# Patient Record
Sex: MALE | Race: WHITE | NOT HISPANIC OR LATINO
[De-identification: names, ages, dates, MRNs, and addresses within clinical notes are randomized per-mention and may not be internally consistent; named-entity substitution may affect disease eponyms.]

---

## 2017-01-09 ENCOUNTER — RX RENEWAL (OUTPATIENT)
Age: 82
End: 2017-01-09

## 2017-01-12 ENCOUNTER — APPOINTMENT (OUTPATIENT)
Dept: INFECTIOUS DISEASE | Facility: CLINIC | Age: 82
End: 2017-01-12

## 2017-01-12 VITALS
SYSTOLIC BLOOD PRESSURE: 148 MMHG | TEMPERATURE: 96.8 F | BODY MASS INDEX: 28.56 KG/M2 | DIASTOLIC BLOOD PRESSURE: 81 MMHG | HEIGHT: 67 IN | OXYGEN SATURATION: 99 % | HEART RATE: 65 BPM | WEIGHT: 182 LBS

## 2017-02-02 ENCOUNTER — APPOINTMENT (OUTPATIENT)
Dept: INTERNAL MEDICINE | Facility: CLINIC | Age: 82
End: 2017-02-02

## 2017-02-02 VITALS
HEIGHT: 67 IN | SYSTOLIC BLOOD PRESSURE: 143 MMHG | DIASTOLIC BLOOD PRESSURE: 76 MMHG | BODY MASS INDEX: 28.56 KG/M2 | HEART RATE: 83 BPM | OXYGEN SATURATION: 94 % | TEMPERATURE: 97.5 F | WEIGHT: 182 LBS

## 2017-02-03 LAB
25(OH)D3 SERPL-MCNC: 22 NG/ML
ALBUMIN SERPL ELPH-MCNC: 4.2 G/DL
ALP BLD-CCNC: 57 U/L
ALT SERPL-CCNC: 29 U/L
ANION GAP SERPL CALC-SCNC: 15 MMOL/L
AST SERPL-CCNC: 24 U/L
BASOPHILS # BLD AUTO: 0.08 K/UL
BASOPHILS NFR BLD AUTO: 1.2 %
BILIRUB SERPL-MCNC: 0.3 MG/DL
BUN SERPL-MCNC: 28 MG/DL
CALCIUM SERPL-MCNC: 9.5 MG/DL
CHLORIDE SERPL-SCNC: 106 MMOL/L
CHOLEST SERPL-MCNC: 158 MG/DL
CHOLEST/HDLC SERPL: 4.5 RATIO
CO2 SERPL-SCNC: 22 MMOL/L
CREAT SERPL-MCNC: 1.82 MG/DL
EOSINOPHIL # BLD AUTO: 0.23 K/UL
EOSINOPHIL NFR BLD AUTO: 3.6 %
GLUCOSE SERPL-MCNC: 158 MG/DL
HBA1C MFR BLD HPLC: 7.1 %
HCT VFR BLD CALC: 42.3 %
HDLC SERPL-MCNC: 35 MG/DL
HGB BLD-MCNC: 12.9 G/DL
IMM GRANULOCYTES NFR BLD AUTO: 0.2 %
LDLC SERPL CALC-MCNC: 89 MG/DL
LYMPHOCYTES # BLD AUTO: 1.45 K/UL
LYMPHOCYTES NFR BLD AUTO: 22.5 %
MAN DIFF?: NORMAL
MCHC RBC-ENTMCNC: 27.5 PG
MCHC RBC-ENTMCNC: 30.5 GM/DL
MCV RBC AUTO: 90.2 FL
MONOCYTES # BLD AUTO: 0.56 K/UL
MONOCYTES NFR BLD AUTO: 8.7 %
NEUTROPHILS # BLD AUTO: 4.12 K/UL
NEUTROPHILS NFR BLD AUTO: 63.8 %
PLATELET # BLD AUTO: 216 K/UL
POTASSIUM SERPL-SCNC: 4.9 MMOL/L
PROT SERPL-MCNC: 6.8 G/DL
RBC # BLD: 4.69 M/UL
RBC # FLD: 15 %
SODIUM SERPL-SCNC: 143 MMOL/L
T4 SERPL-MCNC: 5 UG/DL
TRIGL SERPL-MCNC: 172 MG/DL
TSH SERPL-ACNC: 2.39 UIU/ML
WBC # FLD AUTO: 6.45 K/UL

## 2017-02-13 ENCOUNTER — MEDICATION RENEWAL (OUTPATIENT)
Age: 82
End: 2017-02-13

## 2017-04-06 ENCOUNTER — RX RENEWAL (OUTPATIENT)
Age: 82
End: 2017-04-06

## 2017-05-01 ENCOUNTER — MEDICATION RENEWAL (OUTPATIENT)
Age: 82
End: 2017-05-01

## 2017-05-01 ENCOUNTER — RX RENEWAL (OUTPATIENT)
Age: 82
End: 2017-05-01

## 2017-05-15 ENCOUNTER — RX RENEWAL (OUTPATIENT)
Age: 82
End: 2017-05-15

## 2017-06-20 ENCOUNTER — RX RENEWAL (OUTPATIENT)
Age: 82
End: 2017-06-20

## 2017-07-17 ENCOUNTER — RX RENEWAL (OUTPATIENT)
Age: 82
End: 2017-07-17

## 2017-10-13 ENCOUNTER — APPOINTMENT (OUTPATIENT)
Dept: INTERNAL MEDICINE | Facility: CLINIC | Age: 82
End: 2017-10-13

## 2017-11-01 ENCOUNTER — LABORATORY RESULT (OUTPATIENT)
Age: 82
End: 2017-11-01

## 2017-11-01 ENCOUNTER — APPOINTMENT (OUTPATIENT)
Dept: INTERNAL MEDICINE | Facility: CLINIC | Age: 82
End: 2017-11-01
Payer: MEDICARE

## 2017-11-01 VITALS
DIASTOLIC BLOOD PRESSURE: 77 MMHG | SYSTOLIC BLOOD PRESSURE: 130 MMHG | WEIGHT: 171 LBS | BODY MASS INDEX: 26.84 KG/M2 | HEIGHT: 67 IN | TEMPERATURE: 98.5 F

## 2017-11-01 VITALS — OXYGEN SATURATION: 94 %

## 2017-11-01 VITALS — HEART RATE: 74 BPM

## 2017-11-01 LAB
BILIRUB UR QL STRIP: NEGATIVE
CLARITY UR: CLEAR
COLLECTION METHOD: NORMAL
GLUCOSE UR-MCNC: NEGATIVE
HCG UR QL: 0.2 EU/DL
HGB UR QL STRIP.AUTO: NORMAL
KETONES UR-MCNC: NEGATIVE
LEUKOCYTE ESTERASE UR QL STRIP: NORMAL
NITRITE UR QL STRIP: POSITIVE
PH UR STRIP: 6
PROT UR STRIP-MCNC: NEGATIVE
SP GR UR STRIP: 1.01

## 2017-11-01 PROCEDURE — G0439: CPT

## 2017-11-01 PROCEDURE — 36415 COLL VENOUS BLD VENIPUNCTURE: CPT

## 2017-11-01 PROCEDURE — 81002 URINALYSIS NONAUTO W/O SCOPE: CPT

## 2017-11-01 PROCEDURE — 99214 OFFICE O/P EST MOD 30 MIN: CPT | Mod: 25

## 2017-11-01 PROCEDURE — 90662 IIV NO PRSV INCREASED AG IM: CPT

## 2017-11-01 PROCEDURE — G0008: CPT

## 2017-11-02 LAB
25(OH)D3 SERPL-MCNC: 26.6 NG/ML
BASOPHILS # BLD AUTO: 0.04 K/UL
BASOPHILS NFR BLD AUTO: 0.6 %
CHOLEST SERPL-MCNC: 127 MG/DL
CHOLEST/HDLC SERPL: 4.5 RATIO
EOSINOPHIL # BLD AUTO: 0.56 K/UL
EOSINOPHIL NFR BLD AUTO: 8.9 %
HBA1C MFR BLD HPLC: 6.3 %
HCT VFR BLD CALC: 40.9 %
HDLC SERPL-MCNC: 28 MG/DL
HGB BLD-MCNC: 12.8 G/DL
IMM GRANULOCYTES NFR BLD AUTO: 0.2 %
LDLC SERPL CALC-MCNC: 66 MG/DL
LYMPHOCYTES # BLD AUTO: 0.88 K/UL
LYMPHOCYTES NFR BLD AUTO: 14 %
MAN DIFF?: NORMAL
MCHC RBC-ENTMCNC: 28.6 PG
MCHC RBC-ENTMCNC: 31.3 GM/DL
MCV RBC AUTO: 91.3 FL
MONOCYTES # BLD AUTO: 0.63 K/UL
MONOCYTES NFR BLD AUTO: 10 %
NEUTROPHILS # BLD AUTO: 4.15 K/UL
NEUTROPHILS NFR BLD AUTO: 66.3 %
PLATELET # BLD AUTO: 176 K/UL
RBC # BLD: 4.48 M/UL
RBC # FLD: 15.8 %
T4 SERPL-MCNC: 6.1 UG/DL
TRIGL SERPL-MCNC: 164 MG/DL
TSH SERPL-ACNC: 3.47 UIU/ML
WBC # FLD AUTO: 6.27 K/UL

## 2017-11-06 ENCOUNTER — MEDICATION RENEWAL (OUTPATIENT)
Age: 82
End: 2017-11-06

## 2018-01-22 ENCOUNTER — RX RENEWAL (OUTPATIENT)
Age: 83
End: 2018-01-22

## 2018-02-04 ENCOUNTER — RX RENEWAL (OUTPATIENT)
Age: 83
End: 2018-02-04

## 2018-03-01 ENCOUNTER — APPOINTMENT (OUTPATIENT)
Dept: INTERNAL MEDICINE | Facility: CLINIC | Age: 83
End: 2018-03-01
Payer: MEDICARE

## 2018-03-01 VITALS — TEMPERATURE: 97.4 F | HEIGHT: 67 IN | BODY MASS INDEX: 29.03 KG/M2 | WEIGHT: 185 LBS

## 2018-03-01 VITALS — HEART RATE: 60 BPM | SYSTOLIC BLOOD PRESSURE: 110 MMHG | DIASTOLIC BLOOD PRESSURE: 60 MMHG

## 2018-03-01 PROCEDURE — 99214 OFFICE O/P EST MOD 30 MIN: CPT

## 2018-03-19 ENCOUNTER — RX RENEWAL (OUTPATIENT)
Age: 83
End: 2018-03-19

## 2018-04-24 ENCOUNTER — MEDICATION RENEWAL (OUTPATIENT)
Age: 83
End: 2018-04-24

## 2018-05-02 ENCOUNTER — APPOINTMENT (OUTPATIENT)
Dept: INTERNAL MEDICINE | Facility: CLINIC | Age: 83
End: 2018-05-02

## 2018-05-11 ENCOUNTER — RX RENEWAL (OUTPATIENT)
Age: 83
End: 2018-05-11

## 2018-05-11 ENCOUNTER — MEDICATION RENEWAL (OUTPATIENT)
Age: 83
End: 2018-05-11

## 2018-07-10 ENCOUNTER — RX RENEWAL (OUTPATIENT)
Age: 83
End: 2018-07-10

## 2018-07-23 ENCOUNTER — RX RENEWAL (OUTPATIENT)
Age: 83
End: 2018-07-23

## 2018-07-26 ENCOUNTER — MEDICATION RENEWAL (OUTPATIENT)
Age: 83
End: 2018-07-26

## 2018-11-29 ENCOUNTER — RX RENEWAL (OUTPATIENT)
Age: 83
End: 2018-11-29

## 2018-12-07 ENCOUNTER — MEDICATION RENEWAL (OUTPATIENT)
Age: 83
End: 2018-12-07

## 2018-12-07 ENCOUNTER — APPOINTMENT (OUTPATIENT)
Dept: INTERNAL MEDICINE | Facility: CLINIC | Age: 83
End: 2018-12-07
Payer: MEDICARE

## 2018-12-07 VITALS
OXYGEN SATURATION: 96 % | TEMPERATURE: 97.8 F | BODY MASS INDEX: 28.25 KG/M2 | DIASTOLIC BLOOD PRESSURE: 80 MMHG | SYSTOLIC BLOOD PRESSURE: 120 MMHG | HEIGHT: 67 IN | HEART RATE: 60 BPM | WEIGHT: 180 LBS

## 2018-12-07 DIAGNOSIS — R21 RASH AND OTHER NONSPECIFIC SKIN ERUPTION: ICD-10-CM

## 2018-12-07 DIAGNOSIS — N39.0 URINARY TRACT INFECTION, SITE NOT SPECIFIED: ICD-10-CM

## 2018-12-07 DIAGNOSIS — Z87.898 PERSONAL HISTORY OF OTHER SPECIFIED CONDITIONS: ICD-10-CM

## 2018-12-07 LAB
BILIRUB UR QL STRIP: NEGATIVE
CLARITY UR: CLEAR
COLLECTION METHOD: NORMAL
GLUCOSE UR-MCNC: NEGATIVE
HCG UR QL: 0.2 EU/DL
HGB UR QL STRIP.AUTO: NORMAL
KETONES UR-MCNC: NEGATIVE
LEUKOCYTE ESTERASE UR QL STRIP: NORMAL
NITRITE UR QL STRIP: POSITIVE
PH UR STRIP: 5
PROT UR STRIP-MCNC: NEGATIVE
SP GR UR STRIP: 1.01

## 2018-12-07 PROCEDURE — 36415 COLL VENOUS BLD VENIPUNCTURE: CPT

## 2018-12-07 PROCEDURE — 99214 OFFICE O/P EST MOD 30 MIN: CPT | Mod: 25

## 2018-12-07 PROCEDURE — 81002 URINALYSIS NONAUTO W/O SCOPE: CPT

## 2018-12-07 PROCEDURE — G0439: CPT

## 2018-12-07 RX ORDER — GLYCOPYRROLATE AND FORMOTEROL FUMARATE 9; 4.8 UG/1; UG/1
9-4.8 AEROSOL, METERED RESPIRATORY (INHALATION) TWICE DAILY
Qty: 1 | Refills: 0 | Status: DISCONTINUED | OUTPATIENT
Start: 2018-03-01 | End: 2018-12-07

## 2018-12-07 RX ORDER — NYSTATIN 100000 [USP'U]/G
100000 POWDER TOPICAL 3 TIMES DAILY
Qty: 1 | Refills: 11 | Status: DISCONTINUED | COMMUNITY
Start: 2017-11-01 | End: 2018-12-07

## 2018-12-07 NOTE — PLAN
[FreeTextEntry1] : Medicare Annual\par vaccine up to date\par aged out of other health maintenance\par \par \par Dementia progressive\par diabetes on med , well controlled check blood\par ashd stable\par cholesterol on med to check\par f/u cardiolgist

## 2018-12-07 NOTE — REVIEW OF SYSTEMS
[Fever] : no fever [Night Sweats] : no night sweats [Discharge] : no discharge [Vision Problems] : no vision problems [Earache] : no earache [Nasal Discharge] : no nasal discharge [Chest Pain] : no chest pain [Orthopena] : no orthopnea [Shortness Of Breath] : no shortness of breath [Wheezing] : no wheezing [Abdominal Pain] : no abdominal pain [Vomiting] : no vomiting [Dysuria] : no dysuria [Hematuria] : no hematuria [Joint Pain] : joint pain [Back Pain] : back pain [Joint Swelling] : joint swelling [Confusion] : confusion [Unsteady Walk] : ataxia [Memory Loss] : memory loss

## 2018-12-07 NOTE — HISTORY OF PRESENT ILLNESS
[FreeTextEntry1] : Medicare Annual [de-identified] : Medicare Annual\par had all vaccine\par aged out of other health mainteance\par \par \par diabetes on med\par no opth?\par ashd see Dr Mann\par see podiatry\par history of stent\par Walks with walker\par

## 2018-12-07 NOTE — PHYSICAL EXAM
[No Acute Distress] : no acute distress [Well Nourished] : well nourished [Normal Outer Ear/Nose] : the outer ears and nose were normal in appearance [Normal Oropharynx] : the oropharynx was normal [No JVD] : no jugular venous distention [Supple] : supple [No Respiratory Distress] : no respiratory distress  [Clear to Auscultation] : lungs were clear to auscultation bilaterally [Normal Rate] : normal rate  [Regular Rhythm] : with a regular rhythm [No Edema] : there was no peripheral edema [No Extremity Clubbing/Cyanosis] : no extremity clubbing/cyanosis [Soft] : abdomen soft [No HSM] : no HSM [Normal Bowel Sounds] : normal bowel sounds

## 2018-12-07 NOTE — HEALTH RISK ASSESSMENT
[Poor] : ~his/her~ mood as  poor [] : No [Any fall with injury in past year] : Patient reported fall with injury in the past year [1] : 2) Feeling down, depressed, or hopeless for several days (1) [[id number]] : 2 [Change in mental status noted] : Change in mental status noted [Language] : denies difficulty with language [Behavior] : difficulty with behavior [Learning/Retaining New Information] : difficulty learning/retaining new information [Handling Complex Tasks] : difficulty handling complex tasks [Reasoning] : difficulty with reasoning [Spatial Ability and Orientation] : difficulty with spatial ability and orientation [None] : None [With Family] : lives with family [College] : College [] :  [Sexually Active] : sexually active [Feels Safe at Home] : Feels safe at home [Fully functional (bathing, dressing, toileting, transferring, walking, feeding)] : Fully functional (bathing, dressing, toileting, transferring, walking, feeding) [Fully functional (using the telephone, shopping, preparing meals, housekeeping, doing laundry, using] : Fully functional and needs no help or supervision to perform IADLs (using the telephone, shopping, preparing meals, housekeeping, doing laundry, using transportation, managing medications and managing finances) [Reports changes in hearing] : Reports no changes in hearing [Reports changes in vision] : Reports no changes in vision [Reports changes in dental health] : Reports no changes in dental health [Smoke Detector] : smoke detector [Carbon Monoxide Detector] : carbon monoxide detector [Guns at Home] : no guns at home [Safety elements used in home] : safety elements used in home [Seat Belt] :  uses seat belt

## 2018-12-08 ENCOUNTER — TRANSCRIPTION ENCOUNTER (OUTPATIENT)
Age: 83
End: 2018-12-08

## 2018-12-08 LAB
25(OH)D3 SERPL-MCNC: 24.3 NG/ML
ALBUMIN SERPL ELPH-MCNC: 4.2 G/DL
ALP BLD-CCNC: 55 U/L
ALT SERPL-CCNC: 23 U/L
ANION GAP SERPL CALC-SCNC: 10 MMOL/L
AST SERPL-CCNC: 20 U/L
BASOPHILS # BLD AUTO: 0.05 K/UL
BASOPHILS NFR BLD AUTO: 0.7 %
BILIRUB SERPL-MCNC: 0.3 MG/DL
BUN SERPL-MCNC: 33 MG/DL
CALCIUM SERPL-MCNC: 9.1 MG/DL
CHLORIDE SERPL-SCNC: 108 MMOL/L
CHOLEST SERPL-MCNC: 130 MG/DL
CHOLEST/HDLC SERPL: 3.7 RATIO
CO2 SERPL-SCNC: 24 MMOL/L
CREAT SERPL-MCNC: 1.61 MG/DL
EOSINOPHIL # BLD AUTO: 0.32 K/UL
EOSINOPHIL NFR BLD AUTO: 4.7 %
GLUCOSE SERPL-MCNC: 87 MG/DL
HBA1C MFR BLD HPLC: 6.1 %
HCT VFR BLD CALC: 36.1 %
HDLC SERPL-MCNC: 35 MG/DL
HGB BLD-MCNC: 11.2 G/DL
IMM GRANULOCYTES NFR BLD AUTO: 0.3 %
LDLC SERPL CALC-MCNC: 75 MG/DL
LYMPHOCYTES # BLD AUTO: 1.05 K/UL
LYMPHOCYTES NFR BLD AUTO: 15.3 %
MAN DIFF?: NORMAL
MCHC RBC-ENTMCNC: 26.6 PG
MCHC RBC-ENTMCNC: 31 GM/DL
MCV RBC AUTO: 85.7 FL
MONOCYTES # BLD AUTO: 0.7 K/UL
MONOCYTES NFR BLD AUTO: 10.2 %
NEUTROPHILS # BLD AUTO: 4.72 K/UL
NEUTROPHILS NFR BLD AUTO: 68.8 %
PLATELET # BLD AUTO: 213 K/UL
POTASSIUM SERPL-SCNC: 4.9 MMOL/L
PROT SERPL-MCNC: 6.3 G/DL
RBC # BLD: 4.21 M/UL
RBC # FLD: 16.2 %
SODIUM SERPL-SCNC: 142 MMOL/L
T4 SERPL-MCNC: 5 UG/DL
TRIGL SERPL-MCNC: 100 MG/DL
TSH SERPL-ACNC: 2.19 UIU/ML
WBC # FLD AUTO: 6.86 K/UL

## 2018-12-09 ENCOUNTER — MEDICATION RENEWAL (OUTPATIENT)
Age: 83
End: 2018-12-09

## 2018-12-09 LAB — NT-PROBNP SERPL-MCNC: 827 PG/ML

## 2019-01-09 ENCOUNTER — RX RENEWAL (OUTPATIENT)
Age: 84
End: 2019-01-09

## 2019-01-22 ENCOUNTER — RX RENEWAL (OUTPATIENT)
Age: 84
End: 2019-01-22

## 2019-01-23 ENCOUNTER — APPOINTMENT (OUTPATIENT)
Dept: OTOLARYNGOLOGY | Facility: CLINIC | Age: 84
End: 2019-01-23
Payer: MEDICARE

## 2019-01-23 VITALS
WEIGHT: 180 LBS | SYSTOLIC BLOOD PRESSURE: 118 MMHG | HEART RATE: 57 BPM | DIASTOLIC BLOOD PRESSURE: 65 MMHG | BODY MASS INDEX: 28.25 KG/M2 | HEIGHT: 67 IN

## 2019-01-23 DIAGNOSIS — R09.81 NASAL CONGESTION: ICD-10-CM

## 2019-01-23 DIAGNOSIS — J34.89 OTHER SPECIFIED DISORDERS OF NOSE AND NASAL SINUSES: ICD-10-CM

## 2019-01-23 PROCEDURE — 92557 COMPREHENSIVE HEARING TEST: CPT

## 2019-01-23 PROCEDURE — G0268 REMOVAL OF IMPACTED WAX MD: CPT

## 2019-01-23 PROCEDURE — 99204 OFFICE O/P NEW MOD 45 MIN: CPT | Mod: 25

## 2019-01-23 PROCEDURE — 31231 NASAL ENDOSCOPY DX: CPT

## 2019-01-23 PROCEDURE — 92567 TYMPANOMETRY: CPT

## 2019-01-23 NOTE — ADDENDUM
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

## 2019-01-23 NOTE — HISTORY OF PRESENT ILLNESS
[de-identified] : Patient has been blasting the TV lately and has been having people repeat themselves all the time. He does not have any ear pain or pressure but has had a history of ear clogging with wax. He does not have any ringing in the ears or dizziness. He does not have any nasal congestion or runny nose right now but according to wife he has a frequent runny nose that makes him blow his nose a lot

## 2019-01-23 NOTE — PHYSICAL EXAM
[Midline] : trachea located in midline position [Normal] : no rashes [Nasal Endoscopy Performed] : nasal endoscopy was performed, see procedure section for findings

## 2019-01-23 NOTE — CONSULT LETTER
[Dear  ___] : Dear  [unfilled], [Consult Letter:] : I had the pleasure of evaluating your patient, [unfilled]. [Please see my note below.] : Please see my note below. [Consult Closing:] : Thank you very much for allowing me to participate in the care of this patient.  If you have any questions, please do not hesitate to contact me. [Sincerely,] : Sincerely, [FreeTextEntry3] : Maxi Negron MD\par [facility]\par Otolaryngology and Facial Plastics\par Associated Professor, [employer]\par

## 2019-02-04 ENCOUNTER — MEDICATION RENEWAL (OUTPATIENT)
Age: 84
End: 2019-02-04

## 2019-05-23 ENCOUNTER — MEDICATION RENEWAL (OUTPATIENT)
Age: 84
End: 2019-05-23

## 2019-06-03 ENCOUNTER — MEDICATION RENEWAL (OUTPATIENT)
Age: 84
End: 2019-06-03

## 2019-06-16 ENCOUNTER — RX RENEWAL (OUTPATIENT)
Age: 84
End: 2019-06-16

## 2019-06-21 ENCOUNTER — APPOINTMENT (OUTPATIENT)
Dept: INTERNAL MEDICINE | Facility: CLINIC | Age: 84
End: 2019-06-21
Payer: MEDICARE

## 2019-06-21 VITALS
BODY MASS INDEX: 28.56 KG/M2 | SYSTOLIC BLOOD PRESSURE: 100 MMHG | WEIGHT: 182 LBS | HEIGHT: 67 IN | DIASTOLIC BLOOD PRESSURE: 70 MMHG | TEMPERATURE: 98 F

## 2019-06-21 DIAGNOSIS — K21.9 GASTRO-ESOPHAGEAL REFLUX DISEASE W/OUT ESOPHAGITIS: ICD-10-CM

## 2019-06-21 PROCEDURE — 99214 OFFICE O/P EST MOD 30 MIN: CPT | Mod: 25

## 2019-06-21 PROCEDURE — 36415 COLL VENOUS BLD VENIPUNCTURE: CPT

## 2019-06-21 NOTE — HISTORY OF PRESENT ILLNESS
[FreeTextEntry1] : diabetes [de-identified] : diabetes and dementia\par ashd\par gait worse\par Recent visit Cardiology Dr Mann\par Recent visit Neuro Ostrofsky

## 2019-06-21 NOTE — PLAN
[FreeTextEntry1] : diabetes on med\par ashd no cp or sob\par gait poor\par under care of neuro and cardiology\par blood to check diabetes, lipids and other

## 2019-06-22 LAB
25(OH)D3 SERPL-MCNC: 21.6 NG/ML
ALBUMIN SERPL ELPH-MCNC: 4.2 G/DL
ALP BLD-CCNC: 48 U/L
ALT SERPL-CCNC: 20 U/L
ANION GAP SERPL CALC-SCNC: 11 MMOL/L
AST SERPL-CCNC: 19 U/L
BASOPHILS # BLD AUTO: 0.09 K/UL
BASOPHILS NFR BLD AUTO: 1.2 %
BILIRUB SERPL-MCNC: 0.2 MG/DL
BUN SERPL-MCNC: 26 MG/DL
CALCIUM SERPL-MCNC: 9.2 MG/DL
CHLORIDE SERPL-SCNC: 108 MMOL/L
CHOLEST SERPL-MCNC: 124 MG/DL
CHOLEST/HDLC SERPL: 4.1 RATIO
CO2 SERPL-SCNC: 24 MMOL/L
CREAT SERPL-MCNC: 1.6 MG/DL
EOSINOPHIL # BLD AUTO: 0.32 K/UL
EOSINOPHIL NFR BLD AUTO: 4.4 %
ESTIMATED AVERAGE GLUCOSE: 128 MG/DL
GLUCOSE SERPL-MCNC: 87 MG/DL
HBA1C MFR BLD HPLC: 6.1 %
HCT VFR BLD CALC: 36.9 %
HDLC SERPL-MCNC: 30 MG/DL
HGB BLD-MCNC: 11.1 G/DL
IMM GRANULOCYTES NFR BLD AUTO: 0.4 %
LDLC SERPL CALC-MCNC: 69 MG/DL
LYMPHOCYTES # BLD AUTO: 1.16 K/UL
LYMPHOCYTES NFR BLD AUTO: 15.8 %
MAN DIFF?: NORMAL
MCHC RBC-ENTMCNC: 25.6 PG
MCHC RBC-ENTMCNC: 30.1 GM/DL
MCV RBC AUTO: 85.2 FL
MONOCYTES # BLD AUTO: 0.89 K/UL
MONOCYTES NFR BLD AUTO: 12.1 %
NEUTROPHILS # BLD AUTO: 4.86 K/UL
NEUTROPHILS NFR BLD AUTO: 66.1 %
PLATELET # BLD AUTO: 212 K/UL
POTASSIUM SERPL-SCNC: 4.6 MMOL/L
PROT SERPL-MCNC: 6.5 G/DL
RBC # BLD: 4.33 M/UL
RBC # FLD: 15.8 %
SODIUM SERPL-SCNC: 143 MMOL/L
T4 FREE SERPL-MCNC: 0.9 NG/DL
TRIGL SERPL-MCNC: 126 MG/DL
TSH SERPL-ACNC: 2.96 UIU/ML
WBC # FLD AUTO: 7.35 K/UL

## 2019-07-07 ENCOUNTER — RX RENEWAL (OUTPATIENT)
Age: 84
End: 2019-07-07

## 2019-07-14 ENCOUNTER — RX RENEWAL (OUTPATIENT)
Age: 84
End: 2019-07-14

## 2019-07-17 ENCOUNTER — MEDICATION RENEWAL (OUTPATIENT)
Age: 84
End: 2019-07-17

## 2019-08-18 ENCOUNTER — RX RENEWAL (OUTPATIENT)
Age: 84
End: 2019-08-18

## 2019-09-25 ENCOUNTER — APPOINTMENT (OUTPATIENT)
Dept: INTERNAL MEDICINE | Facility: CLINIC | Age: 84
End: 2019-09-25
Payer: MEDICARE

## 2019-09-25 VITALS
SYSTOLIC BLOOD PRESSURE: 120 MMHG | DIASTOLIC BLOOD PRESSURE: 70 MMHG | WEIGHT: 182 LBS | TEMPERATURE: 98 F | HEIGHT: 67 IN | BODY MASS INDEX: 28.56 KG/M2

## 2019-09-25 DIAGNOSIS — B02.9 ZOSTER W/OUT COMPLICATIONS: ICD-10-CM

## 2019-09-25 PROCEDURE — 99213 OFFICE O/P EST LOW 20 MIN: CPT

## 2020-02-16 ENCOUNTER — RX RENEWAL (OUTPATIENT)
Age: 85
End: 2020-02-16

## 2020-03-13 ENCOUNTER — APPOINTMENT (OUTPATIENT)
Dept: INTERNAL MEDICINE | Facility: CLINIC | Age: 85
End: 2020-03-13
Payer: MEDICARE

## 2020-03-13 VITALS
WEIGHT: 182 LBS | DIASTOLIC BLOOD PRESSURE: 70 MMHG | BODY MASS INDEX: 28.56 KG/M2 | HEART RATE: 70 BPM | SYSTOLIC BLOOD PRESSURE: 110 MMHG | HEIGHT: 67 IN

## 2020-03-13 PROCEDURE — 36415 COLL VENOUS BLD VENIPUNCTURE: CPT

## 2020-03-13 PROCEDURE — G0439: CPT

## 2020-03-13 PROCEDURE — 99215 OFFICE O/P EST HI 40 MIN: CPT | Mod: 25

## 2020-03-13 NOTE — REVIEW OF SYSTEMS
[Fever] : no fever [Night Sweats] : no night sweats [Earache] : no earache [Nasal Discharge] : no nasal discharge [Chest Pain] : no chest pain [Orthopena] : no orthopnea [Shortness Of Breath] : no shortness of breath [Wheezing] : no wheezing [Abdominal Pain] : no abdominal pain [Vomiting] : no vomiting [Back Pain] : back pain

## 2020-03-13 NOTE — PHYSICAL EXAM
[No Acute Distress] : no acute distress [Well Nourished] : well nourished [Normal Outer Ear/Nose] : the outer ears and nose were normal in appearance [Normal Oropharynx] : the oropharynx was normal [No JVD] : no jugular venous distention [No Lymphadenopathy] : no lymphadenopathy [No Respiratory Distress] : no respiratory distress  [No Accessory Muscle Use] : no accessory muscle use [Normal Rate] : normal rate  [Regular Rhythm] : with a regular rhythm [Soft] : abdomen soft [No HSM] : no HSM

## 2020-03-13 NOTE — HISTORY OF PRESENT ILLNESS
[de-identified] : Annual \par had all vaccine\par aged out of other\par \par \par \par cough 10 days \par worse at night\par no cp\par history of stent See Dr Mann\par diabetic on med\par

## 2020-03-13 NOTE — HEALTH RISK ASSESSMENT
[Good] : ~his/her~  mood as  good [] : No [No] : No [No falls in past year] : Patient reported no falls in the past year [1] : 2) Feeling down, depressed, or hopeless for several days (1) [[id number]] : 2 [Change in mental status noted] : No change in mental status noted [Language] : denies difficulty with language [Behavior] : difficulty with behavior [Learning/Retaining New Information] : difficulty learning/retaining new information [Handling Complex Tasks] : difficulty handling complex tasks [Reasoning] : difficulty with reasoning [Spatial Ability and Orientation] : difficulty with spatial ability and orientation [None] : None [With Family] : lives with family [Retired] : retired [College] : College [] :  [Feels Safe at Home] : Feels safe at home [Fully functional (bathing, dressing, toileting, transferring, walking, feeding)] : Fully functional (bathing, dressing, toileting, transferring, walking, feeding) [Fully functional (using the telephone, shopping, preparing meals, housekeeping, doing laundry, using] : Fully functional and needs no help or supervision to perform IADLs (using the telephone, shopping, preparing meals, housekeeping, doing laundry, using transportation, managing medications and managing finances) [Reports changes in hearing] : Reports no changes in hearing [Reports changes in vision] : Reports no changes in vision [Reports normal functional visual acuity (ie: able to read med bottle)] : Reports poor functional visual acuity.  [Reports changes in dental health] : Reports no changes in dental health [Smoke Detector] : smoke detector [Carbon Monoxide Detector] : carbon monoxide detector [Guns at Home] : no guns at home [Safety elements used in home] : safety elements used in home [Seat Belt] :  uses seat belt

## 2020-03-13 NOTE — PLAN
[FreeTextEntry1] : Annual exam\par had all vaccine\par aged out of other health maintenance\par \par \par diabetes check med\par cough no evidence of chf will check pro bnp

## 2020-03-15 LAB
25(OH)D3 SERPL-MCNC: 21.3 NG/ML
ALBUMIN SERPL ELPH-MCNC: 4.2 G/DL
ALP BLD-CCNC: 57 U/L
ALT SERPL-CCNC: 14 U/L
ANION GAP SERPL CALC-SCNC: 12 MMOL/L
AST SERPL-CCNC: 17 U/L
BASOPHILS # BLD AUTO: 0.09 K/UL
BASOPHILS NFR BLD AUTO: 1.1 %
BILIRUB SERPL-MCNC: 0.2 MG/DL
BUN SERPL-MCNC: 28 MG/DL
CALCIUM SERPL-MCNC: 9 MG/DL
CHLORIDE SERPL-SCNC: 107 MMOL/L
CHOLEST SERPL-MCNC: 127 MG/DL
CHOLEST/HDLC SERPL: 4.3 RATIO
CO2 SERPL-SCNC: 22 MMOL/L
CREAT SERPL-MCNC: 1.74 MG/DL
EOSINOPHIL # BLD AUTO: 0.25 K/UL
EOSINOPHIL NFR BLD AUTO: 3.1 %
ESTIMATED AVERAGE GLUCOSE: 126 MG/DL
GLUCOSE SERPL-MCNC: 81 MG/DL
HBA1C MFR BLD HPLC: 6 %
HCT VFR BLD CALC: 35.4 %
HDLC SERPL-MCNC: 30 MG/DL
HGB BLD-MCNC: 10.2 G/DL
IMM GRANULOCYTES NFR BLD AUTO: 0.4 %
LDLC SERPL CALC-MCNC: 70 MG/DL
LYMPHOCYTES # BLD AUTO: 1.19 K/UL
LYMPHOCYTES NFR BLD AUTO: 15 %
MAN DIFF?: NORMAL
MCHC RBC-ENTMCNC: 24.9 PG
MCHC RBC-ENTMCNC: 28.8 GM/DL
MCV RBC AUTO: 86.3 FL
MONOCYTES # BLD AUTO: 0.89 K/UL
MONOCYTES NFR BLD AUTO: 11.2 %
NEUTROPHILS # BLD AUTO: 5.49 K/UL
NEUTROPHILS NFR BLD AUTO: 69.2 %
NT-PROBNP SERPL-MCNC: 1520 PG/ML
PLATELET # BLD AUTO: 262 K/UL
POTASSIUM SERPL-SCNC: 4.8 MMOL/L
PROT SERPL-MCNC: 6.6 G/DL
RBC # BLD: 4.1 M/UL
RBC # FLD: 16.9 %
SODIUM SERPL-SCNC: 141 MMOL/L
T4 FREE SERPL-MCNC: 1 NG/DL
TRIGL SERPL-MCNC: 138 MG/DL
TSH SERPL-ACNC: 3.08 UIU/ML
WBC # FLD AUTO: 7.94 K/UL

## 2020-04-12 ENCOUNTER — RX RENEWAL (OUTPATIENT)
Age: 85
End: 2020-04-12

## 2020-04-24 ENCOUNTER — APPOINTMENT (OUTPATIENT)
Dept: INTERNAL MEDICINE | Facility: CLINIC | Age: 85
End: 2020-04-24
Payer: MEDICARE

## 2020-04-24 VITALS — DIASTOLIC BLOOD PRESSURE: 68 MMHG | SYSTOLIC BLOOD PRESSURE: 128 MMHG | HEART RATE: 68 BPM

## 2020-04-24 DIAGNOSIS — Z87.898 PERSONAL HISTORY OF OTHER SPECIFIED CONDITIONS: ICD-10-CM

## 2020-04-24 DIAGNOSIS — R06.2 WHEEZING: ICD-10-CM

## 2020-04-24 PROCEDURE — 99213 OFFICE O/P EST LOW 20 MIN: CPT | Mod: 95

## 2020-04-24 NOTE — REVIEW OF SYSTEMS
[Chest Pain] : no chest pain [Lower Ext Edema] : no lower extremity edema [Shortness Of Breath] : shortness of breath [Orthopena] : orthopnea [Abdominal Pain] : no abdominal pain [Vomiting] : no vomiting [Wheezing] : wheezing [Negative] : Constitutional

## 2020-04-24 NOTE — HISTORY OF PRESENT ILLNESS
[Medical Office: ([facility])___] : at the medical office located in  [Home] : at home, [unfilled] , at the time of the visit. [Spouse] : spouse [Patient] : the patient [FreeTextEntry1] : follow up [FreeTextEntry2] : and wife [de-identified] : Oral consent given\par Had Wheezing given lasix\par better\par still sob\par no chest pain\par sugars good\par bp 128/70 and pulse 68\par using walker\par dementia stable\par  remove dressing on neck on Sunday. If drainage occurs from either incision, place dry dressing over areas.

## 2020-04-24 NOTE — PLAN
[FreeTextEntry1] : Wheezing  chf or other\par will try increase atenolol to 25 bid\par add imdur\par coontinue lasix 20\par warned about side effects of imdur\par discussed diabees and demtnia

## 2020-04-27 RX ORDER — ISOSORBIDE MONONITRATE 30 MG/1
30 TABLET, EXTENDED RELEASE ORAL
Qty: 30 | Refills: 1 | Status: DISCONTINUED | COMMUNITY
Start: 2020-04-24 | End: 2020-04-27

## 2020-05-12 ENCOUNTER — NON-APPOINTMENT (OUTPATIENT)
Age: 85
End: 2020-05-12

## 2020-05-18 ENCOUNTER — EMERGENCY (EMERGENCY)
Facility: HOSPITAL | Age: 85
LOS: 1 days | Discharge: HOME | End: 2020-05-18
Attending: EMERGENCY MEDICINE | Admitting: EMERGENCY MEDICINE
Payer: MEDICARE

## 2020-05-18 VITALS
OXYGEN SATURATION: 100 % | HEART RATE: 75 BPM | TEMPERATURE: 98 F | DIASTOLIC BLOOD PRESSURE: 67 MMHG | RESPIRATION RATE: 16 BRPM | SYSTOLIC BLOOD PRESSURE: 131 MMHG

## 2020-05-18 LAB
ALBUMIN SERPL ELPH-MCNC: 3.9 G/DL — SIGNIFICANT CHANGE UP (ref 3.3–5)
ALP SERPL-CCNC: 52 U/L — SIGNIFICANT CHANGE UP (ref 40–120)
ALT FLD-CCNC: 13 U/L — SIGNIFICANT CHANGE UP (ref 4–41)
ANION GAP SERPL CALC-SCNC: 11 MMO/L — SIGNIFICANT CHANGE UP (ref 7–14)
APTT BLD: 31.9 SEC — SIGNIFICANT CHANGE UP (ref 27.5–36.3)
AST SERPL-CCNC: 15 U/L — SIGNIFICANT CHANGE UP (ref 4–40)
BASE EXCESS BLDV CALC-SCNC: -1.9 MMOL/L — SIGNIFICANT CHANGE UP
BASOPHILS # BLD AUTO: 0.07 K/UL — SIGNIFICANT CHANGE UP (ref 0–0.2)
BASOPHILS NFR BLD AUTO: 1 % — SIGNIFICANT CHANGE UP (ref 0–2)
BILIRUB SERPL-MCNC: 0.2 MG/DL — SIGNIFICANT CHANGE UP (ref 0.2–1.2)
BLOOD GAS VENOUS - CREATININE: 1.88 MG/DL — HIGH (ref 0.5–1.3)
BLOOD GAS VENOUS - FIO2: 21 — SIGNIFICANT CHANGE UP
BUN SERPL-MCNC: 36 MG/DL — HIGH (ref 7–23)
CALCIUM SERPL-MCNC: 9 MG/DL — SIGNIFICANT CHANGE UP (ref 8.4–10.5)
CHLORIDE BLDV-SCNC: 111 MMOL/L — HIGH (ref 96–108)
CHLORIDE SERPL-SCNC: 109 MMOL/L — HIGH (ref 98–107)
CO2 SERPL-SCNC: 22 MMOL/L — SIGNIFICANT CHANGE UP (ref 22–31)
CREAT SERPL-MCNC: 1.95 MG/DL — HIGH (ref 0.5–1.3)
EOSINOPHIL # BLD AUTO: 0.34 K/UL — SIGNIFICANT CHANGE UP (ref 0–0.5)
EOSINOPHIL NFR BLD AUTO: 4.8 % — SIGNIFICANT CHANGE UP (ref 0–6)
GAS PNL BLDV: 143 MMOL/L — SIGNIFICANT CHANGE UP (ref 136–146)
GLUCOSE BLDV-MCNC: 103 MG/DL — HIGH (ref 70–99)
GLUCOSE SERPL-MCNC: 112 MG/DL — HIGH (ref 70–99)
HCO3 BLDV-SCNC: 22 MMOL/L — SIGNIFICANT CHANGE UP (ref 20–27)
HCT VFR BLD CALC: 30.8 % — LOW (ref 39–50)
HCT VFR BLDV CALC: 29.5 % — LOW (ref 39–51)
HGB BLD-MCNC: 9.2 G/DL — LOW (ref 13–17)
HGB BLDV-MCNC: 9.5 G/DL — LOW (ref 13–17)
IMM GRANULOCYTES NFR BLD AUTO: 0.3 % — SIGNIFICANT CHANGE UP (ref 0–1.5)
INR BLD: 1.2 — HIGH (ref 0.88–1.17)
LACTATE BLDV-MCNC: 1.6 MMOL/L — SIGNIFICANT CHANGE UP (ref 0.5–2)
LYMPHOCYTES # BLD AUTO: 0.99 K/UL — LOW (ref 1–3.3)
LYMPHOCYTES # BLD AUTO: 14 % — SIGNIFICANT CHANGE UP (ref 13–44)
MCHC RBC-ENTMCNC: 23.5 PG — LOW (ref 27–34)
MCHC RBC-ENTMCNC: 29.9 % — LOW (ref 32–36)
MCV RBC AUTO: 78.6 FL — LOW (ref 80–100)
MONOCYTES # BLD AUTO: 0.7 K/UL — SIGNIFICANT CHANGE UP (ref 0–0.9)
MONOCYTES NFR BLD AUTO: 9.9 % — SIGNIFICANT CHANGE UP (ref 2–14)
NEUTROPHILS # BLD AUTO: 4.94 K/UL — SIGNIFICANT CHANGE UP (ref 1.8–7.4)
NEUTROPHILS NFR BLD AUTO: 70 % — SIGNIFICANT CHANGE UP (ref 43–77)
NRBC # FLD: 0 K/UL — SIGNIFICANT CHANGE UP (ref 0–0)
NT-PROBNP SERPL-SCNC: 1784 PG/ML — SIGNIFICANT CHANGE UP
PCO2 BLDV: 45 MMHG — SIGNIFICANT CHANGE UP (ref 41–51)
PH BLDV: 7.33 PH — SIGNIFICANT CHANGE UP (ref 7.32–7.43)
PLATELET # BLD AUTO: 242 K/UL — SIGNIFICANT CHANGE UP (ref 150–400)
PMV BLD: 10.3 FL — SIGNIFICANT CHANGE UP (ref 7–13)
PO2 BLDV: 40 MMHG — SIGNIFICANT CHANGE UP (ref 35–40)
POTASSIUM BLDV-SCNC: 4 MMOL/L — SIGNIFICANT CHANGE UP (ref 3.4–4.5)
POTASSIUM SERPL-MCNC: 4.2 MMOL/L — SIGNIFICANT CHANGE UP (ref 3.5–5.3)
POTASSIUM SERPL-SCNC: 4.2 MMOL/L — SIGNIFICANT CHANGE UP (ref 3.5–5.3)
PROT SERPL-MCNC: 6.7 G/DL — SIGNIFICANT CHANGE UP (ref 6–8.3)
PROTHROM AB SERPL-ACNC: 13.7 SEC — HIGH (ref 9.8–13.1)
RBC # BLD: 3.92 M/UL — LOW (ref 4.2–5.8)
RBC # FLD: 17.4 % — HIGH (ref 10.3–14.5)
SAO2 % BLDV: 64.4 % — SIGNIFICANT CHANGE UP (ref 60–85)
SODIUM SERPL-SCNC: 142 MMOL/L — SIGNIFICANT CHANGE UP (ref 135–145)
TROPONIN T, HIGH SENSITIVITY: 29 NG/L — SIGNIFICANT CHANGE UP (ref ?–14)
TROPONIN T, HIGH SENSITIVITY: 30 NG/L — SIGNIFICANT CHANGE UP (ref ?–14)
WBC # BLD: 7.06 K/UL — SIGNIFICANT CHANGE UP (ref 3.8–10.5)
WBC # FLD AUTO: 7.06 K/UL — SIGNIFICANT CHANGE UP (ref 3.8–10.5)

## 2020-05-18 PROCEDURE — 71045 X-RAY EXAM CHEST 1 VIEW: CPT | Mod: 26

## 2020-05-18 PROCEDURE — 99284 EMERGENCY DEPT VISIT MOD MDM: CPT

## 2020-05-18 NOTE — ED ADULT NURSE NOTE - CHIEF COMPLAINT QUOTE
Pt. brought to [facility] ED by [facility].  Has increased shortness of breath. Saw PMD 2 weeks ago, was placed on lasix. Pt. has history of MI 5 years ago, DM, dementia. . Denies c/o pain. Tachypnea noted at triage. NAD.

## 2020-05-18 NOTE — ED ADULT NURSE NOTE - OBJECTIVE STATEMENT
pt received alert and oriented x4. pt c.o  having sob. pt states his wife called the ambulance because she noticed that he was sob. pt states he feels better and unsure why he is. respirations equal and unlabored. abd is distended. 20g placed in left forearm. labs drawn and sent. pt declines chest pain or sob at the moment. Call bell in reach, warm blanket provided, bed in lowest position, side rails up x2,safety maintained. will continue to monitor.

## 2020-05-18 NOTE — ED PROVIDER NOTE - PHYSICAL EXAMINATION
General: alert, conversant, looks well, vitals reassuring, not labored breathing  Head: atraumatic, normocephalic  Eyes: PERRL, EOMI, no scleral icterus  ENT: no epistaxis, moist mucous membranes, normal phonation, airway patent  Neck: full ROM, no midline ttp  CV: RRR, no murmurs, HDS  Pulm: lungs CTA b/l, no wheezing, no respiratory distress  GI: abd soft, non tender, no guarding/rebound/masses  Back: normal ROM, no midline ttp, no signs of trauma  Extremities: normal ROM, joints stable, distal pulses intact, no edema  Neuro: alert, oriented to self, cannot remember events from tonight, PERRL, moving all extremities   Derm: warm, dry, normal color

## 2020-05-18 NOTE — ED PROVIDER NOTE - NSFOLLOWUPINSTRUCTIONS_ED_ALL_ED_FT
The lab work and XRAY did not reveal a cause of your shortness of breath.    Please hold the LASIX for 2-3 days and follow up with Dr. Agustin this week.    Please return to ER for new or concerning symptoms.

## 2020-05-18 NOTE — ED PROVIDER NOTE - PROGRESS NOTE DETAILS
Haverty, PGY2- workup not markedly abnormal, Scr up but has been elevated in the past, pt still without sx, normal vitals, d/w Dr. JESSY Mann, confirms EKG with prior conduction delays, states has talked to wife several times in the last few months, would recommended discharge, possibly hold lasix for a few days  attempted to reach PCP, Dr. Agustin but unable to  wife called to let her know given no hard indication for admission risks of admission in times of covid outweigh discharge and close f/u with PCP, advised to stop lasix for a few days and call Dr. Agustin, stable for d/c, return precautions given Haverty, PGY2- workup not markedly abnormal, Scr up but has been elevated in the past, pt still without sx, normal vitals, d/w Dr. JESSY Mann, confirms EKG with prior conduction delays, states has talked to wife several times in the last few months, would recommended discharge, hold lasix for a few days  attempted to reach PCP, Dr. Agustin but unable to  wife called to let her know given no hard indication for admission risks of admission in times of covid outweigh discharge and close f/u with PCP, advised to stop lasix for a few days and call Dr. Agustin, stable for d/c, return precautions given

## 2020-05-18 NOTE — ED PROVIDER NOTE - PATIENT PORTAL LINK FT
You can access the FollowMyHealth Patient Portal offered by [facility] by registering at the following website: [email or web address]. By joining [employer]’s FollowMyHealth portal, you will also be able to view your health information using other applications (apps) compatible with our system.

## 2020-05-18 NOTE — ED PROVIDER NOTE - CLINICAL SUMMARY MEDICAL DECISION MAKING FREE TEXT BOX
Jean, PGY2- MDM many medical comorbidities, limited hx 2/2 to dementia, collateral from wife, breathing abnormally tonight, some sputum, no cp, no fever/chills, no falls  pt looks well, demented, normal vitals, no distress, breathing unlabored, no peripheral edema, lungs clear, non focal neuro exam  eval for ACS, pna, signs of overload, not suspicious for overwhelming bacterial infection, intracranial pathology, intraabd process  stable, will re-eval after workup complete

## 2020-05-18 NOTE — ED PROVIDER NOTE - ATTENDING CONTRIBUTION TO CARE
HPI: 83 yo M with Past Medical History of dementia, HTN, HLD, DM, CAD, TIA, falls BIB EMS for concern for SOB. Pt very limited historian. At this time has no complaints. States he feels well. Wife Usha called for collateral at [phone]. Per wife pt had labored breathing at home this evening, was not complaining of chest pain,, no fever/chills, no recent falls. Has been eating/drinking well otherwise. pt also has had cough x several weeks, was seen by PMD and given lasix which did not seem to help so they went back and pt was started on advair which led to tachycardia so PMD stopped meds and sent pt to his cardiologist who saw him 3 days ago and told EKG was stable.   PCP: Sunil Agustin  Cards: Juancho Mann  EXAM: NAD, speaking full sentences, eyes EOMI/PERRL, heart RRR without M/R/G, lungs ctab without wheezing. abd soft nontender, BLE without pitting edema.   MDM: pt with multiple medical problems including CAD that was brought in by EMS from home for coughing and SOB although pt has no complaints now. Wife was concerned and called EMS. Pt has had symptoms x wks and has been seen by his PMD x 2 and his cards x 1 3 days ago. Will work up for ACS vs CHF but likely seasonal allergies. Unlikely covid as no fever, chills or other symptoms per wife. Will contact PMD and cards for further recs.

## 2020-05-18 NOTE — ED ADULT NURSE NOTE - NSIMPLEMENTINTERV_GEN_ALL_ED
Implemented All Fall Risk Interventions:  [place] to call system. Call bell, personal items and telephone within reach. Instruct patient to call for assistance. Room bathroom lighting operational. Non-slip footwear when patient is off stretcher. Physically safe environment: no spills, clutter or unnecessary equipment. Stretcher in lowest position, wheels locked, appropriate side rails in place. Provide visual cue, wrist band, yellow gown, etc. Monitor gait and stability. Monitor for mental status changes and reorient to person, place, and time. Review medications for side effects contributing to fall risk. Reinforce activity limits and safety measures with patient and family.

## 2020-05-18 NOTE — ED PROVIDER NOTE - CARE PLAN
Principal Discharge DX:	Shortness of breath  Secondary Diagnosis:	HTN (hypertension)  Secondary Diagnosis:	Hyperlipidemia

## 2020-05-18 NOTE — ED ADULT TRIAGE NOTE - CHIEF COMPLAINT QUOTE
Pt. brought to [facility] ED by [facility].  Has increased shortness of breath. Saw PMD 2 weeks ago, was placed on lasix. Pt. has history of MI 5 years ago, DM, dementia. . Denies c/o pain. Tachypnea noted at triage. NAD.

## 2020-05-18 NOTE — ED PROVIDER NOTE - PMH
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

## 2020-07-02 ENCOUNTER — APPOINTMENT (OUTPATIENT)
Dept: INTERNAL MEDICINE | Facility: CLINIC | Age: 85
End: 2020-07-02
Payer: MEDICARE

## 2020-07-02 ENCOUNTER — RX RENEWAL (OUTPATIENT)
Age: 85
End: 2020-07-02

## 2020-07-02 VITALS
SYSTOLIC BLOOD PRESSURE: 136 MMHG | HEIGHT: 67 IN | OXYGEN SATURATION: 96 % | TEMPERATURE: 97.9 F | DIASTOLIC BLOOD PRESSURE: 67 MMHG | WEIGHT: 178 LBS | BODY MASS INDEX: 27.94 KG/M2 | HEART RATE: 74 BPM

## 2020-07-02 DIAGNOSIS — R05 COUGH: ICD-10-CM

## 2020-07-02 PROCEDURE — 36415 COLL VENOUS BLD VENIPUNCTURE: CPT

## 2020-07-02 PROCEDURE — 99214 OFFICE O/P EST MOD 30 MIN: CPT | Mod: 25

## 2020-07-02 NOTE — PHYSICAL EXAM
[No Acute Distress] : no acute distress [Well Nourished] : well nourished [Normal Outer Ear/Nose] : the outer ears and nose were normal in appearance [Normal Oropharynx] : the oropharynx was normal [No JVD] : no jugular venous distention [No Lymphadenopathy] : no lymphadenopathy [No Accessory Muscle Use] : no accessory muscle use [No Respiratory Distress] : no respiratory distress  [Normal Rate] : normal rate  [Regular Rhythm] : with a regular rhythm [Soft] : abdomen soft [de-identified] : trqce edema [No HSM] : no HSM [FreeTextEntry1] : guaiac neg

## 2020-07-02 NOTE — HISTORY OF PRESENT ILLNESS
[FreeTextEntry1] : anemia and cough [de-identified] : slow anemia\par low mcv?\par no obvious bleeding\par big toe issue\par worried about gout\par cough and sob better on flovent

## 2020-07-02 NOTE — PLAN
[FreeTextEntry1] : check blood count and iron\par start slo fe\par repeat 6 week'full blood\par check uric acid and diabetes\par continue flovent

## 2020-07-05 LAB
ALBUMIN SERPL ELPH-MCNC: 4.2 G/DL
ALP BLD-CCNC: 55 U/L
ALT SERPL-CCNC: 13 U/L
ANION GAP SERPL CALC-SCNC: 12 MMOL/L
AST SERPL-CCNC: 14 U/L
BACTERIA UR CULT: NORMAL
BASOPHILS # BLD AUTO: 0.07 K/UL
BASOPHILS NFR BLD AUTO: 0.9 %
BILIRUB SERPL-MCNC: 0.3 MG/DL
BUN SERPL-MCNC: 31 MG/DL
CALCIUM SERPL-MCNC: 8.9 MG/DL
CHLORIDE SERPL-SCNC: 110 MMOL/L
CHOLEST SERPL-MCNC: 101 MG/DL
CHOLEST/HDLC SERPL: 3.6 RATIO
CO2 SERPL-SCNC: 21 MMOL/L
CREAT SERPL-MCNC: 1.75 MG/DL
EOSINOPHIL # BLD AUTO: 0.33 K/UL
EOSINOPHIL NFR BLD AUTO: 4.5 %
ESTIMATED AVERAGE GLUCOSE: 126 MG/DL
FERRITIN SERPL-MCNC: 14 NG/ML
FOLATE SERPL-MCNC: 5.7 NG/ML
GLUCOSE SERPL-MCNC: 100 MG/DL
HBA1C MFR BLD HPLC: 6 %
HCT VFR BLD CALC: 34.5 %
HDLC SERPL-MCNC: 28 MG/DL
HGB BLD-MCNC: 9.7 G/DL
IMM GRANULOCYTES NFR BLD AUTO: 0.3 %
IRON SATN MFR SERPL: 6 %
IRON SERPL-MCNC: 24 UG/DL
LDLC SERPL CALC-MCNC: 58 MG/DL
LYMPHOCYTES # BLD AUTO: 1.16 K/UL
LYMPHOCYTES NFR BLD AUTO: 15.7 %
MAN DIFF?: NORMAL
MCHC RBC-ENTMCNC: 22.6 PG
MCHC RBC-ENTMCNC: 28.1 GM/DL
MCV RBC AUTO: 80.4 FL
MONOCYTES # BLD AUTO: 0.86 K/UL
MONOCYTES NFR BLD AUTO: 11.6 %
NEUTROPHILS # BLD AUTO: 4.97 K/UL
NEUTROPHILS NFR BLD AUTO: 67 %
PLATELET # BLD AUTO: 224 K/UL
POTASSIUM SERPL-SCNC: 4.6 MMOL/L
PROT SERPL-MCNC: 6.5 G/DL
RBC # BLD: 4.29 M/UL
RBC # FLD: 19.3 %
SODIUM SERPL-SCNC: 144 MMOL/L
TIBC SERPL-MCNC: 410 UG/DL
TRIGL SERPL-MCNC: 73 MG/DL
TSH SERPL-ACNC: 2.11 UIU/ML
UIBC SERPL-MCNC: 386 UG/DL
URATE SERPL-MCNC: 6.9 MG/DL
VIT B12 SERPL-MCNC: 365 PG/ML
WBC # FLD AUTO: 7.41 K/UL

## 2020-07-13 ENCOUNTER — RX RENEWAL (OUTPATIENT)
Age: 85
End: 2020-07-13

## 2020-07-15 ENCOUNTER — RX RENEWAL (OUTPATIENT)
Age: 85
End: 2020-07-15

## 2020-08-11 ENCOUNTER — LABORATORY RESULT (OUTPATIENT)
Age: 85
End: 2020-08-11

## 2020-08-12 ENCOUNTER — RX RENEWAL (OUTPATIENT)
Age: 85
End: 2020-08-12

## 2020-08-13 LAB
ALBUMIN SERPL ELPH-MCNC: 4.3 G/DL
ALP BLD-CCNC: 57 U/L
ALT SERPL-CCNC: 23 U/L
ANION GAP SERPL CALC-SCNC: 16 MMOL/L
AST SERPL-CCNC: 21 U/L
BASOPHILS # BLD AUTO: 0.08 K/UL
BASOPHILS NFR BLD AUTO: 1 %
BILIRUB SERPL-MCNC: 0.3 MG/DL
BUN SERPL-MCNC: 27 MG/DL
CALCIUM SERPL-MCNC: 9.1 MG/DL
CHLORIDE SERPL-SCNC: 107 MMOL/L
CHOLEST SERPL-MCNC: 119 MG/DL
CHOLEST/HDLC SERPL: 3.9 RATIO
CO2 SERPL-SCNC: 20 MMOL/L
CREAT SERPL-MCNC: 1.82 MG/DL
EOSINOPHIL # BLD AUTO: 0.33 K/UL
EOSINOPHIL NFR BLD AUTO: 4.1 %
ESTIMATED AVERAGE GLUCOSE: 108 MG/DL
GLUCOSE SERPL-MCNC: 93 MG/DL
HBA1C MFR BLD HPLC: 5.4 %
HCT VFR BLD CALC: 41.1 %
HDLC SERPL-MCNC: 31 MG/DL
HGB BLD-MCNC: 12.1 G/DL
IMM GRANULOCYTES NFR BLD AUTO: 0.4 %
LDLC SERPL CALC-MCNC: 65 MG/DL
LYMPHOCYTES # BLD AUTO: 1.25 K/UL
LYMPHOCYTES NFR BLD AUTO: 15.4 %
MAN DIFF?: NORMAL
MCHC RBC-ENTMCNC: 25.2 PG
MCHC RBC-ENTMCNC: 29.4 GM/DL
MCV RBC AUTO: 85.4 FL
MONOCYTES # BLD AUTO: 0.83 K/UL
MONOCYTES NFR BLD AUTO: 10.2 %
NEUTROPHILS # BLD AUTO: 5.62 K/UL
NEUTROPHILS NFR BLD AUTO: 68.9 %
PLATELET # BLD AUTO: 207 K/UL
POTASSIUM SERPL-SCNC: 4.9 MMOL/L
PROT SERPL-MCNC: 6.4 G/DL
RBC # BLD: 4.81 M/UL
RBC # FLD: 26.5 %
SODIUM SERPL-SCNC: 142 MMOL/L
T4 FREE SERPL-MCNC: 1 NG/DL
TRIGL SERPL-MCNC: 118 MG/DL
TSH SERPL-ACNC: 3.21 UIU/ML
WBC # FLD AUTO: 8.14 K/UL

## 2020-08-16 ENCOUNTER — RX RENEWAL (OUTPATIENT)
Age: 85
End: 2020-08-16

## 2020-08-21 ENCOUNTER — RX RENEWAL (OUTPATIENT)
Age: 85
End: 2020-08-21

## 2020-08-25 RX ORDER — BLOOD-GLUCOSE METER
W/DEVICE EACH MISCELLANEOUS
Qty: 1 | Refills: 0 | Status: ACTIVE | COMMUNITY
Start: 2020-08-25 | End: 1900-01-01

## 2020-09-22 ENCOUNTER — NON-APPOINTMENT (OUTPATIENT)
Age: 85
End: 2020-09-22

## 2020-09-25 ENCOUNTER — NON-APPOINTMENT (OUTPATIENT)
Age: 85
End: 2020-09-25

## 2020-09-25 ENCOUNTER — APPOINTMENT (OUTPATIENT)
Dept: CARDIOLOGY | Facility: CLINIC | Age: 85
End: 2020-09-25
Payer: MEDICARE

## 2020-09-25 VITALS
WEIGHT: 178 LBS | OXYGEN SATURATION: 100 % | SYSTOLIC BLOOD PRESSURE: 130 MMHG | DIASTOLIC BLOOD PRESSURE: 78 MMHG | HEIGHT: 67 IN | HEART RATE: 60 BPM | BODY MASS INDEX: 27.94 KG/M2

## 2020-09-25 PROCEDURE — 93000 ELECTROCARDIOGRAM COMPLETE: CPT

## 2020-09-25 PROCEDURE — 99204 OFFICE O/P NEW MOD 45 MIN: CPT

## 2020-09-25 RX ORDER — SULFAMETHOXAZOLE AND TRIMETHOPRIM 400; 80 MG/1; MG/1
400-80 TABLET ORAL
Qty: 10 | Refills: 1 | Status: DISCONTINUED | COMMUNITY
Start: 2020-07-02 | End: 2020-09-25

## 2020-09-25 RX ORDER — IPRATROPIUM BROMIDE 21 UG/1
0.03 SPRAY NASAL
Qty: 1 | Refills: 2 | Status: DISCONTINUED | COMMUNITY
Start: 2019-01-23 | End: 2020-09-25

## 2020-09-25 RX ORDER — POTASSIUM CHLORIDE 750 MG/1
10 CAPSULE, EXTENDED RELEASE ORAL DAILY
Qty: 90 | Refills: 3 | Status: DISCONTINUED | COMMUNITY
Start: 2020-04-22 | End: 2020-09-25

## 2020-09-25 RX ORDER — LOSARTAN POTASSIUM 100 MG/1
100 TABLET, FILM COATED ORAL
Qty: 90 | Refills: 3 | Status: DISCONTINUED | COMMUNITY
Start: 2018-07-26 | End: 2020-09-25

## 2020-09-25 RX ORDER — KETOCONAZOLE 20 MG/G
2 CREAM TOPICAL
Qty: 60 | Refills: 0 | Status: DISCONTINUED | COMMUNITY
Start: 2018-11-28 | End: 2020-09-25

## 2020-09-25 RX ORDER — FAMCICLOVIR 250 MG/1
250 TABLET, FILM COATED ORAL 3 TIMES DAILY
Qty: 21 | Refills: 0 | Status: DISCONTINUED | COMMUNITY
Start: 2019-09-25 | End: 2020-09-25

## 2020-09-25 RX ORDER — FUROSEMIDE 20 MG/1
20 TABLET ORAL DAILY
Qty: 90 | Refills: 3 | Status: DISCONTINUED | COMMUNITY
Start: 2020-04-22 | End: 2020-09-25

## 2020-09-25 RX ORDER — METHYLPREDNISOLONE 4 MG/1
4 TABLET ORAL
Qty: 21 | Refills: 1 | Status: DISCONTINUED | COMMUNITY
Start: 2020-05-18 | End: 2020-09-25

## 2020-09-25 RX ORDER — BLOOD SUGAR DIAGNOSTIC
STRIP MISCELLANEOUS
Qty: 100 | Refills: 2 | Status: DISCONTINUED | COMMUNITY
Start: 2017-01-09 | End: 2020-09-25

## 2020-09-25 RX ORDER — FLUTICASONE PROPIONATE AND SALMETEROL 250; 50 UG/1; UG/1
250-50 POWDER RESPIRATORY (INHALATION)
Qty: 1 | Refills: 5 | Status: DISCONTINUED | COMMUNITY
Start: 2020-04-27 | End: 2020-09-25

## 2020-09-25 NOTE — REASON FOR VISIT
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

## 2020-09-25 NOTE — ASSESSMENT
[FreeTextEntry1] : Ghulam ham has advanced dementia, gait disturbance status post coronary stent, frequent PVCs but is hemodynamically stable without symptoms.  He has had an iron deficiency anemia but on iron his hemoglobin and hematocrit have increased.  He does have chronic renal insufficiency\par \par 1.  Dementia\par 2.  Chronic renal insufficiency\par 3.  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  Frequent PVCs\par 5 EKG with intraventricular conduction defect\par \par Overall the patient is hemodynamically stable and functioning at a reasonable level.

## 2020-09-25 NOTE — DISCUSSION/SUMMARY
[FreeTextEntry1] : Patient will continue on his present regimen of medications.  He will continue to use a walker.  I will obtain the records from [facility] concerning in a noninvasive studies that he had.\par \par He will follow-up with me again in 4 months unless is a change in his status

## 2020-09-25 NOTE — PHYSICAL EXAM
[Normal Conjunctiva] : the conjunctiva exhibited no abnormalities [Heart Sounds] : normal S1 and S2 [Murmurs] : no murmurs present [Auscultation Breath Sounds / Voice Sounds] : lungs were clear to auscultation bilaterally [Abdomen Soft] : soft [Abdomen Tenderness] : non-tender [FreeTextEntry1] : Trace edema

## 2020-11-19 LAB
FERRITIN SERPL-MCNC: 78 NG/ML
IRON SATN MFR SERPL: 36 %
IRON SERPL-MCNC: 128 UG/DL
TIBC SERPL-MCNC: 359 UG/DL
UIBC SERPL-MCNC: 231 UG/DL

## 2020-11-21 ENCOUNTER — NON-APPOINTMENT (OUTPATIENT)
Age: 85
End: 2020-11-21

## 2020-11-25 ENCOUNTER — APPOINTMENT (OUTPATIENT)
Dept: INTERNAL MEDICINE | Facility: CLINIC | Age: 85
End: 2020-11-25
Payer: MEDICARE

## 2020-11-25 VITALS
OXYGEN SATURATION: 98 % | TEMPERATURE: 97.4 F | SYSTOLIC BLOOD PRESSURE: 132 MMHG | DIASTOLIC BLOOD PRESSURE: 87 MMHG | HEART RATE: 62 BPM

## 2020-11-25 PROCEDURE — 99214 OFFICE O/P EST MOD 30 MIN: CPT | Mod: 25

## 2020-11-25 PROCEDURE — 36415 COLL VENOUS BLD VENIPUNCTURE: CPT

## 2020-11-25 NOTE — HISTORY OF PRESENT ILLNESS
[FreeTextEntry1] : anemia [de-identified] : anemia on iron\par need\par f/u\par no source\par will not investigate\par diabetes\par dementia\par heart disease

## 2020-11-29 LAB
ALBUMIN SERPL ELPH-MCNC: 4 G/DL
ALP BLD-CCNC: 61 U/L
ALT SERPL-CCNC: 20 U/L
ANION GAP SERPL CALC-SCNC: 9 MMOL/L
AST SERPL-CCNC: 19 U/L
BASOPHILS # BLD AUTO: 0.08 K/UL
BASOPHILS NFR BLD AUTO: 1.1 %
BILIRUB SERPL-MCNC: 0.3 MG/DL
BUN SERPL-MCNC: 28 MG/DL
CALCIUM SERPL-MCNC: 9.8 MG/DL
CHLORIDE SERPL-SCNC: 106 MMOL/L
CHOLEST SERPL-MCNC: 116 MG/DL
CO2 SERPL-SCNC: 27 MMOL/L
CREAT SERPL-MCNC: 1.54 MG/DL
EOSINOPHIL # BLD AUTO: 0.3 K/UL
EOSINOPHIL NFR BLD AUTO: 4 %
ESTIMATED AVERAGE GLUCOSE: 126 MG/DL
GLUCOSE SERPL-MCNC: 100 MG/DL
HBA1C MFR BLD HPLC: 6 %
HCT VFR BLD CALC: 42.2 %
HDLC SERPL-MCNC: 29 MG/DL
HGB BLD-MCNC: 13 G/DL
IMM GRANULOCYTES NFR BLD AUTO: 0.3 %
LDLC SERPL CALC-MCNC: 67 MG/DL
LYMPHOCYTES # BLD AUTO: 1.08 K/UL
LYMPHOCYTES NFR BLD AUTO: 14.3 %
MAN DIFF?: NORMAL
MCHC RBC-ENTMCNC: 29.5 PG
MCHC RBC-ENTMCNC: 30.8 GM/DL
MCV RBC AUTO: 95.7 FL
MONOCYTES # BLD AUTO: 0.79 K/UL
MONOCYTES NFR BLD AUTO: 10.4 %
NEUTROPHILS # BLD AUTO: 5.3 K/UL
NEUTROPHILS NFR BLD AUTO: 69.9 %
NONHDLC SERPL-MCNC: 87 MG/DL
PLATELET # BLD AUTO: 224 K/UL
POTASSIUM SERPL-SCNC: 5 MMOL/L
PROT SERPL-MCNC: 6.6 G/DL
RBC # BLD: 4.41 M/UL
RBC # FLD: 16 %
SODIUM SERPL-SCNC: 142 MMOL/L
T4 FREE SERPL-MCNC: 1 NG/DL
TRIGL SERPL-MCNC: 96 MG/DL
TSH SERPL-ACNC: 3.03 UIU/ML
WBC # FLD AUTO: 7.57 K/UL

## 2020-12-06 ENCOUNTER — RX RENEWAL (OUTPATIENT)
Age: 85
End: 2020-12-06

## 2021-01-07 ENCOUNTER — RX RENEWAL (OUTPATIENT)
Age: 86
End: 2021-01-07

## 2021-02-25 ENCOUNTER — APPOINTMENT (OUTPATIENT)
Dept: CARDIOLOGY | Facility: CLINIC | Age: 86
End: 2021-02-25
Payer: MEDICARE

## 2021-02-25 ENCOUNTER — NON-APPOINTMENT (OUTPATIENT)
Age: 86
End: 2021-02-25

## 2021-02-25 VITALS
BODY MASS INDEX: 26.94 KG/M2 | OXYGEN SATURATION: 97 % | DIASTOLIC BLOOD PRESSURE: 70 MMHG | WEIGHT: 172 LBS | HEART RATE: 89 BPM | TEMPERATURE: 97.4 F | SYSTOLIC BLOOD PRESSURE: 124 MMHG

## 2021-02-25 PROCEDURE — 99072 ADDL SUPL MATRL&STAF TM PHE: CPT

## 2021-02-25 PROCEDURE — 99214 OFFICE O/P EST MOD 30 MIN: CPT

## 2021-02-25 PROCEDURE — 93000 ELECTROCARDIOGRAM COMPLETE: CPT

## 2021-02-25 NOTE — DISCUSSION/SUMMARY
[FreeTextEntry1] : The patient will continue on his present regimen of medications.  No further cardiac work-up is needed.\par \par Routine follow with me again in 4 months

## 2021-02-25 NOTE — ASSESSMENT
[FreeTextEntry1] : Ghulam ham has advanced dementia, gait disturbance status post coronary stent, frequent PVCs but is hemodynamically stable without symptoms.  He has had an iron deficiency anemia but on iron his hemoglobin and hematocrit have increased.  He does have chronic renal insufficiency\par \par 1.  Dementia with memory difficulties and ambulation difficulties\par 2.  Chronic renal insufficiency last creatinine in September 1 0.5 down from 1.8\par 3.  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  Frequent PVCs\par 5 EKG with intraventricular conduction defect\par \par Overall the patient is hemodynamically stable and functioning at a reasonable level..  There has been no significant change in his cardiac status since the last visit done

## 2021-02-25 NOTE — HISTORY OF PRESENT ILLNESS
[FreeTextEntry1] : Ghulam is 85 years old with a history of hyperlipidemia hypertension status post PTCI of the right coronary many years ago.  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  His wife takes meticulous care of him but he is quite dependent on her.  He walks with a walker and is quite unsteady on his feet\par \par His EKG is abnormal with a intraventricular conduction defect and frequent PVCs.  EKG today is unchanged\par He is on stable medications and now on Lasix 20 mg every other day\par \par Since last visit the wife relates that he is quite stable.  His major problems are related to his dementia difficulty ambulation poor memory etc.\par \par There has been no change in his cardiac status

## 2021-02-25 NOTE — PHYSICAL EXAM
[Normal Conjunctiva] : the conjunctiva exhibited no abnormalities [Auscultation Breath Sounds / Voice Sounds] : lungs were clear to auscultation bilaterally [Heart Sounds] : normal S1 and S2 [Murmurs] : no murmurs present [Abdomen Soft] : soft [Abdomen Tenderness] : non-tender [FreeTextEntry1] : Trace edema

## 2021-03-01 ENCOUNTER — RX RENEWAL (OUTPATIENT)
Age: 86
End: 2021-03-01

## 2021-03-03 ENCOUNTER — APPOINTMENT (OUTPATIENT)
Dept: INTERNAL MEDICINE | Facility: CLINIC | Age: 86
End: 2021-03-03
Payer: MEDICARE

## 2021-03-03 VITALS
TEMPERATURE: 97.5 F | BODY MASS INDEX: 27 KG/M2 | SYSTOLIC BLOOD PRESSURE: 131 MMHG | WEIGHT: 172 LBS | HEART RATE: 72 BPM | HEIGHT: 67 IN | OXYGEN SATURATION: 80 % | DIASTOLIC BLOOD PRESSURE: 87 MMHG

## 2021-03-03 DIAGNOSIS — Z95.5 PRESENCE OF CORONARY ANGIOPLASTY IMPLANT AND GRAFT: ICD-10-CM

## 2021-03-03 DIAGNOSIS — Z74.09 OTHER REDUCED MOBILITY: ICD-10-CM

## 2021-03-03 LAB
BILIRUB UR QL STRIP: NEGATIVE
GLUCOSE UR-MCNC: NEGATIVE
HCG UR QL: 0.2 EU/DL
HGB UR QL STRIP.AUTO: NORMAL
KETONES UR-MCNC: NEGATIVE
LEUKOCYTE ESTERASE UR QL STRIP: NORMAL
NITRITE UR QL STRIP: POSITIVE
PH UR STRIP: 5.5
PROT UR STRIP-MCNC: NEGATIVE
SP GR UR STRIP: 1.01

## 2021-03-03 PROCEDURE — 99214 OFFICE O/P EST MOD 30 MIN: CPT | Mod: 25

## 2021-03-03 PROCEDURE — 81003 URINALYSIS AUTO W/O SCOPE: CPT | Mod: QW

## 2021-03-03 PROCEDURE — 36415 COLL VENOUS BLD VENIPUNCTURE: CPT

## 2021-03-03 PROCEDURE — 99072 ADDL SUPL MATRL&STAF TM PHE: CPT

## 2021-03-03 PROCEDURE — G0439: CPT

## 2021-03-03 NOTE — REVIEW OF SYSTEMS
[Nausea] : no nausea [Incontinence] : incontinence [Frequency] : frequency [Muscle Weakness] : muscle weakness [Back Pain] : back pain [Negative] : Respiratory

## 2021-03-03 NOTE — PHYSICAL EXAM
[No Carotid Bruits] : no carotid bruits [No Edema] : there was no peripheral edema [Normal] : soft, non-tender, non-distended, no masses palpated, no HSM and normal bowel sounds

## 2021-03-03 NOTE — HEALTH RISK ASSESSMENT
[Fair] :  ~his/her~ mood as fair [] : No [No] : No [Any fall with injury in past year] : Patient reported fall with injury in the past year [1] : 2) Feeling down, depressed, or hopeless for several days (1) [[id number]] : 2 [Change in mental status noted] : Change in mental status noted [Language] : denies difficulty with language [Behavior] : difficulty with behavior [Learning/Retaining New Information] : difficulty learning/retaining new information [Handling Complex Tasks] : difficulty handling complex tasks [Reasoning] : difficulty with reasoning [Spatial Ability and Orientation] : difficulty with spatial ability and orientation [None] : None [With Family] : lives with family [Retired] : retired [High School] : high school [] :  [Sexually Active] : not sexually active [Feels Safe at Home] : Feels safe at home [Fully functional (bathing, dressing, toileting, transferring, walking, feeding)] : Fully functional (bathing, dressing, toileting, transferring, walking, feeding) [Fully functional (using the telephone, shopping, preparing meals, housekeeping, doing laundry, using] : Fully functional and needs no help or supervision to perform IADLs (using the telephone, shopping, preparing meals, housekeeping, doing laundry, using transportation, managing medications and managing finances) [Reports changes in dental health] : Reports no changes in dental health [Smoke Detector] : smoke detector [Carbon Monoxide Detector] : carbon monoxide detector [Guns at Home] : no guns at home [Safety elements used in home] : safety elements used in home [Seat Belt] :  uses seat belt

## 2021-03-03 NOTE — HISTORY OF PRESENT ILLNESS
[de-identified] : Annual exam\par Had all vaccine and 1 covid shot\par \par dementia progressive\par gait very poor and panics over and uses poor judgemnt\par to use walker and wheelchair\par wears a diaper\par diabetes with fs 100-180\par on glimperide\par apptite good and does not miss pills\par hx of stent no cp\par history of chf with no sob or edema\par

## 2021-03-03 NOTE — PLAN
[FreeTextEntry1] : Annual exam\par had vaccine\par \par \par \par Stop glimepiride with potential of low sugar\par send correct dosage of metformin   one daily to drug store\par Doing well chf, bp\par dementia progressive gait poor to use wheelchair and walker\par \par blood to asses diabetes and chf \par

## 2021-03-04 LAB
25(OH)D3 SERPL-MCNC: 36.8 NG/ML
ALBUMIN SERPL ELPH-MCNC: 4.3 G/DL
ALP BLD-CCNC: 67 U/L
ALT SERPL-CCNC: 14 U/L
ANION GAP SERPL CALC-SCNC: 10 MMOL/L
APPEARANCE: ABNORMAL
AST SERPL-CCNC: 15 U/L
BACTERIA: ABNORMAL
BASOPHILS # BLD AUTO: 0.06 K/UL
BASOPHILS NFR BLD AUTO: 0.9 %
BILIRUB SERPL-MCNC: 0.5 MG/DL
BILIRUBIN URINE: NEGATIVE
BLOOD URINE: NORMAL
BUN SERPL-MCNC: 27 MG/DL
CALCIUM SERPL-MCNC: 9.7 MG/DL
CHLORIDE SERPL-SCNC: 102 MMOL/L
CHOLEST SERPL-MCNC: 125 MG/DL
CO2 SERPL-SCNC: 27 MMOL/L
COLOR: YELLOW
CREAT SERPL-MCNC: 1.63 MG/DL
EOSINOPHIL # BLD AUTO: 0.44 K/UL
EOSINOPHIL NFR BLD AUTO: 6.7 %
ESTIMATED AVERAGE GLUCOSE: 126 MG/DL
GLUCOSE QUALITATIVE U: NEGATIVE
GLUCOSE SERPL-MCNC: 78 MG/DL
HBA1C MFR BLD HPLC: 6 %
HCT VFR BLD CALC: 45.7 %
HDLC SERPL-MCNC: 32 MG/DL
HGB BLD-MCNC: 14.3 G/DL
HYALINE CASTS: 0 /LPF
IMM GRANULOCYTES NFR BLD AUTO: 0.5 %
KETONES URINE: NEGATIVE
LDLC SERPL CALC-MCNC: 75 MG/DL
LEUKOCYTE ESTERASE URINE: ABNORMAL
LYMPHOCYTES # BLD AUTO: 0.92 K/UL
LYMPHOCYTES NFR BLD AUTO: 14 %
MAN DIFF?: NORMAL
MCHC RBC-ENTMCNC: 30.6 PG
MCHC RBC-ENTMCNC: 31.3 GM/DL
MCV RBC AUTO: 97.6 FL
MICROSCOPIC-UA: NORMAL
MONOCYTES # BLD AUTO: 0.85 K/UL
MONOCYTES NFR BLD AUTO: 13 %
NEUTROPHILS # BLD AUTO: 4.25 K/UL
NEUTROPHILS NFR BLD AUTO: 64.9 %
NITRITE URINE: POSITIVE
NONHDLC SERPL-MCNC: 93 MG/DL
NT-PROBNP SERPL-MCNC: 4485 PG/ML
PH URINE: 6
PLATELET # BLD AUTO: 177 K/UL
POTASSIUM SERPL-SCNC: 4.7 MMOL/L
PROT SERPL-MCNC: 6.8 G/DL
PROTEIN URINE: NORMAL
RBC # BLD: 4.68 M/UL
RBC # FLD: 14.1 %
RED BLOOD CELLS URINE: 4 /HPF
SODIUM SERPL-SCNC: 140 MMOL/L
SPECIFIC GRAVITY URINE: 1.02
SQUAMOUS EPITHELIAL CELLS: 0 /HPF
T4 FREE SERPL-MCNC: 1.1 NG/DL
TRIGL SERPL-MCNC: 87 MG/DL
TSH SERPL-ACNC: 3.76 UIU/ML
URATE SERPL-MCNC: 6.8 MG/DL
UROBILINOGEN URINE: NORMAL
WBC # FLD AUTO: 6.55 K/UL
WHITE BLOOD CELLS URINE: 214 /HPF

## 2021-03-06 LAB — BACTERIA UR CULT: ABNORMAL

## 2021-03-24 ENCOUNTER — RX RENEWAL (OUTPATIENT)
Age: 86
End: 2021-03-24

## 2021-05-03 ENCOUNTER — RX RENEWAL (OUTPATIENT)
Age: 86
End: 2021-05-03

## 2021-05-14 ENCOUNTER — RX CHANGE (OUTPATIENT)
Age: 86
End: 2021-05-14

## 2021-05-14 ENCOUNTER — NON-APPOINTMENT (OUTPATIENT)
Age: 86
End: 2021-05-14

## 2021-05-14 RX ORDER — FLUTICASONE PROPIONATE 110 UG/1
110 AEROSOL, METERED RESPIRATORY (INHALATION) DAILY
Qty: 12 | Refills: 5 | Status: DISCONTINUED | COMMUNITY
Start: 2020-05-13 | End: 2021-05-14

## 2021-05-18 ENCOUNTER — APPOINTMENT (OUTPATIENT)
Dept: OTOLARYNGOLOGY | Facility: CLINIC | Age: 86
End: 2021-05-18
Payer: MEDICARE

## 2021-05-18 VITALS
TEMPERATURE: 97.3 F | DIASTOLIC BLOOD PRESSURE: 70 MMHG | SYSTOLIC BLOOD PRESSURE: 125 MMHG | BODY MASS INDEX: 25.76 KG/M2 | HEIGHT: 68 IN | WEIGHT: 170 LBS | HEART RATE: 63 BPM

## 2021-05-18 PROCEDURE — 92557 COMPREHENSIVE HEARING TEST: CPT

## 2021-05-18 PROCEDURE — 99214 OFFICE O/P EST MOD 30 MIN: CPT | Mod: 25

## 2021-05-18 PROCEDURE — G0268 REMOVAL OF IMPACTED WAX MD: CPT

## 2021-05-18 PROCEDURE — 92567 TYMPANOMETRY: CPT

## 2021-05-18 PROCEDURE — 99072 ADDL SUPL MATRL&STAF TM PHE: CPT

## 2021-05-18 NOTE — ASSESSMENT
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

## 2021-06-15 ENCOUNTER — APPOINTMENT (OUTPATIENT)
Dept: CARDIOLOGY | Facility: CLINIC | Age: 86
End: 2021-06-15
Payer: MEDICARE

## 2021-06-15 ENCOUNTER — NON-APPOINTMENT (OUTPATIENT)
Age: 86
End: 2021-06-15

## 2021-06-15 VITALS
HEART RATE: 57 BPM | DIASTOLIC BLOOD PRESSURE: 70 MMHG | SYSTOLIC BLOOD PRESSURE: 130 MMHG | HEIGHT: 68 IN | OXYGEN SATURATION: 97 % | TEMPERATURE: 97.3 F

## 2021-06-15 PROCEDURE — 99214 OFFICE O/P EST MOD 30 MIN: CPT

## 2021-06-15 PROCEDURE — 99072 ADDL SUPL MATRL&STAF TM PHE: CPT

## 2021-06-15 PROCEDURE — 93000 ELECTROCARDIOGRAM COMPLETE: CPT

## 2021-06-16 NOTE — ASSESSMENT
[FreeTextEntry1] : Ghulam Bob has advanced dementia, gait disturbance status post coronary stent, frequent PVCs but is hemodynamically stable without symptoms.  He has had an iron deficiency anemia but on iron his hemoglobin and hematocrit have increased.  He does have chronic renal insufficiency and has a markedly elevated BNP which has risen despite no change in his symptoms of shortness of breath etc. and no evidence of overt CHF\par \par 1.  Dementia with memory difficulties and ambulation difficulties\par 2.  Chronic renal insufficiency last creatinine in September 1 0.5 down from 1.8\par 3.  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  Frequent PVCs\par 5 EKG with intraventricular conduction defect\par \par Overall the patient is hemodynamically stable and functioning at a reasonable level..  There has been no significant change in his cardiac status since the last visit.\par \par I believe now he is on an increased dose of Lasix from 20-40 because of the elevated BNP\par

## 2021-06-16 NOTE — REASON FOR VISIT
[FreeTextEntry1] : Ghulam Paulino 85 years old for follow-up of his cardiac status..  His last blood work revealed some chronic renal insufficiency but his BNP had risen significantly in the absence of significant shortness of breath etc.

## 2021-06-16 NOTE — DISCUSSION/SUMMARY
[FreeTextEntry1] : 1.  Despite the absence of symptoms he does have an elevated BNP which is of concern and his symptoms may be difficult to follow.  I would suggest that he have a repeat echocardiogram and then consider whether we can change some of his medications although he is on guideline medicine right now but perhaps Entresto if his renal function holds up would be a better choice\par \par 2.  For now continue present regimen of medications.  Encouraged him to be active and exercise and he will follow-up with me in 3 months and we will try to schedule an echocardiogram at the same visit

## 2021-06-16 NOTE — HISTORY OF PRESENT ILLNESS
[FreeTextEntry1] : Ghulam is 85 years old with a history of hyperlipidemia hypertension status post PTCI of the right coronary many years ago.  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  His wife takes meticulous care of him but he is quite dependent on her.  He walks with a walker and is quite unsteady on his feet\par \par His EKG is abnormal with a intraventricular conduction defect and frequent PVCs.  \par He is on stable medications and now on Lasix 20 mg every other day\par \par On last visit the wife relates that he is quite stable.  His major problems are related to his dementia difficulty ambulation poor memory etc. he did have an elevated BNP\par \par Since that visit he has continued to do well and the wife is pleased with how he is feeling.  He denies shortness of breath or chest pain edema orthopnea PND.  He is a pleasant dementia and offers no complaints and the wife confirms that his status has been quite stable\par \par Nonetheless a BNP recently was in the thousands which was a significant increase.  His Lasix dose was increased\par \par Echocardiogram in 2018 in 2020 done at my previous office revealed mild decrease in LV function with moderate to severe inferior wall hypokinesia there was no significant valvular disease.  There was severe left atrial dilatation and concentric left ventricular hypertrophy

## 2021-08-04 ENCOUNTER — RX RENEWAL (OUTPATIENT)
Age: 86
End: 2021-08-04

## 2021-08-10 ENCOUNTER — RX CHANGE (OUTPATIENT)
Age: 86
End: 2021-08-10

## 2021-09-19 ENCOUNTER — RX RENEWAL (OUTPATIENT)
Age: 86
End: 2021-09-19

## 2021-09-22 ENCOUNTER — APPOINTMENT (OUTPATIENT)
Dept: INTERNAL MEDICINE | Facility: CLINIC | Age: 86
End: 2021-09-22
Payer: MEDICARE

## 2021-09-22 ENCOUNTER — LABORATORY RESULT (OUTPATIENT)
Age: 86
End: 2021-09-22

## 2021-09-22 VITALS
DIASTOLIC BLOOD PRESSURE: 67 MMHG | WEIGHT: 170 LBS | BODY MASS INDEX: 25.76 KG/M2 | TEMPERATURE: 98 F | HEIGHT: 68 IN | OXYGEN SATURATION: 97 % | SYSTOLIC BLOOD PRESSURE: 132 MMHG | HEART RATE: 59 BPM

## 2021-09-22 DIAGNOSIS — Z23 ENCOUNTER FOR IMMUNIZATION: ICD-10-CM

## 2021-09-22 PROCEDURE — G0008: CPT

## 2021-09-22 PROCEDURE — 90662 IIV NO PRSV INCREASED AG IM: CPT

## 2021-09-22 PROCEDURE — 99214 OFFICE O/P EST MOD 30 MIN: CPT | Mod: 25

## 2021-09-22 PROCEDURE — 36415 COLL VENOUS BLD VENIPUNCTURE: CPT

## 2021-09-22 NOTE — HISTORY OF PRESENT ILLNESS
[FreeTextEntry1] : now on lasix 20 [de-identified] : ashd\par elevated pro bnp\par history of ashd\par diabetic on med\par dementia chronic\par

## 2021-09-23 ENCOUNTER — LABORATORY RESULT (OUTPATIENT)
Age: 86
End: 2021-09-23

## 2021-09-23 LAB
ALBUMIN SERPL ELPH-MCNC: 4.4 G/DL
ALP BLD-CCNC: 51 U/L
ALT SERPL-CCNC: 19 U/L
ANION GAP SERPL CALC-SCNC: 14 MMOL/L
APPEARANCE: CLEAR
AST SERPL-CCNC: 19 U/L
BASOPHILS # BLD AUTO: 0.07 K/UL
BASOPHILS NFR BLD AUTO: 1 %
BILIRUB SERPL-MCNC: 0.4 MG/DL
BILIRUBIN URINE: NEGATIVE
BLOOD URINE: NEGATIVE
BUN SERPL-MCNC: 29 MG/DL
CALCIUM SERPL-MCNC: 9.7 MG/DL
CHLORIDE SERPL-SCNC: 105 MMOL/L
CHOLEST SERPL-MCNC: 131 MG/DL
CO2 SERPL-SCNC: 23 MMOL/L
COLOR: NORMAL
CREAT SERPL-MCNC: 1.78 MG/DL
EOSINOPHIL # BLD AUTO: 0.33 K/UL
EOSINOPHIL NFR BLD AUTO: 4.5 %
ESTIMATED AVERAGE GLUCOSE: 123 MG/DL
GLUCOSE QUALITATIVE U: NEGATIVE
GLUCOSE SERPL-MCNC: 96 MG/DL
HBA1C MFR BLD HPLC: 5.9 %
HCT VFR BLD CALC: 43.1 %
HDLC SERPL-MCNC: 31 MG/DL
HGB BLD-MCNC: 13.8 G/DL
IMM GRANULOCYTES NFR BLD AUTO: 0.3 %
KETONES URINE: NEGATIVE
LDLC SERPL CALC-MCNC: 71 MG/DL
LEUKOCYTE ESTERASE URINE: ABNORMAL
LYMPHOCYTES # BLD AUTO: 1.15 K/UL
LYMPHOCYTES NFR BLD AUTO: 15.7 %
MAN DIFF?: NORMAL
MCHC RBC-ENTMCNC: 32 GM/DL
MCHC RBC-ENTMCNC: 32.1 PG
MCV RBC AUTO: 100.2 FL
MONOCYTES # BLD AUTO: 0.66 K/UL
MONOCYTES NFR BLD AUTO: 9 %
NEUTROPHILS # BLD AUTO: 5.1 K/UL
NEUTROPHILS NFR BLD AUTO: 69.5 %
NITRITE URINE: NEGATIVE
NONHDLC SERPL-MCNC: 100 MG/DL
NT-PROBNP SERPL-MCNC: 4011 PG/ML
PH URINE: 6.5
PLATELET # BLD AUTO: 164 K/UL
POTASSIUM SERPL-SCNC: 4.6 MMOL/L
PROT SERPL-MCNC: 6.7 G/DL
PROTEIN URINE: NEGATIVE
RBC # BLD: 4.3 M/UL
RBC # FLD: 14.1 %
SODIUM SERPL-SCNC: 142 MMOL/L
SPECIFIC GRAVITY URINE: 1.01
T4 FREE SERPL-MCNC: 1 NG/DL
TRIGL SERPL-MCNC: 149 MG/DL
TSH SERPL-ACNC: 3.43 UIU/ML
UROBILINOGEN URINE: NORMAL
WBC # FLD AUTO: 7.33 K/UL

## 2021-10-19 ENCOUNTER — APPOINTMENT (OUTPATIENT)
Dept: CARDIOLOGY | Facility: CLINIC | Age: 86
End: 2021-10-19
Payer: MEDICARE

## 2021-10-19 VITALS
HEART RATE: 76 BPM | SYSTOLIC BLOOD PRESSURE: 124 MMHG | HEIGHT: 68 IN | DIASTOLIC BLOOD PRESSURE: 66 MMHG | OXYGEN SATURATION: 96 %

## 2021-10-19 DIAGNOSIS — Z95.5 PRESENCE OF CORONARY ANGIOPLASTY IMPLANT AND GRAFT: ICD-10-CM

## 2021-10-19 PROCEDURE — 99214 OFFICE O/P EST MOD 30 MIN: CPT

## 2021-10-19 PROCEDURE — 93306 TTE W/DOPPLER COMPLETE: CPT

## 2021-10-19 RX ORDER — ALCLOMETASONE DIPROPIONATE 0.5 MG/G
0.05 OINTMENT TOPICAL
Qty: 15 | Refills: 0 | Status: ACTIVE | COMMUNITY
Start: 2021-07-10

## 2021-10-19 RX ORDER — FLUOCINONIDE 0.05 MG/G
0.05 OINTMENT TOPICAL
Qty: 15 | Refills: 0 | Status: ACTIVE | COMMUNITY
Start: 2021-04-14

## 2021-10-19 NOTE — ASSESSMENT
[FreeTextEntry1] : Ghulam Bob has advanced dementia, gait disturbance status post coronary stent, frequent PVCs but is hemodynamically stable without symptoms.  He has had an iron deficiency anemia but on iron his hemoglobin and hematocrit have increased.  He does have chronic renal insufficiency and has a markedly elevated BNP which has risen despite no change in his symptoms of shortness of breath etc. and no evidence of overt CHF\par \par 1.  Dementia with memory difficulties and ambulation difficulties\par 2.  Chronic renal insufficiency last creatinine 1.78\par 3.  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  Frequent PVCs\par 5 EKG with intraventricular conduction defect\par 6.  Markedly elevated BNP-certainly is chronic renal insufficiency corresponds with this.  The elevation is probably a combination of the chronic renal insufficiency some diastolic dysfunction and systolic dysfunction\par \par The patient is free of any angina and free of significant shortness of breath despite the elevation of BNP on last blood draw.  He does not have overt CHF but probably does have a combination of diastolic and systolic heart failure chronic which is under clinical control normal (ped)...

## 2021-10-19 NOTE — HISTORY OF PRESENT ILLNESS
[FreeTextEntry1] : Ghulam is 85 years old with a history of hyperlipidemia hypertension status post PTCI of the right coronary many years ago.  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  His wife takes meticulous care of him but he is quite dependent on her.  He walks with a walker and is quite unsteady on his feet\par \par His EKG is abnormal with a intraventricular conduction defect and frequent PVCs.  \par He is on stable medications and now on Lasix 20 mg every other day\par \par On last visit the wife relates that he is quite stable.  His major problems are related to his dementia difficulty ambulation poor memory etc. he did have an elevated BNP\par \par Since that visit he has continued to do well and the wife is pleased with how he is feeling.  He denies shortness of breath or chest pain edema orthopnea PND.  He is a pleasant dementia and offers no complaints and the wife confirms that his status has been quite stable\par \par Nonetheless a BNP recently was in the thousands which was a significant increase.  His Lasix dose was increased\par \par Echocardiogram in 2018 in 2020 done at my previous office revealed mild decrease in LV function with moderate to severe inferior wall hypokinesia there was no significant valvular disease.  There was severe left atrial dilatation and concentric left ventricular hypertrophy\par \par Since last visit despite the elevation in BNP, he has been feeling extremely well.  He walked from the parking lot to my office which is a fair distance and had no shortness of breath no chest pain.  He has some occasional edema of his lower extremities but that is been under relatively good control he is on a diuretic.  He denies chest pain and he denies significant shortness of breath and he feels quite well and the wife confirms this as well\par \par Blood test September 2021\par WBC 7.33 hemoglobin 13.8 hematocrit 43.1\par BNP 4011 slightly lower than his previous BNP but still markedly elevated\par Hemoglobin A1c 5.9\par LDL 71\par Creatinine 1.78 potassium 4.6 sodium 142\par \par Echocardiogram will be performed today

## 2021-10-19 NOTE — DISCUSSION/SUMMARY
[FreeTextEntry1] : 1.  Continue present regimen of medications\par 2.  Pending on the results of the echo, I would consider starting him on an SGLT2 inhibitor such as Jardiance.  This is quite effective for patients with chronic heart failure both systolic and diastolic and in patients with chronic renal insufficiency.  The main risk is that of infection. \par \par Once the results of the echo are available, we will discuss the addition of this medication

## 2021-10-25 ENCOUNTER — RX RENEWAL (OUTPATIENT)
Age: 86
End: 2021-10-25

## 2021-12-15 ENCOUNTER — RX RENEWAL (OUTPATIENT)
Age: 86
End: 2021-12-15

## 2021-12-30 ENCOUNTER — RX RENEWAL (OUTPATIENT)
Age: 86
End: 2021-12-30

## 2022-02-22 ENCOUNTER — RX RENEWAL (OUTPATIENT)
Age: 87
End: 2022-02-22

## 2022-02-22 ENCOUNTER — NON-APPOINTMENT (OUTPATIENT)
Age: 87
End: 2022-02-22

## 2022-02-22 ENCOUNTER — APPOINTMENT (OUTPATIENT)
Dept: CARDIOLOGY | Facility: CLINIC | Age: 87
End: 2022-02-22
Payer: MEDICARE

## 2022-02-22 VITALS
DIASTOLIC BLOOD PRESSURE: 68 MMHG | SYSTOLIC BLOOD PRESSURE: 129 MMHG | HEIGHT: 68 IN | HEART RATE: 68 BPM | OXYGEN SATURATION: 99 %

## 2022-02-22 DIAGNOSIS — N39.0 URINARY TRACT INFECTION, SITE NOT SPECIFIED: ICD-10-CM

## 2022-02-22 PROCEDURE — 93000 ELECTROCARDIOGRAM COMPLETE: CPT

## 2022-02-22 PROCEDURE — 99214 OFFICE O/P EST MOD 30 MIN: CPT

## 2022-02-22 NOTE — ASSESSMENT
[FreeTextEntry1] : Ghulam Bob has advanced dementia, gait disturbance status post coronary stent, frequent PVCs but is hemodynamically stable without symptoms.  He has had an iron deficiency anemia but on iron his hemoglobin and hematocrit have increased.  He does have chronic renal insufficiency and has a markedly elevated BNP which has risen despite no change in his symptoms of shortness of breath etc. and no evidence of overt CHF.  The patient remained stable without overt CHF stable vital signs and stable EKG.  He does have at least moderate segmental LV dysfunction and diastolic dysfunction\par \par 1.  Dementia with memory difficulties and ambulation difficulties\par 2.  Chronic renal insufficiency last creatinine 1.78\par 3.  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  Frequent PVCs\par 5 EKG with intraventricular conduction defect\par 6.  Markedly elevated BNP-certainly is chronic renal insufficiency corresponds with this.  The elevation is probably a combination of the chronic renal insufficiency some diastolic dysfunction and systolic dysfunction\par 7.  Moderate systolic and moderate diastolic dysfunction\par 8.  Airways disease with intermittent wheezing improved with bronchodilators\par \par Presently the patient is hemodynamically stable

## 2022-02-22 NOTE — REASON FOR VISIT
[FreeTextEntry1] : Ghulam Bob 86 years old for follow-up of his cardiac status..  His last blood work revealed some chronic renal insufficiency but his BNP had risen significantly in the absence of significant shortness of breath etc.

## 2022-02-22 NOTE — PHYSICAL EXAM
[Normal Conjunctiva] : the conjunctiva exhibited no abnormalities [Auscultation Breath Sounds / Voice Sounds] : lungs were clear to auscultation bilaterally [Heart Sounds] : normal S1 and S2 [Murmurs] : no murmurs present [Abdomen Soft] : soft [Abdomen Tenderness] : non-tender [FreeTextEntry1] : No significant edema

## 2022-02-22 NOTE — HISTORY OF PRESENT ILLNESS
[FreeTextEntry1] : Ghulam is 65 years old with a history of hyperlipidemia hypertension status post PTCI of the right coronary many years ago.  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  His wife takes meticulous care of him but he is quite dependent on her.  He walks with a walker and is quite unsteady on his feet\par \par His EKG is abnormal with a intraventricular conduction defect and frequent PVCs.  \par He is on stable medications and now on Lasix 20 mg every other day\par \par On last visit the wife relates that he is quite stable.  His major problems are related to his dementia difficulty ambulation poor memory etc. he did have an elevated BNP\par \par Since that visit he has continued to do well and the wife is pleased with how he is feeling.  He denies shortness of breath or chest pain edema orthopnea PND.  He is a pleasant dementia and offers no complaints and the wife confirms that his status has been quite stable\par \par Nonetheless a BNP recently was in the thousands which was a significant increase.  His Lasix dose was increased\par \par Echocardiogram in 2018 and in 2020 done at my previous office revealed mild decrease in LV function with moderate to severe inferior wall hypokinesia there was no significant valvular disease.  There was severe left atrial dilatation and concentric left ventricular hypertrophy\par \par Since last visit despite the elevation in BNP, he has been feeling extremely well.  He walked from the parking lot to my office which is a fair distance and had no shortness of breath no chest pain.  He has some occasional edema of his lower extremities but that is been under relatively good control he is on a diuretic.  He denies chest pain and he denies significant shortness of breath and he feels quite well and the wife confirms this as well\par \par Blood test September 2021\par WBC 7.33 hemoglobin 13.8 hematocrit 43.1\par BNP 4011 slightly lower than his previous BNP but still markedly elevated\par Hemoglobin A1c 5.9\par LDL 71\par Creatinine 1.78 potassium 4.6 sodium 142\par \par 2D echo in September 2021 revealed moderate LV dysfunction and moderate diastolic dysfunction with segmental inferior and anterolateral hypokinesia.  This was a slow moderate decrease from his prior echoes\par \par For the elevated BNP and his somewhat decrease in LV function, he was started on Lasix 20 mg a day in addition to his losartan and atenolol\par \par According to the wife from a cardiac point of view he has been stable without chest pain shortness of breath edema orthopnea PND.  He does have a pleasant dementia which is relatively stable.\par \par EKG today is stable with normal sinus rhythm intraventricular conduction defect with a left bundle branch block configuration decreased R wave progression related to a severe left axis deviation.  No significant change from prior echo\par \par Echocardiogram will be performed today

## 2022-02-22 NOTE — DISCUSSION/SUMMARY
[FreeTextEntry1] : Patient will continue his present regimen of medications including losartan 50 atenolol 25 and Lasix 20.\par \par Repeat blood work when he sees Dr. Welch in March\par If shortness of breath worsens, we can consider increasing his Lasix or consider adding Entresto and stopping losartan\par \par In addition he is probably a candidate for an SGLT2 inhibitor but I would hold off as clinically he is doing well\par \par Follow-up in 4 months\par

## 2022-02-28 ENCOUNTER — RX RENEWAL (OUTPATIENT)
Age: 87
End: 2022-02-28

## 2022-02-28 RX ORDER — BLOOD SUGAR DIAGNOSTIC
STRIP MISCELLANEOUS
Qty: 100 | Refills: 3 | Status: ACTIVE | COMMUNITY
Start: 2021-03-15 | End: 1900-01-01

## 2022-03-30 ENCOUNTER — APPOINTMENT (OUTPATIENT)
Dept: INTERNAL MEDICINE | Facility: CLINIC | Age: 87
End: 2022-03-30
Payer: MEDICARE

## 2022-03-30 VITALS
TEMPERATURE: 96.7 F | HEART RATE: 50 BPM | WEIGHT: 168 LBS | HEIGHT: 68 IN | DIASTOLIC BLOOD PRESSURE: 65 MMHG | BODY MASS INDEX: 25.46 KG/M2 | OXYGEN SATURATION: 95 % | SYSTOLIC BLOOD PRESSURE: 96 MMHG

## 2022-03-30 DIAGNOSIS — D64.9 ANEMIA, UNSPECIFIED: ICD-10-CM

## 2022-03-30 PROCEDURE — 99214 OFFICE O/P EST MOD 30 MIN: CPT | Mod: 25

## 2022-03-30 PROCEDURE — G0439: CPT

## 2022-03-30 PROCEDURE — 36415 COLL VENOUS BLD VENIPUNCTURE: CPT

## 2022-03-30 RX ORDER — SULFAMETHOXAZOLE AND TRIMETHOPRIM 400; 80 MG/1; MG/1
400-80 TABLET ORAL
Qty: 10 | Refills: 0 | Status: DISCONTINUED | COMMUNITY
Start: 2021-03-11 | End: 2022-03-30

## 2022-03-30 NOTE — HISTORY OF PRESENT ILLNESS
[de-identified] : Annual exam\par had all vaccine\par 3 covid shot\par \par chf compensated\par diabetes to check\par anemia on iron not being worked up\par dementia stable  slowly progressive\par uses walker

## 2022-03-30 NOTE — HEALTH RISK ASSESSMENT
[Fair] :  ~his/her~ mood as fair [No] : No [Any fall with injury in past year] : Patient reported fall with injury in the past year [PHQ-9 Positive] : PHQ-9 Positive [I have developed a follow-up plan documented below in the note.] : I have developed a follow-up plan documented below in the note. [Change in mental status noted] : No change in mental status noted [Language] : denies difficulty with language [Behavior] : denies difficulty with behavior [Learning/Retaining New Information] : difficulty learning/retaining new information [Handling Complex Tasks] : difficulty handling complex tasks [Reasoning] : difficulty with reasoning [Spatial Ability and Orientation] : difficulty with spatial ability and orientation [None] : None [With Family] : lives with family [Retired] : retired [High School] : high school [] :  [Sexually Active] : not sexually active [Feels Safe at Home] : Feels safe at home [Fully functional (bathing, dressing, toileting, transferring, walking, feeding)] : Fully functional (bathing, dressing, toileting, transferring, walking, feeding) [Some assistance needed] : using telephone [Full assistance needed] : managing finances [Reports changes in hearing] : Reports no changes in hearing [Reports changes in vision] : Reports no changes in vision [Reports changes in dental health] : Reports no changes in dental health [Smoke Detector] : smoke detector [Carbon Monoxide Detector] : carbon monoxide detector [Guns at Home] : no guns at home [Safety elements used in home] : safety elements used in home [Seat Belt] :  uses seat belt

## 2022-03-30 NOTE — PLAN
[FreeTextEntry1] : Annual exam\par Had all vaccine\par asged out of other\par \par anemia check blood continue iron no work up\par chf appears compensated\par check pro bnp and lytes\par bp low\par ashd no cp\par demtnia stable\par depression stable on med

## 2022-03-30 NOTE — REVIEW OF SYSTEMS
[Nocturia] : nocturia [Frequency] : frequency [Muscle Pain] : muscle pain [Muscle Weakness] : muscle weakness [Confusion] : confusion [Unsteady Walk] : ataxia [Memory Loss] : memory loss [Negative] : Gastrointestinal

## 2022-03-31 LAB
25(OH)D3 SERPL-MCNC: 63.2 NG/ML
ALBUMIN SERPL ELPH-MCNC: 4 G/DL
ALP BLD-CCNC: 55 U/L
ALT SERPL-CCNC: 28 U/L
ANION GAP SERPL CALC-SCNC: 15 MMOL/L
AST SERPL-CCNC: 21 U/L
BASOPHILS # BLD AUTO: 0.05 K/UL
BASOPHILS NFR BLD AUTO: 0.7 %
BILIRUB SERPL-MCNC: 0.2 MG/DL
BUN SERPL-MCNC: 31 MG/DL
CALCIUM SERPL-MCNC: 9.5 MG/DL
CHLORIDE SERPL-SCNC: 107 MMOL/L
CHOLEST SERPL-MCNC: 129 MG/DL
CO2 SERPL-SCNC: 22 MMOL/L
CREAT SERPL-MCNC: 1.65 MG/DL
EGFR: 40 ML/MIN/1.73M2
EOSINOPHIL # BLD AUTO: 0.41 K/UL
EOSINOPHIL NFR BLD AUTO: 6.1 %
ESTIMATED AVERAGE GLUCOSE: 126 MG/DL
FERRITIN SERPL-MCNC: 86 NG/ML
FOLATE SERPL-MCNC: 6.7 NG/ML
GLUCOSE SERPL-MCNC: 118 MG/DL
HBA1C MFR BLD HPLC: 6 %
HCT VFR BLD CALC: 40 %
HDLC SERPL-MCNC: 36 MG/DL
HGB BLD-MCNC: 12.7 G/DL
IMM GRANULOCYTES NFR BLD AUTO: 0.3 %
IRON SATN MFR SERPL: 27 %
IRON SERPL-MCNC: 83 UG/DL
LDLC SERPL CALC-MCNC: 75 MG/DL
LYMPHOCYTES # BLD AUTO: 1.12 K/UL
LYMPHOCYTES NFR BLD AUTO: 16.6 %
MAN DIFF?: NORMAL
MCHC RBC-ENTMCNC: 30.8 PG
MCHC RBC-ENTMCNC: 31.8 GM/DL
MCV RBC AUTO: 97.1 FL
MONOCYTES # BLD AUTO: 0.72 K/UL
MONOCYTES NFR BLD AUTO: 10.7 %
NEUTROPHILS # BLD AUTO: 4.41 K/UL
NEUTROPHILS NFR BLD AUTO: 65.6 %
NONHDLC SERPL-MCNC: 92 MG/DL
NT-PROBNP SERPL-MCNC: 1673 PG/ML
PLATELET # BLD AUTO: 175 K/UL
POTASSIUM SERPL-SCNC: 4.3 MMOL/L
PROT SERPL-MCNC: 6.3 G/DL
RBC # BLD: 4.12 M/UL
RBC # FLD: 13.6 %
SODIUM SERPL-SCNC: 143 MMOL/L
T4 FREE SERPL-MCNC: 1 NG/DL
TIBC SERPL-MCNC: 307 UG/DL
TRIGL SERPL-MCNC: 88 MG/DL
TSH SERPL-ACNC: 3.61 UIU/ML
UIBC SERPL-MCNC: 224 UG/DL
VIT B12 SERPL-MCNC: 362 PG/ML
WBC # FLD AUTO: 6.73 K/UL

## 2022-05-25 ENCOUNTER — APPOINTMENT (OUTPATIENT)
Dept: OTOLARYNGOLOGY | Facility: CLINIC | Age: 87
End: 2022-05-25
Payer: MEDICARE

## 2022-05-25 VITALS
BODY MASS INDEX: 25.76 KG/M2 | WEIGHT: 170 LBS | OXYGEN SATURATION: 98 % | DIASTOLIC BLOOD PRESSURE: 67 MMHG | HEIGHT: 68 IN | HEART RATE: 66 BPM | SYSTOLIC BLOOD PRESSURE: 109 MMHG

## 2022-05-25 DIAGNOSIS — H61.23 IMPACTED CERUMEN, BILATERAL: ICD-10-CM

## 2022-05-25 DIAGNOSIS — H90.3 SENSORINEURAL HEARING LOSS, BILATERAL: ICD-10-CM

## 2022-05-25 PROCEDURE — 69210 REMOVE IMPACTED EAR WAX UNI: CPT

## 2022-05-25 PROCEDURE — 99214 OFFICE O/P EST MOD 30 MIN: CPT | Mod: 25

## 2022-05-25 NOTE — END OF VISIT
[FreeTextEntry3] : I saw and examined this patient in person. I have discussed with Radha Rodriguez, Physician Assistant, in detail the above note and agree with the above assessment and plan of care.\par

## 2022-05-25 NOTE — HISTORY OF PRESENT ILLNESS
[de-identified] : Patient is here today for ear clogging bilaterally and needs an ear cleaning. He does not have any pain complaints in the ears and is not having any issues with ringing in the ears. He does not wear hearing aids. He does not have any complaints of dizziness. He has some nasal congestion recently that is mild

## 2022-05-25 NOTE — ASSESSMENT
[FreeTextEntry1] : She now 86 follows up cerumen impaction bilaterally curetted out rest of his examination has essentially no change he still has a fairly large septal perforation I do recommend he follow-up with us annually.

## 2022-05-28 ENCOUNTER — NON-APPOINTMENT (OUTPATIENT)
Age: 87
End: 2022-05-28

## 2022-05-29 RX ORDER — GLIMEPIRIDE 1 MG/1
1 TABLET ORAL
Qty: 90 | Refills: 3 | Status: ACTIVE | COMMUNITY
Start: 2022-05-29 | End: 1900-01-01

## 2022-05-29 RX ORDER — SITAGLIPTIN 50 MG/1
50 TABLET, FILM COATED ORAL
Qty: 90 | Refills: 3 | Status: ACTIVE | COMMUNITY
Start: 2022-05-29 | End: 1900-01-01

## 2022-06-13 ENCOUNTER — RX RENEWAL (OUTPATIENT)
Age: 87
End: 2022-06-13

## 2022-06-14 ENCOUNTER — RX RENEWAL (OUTPATIENT)
Age: 87
End: 2022-06-14

## 2022-06-28 ENCOUNTER — NON-APPOINTMENT (OUTPATIENT)
Age: 87
End: 2022-06-28

## 2022-06-28 ENCOUNTER — APPOINTMENT (OUTPATIENT)
Dept: CARDIOLOGY | Facility: CLINIC | Age: 87
End: 2022-06-28
Payer: MEDICARE

## 2022-06-28 VITALS
BODY MASS INDEX: 25.76 KG/M2 | SYSTOLIC BLOOD PRESSURE: 120 MMHG | OXYGEN SATURATION: 98 % | HEART RATE: 69 BPM | HEIGHT: 68 IN | WEIGHT: 170 LBS | DIASTOLIC BLOOD PRESSURE: 70 MMHG

## 2022-06-28 PROCEDURE — 99214 OFFICE O/P EST MOD 30 MIN: CPT

## 2022-06-28 PROCEDURE — 93000 ELECTROCARDIOGRAM COMPLETE: CPT

## 2022-06-28 NOTE — DISCUSSION/SUMMARY
[FreeTextEntry1] : Patient will continue on his present regimen of medications.  Overall at age 86 he is clinically doing well despite his dementia and cognitive difficulties.\par \par No further cardiac work-up needed presently\par \par Routine follow with me again in 3 months

## 2022-06-28 NOTE — ASSESSMENT
[FreeTextEntry1] : Ghulam Bob has advanced dementia, gait disturbance status post coronary stent, frequent PVCs but is hemodynamically stable without symptoms.  He has had an iron deficiency anemia but on iron his hemoglobin and hematocrit have increased.  He does have chronic renal insufficiency and has a markedly elevated BNP which has risen despite no change in his symptoms of shortness of breath etc. and no evidence of overt CHF.  The patient remained stable without overt CHF stable vital signs and stable EKG.  He does have at least moderate segmental LV dysfunction and diastolic dysfunction\par \par 1.  Dementia with memory difficulties and ambulation difficulties\par 2.  Chronic renal insufficiency last creatinine 1.78\par 3.  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  Frequent PVCs\par 5 EKG with intraventricular conduction defect\par 6.  Markedly elevated BNP-certainly is chronic renal insufficiency corresponds with this.  The elevation is probably a combination of the chronic renal insufficiency some diastolic dysfunction and systolic dysfunction\par 7.  Moderate systolic and moderate diastolic dysfunction\par 8.  Airways disease with intermittent wheezing improved with bronchodilators\par \par Presently the patient is hemodynamically stable.  He continues to be stable hemodynamically.  There is no change in his physical exam EKG.

## 2022-06-28 NOTE — HISTORY OF PRESENT ILLNESS
[FreeTextEntry1] : Ghulam is 65 years old with a history of hyperlipidemia hypertension status post PTCI of the right coronary many years ago.  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  His wife takes meticulous care of him but he is quite dependent on her.  He walks with a walker and is quite unsteady on his feet\par \par His EKG is abnormal with a intraventricular conduction defect and frequent PVCs.  \par He is on stable medications and now on Lasix 20 mg every other day\par \par On last visit the wife relates that he is quite stable.  His major problems are related to his dementia difficulty ambulation poor memory etc. he did have an elevated BNP\par \par Since that visit he has continued to do well and the wife is pleased with how he is feeling.  He denies shortness of breath or chest pain edema orthopnea PND.  He is a pleasant dementia and offers no complaints and the wife confirms that his status has been quite stable\par \par Nonetheless a BNP recently was in the thousands which was a significant increase.  His Lasix dose was increased\par \par Echocardiogram in 2018 and in 2020 done at my previous office revealed mild decrease in LV function with moderate to severe inferior wall hypokinesia there was no significant valvular disease.  There was severe left atrial dilatation and concentric left ventricular hypertrophy\par \par Since last visit despite the elevation in BNP, he has been feeling extremely well.  He walked from the parking lot to my office which is a fair distance and had no shortness of breath no chest pain.  He has some occasional edema of his lower extremities but that is been under relatively good control he is on a diuretic.  He denies chest pain and he denies significant shortness of breath and he feels quite well and the wife confirms this as well\par \par Blood test September 2021\par WBC 7.33 hemoglobin 13.8 hematocrit 43.1\par BNP 4011 slightly lower than his previous BNP but still markedly elevated\par Hemoglobin A1c 5.9\par LDL 71\par Creatinine 1.78 potassium 4.6 sodium 142\par \par 2D echo in October r 2021 revealed moderate LV dysfunction and moderate diastolic dysfunction with segmental inferior and anterolateral hypokinesia.  This was a slow moderate decrease from his prior echoes\par \par For the elevated BNP and his somewhat decrease in LV function, he was started on Lasix 20 mg a day in addition to his losartan and atenolol\par \par According to the wife from a cardiac point of view he has been stable without chest pain shortness of breath edema orthopnea PND.  He does have a pleasant dementia which is relatively stable.\par \par EKG  last visit was  stable with normal sinus rhythm intraventricular conduction defect with a left bundle branch block configuration decreased R wave progression related to a severe left axis deviation. \par \par EKG dated June 28, 2022 unchanged normal sinus rhythm intraventricular conduction defect with a left bundle branch block configuration and left axis deviation.  No change\par \par Overall the patient has been stable since the last visit.  His wife does relate that occasionally he has some shortness of breath but overall she is pleased with how he is doing.  He does have significant cognitive issues but is always pleasant.\par \par \par \par \par Echocardiogram will be performed today

## 2022-07-28 ENCOUNTER — RX RENEWAL (OUTPATIENT)
Age: 87
End: 2022-07-28

## 2022-08-10 ENCOUNTER — RX RENEWAL (OUTPATIENT)
Age: 87
End: 2022-08-10

## 2022-08-11 ENCOUNTER — RX CHANGE (OUTPATIENT)
Age: 87
End: 2022-08-11

## 2022-08-11 RX ORDER — FLUTICASONE PROPIONATE 110 UG/1
110 AEROSOL, METERED RESPIRATORY (INHALATION) DAILY
Qty: 1 | Refills: 5 | Status: ACTIVE | COMMUNITY
Start: 2022-08-11 | End: 1900-01-01

## 2022-08-17 ENCOUNTER — RX RENEWAL (OUTPATIENT)
Age: 87
End: 2022-08-17

## 2022-08-29 ENCOUNTER — RX RENEWAL (OUTPATIENT)
Age: 87
End: 2022-08-29

## 2022-08-29 RX ORDER — METFORMIN ER 500 MG 500 MG/1
500 TABLET ORAL
Qty: 90 | Refills: 3 | Status: ACTIVE | COMMUNITY
Start: 2017-02-13 | End: 1900-01-01

## 2022-09-10 ENCOUNTER — RX RENEWAL (OUTPATIENT)
Age: 87
End: 2022-09-10

## 2022-10-03 ENCOUNTER — LABORATORY RESULT (OUTPATIENT)
Age: 87
End: 2022-10-03

## 2022-10-03 ENCOUNTER — APPOINTMENT (OUTPATIENT)
Dept: INTERNAL MEDICINE | Facility: CLINIC | Age: 87
End: 2022-10-03

## 2022-10-03 VITALS
TEMPERATURE: 96.8 F | HEART RATE: 72 BPM | WEIGHT: 170 LBS | SYSTOLIC BLOOD PRESSURE: 140 MMHG | DIASTOLIC BLOOD PRESSURE: 78 MMHG | HEIGHT: 68 IN | BODY MASS INDEX: 25.76 KG/M2

## 2022-10-03 DIAGNOSIS — J45.909 UNSPECIFIED ASTHMA, UNCOMPLICATED: ICD-10-CM

## 2022-10-03 PROCEDURE — 36415 COLL VENOUS BLD VENIPUNCTURE: CPT

## 2022-10-03 PROCEDURE — 81003 URINALYSIS AUTO W/O SCOPE: CPT | Mod: QW

## 2022-10-03 PROCEDURE — G0008: CPT

## 2022-10-03 PROCEDURE — 99213 OFFICE O/P EST LOW 20 MIN: CPT | Mod: 25

## 2022-10-03 PROCEDURE — 90662 IIV NO PRSV INCREASED AG IM: CPT

## 2022-10-03 RX ORDER — FLUTICASONE PROPIONATE 110 UG/1
110 AEROSOL, METERED RESPIRATORY (INHALATION)
Qty: 1 | Refills: 5 | Status: DISCONTINUED | COMMUNITY
Start: 2021-05-14 | End: 2022-10-03

## 2022-10-05 LAB
ALBUMIN SERPL ELPH-MCNC: 4.1 G/DL
ALP BLD-CCNC: 54 U/L
ALT SERPL-CCNC: 18 U/L
ANION GAP SERPL CALC-SCNC: 10 MMOL/L
APPEARANCE: CLEAR
AST SERPL-CCNC: 20 U/L
BASOPHILS # BLD AUTO: 0.06 K/UL
BASOPHILS NFR BLD AUTO: 0.7 %
BILIRUB SERPL-MCNC: 0.4 MG/DL
BILIRUBIN URINE: NEGATIVE
BLOOD URINE: NEGATIVE
BUN SERPL-MCNC: 28 MG/DL
CALCIUM SERPL-MCNC: 9.2 MG/DL
CHLORIDE SERPL-SCNC: 105 MMOL/L
CHOLEST SERPL-MCNC: 133 MG/DL
CO2 SERPL-SCNC: 27 MMOL/L
COLOR: NORMAL
CREAT SERPL-MCNC: 1.69 MG/DL
EGFR: 39 ML/MIN/1.73M2
EOSINOPHIL # BLD AUTO: 0.38 K/UL
EOSINOPHIL NFR BLD AUTO: 4.5 %
ESTIMATED AVERAGE GLUCOSE: 120 MG/DL
GLUCOSE QUALITATIVE U: NEGATIVE
GLUCOSE SERPL-MCNC: 80 MG/DL
HBA1C MFR BLD HPLC: 5.8 %
HCT VFR BLD CALC: 40.9 %
HDLC SERPL-MCNC: 35 MG/DL
HGB BLD-MCNC: 13.5 G/DL
IMM GRANULOCYTES NFR BLD AUTO: 0.2 %
KETONES URINE: NEGATIVE
LDLC SERPL CALC-MCNC: 77 MG/DL
LEUKOCYTE ESTERASE URINE: ABNORMAL
LYMPHOCYTES # BLD AUTO: 1.02 K/UL
LYMPHOCYTES NFR BLD AUTO: 12 %
MAN DIFF?: NORMAL
MCHC RBC-ENTMCNC: 31.7 PG
MCHC RBC-ENTMCNC: 33 GM/DL
MCV RBC AUTO: 96 FL
MONOCYTES # BLD AUTO: 0.93 K/UL
MONOCYTES NFR BLD AUTO: 11 %
NEUTROPHILS # BLD AUTO: 6.06 K/UL
NEUTROPHILS NFR BLD AUTO: 71.6 %
NITRITE URINE: POSITIVE
NONHDLC SERPL-MCNC: 98 MG/DL
PH URINE: 6.5
PLATELET # BLD AUTO: 157 K/UL
POTASSIUM SERPL-SCNC: 4.3 MMOL/L
PROT SERPL-MCNC: 6.7 G/DL
PROTEIN URINE: NEGATIVE
RBC # BLD: 4.26 M/UL
RBC # FLD: 13.9 %
SODIUM SERPL-SCNC: 142 MMOL/L
SPECIFIC GRAVITY URINE: 1.01
T4 FREE SERPL-MCNC: 1.1 NG/DL
TRIGL SERPL-MCNC: 103 MG/DL
TSH SERPL-ACNC: 3.43 UIU/ML
UROBILINOGEN URINE: NORMAL
WBC # FLD AUTO: 8.47 K/UL

## 2022-10-16 ENCOUNTER — RX RENEWAL (OUTPATIENT)
Age: 87
End: 2022-10-16

## 2022-10-17 NOTE — PLAN
[FreeTextEntry1] : blood pressure good\par ashd no cp  chf stable\par diabetes check blood\par \par Patient has become more and more incapacitated\par Would benefit from home care and physical therapy and med management/diabetes \par

## 2022-10-17 NOTE — HISTORY OF PRESENT ILLNESS
[FreeTextEntry1] : f/u [de-identified] : flu shot today\par no cp or sob\par sob gone since with fliovent\par diabetes on med\par \par

## 2022-10-24 ENCOUNTER — NON-APPOINTMENT (OUTPATIENT)
Age: 87
End: 2022-10-24

## 2022-10-25 ENCOUNTER — NON-APPOINTMENT (OUTPATIENT)
Age: 87
End: 2022-10-25

## 2022-10-25 ENCOUNTER — APPOINTMENT (OUTPATIENT)
Dept: CARDIOLOGY | Facility: CLINIC | Age: 87
End: 2022-10-25

## 2022-10-25 VITALS
HEIGHT: 68 IN | OXYGEN SATURATION: 98 % | DIASTOLIC BLOOD PRESSURE: 72 MMHG | BODY MASS INDEX: 25.76 KG/M2 | SYSTOLIC BLOOD PRESSURE: 133 MMHG | HEART RATE: 62 BPM | WEIGHT: 170 LBS

## 2022-10-25 DIAGNOSIS — F32.A ANXIETY DISORDER, UNSPECIFIED: ICD-10-CM

## 2022-10-25 DIAGNOSIS — I50.9 HEART FAILURE, UNSPECIFIED: ICD-10-CM

## 2022-10-25 DIAGNOSIS — F41.9 ANXIETY DISORDER, UNSPECIFIED: ICD-10-CM

## 2022-10-25 DIAGNOSIS — N18.9 CHRONIC KIDNEY DISEASE, UNSPECIFIED: ICD-10-CM

## 2022-10-25 DIAGNOSIS — F03.90 UNSPECIFIED DEMENTIA W/OUT BEHAVIORAL DISTURBANCE: ICD-10-CM

## 2022-10-25 DIAGNOSIS — E11.9 TYPE 2 DIABETES MELLITUS W/OUT COMPLICATIONS: ICD-10-CM

## 2022-10-25 DIAGNOSIS — I10 ESSENTIAL (PRIMARY) HYPERTENSION: ICD-10-CM

## 2022-10-25 DIAGNOSIS — I25.10 ATHEROSCLEROTIC HEART DISEASE OF NATIVE CORONARY ARTERY W/OUT ANGINA PECTORIS: ICD-10-CM

## 2022-10-25 DIAGNOSIS — E78.5 HYPERLIPIDEMIA, UNSPECIFIED: ICD-10-CM

## 2022-10-25 PROCEDURE — 99214 OFFICE O/P EST MOD 30 MIN: CPT | Mod: 25

## 2022-10-25 PROCEDURE — 93000 ELECTROCARDIOGRAM COMPLETE: CPT

## 2022-10-25 NOTE — ASSESSMENT
[FreeTextEntry1] : Ghulam Bob has advanced dementia, gait disturbance status post coronary stent, frequent PVCs but is hemodynamically stable without symptoms.  He has had an iron deficiency anemia but on iron his hemoglobin and hematocrit have increased.  He does have chronic renal insufficiency and has a markedly elevated BNP which has risen despite no change in his symptoms of shortness of breath etc. and no evidence of overt CHF.  The patient remained stable without overt CHF stable vital signs and stable EKG.  He does have at least moderate segmental LV dysfunction and diastolic dysfunction.  Presently from a cardiac point of view he is stable but his major problem revolves around his dementia and periods of behavioral disturbance placing a stress on his spouse\par \par 1.  Dementia with memory difficulties and ambulation difficulties\par 2.  Chronic renal insufficiency last  GFR 39 stable\par 3.  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  Frequent PVCs\par 5 EKG with intraventricular conduction defect\par 6.  Markedly elevated BNP-certainly is chronic renal insufficiency corresponds with this.  The elevation is probably a combination of the chronic renal insufficiency some diastolic dysfunction and systolic dysfunction\par 7.  Moderate systolic and moderate diastolic dysfunction\par 8.  Airways disease with intermittent wheezing improved with bronchodilators\par \par Presently the patient is hemodynamically stable.  He continues to be stable hemodynamically.  There is no change in his physical exam EKG.

## 2022-10-25 NOTE — DISCUSSION/SUMMARY
[FreeTextEntry1] :  patient continue on his present regimen of medications.  It would be helpful for the wife to have greater assistance in the house which will put less stress on her.  Hemodynamically is stable.  No further cardiac work-up is needed [EKG obtained to assist in diagnosis and management of assessed problem(s)] : EKG obtained to assist in diagnosis and management of assessed problem(s)

## 2022-10-25 NOTE — HISTORY OF PRESENT ILLNESS
[FreeTextEntry1] : Ghulam is 65 years old with a history of hyperlipidemia hypertension status post PTCI of the right coronary many years ago.  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  His wife takes meticulous care of him but he is quite dependent on her.  He walks with a walker and is quite unsteady on his feet\par \par His EKG is abnormal with a intraventricular conduction defect and frequent PVCs.  \par He is on stable medications and now on Lasix 20 mg every other day\par \par On last visit the wife relates that he is quite stable.  His major problems are related to his dementia difficulty ambulation poor memory etc. he did have an elevated BNP\par \par Since that visit he has continued to do well and the wife is pleased with how he is feeling.  He denies shortness of breath or chest pain edema orthopnea PND.  He is a pleasant dementia and offers no complaints and the wife confirms that his status has been quite stable\par \par Nonetheless a BNP recently was in the thousands which was a significant increase.  His Lasix dose was increased\par \par Visit October 25, 2022:  The main issues with the patient is related to his dementia and periods of some behavioral disturbance which is putting a tremendous amount of stress on his spouse.  The patient is in good spirits and denies chest pain chest pressure or shortness of breath..\par \par   Blood test October 3, 2022\par  LDL 77 HDL 35 cholesterol 133\par  creatinine 1.69 potassium 4.3 GFR 39 (stable)\par  hemoglobin A1c 5.8 hemoglobin 12.4 hematocrit 40\par \par Echocardiogram in 2018 and in 2020 done at my previous office revealed mild decrease in LV function with moderate to severe inferior wall hypokinesia there was no significant valvular disease.  There was severe left atrial dilatation and concentric left ventricular hypertrophy\par \par Since last visit despite the elevation in BNP, he has been feeling extremely well.  He walked from the parking lot to my office which is a fair distance and had no shortness of breath no chest pain.  He has some occasional edema of his lower extremities but that is been under relatively good control he is on a diuretic.  He denies chest pain and he denies significant shortness of breath and he feels quite well and the wife confirms this as well\par \par Blood test September 2021\par WBC 7.33 hemoglobin 13.8 hematocrit 43.1\par BNP 4011 slightly lower than his previous BNP but still markedly elevated\par Hemoglobin A1c 5.9\par LDL 71\par Creatinine 1.78 potassium 4.6 sodium 142\par \par 2D echo in October r 2021 revealed moderate LV dysfunction and moderate diastolic dysfunction with segmental inferior and anterolateral hypokinesia.  This was a slow moderate decrease from his prior echoes\par \par For the elevated BNP and his somewhat decrease in LV function, he was started on Lasix 20 mg a day in addition to his losartan and atenolol\par \par According to the wife from a cardiac point of view he has been stable without chest pain shortness of breath edema orthopnea PND.  He does have a pleasant dementia which is relatively stable.\par \par EKG  last visit was  stable with normal sinus rhythm intraventricular conduction defect with a left bundle branch block configuration decreased R wave progression related to a severe left axis deviation. \par \par EKG dated June 28, 2022 unchanged normal sinus rhythm intraventricular conduction defect with a left bundle branch block configuration and left axis deviation.  No change\par \par Overall the patient has been stable since the last visit.  His wife does relate that occasionally he has some shortness of breath but overall she is pleased with how he is doing.  He does have significant cognitive issues but is always pleasant.\par \par \par \par \par Echocardiogram will be performed today

## 2023-01-23 ENCOUNTER — INPATIENT (INPATIENT)
Facility: HOSPITAL | Age: 88
LOS: 7 days | Discharge: NURSING, REHAB OR LONG-TERM CARE | End: 2023-01-31
Attending: STUDENT IN AN ORGANIZED HEALTH CARE EDUCATION/TRAINING PROGRAM | Admitting: STUDENT IN AN ORGANIZED HEALTH CARE EDUCATION/TRAINING PROGRAM
Payer: MEDICARE

## 2023-01-23 VITALS
DIASTOLIC BLOOD PRESSURE: 70 MMHG | RESPIRATION RATE: 16 BRPM | SYSTOLIC BLOOD PRESSURE: 165 MMHG | OXYGEN SATURATION: 95 % | HEART RATE: 73 BPM | TEMPERATURE: 101 F

## 2023-01-23 PROCEDURE — 99291 CRITICAL CARE FIRST HOUR: CPT

## 2023-01-23 NOTE — ED ADULT TRIAGE NOTE - CHIEF COMPLAINT QUOTE
alert oriented c/o fever weakness temp 101.5 in triage no c/o CP dizziness or SOB  PMHx HTN DM2 deepak knee replacement dementia   unable to walk usually uses a walker

## 2023-01-24 ENCOUNTER — NON-APPOINTMENT (OUTPATIENT)
Age: 88
End: 2023-01-24

## 2023-01-24 ENCOUNTER — APPOINTMENT (OUTPATIENT)
Dept: CARDIOLOGY | Facility: CLINIC | Age: 88
End: 2023-01-24

## 2023-01-24 DIAGNOSIS — E78.5 HYPERLIPIDEMIA, UNSPECIFIED: ICD-10-CM

## 2023-01-24 DIAGNOSIS — N17.9 ACUTE KIDNEY FAILURE, UNSPECIFIED: ICD-10-CM

## 2023-01-24 DIAGNOSIS — J12.1 RESPIRATORY SYNCYTIAL VIRUS PNEUMONIA: ICD-10-CM

## 2023-01-24 DIAGNOSIS — A41.9 SEPSIS, UNSPECIFIED ORGANISM: ICD-10-CM

## 2023-01-24 DIAGNOSIS — J18.9 PNEUMONIA, UNSPECIFIED ORGANISM: ICD-10-CM

## 2023-01-24 DIAGNOSIS — R63.8 OTHER SYMPTOMS AND SIGNS CONCERNING FOOD AND FLUID INTAKE: ICD-10-CM

## 2023-01-24 DIAGNOSIS — E11.9 TYPE 2 DIABETES MELLITUS WITHOUT COMPLICATIONS: ICD-10-CM

## 2023-01-24 DIAGNOSIS — I10 ESSENTIAL (PRIMARY) HYPERTENSION: ICD-10-CM

## 2023-01-24 DIAGNOSIS — F03.90 UNSPECIFIED DEMENTIA WITHOUT BEHAVIORAL DISTURBANCE: ICD-10-CM

## 2023-01-24 DIAGNOSIS — K21.9 GASTRO-ESOPHAGEAL REFLUX DISEASE WITHOUT ESOPHAGITIS: ICD-10-CM

## 2023-01-24 DIAGNOSIS — N40.0 BENIGN PROSTATIC HYPERPLASIA WITHOUT LOWER URINARY TRACT SYMPTOMS: ICD-10-CM

## 2023-01-24 DIAGNOSIS — R53.1 WEAKNESS: ICD-10-CM

## 2023-01-24 DIAGNOSIS — J96.01 ACUTE RESPIRATORY FAILURE WITH HYPOXIA: ICD-10-CM

## 2023-01-24 LAB
ALBUMIN SERPL ELPH-MCNC: 4 G/DL — SIGNIFICANT CHANGE UP (ref 3.3–5)
ALP SERPL-CCNC: 59 U/L — SIGNIFICANT CHANGE UP (ref 40–120)
ALT FLD-CCNC: 16 U/L — SIGNIFICANT CHANGE UP (ref 4–41)
ANION GAP SERPL CALC-SCNC: 13 MMOL/L — SIGNIFICANT CHANGE UP (ref 7–14)
APPEARANCE UR: CLEAR — SIGNIFICANT CHANGE UP
AST SERPL-CCNC: 17 U/L — SIGNIFICANT CHANGE UP (ref 4–40)
BACTERIA # UR AUTO: NEGATIVE — SIGNIFICANT CHANGE UP
BASE EXCESS BLDV CALC-SCNC: -1.9 MMOL/L — SIGNIFICANT CHANGE UP (ref -2–3)
BASE EXCESS BLDV CALC-SCNC: -2.5 MMOL/L — LOW (ref -2–3)
BASOPHILS # BLD AUTO: 0.04 K/UL — SIGNIFICANT CHANGE UP (ref 0–0.2)
BASOPHILS NFR BLD AUTO: 0.3 % — SIGNIFICANT CHANGE UP (ref 0–2)
BILIRUB SERPL-MCNC: 0.5 MG/DL — SIGNIFICANT CHANGE UP (ref 0.2–1.2)
BILIRUB UR-MCNC: NEGATIVE — SIGNIFICANT CHANGE UP
BLOOD GAS VENOUS COMPREHENSIVE RESULT: SIGNIFICANT CHANGE UP
BLOOD GAS VENOUS COMPREHENSIVE RESULT: SIGNIFICANT CHANGE UP
BUN SERPL-MCNC: 37 MG/DL — HIGH (ref 7–23)
CALCIUM SERPL-MCNC: 9.1 MG/DL — SIGNIFICANT CHANGE UP (ref 8.4–10.5)
CHLORIDE BLDV-SCNC: 106 MMOL/L — SIGNIFICANT CHANGE UP (ref 96–108)
CHLORIDE BLDV-SCNC: 109 MMOL/L — HIGH (ref 96–108)
CHLORIDE SERPL-SCNC: 106 MMOL/L — SIGNIFICANT CHANGE UP (ref 98–107)
CO2 BLDV-SCNC: 23.3 MMOL/L — SIGNIFICANT CHANGE UP (ref 22–26)
CO2 BLDV-SCNC: 23.7 MMOL/L — SIGNIFICANT CHANGE UP (ref 22–26)
CO2 SERPL-SCNC: 20 MMOL/L — LOW (ref 22–31)
COLOR SPEC: YELLOW — SIGNIFICANT CHANGE UP
CREAT ?TM UR-MCNC: 117 MG/DL — SIGNIFICANT CHANGE UP
CREAT SERPL-MCNC: 1.8 MG/DL — HIGH (ref 0.5–1.3)
DIFF PNL FLD: NEGATIVE — SIGNIFICANT CHANGE UP
EGFR: 36 ML/MIN/1.73M2 — LOW
EOSINOPHIL # BLD AUTO: 0 K/UL — SIGNIFICANT CHANGE UP (ref 0–0.5)
EOSINOPHIL NFR BLD AUTO: 0 % — SIGNIFICANT CHANGE UP (ref 0–6)
EPI CELLS # UR: 0 /HPF — SIGNIFICANT CHANGE UP (ref 0–5)
FLUAV AG NPH QL: SIGNIFICANT CHANGE UP
FLUBV AG NPH QL: SIGNIFICANT CHANGE UP
GAS PNL BLDV: 136 MMOL/L — SIGNIFICANT CHANGE UP (ref 136–145)
GAS PNL BLDV: 137 MMOL/L — SIGNIFICANT CHANGE UP (ref 136–145)
GLUCOSE BLDV-MCNC: 144 MG/DL — HIGH (ref 70–99)
GLUCOSE BLDV-MCNC: 156 MG/DL — HIGH (ref 70–99)
GLUCOSE SERPL-MCNC: 162 MG/DL — HIGH (ref 70–99)
GLUCOSE UR QL: NEGATIVE — SIGNIFICANT CHANGE UP
HCO3 BLDV-SCNC: 22 MMOL/L — SIGNIFICANT CHANGE UP (ref 22–29)
HCO3 BLDV-SCNC: 22 MMOL/L — SIGNIFICANT CHANGE UP (ref 22–29)
HCT VFR BLD CALC: 39.1 % — SIGNIFICANT CHANGE UP (ref 39–50)
HCT VFR BLDA CALC: 36 % — LOW (ref 39–51)
HCT VFR BLDA CALC: 40 % — SIGNIFICANT CHANGE UP (ref 39–51)
HGB BLD CALC-MCNC: 11.9 G/DL — LOW (ref 13–17)
HGB BLD CALC-MCNC: 13.2 G/DL — SIGNIFICANT CHANGE UP (ref 13–17)
HGB BLD-MCNC: 12.8 G/DL — LOW (ref 13–17)
IANC: 10.51 K/UL — HIGH (ref 1.8–7.4)
IMM GRANULOCYTES NFR BLD AUTO: 0.5 % — SIGNIFICANT CHANGE UP (ref 0–0.9)
KETONES UR-MCNC: NEGATIVE — SIGNIFICANT CHANGE UP
LACTATE BLDV-MCNC: 1 MMOL/L — SIGNIFICANT CHANGE UP (ref 0.5–2)
LACTATE BLDV-MCNC: 2.3 MMOL/L — HIGH (ref 0.5–2)
LEUKOCYTE ESTERASE UR-ACNC: ABNORMAL
LYMPHOCYTES # BLD AUTO: 0.65 K/UL — LOW (ref 1–3.3)
LYMPHOCYTES # BLD AUTO: 5.3 % — LOW (ref 13–44)
MCHC RBC-ENTMCNC: 30.5 PG — SIGNIFICANT CHANGE UP (ref 27–34)
MCHC RBC-ENTMCNC: 32.7 GM/DL — SIGNIFICANT CHANGE UP (ref 32–36)
MCV RBC AUTO: 93.1 FL — SIGNIFICANT CHANGE UP (ref 80–100)
MONOCYTES # BLD AUTO: 1.01 K/UL — HIGH (ref 0–0.9)
MONOCYTES NFR BLD AUTO: 8.2 % — SIGNIFICANT CHANGE UP (ref 2–14)
NEUTROPHILS # BLD AUTO: 10.51 K/UL — HIGH (ref 1.8–7.4)
NEUTROPHILS NFR BLD AUTO: 85.7 % — HIGH (ref 43–77)
NITRITE UR-MCNC: POSITIVE
NRBC # BLD: 0 /100 WBCS — SIGNIFICANT CHANGE UP (ref 0–0)
NRBC # FLD: 0 K/UL — SIGNIFICANT CHANGE UP (ref 0–0)
PCO2 BLDV: 35 MMHG — LOW (ref 42–55)
PCO2 BLDV: 39 MMHG — LOW (ref 42–55)
PH BLDV: 7.37 — SIGNIFICANT CHANGE UP (ref 7.32–7.43)
PH BLDV: 7.41 — SIGNIFICANT CHANGE UP (ref 7.32–7.43)
PH UR: 5.5 — SIGNIFICANT CHANGE UP (ref 5–8)
PLATELET # BLD AUTO: 161 K/UL — SIGNIFICANT CHANGE UP (ref 150–400)
PO2 BLDV: 50 MMHG — SIGNIFICANT CHANGE UP
PO2 BLDV: 57 MMHG — SIGNIFICANT CHANGE UP
POTASSIUM BLDV-SCNC: 3.6 MMOL/L — SIGNIFICANT CHANGE UP (ref 3.5–5.1)
POTASSIUM BLDV-SCNC: 4 MMOL/L — SIGNIFICANT CHANGE UP (ref 3.5–5.1)
POTASSIUM SERPL-MCNC: 4 MMOL/L — SIGNIFICANT CHANGE UP (ref 3.5–5.3)
POTASSIUM SERPL-SCNC: 4 MMOL/L — SIGNIFICANT CHANGE UP (ref 3.5–5.3)
PROT SERPL-MCNC: 7.2 G/DL — SIGNIFICANT CHANGE UP (ref 6–8.3)
PROT UR-MCNC: NEGATIVE — SIGNIFICANT CHANGE UP
RBC # BLD: 4.2 M/UL — SIGNIFICANT CHANGE UP (ref 4.2–5.8)
RBC # FLD: 13.9 % — SIGNIFICANT CHANGE UP (ref 10.3–14.5)
RBC CASTS # UR COMP ASSIST: 2 /HPF — SIGNIFICANT CHANGE UP (ref 0–4)
RSV RNA NPH QL NAA+NON-PROBE: DETECTED
SAO2 % BLDV: 83.6 % — SIGNIFICANT CHANGE UP
SAO2 % BLDV: 88.7 % — SIGNIFICANT CHANGE UP
SARS-COV-2 RNA SPEC QL NAA+PROBE: SIGNIFICANT CHANGE UP
SODIUM SERPL-SCNC: 139 MMOL/L — SIGNIFICANT CHANGE UP (ref 135–145)
SODIUM UR-SCNC: 72 MMOL/L — SIGNIFICANT CHANGE UP
SP GR SPEC: 1.02 — SIGNIFICANT CHANGE UP (ref 1.01–1.05)
UROBILINOGEN FLD QL: SIGNIFICANT CHANGE UP
UUN UR-MCNC: 919 MG/DL — SIGNIFICANT CHANGE UP
WBC # BLD: 12.27 K/UL — HIGH (ref 3.8–10.5)
WBC # FLD AUTO: 12.27 K/UL — HIGH (ref 3.8–10.5)
WBC UR QL: 6 /HPF — HIGH (ref 0–5)

## 2023-01-24 PROCEDURE — 99223 1ST HOSP IP/OBS HIGH 75: CPT

## 2023-01-24 PROCEDURE — 71045 X-RAY EXAM CHEST 1 VIEW: CPT | Mod: 26

## 2023-01-24 RX ORDER — DEXTROSE 50 % IN WATER 50 %
12.5 SYRINGE (ML) INTRAVENOUS ONCE
Refills: 0 | Status: DISCONTINUED | OUTPATIENT
Start: 2023-01-24 | End: 2023-01-31

## 2023-01-24 RX ORDER — INSULIN LISPRO 100/ML
VIAL (ML) SUBCUTANEOUS
Refills: 0 | Status: DISCONTINUED | OUTPATIENT
Start: 2023-01-24 | End: 2023-01-31

## 2023-01-24 RX ORDER — SODIUM CHLORIDE 9 MG/ML
1000 INJECTION INTRAMUSCULAR; INTRAVENOUS; SUBCUTANEOUS ONCE
Refills: 0 | Status: COMPLETED | OUTPATIENT
Start: 2023-01-24 | End: 2023-01-24

## 2023-01-24 RX ORDER — ACETAMINOPHEN 500 MG
1000 TABLET ORAL ONCE
Refills: 0 | Status: COMPLETED | OUTPATIENT
Start: 2023-01-24 | End: 2023-01-24

## 2023-01-24 RX ORDER — FLUTICASONE PROPIONATE 220 MCG
2 AEROSOL WITH ADAPTER (GRAM) INHALATION
Qty: 0 | Refills: 0 | DISCHARGE

## 2023-01-24 RX ORDER — CEFTRIAXONE 500 MG/1
1000 INJECTION, POWDER, FOR SOLUTION INTRAMUSCULAR; INTRAVENOUS EVERY 24 HOURS
Refills: 0 | Status: DISCONTINUED | OUTPATIENT
Start: 2023-01-25 | End: 2023-01-26

## 2023-01-24 RX ORDER — DEXTROSE 50 % IN WATER 50 %
25 SYRINGE (ML) INTRAVENOUS ONCE
Refills: 0 | Status: DISCONTINUED | OUTPATIENT
Start: 2023-01-24 | End: 2023-01-31

## 2023-01-24 RX ORDER — TAMSULOSIN HYDROCHLORIDE 0.4 MG/1
0.4 CAPSULE ORAL AT BEDTIME
Refills: 0 | Status: DISCONTINUED | OUTPATIENT
Start: 2023-01-24 | End: 2023-01-26

## 2023-01-24 RX ORDER — SODIUM CHLORIDE 9 MG/ML
1000 INJECTION, SOLUTION INTRAVENOUS
Refills: 0 | Status: DISCONTINUED | OUTPATIENT
Start: 2023-01-24 | End: 2023-01-31

## 2023-01-24 RX ORDER — PANTOPRAZOLE SODIUM 20 MG/1
40 TABLET, DELAYED RELEASE ORAL
Refills: 0 | Status: DISCONTINUED | OUTPATIENT
Start: 2023-01-24 | End: 2023-01-31

## 2023-01-24 RX ORDER — IPRATROPIUM/ALBUTEROL SULFATE 18-103MCG
3 AEROSOL WITH ADAPTER (GRAM) INHALATION EVERY 6 HOURS
Refills: 0 | Status: DISCONTINUED | OUTPATIENT
Start: 2023-01-24 | End: 2023-01-24

## 2023-01-24 RX ORDER — PANTOPRAZOLE SODIUM 20 MG/1
1 TABLET, DELAYED RELEASE ORAL
Qty: 0 | Refills: 0 | DISCHARGE

## 2023-01-24 RX ORDER — BENZOCAINE AND MENTHOL 5; 1 G/100ML; G/100ML
1 LIQUID ORAL EVERY 4 HOURS
Refills: 0 | Status: DISCONTINUED | OUTPATIENT
Start: 2023-01-24 | End: 2023-01-25

## 2023-01-24 RX ORDER — INSULIN LISPRO 100/ML
VIAL (ML) SUBCUTANEOUS AT BEDTIME
Refills: 0 | Status: DISCONTINUED | OUTPATIENT
Start: 2023-01-24 | End: 2023-01-31

## 2023-01-24 RX ORDER — GLUCAGON INJECTION, SOLUTION 0.5 MG/.1ML
1 INJECTION, SOLUTION SUBCUTANEOUS ONCE
Refills: 0 | Status: DISCONTINUED | OUTPATIENT
Start: 2023-01-24 | End: 2023-01-31

## 2023-01-24 RX ORDER — LEVALBUTEROL 1.25 MG/.5ML
0.63 SOLUTION, CONCENTRATE RESPIRATORY (INHALATION) EVERY 6 HOURS
Refills: 0 | Status: DISCONTINUED | OUTPATIENT
Start: 2023-01-24 | End: 2023-01-25

## 2023-01-24 RX ORDER — HEPARIN SODIUM 5000 [USP'U]/ML
5000 INJECTION INTRAVENOUS; SUBCUTANEOUS EVERY 8 HOURS
Refills: 0 | Status: DISCONTINUED | OUTPATIENT
Start: 2023-01-24 | End: 2023-01-31

## 2023-01-24 RX ORDER — DULOXETINE HYDROCHLORIDE 30 MG/1
60 CAPSULE, DELAYED RELEASE ORAL DAILY
Refills: 0 | Status: DISCONTINUED | OUTPATIENT
Start: 2023-01-24 | End: 2023-01-31

## 2023-01-24 RX ORDER — ATENOLOL 25 MG/1
25 TABLET ORAL DAILY
Refills: 0 | Status: DISCONTINUED | OUTPATIENT
Start: 2023-01-24 | End: 2023-01-31

## 2023-01-24 RX ORDER — CEFTRIAXONE 500 MG/1
1000 INJECTION, POWDER, FOR SOLUTION INTRAMUSCULAR; INTRAVENOUS ONCE
Refills: 0 | Status: COMPLETED | OUTPATIENT
Start: 2023-01-24 | End: 2023-01-24

## 2023-01-24 RX ORDER — LANOLIN ALCOHOL/MO/W.PET/CERES
3 CREAM (GRAM) TOPICAL AT BEDTIME
Refills: 0 | Status: DISCONTINUED | OUTPATIENT
Start: 2023-01-24 | End: 2023-01-31

## 2023-01-24 RX ORDER — LOSARTAN POTASSIUM 100 MG/1
1 TABLET, FILM COATED ORAL
Qty: 0 | Refills: 0 | DISCHARGE

## 2023-01-24 RX ORDER — SIMVASTATIN 20 MG/1
40 TABLET, FILM COATED ORAL AT BEDTIME
Refills: 0 | Status: DISCONTINUED | OUTPATIENT
Start: 2023-01-24 | End: 2023-01-31

## 2023-01-24 RX ORDER — ACETAMINOPHEN 500 MG
650 TABLET ORAL EVERY 6 HOURS
Refills: 0 | Status: DISCONTINUED | OUTPATIENT
Start: 2023-01-24 | End: 2023-01-31

## 2023-01-24 RX ORDER — DEXTROSE 50 % IN WATER 50 %
15 SYRINGE (ML) INTRAVENOUS ONCE
Refills: 0 | Status: DISCONTINUED | OUTPATIENT
Start: 2023-01-24 | End: 2023-01-31

## 2023-01-24 RX ADMIN — SIMVASTATIN 40 MILLIGRAM(S): 20 TABLET, FILM COATED ORAL at 23:42

## 2023-01-24 RX ADMIN — Medication 3 MILLILITER(S): at 11:10

## 2023-01-24 RX ADMIN — HEPARIN SODIUM 5000 UNIT(S): 5000 INJECTION INTRAVENOUS; SUBCUTANEOUS at 23:42

## 2023-01-24 RX ADMIN — Medication 4: at 17:14

## 2023-01-24 RX ADMIN — CEFTRIAXONE 100 MILLIGRAM(S): 500 INJECTION, POWDER, FOR SOLUTION INTRAMUSCULAR; INTRAVENOUS at 02:43

## 2023-01-24 RX ADMIN — Medication 2: at 14:15

## 2023-01-24 RX ADMIN — Medication 3 MILLIGRAM(S): at 23:42

## 2023-01-24 RX ADMIN — Medication 1000 MILLIGRAM(S): at 04:43

## 2023-01-24 RX ADMIN — LEVALBUTEROL 0.63 MILLIGRAM(S): 1.25 SOLUTION, CONCENTRATE RESPIRATORY (INHALATION) at 22:08

## 2023-01-24 RX ADMIN — Medication 400 MILLIGRAM(S): at 02:00

## 2023-01-24 RX ADMIN — SODIUM CHLORIDE 1000 MILLILITER(S): 9 INJECTION INTRAMUSCULAR; INTRAVENOUS; SUBCUTANEOUS at 04:43

## 2023-01-24 RX ADMIN — DULOXETINE HYDROCHLORIDE 60 MILLIGRAM(S): 30 CAPSULE, DELAYED RELEASE ORAL at 13:14

## 2023-01-24 RX ADMIN — HEPARIN SODIUM 5000 UNIT(S): 5000 INJECTION INTRAVENOUS; SUBCUTANEOUS at 14:13

## 2023-01-24 NOTE — PATIENT PROFILE ADULT - FALL HARM RISK - HARM RISK INTERVENTIONS

## 2023-01-24 NOTE — H&P ADULT - PROBLEM SELECTOR PLAN 8
- home meds: januvia 50mg daily, glimeperide 1mg daily, metformin 500mg BID  - mISS  - check A1C  - carb consistent diet

## 2023-01-24 NOTE — H&P ADULT - PROBLEM SELECTOR PLAN 9
- pt with hx dementia, not on meds; at baseline ambulates with walker and AOx2-3  - currently at baseline

## 2023-01-24 NOTE — H&P ADULT - PROBLEM SELECTOR PLAN 3
- CXR without focal consolidation, however given presence of severe sepsis will treat with high risk c/f superimposed bacterial PNA  - management as above

## 2023-01-24 NOTE — ED ADULT NURSE REASSESSMENT NOTE - NS ED NURSE REASSESS COMMENT FT1
ACP at the bedside, patient's heart rate is 112, sinus tachycardia on the monitor w/O2 sat 100%. Patient denies SOB, chest pain at this time. Side rails up and safety maintained. Fall precaution in place. Call bells within reach. Family at bedside.

## 2023-01-24 NOTE — ED ADULT NURSE REASSESSMENT NOTE - NS ED NURSE REASSESS COMMENT FT1
report received from AM shift RN. pt A&Ox2, hx dementia. pt respirations even and unlabored, completing full sentences. pt on 3L NC, O2 saturation 98%. VS as charted. pt comfortable at this time, no new complaints at  this time. stretcher in lowest position, siderails up, family at bedside.

## 2023-01-24 NOTE — H&P ADULT - PROBLEM SELECTOR PLAN 2
- on arrival, RR 16 with O2sat 95% RA -- 2L NC 99%  - likely in setting of RSV and c/f superimposed PNA  - management as above  - also former smoker, ?component of possible undiagnosed COPD  - start duonebs q6h   - wean O2 as tolerated

## 2023-01-24 NOTE — H&P ADULT - PROBLEM SELECTOR PLAN 4
- Cr 1.8 on arrival, unknown baseline  - likely pre-renal in setting of severe sepsis and poor PO intake  - s/p 1L NS in ER  - check urine lytes  - continue to trend  - monitor I&O  - avoid nephrotoxic agents and renally dose medications  - hold home lasix/losartan and restart as indicated

## 2023-01-24 NOTE — H&P ADULT - PROBLEM SELECTOR PLAN 5
- pt p/w generalized weakness and difficulty ambulating x1d, likely in setting of infection, as above  - PT consulted, f/u recs  - fall precautions

## 2023-01-24 NOTE — ED PROVIDER NOTE - NSICDXPASTMEDICALHX_GEN_ALL_CORE_FT
PAST MEDICAL HISTORY:  BPH with Urinary Obstruction; 8/2010; [facility] Tx     Diabetes Mellitus     Fall Multiple falls in 2013    Fall multiple falls in 2012 secondary to bladder infection    H/O: Osteoarthritis     Heart Attack;( silent)  noted stress 1993     HTN (Hypertension)     Hyperlipidemia     TIA (Transient Ischemic Attack); x 3; 1984,1989,1995

## 2023-01-24 NOTE — ED ADULT NURSE NOTE - OBJECTIVE STATEMENT
Fernando RN: Pt received in rm #[id number], 87Y M AOx2 non ambulatory at this time but as per family is at baseline. PMHX Dementia. As per son pt has had increased generalized weakness, productive cough. Pt rectally 102.7F skin clean dry intact. VS as charted, IV placed 18G Rt forearm, labs sent, medicated as ordered. Linen on bed changed and stretcher in lowest position. Pt instructed to call for assistance as needed. Report to primary RN.

## 2023-01-24 NOTE — ED PROVIDER NOTE - NS ED ROS FT
CONST: + Fever, weakness  EYES: no pain, no vision changes  ENT: no sore throat, no ear pain, no change in hearing  CV: no chest pain, no leg swelling  RESP: + Cough  ABD: no abdominal pain, no nausea, no vomiting, no diarrhea  : no dysuria, no flank pain, no hematuria  MSK: no back pain, no extremity pain  NEURO: no headache or additional neurologic complaints  SKIN:  no rash

## 2023-01-24 NOTE — H&P ADULT - ASSESSMENT
Pt is an 88 yo M with PMH HTN, HLD, T2D, dementia (AOx2-3), OA, BPH, CAD, TIA, and GERD p/w 1d weakness, F/C, productive cough, and decreased ability to ambulate. Found to have RSV and COY.

## 2023-01-24 NOTE — ED ADULT NURSE NOTE - NSIMPLEMENTINTERV_GEN_ALL_ED
Implemented All Fall with Harm Risk Interventions:  [place] to call system. Call bell, personal items and telephone within reach. Instruct patient to call for assistance. Room bathroom lighting operational. Non-slip footwear when patient is off stretcher. Physically safe environment: no spills, clutter or unnecessary equipment. Stretcher in lowest position, wheels locked, appropriate side rails in place. Provide visual cue, wrist band, yellow gown, etc. Monitor gait and stability. Monitor for mental status changes and reorient to person, place, and time. Review medications for side effects contributing to fall risk. Reinforce activity limits and safety measures with patient and family. Provide visual clues: red socks.

## 2023-01-24 NOTE — ED PROVIDER NOTE - OBJECTIVE STATEMENT
87-year-old male patient past medical history of dementia who presents to the emergency department 1 day of cough, green sputum, congestion.  Per son, patient has been having difficulty ambulating today secondary to weakness.  No nausea, vomiting, abdominal pain or any other complaints.

## 2023-01-24 NOTE — ED ADULT NURSE REASSESSMENT NOTE - NS ED NURSE REASSESS COMMENT FT1
report rcd from bulmaro rn. pt a&ox2 with no new complaints. pt afebrile at this time. EKG in progress. 18g to the LA with no redness or swelling. pt respirations even and unlabored. pt on 2L NC. pt safety maintained.

## 2023-01-24 NOTE — ED PROVIDER NOTE - CLINICAL SUMMARY MEDICAL DECISION MAKING FREE TEXT BOX
87-year-old male patient past medical history of dementia who presents to the emergency department for cough, sputum, weakness since 1 day.  On exam with rales on the right lung.  Will assess with chest x-ray, lab, UA. Will admit given weakness and unable to ambulate.  Most likely pneumonia. 87-year-old male patient past medical history of dementia who presents to the emergency department for cough, sputum, weakness since 1 day.  On exam with rales on the right lung.  Will assess with chest x-ray, lab, UA. Will admit given weakness and unable to ambulate.  Most likely pneumonia.    Chandan villagomez MD: 87-year-old male past mostly dementia presenting for productive cough and generalized weakness for the last day.  Physical exam did demonstrate rales on the right side.  The differential includes but is not limited to pulmonary edema, respiratory infection viral in nature, pneumonia.  Chest x-ray was obtained which was concerning for infiltrate.  Labs were also drawn which had no significant abnormalities except for white count.  Urinalysis did not show signs of infection.  The patient did meet SIRS criteria did receive broad-spectrum antibiotics as well as blood cultures.  Was hemodynamically stable the entire time.  Due to the degree of weakness that he is experiencing will need to be admitted for further treatment.

## 2023-01-24 NOTE — H&P ADULT - HISTORY OF PRESENT ILLNESS
Pt is an 86 yo M with PMH HTN, HLD, T2D, dementia (AOx2-3), OA, BPH, CAD, TIA, and GERD p/w 1d weakness, F/C, productive cough, and decreased ability to ambulate. Pt had been in his usual state of health when symptoms developed. Denies any known sick contacts. Lives with his wife and does not have any pets. Has not had any recent medication changes. At baseline ambulates with a walker but has been unable to do so for the last day or so; also with mildly decreased appetite. Denies HA, CP, SOB, abd pain, changes to BMs or urination, pain in extremities. Last BM was morning of presentation.     On arrival, T 101.5--102.7, HR 73, /70, RR 16 O2sat 95% RA. Labs with WBC 12.27, 85.7% neutrophils, Hb 12.8, MCV 93, BUN 37, Cr 1.8, lact 2.3--1, UA neg, RVP +RSV. CXR neg acute consolidation. Pt given tylenol, CTX, and 1L NS.

## 2023-01-24 NOTE — H&P ADULT - NSHPLABSRESULTS_GEN_ALL_CORE
LABS:                        12.8   12.27 )-----------( 161      ( 2023 02:15 )             39.1         139  |  106  |  37<H>  ----------------------------<  162<H>  4.0   |  20<L>  |  1.80<H>    Ca    9.1      2023 02:15    TPro  7.2  /  Alb  4.0  /  TBili  0.5  /  DBili  x   /  AST  17  /  ALT  16  /  AlkPhos  59        Urinalysis Basic - ( 2023 02:15 )    Color: Yellow / Appearance: Clear / S.020 / pH: x  Gluc: x / Ketone: Negative  / Bili: Negative / Urobili: <2 mg/dL   Blood: x / Protein: Negative / Nitrite: Positive   Leuk Esterase: Small / RBC: 2 /HPF / WBC 6 /HPF   Sq Epi: x / Non Sq Epi: 0 /HPF / Bacteria: Negative      CAPILLARY BLOOD GLUCOSE      POCT Blood Glucose.: 157 mg/dL (2023 06:31)      RADIOLOGY & ADDITIONAL TESTS: Reviewed.

## 2023-01-24 NOTE — H&P ADULT - PROBLEM SELECTOR PLAN 12
- F: s/p 1L NS  - E: replete K<4, Mg<2  - N: DASH/TLC, carb consistent  - D: hep sq TID  - G: protonix 40mg daily    code: full  dispo: pending medical optimization and PT eval

## 2023-01-24 NOTE — H&P ADULT - PROBLEM SELECTOR PLAN 1
- pt p/w 1d weakness, F/C, productive cough, poor appetite, and decreased ability to ambulate  - on arrival, meeting SIRS criteria with lactate 2.3  - UA neg, UCx and BCx in lab  - RVP+ RSV  - CXR neg acute consolidation  - repeat lactate 1  - s/p 1L NS and CTX in ER  - will c/w CTX 1g q24h x5d with c/f PNA   - supportive care  - wean O2 as tolerated

## 2023-01-24 NOTE — ED ADULT NURSE NOTE - NSSEPSISSUSPECTED_ED_A_ED
Detail Level: Generalized Patient Specific Counseling (Will Not Stick From Patient To Patient): * Recommended QWeekly bleach baths\\n* Continue Eucrisa BID\\n* Apply clobetasol when red, angry, scaly \\n* Recommended Neetu Boot \\n* Discussed starting Dupixent\\n  * pt declined today secondary to recent COPD diagnosis Unable to Assess: Patient left before being evaluated

## 2023-01-24 NOTE — ED ADULT NURSE REASSESSMENT NOTE - NS ED NURSE REASSESS COMMENT FT1
Received patient in room 18, admitted to medicine, waiting for bed assignment. Patient denies any pain/discomfort at this time. Patient is A&OX2, airway patent, breathing unlabored and even, radial pulses palpbel. Side rails up and safety maintained. Fall precaution in place. Call bells within reach.

## 2023-01-24 NOTE — ED ADULT NURSE REASSESSMENT NOTE - NS ED NURSE REASSESS COMMENT FT1
Patient's -140 sinus tachycardia w/O2 sat 100% on the monitor, patient c/o SOB, duoneb given. Spoke w/ACP on the phone, will wait for a visit soon. Patient's , sinus tachycardia w/O2 sat 100% on the monitor, patient c/o SOB, duoneb given. Spoke w/ACP on the phone, will wait for a visit soon.

## 2023-01-24 NOTE — ED PROVIDER NOTE - PHYSICAL EXAMINATION
GENERAL: Awake, alert, NAD  HEENT: NC/AT, moist mucous membranes, EOMI  LUNGS: Rales auscultated in right lung  CARDIAC: RRR, no m/r/g  ABDOMEN: Soft, non tender, non distended, no rebound, no guarding  EXT: No edema, no calf tenderness, no deformities.  NEURO: A&Ox3. Moving all extremities.  SKIN: Warm and dry. No rash.  PSYCH: Normal affect.

## 2023-01-24 NOTE — H&P ADULT - PROBLEM SELECTOR PLAN 6
General
- home meds: lasix 20mg daily, losartan 50mg daily, atenolol 25mg daily  - c/w home atenolol  - hold lasix/losartan in setting of COY, restart as indicated

## 2023-01-24 NOTE — ED ADULT NURSE REASSESSMENT NOTE - NS ED NURSE REASSESS COMMENT FT1
Patient resting in stretcher, no distress noted. Patient denies any pain/discomfort at this time. Patient denies SOB, chest pain. Patient is on cardiac monitor sinus rhythm w/O2 sat 98% on a 4L/NC. Safety maintained. Side rails up and safety maintained. Fall precaution in place. Call bells within reach.

## 2023-01-24 NOTE — ED PROVIDER NOTE - CARE PLAN
1 Principal Discharge DX:	Pneumonia   Principal Discharge DX:	Sepsis  Secondary Diagnosis:	Pneumonia

## 2023-01-24 NOTE — ED PROVIDER NOTE - ATTENDING CONTRIBUTION TO CARE
87-year-old male with a past medical history of dementia presents emerged department with cough productive of green sputum as well as congestion.  The patient and provide any history secondary to his dementia.  The son reports that the patient had problems ambulating today and taking care of some of his ADLs secondary to generalized weakness.  There is no reported nausea vomiting or sick contacts.    Vitals: I have reviewed the patients vital signs  General: nontoxic appearing  HEENT: Atraumatic, normocephalic, airway patent  Eyes: EOMI, tracking appropriately  Neck: no tracheal deviation  Chest/Lungs: no trauma, symmetric chest rise,  no resp distress  Heart: skin and extremities well perfused, regular rate and rhythm  Neuro: A+Ox1, appears non focal  MSK: no deformities  Skin: no cyanosis, no jaundice   Psych:  Normal mood and affect    For the full details of the medical decision making and ED clinical course please see the MDM and progress note sections in the main body of the provider note 87-year-old male with a past medical history of dementia presents emerged department with cough productive of green sputum as well as congestion.  The patient and provide any history secondary to his dementia.  The son reports that the patient had problems ambulating today and taking care of some of his ADLs secondary to generalized weakness.  There is no reported nausea vomiting or sick contacts.    Vitals: I have reviewed the patients vital signs  General: nontoxic appearing  HEENT: Atraumatic, normocephalic, airway patent  Eyes: EOMI, tracking appropriately  Neck: no tracheal deviation  Chest/Lungs: no trauma, symmetric chest rise,  no resp distress  Heart: skin and extremities well perfused, regular rate and rhythm  Neuro: A+Ox1, appears non focal  MSK: no deformities  Skin: no cyanosis, no jaundice   Psych:  Normal mood and affect    For the full details of the medical decision making and ED clinical course please see the MDM and progress note sections in the main body of the provider note    Upon my evaluation, this patient had a high probability of imminent or life-threatening deterioration due to sepsis and pneumonia, which required my direct attention, intervention, and personal management.  The patient has a  medical condition that impairs one or more vital organ systems.  Frequent personal assessment and adjustment of medical interventions was performed.      I have personally provided 35 minutes of critical care time exclusive of time spent on separately billable procedures. Time includes review of laboratory data, radiology results, discussion with consultants, patient and family; monitoring for potential decompensation, as well as time spent retrieving data and reviewing the chart and documenting the visit. Interventions were performed as documented above.

## 2023-01-24 NOTE — H&P ADULT - NSHPPHYSICALEXAM_GEN_ALL_CORE
VITAL SIGNS:  T(C): 36.9 (01-24-23 @ 05:41), Max: 39.3 (01-24-23 @ 01:50)  T(F): 98.4 (01-24-23 @ 05:41), Max: 102.7 (01-24-23 @ 01:50)  HR: 127 (01-24-23 @ 11:16) (64 - 127)  BP: 154/73 (01-24-23 @ 11:16) (119/57 - 165/70)  BP(mean): 93 (01-24-23 @ 05:41) (73 - 93)  RR: 24 (01-24-23 @ 11:16) (15 - 24)  SpO2: 100% (01-24-23 @ 11:16) (95% - 100%)  Wt(kg): --    PHYSICAL EXAM:  Constitutional: WDWN resting in bed; NAD; slightly anxious (looking for wife)  Head: NC/AT  Eyes: PERRL, EOMI, anicteric sclera  ENT: no nasal discharge; MMM  Neck: supple; no JVD  Respiratory: decreased inspiratory effort, diffuse wheezing in all lung fields L>R, on 2L NC, no respiratory distress  Cardiac: +S1/S2; tachycardic 100-105; no M/R/G  Gastrointestinal: soft, overly nourished, NT/ND; no rebound or guarding; +BSx4  Extremities: WWP, no clubbing or cyanosis; no peripheral edema  Musculoskeletal: NROM x4; no joint swelling, tenderness or erythema  Vascular: 2+ radial, DP/PT pulses B/L  Dermatologic: skin warm, dry and intact; no rashes, wounds, or scars  Neurologic: AAOx2 (self, [place], 2021, [place]); CNII-XII grossly intact; no focal deficits  Psychiatric: affect and characteristics of appearance, verbalizations, behaviors are appropriate

## 2023-01-25 LAB
24R-OH-CALCIDIOL SERPL-MCNC: 37.7 NG/ML — SIGNIFICANT CHANGE UP (ref 30–80)
A1C WITH ESTIMATED AVERAGE GLUCOSE RESULT: 5.8 % — HIGH (ref 4–5.6)
ANION GAP SERPL CALC-SCNC: 8 MMOL/L — SIGNIFICANT CHANGE UP (ref 7–14)
BASOPHILS # BLD AUTO: 0.03 K/UL — SIGNIFICANT CHANGE UP (ref 0–0.2)
BASOPHILS NFR BLD AUTO: 0.5 % — SIGNIFICANT CHANGE UP (ref 0–2)
BUN SERPL-MCNC: 36 MG/DL — HIGH (ref 7–23)
CALCIUM SERPL-MCNC: 8.7 MG/DL — SIGNIFICANT CHANGE UP (ref 8.4–10.5)
CHLORIDE SERPL-SCNC: 111 MMOL/L — HIGH (ref 98–107)
CO2 SERPL-SCNC: 23 MMOL/L — SIGNIFICANT CHANGE UP (ref 22–31)
CREAT SERPL-MCNC: 1.63 MG/DL — HIGH (ref 0.5–1.3)
CULTURE RESULTS: SIGNIFICANT CHANGE UP
EGFR: 41 ML/MIN/1.73M2 — LOW
EOSINOPHIL # BLD AUTO: 0.02 K/UL — SIGNIFICANT CHANGE UP (ref 0–0.5)
EOSINOPHIL NFR BLD AUTO: 0.3 % — SIGNIFICANT CHANGE UP (ref 0–6)
ESTIMATED AVERAGE GLUCOSE: 120 — SIGNIFICANT CHANGE UP
FOLATE SERPL-MCNC: 4.4 NG/ML — SIGNIFICANT CHANGE UP (ref 3.1–17.5)
GLUCOSE BLDC GLUCOMTR-MCNC: 142 MG/DL — HIGH (ref 70–99)
GLUCOSE BLDC GLUCOMTR-MCNC: 168 MG/DL — HIGH (ref 70–99)
GLUCOSE BLDC GLUCOMTR-MCNC: 230 MG/DL — HIGH (ref 70–99)
GLUCOSE SERPL-MCNC: 128 MG/DL — HIGH (ref 70–99)
HCT VFR BLD CALC: 33.6 % — LOW (ref 39–50)
HGB BLD-MCNC: 10.8 G/DL — LOW (ref 13–17)
IANC: 5.01 K/UL — SIGNIFICANT CHANGE UP (ref 1.8–7.4)
IMM GRANULOCYTES NFR BLD AUTO: 0.5 % — SIGNIFICANT CHANGE UP (ref 0–0.9)
LYMPHOCYTES # BLD AUTO: 0.5 K/UL — LOW (ref 1–3.3)
LYMPHOCYTES # BLD AUTO: 8.2 % — LOW (ref 13–44)
MAGNESIUM SERPL-MCNC: 2.1 MG/DL — SIGNIFICANT CHANGE UP (ref 1.6–2.6)
MCHC RBC-ENTMCNC: 30.4 PG — SIGNIFICANT CHANGE UP (ref 27–34)
MCHC RBC-ENTMCNC: 32.1 GM/DL — SIGNIFICANT CHANGE UP (ref 32–36)
MCV RBC AUTO: 94.6 FL — SIGNIFICANT CHANGE UP (ref 80–100)
MONOCYTES # BLD AUTO: 0.53 K/UL — SIGNIFICANT CHANGE UP (ref 0–0.9)
MONOCYTES NFR BLD AUTO: 8.7 % — SIGNIFICANT CHANGE UP (ref 2–14)
NEUTROPHILS # BLD AUTO: 5.01 K/UL — SIGNIFICANT CHANGE UP (ref 1.8–7.4)
NEUTROPHILS NFR BLD AUTO: 81.8 % — HIGH (ref 43–77)
NRBC # BLD: 0 /100 WBCS — SIGNIFICANT CHANGE UP (ref 0–0)
NRBC # FLD: 0 K/UL — SIGNIFICANT CHANGE UP (ref 0–0)
PHOSPHATE SERPL-MCNC: 2.2 MG/DL — LOW (ref 2.5–4.5)
PLATELET # BLD AUTO: 125 K/UL — LOW (ref 150–400)
POTASSIUM SERPL-MCNC: 3.8 MMOL/L — SIGNIFICANT CHANGE UP (ref 3.5–5.3)
POTASSIUM SERPL-SCNC: 3.8 MMOL/L — SIGNIFICANT CHANGE UP (ref 3.5–5.3)
RBC # BLD: 3.55 M/UL — LOW (ref 4.2–5.8)
RBC # FLD: 14.2 % — SIGNIFICANT CHANGE UP (ref 10.3–14.5)
SODIUM SERPL-SCNC: 142 MMOL/L — SIGNIFICANT CHANGE UP (ref 135–145)
SPECIMEN SOURCE: SIGNIFICANT CHANGE UP
TSH SERPL-MCNC: 2.65 UIU/ML — SIGNIFICANT CHANGE UP (ref 0.27–4.2)
VIT B12 SERPL-MCNC: 272 PG/ML — SIGNIFICANT CHANGE UP (ref 200–900)
VIT D25+D1,25 OH+D1,25 PNL SERPL-MCNC: 61.3 PG/ML — SIGNIFICANT CHANGE UP (ref 19.9–79.3)
WBC # BLD: 6.12 K/UL — SIGNIFICANT CHANGE UP (ref 3.8–10.5)
WBC # FLD AUTO: 6.12 K/UL — SIGNIFICANT CHANGE UP (ref 3.8–10.5)

## 2023-01-25 PROCEDURE — 71045 X-RAY EXAM CHEST 1 VIEW: CPT | Mod: 26

## 2023-01-25 PROCEDURE — 99233 SBSQ HOSP IP/OBS HIGH 50: CPT

## 2023-01-25 RX ORDER — ALBUTEROL 90 UG/1
2.5 AEROSOL, METERED ORAL
Refills: 0 | Status: DISCONTINUED | OUTPATIENT
Start: 2023-01-25 | End: 2023-01-31

## 2023-01-25 RX ORDER — SODIUM,POTASSIUM PHOSPHATES 278-250MG
1 POWDER IN PACKET (EA) ORAL
Refills: 0 | Status: COMPLETED | OUTPATIENT
Start: 2023-01-25 | End: 2023-01-26

## 2023-01-25 RX ORDER — IPRATROPIUM BROMIDE 0.2 MG/ML
500 SOLUTION, NON-ORAL INHALATION EVERY 6 HOURS
Refills: 0 | Status: DISCONTINUED | OUTPATIENT
Start: 2023-01-25 | End: 2023-01-25

## 2023-01-25 RX ORDER — IPRATROPIUM/ALBUTEROL SULFATE 18-103MCG
3 AEROSOL WITH ADAPTER (GRAM) INHALATION EVERY 6 HOURS
Refills: 0 | Status: DISCONTINUED | OUTPATIENT
Start: 2023-01-25 | End: 2023-01-31

## 2023-01-25 RX ORDER — INSULIN GLARGINE 100 [IU]/ML
8 INJECTION, SOLUTION SUBCUTANEOUS AT BEDTIME
Refills: 0 | Status: DISCONTINUED | OUTPATIENT
Start: 2023-01-25 | End: 2023-01-26

## 2023-01-25 RX ORDER — MOMETASONE FUROATE 220 UG/1
1 INHALANT RESPIRATORY (INHALATION) DAILY
Refills: 0 | Status: DISCONTINUED | OUTPATIENT
Start: 2023-01-25 | End: 2023-01-31

## 2023-01-25 RX ADMIN — ATENOLOL 25 MILLIGRAM(S): 25 TABLET ORAL at 06:12

## 2023-01-25 RX ADMIN — Medication 40 MILLIGRAM(S): at 18:20

## 2023-01-25 RX ADMIN — TAMSULOSIN HYDROCHLORIDE 0.4 MILLIGRAM(S): 0.4 CAPSULE ORAL at 21:52

## 2023-01-25 RX ADMIN — Medication 1 PACKET(S): at 18:10

## 2023-01-25 RX ADMIN — HEPARIN SODIUM 5000 UNIT(S): 5000 INJECTION INTRAVENOUS; SUBCUTANEOUS at 12:42

## 2023-01-25 RX ADMIN — LEVALBUTEROL 0.63 MILLIGRAM(S): 1.25 SOLUTION, CONCENTRATE RESPIRATORY (INHALATION) at 04:21

## 2023-01-25 RX ADMIN — CEFTRIAXONE 100 MILLIGRAM(S): 500 INJECTION, POWDER, FOR SOLUTION INTRAMUSCULAR; INTRAVENOUS at 02:42

## 2023-01-25 RX ADMIN — HEPARIN SODIUM 5000 UNIT(S): 5000 INJECTION INTRAVENOUS; SUBCUTANEOUS at 06:13

## 2023-01-25 RX ADMIN — Medication 3 MILLILITER(S): at 21:46

## 2023-01-25 RX ADMIN — Medication 1 PACKET(S): at 12:42

## 2023-01-25 RX ADMIN — Medication 1 PACKET(S): at 21:52

## 2023-01-25 RX ADMIN — Medication 3 MILLIGRAM(S): at 21:53

## 2023-01-25 RX ADMIN — TAMSULOSIN HYDROCHLORIDE 0.4 MILLIGRAM(S): 0.4 CAPSULE ORAL at 00:43

## 2023-01-25 RX ADMIN — DULOXETINE HYDROCHLORIDE 60 MILLIGRAM(S): 30 CAPSULE, DELAYED RELEASE ORAL at 18:11

## 2023-01-25 RX ADMIN — SIMVASTATIN 40 MILLIGRAM(S): 20 TABLET, FILM COATED ORAL at 21:53

## 2023-01-25 RX ADMIN — Medication 100 MILLIGRAM(S): at 21:52

## 2023-01-25 RX ADMIN — PANTOPRAZOLE SODIUM 40 MILLIGRAM(S): 20 TABLET, DELAYED RELEASE ORAL at 06:12

## 2023-01-25 RX ADMIN — Medication 650 MILLIGRAM(S): at 18:22

## 2023-01-25 RX ADMIN — INSULIN GLARGINE 8 UNIT(S): 100 INJECTION, SOLUTION SUBCUTANEOUS at 22:49

## 2023-01-25 RX ADMIN — HEPARIN SODIUM 5000 UNIT(S): 5000 INJECTION INTRAVENOUS; SUBCUTANEOUS at 21:53

## 2023-01-25 RX ADMIN — Medication 3 MILLILITER(S): at 15:04

## 2023-01-25 RX ADMIN — Medication 2: at 12:40

## 2023-01-25 RX ADMIN — LEVALBUTEROL 0.63 MILLIGRAM(S): 1.25 SOLUTION, CONCENTRATE RESPIRATORY (INHALATION) at 10:14

## 2023-01-25 NOTE — PHYSICAL THERAPY INITIAL EVALUATION ADULT - IMPAIRMENTS FOUND, PT EVAL
ergonomics and body mechanics/gait, locomotion, and balance/gross motor/neuromotor development and sensory integration/ROM

## 2023-01-25 NOTE — PHYSICAL THERAPY INITIAL EVALUATION ADULT - ACTIVE RANGE OF MOTION EXAMINATION, REHAB EVAL
bilateral knee flexion <90 degrees/Left UE Active ROM was WNL (within normal limits)/Right UE Active ROM was WNL (within normal limits)/deficits as listed below

## 2023-01-25 NOTE — PHYSICAL THERAPY INITIAL EVALUATION ADULT - MANUAL MUSCLE TESTING RESULTS, REHAB EVAL
In supine, pt able to perform bilateral ankle pumps and knee flexion to extension through reduced ROM. Bilateral UE at least 3/5

## 2023-01-25 NOTE — PHYSICAL THERAPY INITIAL EVALUATION ADULT - PATIENT PROFILE REVIEW, REHAB EVAL
PT initial evaluation received and chart review completed. Pt agreeable to participate in PT evaluation. Pt cleared by TIFFANIE Brown./yes

## 2023-01-25 NOTE — PROGRESS NOTE ADULT - PROBLEM SELECTOR PLAN 2
- on arrival, RR 16 with O2sat 95% RA -- 2L NC 99%  - likely in setting of RSV   - management as above  - also former smoker, ?component of possible undiagnosed COPD  - continue with ATC nebs, started methylprednisolone for COPD exac  - wean O2 as tolerated not on O2 at home - on arrival, RR 16 with O2sat 95% RA -- 2L NC 99%  - likely in setting of RSV   - management as above  - also former smoker, ?component of possible undiagnosed COPD  - continue with ATC nebs, started methylprednisolone for COPD exac  - wean O2 as tolerated not on O2 at home  - resumed home Flovent as therapeutic interchange mometasone

## 2023-01-25 NOTE — PHYSICAL THERAPY INITIAL EVALUATION ADULT - GENERAL OBSERVATIONS, REHAB EVAL
Upon entry, pt semi-supine in bed in NAD; + nasal cannula. + telemetry. Pt left as received with all tubes/lines intact, bed alarm on, call bell in reach and in NAD.

## 2023-01-25 NOTE — PHYSICAL THERAPY INITIAL EVALUATION ADULT - PERTINENT HX OF CURRENT PROBLEM, REHAB EVAL
Pt is an 87 year old male presenting with weakness productive cough, and decreased ability to ambulate. CXR clear lungs. Pt found to be + for RSV. Pt admitted for severe sepsis and acute respiratory failure with hypoxia.

## 2023-01-25 NOTE — PROGRESS NOTE ADULT - PROBLEM SELECTOR PLAN 12
dispo: pending improvement of respiratory status and ARF     F no IVF  E K>4 Mg>2 P>3  N Diet DASH consistent carbohydrate     hep sub q dispo: pending improvement of respiratory status and ARF     F no IVF  E K>4 Mg>2 P>3  N Diet DASH consistent carbohydrate     hep sub q    Patient wife and patient son updated regarding RSV infection, pending CXR, and current kidney dysfunction. Informed them that I started steroids for a chronic bronchitis exacerbation (COPD).

## 2023-01-25 NOTE — PHYSICAL THERAPY INITIAL EVALUATION ADULT - LEVEL OF INDEPENDENCE: SUPINE/SIT, REHAB EVAL
Attempted x2 bouts; however, pt unable to perform. Very resistant upon attempts likely secondary to fear of falling. TIFFANIE Brown notified./unable to perform

## 2023-01-25 NOTE — PHYSICAL THERAPY INITIAL EVALUATION ADULT - ADDITIONAL COMMENTS
Pt is AAOx1 to self only; unable to provide information regarding prior level of function and living situation. Information obtained from H&P. Pt lives with his wife. Ambulated with rolling walker.

## 2023-01-26 ENCOUNTER — NON-APPOINTMENT (OUTPATIENT)
Age: 88
End: 2023-01-26

## 2023-01-26 DIAGNOSIS — N18.30 CHRONIC KIDNEY DISEASE, STAGE 3 UNSPECIFIED: ICD-10-CM

## 2023-01-26 LAB
ANION GAP SERPL CALC-SCNC: 10 MMOL/L — SIGNIFICANT CHANGE UP (ref 7–14)
ANION GAP SERPL CALC-SCNC: 12 MMOL/L — SIGNIFICANT CHANGE UP (ref 7–14)
ANION GAP SERPL CALC-SCNC: 9 MMOL/L — SIGNIFICANT CHANGE UP (ref 7–14)
BASOPHILS # BLD AUTO: 0.01 K/UL — SIGNIFICANT CHANGE UP (ref 0–0.2)
BASOPHILS NFR BLD AUTO: 0.2 % — SIGNIFICANT CHANGE UP (ref 0–2)
BUN SERPL-MCNC: 44 MG/DL — HIGH (ref 7–23)
BUN SERPL-MCNC: 54 MG/DL — HIGH (ref 7–23)
BUN SERPL-MCNC: 55 MG/DL — HIGH (ref 7–23)
CALCIUM SERPL-MCNC: 9 MG/DL — SIGNIFICANT CHANGE UP (ref 8.4–10.5)
CALCIUM SERPL-MCNC: 9.1 MG/DL — SIGNIFICANT CHANGE UP (ref 8.4–10.5)
CALCIUM SERPL-MCNC: 9.2 MG/DL — SIGNIFICANT CHANGE UP (ref 8.4–10.5)
CHLORIDE SERPL-SCNC: 110 MMOL/L — HIGH (ref 98–107)
CHLORIDE SERPL-SCNC: 111 MMOL/L — HIGH (ref 98–107)
CHLORIDE SERPL-SCNC: 111 MMOL/L — HIGH (ref 98–107)
CO2 SERPL-SCNC: 21 MMOL/L — LOW (ref 22–31)
CO2 SERPL-SCNC: 22 MMOL/L — SIGNIFICANT CHANGE UP (ref 22–31)
CO2 SERPL-SCNC: 23 MMOL/L — SIGNIFICANT CHANGE UP (ref 22–31)
CREAT SERPL-MCNC: 1.7 MG/DL — HIGH (ref 0.5–1.3)
CREAT SERPL-MCNC: 1.76 MG/DL — HIGH (ref 0.5–1.3)
CREAT SERPL-MCNC: 1.77 MG/DL — HIGH (ref 0.5–1.3)
EGFR: 37 ML/MIN/1.73M2 — LOW
EGFR: 37 ML/MIN/1.73M2 — LOW
EGFR: 39 ML/MIN/1.73M2 — LOW
EOSINOPHIL # BLD AUTO: 0 K/UL — SIGNIFICANT CHANGE UP (ref 0–0.5)
EOSINOPHIL NFR BLD AUTO: 0 % — SIGNIFICANT CHANGE UP (ref 0–6)
FERRITIN SERPL-MCNC: 300 NG/ML — SIGNIFICANT CHANGE UP (ref 30–400)
GLUCOSE BLDC GLUCOMTR-MCNC: 237 MG/DL — HIGH (ref 70–99)
GLUCOSE BLDC GLUCOMTR-MCNC: 249 MG/DL — HIGH (ref 70–99)
GLUCOSE BLDC GLUCOMTR-MCNC: 259 MG/DL — HIGH (ref 70–99)
GLUCOSE BLDC GLUCOMTR-MCNC: 320 MG/DL — HIGH (ref 70–99)
GLUCOSE SERPL-MCNC: 235 MG/DL — HIGH (ref 70–99)
GLUCOSE SERPL-MCNC: 267 MG/DL — HIGH (ref 70–99)
GLUCOSE SERPL-MCNC: 292 MG/DL — HIGH (ref 70–99)
HCT VFR BLD CALC: 36.4 % — LOW (ref 39–50)
HGB BLD-MCNC: 11.6 G/DL — LOW (ref 13–17)
IANC: 4.43 K/UL — SIGNIFICANT CHANGE UP (ref 1.8–7.4)
IMM GRANULOCYTES NFR BLD AUTO: 0.6 % — SIGNIFICANT CHANGE UP (ref 0–0.9)
IRON SATN MFR SERPL: 11 % — LOW (ref 14–50)
IRON SATN MFR SERPL: 24 UG/DL — LOW (ref 45–165)
LYMPHOCYTES # BLD AUTO: 0.39 K/UL — LOW (ref 1–3.3)
LYMPHOCYTES # BLD AUTO: 7.6 % — LOW (ref 13–44)
MAGNESIUM SERPL-MCNC: 2.4 MG/DL — SIGNIFICANT CHANGE UP (ref 1.6–2.6)
MAGNESIUM SERPL-MCNC: 2.4 MG/DL — SIGNIFICANT CHANGE UP (ref 1.6–2.6)
MCHC RBC-ENTMCNC: 30.4 PG — SIGNIFICANT CHANGE UP (ref 27–34)
MCHC RBC-ENTMCNC: 31.9 GM/DL — LOW (ref 32–36)
MCV RBC AUTO: 95.3 FL — SIGNIFICANT CHANGE UP (ref 80–100)
MONOCYTES # BLD AUTO: 0.27 K/UL — SIGNIFICANT CHANGE UP (ref 0–0.9)
MONOCYTES NFR BLD AUTO: 5.3 % — SIGNIFICANT CHANGE UP (ref 2–14)
MRSA PCR RESULT.: SIGNIFICANT CHANGE UP
NEUTROPHILS # BLD AUTO: 4.43 K/UL — SIGNIFICANT CHANGE UP (ref 1.8–7.4)
NEUTROPHILS NFR BLD AUTO: 86.3 % — HIGH (ref 43–77)
NRBC # BLD: 0 /100 WBCS — SIGNIFICANT CHANGE UP (ref 0–0)
NRBC # FLD: 0 K/UL — SIGNIFICANT CHANGE UP (ref 0–0)
PHOSPHATE SERPL-MCNC: 2.2 MG/DL — LOW (ref 2.5–4.5)
PHOSPHATE SERPL-MCNC: 3.6 MG/DL — SIGNIFICANT CHANGE UP (ref 2.5–4.5)
PLATELET # BLD AUTO: 136 K/UL — LOW (ref 150–400)
POTASSIUM SERPL-MCNC: 4.1 MMOL/L — SIGNIFICANT CHANGE UP (ref 3.5–5.3)
POTASSIUM SERPL-MCNC: 4.3 MMOL/L — SIGNIFICANT CHANGE UP (ref 3.5–5.3)
POTASSIUM SERPL-MCNC: 4.4 MMOL/L — SIGNIFICANT CHANGE UP (ref 3.5–5.3)
POTASSIUM SERPL-SCNC: 4.1 MMOL/L — SIGNIFICANT CHANGE UP (ref 3.5–5.3)
POTASSIUM SERPL-SCNC: 4.3 MMOL/L — SIGNIFICANT CHANGE UP (ref 3.5–5.3)
POTASSIUM SERPL-SCNC: 4.4 MMOL/L — SIGNIFICANT CHANGE UP (ref 3.5–5.3)
RBC # BLD: 3.82 M/UL — LOW (ref 4.2–5.8)
RBC # FLD: 14.3 % — SIGNIFICANT CHANGE UP (ref 10.3–14.5)
S AUREUS DNA NOSE QL NAA+PROBE: SIGNIFICANT CHANGE UP
SODIUM SERPL-SCNC: 142 MMOL/L — SIGNIFICANT CHANGE UP (ref 135–145)
SODIUM SERPL-SCNC: 143 MMOL/L — SIGNIFICANT CHANGE UP (ref 135–145)
SODIUM SERPL-SCNC: 144 MMOL/L — SIGNIFICANT CHANGE UP (ref 135–145)
TIBC SERPL-MCNC: 213 UG/DL — LOW (ref 220–430)
UIBC SERPL-MCNC: 189 UG/DL — SIGNIFICANT CHANGE UP (ref 110–370)
WBC # BLD: 5.13 K/UL — SIGNIFICANT CHANGE UP (ref 3.8–10.5)
WBC # FLD AUTO: 5.13 K/UL — SIGNIFICANT CHANGE UP (ref 3.8–10.5)

## 2023-01-26 PROCEDURE — 51702 INSERT TEMP BLADDER CATH: CPT

## 2023-01-26 PROCEDURE — 76770 US EXAM ABDO BACK WALL COMP: CPT | Mod: 26

## 2023-01-26 PROCEDURE — 99233 SBSQ HOSP IP/OBS HIGH 50: CPT

## 2023-01-26 RX ORDER — INSULIN GLARGINE 100 [IU]/ML
12 INJECTION, SOLUTION SUBCUTANEOUS AT BEDTIME
Refills: 0 | Status: DISCONTINUED | OUTPATIENT
Start: 2023-01-26 | End: 2023-01-27

## 2023-01-26 RX ORDER — SODIUM CHLORIDE 9 MG/ML
4 INJECTION INTRAMUSCULAR; INTRAVENOUS; SUBCUTANEOUS ONCE
Refills: 0 | Status: DISCONTINUED | OUTPATIENT
Start: 2023-01-26 | End: 2023-01-31

## 2023-01-26 RX ORDER — TAMSULOSIN HYDROCHLORIDE 0.4 MG/1
0.8 CAPSULE ORAL AT BEDTIME
Refills: 0 | Status: DISCONTINUED | OUTPATIENT
Start: 2023-01-26 | End: 2023-01-31

## 2023-01-26 RX ORDER — LIDOCAINE HCL 20 MG/ML
10 VIAL (ML) INJECTION ONCE
Refills: 0 | Status: COMPLETED | OUTPATIENT
Start: 2023-01-26 | End: 2023-01-26

## 2023-01-26 RX ORDER — SODIUM,POTASSIUM PHOSPHATES 278-250MG
1 POWDER IN PACKET (EA) ORAL
Refills: 0 | Status: COMPLETED | OUTPATIENT
Start: 2023-01-26 | End: 2023-01-27

## 2023-01-26 RX ORDER — INSULIN LISPRO 100/ML
4 VIAL (ML) SUBCUTANEOUS
Refills: 0 | Status: DISCONTINUED | OUTPATIENT
Start: 2023-01-26 | End: 2023-01-27

## 2023-01-26 RX ADMIN — CEFTRIAXONE 100 MILLIGRAM(S): 500 INJECTION, POWDER, FOR SOLUTION INTRAMUSCULAR; INTRAVENOUS at 01:18

## 2023-01-26 RX ADMIN — TAMSULOSIN HYDROCHLORIDE 0.8 MILLIGRAM(S): 0.4 CAPSULE ORAL at 22:48

## 2023-01-26 RX ADMIN — Medication 3 MILLILITER(S): at 21:11

## 2023-01-26 RX ADMIN — Medication 6: at 13:06

## 2023-01-26 RX ADMIN — PANTOPRAZOLE SODIUM 40 MILLIGRAM(S): 20 TABLET, DELAYED RELEASE ORAL at 06:05

## 2023-01-26 RX ADMIN — Medication 4 UNIT(S): at 18:00

## 2023-01-26 RX ADMIN — HEPARIN SODIUM 5000 UNIT(S): 5000 INJECTION INTRAVENOUS; SUBCUTANEOUS at 13:06

## 2023-01-26 RX ADMIN — Medication 3 MILLIGRAM(S): at 22:49

## 2023-01-26 RX ADMIN — DULOXETINE HYDROCHLORIDE 60 MILLIGRAM(S): 30 CAPSULE, DELAYED RELEASE ORAL at 13:06

## 2023-01-26 RX ADMIN — Medication 40 MILLIGRAM(S): at 06:05

## 2023-01-26 RX ADMIN — Medication 4: at 18:01

## 2023-01-26 RX ADMIN — Medication 3 MILLILITER(S): at 10:21

## 2023-01-26 RX ADMIN — Medication 1 PACKET(S): at 08:48

## 2023-01-26 RX ADMIN — SIMVASTATIN 40 MILLIGRAM(S): 20 TABLET, FILM COATED ORAL at 22:49

## 2023-01-26 RX ADMIN — MOMETASONE FUROATE 1 PUFF(S): 220 INHALANT RESPIRATORY (INHALATION) at 16:33

## 2023-01-26 RX ADMIN — Medication 8: at 08:46

## 2023-01-26 RX ADMIN — Medication 1 PACKET(S): at 19:45

## 2023-01-26 RX ADMIN — ATENOLOL 25 MILLIGRAM(S): 25 TABLET ORAL at 06:05

## 2023-01-26 RX ADMIN — Medication 3 MILLILITER(S): at 16:24

## 2023-01-26 RX ADMIN — HEPARIN SODIUM 5000 UNIT(S): 5000 INJECTION INTRAVENOUS; SUBCUTANEOUS at 22:47

## 2023-01-26 RX ADMIN — INSULIN GLARGINE 12 UNIT(S): 100 INJECTION, SOLUTION SUBCUTANEOUS at 22:48

## 2023-01-26 RX ADMIN — Medication 3 MILLILITER(S): at 04:30

## 2023-01-26 RX ADMIN — HEPARIN SODIUM 5000 UNIT(S): 5000 INJECTION INTRAVENOUS; SUBCUTANEOUS at 06:05

## 2023-01-26 NOTE — PROGRESS NOTE ADULT - PROBLEM SELECTOR PLAN 2
- on arrival, RR 16 with O2sat 95% RA -- 2L NC 99%  - likely in setting of RSV   - management as above  - also former smoker, ?component of possible undiagnosed COPD  - continue with ATC nebs, c/w methylprednisolone for COPD exac  - wean O2 as tolerated not on O2 at home  - resumed home Flovent as therapeutic interchange mometasone

## 2023-01-26 NOTE — PROGRESS NOTE ADULT - PROBLEM SELECTOR PLAN 10
- home med: flomax 0.4mg daily  - c/w home med  - byrne placed for retention continue for one week per urology  - monitor lytes in setting of post-obstructive diuresis, repeat ordered now and another BMP ordered for 10pm - home med: flomax 0.4mg daily--> up-titrate to .8mg given retention   - c/w home med  - byrne placed for retention continue for one week per urology  - monitor lytes in setting of post-obstructive diuresis, repeat ordered now and another BMP ordered for 10pm

## 2023-01-26 NOTE — CHART NOTE - NSCHARTNOTEFT_GEN_A_CORE
Note    Called to place Difficult Byrne    Pt requires byrne for Urinary Retention / Elevated PVR    T(C): 37.1 (01-26-23 @ 06:01), Max: 39.8 (01-25-23 @ 18:20)  HR: 60 (01-26-23 @ 06:01) (55 - 110)  BP: 141/60 (01-26-23 @ 06:01) (128/72 - 146/62)  RR: 18 (01-26-23 @ 06:01) (17 - 22)  SpO2: 97% (01-26-23 @ 06:01) (92% - 99%)  Wt(kg): --    PE:  GEN:-NAD  ABD: soft NT ND, + suprapubic distension  : Penis uncircumcised ;  Scrotum WNL    Procedure:  18f coude  byrne placed in aseptic fashion.  1200 cc of urine collected yellow color  pt tolerated well      Assessment/Plan  -Monitor patient for post-obstructive diuresis, (POD)  -Maintain strict intake and output  -POD = >200cc of urine/hr for 3 or more consecutive hours  -Allow patient to drink to thirst.  Keep two pitchers of water at the bedside at all times.  -Check q6 BMPs to monitor electrolytes until Urine Output normalizes  -Continue Byrne catheter for a week  *Do not remove without speaking to Uro first  -Consider Nephro for further recc's for POD  -Can obtain follow up Renal US to assess improvement in Hydronephrosis in 3- 4 days

## 2023-01-26 NOTE — PROVIDER CONTACT NOTE (OTHER) - ASSESSMENT
Pt is A&Ox1-2. Denies pain or discomfort. Comfortable appearing at this time. Abdomen distended and taut. Unable to insert byrne met resistance when attempting.

## 2023-01-27 LAB
ANION GAP SERPL CALC-SCNC: 10 MMOL/L — SIGNIFICANT CHANGE UP (ref 7–14)
BASOPHILS # BLD AUTO: 0.02 K/UL — SIGNIFICANT CHANGE UP (ref 0–0.2)
BASOPHILS NFR BLD AUTO: 0.2 % — SIGNIFICANT CHANGE UP (ref 0–2)
BUN SERPL-MCNC: 54 MG/DL — HIGH (ref 7–23)
CALCIUM SERPL-MCNC: 8.9 MG/DL — SIGNIFICANT CHANGE UP (ref 8.4–10.5)
CHLORIDE SERPL-SCNC: 112 MMOL/L — HIGH (ref 98–107)
CO2 SERPL-SCNC: 22 MMOL/L — SIGNIFICANT CHANGE UP (ref 22–31)
CREAT SERPL-MCNC: 1.84 MG/DL — HIGH (ref 0.5–1.3)
EGFR: 35 ML/MIN/1.73M2 — LOW
EOSINOPHIL # BLD AUTO: 0 K/UL — SIGNIFICANT CHANGE UP (ref 0–0.5)
EOSINOPHIL NFR BLD AUTO: 0 % — SIGNIFICANT CHANGE UP (ref 0–6)
GLUCOSE BLDC GLUCOMTR-MCNC: 181 MG/DL — HIGH (ref 70–99)
GLUCOSE BLDC GLUCOMTR-MCNC: 223 MG/DL — HIGH (ref 70–99)
GLUCOSE BLDC GLUCOMTR-MCNC: 226 MG/DL — HIGH (ref 70–99)
GLUCOSE BLDC GLUCOMTR-MCNC: 238 MG/DL — HIGH (ref 70–99)
GLUCOSE SERPL-MCNC: 184 MG/DL — HIGH (ref 70–99)
HCT VFR BLD CALC: 34.7 % — LOW (ref 39–50)
HGB BLD-MCNC: 11 G/DL — LOW (ref 13–17)
IANC: 10.08 K/UL — HIGH (ref 1.8–7.4)
IMM GRANULOCYTES NFR BLD AUTO: 0.8 % — SIGNIFICANT CHANGE UP (ref 0–0.9)
LYMPHOCYTES # BLD AUTO: 0.65 K/UL — LOW (ref 1–3.3)
LYMPHOCYTES # BLD AUTO: 5.6 % — LOW (ref 13–44)
MAGNESIUM SERPL-MCNC: 2.4 MG/DL — SIGNIFICANT CHANGE UP (ref 1.6–2.6)
MCHC RBC-ENTMCNC: 29.4 PG — SIGNIFICANT CHANGE UP (ref 27–34)
MCHC RBC-ENTMCNC: 31.7 GM/DL — LOW (ref 32–36)
MCV RBC AUTO: 92.8 FL — SIGNIFICANT CHANGE UP (ref 80–100)
MONOCYTES # BLD AUTO: 0.87 K/UL — SIGNIFICANT CHANGE UP (ref 0–0.9)
MONOCYTES NFR BLD AUTO: 7.4 % — SIGNIFICANT CHANGE UP (ref 2–14)
MRSA PCR RESULT.: SIGNIFICANT CHANGE UP
NEUTROPHILS # BLD AUTO: 10.08 K/UL — HIGH (ref 1.8–7.4)
NEUTROPHILS NFR BLD AUTO: 86 % — HIGH (ref 43–77)
NRBC # BLD: 0 /100 WBCS — SIGNIFICANT CHANGE UP (ref 0–0)
NRBC # FLD: 0 K/UL — SIGNIFICANT CHANGE UP (ref 0–0)
PHOSPHATE SERPL-MCNC: 3.4 MG/DL — SIGNIFICANT CHANGE UP (ref 2.5–4.5)
PLATELET # BLD AUTO: 183 K/UL — SIGNIFICANT CHANGE UP (ref 150–400)
POTASSIUM SERPL-MCNC: 4 MMOL/L — SIGNIFICANT CHANGE UP (ref 3.5–5.3)
POTASSIUM SERPL-SCNC: 4 MMOL/L — SIGNIFICANT CHANGE UP (ref 3.5–5.3)
RBC # BLD: 3.74 M/UL — LOW (ref 4.2–5.8)
RBC # FLD: 14 % — SIGNIFICANT CHANGE UP (ref 10.3–14.5)
S AUREUS DNA NOSE QL NAA+PROBE: SIGNIFICANT CHANGE UP
SODIUM SERPL-SCNC: 144 MMOL/L — SIGNIFICANT CHANGE UP (ref 135–145)
WBC # BLD: 11.71 K/UL — HIGH (ref 3.8–10.5)
WBC # FLD AUTO: 11.71 K/UL — HIGH (ref 3.8–10.5)

## 2023-01-27 PROCEDURE — 99232 SBSQ HOSP IP/OBS MODERATE 35: CPT

## 2023-01-27 RX ORDER — INSULIN GLARGINE 100 [IU]/ML
15 INJECTION, SOLUTION SUBCUTANEOUS AT BEDTIME
Refills: 0 | Status: DISCONTINUED | OUTPATIENT
Start: 2023-01-27 | End: 2023-01-31

## 2023-01-27 RX ORDER — INSULIN LISPRO 100/ML
5 VIAL (ML) SUBCUTANEOUS
Refills: 0 | Status: DISCONTINUED | OUTPATIENT
Start: 2023-01-27 | End: 2023-01-31

## 2023-01-27 RX ADMIN — PANTOPRAZOLE SODIUM 40 MILLIGRAM(S): 20 TABLET, DELAYED RELEASE ORAL at 06:47

## 2023-01-27 RX ADMIN — TAMSULOSIN HYDROCHLORIDE 0.8 MILLIGRAM(S): 0.4 CAPSULE ORAL at 21:24

## 2023-01-27 RX ADMIN — Medication 1 PACKET(S): at 09:02

## 2023-01-27 RX ADMIN — Medication 40 MILLIGRAM(S): at 06:47

## 2023-01-27 RX ADMIN — Medication 3 MILLIGRAM(S): at 21:24

## 2023-01-27 RX ADMIN — Medication 4 UNIT(S): at 12:59

## 2023-01-27 RX ADMIN — INSULIN GLARGINE 15 UNIT(S): 100 INJECTION, SOLUTION SUBCUTANEOUS at 22:24

## 2023-01-27 RX ADMIN — Medication 4 UNIT(S): at 09:01

## 2023-01-27 RX ADMIN — Medication 3 MILLILITER(S): at 20:48

## 2023-01-27 RX ADMIN — Medication 2: at 09:01

## 2023-01-27 RX ADMIN — Medication 4: at 12:59

## 2023-01-27 RX ADMIN — Medication 1 PACKET(S): at 18:12

## 2023-01-27 RX ADMIN — Medication 3 MILLILITER(S): at 03:38

## 2023-01-27 RX ADMIN — ATENOLOL 25 MILLIGRAM(S): 25 TABLET ORAL at 06:47

## 2023-01-27 RX ADMIN — Medication 3 MILLILITER(S): at 10:16

## 2023-01-27 RX ADMIN — Medication 4 UNIT(S): at 18:10

## 2023-01-27 RX ADMIN — Medication 3 MILLILITER(S): at 16:02

## 2023-01-27 RX ADMIN — HEPARIN SODIUM 5000 UNIT(S): 5000 INJECTION INTRAVENOUS; SUBCUTANEOUS at 13:02

## 2023-01-27 RX ADMIN — HEPARIN SODIUM 5000 UNIT(S): 5000 INJECTION INTRAVENOUS; SUBCUTANEOUS at 06:46

## 2023-01-27 RX ADMIN — Medication 1 PACKET(S): at 13:02

## 2023-01-27 RX ADMIN — DULOXETINE HYDROCHLORIDE 60 MILLIGRAM(S): 30 CAPSULE, DELAYED RELEASE ORAL at 13:03

## 2023-01-27 RX ADMIN — MOMETASONE FUROATE 1 PUFF(S): 220 INHALANT RESPIRATORY (INHALATION) at 09:37

## 2023-01-27 RX ADMIN — SIMVASTATIN 40 MILLIGRAM(S): 20 TABLET, FILM COATED ORAL at 21:24

## 2023-01-27 RX ADMIN — HEPARIN SODIUM 5000 UNIT(S): 5000 INJECTION INTRAVENOUS; SUBCUTANEOUS at 21:24

## 2023-01-27 RX ADMIN — Medication 4: at 18:09

## 2023-01-27 NOTE — PROGRESS NOTE ADULT - PROBLEM SELECTOR PLAN 2
- likely in setting of RSV   - management as above  - also former smoker, ?component of possible undiagnosed COPD. On Flovent on med review.   - continue with ATC nebs, c/w methylprednisolone for COPD exac  - weaned to RA  - resumed home Flovent as therapeutic interchange mometasone

## 2023-01-27 NOTE — PROGRESS NOTE ADULT - PROBLEM SELECTOR PLAN 10
- home med: flomax 0.4mg daily--> up-titrate to .8mg given retention   - c/w home med  - byrne placed for retention continue for one week per urology  - monitor lytes in setting of post-obstructive diuresis

## 2023-01-27 NOTE — PROGRESS NOTE ADULT - TIME BILLING
Time spent on review of vitals, physical exam, labs, documentation, and discussion of plan of care with patient and patient wife.
Time spent on review of vitals, labs, physical exam, adjustment of insulin regimen. Time spent on documentation and review of urology note.
Time spent on review of vitals, physical exam, labs, personal review of CXR performed which is clear. Additional time spent on educating patient, patient son, and patient wife regarding plan of care. Time spent on documentation, ordering steroids nebulizers, PA Lateral CXR, and renal sono.

## 2023-01-28 LAB
ANION GAP SERPL CALC-SCNC: 8 MMOL/L — SIGNIFICANT CHANGE UP (ref 7–14)
BUN SERPL-MCNC: 54 MG/DL — HIGH (ref 7–23)
CALCIUM SERPL-MCNC: 8.8 MG/DL — SIGNIFICANT CHANGE UP (ref 8.4–10.5)
CHLORIDE SERPL-SCNC: 114 MMOL/L — HIGH (ref 98–107)
CO2 SERPL-SCNC: 23 MMOL/L — SIGNIFICANT CHANGE UP (ref 22–31)
CREAT SERPL-MCNC: 1.66 MG/DL — HIGH (ref 0.5–1.3)
EGFR: 40 ML/MIN/1.73M2 — LOW
GLUCOSE BLDC GLUCOMTR-MCNC: 155 MG/DL — HIGH (ref 70–99)
GLUCOSE BLDC GLUCOMTR-MCNC: 182 MG/DL — HIGH (ref 70–99)
GLUCOSE BLDC GLUCOMTR-MCNC: 188 MG/DL — HIGH (ref 70–99)
GLUCOSE BLDC GLUCOMTR-MCNC: 211 MG/DL — HIGH (ref 70–99)
GLUCOSE SERPL-MCNC: 148 MG/DL — HIGH (ref 70–99)
HCT VFR BLD CALC: 35.2 % — LOW (ref 39–50)
HGB BLD-MCNC: 11.3 G/DL — LOW (ref 13–17)
MAGNESIUM SERPL-MCNC: 2.4 MG/DL — SIGNIFICANT CHANGE UP (ref 1.6–2.6)
MCHC RBC-ENTMCNC: 29.7 PG — SIGNIFICANT CHANGE UP (ref 27–34)
MCHC RBC-ENTMCNC: 32.1 GM/DL — SIGNIFICANT CHANGE UP (ref 32–36)
MCV RBC AUTO: 92.6 FL — SIGNIFICANT CHANGE UP (ref 80–100)
NRBC # BLD: 0 /100 WBCS — SIGNIFICANT CHANGE UP (ref 0–0)
NRBC # FLD: 0 K/UL — SIGNIFICANT CHANGE UP (ref 0–0)
PHOSPHATE SERPL-MCNC: 3.8 MG/DL — SIGNIFICANT CHANGE UP (ref 2.5–4.5)
PLATELET # BLD AUTO: 177 K/UL — SIGNIFICANT CHANGE UP (ref 150–400)
POTASSIUM SERPL-MCNC: 4.3 MMOL/L — SIGNIFICANT CHANGE UP (ref 3.5–5.3)
POTASSIUM SERPL-SCNC: 4.3 MMOL/L — SIGNIFICANT CHANGE UP (ref 3.5–5.3)
RBC # BLD: 3.8 M/UL — LOW (ref 4.2–5.8)
RBC # FLD: 14 % — SIGNIFICANT CHANGE UP (ref 10.3–14.5)
SODIUM SERPL-SCNC: 145 MMOL/L — SIGNIFICANT CHANGE UP (ref 135–145)
WBC # BLD: 10.55 K/UL — HIGH (ref 3.8–10.5)
WBC # FLD AUTO: 10.55 K/UL — HIGH (ref 3.8–10.5)

## 2023-01-28 PROCEDURE — 99232 SBSQ HOSP IP/OBS MODERATE 35: CPT

## 2023-01-28 RX ADMIN — INSULIN GLARGINE 15 UNIT(S): 100 INJECTION, SOLUTION SUBCUTANEOUS at 23:13

## 2023-01-28 RX ADMIN — PANTOPRAZOLE SODIUM 40 MILLIGRAM(S): 20 TABLET, DELAYED RELEASE ORAL at 05:21

## 2023-01-28 RX ADMIN — HEPARIN SODIUM 5000 UNIT(S): 5000 INJECTION INTRAVENOUS; SUBCUTANEOUS at 21:45

## 2023-01-28 RX ADMIN — Medication 2: at 12:34

## 2023-01-28 RX ADMIN — Medication 40 MILLIGRAM(S): at 05:24

## 2023-01-28 RX ADMIN — Medication 3 MILLILITER(S): at 16:33

## 2023-01-28 RX ADMIN — HEPARIN SODIUM 5000 UNIT(S): 5000 INJECTION INTRAVENOUS; SUBCUTANEOUS at 12:38

## 2023-01-28 RX ADMIN — Medication 5 UNIT(S): at 18:32

## 2023-01-28 RX ADMIN — TAMSULOSIN HYDROCHLORIDE 0.8 MILLIGRAM(S): 0.4 CAPSULE ORAL at 21:48

## 2023-01-28 RX ADMIN — Medication 2: at 18:31

## 2023-01-28 RX ADMIN — Medication 3 MILLILITER(S): at 08:40

## 2023-01-28 RX ADMIN — Medication 5 UNIT(S): at 08:55

## 2023-01-28 RX ADMIN — Medication 2: at 08:55

## 2023-01-28 RX ADMIN — Medication 5 UNIT(S): at 12:34

## 2023-01-28 RX ADMIN — Medication 3 MILLIGRAM(S): at 21:48

## 2023-01-28 RX ADMIN — Medication 3 MILLILITER(S): at 04:36

## 2023-01-28 RX ADMIN — DULOXETINE HYDROCHLORIDE 60 MILLIGRAM(S): 30 CAPSULE, DELAYED RELEASE ORAL at 12:35

## 2023-01-28 RX ADMIN — SIMVASTATIN 40 MILLIGRAM(S): 20 TABLET, FILM COATED ORAL at 21:48

## 2023-01-28 RX ADMIN — MOMETASONE FUROATE 1 PUFF(S): 220 INHALANT RESPIRATORY (INHALATION) at 08:57

## 2023-01-28 RX ADMIN — ATENOLOL 25 MILLIGRAM(S): 25 TABLET ORAL at 05:21

## 2023-01-28 RX ADMIN — Medication 3 MILLILITER(S): at 22:17

## 2023-01-28 RX ADMIN — HEPARIN SODIUM 5000 UNIT(S): 5000 INJECTION INTRAVENOUS; SUBCUTANEOUS at 05:21

## 2023-01-29 LAB
ANION GAP SERPL CALC-SCNC: 11 MMOL/L — SIGNIFICANT CHANGE UP (ref 7–14)
BUN SERPL-MCNC: 50 MG/DL — HIGH (ref 7–23)
CALCIUM SERPL-MCNC: 8.7 MG/DL — SIGNIFICANT CHANGE UP (ref 8.4–10.5)
CHLORIDE SERPL-SCNC: 112 MMOL/L — HIGH (ref 98–107)
CO2 SERPL-SCNC: 23 MMOL/L — SIGNIFICANT CHANGE UP (ref 22–31)
CREAT SERPL-MCNC: 1.7 MG/DL — HIGH (ref 0.5–1.3)
CULTURE RESULTS: SIGNIFICANT CHANGE UP
CULTURE RESULTS: SIGNIFICANT CHANGE UP
EGFR: 39 ML/MIN/1.73M2 — LOW
GLUCOSE BLDC GLUCOMTR-MCNC: 131 MG/DL — HIGH (ref 70–99)
GLUCOSE BLDC GLUCOMTR-MCNC: 150 MG/DL — HIGH (ref 70–99)
GLUCOSE BLDC GLUCOMTR-MCNC: 168 MG/DL — HIGH (ref 70–99)
GLUCOSE BLDC GLUCOMTR-MCNC: 188 MG/DL — HIGH (ref 70–99)
GLUCOSE SERPL-MCNC: 109 MG/DL — HIGH (ref 70–99)
HCT VFR BLD CALC: 35 % — LOW (ref 39–50)
HGB BLD-MCNC: 11.4 G/DL — LOW (ref 13–17)
MAGNESIUM SERPL-MCNC: 2.3 MG/DL — SIGNIFICANT CHANGE UP (ref 1.6–2.6)
MCHC RBC-ENTMCNC: 30.1 PG — SIGNIFICANT CHANGE UP (ref 27–34)
MCHC RBC-ENTMCNC: 32.6 GM/DL — SIGNIFICANT CHANGE UP (ref 32–36)
MCV RBC AUTO: 92.3 FL — SIGNIFICANT CHANGE UP (ref 80–100)
NRBC # BLD: 0 /100 WBCS — SIGNIFICANT CHANGE UP (ref 0–0)
NRBC # FLD: 0 K/UL — SIGNIFICANT CHANGE UP (ref 0–0)
PHOSPHATE SERPL-MCNC: 3.4 MG/DL — SIGNIFICANT CHANGE UP (ref 2.5–4.5)
PLATELET # BLD AUTO: 187 K/UL — SIGNIFICANT CHANGE UP (ref 150–400)
POTASSIUM SERPL-MCNC: 4.2 MMOL/L — SIGNIFICANT CHANGE UP (ref 3.5–5.3)
POTASSIUM SERPL-SCNC: 4.2 MMOL/L — SIGNIFICANT CHANGE UP (ref 3.5–5.3)
RBC # BLD: 3.79 M/UL — LOW (ref 4.2–5.8)
RBC # FLD: 13.9 % — SIGNIFICANT CHANGE UP (ref 10.3–14.5)
SARS-COV-2 RNA SPEC QL NAA+PROBE: SIGNIFICANT CHANGE UP
SODIUM SERPL-SCNC: 146 MMOL/L — HIGH (ref 135–145)
SPECIMEN SOURCE: SIGNIFICANT CHANGE UP
SPECIMEN SOURCE: SIGNIFICANT CHANGE UP
WBC # BLD: 9.65 K/UL — SIGNIFICANT CHANGE UP (ref 3.8–10.5)
WBC # FLD AUTO: 9.65 K/UL — SIGNIFICANT CHANGE UP (ref 3.8–10.5)

## 2023-01-29 PROCEDURE — 99232 SBSQ HOSP IP/OBS MODERATE 35: CPT

## 2023-01-29 RX ORDER — LOSARTAN POTASSIUM 100 MG/1
50 TABLET, FILM COATED ORAL DAILY
Refills: 0 | Status: DISCONTINUED | OUTPATIENT
Start: 2023-01-29 | End: 2023-01-31

## 2023-01-29 RX ADMIN — Medication 2: at 12:55

## 2023-01-29 RX ADMIN — Medication 3 MILLILITER(S): at 09:42

## 2023-01-29 RX ADMIN — DULOXETINE HYDROCHLORIDE 60 MILLIGRAM(S): 30 CAPSULE, DELAYED RELEASE ORAL at 12:56

## 2023-01-29 RX ADMIN — PANTOPRAZOLE SODIUM 40 MILLIGRAM(S): 20 TABLET, DELAYED RELEASE ORAL at 05:25

## 2023-01-29 RX ADMIN — Medication 3 MILLILITER(S): at 16:54

## 2023-01-29 RX ADMIN — TAMSULOSIN HYDROCHLORIDE 0.8 MILLIGRAM(S): 0.4 CAPSULE ORAL at 21:46

## 2023-01-29 RX ADMIN — Medication 5 UNIT(S): at 12:55

## 2023-01-29 RX ADMIN — Medication 2: at 17:47

## 2023-01-29 RX ADMIN — HEPARIN SODIUM 5000 UNIT(S): 5000 INJECTION INTRAVENOUS; SUBCUTANEOUS at 21:47

## 2023-01-29 RX ADMIN — SIMVASTATIN 40 MILLIGRAM(S): 20 TABLET, FILM COATED ORAL at 21:46

## 2023-01-29 RX ADMIN — Medication 40 MILLIGRAM(S): at 05:25

## 2023-01-29 RX ADMIN — Medication 5 UNIT(S): at 17:47

## 2023-01-29 RX ADMIN — ATENOLOL 25 MILLIGRAM(S): 25 TABLET ORAL at 05:25

## 2023-01-29 RX ADMIN — Medication 3 MILLILITER(S): at 04:15

## 2023-01-29 RX ADMIN — Medication 3 MILLIGRAM(S): at 21:46

## 2023-01-29 RX ADMIN — HEPARIN SODIUM 5000 UNIT(S): 5000 INJECTION INTRAVENOUS; SUBCUTANEOUS at 15:09

## 2023-01-29 RX ADMIN — Medication 3 MILLILITER(S): at 21:52

## 2023-01-29 RX ADMIN — Medication 5 UNIT(S): at 09:12

## 2023-01-29 RX ADMIN — INSULIN GLARGINE 15 UNIT(S): 100 INJECTION, SOLUTION SUBCUTANEOUS at 23:13

## 2023-01-29 RX ADMIN — HEPARIN SODIUM 5000 UNIT(S): 5000 INJECTION INTRAVENOUS; SUBCUTANEOUS at 05:25

## 2023-01-29 RX ADMIN — MOMETASONE FUROATE 1 PUFF(S): 220 INHALANT RESPIRATORY (INHALATION) at 09:14

## 2023-01-29 NOTE — PROGRESS NOTE ADULT - PROBLEM SELECTOR PLAN 2
- likely in setting of RSV   - management as above  - also former smoker, poss component of undiagnosed COPD. On Flovent on med review.   - continue with ATC nebs, c/w methylprednisolone for COPD exac  - weaned to RA  - resumed home Flovent as therapeutic interchange mometasone

## 2023-01-30 ENCOUNTER — TRANSCRIPTION ENCOUNTER (OUTPATIENT)
Age: 88
End: 2023-01-30

## 2023-01-30 LAB
ANION GAP SERPL CALC-SCNC: 10 MMOL/L — SIGNIFICANT CHANGE UP (ref 7–14)
BUN SERPL-MCNC: 49 MG/DL — HIGH (ref 7–23)
CALCIUM SERPL-MCNC: 8.6 MG/DL — SIGNIFICANT CHANGE UP (ref 8.4–10.5)
CHLORIDE SERPL-SCNC: 110 MMOL/L — HIGH (ref 98–107)
CO2 SERPL-SCNC: 24 MMOL/L — SIGNIFICANT CHANGE UP (ref 22–31)
CREAT SERPL-MCNC: 1.6 MG/DL — HIGH (ref 0.5–1.3)
EGFR: 41 ML/MIN/1.73M2 — LOW
GLUCOSE BLDC GLUCOMTR-MCNC: 137 MG/DL — HIGH (ref 70–99)
GLUCOSE BLDC GLUCOMTR-MCNC: 141 MG/DL — HIGH (ref 70–99)
GLUCOSE BLDC GLUCOMTR-MCNC: 168 MG/DL — HIGH (ref 70–99)
GLUCOSE BLDC GLUCOMTR-MCNC: 190 MG/DL — HIGH (ref 70–99)
GLUCOSE SERPL-MCNC: 118 MG/DL — HIGH (ref 70–99)
HCT VFR BLD CALC: 34.9 % — LOW (ref 39–50)
HGB BLD-MCNC: 11.5 G/DL — LOW (ref 13–17)
MAGNESIUM SERPL-MCNC: 2.4 MG/DL — SIGNIFICANT CHANGE UP (ref 1.6–2.6)
MCHC RBC-ENTMCNC: 30.7 PG — SIGNIFICANT CHANGE UP (ref 27–34)
MCHC RBC-ENTMCNC: 33 GM/DL — SIGNIFICANT CHANGE UP (ref 32–36)
MCV RBC AUTO: 93.1 FL — SIGNIFICANT CHANGE UP (ref 80–100)
NRBC # BLD: 0 /100 WBCS — SIGNIFICANT CHANGE UP (ref 0–0)
NRBC # FLD: 0.02 K/UL — HIGH (ref 0–0)
PHOSPHATE SERPL-MCNC: 3.6 MG/DL — SIGNIFICANT CHANGE UP (ref 2.5–4.5)
PLATELET # BLD AUTO: 186 K/UL — SIGNIFICANT CHANGE UP (ref 150–400)
POTASSIUM SERPL-MCNC: 4 MMOL/L — SIGNIFICANT CHANGE UP (ref 3.5–5.3)
POTASSIUM SERPL-SCNC: 4 MMOL/L — SIGNIFICANT CHANGE UP (ref 3.5–5.3)
RBC # BLD: 3.75 M/UL — LOW (ref 4.2–5.8)
RBC # FLD: 13.9 % — SIGNIFICANT CHANGE UP (ref 10.3–14.5)
SODIUM SERPL-SCNC: 144 MMOL/L — SIGNIFICANT CHANGE UP (ref 135–145)
WBC # BLD: 7.73 K/UL — SIGNIFICANT CHANGE UP (ref 3.8–10.5)
WBC # FLD AUTO: 7.73 K/UL — SIGNIFICANT CHANGE UP (ref 3.8–10.5)

## 2023-01-30 PROCEDURE — 99239 HOSP IP/OBS DSCHRG MGMT >30: CPT

## 2023-01-30 RX ORDER — FUROSEMIDE 40 MG
1 TABLET ORAL
Qty: 0 | Refills: 0 | DISCHARGE

## 2023-01-30 RX ORDER — INSULIN LISPRO 100/ML
5 VIAL (ML) SUBCUTANEOUS
Qty: 0 | Refills: 0 | DISCHARGE
Start: 2023-01-30

## 2023-01-30 RX ORDER — TAMSULOSIN HYDROCHLORIDE 0.4 MG/1
2 CAPSULE ORAL
Qty: 0 | Refills: 0 | DISCHARGE
Start: 2023-01-30

## 2023-01-30 RX ORDER — INSULIN LISPRO 100/ML
1 VIAL (ML) SUBCUTANEOUS
Qty: 0 | Refills: 0 | DISCHARGE
Start: 2023-01-30

## 2023-01-30 RX ORDER — INSULIN GLARGINE 100 [IU]/ML
15 INJECTION, SOLUTION SUBCUTANEOUS
Qty: 0 | Refills: 0 | DISCHARGE
Start: 2023-01-30

## 2023-01-30 RX ORDER — SITAGLIPTIN 50 MG/1
1 TABLET, FILM COATED ORAL
Qty: 0 | Refills: 0 | DISCHARGE

## 2023-01-30 RX ORDER — IPRATROPIUM/ALBUTEROL SULFATE 18-103MCG
3 AEROSOL WITH ADAPTER (GRAM) INHALATION
Qty: 0 | Refills: 0 | DISCHARGE
Start: 2023-01-30

## 2023-01-30 RX ORDER — METFORMIN HYDROCHLORIDE 850 MG/1
1 TABLET ORAL
Qty: 0 | Refills: 0 | DISCHARGE

## 2023-01-30 RX ADMIN — Medication 5 UNIT(S): at 12:45

## 2023-01-30 RX ADMIN — Medication 3 MILLILITER(S): at 03:32

## 2023-01-30 RX ADMIN — MOMETASONE FUROATE 1 PUFF(S): 220 INHALANT RESPIRATORY (INHALATION) at 08:52

## 2023-01-30 RX ADMIN — SIMVASTATIN 40 MILLIGRAM(S): 20 TABLET, FILM COATED ORAL at 22:28

## 2023-01-30 RX ADMIN — PANTOPRAZOLE SODIUM 40 MILLIGRAM(S): 20 TABLET, DELAYED RELEASE ORAL at 06:19

## 2023-01-30 RX ADMIN — Medication 5 UNIT(S): at 17:58

## 2023-01-30 RX ADMIN — Medication 3 MILLILITER(S): at 15:54

## 2023-01-30 RX ADMIN — Medication 2: at 17:58

## 2023-01-30 RX ADMIN — Medication 3 MILLIGRAM(S): at 22:28

## 2023-01-30 RX ADMIN — Medication 5 UNIT(S): at 08:50

## 2023-01-30 RX ADMIN — Medication 2: at 12:44

## 2023-01-30 RX ADMIN — ATENOLOL 25 MILLIGRAM(S): 25 TABLET ORAL at 06:19

## 2023-01-30 RX ADMIN — TAMSULOSIN HYDROCHLORIDE 0.8 MILLIGRAM(S): 0.4 CAPSULE ORAL at 22:28

## 2023-01-30 RX ADMIN — HEPARIN SODIUM 5000 UNIT(S): 5000 INJECTION INTRAVENOUS; SUBCUTANEOUS at 15:57

## 2023-01-30 RX ADMIN — INSULIN GLARGINE 15 UNIT(S): 100 INJECTION, SOLUTION SUBCUTANEOUS at 22:29

## 2023-01-30 RX ADMIN — Medication 3 MILLILITER(S): at 08:09

## 2023-01-30 RX ADMIN — Medication 40 MILLIGRAM(S): at 06:19

## 2023-01-30 RX ADMIN — DULOXETINE HYDROCHLORIDE 60 MILLIGRAM(S): 30 CAPSULE, DELAYED RELEASE ORAL at 12:45

## 2023-01-30 RX ADMIN — Medication 3 MILLILITER(S): at 22:08

## 2023-01-30 RX ADMIN — HEPARIN SODIUM 5000 UNIT(S): 5000 INJECTION INTRAVENOUS; SUBCUTANEOUS at 22:28

## 2023-01-30 RX ADMIN — LOSARTAN POTASSIUM 50 MILLIGRAM(S): 100 TABLET, FILM COATED ORAL at 06:19

## 2023-01-30 RX ADMIN — HEPARIN SODIUM 5000 UNIT(S): 5000 INJECTION INTRAVENOUS; SUBCUTANEOUS at 06:18

## 2023-01-30 NOTE — DISCHARGE NOTE PROVIDER - HOSPITAL COURSE
88 yo M with PMHx HTN, HLD, T2D, dementia (AOx2-3), OA, BPH, CAD, TIA, CKD 3 and GERD presented to ED with weakness, productive cough, and decreased ability to ambulate.   On admission, tested positive for RSV and noted to be wheezing on exam with a smoking history concern for possible underlying chronic bronchitis/COPD. He was treated for likely COPD exacerbation. Likely an acute worsening of patient's chronic cough based on hx obtained on admission and had chronic cough for the past several years with white sputum as a former smoker.   On admission, noted to have severe sepsis- likely 2/2 to RSV and met SIRS criteria with lactate 2.3 cleared on repeat. UA neg, UCx showed normal urogenital john. BCx negative. MRSA swab negative  CXR neg acute consolidation, unable to obtain PA/Lateral patient unable to stand. Repeat CXR clear, discontinued antibiotics.   On admission, noted to have acute respiratory failure with hypoxia and also treated with Methylprednisolone and nebulizer for likely COPD exacerbation. He will need to follow up with Pulmonology outpatient for COPD management. Initially on oxygen supplementation and weaned to RA. Now stable on room air. Can resume home Flovent as therapeutic interchange mometasone.  Patient with hx of stage 3 chronic kidney disease and on admission Cr 1.8--> 1.63 --> 1.76-> 1.84, baseline ~1.5-1.8 per HIE. On 1/30, Cr 1.60 prior to discharge.   Urine studies sent and showed likely cause is intrinsic. Renal U/S showed no hydronephrosis thickened bladder which may reflect chronic obstruction.   Home dose Lasix held in setting of post obstructive diuresis. Losartan initially held but resumed prior to discharge.   PT consulted for difficulty ambulating and recommended rehab. Will need to have fall precautions in place at the rehab as well.   Patient with hx of hypertension and continued home meds: losartan 50mg daily, atenolol 25mg daily with exception of Lasix 20mg daily which was held in setting of post obstructive diuresis and normotension.  Continued simvastatin 40mg daily for hyperlipidemia  Pt with hx of Type 2 diabetes mellitus and at home on Januvia 50mg daily, glimepiride 1mg daily, metformin 500mg BID. During hospitalization, HgbA1C --> 5.8 and increased lantus to 15u and 5u of pre-meal (hyperglycemia in the setting of steroids). He was continued on carb consistent diet.  On Discharge, would not resume metformin, can continue Januvia and glimepiride.   Will stop Metformin given CKD and risk of lactic acidosis.  Pt with hx of Dementia and not on meds; at baseline ambulates with walker and AOx2-3. Pt currently at baseline.  Pt with benign prostatic hyperplasia and at home received Flomax 0.4mg daily--> up-titrate to .8mg given retention.   Bethea placed for retention continue for one week per urology and will need to follow up outpatient after discharge.   Continued on Protonix 40mg daily for GERD    On 1/30, patient was seen and evaluated today. Pt at AO x 2 and reports feeling better. He denies any acute complaints at this time.   Discussed discharge medications, plan and outpatient follow up with spouse over the phone. All questions answered and family in agreement with discharge plan.   Patient is hemodynamically stable and medically optimized for discharge with outpatient follow up with PCP, Urology, Pulmonology, Nephrology and Cardiology.    86 yo M with PMHx HTN, HLD, T2D, dementia (AOx2-3), OA, BPH, CAD, TIA, CKD 3 and GERD presented to ED with weakness, productive cough, and decreased ability to ambulate.   On admission, tested positive for RSV and noted to be wheezing on exam with a smoking history concern for possible underlying chronic bronchitis/COPD. He was treated for likely COPD exacerbation. Likely an acute worsening of patient's chronic cough based on hx obtained on admission and had chronic cough for the past several years with white sputum as a former smoker.   On admission, noted to have severe sepsis- likely 2/2 to RSV and met SIRS criteria with lactate 2.3 cleared on repeat. UA neg, UCx showed normal urogenital john. BCx negative. MRSA swab negative  CXR neg acute consolidation, unable to obtain PA/Lateral patient unable to stand. Repeat CXR clear, discontinued antibiotics.   On admission, noted to have acute respiratory failure with hypoxia and also treated with Methylprednisolone and nebulizer for likely COPD exacerbation. He will need to follow up with Pulmonology outpatient for COPD management. Initially on oxygen supplementation and weaned to RA. Now stable on room air. Can resume home Flovent as therapeutic interchange mometasone.  Patient with hx of stage 3 chronic kidney disease and on admission Cr 1.8--> 1.63 --> 1.76-> 1.84, baseline ~1.5-1.8 per HIE. On 1/30, Cr 1.60 prior to discharge.   Urine studies sent and showed likely cause is intrinsic. Renal U/S showed no hydronephrosis thickened bladder which may reflect chronic obstruction.   Home dose Lasix held in setting of post obstructive diuresis. Losartan initially held but resumed prior to discharge.   PT consulted for difficulty ambulating and recommended rehab. Will need to have fall precautions in place at the rehab as well.   Patient with hx of hypertension and continued home meds: losartan 50mg daily, atenolol 25mg daily with exception of Lasix 20mg daily which was held in setting of post obstructive diuresis and normotension.  Continued simvastatin 40mg daily for hyperlipidemia  Pt with hx of Type 2 diabetes mellitus and at home on Januvia 50mg daily, glimepiride 1mg daily, metformin 500mg BID. During hospitalization, HgbA1C --> 5.8 and increased lantus to 15u and 5u of pre-meal (hyperglycemia in the setting of steroids. He was continued on carb consistent diet.  On Discharge, would not resume metformin, can continue Januvia and glimepiride. Will stop Metformin given CKD and risk of lactic acidosis.  Pt with hx of Dementia and not on meds; at baseline ambulates with walker and AOx2-3. Pt currently at baseline.  Pt with benign prostatic hyperplasia and at home received Flomax 0.4mg daily--> up-titrate to .8mg given retention.   Bethea placed for retention continue for one week per urology and will need to follow up outpatient after discharge.   Continued on Protonix 40mg daily for GERD  Discussed case with Urology over the phone on 1/30 and recommended outpatient follow up with urology for further work up. Discussed case with nephrology as well who recommended BUN/Cr now at baseline and will need to follow up with Nephrologist for further work up of  post obstructive diuresis.   On 1/30, patient was seen and evaluated today. Pt at AO x 2 and reports feeling better. He denies any acute complaints at this time.   Discussed discharge medications, plan and outpatient follow up with spouse over the phone. All questions answered and family in agreement with discharge plan.   Patient is hemodynamically stable and medically optimized for discharge with outpatient follow up with PCP, Urology, Pulmonology, Nephrology and Cardiology. Patient will need repeat labs (CBC/CMP) 1-2 days to monitor BUN/Cr and hemoglobin. Pt needs repeat Renal U/S within one week to assess  post obstructive diuresis.    88 yo M with PMHx HTN, HLD, T2D, dementia (AOx2-3), OA, BPH, CAD, TIA, CKD 3 and GERD presented to ED with weakness, productive cough, and decreased ability to ambulate.   On admission, tested positive for RSV and noted to be wheezing on exam with a smoking history concern for possible underlying chronic bronchitis/COPD. He was treated for likely COPD exacerbation. Likely an acute worsening of patient's chronic cough based on hx obtained on admission and had chronic cough for the past several years with white sputum as a former smoker.   On admission, noted to have severe sepsis- likely 2/2 to RSV and met SIRS criteria with lactate 2.3 cleared on repeat. UA neg, UCx showed normal urogenital john. BCx negative. MRSA swab negative  CXR neg acute consolidation, unable to obtain PA/Lateral patient unable to stand. Repeat CXR clear, discontinued antibiotics.   On admission, noted to have acute respiratory failure with hypoxia and also treated with Methylprednisolone and nebulizer for likely COPD exacerbation. He will need to follow up with Pulmonology outpatient for COPD management. Initially on oxygen supplementation and weaned to RA. Now stable on room air. Can resume home Flovent as therapeutic interchange mometasone.  Patient with hx of stage 3 chronic kidney disease and on admission Cr 1.8--> 1.63 --> 1.76-> 1.84, baseline ~1.5-1.8 per HIE. On 1/30, Cr 1.60 prior to discharge.   Urine studies sent and showed likely cause is intrinsic. Renal U/S showed no hydronephrosis thickened bladder which may reflect chronic obstruction.   Home dose Lasix held in setting of post obstructive diuresis. Losartan initially held but resumed prior to discharge.   PT consulted for difficulty ambulating and recommended rehab. Will need to have fall precautions in place at the rehab as well.   Patient with hx of hypertension and continued home meds: losartan 50mg daily, atenolol 25mg daily with exception of Lasix 20mg daily which was held in setting of post obstructive diuresis and normotension.  Continued simvastatin 40mg daily for hyperlipidemia  Pt with hx of Type 2 diabetes mellitus and at home on Januvia 50mg daily, glimepiride 1mg daily, metformin 500mg BID. During hospitalization, HgbA1C --> 5.8 and increased lantus to 15u and 5u of pre-meal (hyperglycemia in the setting of steroids. He was continued on carb consistent diet.  On Discharge, would not resume metformin, can continue Januvia and glimepiride. Will stop Metformin given CKD and risk of lactic acidosis.  Pt with hx of Dementia and not on meds; at baseline ambulates with walker and AOx2-3. Pt currently at baseline.  Pt with benign prostatic hyperplasia and at home received Flomax 0.4mg daily--> up-titrate to .8mg given retention.   Bethea placed for retention continue for one week per urology and will need to follow up outpatient after discharge.   Continued on Protonix 40mg daily for GERD  Discussed case with Urology over the phone on 1/30 and recommended outpatient follow up with urology for further work up. Discussed case with nephrology as well who recommended BUN/Cr now at baseline and will need to follow up with Nephrologist for further work up of  post obstructive diuresis.   On 1/30, patient was seen and evaluated today. Pt at AO x 2 and reports feeling better. He denies any acute complaints at this time.   Discussed discharge medications, plan and outpatient follow up with spouse (Stan Sousa)  over the phone. All questions answered and family in agreement with discharge plan.   Patient is hemodynamically stable and medically optimized for discharge with outpatient follow up with PCP, Urology, Pulmonology, Nephrology and Cardiology. Patient will need repeat labs (CBC/CMP) 1-2 days to monitor BUN/Cr and hemoglobin. Pt needs repeat Renal U/S within one week to assess  post obstructive diuresis.

## 2023-01-30 NOTE — DISCHARGE NOTE PROVIDER - NSFOLLOWUPCLINICS_GEN_ALL_ED_FT
[facility]  Nephrology  [address] [place] [address], [address] [place] [address]  Phone: [phone]  Fax:     [facility]  Pulmonology  [address] [place] [address], [facility] [address]  [place] [address]  Phone: [phone]  Fax:     [facility]  Urology  [address] [place] [address]  Phone: [phone]  Fax: [phone]

## 2023-01-30 NOTE — DISCHARGE NOTE PROVIDER - ATTENDING DISCHARGE PHYSICAL EXAMINATION:
CONSTITUTIONAL: NAD, appears comfortable  EYES: PERRLA; conjunctiva and sclera clear  ENMT: Moist oral mucosa; normal dentition  RESPIRATORY: Normal respiratory effort; grossly b/l AE  CARDIOVASCULAR: Regular rate and rhythm; No lower extremity edema;   ABDOMEN: Nontender to palpation, normoactive bowel sounds  MUSCULOSKELETAL:  no clubbing or cyanosis of digits; no joint swelling or tenderness to palpation  PSYCH: calm, coop; affect appropriate  NEUROLOGY: CN 2-12 are intact and symmetric; no gross sensory deficits   SKIN: No rashes; no palpable lesions

## 2023-01-30 NOTE — DISCHARGE NOTE PROVIDER - NSDCCPCAREPLAN_GEN_ALL_CORE_FT
PRINCIPAL DISCHARGE DIAGNOSIS  Diagnosis: Sepsis  Assessment and Plan of Treatment: You were treated for sepsis and tested positive for RSV. Urine Cx showed normal urogenital john. BCx negative. CXR neg acute consolidation and you received antibiotics.         SECONDARY DISCHARGE DIAGNOSES  Diagnosis: Acute respiratory failure with hypoxia  Assessment and Plan of Treatment: During admission, you were treated with Methylprednisolone and nebulizer for likely COPD exacerbation. You  will need to follow up with Pulmonology outpatient for COPD management. Initially on oxygen supplementation and weaned off  to room air. Now stable on room air. Can resume home Flovent. Please follow up with pulmonologist for further evaluation and management.    Diagnosis: Bladder outlet obstruction  Assessment and Plan of Treatment: During admission, you had Renal U/S which showed no hydronephrosis thickened bladder which may reflect chronic obstruction.  Urology consulted and Bethea inserted. Please continue to keep the Bethea and follow up with urology for further work up.    Diagnosis: Stage 3 chronic kidney disease  Assessment and Plan of Treatment: Patient with hx of stage 3 chronic kidney disease and on admission Cr 1.8--> 1.63 --> 1.76-> 1.84, baseline ~1.5-1.8 per HIE. On 1/30, Cr 1.60 prior to discharge.   Please follow up with Nephrology after discharge.    Diagnosis: HLD (hyperlipidemia)  Assessment and Plan of Treatment: Continue statin and follow up with PCP.    Diagnosis: HTN (hypertension)  Assessment and Plan of Treatment: Continue all home medications except for Lasix and follow up with PCP.    Diagnosis: Type 2 diabetes mellitus  Assessment and Plan of Treatment: Please stop Metformin but can resume other home medications for Diabetes.   Please see your PCP  to have your A1c checked every 3 months. You will need to return at least once each year to have your feet checked. You will need an eye exam once a year to check for retinopathy. You will also need urine tests every year to check for kidney problems. You may need tests to monitor for heart disease such as an EKG, stress test, blood pressure monitoring, and blood tests. Write down your questions so you remember to ask them during your visits.  You will need to check your blood sugar level at least 3 times each day if you are on insulin. If you check your blood sugar level before a meal , it should be between 80 and 130 mg/dL. If you check your blood sugar level 1 to 2 hours after a meal , it should be less than 180 mg/dL.  Your blood sugar level is too low if it goes below 70 mg/dL. If the level is too low, eat or drink 15 grams of fast-acting carbohydrates, such as 1/2 cup fruit juice or 4 oz. regular soda. Check your blood sugar level 15 minutes later. If the level is still low (less than 100 mg/dL), drink another serving.   Do not skip meals. Your blood sugar level may drop too low if you have taken diabetes medicine and do not eat.  Please seek medical attention immediately if:  You have severe abdominal pain, or the pain spreads to your back. You may also be vomiting.  You have trouble staying awake or focusing.  You are shaking or sweating.  You have blurred or double vision.  Your breath has a fruity, sweet smell.  Your breathing is deep and labored, or rapid and shallow.  Your heartbeat is fast and weak.      Diagnosis: Dementia  Assessment and Plan of Treatment: Will need frequent re-orientation. fall risk protocol and aspiration precautions. Modified diet    Diagnosis: BPH (benign prostatic hyperplasia)  Assessment and Plan of Treatment: Continue Bethea and follow up with Urology.    Diagnosis: Pneumonia  Assessment and Plan of Treatment:

## 2023-01-30 NOTE — DISCHARGE NOTE PROVIDER - NSDCMRMEDTOKEN_GEN_ALL_CORE_FT
atenolol 25 mg oral tablet: 1 tab(s) orally once a day  Cymbalta 60 mg oral delayed release capsule: 1 cap(s) orally once a day  Flovent HFA 44 mcg/inh inhalation aerosol: 2 puff(s) inhaled 2 times a day, As Needed  glipiZIDE 5 mg oral tablet: 1.5  orally once a day  Januvia 50 mg oral tablet: 1 tab(s) orally once a day  Lasix 20 mg oral tablet: 1 tab(s) orally once a day  losartan 50 mg oral tablet: 1 tab(s) orally once a day  metFORMIN 500 mg oral tablet: 1 tab(s) orally 2 times a day  Protonix 40 mg oral delayed release tablet: 1 tab(s) orally once a day  simvastatin 40 mg oral tablet: 1 tab(s) orally once a day (at bedtime)  tamsulosin 0.4 mg oral capsule: 2 cap(s) orally once a day (at bedtime)   atenolol 25 mg oral tablet: 1 tab(s) orally once a day  Cymbalta 60 mg oral delayed release capsule: 1 cap(s) orally once a day  Flovent HFA 44 mcg/inh inhalation aerosol: 2 puff(s) inhaled 2 times a day, As Needed  guaiFENesin 100 mg/5 mL oral liquid: 5 milliliter(s) orally every 6 hours, As needed, Cough  insulin glargine 100 units/mL subcutaneous solution: 15 unit(s) subcutaneous once a day (at bedtime)  insulin lispro 100 units/mL injectable solution: 5 unit(s) subcutaneous 3 times a day (before meals)  insulin lispro 100 units/mL injectable solution: 0 Unit(s) if Glucose 61 - 250  2 Unit(s) if Glucose 251 - 300  4 Unit(s) if Glucose 301 - 350  6 Unit(s) if Glucose 351 - 400  8 Unit(s) if Glucose Greater Than 400  insulin lispro 100 units/mL injectable solution: 2 Unit(s) if Glucose 151 - 200  4 Unit(s) if Glucose 201 - 250  6 Unit(s) if Glucose 251 - 300  8 Unit(s) if Glucose 301 - 350  10 Unit(s) if Glucose 351 - 400  12 Unit(s) if Glucose Greater Than 400  ipratropium-albuterol 0.5 mg-2.5 mg/3 mL inhalation solution: 3 milliliter(s) inhaled every 6 hours  losartan 50 mg oral tablet: 1 tab(s) orally once a day  Protonix 40 mg oral delayed release tablet: 1 tab(s) orally once a day  simvastatin 40 mg oral tablet: 1 tab(s) orally once a day (at bedtime)  tamsulosin 0.4 mg oral capsule: 2 cap(s) orally once a day (at bedtime)

## 2023-01-31 ENCOUNTER — TRANSCRIPTION ENCOUNTER (OUTPATIENT)
Age: 88
End: 2023-01-31

## 2023-01-31 VITALS
TEMPERATURE: 98 F | RESPIRATION RATE: 18 BRPM | HEART RATE: 84 BPM | DIASTOLIC BLOOD PRESSURE: 62 MMHG | SYSTOLIC BLOOD PRESSURE: 130 MMHG | OXYGEN SATURATION: 100 %

## 2023-01-31 LAB
ALBUMIN SERPL ELPH-MCNC: 3.2 G/DL — LOW (ref 3.3–5)
ALP SERPL-CCNC: 50 U/L — SIGNIFICANT CHANGE UP (ref 40–120)
ALT FLD-CCNC: 63 U/L — HIGH (ref 4–41)
ANION GAP SERPL CALC-SCNC: 10 MMOL/L — SIGNIFICANT CHANGE UP (ref 7–14)
AST SERPL-CCNC: 23 U/L — SIGNIFICANT CHANGE UP (ref 4–40)
BASOPHILS # BLD AUTO: 0.02 K/UL — SIGNIFICANT CHANGE UP (ref 0–0.2)
BASOPHILS NFR BLD AUTO: 0.2 % — SIGNIFICANT CHANGE UP (ref 0–2)
BILIRUB SERPL-MCNC: 0.5 MG/DL — SIGNIFICANT CHANGE UP (ref 0.2–1.2)
BUN SERPL-MCNC: 50 MG/DL — HIGH (ref 7–23)
CALCIUM SERPL-MCNC: 8.6 MG/DL — SIGNIFICANT CHANGE UP (ref 8.4–10.5)
CHLORIDE SERPL-SCNC: 111 MMOL/L — HIGH (ref 98–107)
CO2 SERPL-SCNC: 22 MMOL/L — SIGNIFICANT CHANGE UP (ref 22–31)
CREAT SERPL-MCNC: 1.65 MG/DL — HIGH (ref 0.5–1.3)
EGFR: 40 ML/MIN/1.73M2 — LOW
EOSINOPHIL # BLD AUTO: 0.14 K/UL — SIGNIFICANT CHANGE UP (ref 0–0.5)
EOSINOPHIL NFR BLD AUTO: 1.4 % — SIGNIFICANT CHANGE UP (ref 0–6)
GLUCOSE BLDC GLUCOMTR-MCNC: 168 MG/DL — HIGH (ref 70–99)
GLUCOSE BLDC GLUCOMTR-MCNC: 172 MG/DL — HIGH (ref 70–99)
GLUCOSE SERPL-MCNC: 163 MG/DL — HIGH (ref 70–99)
HCT VFR BLD CALC: 37.9 % — LOW (ref 39–50)
HGB BLD-MCNC: 12.4 G/DL — LOW (ref 13–17)
IANC: 8.29 K/UL — HIGH (ref 1.8–7.4)
IMM GRANULOCYTES NFR BLD AUTO: 1.5 % — HIGH (ref 0–0.9)
LYMPHOCYTES # BLD AUTO: 0.67 K/UL — LOW (ref 1–3.3)
LYMPHOCYTES # BLD AUTO: 6.8 % — LOW (ref 13–44)
MAGNESIUM SERPL-MCNC: 2.4 MG/DL — SIGNIFICANT CHANGE UP (ref 1.6–2.6)
MCHC RBC-ENTMCNC: 31.3 PG — SIGNIFICANT CHANGE UP (ref 27–34)
MCHC RBC-ENTMCNC: 32.7 GM/DL — SIGNIFICANT CHANGE UP (ref 32–36)
MCV RBC AUTO: 95.7 FL — SIGNIFICANT CHANGE UP (ref 80–100)
MONOCYTES # BLD AUTO: 0.64 K/UL — SIGNIFICANT CHANGE UP (ref 0–0.9)
MONOCYTES NFR BLD AUTO: 6.5 % — SIGNIFICANT CHANGE UP (ref 2–14)
NEUTROPHILS # BLD AUTO: 8.29 K/UL — HIGH (ref 1.8–7.4)
NEUTROPHILS NFR BLD AUTO: 83.6 % — HIGH (ref 43–77)
NRBC # BLD: 0 /100 WBCS — SIGNIFICANT CHANGE UP (ref 0–0)
NRBC # FLD: 0 K/UL — SIGNIFICANT CHANGE UP (ref 0–0)
PHOSPHATE SERPL-MCNC: 3.8 MG/DL — SIGNIFICANT CHANGE UP (ref 2.5–4.5)
PLATELET # BLD AUTO: 221 K/UL — SIGNIFICANT CHANGE UP (ref 150–400)
POTASSIUM SERPL-MCNC: 4.4 MMOL/L — SIGNIFICANT CHANGE UP (ref 3.5–5.3)
POTASSIUM SERPL-SCNC: 4.4 MMOL/L — SIGNIFICANT CHANGE UP (ref 3.5–5.3)
PROT SERPL-MCNC: 6.2 G/DL — SIGNIFICANT CHANGE UP (ref 6–8.3)
RBC # BLD: 3.96 M/UL — LOW (ref 4.2–5.8)
RBC # FLD: 15.6 % — HIGH (ref 10.3–14.5)
SODIUM SERPL-SCNC: 143 MMOL/L — SIGNIFICANT CHANGE UP (ref 135–145)
WBC # BLD: 9.91 K/UL — SIGNIFICANT CHANGE UP (ref 3.8–10.5)
WBC # FLD AUTO: 9.91 K/UL — SIGNIFICANT CHANGE UP (ref 3.8–10.5)

## 2023-01-31 PROCEDURE — 71045 X-RAY EXAM CHEST 1 VIEW: CPT | Mod: 26

## 2023-01-31 RX ADMIN — Medication 3 MILLILITER(S): at 04:16

## 2023-01-31 RX ADMIN — DULOXETINE HYDROCHLORIDE 60 MILLIGRAM(S): 30 CAPSULE, DELAYED RELEASE ORAL at 11:36

## 2023-01-31 RX ADMIN — Medication 2: at 08:40

## 2023-01-31 RX ADMIN — HEPARIN SODIUM 5000 UNIT(S): 5000 INJECTION INTRAVENOUS; SUBCUTANEOUS at 12:00

## 2023-01-31 RX ADMIN — ATENOLOL 25 MILLIGRAM(S): 25 TABLET ORAL at 05:05

## 2023-01-31 RX ADMIN — MOMETASONE FUROATE 1 PUFF(S): 220 INHALANT RESPIRATORY (INHALATION) at 08:39

## 2023-01-31 RX ADMIN — Medication 5 UNIT(S): at 12:27

## 2023-01-31 RX ADMIN — Medication 3 MILLILITER(S): at 10:05

## 2023-01-31 RX ADMIN — PANTOPRAZOLE SODIUM 40 MILLIGRAM(S): 20 TABLET, DELAYED RELEASE ORAL at 05:04

## 2023-01-31 RX ADMIN — HEPARIN SODIUM 5000 UNIT(S): 5000 INJECTION INTRAVENOUS; SUBCUTANEOUS at 05:04

## 2023-01-31 RX ADMIN — LOSARTAN POTASSIUM 50 MILLIGRAM(S): 100 TABLET, FILM COATED ORAL at 05:05

## 2023-01-31 RX ADMIN — Medication 2: at 12:26

## 2023-01-31 RX ADMIN — Medication 5 UNIT(S): at 08:40

## 2023-01-31 NOTE — DIETITIAN INITIAL EVALUATION ADULT - OTHER INFO
Medical course: Per chart 87 year old male with PMH HTN, HLD, T2D, dementia (AOx2-3), OA, BPH, CAD, TIA, CKD 3 and GERD p/w 1d weakness, F/C, productive cough, and decreased ability to ambulate. Found to have RSV Patient noted to be wheezing on exam with a smoking history concern for possible underlying chronic bronchitis/COPD now treating for a likely COPD exacerbation.     Nutrition interview: Pt A&Ox1-2, pt is poor historian but able to answer questions appropriately. Denies any recent nausea, vomiting, diarrhea or constipation, last BM noted on 1/29 per RN flowsheets. Pt not on bowel regimen. Denies any chewing/swallowing difficulties. No known food allergies. Per [facility] #. Denies any recent weight changes. Pt with no food preferences at this time. Intake is 50-75% per RN flowsheets and per pt. Feeding skills: total assistance.

## 2023-01-31 NOTE — DISCHARGE NOTE NURSING/CASE MANAGEMENT/SOCIAL WORK - NSDCCRNAME_GEN_ALL_CORE_FT
[facility]   Transportation [facility] [phone] Trip #[id number] 
[facility]   transportation [facility] [phone]  trip #[id number]

## 2023-01-31 NOTE — PROGRESS NOTE ADULT - PROBLEM SELECTOR PLAN 1
Sepsis 2/2 to RSV  - pt p/w 1d weakness, F/C, productive cough, poor appetite, and decreased ability to ambulate. Likely an acute worsening of patient's chronic cough based on hx obtained bedside. Has chronic cough for the past several years with white sputum in the setting of former smoking hx.   - on arrival, meeting SIRS criteria with lactate 2.3 cleared on repeat   - UA neg, UCx and BCx--> NGTD, MRSA swab negative, sputum culture ordered, RVP+ RSV  - CXR neg acute consolidation, unable to obtain PA/Lateral patient unable to stand. Repeat CXR clear, discontinued antibiotics.  - weaned to RA  - c/w methylprednisolone for likely COPD exacerbation, outpt f/u for COPD mgmt  - ATC nebulizer treatments
Sepsis 2/2 to RSV  - pt p/w 1d weakness, F/C, productive cough, poor appetite, and decreased ability to ambulate. Likely an acute worsening of patient's chronic cough based on hx obtained bedside. Has chronic cough for the past several years with white sputum in the setting of former smoking hx.   - on arrival, meeting SIRS criteria with lactate 2.3 cleared on repeat   - UA neg, UCx and BCx--> NGTD, MRSA swab pending, sputum culture ordered, RVP+ RSV  - CXR neg acute consolidation, unable to obtain PA/Lateral patient unable to stand. Repeat CXR clear, discontinued antibiotics.  - wean O2 as tolerated, not on O2 at home   - c/w methylprednisolone for likely COPD exacerbation, needs outpatient pulm follow up for official PFTs  - ATC nebulizer treatments
Sepsis 2/2 to RSV  - pt p/w 1d weakness, F/C, productive cough, poor appetite, and decreased ability to ambulate. Likely an acute worsening of patient's chronic cough based on hx obtained bedside. Has chronic cough for the past several years with white sputum in the setting of former smoking hx.   - on arrival, meeting SIRS criteria with lactate 2.3 cleared on repeat   - UA neg, UCx and BCx--> NGTD, MRSA swab negative, sputum culture ordered, RVP+ RSV  - CXR neg acute consolidation, unable to obtain PA/Lateral patient unable to stand. Repeat CXR clear, discontinued antibiotics.  - weaned to RA  - c/w methylprednisolone for likely COPD exacerbation, outpt f/u for COPD mgmt  - ATC nebulizer treatments
Sepsis 2/2 to RSV  - pt p/w 1d weakness, F/C, productive cough, poor appetite, and decreased ability to ambulate. Likely an acute worsening of patient's chronic cough based on hx obtained bedside. Has chronic cough for the past several years with white sputum in the setting of former smoking hx.   - on arrival, meeting SIRS criteria with lactate 2.3 cleared on repeat   - UA neg, UCx and BCx--> NGTD, MRSA swab negative, sputum culture ordered, RVP+ RSV  - CXR neg acute consolidation, unable to obtain PA/Lateral patient unable to stand. Repeat CXR clear, discontinued antibiotics.  - weaned to RA  - c/w methylprednisolone for likely COPD exacerbation, outpt f/u for COPD mgmt  - ATC nebulizer treatments
Sepsis 2/2 to RSV  - pt p/w 1d weakness, F/C, productive cough, poor appetite, and decreased ability to ambulate. Likely an acute worsening of patient's chronic cough based on hx obtained bedside. Has chronic cough for the past several years with white sputum in the setting of former smoking hx.   - on arrival, meeting SIRS criteria with lactate 2.3 cleared on repeat   - UA neg, UCx and BCx--> NGTD, MRSA swab pending, sputum culture ordered, RVP+ RSV  - CXR neg acute consolidation, will obtain PA/Lateral Film to assess for bacterial superinfection. If negative will discontinue antibiotics.   - will c/w CTX 1g q24h for now pending repeat imaging   - wean O2 as tolerated, not on O2 at home   - started methylprednisolone for likely COPD exacerbation, needs outpatient pulm follow up for official PFTs  - ATC nebulizer treatments
Sepsis 2/2 to RSV  - pt p/w 1d weakness, F/C, productive cough, poor appetite, and decreased ability to ambulate. Likely an acute worsening of patient's chronic cough based on hx obtained bedside. Has chronic cough for the past several years with white sputum in the setting of former smoking hx.   - on arrival, meeting SIRS criteria with lactate 2.3 cleared on repeat   - UA neg, UCx and BCx--> NGTD, MRSA swab negative, sputum culture ordered, RVP+ RSV  - CXR neg acute consolidation, unable to obtain PA/Lateral patient unable to stand. Repeat CXR clear, discontinued antibiotics.  - weaned to RA  - c/w methylprednisolone for likely COPD exacerbation, needs outpatient pulm follow up for official PFTs  - ATC nebulizer treatments

## 2023-01-31 NOTE — PROGRESS NOTE ADULT - SUBJECTIVE AND OBJECTIVE BOX
Barbara Henry MD   Pager [phone], [employer] Teams    INTERVAL HPI/OVERNIGHT EVENTS:  Patient was seen and examined at bedside. As per nurse and patient, no o/n events, patient resting comfortably. Patient complains of cough however states cough is chronic w/ white sputum. Patient reports dyspnea however improved from earlier. Patient otherwise w/o complaints. Patient denies: fever, chills, HA, CP, palpitations, N/V/D/C, dysuria, changes in bowel movements. ROS otherwise negative.    VITAL SIGNS:  T(F): 97.6 (23 @ 15:17)  HR: 76 (23 @ 15:17)  BP: 128/72 (23 @ 15:17)  RR: 17 (23 @ 15:17)  SpO2: 99% (23 @ 15:17)  Wt(kg): --    PHYSICAL EXAM:  Constitutional: Elderly male, NAD  HEENT: EOMI, sclera non-icteric, neck supple, trachea midline, no masses, no JVD, MMM   Respiratory: Diffuse wheezes and rhonchi, without accessory muscle use and no intercostal retractions, NC in place  Cardiovascular: RRR, normal S1S2, no M/R/G  Gastrointestinal: soft, NTND, no masses palpable, BS normal  Extremities: Warm, well perfused, pulses equal bilateral upper and lower extremities, no edema, no clubbing  Neurological: AAOx2, CN Grossly intact  Skin: Normal temperature, warm, dry    MEDICATIONS  (STANDING):  albuterol/ipratropium for Nebulization 3 milliLiter(s) Nebulizer every 6 hours  atenolol  Tablet 25 milliGRAM(s) Oral daily  cefTRIAXone   IVPB 1000 milliGRAM(s) IV Intermittent every 24 hours  dextrose 5%. 1000 milliLiter(s) (100 mL/Hr) IV Continuous <Continuous>  dextrose 5%. 1000 milliLiter(s) (50 mL/Hr) IV Continuous <Continuous>  dextrose 50% Injectable 25 Gram(s) IV Push once  dextrose 50% Injectable 12.5 Gram(s) IV Push once  dextrose 50% Injectable 25 Gram(s) IV Push once  DULoxetine 60 milliGRAM(s) Oral daily  glucagon  Injectable 1 milliGRAM(s) IntraMuscular once  heparin   Injectable 5000 Unit(s) SubCutaneous every 8 hours  insulin lispro (ADMELOG) corrective regimen sliding scale   SubCutaneous three times a day before meals  insulin lispro (ADMELOG) corrective regimen sliding scale   SubCutaneous at bedtime  melatonin 3 milliGRAM(s) Oral at bedtime  methylPREDNISolone sodium succinate Injectable 40 milliGRAM(s) IV Push daily  pantoprazole    Tablet 40 milliGRAM(s) Oral before breakfast  potassium phosphate / sodium phosphate Powder (PHOS-NaK) 1 Packet(s) Oral three times a day with meals  simvastatin 40 milliGRAM(s) Oral at bedtime  tamsulosin 0.4 milliGRAM(s) Oral at bedtime    MEDICATIONS  (PRN):  acetaminophen     Tablet .. 650 milliGRAM(s) Oral every 6 hours PRN Temp greater or equal to 38C (100.4F), Mild Pain (1 - 3)  albuterol   0.5% 2.5 milliGRAM(s) Nebulizer every 2 hours PRN SOB/Cough  dextrose Oral Gel 15 Gram(s) Oral once PRN Blood Glucose LESS THAN 70 milliGRAM(s)/deciliter  guaiFENesin Oral Liquid (Sugar-Free) 100 milliGRAM(s) Oral every 6 hours PRN Cough      Allergies    No Known Allergies    Intolerances        LABS:                        10.8   6.12  )-----------( 125      ( 2023 07:35 )             33.6     -    142  |  111<H>  |  36<H>  ----------------------------<  128<H>  3.8   |  23  |  1.63<H>    Ca    8.7      2023 07:35  Phos  2.2       Mg     2.10         TPro  7.2  /  Alb  4.0  /  TBili  0.5  /  DBili  x   /  AST  17  /  ALT  16  /  AlkPhos  59        Urinalysis Basic - ( 2023 02:15 )    Color: Yellow / Appearance: Clear / S.020 / pH: x  Gluc: x / Ketone: Negative  / Bili: Negative / Urobili: <2 mg/dL   Blood: x / Protein: Negative / Nitrite: Positive   Leuk Esterase: Small / RBC: 2 /HPF / WBC 6 /HPF   Sq Epi: x / Non Sq Epi: 0 /HPF / Bacteria: Negative    Blood cultures NGTD     CXR personally reviewed clear lungs w/o evidence of bacterial superinfection         RADIOLOGY & ADDITIONAL TESTS:  Reviewed
[facility] Division of Hospital Medicine  Hilda Mahajan MD  Pager [phone]    Patient is a 87y old  Male who presents with a chief complaint of RSV, weakness       SUBJECTIVE / OVERNIGHT EVENTS: no complaints      MEDICATIONS  (STANDING):  albuterol/ipratropium for Nebulization 3 milliLiter(s) Nebulizer every 6 hours  atenolol  Tablet 25 milliGRAM(s) Oral daily  dextrose 5%. 1000 milliLiter(s) (100 mL/Hr) IV Continuous <Continuous>  dextrose 5%. 1000 milliLiter(s) (50 mL/Hr) IV Continuous <Continuous>  dextrose 50% Injectable 25 Gram(s) IV Push once  dextrose 50% Injectable 12.5 Gram(s) IV Push once  dextrose 50% Injectable 25 Gram(s) IV Push once  DULoxetine 60 milliGRAM(s) Oral daily  glucagon  Injectable 1 milliGRAM(s) IntraMuscular once  heparin   Injectable 5000 Unit(s) SubCutaneous every 8 hours  insulin glargine Injectable (LANTUS) 15 Unit(s) SubCutaneous at bedtime  insulin lispro (ADMELOG) corrective regimen sliding scale   SubCutaneous three times a day before meals  insulin lispro (ADMELOG) corrective regimen sliding scale   SubCutaneous at bedtime  insulin lispro Injectable (ADMELOG) 5 Unit(s) SubCutaneous three times a day before meals  melatonin 3 milliGRAM(s) Oral at bedtime  methylPREDNISolone sodium succinate Injectable 40 milliGRAM(s) IV Push daily  mometasone 220 MICROgram(s) Inhaler 1 Puff(s) Inhalation daily  pantoprazole    Tablet 40 milliGRAM(s) Oral before breakfast  simvastatin 40 milliGRAM(s) Oral at bedtime  tamsulosin 0.8 milliGRAM(s) Oral at bedtime    MEDICATIONS  (PRN):  acetaminophen     Tablet .. 650 milliGRAM(s) Oral every 6 hours PRN Temp greater or equal to 38C (100.4F), Mild Pain (1 - 3)  albuterol   0.5% 2.5 milliGRAM(s) Nebulizer every 2 hours PRN SOB/Cough  dextrose Oral Gel 15 Gram(s) Oral once PRN Blood Glucose LESS THAN 70 milliGRAM(s)/deciliter  guaiFENesin Oral Liquid (Sugar-Free) 100 milliGRAM(s) Oral every 6 hours PRN Cough  sodium chloride 3%  Inhalation 4 milliLiter(s) Inhalation once PRN sputum induction      CAPILLARY BLOOD GLUCOSE  POCT Blood Glucose.: 150 mg/dL (29 Jan 2023 08:26)  POCT Blood Glucose.: 211 mg/dL (28 Jan 2023 22:15)  POCT Blood Glucose.: 188 mg/dL (28 Jan 2023 17:34)  POCT Blood Glucose.: 182 mg/dL (28 Jan 2023 12:15)      PHYSICAL EXAM:  Vital Signs Last 24 Hrs  T(F): 98.8 (29 Jan 2023 05:24), Max: 98.8 (29 Jan 2023 05:24)  HR: 84 (29 Jan 2023 05:24) (70 - 84)  BP: 144/79 (29 Jan 2023 05:24) (128/73 - 150/86)  RR: 18 (29 Jan 2023 05:24) (17 - 18)  SpO2: 96% (29 Jan 2023 05:24) (94% - 98%)    Parameters below as of 29 Jan 2023 05:24  Patient On (Oxygen Delivery Method): room air        CONSTITUTIONAL: NAD, appears comfortable  EYES: PERRLA; conjunctiva and sclera clear  ENMT: Moist oral mucosa; normal dentition  RESPIRATORY: Normal respiratory effort; grossly b/l AE  CARDIOVASCULAR: Regular rate and rhythm; No lower extremity edema;   ABDOMEN: Nontender to palpation, normoactive bowel sounds  MUSCULOSKELETAL:  no clubbing or cyanosis of digits; no joint swelling or tenderness to palpation  PSYCH: calm, coop; affect appropriate  NEUROLOGY: CN 2-12 are intact and symmetric; no gross sensory deficits   SKIN: No rashes; no palpable lesions    LABS:                        11.4   9.65  )-----------( 187      ( 29 Jan 2023 05:46 )             35.0     01-29    146<H>  |  112<H>  |  50<H>  ----------------------------<  109<H>  4.2   |  23  |  1.70<H>    Ca    8.7      29 Jan 2023 05:46  Phos  3.4     01-29  Mg     2.30     01-29                
Barbara Henry MD   Pager [phone], [employer] Teams    INTERVAL HPI/OVERNIGHT EVENTS:  Patient was seen and examined at bedside. Patient resting comfortably. Patient complains of cough w/ white sputum, denies hemoptysis or green sputum. Patient reports dyspnea however improved from earlier. Patient otherwise w/o complaints. ROS otherwise negative.    Vital Signs Last 24 Hrs  T(C): 37.1 (26 Jan 2023 06:01), Max: 39.8 (25 Jan 2023 18:20)  T(F): 98.8 (26 Jan 2023 06:01), Max: 103.7 (25 Jan 2023 18:20)  HR: 76 (26 Jan 2023 10:21) (55 - 110)  BP: 141/60 (26 Jan 2023 06:01) (128/72 - 146/62)  BP(mean): --  RR: 18 (26 Jan 2023 06:01) (17 - 22)  SpO2: 98% (26 Jan 2023 10:21) (92% - 99%)    Parameters below as of 26 Jan 2023 10:21  Patient On (Oxygen Delivery Method): nasal cannula    PHYSICAL EXAM:  Constitutional: Elderly male, NAD  HEENT: EOMI, sclera non-icteric, neck supple, trachea midline, no masses, no JVD, MMM   Respiratory: Diffuse wheezes and rhonchi, without accessory muscle use and no intercostal retractions, NC in place  Cardiovascular: RRR, normal S1S2, no M/R/G  Gastrointestinal: soft, NTND, no masses palpable, BS normal  Extremities: Warm, well perfused, pulses equal bilateral upper and lower extremities, no edema, no clubbing  : byrne in place with yellow urine   Neurological: AAOx2, CN Grossly intact  Skin: Normal temperature, warm, dry    MEDICATIONS  (STANDING):  albuterol/ipratropium for Nebulization 3 milliLiter(s) Nebulizer every 6 hours  atenolol  Tablet 25 milliGRAM(s) Oral daily  dextrose 5%. 1000 milliLiter(s) (100 mL/Hr) IV Continuous <Continuous>  dextrose 5%. 1000 milliLiter(s) (50 mL/Hr) IV Continuous <Continuous>  dextrose 50% Injectable 25 Gram(s) IV Push once  dextrose 50% Injectable 12.5 Gram(s) IV Push once  dextrose 50% Injectable 25 Gram(s) IV Push once  DULoxetine 60 milliGRAM(s) Oral daily  glucagon  Injectable 1 milliGRAM(s) IntraMuscular once  heparin   Injectable 5000 Unit(s) SubCutaneous every 8 hours  insulin glargine Injectable (LANTUS) 8 Unit(s) SubCutaneous at bedtime  insulin lispro (ADMELOG) corrective regimen sliding scale   SubCutaneous three times a day before meals  insulin lispro (ADMELOG) corrective regimen sliding scale   SubCutaneous at bedtime  melatonin 3 milliGRAM(s) Oral at bedtime  methylPREDNISolone sodium succinate Injectable 40 milliGRAM(s) IV Push daily  mometasone 220 MICROgram(s) Inhaler 1 Puff(s) Inhalation daily  pantoprazole    Tablet 40 milliGRAM(s) Oral before breakfast  simvastatin 40 milliGRAM(s) Oral at bedtime  tamsulosin 0.4 milliGRAM(s) Oral at bedtime    MEDICATIONS  (PRN):  acetaminophen     Tablet .. 650 milliGRAM(s) Oral every 6 hours PRN Temp greater or equal to 38C (100.4F), Mild Pain (1 - 3)  albuterol   0.5% 2.5 milliGRAM(s) Nebulizer every 2 hours PRN SOB/Cough  dextrose Oral Gel 15 Gram(s) Oral once PRN Blood Glucose LESS THAN 70 milliGRAM(s)/deciliter  guaiFENesin Oral Liquid (Sugar-Free) 100 milliGRAM(s) Oral every 6 hours PRN Cough  sodium chloride 3%  Inhalation 4 milliLiter(s) Inhalation once PRN sputum induction      Allergies    No Known Allergies    Intolerances    LABS:                         11.6   5.13  )-----------( 136      ( 26 Jan 2023 06:15 )             36.4     01-26    144  |  111<H>  |  44<H>  ----------------------------<  292<H>  4.4   |  21<L>  |  1.76<H>    Ca    9.0      26 Jan 2023 06:15  Phos  3.6     01-26  Mg     2.40     01-26    Ultrasound Renal   *  No hydronephrosis.  *  Thickened trabeculated bladder wall which may reflect chronic changes   of bladder outlet obstruction.  *  Specular debris in the bladder. Correlate with urinalysis.    CXR: clear lungs           RADIOLOGY, EKG & ADDITIONAL TESTS: Reviewed. 
[facility] Division of Hospital Medicine  Hilda Mahajan MD  Pager [phone]    Patient is a 87y old  Male who presents with a chief complaint of RSV, weakness       SUBJECTIVE / OVERNIGHT EVENTS: feeling better; looking forward to rehab      MEDICATIONS  (STANDING):  albuterol/ipratropium for Nebulization 3 milliLiter(s) Nebulizer every 6 hours  atenolol  Tablet 25 milliGRAM(s) Oral daily  dextrose 5%. 1000 milliLiter(s) (100 mL/Hr) IV Continuous <Continuous>  dextrose 5%. 1000 milliLiter(s) (50 mL/Hr) IV Continuous <Continuous>  dextrose 50% Injectable 25 Gram(s) IV Push once  dextrose 50% Injectable 12.5 Gram(s) IV Push once  dextrose 50% Injectable 25 Gram(s) IV Push once  DULoxetine 60 milliGRAM(s) Oral daily  glucagon  Injectable 1 milliGRAM(s) IntraMuscular once  heparin   Injectable 5000 Unit(s) SubCutaneous every 8 hours  insulin glargine Injectable (LANTUS) 15 Unit(s) SubCutaneous at bedtime  insulin lispro (ADMELOG) corrective regimen sliding scale   SubCutaneous three times a day before meals  insulin lispro (ADMELOG) corrective regimen sliding scale   SubCutaneous at bedtime  insulin lispro Injectable (ADMELOG) 5 Unit(s) SubCutaneous three times a day before meals  melatonin 3 milliGRAM(s) Oral at bedtime  methylPREDNISolone sodium succinate Injectable 40 milliGRAM(s) IV Push daily  mometasone 220 MICROgram(s) Inhaler 1 Puff(s) Inhalation daily  pantoprazole    Tablet 40 milliGRAM(s) Oral before breakfast  simvastatin 40 milliGRAM(s) Oral at bedtime  tamsulosin 0.8 milliGRAM(s) Oral at bedtime    MEDICATIONS  (PRN):  acetaminophen     Tablet .. 650 milliGRAM(s) Oral every 6 hours PRN Temp greater or equal to 38C (100.4F), Mild Pain (1 - 3)  albuterol   0.5% 2.5 milliGRAM(s) Nebulizer every 2 hours PRN SOB/Cough  dextrose Oral Gel 15 Gram(s) Oral once PRN Blood Glucose LESS THAN 70 milliGRAM(s)/deciliter  guaiFENesin Oral Liquid (Sugar-Free) 100 milliGRAM(s) Oral every 6 hours PRN Cough  sodium chloride 3%  Inhalation 4 milliLiter(s) Inhalation once PRN sputum induction      CAPILLARY BLOOD GLUCOSE  POCT Blood Glucose.: 155 mg/dL (28 Jan 2023 08:35)  POCT Blood Glucose.: 226 mg/dL (27 Jan 2023 22:21)  POCT Blood Glucose.: 223 mg/dL (27 Jan 2023 17:37)  POCT Blood Glucose.: 238 mg/dL (27 Jan 2023 12:05)        PHYSICAL EXAM:  Vital Signs Last 24 Hrs  T(F): 98 (28 Jan 2023 05:22), Max: 98.6 (27 Jan 2023 12:02)  HR: 65 (28 Jan 2023 05:22) (65 - 90)  BP: 145/76 (28 Jan 2023 05:22) (142/67 - 150/77)  RR: 18 (28 Jan 2023 05:22) (18 - 20)  SpO2: 97% (28 Jan 2023 05:22) (95% - 99%)    Parameters below as of 28 Jan 2023 05:22  Patient On (Oxygen Delivery Method): room air        CONSTITUTIONAL: NAD, appears comfortable  EYES: PERRLA; conjunctiva and sclera clear  ENMT: Moist oral mucosa; normal dentition  RESPIRATORY: Normal respiratory effort; grossly b/l AE, no wheeze  CARDIOVASCULAR: Regular rate and rhythm; No lower extremity edema;  ABDOMEN: Nontender to palpation, normoactive bowel sounds  MUSCULOSKELETAL:  no clubbing or cyanosis of digits; no joint swelling or tenderness to palpation  PSYCH: calm, coop; affect appropriate  NEUROLOGY: CN 2-12 are intact and symmetric; no gross sensory deficits   SKIN: No rashes; no palpable lesions  : indwelling urinary cath in place draining clear yellow urine    LABS:                        11.3   10.55 )-----------( 177      ( 28 Jan 2023 06:12 )             35.2     01-28    145  |  114<H>  |  54<H>  ----------------------------<  148<H>  4.3   |  23  |  1.66<H>    Ca    8.8      28 Jan 2023 06:12  Phos  3.8     01-28  Mg     2.40     01-28    
Barbara Henry MD   Pager [phone], [employer] Teams    INTERVAL HPI/OVERNIGHT EVENTS:  Patient seen and examined at bedside. Patient resting comfortably. Patient states his breathing and cough is much improved. Patient w/o complaints. ROS otherwise negative.    Vital Signs Last 24 Hrs  T(C): 37 (27 Jan 2023 12:02), Max: 37 (27 Jan 2023 12:02)  T(F): 98.6 (27 Jan 2023 12:02), Max: 98.6 (27 Jan 2023 12:02)  HR: 81 (27 Jan 2023 12:02) (73 - 88)  BP: 142/67 (27 Jan 2023 12:02) (129/66 - 142/67)  BP(mean): --  RR: 20 (27 Jan 2023 12:02) (18 - 20)  SpO2: 95% (27 Jan 2023 12:02) (94% - 98%)    Parameters below as of 27 Jan 2023 12:02  Patient On (Oxygen Delivery Method): room air    CAPILLARY BLOOD GLUCOSE  POCT Blood Glucose.: 223 mg/dL (27 Jan 2023 17:37)  POCT Blood Glucose.: 238 mg/dL (27 Jan 2023 12:05)  POCT Blood Glucose.: 181 mg/dL (27 Jan 2023 08:45)  POCT Blood Glucose.: 237 mg/dL (26 Jan 2023 22:16)    PHYSICAL EXAM:  Constitutional: Elderly male, NAD  HEENT: EOMI, sclera non-icteric, neck supple, trachea midline, no masses, no JVD, MMM   Respiratory: Trace diffuse wheezes, without accessory muscle use and no intercostal retractions, patient on RA  Cardiovascular: RRR, normal S1S2, no M/R/G  Gastrointestinal: soft, NTND, no masses palpable, BS normal  Extremities: Warm, well perfused, pulses equal bilateral upper and lower extremities, no edema, no clubbing  : byrne in place with yellow urine   Neurological: AAOx2, CN Grossly intact  Skin: Normal temperature, warm, dry    MEDICATIONS  (STANDING):  albuterol/ipratropium for Nebulization 3 milliLiter(s) Nebulizer every 6 hours  atenolol  Tablet 25 milliGRAM(s) Oral daily  dextrose 5%. 1000 milliLiter(s) (100 mL/Hr) IV Continuous <Continuous>  dextrose 5%. 1000 milliLiter(s) (50 mL/Hr) IV Continuous <Continuous>  dextrose 50% Injectable 25 Gram(s) IV Push once  dextrose 50% Injectable 12.5 Gram(s) IV Push once  dextrose 50% Injectable 25 Gram(s) IV Push once  DULoxetine 60 milliGRAM(s) Oral daily  glucagon  Injectable 1 milliGRAM(s) IntraMuscular once  heparin   Injectable 5000 Unit(s) SubCutaneous every 8 hours  insulin glargine Injectable (LANTUS) 12 Unit(s) SubCutaneous at bedtime  insulin lispro (ADMELOG) corrective regimen sliding scale   SubCutaneous three times a day before meals  insulin lispro (ADMELOG) corrective regimen sliding scale   SubCutaneous at bedtime  insulin lispro Injectable (ADMELOG) 4 Unit(s) SubCutaneous three times a day before meals  melatonin 3 milliGRAM(s) Oral at bedtime  methylPREDNISolone sodium succinate Injectable 40 milliGRAM(s) IV Push daily  mometasone 220 MICROgram(s) Inhaler 1 Puff(s) Inhalation daily  pantoprazole    Tablet 40 milliGRAM(s) Oral before breakfast  simvastatin 40 milliGRAM(s) Oral at bedtime  tamsulosin 0.8 milliGRAM(s) Oral at bedtime    MEDICATIONS  (PRN):  acetaminophen     Tablet .. 650 milliGRAM(s) Oral every 6 hours PRN Temp greater or equal to 38C (100.4F), Mild Pain (1 - 3)  albuterol   0.5% 2.5 milliGRAM(s) Nebulizer every 2 hours PRN SOB/Cough  dextrose Oral Gel 15 Gram(s) Oral once PRN Blood Glucose LESS THAN 70 milliGRAM(s)/deciliter  guaiFENesin Oral Liquid (Sugar-Free) 100 milliGRAM(s) Oral every 6 hours PRN Cough  sodium chloride 3%  Inhalation 4 milliLiter(s) Inhalation once PRN sputum induction      Allergies    No Known Allergies    Intolerances    LABS:                         11.0   11.71 )-----------( 183      ( 27 Jan 2023 06:17 )             34.7     01-27    144  |  112<H>  |  54<H>  ----------------------------<  184<H>  4.0   |  22  |  1.84<H>    Ca    8.9      27 Jan 2023 06:17  Phos  3.4     01-27  Mg     2.40     01-27    Ultrasound Renal   *  No hydronephrosis.  *  Thickened trabeculated bladder wall which may reflect chronic changes   of bladder outlet obstruction.  *  Specular debris in the bladder. Correlate with urinalysis.    CXR: clear lungs       RADIOLOGY, EKG & ADDITIONAL TESTS: Reviewed.             
Patient is a 87y old  Male who presents with a chief complaint of Pneumonia due to infectious organism     (31 Jan 2023 11:16)      SUBJECTIVE / OVERNIGHT EVENTS:    No events overnight. This AM, patient without n/v/d/cp/sob.      MEDICATIONS  (STANDING):  albuterol/ipratropium for Nebulization 3 milliLiter(s) Nebulizer every 6 hours  atenolol  Tablet 25 milliGRAM(s) Oral daily  dextrose 5%. 1000 milliLiter(s) (100 mL/Hr) IV Continuous <Continuous>  dextrose 5%. 1000 milliLiter(s) (50 mL/Hr) IV Continuous <Continuous>  dextrose 50% Injectable 25 Gram(s) IV Push once  dextrose 50% Injectable 12.5 Gram(s) IV Push once  dextrose 50% Injectable 25 Gram(s) IV Push once  DULoxetine 60 milliGRAM(s) Oral daily  glucagon  Injectable 1 milliGRAM(s) IntraMuscular once  heparin   Injectable 5000 Unit(s) SubCutaneous every 8 hours  insulin glargine Injectable (LANTUS) 15 Unit(s) SubCutaneous at bedtime  insulin lispro (ADMELOG) corrective regimen sliding scale   SubCutaneous three times a day before meals  insulin lispro (ADMELOG) corrective regimen sliding scale   SubCutaneous at bedtime  insulin lispro Injectable (ADMELOG) 5 Unit(s) SubCutaneous three times a day before meals  losartan 50 milliGRAM(s) Oral daily  melatonin 3 milliGRAM(s) Oral at bedtime  mometasone 220 MICROgram(s) Inhaler 1 Puff(s) Inhalation daily  pantoprazole    Tablet 40 milliGRAM(s) Oral before breakfast  simvastatin 40 milliGRAM(s) Oral at bedtime  tamsulosin 0.8 milliGRAM(s) Oral at bedtime    MEDICATIONS  (PRN):  acetaminophen     Tablet .. 650 milliGRAM(s) Oral every 6 hours PRN Temp greater or equal to 38C (100.4F), Mild Pain (1 - 3)  albuterol   0.5% 2.5 milliGRAM(s) Nebulizer every 2 hours PRN SOB/Cough  dextrose Oral Gel 15 Gram(s) Oral once PRN Blood Glucose LESS THAN 70 milliGRAM(s)/deciliter  guaiFENesin Oral Liquid (Sugar-Free) 100 milliGRAM(s) Oral every 6 hours PRN Cough  sodium chloride 3%  Inhalation 4 milliLiter(s) Inhalation once PRN sputum induction      PHYSICAL EXAM:  T(C): 36.7 (01-31-23 @ 05:00), Max: 36.8 (01-30-23 @ 22:20)  HR: 82 (01-31-23 @ 10:07) (70 - 85)  BP: 133/57 (01-31-23 @ 05:00) (131/66 - 133/57)  RR: 18 (01-31-23 @ 05:00) (17 - 18)  SpO2: 99% (01-31-23 @ 10:07) (96% - 100%)  I&O's Summary    30 Jan 2023 07:01  -  31 Jan 2023 07:00  --------------------------------------------------------  IN: 0 mL / OUT: 400 mL / NET: -400 mL      GENERAL: NAD, well-developed  HEAD:  Atraumatic, Normocephalic, MMM  CHEST/LUNG: No use of accessory muscles, CTAB, breathing non-labored  COR: RR, no mrcg  ABD: Soft, ND/NT, +BS  PSYCH: AAOx3  NEUROLOGY: CN II-XII grossly intact, moving all extremities  SKIN: No rashes or lesions  EXT: wwp, no cce    LABS:  CAPILLARY BLOOD GLUCOSE      POCT Blood Glucose.: 168 mg/dL (31 Jan 2023 12:05)  POCT Blood Glucose.: 172 mg/dL (31 Jan 2023 08:34)  POCT Blood Glucose.: 137 mg/dL (30 Jan 2023 22:13)  POCT Blood Glucose.: 168 mg/dL (30 Jan 2023 17:30)                          12.4   9.91  )-----------( 221      ( 31 Jan 2023 11:34 )             37.9     01-31    143  |  111<H>  |  50<H>  ----------------------------<  163<H>  4.4   |  22  |  1.65<H>    Ca    8.6      31 Jan 2023 11:34  Phos  3.8     01-31  Mg     2.40     01-31    TPro  6.2  /  Alb  3.2<L>  /  TBili  0.5  /  DBili  x   /  AST  23  /  ALT  63<H>  /  AlkPhos  50  01-31                RADIOLOGY & ADDITIONAL TESTS:    Telemetry Personally Reviewed -     Imaging Personally Reviewed -     Imaging Reviewed -     Consultant(s) Notes Reviewed -       Care Discussed with Consultants/Other Providers -

## 2023-01-31 NOTE — PROGRESS NOTE ADULT - PROBLEM SELECTOR PLAN 6
- home meds: lasix 20mg daily, losartan 50mg daily, atenolol 25mg daily  - c/w home atenolol  - holding lasix in setting of post obstructive diuresis and normotension
- home meds: lasix 20mg daily, losartan 50mg daily, atenolol 25mg daily  - c/w home atenolol  - holding lasix/losartan in setting of post obstructive diuresis and normotension
- home meds: lasix 20mg daily, losartan 50mg daily, atenolol 25mg daily  - c/w home atenolol  - holding lasix/losartan in setting of post obstructive diuresis and normotension
- home meds: lasix 20mg daily, losartan 50mg daily, atenolol 25mg daily  - c/w home atenolol  - holding lasix in setting of post obstructive diuresis and normotension
- home meds: lasix 20mg daily, losartan 50mg daily, atenolol 25mg daily  - c/w home atenolol  - hold lasix/losartan in setting of COY, restart as indicated
- home meds: lasix 20mg daily, losartan 50mg daily, atenolol 25mg daily  - c/w home atenolol  - holding lasix/losartan in setting of post obstructive diuresis and normotension

## 2023-01-31 NOTE — PROGRESS NOTE ADULT - PROBLEM SELECTOR PLAN 11
- home med: protonix 40mg daily  - c/w home med

## 2023-01-31 NOTE — DIETITIAN INITIAL EVALUATION ADULT - PERTINENT LABORATORY DATA
01-30    144  |  110<H>  |  49<H>  ----------------------------<  118<H>  4.0   |  24  |  1.60<H>    Ca    8.6      30 Jan 2023 05:47  Phos  3.6     01-30  Mg     2.40     01-30    POCT Blood Glucose.: 172 mg/dL (01-31-23 @ 08:34)  A1C with Estimated Average Glucose Result: 5.8 % (01-25-23 @ 07:35)  A1C with Estimated Average Glucose Result: 6.1 % (01-24-23 @ 02:15)   01-30 Na 144 mmol/L Glu 118 mg/dL<H> K+ 4.0 mmol/L Cr 1.60 mg/dL<H> BUN 49 mg/dL<H> Phos 3.6 mg/dL  01-31 @ 12:05 POCT 168 mg/dL  A1C with Estimated Average Glucose Result: 5.8 % (01-25-23 @ 07:35)  A1C with Estimated Average Glucose Result: 6.1 % (01-24-23 @ 02:15)

## 2023-01-31 NOTE — PROGRESS NOTE ADULT - ASSESSMENT
Pt is an 86 yo M with PMH HTN, HLD, T2D, dementia (AOx2-3), OA, BPH, CAD, TIA, and GERD p/w 1d weakness, F/C, productive cough, and decreased ability to ambulate. Found to have RSV and elevated crt (on review of outpatient labs patient currently at his baseline crt). Patient noted to be wheezing on exam with a smoking history concern for possible underlying chronic bronchitis/COPD now treating for a likely COPD exacerbation. 
Pt is an 86 yo M with PMH HTN, HLD, T2D, dementia (AOx2-3), OA, BPH, CAD, TIA, and GERD p/w 1d weakness, F/C, productive cough, and decreased ability to ambulate. Found to have RSV and COY. Patient noted to be wheezing on exam with a smoking history concern for possible underlying chronic bronchitis/COPD now treating for a likely COPD exacerbation. 
Pt is an 86 yo M with PMH HTN, HLD, T2D, dementia (AOx2-3), OA, BPH, CAD, TIA, and GERD p/w 1d weakness, F/C, productive cough, and decreased ability to ambulate. Found to have RSV and elevated crt (on review of outpatient labs patient currently at his baseline crt). Patient noted to be wheezing on exam with a smoking history concern for possible underlying chronic bronchitis/COPD now treating for a likely COPD exacerbation. 
Pt is an 86 yo M with PMH HTN, HLD, T2D, dementia (AOx2-3), OA, BPH, CAD, TIA, CKD 3 and GERD p/w 1d weakness, F/C, productive cough, and decreased ability to ambulate. Found to have RSV Patient noted to be wheezing on exam with a smoking history concern for possible underlying chronic bronchitis/COPD now treating for a likely COPD exacerbation. 
Pt is an 88 yo M with PMH HTN, HLD, T2D, dementia (AOx2-3), OA, BPH, CAD, TIA, CKD 3 and GERD p/w 1d weakness, F/C, productive cough, and decreased ability to ambulate. Found to have RSV Patient noted to be wheezing on exam with a smoking history concern for possible underlying chronic bronchitis/COPD now treating for a likely COPD exacerbation. 
Pt is an 88 yo M with PMH HTN, HLD, T2D, dementia (AOx2-3), OA, BPH, CAD, TIA, CKD 3 and GERD p/w 1d weakness, F/C, productive cough, and decreased ability to ambulate. Found to have RSV Patient noted to be wheezing on exam with a smoking history concern for possible underlying chronic bronchitis/COPD now treating for a likely COPD exacerbation.

## 2023-01-31 NOTE — DIETITIAN INITIAL EVALUATION ADULT - NS FNS DIET ORDER
Diet, DASH/TLC:   Sodium & Cholesterol Restricted  Consistent Carbohydrate {Evening Snack} (CSTCHOSN) (01-24-23 @ 09:22) [Active]

## 2023-01-31 NOTE — PROGRESS NOTE ADULT - PROBLEM SELECTOR PLAN 4
- Cr 1.8--> 1.63 --> 1.76-> 1.84, baseline ~1.5-1.8 per HIE  - urine lytes intrinsic, renal sono ordered no hydronephrosis thickened bladder which may reflect chronic obstruction  - continue to trend   - avoid nephrotoxic agents and renally dose medications  - holding home lasix (hold in setting of post obstructive diuresis)/losartan and restart as indicated  will consider resuming losartan in AM
- Cr 1.8--> 1.63 --> 1.76, baseline ~1.5-1.8 per HIE  - urine lytes intrinsic, renal sono ordered no hydronephrosis thickened bladder which may reflect chronic obstruction  - continue to trend   - avoid nephrotoxic agents and renally dose medications  - holding home lasix (hold in setting of post obstructive diuresis)/losartan and restart as indicated
- Cr 1.8--> 1.63 --> 1.76-> 1.84, baseline ~1.5-1.8 per HIE  - urine lytes intrinsic, renal sono ordered no hydronephrosis thickened bladder which may reflect chronic obstruction  - continue to trend   - avoid nephrotoxic agents and renally dose medications  - holding home lasix (hold in setting of post obstructive diuresis)/losartan and restart as indicated  resume losartan today, cont to monitor
- Cr 1.8--> 1.63 --> 1.76-> 1.84, baseline ~1.5-1.8 per HIE  - urine lytes intrinsic, renal sono ordered no hydronephrosis thickened bladder which may reflect chronic obstruction  - continue to trend   - avoid nephrotoxic agents and renally dose medications  - holding home lasix (hold in setting of post obstructive diuresis)/losartan and restart as indicated  resume losartan today, cont to monitor
- Cr 1.8--> 1.63 on arrival, unknown baseline  - urine lytes intrinsic, renal sono ordered   - likely in setting of severe sepsis and poor PO intake  - continue to trend daily  - avoid nephrotoxic agents and renally dose medications  - hold home lasix/losartan and restart as indicated
- Cr 1.8--> 1.63 --> 1.76-> 1.84, baseline ~1.5-1.8 per HIE  - urine lytes intrinsic, renal sono ordered no hydronephrosis thickened bladder which may reflect chronic obstruction  - continue to trend   - avoid nephrotoxic agents and renally dose medications  - holding home lasix (hold in setting of post obstructive diuresis)/losartan and restart as indicated

## 2023-01-31 NOTE — DISCHARGE NOTE NURSING/CASE MANAGEMENT/SOCIAL WORK - NSDCFUADDAPPT_GEN_ALL_CORE_FT
Follow up with your primary care physician for further monitoring in 1-2 weeks. Please call to arrange appointment.  
Follow up with your primary care physician for further monitoring in 1-2 weeks. Please call to arrange appointment.

## 2023-01-31 NOTE — PROGRESS NOTE ADULT - PROBLEM SELECTOR PLAN 3
- CXR without focal consolidation, however given presence of severe sepsis admitting provider started abx for possible superimposed bacterial PNA  - repeat CXR clear, discontinuing abx. Sepsis likely 2/2 to RSV.
- CXR without focal consolidation, however given presence of severe sepsis admitting provider started abx for possible superimposed bacterial PNA  - will obtain PA lateral film if negative for bacterial pna will d'c ceftriaxone
- CXR without focal consolidation, however given presence of severe sepsis admitting provider started abx for possible superimposed bacterial PNA  - repeat CXR clear, discontinuing abx. Sepsis likely 2/2 to RSV.

## 2023-01-31 NOTE — PROGRESS NOTE ADULT - PROBLEM SELECTOR PLAN 8
- home meds: januvia 50mg daily, glimeperide 1mg daily, metformin 500mg BID  - mISS  - check A1C --> 6.1; increase lantus 15u and 5u of pre-meal (hyperglycemia in the setting of steroids)  - carb consistent diet
- home meds: januvia 50mg daily, glimeperide 1mg daily, metformin 500mg BID  - mISS  - check A1C --> 5.8; increase lantus 15u and 5u of pre-meal (hyperglycemia in the setting of steroids)  - carb consistent diet  would not resume metformin on dc, can cont januvia, glimperide  A1c is better than goal for age group, tight control not necessary  no metformin given CKA and risk of lactic acidosis
- home meds: januvia 50mg daily, glimeperide 1mg daily, metformin 500mg BID  - mISS  - check A1C --> 5.8; increase lantus 15u and 5u of pre-meal (hyperglycemia in the setting of steroids)  - carb consistent diet  would not resume metformin on dc, can cont januvia, glimperide  A1c is better than goal for age group, tight control not necessary  no metformin given CKA and risk of lactic acidosis
- home meds: januvia 50mg daily, glimeperide 1mg daily, metformin 500mg BID  - mISS  - check A1C --> 6.1; increase insulin to 12u and 4u of pre-meal (hyperglycemia in the setting of steroids)  - carb consistent diet
- home meds: januvia 50mg daily, glimeperide 1mg daily, metformin 500mg BID  - mISS  - check A1C --> 5.8; increase lantus 15u and 5u of pre-meal (hyperglycemia in the setting of steroids)  - carb consistent diet  would not resume metformin on dc, can cont januvia, glimperide  A1c is better than goal for age group, tight control not necessary  no metformin given CKA and risk of lactic acidosis
- home meds: januvia 50mg daily, glimeperide 1mg daily, metformin 500mg BID  - mISS  - check A1C --> 6.1; starting 8u of basal insulin in anticipation of hyperglycemia in the setting of steroids  - carb consistent diet

## 2023-01-31 NOTE — PROGRESS NOTE ADULT - NSPROGADDITIONALINFOA_GEN_ALL_CORE
planning for rehab early next week
Dispo: Patient hemodynamically stable for discharge to [facility] today.
Called patient primary care office and updated them on reason for admission and patient hospital course.

## 2023-01-31 NOTE — PROGRESS NOTE ADULT - PROBLEM SELECTOR PLAN 5
- pt p/w generalized weakness and difficulty ambulating x1d, likely in setting of infection, as above  - PT consulted --> rehab  - fall precautions
Resulted
- pt p/w generalized weakness and difficulty ambulating x1d, likely in setting of infection, as above  - PT consulted --> rehab  - fall precautions

## 2023-01-31 NOTE — PROGRESS NOTE ADULT - PROBLEM SELECTOR PLAN 7
- home med: simvastatin 40mg daily  - c/w home med

## 2023-01-31 NOTE — PROGRESS NOTE ADULT - PROBLEM SELECTOR PROBLEM 4
Stage 3 chronic kidney disease
COY (acute kidney injury)
Stage 3 chronic kidney disease
Stage 3 chronic kidney disease

## 2023-01-31 NOTE — PROGRESS NOTE ADULT - PROBLEM SELECTOR PROBLEM 3
Pneumonia due to respiratory syncytial virus (RSV)

## 2023-01-31 NOTE — DIETITIAN INITIAL EVALUATION ADULT - ORAL INTAKE PTA/DIET HISTORY
Pt lives at home with wife. Pt is a poor historian, denies following any specific diet at home. No vitamins/supplements taken PTA. Per chart pt was having poor PO intake x1 day PTA.

## 2023-01-31 NOTE — DIETITIAN INITIAL EVALUATION ADULT - ADD RECOMMEND
1) Recommend CSTCHO, low sodium diet  2) Monitor weights, labs, BM's, skin integrity, p.o. intake.   3) Encourage PO intake and honor food preferences as able. Please provide assistance with meals.

## 2023-01-31 NOTE — DISCHARGE NOTE NURSING/CASE MANAGEMENT/SOCIAL WORK - NSDCPEFALRISK_GEN_ALL_CORE
For information on Fall & Injury Prevention, visit: [email or web address] OR  [email or web address] OR  https://www.cdc.gov/steadi/patient.html
For information on Fall & Injury Prevention, visit: [email or web address] OR  [email or web address] OR  https://www.cdc.gov/steadi/patient.html

## 2023-01-31 NOTE — DISCHARGE NOTE NURSING/CASE MANAGEMENT/SOCIAL WORK - PATIENT PORTAL LINK FT
You can access the FollowMyHealth Patient Portal offered by [facility] by registering at the following website: [email or web address]. By joining [facility]’s FollowMyHealth portal, you will also be able to view your health information using other applications (apps) compatible with our system.
You can access the FollowMyHealth Patient Portal offered by [facility] by registering at the following website: [email or web address]. By joining [facility]s FollowMyHealth portal, you will also be able to view your health information using other applications (apps) compatible with our system.

## 2023-02-01 ENCOUNTER — NON-APPOINTMENT (OUTPATIENT)
Age: 88
End: 2023-02-01

## 2023-02-15 ENCOUNTER — APPOINTMENT (OUTPATIENT)
Age: 88
End: 2023-02-15
Payer: MEDICARE

## 2023-02-15 ENCOUNTER — RX RENEWAL (OUTPATIENT)
Age: 88
End: 2023-02-15

## 2023-02-15 VITALS
SYSTOLIC BLOOD PRESSURE: 122 MMHG | OXYGEN SATURATION: 96 % | HEART RATE: 99 BPM | WEIGHT: 170 LBS | BODY MASS INDEX: 25.76 KG/M2 | DIASTOLIC BLOOD PRESSURE: 63 MMHG | HEIGHT: 68 IN

## 2023-02-15 DIAGNOSIS — R33.9 RETENTION OF URINE, UNSPECIFIED: ICD-10-CM

## 2023-02-15 DIAGNOSIS — N40.0 BENIGN PROSTATIC HYPERPLASIA WITHOUT LOWER URINARY TRACT SYMPMS: ICD-10-CM

## 2023-02-15 PROCEDURE — 99204 OFFICE O/P NEW MOD 45 MIN: CPT

## 2023-02-15 RX ORDER — LOSARTAN POTASSIUM 50 MG/1
50 TABLET, FILM COATED ORAL DAILY
Qty: 90 | Refills: 3 | Status: ACTIVE | COMMUNITY
Start: 2021-03-01 | End: 1900-01-01

## 2023-02-15 NOTE — HISTORY OF PRESENT ILLNESS
[FreeTextEntry1] : He is an 87-year-old male with a history of hypertension, hyperlipidemia, diabetes, dimension, coronary disease, TIA who was recently admitted to [facility] with RSV and weakness.  During his hospitalization, he had urinary retention.  Urology was called to place a catheter.  An 18 Urdu coudé catheter was placed on January 24.  1200 cc of urine were drained.\par \par Renal ultrasound January 26, 2023: Right renal cortical scarring, no hydronephrosis bilaterally, thickened and trabeculated bladder\par \par History obtained from son and wife.  He has a history of a greenlight prostatectomy approximately 7 to 8 years ago.  He is on Flomax twice daily.  He was wearing a diaper prior to his hospitalization, but did void intermittently throughout the day.  He has had recurrent UTIs.  No gross hematuria.\par \par Denies gross hematuria, flank pain, fevers, chills, nausea, vomiting.

## 2023-02-15 NOTE — ASSESSMENT
[FreeTextEntry1] : 87 y.o. M with history of hypertension, hyperlipidemia, diabetes, dimension, coronary disease, TIA who presents with urinary retention. Exam with fibrinous exudate at urethral meatus with pressure wound from prolonged catheterization\par - Byrne removed\par - Instructions given to nursing facility to check PVR, only replace catheter if PVR >500 mL, suprapubic pain, no void x 8 hours. Suspect patient has had longstanding poor bladder emptying based on symptoms, US with trabeculations\par - Bacitracin BID to tip of penis \par - If byrne needs replacing, given pressure wound, would consider SPT placement

## 2023-02-15 NOTE — PHYSICAL EXAM
[Edema] : no peripheral edema [Abdomen Tenderness] : non-tender [Costovertebral Angle Tenderness] : no ~M costovertebral angle tenderness [Testes Tenderness] : no tenderness of the testes [Testes Mass (___cm)] : there were no testicular masses [FreeTextEntry1] : Circumcised penis.  He can [place] draining yellow urine.  Evidence of medial erosion, fibrinous exudate at ventral aspect of urethral meatus [] : no rash [No Focal Deficits] : no focal deficits [Oriented To Time, Place, And Person] : oriented to person, place, and time

## 2023-03-08 ENCOUNTER — APPOINTMENT (OUTPATIENT)
Dept: INTERNAL MEDICINE | Facility: CLINIC | Age: 88
End: 2023-03-08

## 2023-03-22 NOTE — REVIEW OF SYSTEMS
I reviewed the progress note and agree with the resident’s findings and plans as written. Case discussed with resident.    Yazan Hampton, PharmD        [Fever] : no fever [Night Sweats] : no night sweats [Earache] : no earache [Nasal Discharge] : no nasal discharge [Chest Pain] : no chest pain [Orthopena] : no orthopnea [Shortness Of Breath] : no shortness of breath [Wheezing] : no wheezing [Abdominal Pain] : no abdominal pain [Vomiting] : no vomiting

## 2023-03-29 ENCOUNTER — INPATIENT (INPATIENT)
Facility: HOSPITAL | Age: 88
LOS: 15 days | Discharge: HOME | DRG: 628 | End: 2023-04-14
Attending: INTERNAL MEDICINE | Admitting: INTERNAL MEDICINE
Payer: MEDICARE

## 2023-03-29 VITALS
WEIGHT: 139.99 LBS | DIASTOLIC BLOOD PRESSURE: 91 MMHG | SYSTOLIC BLOOD PRESSURE: 160 MMHG | HEIGHT: 68 IN | HEART RATE: 95 BPM | TEMPERATURE: 98 F | RESPIRATION RATE: 16 BRPM | OXYGEN SATURATION: 96 %

## 2023-03-29 DIAGNOSIS — M86.9 OSTEOMYELITIS, UNSPECIFIED: ICD-10-CM

## 2023-03-29 PROCEDURE — 71045 X-RAY EXAM CHEST 1 VIEW: CPT | Mod: 26

## 2023-03-29 PROCEDURE — 73630 X-RAY EXAM OF FOOT: CPT | Mod: 26,LT

## 2023-03-29 PROCEDURE — 99285 EMERGENCY DEPT VISIT HI MDM: CPT

## 2023-03-29 RX ORDER — PIPERACILLIN AND TAZOBACTAM 4; .5 G/20ML; G/20ML
3.38 INJECTION, POWDER, LYOPHILIZED, FOR SOLUTION INTRAVENOUS ONCE
Refills: 0 | Status: COMPLETED | OUTPATIENT
Start: 2023-03-30 | End: 2023-03-30

## 2023-03-29 RX ORDER — PANTOPRAZOLE SODIUM 20 MG/1
40 TABLET, DELAYED RELEASE ORAL
Refills: 0 | Status: DISCONTINUED | OUTPATIENT
Start: 2023-03-29 | End: 2023-03-30

## 2023-03-29 RX ORDER — HEPARIN SODIUM 5000 [USP'U]/ML
5000 INJECTION INTRAVENOUS; SUBCUTANEOUS EVERY 12 HOURS
Refills: 0 | Status: DISCONTINUED | OUTPATIENT
Start: 2023-03-29 | End: 2023-03-31

## 2023-03-29 RX ORDER — VANCOMYCIN HCL 1 G
750 VIAL (EA) INTRAVENOUS EVERY 24 HOURS
Refills: 0 | Status: DISCONTINUED | OUTPATIENT
Start: 2023-03-29 | End: 2023-03-30

## 2023-03-29 RX ORDER — PIPERACILLIN AND TAZOBACTAM 4; .5 G/20ML; G/20ML
3.38 INJECTION, POWDER, LYOPHILIZED, FOR SOLUTION INTRAVENOUS EVERY 8 HOURS
Refills: 0 | Status: COMPLETED | OUTPATIENT
Start: 2023-03-30 | End: 2023-04-06

## 2023-03-29 RX ORDER — ACETAMINOPHEN 500 MG
650 TABLET ORAL EVERY 6 HOURS
Refills: 0 | Status: DISCONTINUED | OUTPATIENT
Start: 2023-03-29 | End: 2023-04-10

## 2023-03-29 RX ORDER — PIPERACILLIN AND TAZOBACTAM 4; .5 G/20ML; G/20ML
3.38 INJECTION, POWDER, LYOPHILIZED, FOR SOLUTION INTRAVENOUS ONCE
Refills: 0 | Status: COMPLETED | OUTPATIENT
Start: 2023-03-29 | End: 2023-03-29

## 2023-03-29 RX ADMIN — Medication 250 MILLIGRAM(S): at 22:49

## 2023-03-29 RX ADMIN — PIPERACILLIN AND TAZOBACTAM 200 GRAM(S): 4; .5 INJECTION, POWDER, LYOPHILIZED, FOR SOLUTION INTRAVENOUS at 21:15

## 2023-03-29 NOTE — CONSULT NOTE ADULT - SUBJECTIVE AND OBJECTIVE BOX
[facility] [place]     Patient is a 87y old  Male who presents with a chief complaint of     HPI:      HPI:    87y Male for Cardiology Consult    PAST MEDICAL & SURGICAL HISTORY:      FAMILY HISTORY:      SOCIAL HISTORY:   Alcohol:  Smoking:    Allergies    No Known Allergies    Intolerances        MEDICATIONS  (STANDING):  heparin   Injectable 5000 Unit(s) SubCutaneous every 12 hours  pantoprazole    Tablet 40 milliGRAM(s) Oral before breakfast  piperacillin/tazobactam IVPB.- 3.375 Gram(s) IV Intermittent once  piperacillin/tazobactam IVPB.- 3.375 Gram(s) IV Intermittent once  piperacillin/tazobactam IVPB.- 3.375 Gram(s) IV Intermittent once  piperacillin/tazobactam IVPB.. 3.375 Gram(s) IV Intermittent every 8 hours  vancomycin  IVPB 750 milliGRAM(s) IV Intermittent every 24 hours    MEDICATIONS  (PRN):  acetaminophen     Tablet .. 650 milliGRAM(s) Oral every 6 hours PRN Temp greater or equal to 38C (100.4F), Mild Pain (1 - 3)      REVIEW OF SYSTEMS:  CONSTITUTIONAL: No fever, weight loss, chills, shakes, or fat  RESPIRATORY: No cough, wheezing, hemoptysis, or shortness of breath  CARDIOVASCULAR: No chest pain, dyspnea, palpitations, dizziness, syncope, paroxysmal nocturnal dyspnea, orthopnea, or arm or leg swelling  GASTROINTESTINAL: No abdominal  or epigastric pain, nausea, vomiting, hematemesis, diarrhea, constipation, melena or bright red bloo  NEUROLOGICAL: No headaches, memory loss, slurred speech, limb weakness, loss of strength, numbness, or tremors  SKIN: No itching, burning, rashes, or lesions  ENDOCRINE: No heat or cold intolerance, or hair loss  MUSCULOSKELETAL: No joint pain or swelling, muscle, back, or extremity pain  HEME/LYMPH: No easy bruising or bleeding gums  ALLERY AND IMMUNOLOGIC: No hives or rash.    Vital Signs Last 24 Hrs  T(C): 36.4 (29 Mar 2023 17:43), Max: 36.4 (29 Mar 2023 17:43)  T(F): 97.6 (29 Mar 2023 17:43), Max: 97.6 (29 Mar 2023 17:43)  HR: 95 (29 Mar 2023 17:43) (95 - 95)  BP: 160/91 (29 Mar 2023 17:43) (160/91 - 160/91)  BP(mean): --  RR: 16 (29 Mar 2023 17:43) (16 - 16)  SpO2: 96% (29 Mar 2023 17:43) (96% - 96%)    Parameters below as of 29 Mar 2023 17:43  Patient On (Oxygen Delivery Method): room air        PHYSICAL EXAM:  HEAD:  Atraumatic, Normocephalic  EYES: EOMI, PERRLA, conjunctiva and sclera clear  NECK: Supple and normal thyroid.  No JVD or carotid bruit.   HEART: S1, S2 regular , 1/6 soft ejection systolic murmur in mitral area , no thrill and no gallops .  PULMONARY: Bilateral vesicular breathing , few scattered ronchi and few scattered rales are present .  ABDOMEN: Soft nontender and positive bowl sounds   EXTREMITIES:  No clubbing, cyanosis, or pedal  edema  NEUROLOGICAL: AAOX3 , no focal deficit .    LABS:                        11.2   11.88 )-----------( 228      ( 29 Mar 2023 21:20 )             35.5     03-29    144  |  115<H>  |  35<H>  ----------------------------<  249<H>  4.3   |  27  |  1.40<H>    Ca    9.3      29 Mar 2023 21:20    TPro  7.6  /  Alb  2.8<L>  /  TBili  0.5  /  DBili  x   /  AST  20  /  ALT  30  /  AlkPhos  71  03-29            BNP      EKG:  ECHO:  IMAGING:    Assessment and Plan :     Will continue to follow during hospital course and give further recommendations as needed . Thanks for your referral . if any questions please contact me at office ([phone])cell [phone])  [facility] [place]     Patient is a 87y old  Male who presents with a chief complaint of     HPI:      HPI:    87y Male for Cardiology Consult    PAST MEDICAL & SURGICAL HISTORY:      FAMILY HISTORY:      SOCIAL HISTORY:   Alcohol:  Smoking:    Allergies    No Known Allergies    Intolerances        MEDICATIONS  (STANDING):  heparin   Injectable 5000 Unit(s) SubCutaneous every 12 hours  pantoprazole    Tablet 40 milliGRAM(s) Oral before breakfast  piperacillin/tazobactam IVPB.- 3.375 Gram(s) IV Intermittent once  piperacillin/tazobactam IVPB.- 3.375 Gram(s) IV Intermittent once  piperacillin/tazobactam IVPB.- 3.375 Gram(s) IV Intermittent once  piperacillin/tazobactam IVPB.. 3.375 Gram(s) IV Intermittent every 8 hours  vancomycin  IVPB 750 milliGRAM(s) IV Intermittent every 24 hours    MEDICATIONS  (PRN):  acetaminophen     Tablet .. 650 milliGRAM(s) Oral every 6 hours PRN Temp greater or equal to 38C (100.4F), Mild Pain (1 - 3)      REVIEW OF SYSTEMS:  CONSTITUTIONAL: No fever, weight loss, chills, shakes, or fat  RESPIRATORY: No cough, wheezing, hemoptysis, or shortness of breath  CARDIOVASCULAR: No chest pain, dyspnea, palpitations, dizziness, syncope, paroxysmal nocturnal dyspnea, orthopnea, or arm or leg swelling  GASTROINTESTINAL: No abdominal  or epigastric pain, nausea, vomiting, hematemesis, diarrhea, constipation, melena or bright red bloo  NEUROLOGICAL: No headaches, memory loss, slurred speech, limb weakness, loss of strength, numbness, or tremors  SKIN: No itching, burning, rashes, or lesions  ENDOCRINE: No heat or cold intolerance, or hair loss  MUSCULOSKELETAL: No joint pain or swelling, muscle, back, or extremity pain  HEME/LYMPH: No easy bruising or bleeding gums  ALLERY AND IMMUNOLOGIC: No hives or rash.    Vital Signs Last 24 Hrs  T(C): 36.4 (29 Mar 2023 17:43), Max: 36.4 (29 Mar 2023 17:43)  T(F): 97.6 (29 Mar 2023 17:43), Max: 97.6 (29 Mar 2023 17:43)  HR: 95 (29 Mar 2023 17:43) (95 - 95)  BP: 160/91 (29 Mar 2023 17:43) (160/91 - 160/91)  BP(mean): --  RR: 16 (29 Mar 2023 17:43) (16 - 16)  SpO2: 96% (29 Mar 2023 17:43) (96% - 96%)    Parameters below as of 29 Mar 2023 17:43  Patient On (Oxygen Delivery Method): room air        PHYSICAL EXAM:  HEAD:  Atraumatic, Normocephalic  EYES: EOMI, PERRLA, conjunctiva and sclera clear  NECK: Supple and normal thyroid.  No JVD or carotid bruit.   HEART: S1, S2 regular , 1/6 soft ejection systolic murmur in mitral area , no thrill and no gallops .  PULMONARY: Bilateral vesicular breathing , few scattered ronchi and few scattered rales are present .  ABDOMEN: Soft nontender and positive bowl sounds   EXTREMITIES:  No clubbing, cyanosis, or pedal  edema  NEUROLOGICAL: AAOX3 , no focal deficit .    LABS:                        11.2   11.88 )-----------( 228      ( 29 Mar 2023 21:20 )             35.5     03-29    144  |  115<H>  |  35<H>  ----------------------------<  249<H>  4.3   |  27  |  1.40<H>    Ca    9.3      29 Mar 2023 21:20    TPro  7.6  /  Alb  2.8<L>  /  TBili  0.5  /  DBili  x   /  AST  20  /  ALT  30  /  AlkPhos  71  03-29            BNP      EKG:  ECHO:  IMAGING:    Assessment and Plan :     Will continue to follow during hospital course and give further recommendations as needed . Thanks for your referral . if any questions please contact me at office ([phone])cell [phone])  [facility] [place]     Patient is a 87y old  Male who presents with a chief complaint of     HPI:      HPI:    87y Male for Cardiology Consult    PAST MEDICAL & SURGICAL HISTORY:      FAMILY HISTORY:      SOCIAL HISTORY:   Alcohol:  Smoking:    Allergies    No Known Allergies    Intolerances        MEDICATIONS  (STANDING):  heparin   Injectable 5000 Unit(s) SubCutaneous every 12 hours  pantoprazole    Tablet 40 milliGRAM(s) Oral before breakfast  piperacillin/tazobactam IVPB.- 3.375 Gram(s) IV Intermittent once  piperacillin/tazobactam IVPB.- 3.375 Gram(s) IV Intermittent once  piperacillin/tazobactam IVPB.- 3.375 Gram(s) IV Intermittent once  piperacillin/tazobactam IVPB.. 3.375 Gram(s) IV Intermittent every 8 hours  vancomycin  IVPB 750 milliGRAM(s) IV Intermittent every 24 hours    MEDICATIONS  (PRN):  acetaminophen     Tablet .. 650 milliGRAM(s) Oral every 6 hours PRN Temp greater or equal to 38C (100.4F), Mild Pain (1 - 3)      REVIEW OF SYSTEMS:  CONSTITUTIONAL: No fever, weight loss, chills, shakes, or fat  RESPIRATORY: No cough, wheezing, hemoptysis, or shortness of breath  CARDIOVASCULAR: No chest pain, dyspnea, palpitations, dizziness, syncope, paroxysmal nocturnal dyspnea, orthopnea, or arm or leg swelling  GASTROINTESTINAL: No abdominal  or epigastric pain, nausea, vomiting, hematemesis, diarrhea, constipation, melena or bright red bloo  NEUROLOGICAL: No headaches, memory loss, slurred speech, limb weakness, loss of strength, numbness, or tremors  SKIN: No itching, burning, rashes, or lesions  ENDOCRINE: No heat or cold intolerance, or hair loss  MUSCULOSKELETAL: No joint pain or swelling, muscle, back, or extremity pain  HEME/LYMPH: No easy bruising or bleeding gums  ALLERY AND IMMUNOLOGIC: No hives or rash.    Vital Signs Last 24 Hrs  T(C): 36.4 (29 Mar 2023 17:43), Max: 36.4 (29 Mar 2023 17:43)  T(F): 97.6 (29 Mar 2023 17:43), Max: 97.6 (29 Mar 2023 17:43)  HR: 95 (29 Mar 2023 17:43) (95 - 95)  BP: 160/91 (29 Mar 2023 17:43) (160/91 - 160/91)  BP(mean): --  RR: 16 (29 Mar 2023 17:43) (16 - 16)  SpO2: 96% (29 Mar 2023 17:43) (96% - 96%)    Parameters below as of 29 Mar 2023 17:43  Patient On (Oxygen Delivery Method): room air        PHYSICAL EXAM:  HEAD:  Atraumatic, Normocephalic  EYES: EOMI, PERRLA, conjunctiva and sclera clear  NECK: Supple and normal thyroid.  No JVD or carotid bruit.   HEART: S1, S2 regular , 1/6 soft ejection systolic murmur in mitral area , no thrill and no gallops .  PULMONARY: Bilateral vesicular breathing , few scattered ronchi and few scattered rales are present .  ABDOMEN: Soft nontender and positive bowl sounds   EXTREMITIES:  No clubbing, cyanosis, or pedal  edema  NEUROLOGICAL: AAOX3 , no focal deficit .    LABS:                        11.2   11.88 )-----------( 228      ( 29 Mar 2023 21:20 )             35.5     03-29    144  |  115<H>  |  35<H>  ----------------------------<  249<H>  4.3   |  27  |  1.40<H>    Ca    9.3      29 Mar 2023 21:20    TPro  7.6  /  Alb  2.8<L>  /  TBili  0.5  /  DBili  x   /  AST  20  /  ALT  30  /  AlkPhos  71  03-29            BNP      EKG:  ECHO:  IMAGING:    Assessment and Plan :     Will continue to follow during hospital course and give further recommendations as needed . Thanks for your referral . if any questions please contact me at office ([phone])cell [phone])  KARIME WYNN MD [place] [id number]  [address]  OFFICE : [id number]  CELL : [phone]    Patient is a 87y old  Male who presents with a chief complaint of FOOT INFECTION .    HPI:      HPI:    87y Male for Cardiology Consult    PAST MEDICAL & SURGICAL HISTORY:      FAMILY HISTORY:      SOCIAL HISTORY:   Alcohol:  Smoking:    Allergies    No Known Allergies    Intolerances        MEDICATIONS  (STANDING):  heparin   Injectable 5000 Unit(s) SubCutaneous every 12 hours  pantoprazole    Tablet 40 milliGRAM(s) Oral before breakfast  piperacillin/tazobactam IVPB.- 3.375 Gram(s) IV Intermittent once  piperacillin/tazobactam IVPB.- 3.375 Gram(s) IV Intermittent once  piperacillin/tazobactam IVPB.- 3.375 Gram(s) IV Intermittent once  piperacillin/tazobactam IVPB.. 3.375 Gram(s) IV Intermittent every 8 hours  vancomycin  IVPB 750 milliGRAM(s) IV Intermittent every 24 hours    MEDICATIONS  (PRN):  acetaminophen     Tablet .. 650 milliGRAM(s) Oral every 6 hours PRN Temp greater or equal to 38C (100.4F), Mild Pain (1 - 3)      Vital Signs Last 24 Hrs  T(C): 36.4 (29 Mar 2023 17:43), Max: 36.4 (29 Mar 2023 17:43)  T(F): 97.6 (29 Mar 2023 17:43), Max: 97.6 (29 Mar 2023 17:43)  HR: 95 (29 Mar 2023 17:43) (95 - 95)  BP: 160/91 (29 Mar 2023 17:43) (160/91 - 160/91)  BP(mean): --  RR: 16 (29 Mar 2023 17:43) (16 - 16)  SpO2: 96% (29 Mar 2023 17:43) (96% - 96%)    Parameters below as of 29 Mar 2023 17:43  Patient On (Oxygen Delivery Method): room air        LABS:                        11.2   11.88 )-----------( 228      ( 29 Mar 2023 21:20 )             35.5     03-29    144  |  115<H>  |  35<H>  ----------------------------<  249<H>  4.3   |  27  |  1.40<H>    Ca    9.3      29 Mar 2023 21:20    TPro  7.6  /  Alb  2.8<L>  /  TBili  0.5  /  DBili  x   /  AST  20  /  ALT  30  /  AlkPhos  71  03-29            Assessment and Plan :   FULL CONSULT DICTATED   87 years old male with H/O hypertension , DM has foot infection . Continue I/V antibiotics . Patient cardiac wise stable and cleared for foot surgery if needed .  Will continue to follow during hospital course and give further recommendations as needed . Thanks for your referral . if any questions please contact me at office ([phone] cell [phone])  KARIME WYNN MD [place] [id number]  [address]  OFFICE : [id number]  CELL : [phone]    Patient is a 87y old  Male who presents with a chief complaint of FOOT INFECTION .    HPI:      HPI:    87y Male for Cardiology Consult    PAST MEDICAL & SURGICAL HISTORY:      FAMILY HISTORY:      SOCIAL HISTORY:   Alcohol:  Smoking:    Allergies    No Known Allergies    Intolerances        MEDICATIONS  (STANDING):  heparin   Injectable 5000 Unit(s) SubCutaneous every 12 hours  pantoprazole    Tablet 40 milliGRAM(s) Oral before breakfast  piperacillin/tazobactam IVPB.- 3.375 Gram(s) IV Intermittent once  piperacillin/tazobactam IVPB.- 3.375 Gram(s) IV Intermittent once  piperacillin/tazobactam IVPB.- 3.375 Gram(s) IV Intermittent once  piperacillin/tazobactam IVPB.. 3.375 Gram(s) IV Intermittent every 8 hours  vancomycin  IVPB 750 milliGRAM(s) IV Intermittent every 24 hours    MEDICATIONS  (PRN):  acetaminophen     Tablet .. 650 milliGRAM(s) Oral every 6 hours PRN Temp greater or equal to 38C (100.4F), Mild Pain (1 - 3)      Vital Signs Last 24 Hrs  T(C): 36.4 (29 Mar 2023 17:43), Max: 36.4 (29 Mar 2023 17:43)  T(F): 97.6 (29 Mar 2023 17:43), Max: 97.6 (29 Mar 2023 17:43)  HR: 95 (29 Mar 2023 17:43) (95 - 95)  BP: 160/91 (29 Mar 2023 17:43) (160/91 - 160/91)  BP(mean): --  RR: 16 (29 Mar 2023 17:43) (16 - 16)  SpO2: 96% (29 Mar 2023 17:43) (96% - 96%)    Parameters below as of 29 Mar 2023 17:43  Patient On (Oxygen Delivery Method): room air        LABS:                        11.2   11.88 )-----------( 228      ( 29 Mar 2023 21:20 )             35.5     03-29    144  |  115<H>  |  35<H>  ----------------------------<  249<H>  4.3   |  27  |  1.40<H>    Ca    9.3      29 Mar 2023 21:20    TPro  7.6  /  Alb  2.8<L>  /  TBili  0.5  /  DBili  x   /  AST  20  /  ALT  30  /  AlkPhos  71  03-29            Assessment and Plan :   FULL CONSULT DICTATED   87 years old male with H/O hypertension , DM has foot infection . Continue I/V antibiotics . Patient cardiac wise stable and cleared for foot surgery if needed .  Will continue to follow during hospital course and give further recommendations as needed . Thanks for your referral . if any questions please contact me at office ([phone] cell [phone])  KARIME WYNN MD [place] [id number]  [address]  OFFICE : [phone]  CELL : [phone]    Patient is a 87y old  Male who presents with a chief complaint of FOOT INFECTION .    HPI:      HPI:    87y Male for Cardiology Consult    PAST MEDICAL & SURGICAL HISTORY:      FAMILY HISTORY:      SOCIAL HISTORY:   Alcohol:  Smoking:    Allergies    No Known Allergies    Intolerances        MEDICATIONS  (STANDING):  heparin   Injectable 5000 Unit(s) SubCutaneous every 12 hours  pantoprazole    Tablet 40 milliGRAM(s) Oral before breakfast  piperacillin/tazobactam IVPB.- 3.375 Gram(s) IV Intermittent once  piperacillin/tazobactam IVPB.- 3.375 Gram(s) IV Intermittent once  piperacillin/tazobactam IVPB.- 3.375 Gram(s) IV Intermittent once  piperacillin/tazobactam IVPB.. 3.375 Gram(s) IV Intermittent every 8 hours  vancomycin  IVPB 750 milliGRAM(s) IV Intermittent every 24 hours    MEDICATIONS  (PRN):  acetaminophen     Tablet .. 650 milliGRAM(s) Oral every 6 hours PRN Temp greater or equal to 38C (100.4F), Mild Pain (1 - 3)      Vital Signs Last 24 Hrs  T(C): 36.4 (29 Mar 2023 17:43), Max: 36.4 (29 Mar 2023 17:43)  T(F): 97.6 (29 Mar 2023 17:43), Max: 97.6 (29 Mar 2023 17:43)  HR: 95 (29 Mar 2023 17:43) (95 - 95)  BP: 160/91 (29 Mar 2023 17:43) (160/91 - 160/91)  BP(mean): --  RR: 16 (29 Mar 2023 17:43) (16 - 16)  SpO2: 96% (29 Mar 2023 17:43) (96% - 96%)    Parameters below as of 29 Mar 2023 17:43  Patient On (Oxygen Delivery Method): room air        LABS:                        11.2   11.88 )-----------( 228      ( 29 Mar 2023 21:20 )             35.5     03-29    144  |  115<H>  |  35<H>  ----------------------------<  249<H>  4.3   |  27  |  1.40<H>    Ca    9.3      29 Mar 2023 21:20    TPro  7.6  /  Alb  2.8<L>  /  TBili  0.5  /  DBili  x   /  AST  20  /  ALT  30  /  AlkPhos  71  03-29            Assessment and Plan :   FULL CONSULT DICTATED   87 years old male with H/O hypertension , DM has foot infection . Continue I/V antibiotics . Patient cardiac wise stable and cleared for foot surgery if needed .  Will continue to follow during hospital course and give further recommendations as needed . Thanks for your referral . if any questions please contact me at office ([phone] cell [phone])

## 2023-03-29 NOTE — CONSULT NOTE ADULT - SUBJECTIVE AND OBJECTIVE BOX
Patient is a 87y Male whom presented to the hospital with     PAST MEDICAL & SURGICAL HISTORY:      MEDICATIONS  (STANDING):      Allergies    No Known Allergies    Intolerances        SOCIAL HISTORY:  Denies ETOh,Smoking,     FAMILY HISTORY:      REVIEW OF SYSTEMS:    CONSTITUTIONAL: No weakness, fevers or chills  EYES/ENT: No visual changes;  no throat pain   NECK: No pain or stiffness  RESPIRATORY: No cough, wheezing, hemoptysis; No shortness of breath  CARDIOVASCULAR: No chest pain or palpitations  GASTROINTESTINAL: No abdominal or epigastric pain. No nausea, vomiting,     No diarrhea or constipation. No melena   GENITOURINARY: No dysuria, frequency or hematuria  NEUROLOGICAL: No numbness or weakness  SKIN: dry      VITAL:  T(C): , Max: 36.4 (03-29-23 @ 17:43)  T(F): , Max: 97.6 (03-29-23 @ 17:43)  HR: 95 (03-29-23 @ 17:43)  BP: 160/91 (03-29-23 @ 17:43)  BP(mean): --  RR: 16 (03-29-23 @ 17:43)  SpO2: 96% (03-29-23 @ 17:43)  Wt(kg): --    I and O's:    Height (cm): 172.7 (03-29 @ 17:43)  Weight (kg): 63.5 (03-29 @ 17:43)  BMI (kg/m2): 21.3 (03-29 @ 17:43)  BSA (m2): 1.76 (03-29 @ 17:43)    PHYSICAL EXAM:    Constitutional: NAD  HEENT: conjunctive   clear   Neck:  No JVD  Respiratory: CTAB  Cardiovascular: S1 and S2  Gastrointestinal: BS+, soft, NT/ND  Extremities: No peripheral edema  Neurological: A/O x 3, no focal deficits  Psychiatric: Normal mood, normal affect  : No Bethea  Skin: No rashes  Access: Not applicable    LABS:                        11.2   11.88 )-----------( 228      ( 29 Mar 2023 21:20 )             35.5     03-29    144  |  115<H>  |  35<H>  ----------------------------<  249<H>  4.3   |  27  |  1.40<H>    Ca    9.3      29 Mar 2023 21:20    TPro  7.6  /  Alb  2.8<L>  /  TBili  0.5  /  DBili  x   /  AST  20  /  ALT  30  /  AlkPhos  71  03-29      Urine Studies:          RADIOLOGY & ADDITIONAL STUDIES:                   Patient is a 87y Male whom presented to the hospital with ckd and chelo     PAST MEDICAL & SURGICAL HISTORY:      MEDICATIONS  (STANDING):      Allergies    No Known Allergies    Intolerances        SOCIAL HISTORY:  Denies ETOh,Smoking,     FAMILY HISTORY:      REVIEW OF SYSTEMS:  unable to obtained a good review system            VITAL:  T(C): , Max: 36.4 (03-29-23 @ 17:43)  T(F): , Max: 97.6 (03-29-23 @ 17:43)  HR: 95 (03-29-23 @ 17:43)  BP: 160/91 (03-29-23 @ 17:43)  BP(mean): --  RR: 16 (03-29-23 @ 17:43)  SpO2: 96% (03-29-23 @ 17:43)  Wt(kg): --    I and O's:    Height (cm): 172.7 (03-29 @ 17:43)  Weight (kg): 63.5 (03-29 @ 17:43)  BMI (kg/m2): 21.3 (03-29 @ 17:43)  BSA (m2): 1.76 (03-29 @ 17:43)    PHYSICAL EXAM:    Constitutional: NAD  HEENT: conjunctive   clear   Neck:  No JVD  Respiratory: CTAB  Cardiovascular: S1 and S2  Gastrointestinal: BS+, soft,   Extremities: No peripheral edema  Neurological:  no focal deficits  Psychiatric: Normal mood,   : No Bethea  Skin: dry  Access: Not applicable    LABS:                        11.2   11.88 )-----------( 228      ( 29 Mar 2023 21:20 )             35.5     03-29    144  |  115<H>  |  35<H>  ----------------------------<  249<H>  4.3   |  27  |  1.40<H>    Ca    9.3      29 Mar 2023 21:20    TPro  7.6  /  Alb  2.8<L>  /  TBili  0.5  /  DBili  x   /  AST  20  /  ALT  30  /  AlkPhos  71  03-29      Urine Studies:          RADIOLOGY & ADDITIONAL STUDIES:          MEDICATIONS  (STANDING):  albuterol/ipratropium for Nebulization 3 milliLiter(s) Nebulizer every 8 hours  atenolol  Tablet 25 milliGRAM(s) Oral daily  budesonide 160 MICROgram(s)/formoterol 4.5 MICROgram(s) Inhaler 2 Puff(s) Inhalation two times a day  dextrose 5%. 1000 milliLiter(s) (50 mL/Hr) IV Continuous <Continuous>  dextrose 5%. 1000 milliLiter(s) (100 mL/Hr) IV Continuous <Continuous>  dextrose 50% Injectable 25 Gram(s) IV Push once  dextrose 50% Injectable 12.5 Gram(s) IV Push once  dextrose 50% Injectable 25 Gram(s) IV Push once  donepezil 5 milliGRAM(s) Oral at bedtime  DULoxetine 60 milliGRAM(s) Oral daily  glucagon  Injectable 1 milliGRAM(s) IntraMuscular once  heparin   Injectable 5000 Unit(s) SubCutaneous every 12 hours  insulin glargine Injectable (LANTUS) 8 Unit(s) SubCutaneous every morning  insulin lispro (ADMELOG) corrective regimen sliding scale   SubCutaneous three times a day before meals  insulin lispro (ADMELOG) corrective regimen sliding scale   SubCutaneous at bedtime  lactobacillus acidophilus 1 Tablet(s) Oral two times a day with meals  losartan 50 milliGRAM(s) Oral daily  multivitamin 1 Tablet(s) Oral daily  pantoprazole    Tablet 40 milliGRAM(s) Oral daily  piperacillin/tazobactam IVPB.. 3.375 Gram(s) IV Intermittent every 8 hours  senna 2 Tablet(s) Oral at bedtime  simvastatin 40 milliGRAM(s) Oral at bedtime  sodium chloride 0.9%. 1000 milliLiter(s) (50 mL/Hr) IV Continuous <Continuous>  tamsulosin 0.4 milliGRAM(s) Oral at bedtime

## 2023-03-29 NOTE — CONSULT NOTE ADULT - ASSESSMENT
86 yo male ,[facility] resident with PMHx - COPD, DM, HTN, CAD, HLD, H/O TIA, dementia,GERD, BPH and depression sent ot ER for evaluation of left foot 1metatarsal wound ,suggestive of osteomyelitis .Patient was seen by ID consult and transfer to the hospital recommended for wound cx/bone biopsy /podiatry evaluation ,likely will require 4-6 weeks of iv abx . Patient was admitted to [facility] with b/l feet wounds and was followed by wound care team ,recently completed 7 days of doxycycline for foot wound cellulitis . (30 Mar 2023 05:21)      ACUTE RENAL FAILURE: sodium chloride 0.9%. 1000 milliLiter(s) (50 mL/Hr) IV Continuous   Serum creatinine is  at     , approximating GFR at   ml/min.   There is no progression . No uremic symptoms  No evidence of anemia .  Fluid status stable.  Will continue to avoid nephrotoxic drugs.  Patient remains asymptomatic.   Continue current therapy.  hold  diuretic.  hold   ACE inhibitor.  hold   ARB.  Additional evaluation:   ECG,    echocardiogram,     CXR,  will obtained recent   renal ultrasound to evalaute kidney size and possible stones ,      BP monitoring,continue current antihypertensive meds, low salt diet,followup with PMD in 1-2 weeks     88 yo male ,[facility] resident with PMHx - COPD, DM, HTN, CAD, HLD, H/O TIA, dementia,GERD, BPH and depression sent ot ER for evaluation of left foot 1metatarsal wound ,suggestive of osteomyelitis .Patient was seen by ID consult and transfer to the hospital recommended for wound cx/bone biopsy /podiatry evaluation ,likely will require 4-6 weeks of iv abx . Patient was admitted to [facility] with b/l feet wounds and was followed by wound care team ,recently completed 7 days of doxycycline for foot wound cellulitis . (30 Mar 2023 05:21)      ACUTE RENAL FAILURE: sodium chloride 0.9%. 1000 milliLiter(s) (50 mL/Hr) IV Continuous   Serum creatinine is  at     , approximating GFR at   ml/min.   There is no progression . No uremic symptoms  No evidence of anemia .  Fluid status stable.  Will continue to avoid nephrotoxic drugs.  Patient remains asymptomatic.   Continue current therapy.  hold  diuretic.  hold   ACE inhibitor.  hold   ARB.  Additional evaluation:   ECG,    echocardiogram,     CXR,  will obtained recent   renal ultrasound to evalaute kidney size and possible stones ,      BP monitoring,continue current antihypertensive meds, low salt diet,followup with PMD in 1-2 weeks     86 yo male ,[facility] resident with PMHx - COPD, DM, HTN, CAD, HLD, H/O TIA, dementia,GERD, BPH and depression sent ot ER for evaluation of left foot 1metatarsal wound ,suggestive of osteomyelitis .Patient was seen by ID consult and transfer to the hospital recommended for wound cx/bone biopsy /podiatry evaluation ,likely will require 4-6 weeks of iv abx . Patient was admitted to [facility] with b/l feet wounds and was followed by wound care team ,recently completed 7 days of doxycycline for foot wound cellulitis . (30 Mar 2023 05:21)      ACUTE RENAL FAILURE: sodium chloride 0.9%. 1000 milliLiter(s) (50 mL/Hr) IV Continuous   Serum creatinine is  at     , approximating GFR at   ml/min.   There is no progression . No uremic symptoms  No evidence of anemia .  Fluid status stable.  Will continue to avoid nephrotoxic drugs.  Patient remains asymptomatic.   Continue current therapy.  hold  diuretic.  hold   ACE inhibitor.  hold   ARB.  Additional evaluation:   ECG,    echocardiogram,     CXR,  will obtained recent   renal ultrasound to evalaute kidney size and possible stones ,      BP monitoring,continue current antihypertensive meds, low salt diet,followup with PMD in 1-2 weeks

## 2023-03-29 NOTE — CONSULT NOTE ADULT - SUBJECTIVE AND OBJECTIVE BOX
ANA MARIA LEIGH    [facility] ED    Allergies    No Known Allergies    Intolerances        PAST MEDICAL & SURGICAL HISTORY:      FAMILY HISTORY:      Home Medications:      MEDICATIONS  (STANDING):    MEDICATIONS  (PRN):              Vital Signs Last 24 Hrs  T(C): 36.4 (29 Mar 2023 17:43), Max: 36.4 (29 Mar 2023 17:43)  T(F): 97.6 (29 Mar 2023 17:43), Max: 97.6 (29 Mar 2023 17:43)  HR: 95 (29 Mar 2023 17:43) (95 - 95)  BP: 160/91 (29 Mar 2023 17:43) (160/91 - 160/91)  BP(mean): --  RR: 16 (29 Mar 2023 17:43) (16 - 16)  SpO2: 96% (29 Mar 2023 17:43) (96% - 96%)    Parameters below as of 29 Mar 2023 17:43  Patient On (Oxygen Delivery Method): room air                  LABS:                        11.2   11.88 )-----------( 228      ( 29 Mar 2023 21:20 )             35.5     03-29    144  |  115<H>  |  35<H>  ----------------------------<  249<H>  4.3   |  27  |  1.40<H>    Ca    9.3      29 Mar 2023 21:20    TPro  7.6  /  Alb  2.8<L>  /  TBili  0.5  /  DBili  x   /  AST  20  /  ALT  30  /  AlkPhos  71  03-29              WBC:  WBC Count: 11.88 K/uL (03-29 @ 21:20)      MICROBIOLOGY:  RECENT CULTURES:                  Sodium:  Sodium, Serum: 144 mmol/L (03-29 @ 21:20)      1.40 mg/dL 03-29 @ 21:20      Hemoglobin:  Hemoglobin: 11.2 g/dL (03-29 @ 21:20)      Platelets: Platelet Count - Automated: 228 K/uL (03-29 @ 21:20)      LIVER FUNCTIONS - ( 29 Mar 2023 21:20 )  Alb: 2.8 g/dL / Pro: 7.6 g/dL / ALK PHOS: 71 U/L / ALT: 30 U/L / AST: 20 U/L / GGT: x                 RADIOLOGY & ADDITIONAL STUDIES:      MICROBIOLOGY:  RECENT CULTURES:

## 2023-03-29 NOTE — ED ADULT TRIAGE NOTE - NURSING HOMES
[facility]

## 2023-03-29 NOTE — CONSULT NOTE ADULT - TIME BILLING
in direct care of patient , reviewing labs and other results and adjusting medications and in discussion with other consultants , RN and  PMD

## 2023-03-29 NOTE — ED ADULT TRIAGE NOTE - CHIEF COMPLAINT QUOTE
Per EMS pt coming from [facility] to r/o osteomyelitis of left foot Per EMS pt coming from [facility] to r/o osteomyelitis of left foot Per EMS pt coming from [facility] to r/o osteomyelitis of left foot

## 2023-03-29 NOTE — ED ADULT TRIAGE NOTE - NS ED NURSE AMBULANCES
[facility]

## 2023-03-29 NOTE — CONSULT NOTE ADULT - ASSESSMENT
Hospital Course:  Discharge Date	30-Jan-2023  Admission Date	24-Jan-2023 03:33  Reason for Admission	RSV, weakness  Hospital Course	  88 yo M with PMHx HTN, HLD, T2D, dementia (AOx2-3), OA, BPH, CAD, TIA, CKD 3  and GERD presented to ED with weakness, productive cough, and decreased ability  to ambulate.  On admission, tested positive for RSV and noted to be wheezing on exam with a  smoking history concern for possible underlying chronic bronchitis/COPD. He was  treated for likely COPD exacerbation. Likely an acute worsening of patient's  chronic cough based on hx obtained on admission and had chronic cough for the  past several years with white sputum as a former smoker.  On admission, noted to have severe sepsis- likely 2/2 to RSV and met SIRS  criteria with lactate 2.3 cleared on repeat. UA neg, UCx showed normal  urogenital john. BCx negative. MRSA swab negative  CXR neg acute consolidation, unable to obtain PA/Lateral patient unable to  stand. Repeat CXR clear, discontinued antibiotics.  On admission, noted to have acute respiratory failure with hypoxia and also  treated with Methylprednisolone and nebulizer for likely COPD exacerbation. He  will need to follow up with Pulmonology outpatient for COPD management.  Initially on oxygen supplementation and weaned to RA. Now stable on room air.  Can resume home Flovent as therapeutic interchange mometasone.  Patient with hx of stage 3 chronic kidney disease and on admission Cr 1.8-->  1.63 --> 1.76-> 1.84, baseline ~1.5-1.8 per HIE. On 1/30, Cr 1.60 prior to  discharge.  Urine studies sent and showed likely cause is intrinsic. Renal U/S showed no  hydronephrosis thickened bladder which may reflect chronic obstruction.  Home dose Lasix held in setting of post obstructive diuresis. Losartan  initially held but resumed prior to discharge.  PT consulted for difficulty ambulating and recommended rehab. Will need to have  fall precautions in place at the rehab as well.  Patient with hx of hypertension and continued home meds: losartan 50mg daily,  atenolol 25mg daily with exception of Lasix 20mg daily which was held in  setting of post obstructive diuresis and normotension.  Continued simvastatin 40mg daily for hyperlipidemia  Pt with hx of Type 2 diabetes mellitus and at home on Januvia 50mg daily,  glimepiride 1mg daily, metformin 500mg BID. During hospitalization, HgbA1C -->  5.8 and increased lantus to 15u and 5u of pre-meal (hyperglycemia in the  setting of steroids. He was continued on carb consistent diet.  On Discharge, would not resume metformin, can continue Januvia and glimepiride.  Will stop Metformin given CKD and risk of lactic acidosis.  Pt with hx of Dementia and not on meds; at baseline ambulates with walker and  AOx2-3. Pt currently at baseline.  Pt with benign prostatic hyperplasia and at home received Flomax 0.4mg daily-->  up-titrate to .8mg given retention.  Bethea placed for retention continue for one week per urology and will need to  follow up outpatient after discharge.  Continued on Protonix 40mg daily for GERD  Discussed case with Urology over the phone on 1/30 and recommended outpatient  follow up with urology for further work up. Discussed case with nephrology as  well who recommended BUN/Cr now at baseline and will need to follow up with  Nephrologist for further work up of  post obstructive diuresis.  On 1/30, patient was seen and evaluated today. Pt at AO x 2 and reports feeling  better. He denies any acute complaints at this time.  Discussed discharge medications, plan and outpatient follow up with spouse  (Stan Sousa)  over the phone. All questions answered and family in  agreement with discharge plan.  Patient is hemodynamically stable and medically optimized for discharge with  outpatient follow up with PCP, Urology, Pulmonology, Nephrology and Cardiology.  Patient will need repeat labs (CBC/CMP) 1-2 days to monitor BUN/Cr and  hemoglobin. Pt needs repeat Renal U/S within one week to assess  post  obstructive diuresis.     Med Reconciliation:  Override IMPROVE-DD recommendations due to:	IMPROVE-DD Application Not  Available  Recommended Post-Discharge VTE Prophylaxis	No post-discharge thromboprophylaxis  indicated.  Medication Reconciliation Status	Admission Reconciliation is Completed  Discharge Reconciliation is Completed  Discharge Medications	atenolol 25 mg oral tablet: 1 tab(s) orally once a day  Cymbalta 60 mg oral delayed release capsule: 1 cap(s) orally once a day  Flovent HFA 44 mcg/inh inhalation aerosol: 2 puff(s) inhaled 2 times a day, As  Needed  guaiFENesin 100 mg/5 mL oral liquid: 5 milliliter(s) orally every 6 hours, As  needed, Cough  insulin glargine 100 units/mL subcutaneous solution: 15 unit(s) subcutaneous  once a day (at bedtime)  insulin lispro 100 units/mL injectable solution: 5 unit(s) subcutaneous 3 times  a day (before meals)  insulin lispro 100 units/mL injectable solution: 0 Unit(s) if Glucose 61 - 250  2 Unit(s) if Glucose 251 - 300  4 Unit(s) if Glucose 301 - 350  6 Unit(s) if Glucose 351 - 400  8 Unit(s) if Glucose Greater Than 400  insulin lispro 100 units/mL injectable solution: 2 Unit(s) if Glucose 151 - 200  4 Unit(s) if Glucose 201 - 250  6 Unit(s) if Glucose 251 - 300  8 Unit(s) if Glucose 301 - 350  10 Unit(s) if Glucose 351 - 400  12 Unit(s) if Glucose Greater Than 400  ipratropium-albuterol 0.5 mg-2.5 mg/3 mL inhalation solution: 3 milliliter(s)  inhaled every 6 hours  losartan 50 mg oral tablet: 1 tab(s) orally once a day  Protonix 40 mg oral delayed release tablet: 1 tab(s) orally once a day  simvastatin 40 mg oral tablet: 1 tab(s) orally once a day (at bedtime)  tamsulosin 0.4 mg oral capsule: 2 cap(s) orally once a day (at bedtime)  . Hospital Course:  Discharge Date	30-Jan-2023  Admission Date	24-Jan-2023 03:33  Reason for Admission	RSV, weakness  Hospital Course	  86 yo M with PMHx HTN, HLD, T2D, dementia (AOx2-3), OA, BPH, CAD, TIA, CKD 3  and GERD presented to ED with weakness, productive cough, and decreased ability  to ambulate.  On admission, tested positive for RSV and noted to be wheezing on exam with a  smoking history concern for possible underlying chronic bronchitis/COPD. He was  treated for likely COPD exacerbation. Likely an acute worsening of patient's  chronic cough based on hx obtained on admission and had chronic cough for the  past several years with white sputum as a former smoker.  On admission, noted to have severe sepsis- likely 2/2 to RSV and met SIRS  criteria with lactate 2.3 cleared on repeat. UA neg, UCx showed normal  urogenital john. BCx negative. MRSA swab negative  CXR neg acute consolidation, unable to obtain PA/Lateral patient unable to  stand. Repeat CXR clear, discontinued antibiotics.  On admission, noted to have acute respiratory failure with hypoxia and also  treated with Methylprednisolone and nebulizer for likely COPD exacerbation. He  will need to follow up with Pulmonology outpatient for COPD management.  Initially on oxygen supplementation and weaned to RA. Now stable on room air.  Can resume home Flovent as therapeutic interchange mometasone.  Patient with hx of stage 3 chronic kidney disease and on admission Cr 1.8-->  1.63 --> 1.76-> 1.84, baseline ~1.5-1.8 per HIE. On 1/30, Cr 1.60 prior to  discharge.  Urine studies sent and showed likely cause is intrinsic. Renal U/S showed no  hydronephrosis thickened bladder which may reflect chronic obstruction.  Home dose Lasix held in setting of post obstructive diuresis. Losartan  initially held but resumed prior to discharge.  PT consulted for difficulty ambulating and recommended rehab. Will need to have  fall precautions in place at the rehab as well.  Patient with hx of hypertension and continued home meds: losartan 50mg daily,  atenolol 25mg daily with exception of Lasix 20mg daily which was held in  setting of post obstructive diuresis and normotension.  Continued simvastatin 40mg daily for hyperlipidemia  Pt with hx of Type 2 diabetes mellitus and at home on Januvia 50mg daily,  glimepiride 1mg daily, metformin 500mg BID. During hospitalization, HgbA1C -->  5.8 and increased lantus to 15u and 5u of pre-meal (hyperglycemia in the  setting of steroids. He was continued on carb consistent diet.  On Discharge, would not resume metformin, can continue Januvia and glimepiride.  Will stop Metformin given CKD and risk of lactic acidosis.  Pt with hx of Dementia and not on meds; at baseline ambulates with walker and  AOx2-3. Pt currently at baseline.  Pt with benign prostatic hyperplasia and at home received Flomax 0.4mg daily-->  up-titrate to .8mg given retention.  Bethea placed for retention continue for one week per urology and will need to  follow up outpatient after discharge.  Continued on Protonix 40mg daily for GERD  Discussed case with Urology over the phone on 1/30 and recommended outpatient  follow up with urology for further work up. Discussed case with nephrology as  well who recommended BUN/Cr now at baseline and will need to follow up with  Nephrologist for further work up of  post obstructive diuresis.  On 1/30, patient was seen and evaluated today. Pt at AO x 2 and reports feeling  better. He denies any acute complaints at this time.  Discussed discharge medications, plan and outpatient follow up with spouse  (Stan Sousa)  over the phone. All questions answered and family in  agreement with discharge plan.  Patient is hemodynamically stable and medically optimized for discharge with  outpatient follow up with PCP, Urology, Pulmonology, Nephrology and Cardiology.  Patient will need repeat labs (CBC/CMP) 1-2 days to monitor BUN/Cr and  hemoglobin. Pt needs repeat Renal U/S within one week to assess  post  obstructive diuresis.     Med Reconciliation:  Override IMPROVE-DD recommendations due to:	IMPROVE-DD Application Not  Available  Recommended Post-Discharge VTE Prophylaxis	No post-discharge thromboprophylaxis  indicated.  Medication Reconciliation Status	Admission Reconciliation is Completed  Discharge Reconciliation is Completed  Discharge Medications	atenolol 25 mg oral tablet: 1 tab(s) orally once a day  Cymbalta 60 mg oral delayed release capsule: 1 cap(s) orally once a day  Flovent HFA 44 mcg/inh inhalation aerosol: 2 puff(s) inhaled 2 times a day, As  Needed  guaiFENesin 100 mg/5 mL oral liquid: 5 milliliter(s) orally every 6 hours, As  needed, Cough  insulin glargine 100 units/mL subcutaneous solution: 15 unit(s) subcutaneous  once a day (at bedtime)  insulin lispro 100 units/mL injectable solution: 5 unit(s) subcutaneous 3 times  a day (before meals)  insulin lispro 100 units/mL injectable solution: 0 Unit(s) if Glucose 61 - 250  2 Unit(s) if Glucose 251 - 300  4 Unit(s) if Glucose 301 - 350  6 Unit(s) if Glucose 351 - 400  8 Unit(s) if Glucose Greater Than 400  insulin lispro 100 units/mL injectable solution: 2 Unit(s) if Glucose 151 - 200  4 Unit(s) if Glucose 201 - 250  6 Unit(s) if Glucose 251 - 300  8 Unit(s) if Glucose 301 - 350  10 Unit(s) if Glucose 351 - 400  12 Unit(s) if Glucose Greater Than 400  ipratropium-albuterol 0.5 mg-2.5 mg/3 mL inhalation solution: 3 milliliter(s)  inhaled every 6 hours  losartan 50 mg oral tablet: 1 tab(s) orally once a day  Protonix 40 mg oral delayed release tablet: 1 tab(s) orally once a day  simvastatin 40 mg oral tablet: 1 tab(s) orally once a day (at bedtime)  tamsulosin 0.4 mg oral capsule: 2 cap(s) orally once a day (at bedtime)  . Initial evaluation/Pulmonary Critical Care consultation requested on  3/29/2023 by Dr Michelle     from Dr Mascorro   Patient examined chart reviewed    HOSPITAL ADMISSION   PATIENT CAME  FROM (if information available)      ABGS.    VS/ PO/IO/ VENT/ DRIPS.   3/29/2023 afeb 95 160/90   3/29/2023 ra 96%         AGE SEX DOA C/C.  87 m  3/29/2023   From [facility] osteom l foot     OUTSIDE PULM MD.   .. 3/29/2023 unknown    RECENT HOSPITAL STAYS.  .. 1/24-1/30/2023 [facility] RSV  COPD ex     RELEVANT ID INFORMATION.    PMH .  Hytn  HLD  GERD   T2D  CKD   .. 1/2023 Cr 1.6  Renal us (-)   BPH   TIA  dementia       PROBLEM/ASSESSMENT/PLAN.  Infection  Osteomyelitius  Possible pneumonia   .. Esr 3/29/2023 esr 67  .. W 3/29/2023 w 11.8   .. CXR 3/29/2023 Possible hansa pneum  .. Antibio   .. follow cultures  COPD  .. bd   hemodynamics  .. La 3/29/2023 la 1.8   .. target map 65 (+)   Anemia  .. Hb 3/29/2023 Hb 11.2   .. monitor  CKD  .. Na 3/29/2023 Na 144  .. Cr 3/29/2023 Cr 1.4   .. monitor    OVERALL .  86 yo M with PMHx HTN, HLD, T2D, dementia (AOx2-3), OA, BPH, CAD, TIA, CKD 3 and GERD HO recent hospital stay 1/24-1/30/2023 [facility] RSV  COPD ex  now admitted with osteomyelitis possible pneum  Pulm consulted 3/29/2023    PROBLEMS  Osteomyelitis  Pneumonia   COPD   CKD     PLAN  Antibio bronchodilators monitor  .       TIME SPENT   Over 55 minutes aggregate care time spent on encounter; activities included   direct patient care, counseling and/or coordinating care reviewing notes, lab data/ imaging , discussion with multidisciplinary team/ patient  /family and explaining in detail risks, benefits, alternatives  of the recommendations     Paulino Rossi m   Initial evaluation/Pulmonary Critical Care consultation requested on  3/29/2023 by Dr Michelle     from Dr Mascorro   Patient examined chart reviewed    HOSPITAL ADMISSION   PATIENT CAME  FROM (if information available)      ABGS.    VS/ PO/IO/ VENT/ DRIPS.   3/29/2023 afeb 95 160/90   3/29/2023 ra 96%         AGE SEX DOA C/C.  87 m  3/29/2023   From [facility] osteom l foot     OUTSIDE PULM MD.   .. 3/29/2023 unknown    RECENT HOSPITAL STAYS.  .. 1/24-1/30/2023 [facility] RSV  COPD ex     RELEVANT ID INFORMATION.    PMH .  Hytn  HLD  GERD   T2D  CKD   .. 1/2023 Cr 1.6  Renal us (-)   BPH   TIA  dementia       PROBLEM/ASSESSMENT/PLAN.  Infection  Osteomyelitius  Possible pneumonia   .. Esr 3/29/2023 esr 67  .. W 3/29/2023 w 11.8   .. CXR 3/29/2023 Possible hansa pneum  .. Antibio   .. follow cultures  COPD  .. bd   hemodynamics  .. La 3/29/2023 la 1.8   .. target map 65 (+)   Anemia  .. Hb 3/29/2023 Hb 11.2   .. monitor  CKD  .. Na 3/29/2023 Na 144  .. Cr 3/29/2023 Cr 1.4   .. monitor    OVERALL .  88 yo M with PMHx HTN, HLD, T2D, dementia (AOx2-3), OA, BPH, CAD, TIA, CKD 3 and GERD HO recent hospital stay 1/24-1/30/2023 [facility] RSV  COPD ex  now admitted with osteomyelitis possible pneum  Pulm consulted 3/29/2023    PROBLEMS  Osteomyelitis  Pneumonia   COPD   CKD     PLAN  Antibio bronchodilators monitor  .       TIME SPENT   Over 55 minutes aggregate care time spent on encounter; activities included   direct patient care, counseling and/or coordinating care reviewing notes, lab data/ imaging , discussion with multidisciplinary team/ patient  /family and explaining in detail risks, benefits, alternatives  of the recommendations     Paulino Rossi m   Initial evaluation/Pulmonary Critical Care consultation requested on  3/29/2023 by Dr Michelle     from Dr Mascorro   Patient examined chart reviewed    HOSPITAL ADMISSION   PATIENT CAME  FROM (if information available)      ABGS.    VS/ PO/IO/ VENT/ DRIPS.   3/29/2023 afeb 95 160/90   3/29/2023 ra 96%         AGE SEX DOA C/C.  87 m  3/29/2023   From [facility] osteom l foot     OUTSIDE PULM MD.   .. 3/29/2023 unknown    RECENT HOSPITAL STAYS.  .. 1/24-1/30/2023 [facility] RSV  COPD ex     RELEVANT ID INFORMATION.    PMH .  Hytn  HLD  GERD   T2D  CKD   .. 1/2023 Cr 1.6  Renal us (-)   BPH   TIA  dementia       PROBLEM/ASSESSMENT/PLAN.  Infection  Osteomyelitius  Possible pneumonia   .. Esr 3/29/2023 esr 67  .. W 3/29/2023 w 11.8   .. CXR 3/29/2023 Possible hansa pneum  .. Antibio   .. follow cultures  COPD  .. bd   hemodynamics  .. La 3/29/2023 la 1.8   .. target map 65 (+)   Anemia  .. Hb 3/29/2023 Hb 11.2   .. monitor  CKD  .. Na 3/29/2023 Na 144  .. Cr 3/29/2023 Cr 1.4   .. monitor    OVERALL .  86 yo M with PMHx HTN, HLD, T2D, dementia (AOx2-3), OA, BPH, CAD, TIA, CKD 3 and GERD HO recent hospital stay 1/24-1/30/2023 [facility] RSV  COPD ex  now admitted with osteomyelitis possible pneum  Pulm consulted 3/29/2023    PROBLEMS  Osteomyelitis  Pneumonia   COPD   CKD     PLAN  Antibio bronchodilators monitor  .       TIME SPENT   Over 55 minutes aggregate care time spent on encounter; activities included   direct patient care, counseling and/or coordinating care reviewing notes, lab data/ imaging , discussion with multidisciplinary team/ patient  /family and explaining in detail risks, benefits, alternatives  of the recommendations     Paulino Rossi m

## 2023-03-30 ENCOUNTER — NON-APPOINTMENT (OUTPATIENT)
Age: 88
End: 2023-03-30

## 2023-03-30 DIAGNOSIS — I25.10 ATHEROSCLEROTIC HEART DISEASE OF NATIVE CORONARY ARTERY WITHOUT ANGINA PECTORIS: ICD-10-CM

## 2023-03-30 DIAGNOSIS — Z29.9 ENCOUNTER FOR PROPHYLACTIC MEASURES, UNSPECIFIED: ICD-10-CM

## 2023-03-30 DIAGNOSIS — E11.9 TYPE 2 DIABETES MELLITUS WITHOUT COMPLICATIONS: ICD-10-CM

## 2023-03-30 DIAGNOSIS — I73.9 PERIPHERAL VASCULAR DISEASE, UNSPECIFIED: ICD-10-CM

## 2023-03-30 DIAGNOSIS — N18.30 CHRONIC KIDNEY DISEASE, STAGE 3 UNSPECIFIED: ICD-10-CM

## 2023-03-30 DIAGNOSIS — S91.309A UNSPECIFIED OPEN WOUND, UNSPECIFIED FOOT, INITIAL ENCOUNTER: ICD-10-CM

## 2023-03-30 DIAGNOSIS — M86.9 OSTEOMYELITIS, UNSPECIFIED: ICD-10-CM

## 2023-03-30 DIAGNOSIS — L03.116 CELLULITIS OF LEFT LOWER LIMB: ICD-10-CM

## 2023-03-30 DIAGNOSIS — N40.0 BENIGN PROSTATIC HYPERPLASIA WITHOUT LOWER URINARY TRACT SYMPTOMS: ICD-10-CM

## 2023-03-30 DIAGNOSIS — I10 ESSENTIAL (PRIMARY) HYPERTENSION: ICD-10-CM

## 2023-03-30 DIAGNOSIS — J44.9 CHRONIC OBSTRUCTIVE PULMONARY DISEASE, UNSPECIFIED: ICD-10-CM

## 2023-03-30 DIAGNOSIS — K21.9 GASTRO-ESOPHAGEAL REFLUX DISEASE WITHOUT ESOPHAGITIS: ICD-10-CM

## 2023-03-30 DIAGNOSIS — L89.95 PRESSURE ULCER OF UNSPECIFIED SITE, UNSTAGEABLE: ICD-10-CM

## 2023-03-30 DIAGNOSIS — F03.90 UNSPECIFIED DEMENTIA, UNSPECIFIED SEVERITY, WITHOUT BEHAVIORAL DISTURBANCE, PSYCHOTIC DISTURBANCE, MOOD DISTURBANCE, AND ANXIETY: ICD-10-CM

## 2023-03-30 LAB
ALBUMIN SERPL ELPH-MCNC: 2.2 G/DL — LOW (ref 3.3–5)
ALP SERPL-CCNC: 62 U/L — SIGNIFICANT CHANGE UP (ref 40–120)
ALT FLD-CCNC: 23 U/L — SIGNIFICANT CHANGE UP (ref 12–78)
ANION GAP SERPL CALC-SCNC: 5 MMOL/L — SIGNIFICANT CHANGE UP (ref 5–17)
AST SERPL-CCNC: 24 U/L — SIGNIFICANT CHANGE UP (ref 15–37)
BILIRUB DIRECT SERPL-MCNC: 0.2 MG/DL — SIGNIFICANT CHANGE UP (ref 0–0.3)
BILIRUB INDIRECT FLD-MCNC: 0.6 MG/DL — SIGNIFICANT CHANGE UP (ref 0.2–1)
BILIRUB SERPL-MCNC: 0.8 MG/DL — SIGNIFICANT CHANGE UP (ref 0.2–1.2)
BUN SERPL-MCNC: 36 MG/DL — HIGH (ref 7–23)
CALCIUM SERPL-MCNC: 8.9 MG/DL — SIGNIFICANT CHANGE UP (ref 8.5–10.1)
CHLORIDE SERPL-SCNC: 116 MMOL/L — HIGH (ref 96–108)
CHOLEST SERPL-MCNC: 108 MG/DL — SIGNIFICANT CHANGE UP
CO2 SERPL-SCNC: 22 MMOL/L — SIGNIFICANT CHANGE UP (ref 22–31)
CREAT SERPL-MCNC: 1.3 MG/DL — SIGNIFICANT CHANGE UP (ref 0.5–1.3)
EGFR: 53 ML/MIN/1.73M2 — LOW
GLUCOSE SERPL-MCNC: 273 MG/DL — HIGH (ref 70–99)
HCT VFR BLD CALC: 32.2 % — LOW (ref 39–50)
HDLC SERPL-MCNC: 32 MG/DL — LOW
HGB BLD-MCNC: 10.2 G/DL — LOW (ref 13–17)
LIPID PNL WITH DIRECT LDL SERPL: 62 MG/DL — SIGNIFICANT CHANGE UP
MAGNESIUM SERPL-MCNC: 2.2 MG/DL — SIGNIFICANT CHANGE UP (ref 1.6–2.6)
MCHC RBC-ENTMCNC: 29.3 PG — SIGNIFICANT CHANGE UP (ref 27–34)
MCHC RBC-ENTMCNC: 31.7 GM/DL — LOW (ref 32–36)
MCV RBC AUTO: 92.5 FL — SIGNIFICANT CHANGE UP (ref 80–100)
NON HDL CHOLESTEROL: 77 MG/DL — SIGNIFICANT CHANGE UP
NRBC # BLD: 0 /100 WBCS — SIGNIFICANT CHANGE UP (ref 0–0)
PLATELET # BLD AUTO: 202 K/UL — SIGNIFICANT CHANGE UP (ref 150–400)
POTASSIUM SERPL-MCNC: 4.4 MMOL/L — SIGNIFICANT CHANGE UP (ref 3.5–5.3)
POTASSIUM SERPL-SCNC: 4.4 MMOL/L — SIGNIFICANT CHANGE UP (ref 3.5–5.3)
PROT SERPL-MCNC: 6.7 G/DL — SIGNIFICANT CHANGE UP (ref 6–8.3)
RBC # BLD: 3.48 M/UL — LOW (ref 4.2–5.8)
RBC # FLD: 16.9 % — HIGH (ref 10.3–14.5)
SODIUM SERPL-SCNC: 143 MMOL/L — SIGNIFICANT CHANGE UP (ref 135–145)
TRIGL SERPL-MCNC: 74 MG/DL — SIGNIFICANT CHANGE UP
TSH SERPL-MCNC: 2.33 UIU/ML — SIGNIFICANT CHANGE UP (ref 0.36–3.74)
URATE SERPL-MCNC: 3.8 MG/DL — SIGNIFICANT CHANGE UP (ref 3.4–8.8)
WBC # BLD: 12 K/UL — HIGH (ref 3.8–10.5)
WBC # FLD AUTO: 12 K/UL — HIGH (ref 3.8–10.5)

## 2023-03-30 PROCEDURE — 93923 UPR/LXTR ART STDY 3+ LVLS: CPT | Mod: 26

## 2023-03-30 PROCEDURE — 99221 1ST HOSP IP/OBS SF/LOW 40: CPT

## 2023-03-30 PROCEDURE — 93010 ELECTROCARDIOGRAM REPORT: CPT

## 2023-03-30 PROCEDURE — 71250 CT THORAX DX C-: CPT | Mod: 26

## 2023-03-30 PROCEDURE — 73620 X-RAY EXAM OF FOOT: CPT | Mod: 26,LT

## 2023-03-30 PROCEDURE — 71045 X-RAY EXAM CHEST 1 VIEW: CPT | Mod: 26

## 2023-03-30 PROCEDURE — 99222 1ST HOSP IP/OBS MODERATE 55: CPT

## 2023-03-30 RX ORDER — INSULIN LISPRO 100/ML
VIAL (ML) SUBCUTANEOUS
Refills: 0 | Status: DISCONTINUED | OUTPATIENT
Start: 2023-03-30 | End: 2023-04-10

## 2023-03-30 RX ORDER — DEXTROSE 50 % IN WATER 50 %
25 SYRINGE (ML) INTRAVENOUS ONCE
Refills: 0 | Status: DISCONTINUED | OUTPATIENT
Start: 2023-03-30 | End: 2023-04-10

## 2023-03-30 RX ORDER — DEXTROSE 50 % IN WATER 50 %
15 SYRINGE (ML) INTRAVENOUS ONCE
Refills: 0 | Status: DISCONTINUED | OUTPATIENT
Start: 2023-03-30 | End: 2023-04-10

## 2023-03-30 RX ORDER — GLUCAGON INJECTION, SOLUTION 0.5 MG/.1ML
1 INJECTION, SOLUTION SUBCUTANEOUS ONCE
Refills: 0 | Status: DISCONTINUED | OUTPATIENT
Start: 2023-03-30 | End: 2023-04-10

## 2023-03-30 RX ORDER — SODIUM CHLORIDE 9 MG/ML
1000 INJECTION, SOLUTION INTRAVENOUS
Refills: 0 | Status: DISCONTINUED | OUTPATIENT
Start: 2023-03-30 | End: 2023-04-10

## 2023-03-30 RX ORDER — INSULIN LISPRO 100/ML
VIAL (ML) SUBCUTANEOUS AT BEDTIME
Refills: 0 | Status: DISCONTINUED | OUTPATIENT
Start: 2023-03-30 | End: 2023-04-10

## 2023-03-30 RX ORDER — HYDRALAZINE HCL 50 MG
50 TABLET ORAL EVERY 6 HOURS
Refills: 0 | Status: DISCONTINUED | OUTPATIENT
Start: 2023-03-30 | End: 2023-04-10

## 2023-03-30 RX ORDER — LACTOBACILLUS ACIDOPHILUS 100MM CELL
1 CAPSULE ORAL
Refills: 0 | Status: DISCONTINUED | OUTPATIENT
Start: 2023-03-30 | End: 2023-04-10

## 2023-03-30 RX ORDER — LANOLIN ALCOHOL/MO/W.PET/CERES
3 CREAM (GRAM) TOPICAL AT BEDTIME
Refills: 0 | Status: DISCONTINUED | OUTPATIENT
Start: 2023-03-30 | End: 2023-04-10

## 2023-03-30 RX ORDER — SODIUM CHLORIDE 9 MG/ML
1000 INJECTION INTRAMUSCULAR; INTRAVENOUS; SUBCUTANEOUS
Refills: 0 | Status: DISCONTINUED | OUTPATIENT
Start: 2023-03-30 | End: 2023-03-31

## 2023-03-30 RX ORDER — SODIUM CHLORIDE 9 MG/ML
1000 INJECTION, SOLUTION INTRAVENOUS
Refills: 0 | Status: DISCONTINUED | OUTPATIENT
Start: 2023-03-30 | End: 2023-03-30

## 2023-03-30 RX ORDER — PANTOPRAZOLE SODIUM 20 MG/1
40 TABLET, DELAYED RELEASE ORAL DAILY
Refills: 0 | Status: DISCONTINUED | OUTPATIENT
Start: 2023-03-30 | End: 2023-03-31

## 2023-03-30 RX ORDER — LOSARTAN POTASSIUM 100 MG/1
25 TABLET, FILM COATED ORAL DAILY
Refills: 0 | Status: DISCONTINUED | OUTPATIENT
Start: 2023-03-30 | End: 2023-03-30

## 2023-03-30 RX ORDER — IPRATROPIUM/ALBUTEROL SULFATE 18-103MCG
3 AEROSOL WITH ADAPTER (GRAM) INHALATION EVERY 8 HOURS
Refills: 0 | Status: DISCONTINUED | OUTPATIENT
Start: 2023-03-30 | End: 2023-04-10

## 2023-03-30 RX ORDER — DEXTROSE 50 % IN WATER 50 %
12.5 SYRINGE (ML) INTRAVENOUS ONCE
Refills: 0 | Status: DISCONTINUED | OUTPATIENT
Start: 2023-03-30 | End: 2023-04-10

## 2023-03-30 RX ORDER — INSULIN GLARGINE 100 [IU]/ML
8 INJECTION, SOLUTION SUBCUTANEOUS ONCE
Refills: 0 | Status: COMPLETED | OUTPATIENT
Start: 2023-03-30 | End: 2023-03-30

## 2023-03-30 RX ORDER — BUDESONIDE AND FORMOTEROL FUMARATE DIHYDRATE 160; 4.5 UG/1; UG/1
2 AEROSOL RESPIRATORY (INHALATION)
Refills: 0 | Status: DISCONTINUED | OUTPATIENT
Start: 2023-03-30 | End: 2023-04-10

## 2023-03-30 RX ORDER — TRAMADOL HYDROCHLORIDE 50 MG/1
50 TABLET ORAL
Refills: 0 | Status: DISCONTINUED | OUTPATIENT
Start: 2023-03-30 | End: 2023-03-30

## 2023-03-30 RX ORDER — LOSARTAN POTASSIUM 100 MG/1
50 TABLET, FILM COATED ORAL DAILY
Refills: 0 | Status: DISCONTINUED | OUTPATIENT
Start: 2023-03-31 | End: 2023-04-04

## 2023-03-30 RX ORDER — DULOXETINE HYDROCHLORIDE 30 MG/1
60 CAPSULE, DELAYED RELEASE ORAL DAILY
Refills: 0 | Status: DISCONTINUED | OUTPATIENT
Start: 2023-03-30 | End: 2023-04-10

## 2023-03-30 RX ORDER — DONEPEZIL HYDROCHLORIDE 10 MG/1
5 TABLET, FILM COATED ORAL AT BEDTIME
Refills: 0 | Status: DISCONTINUED | OUTPATIENT
Start: 2023-03-30 | End: 2023-04-10

## 2023-03-30 RX ORDER — MAGNESIUM HYDROXIDE 400 MG/1
30 TABLET, CHEWABLE ORAL DAILY
Refills: 0 | Status: DISCONTINUED | OUTPATIENT
Start: 2023-03-30 | End: 2023-04-10

## 2023-03-30 RX ORDER — SENNA PLUS 8.6 MG/1
2 TABLET ORAL AT BEDTIME
Refills: 0 | Status: DISCONTINUED | OUTPATIENT
Start: 2023-03-30 | End: 2023-04-10

## 2023-03-30 RX ORDER — TAMSULOSIN HYDROCHLORIDE 0.4 MG/1
0.4 CAPSULE ORAL AT BEDTIME
Refills: 0 | Status: DISCONTINUED | OUTPATIENT
Start: 2023-03-30 | End: 2023-04-10

## 2023-03-30 RX ORDER — ACETAMINOPHEN 500 MG
650 TABLET ORAL EVERY 6 HOURS
Refills: 0 | Status: DISCONTINUED | OUTPATIENT
Start: 2023-03-30 | End: 2023-03-30

## 2023-03-30 RX ORDER — INSULIN GLARGINE 100 [IU]/ML
8 INJECTION, SOLUTION SUBCUTANEOUS EVERY MORNING
Refills: 0 | Status: DISCONTINUED | OUTPATIENT
Start: 2023-03-31 | End: 2023-03-31

## 2023-03-30 RX ORDER — ONDANSETRON 8 MG/1
4 TABLET, FILM COATED ORAL EVERY 8 HOURS
Refills: 0 | Status: DISCONTINUED | OUTPATIENT
Start: 2023-03-30 | End: 2023-04-10

## 2023-03-30 RX ORDER — MORPHINE SULFATE 50 MG/1
2 CAPSULE, EXTENDED RELEASE ORAL EVERY 6 HOURS
Refills: 0 | Status: DISCONTINUED | OUTPATIENT
Start: 2023-03-30 | End: 2023-03-30

## 2023-03-30 RX ORDER — ATENOLOL 25 MG/1
25 TABLET ORAL DAILY
Refills: 0 | Status: DISCONTINUED | OUTPATIENT
Start: 2023-03-30 | End: 2023-03-31

## 2023-03-30 RX ORDER — SIMVASTATIN 20 MG/1
40 TABLET, FILM COATED ORAL AT BEDTIME
Refills: 0 | Status: DISCONTINUED | OUTPATIENT
Start: 2023-03-30 | End: 2023-04-10

## 2023-03-30 RX ADMIN — BUDESONIDE AND FORMOTEROL FUMARATE DIHYDRATE 2 PUFF(S): 160; 4.5 AEROSOL RESPIRATORY (INHALATION) at 09:49

## 2023-03-30 RX ADMIN — HEPARIN SODIUM 5000 UNIT(S): 5000 INJECTION INTRAVENOUS; SUBCUTANEOUS at 05:52

## 2023-03-30 RX ADMIN — SIMVASTATIN 40 MILLIGRAM(S): 20 TABLET, FILM COATED ORAL at 22:16

## 2023-03-30 RX ADMIN — PANTOPRAZOLE SODIUM 40 MILLIGRAM(S): 20 TABLET, DELAYED RELEASE ORAL at 12:53

## 2023-03-30 RX ADMIN — PIPERACILLIN AND TAZOBACTAM 25 GRAM(S): 4; .5 INJECTION, POWDER, LYOPHILIZED, FOR SOLUTION INTRAVENOUS at 22:17

## 2023-03-30 RX ADMIN — Medication 1 TABLET(S): at 09:53

## 2023-03-30 RX ADMIN — LOSARTAN POTASSIUM 25 MILLIGRAM(S): 100 TABLET, FILM COATED ORAL at 09:50

## 2023-03-30 RX ADMIN — SENNA PLUS 2 TABLET(S): 8.6 TABLET ORAL at 22:17

## 2023-03-30 RX ADMIN — BUDESONIDE AND FORMOTEROL FUMARATE DIHYDRATE 2 PUFF(S): 160; 4.5 AEROSOL RESPIRATORY (INHALATION) at 22:16

## 2023-03-30 RX ADMIN — Medication 1 TABLET(S): at 12:53

## 2023-03-30 RX ADMIN — TAMSULOSIN HYDROCHLORIDE 0.4 MILLIGRAM(S): 0.4 CAPSULE ORAL at 22:17

## 2023-03-30 RX ADMIN — PIPERACILLIN AND TAZOBACTAM 25 GRAM(S): 4; .5 INJECTION, POWDER, LYOPHILIZED, FOR SOLUTION INTRAVENOUS at 16:52

## 2023-03-30 RX ADMIN — SODIUM CHLORIDE 50 MILLILITER(S): 9 INJECTION INTRAMUSCULAR; INTRAVENOUS; SUBCUTANEOUS at 03:05

## 2023-03-30 RX ADMIN — PIPERACILLIN AND TAZOBACTAM 25 GRAM(S): 4; .5 INJECTION, POWDER, LYOPHILIZED, FOR SOLUTION INTRAVENOUS at 09:54

## 2023-03-30 RX ADMIN — Medication 6: at 08:21

## 2023-03-30 RX ADMIN — INSULIN GLARGINE 8 UNIT(S): 100 INJECTION, SOLUTION SUBCUTANEOUS at 16:49

## 2023-03-30 RX ADMIN — ATENOLOL 25 MILLIGRAM(S): 25 TABLET ORAL at 09:50

## 2023-03-30 RX ADMIN — Medication 3 MILLILITER(S): at 14:06

## 2023-03-30 RX ADMIN — HEPARIN SODIUM 5000 UNIT(S): 5000 INJECTION INTRAVENOUS; SUBCUTANEOUS at 22:16

## 2023-03-30 RX ADMIN — Medication 6: at 16:50

## 2023-03-30 RX ADMIN — PIPERACILLIN AND TAZOBACTAM 25 GRAM(S): 4; .5 INJECTION, POWDER, LYOPHILIZED, FOR SOLUTION INTRAVENOUS at 03:04

## 2023-03-30 RX ADMIN — Medication 1 TABLET(S): at 16:54

## 2023-03-30 RX ADMIN — Medication 10: at 12:48

## 2023-03-30 RX ADMIN — DULOXETINE HYDROCHLORIDE 60 MILLIGRAM(S): 30 CAPSULE, DELAYED RELEASE ORAL at 12:50

## 2023-03-30 RX ADMIN — Medication 3 MILLILITER(S): at 07:45

## 2023-03-30 NOTE — PROGRESS NOTE ADULT - ASSESSMENT
86 yo male ,[facility] resident with PMHx - COPD, DM, HTN, CAD, HLD, H/O TIA, dementia,GERD, BPH and depression sent ot ER for evaluation of left foot 1metatarsal wound ,suggestive of osteomyelitis .Patient was seen by ID consult and transfer to the hospital recommended for wound cx/bone biopsy /podiatry evaluation ,likely will require 4-6 weeks of iv abx . Patient was admitted to [facility] with b/l feet wounds and was followed by wound care team ,recently completed 7 days of doxycycline for foot wound cellulitis . (30 Mar 2023 05:21)      ACUTE RENAL FAILURE: sodium chloride 0.9%. 1000 milliLiter(s) (50 mL/Hr) IV Continuous   Serum creatinine is  at     , approximating GFR at   ml/min.   There is no progression . No uremic symptoms  No evidence of anemia .  Fluid status stable.  Will continue to avoid nephrotoxic drugs.  Patient remains asymptomatic.   Continue current therapy.  hold  diuretic.  hold   ACE inhibitor.  hold   ARB.  Additional evaluation:   ECG,    echocardiogram,     CXR,  will obtained recent   renal ultrasound to evalaute kidney size and possible stones ,      BP monitoring,continue current antihypertensive meds, low salt diet,followup with PMD in 1-2 weeks     86 yo male ,[facility] resident with PMHx - COPD, DM, HTN, CAD, HLD, H/O TIA, dementia,GERD, BPH and depression sent ot ER for evaluation of left foot 1metatarsal wound ,suggestive of osteomyelitis .Patient was seen by ID consult and transfer to the hospital recommended for wound cx/bone biopsy /podiatry evaluation ,likely will require 4-6 weeks of iv abx . Patient was admitted to [facility] with b/l feet wounds and was followed by wound care team ,recently completed 7 days of doxycycline for foot wound cellulitis . (30 Mar 2023 05:21)      ACUTE RENAL FAILURE: sodium chloride 0.9%. 1000 milliLiter(s) (50 mL/Hr) IV Continuous   Serum creatinine is  at     , approximating GFR at   ml/min.   There is no progression . No uremic symptoms  No evidence of anemia .  Fluid status stable.  Will continue to avoid nephrotoxic drugs.  Patient remains asymptomatic.   Continue current therapy.  hold  diuretic.  hold   ACE inhibitor.  hold   ARB.  Additional evaluation:   ECG,    echocardiogram,     CXR,  will obtained recent   renal ultrasound to evalaute kidney size and possible stones ,      BP monitoring,continue current antihypertensive meds, low salt diet,followup with PMD in 1-2 weeks     88 yo male ,[facility] resident with PMHx - COPD, DM, HTN, CAD, HLD, H/O TIA, dementia,GERD, BPH and depression sent ot ER for evaluation of left foot 1metatarsal wound ,suggestive of osteomyelitis .Patient was seen by ID consult and transfer to the hospital recommended for wound cx/bone biopsy /podiatry evaluation ,likely will require 4-6 weeks of iv abx . Patient was admitted to [facility] with b/l feet wounds and was followed by wound care team ,recently completed 7 days of doxycycline for foot wound cellulitis . (30 Mar 2023 05:21)      ACUTE RENAL FAILURE: sodium chloride 0.9%. 1000 milliLiter(s) (50 mL/Hr) IV Continuous   Serum creatinine is  at     , approximating GFR at   ml/min.   There is no progression . No uremic symptoms  No evidence of anemia .  Fluid status stable.  Will continue to avoid nephrotoxic drugs.  Patient remains asymptomatic.   Continue current therapy.  hold  diuretic.  hold   ACE inhibitor.  hold   ARB.  Additional evaluation:   ECG,    echocardiogram,     CXR,  will obtained recent   renal ultrasound to evalaute kidney size and possible stones ,      BP monitoring,continue current antihypertensive meds, low salt diet,followup with PMD in 1-2 weeks

## 2023-03-30 NOTE — CONSULT NOTE ADULT - ASSESSMENT
88 yo male ,[facility] resident with PMHx - COPD, DM, HTN, CAD, HLD, H/O TIA, dementia,GERD, BPH and depression, who was sent for evaluation of left foot 1metatarsal wound. Concern for wound infection with underlying osteomyelitis. He also has bilateral heel wounds R>L. He had a low grade temp and mild leukocytosis. Plan for potential Podiatry intervention pending MRI.    -continue Zosyn  -hold off on vancomycin for now  -follow blood cultures  -follow up bilateral foot MRI    Thank you for courtesy of this consult.     Will follow.  Discussed with the primary team.     Isis Mancia MD  Division of Infectious Diseases   Cell [phone] between 8am and 6pm   After 6pm and weekends please call ID service at [phone].    88 [facility] ,[facility] with PMHx - COPD, DM, HTN, CAD, HLD, H/O TIA, dementia,GERD, BPH and depression, who was sent for evaluation of left foot 1metatarsal wound. Concern for wound infection with underlying osteomyelitis. He also has bilateral heel wounds R>L. He had a low grade temp and mild leukocytosis. Plan for potential Podiatry intervention pending MRI.    -continue Zosyn  -hold off on vancomycin for now  -follow blood cultures  -follow up bilateral foot MRI    Thank you for courtesy of this consult.     Will follow.  Discussed with the primary team.     Isis Mancia MD  Division of Infectious Diseases   Cell [phone] between 8am and 6pm   After 6pm and weekends please call ID service at [phone].    86 yo male ,[facility] resident with PMHx - COPD, DM, HTN, CAD, HLD, H/O TIA, dementia,GERD, BPH and depression, who was sent for evaluation of left foot 1metatarsal wound. Concern for wound infection with underlying osteomyelitis. He also has bilateral heel wounds R>L. He had a low grade temp and mild leukocytosis. Plan for potential Podiatry intervention pending MRI.    -continue Zosyn  -hold off on vancomycin for now  -follow blood cultures  -follow up bilateral foot MRI    Thank you for courtesy of this consult.     Will follow.  Discussed with the primary team.     Isis Mancia MD  Division of Infectious Diseases   Cell [phone] between 8am and 6pm   After 6pm and weekends please call ID service at [phone].

## 2023-03-30 NOTE — CONSULT NOTE ADULT - ASSESSMENT
Physical Exam:   Vital Signs Last 24 Hrs  T(C): 37.2 (30 Mar 2023 12:47), Max: 37.8 (30 Mar 2023 04:28)  T(F): 99 (30 Mar 2023 12:47), Max: 100.1 (30 Mar 2023 04:28)  HR: 98 (30 Mar 2023 14:06) (91 - 112)  BP: 153/81 (30 Mar 2023 12:47) (151/76 - 160/91)  BP(mean): --  RR: 18 (30 Mar 2023 12:47) (16 - 20)  SpO2: 94% (30 Mar 2023 14:06) (91% - 96%)    Parameters below as of 30 Mar 2023 14:06  Patient On (Oxygen Delivery Method): room air     CAPILLARY BLOOD GLUCOSE      POCT Blood Glucose.: 273 mg/dL (30 Mar 2023 16:36)  POCT Blood Glucose.: 352 mg/dL (30 Mar 2023 12:48)  POCT Blood Glucose.: 339 mg/dL (30 Mar 2023 11:23)  POCT Blood Glucose.: 263 mg/dL (30 Mar 2023 08:02)  POCT Blood Glucose.: 240 mg/dL (30 Mar 2023 06:24)      Cholesterol, Serum: 113 mg/dL (05.19.21 @ 08:36)     HDL Cholesterol, Serum: 22 mg/dL (05.19.21 @ 08:36)     LDL Cholesterol Calculated: 66 mg/dL (05.19.21 @ 08:36)     DIET: CC  >50%

## 2023-03-30 NOTE — H&P ADULT - PROBLEM SELECTOR PLAN 4
Admitted for workup and IV antibiotics  Pt unable to provide any reliable information  Reports walks independently  Will obtain ALANNA/PVRs to provide input on wound healing potential  Pt does not appear to be a surgical candidate however in light of his advanced dementia, intermittent behavioral disturbances and malnutrition  Would consider conservative approach and GOC conversation

## 2023-03-30 NOTE — CARE COORDINATION ASSESSMENT. - NSDCPLANSERVICES_GEN_ALL_CORE
Spouse requesting pt return to [facility] once medically stable./Subacute Rehabilitation Spouse requesting pt return to [facility] once medically stable./Subacute Rehabilitation Spouse requesting pt return to [facility] once medically stable./Subacute Rehabilitation

## 2023-03-30 NOTE — PROGRESS NOTE ADULT - SUBJECTIVE AND OBJECTIVE BOX
ANA MARIA LU    PLV 1EAS 100 D1    Allergies    No Known Allergies    Intolerances        PAST MEDICAL & SURGICAL HISTORY:  ASHD (arteriosclerotic heart disease)      BPH without urinary obstruction      COPD, moderate      Stage 3 chronic kidney disease      Chronic GERD      HLD (hyperlipidemia)      MDD (major depressive disorder)      Obstructive and reflux uropathy      Cellulitis      HTN (hypertension)      Sepsis      Dementia      Moderate protein-calorie malnutrition      Brain TIA      History of RSV infection      DM type 2, not at goal      Pressure ulcer of unspecified heel, unspecified stage      Venous stasis ulcer without varicose veins      Multiple open wounds of foot          FAMILY HISTORY:      Home Medications:      MEDICATIONS  (STANDING):  albuterol/ipratropium for Nebulization 3 milliLiter(s) Nebulizer every 8 hours  atenolol  Tablet 25 milliGRAM(s) Oral daily  budesonide 160 MICROgram(s)/formoterol 4.5 MICROgram(s) Inhaler 2 Puff(s) Inhalation two times a day  dextrose 5%. 1000 milliLiter(s) (50 mL/Hr) IV Continuous <Continuous>  dextrose 5%. 1000 milliLiter(s) (100 mL/Hr) IV Continuous <Continuous>  dextrose 50% Injectable 25 Gram(s) IV Push once  dextrose 50% Injectable 12.5 Gram(s) IV Push once  dextrose 50% Injectable 25 Gram(s) IV Push once  donepezil 5 milliGRAM(s) Oral at bedtime  DULoxetine 60 milliGRAM(s) Oral daily  glucagon  Injectable 1 milliGRAM(s) IntraMuscular once  heparin   Injectable 5000 Unit(s) SubCutaneous every 12 hours  insulin lispro (ADMELOG) corrective regimen sliding scale   SubCutaneous three times a day before meals  insulin lispro (ADMELOG) corrective regimen sliding scale   SubCutaneous at bedtime  lactobacillus acidophilus 1 Tablet(s) Oral two times a day with meals  losartan 25 milliGRAM(s) Oral daily  multivitamin 1 Tablet(s) Oral daily  pantoprazole    Tablet 40 milliGRAM(s) Oral daily  piperacillin/tazobactam IVPB.- 3.375 Gram(s) IV Intermittent once  piperacillin/tazobactam IVPB.. 3.375 Gram(s) IV Intermittent every 8 hours  senna 2 Tablet(s) Oral at bedtime  simvastatin 40 milliGRAM(s) Oral at bedtime  sodium chloride 0.9%. 1000 milliLiter(s) (50 mL/Hr) IV Continuous <Continuous>  tamsulosin 0.4 milliGRAM(s) Oral at bedtime  vancomycin  IVPB 750 milliGRAM(s) IV Intermittent every 24 hours    MEDICATIONS  (PRN):  acetaminophen     Tablet .. 650 milliGRAM(s) Oral every 6 hours PRN Temp greater or equal to 38C (100.4F), Mild Pain (1 - 3)  aluminum hydroxide/magnesium hydroxide/simethicone Suspension 30 milliLiter(s) Oral every 4 hours PRN Dyspepsia  bisacodyl Suppository 10 milliGRAM(s) Rectal daily PRN Constipation  dextrose Oral Gel 15 Gram(s) Oral once PRN Blood Glucose LESS THAN 70 milliGRAM(s)/deciliter  hydrALAZINE 50 milliGRAM(s) Oral every 6 hours PRN for systolic BP>160  magnesium hydroxide Suspension 30 milliLiter(s) Oral daily PRN Constipation  melatonin 3 milliGRAM(s) Oral at bedtime PRN Insomnia  morphine  - Injectable 2 milliGRAM(s) IV Push every 6 hours PRN Severe Pain (7 - 10)  ondansetron Injectable 4 milliGRAM(s) IV Push every 8 hours PRN Nausea and/or Vomiting  traMADol 50 milliGRAM(s) Oral four times a day PRN Moderate Pain (4 - 6)      Diet, DASH/TLC:   Sodium & Cholesterol Restricted  Consistent Carbohydrate Evening Snack  Soft and Bite Sized (SOFTBTSZ)  Supplement Feeding Modality:  Oral  Glucerna Shake Cans or Servings Per Day:  1       Frequency:  Daily (03-30-23 @ 05:10) [Active]          Vital Signs Last 24 Hrs  T(C): 37.7 (30 Mar 2023 09:51), Max: 37.8 (30 Mar 2023 04:28)  T(F): 99.9 (30 Mar 2023 09:51), Max: 100.1 (30 Mar 2023 04:28)  HR: 112 (30 Mar 2023 09:51) (91 - 112)  BP: 158/76 (30 Mar 2023 09:51) (151/76 - 160/91)  BP(mean): --  RR: 18 (30 Mar 2023 09:51) (16 - 20)  SpO2: 96% (30 Mar 2023 09:51) (91% - 96%)    Parameters below as of 30 Mar 2023 09:51  Patient On (Oxygen Delivery Method): room air          03-29-23 @ 07:01  -  03-30-23 @ 07:00  --------------------------------------------------------  IN: 350 mL / OUT: 0 mL / NET: 350 mL    03-30-23 @ 07:01  -  03-30-23 @ 09:58  --------------------------------------------------------  IN: 240 mL / OUT: 0 mL / NET: 240 mL              LABS:                        10.2   12.00 )-----------( 202      ( 30 Mar 2023 07:20 )             32.2     03-30    143  |  116<H>  |  36<H>  ----------------------------<  273<H>  4.4   |  22  |  1.30    Ca    8.9      30 Mar 2023 07:20    TPro  7.6  /  Alb  2.8<L>  /  TBili  0.5  /  DBili  x   /  AST  20  /  ALT  30  /  AlkPhos  71  03-29              WBC:  WBC Count: 12.00 K/uL (03-30 @ 07:20)  WBC Count: 11.88 K/uL (03-29 @ 21:20)      MICROBIOLOGY:  RECENT CULTURES:                  Sodium:  Sodium, Serum: 143 mmol/L (03-30 @ 07:20)  Sodium, Serum: 144 mmol/L (03-29 @ 21:20)      1.30 mg/dL 03-30 @ 07:20  1.40 mg/dL 03-29 @ 21:20      Hemoglobin:  Hemoglobin: 10.2 g/dL (03-30 @ 07:20)  Hemoglobin: 11.2 g/dL (03-29 @ 21:20)      Platelets: Platelet Count - Automated: 202 K/uL (03-30 @ 07:20)  Platelet Count - Automated: 228 K/uL (03-29 @ 21:20)      LIVER FUNCTIONS - ( 29 Mar 2023 21:20 )  Alb: 2.8 g/dL / Pro: 7.6 g/dL / ALK PHOS: 71 U/L / ALT: 30 U/L / AST: 20 U/L / GGT: x                 RADIOLOGY & ADDITIONAL STUDIES:      MICROBIOLOGY:  RECENT CULTURES:

## 2023-03-30 NOTE — PATIENT CHOICE NOTE. - NSPTCHOICESTATE_GEN_ALL_CORE
I have met with the patient and/or caregiver to discuss discharge goals and treatment plan. Patient and/or caregiver also provided with instructions on accessing the CMS Compare websites for additional information related to Post Acute Provider quality and resource use measures to assist them in evaluation of the providers and in selecting their post-acute provider of choice. Patient and caregiver were informed of the facilities that are owned and/or operated by [facility]. I have discussed with the patient the availability of in-network facilities and providers. Patient and caregiver provided with a list of post-acute providers whose services are appropriate to the discharge plans and patient needs.     For patient requiring durable medical equipment, patient and/or caregiver were informed that they have the right to request who provides the required equipment. I have met with the patient and/or caregiver to discuss discharge goals and treatment plan. Patient and/or caregiver also provided with instructions on accessing the CMS Compare websites for additional information related to Post Acute Provider quality and resource use measures to assist them in evaluation of the providers and in selecting their post-acute provider of choice. Patient and caregiver were informed of the facilities that are owned and/or operated by [facility]. I have discussed with the patient the availability of in-network facilities and providers. Patient and caregiver provided with a list of post-acute providers whose services are appropriate to the discharge plans and patient needs.     For patient requiring durable medical equipment, patient and/or caregiver were informed that they have the right to request who provides the required equipment. I have met with the patient and/or caregiver to discuss discharge goals and treatment plan. Patient and/or caregiver also provided with instructions on accessing the CMS Compare websites for additional information related to Post Acute Provider quality and resource use measures to assist them in evaluation of the providers and in selecting their post-acute provider of choice. Patient and caregiver were informed of the facilities that are owned and/or operated by [facility]. I have discussed with the patient the availability of in-network facilities and providers. Patient and caregiver provided with a list of post-acute providers whose services are appropriate to the discharge plans and patient needs.     For patient requiring durable medical equipment, patient and/or caregiver were informed that they have the right to request who provides the required equipment.

## 2023-03-30 NOTE — CARE COORDINATION ASSESSMENT. - NS SW HOME EQUIPMENT
Pt unable to ambulate with walker since being in [facility]. Pt currently uses w/c for locomotion./wheelchair Pt unable to ambulate with walker since being in [facility]. Pt currently uses w/c for locomotion./wheelchair Pt unable to ambulate with walker since being in [facility]. Pt currently uses w/c for locomotion./wheelchair

## 2023-03-30 NOTE — H&P ADULT - PROBLEM SELECTOR PLAN 2
Wound care consult, turn and reposition every 2 hrs ,skin assesemnt and skin care as per floor protocol ,heel elevators

## 2023-03-30 NOTE — DIETITIAN INITIAL EVALUATION ADULT - SIGNS/SYMPTOMS
as evidenced by multiple pressure ulcers noted above.  as evidenced by NFPE findings- mild/moderate muscle depletion and fat loss.

## 2023-03-30 NOTE — DIETITIAN INITIAL EVALUATION ADULT - ORAL INTAKE PTA/DIET HISTORY
Pt admitted from [facility]. Reviewed transfer documents, pt was on a NCS, CARLOS ENRIQUE regular texture diet with thin liquids PTA. Pt was receiving Glucerna supplements 4x/day and Reginald BID.  Pt admitted from [facility]. Reviewed transfer documents, pt was on a NCS, CARLOS ENRIQUE regular texture diet with thin liquids PTA. Pt was receiving Glucerna supplements 4x/day and Reginald BID.  Pt admitted from [facility]. Reviewed transfer documents, pt was on a NCS, CARLOS ENRIQUE regular texture diet with thin liquids PTA. Pt was receiving Glucerna supplements 4x/day and Reginald BID.

## 2023-03-30 NOTE — DIETITIAN INITIAL EVALUATION ADULT - NSFNSPHYEXAMSKINFT_GEN_A_CORE
Pressure Injury 1: Left:,buttocks, Stage II  Pressure Injury 2: Left:,heel, Unstageable  Pressure Injury 3: Left:,lateral,foot, Unstageable  Pressure Injury 4: Left:,foot bunion,buttocks, Unstageable  Pressure Injury 5: Right:,heel, Unstageable

## 2023-03-30 NOTE — PROGRESS NOTE ADULT - SUBJECTIVE AND OBJECTIVE BOX
[facility] [place]       HPI:  86 yo male ,[facility] resident with PMHx - COPD, DM, HTN, CAD, HLD, H/O TIA, dementia,GERD, BPH and depression sent ot ER for evaluation of left foot 1metatarsal wound ,suggestive of osteomyelitis .Patient was seen by ID consult and transfer to the hospital recommended for wound cx/bone biopsy /podiatry evaluation ,likely will require 4-6 weeks of iv abx . Patient was admitted to [facility] with b/l feet wounds and was followed by wound care team ,recently completed 7 days of doxycycline for foot wound cellulitis . (30 Mar 2023 05:21)        SUBJECTIVE:      ALLERGIES:  Allergies    No Known Allergies    Intolerances          MEDICATIONS  (STANDING):  albuterol/ipratropium for Nebulization 3 milliLiter(s) Nebulizer every 8 hours  atenolol  Tablet 25 milliGRAM(s) Oral daily  budesonide 160 MICROgram(s)/formoterol 4.5 MICROgram(s) Inhaler 2 Puff(s) Inhalation two times a day  dextrose 5%. 1000 milliLiter(s) (50 mL/Hr) IV Continuous <Continuous>  dextrose 5%. 1000 milliLiter(s) (100 mL/Hr) IV Continuous <Continuous>  dextrose 50% Injectable 25 Gram(s) IV Push once  dextrose 50% Injectable 12.5 Gram(s) IV Push once  dextrose 50% Injectable 25 Gram(s) IV Push once  donepezil 5 milliGRAM(s) Oral at bedtime  DULoxetine 60 milliGRAM(s) Oral daily  glucagon  Injectable 1 milliGRAM(s) IntraMuscular once  heparin   Injectable 5000 Unit(s) SubCutaneous every 12 hours  insulin glargine Injectable (LANTUS) 15 Unit(s) SubCutaneous at bedtime  insulin lispro (ADMELOG) corrective regimen sliding scale   SubCutaneous three times a day before meals  insulin lispro (ADMELOG) corrective regimen sliding scale   SubCutaneous at bedtime  insulin lispro Injectable (ADMELOG) 3 Unit(s) SubCutaneous three times a day before meals  lactobacillus acidophilus 1 Tablet(s) Oral two times a day with meals  losartan 50 milliGRAM(s) Oral daily  multivitamin 1 Tablet(s) Oral daily  pantoprazole   Suspension 40 milliGRAM(s) Oral daily  piperacillin/tazobactam IVPB.. 3.375 Gram(s) IV Intermittent every 8 hours  senna 2 Tablet(s) Oral at bedtime  simvastatin 40 milliGRAM(s) Oral at bedtime  tamsulosin 0.4 milliGRAM(s) Oral at bedtime    MEDICATIONS  (PRN):  acetaminophen     Tablet .. 650 milliGRAM(s) Oral every 6 hours PRN Temp greater or equal to 38C (100.4F), Mild Pain (1 - 3)  aluminum hydroxide/magnesium hydroxide/simethicone Suspension 30 milliLiter(s) Oral every 4 hours PRN Dyspepsia  bisacodyl Suppository 10 milliGRAM(s) Rectal daily PRN Constipation  dextrose Oral Gel 15 Gram(s) Oral once PRN Blood Glucose LESS THAN 70 milliGRAM(s)/deciliter  hydrALAZINE 50 milliGRAM(s) Oral every 6 hours PRN for systolic BP>160  magnesium hydroxide Suspension 30 milliLiter(s) Oral daily PRN Constipation  melatonin 3 milliGRAM(s) Oral at bedtime PRN Insomnia  morphine  - Injectable 2 milliGRAM(s) IV Push every 6 hours PRN Severe Pain (7 - 10)  ondansetron Injectable 4 milliGRAM(s) IV Push every 8 hours PRN Nausea and/or Vomiting  traMADol 50 milliGRAM(s) Oral four times a day PRN Moderate Pain (4 - 6)      REVIEW OF SYSTEMS:  CONSTITUTIONAL: No fever,  RESPIRATORY: No cough, wheezing, shortness of breath  CARDIOVASCULAR: No chest pain, dyspnea, palpitations, dizziness, syncope, paroxysmal nocturnal dyspnea, orthopnea, or arm or leg swelling  GASTROINTESTINAL: No abdominal  or epigastric pain, nausea, vomiting,  diarrhea  NEUROLOGICAL: No headaches,  loss of strength, numbness, or tremors    Vital Signs Last 24 Hrs  T(C): 37 (31 Mar 2023 12:26), Max: 37 (31 Mar 2023 12:26)  T(F): 98.6 (31 Mar 2023 12:26), Max: 98.6 (31 Mar 2023 12:26)  HR: 101 (31 Mar 2023 13:18) (88 - 103)  BP: 136/85 (31 Mar 2023 12:26) (136/85 - 157/82)  BP(mean): --  RR: 18 (31 Mar 2023 12:26) (18 - 18)  SpO2: 94% (31 Mar 2023 13:18) (92% - 94%)    Parameters below as of 31 Mar 2023 13:18  Patient On (Oxygen Delivery Method): room air        PHYSICAL EXAM:  HEAD:  Atraumatic, Normocephalic  NECK: Supple and normal thyroid.  No JVD or carotid bruit.   HEART: S1, S2 regular , 1/6 soft ejection systolic murmur in mitral area , no thrill and no gallops .  PULMONARY: Bilateral vesicular breathing , few scattered ronchi and few scattered rales are present .  ABDOMEN: Soft nontender and positive bowl sounds   EXTREMITIES:  No clubbing, cyanosis, or pedal  edema  NEUROLOGICAL: AAOX3 , no focal deficit .    LABS:                        9.8    10.50 )-----------( 209      ( 31 Mar 2023 06:44 )             32.0     03-31    147<H>  |  119<H>  |  38<H>  ----------------------------<  234<H>  4.0   |  22  |  1.30    Ca    8.9      31 Mar 2023 06:44  Mg     2.2     03-30    TPro  6.6  /  Alb  2.3<L>  /  TBili  0.6  /  DBili  x   /  AST  23  /  ALT  30  /  AlkPhos  56  03-31        PT/INR - ( 31 Mar 2023 06:44 )   PT: 14.8 sec;   INR: 1.26 ratio           Urinalysis Basic - ( 30 Mar 2023 09:53 )    Color: Red / Appearance: Slightly Turbid / SG: >=1.030 / pH: x  Gluc: x / Ketone: Negative  / Bili: Negative / Urobili: <2 mg/dL   Blood: x / Protein: Trace / Nitrite: Negative   Leuk Esterase: Moderate / RBC: 10 /HPF /  /HPF   Sq Epi: x / Non Sq Epi: 0 /HPF / Bacteria: Moderate      BNP      EKG:  ECHO:  IMAGING:    Assessment/Plan    Will continue to follow during hospital course and give further recommendations as needed . Thanks for your referral . if any questions please contact me at office ([phone])cell [phone])  [facility] [place]       HPI:  86 yo male ,[facility] resident with PMHx - COPD, DM, HTN, CAD, HLD, H/O TIA, dementia,GERD, BPH and depression sent ot ER for evaluation of left foot 1metatarsal wound ,suggestive of osteomyelitis .Patient was seen by ID consult and transfer to the hospital recommended for wound cx/bone biopsy /podiatry evaluation ,likely will require 4-6 weeks of iv abx . Patient was admitted to [facility] with b/l feet wounds and was followed by wound care team ,recently completed 7 days of doxycycline for foot wound cellulitis . (30 Mar 2023 05:21)        SUBJECTIVE:      ALLERGIES:  Allergies    No Known Allergies    Intolerances          MEDICATIONS  (STANDING):  albuterol/ipratropium for Nebulization 3 milliLiter(s) Nebulizer every 8 hours  atenolol  Tablet 25 milliGRAM(s) Oral daily  budesonide 160 MICROgram(s)/formoterol 4.5 MICROgram(s) Inhaler 2 Puff(s) Inhalation two times a day  dextrose 5%. 1000 milliLiter(s) (50 mL/Hr) IV Continuous <Continuous>  dextrose 5%. 1000 milliLiter(s) (100 mL/Hr) IV Continuous <Continuous>  dextrose 50% Injectable 25 Gram(s) IV Push once  dextrose 50% Injectable 12.5 Gram(s) IV Push once  dextrose 50% Injectable 25 Gram(s) IV Push once  donepezil 5 milliGRAM(s) Oral at bedtime  DULoxetine 60 milliGRAM(s) Oral daily  glucagon  Injectable 1 milliGRAM(s) IntraMuscular once  heparin   Injectable 5000 Unit(s) SubCutaneous every 12 hours  insulin glargine Injectable (LANTUS) 15 Unit(s) SubCutaneous at bedtime  insulin lispro (ADMELOG) corrective regimen sliding scale   SubCutaneous three times a day before meals  insulin lispro (ADMELOG) corrective regimen sliding scale   SubCutaneous at bedtime  insulin lispro Injectable (ADMELOG) 3 Unit(s) SubCutaneous three times a day before meals  lactobacillus acidophilus 1 Tablet(s) Oral two times a day with meals  losartan 50 milliGRAM(s) Oral daily  multivitamin 1 Tablet(s) Oral daily  pantoprazole   Suspension 40 milliGRAM(s) Oral daily  piperacillin/tazobactam IVPB.. 3.375 Gram(s) IV Intermittent every 8 hours  senna 2 Tablet(s) Oral at bedtime  simvastatin 40 milliGRAM(s) Oral at bedtime  tamsulosin 0.4 milliGRAM(s) Oral at bedtime    MEDICATIONS  (PRN):  acetaminophen     Tablet .. 650 milliGRAM(s) Oral every 6 hours PRN Temp greater or equal to 38C (100.4F), Mild Pain (1 - 3)  aluminum hydroxide/magnesium hydroxide/simethicone Suspension 30 milliLiter(s) Oral every 4 hours PRN Dyspepsia  bisacodyl Suppository 10 milliGRAM(s) Rectal daily PRN Constipation  dextrose Oral Gel 15 Gram(s) Oral once PRN Blood Glucose LESS THAN 70 milliGRAM(s)/deciliter  hydrALAZINE 50 milliGRAM(s) Oral every 6 hours PRN for systolic BP>160  magnesium hydroxide Suspension 30 milliLiter(s) Oral daily PRN Constipation  melatonin 3 milliGRAM(s) Oral at bedtime PRN Insomnia  morphine  - Injectable 2 milliGRAM(s) IV Push every 6 hours PRN Severe Pain (7 - 10)  ondansetron Injectable 4 milliGRAM(s) IV Push every 8 hours PRN Nausea and/or Vomiting  traMADol 50 milliGRAM(s) Oral four times a day PRN Moderate Pain (4 - 6)      REVIEW OF SYSTEMS:  CONSTITUTIONAL: No fever,  RESPIRATORY: No cough, wheezing, shortness of breath  CARDIOVASCULAR: No chest pain, dyspnea, palpitations, dizziness, syncope, paroxysmal nocturnal dyspnea, orthopnea, or arm or leg swelling  GASTROINTESTINAL: No abdominal  or epigastric pain, nausea, vomiting,  diarrhea  NEUROLOGICAL: No headaches,  loss of strength, numbness, or tremors    Vital Signs Last 24 Hrs  T(C): 37 (31 Mar 2023 12:26), Max: 37 (31 Mar 2023 12:26)  T(F): 98.6 (31 Mar 2023 12:26), Max: 98.6 (31 Mar 2023 12:26)  HR: 101 (31 Mar 2023 13:18) (88 - 103)  BP: 136/85 (31 Mar 2023 12:26) (136/85 - 157/82)  BP(mean): --  RR: 18 (31 Mar 2023 12:26) (18 - 18)  SpO2: 94% (31 Mar 2023 13:18) (92% - 94%)    Parameters below as of 31 Mar 2023 13:18  Patient On (Oxygen Delivery Method): room air        PHYSICAL EXAM:  HEAD:  Atraumatic, Normocephalic  NECK: Supple and normal thyroid.  No JVD or carotid bruit.   HEART: S1, S2 regular , 1/6 soft ejection systolic murmur in mitral area , no thrill and no gallops .  PULMONARY: Bilateral vesicular breathing , few scattered ronchi and few scattered rales are present .  ABDOMEN: Soft nontender and positive bowl sounds   EXTREMITIES:  No clubbing, cyanosis, or pedal  edema  NEUROLOGICAL: AAOX3 , no focal deficit .    LABS:                        9.8    10.50 )-----------( 209      ( 31 Mar 2023 06:44 )             32.0     03-31    147<H>  |  119<H>  |  38<H>  ----------------------------<  234<H>  4.0   |  22  |  1.30    Ca    8.9      31 Mar 2023 06:44  Mg     2.2     03-30    TPro  6.6  /  Alb  2.3<L>  /  TBili  0.6  /  DBili  x   /  AST  23  /  ALT  30  /  AlkPhos  56  03-31        PT/INR - ( 31 Mar 2023 06:44 )   PT: 14.8 sec;   INR: 1.26 ratio           Urinalysis Basic - ( 30 Mar 2023 09:53 )    Color: Red / Appearance: Slightly Turbid / SG: >=1.030 / pH: x  Gluc: x / Ketone: Negative  / Bili: Negative / Urobili: <2 mg/dL   Blood: x / Protein: Trace / Nitrite: Negative   Leuk Esterase: Moderate / RBC: 10 /HPF /  /HPF   Sq Epi: x / Non Sq Epi: 0 /HPF / Bacteria: Moderate      BNP      EKG:  ECHO:  IMAGING:    Assessment/Plan    Will continue to follow during hospital course and give further recommendations as needed . Thanks for your referral . if any questions please contact me at office ([phone] [phone])  [facility] [place]       HPI:  86 yo male ,[facility] resident with PMHx - COPD, DM, HTN, CAD, HLD, H/O TIA, dementia,GERD, BPH and depression sent ot ER for evaluation of left foot 1metatarsal wound ,suggestive of osteomyelitis .Patient was seen by ID consult and transfer to the hospital recommended for wound cx/bone biopsy /podiatry evaluation ,likely will require 4-6 weeks of iv abx . Patient was admitted to [facility] with b/l feet wounds and was followed by wound care team ,recently completed 7 days of doxycycline for foot wound cellulitis . (30 Mar 2023 05:21)        SUBJECTIVE:      ALLERGIES:  Allergies    No Known Allergies    Intolerances          MEDICATIONS  (STANDING):  albuterol/ipratropium for Nebulization 3 milliLiter(s) Nebulizer every 8 hours  atenolol  Tablet 25 milliGRAM(s) Oral daily  budesonide 160 MICROgram(s)/formoterol 4.5 MICROgram(s) Inhaler 2 Puff(s) Inhalation two times a day  dextrose 5%. 1000 milliLiter(s) (50 mL/Hr) IV Continuous <Continuous>  dextrose 5%. 1000 milliLiter(s) (100 mL/Hr) IV Continuous <Continuous>  dextrose 50% Injectable 25 Gram(s) IV Push once  dextrose 50% Injectable 12.5 Gram(s) IV Push once  dextrose 50% Injectable 25 Gram(s) IV Push once  donepezil 5 milliGRAM(s) Oral at bedtime  DULoxetine 60 milliGRAM(s) Oral daily  glucagon  Injectable 1 milliGRAM(s) IntraMuscular once  heparin   Injectable 5000 Unit(s) SubCutaneous every 12 hours  insulin glargine Injectable (LANTUS) 15 Unit(s) SubCutaneous at bedtime  insulin lispro (ADMELOG) corrective regimen sliding scale   SubCutaneous three times a day before meals  insulin lispro (ADMELOG) corrective regimen sliding scale   SubCutaneous at bedtime  insulin lispro Injectable (ADMELOG) 3 Unit(s) SubCutaneous three times a day before meals  lactobacillus acidophilus 1 Tablet(s) Oral two times a day with meals  losartan 50 milliGRAM(s) Oral daily  multivitamin 1 Tablet(s) Oral daily  pantoprazole   Suspension 40 milliGRAM(s) Oral daily  piperacillin/tazobactam IVPB.. 3.375 Gram(s) IV Intermittent every 8 hours  senna 2 Tablet(s) Oral at bedtime  simvastatin 40 milliGRAM(s) Oral at bedtime  tamsulosin 0.4 milliGRAM(s) Oral at bedtime    MEDICATIONS  (PRN):  acetaminophen     Tablet .. 650 milliGRAM(s) Oral every 6 hours PRN Temp greater or equal to 38C (100.4F), Mild Pain (1 - 3)  aluminum hydroxide/magnesium hydroxide/simethicone Suspension 30 milliLiter(s) Oral every 4 hours PRN Dyspepsia  bisacodyl Suppository 10 milliGRAM(s) Rectal daily PRN Constipation  dextrose Oral Gel 15 Gram(s) Oral once PRN Blood Glucose LESS THAN 70 milliGRAM(s)/deciliter  hydrALAZINE 50 milliGRAM(s) Oral every 6 hours PRN for systolic BP>160  magnesium hydroxide Suspension 30 milliLiter(s) Oral daily PRN Constipation  melatonin 3 milliGRAM(s) Oral at bedtime PRN Insomnia  morphine  - Injectable 2 milliGRAM(s) IV Push every 6 hours PRN Severe Pain (7 - 10)  ondansetron Injectable 4 milliGRAM(s) IV Push every 8 hours PRN Nausea and/or Vomiting  traMADol 50 milliGRAM(s) Oral four times a day PRN Moderate Pain (4 - 6)      REVIEW OF SYSTEMS:  CONSTITUTIONAL: No fever,  RESPIRATORY: No cough, wheezing, shortness of breath  CARDIOVASCULAR: No chest pain, dyspnea, palpitations, dizziness, syncope, paroxysmal nocturnal dyspnea, orthopnea, or arm or leg swelling  GASTROINTESTINAL: No abdominal  or epigastric pain, nausea, vomiting,  diarrhea  NEUROLOGICAL: No headaches,  loss of strength, numbness, or tremors    Vital Signs Last 24 Hrs  T(C): 37 (31 Mar 2023 12:26), Max: 37 (31 Mar 2023 12:26)  T(F): 98.6 (31 Mar 2023 12:26), Max: 98.6 (31 Mar 2023 12:26)  HR: 101 (31 Mar 2023 13:18) (88 - 103)  BP: 136/85 (31 Mar 2023 12:26) (136/85 - 157/82)  BP(mean): --  RR: 18 (31 Mar 2023 12:26) (18 - 18)  SpO2: 94% (31 Mar 2023 13:18) (92% - 94%)    Parameters below as of 31 Mar 2023 13:18  Patient On (Oxygen Delivery Method): room air        PHYSICAL EXAM:  HEAD:  Atraumatic, Normocephalic  NECK: Supple and normal thyroid.  No JVD or carotid bruit.   HEART: S1, S2 regular , 1/6 soft ejection systolic murmur in mitral area , no thrill and no gallops .  PULMONARY: Bilateral vesicular breathing , few scattered ronchi and few scattered rales are present .  ABDOMEN: Soft nontender and positive bowl sounds   EXTREMITIES:  No clubbing, cyanosis, or pedal  edema  NEUROLOGICAL: AAOX3 , no focal deficit .    LABS:                        9.8    10.50 )-----------( 209      ( 31 Mar 2023 06:44 )             32.0     03-31    147<H>  |  119<H>  |  38<H>  ----------------------------<  234<H>  4.0   |  22  |  1.30    Ca    8.9      31 Mar 2023 06:44  Mg     2.2     03-30    TPro  6.6  /  Alb  2.3<L>  /  TBili  0.6  /  DBili  x   /  AST  23  /  ALT  30  /  AlkPhos  56  03-31        PT/INR - ( 31 Mar 2023 06:44 )   PT: 14.8 sec;   INR: 1.26 ratio           Urinalysis Basic - ( 30 Mar 2023 09:53 )    Color: Red / Appearance: Slightly Turbid / SG: >=1.030 / pH: x  Gluc: x / Ketone: Negative  / Bili: Negative / Urobili: <2 mg/dL   Blood: x / Protein: Trace / Nitrite: Negative   Leuk Esterase: Moderate / RBC: 10 /HPF /  /HPF   Sq Epi: x / Non Sq Epi: 0 /HPF / Bacteria: Moderate      BNP      EKG:  ECHO:  IMAGING:    Assessment/Plan    Will continue to follow during hospital course and give further recommendations as needed . Thanks for your referral . if any questions please contact me at office ([phone])cell [phone])  [facility] [place]       HPI:  86 yo male ,[facility] resident with PMHx - COPD, DM, HTN, CAD, HLD, H/O TIA, dementia,GERD, BPH and depression sent ot ER for evaluation of left foot 1metatarsal wound ,suggestive of osteomyelitis .Patient was seen by ID consult and transfer to the hospital recommended for wound cx/bone biopsy /podiatry evaluation ,likely will require 4-6 weeks of iv abx . Patient was admitted to [facility] with b/l feet wounds and was followed by wound care team ,recently completed 7 days of doxycycline for foot wound cellulitis . (30 Mar 2023 05:21)        SUBJECTIVE: Patient feeling better       ALLERGIES:  Allergies    No Known Allergies    Intolerances          MEDICATIONS  (STANDING):  albuterol/ipratropium for Nebulization 3 milliLiter(s) Nebulizer every 8 hours  atenolol  Tablet 25 milliGRAM(s) Oral daily  budesonide 160 MICROgram(s)/formoterol 4.5 MICROgram(s) Inhaler 2 Puff(s) Inhalation two times a day  dextrose 5%. 1000 milliLiter(s) (50 mL/Hr) IV Continuous <Continuous>  dextrose 5%. 1000 milliLiter(s) (100 mL/Hr) IV Continuous <Continuous>  dextrose 50% Injectable 25 Gram(s) IV Push once  dextrose 50% Injectable 12.5 Gram(s) IV Push once  dextrose 50% Injectable 25 Gram(s) IV Push once  donepezil 5 milliGRAM(s) Oral at bedtime  DULoxetine 60 milliGRAM(s) Oral daily  glucagon  Injectable 1 milliGRAM(s) IntraMuscular once  heparin   Injectable 5000 Unit(s) SubCutaneous every 12 hours  insulin glargine Injectable (LANTUS) 15 Unit(s) SubCutaneous at bedtime  insulin lispro (ADMELOG) corrective regimen sliding scale   SubCutaneous three times a day before meals  insulin lispro (ADMELOG) corrective regimen sliding scale   SubCutaneous at bedtime  insulin lispro Injectable (ADMELOG) 3 Unit(s) SubCutaneous three times a day before meals  lactobacillus acidophilus 1 Tablet(s) Oral two times a day with meals  losartan 50 milliGRAM(s) Oral daily  multivitamin 1 Tablet(s) Oral daily  pantoprazole   Suspension 40 milliGRAM(s) Oral daily  piperacillin/tazobactam IVPB.. 3.375 Gram(s) IV Intermittent every 8 hours  senna 2 Tablet(s) Oral at bedtime  simvastatin 40 milliGRAM(s) Oral at bedtime  tamsulosin 0.4 milliGRAM(s) Oral at bedtime    MEDICATIONS  (PRN):  acetaminophen     Tablet .. 650 milliGRAM(s) Oral every 6 hours PRN Temp greater or equal to 38C (100.4F), Mild Pain (1 - 3)  aluminum hydroxide/magnesium hydroxide/simethicone Suspension 30 milliLiter(s) Oral every 4 hours PRN Dyspepsia  bisacodyl Suppository 10 milliGRAM(s) Rectal daily PRN Constipation  dextrose Oral Gel 15 Gram(s) Oral once PRN Blood Glucose LESS THAN 70 milliGRAM(s)/deciliter  hydrALAZINE 50 milliGRAM(s) Oral every 6 hours PRN for systolic BP>160  magnesium hydroxide Suspension 30 milliLiter(s) Oral daily PRN Constipation  melatonin 3 milliGRAM(s) Oral at bedtime PRN Insomnia  morphine  - Injectable 2 milliGRAM(s) IV Push every 6 hours PRN Severe Pain (7 - 10)  ondansetron Injectable 4 milliGRAM(s) IV Push every 8 hours PRN Nausea and/or Vomiting  traMADol 50 milliGRAM(s) Oral four times a day PRN Moderate Pain (4 - 6)      REVIEW OF SYSTEMS:  CONSTITUTIONAL: No fever,  RESPIRATORY: No cough, wheezing, shortness of breath  CARDIOVASCULAR: No chest pain, dyspnea, palpitations, dizziness, syncope, paroxysmal nocturnal dyspnea, orthopnea, or arm or leg swelling  GASTROINTESTINAL: No abdominal  or epigastric pain, nausea, vomiting,  diarrhea  NEUROLOGICAL: No headaches,  loss of strength, numbness, or tremors    Vital Signs Last 24 Hrs  T(C): 37 (31 Mar 2023 12:26), Max: 37 (31 Mar 2023 12:26)  T(F): 98.6 (31 Mar 2023 12:26), Max: 98.6 (31 Mar 2023 12:26)  HR: 101 (31 Mar 2023 13:18) (88 - 103)  BP: 136/85 (31 Mar 2023 12:26) (136/85 - 157/82)  BP(mean): --  RR: 18 (31 Mar 2023 12:26) (18 - 18)  SpO2: 94% (31 Mar 2023 13:18) (92% - 94%)      PHYSICAL EXAM:  HEAD:  Atraumatic, Normocephalic  NECK: Supple and normal thyroid.  No JVD or carotid bruit.   HEART: S1, S2 regular , 1/6 soft ejection systolic murmur in mitral area , no thrill and no gallops .  PULMONARY: Bilateral vesicular breathing , few scattered rhonchi and few scattered rales are present .  ABDOMEN: Soft nontender and positive bowl sounds   EXTREMITIES:  No clubbing, cyanosis, or pedal  edema  NEUROLOGICAL: AA and confused .   Skin : No rashes .  Musculoskeletal : No joint swellings .    LABS:                        9.8    10.50 )-----------( 209      ( 31 Mar 2023 06:44 )             32.0     03-31    147<H>  |  119<H>  |  38<H>  ----------------------------<  234<H>  4.0   |  22  |  1.30    Ca    8.9      31 Mar 2023 06:44  Mg     2.2     03-30    TPro  6.6  /  Alb  2.3<L>  /  TBili  0.6  /  DBili  x   /  AST  23  /  ALT  30  /  AlkPhos  56  03-31        PT/INR - ( 31 Mar 2023 06:44 )   PT: 14.8 sec;   INR: 1.26 ratio           Urinalysis Basic - ( 30 Mar 2023 09:53 )    Color: Red / Appearance: Slightly Turbid / SG: >=1.030 / pH: x  Gluc: x / Ketone: Negative  / Bili: Negative / Urobili: <2 mg/dL   Blood: x / Protein: Trace / Nitrite: Negative   Leuk Esterase: Moderate / RBC: 10 /HPF /  /HPF   Sq Epi: x / Non Sq Epi: 0 /HPF / Bacteria: Moderate    Assessment/Plan  Patient has :  1) Left foot infection .  2) Hypertension and stable .  3) DM .  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  Will continue to follow during hospital course and give further recommendations as needed . Thanks for your referral . if any questions please contact me at office ([phone] cell [phone])      [facility] [place]       HPI:  88 yo male ,[facility] resident with PMHx - COPD, DM, HTN, CAD, HLD, H/O TIA, dementia,GERD, BPH and depression sent ot ER for evaluation of left foot 1metatarsal wound ,suggestive of osteomyelitis .Patient was seen by ID consult and transfer to the hospital recommended for wound cx/bone biopsy /podiatry evaluation ,likely will require 4-6 weeks of iv abx . Patient was admitted to [facility] with b/l feet wounds and was followed by wound care team ,recently completed 7 days of doxycycline for foot wound cellulitis . (30 Mar 2023 05:21)        SUBJECTIVE: Patient feeling better       ALLERGIES:  Allergies    No Known Allergies    Intolerances          MEDICATIONS  (STANDING):  albuterol/ipratropium for Nebulization 3 milliLiter(s) Nebulizer every 8 hours  atenolol  Tablet 25 milliGRAM(s) Oral daily  budesonide 160 MICROgram(s)/formoterol 4.5 MICROgram(s) Inhaler 2 Puff(s) Inhalation two times a day  dextrose 5%. 1000 milliLiter(s) (50 mL/Hr) IV Continuous <Continuous>  dextrose 5%. 1000 milliLiter(s) (100 mL/Hr) IV Continuous <Continuous>  dextrose 50% Injectable 25 Gram(s) IV Push once  dextrose 50% Injectable 12.5 Gram(s) IV Push once  dextrose 50% Injectable 25 Gram(s) IV Push once  donepezil 5 milliGRAM(s) Oral at bedtime  DULoxetine 60 milliGRAM(s) Oral daily  glucagon  Injectable 1 milliGRAM(s) IntraMuscular once  heparin   Injectable 5000 Unit(s) SubCutaneous every 12 hours  insulin glargine Injectable (LANTUS) 15 Unit(s) SubCutaneous at bedtime  insulin lispro (ADMELOG) corrective regimen sliding scale   SubCutaneous three times a day before meals  insulin lispro (ADMELOG) corrective regimen sliding scale   SubCutaneous at bedtime  insulin lispro Injectable (ADMELOG) 3 Unit(s) SubCutaneous three times a day before meals  lactobacillus acidophilus 1 Tablet(s) Oral two times a day with meals  losartan 50 milliGRAM(s) Oral daily  multivitamin 1 Tablet(s) Oral daily  pantoprazole   Suspension 40 milliGRAM(s) Oral daily  piperacillin/tazobactam IVPB.. 3.375 Gram(s) IV Intermittent every 8 hours  senna 2 Tablet(s) Oral at bedtime  simvastatin 40 milliGRAM(s) Oral at bedtime  tamsulosin 0.4 milliGRAM(s) Oral at bedtime    MEDICATIONS  (PRN):  acetaminophen     Tablet .. 650 milliGRAM(s) Oral every 6 hours PRN Temp greater or equal to 38C (100.4F), Mild Pain (1 - 3)  aluminum hydroxide/magnesium hydroxide/simethicone Suspension 30 milliLiter(s) Oral every 4 hours PRN Dyspepsia  bisacodyl Suppository 10 milliGRAM(s) Rectal daily PRN Constipation  dextrose Oral Gel 15 Gram(s) Oral once PRN Blood Glucose LESS THAN 70 milliGRAM(s)/deciliter  hydrALAZINE 50 milliGRAM(s) Oral every 6 hours PRN for systolic BP>160  magnesium hydroxide Suspension 30 milliLiter(s) Oral daily PRN Constipation  melatonin 3 milliGRAM(s) Oral at bedtime PRN Insomnia  morphine  - Injectable 2 milliGRAM(s) IV Push every 6 hours PRN Severe Pain (7 - 10)  ondansetron Injectable 4 milliGRAM(s) IV Push every 8 hours PRN Nausea and/or Vomiting  traMADol 50 milliGRAM(s) Oral four times a day PRN Moderate Pain (4 - 6)      REVIEW OF SYSTEMS:  CONSTITUTIONAL: No fever,  RESPIRATORY: No cough, wheezing, shortness of breath  CARDIOVASCULAR: No chest pain, dyspnea, palpitations, dizziness, syncope, paroxysmal nocturnal dyspnea, orthopnea, or arm or leg swelling  GASTROINTESTINAL: No abdominal  or epigastric pain, nausea, vomiting,  diarrhea  NEUROLOGICAL: No headaches,  loss of strength, numbness, or tremors    Vital Signs Last 24 Hrs  T(C): 37 (31 Mar 2023 12:26), Max: 37 (31 Mar 2023 12:26)  T(F): 98.6 (31 Mar 2023 12:26), Max: 98.6 (31 Mar 2023 12:26)  HR: 101 (31 Mar 2023 13:18) (88 - 103)  BP: 136/85 (31 Mar 2023 12:26) (136/85 - 157/82)  BP(mean): --  RR: 18 (31 Mar 2023 12:26) (18 - 18)  SpO2: 94% (31 Mar 2023 13:18) (92% - 94%)      PHYSICAL EXAM:  HEAD:  Atraumatic, Normocephalic  NECK: Supple and normal thyroid.  No JVD or carotid bruit.   HEART: S1, S2 regular , 1/6 soft ejection systolic murmur in mitral area , no thrill and no gallops .  PULMONARY: Bilateral vesicular breathing , few scattered rhonchi and few scattered rales are present .  ABDOMEN: Soft nontender and positive bowl sounds   EXTREMITIES:  No clubbing, cyanosis, or pedal  edema  NEUROLOGICAL: AA and confused .   Skin : No rashes .  Musculoskeletal : No joint swellings .    LABS:                        9.8    10.50 )-----------( 209      ( 31 Mar 2023 06:44 )             32.0     03-31    147<H>  |  119<H>  |  38<H>  ----------------------------<  234<H>  4.0   |  22  |  1.30    Ca    8.9      31 Mar 2023 06:44  Mg     2.2     03-30    TPro  6.6  /  Alb  2.3<L>  /  TBili  0.6  /  DBili  x   /  AST  23  /  ALT  30  /  AlkPhos  56  03-31        PT/INR - ( 31 Mar 2023 06:44 )   PT: 14.8 sec;   INR: 1.26 ratio           Urinalysis Basic - ( 30 Mar 2023 09:53 )    Color: Red / Appearance: Slightly Turbid / SG: >=1.030 / pH: x  Gluc: x / Ketone: Negative  / Bili: Negative / Urobili: <2 mg/dL   Blood: x / Protein: Trace / Nitrite: Negative   Leuk Esterase: Moderate / RBC: 10 /HPF /  /HPF   Sq Epi: x / Non Sq Epi: 0 /HPF / Bacteria: Moderate    Assessment/Plan  Patient has :  1) Left foot infection .  2) Hypertension and stable .  3) DM .  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  Will continue to follow during hospital course and give further recommendations as needed . Thanks for your referral . if any questions please contact me at office ([phone] cell [phone])      [facility] [place]       HPI:  86 yo male ,[facility] resident with PMHx - COPD, DM, HTN, CAD, HLD, H/O TIA, dementia,GERD, BPH and depression sent ot ER for evaluation of left foot 1metatarsal wound ,suggestive of osteomyelitis .Patient was seen by ID consult and transfer to the hospital recommended for wound cx/bone biopsy /podiatry evaluation ,likely will require 4-6 weeks of iv abx . Patient was admitted to [facility] with b/l feet wounds and was followed by wound care team ,recently completed 7 days of doxycycline for foot wound cellulitis . (30 Mar 2023 05:21)        SUBJECTIVE: Patient feeling better       ALLERGIES:  Allergies    No Known Allergies    Intolerances          MEDICATIONS  (STANDING):  albuterol/ipratropium for Nebulization 3 milliLiter(s) Nebulizer every 8 hours  atenolol  Tablet 25 milliGRAM(s) Oral daily  budesonide 160 MICROgram(s)/formoterol 4.5 MICROgram(s) Inhaler 2 Puff(s) Inhalation two times a day  dextrose 5%. 1000 milliLiter(s) (50 mL/Hr) IV Continuous <Continuous>  dextrose 5%. 1000 milliLiter(s) (100 mL/Hr) IV Continuous <Continuous>  dextrose 50% Injectable 25 Gram(s) IV Push once  dextrose 50% Injectable 12.5 Gram(s) IV Push once  dextrose 50% Injectable 25 Gram(s) IV Push once  donepezil 5 milliGRAM(s) Oral at bedtime  DULoxetine 60 milliGRAM(s) Oral daily  glucagon  Injectable 1 milliGRAM(s) IntraMuscular once  heparin   Injectable 5000 Unit(s) SubCutaneous every 12 hours  insulin glargine Injectable (LANTUS) 15 Unit(s) SubCutaneous at bedtime  insulin lispro (ADMELOG) corrective regimen sliding scale   SubCutaneous three times a day before meals  insulin lispro (ADMELOG) corrective regimen sliding scale   SubCutaneous at bedtime  insulin lispro Injectable (ADMELOG) 3 Unit(s) SubCutaneous three times a day before meals  lactobacillus acidophilus 1 Tablet(s) Oral two times a day with meals  losartan 50 milliGRAM(s) Oral daily  multivitamin 1 Tablet(s) Oral daily  pantoprazole   Suspension 40 milliGRAM(s) Oral daily  piperacillin/tazobactam IVPB.. 3.375 Gram(s) IV Intermittent every 8 hours  senna 2 Tablet(s) Oral at bedtime  simvastatin 40 milliGRAM(s) Oral at bedtime  tamsulosin 0.4 milliGRAM(s) Oral at bedtime    MEDICATIONS  (PRN):  acetaminophen     Tablet .. 650 milliGRAM(s) Oral every 6 hours PRN Temp greater or equal to 38C (100.4F), Mild Pain (1 - 3)  aluminum hydroxide/magnesium hydroxide/simethicone Suspension 30 milliLiter(s) Oral every 4 hours PRN Dyspepsia  bisacodyl Suppository 10 milliGRAM(s) Rectal daily PRN Constipation  dextrose Oral Gel 15 Gram(s) Oral once PRN Blood Glucose LESS THAN 70 milliGRAM(s)/deciliter  hydrALAZINE 50 milliGRAM(s) Oral every 6 hours PRN for systolic BP>160  magnesium hydroxide Suspension 30 milliLiter(s) Oral daily PRN Constipation  melatonin 3 milliGRAM(s) Oral at bedtime PRN Insomnia  morphine  - Injectable 2 milliGRAM(s) IV Push every 6 hours PRN Severe Pain (7 - 10)  ondansetron Injectable 4 milliGRAM(s) IV Push every 8 hours PRN Nausea and/or Vomiting  traMADol 50 milliGRAM(s) Oral four times a day PRN Moderate Pain (4 - 6)      REVIEW OF SYSTEMS:  CONSTITUTIONAL: No fever,  RESPIRATORY: No cough, wheezing, shortness of breath  CARDIOVASCULAR: No chest pain, dyspnea, palpitations, dizziness, syncope, paroxysmal nocturnal dyspnea, orthopnea, or arm or leg swelling  GASTROINTESTINAL: No abdominal  or epigastric pain, nausea, vomiting,  diarrhea  NEUROLOGICAL: No headaches,  loss of strength, numbness, or tremors    Vital Signs Last 24 Hrs  T(C): 37 (31 Mar 2023 12:26), Max: 37 (31 Mar 2023 12:26)  T(F): 98.6 (31 Mar 2023 12:26), Max: 98.6 (31 Mar 2023 12:26)  HR: 101 (31 Mar 2023 13:18) (88 - 103)  BP: 136/85 (31 Mar 2023 12:26) (136/85 - 157/82)  BP(mean): --  RR: 18 (31 Mar 2023 12:26) (18 - 18)  SpO2: 94% (31 Mar 2023 13:18) (92% - 94%)      PHYSICAL EXAM:  HEAD:  Atraumatic, Normocephalic  NECK: Supple and normal thyroid.  No JVD or carotid bruit.   HEART: S1, S2 regular , 1/6 soft ejection systolic murmur in mitral area , no thrill and no gallops .  PULMONARY: Bilateral vesicular breathing , few scattered rhonchi and few scattered rales are present .  ABDOMEN: Soft nontender and positive bowl sounds   EXTREMITIES:  No clubbing, cyanosis, or pedal  edema  NEUROLOGICAL: AA and confused .   Skin : No rashes .  Musculoskeletal : No joint swellings .    LABS:                        9.8    10.50 )-----------( 209      ( 31 Mar 2023 06:44 )             32.0     03-31    147<H>  |  119<H>  |  38<H>  ----------------------------<  234<H>  4.0   |  22  |  1.30    Ca    8.9      31 Mar 2023 06:44  Mg     2.2     03-30    TPro  6.6  /  Alb  2.3<L>  /  TBili  0.6  /  DBili  x   /  AST  23  /  ALT  30  /  AlkPhos  56  03-31        PT/INR - ( 31 Mar 2023 06:44 )   PT: 14.8 sec;   INR: 1.26 ratio           Urinalysis Basic - ( 30 Mar 2023 09:53 )    Color: Red / Appearance: Slightly Turbid / SG: >=1.030 / pH: x  Gluc: x / Ketone: Negative  / Bili: Negative / Urobili: <2 mg/dL   Blood: x / Protein: Trace / Nitrite: Negative   Leuk Esterase: Moderate / RBC: 10 /HPF /  /HPF   Sq Epi: x / Non Sq Epi: 0 /HPF / Bacteria: Moderate    Assessment/Plan  Patient has :  1) Left foot infection .  2) Hypertension and stable .  3) DM .  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  Will continue to follow during hospital course and give further recommendations as needed . Thanks for your referral . if any questions please contact me at office ([phone] cell [phone])      KARIME WYNN MD [place] [id number]  [address]  OFFICE : [phone]  CELL : [phone]  CARDIOLOGY F/U :       HPI:  88 yo male ,[facility] resident with PMHx - COPD, DM, HTN, CAD, HLD, H/O TIA, dementia,GERD, BPH and depression sent ot ER for evaluation of left foot 1metatarsal wound ,suggestive of osteomyelitis .Patient was seen by ID consult and transfer to the hospital recommended for wound cx/bone biopsy /podiatry evaluation ,likely will require 4-6 weeks of iv abx . Patient was admitted to [facility] with b/l feet wounds and was followed by wound care team ,recently completed 7 days of doxycycline for foot wound cellulitis . (30 Mar 2023 05:21)        SUBJECTIVE: Patient feeling better       ALLERGIES:  Allergies    No Known Allergies    Intolerances          MEDICATIONS  (STANDING):  albuterol/ipratropium for Nebulization 3 milliLiter(s) Nebulizer every 8 hours  atenolol  Tablet 25 milliGRAM(s) Oral daily  budesonide 160 MICROgram(s)/formoterol 4.5 MICROgram(s) Inhaler 2 Puff(s) Inhalation two times a day  dextrose 5%. 1000 milliLiter(s) (50 mL/Hr) IV Continuous <Continuous>  dextrose 5%. 1000 milliLiter(s) (100 mL/Hr) IV Continuous <Continuous>  dextrose 50% Injectable 25 Gram(s) IV Push once  dextrose 50% Injectable 12.5 Gram(s) IV Push once  dextrose 50% Injectable 25 Gram(s) IV Push once  donepezil 5 milliGRAM(s) Oral at bedtime  DULoxetine 60 milliGRAM(s) Oral daily  glucagon  Injectable 1 milliGRAM(s) IntraMuscular once  heparin   Injectable 5000 Unit(s) SubCutaneous every 12 hours  insulin glargine Injectable (LANTUS) 15 Unit(s) SubCutaneous at bedtime  insulin lispro (ADMELOG) corrective regimen sliding scale   SubCutaneous three times a day before meals  insulin lispro (ADMELOG) corrective regimen sliding scale   SubCutaneous at bedtime  insulin lispro Injectable (ADMELOG) 3 Unit(s) SubCutaneous three times a day before meals  lactobacillus acidophilus 1 Tablet(s) Oral two times a day with meals  losartan 50 milliGRAM(s) Oral daily  multivitamin 1 Tablet(s) Oral daily  pantoprazole   Suspension 40 milliGRAM(s) Oral daily  piperacillin/tazobactam IVPB.. 3.375 Gram(s) IV Intermittent every 8 hours  senna 2 Tablet(s) Oral at bedtime  simvastatin 40 milliGRAM(s) Oral at bedtime  tamsulosin 0.4 milliGRAM(s) Oral at bedtime    MEDICATIONS  (PRN):  acetaminophen     Tablet .. 650 milliGRAM(s) Oral every 6 hours PRN Temp greater or equal to 38C (100.4F), Mild Pain (1 - 3)  aluminum hydroxide/magnesium hydroxide/simethicone Suspension 30 milliLiter(s) Oral every 4 hours PRN Dyspepsia  bisacodyl Suppository 10 milliGRAM(s) Rectal daily PRN Constipation  dextrose Oral Gel 15 Gram(s) Oral once PRN Blood Glucose LESS THAN 70 milliGRAM(s)/deciliter  hydrALAZINE 50 milliGRAM(s) Oral every 6 hours PRN for systolic BP>160  magnesium hydroxide Suspension 30 milliLiter(s) Oral daily PRN Constipation  melatonin 3 milliGRAM(s) Oral at bedtime PRN Insomnia  morphine  - Injectable 2 milliGRAM(s) IV Push every 6 hours PRN Severe Pain (7 - 10)  ondansetron Injectable 4 milliGRAM(s) IV Push every 8 hours PRN Nausea and/or Vomiting  traMADol 50 milliGRAM(s) Oral four times a day PRN Moderate Pain (4 - 6)      REVIEW OF SYSTEMS:  CONSTITUTIONAL: No fever,  RESPIRATORY: No cough, wheezing, shortness of breath  CARDIOVASCULAR: No chest pain, dyspnea, palpitations, dizziness, syncope, paroxysmal nocturnal dyspnea, orthopnea, or arm or leg swelling  GASTROINTESTINAL: No abdominal  or epigastric pain, nausea, vomiting,  diarrhea  NEUROLOGICAL: No headaches,  loss of strength, numbness, or tremors    Vital Signs Last 24 Hrs  T(C): 37 (31 Mar 2023 12:26), Max: 37 (31 Mar 2023 12:26)  T(F): 98.6 (31 Mar 2023 12:26), Max: 98.6 (31 Mar 2023 12:26)  HR: 101 (31 Mar 2023 13:18) (88 - 103)  BP: 136/85 (31 Mar 2023 12:26) (136/85 - 157/82)  BP(mean): --  RR: 18 (31 Mar 2023 12:26) (18 - 18)  SpO2: 94% (31 Mar 2023 13:18) (92% - 94%)      PHYSICAL EXAM:  HEAD:  Atraumatic, Normocephalic  NECK: Supple and normal thyroid.  No JVD or carotid bruit.   HEART: S1, S2 regular , 1/6 soft ejection systolic murmur in mitral area , no thrill and no gallops .  PULMONARY: Bilateral vesicular breathing , few scattered rhonchi and few scattered rales are present .  ABDOMEN: Soft nontender and positive bowl sounds   EXTREMITIES:  No clubbing, cyanosis, or pedal  edema  NEUROLOGICAL: AA and confused .   Skin : No rashes .  Musculoskeletal : No joint swellings .    LABS:                        9.8    10.50 )-----------( 209      ( 31 Mar 2023 06:44 )             32.0     03-31    147<H>  |  119<H>  |  38<H>  ----------------------------<  234<H>  4.0   |  22  |  1.30    Ca    8.9      31 Mar 2023 06:44  Mg     2.2     03-30    TPro  6.6  /  Alb  2.3<L>  /  TBili  0.6  /  DBili  x   /  AST  23  /  ALT  30  /  AlkPhos  56  03-31        PT/INR - ( 31 Mar 2023 06:44 )   PT: 14.8 sec;   INR: 1.26 ratio           Urinalysis Basic - ( 30 Mar 2023 09:53 )    Color: Red / Appearance: Slightly Turbid / SG: >=1.030 / pH: x  Gluc: x / Ketone: Negative  / Bili: Negative / Urobili: <2 mg/dL   Blood: x / Protein: Trace / Nitrite: Negative   Leuk Esterase: Moderate / RBC: 10 /HPF /  /HPF   Sq Epi: x / Non Sq Epi: 0 /HPF / Bacteria: Moderate    Assessment/Plan  Patient has :  1) Left foot infection .  2) Hypertension and stable .  3) DM .  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  Will continue to follow during hospital course and give further recommendations as needed . Thanks for your referral . if any questions please contact me at office ([phone] cell [phone])      KARIME WYNN MD [place] [id number]  [address]  OFFICE : [phone]  CELL : [phone]  CARDIOLOGY F/U :       HPI:  86 yo male ,[facility] resident with PMHx - COPD, DM, HTN, CAD, HLD, H/O TIA, dementia,GERD, BPH and depression sent ot ER for evaluation of left foot 1metatarsal wound ,suggestive of osteomyelitis .Patient was seen by ID consult and transfer to the hospital recommended for wound cx/bone biopsy /podiatry evaluation ,likely will require 4-6 weeks of iv abx . Patient was admitted to [facility] with b/l feet wounds and was followed by wound care team ,recently completed 7 days of doxycycline for foot wound cellulitis . (30 Mar 2023 05:21)        SUBJECTIVE: Patient feeling better       ALLERGIES:  Allergies    No Known Allergies    Intolerances          MEDICATIONS  (STANDING):  albuterol/ipratropium for Nebulization 3 milliLiter(s) Nebulizer every 8 hours  atenolol  Tablet 25 milliGRAM(s) Oral daily  budesonide 160 MICROgram(s)/formoterol 4.5 MICROgram(s) Inhaler 2 Puff(s) Inhalation two times a day  dextrose 5%. 1000 milliLiter(s) (50 mL/Hr) IV Continuous <Continuous>  dextrose 5%. 1000 milliLiter(s) (100 mL/Hr) IV Continuous <Continuous>  dextrose 50% Injectable 25 Gram(s) IV Push once  dextrose 50% Injectable 12.5 Gram(s) IV Push once  dextrose 50% Injectable 25 Gram(s) IV Push once  donepezil 5 milliGRAM(s) Oral at bedtime  DULoxetine 60 milliGRAM(s) Oral daily  glucagon  Injectable 1 milliGRAM(s) IntraMuscular once  heparin   Injectable 5000 Unit(s) SubCutaneous every 12 hours  insulin glargine Injectable (LANTUS) 15 Unit(s) SubCutaneous at bedtime  insulin lispro (ADMELOG) corrective regimen sliding scale   SubCutaneous three times a day before meals  insulin lispro (ADMELOG) corrective regimen sliding scale   SubCutaneous at bedtime  insulin lispro Injectable (ADMELOG) 3 Unit(s) SubCutaneous three times a day before meals  lactobacillus acidophilus 1 Tablet(s) Oral two times a day with meals  losartan 50 milliGRAM(s) Oral daily  multivitamin 1 Tablet(s) Oral daily  pantoprazole   Suspension 40 milliGRAM(s) Oral daily  piperacillin/tazobactam IVPB.. 3.375 Gram(s) IV Intermittent every 8 hours  senna 2 Tablet(s) Oral at bedtime  simvastatin 40 milliGRAM(s) Oral at bedtime  tamsulosin 0.4 milliGRAM(s) Oral at bedtime    MEDICATIONS  (PRN):  acetaminophen     Tablet .. 650 milliGRAM(s) Oral every 6 hours PRN Temp greater or equal to 38C (100.4F), Mild Pain (1 - 3)  aluminum hydroxide/magnesium hydroxide/simethicone Suspension 30 milliLiter(s) Oral every 4 hours PRN Dyspepsia  bisacodyl Suppository 10 milliGRAM(s) Rectal daily PRN Constipation  dextrose Oral Gel 15 Gram(s) Oral once PRN Blood Glucose LESS THAN 70 milliGRAM(s)/deciliter  hydrALAZINE 50 milliGRAM(s) Oral every 6 hours PRN for systolic BP>160  magnesium hydroxide Suspension 30 milliLiter(s) Oral daily PRN Constipation  melatonin 3 milliGRAM(s) Oral at bedtime PRN Insomnia  morphine  - Injectable 2 milliGRAM(s) IV Push every 6 hours PRN Severe Pain (7 - 10)  ondansetron Injectable 4 milliGRAM(s) IV Push every 8 hours PRN Nausea and/or Vomiting  traMADol 50 milliGRAM(s) Oral four times a day PRN Moderate Pain (4 - 6)      REVIEW OF SYSTEMS:  CONSTITUTIONAL: No fever,  RESPIRATORY: No cough, wheezing, shortness of breath  CARDIOVASCULAR: No chest pain, dyspnea, palpitations, dizziness, syncope, paroxysmal nocturnal dyspnea, orthopnea, or arm or leg swelling  GASTROINTESTINAL: No abdominal  or epigastric pain, nausea, vomiting,  diarrhea  NEUROLOGICAL: No headaches,  loss of strength, numbness, or tremors    Vital Signs Last 24 Hrs  T(C): 37 (31 Mar 2023 12:26), Max: 37 (31 Mar 2023 12:26)  T(F): 98.6 (31 Mar 2023 12:26), Max: 98.6 (31 Mar 2023 12:26)  HR: 101 (31 Mar 2023 13:18) (88 - 103)  BP: 136/85 (31 Mar 2023 12:26) (136/85 - 157/82)  BP(mean): --  RR: 18 (31 Mar 2023 12:26) (18 - 18)  SpO2: 94% (31 Mar 2023 13:18) (92% - 94%)      PHYSICAL EXAM:  HEAD:  Atraumatic, Normocephalic  NECK: Supple and normal thyroid.  No JVD or carotid bruit.   HEART: S1, S2 regular , 1/6 soft ejection systolic murmur in mitral area , no thrill and no gallops .  PULMONARY: Bilateral vesicular breathing , few scattered rhonchi and few scattered rales are present .  ABDOMEN: Soft nontender and positive bowl sounds   EXTREMITIES:  No clubbing, cyanosis, or pedal  edema  NEUROLOGICAL: AA and confused .   Skin : No rashes .  Musculoskeletal : No joint swellings .    LABS:                        9.8    10.50 )-----------( 209      ( 31 Mar 2023 06:44 )             32.0     03-31    147<H>  |  119<H>  |  38<H>  ----------------------------<  234<H>  4.0   |  22  |  1.30    Ca    8.9      31 Mar 2023 06:44  Mg     2.2     03-30    TPro  6.6  /  Alb  2.3<L>  /  TBili  0.6  /  DBili  x   /  AST  23  /  ALT  30  /  AlkPhos  56  03-31        PT/INR - ( 31 Mar 2023 06:44 )   PT: 14.8 sec;   INR: 1.26 ratio           Urinalysis Basic - ( 30 Mar 2023 09:53 )    Color: Red / Appearance: Slightly Turbid / SG: >=1.030 / pH: x  Gluc: x / Ketone: Negative  / Bili: Negative / Urobili: <2 mg/dL   Blood: x / Protein: Trace / Nitrite: Negative   Leuk Esterase: Moderate / RBC: 10 /HPF /  /HPF   Sq Epi: x / Non Sq Epi: 0 /HPF / Bacteria: Moderate    Assessment/Plan  Patient has :  1) Left foot infection .  2) Hypertension and stable .  3) DM .  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  Will continue to follow during hospital course and give further recommendations as needed . Thanks for your referral . if any questions please contact me at office ([phone] cell [phone])      KARIME WYNN MD [place] [id number]  [address]  OFFICE : [phone]  CELL : [phone]  CARDIOLOGY F/U :       HPI:  88 yo male ,[facility] resident with PMHx - COPD, DM, HTN, CAD, HLD, H/O TIA, dementia,GERD, BPH and depression sent ot ER for evaluation of left foot 1metatarsal wound ,suggestive of osteomyelitis .Patient was seen by ID consult and transfer to the hospital recommended for wound cx/bone biopsy /podiatry evaluation ,likely will require 4-6 weeks of iv abx . Patient was admitted to [facility] with b/l feet wounds and was followed by wound care team ,recently completed 7 days of doxycycline for foot wound cellulitis . (30 Mar 2023 05:21)        SUBJECTIVE: Patient feeling better       ALLERGIES:  Allergies    No Known Allergies    Intolerances          MEDICATIONS  (STANDING):  albuterol/ipratropium for Nebulization 3 milliLiter(s) Nebulizer every 8 hours  atenolol  Tablet 25 milliGRAM(s) Oral daily  budesonide 160 MICROgram(s)/formoterol 4.5 MICROgram(s) Inhaler 2 Puff(s) Inhalation two times a day  dextrose 5%. 1000 milliLiter(s) (50 mL/Hr) IV Continuous <Continuous>  dextrose 5%. 1000 milliLiter(s) (100 mL/Hr) IV Continuous <Continuous>  dextrose 50% Injectable 25 Gram(s) IV Push once  dextrose 50% Injectable 12.5 Gram(s) IV Push once  dextrose 50% Injectable 25 Gram(s) IV Push once  donepezil 5 milliGRAM(s) Oral at bedtime  DULoxetine 60 milliGRAM(s) Oral daily  glucagon  Injectable 1 milliGRAM(s) IntraMuscular once  heparin   Injectable 5000 Unit(s) SubCutaneous every 12 hours  insulin glargine Injectable (LANTUS) 15 Unit(s) SubCutaneous at bedtime  insulin lispro (ADMELOG) corrective regimen sliding scale   SubCutaneous three times a day before meals  insulin lispro (ADMELOG) corrective regimen sliding scale   SubCutaneous at bedtime  insulin lispro Injectable (ADMELOG) 3 Unit(s) SubCutaneous three times a day before meals  lactobacillus acidophilus 1 Tablet(s) Oral two times a day with meals  losartan 50 milliGRAM(s) Oral daily  multivitamin 1 Tablet(s) Oral daily  pantoprazole   Suspension 40 milliGRAM(s) Oral daily  piperacillin/tazobactam IVPB.. 3.375 Gram(s) IV Intermittent every 8 hours  senna 2 Tablet(s) Oral at bedtime  simvastatin 40 milliGRAM(s) Oral at bedtime  tamsulosin 0.4 milliGRAM(s) Oral at bedtime    MEDICATIONS  (PRN):  acetaminophen     Tablet .. 650 milliGRAM(s) Oral every 6 hours PRN Temp greater or equal to 38C (100.4F), Mild Pain (1 - 3)  aluminum hydroxide/magnesium hydroxide/simethicone Suspension 30 milliLiter(s) Oral every 4 hours PRN Dyspepsia  bisacodyl Suppository 10 milliGRAM(s) Rectal daily PRN Constipation  dextrose Oral Gel 15 Gram(s) Oral once PRN Blood Glucose LESS THAN 70 milliGRAM(s)/deciliter  hydrALAZINE 50 milliGRAM(s) Oral every 6 hours PRN for systolic BP>160  magnesium hydroxide Suspension 30 milliLiter(s) Oral daily PRN Constipation  melatonin 3 milliGRAM(s) Oral at bedtime PRN Insomnia  morphine  - Injectable 2 milliGRAM(s) IV Push every 6 hours PRN Severe Pain (7 - 10)  ondansetron Injectable 4 milliGRAM(s) IV Push every 8 hours PRN Nausea and/or Vomiting  traMADol 50 milliGRAM(s) Oral four times a day PRN Moderate Pain (4 - 6)      REVIEW OF SYSTEMS:  CONSTITUTIONAL: No fever,  RESPIRATORY: No cough, wheezing, shortness of breath  CARDIOVASCULAR: No chest pain, dyspnea, palpitations, dizziness, syncope, paroxysmal nocturnal dyspnea, orthopnea, or arm or leg swelling  GASTROINTESTINAL: No abdominal  or epigastric pain, nausea, vomiting,  diarrhea  NEUROLOGICAL: No headaches,  loss of strength, numbness, or tremors    Vital Signs Last 24 Hrs  T(C): 37 (31 Mar 2023 12:26), Max: 37 (31 Mar 2023 12:26)  T(F): 98.6 (31 Mar 2023 12:26), Max: 98.6 (31 Mar 2023 12:26)  HR: 101 (31 Mar 2023 13:18) (88 - 103)  BP: 136/85 (31 Mar 2023 12:26) (136/85 - 157/82)  BP(mean): --  RR: 18 (31 Mar 2023 12:26) (18 - 18)  SpO2: 94% (31 Mar 2023 13:18) (92% - 94%)      PHYSICAL EXAM:  HEAD:  Atraumatic, Normocephalic  NECK: Supple and normal thyroid.  No JVD or carotid bruit.   HEART: S1, S2 regular , 1/6 soft ejection systolic murmur in mitral area , no thrill and no gallops .  PULMONARY: Bilateral vesicular breathing , few scattered rhonchi and few scattered rales are present .  ABDOMEN: Soft nontender and positive bowl sounds   EXTREMITIES:  No clubbing, cyanosis, or pedal  edema  NEUROLOGICAL: AA and confused .   Skin : No rashes .  Musculoskeletal : No joint swellings .    LABS:                        9.8    10.50 )-----------( 209      ( 31 Mar 2023 06:44 )             32.0     03-31    147<H>  |  119<H>  |  38<H>  ----------------------------<  234<H>  4.0   |  22  |  1.30    Ca    8.9      31 Mar 2023 06:44  Mg     2.2     03-30    TPro  6.6  /  Alb  2.3<L>  /  TBili  0.6  /  DBili  x   /  AST  23  /  ALT  30  /  AlkPhos  56  03-31        PT/INR - ( 31 Mar 2023 06:44 )   PT: 14.8 sec;   INR: 1.26 ratio           Urinalysis Basic - ( 30 Mar 2023 09:53 )    Color: Red / Appearance: Slightly Turbid / SG: >=1.030 / pH: x  Gluc: x / Ketone: Negative  / Bili: Negative / Urobili: <2 mg/dL   Blood: x / Protein: Trace / Nitrite: Negative   Leuk Esterase: Moderate / RBC: 10 /HPF /  /HPF   Sq Epi: x / Non Sq Epi: 0 /HPF / Bacteria: Moderate    Assessment/Plan  Patient has :  1) Left foot infection .  2) Hypertension and stable .  3) DM .  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  Will continue to follow during hospital course and give further recommendations as needed . Thanks for your referral . if any questions please contact me at office ([phone] cell [phone])      KARIME WYNN MD [place] [id number]  [address]  OFFICE : [phone]  CELL : [phone]  CARDIOLOGY F/U :       HPI:  88 yo male ,[facility] resident with PMHx - COPD, DM, HTN, CAD, HLD, H/O TIA, dementia,GERD, BPH and depression sent ot ER for evaluation of left foot 1metatarsal wound ,suggestive of osteomyelitis .Patient was seen by ID consult and transfer to the hospital recommended for wound cx/bone biopsy /podiatry evaluation ,likely will require 4-6 weeks of iv abx . Patient was admitted to [facility] with b/l feet wounds and was followed by wound care team ,recently completed 7 days of doxycycline for foot wound cellulitis . (30 Mar 2023 05:21)        SUBJECTIVE: Patient feeling better       ALLERGIES:  Allergies    No Known Allergies    Intolerances          MEDICATIONS  (STANDING):  albuterol/ipratropium for Nebulization 3 milliLiter(s) Nebulizer every 8 hours  atenolol  Tablet 25 milliGRAM(s) Oral daily  budesonide 160 MICROgram(s)/formoterol 4.5 MICROgram(s) Inhaler 2 Puff(s) Inhalation two times a day  dextrose 5%. 1000 milliLiter(s) (50 mL/Hr) IV Continuous <Continuous>  dextrose 5%. 1000 milliLiter(s) (100 mL/Hr) IV Continuous <Continuous>  dextrose 50% Injectable 25 Gram(s) IV Push once  dextrose 50% Injectable 12.5 Gram(s) IV Push once  dextrose 50% Injectable 25 Gram(s) IV Push once  donepezil 5 milliGRAM(s) Oral at bedtime  DULoxetine 60 milliGRAM(s) Oral daily  glucagon  Injectable 1 milliGRAM(s) IntraMuscular once  heparin   Injectable 5000 Unit(s) SubCutaneous every 12 hours  insulin glargine Injectable (LANTUS) 15 Unit(s) SubCutaneous at bedtime  insulin lispro (ADMELOG) corrective regimen sliding scale   SubCutaneous three times a day before meals  insulin lispro (ADMELOG) corrective regimen sliding scale   SubCutaneous at bedtime  insulin lispro Injectable (ADMELOG) 3 Unit(s) SubCutaneous three times a day before meals  lactobacillus acidophilus 1 Tablet(s) Oral two times a day with meals  losartan 50 milliGRAM(s) Oral daily  multivitamin 1 Tablet(s) Oral daily  pantoprazole   Suspension 40 milliGRAM(s) Oral daily  piperacillin/tazobactam IVPB.. 3.375 Gram(s) IV Intermittent every 8 hours  senna 2 Tablet(s) Oral at bedtime  simvastatin 40 milliGRAM(s) Oral at bedtime  tamsulosin 0.4 milliGRAM(s) Oral at bedtime    MEDICATIONS  (PRN):  acetaminophen     Tablet .. 650 milliGRAM(s) Oral every 6 hours PRN Temp greater or equal to 38C (100.4F), Mild Pain (1 - 3)  aluminum hydroxide/magnesium hydroxide/simethicone Suspension 30 milliLiter(s) Oral every 4 hours PRN Dyspepsia  bisacodyl Suppository 10 milliGRAM(s) Rectal daily PRN Constipation  dextrose Oral Gel 15 Gram(s) Oral once PRN Blood Glucose LESS THAN 70 milliGRAM(s)/deciliter  hydrALAZINE 50 milliGRAM(s) Oral every 6 hours PRN for systolic BP>160  magnesium hydroxide Suspension 30 milliLiter(s) Oral daily PRN Constipation  melatonin 3 milliGRAM(s) Oral at bedtime PRN Insomnia  morphine  - Injectable 2 milliGRAM(s) IV Push every 6 hours PRN Severe Pain (7 - 10)  ondansetron Injectable 4 milliGRAM(s) IV Push every 8 hours PRN Nausea and/or Vomiting  traMADol 50 milliGRAM(s) Oral four times a day PRN Moderate Pain (4 - 6)      REVIEW OF SYSTEMS:  CONSTITUTIONAL: No fever,  RESPIRATORY: No cough, wheezing, shortness of breath  CARDIOVASCULAR: No chest pain, dyspnea, palpitations, dizziness, syncope, paroxysmal nocturnal dyspnea, orthopnea, or arm or leg swelling  GASTROINTESTINAL: No abdominal  or epigastric pain, nausea, vomiting,  diarrhea  NEUROLOGICAL: No headaches, numbness, or tremors  Skin : No itching .  Hematology : No active bleeding .  Endocrinology : No heat and cold intolerance .  Psychiatry : Patient is confused .  Genitourinary : No dysuria .  Musculoskeletal : Patient has mild arthritis .      Vital Signs Last 24 Hrs  T(C): 37 (31 Mar 2023 12:26), Max: 37 (31 Mar 2023 12:26)  T(F): 98.6 (31 Mar 2023 12:26), Max: 98.6 (31 Mar 2023 12:26)  HR: 101 (31 Mar 2023 13:18) (88 - 103)  BP: 136/85 (31 Mar 2023 12:26) (136/85 - 157/82)  BP(mean): --  RR: 18 (31 Mar 2023 12:26) (18 - 18)  SpO2: 94% (31 Mar 2023 13:18) (92% - 94%)      PHYSICAL EXAM:  HEAD:  Atraumatic, Normocephalic  NECK: Supple and normal thyroid.  No JVD or carotid bruit.   HEART: S1, S2 regular , 1/6 soft ejection systolic murmur in mitral area , no thrill and no gallops .  PULMONARY: Bilateral vesicular breathing , few scattered rhonchi and few scattered rales are present .  ABDOMEN: Soft nontender and positive bowl sounds   EXTREMITIES:  No clubbing, cyanosis, or pedal  edema  NEUROLOGICAL: AA and confused .   Skin : No rashes .  Musculoskeletal : No joint swellings .    LABS:                        9.8    10.50 )-----------( 209      ( 31 Mar 2023 06:44 )             32.0     03-31    147<H>  |  119<H>  |  38<H>  ----------------------------<  234<H>  4.0   |  22  |  1.30    Ca    8.9      31 Mar 2023 06:44  Mg     2.2     03-30    TPro  6.6  /  Alb  2.3<L>  /  TBili  0.6  /  DBili  x   /  AST  23  /  ALT  30  /  AlkPhos  56  03-31        PT/INR - ( 31 Mar 2023 06:44 )   PT: 14.8 sec;   INR: 1.26 ratio           Urinalysis Basic - ( 30 Mar 2023 09:53 )    Color: Red / Appearance: Slightly Turbid / SG: >=1.030 / pH: x  Gluc: x / Ketone: Negative  / Bili: Negative / Urobili: <2 mg/dL   Blood: x / Protein: Trace / Nitrite: Negative   Leuk Esterase: Moderate / RBC: 10 /HPF /  /HPF   Sq Epi: x / Non Sq Epi: 0 /HPF / Bacteria: Moderate    Assessment/Plan  Patient has :  1) Left foot infection .  2) Hypertension and stable .  3) DM .  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  Will continue to follow during hospital course and give further recommendations as needed . Thanks for your referral . if any questions please contact me at office ([phone] cell [phone])      KARIME WYNN MD [place] [id number]  [address]  OFFICE : [phone]  CELL : [phone]  CARDIOLOGY F/U :       HPI:  88 yo male ,[facility] resident with PMHx - COPD, DM, HTN, CAD, HLD, H/O TIA, dementia,GERD, BPH and depression sent ot ER for evaluation of left foot 1metatarsal wound ,suggestive of osteomyelitis .Patient was seen by ID consult and transfer to the hospital recommended for wound cx/bone biopsy /podiatry evaluation ,likely will require 4-6 weeks of iv abx . Patient was admitted to [facility] with b/l feet wounds and was followed by wound care team ,recently completed 7 days of doxycycline for foot wound cellulitis . (30 Mar 2023 05:21)        SUBJECTIVE: Patient feeling better       ALLERGIES:  Allergies    No Known Allergies    Intolerances          MEDICATIONS  (STANDING):  albuterol/ipratropium for Nebulization 3 milliLiter(s) Nebulizer every 8 hours  atenolol  Tablet 25 milliGRAM(s) Oral daily  budesonide 160 MICROgram(s)/formoterol 4.5 MICROgram(s) Inhaler 2 Puff(s) Inhalation two times a day  dextrose 5%. 1000 milliLiter(s) (50 mL/Hr) IV Continuous <Continuous>  dextrose 5%. 1000 milliLiter(s) (100 mL/Hr) IV Continuous <Continuous>  dextrose 50% Injectable 25 Gram(s) IV Push once  dextrose 50% Injectable 12.5 Gram(s) IV Push once  dextrose 50% Injectable 25 Gram(s) IV Push once  donepezil 5 milliGRAM(s) Oral at bedtime  DULoxetine 60 milliGRAM(s) Oral daily  glucagon  Injectable 1 milliGRAM(s) IntraMuscular once  heparin   Injectable 5000 Unit(s) SubCutaneous every 12 hours  insulin glargine Injectable (LANTUS) 15 Unit(s) SubCutaneous at bedtime  insulin lispro (ADMELOG) corrective regimen sliding scale   SubCutaneous three times a day before meals  insulin lispro (ADMELOG) corrective regimen sliding scale   SubCutaneous at bedtime  insulin lispro Injectable (ADMELOG) 3 Unit(s) SubCutaneous three times a day before meals  lactobacillus acidophilus 1 Tablet(s) Oral two times a day with meals  losartan 50 milliGRAM(s) Oral daily  multivitamin 1 Tablet(s) Oral daily  pantoprazole   Suspension 40 milliGRAM(s) Oral daily  piperacillin/tazobactam IVPB.. 3.375 Gram(s) IV Intermittent every 8 hours  senna 2 Tablet(s) Oral at bedtime  simvastatin 40 milliGRAM(s) Oral at bedtime  tamsulosin 0.4 milliGRAM(s) Oral at bedtime    MEDICATIONS  (PRN):  acetaminophen     Tablet .. 650 milliGRAM(s) Oral every 6 hours PRN Temp greater or equal to 38C (100.4F), Mild Pain (1 - 3)  aluminum hydroxide/magnesium hydroxide/simethicone Suspension 30 milliLiter(s) Oral every 4 hours PRN Dyspepsia  bisacodyl Suppository 10 milliGRAM(s) Rectal daily PRN Constipation  dextrose Oral Gel 15 Gram(s) Oral once PRN Blood Glucose LESS THAN 70 milliGRAM(s)/deciliter  hydrALAZINE 50 milliGRAM(s) Oral every 6 hours PRN for systolic BP>160  magnesium hydroxide Suspension 30 milliLiter(s) Oral daily PRN Constipation  melatonin 3 milliGRAM(s) Oral at bedtime PRN Insomnia  morphine  - Injectable 2 milliGRAM(s) IV Push every 6 hours PRN Severe Pain (7 - 10)  ondansetron Injectable 4 milliGRAM(s) IV Push every 8 hours PRN Nausea and/or Vomiting  traMADol 50 milliGRAM(s) Oral four times a day PRN Moderate Pain (4 - 6)      REVIEW OF SYSTEMS:  CONSTITUTIONAL: No fever,  RESPIRATORY: No cough, wheezing, shortness of breath  CARDIOVASCULAR: No chest pain, dyspnea, palpitations, dizziness, syncope, paroxysmal nocturnal dyspnea, orthopnea, or arm or leg swelling  GASTROINTESTINAL: No abdominal  or epigastric pain, nausea, vomiting,  diarrhea  NEUROLOGICAL: No headaches, numbness, or tremors  Skin : No itching .  Hematology : No active bleeding .  Endocrinology : No heat and cold intolerance .  Psychiatry : Patient is confused .  Genitourinary : No dysuria .  Musculoskeletal : Patient has mild arthritis .      Vital Signs Last 24 Hrs  T(C): 37 (31 Mar 2023 12:26), Max: 37 (31 Mar 2023 12:26)  T(F): 98.6 (31 Mar 2023 12:26), Max: 98.6 (31 Mar 2023 12:26)  HR: 101 (31 Mar 2023 13:18) (88 - 103)  BP: 136/85 (31 Mar 2023 12:26) (136/85 - 157/82)  BP(mean): --  RR: 18 (31 Mar 2023 12:26) (18 - 18)  SpO2: 94% (31 Mar 2023 13:18) (92% - 94%)      PHYSICAL EXAM:  HEAD:  Atraumatic, Normocephalic  NECK: Supple and normal thyroid.  No JVD or carotid bruit.   HEART: S1, S2 regular , 1/6 soft ejection systolic murmur in mitral area , no thrill and no gallops .  PULMONARY: Bilateral vesicular breathing , few scattered rhonchi and few scattered rales are present .  ABDOMEN: Soft nontender and positive bowl sounds   EXTREMITIES:  No clubbing, cyanosis, or pedal  edema  NEUROLOGICAL: AA and confused .   Skin : No rashes .  Musculoskeletal : No joint swellings .    LABS:                        9.8    10.50 )-----------( 209      ( 31 Mar 2023 06:44 )             32.0     03-31    147<H>  |  119<H>  |  38<H>  ----------------------------<  234<H>  4.0   |  22  |  1.30    Ca    8.9      31 Mar 2023 06:44  Mg     2.2     03-30    TPro  6.6  /  Alb  2.3<L>  /  TBili  0.6  /  DBili  x   /  AST  23  /  ALT  30  /  AlkPhos  56  03-31        PT/INR - ( 31 Mar 2023 06:44 )   PT: 14.8 sec;   INR: 1.26 ratio           Urinalysis Basic - ( 30 Mar 2023 09:53 )    Color: Red / Appearance: Slightly Turbid / SG: >=1.030 / pH: x  Gluc: x / Ketone: Negative  / Bili: Negative / Urobili: <2 mg/dL   Blood: x / Protein: Trace / Nitrite: Negative   Leuk Esterase: Moderate / RBC: 10 /HPF /  /HPF   Sq Epi: x / Non Sq Epi: 0 /HPF / Bacteria: Moderate    Assessment/Plan  Patient has :  1) Left foot infection .  2) Hypertension and stable .  3) DM .  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  Will continue to follow during hospital course and give further recommendations as needed . Thanks for your referral . if any questions please contact me at office ([phone] cell [phone])      KARIME WYNN MD [place] [id number]  [address]  OFFICE : [phone]  CELL : [phone]  CARDIOLOGY F/U :       HPI:  86 yo male ,[facility] resident with PMHx - COPD, DM, HTN, CAD, HLD, H/O TIA, dementia,GERD, BPH and depression sent ot ER for evaluation of left foot 1metatarsal wound ,suggestive of osteomyelitis .Patient was seen by ID consult and transfer to the hospital recommended for wound cx/bone biopsy /podiatry evaluation ,likely will require 4-6 weeks of iv abx . Patient was admitted to [facility] with b/l feet wounds and was followed by wound care team ,recently completed 7 days of doxycycline for foot wound cellulitis . (30 Mar 2023 05:21)        SUBJECTIVE: Patient feeling better       ALLERGIES:  Allergies    No Known Allergies    Intolerances          MEDICATIONS  (STANDING):  albuterol/ipratropium for Nebulization 3 milliLiter(s) Nebulizer every 8 hours  atenolol  Tablet 25 milliGRAM(s) Oral daily  budesonide 160 MICROgram(s)/formoterol 4.5 MICROgram(s) Inhaler 2 Puff(s) Inhalation two times a day  dextrose 5%. 1000 milliLiter(s) (50 mL/Hr) IV Continuous <Continuous>  dextrose 5%. 1000 milliLiter(s) (100 mL/Hr) IV Continuous <Continuous>  dextrose 50% Injectable 25 Gram(s) IV Push once  dextrose 50% Injectable 12.5 Gram(s) IV Push once  dextrose 50% Injectable 25 Gram(s) IV Push once  donepezil 5 milliGRAM(s) Oral at bedtime  DULoxetine 60 milliGRAM(s) Oral daily  glucagon  Injectable 1 milliGRAM(s) IntraMuscular once  heparin   Injectable 5000 Unit(s) SubCutaneous every 12 hours  insulin glargine Injectable (LANTUS) 15 Unit(s) SubCutaneous at bedtime  insulin lispro (ADMELOG) corrective regimen sliding scale   SubCutaneous three times a day before meals  insulin lispro (ADMELOG) corrective regimen sliding scale   SubCutaneous at bedtime  insulin lispro Injectable (ADMELOG) 3 Unit(s) SubCutaneous three times a day before meals  lactobacillus acidophilus 1 Tablet(s) Oral two times a day with meals  losartan 50 milliGRAM(s) Oral daily  multivitamin 1 Tablet(s) Oral daily  pantoprazole   Suspension 40 milliGRAM(s) Oral daily  piperacillin/tazobactam IVPB.. 3.375 Gram(s) IV Intermittent every 8 hours  senna 2 Tablet(s) Oral at bedtime  simvastatin 40 milliGRAM(s) Oral at bedtime  tamsulosin 0.4 milliGRAM(s) Oral at bedtime    MEDICATIONS  (PRN):  acetaminophen     Tablet .. 650 milliGRAM(s) Oral every 6 hours PRN Temp greater or equal to 38C (100.4F), Mild Pain (1 - 3)  aluminum hydroxide/magnesium hydroxide/simethicone Suspension 30 milliLiter(s) Oral every 4 hours PRN Dyspepsia  bisacodyl Suppository 10 milliGRAM(s) Rectal daily PRN Constipation  dextrose Oral Gel 15 Gram(s) Oral once PRN Blood Glucose LESS THAN 70 milliGRAM(s)/deciliter  hydrALAZINE 50 milliGRAM(s) Oral every 6 hours PRN for systolic BP>160  magnesium hydroxide Suspension 30 milliLiter(s) Oral daily PRN Constipation  melatonin 3 milliGRAM(s) Oral at bedtime PRN Insomnia  morphine  - Injectable 2 milliGRAM(s) IV Push every 6 hours PRN Severe Pain (7 - 10)  ondansetron Injectable 4 milliGRAM(s) IV Push every 8 hours PRN Nausea and/or Vomiting  traMADol 50 milliGRAM(s) Oral four times a day PRN Moderate Pain (4 - 6)      REVIEW OF SYSTEMS:  CONSTITUTIONAL: No fever,  RESPIRATORY: No cough, wheezing, shortness of breath  CARDIOVASCULAR: No chest pain, dyspnea, palpitations, dizziness, syncope, paroxysmal nocturnal dyspnea, orthopnea, or arm or leg swelling  GASTROINTESTINAL: No abdominal  or epigastric pain, nausea, vomiting,  diarrhea  NEUROLOGICAL: No headaches, numbness, or tremors  Skin : No itching .  Hematology : No active bleeding .  Endocrinology : No heat and cold intolerance .  Psychiatry : Patient is confused .  Genitourinary : No dysuria .  Musculoskeletal : Patient has mild arthritis .      Vital Signs Last 24 Hrs  T(C): 37 (31 Mar 2023 12:26), Max: 37 (31 Mar 2023 12:26)  T(F): 98.6 (31 Mar 2023 12:26), Max: 98.6 (31 Mar 2023 12:26)  HR: 101 (31 Mar 2023 13:18) (88 - 103)  BP: 136/85 (31 Mar 2023 12:26) (136/85 - 157/82)  BP(mean): --  RR: 18 (31 Mar 2023 12:26) (18 - 18)  SpO2: 94% (31 Mar 2023 13:18) (92% - 94%)      PHYSICAL EXAM:  HEAD:  Atraumatic, Normocephalic  NECK: Supple and normal thyroid.  No JVD or carotid bruit.   HEART: S1, S2 regular , 1/6 soft ejection systolic murmur in mitral area , no thrill and no gallops .  PULMONARY: Bilateral vesicular breathing , few scattered rhonchi and few scattered rales are present .  ABDOMEN: Soft nontender and positive bowl sounds   EXTREMITIES:  No clubbing, cyanosis, or pedal  edema  NEUROLOGICAL: AA and confused .   Skin : No rashes .  Musculoskeletal : No joint swellings .    LABS:                        9.8    10.50 )-----------( 209      ( 31 Mar 2023 06:44 )             32.0     03-31    147<H>  |  119<H>  |  38<H>  ----------------------------<  234<H>  4.0   |  22  |  1.30    Ca    8.9      31 Mar 2023 06:44  Mg     2.2     03-30    TPro  6.6  /  Alb  2.3<L>  /  TBili  0.6  /  DBili  x   /  AST  23  /  ALT  30  /  AlkPhos  56  03-31        PT/INR - ( 31 Mar 2023 06:44 )   PT: 14.8 sec;   INR: 1.26 ratio           Urinalysis Basic - ( 30 Mar 2023 09:53 )    Color: Red / Appearance: Slightly Turbid / SG: >=1.030 / pH: x  Gluc: x / Ketone: Negative  / Bili: Negative / Urobili: <2 mg/dL   Blood: x / Protein: Trace / Nitrite: Negative   Leuk Esterase: Moderate / RBC: 10 /HPF /  /HPF   Sq Epi: x / Non Sq Epi: 0 /HPF / Bacteria: Moderate    Assessment/Plan  Patient has :  1) Left foot infection .  2) Hypertension and stable .  3) DM .  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  Will continue to follow during hospital course and give further recommendations as needed . Thanks for your referral . if any questions please contact me at office ([phone] cell [phone])

## 2023-03-30 NOTE — PATIENT PROFILE ADULT - FALL HARM RISK - HARM RISK INTERVENTIONS
Assistance with ambulation/Assistance OOB with selected safe patient handling equipment/Communicate Risk of Fall with Harm to all staff/Reinforce activity limits and safety measures with patient and family/Reorient to person, place and time as needed/Tailored Fall Risk Interventions/Use of alarms - bed, chair and/or voice tab/Visual Cue: Yellow wristband and red socks/Bed in lowest position, wheels locked, appropriate side rails in place/Call bell, personal items and telephone in reach/Instruct patient to call for assistance before getting out of bed or chair/Non-slip footwear when patient is out of bed/[place] to call system/Physically safe environment - no spills, clutter or unnecessary equipment/Purposeful Proactive Rounding/Room/bathroom lighting operational, light cord in reach Assistance with ambulation/Assistance OOB with selected safe patient handling equipment/Communicate Risk of Fall with Harm to all staff/Reinforce activity limits and safety measures with patient and family/Reorient to person, place and time as needed/Tailored Fall Risk Interventions/Use of alarms - bed, chair and/or voice tab/Visual Cue: Yellow wristband and red socks/Bed in lowest position, wheels locked, appropriate side rails in place/Call bell, personal items and telephone in reach/Instruct patient to call for assistance before getting out of bed or chair/Non-slip footwear when patient is out of bed/[place] to call system/Physically safe environment - no spills, clutter or unnecessary equipment/Purposeful Proactive Rounding/Room/bathroom lighting operational, light cord in reach Assistance with ambulation/Assistance OOB with selected safe patient handling equipment/Communicate Risk of Fall with Harm to all staff/Reinforce activity limits and safety measures with patient and family/Reorient to person, place and time as needed/Tailored Fall Risk Interventions/Use of alarms - bed, chair and/or voice tab/Visual Cue: Yellow wristband and red socks/Bed in lowest position, wheels locked, appropriate side rails in place/Call bell, personal items and telephone in reach/Instruct patient to call for assistance before getting out of bed or chair/Non-slip footwear when patient is out of bed/[place] to call system/Physically safe environment - no spills, clutter or unnecessary equipment/Purposeful Proactive Rounding/Room/bathroom lighting operational, light cord in reach

## 2023-03-30 NOTE — SWALLOW BEDSIDE ASSESSMENT ADULT - ASR SWALLOW ASPIRATION MONITOR
change of breathing pattern/oral hygiene/position upright (90Y)/cough/gurgly voice/fever/pneumonia/throat clearing/upper respiratory infection Aspiration precautions-if any s/s penetration/aspiration noted, d/c PO & initiate NPO with SLP to reassess/change of breathing pattern/oral hygiene/position upright (90Y)/cough/gurgly voice/fever/pneumonia/throat clearing/upper respiratory infection

## 2023-03-30 NOTE — DIETITIAN INITIAL EVALUATION ADULT - ETIOLOGY
related to decreased ability to consume sufficient energy, hx of dementia, advanced age related to increased demand for nutrient (protein, vit/min)

## 2023-03-30 NOTE — PATIENT PROFILE ADULT - FUNCTIONAL ASSESSMENT - BASIC MOBILITY 6.
1-calculated by average/Not able to assess (calculate score using [facility] averaging method)  1-calculated by average/Not able to assess (calculate score using [facility] averaging method)  1-calculated by average/Not able to assess (calculate score using [facility] averaging method)

## 2023-03-30 NOTE — CARE COORDINATION ASSESSMENT. - TRANSPORTATION UTILIZED PRIOR TO ADMISSION
spouse was assisting with transport but currently unable to drive - son Ghulam very involved and able to assist with transportation once home./family/friend provides transportation

## 2023-03-30 NOTE — H&P ADULT - ASSESSMENT
86 yo male ,[facility] resident with PMHx - COPD, DM, HTN, CAD, HLD, H/O TIA, dementia,GERD, BPH and depression sent ot ER for evaluation of left foot 1metatarsal wound ,suggestive of osteomyelitis .Patient was seen by ID consult and transfer to the hospital recommended for wound cx/bone biopsy /podiatry evaluation ,likely will require 4-6 weeks of iv abx . Patient was admitted to [facility] with b/l feet wounds and was followed by wound care team ,recently completed 7 days of doxycycline for foot wound cellulitis . 88 yo male ,[facility] resident with PMHx - COPD, DM, HTN, CAD, HLD, H/O TIA, dementia,GERD, BPH and depression sent ot ER for evaluation of left foot 1metatarsal wound ,suggestive of osteomyelitis .Patient was seen by ID consult and transfer to the hospital recommended for wound cx/bone biopsy /podiatry evaluation ,likely will require 4-6 weeks of iv abx . Patient was admitted to [facility] with b/l feet wounds and was followed by wound care team ,recently completed 7 days of doxycycline for foot wound cellulitis . 86 yo male ,[facility] resident with PMHx - COPD, DM, HTN, CAD, HLD, H/O TIA, dementia,GERD, BPH and depression sent ot ER for evaluation of left foot 1metatarsal wound ,suggestive of osteomyelitis .Patient was seen by ID consult and transfer to the hospital recommended for wound cx/bone biopsy /podiatry evaluation ,likely will require 4-6 weeks of iv abx . Patient was admitted to [facility] with b/l feet wounds and was followed by wound care team ,recently completed 7 days of doxycycline for foot wound cellulitis .

## 2023-03-30 NOTE — H&P ADULT - TIME BILLING
75minutes spent on this visit, 50% visit time spent in care co-ordination with other attendings and counselling patient ,writing admission orders ( see complete and current orders and order section) ,requesting necessary consults ,informing family about status & plan of care .I have discussed care plan with [facility] wellness/admitting /nursing   department ,outpatient PCP , hospital consultants , ER physician & med staff . 75minutes spent on this visit, 50% visit time spent in care co-ordination with other attendings and counselling patient ,writing admission orders ( see complete and current orders and order section) ,requesting necessary consults ,informing family about status & plan of care .I have discussed care plan with [facility] wellness/admitting /nursing   department ,outpatient PCP , hospital consultants , ER physician & med staff . 75minutes spent on this visit, 50% visit time spent in care co-ordination with other attendings and counselling patient ,writing admission orders ( see complete and current orders and order section) ,requesting necessary consults ,informing family about status & plan of care .I have discussed care plan with [facility] wellness/admitting /nursing   department ,outpatient PCP , hospital consultants , ER physician & med staff .

## 2023-03-30 NOTE — CONSULT NOTE ADULT - PROBLEM SELECTOR RECOMMENDATION 9
Type 2 A1c 8.0% adm leg wound  add 8 units lantus in AM  c/w moderate ISS  c/w CC diet and accucheck ACHS  Recommend endocrine-Perlman onconsult  FU appt: TBA, return to SNF  DSC recommendations: return to SNF with regimen and glucose monitoring  diabetes education provided as documented above  Diabetes support info and cell # [phone] given   Goal 100-180 mg/dL; 140-180 mg/dL in critical care areas Type 2 A1c 8.0% adm leg wound  add 8 units lantus in AM  c/w moderate ISS  c/w CC diet and accucheck ACHS  Recommend endocrine-Perlman onconsult  FU appt: TBA, return to SNF  DSC recommendations: return to SNF with regimen and glucose monitoring  diabetes education provided as documented above  Diabetes support info and cell # [phone] given   Goal 100-180 mg/dL; 140-180 mg/dL in critical care areas Type 2 A1c 8.0% adm leg wound  add 8 units lantus in AM  c/w moderate ISS  c/w CC diet and accucheck ACHS  Recommend endocrine-Perlman onconsult  FU appt: TBA, return to SNF  DSC recommendations: return to SNF with regimen and glucose monitoring  diabetes education provided as documented above  Diabetes support info and cell # [phone] given   Goal 100-180 mg/dL; 140-180 mg/dL in critical care areas

## 2023-03-30 NOTE — CONSULT NOTE ADULT - ASSESSMENT
86 y/o M with advanced dementia presents with BLE wound L>R  Admitted for workup and IV antibiotics  Pt unable to provide any reliable information  Reports walks independently  Will obtain ALANNA/PVRs to provide input on wound healing potential  Pt does not appear to be a surgical candidate however in light of his advanced dementia, intermittent behavioral disturbances and malnutrition  Would consider conservative approach and GOC conversation  Discussed with Dr Lucas  Further recommendations pending imaging

## 2023-03-30 NOTE — DIETITIAN INITIAL EVALUATION ADULT - NS FNS DIET ORDER
Diet, DASH/TLC:   Sodium & Cholesterol Restricted  Consistent Carbohydrate {Evening Snack}  Soft and Bite Sized (SOFTBTSZ)  Supplement Feeding Modality:  Oral  Glucerna Shake Cans or Servings Per Day:  1       Frequency:  Daily (03-30-23 @ 05:10)   Diet, DASH/TLC:   Sodium & Cholesterol Restricted  Consistent Carbohydrate {Evening Snack}  Soft and Bite Sized (SOFTBTSZ)  Supplement Feeding Modality:  Oral  Glucerna Shake Cans or Servings Per Day:  1       Frequency:  Daily (03-30-23 @ 05:10)

## 2023-03-30 NOTE — PATIENT PROFILE ADULT - NSPROPTRIGHTSUPPORTPHONE_GEN_A_NUR
[address] [place] [address]  Phone: [phone]  Fax: [phone]    Kenneth Mae  [id number]   1946 6/30/2022      Dear, Ellie Mendez    I would like to thank you for the kind referral of Jessie Mae. She presented to the office today for evaluation regarding chronic pain and swelling right ankle. 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 Conservative care recommended, patient was dispensed ankle brace and shoe recommendations were discussed. We will have continued follow-up until issues are improved. If you should have any questions concerning her visit today, please do not hesitate to contact me.     Sincerely,    Lux Alvarez DPM [phone]

## 2023-03-30 NOTE — DIETITIAN INITIAL EVALUATION ADULT - ADD RECOMMEND
1) Continue DASH/TLC, consistent carbohydrate diet; defer diet texture/consistency to SLP pending swallow evaluation  2) Recommend Reginald BID, continue Glucerna daily  3) Continue MVI daily  4) Monitor po intake, diet tolerance, weight trends, labs, GI function, skin integrity

## 2023-03-30 NOTE — H&P ADULT - NSICDXPASTMEDICALHX_GEN_ALL_CORE_FT
PAST MEDICAL HISTORY:  ASHD (arteriosclerotic heart disease)     BPH without urinary obstruction     Brain TIA     Cellulitis     Chronic GERD     COPD, moderate     Dementia     DM type 2, not at goal     History of RSV infection     HLD (hyperlipidemia)     HTN (hypertension)     MDD (major depressive disorder)     Moderate protein-calorie malnutrition     Multiple open wounds of foot     Obstructive and reflux uropathy     Pressure ulcer of unspecified heel, unspecified stage     Sepsis     Stage 3 chronic kidney disease     Venous stasis ulcer without varicose veins

## 2023-03-30 NOTE — PATIENT CHOICE NOTE. - NSPTCHOICENOTES_GEN_ALL_CORE
Pt spouse requesting that pt return to [facility] upon d/c. Pt spouse requesting that pt return to [facility] upon d/c. Pt spouse requesting that pt return to [facility] upon d/c.

## 2023-03-30 NOTE — H&P ADULT - HISTORY OF PRESENT ILLNESS
86 yo male ,[facility] resident with PMHx - COPD, DM, HTN, CAD, HLD, H/O TIA, dementia,GERD, BPH and depression sent ot ER for evaluation of left foot 1metatarsal wound ,suggestive of osteomyelitis .Patient was seen by ID consult and transfer to the hospital recommended for wound cx/bone biopsy /podiatry evaluation ,likely will require 4-6 weeks of iv abx . Patient was admitted to [facility] with b/l feet wounds and was followed by wound care team ,recently completed 7 days of doxycycline for foot wound cellulitis . 86 yo male ,[facility] resident with PMHx - COPD, DM, HTN, CAD, HLD, H/O TIA, dementia,GERD, BPH and depression sent ot ER for evaluation of left foot 1metatarsal wound ,suggestive of osteomyelitis .Patient was seen by ID consult and transfer to the hospital recommended for wound cx/bone biopsy /podiatry evaluation ,likely will require 4-6 weeks of iv abx . Patient was admitted to [facility] with b/l feet wounds and was followed by wound care team ,recently completed 7 days of doxycycline for foot wound cellulitis . 88 yo male ,[facility] resident with PMHx - COPD, DM, HTN, CAD, HLD, H/O TIA, dementia,GERD, BPH and depression sent ot ER for evaluation of left foot 1metatarsal wound ,suggestive of osteomyelitis .Patient was seen by ID consult and transfer to the hospital recommended for wound cx/bone biopsy /podiatry evaluation ,likely will require 4-6 weeks of iv abx . Patient was admitted to [facility] with b/l feet wounds and was followed by wound care team ,recently completed 7 days of doxycycline for foot wound cellulitis .

## 2023-03-30 NOTE — CARE COORDINATION ASSESSMENT. - NSPASTMEDSURGHISTORY_GEN_ALL_CORE_FT
PAST MEDICAL & SURGICAL HISTORY:  Multiple open wounds of foot      Venous stasis ulcer without varicose veins      Pressure ulcer of unspecified heel, unspecified stage      DM type 2, not at goal      History of RSV infection      Brain TIA      Moderate protein-calorie malnutrition      Dementia      Sepsis      HTN (hypertension)      Cellulitis      Obstructive and reflux uropathy      MDD (major depressive disorder)      HLD (hyperlipidemia)      Chronic GERD      Stage 3 chronic kidney disease      COPD, moderate      BPH without urinary obstruction      ASHD (arteriosclerotic heart disease)

## 2023-03-30 NOTE — CONSULT NOTE ADULT - SUBJECTIVE AND OBJECTIVE BOX
HPI:  86 yo male ,[facility] resident with PMHx - COPD, DM, HTN, CAD, HLD, H/O TIA, dementia,GERD, BPH and depression sent ot ER for evaluation of left foot 1metatarsal wound ,suggestive of osteomyelitis .Patient was seen by ID consult and transfer to the hospital recommended for wound cx/bone biopsy /podiatry evaluation ,likely will require 4-6 weeks of iv abx . Patient was admitted to [facility] with b/l feet wounds and was followed by wound care team ,recently completed 7 days of doxycycline for foot wound cellulitis . (30 Mar 2023 05:21)      87y year old Male seen at [facility] 1EAS 100 D1 for ----------.  Denies any fever, chills, nausea, vomiting, chest pain, shortness of breath, or calf pain at this time.    REVIEW OF SYSTEMS    PAST MEDICAL & SURGICAL HISTORY:  ASHD (arteriosclerotic heart disease)      BPH without urinary obstruction      COPD, moderate      Stage 3 chronic kidney disease      Chronic GERD      HLD (hyperlipidemia)      MDD (major depressive disorder)      Obstructive and reflux uropathy      Cellulitis      HTN (hypertension)      Sepsis      Dementia      Moderate protein-calorie malnutrition      Brain TIA      History of RSV infection      DM type 2, not at goal      Pressure ulcer of unspecified heel, unspecified stage      Venous stasis ulcer without varicose veins      Multiple open wounds of foot          Allergies    No Known Allergies    Intolerances        MEDICATIONS  (STANDING):  albuterol/ipratropium for Nebulization 3 milliLiter(s) Nebulizer every 8 hours  atenolol  Tablet 25 milliGRAM(s) Oral daily  budesonide 160 MICROgram(s)/formoterol 4.5 MICROgram(s) Inhaler 2 Puff(s) Inhalation two times a day  dextrose 5%. 1000 milliLiter(s) (50 mL/Hr) IV Continuous <Continuous>  dextrose 5%. 1000 milliLiter(s) (100 mL/Hr) IV Continuous <Continuous>  dextrose 50% Injectable 25 Gram(s) IV Push once  dextrose 50% Injectable 12.5 Gram(s) IV Push once  dextrose 50% Injectable 25 Gram(s) IV Push once  donepezil 5 milliGRAM(s) Oral at bedtime  DULoxetine 60 milliGRAM(s) Oral daily  glucagon  Injectable 1 milliGRAM(s) IntraMuscular once  heparin   Injectable 5000 Unit(s) SubCutaneous every 12 hours  insulin glargine Injectable (LANTUS) 8 Unit(s) SubCutaneous once  insulin lispro (ADMELOG) corrective regimen sliding scale   SubCutaneous three times a day before meals  insulin lispro (ADMELOG) corrective regimen sliding scale   SubCutaneous at bedtime  lactobacillus acidophilus 1 Tablet(s) Oral two times a day with meals  losartan 25 milliGRAM(s) Oral daily  multivitamin 1 Tablet(s) Oral daily  pantoprazole    Tablet 40 milliGRAM(s) Oral daily  piperacillin/tazobactam IVPB.- 3.375 Gram(s) IV Intermittent once  piperacillin/tazobactam IVPB.. 3.375 Gram(s) IV Intermittent every 8 hours  senna 2 Tablet(s) Oral at bedtime  simvastatin 40 milliGRAM(s) Oral at bedtime  sodium chloride 0.9%. 1000 milliLiter(s) (50 mL/Hr) IV Continuous <Continuous>  tamsulosin 0.4 milliGRAM(s) Oral at bedtime    MEDICATIONS  (PRN):  acetaminophen     Tablet .. 650 milliGRAM(s) Oral every 6 hours PRN Temp greater or equal to 38C (100.4F), Mild Pain (1 - 3)  aluminum hydroxide/magnesium hydroxide/simethicone Suspension 30 milliLiter(s) Oral every 4 hours PRN Dyspepsia  bisacodyl Suppository 10 milliGRAM(s) Rectal daily PRN Constipation  dextrose Oral Gel 15 Gram(s) Oral once PRN Blood Glucose LESS THAN 70 milliGRAM(s)/deciliter  hydrALAZINE 50 milliGRAM(s) Oral every 6 hours PRN for systolic BP>160  magnesium hydroxide Suspension 30 milliLiter(s) Oral daily PRN Constipation  melatonin 3 milliGRAM(s) Oral at bedtime PRN Insomnia  morphine  - Injectable 2 milliGRAM(s) IV Push every 6 hours PRN Severe Pain (7 - 10)  ondansetron Injectable 4 milliGRAM(s) IV Push every 8 hours PRN Nausea and/or Vomiting  traMADol 50 milliGRAM(s) Oral four times a day PRN Moderate Pain (4 - 6)      Social History:  [facility] .No ETOH or TOBACCO reported. (30 Mar 2023 05:21)      FAMILY HISTORY:      Vital Signs Last 24 Hrs  T(C): 37.2 (30 Mar 2023 12:47), Max: 37.8 (30 Mar 2023 04:28)  T(F): 99 (30 Mar 2023 12:47), Max: 100.1 (30 Mar 2023 04:28)  HR: 98 (30 Mar 2023 14:06) (91 - 112)  BP: 153/81 (30 Mar 2023 12:47) (151/76 - 160/91)  BP(mean): --  RR: 18 (30 Mar 2023 12:47) (16 - 20)  SpO2: 94% (30 Mar 2023 14:06) (91% - 96%)    Parameters below as of 30 Mar 2023 14:06  Patient On (Oxygen Delivery Method): room air        PHYSICAL EXAM:  Vascular: DP & PT palpable bilaterally, Capillary refill 3 seconds, Digital hair present bilaterally  Neurological: Light touch sensation intact bilaterally  Musculoskeletal: 5/5 strength in all quadrants bilaterally, AJ & STJ ROM intact  Dermatological: ---------- cm ulceration noted to the ----------, granular wound bed, no probe to bone, no periwound erythema, no fluctuance, no malodor, no proximal streaking at this time    CBC Full  -  ( 30 Mar 2023 07:20 )  WBC Count : 12.00 K/uL  RBC Count : 3.48 M/uL  Hemoglobin : 10.2 g/dL  Hematocrit : 32.2 %  Platelet Count - Automated : 202 K/uL  Mean Cell Volume : 92.5 fl  Mean Cell Hemoglobin : 29.3 pg  Mean Cell Hemoglobin Concentration : 31.7 gm/dL  Auto Neutrophil # : x  Auto Lymphocyte # : x  Auto Monocyte # : x  Auto Eosinophil # : x  Auto Basophil # : x  Auto Neutrophil % : x  Auto Lymphocyte % : x  Auto Monocyte % : x  Auto Eosinophil % : x  Auto Basophil % : x      ----------CHEM PANEL----------            Imaging: ----------   HPI:  88 yo male ,[facility] resident with PMHx - COPD, DM, HTN, CAD, HLD, H/O TIA, dementia,GERD, BPH and depression sent ot ER for evaluation of left foot 1metatarsal wound ,suggestive of osteomyelitis .Patient was seen by ID consult and transfer to the hospital recommended for wound cx/bone biopsy /podiatry evaluation ,likely will require 4-6 weeks of iv abx . Patient was admitted to [facility] with b/l feet wounds and was followed by wound care team ,recently completed 7 days of doxycycline for foot wound cellulitis . (30 Mar 2023 05:21)      87y year old Male [facility] at [facility] 1EAS 100 D1 for ----------.  Denies any fever, chills, nausea, vomiting, chest pain, shortness of breath, or calf pain at this time.    REVIEW OF SYSTEMS    PAST MEDICAL & SURGICAL HISTORY:  ASHD (arteriosclerotic heart disease)      BPH without urinary obstruction      COPD, moderate      Stage 3 chronic kidney disease      Chronic GERD      HLD (hyperlipidemia)      MDD (major depressive disorder)      Obstructive and reflux uropathy      Cellulitis      HTN (hypertension)      Sepsis      Dementia      Moderate protein-calorie malnutrition      Brain TIA      History of RSV infection      DM type 2, not at goal      Pressure ulcer of unspecified heel, unspecified stage      Venous stasis ulcer without varicose veins      Multiple open wounds of foot          Allergies    No Known Allergies    Intolerances        MEDICATIONS  (STANDING):  albuterol/ipratropium for Nebulization 3 milliLiter(s) Nebulizer every 8 hours  atenolol  Tablet 25 milliGRAM(s) Oral daily  budesonide 160 MICROgram(s)/formoterol 4.5 MICROgram(s) Inhaler 2 Puff(s) Inhalation two times a day  dextrose 5%. 1000 milliLiter(s) (50 mL/Hr) IV Continuous <Continuous>  dextrose 5%. 1000 milliLiter(s) (100 mL/Hr) IV Continuous <Continuous>  dextrose 50% Injectable 25 Gram(s) IV Push once  dextrose 50% Injectable 12.5 Gram(s) IV Push once  dextrose 50% Injectable 25 Gram(s) IV Push once  donepezil 5 milliGRAM(s) Oral at bedtime  DULoxetine 60 milliGRAM(s) Oral daily  glucagon  Injectable 1 milliGRAM(s) IntraMuscular once  heparin   Injectable 5000 Unit(s) SubCutaneous every 12 hours  insulin glargine Injectable (LANTUS) 8 Unit(s) SubCutaneous once  insulin lispro (ADMELOG) corrective regimen sliding scale   SubCutaneous three times a day before meals  insulin lispro (ADMELOG) corrective regimen sliding scale   SubCutaneous at bedtime  lactobacillus acidophilus 1 Tablet(s) Oral two times a day with meals  losartan 25 milliGRAM(s) Oral daily  multivitamin 1 Tablet(s) Oral daily  pantoprazole    Tablet 40 milliGRAM(s) Oral daily  piperacillin/tazobactam IVPB.- 3.375 Gram(s) IV Intermittent once  piperacillin/tazobactam IVPB.. 3.375 Gram(s) IV Intermittent every 8 hours  senna 2 Tablet(s) Oral at bedtime  simvastatin 40 milliGRAM(s) Oral at bedtime  sodium chloride 0.9%. 1000 milliLiter(s) (50 mL/Hr) IV Continuous <Continuous>  tamsulosin 0.4 milliGRAM(s) Oral at bedtime    MEDICATIONS  (PRN):  acetaminophen     Tablet .. 650 milliGRAM(s) Oral every 6 hours PRN Temp greater or equal to 38C (100.4F), Mild Pain (1 - 3)  aluminum hydroxide/magnesium hydroxide/simethicone Suspension 30 milliLiter(s) Oral every 4 hours PRN Dyspepsia  bisacodyl Suppository 10 milliGRAM(s) Rectal daily PRN Constipation  dextrose Oral Gel 15 Gram(s) Oral once PRN Blood Glucose LESS THAN 70 milliGRAM(s)/deciliter  hydrALAZINE 50 milliGRAM(s) Oral every 6 hours PRN for systolic BP>160  magnesium hydroxide Suspension 30 milliLiter(s) Oral daily PRN Constipation  melatonin 3 milliGRAM(s) Oral at bedtime PRN Insomnia  morphine  - Injectable 2 milliGRAM(s) IV Push every 6 hours PRN Severe Pain (7 - 10)  ondansetron Injectable 4 milliGRAM(s) IV Push every 8 hours PRN Nausea and/or Vomiting  traMADol 50 milliGRAM(s) Oral four times a day PRN Moderate Pain (4 - 6)      Social History:  Resident in [facility] .No ETOH or TOBACCO reported. (30 Mar 2023 05:21)      FAMILY HISTORY:      Vital Signs Last 24 Hrs  T(C): 37.2 (30 Mar 2023 12:47), Max: 37.8 (30 Mar 2023 04:28)  T(F): 99 (30 Mar 2023 12:47), Max: 100.1 (30 Mar 2023 04:28)  HR: 98 (30 Mar 2023 14:06) (91 - 112)  BP: 153/81 (30 Mar 2023 12:47) (151/76 - 160/91)  BP(mean): --  RR: 18 (30 Mar 2023 12:47) (16 - 20)  SpO2: 94% (30 Mar 2023 14:06) (91% - 96%)    Parameters below as of 30 Mar 2023 14:06  Patient On (Oxygen Delivery Method): room air        PHYSICAL EXAM:  Vascular: DP & PT palpable bilaterally, Capillary refill 3 seconds, Digital hair present bilaterally  Neurological: Light touch sensation intact bilaterally  Musculoskeletal: 5/5 strength in all quadrants bilaterally, AJ & STJ ROM intact  Dermatological: ---------- cm ulceration noted to the ----------, granular wound bed, no probe to bone, no periwound erythema, no fluctuance, no malodor, no proximal streaking at this time    CBC Full  -  ( 30 Mar 2023 07:20 )  WBC Count : 12.00 K/uL  RBC Count : 3.48 M/uL  Hemoglobin : 10.2 g/dL  Hematocrit : 32.2 %  Platelet Count - Automated : 202 K/uL  Mean Cell Volume : 92.5 fl  Mean Cell Hemoglobin : 29.3 pg  Mean Cell Hemoglobin Concentration : 31.7 gm/dL  Auto Neutrophil # : x  Auto Lymphocyte # : x  Auto Monocyte # : x  Auto Eosinophil # : x  Auto Basophil # : x  Auto Neutrophil % : x  Auto Lymphocyte % : x  Auto Monocyte % : x  Auto Eosinophil % : x  Auto Basophil % : x      ----------CHEM PANEL----------            Imaging: ----------   HPI:  86 yo male ,[facility] resident with PMHx - COPD, DM, HTN, CAD, HLD, H/O TIA, dementia,GERD, BPH and depression sent ot ER for evaluation of left foot 1metatarsal wound ,suggestive of osteomyelitis .Patient was seen by ID consult and transfer to the hospital recommended for wound cx/bone biopsy /podiatry evaluation ,likely will require 4-6 weeks of iv abx . Patient was admitted to [facility] with b/l feet wounds and was followed by wound care team ,recently completed 7 days of doxycycline for foot wound cellulitis . (30 Mar 2023 05:21)      87y year old Male seen at [facility] 1EAS 100 D1 for ----------.  Denies any fever, chills, nausea, vomiting, chest pain, shortness of breath, or calf pain at this time.    REVIEW OF SYSTEMS    PAST MEDICAL & SURGICAL HISTORY:  ASHD (arteriosclerotic heart disease)      BPH without urinary obstruction      COPD, moderate      Stage 3 chronic kidney disease      Chronic GERD      HLD (hyperlipidemia)      MDD (major depressive disorder)      Obstructive and reflux uropathy      Cellulitis      HTN (hypertension)      Sepsis      Dementia      Moderate protein-calorie malnutrition      Brain TIA      History of RSV infection      DM type 2, not at goal      Pressure ulcer of unspecified heel, unspecified stage      Venous stasis ulcer without varicose veins      Multiple open wounds of foot          Allergies    No Known Allergies    Intolerances        MEDICATIONS  (STANDING):  albuterol/ipratropium for Nebulization 3 milliLiter(s) Nebulizer every 8 hours  atenolol  Tablet 25 milliGRAM(s) Oral daily  budesonide 160 MICROgram(s)/formoterol 4.5 MICROgram(s) Inhaler 2 Puff(s) Inhalation two times a day  dextrose 5%. 1000 milliLiter(s) (50 mL/Hr) IV Continuous <Continuous>  dextrose 5%. 1000 milliLiter(s) (100 mL/Hr) IV Continuous <Continuous>  dextrose 50% Injectable 25 Gram(s) IV Push once  dextrose 50% Injectable 12.5 Gram(s) IV Push once  dextrose 50% Injectable 25 Gram(s) IV Push once  donepezil 5 milliGRAM(s) Oral at bedtime  DULoxetine 60 milliGRAM(s) Oral daily  glucagon  Injectable 1 milliGRAM(s) IntraMuscular once  heparin   Injectable 5000 Unit(s) SubCutaneous every 12 hours  insulin glargine Injectable (LANTUS) 8 Unit(s) SubCutaneous once  insulin lispro (ADMELOG) corrective regimen sliding scale   SubCutaneous three times a day before meals  insulin lispro (ADMELOG) corrective regimen sliding scale   SubCutaneous at bedtime  lactobacillus acidophilus 1 Tablet(s) Oral two times a day with meals  losartan 25 milliGRAM(s) Oral daily  multivitamin 1 Tablet(s) Oral daily  pantoprazole    Tablet 40 milliGRAM(s) Oral daily  piperacillin/tazobactam IVPB.- 3.375 Gram(s) IV Intermittent once  piperacillin/tazobactam IVPB.. 3.375 Gram(s) IV Intermittent every 8 hours  senna 2 Tablet(s) Oral at bedtime  simvastatin 40 milliGRAM(s) Oral at bedtime  sodium chloride 0.9%. 1000 milliLiter(s) (50 mL/Hr) IV Continuous <Continuous>  tamsulosin 0.4 milliGRAM(s) Oral at bedtime    MEDICATIONS  (PRN):  acetaminophen     Tablet .. 650 milliGRAM(s) Oral every 6 hours PRN Temp greater or equal to 38C (100.4F), Mild Pain (1 - 3)  aluminum hydroxide/magnesium hydroxide/simethicone Suspension 30 milliLiter(s) Oral every 4 hours PRN Dyspepsia  bisacodyl Suppository 10 milliGRAM(s) Rectal daily PRN Constipation  dextrose Oral Gel 15 Gram(s) Oral once PRN Blood Glucose LESS THAN 70 milliGRAM(s)/deciliter  hydrALAZINE 50 milliGRAM(s) Oral every 6 hours PRN for systolic BP>160  magnesium hydroxide Suspension 30 milliLiter(s) Oral daily PRN Constipation  melatonin 3 milliGRAM(s) Oral at bedtime PRN Insomnia  morphine  - Injectable 2 milliGRAM(s) IV Push every 6 hours PRN Severe Pain (7 - 10)  ondansetron Injectable 4 milliGRAM(s) IV Push every 8 hours PRN Nausea and/or Vomiting  traMADol 50 milliGRAM(s) Oral four times a day PRN Moderate Pain (4 - 6)      Social History:  Resident in [facility] .No ETOH or TOBACCO reported. (30 Mar 2023 05:21)      FAMILY HISTORY:      Vital Signs Last 24 Hrs  T(C): 37.2 (30 Mar 2023 12:47), Max: 37.8 (30 Mar 2023 04:28)  T(F): 99 (30 Mar 2023 12:47), Max: 100.1 (30 Mar 2023 04:28)  HR: 98 (30 Mar 2023 14:06) (91 - 112)  BP: 153/81 (30 Mar 2023 12:47) (151/76 - 160/91)  BP(mean): --  RR: 18 (30 Mar 2023 12:47) (16 - 20)  SpO2: 94% (30 Mar 2023 14:06) (91% - 96%)    Parameters below as of 30 Mar 2023 14:06  Patient On (Oxygen Delivery Method): room air        PHYSICAL EXAM:  Vascular: DP & PT palpable bilaterally, Capillary refill 3 seconds, Digital hair present bilaterally  Neurological: Light touch sensation intact bilaterally  Musculoskeletal: 5/5 strength in all quadrants bilaterally, AJ & STJ ROM intact  Dermatological: ---------- cm ulceration noted to the ----------, granular wound bed, no probe to bone, no periwound erythema, no fluctuance, no malodor, no proximal streaking at this time    CBC Full  -  ( 30 Mar 2023 07:20 )  WBC Count : 12.00 K/uL  RBC Count : 3.48 M/uL  Hemoglobin : 10.2 g/dL  Hematocrit : 32.2 %  Platelet Count - Automated : 202 K/uL  Mean Cell Volume : 92.5 fl  Mean Cell Hemoglobin : 29.3 pg  Mean Cell Hemoglobin Concentration : 31.7 gm/dL  Auto Neutrophil # : x  Auto Lymphocyte # : x  Auto Monocyte # : x  Auto Eosinophil # : x  Auto Basophil # : x  Auto Neutrophil % : x  Auto Lymphocyte % : x  Auto Monocyte % : x  Auto Eosinophil % : x  Auto Basophil % : x      ----------CHEM PANEL----------            Imaging: ----------   HPI:  88 yo male ,[facility] resident with PMHx - COPD, DM, HTN, CAD, HLD, H/O TIA, dementia,GERD, BPH and depression sent ot ER for evaluation of left foot 1metatarsal wound ,suggestive of osteomyelitis .Patient was seen by ID consult and transfer to the hospital recommended for wound cx/bone biopsy /podiatry evaluation ,likely will require 4-6 weeks of iv abx . Patient was admitted to [facility] with b/l feet wounds and was followed by wound care team ,recently completed 7 days of doxycycline for foot wound cellulitis . (30 Mar 2023 05:21)      87y year old Male seen at [facility] 1EAS 100 D1 for bilateral foot wounds. Patient is alert and oriented x 2 and is only able to answer simple questions. Patient has tenderness to palpation to the foot wounds. Patient is unable to follow instructions. Per phone conversation with patient's spouse, patient has had the wounds since January. Patient was at the [facility] and was being treated  for the wounds, was sent to [place] for further treatment and evaluation since there was suspicion of possible underlying infection to the left foot wound. Per patient's spouse patient has been non ambulatory and has mostly been bed bound.   REVIEW OF SYSTEMS    PAST MEDICAL & SURGICAL HISTORY:  ASHD (arteriosclerotic heart disease)      BPH without urinary obstruction      COPD, moderate      Stage 3 chronic kidney disease      Chronic GERD      HLD (hyperlipidemia)      MDD (major depressive disorder)      Obstructive and reflux uropathy      Cellulitis      HTN (hypertension)      Sepsis      Dementia      Moderate protein-calorie malnutrition      Brain TIA      History of RSV infection      DM type 2, not at goal      Pressure ulcer of unspecified heel, unspecified stage      Venous stasis ulcer without varicose veins      Multiple open wounds of foot          Allergies    No Known Allergies    Intolerances        MEDICATIONS  (STANDING):  albuterol/ipratropium for Nebulization 3 milliLiter(s) Nebulizer every 8 hours  atenolol  Tablet 25 milliGRAM(s) Oral daily  budesonide 160 MICROgram(s)/formoterol 4.5 MICROgram(s) Inhaler 2 Puff(s) Inhalation two times a day  dextrose 5%. 1000 milliLiter(s) (50 mL/Hr) IV Continuous <Continuous>  dextrose 5%. 1000 milliLiter(s) (100 mL/Hr) IV Continuous <Continuous>  dextrose 50% Injectable 25 Gram(s) IV Push once  dextrose 50% Injectable 12.5 Gram(s) IV Push once  dextrose 50% Injectable 25 Gram(s) IV Push once  donepezil 5 milliGRAM(s) Oral at bedtime  DULoxetine 60 milliGRAM(s) Oral daily  glucagon  Injectable 1 milliGRAM(s) IntraMuscular once  heparin   Injectable 5000 Unit(s) SubCutaneous every 12 hours  insulin glargine Injectable (LANTUS) 8 Unit(s) SubCutaneous once  insulin lispro (ADMELOG) corrective regimen sliding scale   SubCutaneous three times a day before meals  insulin lispro (ADMELOG) corrective regimen sliding scale   SubCutaneous at bedtime  lactobacillus acidophilus 1 Tablet(s) Oral two times a day with meals  losartan 25 milliGRAM(s) Oral daily  multivitamin 1 Tablet(s) Oral daily  pantoprazole    Tablet 40 milliGRAM(s) Oral daily  piperacillin/tazobactam IVPB.- 3.375 Gram(s) IV Intermittent once  piperacillin/tazobactam IVPB.. 3.375 Gram(s) IV Intermittent every 8 hours  senna 2 Tablet(s) Oral at bedtime  simvastatin 40 milliGRAM(s) Oral at bedtime  sodium chloride 0.9%. 1000 milliLiter(s) (50 mL/Hr) IV Continuous <Continuous>  tamsulosin 0.4 milliGRAM(s) Oral at bedtime    MEDICATIONS  (PRN):  acetaminophen     Tablet .. 650 milliGRAM(s) Oral every 6 hours PRN Temp greater or equal to 38C (100.4F), Mild Pain (1 - 3)  aluminum hydroxide/magnesium hydroxide/simethicone Suspension 30 milliLiter(s) Oral every 4 hours PRN Dyspepsia  bisacodyl Suppository 10 milliGRAM(s) Rectal daily PRN Constipation  dextrose Oral Gel 15 Gram(s) Oral once PRN Blood Glucose LESS THAN 70 milliGRAM(s)/deciliter  hydrALAZINE 50 milliGRAM(s) Oral every 6 hours PRN for systolic BP>160  magnesium hydroxide Suspension 30 milliLiter(s) Oral daily PRN Constipation  melatonin 3 milliGRAM(s) Oral at bedtime PRN Insomnia  morphine  - Injectable 2 milliGRAM(s) IV Push every 6 hours PRN Severe Pain (7 - 10)  ondansetron Injectable 4 milliGRAM(s) IV Push every 8 hours PRN Nausea and/or Vomiting  traMADol 50 milliGRAM(s) Oral four times a day PRN Moderate Pain (4 - 6)      Social History:  Resident in [facility] .No ETOH or TOBACCO reported. (30 Mar 2023 05:21)      FAMILY HISTORY:      Vital Signs Last 24 Hrs  T(C): 37.2 (30 Mar 2023 12:47), Max: 37.8 (30 Mar 2023 04:28)  T(F): 99 (30 Mar 2023 12:47), Max: 100.1 (30 Mar 2023 04:28)  HR: 98 (30 Mar 2023 14:06) (91 - 112)  BP: 153/81 (30 Mar 2023 12:47) (151/76 - 160/91)  BP(mean): --  RR: 18 (30 Mar 2023 12:47) (16 - 20)  SpO2: 94% (30 Mar 2023 14:06) (91% - 96%)    Parameters below as of 30 Mar 2023 14:06  Patient On (Oxygen Delivery Method): room air        PHYSICAL EXAM:  Vascular: DP & PT non palpable bilaterally, Capillary refill delayed to the digits  Digital hair absent bilaterally  Neurological: Left foot wound very tender to touch   Musculoskeletal: Unable to assess   Dermatological: Left foot 1st metatarsal head 1.0cm x 1.5cm x probe to bone cm ulceration, lateral 5th metatarsal base 1.0cm x 0.5cm x o.3 fibrotic wounds with periwound erythema and serous drainage noted, no fluctuance, no malodor, no proximal streaking at this time. Bilateral posterior plantar  heel eschars - right measuring 3.0cm x 4.0cm x necrotic eschar and left 1.0cm x 0.7cm x necrotic eschar, stable with no fluctuance or drainage.     CBC Full  -  ( 30 Mar 2023 07:20 )  WBC Count : 12.00 K/uL  RBC Count : 3.48 M/uL  Hemoglobin : 10.2 g/dL  Hematocrit : 32.2 %  Platelet Count - Automated : 202 K/uL  Mean Cell Volume : 92.5 fl  Mean Cell Hemoglobin : 29.3 pg  Mean Cell Hemoglobin Concentration : 31.7 gm/dL  Auto Neutrophil # : x  Auto Lymphocyte # : x  Auto Monocyte # : x  Auto Eosinophil # : x  Auto Basophil # : x  Auto Neutrophil % : x  Auto Lymphocyte % : x  Auto Monocyte % : x  Auto Eosinophil % : x  Auto Basophil % : x      03-30    143  |  116<H>  |  36<H>  ----------------------------<  273<H>  4.4   |  22  |  1.30    Ca    8.9      30 Mar 2023 07:20  Mg     2.2     03-30    TPro  6.7  /  Alb  2.2<L>  /  TBili  0.8  /  DBili  0.2  /  AST  24  /  ALT  23  /  AlkPhos  62  03-30                            10.2   12.00 )-----------( 202      ( 30 Mar 2023 07:20 )             32.2         Imaging: ----------     1 / 1 Doc Suero              Report date: 3/30/2023       View Order      (Report matches study selected on Patient History pane)                    ACC: [id number] EXAM: XR FOOT COMP MIN 3 VIEWS LT ORDERED BY: JOHN OLGUIN    PROCEDURE DATE: 03/29/2023        INTERPRETATION: LEFT foot    CLINICAL INFORMATION: Infection:    TECHNIQUE: Oblique radiographs submitted for interpretation..    FINDINGS:  Marked hallux valgus deformity of first metatarsal phalangeal joint with periarticular erosions of the medial first metatarsal head and arthritic narrowing of the metatarsal phalangeal joint with subchondral metatarsal head cystic changes.  Medial soft tissue ulceration adjacent to metatarsal head. No subcutaneous air.    No gross fracture.    .    IMPRESSION:    Severe first metatarsal phalangeal joint hallux valgus deformity of. Ulceration adjacent to metatarsal head with erosions of the medial first metatarsal head. Constellation of findings concerning for osteomyelitis of the first metatarsal head.    --- End of Report --- HPI:  86 yo male ,[facility] resident with PMHx - COPD, DM, HTN, CAD, HLD, H/O TIA, dementia,GERD, BPH and depression sent ot ER for evaluation of left foot 1metatarsal wound ,suggestive of osteomyelitis .Patient was seen by ID consult and transfer to the hospital recommended for wound cx/bone biopsy /podiatry evaluation ,likely will require 4-6 weeks of iv abx . Patient was admitted to [facility] with b/l feet wounds and was followed by wound care team ,recently completed 7 days of doxycycline for foot wound cellulitis . (30 Mar 2023 05:21)      87y year old Male seen at [facility] 1EAS 100 D1 for bilateral foot wounds. Patient is alert and oriented x 2 and is only able to answer simple questions. Patient has tenderness to palpation to the foot wounds. Patient is unable to follow instructions. Per phone conversation with patient's spouse, patient has had the wounds since January. Patient was at the [facility] and was being treated  for the wounds, was sent to [facility] for further treatment and evaluation since there was suspicion of possible underlying infection to the left foot wound. Per patient's spouse patient has been non ambulatory and has mostly been bed bound.   REVIEW OF SYSTEMS    PAST MEDICAL & SURGICAL HISTORY:  ASHD (arteriosclerotic heart disease)      BPH without urinary obstruction      COPD, moderate      Stage 3 chronic kidney disease      Chronic GERD      HLD (hyperlipidemia)      MDD (major depressive disorder)      Obstructive and reflux uropathy      Cellulitis      HTN (hypertension)      Sepsis      Dementia      Moderate protein-calorie malnutrition      Brain TIA      History of RSV infection      DM type 2, not at goal      Pressure ulcer of unspecified heel, unspecified stage      Venous stasis ulcer without varicose veins      Multiple open wounds of foot          Allergies    No Known Allergies    Intolerances        MEDICATIONS  (STANDING):  albuterol/ipratropium for Nebulization 3 milliLiter(s) Nebulizer every 8 hours  atenolol  Tablet 25 milliGRAM(s) Oral daily  budesonide 160 MICROgram(s)/formoterol 4.5 MICROgram(s) Inhaler 2 Puff(s) Inhalation two times a day  dextrose 5%. 1000 milliLiter(s) (50 mL/Hr) IV Continuous <Continuous>  dextrose 5%. 1000 milliLiter(s) (100 mL/Hr) IV Continuous <Continuous>  dextrose 50% Injectable 25 Gram(s) IV Push once  dextrose 50% Injectable 12.5 Gram(s) IV Push once  dextrose 50% Injectable 25 Gram(s) IV Push once  donepezil 5 milliGRAM(s) Oral at bedtime  DULoxetine 60 milliGRAM(s) Oral daily  glucagon  Injectable 1 milliGRAM(s) IntraMuscular once  heparin   Injectable 5000 Unit(s) SubCutaneous every 12 hours  insulin glargine Injectable (LANTUS) 8 Unit(s) SubCutaneous once  insulin lispro (ADMELOG) corrective regimen sliding scale   SubCutaneous three times a day before meals  insulin lispro (ADMELOG) corrective regimen sliding scale   SubCutaneous at bedtime  lactobacillus acidophilus 1 Tablet(s) Oral two times a day with meals  losartan 25 milliGRAM(s) Oral daily  multivitamin 1 Tablet(s) Oral daily  pantoprazole    Tablet 40 milliGRAM(s) Oral daily  piperacillin/tazobactam IVPB.- 3.375 Gram(s) IV Intermittent once  piperacillin/tazobactam IVPB.. 3.375 Gram(s) IV Intermittent every 8 hours  senna 2 Tablet(s) Oral at bedtime  simvastatin 40 milliGRAM(s) Oral at bedtime  sodium chloride 0.9%. 1000 milliLiter(s) (50 mL/Hr) IV Continuous <Continuous>  tamsulosin 0.4 milliGRAM(s) Oral at bedtime    MEDICATIONS  (PRN):  acetaminophen     Tablet .. 650 milliGRAM(s) Oral every 6 hours PRN Temp greater or equal to 38C (100.4F), Mild Pain (1 - 3)  aluminum hydroxide/magnesium hydroxide/simethicone Suspension 30 milliLiter(s) Oral every 4 hours PRN Dyspepsia  bisacodyl Suppository 10 milliGRAM(s) Rectal daily PRN Constipation  dextrose Oral Gel 15 Gram(s) Oral once PRN Blood Glucose LESS THAN 70 milliGRAM(s)/deciliter  hydrALAZINE 50 milliGRAM(s) Oral every 6 hours PRN for systolic BP>160  magnesium hydroxide Suspension 30 milliLiter(s) Oral daily PRN Constipation  melatonin 3 milliGRAM(s) Oral at bedtime PRN Insomnia  morphine  - Injectable 2 milliGRAM(s) IV Push every 6 hours PRN Severe Pain (7 - 10)  ondansetron Injectable 4 milliGRAM(s) IV Push every 8 hours PRN Nausea and/or Vomiting  traMADol 50 milliGRAM(s) Oral four times a day PRN Moderate Pain (4 - 6)      Social History:  [facility] .No ETOH or TOBACCO reported. (30 Mar 2023 05:21)      FAMILY HISTORY:      Vital Signs Last 24 Hrs  T(C): 37.2 (30 Mar 2023 12:47), Max: 37.8 (30 Mar 2023 04:28)  T(F): 99 (30 Mar 2023 12:47), Max: 100.1 (30 Mar 2023 04:28)  HR: 98 (30 Mar 2023 14:06) (91 - 112)  BP: 153/81 (30 Mar 2023 12:47) (151/76 - 160/91)  BP(mean): --  RR: 18 (30 Mar 2023 12:47) (16 - 20)  SpO2: 94% (30 Mar 2023 14:06) (91% - 96%)    Parameters below as of 30 Mar 2023 14:06  Patient On (Oxygen Delivery Method): room air        PHYSICAL EXAM:  Vascular: DP & PT non palpable bilaterally, Capillary refill delayed to the digits  Digital hair absent bilaterally  Neurological: Left foot wound very tender to touch   Musculoskeletal: Unable to assess   Dermatological: Left foot 1st metatarsal head 1.0cm x 1.5cm x probe to bone cm ulceration, lateral 5th metatarsal base 1.0cm x 0.5cm x o.3 fibrotic wounds with periwound erythema and serous drainage noted, no fluctuance, no malodor, no proximal streaking at this time. Bilateral posterior plantar  heel eschars - right measuring 3.0cm x 4.0cm x necrotic eschar and left 1.0cm x 0.7cm x necrotic eschar, stable with no fluctuance or drainage.     CBC Full  -  ( 30 Mar 2023 07:20 )  WBC Count : 12.00 K/uL  RBC Count : 3.48 M/uL  Hemoglobin : 10.2 g/dL  Hematocrit : 32.2 %  Platelet Count - Automated : 202 K/uL  Mean Cell Volume : 92.5 fl  Mean Cell Hemoglobin : 29.3 pg  Mean Cell Hemoglobin Concentration : 31.7 gm/dL  Auto Neutrophil # : x  Auto Lymphocyte # : x  Auto Monocyte # : x  Auto Eosinophil # : x  Auto Basophil # : x  Auto Neutrophil % : x  Auto Lymphocyte % : x  Auto Monocyte % : x  Auto Eosinophil % : x  Auto Basophil % : x      03-30    143  |  116<H>  |  36<H>  ----------------------------<  273<H>  4.4   |  22  |  1.30    Ca    8.9      30 Mar 2023 07:20  Mg     2.2     03-30    TPro  6.7  /  Alb  2.2<L>  /  TBili  0.8  /  DBili  0.2  /  AST  24  /  ALT  23  /  AlkPhos  62  03-30                            10.2   12.00 )-----------( 202      ( 30 Mar 2023 07:20 )             32.2         Imaging: ----------     1 / 1 Doc Suero              Report date: 3/30/2023       View Order      (Report matches study selected on Patient History pane)                    ACC: [id number] EXAM: XR FOOT COMP MIN 3 VIEWS LT ORDERED BY: JOHN OLGUIN    PROCEDURE DATE: 03/29/2023        INTERPRETATION: LEFT foot    CLINICAL INFORMATION: Infection:    TECHNIQUE: Oblique radiographs submitted for interpretation..    FINDINGS:  Marked hallux valgus deformity of first metatarsal phalangeal joint with periarticular erosions of the medial first metatarsal head and arthritic narrowing of the metatarsal phalangeal joint with subchondral metatarsal head cystic changes.  Medial soft tissue ulceration adjacent to metatarsal head. No subcutaneous air.    No gross fracture.    .    IMPRESSION:    Severe first metatarsal phalangeal joint hallux valgus deformity of. Ulceration adjacent to metatarsal head with erosions of the medial first metatarsal head. Constellation of findings concerning for osteomyelitis of the first metatarsal head.    --- End of Report --- HPI:  86 yo male ,[facility] resident with PMHx - COPD, DM, HTN, CAD, HLD, H/O TIA, dementia,GERD, BPH and depression sent ot ER for evaluation of left foot 1metatarsal wound ,suggestive of osteomyelitis .Patient was seen by ID consult and transfer to the hospital recommended for wound cx/bone biopsy /podiatry evaluation ,likely will require 4-6 weeks of iv abx . Patient was admitted to [facility] with b/l feet wounds and was followed by wound care team ,recently completed 7 days of doxycycline for foot wound cellulitis . (30 Mar 2023 05:21)      87y year old Male seen at [facility] 1EAS 100 D1 for bilateral foot wounds. Patient is alert and oriented x 2 and is only able to answer simple questions. Patient has tenderness to palpation to the foot wounds. Patient is unable to follow instructions. Per phone conversation with patient's spouse, patient has had the wounds since January. Patient was at the [facility] and was being treated  for the wounds, was sent to [place] for further treatment and evaluation since there was suspicion of possible underlying infection to the left foot wound. Per patient's spouse patient has been non ambulatory and has mostly been bed bound.   REVIEW OF SYSTEMS    PAST MEDICAL & SURGICAL HISTORY:  ASHD (arteriosclerotic heart disease)      BPH without urinary obstruction      COPD, moderate      Stage 3 chronic kidney disease      Chronic GERD      HLD (hyperlipidemia)      MDD (major depressive disorder)      Obstructive and reflux uropathy      Cellulitis      HTN (hypertension)      Sepsis      Dementia      Moderate protein-calorie malnutrition      Brain TIA      History of RSV infection      DM type 2, not at goal      Pressure ulcer of unspecified heel, unspecified stage      Venous stasis ulcer without varicose veins      Multiple open wounds of foot          Allergies    No Known Allergies    Intolerances        MEDICATIONS  (STANDING):  albuterol/ipratropium for Nebulization 3 milliLiter(s) Nebulizer every 8 hours  atenolol  Tablet 25 milliGRAM(s) Oral daily  budesonide 160 MICROgram(s)/formoterol 4.5 MICROgram(s) Inhaler 2 Puff(s) Inhalation two times a day  dextrose 5%. 1000 milliLiter(s) (50 mL/Hr) IV Continuous <Continuous>  dextrose 5%. 1000 milliLiter(s) (100 mL/Hr) IV Continuous <Continuous>  dextrose 50% Injectable 25 Gram(s) IV Push once  dextrose 50% Injectable 12.5 Gram(s) IV Push once  dextrose 50% Injectable 25 Gram(s) IV Push once  donepezil 5 milliGRAM(s) Oral at bedtime  DULoxetine 60 milliGRAM(s) Oral daily  glucagon  Injectable 1 milliGRAM(s) IntraMuscular once  heparin   Injectable 5000 Unit(s) SubCutaneous every 12 hours  insulin glargine Injectable (LANTUS) 8 Unit(s) SubCutaneous once  insulin lispro (ADMELOG) corrective regimen sliding scale   SubCutaneous three times a day before meals  insulin lispro (ADMELOG) corrective regimen sliding scale   SubCutaneous at bedtime  lactobacillus acidophilus 1 Tablet(s) Oral two times a day with meals  losartan 25 milliGRAM(s) Oral daily  multivitamin 1 Tablet(s) Oral daily  pantoprazole    Tablet 40 milliGRAM(s) Oral daily  piperacillin/tazobactam IVPB.- 3.375 Gram(s) IV Intermittent once  piperacillin/tazobactam IVPB.. 3.375 Gram(s) IV Intermittent every 8 hours  senna 2 Tablet(s) Oral at bedtime  simvastatin 40 milliGRAM(s) Oral at bedtime  sodium chloride 0.9%. 1000 milliLiter(s) (50 mL/Hr) IV Continuous <Continuous>  tamsulosin 0.4 milliGRAM(s) Oral at bedtime    MEDICATIONS  (PRN):  acetaminophen     Tablet .. 650 milliGRAM(s) Oral every 6 hours PRN Temp greater or equal to 38C (100.4F), Mild Pain (1 - 3)  aluminum hydroxide/magnesium hydroxide/simethicone Suspension 30 milliLiter(s) Oral every 4 hours PRN Dyspepsia  bisacodyl Suppository 10 milliGRAM(s) Rectal daily PRN Constipation  dextrose Oral Gel 15 Gram(s) Oral once PRN Blood Glucose LESS THAN 70 milliGRAM(s)/deciliter  hydrALAZINE 50 milliGRAM(s) Oral every 6 hours PRN for systolic BP>160  magnesium hydroxide Suspension 30 milliLiter(s) Oral daily PRN Constipation  melatonin 3 milliGRAM(s) Oral at bedtime PRN Insomnia  morphine  - Injectable 2 milliGRAM(s) IV Push every 6 hours PRN Severe Pain (7 - 10)  ondansetron Injectable 4 milliGRAM(s) IV Push every 8 hours PRN Nausea and/or Vomiting  traMADol 50 milliGRAM(s) Oral four times a day PRN Moderate Pain (4 - 6)      Social History:  [facility] .No ETOH or TOBACCO reported. (30 Mar 2023 05:21)      FAMILY HISTORY:      Vital Signs Last 24 Hrs  T(C): 37.2 (30 Mar 2023 12:47), Max: 37.8 (30 Mar 2023 04:28)  T(F): 99 (30 Mar 2023 12:47), Max: 100.1 (30 Mar 2023 04:28)  HR: 98 (30 Mar 2023 14:06) (91 - 112)  BP: 153/81 (30 Mar 2023 12:47) (151/76 - 160/91)  BP(mean): --  RR: 18 (30 Mar 2023 12:47) (16 - 20)  SpO2: 94% (30 Mar 2023 14:06) (91% - 96%)    Parameters below as of 30 Mar 2023 14:06  Patient On (Oxygen Delivery Method): room air        PHYSICAL EXAM:  Vascular: DP & PT non palpable bilaterally, Capillary refill delayed to the digits  Digital hair absent bilaterally  Neurological: Left foot wound very tender to touch   Musculoskeletal: Unable to assess   Dermatological: Left foot 1st metatarsal head 1.0cm x 1.5cm x probe to bone cm ulceration, lateral 5th metatarsal base 1.0cm x 0.5cm x o.3 fibrotic wounds with periwound erythema and serous drainage noted, no fluctuance, no malodor, no proximal streaking at this time. Bilateral posterior plantar  heel eschars - right measuring 3.0cm x 4.0cm x necrotic eschar and left 1.0cm x 0.7cm x necrotic eschar, stable with no fluctuance or drainage.     CBC Full  -  ( 30 Mar 2023 07:20 )  WBC Count : 12.00 K/uL  RBC Count : 3.48 M/uL  Hemoglobin : 10.2 g/dL  Hematocrit : 32.2 %  Platelet Count - Automated : 202 K/uL  Mean Cell Volume : 92.5 fl  Mean Cell Hemoglobin : 29.3 pg  Mean Cell Hemoglobin Concentration : 31.7 gm/dL  Auto Neutrophil # : x  Auto Lymphocyte # : x  Auto Monocyte # : x  Auto Eosinophil # : x  Auto Basophil # : x  Auto Neutrophil % : x  Auto Lymphocyte % : x  Auto Monocyte % : x  Auto Eosinophil % : x  Auto Basophil % : x      03-30    143  |  116<H>  |  36<H>  ----------------------------<  273<H>  4.4   |  22  |  1.30    Ca    8.9      30 Mar 2023 07:20  Mg     2.2     03-30    TPro  6.7  /  Alb  2.2<L>  /  TBili  0.8  /  DBili  0.2  /  AST  24  /  ALT  23  /  AlkPhos  62  03-30                            10.2   12.00 )-----------( 202      ( 30 Mar 2023 07:20 )             32.2         Imaging: ----------     1 / 1 Doc Suero              Report date: 3/30/2023       View Order      (Report matches study selected on Patient History pane)                    ACC: [id number] EXAM: XR FOOT COMP MIN 3 VIEWS LT ORDERED BY: JOHN OLGUIN    PROCEDURE DATE: 03/29/2023        INTERPRETATION: LEFT foot    CLINICAL INFORMATION: Infection:    TECHNIQUE: Oblique radiographs submitted for interpretation..    FINDINGS:  Marked hallux valgus deformity of first metatarsal phalangeal joint with periarticular erosions of the medial first metatarsal head and arthritic narrowing of the metatarsal phalangeal joint with subchondral metatarsal head cystic changes.  Medial soft tissue ulceration adjacent to metatarsal head. No subcutaneous air.    No gross fracture.    .    IMPRESSION:    Severe first metatarsal phalangeal joint hallux valgus deformity of. Ulceration adjacent to metatarsal head with erosions of the medial first metatarsal head. Constellation of findings concerning for osteomyelitis of the first metatarsal head.    --- End of Report ---

## 2023-03-30 NOTE — CONSULT NOTE ADULT - SUBJECTIVE AND OBJECTIVE BOX
Vascular Attending:  Dr Lucas      HPI:  86 yo male ,[facility] resident with PMHx - COPD, DM, HTN, CAD, HLD, H/O TIA, dementia,GERD, BPH and depression sent ot ER for evaluation of left foot 1metatarsal wound ,suggestive of osteomyelitis .Patient was seen by ID consult and transfer to the hospital recommended for wound cx/bone biopsy /podiatry evaluation ,likely will require 4-6 weeks of iv abx . Patient was admitted to [facility] with b/l feet wounds and was followed by wound care team ,recently completed 7 days of doxycycline for foot wound cellulitis . (30 Mar 2023 05:21)      PAST MEDICAL & SURGICAL HISTORY:  ASHD (arteriosclerotic heart disease)  BPH without urinary obstruction  COPD, moderate  Stage 3 chronic kidney disease  Chronic GERD  HLD (hyperlipidemia)  MDD (major depressive disorder)  Obstructive and reflux uropathy  Cellulitis  HTN (hypertension)  Sepsis  Dementia  Moderate protein-calorie malnutrition  Brain TIA  History of RSV infection  DM type 2, not at goal  Pressure ulcer of unspecified heel, unspecified stage  Venous stasis ulcer without varicose veins  Multiple open wounds of foot      REVIEW OF SYSTEMS-unable to obtain       MEDICATIONS  (STANDING):  albuterol/ipratropium for Nebulization 3 milliLiter(s) Nebulizer every 8 hours  atenolol  Tablet 25 milliGRAM(s) Oral daily  budesonide 160 MICROgram(s)/formoterol 4.5 MICROgram(s) Inhaler 2 Puff(s) Inhalation two times a day  dextrose 5%. 1000 milliLiter(s) (50 mL/Hr) IV Continuous <Continuous>  dextrose 5%. 1000 milliLiter(s) (100 mL/Hr) IV Continuous <Continuous>  dextrose 50% Injectable 25 Gram(s) IV Push once  dextrose 50% Injectable 12.5 Gram(s) IV Push once  dextrose 50% Injectable 25 Gram(s) IV Push once  donepezil 5 milliGRAM(s) Oral at bedtime  DULoxetine 60 milliGRAM(s) Oral daily  glucagon  Injectable 1 milliGRAM(s) IntraMuscular once  heparin   Injectable 5000 Unit(s) SubCutaneous every 12 hours  insulin lispro (ADMELOG) corrective regimen sliding scale   SubCutaneous three times a day before meals  insulin lispro (ADMELOG) corrective regimen sliding scale   SubCutaneous at bedtime  lactobacillus acidophilus 1 Tablet(s) Oral two times a day with meals  losartan 25 milliGRAM(s) Oral daily  multivitamin 1 Tablet(s) Oral daily  pantoprazole    Tablet 40 milliGRAM(s) Oral daily  piperacillin/tazobactam IVPB.- 3.375 Gram(s) IV Intermittent once  piperacillin/tazobactam IVPB.. 3.375 Gram(s) IV Intermittent every 8 hours  senna 2 Tablet(s) Oral at bedtime  simvastatin 40 milliGRAM(s) Oral at bedtime  sodium chloride 0.9%. 1000 milliLiter(s) (50 mL/Hr) IV Continuous <Continuous>  tamsulosin 0.4 milliGRAM(s) Oral at bedtime    MEDICATIONS  (PRN):  acetaminophen     Tablet .. 650 milliGRAM(s) Oral every 6 hours PRN Temp greater or equal to 38C (100.4F), Mild Pain (1 - 3)  aluminum hydroxide/magnesium hydroxide/simethicone Suspension 30 milliLiter(s) Oral every 4 hours PRN Dyspepsia  bisacodyl Suppository 10 milliGRAM(s) Rectal daily PRN Constipation  dextrose Oral Gel 15 Gram(s) Oral once PRN Blood Glucose LESS THAN 70 milliGRAM(s)/deciliter  hydrALAZINE 50 milliGRAM(s) Oral every 6 hours PRN for systolic BP>160  magnesium hydroxide Suspension 30 milliLiter(s) Oral daily PRN Constipation  melatonin 3 milliGRAM(s) Oral at bedtime PRN Insomnia  morphine  - Injectable 2 milliGRAM(s) IV Push every 6 hours PRN Severe Pain (7 - 10)  ondansetron Injectable 4 milliGRAM(s) IV Push every 8 hours PRN Nausea and/or Vomiting  traMADol 50 milliGRAM(s) Oral four times a day PRN Moderate Pain (4 - 6)      Allergies  No Known Allergies      SOCIAL HISTORY: [facility] resident although pt reports he lives with wife and works part time; non smoker, no ETOH      Vital Signs Last 24 Hrs  T(C): 37.7 (30 Mar 2023 09:51), Max: 37.8 (30 Mar 2023 04:28)  T(F): 99.9 (30 Mar 2023 09:51), Max: 100.1 (30 Mar 2023 04:28)  HR: 112 (30 Mar 2023 09:51) (91 - 112)  BP: 158/76 (30 Mar 2023 09:51) (151/76 - 160/91)  BP(mean): --  RR: 18 (30 Mar 2023 09:51) (16 - 20)  SpO2: 96% (30 Mar 2023 09:51) (91% - 96%)    Parameters below as of 30 Mar 2023 09:51  Patient On (Oxygen Delivery Method): room air        PHYSICAL EXAM:  Constitutional: Thin elderly M in NAD  Eyes: PERRL  ENMT: WNL  Neck: No JVD  Respiratory: essentially CTA  Cardiovascular: normal S1, S2  Gastrointestinal: soft, ND, NT  Extremities: BLE feet wrapped with dressings CDI. Pt became agitated when trying to remove dressings for further assessment  Neurological: A&O X 1  Pulses:   Right:                                                                          Left:  FEM [x ]2+ [ ]1+ [ ]doppler                                             FEM [x ]2+ [ ]1+ [ ]doppler    POP [x ]2+ [ ]1+ [ ]doppler                                             POP [x ]2+ [ ]1+ [ ]doppler    DP [ ]2+ [ ]1+ [x ]doppler                                                DP [ ]2+ [ ]1+ [ ]doppler  PT[ ]2+ [ ]1+ [ ]doppler                                                  PT [ ]2+ [ ]1+ [x ]doppler      LABS:                        10.2   12.00 )-----------( 202      ( 30 Mar 2023 07:20 )             32.2     03-30    143  |  116<H>  |  36<H>  ----------------------------<  273<H>  4.4   |  22  |  1.30    Ca    8.9      30 Mar 2023 07:20  Mg     2.2     03-30    TPro  6.7  /  Alb  2.2<L>  /  TBili  0.8  /  DBili  0.2  /  AST  24  /  ALT  23  /  AlkPhos  62  03-30          RADIOLOGY & ADDITIONAL STUDIES     Vascular Attending:  Dr Lucas      HPI:  86 yo male ,[facility] resident with PMHx - COPD, DM, HTN, CAD, HLD, H/O TIA, dementia,GERD, BPH and depression sent ot ER for evaluation of left foot 1metatarsal wound ,suggestive of osteomyelitis .Patient was seen by ID consult and transfer to the hospital recommended for wound cx/bone biopsy /podiatry evaluation ,likely will require 4-6 weeks of iv abx . Patient was admitted to [facility] with b/l feet wounds and was followed by wound care team ,recently completed 7 days of doxycycline for foot wound cellulitis . (30 Mar 2023 05:21)      PAST MEDICAL & SURGICAL HISTORY:  ASHD (arteriosclerotic heart disease)  BPH without urinary obstruction  COPD, moderate  Stage 3 chronic kidney disease  Chronic GERD  HLD (hyperlipidemia)  MDD (major depressive disorder)  Obstructive and reflux uropathy  Cellulitis  HTN (hypertension)  Sepsis  Dementia  Moderate protein-calorie malnutrition  Brain TIA  History of RSV infection  DM type 2, not at goal  Pressure ulcer of unspecified heel, unspecified stage  Venous stasis ulcer without varicose veins  Multiple open wounds of foot      REVIEW OF SYSTEMS-unable to obtain       MEDICATIONS  (STANDING):  albuterol/ipratropium for Nebulization 3 milliLiter(s) Nebulizer every 8 hours  atenolol  Tablet 25 milliGRAM(s) Oral daily  budesonide 160 MICROgram(s)/formoterol 4.5 MICROgram(s) Inhaler 2 Puff(s) Inhalation two times a day  dextrose 5%. 1000 milliLiter(s) (50 mL/Hr) IV Continuous <Continuous>  dextrose 5%. 1000 milliLiter(s) (100 mL/Hr) IV Continuous <Continuous>  dextrose 50% Injectable 25 Gram(s) IV Push once  dextrose 50% Injectable 12.5 Gram(s) IV Push once  dextrose 50% Injectable 25 Gram(s) IV Push once  donepezil 5 milliGRAM(s) Oral at bedtime  DULoxetine 60 milliGRAM(s) Oral daily  glucagon  Injectable 1 milliGRAM(s) IntraMuscular once  heparin   Injectable 5000 Unit(s) SubCutaneous every 12 hours  insulin lispro (ADMELOG) corrective regimen sliding scale   SubCutaneous three times a day before meals  insulin lispro (ADMELOG) corrective regimen sliding scale   SubCutaneous at bedtime  lactobacillus acidophilus 1 Tablet(s) Oral two times a day with meals  losartan 25 milliGRAM(s) Oral daily  multivitamin 1 Tablet(s) Oral daily  pantoprazole    Tablet 40 milliGRAM(s) Oral daily  piperacillin/tazobactam IVPB.- 3.375 Gram(s) IV Intermittent once  piperacillin/tazobactam IVPB.. 3.375 Gram(s) IV Intermittent every 8 hours  senna 2 Tablet(s) Oral at bedtime  simvastatin 40 milliGRAM(s) Oral at bedtime  sodium chloride 0.9%. 1000 milliLiter(s) (50 mL/Hr) IV Continuous <Continuous>  tamsulosin 0.4 milliGRAM(s) Oral at bedtime    MEDICATIONS  (PRN):  acetaminophen     Tablet .. 650 milliGRAM(s) Oral every 6 hours PRN Temp greater or equal to 38C (100.4F), Mild Pain (1 - 3)  aluminum hydroxide/magnesium hydroxide/simethicone Suspension 30 milliLiter(s) Oral every 4 hours PRN Dyspepsia  bisacodyl Suppository 10 milliGRAM(s) Rectal daily PRN Constipation  dextrose Oral Gel 15 Gram(s) Oral once PRN Blood Glucose LESS THAN 70 milliGRAM(s)/deciliter  hydrALAZINE 50 milliGRAM(s) Oral every 6 hours PRN for systolic BP>160  magnesium hydroxide Suspension 30 milliLiter(s) Oral daily PRN Constipation  melatonin 3 milliGRAM(s) Oral at bedtime PRN Insomnia  morphine  - Injectable 2 milliGRAM(s) IV Push every 6 hours PRN Severe Pain (7 - 10)  ondansetron Injectable 4 milliGRAM(s) IV Push every 8 hours PRN Nausea and/or Vomiting  traMADol 50 milliGRAM(s) Oral four times a day PRN Moderate Pain (4 - 6)      Allergies  No Known Allergies      SOCIAL HISTORY: [facility] resident although pt reports he lives with wife and works part time; non smoker, no ETOH      Vital Signs Last 24 Hrs  T(C): 37.7 (30 Mar 2023 09:51), Max: 37.8 (30 Mar 2023 04:28)  T(F): 99.9 (30 Mar 2023 09:51), Max: 100.1 (30 Mar 2023 04:28)  HR: 112 (30 Mar 2023 09:51) (91 - 112)  BP: 158/76 (30 Mar 2023 09:51) (151/76 - 160/91)  BP(mean): --  RR: 18 (30 Mar 2023 09:51) (16 - 20)  SpO2: 96% (30 Mar 2023 09:51) (91% - 96%)    Parameters below as of 30 Mar 2023 09:51  Patient On (Oxygen Delivery Method): room air        PHYSICAL EXAM:  Constitutional: Thin elderly M in NAD  Eyes: PERRL  ENMT: WNL  Neck: No JVD  Respiratory: essentially CTA  Cardiovascular: normal S1, S2  Gastrointestinal: soft, ND, NT  Extremities: BLE feet wrapped with dressings CDI. Pt became agitated when trying to remove dressings for further assessment  Neurological: A&O X 1  Pulses:   Right:                                                                          Left:  FEM [x ]2+ [ ]1+ [ ]doppler                                             FEM [x ]2+ [ ]1+ [ ]doppler    POP [x ]2+ [ ]1+ [ ]doppler                                             POP [x ]2+ [ ]1+ [ ]doppler    DP [ ]2+ [ ]1+ [x ]doppler                                                DP [ ]2+ [ ]1+ [ ]doppler  PT[ ]2+ [ ]1+ [ ]doppler                                                  PT [ ]2+ [ ]1+ [x ]doppler      LABS:                        10.2   12.00 )-----------( 202      ( 30 Mar 2023 07:20 )             32.2     03-30    143  |  116<H>  |  36<H>  ----------------------------<  273<H>  4.4   |  22  |  1.30    Ca    8.9      30 Mar 2023 07:20  Mg     2.2     03-30    TPro  6.7  /  Alb  2.2<L>  /  TBili  0.8  /  DBili  0.2  /  AST  24  /  ALT  23  /  AlkPhos  62  03-30          RADIOLOGY & ADDITIONAL STUDIES     Vascular Attending:  Dr Lucas      HPI:  88 yo male ,[facility] resident with PMHx - COPD, DM, HTN, CAD, HLD, H/O TIA, dementia,GERD, BPH and depression sent ot ER for evaluation of left foot 1metatarsal wound ,suggestive of osteomyelitis .Patient was seen by ID consult and transfer to the hospital recommended for wound cx/bone biopsy /podiatry evaluation ,likely will require 4-6 weeks of iv abx . Patient was admitted to [facility] with b/l feet wounds and was followed by wound care team ,recently completed 7 days of doxycycline for foot wound cellulitis . (30 Mar 2023 05:21)      PAST MEDICAL & SURGICAL HISTORY:  ASHD (arteriosclerotic heart disease)  BPH without urinary obstruction  COPD, moderate  Stage 3 chronic kidney disease  Chronic GERD  HLD (hyperlipidemia)  MDD (major depressive disorder)  Obstructive and reflux uropathy  Cellulitis  HTN (hypertension)  Sepsis  Dementia  Moderate protein-calorie malnutrition  Brain TIA  History of RSV infection  DM type 2, not at goal  Pressure ulcer of unspecified heel, unspecified stage  Venous stasis ulcer without varicose veins  Multiple open wounds of foot      REVIEW OF SYSTEMS-unable to obtain       MEDICATIONS  (STANDING):  albuterol/ipratropium for Nebulization 3 milliLiter(s) Nebulizer every 8 hours  atenolol  Tablet 25 milliGRAM(s) Oral daily  budesonide 160 MICROgram(s)/formoterol 4.5 MICROgram(s) Inhaler 2 Puff(s) Inhalation two times a day  dextrose 5%. 1000 milliLiter(s) (50 mL/Hr) IV Continuous <Continuous>  dextrose 5%. 1000 milliLiter(s) (100 mL/Hr) IV Continuous <Continuous>  dextrose 50% Injectable 25 Gram(s) IV Push once  dextrose 50% Injectable 12.5 Gram(s) IV Push once  dextrose 50% Injectable 25 Gram(s) IV Push once  donepezil 5 milliGRAM(s) Oral at bedtime  DULoxetine 60 milliGRAM(s) Oral daily  glucagon  Injectable 1 milliGRAM(s) IntraMuscular once  heparin   Injectable 5000 Unit(s) SubCutaneous every 12 hours  insulin lispro (ADMELOG) corrective regimen sliding scale   SubCutaneous three times a day before meals  insulin lispro (ADMELOG) corrective regimen sliding scale   SubCutaneous at bedtime  lactobacillus acidophilus 1 Tablet(s) Oral two times a day with meals  losartan 25 milliGRAM(s) Oral daily  multivitamin 1 Tablet(s) Oral daily  pantoprazole    Tablet 40 milliGRAM(s) Oral daily  piperacillin/tazobactam IVPB.- 3.375 Gram(s) IV Intermittent once  piperacillin/tazobactam IVPB.. 3.375 Gram(s) IV Intermittent every 8 hours  senna 2 Tablet(s) Oral at bedtime  simvastatin 40 milliGRAM(s) Oral at bedtime  sodium chloride 0.9%. 1000 milliLiter(s) (50 mL/Hr) IV Continuous <Continuous>  tamsulosin 0.4 milliGRAM(s) Oral at bedtime    MEDICATIONS  (PRN):  acetaminophen     Tablet .. 650 milliGRAM(s) Oral every 6 hours PRN Temp greater or equal to 38C (100.4F), Mild Pain (1 - 3)  aluminum hydroxide/magnesium hydroxide/simethicone Suspension 30 milliLiter(s) Oral every 4 hours PRN Dyspepsia  bisacodyl Suppository 10 milliGRAM(s) Rectal daily PRN Constipation  dextrose Oral Gel 15 Gram(s) Oral once PRN Blood Glucose LESS THAN 70 milliGRAM(s)/deciliter  hydrALAZINE 50 milliGRAM(s) Oral every 6 hours PRN for systolic BP>160  magnesium hydroxide Suspension 30 milliLiter(s) Oral daily PRN Constipation  melatonin 3 milliGRAM(s) Oral at bedtime PRN Insomnia  morphine  - Injectable 2 milliGRAM(s) IV Push every 6 hours PRN Severe Pain (7 - 10)  ondansetron Injectable 4 milliGRAM(s) IV Push every 8 hours PRN Nausea and/or Vomiting  traMADol 50 milliGRAM(s) Oral four times a day PRN Moderate Pain (4 - 6)      Allergies  No Known Allergies      SOCIAL HISTORY: [facility] resident although pt reports he lives with wife and works part time; non smoker, no ETOH      Vital Signs Last 24 Hrs  T(C): 37.7 (30 Mar 2023 09:51), Max: 37.8 (30 Mar 2023 04:28)  T(F): 99.9 (30 Mar 2023 09:51), Max: 100.1 (30 Mar 2023 04:28)  HR: 112 (30 Mar 2023 09:51) (91 - 112)  BP: 158/76 (30 Mar 2023 09:51) (151/76 - 160/91)  BP(mean): --  RR: 18 (30 Mar 2023 09:51) (16 - 20)  SpO2: 96% (30 Mar 2023 09:51) (91% - 96%)    Parameters below as of 30 Mar 2023 09:51  Patient On (Oxygen Delivery Method): room air        PHYSICAL EXAM:  Constitutional: Thin elderly M in NAD  Eyes: PERRL  ENMT: WNL  Neck: No JVD  Respiratory: essentially CTA  Cardiovascular: normal S1, S2  Gastrointestinal: soft, ND, NT  Extremities: BLE feet wrapped with dressings CDI. Pt became agitated when trying to remove dressings for further assessment  Neurological: A&O X 1  Pulses:   Right:                                                                          Left:  FEM [x ]2+ [ ]1+ [ ]doppler                                             FEM [x ]2+ [ ]1+ [ ]doppler    POP [x ]2+ [ ]1+ [ ]doppler                                             POP [x ]2+ [ ]1+ [ ]doppler    DP [ ]2+ [ ]1+ [x ]doppler                                                DP [ ]2+ [ ]1+ [ ]doppler  PT[ ]2+ [ ]1+ [ ]doppler                                                  PT [ ]2+ [ ]1+ [x ]doppler      LABS:                        10.2   12.00 )-----------( 202      ( 30 Mar 2023 07:20 )             32.2     03-30    143  |  116<H>  |  36<H>  ----------------------------<  273<H>  4.4   |  22  |  1.30    Ca    8.9      30 Mar 2023 07:20  Mg     2.2     03-30    TPro  6.7  /  Alb  2.2<L>  /  TBili  0.8  /  DBili  0.2  /  AST  24  /  ALT  23  /  AlkPhos  62  03-30          RADIOLOGY & ADDITIONAL STUDIES

## 2023-03-30 NOTE — CARE COORDINATION ASSESSMENT. - NSCAREPROVIDERS_GEN_ALL_CORE_FT
CARE PROVIDERS:  Admitting: Linda Sanders  Attending: Linda Sanders  Cardiology Technician: Turner Portillo  Case Management: Radha Marie  Clinical Doc. Improvement: Alverto Brewster  Clinical Doc. Improvement: Erin Tomlinson  Clinical Doc. Improvement: Andreia Cummings  Consultant: Manju France  Consultant: Manuel Mascorro  Consultant: Dutch Arevalo  Consultant: Alecia Lamas  Consultant: Ledy Armando  Consultant: Isis Mancia  Consultant: Ethan Ojeda ED Attending: Ashanti Pittman ED Nurse: Pete Gutierrez  Nurse: John Ojeda  Nurse: Carol Gallardo  Nurse: Vera Hernandez  Ordered: ADM, User  Ordered: Pahlavan, Mohsen  Ordered: Doctor, Unknown  Ordered: He Maurer  Override: Yuni Kidd  PCA/Nursing Assistant: Tiana Murray  Radiology Technician: Alecia Sousa  Registered Dietitian: Jovana Nicole  Respiratory Therapy: Cameron Travis  Respiratory Therapy: Kade Jansen  : Francheska Parkinson  Speech Pathology: Dominique Smith  Speech Pathology: Morelia Thomas// Supp. Assoc.: Carmen Hammond

## 2023-03-30 NOTE — CARE COORDINATION ASSESSMENT. - RETURN TO PRIOR LIVING ARRANGEMENTS
Unsure if pt can safely return home. Pt to return to [facility] post hospitalization./Unknown at Present Unsure if pt can safely return home. Pt to return to [facility] post hospitalization./Unknown at Present Unsure if pt can safely return home. Pt to return to [facility] post hospitalization./Unknown at Present

## 2023-03-30 NOTE — CONSULT NOTE ADULT - PROBLEM SELECTOR RECOMMENDATION 9
Patient seen and evaluated   Left first metatarsal head wound down to bone with fibrotic tissue and erythema in the periwound and forefoot area   Applied Aquacel and sterile dressing to the foot   Left foot X- Rays (+) OM at the first metatarsal head   Ordered MRI for the left and right foot to r/o OM   Discussed with family conservative vs surgical intervention for the left foot wound. Patient is high risk for amputation and for sepsis, loss of limb and loss life.  Will discuss further treatment plan pending MRI and vascular recommendations   Continue local wound care and offloading at this time

## 2023-03-30 NOTE — PROGRESS NOTE ADULT - SUBJECTIVE AND OBJECTIVE BOX
Patient is a 87y Male whom presented to the hospital with ckd and chelo     PAST MEDICAL & SURGICAL HISTORY:      MEDICATIONS  (STANDING):      Allergies    No Known Allergies    Intolerances        SOCIAL HISTORY:  Denies ETOh,Smoking,     FAMILY HISTORY:      REVIEW OF SYSTEMS:  unable to obtained a good review system                              10.2   12.00 )-----------( 202      ( 30 Mar 2023 07:20 )             32.2       CBC Full  -  ( 30 Mar 2023 07:20 )  WBC Count : 12.00 K/uL  RBC Count : 3.48 M/uL  Hemoglobin : 10.2 g/dL  Hematocrit : 32.2 %  Platelet Count - Automated : 202 K/uL  Mean Cell Volume : 92.5 fl  Mean Cell Hemoglobin : 29.3 pg  Mean Cell Hemoglobin Concentration : 31.7 gm/dL  Auto Neutrophil # : x  Auto Lymphocyte # : x  Auto Monocyte # : x  Auto Eosinophil # : x  Auto Basophil # : x  Auto Neutrophil % : x  Auto Lymphocyte % : x  Auto Monocyte % : x  Auto Eosinophil % : x  Auto Basophil % : x      03-30    143  |  116<H>  |  36<H>  ----------------------------<  273<H>  4.4   |  22  |  1.30    Ca    8.9      30 Mar 2023 07:20  Mg     2.2     03-30    TPro  6.7  /  Alb  2.2<L>  /  TBili  0.8  /  DBili  0.2  /  AST  24  /  ALT  23  /  AlkPhos  62  03-30      CAPILLARY BLOOD GLUCOSE      POCT Blood Glucose.: 199 mg/dL (30 Mar 2023 21:39)  POCT Blood Glucose.: 273 mg/dL (30 Mar 2023 16:36)  POCT Blood Glucose.: 352 mg/dL (30 Mar 2023 12:48)  POCT Blood Glucose.: 339 mg/dL (30 Mar 2023 11:23)  POCT Blood Glucose.: 263 mg/dL (30 Mar 2023 08:02)  POCT Blood Glucose.: 240 mg/dL (30 Mar 2023 06:24)      Vital Signs Last 24 Hrs  T(C): 36.9 (30 Mar 2023 20:45), Max: 37.8 (30 Mar 2023 04:28)  T(F): 98.5 (30 Mar 2023 20:45), Max: 100.1 (30 Mar 2023 04:28)  HR: 88 (30 Mar 2023 20:45) (88 - 112)  BP: 151/78 (30 Mar 2023 20:45) (151/76 - 158/76)  BP(mean): --  RR: 18 (30 Mar 2023 20:45) (18 - 20)  SpO2: 92% (30 Mar 2023 20:45) (91% - 96%)    Parameters below as of 30 Mar 2023 20:45  Patient On (Oxygen Delivery Method): room air              PHYSICAL EXAM:    Constitutional: NAD  HEENT: conjunctive   clear   Neck:  No JVD  Respiratory: CTAB  Cardiovascular: S1 and S2  Gastrointestinal: BS+, soft,   Extremities: No peripheral edema  Neurological:  no focal deficits  Psychiatric: Normal mood,   : No Bethea  Skin: dry  Access: Not applicable    LABS:                        11.2   11.88 )-----------( 228      ( 29 Mar 2023 21:20 )             35.5     03-29    144  |  115<H>  |  35<H>  ----------------------------<  249<H>  4.3   |  27  |  1.40<H>    Ca    9.3      29 Mar 2023 21:20    TPro  7.6  /  Alb  2.8<L>  /  TBili  0.5  /  DBili  x   /  AST  20  /  ALT  30  /  AlkPhos  71  03-29      Urine Studies:          RADIOLOGY & ADDITIONAL STUDIES:          MEDICATIONS  (STANDING):  albuterol/ipratropium for Nebulization 3 milliLiter(s) Nebulizer every 8 hours  atenolol  Tablet 25 milliGRAM(s) Oral daily  budesonide 160 MICROgram(s)/formoterol 4.5 MICROgram(s) Inhaler 2 Puff(s) Inhalation two times a day  dextrose 5%. 1000 milliLiter(s) (50 mL/Hr) IV Continuous <Continuous>  dextrose 5%. 1000 milliLiter(s) (100 mL/Hr) IV Continuous <Continuous>  dextrose 50% Injectable 25 Gram(s) IV Push once  dextrose 50% Injectable 12.5 Gram(s) IV Push once  dextrose 50% Injectable 25 Gram(s) IV Push once  donepezil 5 milliGRAM(s) Oral at bedtime  DULoxetine 60 milliGRAM(s) Oral daily  glucagon  Injectable 1 milliGRAM(s) IntraMuscular once  heparin   Injectable 5000 Unit(s) SubCutaneous every 12 hours  insulin glargine Injectable (LANTUS) 8 Unit(s) SubCutaneous every morning  insulin lispro (ADMELOG) corrective regimen sliding scale   SubCutaneous three times a day before meals  insulin lispro (ADMELOG) corrective regimen sliding scale   SubCutaneous at bedtime  lactobacillus acidophilus 1 Tablet(s) Oral two times a day with meals  losartan 50 milliGRAM(s) Oral daily  multivitamin 1 Tablet(s) Oral daily  pantoprazole    Tablet 40 milliGRAM(s) Oral daily  piperacillin/tazobactam IVPB.. 3.375 Gram(s) IV Intermittent every 8 hours  senna 2 Tablet(s) Oral at bedtime  simvastatin 40 milliGRAM(s) Oral at bedtime  sodium chloride 0.9%. 1000 milliLiter(s) (50 mL/Hr) IV Continuous <Continuous>  tamsulosin 0.4 milliGRAM(s) Oral at bedtime

## 2023-03-30 NOTE — DIETITIAN INITIAL EVALUATION ADULT - PERTINENT MEDS FT
MEDICATIONS  (STANDING):  albuterol/ipratropium for Nebulization 3 milliLiter(s) Nebulizer every 8 hours  atenolol  Tablet 25 milliGRAM(s) Oral daily  budesonide 160 MICROgram(s)/formoterol 4.5 MICROgram(s) Inhaler 2 Puff(s) Inhalation two times a day  dextrose 5%. 1000 milliLiter(s) (50 mL/Hr) IV Continuous <Continuous>  dextrose 5%. 1000 milliLiter(s) (100 mL/Hr) IV Continuous <Continuous>  dextrose 50% Injectable 25 Gram(s) IV Push once  dextrose 50% Injectable 12.5 Gram(s) IV Push once  dextrose 50% Injectable 25 Gram(s) IV Push once  donepezil 5 milliGRAM(s) Oral at bedtime  DULoxetine 60 milliGRAM(s) Oral daily  glucagon  Injectable 1 milliGRAM(s) IntraMuscular once  heparin   Injectable 5000 Unit(s) SubCutaneous every 12 hours  insulin lispro (ADMELOG) corrective regimen sliding scale   SubCutaneous three times a day before meals  insulin lispro (ADMELOG) corrective regimen sliding scale   SubCutaneous at bedtime  lactobacillus acidophilus 1 Tablet(s) Oral two times a day with meals  losartan 25 milliGRAM(s) Oral daily  multivitamin 1 Tablet(s) Oral daily  pantoprazole    Tablet 40 milliGRAM(s) Oral daily  piperacillin/tazobactam IVPB.- 3.375 Gram(s) IV Intermittent once  piperacillin/tazobactam IVPB.. 3.375 Gram(s) IV Intermittent every 8 hours  senna 2 Tablet(s) Oral at bedtime  simvastatin 40 milliGRAM(s) Oral at bedtime  sodium chloride 0.9%. 1000 milliLiter(s) (50 mL/Hr) IV Continuous <Continuous>  tamsulosin 0.4 milliGRAM(s) Oral at bedtime    MEDICATIONS  (PRN):  acetaminophen     Tablet .. 650 milliGRAM(s) Oral every 6 hours PRN Temp greater or equal to 38C (100.4F), Mild Pain (1 - 3)  aluminum hydroxide/magnesium hydroxide/simethicone Suspension 30 milliLiter(s) Oral every 4 hours PRN Dyspepsia  bisacodyl Suppository 10 milliGRAM(s) Rectal daily PRN Constipation  dextrose Oral Gel 15 Gram(s) Oral once PRN Blood Glucose LESS THAN 70 milliGRAM(s)/deciliter  hydrALAZINE 50 milliGRAM(s) Oral every 6 hours PRN for systolic BP>160  magnesium hydroxide Suspension 30 milliLiter(s) Oral daily PRN Constipation  melatonin 3 milliGRAM(s) Oral at bedtime PRN Insomnia  morphine  - Injectable 2 milliGRAM(s) IV Push every 6 hours PRN Severe Pain (7 - 10)  ondansetron Injectable 4 milliGRAM(s) IV Push every 8 hours PRN Nausea and/or Vomiting  traMADol 50 milliGRAM(s) Oral four times a day PRN Moderate Pain (4 - 6)

## 2023-03-30 NOTE — DIETITIAN INITIAL EVALUATION ADULT - PERTINENT LABORATORY DATA
03-30    143  |  116<H>  |  36<H>  ----------------------------<  273<H>  4.4   |  22  |  1.30    Ca    8.9      30 Mar 2023 07:20  Mg     2.2     03-30    TPro  6.7  /  Alb  2.2<L>  /  TBili  0.8  /  DBili  0.2  /  AST  24  /  ALT  23  /  AlkPhos  62  03-30  POCT Blood Glucose.: 352 mg/dL (03-30-23 @ 12:48)  A1C with Estimated Average Glucose Result: 8.0 % (03-29-23 @ 21:20)

## 2023-03-30 NOTE — CONSULT NOTE ADULT - PROBLEM SELECTOR RECOMMENDATION 3
Pressure ulcers to bilateral heels with necrotic stable eschars   Continue with offloading boots at all times since patient is non ambulatory   Applied Aquacel and sterile dry dressing to bilateral heels

## 2023-03-30 NOTE — CONSULT NOTE ADULT - SUBJECTIVE AND OBJECTIVE BOX
[facility]  INFECTIOUS DISEASES - Ruma Mayes, [place] [address]  Tel: [phone]     Fax: [phone]  =======================================================    [id number]  ANA MARIA ESMEGREGORIO     CC: Patient is a 87y old  Male who presents with a chief complaint of left foot infection (30 Mar 2023 11:59)    HPI:  86 yo male ,Formerly [facility] resident with PMHx - COPD, DM, HTN, CAD, HLD, H/O TIA, dementia,GERD, BPH and depression, who was sent for evaluation of left foot 1metatarsal wound.  Per records,recently completed 7 days of doxycycline for foot wound cellulitis. He denies any pain, but noted to have discomfort/restlessness while left foot dressing was being removed. He denies any fevers, SOB, chest pain, abdominal pain, nausea or diarrhea.      PAST MEDICAL & SURGICAL HISTORY:  ASHD (arteriosclerotic heart disease)      BPH without urinary obstruction      COPD, moderate      Stage 3 chronic kidney disease      Chronic GERD      HLD (hyperlipidemia)      MDD (major depressive disorder)      Obstructive and reflux uropathy      Cellulitis      HTN (hypertension)      Sepsis      Dementia      Moderate protein-calorie malnutrition      Brain TIA      History of RSV infection      DM type 2, not at goal      Pressure ulcer of unspecified heel, unspecified stage      Venous stasis ulcer without varicose veins      Multiple open wounds of foot          Social Hx:     FAMILY HISTORY:      Allergies    No Known Allergies    Intolerances        Antibiotics:  MEDICATIONS  (STANDING):  albuterol/ipratropium for Nebulization 3 milliLiter(s) Nebulizer every 8 hours  atenolol  Tablet 25 milliGRAM(s) Oral daily  budesonide 160 MICROgram(s)/formoterol 4.5 MICROgram(s) Inhaler 2 Puff(s) Inhalation two times a day  dextrose 5%. 1000 milliLiter(s) (50 mL/Hr) IV Continuous <Continuous>  dextrose 5%. 1000 milliLiter(s) (100 mL/Hr) IV Continuous <Continuous>  dextrose 50% Injectable 25 Gram(s) IV Push once  dextrose 50% Injectable 12.5 Gram(s) IV Push once  dextrose 50% Injectable 25 Gram(s) IV Push once  donepezil 5 milliGRAM(s) Oral at bedtime  DULoxetine 60 milliGRAM(s) Oral daily  glucagon  Injectable 1 milliGRAM(s) IntraMuscular once  heparin   Injectable 5000 Unit(s) SubCutaneous every 12 hours  insulin lispro (ADMELOG) corrective regimen sliding scale   SubCutaneous three times a day before meals  insulin lispro (ADMELOG) corrective regimen sliding scale   SubCutaneous at bedtime  lactobacillus acidophilus 1 Tablet(s) Oral two times a day with meals  losartan 25 milliGRAM(s) Oral daily  multivitamin 1 Tablet(s) Oral daily  pantoprazole    Tablet 40 milliGRAM(s) Oral daily  piperacillin/tazobactam IVPB.- 3.375 Gram(s) IV Intermittent once  piperacillin/tazobactam IVPB.. 3.375 Gram(s) IV Intermittent every 8 hours  senna 2 Tablet(s) Oral at bedtime  simvastatin 40 milliGRAM(s) Oral at bedtime  sodium chloride 0.9%. 1000 milliLiter(s) (50 mL/Hr) IV Continuous <Continuous>  tamsulosin 0.4 milliGRAM(s) Oral at bedtime    MEDICATIONS  (PRN):  acetaminophen     Tablet .. 650 milliGRAM(s) Oral every 6 hours PRN Temp greater or equal to 38C (100.4F), Mild Pain (1 - 3)  aluminum hydroxide/magnesium hydroxide/simethicone Suspension 30 milliLiter(s) Oral every 4 hours PRN Dyspepsia  bisacodyl Suppository 10 milliGRAM(s) Rectal daily PRN Constipation  dextrose Oral Gel 15 Gram(s) Oral once PRN Blood Glucose LESS THAN 70 milliGRAM(s)/deciliter  hydrALAZINE 50 milliGRAM(s) Oral every 6 hours PRN for systolic BP>160  magnesium hydroxide Suspension 30 milliLiter(s) Oral daily PRN Constipation  melatonin 3 milliGRAM(s) Oral at bedtime PRN Insomnia  morphine  - Injectable 2 milliGRAM(s) IV Push every 6 hours PRN Severe Pain (7 - 10)  ondansetron Injectable 4 milliGRAM(s) IV Push every 8 hours PRN Nausea and/or Vomiting  traMADol 50 milliGRAM(s) Oral four times a day PRN Moderate Pain (4 - 6)       REVIEW OF SYSTEMS:*limited 2/2 dementia*  CONSTITUTIONAL:  (+) low grade temp  CARDIOVASCULAR:  No chest pain or SOB.  RESPIRATORY:  No cough, shortness of breath  GASTROINTESTINAL:  No nausea, vomiting or diarrhea.  MUSCULOSKELETAL:  no back pain  SKIN:  see history  NEUROLOGIC:  No headache or dizziness    Physical Exam:  Vital Signs Last 24 Hrs  T(C): 37.7 (30 Mar 2023 09:51), Max: 37.8 (30 Mar 2023 04:28)  T(F): 99.9 (30 Mar 2023 09:51), Max: 100.1 (30 Mar 2023 04:28)  HR: 112 (30 Mar 2023 09:51) (91 - 112)  BP: 158/76 (30 Mar 2023 09:51) (151/76 - 160/91)  BP(mean): --  RR: 18 (30 Mar 2023 09:51) (16 - 20)  SpO2: 96% (30 Mar 2023 09:51) (91% - 96%)    Parameters below as of 30 Mar 2023 09:51  Patient On (Oxygen Delivery Method): room air      Height (cm): 172.7 (03-29 @ 17:43)  Weight (kg): 63.5 (03-29 @ 17:43)  BMI (kg/m2): 21.3 (03-29 @ 17:43)  BSA (m2): 1.76 (03-29 @ 17:43)  GEN: NAD  HEENT: normocephalic and atraumatic.   NECK: Supple.   LUNGS: Clear to auscultation.  HEART: Regular rate and rhythm   ABDOMEN: Soft, nontender, and nondistended.    EXTREMITIES: No leg edema.  NEUROLOGIC: AO x 2, answering some simple questions  SKIN: (+) L foot foot 1st metatarsal wound with probe to bone, no odor or active drainage but with surrounding erythema and tenderness, bilateral heel wounds    Labs:  03-30    143  |  116<H>  |  36<H>  ----------------------------<  273<H>  4.4   |  22  |  1.30    Ca    8.9      30 Mar 2023 07:20  Mg     2.2     03-30    TPro  6.7  /  Alb  2.2<L>  /  TBili  0.8  /  DBili  0.2  /  AST  24  /  ALT  23  /  AlkPhos  62  03-30                          10.2   12.00 )-----------( 202      ( 30 Mar 2023 07:20 )             32.2         LIVER FUNCTIONS - ( 30 Mar 2023 07:20 )  Alb: 2.2 g/dL / Pro: 6.7 g/dL / ALK PHOS: 62 U/L / ALT: 23 U/L / AST: 24 U/L / GGT: x                     Sedimentation Rate, Erythrocyte: 67 mm/hr (03-29-23 @ 21:20)    COVID-19 PCR: NotDetec (03-29-23 @ 21:20)      RECENT CULTURES:        All imaging and other data have been reviewed.    Left foot and chest xray:  Heart likely enlarged.    On March 29 oh is an infiltrate developing off the right lower hilum.   This has increased.    Left foot. Concern is osteomyelitis. 2 views. Moderate hallux valgus with   adjacent degeneration again noted.    There is a small erosion of the proximal corner of the outer aspect of   the proximal phalanx of the great toe. Another similar erosion is seen of   the distal outer corner of the proximal phalanx. Bone infection not   excluded.    No fracture. Arterial calcification.    Findings similar to March 29.    IMPRESSION: Stable erosions around the first MTP joint and great toe   which could be degenerative or bone infection in etiology.    Increasing right lower perihilar infiltrate.   [facility]  INFECTIOUS DISEASES - Ruma Mayes, [place] [address]  Tel: [phone]     Fax: [phone]  =======================================================    [id number]  ANA MARIA ESMEGREGORIO     CC: Patient is a 87y old  Male who presents with a chief complaint of left foot infection (30 Mar 2023 11:59)    HPI:  88 yo male ,[facility]ly [facility] resident with PMHx - COPD, DM, HTN, CAD, HLD, H/O TIA, dementia,GERD, BPH and depression, who was sent for evaluation of left foot 1metatarsal wound.  Per records,recently completed 7 days of doxycycline for foot wound cellulitis. He denies any pain, but noted to have discomfort/restlessness while left foot dressing was being removed. He denies any fevers, SOB, chest pain, abdominal pain, nausea or diarrhea.      PAST MEDICAL & SURGICAL HISTORY:  ASHD (arteriosclerotic heart disease)      BPH without urinary obstruction      COPD, moderate      Stage 3 chronic kidney disease      Chronic GERD      HLD (hyperlipidemia)      MDD (major depressive disorder)      Obstructive and reflux uropathy      Cellulitis      HTN (hypertension)      Sepsis      Dementia      Moderate protein-calorie malnutrition      Brain TIA      History of RSV infection      DM type 2, not at goal      Pressure ulcer of unspecified heel, unspecified stage      Venous stasis ulcer without varicose veins      Multiple open wounds of foot          Social Hx:     FAMILY HISTORY:      Allergies    No Known Allergies    Intolerances        Antibiotics:  MEDICATIONS  (STANDING):  albuterol/ipratropium for Nebulization 3 milliLiter(s) Nebulizer every 8 hours  atenolol  Tablet 25 milliGRAM(s) Oral daily  budesonide 160 MICROgram(s)/formoterol 4.5 MICROgram(s) Inhaler 2 Puff(s) Inhalation two times a day  dextrose 5%. 1000 milliLiter(s) (50 mL/Hr) IV Continuous <Continuous>  dextrose 5%. 1000 milliLiter(s) (100 mL/Hr) IV Continuous <Continuous>  dextrose 50% Injectable 25 Gram(s) IV Push once  dextrose 50% Injectable 12.5 Gram(s) IV Push once  dextrose 50% Injectable 25 Gram(s) IV Push once  donepezil 5 milliGRAM(s) Oral at bedtime  DULoxetine 60 milliGRAM(s) Oral daily  glucagon  Injectable 1 milliGRAM(s) IntraMuscular once  heparin   Injectable 5000 Unit(s) SubCutaneous every 12 hours  insulin lispro (ADMELOG) corrective regimen sliding scale   SubCutaneous three times a day before meals  insulin lispro (ADMELOG) corrective regimen sliding scale   SubCutaneous at bedtime  lactobacillus acidophilus 1 Tablet(s) Oral two times a day with meals  losartan 25 milliGRAM(s) Oral daily  multivitamin 1 Tablet(s) Oral daily  pantoprazole    Tablet 40 milliGRAM(s) Oral daily  piperacillin/tazobactam IVPB.- 3.375 Gram(s) IV Intermittent once  piperacillin/tazobactam IVPB.. 3.375 Gram(s) IV Intermittent every 8 hours  senna 2 Tablet(s) Oral at bedtime  simvastatin 40 milliGRAM(s) Oral at bedtime  sodium chloride 0.9%. 1000 milliLiter(s) (50 mL/Hr) IV Continuous <Continuous>  tamsulosin 0.4 milliGRAM(s) Oral at bedtime    MEDICATIONS  (PRN):  acetaminophen     Tablet .. 650 milliGRAM(s) Oral every 6 hours PRN Temp greater or equal to 38C (100.4F), Mild Pain (1 - 3)  aluminum hydroxide/magnesium hydroxide/simethicone Suspension 30 milliLiter(s) Oral every 4 hours PRN Dyspepsia  bisacodyl Suppository 10 milliGRAM(s) Rectal daily PRN Constipation  dextrose Oral Gel 15 Gram(s) Oral once PRN Blood Glucose LESS THAN 70 milliGRAM(s)/deciliter  hydrALAZINE 50 milliGRAM(s) Oral every 6 hours PRN for systolic BP>160  magnesium hydroxide Suspension 30 milliLiter(s) Oral daily PRN Constipation  melatonin 3 milliGRAM(s) Oral at bedtime PRN Insomnia  morphine  - Injectable 2 milliGRAM(s) IV Push every 6 hours PRN Severe Pain (7 - 10)  ondansetron Injectable 4 milliGRAM(s) IV Push every 8 hours PRN Nausea and/or Vomiting  traMADol 50 milliGRAM(s) Oral four times a day PRN Moderate Pain (4 - 6)       REVIEW OF SYSTEMS:*limited 2/2 dementia*  CONSTITUTIONAL:  (+) low grade temp  CARDIOVASCULAR:  No chest pain or SOB.  RESPIRATORY:  No cough, shortness of breath  GASTROINTESTINAL:  No nausea, vomiting or diarrhea.  MUSCULOSKELETAL:  no back pain  SKIN:  see history  NEUROLOGIC:  No headache or dizziness    Physical Exam:  Vital Signs Last 24 Hrs  T(C): 37.7 (30 Mar 2023 09:51), Max: 37.8 (30 Mar 2023 04:28)  T(F): 99.9 (30 Mar 2023 09:51), Max: 100.1 (30 Mar 2023 04:28)  HR: 112 (30 Mar 2023 09:51) (91 - 112)  BP: 158/76 (30 Mar 2023 09:51) (151/76 - 160/91)  BP(mean): --  RR: 18 (30 Mar 2023 09:51) (16 - 20)  SpO2: 96% (30 Mar 2023 09:51) (91% - 96%)    Parameters below as of 30 Mar 2023 09:51  Patient On (Oxygen Delivery Method): room air      Height (cm): 172.7 (03-29 @ 17:43)  Weight (kg): 63.5 (03-29 @ 17:43)  BMI (kg/m2): 21.3 (03-29 @ 17:43)  BSA (m2): 1.76 (03-29 @ 17:43)  GEN: NAD  HEENT: normocephalic and atraumatic.   NECK: Supple.   LUNGS: Clear to auscultation.  HEART: Regular rate and rhythm   ABDOMEN: Soft, nontender, and nondistended.    EXTREMITIES: No leg edema.  NEUROLOGIC: AO x 2, answering some simple questions  SKIN: (+) L foot foot 1st metatarsal wound with probe to bone, no odor or active drainage but with surrounding erythema and tenderness, bilateral heel wounds    Labs:  03-30    143  |  116<H>  |  36<H>  ----------------------------<  273<H>  4.4   |  22  |  1.30    Ca    8.9      30 Mar 2023 07:20  Mg     2.2     03-30    TPro  6.7  /  Alb  2.2<L>  /  TBili  0.8  /  DBili  0.2  /  AST  24  /  ALT  23  /  AlkPhos  62  03-30                          10.2   12.00 )-----------( 202      ( 30 Mar 2023 07:20 )             32.2         LIVER FUNCTIONS - ( 30 Mar 2023 07:20 )  Alb: 2.2 g/dL / Pro: 6.7 g/dL / ALK PHOS: 62 U/L / ALT: 23 U/L / AST: 24 U/L / GGT: x                     Sedimentation Rate, Erythrocyte: 67 mm/hr (03-29-23 @ 21:20)    COVID-19 PCR: NotDetec (03-29-23 @ 21:20)      RECENT CULTURES:        All imaging and other data have been reviewed.    Left foot and chest xray:  Heart likely enlarged.    On March 29 oh is an infiltrate developing off the right lower hilum.   This has increased.    Left foot. Concern is osteomyelitis. 2 views. Moderate hallux valgus with   adjacent degeneration again noted.    There is a small erosion of the proximal corner of the outer aspect of   the proximal phalanx of the great toe. Another similar erosion is seen of   the distal outer corner of the proximal phalanx. Bone infection not   excluded.    No fracture. Arterial calcification.    Findings similar to March 29.    IMPRESSION: Stable erosions around the first MTP joint and great toe   which could be degenerative or bone infection in etiology.    Increasing right lower perihilar infiltrate.   [facility]  INFECTIOUS DISEASES - Ruma Mayes, [place] [address]  Tel: [phone]     Fax: [phone]  =======================================================    [id number]  ANA MARIA ESMEGREGORIO     CC: Patient is a 87y old  Male who presents with a chief complaint of left foot infection (30 Mar 2023 11:59)    HPI:  88 yo male ,[facility] resident with PMHx - COPD, DM, HTN, CAD, HLD, H/O TIA, dementia,GERD, BPH and depression, who was sent for evaluation of left foot 1metatarsal wound.  Per records,recently completed 7 days of doxycycline for foot wound cellulitis. He denies any pain, but noted to have discomfort/restlessness while left foot dressing was being removed. He denies any fevers, SOB, chest pain, abdominal pain, nausea or diarrhea.      PAST MEDICAL & SURGICAL HISTORY:  ASHD (arteriosclerotic heart disease)      BPH without urinary obstruction      COPD, moderate      Stage 3 chronic kidney disease      Chronic GERD      HLD (hyperlipidemia)      MDD (major depressive disorder)      Obstructive and reflux uropathy      Cellulitis      HTN (hypertension)      Sepsis      Dementia      Moderate protein-calorie malnutrition      Brain TIA      History of RSV infection      DM type 2, not at goal      Pressure ulcer of unspecified heel, unspecified stage      Venous stasis ulcer without varicose veins      Multiple open wounds of foot          Social Hx:     FAMILY HISTORY:      Allergies    No Known Allergies    Intolerances        Antibiotics:  MEDICATIONS  (STANDING):  albuterol/ipratropium for Nebulization 3 milliLiter(s) Nebulizer every 8 hours  atenolol  Tablet 25 milliGRAM(s) Oral daily  budesonide 160 MICROgram(s)/formoterol 4.5 MICROgram(s) Inhaler 2 Puff(s) Inhalation two times a day  dextrose 5%. 1000 milliLiter(s) (50 mL/Hr) IV Continuous <Continuous>  dextrose 5%. 1000 milliLiter(s) (100 mL/Hr) IV Continuous <Continuous>  dextrose 50% Injectable 25 Gram(s) IV Push once  dextrose 50% Injectable 12.5 Gram(s) IV Push once  dextrose 50% Injectable 25 Gram(s) IV Push once  donepezil 5 milliGRAM(s) Oral at bedtime  DULoxetine 60 milliGRAM(s) Oral daily  glucagon  Injectable 1 milliGRAM(s) IntraMuscular once  heparin   Injectable 5000 Unit(s) SubCutaneous every 12 hours  insulin lispro (ADMELOG) corrective regimen sliding scale   SubCutaneous three times a day before meals  insulin lispro (ADMELOG) corrective regimen sliding scale   SubCutaneous at bedtime  lactobacillus acidophilus 1 Tablet(s) Oral two times a day with meals  losartan 25 milliGRAM(s) Oral daily  multivitamin 1 Tablet(s) Oral daily  pantoprazole    Tablet 40 milliGRAM(s) Oral daily  piperacillin/tazobactam IVPB.- 3.375 Gram(s) IV Intermittent once  piperacillin/tazobactam IVPB.. 3.375 Gram(s) IV Intermittent every 8 hours  senna 2 Tablet(s) Oral at bedtime  simvastatin 40 milliGRAM(s) Oral at bedtime  sodium chloride 0.9%. 1000 milliLiter(s) (50 mL/Hr) IV Continuous <Continuous>  tamsulosin 0.4 milliGRAM(s) Oral at bedtime    MEDICATIONS  (PRN):  acetaminophen     Tablet .. 650 milliGRAM(s) Oral every 6 hours PRN Temp greater or equal to 38C (100.4F), Mild Pain (1 - 3)  aluminum hydroxide/magnesium hydroxide/simethicone Suspension 30 milliLiter(s) Oral every 4 hours PRN Dyspepsia  bisacodyl Suppository 10 milliGRAM(s) Rectal daily PRN Constipation  dextrose Oral Gel 15 Gram(s) Oral once PRN Blood Glucose LESS THAN 70 milliGRAM(s)/deciliter  hydrALAZINE 50 milliGRAM(s) Oral every 6 hours PRN for systolic BP>160  magnesium hydroxide Suspension 30 milliLiter(s) Oral daily PRN Constipation  melatonin 3 milliGRAM(s) Oral at bedtime PRN Insomnia  morphine  - Injectable 2 milliGRAM(s) IV Push every 6 hours PRN Severe Pain (7 - 10)  ondansetron Injectable 4 milliGRAM(s) IV Push every 8 hours PRN Nausea and/or Vomiting  traMADol 50 milliGRAM(s) Oral four times a day PRN Moderate Pain (4 - 6)       REVIEW OF SYSTEMS:*limited 2/2 dementia*  CONSTITUTIONAL:  (+) low grade temp  CARDIOVASCULAR:  No chest pain or SOB.  RESPIRATORY:  No cough, shortness of breath  GASTROINTESTINAL:  No nausea, vomiting or diarrhea.  MUSCULOSKELETAL:  no back pain  SKIN:  see history  NEUROLOGIC:  No headache or dizziness    Physical Exam:  Vital Signs Last 24 Hrs  T(C): 37.7 (30 Mar 2023 09:51), Max: 37.8 (30 Mar 2023 04:28)  T(F): 99.9 (30 Mar 2023 09:51), Max: 100.1 (30 Mar 2023 04:28)  HR: 112 (30 Mar 2023 09:51) (91 - 112)  BP: 158/76 (30 Mar 2023 09:51) (151/76 - 160/91)  BP(mean): --  RR: 18 (30 Mar 2023 09:51) (16 - 20)  SpO2: 96% (30 Mar 2023 09:51) (91% - 96%)    Parameters below as of 30 Mar 2023 09:51  Patient On (Oxygen Delivery Method): room air      Height (cm): 172.7 (03-29 @ 17:43)  Weight (kg): 63.5 (03-29 @ 17:43)  BMI (kg/m2): 21.3 (03-29 @ 17:43)  BSA (m2): 1.76 (03-29 @ 17:43)  GEN: NAD  HEENT: normocephalic and atraumatic.   NECK: Supple.   LUNGS: Clear to auscultation.  HEART: Regular rate and rhythm   ABDOMEN: Soft, nontender, and nondistended.    EXTREMITIES: No leg edema.  NEUROLOGIC: AO x 2, answering some simple questions  SKIN: (+) L foot foot 1st metatarsal wound with probe to bone, no odor or active drainage but with surrounding erythema and tenderness, bilateral heel wounds    Labs:  03-30    143  |  116<H>  |  36<H>  ----------------------------<  273<H>  4.4   |  22  |  1.30    Ca    8.9      30 Mar 2023 07:20  Mg     2.2     03-30    TPro  6.7  /  Alb  2.2<L>  /  TBili  0.8  /  DBili  0.2  /  AST  24  /  ALT  23  /  AlkPhos  62  03-30                          10.2   12.00 )-----------( 202      ( 30 Mar 2023 07:20 )             32.2         LIVER FUNCTIONS - ( 30 Mar 2023 07:20 )  Alb: 2.2 g/dL / Pro: 6.7 g/dL / ALK PHOS: 62 U/L / ALT: 23 U/L / AST: 24 U/L / GGT: x                     Sedimentation Rate, Erythrocyte: 67 mm/hr (03-29-23 @ 21:20)    COVID-19 PCR: NotDetec (03-29-23 @ 21:20)      RECENT CULTURES:        All imaging and other data have been reviewed.    Left foot and chest xray:  Heart likely enlarged.    On March 29 oh is an infiltrate developing off the right lower hilum.   This has increased.    Left foot. Concern is osteomyelitis. 2 views. Moderate hallux valgus with   adjacent degeneration again noted.    There is a small erosion of the proximal corner of the outer aspect of   the proximal phalanx of the great toe. Another similar erosion is seen of   the distal outer corner of the proximal phalanx. Bone infection not   excluded.    No fracture. Arterial calcification.    Findings similar to March 29.    IMPRESSION: Stable erosions around the first MTP joint and great toe   which could be degenerative or bone infection in etiology.    Increasing right lower perihilar infiltrate.

## 2023-03-30 NOTE — SWALLOW BEDSIDE ASSESSMENT ADULT - COMMENTS
Chart reviewed order received for swallow eval.  Pt received upright in bed A&A Ox2, reduced cognition, on RA, pain scale 0/10 pre & post eval.  Swallow eval completed see below for details.  Pt educated on rx's, left as received NAD TIFFANIE Medina & Dr. Sanders notified.  Will follow.     Per charting, pt is a "88 yo male ,[facility] resident with PMHx - COPD, DM, HTN, CAD, HLD, H/O TIA, dementia,GERD, BPH and depression sent ot ER for evaluation of left foot 1metatarsal wound ,suggestive of osteomyelitis .Patient was seen by ID consult and transfer to the hospital recommended for wound cx/bone biopsy /podiatry evaluation ,likely will require 4-6 weeks of iv abx . Patient was admitted to [facility] with b/l feet wounds and was followed by wound care team ,recently completed 7 days of doxycycline for foot wound cellulitis ."    Chest xray 3/30/23: "IMPRESSION: Stable erosions around the first MTP joint and great toe   which could be degenerative or bone infection in etiology.    Increasing right lower perihilar infiltrate." Chart reviewed order received for swallow eval.  Pt received upright in bed A&A Ox2, reduced cognition, on RA, pain scale 0/10 pre & post eval.  Swallow eval completed see below for details.  Pt educated on rx's, left as received NAD TIFFANIE Medina & Dr. Sanders notified.  Will follow.     Per charting, pt is a "86 yo male ,[facility] resident with PMHx - COPD, DM, HTN, CAD, HLD, H/O TIA, dementia,GERD, BPH and depression sent ot ER for evaluation of left foot 1metatarsal wound ,suggestive of osteomyelitis .Patient was seen by ID consult and transfer to the hospital recommended for wound cx/bone biopsy /podiatry evaluation ,likely will require 4-6 weeks of iv abx . Patient was admitted to [facility] with b/l feet wounds and was followed by wound care team ,recently completed 7 days of doxycycline for foot wound cellulitis ."    Chest xray 3/30/23: "IMPRESSION: Stable erosions around the first MTP joint and great toe   which could be degenerative or bone infection in etiology.    Increasing right lower perihilar infiltrate." Chart reviewed order received for swallow eval.  Pt received upright in bed A&A Ox2, reduced cognition, on RA, pain scale 0/10 pre & post eval.  Swallow eval completed see below for details.  Pt educated on rx's, left as received NAD TIFFANIE Medina & Dr. Sanders notified.  Will follow.     Per charting, pt is a "86 yo male ,[facility] resident with PMHx - COPD, DM, HTN, CAD, HLD, H/O TIA, dementia,GERD, BPH and depression sent ot ER for evaluation of left foot 1metatarsal wound ,suggestive of osteomyelitis .Patient was seen by ID consult and transfer to the hospital recommended for wound cx/bone biopsy /podiatry evaluation ,likely will require 4-6 weeks of iv abx . Patient was admitted to [facility] with b/l feet wounds and was followed by wound care team ,recently completed 7 days of doxycycline for foot wound cellulitis ."    Chest xray 3/30/23: "IMPRESSION: Stable erosions around the first MTP joint and great toe   which could be degenerative or bone infection in etiology.    Increasing right lower perihilar infiltrate."

## 2023-03-30 NOTE — PROGRESS NOTE ADULT - ASSESSMENT
REVIEW OF SYMPTOMS      Able to give (reliable) ROS  NO     PHYSICAL EXAM    HEENT Unremarkable  atraumatic   RESP Fair air entry EXP prolonged    Harsh breath sound Resp distres mild   CARDIAC S1 S2 No S3     NO JVD    ABDOMEN SOFT BS PRESENT NOT DISTENDED No hepatosplenomegaly   PEDAL EDEMA present No calf tenderness  NO rash       GENERAL DATA .   GOC.   3/30/2023 full code  ALLGY.    nka                    WT. 3/30/2023 63  BMI.      3/30/2023 21            ICU STAY. .. none  COVID. ..  3/29/2023 scv2 (-)     BEST PRACTICE ISSUES.    HOB ELEVATN. Yes  DVT PPLX. ..    3/29 hpsc   MILLER PPLX. ..   3/30/2023 protonix 40    INFN PPLX. ..    SP SW LAURENT.  3/30/2023 -> soft bite mild thick        DIET.  ..  3/30/2023 dash   IV fl... 3/30/2023 ns 50        ABGS.    VS/ PO/IO/ VENT/ DRIPS.   3/30/2023 99f 110 150/70   3/30/2023 ra 96%     PROBLEM/ASSESSMENT/PLAN.  Infection  Osteomyelitius  Possible pneumonia   .. Esr 3/29/2023 esr 67  .. W 3/29-3/30/2023 w 11.8 - 12  .. cxr 3/30/2023  ........ increasing r lower perihilar infiltrate   .. xr foot left 3/30/2023  ........ stable eroisions around 1st mtp anmd great toe    ........ which could be bone infectn    .. CXR 3/29/2023 Possible hansa pneum  .. 3/29 zosyn    .. follow cultures  COPD  .. 3/30/2023 duoneb.3    .. 3/30/2023 symbicort   hemodynamics  .. La 3/29/2023 la 1.8   .. target map 65 (+)   CAD.  .. 3/30/2023 atenolol 25  .. 3/30/2023 simvastat 40   CHF.  .. 3/30/2023 losartan 50   .. 3/30/2023 hydralazin 50.4p   Anemia  .. Hb 3/29-3/30/2023 Hb 11.2- 10.2    .. monitor  CKD  .. Na 3/29/2023 Na 144  .. Cr 3/29-3/30/2023 Cr 1.4 - 1.3   .. monitor  OBS  .. 3/30/2023 donepezil     OVERALL .  86 yo M with PMHx HTN, HLD, T2D, dementia (AOx2-3), OA, BPH, CAD, TIA, CKD 3 and GERD HO recent hospital stay 1/24-1/30/2023 [facility] RSV  COPD ex  now admitted with osteomyelitis possible pneum  Pulm consulted 3/29/2023    PROBLEMS  Osteomyelitis  Pneumonia   COPD   CKD     PLAN  Antibio bronchodilators monitor  .. 3/30/2023 check ct ch   .       TIME SPENT   Over 25 minutes aggregate care time spent on encounter; activities included   direct patient care, counseling and/or coordinating care reviewing notes, lab data/ imaging , discussion with multidisciplinary team/ patient  /family and explaining in detail risks, benefits, alternatives  of the recommendations     Paulino Parmar 87 m   REVIEW OF SYMPTOMS      Able to give (reliable) ROS  NO     PHYSICAL EXAM    HEENT Unremarkable  atraumatic   RESP Fair air entry EXP prolonged    Harsh breath sound Resp distres mild   CARDIAC S1 S2 No S3     NO JVD    ABDOMEN SOFT BS PRESENT NOT DISTENDED No hepatosplenomegaly   PEDAL EDEMA present No calf tenderness  NO rash       GENERAL DATA .   GOC.   3/30/2023 full code  ALLGY.    nka                    WT. 3/30/2023 63  BMI.      3/30/2023 21            ICU STAY. .. none  COVID. ..  3/29/2023 scv2 (-)     BEST PRACTICE ISSUES.    HOB ELEVATN. Yes  DVT PPLX. ..    3/29 hpsc   MILLER PPLX. ..   3/30/2023 protonix 40    INFN PPLX. ..    SP SW LAURENT.  3/30/2023 -> soft bite mild thick        DIET.  ..  3/30/2023 dash   IV fl... 3/30/2023 ns 50        ABGS.    VS/ PO/IO/ VENT/ DRIPS.   3/30/2023 99f 110 150/70   3/30/2023 ra 96%     PROBLEM/ASSESSMENT/PLAN.  Infection  Osteomyelitius  Possible pneumonia   .. Esr 3/29/2023 esr 67  .. W 3/29-3/30/2023 w 11.8 - 12  .. cxr 3/30/2023  ........ increasing r lower perihilar infiltrate   .. xr foot left 3/30/2023  ........ stable eroisions around 1st mtp anmd great toe    ........ which could be bone infectn    .. CXR 3/29/2023 Possible hansa pneum  .. 3/29 zosyn    .. follow cultures  COPD  .. 3/30/2023 duoneb.3    .. 3/30/2023 symbicort   hemodynamics  .. La 3/29/2023 la 1.8   .. target map 65 (+)   CAD.  .. 3/30/2023 atenolol 25  .. 3/30/2023 simvastat 40   CHF.  .. 3/30/2023 losartan 50   .. 3/30/2023 hydralazin 50.4p   Anemia  .. Hb 3/29-3/30/2023 Hb 11.2- 10.2    .. monitor  CKD  .. Na 3/29/2023 Na 144  .. Cr 3/29-3/30/2023 Cr 1.4 - 1.3   .. monitor  OBS  .. 3/30/2023 donepezil     OVERALL .  88 yo M with PMHx HTN, HLD, T2D, dementia (AOx2-3), OA, BPH, CAD, TIA, CKD 3 and GERD HO recent hospital stay 1/24-1/30/2023 [facility] RSV  COPD ex  now admitted with osteomyelitis possible pneum  Pulm consulted 3/29/2023    PROBLEMS  Osteomyelitis  Pneumonia   COPD   CKD     PLAN  Antibio bronchodilators monitor  .. 3/30/2023 check ct ch   .       TIME SPENT   Over 25 minutes aggregate care time spent on encounter; activities included   direct patient care, counseling and/or coordinating care reviewing notes, lab data/ imaging , discussion with multidisciplinary team/ patient  /family and explaining in detail risks, benefits, alternatives  of the recommendations     Paulino Parmar 87 m

## 2023-03-30 NOTE — H&P ADULT - MUSCULOSKELETAL
not applicable no joint swelling/no joint erythema/no joint warmth/strength 5/5 bilateral lower extremities

## 2023-03-30 NOTE — DIETITIAN NUTRITION RISK NOTIFICATION - TREATMENT: THE FOLLOWING DIET HAS BEEN RECOMMENDED
Diet, DASH/TLC:   Sodium & Cholesterol Restricted  Consistent Carbohydrate {Evening Snack}  Soft and Bite Sized (SOFTBTSZ)  Supplement Feeding Modality:  Oral  Glucerna Shake Cans or Servings Per Day:  1       Frequency:  Daily (03-30-23 @ 05:10) [Active]

## 2023-03-30 NOTE — CONSULT NOTE ADULT - SUBJECTIVE AND OBJECTIVE BOX
Patient is a 87y old  Male who presents with a chief complaint of left foot infection (30 Mar 2023 14:47)    pt A&Ox2, disoriented to place and situation, poor historian, currently at [facility], left message for nurse, left message for spouse  formerly [facility] resident Type 2 DM, unsure when first DX, no known complications or hx DKA/HSS. managed by facility Dr. Linda Sanders, current A1c 8.0%, RX   year known complications Endocrine Last seen Rx home Sitagliptin 50mg daily admelog ISS ACHS, f/s and insulin administration done by SNF staff. explained to patient BG targets in hospital 100-180, using insulin for improved glycemic control, consistent carb diet. verbal education and written handouts given.    HPI:  88 yo male ,formerly [facility] resident with PMHx - COPD, DM, HTN, CAD, HLD, H/O TIA, dementia,GERD, BPH and depression sent ot ER for evaluation of left foot 1metatarsal wound ,suggestive of osteomyelitis .Patient was seen by ID consult and transfer to the hospital recommended for wound cx/bone biopsy /podiatry evaluation ,likely will require 4-6 weeks of iv abx . Patient was admitted to [facility] with b/l feet wounds and was followed by wound care team ,recently completed 7 days of doxycycline for foot wound cellulitis . (30 Mar 2023 05:21)      PAST MEDICAL & SURGICAL HISTORY:  ASHD (arteriosclerotic heart disease)      BPH without urinary obstruction      COPD, moderate      Stage 3 chronic kidney disease      Chronic GERD      HLD (hyperlipidemia)      MDD (major depressive disorder)      Obstructive and reflux uropathy      Cellulitis      HTN (hypertension)      Sepsis      Dementia      Moderate protein-calorie malnutrition      Brain TIA      History of RSV infection      DM type 2, not at goal      Pressure ulcer of unspecified heel, unspecified stage      Venous stasis ulcer without varicose veins      Multiple open wounds of foot      Allergies    No Known Allergies    Intolerances        MEDICATIONS  (STANDING):  albuterol/ipratropium for Nebulization 3 milliLiter(s) Nebulizer every 8 hours  atenolol  Tablet 25 milliGRAM(s) Oral daily  budesonide 160 MICROgram(s)/formoterol 4.5 MICROgram(s) Inhaler 2 Puff(s) Inhalation two times a day  dextrose 5%. 1000 milliLiter(s) (50 mL/Hr) IV Continuous <Continuous>  dextrose 5%. 1000 milliLiter(s) (100 mL/Hr) IV Continuous <Continuous>  dextrose 50% Injectable 25 Gram(s) IV Push once  dextrose 50% Injectable 12.5 Gram(s) IV Push once  dextrose 50% Injectable 25 Gram(s) IV Push once  donepezil 5 milliGRAM(s) Oral at bedtime  DULoxetine 60 milliGRAM(s) Oral daily  glucagon  Injectable 1 milliGRAM(s) IntraMuscular once  heparin   Injectable 5000 Unit(s) SubCutaneous every 12 hours  insulin glargine Injectable (LANTUS) 8 Unit(s) SubCutaneous once  insulin lispro (ADMELOG) corrective regimen sliding scale   SubCutaneous three times a day before meals  insulin lispro (ADMELOG) corrective regimen sliding scale   SubCutaneous at bedtime  lactobacillus acidophilus 1 Tablet(s) Oral two times a day with meals  losartan 25 milliGRAM(s) Oral daily  multivitamin 1 Tablet(s) Oral daily  pantoprazole    Tablet 40 milliGRAM(s) Oral daily  piperacillin/tazobactam IVPB.- 3.375 Gram(s) IV Intermittent once  piperacillin/tazobactam IVPB.. 3.375 Gram(s) IV Intermittent every 8 hours  senna 2 Tablet(s) Oral at bedtime  simvastatin 40 milliGRAM(s) Oral at bedtime  sodium chloride 0.9%. 1000 milliLiter(s) (50 mL/Hr) IV Continuous <Continuous>  tamsulosin 0.4 milliGRAM(s) Oral at bedtime       Patient is a 87y old  Male who presents with a chief complaint of left foot infection (30 Mar 2023 14:47)    pt A&Ox2, disoriented to place and situation, poor historian, currently at [facility], left message for nurse, left message for spouse  [facility] resident Type 2 DM, unsure when first DX, no known complications or hx DKA/HSS. managed by facility Dr. Linda Sanders, current A1c 8.0%, RX   year known complications Endocrine Last seen Rx home Sitagliptin 50mg daily admelog ISS ACHS, f/s and insulin administration done by SNF staff. explained to patient BG targets in hospital 100-180, using insulin for improved glycemic control, consistent carb diet. verbal education and written handouts given.    HPI:  86 yo male ,[facility] resident with PMHx - COPD, DM, HTN, CAD, HLD, H/O TIA, dementia,GERD, BPH and depression sent ot ER for evaluation of left foot 1metatarsal wound ,suggestive of osteomyelitis .Patient was seen by ID consult and transfer to the hospital recommended for wound cx/bone biopsy /podiatry evaluation ,likely will require 4-6 weeks of iv abx . Patient was admitted to [facility] with b/l feet wounds and was followed by wound care team ,recently completed 7 days of doxycycline for foot wound cellulitis . (30 Mar 2023 05:21)      PAST MEDICAL & SURGICAL HISTORY:  ASHD (arteriosclerotic heart disease)      BPH without urinary obstruction      COPD, moderate      Stage 3 chronic kidney disease      Chronic GERD      HLD (hyperlipidemia)      MDD (major depressive disorder)      Obstructive and reflux uropathy      Cellulitis      HTN (hypertension)      Sepsis      Dementia      Moderate protein-calorie malnutrition      Brain TIA      History of RSV infection      DM type 2, not at goal      Pressure ulcer of unspecified heel, unspecified stage      Venous stasis ulcer without varicose veins      Multiple open wounds of foot      Allergies    No Known Allergies    Intolerances        MEDICATIONS  (STANDING):  albuterol/ipratropium for Nebulization 3 milliLiter(s) Nebulizer every 8 hours  atenolol  Tablet 25 milliGRAM(s) Oral daily  budesonide 160 MICROgram(s)/formoterol 4.5 MICROgram(s) Inhaler 2 Puff(s) Inhalation two times a day  dextrose 5%. 1000 milliLiter(s) (50 mL/Hr) IV Continuous <Continuous>  dextrose 5%. 1000 milliLiter(s) (100 mL/Hr) IV Continuous <Continuous>  dextrose 50% Injectable 25 Gram(s) IV Push once  dextrose 50% Injectable 12.5 Gram(s) IV Push once  dextrose 50% Injectable 25 Gram(s) IV Push once  donepezil 5 milliGRAM(s) Oral at bedtime  DULoxetine 60 milliGRAM(s) Oral daily  glucagon  Injectable 1 milliGRAM(s) IntraMuscular once  heparin   Injectable 5000 Unit(s) SubCutaneous every 12 hours  insulin glargine Injectable (LANTUS) 8 Unit(s) SubCutaneous once  insulin lispro (ADMELOG) corrective regimen sliding scale   SubCutaneous three times a day before meals  insulin lispro (ADMELOG) corrective regimen sliding scale   SubCutaneous at bedtime  lactobacillus acidophilus 1 Tablet(s) Oral two times a day with meals  losartan 25 milliGRAM(s) Oral daily  multivitamin 1 Tablet(s) Oral daily  pantoprazole    Tablet 40 milliGRAM(s) Oral daily  piperacillin/tazobactam IVPB.- 3.375 Gram(s) IV Intermittent once  piperacillin/tazobactam IVPB.. 3.375 Gram(s) IV Intermittent every 8 hours  senna 2 Tablet(s) Oral at bedtime  simvastatin 40 milliGRAM(s) Oral at bedtime  sodium chloride 0.9%. 1000 milliLiter(s) (50 mL/Hr) IV Continuous <Continuous>  tamsulosin 0.4 milliGRAM(s) Oral at bedtime       Patient is a 87y old  Male who presents with a chief complaint of left foot infection (30 Mar 2023 14:47)    pt A&Ox2, disoriented to place and situation, poor historian, currently at [facility], left message for nurse, left message for spouse  [facility] resident Type 2 DM, unsure when first DX, no known complications or hx DKA/HSS. managed by facility Dr. Linda Sanders, current A1c 8.0%, RX   year known complications Endocrine Last seen Rx home Sitagliptin 50mg daily admelog ISS ACHS, f/s and insulin administration done by SNF staff. explained to patient BG targets in hospital 100-180, using insulin for improved glycemic control, consistent carb diet. verbal education and written handouts given.    HPI:  88 yo male ,[facility] resident with PMHx - COPD, DM, HTN, CAD, HLD, H/O TIA, dementia,GERD, BPH and depression sent ot ER for evaluation of left foot 1metatarsal wound ,suggestive of osteomyelitis .Patient was seen by ID consult and transfer to the hospital recommended for wound cx/bone biopsy /podiatry evaluation ,likely will require 4-6 weeks of iv abx . Patient was admitted to [facility] with b/l feet wounds and was followed by wound care team ,recently completed 7 days of doxycycline for foot wound cellulitis . (30 Mar 2023 05:21)      PAST MEDICAL & SURGICAL HISTORY:  ASHD (arteriosclerotic heart disease)      BPH without urinary obstruction      COPD, moderate      Stage 3 chronic kidney disease      Chronic GERD      HLD (hyperlipidemia)      MDD (major depressive disorder)      Obstructive and reflux uropathy      Cellulitis      HTN (hypertension)      Sepsis      Dementia      Moderate protein-calorie malnutrition      Brain TIA      History of RSV infection      DM type 2, not at goal      Pressure ulcer of unspecified heel, unspecified stage      Venous stasis ulcer without varicose veins      Multiple open wounds of foot      Allergies    No Known Allergies    Intolerances        MEDICATIONS  (STANDING):  albuterol/ipratropium for Nebulization 3 milliLiter(s) Nebulizer every 8 hours  atenolol  Tablet 25 milliGRAM(s) Oral daily  budesonide 160 MICROgram(s)/formoterol 4.5 MICROgram(s) Inhaler 2 Puff(s) Inhalation two times a day  dextrose 5%. 1000 milliLiter(s) (50 mL/Hr) IV Continuous <Continuous>  dextrose 5%. 1000 milliLiter(s) (100 mL/Hr) IV Continuous <Continuous>  dextrose 50% Injectable 25 Gram(s) IV Push once  dextrose 50% Injectable 12.5 Gram(s) IV Push once  dextrose 50% Injectable 25 Gram(s) IV Push once  donepezil 5 milliGRAM(s) Oral at bedtime  DULoxetine 60 milliGRAM(s) Oral daily  glucagon  Injectable 1 milliGRAM(s) IntraMuscular once  heparin   Injectable 5000 Unit(s) SubCutaneous every 12 hours  insulin glargine Injectable (LANTUS) 8 Unit(s) SubCutaneous once  insulin lispro (ADMELOG) corrective regimen sliding scale   SubCutaneous three times a day before meals  insulin lispro (ADMELOG) corrective regimen sliding scale   SubCutaneous at bedtime  lactobacillus acidophilus 1 Tablet(s) Oral two times a day with meals  losartan 25 milliGRAM(s) Oral daily  multivitamin 1 Tablet(s) Oral daily  pantoprazole    Tablet 40 milliGRAM(s) Oral daily  piperacillin/tazobactam IVPB.- 3.375 Gram(s) IV Intermittent once  piperacillin/tazobactam IVPB.. 3.375 Gram(s) IV Intermittent every 8 hours  senna 2 Tablet(s) Oral at bedtime  simvastatin 40 milliGRAM(s) Oral at bedtime  sodium chloride 0.9%. 1000 milliLiter(s) (50 mL/Hr) IV Continuous <Continuous>  tamsulosin 0.4 milliGRAM(s) Oral at bedtime

## 2023-03-30 NOTE — DIETITIAN INITIAL EVALUATION ADULT - OTHER INFO
Pt is a "86 yo male [facility] resident with PMHx of COPD, DM, HTN, CAD, HLD, H/O TIA, dementia, GERD, BPH and depression sent to ER for evaluation of left foot 1metatarsal wound, suggestive of osteomyelitis. Patient was seen by ID consult and transfer to the hospital recommended for wound cx/bone biopsy /podiatry evaluation, likely will require 4-6 weeks of iv abx. Patient was admitted to [facility] with b/l feet wounds and was followed by wound care team, recently completed 7 days of doxycycline for foot wound cellulitis."    Visited pt at bedside this am. Pt alert/confused at times during visit. Observed lunch tray left at pt's bedside; pt consumed >50% of meal. Spoke with nursing, pt with fair intake of breakfast meal this am as well (consumed 100% of yogurt and hot cereal, 1/2 of glucerna shake). Assisted with feeding. Denies chewing/swallowing difficulties. Soft/bite size consistency diet rx; SLP consulted. NKFA. Denies N/V/D/C. +BM 3/30; bowel regimen rx. CBW on admission 140#. No edema noted. Skin: pressure ulcers; stage II to BL buttocks, unstageable to BL heels, and L lateral foot. Pt currently on soft and bite size DASH/TLC, consistent carbohydrate diet. Receiving IVFs; NaCl@50ml/hr. Pt with hx of DM, A1c of 8.0% obtained. Diet education not appropriate at this time. Reviewed MOLST; pt DNR/DNI, no peg.  Pt is a "86 yo male [facility] resident with PMHx of COPD, DM, HTN, CAD, HLD, H/O TIA, dementia, GERD, BPH and depression sent to ER for evaluation of left foot 1metatarsal wound, suggestive of osteomyelitis. Patient was seen by ID consult and transfer to the hospital recommended for wound cx/bone biopsy /podiatry evaluation, likely will require 4-6 weeks of iv abx. Patient was admitted to [facility] with b/l feet wounds and was followed by wound care team, recently completed 7 days of doxycycline for foot wound cellulitis."    Visited pt at bedside this am. Pt alert/confused at times during visit. Observed lunch tray left at pt's bedside; pt consumed >50% of meal. Spoke with nursing, pt with fair intake of breakfast meal this am as well (consumed 100% of yogurt and hot cereal, 1/2 of glucerna shake). Assisted with feeding. Denies chewing/swallowing difficulties. Soft/bite size consistency diet rx; SLP consulted. NKFA. Denies N/V/D/C. +BM 3/30; bowel regimen rx. CBW on admission 140#. No edema noted. Skin: pressure ulcers; stage II to BL buttocks, unstageable to BL heels, and L lateral foot. Pt currently on soft and bite size DASH/TLC, consistent carbohydrate diet. Receiving IVFs; NaCl@50ml/hr. Pt with hx of DM, A1c of 8.0% obtained. Diet education not appropriate at this time. Reviewed MOLST; pt DNR/DNI, no peg.  Pt is a "88 yo male [facility] resident with PMHx of COPD, DM, HTN, CAD, HLD, H/O TIA, dementia, GERD, BPH and depression sent to ER for evaluation of left foot 1metatarsal wound, suggestive of osteomyelitis. Patient was seen by ID consult and transfer to the hospital recommended for wound cx/bone biopsy /podiatry evaluation, likely will require 4-6 weeks of iv abx. Patient was admitted to [facility] with b/l feet wounds and was followed by wound care team, recently completed 7 days of doxycycline for foot wound cellulitis."    Visited pt at bedside this am. Pt alert/confused at times during visit. Observed lunch tray left at pt's bedside; pt consumed >50% of meal. Spoke with nursing, pt with fair intake of breakfast meal this am as well (consumed 100% of yogurt and hot cereal, 1/2 of glucerna shake). Assisted with feeding. Denies chewing/swallowing difficulties. Soft/bite size consistency diet rx; SLP consulted. NKFA. Denies N/V/D/C. +BM 3/30; bowel regimen rx. CBW on admission 140#. No edema noted. Skin: pressure ulcers; stage II to BL buttocks, unstageable to BL heels, and L lateral foot. Pt currently on soft and bite size DASH/TLC, consistent carbohydrate diet. Receiving IVFs; NaCl@50ml/hr. Pt with hx of DM, A1c of 8.0% obtained. Diet education not appropriate at this time. Reviewed MOLST; pt DNR/DNI, no peg.

## 2023-03-30 NOTE — H&P ADULT - PROBLEM SELECTOR PLAN 1
left foot 1metatarsal wound. Concern for wound infection with underlying osteomyelitis. He also has bilateral heel wounds R>L. He had a low grade temp and mild leukocytosis. Plan for potential Podiatry intervention pending MRI.  -continue Zosyn  -hold off on vancomycin for now  -follow blood cultures  -follow up bilateral foot MRI

## 2023-03-31 LAB
ALBUMIN SERPL ELPH-MCNC: 2.3 G/DL — LOW (ref 3.3–5)
ALP SERPL-CCNC: 56 U/L — SIGNIFICANT CHANGE UP (ref 40–120)
ALT FLD-CCNC: 30 U/L — SIGNIFICANT CHANGE UP (ref 12–78)
ANION GAP SERPL CALC-SCNC: 6 MMOL/L — SIGNIFICANT CHANGE UP (ref 5–17)
APPEARANCE UR: ABNORMAL
AST SERPL-CCNC: 23 U/L — SIGNIFICANT CHANGE UP (ref 15–37)
BACTERIA # UR AUTO: ABNORMAL
BILIRUB SERPL-MCNC: 0.6 MG/DL — SIGNIFICANT CHANGE UP (ref 0.2–1.2)
BILIRUB UR-MCNC: NEGATIVE — SIGNIFICANT CHANGE UP
BUN SERPL-MCNC: 38 MG/DL — HIGH (ref 7–23)
CALCIUM SERPL-MCNC: 8.9 MG/DL — SIGNIFICANT CHANGE UP (ref 8.5–10.1)
CHLORIDE SERPL-SCNC: 119 MMOL/L — HIGH (ref 96–108)
CO2 SERPL-SCNC: 22 MMOL/L — SIGNIFICANT CHANGE UP (ref 22–31)
COLOR SPEC: ABNORMAL
CREAT SERPL-MCNC: 1.3 MG/DL — SIGNIFICANT CHANGE UP (ref 0.5–1.3)
CRP SERPL-MCNC: 105 MG/L — HIGH
DIFF PNL FLD: ABNORMAL
EGFR: 53 ML/MIN/1.73M2 — LOW
EPI CELLS # UR: 0 /HPF — SIGNIFICANT CHANGE UP (ref 0–5)
ERYTHROCYTE [SEDIMENTATION RATE] IN BLOOD: 49 MM/HR — HIGH (ref 0–20)
GLUCOSE SERPL-MCNC: 234 MG/DL — HIGH (ref 70–99)
GLUCOSE UR QL: ABNORMAL
GRAN CASTS # UR COMP ASSIST: 0 /LPF — SIGNIFICANT CHANGE UP
HCT VFR BLD CALC: 32 % — LOW (ref 39–50)
HGB BLD-MCNC: 9.8 G/DL — LOW (ref 13–17)
HYALINE CASTS # UR AUTO: 0 /LPF — SIGNIFICANT CHANGE UP (ref 0–7)
INR BLD: 1.26 RATIO — HIGH (ref 0.88–1.16)
KETONES UR-MCNC: NEGATIVE — SIGNIFICANT CHANGE UP
LEUKOCYTE ESTERASE UR-ACNC: ABNORMAL
MCHC RBC-ENTMCNC: 28.5 PG — SIGNIFICANT CHANGE UP (ref 27–34)
MCHC RBC-ENTMCNC: 30.6 GM/DL — LOW (ref 32–36)
MCV RBC AUTO: 93 FL — SIGNIFICANT CHANGE UP (ref 80–100)
NITRITE UR-MCNC: NEGATIVE — SIGNIFICANT CHANGE UP
NRBC # BLD: 0 /100 WBCS — SIGNIFICANT CHANGE UP (ref 0–0)
PH UR: 5.5 — SIGNIFICANT CHANGE UP (ref 5–8)
PLATELET # BLD AUTO: 209 K/UL — SIGNIFICANT CHANGE UP (ref 150–400)
POTASSIUM SERPL-MCNC: 4 MMOL/L — SIGNIFICANT CHANGE UP (ref 3.5–5.3)
POTASSIUM SERPL-SCNC: 4 MMOL/L — SIGNIFICANT CHANGE UP (ref 3.5–5.3)
PROT SERPL-MCNC: 6.6 G/DL — SIGNIFICANT CHANGE UP (ref 6–8.3)
PROT UR-MCNC: SIGNIFICANT CHANGE UP
PROTHROM AB SERPL-ACNC: 14.8 SEC — HIGH (ref 10.5–13.4)
RBC # BLD: 3.44 M/UL — LOW (ref 4.2–5.8)
RBC # FLD: 17.1 % — HIGH (ref 10.3–14.5)
RBC CASTS # UR COMP ASSIST: 10 /HPF — HIGH (ref 0–4)
SODIUM SERPL-SCNC: 147 MMOL/L — HIGH (ref 135–145)
SP GR SPEC: >=1.03 (ref 1.01–1.02)
UROBILINOGEN FLD QL: SIGNIFICANT CHANGE UP
WBC # BLD: 10.5 K/UL — SIGNIFICANT CHANGE UP (ref 3.8–10.5)
WBC # FLD AUTO: 10.5 K/UL — SIGNIFICANT CHANGE UP (ref 3.8–10.5)
WBC UR QL: 500 /HPF — HIGH (ref 0–5)

## 2023-03-31 PROCEDURE — 99232 SBSQ HOSP IP/OBS MODERATE 35: CPT | Mod: 57

## 2023-03-31 PROCEDURE — 93970 EXTREMITY STUDY: CPT | Mod: 26

## 2023-03-31 PROCEDURE — 99232 SBSQ HOSP IP/OBS MODERATE 35: CPT

## 2023-03-31 RX ORDER — INSULIN LISPRO 100/ML
3 VIAL (ML) SUBCUTANEOUS
Refills: 0 | Status: DISCONTINUED | OUTPATIENT
Start: 2023-03-31 | End: 2023-04-06

## 2023-03-31 RX ORDER — METOPROLOL TARTRATE 50 MG
25 TABLET ORAL
Refills: 0 | Status: DISCONTINUED | OUTPATIENT
Start: 2023-03-31 | End: 2023-04-03

## 2023-03-31 RX ORDER — ENOXAPARIN SODIUM 100 MG/ML
60 INJECTION SUBCUTANEOUS EVERY 12 HOURS
Refills: 0 | Status: DISCONTINUED | OUTPATIENT
Start: 2023-03-31 | End: 2023-04-09

## 2023-03-31 RX ORDER — PANTOPRAZOLE SODIUM 20 MG/1
40 TABLET, DELAYED RELEASE ORAL DAILY
Refills: 0 | Status: DISCONTINUED | OUTPATIENT
Start: 2023-03-31 | End: 2023-04-10

## 2023-03-31 RX ORDER — INSULIN GLARGINE 100 [IU]/ML
15 INJECTION, SOLUTION SUBCUTANEOUS AT BEDTIME
Refills: 0 | Status: DISCONTINUED | OUTPATIENT
Start: 2023-03-31 | End: 2023-04-06

## 2023-03-31 RX ADMIN — HEPARIN SODIUM 5000 UNIT(S): 5000 INJECTION INTRAVENOUS; SUBCUTANEOUS at 17:10

## 2023-03-31 RX ADMIN — TAMSULOSIN HYDROCHLORIDE 0.4 MILLIGRAM(S): 0.4 CAPSULE ORAL at 22:08

## 2023-03-31 RX ADMIN — BUDESONIDE AND FORMOTEROL FUMARATE DIHYDRATE 2 PUFF(S): 160; 4.5 AEROSOL RESPIRATORY (INHALATION) at 17:10

## 2023-03-31 RX ADMIN — SIMVASTATIN 40 MILLIGRAM(S): 20 TABLET, FILM COATED ORAL at 22:08

## 2023-03-31 RX ADMIN — HEPARIN SODIUM 5000 UNIT(S): 5000 INJECTION INTRAVENOUS; SUBCUTANEOUS at 06:39

## 2023-03-31 RX ADMIN — PIPERACILLIN AND TAZOBACTAM 25 GRAM(S): 4; .5 INJECTION, POWDER, LYOPHILIZED, FOR SOLUTION INTRAVENOUS at 22:07

## 2023-03-31 RX ADMIN — Medication 1 TABLET(S): at 11:48

## 2023-03-31 RX ADMIN — Medication 25 MILLIGRAM(S): at 22:09

## 2023-03-31 RX ADMIN — PANTOPRAZOLE SODIUM 40 MILLIGRAM(S): 20 TABLET, DELAYED RELEASE ORAL at 12:16

## 2023-03-31 RX ADMIN — BUDESONIDE AND FORMOTEROL FUMARATE DIHYDRATE 2 PUFF(S): 160; 4.5 AEROSOL RESPIRATORY (INHALATION) at 06:40

## 2023-03-31 RX ADMIN — Medication 1 TABLET(S): at 08:09

## 2023-03-31 RX ADMIN — ATENOLOL 25 MILLIGRAM(S): 25 TABLET ORAL at 08:10

## 2023-03-31 RX ADMIN — Medication 3 MILLILITER(S): at 13:18

## 2023-03-31 RX ADMIN — SODIUM CHLORIDE 50 MILLILITER(S): 9 INJECTION INTRAMUSCULAR; INTRAVENOUS; SUBCUTANEOUS at 11:50

## 2023-03-31 RX ADMIN — DULOXETINE HYDROCHLORIDE 60 MILLIGRAM(S): 30 CAPSULE, DELAYED RELEASE ORAL at 11:49

## 2023-03-31 RX ADMIN — ENOXAPARIN SODIUM 60 MILLIGRAM(S): 100 INJECTION SUBCUTANEOUS at 22:07

## 2023-03-31 RX ADMIN — Medication 3 UNIT(S): at 16:58

## 2023-03-31 RX ADMIN — Medication 3 UNIT(S): at 12:33

## 2023-03-31 RX ADMIN — INSULIN GLARGINE 15 UNIT(S): 100 INJECTION, SOLUTION SUBCUTANEOUS at 22:09

## 2023-03-31 RX ADMIN — Medication 650 MILLIGRAM(S): at 22:08

## 2023-03-31 RX ADMIN — PIPERACILLIN AND TAZOBACTAM 25 GRAM(S): 4; .5 INJECTION, POWDER, LYOPHILIZED, FOR SOLUTION INTRAVENOUS at 06:39

## 2023-03-31 RX ADMIN — Medication 6: at 16:58

## 2023-03-31 RX ADMIN — DONEPEZIL HYDROCHLORIDE 5 MILLIGRAM(S): 10 TABLET, FILM COATED ORAL at 22:08

## 2023-03-31 RX ADMIN — Medication 8: at 11:47

## 2023-03-31 RX ADMIN — Medication 1 TABLET(S): at 16:58

## 2023-03-31 RX ADMIN — Medication 3 MILLILITER(S): at 07:51

## 2023-03-31 RX ADMIN — SODIUM CHLORIDE 50 MILLILITER(S): 9 INJECTION INTRAMUSCULAR; INTRAVENOUS; SUBCUTANEOUS at 10:54

## 2023-03-31 RX ADMIN — INSULIN GLARGINE 8 UNIT(S): 100 INJECTION, SOLUTION SUBCUTANEOUS at 08:03

## 2023-03-31 RX ADMIN — Medication 4: at 08:03

## 2023-03-31 RX ADMIN — Medication 3 MILLILITER(S): at 22:51

## 2023-03-31 RX ADMIN — Medication 3 MILLILITER(S): at 00:37

## 2023-03-31 RX ADMIN — PIPERACILLIN AND TAZOBACTAM 25 GRAM(S): 4; .5 INJECTION, POWDER, LYOPHILIZED, FOR SOLUTION INTRAVENOUS at 14:29

## 2023-03-31 NOTE — PROGRESS NOTE ADULT - SUBJECTIVE AND OBJECTIVE BOX
ANA MARIA LEIGH    PLV 1EAS 100 D1    Allergies    No Known Allergies    Intolerances        PAST MEDICAL & SURGICAL HISTORY:  ASHD (arteriosclerotic heart disease)      BPH without urinary obstruction      COPD, moderate      Stage 3 chronic kidney disease      Chronic GERD      HLD (hyperlipidemia)      MDD (major depressive disorder)      Obstructive and reflux uropathy      Cellulitis      HTN (hypertension)      Sepsis      Dementia      Moderate protein-calorie malnutrition      Brain TIA      History of RSV infection      DM type 2, not at goal      Pressure ulcer of unspecified heel, unspecified stage      Venous stasis ulcer without varicose veins      Multiple open wounds of foot          FAMILY HISTORY:      Home Medications:  Admelog SoloStar 100 units/mL injectable solution: injectable 4 times a day (before meals and at bedtime) per sliding scale (30 Mar 2023 15:23)  Aricept 5 mg oral tablet: 1 tab(s) orally once a day (30 Mar 2023 15:23)  atenolol 25 mg oral tablet: 1 tab(s) orally once a day (30 Mar 2023 15:23)  Bacid (LAC) oral tablet: 1 tab(s) orally 2 times a day (30 Mar 2023 15:23)  Cymbalta 60 mg oral delayed release capsule: 1 cap(s) orally once a day (30 Mar 2023 15:23)  docusate sodium 100 mg oral capsule: 2 cap(s) orally once a day (at bedtime) (30 Mar 2023 15:23)  doxycycline hyclate 100 mg oral tablet: 1 tab(s) orally 2 times a day for 7 days  3/28/23-4/4/23 (30 Mar 2023 15:23)  Dulcolax Laxative 10 mg rectal suppository: 1 suppository(ies) rectally as needed for  constipation (30 Mar 2023 15:23)  famotidine 40 mg oral tablet: 1 tab(s) orally once a day (30 Mar 2023 15:23)  Fleet Enema 19 g-7 g rectal enema: 133 milliliter(s) rectally as needed for  constipation (30 Mar 2023 15:23)  Flovent 44 mcg/inh inhalation aerosol with adapter: 1 puff(s) inhaled every 12 hours (30 Mar 2023 15:23)  ipratropium-albuterol 0.5 mg-2.5 mg/3 mL inhalation solution: 3 milliliter(s) by nebulizer 4 times a day (30 Mar 2023 15:23)  losartan 50 mg oral tablet: 1 tab(s) orally once a day (30 Mar 2023 15:23)  Milk of Magnesia 8% oral suspension: 30 milliliter(s) orally once a day as needed for  constipation (30 Mar 2023 15:23)  Santyl 250 units/g topical ointment: Apply topically to affected area (30 Mar 2023 12:41)  simvastatin 40 mg oral tablet: 1 tab(s) orally once a day (at bedtime) (30 Mar 2023 15:23)  SITagliptin 50 mg oral tablet: 1 tab(s) orally once a day (30 Mar 2023 15:23)  tamsulosin 0.4 mg oral capsule: 1 cap(s) orally once a day (in the evening) (30 Mar 2023 15:23)  Therapeutic Multiple Vitamins oral tablet: 1 tab(s) orally once a day (30 Mar 2023 15:23)  traMADol 50 mg oral tablet: 0.5 tab(s) orally 2 times a day (30 Mar 2023 15:23)  Tylenol Caplet Extra Strength 500 mg oral tablet: 2 tab(s) orally every 8 hours (30 Mar 2023 15:23)      MEDICATIONS  (STANDING):  albuterol/ipratropium for Nebulization 3 milliLiter(s) Nebulizer every 8 hours  atenolol  Tablet 25 milliGRAM(s) Oral daily  budesonide 160 MICROgram(s)/formoterol 4.5 MICROgram(s) Inhaler 2 Puff(s) Inhalation two times a day  dextrose 5%. 1000 milliLiter(s) (50 mL/Hr) IV Continuous <Continuous>  dextrose 5%. 1000 milliLiter(s) (100 mL/Hr) IV Continuous <Continuous>  dextrose 50% Injectable 25 Gram(s) IV Push once  dextrose 50% Injectable 12.5 Gram(s) IV Push once  dextrose 50% Injectable 25 Gram(s) IV Push once  donepezil 5 milliGRAM(s) Oral at bedtime  DULoxetine 60 milliGRAM(s) Oral daily  glucagon  Injectable 1 milliGRAM(s) IntraMuscular once  heparin   Injectable 5000 Unit(s) SubCutaneous every 12 hours  insulin glargine Injectable (LANTUS) 8 Unit(s) SubCutaneous every morning  insulin lispro (ADMELOG) corrective regimen sliding scale   SubCutaneous three times a day before meals  insulin lispro (ADMELOG) corrective regimen sliding scale   SubCutaneous at bedtime  lactobacillus acidophilus 1 Tablet(s) Oral two times a day with meals  losartan 50 milliGRAM(s) Oral daily  multivitamin 1 Tablet(s) Oral daily  pantoprazole    Tablet 40 milliGRAM(s) Oral daily  piperacillin/tazobactam IVPB.. 3.375 Gram(s) IV Intermittent every 8 hours  senna 2 Tablet(s) Oral at bedtime  simvastatin 40 milliGRAM(s) Oral at bedtime  sodium chloride 0.9%. 1000 milliLiter(s) (50 mL/Hr) IV Continuous <Continuous>  tamsulosin 0.4 milliGRAM(s) Oral at bedtime    MEDICATIONS  (PRN):  acetaminophen     Tablet .. 650 milliGRAM(s) Oral every 6 hours PRN Temp greater or equal to 38C (100.4F), Mild Pain (1 - 3)  aluminum hydroxide/magnesium hydroxide/simethicone Suspension 30 milliLiter(s) Oral every 4 hours PRN Dyspepsia  bisacodyl Suppository 10 milliGRAM(s) Rectal daily PRN Constipation  dextrose Oral Gel 15 Gram(s) Oral once PRN Blood Glucose LESS THAN 70 milliGRAM(s)/deciliter  hydrALAZINE 50 milliGRAM(s) Oral every 6 hours PRN for systolic BP>160  magnesium hydroxide Suspension 30 milliLiter(s) Oral daily PRN Constipation  melatonin 3 milliGRAM(s) Oral at bedtime PRN Insomnia  morphine  - Injectable 2 milliGRAM(s) IV Push every 6 hours PRN Severe Pain (7 - 10)  ondansetron Injectable 4 milliGRAM(s) IV Push every 8 hours PRN Nausea and/or Vomiting  traMADol 50 milliGRAM(s) Oral four times a day PRN Moderate Pain (4 - 6)      Diet, DASH/TLC:   Sodium & Cholesterol Restricted  Consistent Carbohydrate Evening Snack  Soft and Bite Sized (SOFTBTSZ)  Reginald(7 Gm Arginine/7 Gm Glut/1.2 Gm HMB     Qty per Day:  2  Supplement Feeding Modality:  Oral  Glucerna Shake Cans or Servings Per Day:  1       Frequency:  Daily (03-30-23 @ 13:51) [Pending Verification By Attending]  Diet, DASH/TLC:   Sodium & Cholesterol Restricted  Consistent Carbohydrate Evening Snack  Soft and Bite Sized (SOFTBTSZ)  Supplement Feeding Modality:  Oral  Glucerna Shake Cans or Servings Per Day:  1       Frequency:  Daily (03-30-23 @ 05:10) [Active]          Vital Signs Last 24 Hrs  T(C): 36.9 (31 Mar 2023 04:53), Max: 37.7 (30 Mar 2023 09:51)  T(F): 98.4 (31 Mar 2023 04:53), Max: 99.9 (30 Mar 2023 09:51)  HR: 103 (31 Mar 2023 04:53) (88 - 112)  BP: 157/82 (31 Mar 2023 04:53) (151/78 - 158/76)  BP(mean): --  RR: 18 (31 Mar 2023 04:53) (18 - 18)  SpO2: 93% (31 Mar 2023 04:53) (91% - 96%)    Parameters below as of 31 Mar 2023 04:53  Patient On (Oxygen Delivery Method): room air          03-29-23 @ 07:01  -  03-30-23 @ 07:00  --------------------------------------------------------  IN: 350 mL / OUT: 0 mL / NET: 350 mL    03-30-23 @ 07:01  -  03-31-23 @ 06:36  --------------------------------------------------------  IN: 240 mL / OUT: 0 mL / NET: 240 mL              LABS:                        10.2   12.00 )-----------( 202      ( 30 Mar 2023 07:20 )             32.2     03-30    143  |  116<H>  |  36<H>  ----------------------------<  273<H>  4.4   |  22  |  1.30    Ca    8.9      30 Mar 2023 07:20  Mg     2.2     03-30    TPro  6.7  /  Alb  2.2<L>  /  TBili  0.8  /  DBili  0.2  /  AST  24  /  ALT  23  /  AlkPhos  62  03-30              WBC:  WBC Count: 12.00 K/uL (03-30 @ 07:20)  WBC Count: 11.88 K/uL (03-29 @ 21:20)      MICROBIOLOGY:  RECENT CULTURES:  03-29 .Blood XXXX XXXX   No growth to date.    03-29 .Blood XXXX XXXX   No growth to date.                    Sodium:  Sodium, Serum: 143 mmol/L (03-30 @ 07:20)  Sodium, Serum: 144 mmol/L (03-29 @ 21:20)      1.30 mg/dL 03-30 @ 07:20  1.40 mg/dL 03-29 @ 21:20      Hemoglobin:  Hemoglobin: 10.2 g/dL (03-30 @ 07:20)  Hemoglobin: 11.2 g/dL (03-29 @ 21:20)      Platelets: Platelet Count - Automated: 202 K/uL (03-30 @ 07:20)  Platelet Count - Automated: 228 K/uL (03-29 @ 21:20)      LIVER FUNCTIONS - ( 30 Mar 2023 07:20 )  Alb: 2.2 g/dL / Pro: 6.7 g/dL / ALK PHOS: 62 U/L / ALT: 23 U/L / AST: 24 U/L / GGT: x                 RADIOLOGY & ADDITIONAL STUDIES:      MICROBIOLOGY:  RECENT CULTURES:  03-29 .Blood XXXX XXXX   No growth to date.    03-29 .Blood XXXX XXXX   No growth to date.

## 2023-03-31 NOTE — PROGRESS NOTE ADULT - SUBJECTIVE AND OBJECTIVE BOX
PROGRESS NOTE  Patient is a 87y old  Male who presents with a chief complaint of left foot infection (31 Mar 2023 09:46)    Chart and available morning labs /imaging are reviewed electronically , urgent issues addressed . More information  is being added upon completion of rounds , when more information is collected and management discussed with consultants , medical staff and social service/case management on the floor   OVERNIGHT    No new issues reported by medical staff . All above noted Patient is resting in a bed comfortably .Confused ,poor mentation .No distress noted Vasc and podiatry input is appreciated   HPI:  88 yo male ,[facility] resident with PMHx - COPD, DM, HTN, CAD, HLD, H/O TIA, dementia,GERD, BPH and depression sent ot ER for evaluation of left foot 1metatarsal wound ,suggestive of osteomyelitis .Patient was seen by ID consult and transfer to the hospital recommended for wound cx/bone biopsy /podiatry evaluation ,likely will require 4-6 weeks of iv abx . Patient was admitted to [facility] with b/l feet wounds and was followed by wound care team ,recently completed 7 days of doxycycline for foot wound cellulitis . (30 Mar 2023 05:21)    PAST MEDICAL & SURGICAL HISTORY:  HLD (hyperlipidemia)      MDD (major depressive disorder)      Obstructive and reflux uropathy      Cellulitis      Sepsis      Moderate protein-calorie malnutrition      Brain TIA      History of RSV infection      Pressure ulcer of unspecified heel, unspecified stage      Venous stasis ulcer without varicose veins      ASHD (arteriosclerotic heart disease)      BPH without urinary obstruction      COPD, moderate      Stage 3 chronic kidney disease      Chronic GERD      HTN (hypertension)      Dementia      DM type 2, not at goal      Multiple open wounds of foot          MEDICATIONS  (STANDING):  albuterol/ipratropium for Nebulization 3 milliLiter(s) Nebulizer every 8 hours  atenolol  Tablet 25 milliGRAM(s) Oral daily  budesonide 160 MICROgram(s)/formoterol 4.5 MICROgram(s) Inhaler 2 Puff(s) Inhalation two times a day  dextrose 5%. 1000 milliLiter(s) (50 mL/Hr) IV Continuous <Continuous>  dextrose 5%. 1000 milliLiter(s) (100 mL/Hr) IV Continuous <Continuous>  dextrose 50% Injectable 25 Gram(s) IV Push once  dextrose 50% Injectable 12.5 Gram(s) IV Push once  dextrose 50% Injectable 25 Gram(s) IV Push once  donepezil 5 milliGRAM(s) Oral at bedtime  DULoxetine 60 milliGRAM(s) Oral daily  glucagon  Injectable 1 milliGRAM(s) IntraMuscular once  heparin   Injectable 5000 Unit(s) SubCutaneous every 12 hours  insulin glargine Injectable (LANTUS) 15 Unit(s) SubCutaneous at bedtime  insulin lispro (ADMELOG) corrective regimen sliding scale   SubCutaneous three times a day before meals  insulin lispro (ADMELOG) corrective regimen sliding scale   SubCutaneous at bedtime  insulin lispro Injectable (ADMELOG) 3 Unit(s) SubCutaneous three times a day before meals  lactobacillus acidophilus 1 Tablet(s) Oral two times a day with meals  losartan 50 milliGRAM(s) Oral daily  multivitamin 1 Tablet(s) Oral daily  pantoprazole   Suspension 40 milliGRAM(s) Oral daily  piperacillin/tazobactam IVPB.. 3.375 Gram(s) IV Intermittent every 8 hours  senna 2 Tablet(s) Oral at bedtime  simvastatin 40 milliGRAM(s) Oral at bedtime  sodium chloride 0.9%. 1000 milliLiter(s) (50 mL/Hr) IV Continuous <Continuous>  tamsulosin 0.4 milliGRAM(s) Oral at bedtime    MEDICATIONS  (PRN):  acetaminophen     Tablet .. 650 milliGRAM(s) Oral every 6 hours PRN Temp greater or equal to 38C (100.4F), Mild Pain (1 - 3)  aluminum hydroxide/magnesium hydroxide/simethicone Suspension 30 milliLiter(s) Oral every 4 hours PRN Dyspepsia  bisacodyl Suppository 10 milliGRAM(s) Rectal daily PRN Constipation  dextrose Oral Gel 15 Gram(s) Oral once PRN Blood Glucose LESS THAN 70 milliGRAM(s)/deciliter  hydrALAZINE 50 milliGRAM(s) Oral every 6 hours PRN for systolic BP>160  magnesium hydroxide Suspension 30 milliLiter(s) Oral daily PRN Constipation  melatonin 3 milliGRAM(s) Oral at bedtime PRN Insomnia  morphine  - Injectable 2 milliGRAM(s) IV Push every 6 hours PRN Severe Pain (7 - 10)  ondansetron Injectable 4 milliGRAM(s) IV Push every 8 hours PRN Nausea and/or Vomiting  traMADol 50 milliGRAM(s) Oral four times a day PRN Moderate Pain (4 - 6)      OBJECTIVE    T(C): 37 (03-31-23 @ 12:26), Max: 37 (03-31-23 @ 12:26)  HR: 98 (03-31-23 @ 12:26) (88 - 103)  BP: 136/85 (03-31-23 @ 12:26) (136/85 - 157/82)  RR: 18 (03-31-23 @ 12:26) (18 - 18)  SpO2: 94% (03-31-23 @ 12:26) (92% - 94%)  Wt(kg): --  I&O's Summary    30 Mar 2023 07:01  -  31 Mar 2023 07:00  --------------------------------------------------------  IN: 240 mL / OUT: 0 mL / NET: 240 mL          REVIEW OF SYSTEMS:  CONSTITUTIONAL: No fever, weight loss, or fatigue  EYES: No eye pain, visual disturbances, or discharge  ENMT:   No sinus or throat pain  NECK: No pain or stiffness  RESPIRATORY: No cough, wheezing, chills or hemoptysis; No shortness of breath  CARDIOVASCULAR: No chest pain, palpitations, dizziness, or leg swelling  GASTROINTESTINAL: No abdominal pain. No nausea, vomiting; No diarrhea or constipation. No melena or hematochezia.  GENITOURINARY: No dysuria, frequency, hematuria, or incontinence  NEUROLOGICAL: No headaches, memory loss, loss of strength, numbness, or tremors  SKIN: No itching, burning, rashes, or lesions   MUSCULOSKELETAL: No joint pain or swelling; No muscle, back, or extremity pain    PHYSICAL EXAM:  Appearance: NAD. VS past 24 hrs -as above   HEENT:   Moist oral mucosa. Conjunctiva clear b/l.   Neck : supple  Respiratory: Lungs CTAB.  Gastrointestinal:  Soft, nontender. No rebound. No rigidity. BS present	  Cardiovascular: RRR ,S1S2 present  Neurologic: Non-focal. Moving all extremities.  Extremities: No edema. No erythema. No calf tenderness.  Skin: No rashes, No ecchymoses, No cyanosis.	  wounds ,skin lesions-See skin assesment flow sheet   LABS:                        9.8    10.50 )-----------( 209      ( 31 Mar 2023 06:44 )             32.0     03-31    147<H>  |  119<H>  |  38<H>  ----------------------------<  234<H>  4.0   |  22  |  1.30    Ca    8.9      31 Mar 2023 06:44  Mg     2.2     03-30    TPro  6.6  /  Alb  2.3<L>  /  TBili  0.6  /  DBili  x   /  AST  23  /  ALT  30  /  AlkPhos  56  03-31    CAPILLARY BLOOD GLUCOSE      POCT Blood Glucose.: 335 mg/dL (31 Mar 2023 11:24)  POCT Blood Glucose.: 238 mg/dL (31 Mar 2023 07:35)  POCT Blood Glucose.: 199 mg/dL (30 Mar 2023 21:39)  POCT Blood Glucose.: 273 mg/dL (30 Mar 2023 16:36)    PT/INR - ( 31 Mar 2023 06:44 )   PT: 14.8 sec;   INR: 1.26 ratio           Urinalysis Basic - ( 30 Mar 2023 09:53 )    Color: Red / Appearance: Slightly Turbid / SG: >=1.030 / pH: x  Gluc: x / Ketone: Negative  / Bili: Negative / Urobili: <2 mg/dL   Blood: x / Protein: Trace / Nitrite: Negative   Leuk Esterase: Moderate / RBC: 10 /HPF /  /HPF   Sq Epi: x / Non Sq Epi: 0 /HPF / Bacteria: Moderate        Culture - Blood (collected 29 Mar 2023 21:32)  Source: .Blood  Preliminary Report (31 Mar 2023 01:03):    No growth to date.    Culture - Blood (collected 29 Mar 2023 21:20)  Source: .Blood  Preliminary Report (31 Mar 2023 01:03):    No growth to date.      RADIOLOGY & ADDITIONAL TESTS:   reviewed elctronically  ASSESSMENT/PLAN: 	    25 minutes aggregate time was spent on this visit, 50% visit time spent in care co-ordination with other attendings and counselling patient .I have discussed care plan with patient / HCP/family member ,who expressed understanding of problems treatment and their effect and side effects, alternatives in details. I have asked if they have any questions and concerns and appropriately addressed them to best of my ability.  PROGRESS NOTE  Patient is a 87y old  Male who presents with a chief complaint of left foot infection (31 Mar 2023 09:46)    Chart and available morning labs /imaging are reviewed electronically , urgent issues addressed . More information  is being added upon completion of rounds , when more information is collected and management discussed with consultants , medical staff and social service/case management on the floor   OVERNIGHT    No new issues reported by medical staff . All above noted Patient is resting in a bed comfortably .Confused ,poor mentation .No distress noted Vasc and podiatry input is appreciated   HPI:  88 yo male ,[facility] resident with PMHx - COPD, DM, HTN, CAD, HLD, H/O TIA, dementia,GERD, BPH and depression sent ot ER for evaluation of left foot 1metatarsal wound ,suggestive of osteomyelitis .Patient was seen by ID consult and transfer to the hospital recommended for wound cx/bone biopsy /podiatry evaluation ,likely will require 4-6 weeks of iv abx . Patient was admitted to [facility] with b/l feet wounds and was followed by wound care team ,recently completed 7 days of doxycycline for foot wound cellulitis . (30 Mar 2023 05:21)    PAST MEDICAL & SURGICAL HISTORY:  HLD (hyperlipidemia)      MDD (major depressive disorder)      Obstructive and reflux uropathy      Cellulitis      Sepsis      Moderate protein-calorie malnutrition      Brain TIA      History of RSV infection      Pressure ulcer of unspecified heel, unspecified stage      Venous stasis ulcer without varicose veins      ASHD (arteriosclerotic heart disease)      BPH without urinary obstruction      COPD, moderate      Stage 3 chronic kidney disease      Chronic GERD      HTN (hypertension)      Dementia      DM type 2, not at goal      Multiple open wounds of foot          MEDICATIONS  (STANDING):  albuterol/ipratropium for Nebulization 3 milliLiter(s) Nebulizer every 8 hours  atenolol  Tablet 25 milliGRAM(s) Oral daily  budesonide 160 MICROgram(s)/formoterol 4.5 MICROgram(s) Inhaler 2 Puff(s) Inhalation two times a day  dextrose 5%. 1000 milliLiter(s) (50 mL/Hr) IV Continuous <Continuous>  dextrose 5%. 1000 milliLiter(s) (100 mL/Hr) IV Continuous <Continuous>  dextrose 50% Injectable 25 Gram(s) IV Push once  dextrose 50% Injectable 12.5 Gram(s) IV Push once  dextrose 50% Injectable 25 Gram(s) IV Push once  donepezil 5 milliGRAM(s) Oral at bedtime  DULoxetine 60 milliGRAM(s) Oral daily  glucagon  Injectable 1 milliGRAM(s) IntraMuscular once  heparin   Injectable 5000 Unit(s) SubCutaneous every 12 hours  insulin glargine Injectable (LANTUS) 15 Unit(s) SubCutaneous at bedtime  insulin lispro (ADMELOG) corrective regimen sliding scale   SubCutaneous three times a day before meals  insulin lispro (ADMELOG) corrective regimen sliding scale   SubCutaneous at bedtime  insulin lispro Injectable (ADMELOG) 3 Unit(s) SubCutaneous three times a day before meals  lactobacillus acidophilus 1 Tablet(s) Oral two times a day with meals  losartan 50 milliGRAM(s) Oral daily  multivitamin 1 Tablet(s) Oral daily  pantoprazole   Suspension 40 milliGRAM(s) Oral daily  piperacillin/tazobactam IVPB.. 3.375 Gram(s) IV Intermittent every 8 hours  senna 2 Tablet(s) Oral at bedtime  simvastatin 40 milliGRAM(s) Oral at bedtime  sodium chloride 0.9%. 1000 milliLiter(s) (50 mL/Hr) IV Continuous <Continuous>  tamsulosin 0.4 milliGRAM(s) Oral at bedtime    MEDICATIONS  (PRN):  acetaminophen     Tablet .. 650 milliGRAM(s) Oral every 6 hours PRN Temp greater or equal to 38C (100.4F), Mild Pain (1 - 3)  aluminum hydroxide/magnesium hydroxide/simethicone Suspension 30 milliLiter(s) Oral every 4 hours PRN Dyspepsia  bisacodyl Suppository 10 milliGRAM(s) Rectal daily PRN Constipation  dextrose Oral Gel 15 Gram(s) Oral once PRN Blood Glucose LESS THAN 70 milliGRAM(s)/deciliter  hydrALAZINE 50 milliGRAM(s) Oral every 6 hours PRN for systolic BP>160  magnesium hydroxide Suspension 30 milliLiter(s) Oral daily PRN Constipation  melatonin 3 milliGRAM(s) Oral at bedtime PRN Insomnia  morphine  - Injectable 2 milliGRAM(s) IV Push every 6 hours PRN Severe Pain (7 - 10)  ondansetron Injectable 4 milliGRAM(s) IV Push every 8 hours PRN Nausea and/or Vomiting  traMADol 50 milliGRAM(s) Oral four times a day PRN Moderate Pain (4 - 6)      OBJECTIVE    T(C): 37 (03-31-23 @ 12:26), Max: 37 (03-31-23 @ 12:26)  HR: 98 (03-31-23 @ 12:26) (88 - 103)  BP: 136/85 (03-31-23 @ 12:26) (136/85 - 157/82)  RR: 18 (03-31-23 @ 12:26) (18 - 18)  SpO2: 94% (03-31-23 @ 12:26) (92% - 94%)  Wt(kg): --  I&O's Summary    30 Mar 2023 07:01  -  31 Mar 2023 07:00  --------------------------------------------------------  IN: 240 mL / OUT: 0 mL / NET: 240 mL          REVIEW OF SYSTEMS:  CONSTITUTIONAL: No fever, weight loss, or fatigue  EYES: No eye pain, visual disturbances, or discharge  ENMT:   No sinus or throat pain  NECK: No pain or stiffness  RESPIRATORY: No cough, wheezing, chills or hemoptysis; No shortness of breath  CARDIOVASCULAR: No chest pain, palpitations, dizziness, or leg swelling  GASTROINTESTINAL: No abdominal pain. No nausea, vomiting; No diarrhea or constipation. No melena or hematochezia.  GENITOURINARY: No dysuria, frequency, hematuria, or incontinence  NEUROLOGICAL: No headaches, memory loss, loss of strength, numbness, or tremors  SKIN: No itching, burning, rashes, or lesions   MUSCULOSKELETAL: No joint pain or swelling; No muscle, back, or extremity pain    PHYSICAL EXAM:  Appearance: NAD. VS past 24 hrs -as above   HEENT:   Moist oral mucosa. Conjunctiva clear b/l.   Neck : supple  Respiratory: Lungs CTAB.  Gastrointestinal:  Soft, nontender. No rebound. No rigidity. BS present	  Cardiovascular: RRR ,S1S2 present  Neurologic: Non-focal. Moving all extremities.  Extremities: No edema. No erythema. No calf tenderness.  Skin: No rashes, No ecchymoses, No cyanosis.	  wounds ,skin lesions-See skin assesment flow sheet   LABS:                        9.8    10.50 )-----------( 209      ( 31 Mar 2023 06:44 )             32.0     03-31    147<H>  |  119<H>  |  38<H>  ----------------------------<  234<H>  4.0   |  22  |  1.30    Ca    8.9      31 Mar 2023 06:44  Mg     2.2     03-30    TPro  6.6  /  Alb  2.3<L>  /  TBili  0.6  /  DBili  x   /  AST  23  /  ALT  30  /  AlkPhos  56  03-31    CAPILLARY BLOOD GLUCOSE      POCT Blood Glucose.: 335 mg/dL (31 Mar 2023 11:24)  POCT Blood Glucose.: 238 mg/dL (31 Mar 2023 07:35)  POCT Blood Glucose.: 199 mg/dL (30 Mar 2023 21:39)  POCT Blood Glucose.: 273 mg/dL (30 Mar 2023 16:36)    PT/INR - ( 31 Mar 2023 06:44 )   PT: 14.8 sec;   INR: 1.26 ratio           Urinalysis Basic - ( 30 Mar 2023 09:53 )    Color: Red / Appearance: Slightly Turbid / SG: >=1.030 / pH: x  Gluc: x / Ketone: Negative  / Bili: Negative / Urobili: <2 mg/dL   Blood: x / Protein: Trace / Nitrite: Negative   Leuk Esterase: Moderate / RBC: 10 /HPF /  /HPF   Sq Epi: x / Non Sq Epi: 0 /HPF / Bacteria: Moderate        Culture - Blood (collected 29 Mar 2023 21:32)  Source: .Blood  Preliminary Report (31 Mar 2023 01:03):    No growth to date.    Culture - Blood (collected 29 Mar 2023 21:20)  Source: .Blood  Preliminary Report (31 Mar 2023 01:03):    No growth to date.      RADIOLOGY & ADDITIONAL TESTS:   reviewed elctronically  ASSESSMENT/PLAN: 	    25 minutes aggregate time was spent on this visit, 50% visit time spent in care co-ordination with other attendings and counselling patient .I have discussed care plan with patient / HCP/family member ,who expressed understanding of problems treatment and their effect and side effects, alternatives in details. I have asked if they have any questions and concerns and appropriately addressed them to best of my ability.  PROGRESS NOTE  Patient is a 87y old  Male who presents with a chief complaint of left foot infection (31 Mar 2023 09:46)    Chart and available morning labs /imaging are reviewed electronically , urgent issues addressed . More information  is being added upon completion of rounds , when more information is collected and management discussed with consultants , medical staff and social service/case management on the floor   OVERNIGHT    No new issues reported by medical staff . All above noted Patient is resting in a bed comfortably .Confused ,poor mentation .No distress noted Vasc and podiatry input is appreciated   HPI:  86 yo male ,[facility] resident with PMHx - COPD, DM, HTN, CAD, HLD, H/O TIA, dementia,GERD, BPH and depression sent ot ER for evaluation of left foot 1metatarsal wound ,suggestive of osteomyelitis .Patient was seen by ID consult and transfer to the hospital recommended for wound cx/bone biopsy /podiatry evaluation ,likely will require 4-6 weeks of iv abx . Patient was admitted to [facility] with b/l feet wounds and was followed by wound care team ,recently completed 7 days of doxycycline for foot wound cellulitis . (30 Mar 2023 05:21)    PAST MEDICAL & SURGICAL HISTORY:  HLD (hyperlipidemia)      MDD (major depressive disorder)      Obstructive and reflux uropathy      Cellulitis      Sepsis      Moderate protein-calorie malnutrition      Brain TIA      History of RSV infection      Pressure ulcer of unspecified heel, unspecified stage      Venous stasis ulcer without varicose veins      ASHD (arteriosclerotic heart disease)      BPH without urinary obstruction      COPD, moderate      Stage 3 chronic kidney disease      Chronic GERD      HTN (hypertension)      Dementia      DM type 2, not at goal      Multiple open wounds of foot          MEDICATIONS  (STANDING):  albuterol/ipratropium for Nebulization 3 milliLiter(s) Nebulizer every 8 hours  atenolol  Tablet 25 milliGRAM(s) Oral daily  budesonide 160 MICROgram(s)/formoterol 4.5 MICROgram(s) Inhaler 2 Puff(s) Inhalation two times a day  dextrose 5%. 1000 milliLiter(s) (50 mL/Hr) IV Continuous <Continuous>  dextrose 5%. 1000 milliLiter(s) (100 mL/Hr) IV Continuous <Continuous>  dextrose 50% Injectable 25 Gram(s) IV Push once  dextrose 50% Injectable 12.5 Gram(s) IV Push once  dextrose 50% Injectable 25 Gram(s) IV Push once  donepezil 5 milliGRAM(s) Oral at bedtime  DULoxetine 60 milliGRAM(s) Oral daily  glucagon  Injectable 1 milliGRAM(s) IntraMuscular once  heparin   Injectable 5000 Unit(s) SubCutaneous every 12 hours  insulin glargine Injectable (LANTUS) 15 Unit(s) SubCutaneous at bedtime  insulin lispro (ADMELOG) corrective regimen sliding scale   SubCutaneous three times a day before meals  insulin lispro (ADMELOG) corrective regimen sliding scale   SubCutaneous at bedtime  insulin lispro Injectable (ADMELOG) 3 Unit(s) SubCutaneous three times a day before meals  lactobacillus acidophilus 1 Tablet(s) Oral two times a day with meals  losartan 50 milliGRAM(s) Oral daily  multivitamin 1 Tablet(s) Oral daily  pantoprazole   Suspension 40 milliGRAM(s) Oral daily  piperacillin/tazobactam IVPB.. 3.375 Gram(s) IV Intermittent every 8 hours  senna 2 Tablet(s) Oral at bedtime  simvastatin 40 milliGRAM(s) Oral at bedtime  sodium chloride 0.9%. 1000 milliLiter(s) (50 mL/Hr) IV Continuous <Continuous>  tamsulosin 0.4 milliGRAM(s) Oral at bedtime    MEDICATIONS  (PRN):  acetaminophen     Tablet .. 650 milliGRAM(s) Oral every 6 hours PRN Temp greater or equal to 38C (100.4F), Mild Pain (1 - 3)  aluminum hydroxide/magnesium hydroxide/simethicone Suspension 30 milliLiter(s) Oral every 4 hours PRN Dyspepsia  bisacodyl Suppository 10 milliGRAM(s) Rectal daily PRN Constipation  dextrose Oral Gel 15 Gram(s) Oral once PRN Blood Glucose LESS THAN 70 milliGRAM(s)/deciliter  hydrALAZINE 50 milliGRAM(s) Oral every 6 hours PRN for systolic BP>160  magnesium hydroxide Suspension 30 milliLiter(s) Oral daily PRN Constipation  melatonin 3 milliGRAM(s) Oral at bedtime PRN Insomnia  morphine  - Injectable 2 milliGRAM(s) IV Push every 6 hours PRN Severe Pain (7 - 10)  ondansetron Injectable 4 milliGRAM(s) IV Push every 8 hours PRN Nausea and/or Vomiting  traMADol 50 milliGRAM(s) Oral four times a day PRN Moderate Pain (4 - 6)      OBJECTIVE    T(C): 37 (03-31-23 @ 12:26), Max: 37 (03-31-23 @ 12:26)  HR: 98 (03-31-23 @ 12:26) (88 - 103)  BP: 136/85 (03-31-23 @ 12:26) (136/85 - 157/82)  RR: 18 (03-31-23 @ 12:26) (18 - 18)  SpO2: 94% (03-31-23 @ 12:26) (92% - 94%)  Wt(kg): --  I&O's Summary    30 Mar 2023 07:01  -  31 Mar 2023 07:00  --------------------------------------------------------  IN: 240 mL / OUT: 0 mL / NET: 240 mL          REVIEW OF SYSTEMS:  CONSTITUTIONAL: No fever, weight loss, or fatigue  EYES: No eye pain, visual disturbances, or discharge  ENMT:   No sinus or throat pain  NECK: No pain or stiffness  RESPIRATORY: No cough, wheezing, chills or hemoptysis; No shortness of breath  CARDIOVASCULAR: No chest pain, palpitations, dizziness, or leg swelling  GASTROINTESTINAL: No abdominal pain. No nausea, vomiting; No diarrhea or constipation. No melena or hematochezia.  GENITOURINARY: No dysuria, frequency, hematuria, or incontinence  NEUROLOGICAL: No headaches, memory loss, loss of strength, numbness, or tremors  SKIN: No itching, burning, rashes, or lesions   MUSCULOSKELETAL: No joint pain or swelling; No muscle, back, or extremity pain    PHYSICAL EXAM:  Appearance: NAD. VS past 24 hrs -as above   HEENT:   Moist oral mucosa. Conjunctiva clear b/l.   Neck : supple  Respiratory: Lungs CTAB.  Gastrointestinal:  Soft, nontender. No rebound. No rigidity. BS present	  Cardiovascular: RRR ,S1S2 present  Neurologic: Non-focal. Moving all extremities.  Extremities: No edema. No erythema. No calf tenderness.  Skin: No rashes, No ecchymoses, No cyanosis.	  wounds ,skin lesions-See skin assesment flow sheet   LABS:                        9.8    10.50 )-----------( 209      ( 31 Mar 2023 06:44 )             32.0     03-31    147<H>  |  119<H>  |  38<H>  ----------------------------<  234<H>  4.0   |  22  |  1.30    Ca    8.9      31 Mar 2023 06:44  Mg     2.2     03-30    TPro  6.6  /  Alb  2.3<L>  /  TBili  0.6  /  DBili  x   /  AST  23  /  ALT  30  /  AlkPhos  56  03-31    CAPILLARY BLOOD GLUCOSE      POCT Blood Glucose.: 335 mg/dL (31 Mar 2023 11:24)  POCT Blood Glucose.: 238 mg/dL (31 Mar 2023 07:35)  POCT Blood Glucose.: 199 mg/dL (30 Mar 2023 21:39)  POCT Blood Glucose.: 273 mg/dL (30 Mar 2023 16:36)    PT/INR - ( 31 Mar 2023 06:44 )   PT: 14.8 sec;   INR: 1.26 ratio           Urinalysis Basic - ( 30 Mar 2023 09:53 )    Color: Red / Appearance: Slightly Turbid / SG: >=1.030 / pH: x  Gluc: x / Ketone: Negative  / Bili: Negative / Urobili: <2 mg/dL   Blood: x / Protein: Trace / Nitrite: Negative   Leuk Esterase: Moderate / RBC: 10 /HPF /  /HPF   Sq Epi: x / Non Sq Epi: 0 /HPF / Bacteria: Moderate        Culture - Blood (collected 29 Mar 2023 21:32)  Source: .Blood  Preliminary Report (31 Mar 2023 01:03):    No growth to date.    Culture - Blood (collected 29 Mar 2023 21:20)  Source: .Blood  Preliminary Report (31 Mar 2023 01:03):    No growth to date.      RADIOLOGY & ADDITIONAL TESTS:   reviewed elctronically  ASSESSMENT/PLAN: 	    25 minutes aggregate time was spent on this visit, 50% visit time spent in care co-ordination with other attendings and counselling patient .I have discussed care plan with patient / HCP/family member ,who expressed understanding of problems treatment and their effect and side effects, alternatives in details. I have asked if they have any questions and concerns and appropriately addressed them to best of my ability.

## 2023-03-31 NOTE — PROGRESS NOTE ADULT - PROBLEM SELECTOR PLAN 1
oot area   Applied Aquacel and sterile dressing to the foot   Left foot X- Rays (+) OM at the first metatarsal head   Ordered MRI for the left and right foot to r/o OM - Unable to obtain MRI dueto technical problems   Ordered Nuclear bone scan  Discussed with patient's wife over the phone  have ordered bone scan will discuss surgical intervention pending results   Discussed with family conservative vs surgical intervention for the left foot wound. Patient is high risk for amputation and for sepsis, loss of limb and loss life.  Will discuss further treatment plan pending bone scan results  Vascular recs appreciated   Continue local wound care and offloading at this time.    Dressing changes per wound care  orders Left foot X- Rays (+) OM at the first metatarsal head   Ordered MRI for the left and right foot to r/o OM - Unable to obtain MRI dueto technical problems   Ordered Nuclear bone scan  Discussed with patient's wife over the phone  have ordered bone scan will discuss surgical intervention pending results   Discussed with family conservative vs surgical intervention for the left foot wound. Patient is high risk for amputation and for sepsis, loss of limb and loss life.  Will discuss further treatment plan pending bone scan results  Vascular recs appreciated   Continue local wound care and offloading at this time.  Dressing changes per wound care orders

## 2023-03-31 NOTE — PROGRESS NOTE ADULT - SUBJECTIVE AND OBJECTIVE BOX
[facility]  INFECTIOUS DISEASES - [address]uma [address]es, [place] [address]  Tel: [phone]     Fax: [phone]  =======================================================    ANA MARIA LEIGH [id number]    Follow up: Seen earlier today, had fever later in the day. Sleepy but arousable.    Allergies:  No Known Allergies      Antibiotics:  acetaminophen     Tablet .. 650 milliGRAM(s) Oral every 6 hours PRN  albuterol/ipratropium for Nebulization 3 milliLiter(s) Nebulizer every 8 hours  aluminum hydroxide/magnesium hydroxide/simethicone Suspension 30 milliLiter(s) Oral every 4 hours PRN  bisacodyl Suppository 10 milliGRAM(s) Rectal daily PRN  budesonide 160 MICROgram(s)/formoterol 4.5 MICROgram(s) Inhaler 2 Puff(s) Inhalation two times a day  dextrose 5%. 1000 milliLiter(s) IV Continuous <Continuous>  dextrose 5%. 1000 milliLiter(s) IV Continuous <Continuous>  dextrose 50% Injectable 25 Gram(s) IV Push once  dextrose 50% Injectable 12.5 Gram(s) IV Push once  dextrose 50% Injectable 25 Gram(s) IV Push once  dextrose Oral Gel 15 Gram(s) Oral once PRN  donepezil 5 milliGRAM(s) Oral at bedtime  DULoxetine 60 milliGRAM(s) Oral daily  enoxaparin Injectable 60 milliGRAM(s) SubCutaneous every 12 hours  glucagon  Injectable 1 milliGRAM(s) IntraMuscular once  hydrALAZINE 50 milliGRAM(s) Oral every 6 hours PRN  insulin glargine Injectable (LANTUS) 15 Unit(s) SubCutaneous at bedtime  insulin lispro (ADMELOG) corrective regimen sliding scale   SubCutaneous three times a day before meals  insulin lispro (ADMELOG) corrective regimen sliding scale   SubCutaneous at bedtime  insulin lispro Injectable (ADMELOG) 3 Unit(s) SubCutaneous three times a day before meals  lactobacillus acidophilus 1 Tablet(s) Oral two times a day with meals  losartan 50 milliGRAM(s) Oral daily  magnesium hydroxide Suspension 30 milliLiter(s) Oral daily PRN  melatonin 3 milliGRAM(s) Oral at bedtime PRN  metoprolol tartrate 25 milliGRAM(s) Oral two times a day  morphine  - Injectable 2 milliGRAM(s) IV Push every 6 hours PRN  multivitamin 1 Tablet(s) Oral daily  ondansetron Injectable 4 milliGRAM(s) IV Push every 8 hours PRN  pantoprazole   Suspension 40 milliGRAM(s) Oral daily  piperacillin/tazobactam IVPB.. 3.375 Gram(s) IV Intermittent every 8 hours  senna 2 Tablet(s) Oral at bedtime  simvastatin 40 milliGRAM(s) Oral at bedtime  tamsulosin 0.4 milliGRAM(s) Oral at bedtime  traMADol 50 milliGRAM(s) Oral four times a day PRN       REVIEW OF SYSTEMS:  Unable to obtain 2/2 dementia     Physical Exam:  ICU Vital Signs Last 24 Hrs  T(C): 38.3 (31 Mar 2023 20:57), Max: 38.3 (31 Mar 2023 20:57)  T(F): 101 (31 Mar 2023 20:57), Max: 101 (31 Mar 2023 20:57)  HR: 103 (31 Mar 2023 20:57) (98 - 103)  BP: 155/98 (31 Mar 2023 20:57) (136/85 - 157/82)  BP(mean): --  ABP: --  ABP(mean): --  RR: 18 (31 Mar 2023 20:57) (18 - 18)  SpO2: 92% (31 Mar 2023 20:57) (92% - 94%)    O2 Parameters below as of 31 Mar 2023 20:57  Patient On (Oxygen Delivery Method): room air        GEN: NAD  HEENT: normocephalic and atraumatic.   NECK: Supple.   LUNGS: Clear to auscultation.  HEART: Regular rate and rhythm   ABDOMEN: Soft, nontender, and nondistended.    EXTREMITIES: No leg edema.  NEUROLOGIC: sleepy but arousable  SKIN: (+) L foot foot 1st metatarsal wound with probe to bone, no odor or active drainage but with surrounding erythema and tenderness, bilateral heel wounds    Labs:  03-31    147<H>  |  119<H>  |  38<H>  ----------------------------<  234<H>  4.0   |  22  |  1.30    Ca    8.9      31 Mar 2023 06:44  Mg     2.2     03-30    TPro  6.6  /  Alb  2.3<L>  /  TBili  0.6  /  DBili  x   /  AST  23  /  ALT  30  /  AlkPhos  56  03-31                          9.8    10.50 )-----------( 209      ( 31 Mar 2023 06:44 )             32.0     PT/INR - ( 31 Mar 2023 06:44 )   PT: 14.8 sec;   INR: 1.26 ratio           Urinalysis Basic - ( 30 Mar 2023 09:53 )    Color: Red / Appearance: Slightly Turbid / SG: >=1.030 / pH: x  Gluc: x / Ketone: Negative  / Bili: Negative / Urobili: <2 mg/dL   Blood: x / Protein: Trace / Nitrite: Negative   Leuk Esterase: Moderate / RBC: 10 /HPF /  /HPF   Sq Epi: x / Non Sq Epi: 0 /HPF / Bacteria: Moderate      LIVER FUNCTIONS - ( 31 Mar 2023 06:44 )  Alb: 2.3 g/dL / Pro: 6.6 g/dL / ALK PHOS: 56 U/L / ALT: 30 U/L / AST: 23 U/L / GGT: x             RECENT CULTURES:  03-29 @ 21:32 .Blood     No growth to date.        03-29 @ 21:20 .Blood     No growth to date.              All imaging and data are reviewed.    [facility]  INFECTIOUS DISEASES - [address]es, [place] [address]  Tel: [phone]     Fax: [phone]  =======================================================    ANA MARIA LEIGH [id number]    Follow up: Seen earlier today, had fever later in the day. Sleepy but arousable.    Allergies:  No Known Allergies      Antibiotics:  acetaminophen     Tablet .. 650 milliGRAM(s) Oral every 6 hours PRN  albuterol/ipratropium for Nebulization 3 milliLiter(s) Nebulizer every 8 hours  aluminum hydroxide/magnesium hydroxide/simethicone Suspension 30 milliLiter(s) Oral every 4 hours PRN  bisacodyl Suppository 10 milliGRAM(s) Rectal daily PRN  budesonide 160 MICROgram(s)/formoterol 4.5 MICROgram(s) Inhaler 2 Puff(s) Inhalation two times a day  dextrose 5%. 1000 milliLiter(s) IV Continuous <Continuous>  dextrose 5%. 1000 milliLiter(s) IV Continuous <Continuous>  dextrose 50% Injectable 25 Gram(s) IV Push once  dextrose 50% Injectable 12.5 Gram(s) IV Push once  dextrose 50% Injectable 25 Gram(s) IV Push once  dextrose Oral Gel 15 Gram(s) Oral once PRN  donepezil 5 milliGRAM(s) Oral at bedtime  DULoxetine 60 milliGRAM(s) Oral daily  enoxaparin Injectable 60 milliGRAM(s) SubCutaneous every 12 hours  glucagon  Injectable 1 milliGRAM(s) IntraMuscular once  hydrALAZINE 50 milliGRAM(s) Oral every 6 hours PRN  insulin glargine Injectable (LANTUS) 15 Unit(s) SubCutaneous at bedtime  insulin lispro (ADMELOG) corrective regimen sliding scale   SubCutaneous three times a day before meals  insulin lispro (ADMELOG) corrective regimen sliding scale   SubCutaneous at bedtime  insulin lispro Injectable (ADMELOG) 3 Unit(s) SubCutaneous three times a day before meals  lactobacillus acidophilus 1 Tablet(s) Oral two times a day with meals  losartan 50 milliGRAM(s) Oral daily  magnesium hydroxide Suspension 30 milliLiter(s) Oral daily PRN  melatonin 3 milliGRAM(s) Oral at bedtime PRN  metoprolol tartrate 25 milliGRAM(s) Oral two times a day  morphine  - Injectable 2 milliGRAM(s) IV Push every 6 hours PRN  multivitamin 1 Tablet(s) Oral daily  ondansetron Injectable 4 milliGRAM(s) IV Push every 8 hours PRN  pantoprazole   Suspension 40 milliGRAM(s) Oral daily  piperacillin/tazobactam IVPB.. 3.375 Gram(s) IV Intermittent every 8 hours  senna 2 Tablet(s) Oral at bedtime  simvastatin 40 milliGRAM(s) Oral at bedtime  tamsulosin 0.4 milliGRAM(s) Oral at bedtime  traMADol 50 milliGRAM(s) Oral four times a day PRN       REVIEW OF SYSTEMS:  Unable to obtain 2/2 dementia     Physical Exam:  ICU Vital Signs Last 24 Hrs  T(C): 38.3 (31 Mar 2023 20:57), Max: 38.3 (31 Mar 2023 20:57)  T(F): 101 (31 Mar 2023 20:57), Max: 101 (31 Mar 2023 20:57)  HR: 103 (31 Mar 2023 20:57) (98 - 103)  BP: 155/98 (31 Mar 2023 20:57) (136/85 - 157/82)  BP(mean): --  ABP: --  ABP(mean): --  RR: 18 (31 Mar 2023 20:57) (18 - 18)  SpO2: 92% (31 Mar 2023 20:57) (92% - 94%)    O2 Parameters below as of 31 Mar 2023 20:57  Patient On (Oxygen Delivery Method): room air        GEN: NAD  HEENT: normocephalic and atraumatic.   NECK: Supple.   LUNGS: Clear to auscultation.  HEART: Regular rate and rhythm   ABDOMEN: Soft, nontender, and nondistended.    EXTREMITIES: No leg edema.  NEUROLOGIC: sleepy but arousable  SKIN: (+) L foot foot 1st metatarsal wound with probe to bone, no odor or active drainage but with surrounding erythema and tenderness, bilateral heel wounds    Labs:  03-31    147<H>  |  119<H>  |  38<H>  ----------------------------<  234<H>  4.0   |  22  |  1.30    Ca    8.9      31 Mar 2023 06:44  Mg     2.2     03-30    TPro  6.6  /  Alb  2.3<L>  /  TBili  0.6  /  DBili  x   /  AST  23  /  ALT  30  /  AlkPhos  56  03-31                          9.8    10.50 )-----------( 209      ( 31 Mar 2023 06:44 )             32.0     PT/INR - ( 31 Mar 2023 06:44 )   PT: 14.8 sec;   INR: 1.26 ratio           Urinalysis Basic - ( 30 Mar 2023 09:53 )    Color: Red / Appearance: Slightly Turbid / SG: >=1.030 / pH: x  Gluc: x / Ketone: Negative  / Bili: Negative / Urobili: <2 mg/dL   Blood: x / Protein: Trace / Nitrite: Negative   Leuk Esterase: Moderate / RBC: 10 /HPF /  /HPF   Sq Epi: x / Non Sq Epi: 0 /HPF / Bacteria: Moderate      LIVER FUNCTIONS - ( 31 Mar 2023 06:44 )  Alb: 2.3 g/dL / Pro: 6.6 g/dL / ALK PHOS: 56 U/L / ALT: 30 U/L / AST: 23 U/L / GGT: x             RECENT CULTURES:  03-29 @ 21:32 .Blood     No growth to date.        03-29 @ 21:20 .Blood     No growth to date.              All imaging and data are reviewed.    [facility]  INFECTIOUS DISEASES - [address], [place] [address]  Tel: [phone]     Fax: [phone]  =======================================================    ANA MARIA LEIGH [id number]    Follow up: Seen earlier today, had fever later in the day. Sleepy but arousable.    Allergies:  No Known Allergies      Antibiotics:  acetaminophen     Tablet .. 650 milliGRAM(s) Oral every 6 hours PRN  albuterol/ipratropium for Nebulization 3 milliLiter(s) Nebulizer every 8 hours  aluminum hydroxide/magnesium hydroxide/simethicone Suspension 30 milliLiter(s) Oral every 4 hours PRN  bisacodyl Suppository 10 milliGRAM(s) Rectal daily PRN  budesonide 160 MICROgram(s)/formoterol 4.5 MICROgram(s) Inhaler 2 Puff(s) Inhalation two times a day  dextrose 5%. 1000 milliLiter(s) IV Continuous <Continuous>  dextrose 5%. 1000 milliLiter(s) IV Continuous <Continuous>  dextrose 50% Injectable 25 Gram(s) IV Push once  dextrose 50% Injectable 12.5 Gram(s) IV Push once  dextrose 50% Injectable 25 Gram(s) IV Push once  dextrose Oral Gel 15 Gram(s) Oral once PRN  donepezil 5 milliGRAM(s) Oral at bedtime  DULoxetine 60 milliGRAM(s) Oral daily  enoxaparin Injectable 60 milliGRAM(s) SubCutaneous every 12 hours  glucagon  Injectable 1 milliGRAM(s) IntraMuscular once  hydrALAZINE 50 milliGRAM(s) Oral every 6 hours PRN  insulin glargine Injectable (LANTUS) 15 Unit(s) SubCutaneous at bedtime  insulin lispro (ADMELOG) corrective regimen sliding scale   SubCutaneous three times a day before meals  insulin lispro (ADMELOG) corrective regimen sliding scale   SubCutaneous at bedtime  insulin lispro Injectable (ADMELOG) 3 Unit(s) SubCutaneous three times a day before meals  lactobacillus acidophilus 1 Tablet(s) Oral two times a day with meals  losartan 50 milliGRAM(s) Oral daily  magnesium hydroxide Suspension 30 milliLiter(s) Oral daily PRN  melatonin 3 milliGRAM(s) Oral at bedtime PRN  metoprolol tartrate 25 milliGRAM(s) Oral two times a day  morphine  - Injectable 2 milliGRAM(s) IV Push every 6 hours PRN  multivitamin 1 Tablet(s) Oral daily  ondansetron Injectable 4 milliGRAM(s) IV Push every 8 hours PRN  pantoprazole   Suspension 40 milliGRAM(s) Oral daily  piperacillin/tazobactam IVPB.. 3.375 Gram(s) IV Intermittent every 8 hours  senna 2 Tablet(s) Oral at bedtime  simvastatin 40 milliGRAM(s) Oral at bedtime  tamsulosin 0.4 milliGRAM(s) Oral at bedtime  traMADol 50 milliGRAM(s) Oral four times a day PRN       REVIEW OF SYSTEMS:  Unable to obtain 2/2 dementia     Physical Exam:  ICU Vital Signs Last 24 Hrs  T(C): 38.3 (31 Mar 2023 20:57), Max: 38.3 (31 Mar 2023 20:57)  T(F): 101 (31 Mar 2023 20:57), Max: 101 (31 Mar 2023 20:57)  HR: 103 (31 Mar 2023 20:57) (98 - 103)  BP: 155/98 (31 Mar 2023 20:57) (136/85 - 157/82)  BP(mean): --  ABP: --  ABP(mean): --  RR: 18 (31 Mar 2023 20:57) (18 - 18)  SpO2: 92% (31 Mar 2023 20:57) (92% - 94%)    O2 Parameters below as of 31 Mar 2023 20:57  Patient On (Oxygen Delivery Method): room air        GEN: NAD  HEENT: normocephalic and atraumatic.   NECK: Supple.   LUNGS: Clear to auscultation.  HEART: Regular rate and rhythm   ABDOMEN: Soft, nontender, and nondistended.    EXTREMITIES: No leg edema.  NEUROLOGIC: sleepy but arousable  SKIN: (+) L foot foot 1st metatarsal wound with probe to bone, no odor or active drainage but with surrounding erythema and tenderness, bilateral heel wounds    Labs:  03-31    147<H>  |  119<H>  |  38<H>  ----------------------------<  234<H>  4.0   |  22  |  1.30    Ca    8.9      31 Mar 2023 06:44  Mg     2.2     03-30    TPro  6.6  /  Alb  2.3<L>  /  TBili  0.6  /  DBili  x   /  AST  23  /  ALT  30  /  AlkPhos  56  03-31                          9.8    10.50 )-----------( 209      ( 31 Mar 2023 06:44 )             32.0     PT/INR - ( 31 Mar 2023 06:44 )   PT: 14.8 sec;   INR: 1.26 ratio           Urinalysis Basic - ( 30 Mar 2023 09:53 )    Color: Red / Appearance: Slightly Turbid / SG: >=1.030 / pH: x  Gluc: x / Ketone: Negative  / Bili: Negative / Urobili: <2 mg/dL   Blood: x / Protein: Trace / Nitrite: Negative   Leuk Esterase: Moderate / RBC: 10 /HPF /  /HPF   Sq Epi: x / Non Sq Epi: 0 /HPF / Bacteria: Moderate      LIVER FUNCTIONS - ( 31 Mar 2023 06:44 )  Alb: 2.3 g/dL / Pro: 6.6 g/dL / ALK PHOS: 56 U/L / ALT: 30 U/L / AST: 23 U/L / GGT: x             RECENT CULTURES:  03-29 @ 21:32 .Blood     No growth to date.        03-29 @ 21:20 .Blood     No growth to date.              All imaging and data are reviewed.

## 2023-03-31 NOTE — PROGRESS NOTE ADULT - NUTRITIONAL ASSESSMENT
MEDICATIONS  (STANDING):  albuterol/ipratropium for Nebulization 3 milliLiter(s) Nebulizer every 8 hours  atenolol  Tablet 25 milliGRAM(s) Oral daily  budesonide 160 MICROgram(s)/formoterol 4.5 MICROgram(s) Inhaler 2 Puff(s) Inhalation two times a day  dextrose 5%. 1000 milliLiter(s) (50 mL/Hr) IV Continuous <Continuous>  dextrose 5%. 1000 milliLiter(s) (100 mL/Hr) IV Continuous <Continuous>  dextrose 50% Injectable 25 Gram(s) IV Push once  dextrose 50% Injectable 12.5 Gram(s) IV Push once  dextrose 50% Injectable 25 Gram(s) IV Push once  donepezil 5 milliGRAM(s) Oral at bedtime  DULoxetine 60 milliGRAM(s) Oral daily  glucagon  Injectable 1 milliGRAM(s) IntraMuscular once  heparin   Injectable 5000 Unit(s) SubCutaneous every 12 hours  insulin glargine Injectable (LANTUS) 8 Unit(s) SubCutaneous every morning  insulin lispro (ADMELOG) corrective regimen sliding scale   SubCutaneous three times a day before meals  insulin lispro (ADMELOG) corrective regimen sliding scale   SubCutaneous at bedtime  lactobacillus acidophilus 1 Tablet(s) Oral two times a day with meals  losartan 50 milliGRAM(s) Oral daily  multivitamin 1 Tablet(s) Oral daily  pantoprazole    Tablet 40 milliGRAM(s) Oral daily  piperacillin/tazobactam IVPB.. 3.375 Gram(s) IV Intermittent every 8 hours  senna 2 Tablet(s) Oral at bedtime  simvastatin 40 milliGRAM(s) Oral at bedtime  sodium chloride 0.9%. 1000 milliLiter(s) (50 mL/Hr) IV Continuous <Continuous>  tamsulosin 0.4 milliGRAM(s) Oral at bedtime

## 2023-03-31 NOTE — PROGRESS NOTE ADULT - ASSESSMENT
88 yo male ,[facility] resident with PMHx - COPD, DM, HTN, CAD, HLD, H/O TIA, dementia,GERD, BPH and depression sent ot ER for evaluation of left foot 1metatarsal wound ,suggestive of osteomyelitis .Patient was seen by ID consult and transfer to the hospital recommended for wound cx/bone biopsy /podiatry evaluation ,likely will require 4-6 weeks of iv abx . Patient was admitted to [facility] with b/l feet wounds and was followed by wound care team ,recently completed 7 days of doxycycline for foot wound cellulitis . 88 yo male ,[facility] resident with PMHx - COPD, DM, HTN, CAD, HLD, H/O TIA, dementia,GERD, BPH and depression sent ot ER for evaluation of left foot 1metatarsal wound ,suggestive of osteomyelitis .Patient was seen by ID consult and transfer to the hospital recommended for wound cx/bone biopsy /podiatry evaluation ,likely will require 4-6 weeks of iv abx . Patient was admitted to [facility] with b/l feet wounds and was followed by wound care team ,recently completed 7 days of doxycycline for foot wound cellulitis . 88 yo male ,[facility] resident with PMHx - COPD, DM, HTN, CAD, HLD, H/O TIA, dementia,GERD, BPH and depression sent ot ER for evaluation of left foot 1metatarsal wound ,suggestive of osteomyelitis .Patient was seen by ID consult and transfer to the hospital recommended for wound cx/bone biopsy /podiatry evaluation ,likely will require 4-6 weeks of iv abx . Patient was admitted to [facility] with b/l feet wounds and was followed by wound care team ,recently completed 7 days of doxycycline for foot wound cellulitis .

## 2023-03-31 NOTE — PROGRESS NOTE ADULT - PROBLEM SELECTOR PLAN 1
left foot 1metatarsal wound. Concern for wound infection with underlying osteomyelitis. He also has bilateral heel wounds R>L. He had a low grade temp and mild leukocytosis. Plan for potential Podiatry intervention pending bone scan ,MRI is not available at facility .  -continue Zosyn  -hold off on vancomycin for now  -follow blood cultures  -follow up bilateral foot MRI

## 2023-03-31 NOTE — PROGRESS NOTE ADULT - ASSESSMENT
86 yo male ,[facility] resident with PMHx - COPD, DM, HTN, CAD, HLD, H/O TIA, dementia, GERD, BPH and depression, who was sent for evaluation of left foot 1metatarsal wound. Concern for wound infection with underlying osteomyelitis. He also has bilateral heel wounds R>L.     Had fever later in the day after being seen. Otherwise mild leukocytosis resolved and blood cultures currently no growth. No DVT on bilateral LE Doppler. Plan for potential Podiatry intervention.    -continue Zosyn, but if more fever would broaden to vancomycin and cefepime  -follow cultures to completion  -suggest urine culture  -repeat blood cultures if febrile again  -I will be covered by Dr. Demario Aguirre this weekend, 4/1-4/2/2    Isis Mancia MD  Division of Infectious Diseases   Cell [phone] between 8am and 6pm   After 6pm and weekends please call ID service at [phone].      88 yo male ,[facility] resident with PMHx - COPD, DM, HTN, CAD, HLD, H/O TIA, dementia, GERD, BPH and depression, who was sent for evaluation of left foot 1metatarsal wound. Concern for wound infection with underlying osteomyelitis. He also has bilateral heel wounds R>L.     Had fever later in the day after being seen. Otherwise mild leukocytosis resolved and blood cultures currently no growth. No DVT on bilateral LE Doppler. Plan for potential Podiatry intervention.    -continue Zosyn, but if more fever would broaden to vancomycin and cefepime  -follow cultures to completion  -suggest urine culture  -repeat blood cultures if febrile again  -I will be covered by Dr. Demario Aguirre this weekend, 4/1-4/2/2    Isis Mancia MD  Division of Infectious Diseases   Cell [phone] between 8am and 6pm   After 6pm and weekends please call ID service at [phone].      86 yo male ,[facility] resident with PMHx - COPD, DM, HTN, CAD, HLD, H/O TIA, dementia, GERD, BPH and depression, who was sent for evaluation of left foot 1metatarsal wound. Concern for wound infection with underlying osteomyelitis. He also has bilateral heel wounds R>L.     Had fever later in the day after being seen. Otherwise mild leukocytosis resolved and blood cultures currently no growth. No DVT on bilateral LE Doppler. Plan for potential Podiatry intervention.    -continue Zosyn, but if more fever would broaden to vancomycin and cefepime  -follow cultures to completion  -suggest urine culture  -repeat blood cultures if febrile again  -I will be covered by Dr. Demario Aguirre this weekend, 4/1-4/2/2    Isis Mancia MD  Division of Infectious Diseases   Cell [phone] between 8am and 6pm   After 6pm and weekends please call ID service at [phone].      88 yo male ,[facility] resident with PMHx - COPD, DM, HTN, CAD, HLD, H/O TIA, dementia, GERD, BPH and depression, who was sent for evaluation of left foot 1metatarsal wound. Concern for wound infection with underlying osteomyelitis. He also has bilateral heel wounds R>L.     Had fever later in the day after being seen. Otherwise mild leukocytosis resolved and blood cultures currently no growth. No DVT on bilateral LE Doppler. Plan for potential Podiatry intervention.    -continue Zosyn, but if more fever would broaden to vancomycin and cefepime  -follow cultures to completion  -suggest repeat urinalysis and urine culture  -repeat blood cultures if febrile again  -I will be covered by Dr. Demario Aguirre this weekend, 4/1-4/2/2    Isis Mancia MD  Division of Infectious Diseases   Cell [phone] between 8am and 6pm   After 6pm and weekends please call ID service at [phone].      88 yo male ,[facility] resident with PMHx - COPD, DM, HTN, CAD, HLD, H/O TIA, dementia, GERD, BPH and depression, who was sent for evaluation of left foot 1metatarsal wound. Concern for wound infection with underlying osteomyelitis. He also has bilateral heel wounds R>L.     Had fever later in the day after being seen. Otherwise mild leukocytosis resolved and blood cultures currently no growth. No DVT on bilateral LE Doppler. Plan for potential Podiatry intervention.    -continue Zosyn, but if more fever would broaden to vancomycin and cefepime  -follow cultures to completion  -suggest repeat urinalysis and urine culture  -repeat blood cultures if febrile again  -I will be covered by Dr. Demario Aguirre this weekend, 4/1-4/2/2    Isis Mancia MD  Division of Infectious Diseases   Cell [phone] between 8am and 6pm   After 6pm and weekends please call ID service at [phone].      88 yo male ,[facility] resident with PMHx - COPD, DM, HTN, CAD, HLD, H/O TIA, dementia, GERD, BPH and depression, who was sent for evaluation of left foot 1metatarsal wound. Concern for wound infection with underlying osteomyelitis. He also has bilateral heel wounds R>L.     Had fever later in the day after being seen. Otherwise mild leukocytosis resolved and blood cultures currently no growth. No DVT on bilateral LE Doppler. Plan for potential Podiatry intervention.    -continue Zosyn, but if more fever would broaden to vancomycin and cefepime  -follow cultures to completion  -suggest repeat urinalysis and urine culture  -repeat blood cultures if febrile again  -I will be covered by Dr. Demario Aguirre this weekend, 4/1-4/2/2    Isis Mancia MD  Division of Infectious Diseases   Cell [phone] between 8am and 6pm   After 6pm and weekends please call ID service at [phone].      86 yo male ,[facility] resident with PMHx - COPD, DM, HTN, CAD, HLD, H/O TIA, dementia, GERD, BPH and depression, who was sent for evaluation of left foot 1metatarsal wound. Concern for wound infection with underlying osteomyelitis. He also has bilateral heel wounds R>L.     Had fever later in the day after being seen. Otherwise mild leukocytosis resolved and blood cultures currently no growth. No DVT on bilateral LE Doppler. Plan for potential Podiatry intervention.    -continue Zosyn, but if more fever would broaden to vancomycin and cefepime  -follow cultures to completion  -suggest repeat urinalysis and urine culture  -if still sleepy consider CT head  -I will be covered by Dr. Demario Aguirre this weekend, 4/1-4/2/2    Isis Mancia MD  Division of Infectious Diseases   Cell [phone] between 8am and 6pm   After 6pm and weekends please call ID service at [phone].      88 yo male ,[facility] resident with PMHx - COPD, DM, HTN, CAD, HLD, H/O TIA, dementia, GERD, BPH and depression, who was sent for evaluation of left foot 1metatarsal wound. Concern for wound infection with underlying osteomyelitis. He also has bilateral heel wounds R>L.     Had fever later in the day after being seen. Otherwise mild leukocytosis resolved and blood cultures currently no growth. No DVT on bilateral LE Doppler. Plan for potential Podiatry intervention.    -continue Zosyn, but if more fever would broaden to vancomycin and cefepime  -follow cultures to completion  -suggest repeat urinalysis and urine culture  -if still sleepy consider CT head  -I will be covered by Dr. Demario Aguirre this weekend, 4/1-4/2/2    Isis Mancia MD  Division of Infectious Diseases   Cell [phone] between 8am and 6pm   After 6pm and weekends please call ID service at [phone].      86 yo male ,[facility] resident with PMHx - COPD, DM, HTN, CAD, HLD, H/O TIA, dementia, GERD, BPH and depression, who was sent for evaluation of left foot 1metatarsal wound. Concern for wound infection with underlying osteomyelitis. He also has bilateral heel wounds R>L.     Had fever later in the day after being seen. Otherwise mild leukocytosis resolved and blood cultures currently no growth. No DVT on bilateral LE Doppler. Plan for potential Podiatry intervention.    -continue Zosyn, but if more fever would broaden to vancomycin and cefepime  -follow cultures to completion  -suggest repeat urinalysis and urine culture  -if still sleepy consider CT head  -I will be covered by Dr. Demario Aguirre this weekend, 4/1-4/2/2    Isis Mancia MD  Division of Infectious Diseases   Cell [phone] between 8am and 6pm   After 6pm and weekends please call ID service at [phone].

## 2023-03-31 NOTE — PROGRESS NOTE ADULT - ASSESSMENT
88 yo male ,[facility] resident with PMHx - COPD, DM, HTN, CAD, HLD, H/O TIA, dementia,GERD, BPH and depression sent ot ER for evaluation of left foot 1metatarsal wound ,suggestive of osteomyelitis .Patient was seen by ID consult and transfer to the hospital recommended for wound cx/bone biopsy /podiatry evaluation ,likely will require 4-6 weeks of iv abx . Patient was admitted to [facility] with b/l feet wounds and was followed by wound care team ,recently completed 7 days of doxycycline for foot wound cellulitis . (30 Mar 2023 05:21)      ACUTE RENAL FAILURE: sodium chloride 0.9%. 1000 milliLiter(s) (50 mL/Hr) IV Continuous   Serum creatinine is improved   There is no progression . No uremic symptoms  No evidence of anemia .  Fluid status stable.  Will continue to avoid nephrotoxic drugs.  Patient remains asymptomatic.   Continue current therapy.  hold  diuretic.  hold   ACE inhibitor.  hold   ARB.  Additional evaluation:   ECG,    echocardiogram,     CXR,  will obtained recent   renal ultrasound to evalaute kidney size and possible stones ,      BP monitoring,continue current antihypertensive meds, low salt diet,followup with PMD in 1-2 weeks  losartan 50 milliGRAM(s) Oral daily    f/u  blood and urine cx,serial lactate levels,monitor vitals clement saenz hydration,monitor urine output and renal profile,iv abx   piperacillin/tazobactam IVPB.. 3.375 Gram(s) IV Intermittent every 8 hours 86 yo male ,[facility] resident with PMHx - COPD, DM, HTN, CAD, HLD, H/O TIA, dementia,GERD, BPH and depression sent ot ER for evaluation of left foot 1metatarsal wound ,suggestive of osteomyelitis .Patient was seen by ID consult and transfer to the hospital recommended for wound cx/bone biopsy /podiatry evaluation ,likely will require 4-6 weeks of iv abx . Patient was admitted to [facility] with b/l feet wounds and was followed by wound care team ,recently completed 7 days of doxycycline for foot wound cellulitis . (30 Mar 2023 05:21)      ACUTE RENAL FAILURE: sodium chloride 0.9%. 1000 milliLiter(s) (50 mL/Hr) IV Continuous   Serum creatinine is improved   There is no progression . No uremic symptoms  No evidence of anemia .  Fluid status stable.  Will continue to avoid nephrotoxic drugs.  Patient remains asymptomatic.   Continue current therapy.  hold  diuretic.  hold   ACE inhibitor.  hold   ARB.  Additional evaluation:   ECG,    echocardiogram,     CXR,  will obtained recent   renal ultrasound to evalaute kidney size and possible stones ,      BP monitoring,continue current antihypertensive meds, low salt diet,followup with PMD in 1-2 weeks  losartan 50 milliGRAM(s) Oral daily    f/u  blood and urine cx,serial lactate levels,monitor vitals clement saenz hydration,monitor urine output and renal profile,iv abx   piperacillin/tazobactam IVPB.. 3.375 Gram(s) IV Intermittent every 8 hours 88 yo male ,Counts include [facility] beds at the [facility] resident with PMHx - COPD, DM, HTN, CAD, HLD, H/O TIA, dementia,GERD, BPH and depression sent ot ER for evaluation of left foot 1metatarsal wound ,suggestive of osteomyelitis .Patient was seen by ID consult and transfer to the hospital recommended for wound cx/bone biopsy /podiatry evaluation ,likely will require 4-6 weeks of iv abx . Patient was admitted to Counts include 234 beds at the [facility] with b/l feet wounds and was followed by wound care team ,recently completed 7 days of doxycycline for foot wound cellulitis . (30 Mar 2023 05:21)      ACUTE RENAL FAILURE: sodium chloride 0.9%. 1000 milliLiter(s) (50 mL/Hr) IV Continuous   Serum creatinine is improved   There is no progression . No uremic symptoms  No evidence of anemia .  Fluid status stable.  Will continue to avoid nephrotoxic drugs.  Patient remains asymptomatic.   Continue current therapy.  hold  diuretic.  hold   ACE inhibitor.  hold   ARB.  Additional evaluation:   ECG,    echocardiogram,     CXR,  will obtained recent   renal ultrasound to evalaute kidney size and possible stones ,      BP monitoring,continue current antihypertensive meds, low salt diet,followup with PMD in 1-2 weeks  losartan 50 milliGRAM(s) Oral daily    f/u  blood and urine cx,serial lactate levels,monitor vitals clement saenz hydration,monitor urine output and renal profile,iv abx   piperacillin/tazobactam IVPB.. 3.375 Gram(s) IV Intermittent every 8 hours

## 2023-03-31 NOTE — PROGRESS NOTE ADULT - NUTRITIONAL ASSESSMENT
This patient has been assessed with a concern for Malnutrition and has been determined to have a diagnosis/diagnoses of Moderate protein-calorie malnutrition.    This patient is being managed with:   Diet DASH/TLC-  Sodium & Cholesterol Restricted  Consistent Carbohydrate {Evening Snack}  Soft and Bite Sized (SOFTBTSZ)  Reginald(7 Gm Arginine/7 Gm Glut/1.2 Gm HMB     Qty per Day:  2  Supplement Feeding Modality:  Oral  Glucerna Shake Cans or Servings Per Day:  1       Frequency:  Daily  Entered: Mar 30 2023  1:51PM    Diet DASH/TLC-  Sodium & Cholesterol Restricted  Consistent Carbohydrate {Evening Snack}  Soft and Bite Sized (SOFTBTSZ)  Supplement Feeding Modality:  Oral  Glucerna Shake Cans or Servings Per Day:  1       Frequency:  Daily  Entered: Mar 30 2023  5:09AM    The following pending diet order is being considered for treatment of Moderate protein-calorie malnutrition:null

## 2023-03-31 NOTE — PROGRESS NOTE ADULT - SUBJECTIVE AND OBJECTIVE BOX
87y year old Male seen at [facility] 1EAS 100 D1 for for bilateral foot wounds. Patient was in deep sleep and was not awake during the assessment Denies any fever, chills, nausea, vomiting, chest pain, shortness of breath, or calf pain at this time.    Allergies    No Known Allergies    Intolerances        MEDICATIONS  (STANDING):  albuterol/ipratropium for Nebulization 3 milliLiter(s) Nebulizer every 8 hours  atenolol  Tablet 25 milliGRAM(s) Oral daily  budesonide 160 MICROgram(s)/formoterol 4.5 MICROgram(s) Inhaler 2 Puff(s) Inhalation two times a day  dextrose 5%. 1000 milliLiter(s) (50 mL/Hr) IV Continuous <Continuous>  dextrose 5%. 1000 milliLiter(s) (100 mL/Hr) IV Continuous <Continuous>  dextrose 50% Injectable 25 Gram(s) IV Push once  dextrose 50% Injectable 12.5 Gram(s) IV Push once  dextrose 50% Injectable 25 Gram(s) IV Push once  donepezil 5 milliGRAM(s) Oral at bedtime  DULoxetine 60 milliGRAM(s) Oral daily  glucagon  Injectable 1 milliGRAM(s) IntraMuscular once  heparin   Injectable 5000 Unit(s) SubCutaneous every 12 hours  insulin glargine Injectable (LANTUS) 15 Unit(s) SubCutaneous at bedtime  insulin lispro (ADMELOG) corrective regimen sliding scale   SubCutaneous three times a day before meals  insulin lispro (ADMELOG) corrective regimen sliding scale   SubCutaneous at bedtime  insulin lispro Injectable (ADMELOG) 3 Unit(s) SubCutaneous three times a day before meals  lactobacillus acidophilus 1 Tablet(s) Oral two times a day with meals  losartan 50 milliGRAM(s) Oral daily  multivitamin 1 Tablet(s) Oral daily  pantoprazole   Suspension 40 milliGRAM(s) Oral daily  piperacillin/tazobactam IVPB.. 3.375 Gram(s) IV Intermittent every 8 hours  senna 2 Tablet(s) Oral at bedtime  simvastatin 40 milliGRAM(s) Oral at bedtime  sodium chloride 0.9%. 1000 milliLiter(s) (50 mL/Hr) IV Continuous <Continuous>  tamsulosin 0.4 milliGRAM(s) Oral at bedtime    MEDICATIONS  (PRN):  acetaminophen     Tablet .. 650 milliGRAM(s) Oral every 6 hours PRN Temp greater or equal to 38C (100.4F), Mild Pain (1 - 3)  aluminum hydroxide/magnesium hydroxide/simethicone Suspension 30 milliLiter(s) Oral every 4 hours PRN Dyspepsia  bisacodyl Suppository 10 milliGRAM(s) Rectal daily PRN Constipation  dextrose Oral Gel 15 Gram(s) Oral once PRN Blood Glucose LESS THAN 70 milliGRAM(s)/deciliter  hydrALAZINE 50 milliGRAM(s) Oral every 6 hours PRN for systolic BP>160  magnesium hydroxide Suspension 30 milliLiter(s) Oral daily PRN Constipation  melatonin 3 milliGRAM(s) Oral at bedtime PRN Insomnia  morphine  - Injectable 2 milliGRAM(s) IV Push every 6 hours PRN Severe Pain (7 - 10)  ondansetron Injectable 4 milliGRAM(s) IV Push every 8 hours PRN Nausea and/or Vomiting  traMADol 50 milliGRAM(s) Oral four times a day PRN Moderate Pain (4 - 6)      Vital Signs Last 24 Hrs  T(C): 37 (31 Mar 2023 12:26), Max: 37 (31 Mar 2023 12:26)  T(F): 98.6 (31 Mar 2023 12:26), Max: 98.6 (31 Mar 2023 12:26)  HR: 101 (31 Mar 2023 13:18) (88 - 103)  BP: 136/85 (31 Mar 2023 12:26) (136/85 - 157/82)  BP(mean): --  RR: 18 (31 Mar 2023 12:26) (18 - 18)  SpO2: 94% (31 Mar 2023 13:18) (92% - 94%)    Parameters below as of 31 Mar 2023 13:18  Patient On (Oxygen Delivery Method): room air        PHYSICAL EXAM:  Vascular: DP & PT non palpable bilaterally, Capillary refill delayed to the digits  Digital hair absent bilaterally  Neurological: Left foot wound very tender to touch   Musculoskeletal: Unable to assess   Dermatological: Left foot 1st metatarsal head 1.0cm x 1.5cm x probe to bone cm ulceration, lateral 5th metatarsal base 1.0cm x 0.5cm x o.3 fibrotic wounds with periwound erythema and serous drainage noted, no fluctuance, no malodor, no proximal streaking at this time. Bilateral posterior plantar  heel eschars - right measuring 3.0cm x 4.0cm x necrotic eschar and left 1.0cm x 0.7cm x necrotic eschar, stable with no fluctuance or drainage.       CBC Full  -  ( 31 Mar 2023 06:44 )  WBC Count : 10.50 K/uL  RBC Count : 3.44 M/uL  Hemoglobin : 9.8 g/dL  Hematocrit : 32.0 %  Platelet Count - Automated : 209 K/uL  Mean Cell Volume : 93.0 fl  Mean Cell Hemoglobin : 28.5 pg  Mean Cell Hemoglobin Concentration : 30.6 gm/dL  Auto Neutrophil # : x  Auto Lymphocyte # : x  Auto Monocyte # : x  Auto Eosinophil # : x  Auto Basophil # : x  Auto Neutrophil % : x  Auto Lymphocyte % : x  Auto Monocyte % : x  Auto Eosinophil % : x  Auto Basophil % : x    03-31    147<H>  |  119<H>  |  38<H>  ----------------------------<  234<H>  4.0   |  22  |  1.30    Ca    8.9      31 Mar 2023 06:44  Mg     2.2     03-30    TPro  6.6  /  Alb  2.3<L>  /  TBili  0.6  /  DBili  x   /  AST  23  /  ALT  30  /  AlkPhos  56  03-31                        9.8    10.50 )-----------( 209      ( 31 Mar 2023 06:44 )             32.0       PT/INR - ( 31 Mar 2023 06:44 )   PT: 14.8 sec;   INR: 1.26 ratio               Culture - Blood (collected 29 Mar 2023 21:32)  Source: .Blood  Preliminary Report (31 Mar 2023 01:03):    No growth to date.    Culture - Blood (collected 29 Mar 2023 21:20)  Source: .Blood  Preliminary Report (31 Mar 2023 01:03):    No growth to date.        Imaging: ----------  ACC: [id number] EXAM: XR FOOT COMP MIN 3 VIEWS LT ORDERED BY: JOHN OLGUIN    PROCEDURE DATE: 03/29/2023        INTERPRETATION: LEFT foot    CLINICAL INFORMATION: Infection:    TECHNIQUE: Oblique radiographs submitted for interpretation..    FINDINGS:  Marked hallux valgus deformity of first metatarsal phalangeal joint with periarticular erosions of the medial first metatarsal head and arthritic narrowing of the metatarsal phalangeal joint with subchondral metatarsal head cystic changes.  Medial soft tissue ulceration adjacent to metatarsal head. No subcutaneous air.    No gross fracture.    .    IMPRESSION:    Severe first metatarsal phalangeal joint hallux valgus deformity of. Ulceration adjacent to metatarsal head with erosions of the medial first metatarsal head. Constellation of findings concerning for osteomyelitis of the first metatarsal head.    --- End of Report ---     87y year old Male seen at [facility] 1EAS 100 D1 for for bilateral foot wounds. Patient was in deep sleep and was not awake during the assessment Denies any fever, chills, nausea, vomiting, chest pain, shortness of breath, or calf pain at this time.    Allergies    No Known Allergies    Intolerances        MEDICATIONS  (STANDING):  albuterol/ipratropium for Nebulization 3 milliLiter(s) Nebulizer every 8 hours  atenolol  Tablet 25 milliGRAM(s) Oral daily  budesonide 160 MICROgram(s)/formoterol 4.5 MICROgram(s) Inhaler 2 Puff(s) Inhalation two times a day  dextrose 5%. 1000 milliLiter(s) (50 mL/Hr) IV Continuous <Continuous>  dextrose 5%. 1000 milliLiter(s) (100 mL/Hr) IV Continuous <Continuous>  dextrose 50% Injectable 25 Gram(s) IV Push once  dextrose 50% Injectable 12.5 Gram(s) IV Push once  dextrose 50% Injectable 25 Gram(s) IV Push once  donepezil 5 milliGRAM(s) Oral at bedtime  DULoxetine 60 milliGRAM(s) Oral daily  glucagon  Injectable 1 milliGRAM(s) IntraMuscular once  heparin   Injectable 5000 Unit(s) SubCutaneous every 12 hours  insulin glargine Injectable (LANTUS) 15 Unit(s) SubCutaneous at bedtime  insulin lispro (ADMELOG) corrective regimen sliding scale   SubCutaneous three times a day before meals  insulin lispro (ADMELOG) corrective regimen sliding scale   SubCutaneous at bedtime  insulin lispro Injectable (ADMELOG) 3 Unit(s) SubCutaneous three times a day before meals  lactobacillus acidophilus 1 Tablet(s) Oral two times a day with meals  losartan 50 milliGRAM(s) Oral daily  multivitamin 1 Tablet(s) Oral daily  pantoprazole   Suspension 40 milliGRAM(s) Oral daily  piperacillin/tazobactam IVPB.. 3.375 Gram(s) IV Intermittent every 8 hours  senna 2 Tablet(s) Oral at bedtime  simvastatin 40 milliGRAM(s) Oral at bedtime  sodium chloride 0.9%. 1000 milliLiter(s) (50 mL/Hr) IV Continuous <Continuous>  tamsulosin 0.4 milliGRAM(s) Oral at bedtime    MEDICATIONS  (PRN):  acetaminophen     Tablet .. 650 milliGRAM(s) Oral every 6 hours PRN Temp greater or equal to 38C (100.4F), Mild Pain (1 - 3)  aluminum hydroxide/magnesium hydroxide/simethicone Suspension 30 milliLiter(s) Oral every 4 hours PRN Dyspepsia  bisacodyl Suppository 10 milliGRAM(s) Rectal daily PRN Constipation  dextrose Oral Gel 15 Gram(s) Oral once PRN Blood Glucose LESS THAN 70 milliGRAM(s)/deciliter  hydrALAZINE 50 milliGRAM(s) Oral every 6 hours PRN for systolic BP>160  magnesium hydroxide Suspension 30 milliLiter(s) Oral daily PRN Constipation  melatonin 3 milliGRAM(s) Oral at bedtime PRN Insomnia  morphine  - Injectable 2 milliGRAM(s) IV Push every 6 hours PRN Severe Pain (7 - 10)  ondansetron Injectable 4 milliGRAM(s) IV Push every 8 hours PRN Nausea and/or Vomiting  traMADol 50 milliGRAM(s) Oral four times a day PRN Moderate Pain (4 - 6)      Vital Signs Last 24 Hrs  T(C): 37 (31 Mar 2023 12:26), Max: 37 (31 Mar 2023 12:26)  T(F): 98.6 (31 Mar 2023 12:26), Max: 98.6 (31 Mar 2023 12:26)  HR: 101 (31 Mar 2023 13:18) (88 - 103)  BP: 136/85 (31 Mar 2023 12:26) (136/85 - 157/82)  BP(mean): --  RR: 18 (31 Mar 2023 12:26) (18 - 18)  SpO2: 94% (31 Mar 2023 13:18) (92% - 94%)    Parameters below as of 31 Mar 2023 13:18  Patient On (Oxygen Delivery Method): room air        PHYSICAL EXAM:  Vascular: DP & PT non palpable bilaterally, Capillary refill delayed to the digits  Digital hair absent bilaterally  Neurological: Left foot wound very tender to touch   Musculoskeletal: Unable to assess   Dermatological: Left foot 1st metatarsal head 1.0cm x 1.5cm x probe to bone cm ulceration, lateral 5th metatarsal base 1.0cm x 0.5cm x o.3 fibrotic wounds with periwound erythema and serous drainage noted, no fluctuance, no malodor, no proximal streaking at this time. Bilateral posterior plantar  heel eschars - right measuring 3.0cm x 4.0cm x necrotic eschar and left 1.0cm x 0.7cm x necrotic eschar, stable with no fluctuance or drainage.       CBC Full  -  ( 31 Mar 2023 06:44 )  WBC Count : 10.50 K/uL  RBC Count : 3.44 M/uL  Hemoglobin : 9.8 g/dL  Hematocrit : 32.0 %  Platelet Count - Automated : 209 K/uL  Mean Cell Volume : 93.0 fl  Mean Cell Hemoglobin : 28.5 pg  Mean Cell Hemoglobin Concentration : 30.6 gm/dL  Auto Neutrophil # : x  Auto Lymphocyte # : x  Auto Monocyte # : x  Auto Eosinophil # : x  Auto Basophil # : x  Auto Neutrophil % : x  Auto Lymphocyte % : x  Auto Monocyte % : x  Auto Eosinophil % : x  Auto Basophil % : x    03-31    147<H>  |  119<H>  |  38<H>  ----------------------------<  234<H>  4.0   |  22  |  1.30    Ca    8.9      31 Mar 2023 06:44  Mg     2.2     03-30    TPro  6.6  /  Alb  2.3<L>  /  TBili  0.6  /  DBili  x   /  AST  23  /  ALT  30  /  AlkPhos  56  03-31                        9.8    10.50 )-----------( 209      ( 31 Mar 2023 06:44 )             32.0       PT/INR - ( 31 Mar 2023 06:44 )   PT: 14.8 sec;   INR: 1.26 ratio               Culture - Blood (collected 29 Mar 2023 21:32)  Source: .Blood  Preliminary Report (31 Mar 2023 01:03):    No growth to date.    Culture - Blood (collected 29 Mar 2023 21:20)  Source: .Blood  Preliminary Report (31 Mar 2023 01:03):    No growth to date.        Imaging: ----------  ACC: [id number] EXAM: XR FOOT COMP MIN 3 VIEWS LT ORDERED BY: JOHN OLGUIN    PROCEDURE DATE: 03/29/2023        INTERPRETATION: LEFT foot    CLINICAL INFORMATION: Infection:    TECHNIQUE: Oblique radiographs submitted for interpretation..    FINDINGS:  Marked hallux valgus deformity of first metatarsal phalangeal joint with periarticular erosions of the medial first metatarsal head and arthritic narrowing of the metatarsal phalangeal joint with subchondral metatarsal head cystic changes.  Medial soft tissue ulceration adjacent to metatarsal head. No subcutaneous air.    No gross fracture.    .    IMPRESSION:    Severe first metatarsal phalangeal joint hallux valgus deformity of. Ulceration adjacent to metatarsal head with erosions of the medial first metatarsal head. Constellation of findings concerning for osteomyelitis of the first metatarsal head.    --- End of Report ---     87y year old Male seen at [facility] 1EAS 100 D1 for for bilateral foot wounds. Patient was in deep sleep and was not awake during the assessment Denies any fever, chills, nausea, vomiting, chest pain, shortness of breath, or calf pain at this time.    Allergies    No Known Allergies    Intolerances        MEDICATIONS  (STANDING):  albuterol/ipratropium for Nebulization 3 milliLiter(s) Nebulizer every 8 hours  atenolol  Tablet 25 milliGRAM(s) Oral daily  budesonide 160 MICROgram(s)/formoterol 4.5 MICROgram(s) Inhaler 2 Puff(s) Inhalation two times a day  dextrose 5%. 1000 milliLiter(s) (50 mL/Hr) IV Continuous <Continuous>  dextrose 5%. 1000 milliLiter(s) (100 mL/Hr) IV Continuous <Continuous>  dextrose 50% Injectable 25 Gram(s) IV Push once  dextrose 50% Injectable 12.5 Gram(s) IV Push once  dextrose 50% Injectable 25 Gram(s) IV Push once  donepezil 5 milliGRAM(s) Oral at bedtime  DULoxetine 60 milliGRAM(s) Oral daily  glucagon  Injectable 1 milliGRAM(s) IntraMuscular once  heparin   Injectable 5000 Unit(s) SubCutaneous every 12 hours  insulin glargine Injectable (LANTUS) 15 Unit(s) SubCutaneous at bedtime  insulin lispro (ADMELOG) corrective regimen sliding scale   SubCutaneous three times a day before meals  insulin lispro (ADMELOG) corrective regimen sliding scale   SubCutaneous at bedtime  insulin lispro Injectable (ADMELOG) 3 Unit(s) SubCutaneous three times a day before meals  lactobacillus acidophilus 1 Tablet(s) Oral two times a day with meals  losartan 50 milliGRAM(s) Oral daily  multivitamin 1 Tablet(s) Oral daily  pantoprazole   Suspension 40 milliGRAM(s) Oral daily  piperacillin/tazobactam IVPB.. 3.375 Gram(s) IV Intermittent every 8 hours  senna 2 Tablet(s) Oral at bedtime  simvastatin 40 milliGRAM(s) Oral at bedtime  sodium chloride 0.9%. 1000 milliLiter(s) (50 mL/Hr) IV Continuous <Continuous>  tamsulosin 0.4 milliGRAM(s) Oral at bedtime    MEDICATIONS  (PRN):  acetaminophen     Tablet .. 650 milliGRAM(s) Oral every 6 hours PRN Temp greater or equal to 38C (100.4F), Mild Pain (1 - 3)  aluminum hydroxide/magnesium hydroxide/simethicone Suspension 30 milliLiter(s) Oral every 4 hours PRN Dyspepsia  bisacodyl Suppository 10 milliGRAM(s) Rectal daily PRN Constipation  dextrose Oral Gel 15 Gram(s) Oral once PRN Blood Glucose LESS THAN 70 milliGRAM(s)/deciliter  hydrALAZINE 50 milliGRAM(s) Oral every 6 hours PRN for systolic BP>160  magnesium hydroxide Suspension 30 milliLiter(s) Oral daily PRN Constipation  melatonin 3 milliGRAM(s) Oral at bedtime PRN Insomnia  morphine  - Injectable 2 milliGRAM(s) IV Push every 6 hours PRN Severe Pain (7 - 10)  ondansetron Injectable 4 milliGRAM(s) IV Push every 8 hours PRN Nausea and/or Vomiting  traMADol 50 milliGRAM(s) Oral four times a day PRN Moderate Pain (4 - 6)      Vital Signs Last 24 Hrs  T(C): 37 (31 Mar 2023 12:26), Max: 37 (31 Mar 2023 12:26)  T(F): 98.6 (31 Mar 2023 12:26), Max: 98.6 (31 Mar 2023 12:26)  HR: 101 (31 Mar 2023 13:18) (88 - 103)  BP: 136/85 (31 Mar 2023 12:26) (136/85 - 157/82)  BP(mean): --  RR: 18 (31 Mar 2023 12:26) (18 - 18)  SpO2: 94% (31 Mar 2023 13:18) (92% - 94%)    Parameters below as of 31 Mar 2023 13:18  Patient On (Oxygen Delivery Method): room air        PHYSICAL EXAM:  Vascular: DP & PT non palpable bilaterally, Capillary refill delayed to the digits  Digital hair absent bilaterally  Neurological: Left foot wound very tender to touch   Musculoskeletal: Unable to assess   Dermatological: Left foot 1st metatarsal head 1.0cm x 1.5cm x probe to bone cm ulceration, lateral 5th metatarsal base 1.0cm x 0.5cm x o.3 fibrotic wounds with periwound erythema and serous drainage noted, no fluctuance, no malodor, no proximal streaking at this time. Bilateral posterior plantar  heel eschars - right measuring 3.0cm x 4.0cm x necrotic eschar and left 1.0cm x 0.7cm x necrotic eschar, stable with no fluctuance or drainage.       CBC Full  -  ( 31 Mar 2023 06:44 )  WBC Count : 10.50 K/uL  RBC Count : 3.44 M/uL  Hemoglobin : 9.8 g/dL  Hematocrit : 32.0 %  Platelet Count - Automated : 209 K/uL  Mean Cell Volume : 93.0 fl  Mean Cell Hemoglobin : 28.5 pg  Mean Cell Hemoglobin Concentration : 30.6 gm/dL  Auto Neutrophil # : x  Auto Lymphocyte # : x  Auto Monocyte # : x  Auto Eosinophil # : x  Auto Basophil # : x  Auto Neutrophil % : x  Auto Lymphocyte % : x  Auto Monocyte % : x  Auto Eosinophil % : x  Auto Basophil % : x    03-31    147<H>  |  119<H>  |  38<H>  ----------------------------<  234<H>  4.0   |  22  |  1.30    Ca    8.9      31 Mar 2023 06:44  Mg     2.2     03-30    TPro  6.6  /  Alb  2.3<L>  /  TBili  0.6  /  DBili  x   /  AST  23  /  ALT  30  /  AlkPhos  56  03-31                        9.8    10.50 )-----------( 209      ( 31 Mar 2023 06:44 )             32.0       PT/INR - ( 31 Mar 2023 06:44 )   PT: 14.8 sec;   INR: 1.26 ratio               Culture - Blood (collected 29 Mar 2023 21:32)  Source: .Blood  Preliminary Report (31 Mar 2023 01:03):    No growth to date.    Culture - Blood (collected 29 Mar 2023 21:20)  Source: .Blood  Preliminary Report (31 Mar 2023 01:03):    No growth to date.        Imaging: ----------  ACC: [id number] EXAM: XR FOOT COMP MIN 3 VIEWS LT ORDERED BY: JOHN OLGUIN    PROCEDURE DATE: 03/29/2023        INTERPRETATION: LEFT foot    CLINICAL INFORMATION: Infection:    TECHNIQUE: Oblique radiographs submitted for interpretation..    FINDINGS:  Marked hallux valgus deformity of first metatarsal phalangeal joint with periarticular erosions of the medial first metatarsal head and arthritic narrowing of the metatarsal phalangeal joint with subchondral metatarsal head cystic changes.  Medial soft tissue ulceration adjacent to metatarsal head. No subcutaneous air.    No gross fracture.    .    IMPRESSION:    Severe first metatarsal phalangeal joint hallux valgus deformity of. Ulceration adjacent to metatarsal head with erosions of the medial first metatarsal head. Constellation of findings concerning for osteomyelitis of the first metatarsal head.    --- End of Report ---

## 2023-03-31 NOTE — PROGRESS NOTE ADULT - SUBJECTIVE AND OBJECTIVE BOX
[facility] [place]       HPI:  86 yo male ,[facility] resident with PMHx - COPD, DM, HTN, CAD, HLD, H/O TIA, dementia,GERD, BPH and depression sent ot ER for evaluation of left foot 1metatarsal wound ,suggestive of osteomyelitis .Patient was seen by ID consult and transfer to the hospital recommended for wound cx/bone biopsy /podiatry evaluation ,likely will require 4-6 weeks of iv abx . Patient was admitted to [facility] with b/l feet wounds and was followed by wound care team ,recently completed 7 days of doxycycline for foot wound cellulitis . (30 Mar 2023 05:21)        SUBJECTIVE:      ALLERGIES:  Allergies    No Known Allergies    Intolerances          MEDICATIONS  (STANDING):  albuterol/ipratropium for Nebulization 3 milliLiter(s) Nebulizer every 8 hours  atenolol  Tablet 25 milliGRAM(s) Oral daily  budesonide 160 MICROgram(s)/formoterol 4.5 MICROgram(s) Inhaler 2 Puff(s) Inhalation two times a day  dextrose 5%. 1000 milliLiter(s) (50 mL/Hr) IV Continuous <Continuous>  dextrose 5%. 1000 milliLiter(s) (100 mL/Hr) IV Continuous <Continuous>  dextrose 50% Injectable 25 Gram(s) IV Push once  dextrose 50% Injectable 12.5 Gram(s) IV Push once  dextrose 50% Injectable 25 Gram(s) IV Push once  donepezil 5 milliGRAM(s) Oral at bedtime  DULoxetine 60 milliGRAM(s) Oral daily  glucagon  Injectable 1 milliGRAM(s) IntraMuscular once  heparin   Injectable 5000 Unit(s) SubCutaneous every 12 hours  insulin glargine Injectable (LANTUS) 15 Unit(s) SubCutaneous at bedtime  insulin lispro (ADMELOG) corrective regimen sliding scale   SubCutaneous three times a day before meals  insulin lispro (ADMELOG) corrective regimen sliding scale   SubCutaneous at bedtime  insulin lispro Injectable (ADMELOG) 3 Unit(s) SubCutaneous three times a day before meals  lactobacillus acidophilus 1 Tablet(s) Oral two times a day with meals  losartan 50 milliGRAM(s) Oral daily  multivitamin 1 Tablet(s) Oral daily  pantoprazole   Suspension 40 milliGRAM(s) Oral daily  piperacillin/tazobactam IVPB.. 3.375 Gram(s) IV Intermittent every 8 hours  senna 2 Tablet(s) Oral at bedtime  simvastatin 40 milliGRAM(s) Oral at bedtime  sodium chloride 0.9%. 1000 milliLiter(s) (50 mL/Hr) IV Continuous <Continuous>  tamsulosin 0.4 milliGRAM(s) Oral at bedtime    MEDICATIONS  (PRN):  acetaminophen     Tablet .. 650 milliGRAM(s) Oral every 6 hours PRN Temp greater or equal to 38C (100.4F), Mild Pain (1 - 3)  aluminum hydroxide/magnesium hydroxide/simethicone Suspension 30 milliLiter(s) Oral every 4 hours PRN Dyspepsia  bisacodyl Suppository 10 milliGRAM(s) Rectal daily PRN Constipation  dextrose Oral Gel 15 Gram(s) Oral once PRN Blood Glucose LESS THAN 70 milliGRAM(s)/deciliter  hydrALAZINE 50 milliGRAM(s) Oral every 6 hours PRN for systolic BP>160  magnesium hydroxide Suspension 30 milliLiter(s) Oral daily PRN Constipation  melatonin 3 milliGRAM(s) Oral at bedtime PRN Insomnia  morphine  - Injectable 2 milliGRAM(s) IV Push every 6 hours PRN Severe Pain (7 - 10)  ondansetron Injectable 4 milliGRAM(s) IV Push every 8 hours PRN Nausea and/or Vomiting  traMADol 50 milliGRAM(s) Oral four times a day PRN Moderate Pain (4 - 6)      REVIEW OF SYSTEMS:  CONSTITUTIONAL: No fever,  RESPIRATORY: No cough, wheezing, shortness of breath  CARDIOVASCULAR: No chest pain, dyspnea, palpitations, dizziness, syncope, paroxysmal nocturnal dyspnea, orthopnea, or arm or leg swelling  GASTROINTESTINAL: No abdominal  or epigastric pain, nausea, vomiting,  diarrhea  NEUROLOGICAL: No headaches,  loss of strength, numbness, or tremors    Vital Signs Last 24 Hrs  T(C): 37 (31 Mar 2023 12:26), Max: 37 (31 Mar 2023 12:26)  T(F): 98.6 (31 Mar 2023 12:26), Max: 98.6 (31 Mar 2023 12:26)  HR: 101 (31 Mar 2023 13:18) (88 - 103)  BP: 136/85 (31 Mar 2023 12:26) (136/85 - 157/82)  BP(mean): --  RR: 18 (31 Mar 2023 12:26) (18 - 18)  SpO2: 94% (31 Mar 2023 13:18) (92% - 94%)    Parameters below as of 31 Mar 2023 13:18  Patient On (Oxygen Delivery Method): room air        PHYSICAL EXAM:  HEAD:  Atraumatic, Normocephalic  NECK: Supple and normal thyroid.  No JVD or carotid bruit.   HEART: S1, S2 regular , 1/6 soft ejection systolic murmur in mitral area , no thrill and no gallops .  PULMONARY: Bilateral vesicular breathing , few scattered ronchi and few scattered rales are present .  ABDOMEN: Soft nontender and positive bowl sounds   EXTREMITIES:  No clubbing, cyanosis, or pedal  edema  NEUROLOGICAL: AAOX3 , no focal deficit .    LABS:                        9.8    10.50 )-----------( 209      ( 31 Mar 2023 06:44 )             32.0     03-31    147<H>  |  119<H>  |  38<H>  ----------------------------<  234<H>  4.0   |  22  |  1.30    Ca    8.9      31 Mar 2023 06:44  Mg     2.2     03-30    TPro  6.6  /  Alb  2.3<L>  /  TBili  0.6  /  DBili  x   /  AST  23  /  ALT  30  /  AlkPhos  56  03-31        PT/INR - ( 31 Mar 2023 06:44 )   PT: 14.8 sec;   INR: 1.26 ratio           Urinalysis Basic - ( 30 Mar 2023 09:53 )    Color: Red / Appearance: Slightly Turbid / SG: >=1.030 / pH: x  Gluc: x / Ketone: Negative  / Bili: Negative / Urobili: <2 mg/dL   Blood: x / Protein: Trace / Nitrite: Negative   Leuk Esterase: Moderate / RBC: 10 /HPF /  /HPF   Sq Epi: x / Non Sq Epi: 0 /HPF / Bacteria: Moderate      BNP      EKG:  ECHO:  IMAGING:    Assessment/Plan    Will continue to follow during hospital course and give further recommendations as needed . Thanks for your referral . if any questions please contact me at office ([phone])cell [phone])  [facility] [place]       HPI:  88 yo male ,formerly [facility] resident with PMHx - COPD, DM, HTN, CAD, HLD, H/O TIA, dementia,GERD, BPH and depression sent ot ER for evaluation of left foot 1metatarsal wound ,suggestive of osteomyelitis .Patient was seen by ID consult and transfer to the hospital recommended for wound cx/bone biopsy /podiatry evaluation ,likely will require 4-6 weeks of iv abx . Patient was admitted to [facility] with b/l feet wounds and was followed by wound care team ,recently completed 7 days of doxycycline for foot wound cellulitis . (30 Mar 2023 05:21)        SUBJECTIVE:      ALLERGIES:  Allergies    No Known Allergies    Intolerances          MEDICATIONS  (STANDING):  albuterol/ipratropium for Nebulization 3 milliLiter(s) Nebulizer every 8 hours  atenolol  Tablet 25 milliGRAM(s) Oral daily  budesonide 160 MICROgram(s)/formoterol 4.5 MICROgram(s) Inhaler 2 Puff(s) Inhalation two times a day  dextrose 5%. 1000 milliLiter(s) (50 mL/Hr) IV Continuous <Continuous>  dextrose 5%. 1000 milliLiter(s) (100 mL/Hr) IV Continuous <Continuous>  dextrose 50% Injectable 25 Gram(s) IV Push once  dextrose 50% Injectable 12.5 Gram(s) IV Push once  dextrose 50% Injectable 25 Gram(s) IV Push once  donepezil 5 milliGRAM(s) Oral at bedtime  DULoxetine 60 milliGRAM(s) Oral daily  glucagon  Injectable 1 milliGRAM(s) IntraMuscular once  heparin   Injectable 5000 Unit(s) SubCutaneous every 12 hours  insulin glargine Injectable (LANTUS) 15 Unit(s) SubCutaneous at bedtime  insulin lispro (ADMELOG) corrective regimen sliding scale   SubCutaneous three times a day before meals  insulin lispro (ADMELOG) corrective regimen sliding scale   SubCutaneous at bedtime  insulin lispro Injectable (ADMELOG) 3 Unit(s) SubCutaneous three times a day before meals  lactobacillus acidophilus 1 Tablet(s) Oral two times a day with meals  losartan 50 milliGRAM(s) Oral daily  multivitamin 1 Tablet(s) Oral daily  pantoprazole   Suspension 40 milliGRAM(s) Oral daily  piperacillin/tazobactam IVPB.. 3.375 Gram(s) IV Intermittent every 8 hours  senna 2 Tablet(s) Oral at bedtime  simvastatin 40 milliGRAM(s) Oral at bedtime  sodium chloride 0.9%. 1000 milliLiter(s) (50 mL/Hr) IV Continuous <Continuous>  tamsulosin 0.4 milliGRAM(s) Oral at bedtime    MEDICATIONS  (PRN):  acetaminophen     Tablet .. 650 milliGRAM(s) Oral every 6 hours PRN Temp greater or equal to 38C (100.4F), Mild Pain (1 - 3)  aluminum hydroxide/magnesium hydroxide/simethicone Suspension 30 milliLiter(s) Oral every 4 hours PRN Dyspepsia  bisacodyl Suppository 10 milliGRAM(s) Rectal daily PRN Constipation  dextrose Oral Gel 15 Gram(s) Oral once PRN Blood Glucose LESS THAN 70 milliGRAM(s)/deciliter  hydrALAZINE 50 milliGRAM(s) Oral every 6 hours PRN for systolic BP>160  magnesium hydroxide Suspension 30 milliLiter(s) Oral daily PRN Constipation  melatonin 3 milliGRAM(s) Oral at bedtime PRN Insomnia  morphine  - Injectable 2 milliGRAM(s) IV Push every 6 hours PRN Severe Pain (7 - 10)  ondansetron Injectable 4 milliGRAM(s) IV Push every 8 hours PRN Nausea and/or Vomiting  traMADol 50 milliGRAM(s) Oral four times a day PRN Moderate Pain (4 - 6)      REVIEW OF SYSTEMS:  CONSTITUTIONAL: No fever,  RESPIRATORY: No cough, wheezing, shortness of breath  CARDIOVASCULAR: No chest pain, dyspnea, palpitations, dizziness, syncope, paroxysmal nocturnal dyspnea, orthopnea, or arm or leg swelling  GASTROINTESTINAL: No abdominal  or epigastric pain, nausea, vomiting,  diarrhea  NEUROLOGICAL: No headaches,  loss of strength, numbness, or tremors    Vital Signs Last 24 Hrs  T(C): 37 (31 Mar 2023 12:26), Max: 37 (31 Mar 2023 12:26)  T(F): 98.6 (31 Mar 2023 12:26), Max: 98.6 (31 Mar 2023 12:26)  HR: 101 (31 Mar 2023 13:18) (88 - 103)  BP: 136/85 (31 Mar 2023 12:26) (136/85 - 157/82)  BP(mean): --  RR: 18 (31 Mar 2023 12:26) (18 - 18)  SpO2: 94% (31 Mar 2023 13:18) (92% - 94%)    Parameters below as of 31 Mar 2023 13:18  Patient On (Oxygen Delivery Method): room air        PHYSICAL EXAM:  HEAD:  Atraumatic, Normocephalic  NECK: Supple and normal thyroid.  No JVD or carotid bruit.   HEART: S1, S2 regular , 1/6 soft ejection systolic murmur in mitral area , no thrill and no gallops .  PULMONARY: Bilateral vesicular breathing , few scattered ronchi and few scattered rales are present .  ABDOMEN: Soft nontender and positive bowl sounds   EXTREMITIES:  No clubbing, cyanosis, or pedal  edema  NEUROLOGICAL: AAOX3 , no focal deficit .    LABS:                        9.8    10.50 )-----------( 209      ( 31 Mar 2023 06:44 )             32.0     03-31    147<H>  |  119<H>  |  38<H>  ----------------------------<  234<H>  4.0   |  22  |  1.30    Ca    8.9      31 Mar 2023 06:44  Mg     2.2     03-30    TPro  6.6  /  Alb  2.3<L>  /  TBili  0.6  /  DBili  x   /  AST  23  /  ALT  30  /  AlkPhos  56  03-31        PT/INR - ( 31 Mar 2023 06:44 )   PT: 14.8 sec;   INR: 1.26 ratio           Urinalysis Basic - ( 30 Mar 2023 09:53 )    Color: Red / Appearance: Slightly Turbid / SG: >=1.030 / pH: x  Gluc: x / Ketone: Negative  / Bili: Negative / Urobili: <2 mg/dL   Blood: x / Protein: Trace / Nitrite: Negative   Leuk Esterase: Moderate / RBC: 10 /HPF /  /HPF   Sq Epi: x / Non Sq Epi: 0 /HPF / Bacteria: Moderate      BNP      EKG:  ECHO:  IMAGING:    Assessment/Plan    Will continue to follow during hospital course and give further recommendations as needed . Thanks for your referral . if any questions please contact me at office ([phone] [phone])  [facility] [place]       HPI:  86 yo male ,[facility] resident with PMHx - COPD, DM, HTN, CAD, HLD, H/O TIA, dementia,GERD, BPH and depression sent ot ER for evaluation of left foot 1metatarsal wound ,suggestive of osteomyelitis .Patient was seen by ID consult and transfer to the hospital recommended for wound cx/bone biopsy /podiatry evaluation ,likely will require 4-6 weeks of iv abx . Patient was admitted to [facility] with b/l feet wounds and was followed by wound care team ,recently completed 7 days of doxycycline for foot wound cellulitis . (30 Mar 2023 05:21)        SUBJECTIVE:      ALLERGIES:  Allergies    No Known Allergies    Intolerances          MEDICATIONS  (STANDING):  albuterol/ipratropium for Nebulization 3 milliLiter(s) Nebulizer every 8 hours  atenolol  Tablet 25 milliGRAM(s) Oral daily  budesonide 160 MICROgram(s)/formoterol 4.5 MICROgram(s) Inhaler 2 Puff(s) Inhalation two times a day  dextrose 5%. 1000 milliLiter(s) (50 mL/Hr) IV Continuous <Continuous>  dextrose 5%. 1000 milliLiter(s) (100 mL/Hr) IV Continuous <Continuous>  dextrose 50% Injectable 25 Gram(s) IV Push once  dextrose 50% Injectable 12.5 Gram(s) IV Push once  dextrose 50% Injectable 25 Gram(s) IV Push once  donepezil 5 milliGRAM(s) Oral at bedtime  DULoxetine 60 milliGRAM(s) Oral daily  glucagon  Injectable 1 milliGRAM(s) IntraMuscular once  heparin   Injectable 5000 Unit(s) SubCutaneous every 12 hours  insulin glargine Injectable (LANTUS) 15 Unit(s) SubCutaneous at bedtime  insulin lispro (ADMELOG) corrective regimen sliding scale   SubCutaneous three times a day before meals  insulin lispro (ADMELOG) corrective regimen sliding scale   SubCutaneous at bedtime  insulin lispro Injectable (ADMELOG) 3 Unit(s) SubCutaneous three times a day before meals  lactobacillus acidophilus 1 Tablet(s) Oral two times a day with meals  losartan 50 milliGRAM(s) Oral daily  multivitamin 1 Tablet(s) Oral daily  pantoprazole   Suspension 40 milliGRAM(s) Oral daily  piperacillin/tazobactam IVPB.. 3.375 Gram(s) IV Intermittent every 8 hours  senna 2 Tablet(s) Oral at bedtime  simvastatin 40 milliGRAM(s) Oral at bedtime  sodium chloride 0.9%. 1000 milliLiter(s) (50 mL/Hr) IV Continuous <Continuous>  tamsulosin 0.4 milliGRAM(s) Oral at bedtime    MEDICATIONS  (PRN):  acetaminophen     Tablet .. 650 milliGRAM(s) Oral every 6 hours PRN Temp greater or equal to 38C (100.4F), Mild Pain (1 - 3)  aluminum hydroxide/magnesium hydroxide/simethicone Suspension 30 milliLiter(s) Oral every 4 hours PRN Dyspepsia  bisacodyl Suppository 10 milliGRAM(s) Rectal daily PRN Constipation  dextrose Oral Gel 15 Gram(s) Oral once PRN Blood Glucose LESS THAN 70 milliGRAM(s)/deciliter  hydrALAZINE 50 milliGRAM(s) Oral every 6 hours PRN for systolic BP>160  magnesium hydroxide Suspension 30 milliLiter(s) Oral daily PRN Constipation  melatonin 3 milliGRAM(s) Oral at bedtime PRN Insomnia  morphine  - Injectable 2 milliGRAM(s) IV Push every 6 hours PRN Severe Pain (7 - 10)  ondansetron Injectable 4 milliGRAM(s) IV Push every 8 hours PRN Nausea and/or Vomiting  traMADol 50 milliGRAM(s) Oral four times a day PRN Moderate Pain (4 - 6)      REVIEW OF SYSTEMS:  CONSTITUTIONAL: No fever,  RESPIRATORY: No cough, wheezing, shortness of breath  CARDIOVASCULAR: No chest pain, dyspnea, palpitations, dizziness, syncope, paroxysmal nocturnal dyspnea, orthopnea, or arm or leg swelling  GASTROINTESTINAL: No abdominal  or epigastric pain, nausea, vomiting,  diarrhea  NEUROLOGICAL: No headaches,  loss of strength, numbness, or tremors    Vital Signs Last 24 Hrs  T(C): 37 (31 Mar 2023 12:26), Max: 37 (31 Mar 2023 12:26)  T(F): 98.6 (31 Mar 2023 12:26), Max: 98.6 (31 Mar 2023 12:26)  HR: 101 (31 Mar 2023 13:18) (88 - 103)  BP: 136/85 (31 Mar 2023 12:26) (136/85 - 157/82)  BP(mean): --  RR: 18 (31 Mar 2023 12:26) (18 - 18)  SpO2: 94% (31 Mar 2023 13:18) (92% - 94%)    Parameters below as of 31 Mar 2023 13:18  Patient On (Oxygen Delivery Method): room air        PHYSICAL EXAM:  HEAD:  Atraumatic, Normocephalic  NECK: Supple and normal thyroid.  No JVD or carotid bruit.   HEART: S1, S2 regular , 1/6 soft ejection systolic murmur in mitral area , no thrill and no gallops .  PULMONARY: Bilateral vesicular breathing , few scattered ronchi and few scattered rales are present .  ABDOMEN: Soft nontender and positive bowl sounds   EXTREMITIES:  No clubbing, cyanosis, or pedal  edema  NEUROLOGICAL: AAOX3 , no focal deficit .    LABS:                        9.8    10.50 )-----------( 209      ( 31 Mar 2023 06:44 )             32.0     03-31    147<H>  |  119<H>  |  38<H>  ----------------------------<  234<H>  4.0   |  22  |  1.30    Ca    8.9      31 Mar 2023 06:44  Mg     2.2     03-30    TPro  6.6  /  Alb  2.3<L>  /  TBili  0.6  /  DBili  x   /  AST  23  /  ALT  30  /  AlkPhos  56  03-31        PT/INR - ( 31 Mar 2023 06:44 )   PT: 14.8 sec;   INR: 1.26 ratio           Urinalysis Basic - ( 30 Mar 2023 09:53 )    Color: Red / Appearance: Slightly Turbid / SG: >=1.030 / pH: x  Gluc: x / Ketone: Negative  / Bili: Negative / Urobili: <2 mg/dL   Blood: x / Protein: Trace / Nitrite: Negative   Leuk Esterase: Moderate / RBC: 10 /HPF /  /HPF   Sq Epi: x / Non Sq Epi: 0 /HPF / Bacteria: Moderate      BNP      EKG:  ECHO:  IMAGING:    Assessment/Plan    Will continue to follow during hospital course and give further recommendations as needed . Thanks for your referral . if any questions please contact me at office ([phone])cell [phone])  KARIME WYNN MD [place] [id number]  [address]  OFFICE : [phone]  CELL : [phone]  CARDIOLOGY F/U :   HPI:  88 yo male ,[facility] resident with PMHx - COPD, DM, HTN, CAD, HLD, H/O TIA, dementia,GERD, BPH and depression sent ot ER for evaluation of left foot 1metatarsal wound ,suggestive of osteomyelitis .Patient was seen by ID consult and transfer to the hospital recommended for wound cx/bone biopsy /podiatry evaluation ,likely will require 4-6 weeks of iv abx . Patient was admitted to [facility] with b/l feet wounds and was followed by wound care team ,recently completed 7 days of doxycycline for foot wound cellulitis . (30 Mar 2023 05:21)        SUBJECTIVE: Patient feeling better .      ALLERGIES:  Allergies    No Known Allergies    Intolerances          MEDICATIONS  (STANDING):  albuterol/ipratropium for Nebulization 3 milliLiter(s) Nebulizer every 8 hours  atenolol  Tablet 25 milliGRAM(s) Oral daily  budesonide 160 MICROgram(s)/formoterol 4.5 MICROgram(s) Inhaler 2 Puff(s) Inhalation two times a day  dextrose 5%. 1000 milliLiter(s) (50 mL/Hr) IV Continuous <Continuous>  dextrose 5%. 1000 milliLiter(s) (100 mL/Hr) IV Continuous <Continuous>  dextrose 50% Injectable 25 Gram(s) IV Push once  dextrose 50% Injectable 12.5 Gram(s) IV Push once  dextrose 50% Injectable 25 Gram(s) IV Push once  donepezil 5 milliGRAM(s) Oral at bedtime  DULoxetine 60 milliGRAM(s) Oral daily  glucagon  Injectable 1 milliGRAM(s) IntraMuscular once  heparin   Injectable 5000 Unit(s) SubCutaneous every 12 hours  insulin glargine Injectable (LANTUS) 15 Unit(s) SubCutaneous at bedtime  insulin lispro (ADMELOG) corrective regimen sliding scale   SubCutaneous three times a day before meals  insulin lispro (ADMELOG) corrective regimen sliding scale   SubCutaneous at bedtime  insulin lispro Injectable (ADMELOG) 3 Unit(s) SubCutaneous three times a day before meals  lactobacillus acidophilus 1 Tablet(s) Oral two times a day with meals  losartan 50 milliGRAM(s) Oral daily  multivitamin 1 Tablet(s) Oral daily  pantoprazole   Suspension 40 milliGRAM(s) Oral daily  piperacillin/tazobactam IVPB.. 3.375 Gram(s) IV Intermittent every 8 hours  senna 2 Tablet(s) Oral at bedtime  simvastatin 40 milliGRAM(s) Oral at bedtime  sodium chloride 0.9%. 1000 milliLiter(s) (50 mL/Hr) IV Continuous <Continuous>  tamsulosin 0.4 milliGRAM(s) Oral at bedtime    MEDICATIONS  (PRN):  acetaminophen     Tablet .. 650 milliGRAM(s) Oral every 6 hours PRN Temp greater or equal to 38C (100.4F), Mild Pain (1 - 3)  aluminum hydroxide/magnesium hydroxide/simethicone Suspension 30 milliLiter(s) Oral every 4 hours PRN Dyspepsia  bisacodyl Suppository 10 milliGRAM(s) Rectal daily PRN Constipation  dextrose Oral Gel 15 Gram(s) Oral once PRN Blood Glucose LESS THAN 70 milliGRAM(s)/deciliter  hydrALAZINE 50 milliGRAM(s) Oral every 6 hours PRN for systolic BP>160  magnesium hydroxide Suspension 30 milliLiter(s) Oral daily PRN Constipation  melatonin 3 milliGRAM(s) Oral at bedtime PRN Insomnia  morphine  - Injectable 2 milliGRAM(s) IV Push every 6 hours PRN Severe Pain (7 - 10)  ondansetron Injectable 4 milliGRAM(s) IV Push every 8 hours PRN Nausea and/or Vomiting  traMADol 50 milliGRAM(s) Oral four times a day PRN Moderate Pain (4 - 6)      REVIEW OF SYSTEMS:  CONSTITUTIONAL: No fever,  RESPIRATORY: No cough, wheezing, shortness of breath  CARDIOVASCULAR: No chest pain, dyspnea, palpitations, dizziness, syncope, paroxysmal nocturnal dyspnea, orthopnea, or arm or leg swelling  GASTROINTESTINAL: No abdominal  or epigastric pain, nausea, vomiting,  diarrhea  NEUROLOGICAL: No headaches, numbness, or tremors  Skin : No itching .  Hematology : No active bleeding .  Endocrinology : No heat and cold intolerance .  Psychiatry : Patient is confused .  Genitourinary : No dysuria .  Musculoskeletal : Patient has mild arthritis .    Vital Signs Last 24 Hrs  T(C): 37 (31 Mar 2023 12:26), Max: 37 (31 Mar 2023 12:26)  T(F): 98.6 (31 Mar 2023 12:26), Max: 98.6 (31 Mar 2023 12:26)  HR: 101 (31 Mar 2023 13:18) (88 - 103)  BP: 136/85 (31 Mar 2023 12:26) (136/85 - 157/82)  BP(mean): --  RR: 18 (31 Mar 2023 12:26) (18 - 18)  SpO2: 94% (31 Mar 2023 13:18) (92% - 94%)    Parameters below as of 31 Mar 2023 13:18  Patient On (Oxygen Delivery Method): room air        PHYSICAL EXAM:  HEAD:  Atraumatic, Normocephalic  NECK: Supple and normal thyroid.  No JVD or carotid bruit.   HEART: S1, S2 regular , 1/6 soft ejection systolic murmur in mitral area , no thrill and no gallops .  PULMONARY: Bilateral vesicular breathing , few scattered rhonchi and few scattered rales are present .  ABDOMEN: Soft nontender and positive bowl sounds   EXTREMITIES:  No clubbing, cyanosis, or pedal  edema  NEUROLOGICAL: AA and confused .   Skin : No rashes .  Musculoskeletal : No joint swellings .    LABS:                        9.8    10.50 )-----------( 209      ( 31 Mar 2023 06:44 )             32.0     03-31    147<H>  |  119<H>  |  38<H>  ----------------------------<  234<H>  4.0   |  22  |  1.30    Ca    8.9      31 Mar 2023 06:44  Mg     2.2     03-30    TPro  6.6  /  Alb  2.3<L>  /  TBili  0.6  /  DBili  x   /  AST  23  /  ALT  30  /  AlkPhos  56  03-31        PT/INR - ( 31 Mar 2023 06:44 )   PT: 14.8 sec;   INR: 1.26 ratio           Urinalysis Basic - ( 30 Mar 2023 09:53 )    Color: Red / Appearance: Slightly Turbid / SG: >=1.030 / pH: x  Gluc: x / Ketone: Negative  / Bili: Negative / Urobili: <2 mg/dL   Blood: x / Protein: Trace / Nitrite: Negative   Leuk Esterase: Moderate / RBC: 10 /HPF /  /HPF   Sq Epi: x / Non Sq Epi: 0 /HPF / Bacteria: Moderate      Assessment/Plan  Patient has :  1) Left foot infection .  2) Hypertension and stable .  3) DM .  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  Will continue to follow during hospital course and give further recommendations as needed . Thanks for your referral . if any questions please contact me at office ([phone] cell [phone])  KARIME WYNN MD [place] [id number]  [address]  OFFICE : [phone]  CELL : [phone]  CARDIOLOGY F/U :   HPI:  88 yo male ,[facility] resident with PMHx - COPD, DM, HTN, CAD, HLD, H/O TIA, dementia,GERD, BPH and depression sent ot ER for evaluation of left foot 1metatarsal wound ,suggestive of osteomyelitis .Patient was seen by ID consult and transfer to the hospital recommended for wound cx/bone biopsy /podiatry evaluation ,likely will require 4-6 weeks of iv abx . Patient was admitted to [facility] with b/l feet wounds and was followed by wound care team ,recently completed 7 days of doxycycline for foot wound cellulitis . (30 Mar 2023 05:21)        SUBJECTIVE: Patient feeling better .      ALLERGIES:  Allergies    No Known Allergies    Intolerances          MEDICATIONS  (STANDING):  albuterol/ipratropium for Nebulization 3 milliLiter(s) Nebulizer every 8 hours  atenolol  Tablet 25 milliGRAM(s) Oral daily  budesonide 160 MICROgram(s)/formoterol 4.5 MICROgram(s) Inhaler 2 Puff(s) Inhalation two times a day  dextrose 5%. 1000 milliLiter(s) (50 mL/Hr) IV Continuous <Continuous>  dextrose 5%. 1000 milliLiter(s) (100 mL/Hr) IV Continuous <Continuous>  dextrose 50% Injectable 25 Gram(s) IV Push once  dextrose 50% Injectable 12.5 Gram(s) IV Push once  dextrose 50% Injectable 25 Gram(s) IV Push once  donepezil 5 milliGRAM(s) Oral at bedtime  DULoxetine 60 milliGRAM(s) Oral daily  glucagon  Injectable 1 milliGRAM(s) IntraMuscular once  heparin   Injectable 5000 Unit(s) SubCutaneous every 12 hours  insulin glargine Injectable (LANTUS) 15 Unit(s) SubCutaneous at bedtime  insulin lispro (ADMELOG) corrective regimen sliding scale   SubCutaneous three times a day before meals  insulin lispro (ADMELOG) corrective regimen sliding scale   SubCutaneous at bedtime  insulin lispro Injectable (ADMELOG) 3 Unit(s) SubCutaneous three times a day before meals  lactobacillus acidophilus 1 Tablet(s) Oral two times a day with meals  losartan 50 milliGRAM(s) Oral daily  multivitamin 1 Tablet(s) Oral daily  pantoprazole   Suspension 40 milliGRAM(s) Oral daily  piperacillin/tazobactam IVPB.. 3.375 Gram(s) IV Intermittent every 8 hours  senna 2 Tablet(s) Oral at bedtime  simvastatin 40 milliGRAM(s) Oral at bedtime  sodium chloride 0.9%. 1000 milliLiter(s) (50 mL/Hr) IV Continuous <Continuous>  tamsulosin 0.4 milliGRAM(s) Oral at bedtime    MEDICATIONS  (PRN):  acetaminophen     Tablet .. 650 milliGRAM(s) Oral every 6 hours PRN Temp greater or equal to 38C (100.4F), Mild Pain (1 - 3)  aluminum hydroxide/magnesium hydroxide/simethicone Suspension 30 milliLiter(s) Oral every 4 hours PRN Dyspepsia  bisacodyl Suppository 10 milliGRAM(s) Rectal daily PRN Constipation  dextrose Oral Gel 15 Gram(s) Oral once PRN Blood Glucose LESS THAN 70 milliGRAM(s)/deciliter  hydrALAZINE 50 milliGRAM(s) Oral every 6 hours PRN for systolic BP>160  magnesium hydroxide Suspension 30 milliLiter(s) Oral daily PRN Constipation  melatonin 3 milliGRAM(s) Oral at bedtime PRN Insomnia  morphine  - Injectable 2 milliGRAM(s) IV Push every 6 hours PRN Severe Pain (7 - 10)  ondansetron Injectable 4 milliGRAM(s) IV Push every 8 hours PRN Nausea and/or Vomiting  traMADol 50 milliGRAM(s) Oral four times a day PRN Moderate Pain (4 - 6)      REVIEW OF SYSTEMS:  CONSTITUTIONAL: No fever,  RESPIRATORY: No cough, wheezing, shortness of breath  CARDIOVASCULAR: No chest pain, dyspnea, palpitations, dizziness, syncope, paroxysmal nocturnal dyspnea, orthopnea, or arm or leg swelling  GASTROINTESTINAL: No abdominal  or epigastric pain, nausea, vomiting,  diarrhea  NEUROLOGICAL: No headaches, numbness, or tremors  Skin : No itching .  Hematology : No active bleeding .  Endocrinology : No heat and cold intolerance .  Psychiatry : Patient is confused .  Genitourinary : No dysuria .  Musculoskeletal : Patient has mild arthritis .    Vital Signs Last 24 Hrs  T(C): 37 (31 Mar 2023 12:26), Max: 37 (31 Mar 2023 12:26)  T(F): 98.6 (31 Mar 2023 12:26), Max: 98.6 (31 Mar 2023 12:26)  HR: 101 (31 Mar 2023 13:18) (88 - 103)  BP: 136/85 (31 Mar 2023 12:26) (136/85 - 157/82)  BP(mean): --  RR: 18 (31 Mar 2023 12:26) (18 - 18)  SpO2: 94% (31 Mar 2023 13:18) (92% - 94%)    Parameters below as of 31 Mar 2023 13:18  Patient On (Oxygen Delivery Method): room air        PHYSICAL EXAM:  HEAD:  Atraumatic, Normocephalic  NECK: Supple and normal thyroid.  No JVD or carotid bruit.   HEART: S1, S2 regular , 1/6 soft ejection systolic murmur in mitral area , no thrill and no gallops .  PULMONARY: Bilateral vesicular breathing , few scattered rhonchi and few scattered rales are present .  ABDOMEN: Soft nontender and positive bowl sounds   EXTREMITIES:  No clubbing, cyanosis, or pedal  edema  NEUROLOGICAL: AA and confused .   Skin : No rashes .  Musculoskeletal : No joint swellings .    LABS:                        9.8    10.50 )-----------( 209      ( 31 Mar 2023 06:44 )             32.0     03-31    147<H>  |  119<H>  |  38<H>  ----------------------------<  234<H>  4.0   |  22  |  1.30    Ca    8.9      31 Mar 2023 06:44  Mg     2.2     03-30    TPro  6.6  /  Alb  2.3<L>  /  TBili  0.6  /  DBili  x   /  AST  23  /  ALT  30  /  AlkPhos  56  03-31        PT/INR - ( 31 Mar 2023 06:44 )   PT: 14.8 sec;   INR: 1.26 ratio           Urinalysis Basic - ( 30 Mar 2023 09:53 )    Color: Red / Appearance: Slightly Turbid / SG: >=1.030 / pH: x  Gluc: x / Ketone: Negative  / Bili: Negative / Urobili: <2 mg/dL   Blood: x / Protein: Trace / Nitrite: Negative   Leuk Esterase: Moderate / RBC: 10 /HPF /  /HPF   Sq Epi: x / Non Sq Epi: 0 /HPF / Bacteria: Moderate      Assessment/Plan  Patient has :  1) Left foot infection .  2) Hypertension and stable .  3) DM .  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  Will continue to follow during hospital course and give further recommendations as needed . Thanks for your referral . if any questions please contact me at office ([phone] cell [phone])  KARIME WYNN MD [place] [id number]  [address]  OFFICE : [phone]  CELL : [phone]  CARDIOLOGY F/U :   HPI:  88 yo male ,[facility] resident with PMHx - COPD, DM, HTN, CAD, HLD, H/O TIA, dementia,GERD, BPH and depression sent ot ER for evaluation of left foot 1metatarsal wound ,suggestive of osteomyelitis .Patient was seen by ID consult and transfer to the hospital recommended for wound cx/bone biopsy /podiatry evaluation ,likely will require 4-6 weeks of iv abx . Patient was admitted to [facility] with b/l feet wounds and was followed by wound care team ,recently completed 7 days of doxycycline for foot wound cellulitis . (30 Mar 2023 05:21)        SUBJECTIVE: Patient feeling better .      ALLERGIES:  Allergies    No Known Allergies    Intolerances          MEDICATIONS  (STANDING):  albuterol/ipratropium for Nebulization 3 milliLiter(s) Nebulizer every 8 hours  atenolol  Tablet 25 milliGRAM(s) Oral daily  budesonide 160 MICROgram(s)/formoterol 4.5 MICROgram(s) Inhaler 2 Puff(s) Inhalation two times a day  dextrose 5%. 1000 milliLiter(s) (50 mL/Hr) IV Continuous <Continuous>  dextrose 5%. 1000 milliLiter(s) (100 mL/Hr) IV Continuous <Continuous>  dextrose 50% Injectable 25 Gram(s) IV Push once  dextrose 50% Injectable 12.5 Gram(s) IV Push once  dextrose 50% Injectable 25 Gram(s) IV Push once  donepezil 5 milliGRAM(s) Oral at bedtime  DULoxetine 60 milliGRAM(s) Oral daily  glucagon  Injectable 1 milliGRAM(s) IntraMuscular once  heparin   Injectable 5000 Unit(s) SubCutaneous every 12 hours  insulin glargine Injectable (LANTUS) 15 Unit(s) SubCutaneous at bedtime  insulin lispro (ADMELOG) corrective regimen sliding scale   SubCutaneous three times a day before meals  insulin lispro (ADMELOG) corrective regimen sliding scale   SubCutaneous at bedtime  insulin lispro Injectable (ADMELOG) 3 Unit(s) SubCutaneous three times a day before meals  lactobacillus acidophilus 1 Tablet(s) Oral two times a day with meals  losartan 50 milliGRAM(s) Oral daily  multivitamin 1 Tablet(s) Oral daily  pantoprazole   Suspension 40 milliGRAM(s) Oral daily  piperacillin/tazobactam IVPB.. 3.375 Gram(s) IV Intermittent every 8 hours  senna 2 Tablet(s) Oral at bedtime  simvastatin 40 milliGRAM(s) Oral at bedtime  sodium chloride 0.9%. 1000 milliLiter(s) (50 mL/Hr) IV Continuous <Continuous>  tamsulosin 0.4 milliGRAM(s) Oral at bedtime    MEDICATIONS  (PRN):  acetaminophen     Tablet .. 650 milliGRAM(s) Oral every 6 hours PRN Temp greater or equal to 38C (100.4F), Mild Pain (1 - 3)  aluminum hydroxide/magnesium hydroxide/simethicone Suspension 30 milliLiter(s) Oral every 4 hours PRN Dyspepsia  bisacodyl Suppository 10 milliGRAM(s) Rectal daily PRN Constipation  dextrose Oral Gel 15 Gram(s) Oral once PRN Blood Glucose LESS THAN 70 milliGRAM(s)/deciliter  hydrALAZINE 50 milliGRAM(s) Oral every 6 hours PRN for systolic BP>160  magnesium hydroxide Suspension 30 milliLiter(s) Oral daily PRN Constipation  melatonin 3 milliGRAM(s) Oral at bedtime PRN Insomnia  morphine  - Injectable 2 milliGRAM(s) IV Push every 6 hours PRN Severe Pain (7 - 10)  ondansetron Injectable 4 milliGRAM(s) IV Push every 8 hours PRN Nausea and/or Vomiting  traMADol 50 milliGRAM(s) Oral four times a day PRN Moderate Pain (4 - 6)      REVIEW OF SYSTEMS:  CONSTITUTIONAL: No fever,  RESPIRATORY: No cough, wheezing, shortness of breath  CARDIOVASCULAR: No chest pain, dyspnea, palpitations, dizziness, syncope, paroxysmal nocturnal dyspnea, orthopnea, or arm or leg swelling  GASTROINTESTINAL: No abdominal  or epigastric pain, nausea, vomiting,  diarrhea  NEUROLOGICAL: No headaches, numbness, or tremors  Skin : No itching .  Hematology : No active bleeding .  Endocrinology : No heat and cold intolerance .  Psychiatry : Patient is confused .  Genitourinary : No dysuria .  Musculoskeletal : Patient has mild arthritis .    Vital Signs Last 24 Hrs  T(C): 37 (31 Mar 2023 12:26), Max: 37 (31 Mar 2023 12:26)  T(F): 98.6 (31 Mar 2023 12:26), Max: 98.6 (31 Mar 2023 12:26)  HR: 101 (31 Mar 2023 13:18) (88 - 103)  BP: 136/85 (31 Mar 2023 12:26) (136/85 - 157/82)  BP(mean): --  RR: 18 (31 Mar 2023 12:26) (18 - 18)  SpO2: 94% (31 Mar 2023 13:18) (92% - 94%)    Parameters below as of 31 Mar 2023 13:18  Patient On (Oxygen Delivery Method): room air        PHYSICAL EXAM:  HEAD:  Atraumatic, Normocephalic  NECK: Supple and normal thyroid.  No JVD or carotid bruit.   HEART: S1, S2 regular , 1/6 soft ejection systolic murmur in mitral area , no thrill and no gallops .  PULMONARY: Bilateral vesicular breathing , few scattered rhonchi and few scattered rales are present .  ABDOMEN: Soft nontender and positive bowl sounds   EXTREMITIES:  No clubbing, cyanosis, or pedal  edema  NEUROLOGICAL: AA and confused .   Skin : No rashes .  Musculoskeletal : No joint swellings .    LABS:                        9.8    10.50 )-----------( 209      ( 31 Mar 2023 06:44 )             32.0     03-31    147<H>  |  119<H>  |  38<H>  ----------------------------<  234<H>  4.0   |  22  |  1.30    Ca    8.9      31 Mar 2023 06:44  Mg     2.2     03-30    TPro  6.6  /  Alb  2.3<L>  /  TBili  0.6  /  DBili  x   /  AST  23  /  ALT  30  /  AlkPhos  56  03-31        PT/INR - ( 31 Mar 2023 06:44 )   PT: 14.8 sec;   INR: 1.26 ratio           Urinalysis Basic - ( 30 Mar 2023 09:53 )    Color: Red / Appearance: Slightly Turbid / SG: >=1.030 / pH: x  Gluc: x / Ketone: Negative  / Bili: Negative / Urobili: <2 mg/dL   Blood: x / Protein: Trace / Nitrite: Negative   Leuk Esterase: Moderate / RBC: 10 /HPF /  /HPF   Sq Epi: x / Non Sq Epi: 0 /HPF / Bacteria: Moderate      Assessment/Plan  Patient has :  1) Left foot infection .  2) Hypertension and stable .  3) DM .  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  Will continue to follow during hospital course and give further recommendations as needed . Thanks for your referral . if any questions please contact me at office ([phone] cell [phone])  KARIME WYNN MD [place] [id number]  [address]  OFFICE : [phone]  CELL : [phone]  CARDIOLOGY F/U :   HPI:  86 yo male ,[facility] resident with PMHx - COPD, DM, HTN, CAD, HLD, H/O TIA, dementia,GERD, BPH and depression sent ot ER for evaluation of left foot 1metatarsal wound ,suggestive of osteomyelitis .Patient was seen by ID consult and transfer to the hospital recommended for wound cx/bone biopsy /podiatry evaluation ,likely will require 4-6 weeks of iv abx . Patient was admitted to [facility] with b/l feet wounds and was followed by wound care team ,recently completed 7 days of doxycycline for foot wound cellulitis . (30 Mar 2023 05:21)        SUBJECTIVE: Patient feeling better .      ALLERGIES:  Allergies    No Known Allergies    Intolerances          MEDICATIONS  (STANDING):  albuterol/ipratropium for Nebulization 3 milliLiter(s) Nebulizer every 8 hours  atenolol  Tablet 25 milliGRAM(s) Oral daily  budesonide 160 MICROgram(s)/formoterol 4.5 MICROgram(s) Inhaler 2 Puff(s) Inhalation two times a day  dextrose 5%. 1000 milliLiter(s) (50 mL/Hr) IV Continuous <Continuous>  dextrose 5%. 1000 milliLiter(s) (100 mL/Hr) IV Continuous <Continuous>  dextrose 50% Injectable 25 Gram(s) IV Push once  dextrose 50% Injectable 12.5 Gram(s) IV Push once  dextrose 50% Injectable 25 Gram(s) IV Push once  donepezil 5 milliGRAM(s) Oral at bedtime  DULoxetine 60 milliGRAM(s) Oral daily  glucagon  Injectable 1 milliGRAM(s) IntraMuscular once  heparin   Injectable 5000 Unit(s) SubCutaneous every 12 hours  insulin glargine Injectable (LANTUS) 15 Unit(s) SubCutaneous at bedtime  insulin lispro (ADMELOG) corrective regimen sliding scale   SubCutaneous three times a day before meals  insulin lispro (ADMELOG) corrective regimen sliding scale   SubCutaneous at bedtime  insulin lispro Injectable (ADMELOG) 3 Unit(s) SubCutaneous three times a day before meals  lactobacillus acidophilus 1 Tablet(s) Oral two times a day with meals  losartan 50 milliGRAM(s) Oral daily  multivitamin 1 Tablet(s) Oral daily  pantoprazole   Suspension 40 milliGRAM(s) Oral daily  piperacillin/tazobactam IVPB.. 3.375 Gram(s) IV Intermittent every 8 hours  senna 2 Tablet(s) Oral at bedtime  simvastatin 40 milliGRAM(s) Oral at bedtime  sodium chloride 0.9%. 1000 milliLiter(s) (50 mL/Hr) IV Continuous <Continuous>  tamsulosin 0.4 milliGRAM(s) Oral at bedtime    MEDICATIONS  (PRN):  acetaminophen     Tablet .. 650 milliGRAM(s) Oral every 6 hours PRN Temp greater or equal to 38C (100.4F), Mild Pain (1 - 3)  aluminum hydroxide/magnesium hydroxide/simethicone Suspension 30 milliLiter(s) Oral every 4 hours PRN Dyspepsia  bisacodyl Suppository 10 milliGRAM(s) Rectal daily PRN Constipation  dextrose Oral Gel 15 Gram(s) Oral once PRN Blood Glucose LESS THAN 70 milliGRAM(s)/deciliter  hydrALAZINE 50 milliGRAM(s) Oral every 6 hours PRN for systolic BP>160  magnesium hydroxide Suspension 30 milliLiter(s) Oral daily PRN Constipation  melatonin 3 milliGRAM(s) Oral at bedtime PRN Insomnia  morphine  - Injectable 2 milliGRAM(s) IV Push every 6 hours PRN Severe Pain (7 - 10)  ondansetron Injectable 4 milliGRAM(s) IV Push every 8 hours PRN Nausea and/or Vomiting  traMADol 50 milliGRAM(s) Oral four times a day PRN Moderate Pain (4 - 6)      REVIEW OF SYSTEMS:  CONSTITUTIONAL: No fever,  RESPIRATORY: No cough, wheezing, shortness of breath  CARDIOVASCULAR: No chest pain, dyspnea, palpitations, dizziness, syncope, paroxysmal nocturnal dyspnea, orthopnea, or arm or leg swelling  GASTROINTESTINAL: No abdominal  or epigastric pain, nausea, vomiting,  diarrhea  NEUROLOGICAL: No headaches, numbness, or tremors  Skin : No itching .  Hematology : No active bleeding .  Endocrinology : No heat and cold intolerance .  Psychiatry : Patient is confused .  Genitourinary : No dysuria .  Musculoskeletal : Patient has mild arthritis .    Vital Signs Last 24 Hrs  T(C): 37 (31 Mar 2023 12:26), Max: 37 (31 Mar 2023 12:26)  T(F): 98.6 (31 Mar 2023 12:26), Max: 98.6 (31 Mar 2023 12:26)  HR: 101 (31 Mar 2023 13:18) (88 - 103)  BP: 136/85 (31 Mar 2023 12:26) (136/85 - 157/82)  BP(mean): --  RR: 18 (31 Mar 2023 12:26) (18 - 18)  SpO2: 94% (31 Mar 2023 13:18) (92% - 94%)    Parameters below as of 31 Mar 2023 13:18  Patient On (Oxygen Delivery Method): room air        PHYSICAL EXAM:  HEAD:  Atraumatic, Normocephalic  NECK: Supple and normal thyroid.  No JVD or carotid bruit.   HEART: S1, S2 regular , 1/6 soft ejection systolic murmur in mitral area , no thrill and no gallops .  PULMONARY: Bilateral vesicular breathing , few scattered rhonchi and few scattered rales are present .  ABDOMEN: Soft nontender and positive bowl sounds   EXTREMITIES:  No clubbing, cyanosis, or pedal  edema  NEUROLOGICAL: AA and confused .   Skin : No rashes .  Musculoskeletal : No joint swellings .    LABS:                        9.8    10.50 )-----------( 209      ( 31 Mar 2023 06:44 )             32.0     03-31    147<H>  |  119<H>  |  38<H>  ----------------------------<  234<H>  4.0   |  22  |  1.30    Ca    8.9      31 Mar 2023 06:44  Mg     2.2     03-30    TPro  6.6  /  Alb  2.3<L>  /  TBili  0.6  /  DBili  x   /  AST  23  /  ALT  30  /  AlkPhos  56  03-31        PT/INR - ( 31 Mar 2023 06:44 )   PT: 14.8 sec;   INR: 1.26 ratio           Urinalysis Basic - ( 30 Mar 2023 09:53 )    Color: Red / Appearance: Slightly Turbid / SG: >=1.030 / pH: x  Gluc: x / Ketone: Negative  / Bili: Negative / Urobili: <2 mg/dL   Blood: x / Protein: Trace / Nitrite: Negative   Leuk Esterase: Moderate / RBC: 10 /HPF /  /HPF   Sq Epi: x / Non Sq Epi: 0 /HPF / Bacteria: Moderate      Assessment/Plan  Patient has :  1) Left foot infection .  2) Hypertension and stable .  3) DM .  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 4) Patient cardiac wise stable and cleared for foot surgery if needed .  Will continue to follow during hospital course and give further recommendations as needed . Thanks for your referral . if any questions please contact me at office ([phone] cell [phone])  KARIME WYNN MD [place] [id number]  [address]  OFFICE : [phone]  CELL : [phone]  CARDIOLOGY F/U :   HPI:  86 yo male ,[facility] resident with PMHx - COPD, DM, HTN, CAD, HLD, H/O TIA, dementia,GERD, BPH and depression sent ot ER for evaluation of left foot 1metatarsal wound ,suggestive of osteomyelitis .Patient was seen by ID consult and transfer to the hospital recommended for wound cx/bone biopsy /podiatry evaluation ,likely will require 4-6 weeks of iv abx . Patient was admitted to [facility] with b/l feet wounds and was followed by wound care team ,recently completed 7 days of doxycycline for foot wound cellulitis . (30 Mar 2023 05:21)        SUBJECTIVE: Patient feeling better .      ALLERGIES:  Allergies    No Known Allergies    Intolerances          MEDICATIONS  (STANDING):  albuterol/ipratropium for Nebulization 3 milliLiter(s) Nebulizer every 8 hours  atenolol  Tablet 25 milliGRAM(s) Oral daily  budesonide 160 MICROgram(s)/formoterol 4.5 MICROgram(s) Inhaler 2 Puff(s) Inhalation two times a day  dextrose 5%. 1000 milliLiter(s) (50 mL/Hr) IV Continuous <Continuous>  dextrose 5%. 1000 milliLiter(s) (100 mL/Hr) IV Continuous <Continuous>  dextrose 50% Injectable 25 Gram(s) IV Push once  dextrose 50% Injectable 12.5 Gram(s) IV Push once  dextrose 50% Injectable 25 Gram(s) IV Push once  donepezil 5 milliGRAM(s) Oral at bedtime  DULoxetine 60 milliGRAM(s) Oral daily  glucagon  Injectable 1 milliGRAM(s) IntraMuscular once  heparin   Injectable 5000 Unit(s) SubCutaneous every 12 hours  insulin glargine Injectable (LANTUS) 15 Unit(s) SubCutaneous at bedtime  insulin lispro (ADMELOG) corrective regimen sliding scale   SubCutaneous three times a day before meals  insulin lispro (ADMELOG) corrective regimen sliding scale   SubCutaneous at bedtime  insulin lispro Injectable (ADMELOG) 3 Unit(s) SubCutaneous three times a day before meals  lactobacillus acidophilus 1 Tablet(s) Oral two times a day with meals  losartan 50 milliGRAM(s) Oral daily  multivitamin 1 Tablet(s) Oral daily  pantoprazole   Suspension 40 milliGRAM(s) Oral daily  piperacillin/tazobactam IVPB.. 3.375 Gram(s) IV Intermittent every 8 hours  senna 2 Tablet(s) Oral at bedtime  simvastatin 40 milliGRAM(s) Oral at bedtime  sodium chloride 0.9%. 1000 milliLiter(s) (50 mL/Hr) IV Continuous <Continuous>  tamsulosin 0.4 milliGRAM(s) Oral at bedtime    MEDICATIONS  (PRN):  acetaminophen     Tablet .. 650 milliGRAM(s) Oral every 6 hours PRN Temp greater or equal to 38C (100.4F), Mild Pain (1 - 3)  aluminum hydroxide/magnesium hydroxide/simethicone Suspension 30 milliLiter(s) Oral every 4 hours PRN Dyspepsia  bisacodyl Suppository 10 milliGRAM(s) Rectal daily PRN Constipation  dextrose Oral Gel 15 Gram(s) Oral once PRN Blood Glucose LESS THAN 70 milliGRAM(s)/deciliter  hydrALAZINE 50 milliGRAM(s) Oral every 6 hours PRN for systolic BP>160  magnesium hydroxide Suspension 30 milliLiter(s) Oral daily PRN Constipation  melatonin 3 milliGRAM(s) Oral at bedtime PRN Insomnia  morphine  - Injectable 2 milliGRAM(s) IV Push every 6 hours PRN Severe Pain (7 - 10)  ondansetron Injectable 4 milliGRAM(s) IV Push every 8 hours PRN Nausea and/or Vomiting  traMADol 50 milliGRAM(s) Oral four times a day PRN Moderate Pain (4 - 6)      REVIEW OF SYSTEMS:  CONSTITUTIONAL: No fever,  RESPIRATORY: No cough, wheezing, shortness of breath  CARDIOVASCULAR: No chest pain, dyspnea, palpitations, dizziness, syncope, paroxysmal nocturnal dyspnea, orthopnea, or arm or leg swelling  GASTROINTESTINAL: No abdominal  or epigastric pain, nausea, vomiting,  diarrhea  NEUROLOGICAL: No headaches, numbness, or tremors  Skin : No itching .  Hematology : No active bleeding .  Endocrinology : No heat and cold intolerance .  Psychiatry : Patient is confused .  Genitourinary : No dysuria .  Musculoskeletal : Patient has mild arthritis .    Vital Signs Last 24 Hrs  T(C): 37 (31 Mar 2023 12:26), Max: 37 (31 Mar 2023 12:26)  T(F): 98.6 (31 Mar 2023 12:26), Max: 98.6 (31 Mar 2023 12:26)  HR: 101 (31 Mar 2023 13:18) (88 - 103)  BP: 136/85 (31 Mar 2023 12:26) (136/85 - 157/82)  BP(mean): --  RR: 18 (31 Mar 2023 12:26) (18 - 18)  SpO2: 94% (31 Mar 2023 13:18) (92% - 94%)    Parameters below as of 31 Mar 2023 13:18  Patient On (Oxygen Delivery Method): room air        PHYSICAL EXAM:  HEAD:  Atraumatic, Normocephalic  NECK: Supple and normal thyroid.  No JVD or carotid bruit.   HEART: S1, S2 regular , 1/6 soft ejection systolic murmur in mitral area , no thrill and no gallops .  PULMONARY: Bilateral vesicular breathing , few scattered rhonchi and few scattered rales are present .  ABDOMEN: Soft nontender and positive bowl sounds   EXTREMITIES:  No clubbing, cyanosis, or pedal  edema  NEUROLOGICAL: AA and confused .   Skin : No rashes .  Musculoskeletal : No joint swellings .    LABS:                        9.8    10.50 )-----------( 209      ( 31 Mar 2023 06:44 )             32.0     03-31    147<H>  |  119<H>  |  38<H>  ----------------------------<  234<H>  4.0   |  22  |  1.30    Ca    8.9      31 Mar 2023 06:44  Mg     2.2     03-30    TPro  6.6  /  Alb  2.3<L>  /  TBili  0.6  /  DBili  x   /  AST  23  /  ALT  30  /  AlkPhos  56  03-31        PT/INR - ( 31 Mar 2023 06:44 )   PT: 14.8 sec;   INR: 1.26 ratio           Urinalysis Basic - ( 30 Mar 2023 09:53 )    Color: Red / Appearance: Slightly Turbid / SG: >=1.030 / pH: x  Gluc: x / Ketone: Negative  / Bili: Negative / Urobili: <2 mg/dL   Blood: x / Protein: Trace / Nitrite: Negative   Leuk Esterase: Moderate / RBC: 10 /HPF /  /HPF   Sq Epi: x / Non Sq Epi: 0 /HPF / Bacteria: Moderate      Assessment/Plan  Patient has :  1) Left foot infection .  2) Hypertension and stable .  3) DM .  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 4) Patient cardiac wise stable and cleared for foot surgery if needed .  Will continue to follow during hospital course and give further recommendations as needed . Thanks for your referral . if any questions please contact me at office ([phone] cell [phone])  KARIME WYNN MD [place] [id number]  [address]  OFFICE : [phone]  CELL : [phone]  CARDIOLOGY F/U :   HPI:  86 yo male ,[facility] resident with PMHx - COPD, DM, HTN, CAD, HLD, H/O TIA, dementia,GERD, BPH and depression sent ot ER for evaluation of left foot 1metatarsal wound ,suggestive of osteomyelitis .Patient was seen by ID consult and transfer to the hospital recommended for wound cx/bone biopsy /podiatry evaluation ,likely will require 4-6 weeks of iv abx . Patient was admitted to [facility] with b/l feet wounds and was followed by wound care team ,recently completed 7 days of doxycycline for foot wound cellulitis . (30 Mar 2023 05:21)        SUBJECTIVE: Patient feeling better .      ALLERGIES:  Allergies    No Known Allergies    Intolerances          MEDICATIONS  (STANDING):  albuterol/ipratropium for Nebulization 3 milliLiter(s) Nebulizer every 8 hours  atenolol  Tablet 25 milliGRAM(s) Oral daily  budesonide 160 MICROgram(s)/formoterol 4.5 MICROgram(s) Inhaler 2 Puff(s) Inhalation two times a day  dextrose 5%. 1000 milliLiter(s) (50 mL/Hr) IV Continuous <Continuous>  dextrose 5%. 1000 milliLiter(s) (100 mL/Hr) IV Continuous <Continuous>  dextrose 50% Injectable 25 Gram(s) IV Push once  dextrose 50% Injectable 12.5 Gram(s) IV Push once  dextrose 50% Injectable 25 Gram(s) IV Push once  donepezil 5 milliGRAM(s) Oral at bedtime  DULoxetine 60 milliGRAM(s) Oral daily  glucagon  Injectable 1 milliGRAM(s) IntraMuscular once  heparin   Injectable 5000 Unit(s) SubCutaneous every 12 hours  insulin glargine Injectable (LANTUS) 15 Unit(s) SubCutaneous at bedtime  insulin lispro (ADMELOG) corrective regimen sliding scale   SubCutaneous three times a day before meals  insulin lispro (ADMELOG) corrective regimen sliding scale   SubCutaneous at bedtime  insulin lispro Injectable (ADMELOG) 3 Unit(s) SubCutaneous three times a day before meals  lactobacillus acidophilus 1 Tablet(s) Oral two times a day with meals  losartan 50 milliGRAM(s) Oral daily  multivitamin 1 Tablet(s) Oral daily  pantoprazole   Suspension 40 milliGRAM(s) Oral daily  piperacillin/tazobactam IVPB.. 3.375 Gram(s) IV Intermittent every 8 hours  senna 2 Tablet(s) Oral at bedtime  simvastatin 40 milliGRAM(s) Oral at bedtime  sodium chloride 0.9%. 1000 milliLiter(s) (50 mL/Hr) IV Continuous <Continuous>  tamsulosin 0.4 milliGRAM(s) Oral at bedtime    MEDICATIONS  (PRN):  acetaminophen     Tablet .. 650 milliGRAM(s) Oral every 6 hours PRN Temp greater or equal to 38C (100.4F), Mild Pain (1 - 3)  aluminum hydroxide/magnesium hydroxide/simethicone Suspension 30 milliLiter(s) Oral every 4 hours PRN Dyspepsia  bisacodyl Suppository 10 milliGRAM(s) Rectal daily PRN Constipation  dextrose Oral Gel 15 Gram(s) Oral once PRN Blood Glucose LESS THAN 70 milliGRAM(s)/deciliter  hydrALAZINE 50 milliGRAM(s) Oral every 6 hours PRN for systolic BP>160  magnesium hydroxide Suspension 30 milliLiter(s) Oral daily PRN Constipation  melatonin 3 milliGRAM(s) Oral at bedtime PRN Insomnia  morphine  - Injectable 2 milliGRAM(s) IV Push every 6 hours PRN Severe Pain (7 - 10)  ondansetron Injectable 4 milliGRAM(s) IV Push every 8 hours PRN Nausea and/or Vomiting  traMADol 50 milliGRAM(s) Oral four times a day PRN Moderate Pain (4 - 6)      REVIEW OF SYSTEMS:  CONSTITUTIONAL: No fever,  RESPIRATORY: No cough, wheezing, shortness of breath  CARDIOVASCULAR: No chest pain, dyspnea, palpitations, dizziness, syncope, paroxysmal nocturnal dyspnea, orthopnea, or arm or leg swelling  GASTROINTESTINAL: No abdominal  or epigastric pain, nausea, vomiting,  diarrhea  NEUROLOGICAL: No headaches, numbness, or tremors  Skin : No itching .  Hematology : No active bleeding .  Endocrinology : No heat and cold intolerance .  Psychiatry : Patient is confused .  Genitourinary : No dysuria .  Musculoskeletal : Patient has mild arthritis .    Vital Signs Last 24 Hrs  T(C): 37 (31 Mar 2023 12:26), Max: 37 (31 Mar 2023 12:26)  T(F): 98.6 (31 Mar 2023 12:26), Max: 98.6 (31 Mar 2023 12:26)  HR: 101 (31 Mar 2023 13:18) (88 - 103)  BP: 136/85 (31 Mar 2023 12:26) (136/85 - 157/82)  BP(mean): --  RR: 18 (31 Mar 2023 12:26) (18 - 18)  SpO2: 94% (31 Mar 2023 13:18) (92% - 94%)    Parameters below as of 31 Mar 2023 13:18  Patient On (Oxygen Delivery Method): room air        PHYSICAL EXAM:  HEAD:  Atraumatic, Normocephalic  NECK: Supple and normal thyroid.  No JVD or carotid bruit.   HEART: S1, S2 regular , 1/6 soft ejection systolic murmur in mitral area , no thrill and no gallops .  PULMONARY: Bilateral vesicular breathing , few scattered rhonchi and few scattered rales are present .  ABDOMEN: Soft nontender and positive bowl sounds   EXTREMITIES:  No clubbing, cyanosis, or pedal  edema  NEUROLOGICAL: AA and confused .   Skin : No rashes .  Musculoskeletal : No joint swellings .    LABS:                        9.8    10.50 )-----------( 209      ( 31 Mar 2023 06:44 )             32.0     03-31    147<H>  |  119<H>  |  38<H>  ----------------------------<  234<H>  4.0   |  22  |  1.30    Ca    8.9      31 Mar 2023 06:44  Mg     2.2     03-30    TPro  6.6  /  Alb  2.3<L>  /  TBili  0.6  /  DBili  x   /  AST  23  /  ALT  30  /  AlkPhos  56  03-31        PT/INR - ( 31 Mar 2023 06:44 )   PT: 14.8 sec;   INR: 1.26 ratio           Urinalysis Basic - ( 30 Mar 2023 09:53 )    Color: Red / Appearance: Slightly Turbid / SG: >=1.030 / pH: x  Gluc: x / Ketone: Negative  / Bili: Negative / Urobili: <2 mg/dL   Blood: x / Protein: Trace / Nitrite: Negative   Leuk Esterase: Moderate / RBC: 10 /HPF /  /HPF   Sq Epi: x / Non Sq Epi: 0 /HPF / Bacteria: Moderate      Assessment/Plan  Patient has :  1) Left foot infection .  2) Hypertension and stable .  3) DM .  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 4) Patient cardiac wise stable and cleared for foot surgery if needed .  Will continue to follow during hospital course and give further recommendations as needed . Thanks for your referral . if any questions please contact me at office ([phone] cell [phone])

## 2023-03-31 NOTE — PROGRESS NOTE ADULT - ASSESSMENT
REVIEW OF SYMPTOMS      Able to give (reliable) ROS  NO     PHYSICAL EXAM    HEENT Unremarkable  atraumatic   RESP Fair air entry EXP prolonged    Harsh breath sound Resp distres mild   CARDIAC S1 S2 No S3     NO JVD    ABDOMEN SOFT BS PRESENT NOT DISTENDED No hepatosplenomegaly   PEDAL EDEMA present No calf tenderness  NO rash       GENERAL DATA .   GOC.     .. 3/30/2023 full code  ALLGY.     .. nka                    WT.   .. 3/30/2023 63  BMI.        .. 3/30/2023 21            ICU STAY.   .. none  COVID.   .. 3/29/2023 scv2 (-)     BEST PRACTICE ISSUES.    HOB ELEVATN.   .. Yes  DVT PPLX.   ..  3/29 hpsc   MILLER PPLX.   .. 3/30/2023 protonix 40    INFN PPLX. ..    SP SW LAURENT.   ..  3/30/2023 -> soft bite mild thick        DIET.    ..  3/30/2023 dash   IV fl.  .. 3/30/2023 ns 50     ABGS.    VS/ PO/IO/ VENT/ DRIPS.   3/31/2023 afeb 100 150/80   3/31/2023 ra 93%     PROBLEM/ASSESSMENT/PLAN.  Infection  Osteomyelitius  Possible pneumonia   .. Esr 3/29-3/31/2023 esr 67- 49   .. W 3/29-3/30-3/31/2023 w 11.8 - 12- 10.5   .. cxr 3/30/2023  ........ increasing r lower perihilar infiltrate   .. xr foot left 3/30/2023  ........ stable eroisions around 1st mtp anmd great toe    ........ which could be bone infectn    .. CXR 3/29/2023 Possible hansa pneum  .. 3/29/2023 bc (-)   .. 3/29 zosyn    .. follow cultures  PLEURAL EFFUSION.  .. ct ch 3/30/2023   ........ mild pulm edema  ........ mod r and sml effsn   a/r  .. pl effsn likely sec to chf   COPD  .. 3/30/2023 duoneb.3    .. 3/30/2023 symbicort   hemodynamics  .. La 3/29/2023 la 1.8   .. target map 65 (+)   CAD.  .. 3/30/2023 atenolol 25  .. 3/30/2023 simvastat 40   CHF.  .. echo 3/30/2023  ........ ef 40%   ........ mod mr   .. 3/30/2023 losartan 50   .. 3/30/2023 hydralazin 50.4p   Anemia  .. Hb 3/29-3/30-3/31/2023 Hb 11.2- 10.2 - 9.8    .. monitor  CKD  .. Na 3/29-3/31/2023 Na 144-147   .. Cr 3/29-3/30-3/31/2023 Cr 1.4 - 1.3 - 1.3   .. monitor  OBS  .. 3/30/2023 donepezil     OVERALL .  86 yo M with PMHx HTN, HLD, T2D, dementia (AOx2-3), OA, BPH, CAD, TIA, CKD 3 and GERD HO recent hospital stay 1/24-1/30/2023 [facility]  COPD ex  now admitted with osteomyelitis possible pneum  Pulm consulted 3/29/2023    PROBLEMS  Osteomyelitis  Pneumonia   .. 3/29 zosyn    COPD   CKD   PLEURAL EFFSN 3/30 ct  HFREF MOD MR 3/30 echo      PLAN  Antibio bronchodilators monitor  check v duplx    TIME SPENT   Over 25 minutes aggregate care time spent on encounter; activities included   direct patient care, counseling and/or coordinating care reviewing notes, lab data/ imaging , discussion with multidisciplinary team/ patient  /family and explaining in detail risks, benefits, alternatives  of the recommendations     Paulino Parmar 87 m     REVIEW OF SYMPTOMS      Able to give (reliable) ROS  NO     PHYSICAL EXAM    HEENT Unremarkable  atraumatic   RESP Fair air entry EXP prolonged    Harsh breath sound Resp distres mild   CARDIAC S1 S2 No S3     NO JVD    ABDOMEN SOFT BS PRESENT NOT DISTENDED No hepatosplenomegaly   PEDAL EDEMA present No calf tenderness  NO rash       GENERAL DATA .   GOC.     .. 3/30/2023 full code  ALLGY.     .. nka                    WT.   .. 3/30/2023 63  BMI.        .. 3/30/2023 21            ICU STAY.   .. none  COVID.   .. 3/29/2023 scv2 (-)     BEST PRACTICE ISSUES.    HOB ELEVATN.   .. Yes  DVT PPLX.   ..  3/29 hpsc   MILLER PPLX.   .. 3/30/2023 protonix 40    INFN PPLX. ..    SP SW LAURENT.   ..  3/30/2023 -> soft bite mild thick        DIET.    ..  3/30/2023 dash   IV fl.  .. 3/30/2023 ns 50     ABGS.    VS/ PO/IO/ VENT/ DRIPS.   3/31/2023 afeb 100 150/80   3/31/2023 ra 93%     PROBLEM/ASSESSMENT/PLAN.  Infection  Osteomyelitius  Possible pneumonia   .. Esr 3/29-3/31/2023 esr 67- 49   .. W 3/29-3/30-3/31/2023 w 11.8 - 12- 10.5   .. cxr 3/30/2023  ........ increasing r lower perihilar infiltrate   .. xr foot left 3/30/2023  ........ stable eroisions around 1st mtp anmd great toe    ........ which could be bone infectn    .. CXR 3/29/2023 Possible hansa pneum  .. 3/29/2023 bc (-)   .. 3/29 zosyn    .. follow cultures  PLEURAL EFFUSION.  .. ct ch 3/30/2023   ........ mild pulm edema  ........ mod r and sml effsn   a/r  .. pl effsn likely sec to chf   COPD  .. 3/30/2023 duoneb.3    .. 3/30/2023 symbicort   hemodynamics  .. La 3/29/2023 la 1.8   .. target map 65 (+)   CAD.  .. 3/30/2023 atenolol 25  .. 3/30/2023 simvastat 40   CHF.  .. echo 3/30/2023  ........ ef 40%   ........ mod mr   .. 3/30/2023 losartan 50   .. 3/30/2023 hydralazin 50.4p   Anemia  .. Hb 3/29-3/30-3/31/2023 Hb 11.2- 10.2 - 9.8    .. monitor  CKD  .. Na 3/29-3/31/2023 Na 144-147   .. Cr 3/29-3/30-3/31/2023 Cr 1.4 - 1.3 - 1.3   .. monitor  OBS  .. 3/30/2023 donepezil     OVERALL .  88 yo M with PMHx HTN, HLD, T2D, dementia (AOx2-3), OA, BPH, CAD, TIA, CKD 3 and GERD HO recent hospital stay 1/24-1/30/2023 [facility]  COPD ex  now admitted with osteomyelitis possible pneum  Pulm consulted 3/29/2023    PROBLEMS  Osteomyelitis  Pneumonia   .. 3/29 zosyn    COPD   CKD   PLEURAL EFFSN 3/30 ct  HFREF MOD MR 3/30 echo      PLAN  Antibio bronchodilators monitor  check v duplx    TIME SPENT   Over 25 minutes aggregate care time spent on encounter; activities included   direct patient care, counseling and/or coordinating care reviewing notes, lab data/ imaging , discussion with multidisciplinary team/ patient  /family and explaining in detail risks, benefits, alternatives  of the recommendations     Paulino Parmar 87 m

## 2023-03-31 NOTE — CHART NOTE - NSCHARTNOTEFT_GEN_A_CORE
ALANNA/PVR's reviewed with Dr Lucas  Likely no significant PAD  No vascular surgery intervention indicated  Podiatry can proceed as indicated with close post op monitoring for healing

## 2023-03-31 NOTE — PROGRESS NOTE ADULT - SUBJECTIVE AND OBJECTIVE BOX
Patient is a 87y Male whom presented to the hospital with ckd and chelo     PAST MEDICAL & SURGICAL HISTORY:      MEDICATIONS  (STANDING):      Allergies    No Known Allergies    Intolerances        SOCIAL HISTORY:  Denies ETOh,Smoking,     FAMILY HISTORY:      REVIEW OF SYSTEMS:  unable to obtained a good review system                                                9.8    10.50 )-----------( 209      ( 31 Mar 2023 06:44 )             32.0       CBC Full  -  ( 31 Mar 2023 06:44 )  WBC Count : 10.50 K/uL  RBC Count : 3.44 M/uL  Hemoglobin : 9.8 g/dL  Hematocrit : 32.0 %  Platelet Count - Automated : 209 K/uL  Mean Cell Volume : 93.0 fl  Mean Cell Hemoglobin : 28.5 pg  Mean Cell Hemoglobin Concentration : 30.6 gm/dL  Auto Neutrophil # : x  Auto Lymphocyte # : x  Auto Monocyte # : x  Auto Eosinophil # : x  Auto Basophil # : x  Auto Neutrophil % : x  Auto Lymphocyte % : x  Auto Monocyte % : x  Auto Eosinophil % : x  Auto Basophil % : x      03-31    147<H>  |  119<H>  |  38<H>  ----------------------------<  234<H>  4.0   |  22  |  1.30    Ca    8.9      31 Mar 2023 06:44  Mg     2.2     03-30    TPro  6.6  /  Alb  2.3<L>  /  TBili  0.6  /  DBili  x   /  AST  23  /  ALT  30  /  AlkPhos  56  03-31      CAPILLARY BLOOD GLUCOSE      POCT Blood Glucose.: 238 mg/dL (31 Mar 2023 07:35)  POCT Blood Glucose.: 199 mg/dL (30 Mar 2023 21:39)  POCT Blood Glucose.: 273 mg/dL (30 Mar 2023 16:36)  POCT Blood Glucose.: 352 mg/dL (30 Mar 2023 12:48)  POCT Blood Glucose.: 339 mg/dL (30 Mar 2023 11:23)      Vital Signs Last 24 Hrs  T(C): 36.9 (31 Mar 2023 04:53), Max: 37.7 (30 Mar 2023 09:51)  T(F): 98.4 (31 Mar 2023 04:53), Max: 99.9 (30 Mar 2023 09:51)  HR: 101 (31 Mar 2023 07:51) (88 - 112)  BP: 157/82 (31 Mar 2023 04:53) (151/78 - 158/76)  BP(mean): --  RR: 18 (31 Mar 2023 04:53) (18 - 18)  SpO2: 93% (31 Mar 2023 07:51) (92% - 96%)    Parameters below as of 31 Mar 2023 07:51  Patient On (Oxygen Delivery Method): room air        Urinalysis Basic - ( 30 Mar 2023 09:53 )    Color: Red / Appearance: Slightly Turbid / SG: >=1.030 / pH: x  Gluc: x / Ketone: Negative  / Bili: Negative / Urobili: <2 mg/dL   Blood: x / Protein: Trace / Nitrite: Negative   Leuk Esterase: Moderate / RBC: 10 /HPF /  /HPF   Sq Epi: x / Non Sq Epi: 0 /HPF / Bacteria: Moderate        PT/INR - ( 31 Mar 2023 06:44 )   PT: 14.8 sec;   INR: 1.26 ratio               PHYSICAL EXAM:    Constitutional: NAD  HEENT: conjunctive   clear   Neck:  No JVD  Respiratory: CTAB  Cardiovascular: S1 and S2  Gastrointestinal: BS+, soft,   Extremities: No peripheral edema  Neurological:  no focal deficits  Psychiatric: Normal mood,   : No Bethea  Skin: dry  Access: Not applicable    LABS:                        11.2   11.88 )-----------( 228      ( 29 Mar 2023 21:20 )             35.5     03-29    144  |  115<H>  |  35<H>  ----------------------------<  249<H>  4.3   |  27  |  1.40<H>    Ca    9.3      29 Mar 2023 21:20    TPro  7.6  /  Alb  2.8<L>  /  TBili  0.5  /  DBili  x   /  AST  20  /  ALT  30  /  AlkPhos  71  03-29      Urine Studies:          RADIOLOGY & ADDITIONAL STUDIES:          MEDICATIONS  (STANDING):  albuterol/ipratropium for Nebulization 3 milliLiter(s) Nebulizer every 8 hours  atenolol  Tablet 25 milliGRAM(s) Oral daily  budesonide 160 MICROgram(s)/formoterol 4.5 MICROgram(s) Inhaler 2 Puff(s) Inhalation two times a day  dextrose 5%. 1000 milliLiter(s) (50 mL/Hr) IV Continuous <Continuous>  dextrose 5%. 1000 milliLiter(s) (100 mL/Hr) IV Continuous <Continuous>  dextrose 50% Injectable 25 Gram(s) IV Push once  dextrose 50% Injectable 12.5 Gram(s) IV Push once  dextrose 50% Injectable 25 Gram(s) IV Push once  donepezil 5 milliGRAM(s) Oral at bedtime  DULoxetine 60 milliGRAM(s) Oral daily  glucagon  Injectable 1 milliGRAM(s) IntraMuscular once  heparin   Injectable 5000 Unit(s) SubCutaneous every 12 hours  insulin glargine Injectable (LANTUS) 8 Unit(s) SubCutaneous every morning  insulin lispro (ADMELOG) corrective regimen sliding scale   SubCutaneous three times a day before meals  insulin lispro (ADMELOG) corrective regimen sliding scale   SubCutaneous at bedtime  lactobacillus acidophilus 1 Tablet(s) Oral two times a day with meals  losartan 50 milliGRAM(s) Oral daily  multivitamin 1 Tablet(s) Oral daily  pantoprazole    Tablet 40 milliGRAM(s) Oral daily  piperacillin/tazobactam IVPB.. 3.375 Gram(s) IV Intermittent every 8 hours  senna 2 Tablet(s) Oral at bedtime  simvastatin 40 milliGRAM(s) Oral at bedtime  sodium chloride 0.9%. 1000 milliLiter(s) (50 mL/Hr) IV Continuous <Continuous>  tamsulosin 0.4 milliGRAM(s) Oral at bedtime

## 2023-04-01 LAB
ANION GAP SERPL CALC-SCNC: 5 MMOL/L — SIGNIFICANT CHANGE UP (ref 5–17)
BUN SERPL-MCNC: 40 MG/DL — HIGH (ref 7–23)
CALCIUM SERPL-MCNC: 9.3 MG/DL — SIGNIFICANT CHANGE UP (ref 8.5–10.1)
CHLORIDE SERPL-SCNC: 122 MMOL/L — HIGH (ref 96–108)
CO2 SERPL-SCNC: 24 MMOL/L — SIGNIFICANT CHANGE UP (ref 22–31)
CREAT SERPL-MCNC: 1.5 MG/DL — HIGH (ref 0.5–1.3)
EGFR: 45 ML/MIN/1.73M2 — LOW
GLUCOSE SERPL-MCNC: 149 MG/DL — HIGH (ref 70–99)
HCT VFR BLD CALC: 36.7 % — LOW (ref 39–50)
HGB BLD-MCNC: 11.1 G/DL — LOW (ref 13–17)
MCHC RBC-ENTMCNC: 28.2 PG — SIGNIFICANT CHANGE UP (ref 27–34)
MCHC RBC-ENTMCNC: 30.2 GM/DL — LOW (ref 32–36)
MCV RBC AUTO: 93.4 FL — SIGNIFICANT CHANGE UP (ref 80–100)
NRBC # BLD: 0 /100 WBCS — SIGNIFICANT CHANGE UP (ref 0–0)
PLATELET # BLD AUTO: 244 K/UL — SIGNIFICANT CHANGE UP (ref 150–400)
POTASSIUM SERPL-MCNC: 4 MMOL/L — SIGNIFICANT CHANGE UP (ref 3.5–5.3)
POTASSIUM SERPL-SCNC: 4 MMOL/L — SIGNIFICANT CHANGE UP (ref 3.5–5.3)
RBC # BLD: 3.93 M/UL — LOW (ref 4.2–5.8)
RBC # FLD: 17.1 % — HIGH (ref 10.3–14.5)
SODIUM SERPL-SCNC: 151 MMOL/L — HIGH (ref 135–145)
WBC # BLD: 11.96 K/UL — HIGH (ref 3.8–10.5)
WBC # FLD AUTO: 11.96 K/UL — HIGH (ref 3.8–10.5)

## 2023-04-01 PROCEDURE — 99232 SBSQ HOSP IP/OBS MODERATE 35: CPT

## 2023-04-01 RX ORDER — SODIUM CHLORIDE 9 MG/ML
1000 INJECTION, SOLUTION INTRAVENOUS
Refills: 0 | Status: DISCONTINUED | OUTPATIENT
Start: 2023-04-01 | End: 2023-04-02

## 2023-04-01 RX ADMIN — Medication 2: at 08:24

## 2023-04-01 RX ADMIN — ENOXAPARIN SODIUM 60 MILLIGRAM(S): 100 INJECTION SUBCUTANEOUS at 17:04

## 2023-04-01 RX ADMIN — Medication 3 MILLILITER(S): at 07:32

## 2023-04-01 RX ADMIN — Medication 3 UNIT(S): at 17:06

## 2023-04-01 RX ADMIN — Medication 1 TABLET(S): at 17:04

## 2023-04-01 RX ADMIN — SENNA PLUS 2 TABLET(S): 8.6 TABLET ORAL at 21:41

## 2023-04-01 RX ADMIN — Medication 650 MILLIGRAM(S): at 06:08

## 2023-04-01 RX ADMIN — PIPERACILLIN AND TAZOBACTAM 25 GRAM(S): 4; .5 INJECTION, POWDER, LYOPHILIZED, FOR SOLUTION INTRAVENOUS at 06:15

## 2023-04-01 RX ADMIN — BUDESONIDE AND FORMOTEROL FUMARATE DIHYDRATE 2 PUFF(S): 160; 4.5 AEROSOL RESPIRATORY (INHALATION) at 08:27

## 2023-04-01 RX ADMIN — Medication 1 TABLET(S): at 12:26

## 2023-04-01 RX ADMIN — Medication 1 TABLET(S): at 08:26

## 2023-04-01 RX ADMIN — Medication 4: at 17:05

## 2023-04-01 RX ADMIN — Medication 3 UNIT(S): at 08:25

## 2023-04-01 RX ADMIN — BUDESONIDE AND FORMOTEROL FUMARATE DIHYDRATE 2 PUFF(S): 160; 4.5 AEROSOL RESPIRATORY (INHALATION) at 19:26

## 2023-04-01 RX ADMIN — Medication 25 MILLIGRAM(S): at 17:05

## 2023-04-01 RX ADMIN — DONEPEZIL HYDROCHLORIDE 5 MILLIGRAM(S): 10 TABLET, FILM COATED ORAL at 22:38

## 2023-04-01 RX ADMIN — PIPERACILLIN AND TAZOBACTAM 25 GRAM(S): 4; .5 INJECTION, POWDER, LYOPHILIZED, FOR SOLUTION INTRAVENOUS at 13:18

## 2023-04-01 RX ADMIN — Medication 3 MILLILITER(S): at 20:40

## 2023-04-01 RX ADMIN — Medication 3 UNIT(S): at 12:25

## 2023-04-01 RX ADMIN — SIMVASTATIN 40 MILLIGRAM(S): 20 TABLET, FILM COATED ORAL at 22:37

## 2023-04-01 RX ADMIN — TAMSULOSIN HYDROCHLORIDE 0.4 MILLIGRAM(S): 0.4 CAPSULE ORAL at 21:41

## 2023-04-01 RX ADMIN — LOSARTAN POTASSIUM 50 MILLIGRAM(S): 100 TABLET, FILM COATED ORAL at 06:08

## 2023-04-01 RX ADMIN — INSULIN GLARGINE 15 UNIT(S): 100 INJECTION, SOLUTION SUBCUTANEOUS at 21:40

## 2023-04-01 RX ADMIN — Medication 25 MILLIGRAM(S): at 06:15

## 2023-04-01 RX ADMIN — Medication 3 MILLILITER(S): at 13:23

## 2023-04-01 RX ADMIN — DULOXETINE HYDROCHLORIDE 60 MILLIGRAM(S): 30 CAPSULE, DELAYED RELEASE ORAL at 12:26

## 2023-04-01 RX ADMIN — PIPERACILLIN AND TAZOBACTAM 25 GRAM(S): 4; .5 INJECTION, POWDER, LYOPHILIZED, FOR SOLUTION INTRAVENOUS at 21:43

## 2023-04-01 RX ADMIN — PANTOPRAZOLE SODIUM 40 MILLIGRAM(S): 20 TABLET, DELAYED RELEASE ORAL at 12:26

## 2023-04-01 RX ADMIN — Medication 6: at 12:24

## 2023-04-01 RX ADMIN — ENOXAPARIN SODIUM 60 MILLIGRAM(S): 100 INJECTION SUBCUTANEOUS at 06:15

## 2023-04-01 NOTE — PROGRESS NOTE ADULT - ASSESSMENT
86 yo male ,[facility] resident with PMHx - COPD, DM, HTN, CAD, HLD, H/O TIA, dementia, GERD, BPH and depression, who was sent for evaluation of left foot 1metatarsal wound. Concern for wound infection with underlying osteomyelitis. He also has bilateral heel wounds R>L.       Left foot infection with concern for OM  B/L Heel wound  COY  Leukocytosis     - blood cultures currently no growth  - No DVT on bilateral LE Doppler.   -continue Zosyn  -follow cultures to completion    Will follow   I am available on teams for questions  ID service at [phone].      88 yo male ,[facility] resident with PMHx - COPD, DM, HTN, CAD, HLD, H/O TIA, dementia, GERD, BPH and depression, who was sent for evaluation of left foot 1metatarsal wound. Concern for wound infection with underlying osteomyelitis. He also has bilateral heel wounds R>L.       Left foot infection with concern for OM  B/L Heel wound  COY  Leukocytosis     - blood cultures currently no growth  - No DVT on bilateral LE Doppler.   -continue Zosyn  -follow cultures to completion    Will follow   I am available on teams for questions  ID service at [phone].      88 yo male ,[facility] resident with PMHx - COPD, DM, HTN, CAD, HLD, H/O TIA, dementia, GERD, BPH and depression, who was sent for evaluation of left foot 1metatarsal wound. Concern for wound infection with underlying osteomyelitis. He also has bilateral heel wounds R>L.       Left foot infection with concern for OM  B/L Heel wound  COY  Leukocytosis     - blood cultures currently no growth  - No DVT on bilateral LE Doppler.   -continue Zosyn  -follow cultures to completion    Will follow   I am available on teams for questions  ID service at [phone].

## 2023-04-01 NOTE — PROGRESS NOTE ADULT - SUBJECTIVE AND OBJECTIVE BOX
[facility]  INFECTIOUS DISEASES   [address] [place] [address]  Tel: [phone]     Fax: [phone]  =======================================================      ANA MARIA LEIGH [id number]    Follow up:  wound infection with underlying osteomyelitis    Fever to 101 3/31    Allergies:  No Known Allergies      REVIEW OF SYSTEMS:  Unable to obtain due to medical condition       Physical Exam:  GEN: NAD  HEENT: normocephalic and atraumatic. EOMI. PERRL.    NECK: Supple.   LUNGS: CTA B/L.  HEART: RRR  ABDOMEN: Soft, NT, ND.  +BS.    : No CVA tenderness  EXTREMITIES: Without  edema.  MSK: No joint swelling  NEUROLOGIC: Awake alert.   PSYCHIATRIC: Confused   SKIN: (+) L foot foot 1st metatarsal wound with probe to bone, no odor or active drainage but with surrounding erythema and tenderness, bilateral heel wounds    Vitals:  T(F): 99.1 (01 Apr 2023 05:20), Max: 101 (31 Mar 2023 20:57)  HR: 103 (01 Apr 2023 07:52)  BP: 177/76 (01 Apr 2023 05:20)  RR: 18 (01 Apr 2023 05:20)  SpO2: 93% (01 Apr 2023 07:52) (92% - 94%)  temp max in last 48H T(F): , Max: 101 (03-31-23 @ 20:57)      Current Antibiotics:  piperacillin/tazobactam IVPB.. 3.375 Gram(s) IV Intermittent every 8 hours    Other medications:  albuterol/ipratropium for Nebulization 3 milliLiter(s) Nebulizer every 8 hours  budesonide 160 MICROgram(s)/formoterol 4.5 MICROgram(s) Inhaler 2 Puff(s) Inhalation two times a day  dextrose 5%. 1000 milliLiter(s) IV Continuous <Continuous>  dextrose 5%. 1000 milliLiter(s) IV Continuous <Continuous>  dextrose 50% Injectable 25 Gram(s) IV Push once  dextrose 50% Injectable 12.5 Gram(s) IV Push once  dextrose 50% Injectable 25 Gram(s) IV Push once  donepezil 5 milliGRAM(s) Oral at bedtime  DULoxetine 60 milliGRAM(s) Oral daily  enoxaparin Injectable 60 milliGRAM(s) SubCutaneous every 12 hours  glucagon  Injectable 1 milliGRAM(s) IntraMuscular once  insulin glargine Injectable (LANTUS) 15 Unit(s) SubCutaneous at bedtime  insulin lispro (ADMELOG) corrective regimen sliding scale   SubCutaneous three times a day before meals  insulin lispro (ADMELOG) corrective regimen sliding scale   SubCutaneous at bedtime  insulin lispro Injectable (ADMELOG) 3 Unit(s) SubCutaneous three times a day before meals  lactobacillus acidophilus 1 Tablet(s) Oral two times a day with meals  losartan 50 milliGRAM(s) Oral daily  metoprolol tartrate 25 milliGRAM(s) Oral two times a day  multivitamin 1 Tablet(s) Oral daily  pantoprazole   Suspension 40 milliGRAM(s) Oral daily  senna 2 Tablet(s) Oral at bedtime  simvastatin 40 milliGRAM(s) Oral at bedtime  sodium chloride 0.45%. 1000 milliLiter(s) IV Continuous <Continuous>  tamsulosin 0.4 milliGRAM(s) Oral at bedtime                 11.1   11.96 )-----------( 244      ( 01 Apr 2023 05:07 )             36.7     04-01    151<H>  |  122<H>  |  40<H>  ----------------------------<  149<H>  4.0   |  24  |  1.50<H>    Ca    9.3      01 Apr 2023 05:07    TPro  6.6  /  Alb  2.3<L>  /  TBili  0.6  /  DBili  x   /  AST  23  /  ALT  30  /  AlkPhos  56  03-31    RECENT CULTURES:  03-29 @ 21:32 .Blood     No growth to date.    03-29 @ 21:20 .Blood     No growth to date.      WBC Count: 11.96 K/uL (04-01-23 @ 05:07)  WBC Count: 10.50 K/uL (03-31-23 @ 06:44)  WBC Count: 12.00 K/uL (03-30-23 @ 07:20)  WBC Count: 11.88 K/uL (03-29-23 @ 21:20)    Creatinine, Serum: 1.50 mg/dL (04-01-23 @ 05:07)  Creatinine, Serum: 1.30 mg/dL (03-31-23 @ 06:44)  Creatinine, Serum: 1.30 mg/dL (03-30-23 @ 07:20)  Creatinine, Serum: 1.40 mg/dL (03-29-23 @ 21:20)    C-Reactive Protein, Serum: 105 mg/L (03-31-23 @ 06:44)    Sedimentation Rate, Erythrocyte: 49 mm/hr (03-31-23 @ 06:44)  Sedimentation Rate, Erythrocyte: 67 mm/hr (03-29-23 @ 21:20)    COVID-19 PCR: NotDetec (03-29-23 @ 21:20)      Urinalysis Basic - ( 30 Mar 2023 09:53 )  Color: Red / Appearance: Slightly Turbid / SG: >=1.030 / pH: x  Gluc: x / Ketone: Negative  / Bili: Negative / Urobili: <2 mg/dL   Blood: x / Protein: Trace / Nitrite: Negative   Leuk Esterase: Moderate / RBC: 10 /HPF /  /HPF   Sq Epi: x / Non Sq Epi: 0 /HPF / Bacteria: Moderate             [facility]  INFECTIOUS DISEASES   [address] [place] [address]  Tel: [phone]     Fax: [phone]  =======================================================      ANA MARIA LEIGH [id number]    Follow up:  wound infection with underlying osteomyelitis    Fever to 101 3/31    Allergies:  No Known Allergies      REVIEW OF SYSTEMS:  Unable to obtain due to medical condition       Physical Exam:  GEN: NAD  HEENT: normocephalic and atraumatic. EOMI. PERRL.    NECK: Supple.   LUNGS: CTA B/L.  HEART: RRR  ABDOMEN: Soft, NT, ND.  +BS.    : No CVA tenderness  EXTREMITIES: Without  edema.  MSK: No joint swelling  NEUROLOGIC: Awake alert.   PSYCHIATRIC: Confused   SKIN: (+) L foot foot 1st metatarsal wound with probe to bone, no odor or active drainage but with surrounding erythema and tenderness, bilateral heel wounds    Vitals:  T(F): 99.1 (01 Apr 2023 05:20), Max: 101 (31 Mar 2023 20:57)  HR: 103 (01 Apr 2023 07:52)  BP: 177/76 (01 Apr 2023 05:20)  RR: 18 (01 Apr 2023 05:20)  SpO2: 93% (01 Apr 2023 07:52) (92% - 94%)  temp max in last 48H T(F): , Max: 101 (03-31-23 @ 20:57)      Current Antibiotics:  piperacillin/tazobactam IVPB.. 3.375 Gram(s) IV Intermittent every 8 hours    Other medications:  albuterol/ipratropium for Nebulization 3 milliLiter(s) Nebulizer every 8 hours  budesonide 160 MICROgram(s)/formoterol 4.5 MICROgram(s) Inhaler 2 Puff(s) Inhalation two times a day  dextrose 5%. 1000 milliLiter(s) IV Continuous <Continuous>  dextrose 5%. 1000 milliLiter(s) IV Continuous <Continuous>  dextrose 50% Injectable 25 Gram(s) IV Push once  dextrose 50% Injectable 12.5 Gram(s) IV Push once  dextrose 50% Injectable 25 Gram(s) IV Push once  donepezil 5 milliGRAM(s) Oral at bedtime  DULoxetine 60 milliGRAM(s) Oral daily  enoxaparin Injectable 60 milliGRAM(s) SubCutaneous every 12 hours  glucagon  Injectable 1 milliGRAM(s) IntraMuscular once  insulin glargine Injectable (LANTUS) 15 Unit(s) SubCutaneous at bedtime  insulin lispro (ADMELOG) corrective regimen sliding scale   SubCutaneous three times a day before meals  insulin lispro (ADMELOG) corrective regimen sliding scale   SubCutaneous at bedtime  insulin lispro Injectable (ADMELOG) 3 Unit(s) SubCutaneous three times a day before meals  lactobacillus acidophilus 1 Tablet(s) Oral two times a day with meals  losartan 50 milliGRAM(s) Oral daily  metoprolol tartrate 25 milliGRAM(s) Oral two times a day  multivitamin 1 Tablet(s) Oral daily  pantoprazole   Suspension 40 milliGRAM(s) Oral daily  senna 2 Tablet(s) Oral at bedtime  simvastatin 40 milliGRAM(s) Oral at bedtime  sodium chloride 0.45%. 1000 milliLiter(s) IV Continuous <Continuous>  tamsulosin 0.4 milliGRAM(s) Oral at bedtime                 11.1   11.96 )-----------( 244      ( 01 Apr 2023 05:07 )             36.7     04-01    151<H>  |  122<H>  |  40<H>  ----------------------------<  149<H>  4.0   |  24  |  1.50<H>    Ca    9.3      01 Apr 2023 05:07    TPro  6.6  /  Alb  2.3<L>  /  TBili  0.6  /  DBili  x   /  AST  23  /  ALT  30  /  AlkPhos  56  03-31    RECENT CULTURES:  03-29 @ 21:32 .Blood     No growth to date.    03-29 @ 21:20 .Blood     No growth to date.      WBC Count: 11.96 K/uL (04-01-23 @ 05:07)  WBC Count: 10.50 K/uL (03-31-23 @ 06:44)  WBC Count: 12.00 K/uL (03-30-23 @ 07:20)  WBC Count: 11.88 K/uL (03-29-23 @ 21:20)    Creatinine, Serum: 1.50 mg/dL (04-01-23 @ 05:07)  Creatinine, Serum: 1.30 mg/dL (03-31-23 @ 06:44)  Creatinine, Serum: 1.30 mg/dL (03-30-23 @ 07:20)  Creatinine, Serum: 1.40 mg/dL (03-29-23 @ 21:20)    C-Reactive Protein, Serum: 105 mg/L (03-31-23 @ 06:44)    Sedimentation Rate, Erythrocyte: 49 mm/hr (03-31-23 @ 06:44)  Sedimentation Rate, Erythrocyte: 67 mm/hr (03-29-23 @ 21:20)    COVID-19 PCR: NotDetec (03-29-23 @ 21:20)      Urinalysis Basic - ( 30 Mar 2023 09:53 )  Color: Red / Appearance: Slightly Turbid / SG: >=1.030 / pH: x  Gluc: x / Ketone: Negative  / Bili: Negative / Urobili: <2 mg/dL   Blood: x / Protein: Trace / Nitrite: Negative   Leuk Esterase: Moderate / RBC: 10 /HPF /  /HPF   Sq Epi: x / Non Sq Epi: 0 /HPF / Bacteria: Moderate             [facility]  INFECTIOUS DISEASES   [address] [place] [address]  Tel: [phone]     Fax: [phone]  =======================================================      ANA MARIA LEIGH [id number]    Follow up:  wound infection with underlying osteomyelitis    Fever to 101 3/31    Allergies:  No Known Allergies      REVIEW OF SYSTEMS:  Unable to obtain due to medical condition       Physical Exam:  GEN: NAD  HEENT: normocephalic and atraumatic. EOMI. PERRL.    NECK: Supple.   LUNGS: CTA B/L.  HEART: RRR  ABDOMEN: Soft, NT, ND.  +BS.    : No CVA tenderness  EXTREMITIES: Without  edema.  MSK: No joint swelling  NEUROLOGIC: Awake alert.   PSYCHIATRIC: Confused   SKIN: (+) L foot foot 1st metatarsal wound with probe to bone, no odor or active drainage but with surrounding erythema and tenderness, bilateral heel wounds    Vitals:  T(F): 99.1 (01 Apr 2023 05:20), Max: 101 (31 Mar 2023 20:57)  HR: 103 (01 Apr 2023 07:52)  BP: 177/76 (01 Apr 2023 05:20)  RR: 18 (01 Apr 2023 05:20)  SpO2: 93% (01 Apr 2023 07:52) (92% - 94%)  temp max in last 48H T(F): , Max: 101 (03-31-23 @ 20:57)      Current Antibiotics:  piperacillin/tazobactam IVPB.. 3.375 Gram(s) IV Intermittent every 8 hours    Other medications:  albuterol/ipratropium for Nebulization 3 milliLiter(s) Nebulizer every 8 hours  budesonide 160 MICROgram(s)/formoterol 4.5 MICROgram(s) Inhaler 2 Puff(s) Inhalation two times a day  dextrose 5%. 1000 milliLiter(s) IV Continuous <Continuous>  dextrose 5%. 1000 milliLiter(s) IV Continuous <Continuous>  dextrose 50% Injectable 25 Gram(s) IV Push once  dextrose 50% Injectable 12.5 Gram(s) IV Push once  dextrose 50% Injectable 25 Gram(s) IV Push once  donepezil 5 milliGRAM(s) Oral at bedtime  DULoxetine 60 milliGRAM(s) Oral daily  enoxaparin Injectable 60 milliGRAM(s) SubCutaneous every 12 hours  glucagon  Injectable 1 milliGRAM(s) IntraMuscular once  insulin glargine Injectable (LANTUS) 15 Unit(s) SubCutaneous at bedtime  insulin lispro (ADMELOG) corrective regimen sliding scale   SubCutaneous three times a day before meals  insulin lispro (ADMELOG) corrective regimen sliding scale   SubCutaneous at bedtime  insulin lispro Injectable (ADMELOG) 3 Unit(s) SubCutaneous three times a day before meals  lactobacillus acidophilus 1 Tablet(s) Oral two times a day with meals  losartan 50 milliGRAM(s) Oral daily  metoprolol tartrate 25 milliGRAM(s) Oral two times a day  multivitamin 1 Tablet(s) Oral daily  pantoprazole   Suspension 40 milliGRAM(s) Oral daily  senna 2 Tablet(s) Oral at bedtime  simvastatin 40 milliGRAM(s) Oral at bedtime  sodium chloride 0.45%. 1000 milliLiter(s) IV Continuous <Continuous>  tamsulosin 0.4 milliGRAM(s) Oral at bedtime                 11.1   11.96 )-----------( 244      ( 01 Apr 2023 05:07 )             36.7     04-01    151<H>  |  122<H>  |  40<H>  ----------------------------<  149<H>  4.0   |  24  |  1.50<H>    Ca    9.3      01 Apr 2023 05:07    TPro  6.6  /  Alb  2.3<L>  /  TBili  0.6  /  DBili  x   /  AST  23  /  ALT  30  /  AlkPhos  56  03-31    RECENT CULTURES:  03-29 @ 21:32 .Blood     No growth to date.    03-29 @ 21:20 .Blood     No growth to date.      WBC Count: 11.96 K/uL (04-01-23 @ 05:07)  WBC Count: 10.50 K/uL (03-31-23 @ 06:44)  WBC Count: 12.00 K/uL (03-30-23 @ 07:20)  WBC Count: 11.88 K/uL (03-29-23 @ 21:20)    Creatinine, Serum: 1.50 mg/dL (04-01-23 @ 05:07)  Creatinine, Serum: 1.30 mg/dL (03-31-23 @ 06:44)  Creatinine, Serum: 1.30 mg/dL (03-30-23 @ 07:20)  Creatinine, Serum: 1.40 mg/dL (03-29-23 @ 21:20)    C-Reactive Protein, Serum: 105 mg/L (03-31-23 @ 06:44)    Sedimentation Rate, Erythrocyte: 49 mm/hr (03-31-23 @ 06:44)  Sedimentation Rate, Erythrocyte: 67 mm/hr (03-29-23 @ 21:20)    COVID-19 PCR: NotDetec (03-29-23 @ 21:20)      Urinalysis Basic - ( 30 Mar 2023 09:53 )  Color: Red / Appearance: Slightly Turbid / SG: >=1.030 / pH: x  Gluc: x / Ketone: Negative  / Bili: Negative / Urobili: <2 mg/dL   Blood: x / Protein: Trace / Nitrite: Negative   Leuk Esterase: Moderate / RBC: 10 /HPF /  /HPF   Sq Epi: x / Non Sq Epi: 0 /HPF / Bacteria: Moderate

## 2023-04-01 NOTE — PROGRESS NOTE ADULT - SUBJECTIVE AND OBJECTIVE BOX
Patient is a 87y Male whom presented to the hospital with ckd and chelo     PAST MEDICAL & SURGICAL HISTORY:      MEDICATIONS  (STANDING):      Allergies    No Known Allergies    Intolerances        SOCIAL HISTORY:  Denies ETOh,Smoking,     FAMILY HISTORY:      REVIEW OF SYSTEMS:  unable to obtained a good review system                          11.1   11.96 )-----------( 244      ( 01 Apr 2023 05:07 )             36.7       CBC Full  -  ( 01 Apr 2023 05:07 )  WBC Count : 11.96 K/uL  RBC Count : 3.93 M/uL  Hemoglobin : 11.1 g/dL  Hematocrit : 36.7 %  Platelet Count - Automated : 244 K/uL  Mean Cell Volume : 93.4 fl  Mean Cell Hemoglobin : 28.2 pg  Mean Cell Hemoglobin Concentration : 30.2 gm/dL  Auto Neutrophil # : x  Auto Lymphocyte # : x  Auto Monocyte # : x  Auto Eosinophil # : x  Auto Basophil # : x  Auto Neutrophil % : x  Auto Lymphocyte % : x  Auto Monocyte % : x  Auto Eosinophil % : x  Auto Basophil % : x      04-01    151<H>  |  122<H>  |  40<H>  ----------------------------<  149<H>  4.0   |  24  |  1.50<H>    Ca    9.3      01 Apr 2023 05:07    TPro  6.6  /  Alb  2.3<L>  /  TBili  0.6  /  DBili  x   /  AST  23  /  ALT  30  /  AlkPhos  56  03-31      CAPILLARY BLOOD GLUCOSE      POCT Blood Glucose.: 181 mg/dL (01 Apr 2023 07:38)  POCT Blood Glucose.: 102 mg/dL (31 Mar 2023 20:53)  POCT Blood Glucose.: 259 mg/dL (31 Mar 2023 16:41)  POCT Blood Glucose.: 335 mg/dL (31 Mar 2023 11:24)      Vital Signs Last 24 Hrs  T(C): 37.3 (01 Apr 2023 05:20), Max: 38.3 (31 Mar 2023 20:57)  T(F): 99.1 (01 Apr 2023 05:20), Max: 101 (31 Mar 2023 20:57)  HR: 103 (01 Apr 2023 07:52) (98 - 110)  BP: 177/76 (01 Apr 2023 05:20) (136/85 - 177/76)  BP(mean): --  RR: 18 (01 Apr 2023 05:20) (18 - 18)  SpO2: 93% (01 Apr 2023 07:52) (92% - 94%)    Parameters below as of 01 Apr 2023 07:52  Patient On (Oxygen Delivery Method): room air            PT/INR - ( 31 Mar 2023 06:44 )   PT: 14.8 sec;   INR: 1.26 ratio                                                     9.8    10.50 )-----------( 209      ( 31 Mar 2023 06:44 )             32.0       CBC Full  -  ( 31 Mar 2023 06:44 )  WBC Count : 10.50 K/uL  RBC Count : 3.44 M/uL  Hemoglobin : 9.8 g/dL  Hematocrit : 32.0 %  Platelet Count - Automated : 209 K/uL  Mean Cell Volume : 93.0 fl  Mean Cell Hemoglobin : 28.5 pg  Mean Cell Hemoglobin Concentration : 30.6 gm/dL  Auto Neutrophil # : x  Auto Lymphocyte # : x  Auto Monocyte # : x  Auto Eosinophil # : x  Auto Basophil # : x  Auto Neutrophil % : x  Auto Lymphocyte % : x  Auto Monocyte % : x  Auto Eosinophil % : x  Auto Basophil % : x      03-31    147<H>  |  119<H>  |  38<H>  ----------------------------<  234<H>  4.0   |  22  |  1.30    Ca    8.9      31 Mar 2023 06:44  Mg     2.2     03-30    TPro  6.6  /  Alb  2.3<L>  /  TBili  0.6  /  DBili  x   /  AST  23  /  ALT  30  /  AlkPhos  56  03-31      CAPILLARY BLOOD GLUCOSE      POCT Blood Glucose.: 238 mg/dL (31 Mar 2023 07:35)  POCT Blood Glucose.: 199 mg/dL (30 Mar 2023 21:39)  POCT Blood Glucose.: 273 mg/dL (30 Mar 2023 16:36)  POCT Blood Glucose.: 352 mg/dL (30 Mar 2023 12:48)  POCT Blood Glucose.: 339 mg/dL (30 Mar 2023 11:23)      Vital Signs Last 24 Hrs  T(C): 36.9 (31 Mar 2023 04:53), Max: 37.7 (30 Mar 2023 09:51)  T(F): 98.4 (31 Mar 2023 04:53), Max: 99.9 (30 Mar 2023 09:51)  HR: 101 (31 Mar 2023 07:51) (88 - 112)  BP: 157/82 (31 Mar 2023 04:53) (151/78 - 158/76)  BP(mean): --  RR: 18 (31 Mar 2023 04:53) (18 - 18)  SpO2: 93% (31 Mar 2023 07:51) (92% - 96%)    Parameters below as of 31 Mar 2023 07:51  Patient On (Oxygen Delivery Method): room air        Urinalysis Basic - ( 30 Mar 2023 09:53 )    Color: Red / Appearance: Slightly Turbid / SG: >=1.030 / pH: x  Gluc: x / Ketone: Negative  / Bili: Negative / Urobili: <2 mg/dL   Blood: x / Protein: Trace / Nitrite: Negative   Leuk Esterase: Moderate / RBC: 10 /HPF /  /HPF   Sq Epi: x / Non Sq Epi: 0 /HPF / Bacteria: Moderate        PT/INR - ( 31 Mar 2023 06:44 )   PT: 14.8 sec;   INR: 1.26 ratio               PHYSICAL EXAM:    Constitutional: NAD  HEENT: conjunctive   clear   Neck:  No JVD  Respiratory: CTAB  Cardiovascular: S1 and S2  Gastrointestinal: BS+, soft,   Extremities: No peripheral edema  Neurological:  no focal deficits  Psychiatric: Normal mood,   : No Bethea  Skin: dry  Access: Not applicable    LABS:                        11.2   11.88 )-----------( 228      ( 29 Mar 2023 21:20 )             35.5     03-29    144  |  115<H>  |  35<H>  ----------------------------<  249<H>  4.3   |  27  |  1.40<H>    Ca    9.3      29 Mar 2023 21:20    TPro  7.6  /  Alb  2.8<L>  /  TBili  0.5  /  DBili  x   /  AST  20  /  ALT  30  /  AlkPhos  71  03-29      Urine Studies:          RADIOLOGY & ADDITIONAL STUDIES:          MEDICATIONS  (STANDING):  albuterol/ipratropium for Nebulization 3 milliLiter(s) Nebulizer every 8 hours  atenolol  Tablet 25 milliGRAM(s) Oral daily  budesonide 160 MICROgram(s)/formoterol 4.5 MICROgram(s) Inhaler 2 Puff(s) Inhalation two times a day  dextrose 5%. 1000 milliLiter(s) (50 mL/Hr) IV Continuous <Continuous>  dextrose 5%. 1000 milliLiter(s) (100 mL/Hr) IV Continuous <Continuous>  dextrose 50% Injectable 25 Gram(s) IV Push once  dextrose 50% Injectable 12.5 Gram(s) IV Push once  dextrose 50% Injectable 25 Gram(s) IV Push once  donepezil 5 milliGRAM(s) Oral at bedtime  DULoxetine 60 milliGRAM(s) Oral daily  glucagon  Injectable 1 milliGRAM(s) IntraMuscular once  heparin   Injectable 5000 Unit(s) SubCutaneous every 12 hours  insulin glargine Injectable (LANTUS) 8 Unit(s) SubCutaneous every morning  insulin lispro (ADMELOG) corrective regimen sliding scale   SubCutaneous three times a day before meals  insulin lispro (ADMELOG) corrective regimen sliding scale   SubCutaneous at bedtime  lactobacillus acidophilus 1 Tablet(s) Oral two times a day with meals  losartan 50 milliGRAM(s) Oral daily  multivitamin 1 Tablet(s) Oral daily  pantoprazole    Tablet 40 milliGRAM(s) Oral daily  piperacillin/tazobactam IVPB.. 3.375 Gram(s) IV Intermittent every 8 hours  senna 2 Tablet(s) Oral at bedtime  simvastatin 40 milliGRAM(s) Oral at bedtime  sodium chloride 0.9%. 1000 milliLiter(s) (50 mL/Hr) IV Continuous <Continuous>  tamsulosin 0.4 milliGRAM(s) Oral at bedtime

## 2023-04-01 NOTE — PROGRESS NOTE ADULT - ASSESSMENT
86 yo male ,[facility] resident with PMHx - COPD, DM, HTN, CAD, HLD, H/O TIA, dementia,GERD, BPH and depression sent ot ER for evaluation of left foot 1metatarsal wound ,suggestive of osteomyelitis .Patient was seen by ID consult and transfer to the hospital recommended for wound cx/bone biopsy /podiatry evaluation ,likely will require 4-6 weeks of iv abx . Patient was admitted to [facility] with b/l feet wounds and was followed by wound care team ,recently completed 7 days of doxycycline for foot wound cellulitis . (30 Mar 2023 05:21)      ACUTE RENAL FAILURE: sodium chloride 0.45%. 1000 milliLiter(s) (50 mL/Hr) IV Continuous  Serum creatinine is 1.5   There is no progression . No uremic symptoms  No evidence of anemia .  Fluid status stable.  Will continue to avoid nephrotoxic drugs.  Patient remains asymptomatic.   Continue current therapy.  hold  diuretic.  hold   ACE inhibitor.  hold   ARB.  Additional evaluation:   ECG,    echocardiogram,     CXR,  will obtained recent   renal ultrasound to evalaute kidney size and possible stones ,      BP monitoring,continue current antihypertensive meds, low salt diet,followup with PMD in 1-2 weeks  losartan 50 milliGRAM(s) Oral daily    f/u  blood and urine cx,serial lactate levels,monitor vitals clement saenz hydration,monitor urine output and renal profile,iv abx   piperacillin/tazobactam IVPB.. 3.375 Gram(s) IV Intermittent every 8 hours 86 yo male ,[facility] resident with PMHx - COPD, DM, HTN, CAD, HLD, H/O TIA, dementia,GERD, BPH and depression sent ot ER for evaluation of left foot 1metatarsal wound ,suggestive of osteomyelitis .Patient was seen by ID consult and transfer to the hospital recommended for wound cx/bone biopsy /podiatry evaluation ,likely will require 4-6 weeks of iv abx . Patient was admitted to [facility] with b/l feet wounds and was followed by wound care team ,recently completed 7 days of doxycycline for foot wound cellulitis . (30 Mar 2023 05:21)      ACUTE RENAL FAILURE: sodium chloride 0.45%. 1000 milliLiter(s) (50 mL/Hr) IV Continuous  Serum creatinine is 1.5   There is no progression . No uremic symptoms  No evidence of anemia .  Fluid status stable.  Will continue to avoid nephrotoxic drugs.  Patient remains asymptomatic.   Continue current therapy.  hold  diuretic.  hold   ACE inhibitor.  hold   ARB.  Additional evaluation:   ECG,    echocardiogram,     CXR,  will obtained recent   renal ultrasound to evalaute kidney size and possible stones ,      BP monitoring,continue current antihypertensive meds, low salt diet,followup with PMD in 1-2 weeks  losartan 50 milliGRAM(s) Oral daily    f/u  blood and urine cx,serial lactate levels,monitor vitals clement saenz hydration,monitor urine output and renal profile,iv abx   piperacillin/tazobactam IVPB.. 3.375 Gram(s) IV Intermittent every 8 hours 88 yo male ,[facility] resident with PMHx - COPD, DM, HTN, CAD, HLD, H/O TIA, dementia,GERD, BPH and depression sent ot ER for evaluation of left foot 1metatarsal wound ,suggestive of osteomyelitis .Patient was seen by ID consult and transfer to the hospital recommended for wound cx/bone biopsy /podiatry evaluation ,likely will require 4-6 weeks of iv abx . Patient was admitted to [facility] with b/l feet wounds and was followed by wound care team ,recently completed 7 days of doxycycline for foot wound cellulitis . (30 Mar 2023 05:21)      ACUTE RENAL FAILURE: sodium chloride 0.45%. 1000 milliLiter(s) (50 mL/Hr) IV Continuous  Serum creatinine is 1.5   There is no progression . No uremic symptoms  No evidence of anemia .  Fluid status stable.  Will continue to avoid nephrotoxic drugs.  Patient remains asymptomatic.   Continue current therapy.  hold  diuretic.  hold   ACE inhibitor.  hold   ARB.  Additional evaluation:   ECG,    echocardiogram,     CXR,  will obtained recent   renal ultrasound to evalaute kidney size and possible stones ,      BP monitoring,continue current antihypertensive meds, low salt diet,followup with PMD in 1-2 weeks  losartan 50 milliGRAM(s) Oral daily    f/u  blood and urine cx,serial lactate levels,monitor vitals clement saenz hydration,monitor urine output and renal profile,iv abx   piperacillin/tazobactam IVPB.. 3.375 Gram(s) IV Intermittent every 8 hours

## 2023-04-01 NOTE — PROGRESS NOTE ADULT - SUBJECTIVE AND OBJECTIVE BOX
PROGRESS NOTE  Patient is a 87y old  Male who presents with a chief complaint of left foot infection (01 Apr 2023 10:33)    Chart and available morning labs /imaging are reviewed electronically , urgent issues addressed . More information  is being added upon completion of rounds , when more information is collected and management discussed with consultants , medical staff and social service/case management on the floor   OVERNIGHT  No new issues reported by medical staff . All above noted Patient is resting in a bed comfortably .Confused ,poor mentation .No distress noted     HPI:  88 yo male ,[facility] resident with PMHx - COPD, DM, HTN, CAD, HLD, H/O TIA, dementia,GERD, BPH and depression sent ot ER for evaluation of left foot 1metatarsal wound ,suggestive of osteomyelitis .Patient was seen by ID consult and transfer to the hospital recommended for wound cx/bone biopsy /podiatry evaluation ,likely will require 4-6 weeks of iv abx . Patient was admitted to [facility] with b/l feet wounds and was followed by wound care team ,recently completed 7 days of doxycycline for foot wound cellulitis . (30 Mar 2023 05:21)    PAST MEDICAL & SURGICAL HISTORY:  HLD (hyperlipidemia)      MDD (major depressive disorder)      Obstructive and reflux uropathy      Cellulitis      Sepsis      Moderate protein-calorie malnutrition      Brain TIA      History of RSV infection      Pressure ulcer of unspecified heel, unspecified stage      Venous stasis ulcer without varicose veins      ASHD (arteriosclerotic heart disease)      BPH without urinary obstruction      COPD, moderate      Stage 3 chronic kidney disease      Chronic GERD      HTN (hypertension)      Dementia      DM type 2, not at goal      Multiple open wounds of foot          MEDICATIONS  (STANDING):  albuterol/ipratropium for Nebulization 3 milliLiter(s) Nebulizer every 8 hours  budesonide 160 MICROgram(s)/formoterol 4.5 MICROgram(s) Inhaler 2 Puff(s) Inhalation two times a day  dextrose 5%. 1000 milliLiter(s) (50 mL/Hr) IV Continuous <Continuous>  dextrose 5%. 1000 milliLiter(s) (100 mL/Hr) IV Continuous <Continuous>  dextrose 50% Injectable 25 Gram(s) IV Push once  dextrose 50% Injectable 12.5 Gram(s) IV Push once  dextrose 50% Injectable 25 Gram(s) IV Push once  donepezil 5 milliGRAM(s) Oral at bedtime  DULoxetine 60 milliGRAM(s) Oral daily  enoxaparin Injectable 60 milliGRAM(s) SubCutaneous every 12 hours  glucagon  Injectable 1 milliGRAM(s) IntraMuscular once  insulin glargine Injectable (LANTUS) 15 Unit(s) SubCutaneous at bedtime  insulin lispro (ADMELOG) corrective regimen sliding scale   SubCutaneous three times a day before meals  insulin lispro (ADMELOG) corrective regimen sliding scale   SubCutaneous at bedtime  insulin lispro Injectable (ADMELOG) 3 Unit(s) SubCutaneous three times a day before meals  lactobacillus acidophilus 1 Tablet(s) Oral two times a day with meals  losartan 50 milliGRAM(s) Oral daily  metoprolol tartrate 25 milliGRAM(s) Oral two times a day  multivitamin 1 Tablet(s) Oral daily  pantoprazole   Suspension 40 milliGRAM(s) Oral daily  piperacillin/tazobactam IVPB.. 3.375 Gram(s) IV Intermittent every 8 hours  senna 2 Tablet(s) Oral at bedtime  simvastatin 40 milliGRAM(s) Oral at bedtime  sodium chloride 0.45%. 1000 milliLiter(s) (50 mL/Hr) IV Continuous <Continuous>  tamsulosin 0.4 milliGRAM(s) Oral at bedtime    MEDICATIONS  (PRN):  acetaminophen     Tablet .. 650 milliGRAM(s) Oral every 6 hours PRN Temp greater or equal to 38C (100.4F), Mild Pain (1 - 3)  aluminum hydroxide/magnesium hydroxide/simethicone Suspension 30 milliLiter(s) Oral every 4 hours PRN Dyspepsia  bisacodyl Suppository 10 milliGRAM(s) Rectal daily PRN Constipation  dextrose Oral Gel 15 Gram(s) Oral once PRN Blood Glucose LESS THAN 70 milliGRAM(s)/deciliter  hydrALAZINE 50 milliGRAM(s) Oral every 6 hours PRN for systolic BP>160  magnesium hydroxide Suspension 30 milliLiter(s) Oral daily PRN Constipation  melatonin 3 milliGRAM(s) Oral at bedtime PRN Insomnia  morphine  - Injectable 2 milliGRAM(s) IV Push every 6 hours PRN Severe Pain (7 - 10)  ondansetron Injectable 4 milliGRAM(s) IV Push every 8 hours PRN Nausea and/or Vomiting  traMADol 50 milliGRAM(s) Oral four times a day PRN Moderate Pain (4 - 6)      OBJECTIVE    T(C): 37.3 (04-01-23 @ 05:20), Max: 38.3 (03-31-23 @ 20:57)  HR: 103 (04-01-23 @ 07:52) (98 - 110)  BP: 177/76 (04-01-23 @ 05:20) (136/85 - 177/76)  RR: 18 (04-01-23 @ 05:20) (18 - 18)  SpO2: 93% (04-01-23 @ 07:52) (92% - 94%)  Wt(kg): --  I&O's Summary    31 Mar 2023 07:01  -  01 Apr 2023 07:00  --------------------------------------------------------  IN: 800 mL / OUT: 0 mL / NET: 800 mL          REVIEW OF SYSTEMS:  CONSTITUTIONAL: No fever, weight loss, or fatigue  EYES: No eye pain, visual disturbances, or discharge  ENMT:   No sinus or throat pain  NECK: No pain or stiffness  RESPIRATORY: No cough, wheezing, chills or hemoptysis; No shortness of breath  CARDIOVASCULAR: No chest pain, palpitations, dizziness, or leg swelling  GASTROINTESTINAL: No abdominal pain. No nausea, vomiting; No diarrhea or constipation. No melena or hematochezia.  GENITOURINARY: No dysuria, frequency, hematuria, or incontinence  NEUROLOGICAL: No headaches, memory loss, loss of strength, numbness, or tremors  SKIN: No itching, burning, rashes, or lesions   MUSCULOSKELETAL: No joint pain or swelling; No muscle, back, or extremity pain    PHYSICAL EXAM:  Appearance: NAD. VS past 24 hrs -as above   HEENT:   Moist oral mucosa. Conjunctiva clear b/l.   Neck : supple  Respiratory: Lungs CTAB.  Gastrointestinal:  Soft, nontender. No rebound. No rigidity. BS present	  Cardiovascular: RRR ,S1S2 present  Neurologic: Non-focal. Moving all extremities.  Extremities: No edema. No erythema. No calf tenderness.  Skin: No rashes, No ecchymoses, No cyanosis.	  wounds ,skin lesions-See skin assesment flow sheet   LABS:                        11.1   11.96 )-----------( 244      ( 01 Apr 2023 05:07 )             36.7     04-01    151<H>  |  122<H>  |  40<H>  ----------------------------<  149<H>  4.0   |  24  |  1.50<H>    Ca    9.3      01 Apr 2023 05:07    TPro  6.6  /  Alb  2.3<L>  /  TBili  0.6  /  DBili  x   /  AST  23  /  ALT  30  /  AlkPhos  56  03-31    CAPILLARY BLOOD GLUCOSE      POCT Blood Glucose.: 296 mg/dL (01 Apr 2023 11:29)  POCT Blood Glucose.: 181 mg/dL (01 Apr 2023 07:38)  POCT Blood Glucose.: 102 mg/dL (31 Mar 2023 20:53)  POCT Blood Glucose.: 259 mg/dL (31 Mar 2023 16:41)    PT/INR - ( 31 Mar 2023 06:44 )   PT: 14.8 sec;   INR: 1.26 ratio               Culture - Blood (collected 29 Mar 2023 21:32)  Source: .Blood  Preliminary Report (31 Mar 2023 01:03):    No growth to date.    Culture - Blood (collected 29 Mar 2023 21:20)  Source: .Blood  Preliminary Report (31 Mar 2023 01:03):    No growth to date.      RADIOLOGY & ADDITIONAL TESTS: < from: US Duplex Venous Lower Ext Complete, Bilateral (03.31.23 @ 20:54) >  ACC: [id number] EXAM:  US DPLX LWR EXT VEINS COMPL BI   ORDERED BY: ESTRELLA LOBO     PROCEDURE DATE:  03/31/2023          INTERPRETATION:  CLINICAL INFORMATION: Lower extremity swelling.   Cellulitis. Evaluate for DVT.    COMPARISON: None available.    TECHNIQUE: Duplex sonography of the BILATERAL LOWER extremity veins with   color and spectral Doppler, with and without compression.    FINDINGS:    RIGHT:  Normal compressibility of the RIGHT common femoral, femoral and popliteal   veins.  Doppler examination shows normal spontaneous and phasic flow.  No RIGHT calf vein thrombosis is detected.    LEFT:  Normal compressibility of the LEFT common femoral, femoral and popliteal   veins.  Doppler examination shows normal spontaneous and phasic flow.  No LEFT calf vein thrombosis is detected.    IMPRESSION:    < end of copied text >     reviewed elctronically  ASSESSMENT/PLAN: 	    25 minutes aggregate time was spent on this visit, 50% visit time spent in care co-ordination with other attendings and counselling patient .I have discussed care plan with patient / HCP/family member ,who expressed understanding of problems treatment and their effect and side effects, alternatives in details. I have asked if they have any questions and concerns and appropriately addressed them to best of my ability.  PROGRESS NOTE  Patient is a 87y old  Male who presents with a chief complaint of left foot infection (01 Apr 2023 10:33)    Chart and available morning labs /imaging are reviewed electronically , urgent issues addressed . More information  is being added upon completion of rounds , when more information is collected and management discussed with consultants , medical staff and social service/case management on the floor   OVERNIGHT  No new issues reported by medical staff . All above noted Patient is resting in a bed comfortably .Confused ,poor mentation .No distress noted     HPI:  86 yo male ,[facility] resident with PMHx - COPD, DM, HTN, CAD, HLD, H/O TIA, dementia,GERD, BPH and depression sent ot ER for evaluation of left foot 1metatarsal wound ,suggestive of osteomyelitis .Patient was seen by ID consult and transfer to the hospital recommended for wound cx/bone biopsy /podiatry evaluation ,likely will require 4-6 weeks of iv abx . Patient was admitted to [facility] with b/l feet wounds and was followed by wound care team ,recently completed 7 days of doxycycline for foot wound cellulitis . (30 Mar 2023 05:21)    PAST MEDICAL & SURGICAL HISTORY:  HLD (hyperlipidemia)      MDD (major depressive disorder)      Obstructive and reflux uropathy      Cellulitis      Sepsis      Moderate protein-calorie malnutrition      Brain TIA      History of RSV infection      Pressure ulcer of unspecified heel, unspecified stage      Venous stasis ulcer without varicose veins      ASHD (arteriosclerotic heart disease)      BPH without urinary obstruction      COPD, moderate      Stage 3 chronic kidney disease      Chronic GERD      HTN (hypertension)      Dementia      DM type 2, not at goal      Multiple open wounds of foot          MEDICATIONS  (STANDING):  albuterol/ipratropium for Nebulization 3 milliLiter(s) Nebulizer every 8 hours  budesonide 160 MICROgram(s)/formoterol 4.5 MICROgram(s) Inhaler 2 Puff(s) Inhalation two times a day  dextrose 5%. 1000 milliLiter(s) (50 mL/Hr) IV Continuous <Continuous>  dextrose 5%. 1000 milliLiter(s) (100 mL/Hr) IV Continuous <Continuous>  dextrose 50% Injectable 25 Gram(s) IV Push once  dextrose 50% Injectable 12.5 Gram(s) IV Push once  dextrose 50% Injectable 25 Gram(s) IV Push once  donepezil 5 milliGRAM(s) Oral at bedtime  DULoxetine 60 milliGRAM(s) Oral daily  enoxaparin Injectable 60 milliGRAM(s) SubCutaneous every 12 hours  glucagon  Injectable 1 milliGRAM(s) IntraMuscular once  insulin glargine Injectable (LANTUS) 15 Unit(s) SubCutaneous at bedtime  insulin lispro (ADMELOG) corrective regimen sliding scale   SubCutaneous three times a day before meals  insulin lispro (ADMELOG) corrective regimen sliding scale   SubCutaneous at bedtime  insulin lispro Injectable (ADMELOG) 3 Unit(s) SubCutaneous three times a day before meals  lactobacillus acidophilus 1 Tablet(s) Oral two times a day with meals  losartan 50 milliGRAM(s) Oral daily  metoprolol tartrate 25 milliGRAM(s) Oral two times a day  multivitamin 1 Tablet(s) Oral daily  pantoprazole   Suspension 40 milliGRAM(s) Oral daily  piperacillin/tazobactam IVPB.. 3.375 Gram(s) IV Intermittent every 8 hours  senna 2 Tablet(s) Oral at bedtime  simvastatin 40 milliGRAM(s) Oral at bedtime  sodium chloride 0.45%. 1000 milliLiter(s) (50 mL/Hr) IV Continuous <Continuous>  tamsulosin 0.4 milliGRAM(s) Oral at bedtime    MEDICATIONS  (PRN):  acetaminophen     Tablet .. 650 milliGRAM(s) Oral every 6 hours PRN Temp greater or equal to 38C (100.4F), Mild Pain (1 - 3)  aluminum hydroxide/magnesium hydroxide/simethicone Suspension 30 milliLiter(s) Oral every 4 hours PRN Dyspepsia  bisacodyl Suppository 10 milliGRAM(s) Rectal daily PRN Constipation  dextrose Oral Gel 15 Gram(s) Oral once PRN Blood Glucose LESS THAN 70 milliGRAM(s)/deciliter  hydrALAZINE 50 milliGRAM(s) Oral every 6 hours PRN for systolic BP>160  magnesium hydroxide Suspension 30 milliLiter(s) Oral daily PRN Constipation  melatonin 3 milliGRAM(s) Oral at bedtime PRN Insomnia  morphine  - Injectable 2 milliGRAM(s) IV Push every 6 hours PRN Severe Pain (7 - 10)  ondansetron Injectable 4 milliGRAM(s) IV Push every 8 hours PRN Nausea and/or Vomiting  traMADol 50 milliGRAM(s) Oral four times a day PRN Moderate Pain (4 - 6)      OBJECTIVE    T(C): 37.3 (04-01-23 @ 05:20), Max: 38.3 (03-31-23 @ 20:57)  HR: 103 (04-01-23 @ 07:52) (98 - 110)  BP: 177/76 (04-01-23 @ 05:20) (136/85 - 177/76)  RR: 18 (04-01-23 @ 05:20) (18 - 18)  SpO2: 93% (04-01-23 @ 07:52) (92% - 94%)  Wt(kg): --  I&O's Summary    31 Mar 2023 07:01  -  01 Apr 2023 07:00  --------------------------------------------------------  IN: 800 mL / OUT: 0 mL / NET: 800 mL          REVIEW OF SYSTEMS:  CONSTITUTIONAL: No fever, weight loss, or fatigue  EYES: No eye pain, visual disturbances, or discharge  ENMT:   No sinus or throat pain  NECK: No pain or stiffness  RESPIRATORY: No cough, wheezing, chills or hemoptysis; No shortness of breath  CARDIOVASCULAR: No chest pain, palpitations, dizziness, or leg swelling  GASTROINTESTINAL: No abdominal pain. No nausea, vomiting; No diarrhea or constipation. No melena or hematochezia.  GENITOURINARY: No dysuria, frequency, hematuria, or incontinence  NEUROLOGICAL: No headaches, memory loss, loss of strength, numbness, or tremors  SKIN: No itching, burning, rashes, or lesions   MUSCULOSKELETAL: No joint pain or swelling; No muscle, back, or extremity pain    PHYSICAL EXAM:  Appearance: NAD. VS past 24 hrs -as above   HEENT:   Moist oral mucosa. Conjunctiva clear b/l.   Neck : supple  Respiratory: Lungs CTAB.  Gastrointestinal:  Soft, nontender. No rebound. No rigidity. BS present	  Cardiovascular: RRR ,S1S2 present  Neurologic: Non-focal. Moving all extremities.  Extremities: No edema. No erythema. No calf tenderness.  Skin: No rashes, No ecchymoses, No cyanosis.	  wounds ,skin lesions-See skin assesment flow sheet   LABS:                        11.1   11.96 )-----------( 244      ( 01 Apr 2023 05:07 )             36.7     04-01    151<H>  |  122<H>  |  40<H>  ----------------------------<  149<H>  4.0   |  24  |  1.50<H>    Ca    9.3      01 Apr 2023 05:07    TPro  6.6  /  Alb  2.3<L>  /  TBili  0.6  /  DBili  x   /  AST  23  /  ALT  30  /  AlkPhos  56  03-31    CAPILLARY BLOOD GLUCOSE      POCT Blood Glucose.: 296 mg/dL (01 Apr 2023 11:29)  POCT Blood Glucose.: 181 mg/dL (01 Apr 2023 07:38)  POCT Blood Glucose.: 102 mg/dL (31 Mar 2023 20:53)  POCT Blood Glucose.: 259 mg/dL (31 Mar 2023 16:41)    PT/INR - ( 31 Mar 2023 06:44 )   PT: 14.8 sec;   INR: 1.26 ratio               Culture - Blood (collected 29 Mar 2023 21:32)  Source: .Blood  Preliminary Report (31 Mar 2023 01:03):    No growth to date.    Culture - Blood (collected 29 Mar 2023 21:20)  Source: .Blood  Preliminary Report (31 Mar 2023 01:03):    No growth to date.      RADIOLOGY & ADDITIONAL TESTS: < from: US Duplex Venous Lower Ext Complete, Bilateral (03.31.23 @ 20:54) >  ACC: [id number] EXAM:  US DPLX LWR EXT VEINS COMPL BI   ORDERED BY: ESTRELLA LOBO     PROCEDURE DATE:  03/31/2023          INTERPRETATION:  CLINICAL INFORMATION: Lower extremity swelling.   Cellulitis. Evaluate for DVT.    COMPARISON: None available.    TECHNIQUE: Duplex sonography of the BILATERAL LOWER extremity veins with   color and spectral Doppler, with and without compression.    FINDINGS:    RIGHT:  Normal compressibility of the RIGHT common femoral, femoral and popliteal   veins.  Doppler examination shows normal spontaneous and phasic flow.  No RIGHT calf vein thrombosis is detected.    LEFT:  Normal compressibility of the LEFT common femoral, femoral and popliteal   veins.  Doppler examination shows normal spontaneous and phasic flow.  No LEFT calf vein thrombosis is detected.    IMPRESSION:    < end of copied text >     reviewed elctronically  ASSESSMENT/PLAN: 	    25 minutes aggregate time was spent on this visit, 50% visit time spent in care co-ordination with other attendings and counselling patient .I have discussed care plan with patient / HCP/family member ,who expressed understanding of problems treatment and their effect and side effects, alternatives in details. I have asked if they have any questions and concerns and appropriately addressed them to best of my ability.  PROGRESS NOTE  Patient is a 87y old  Male who presents with a chief complaint of left foot infection (01 Apr 2023 10:33)    Chart and available morning labs /imaging are reviewed electronically , urgent issues addressed . More information  is being added upon completion of rounds , when more information is collected and management discussed with consultants , medical staff and social service/case management on the floor   OVERNIGHT  No new issues reported by medical staff . All above noted Patient is resting in a bed comfortably .Confused ,poor mentation .No distress noted     HPI:  86 yo male ,[facility] resident with PMHx - COPD, DM, HTN, CAD, HLD, H/O TIA, dementia,GERD, BPH and depression sent ot ER for evaluation of left foot 1metatarsal wound ,suggestive of osteomyelitis .Patient was seen by ID consult and transfer to the hospital recommended for wound cx/bone biopsy /podiatry evaluation ,likely will require 4-6 weeks of iv abx . Patient was admitted to [facility] with b/l feet wounds and was followed by wound care team ,recently completed 7 days of doxycycline for foot wound cellulitis . (30 Mar 2023 05:21)    PAST MEDICAL & SURGICAL HISTORY:  HLD (hyperlipidemia)      MDD (major depressive disorder)      Obstructive and reflux uropathy      Cellulitis      Sepsis      Moderate protein-calorie malnutrition      Brain TIA      History of RSV infection      Pressure ulcer of unspecified heel, unspecified stage      Venous stasis ulcer without varicose veins      ASHD (arteriosclerotic heart disease)      BPH without urinary obstruction      COPD, moderate      Stage 3 chronic kidney disease      Chronic GERD      HTN (hypertension)      Dementia      DM type 2, not at goal      Multiple open wounds of foot          MEDICATIONS  (STANDING):  albuterol/ipratropium for Nebulization 3 milliLiter(s) Nebulizer every 8 hours  budesonide 160 MICROgram(s)/formoterol 4.5 MICROgram(s) Inhaler 2 Puff(s) Inhalation two times a day  dextrose 5%. 1000 milliLiter(s) (50 mL/Hr) IV Continuous <Continuous>  dextrose 5%. 1000 milliLiter(s) (100 mL/Hr) IV Continuous <Continuous>  dextrose 50% Injectable 25 Gram(s) IV Push once  dextrose 50% Injectable 12.5 Gram(s) IV Push once  dextrose 50% Injectable 25 Gram(s) IV Push once  donepezil 5 milliGRAM(s) Oral at bedtime  DULoxetine 60 milliGRAM(s) Oral daily  enoxaparin Injectable 60 milliGRAM(s) SubCutaneous every 12 hours  glucagon  Injectable 1 milliGRAM(s) IntraMuscular once  insulin glargine Injectable (LANTUS) 15 Unit(s) SubCutaneous at bedtime  insulin lispro (ADMELOG) corrective regimen sliding scale   SubCutaneous three times a day before meals  insulin lispro (ADMELOG) corrective regimen sliding scale   SubCutaneous at bedtime  insulin lispro Injectable (ADMELOG) 3 Unit(s) SubCutaneous three times a day before meals  lactobacillus acidophilus 1 Tablet(s) Oral two times a day with meals  losartan 50 milliGRAM(s) Oral daily  metoprolol tartrate 25 milliGRAM(s) Oral two times a day  multivitamin 1 Tablet(s) Oral daily  pantoprazole   Suspension 40 milliGRAM(s) Oral daily  piperacillin/tazobactam IVPB.. 3.375 Gram(s) IV Intermittent every 8 hours  senna 2 Tablet(s) Oral at bedtime  simvastatin 40 milliGRAM(s) Oral at bedtime  sodium chloride 0.45%. 1000 milliLiter(s) (50 mL/Hr) IV Continuous <Continuous>  tamsulosin 0.4 milliGRAM(s) Oral at bedtime    MEDICATIONS  (PRN):  acetaminophen     Tablet .. 650 milliGRAM(s) Oral every 6 hours PRN Temp greater or equal to 38C (100.4F), Mild Pain (1 - 3)  aluminum hydroxide/magnesium hydroxide/simethicone Suspension 30 milliLiter(s) Oral every 4 hours PRN Dyspepsia  bisacodyl Suppository 10 milliGRAM(s) Rectal daily PRN Constipation  dextrose Oral Gel 15 Gram(s) Oral once PRN Blood Glucose LESS THAN 70 milliGRAM(s)/deciliter  hydrALAZINE 50 milliGRAM(s) Oral every 6 hours PRN for systolic BP>160  magnesium hydroxide Suspension 30 milliLiter(s) Oral daily PRN Constipation  melatonin 3 milliGRAM(s) Oral at bedtime PRN Insomnia  morphine  - Injectable 2 milliGRAM(s) IV Push every 6 hours PRN Severe Pain (7 - 10)  ondansetron Injectable 4 milliGRAM(s) IV Push every 8 hours PRN Nausea and/or Vomiting  traMADol 50 milliGRAM(s) Oral four times a day PRN Moderate Pain (4 - 6)      OBJECTIVE    T(C): 37.3 (04-01-23 @ 05:20), Max: 38.3 (03-31-23 @ 20:57)  HR: 103 (04-01-23 @ 07:52) (98 - 110)  BP: 177/76 (04-01-23 @ 05:20) (136/85 - 177/76)  RR: 18 (04-01-23 @ 05:20) (18 - 18)  SpO2: 93% (04-01-23 @ 07:52) (92% - 94%)  Wt(kg): --  I&O's Summary    31 Mar 2023 07:01  -  01 Apr 2023 07:00  --------------------------------------------------------  IN: 800 mL / OUT: 0 mL / NET: 800 mL          REVIEW OF SYSTEMS:  CONSTITUTIONAL: No fever, weight loss, or fatigue  EYES: No eye pain, visual disturbances, or discharge  ENMT:   No sinus or throat pain  NECK: No pain or stiffness  RESPIRATORY: No cough, wheezing, chills or hemoptysis; No shortness of breath  CARDIOVASCULAR: No chest pain, palpitations, dizziness, or leg swelling  GASTROINTESTINAL: No abdominal pain. No nausea, vomiting; No diarrhea or constipation. No melena or hematochezia.  GENITOURINARY: No dysuria, frequency, hematuria, or incontinence  NEUROLOGICAL: No headaches, memory loss, loss of strength, numbness, or tremors  SKIN: No itching, burning, rashes, or lesions   MUSCULOSKELETAL: No joint pain or swelling; No muscle, back, or extremity pain    PHYSICAL EXAM:  Appearance: NAD. VS past 24 hrs -as above   HEENT:   Moist oral mucosa. Conjunctiva clear b/l.   Neck : supple  Respiratory: Lungs CTAB.  Gastrointestinal:  Soft, nontender. No rebound. No rigidity. BS present	  Cardiovascular: RRR ,S1S2 present  Neurologic: Non-focal. Moving all extremities.  Extremities: No edema. No erythema. No calf tenderness.  Skin: No rashes, No ecchymoses, No cyanosis.	  wounds ,skin lesions-See skin assesment flow sheet   LABS:                        11.1   11.96 )-----------( 244      ( 01 Apr 2023 05:07 )             36.7     04-01    151<H>  |  122<H>  |  40<H>  ----------------------------<  149<H>  4.0   |  24  |  1.50<H>    Ca    9.3      01 Apr 2023 05:07    TPro  6.6  /  Alb  2.3<L>  /  TBili  0.6  /  DBili  x   /  AST  23  /  ALT  30  /  AlkPhos  56  03-31    CAPILLARY BLOOD GLUCOSE      POCT Blood Glucose.: 296 mg/dL (01 Apr 2023 11:29)  POCT Blood Glucose.: 181 mg/dL (01 Apr 2023 07:38)  POCT Blood Glucose.: 102 mg/dL (31 Mar 2023 20:53)  POCT Blood Glucose.: 259 mg/dL (31 Mar 2023 16:41)    PT/INR - ( 31 Mar 2023 06:44 )   PT: 14.8 sec;   INR: 1.26 ratio               Culture - Blood (collected 29 Mar 2023 21:32)  Source: .Blood  Preliminary Report (31 Mar 2023 01:03):    No growth to date.    Culture - Blood (collected 29 Mar 2023 21:20)  Source: .Blood  Preliminary Report (31 Mar 2023 01:03):    No growth to date.      RADIOLOGY & ADDITIONAL TESTS: < from: US Duplex Venous Lower Ext Complete, Bilateral (03.31.23 @ 20:54) >  ACC: [id number] EXAM:  US DPLX LWR EXT VEINS COMPL BI   ORDERED BY: ESTRELLA LOBO     PROCEDURE DATE:  03/31/2023          INTERPRETATION:  CLINICAL INFORMATION: Lower extremity swelling.   Cellulitis. Evaluate for DVT.    COMPARISON: None available.    TECHNIQUE: Duplex sonography of the BILATERAL LOWER extremity veins with   color and spectral Doppler, with and without compression.    FINDINGS:    RIGHT:  Normal compressibility of the RIGHT common femoral, femoral and popliteal   veins.  Doppler examination shows normal spontaneous and phasic flow.  No RIGHT calf vein thrombosis is detected.    LEFT:  Normal compressibility of the LEFT common femoral, femoral and popliteal   veins.  Doppler examination shows normal spontaneous and phasic flow.  No LEFT calf vein thrombosis is detected.    IMPRESSION:    < end of copied text >     reviewed elctronically  ASSESSMENT/PLAN: 	    25 minutes aggregate time was spent on this visit, 50% visit time spent in care co-ordination with other attendings and counselling patient .I have discussed care plan with patient / HCP/family member ,who expressed understanding of problems treatment and their effect and side effects, alternatives in details. I have asked if they have any questions and concerns and appropriately addressed them to best of my ability.

## 2023-04-01 NOTE — PROGRESS NOTE ADULT - ASSESSMENT
88 yo male ,[facility] resident with PMHx - COPD, DM, HTN, CAD, HLD, H/O TIA, dementia,GERD, BPH and depression sent ot ER for evaluation of left foot 1metatarsal wound ,suggestive of osteomyelitis .Patient was seen by ID consult and transfer to the hospital recommended for wound cx/bone biopsy /podiatry evaluation ,likely will require 4-6 weeks of iv abx . Patient was admitted to [facility] with b/l feet wounds and was followed by wound care team ,recently completed 7 days of doxycycline for foot wound cellulitis . 86 yo male ,[facility] resident with PMHx - COPD, DM, HTN, CAD, HLD, H/O TIA, dementia,GERD, BPH and depression sent ot ER for evaluation of left foot 1metatarsal wound ,suggestive of osteomyelitis .Patient was seen by ID consult and transfer to the hospital recommended for wound cx/bone biopsy /podiatry evaluation ,likely will require 4-6 weeks of iv abx . Patient was admitted to [facility] with b/l feet wounds and was followed by wound care team ,recently completed 7 days of doxycycline for foot wound cellulitis . 86 yo male ,[facility] resident with PMHx - COPD, DM, HTN, CAD, HLD, H/O TIA, dementia,GERD, BPH and depression sent ot ER for evaluation of left foot 1metatarsal wound ,suggestive of osteomyelitis .Patient was seen by ID consult and transfer to the hospital recommended for wound cx/bone biopsy /podiatry evaluation ,likely will require 4-6 weeks of iv abx . Patient was admitted to [facility] with b/l feet wounds and was followed by wound care team ,recently completed 7 days of doxycycline for foot wound cellulitis .

## 2023-04-01 NOTE — PROGRESS NOTE ADULT - ASSESSMENT
REVIEW OF SYMPTOMS      Able to give (reliable) ROS  NO     PHYSICAL EXAM    HEENT Unremarkable  atraumatic   RESP Fair air entry EXP prolonged    Harsh breath sound Resp distres mild   CARDIAC S1 S2 No S3     NO JVD    ABDOMEN SOFT BS PRESENT NOT DISTENDED No hepatosplenomegaly   PEDAL EDEMA present No calf tenderness  NO rash       GENERAL DATA .   GOC.     .. 3/30/2023 full code  ALLGY.     .. nka                    WT.   .. 3/30/2023 63  BMI.        .. 3/30/2023 21            ICU STAY.   .. none  COVID.   .. 3/29/2023 scv2 (-)     BEST PRACTICE ISSUES.    HOB ELEVATN.   .. Yes  DVT PPLX.   .. 3/31 lvnx 60.2 Dr Lakhani   ..  3/29- 3/31 hpsc   MILLER PPLX.   .. 3/30/2023 protonix 40    INFN PPLX. ..    SP SW LAURENT.   ..  3/30/2023 -> soft bite mild thick        DIET.    ..  3/30/2023 dash  FREE WATER  .. 4/1/2023 fw 250.4    IV fl.  .. 4/1/2023 1/2 ns 50     ABGS.    VS/ PO/IO/ VENT/ DRIPS.   4/1/2023 afeb 100 150/70   4/1/2023 ra 92%     PROBLEM/ASSESSMENT/PLAN.  Infection  Osteomyelitius  Possible pneumonia   .. Esr 3/29-3/31/2023 esr 67- 49   .. W 3/29-3/30-3/31-4/1/2023 w 11.8 - 12- 10.5- 11.9    .. cxr 3/30/2023  ........ increasing r lower perihilar infiltrate   .. xr foot left 3/30/2023  ........ stable eroisions around 1st mtp anmd great toe    ........ which could be bone infectn    .. CXR 3/29/2023 Possible hansa pneum  .. 3/29/2023 bc (-)   .. 3/29 zosyn    .. follow cultures  PLEURAL EFFUSION.  .. ct ch 3/30/2023   ........ mild pulm edema  ........ mod r and sml effsn   a/r  .. pl effsn likely sec to chf   COPD  .. 3/30/2023 duoneb.3    .. 3/30/2023 symbicort   hemodynamics  .. La 3/29/2023 la 1.8   .. target map 65 (+)   CAD.  .. 3/30- 3/31/2023 atenolol 25  .. 3/31 metoprolol 25.2   .. 3/30/2023 s  RO DVT  .. 4/1/2023 v duplx (-)imvastat 40   CHF.  .. echo 3/30/2023  ........ ef 40%   ........ mod mr   .. 3/30/2023 losartan 50   .. 3/30/2023 hydralazin 50.4p   Anemia  .. Hb 3/29-3/30-3/31-4/1/2023 Hb 11.2- 10.2 - 9.8 - 11   .. monitor  Hypernatremia.  .. Na 3/31-4/1 Na 147 - 151   CKD  .. Na 3/29-3/31/2023 Na 144-147   .. Cr 3/29-3/30-3/31-4/1/2023 Cr 1.4 - 1.3 - 1.3 - 1.5   .. monitor  OBS  .. 3/30/2023 donepezil   .     OVERALL .  88 yo M with PMHx HTN, HLD, T2D, dementia (AOx2-3), OA, BPH, CAD, TIA, CKD 3 and GERD HO recent hospital stay 1/24-1/30/2023 [facility]  COPD ex  now admitted with osteomyelitis possible pneum  Pulm consulted 3/29/2023    PROBLEMS  Osteomyelitis  Pneumonia   .. 3/29 zosyn    COPD   CKD   PLEURAL EFFSN 3/30 ct  HFREF MOD MR 3/30 echo      PLAN  Antibio bronchodilators monitor  4/1/2023 free water startd     TIME SPENT   Over 25 minutes aggregate care time spent on encounter; activities included   direct patient care, counseling and/or coordinating care reviewing notes, lab data/ imaging , discussion with multidisciplinary team/ patient  /family and explaining in detail risks, benefits, alternatives  of the recommendations     Paulino Parmar 87 m     REVIEW OF SYMPTOMS      Able to give (reliable) ROS  NO     PHYSICAL EXAM    HEENT Unremarkable  atraumatic   RESP Fair air entry EXP prolonged    Harsh breath sound Resp distres mild   CARDIAC S1 S2 No S3     NO JVD    ABDOMEN SOFT BS PRESENT NOT DISTENDED No hepatosplenomegaly   PEDAL EDEMA present No calf tenderness  NO rash       GENERAL DATA .   GOC.     .. 3/30/2023 full code  ALLGY.     .. nka                    WT.   .. 3/30/2023 63  BMI.        .. 3/30/2023 21            ICU STAY.   .. none  COVID.   .. 3/29/2023 scv2 (-)     BEST PRACTICE ISSUES.    HOB ELEVATN.   .. Yes  DVT PPLX.   .. 3/31 lvnx 60.2 Dr Lakhani   ..  3/29- 3/31 hpsc   MILLER PPLX.   .. 3/30/2023 protonix 40    INFN PPLX. ..    SP SW LAURENT.   ..  3/30/2023 -> soft bite mild thick        DIET.    ..  3/30/2023 dash  FREE WATER  .. 4/1/2023 fw 250.4    IV fl.  .. 4/1/2023 1/2 ns 50     ABGS.    VS/ PO/IO/ VENT/ DRIPS.   4/1/2023 afeb 100 150/70   4/1/2023 ra 92%     PROBLEM/ASSESSMENT/PLAN.  Infection  Osteomyelitius  Possible pneumonia   .. Esr 3/29-3/31/2023 esr 67- 49   .. W 3/29-3/30-3/31-4/1/2023 w 11.8 - 12- 10.5- 11.9    .. cxr 3/30/2023  ........ increasing r lower perihilar infiltrate   .. xr foot left 3/30/2023  ........ stable eroisions around 1st mtp anmd great toe    ........ which could be bone infectn    .. CXR 3/29/2023 Possible hansa pneum  .. 3/29/2023 bc (-)   .. 3/29 zosyn    .. follow cultures  PLEURAL EFFUSION.  .. ct ch 3/30/2023   ........ mild pulm edema  ........ mod r and sml effsn   a/r  .. pl effsn likely sec to chf   COPD  .. 3/30/2023 duoneb.3    .. 3/30/2023 symbicort   hemodynamics  .. La 3/29/2023 la 1.8   .. target map 65 (+)   CAD.  .. 3/30- 3/31/2023 atenolol 25  .. 3/31 metoprolol 25.2   .. 3/30/2023 s  RO DVT  .. 4/1/2023 v duplx (-)imvastat 40   CHF.  .. echo 3/30/2023  ........ ef 40%   ........ mod mr   .. 3/30/2023 losartan 50   .. 3/30/2023 hydralazin 50.4p   Anemia  .. Hb 3/29-3/30-3/31-4/1/2023 Hb 11.2- 10.2 - 9.8 - 11   .. monitor  Hypernatremia.  .. Na 3/31-4/1 Na 147 - 151   CKD  .. Na 3/29-3/31/2023 Na 144-147   .. Cr 3/29-3/30-3/31-4/1/2023 Cr 1.4 - 1.3 - 1.3 - 1.5   .. monitor  OBS  .. 3/30/2023 donepezil   .     OVERALL .  86 yo M with PMHx HTN, HLD, T2D, dementia (AOx2-3), OA, BPH, CAD, TIA, CKD 3 and GERD HO recent hospital stay 1/24-1/30/2023 [facility]  COPD ex  now admitted with osteomyelitis possible pneum  Pulm consulted 3/29/2023    PROBLEMS  Osteomyelitis  Pneumonia   .. 3/29 zosyn    COPD   CKD   PLEURAL EFFSN 3/30 ct  HFREF MOD MR 3/30 echo      PLAN  Antibio bronchodilators monitor  4/1/2023 free water startd     TIME SPENT   Over 25 minutes aggregate care time spent on encounter; activities included   direct patient care, counseling and/or coordinating care reviewing notes, lab data/ imaging , discussion with multidisciplinary team/ patient  /family and explaining in detail risks, benefits, alternatives  of the recommendations     Paulino Parmar 87 m

## 2023-04-01 NOTE — PROGRESS NOTE ADULT - NUTRITIONAL ASSESSMENT
MEDICATIONS  (STANDING):  albuterol/ipratropium for Nebulization 3 milliLiter(s) Nebulizer every 8 hours  budesonide 160 MICROgram(s)/formoterol 4.5 MICROgram(s) Inhaler 2 Puff(s) Inhalation two times a day  dextrose 5%. 1000 milliLiter(s) (50 mL/Hr) IV Continuous <Continuous>  dextrose 5%. 1000 milliLiter(s) (100 mL/Hr) IV Continuous <Continuous>  dextrose 50% Injectable 25 Gram(s) IV Push once  dextrose 50% Injectable 12.5 Gram(s) IV Push once  dextrose 50% Injectable 25 Gram(s) IV Push once  donepezil 5 milliGRAM(s) Oral at bedtime  DULoxetine 60 milliGRAM(s) Oral daily  enoxaparin Injectable 60 milliGRAM(s) SubCutaneous every 12 hours  glucagon  Injectable 1 milliGRAM(s) IntraMuscular once  insulin glargine Injectable (LANTUS) 15 Unit(s) SubCutaneous at bedtime  insulin lispro (ADMELOG) corrective regimen sliding scale   SubCutaneous three times a day before meals  insulin lispro (ADMELOG) corrective regimen sliding scale   SubCutaneous at bedtime  insulin lispro Injectable (ADMELOG) 3 Unit(s) SubCutaneous three times a day before meals  lactobacillus acidophilus 1 Tablet(s) Oral two times a day with meals  losartan 50 milliGRAM(s) Oral daily  metoprolol tartrate 25 milliGRAM(s) Oral two times a day  multivitamin 1 Tablet(s) Oral daily  pantoprazole   Suspension 40 milliGRAM(s) Oral daily  piperacillin/tazobactam IVPB.. 3.375 Gram(s) IV Intermittent every 8 hours  senna 2 Tablet(s) Oral at bedtime  simvastatin 40 milliGRAM(s) Oral at bedtime  sodium chloride 0.45%. 1000 milliLiter(s) (50 mL/Hr) IV Continuous <Continuous>  tamsulosin 0.4 milliGRAM(s) Oral at bedtime

## 2023-04-01 NOTE — PROGRESS NOTE ADULT - SUBJECTIVE AND OBJECTIVE BOX
ANA MARIA LEIGH    PLV 1EAS 100 D1    Allergies    No Known Allergies    Intolerances        PAST MEDICAL & SURGICAL HISTORY:  ASHD (arteriosclerotic heart disease)      BPH without urinary obstruction      COPD, moderate      Stage 3 chronic kidney disease      Chronic GERD      HLD (hyperlipidemia)      MDD (major depressive disorder)      Obstructive and reflux uropathy      Cellulitis      HTN (hypertension)      Sepsis      Dementia      Moderate protein-calorie malnutrition      Brain TIA      History of RSV infection      DM type 2, not at goal      Pressure ulcer of unspecified heel, unspecified stage      Venous stasis ulcer without varicose veins      Multiple open wounds of foot          FAMILY HISTORY:      Home Medications:  Admelog SoloStar 100 units/mL injectable solution: injectable 4 times a day (before meals and at bedtime) per sliding scale (30 Mar 2023 15:23)  Aricept 5 mg oral tablet: 1 tab(s) orally once a day (30 Mar 2023 15:23)  atenolol 25 mg oral tablet: 1 tab(s) orally once a day (30 Mar 2023 15:23)  Bacid (LAC) oral tablet: 1 tab(s) orally 2 times a day (30 Mar 2023 15:23)  Cymbalta 60 mg oral delayed release capsule: 1 cap(s) orally once a day (30 Mar 2023 15:23)  docusate sodium 100 mg oral capsule: 2 cap(s) orally once a day (at bedtime) (30 Mar 2023 15:23)  doxycycline hyclate 100 mg oral tablet: 1 tab(s) orally 2 times a day for 7 days  3/28/23-4/4/23 (30 Mar 2023 15:23)  Dulcolax Laxative 10 mg rectal suppository: 1 suppository(ies) rectally as needed for  constipation (30 Mar 2023 15:23)  famotidine 40 mg oral tablet: 1 tab(s) orally once a day (30 Mar 2023 15:23)  Fleet Enema 19 g-7 g rectal enema: 133 milliliter(s) rectally as needed for  constipation (30 Mar 2023 15:23)  Flovent 44 mcg/inh inhalation aerosol with adapter: 1 puff(s) inhaled every 12 hours (30 Mar 2023 15:23)  ipratropium-albuterol 0.5 mg-2.5 mg/3 mL inhalation solution: 3 milliliter(s) by nebulizer 4 times a day (30 Mar 2023 15:23)  losartan 50 mg oral tablet: 1 tab(s) orally once a day (30 Mar 2023 15:23)  Milk of Magnesia 8% oral suspension: 30 milliliter(s) orally once a day as needed for  constipation (30 Mar 2023 15:23)  Santyl 250 units/g topical ointment: Apply topically to affected area (30 Mar 2023 12:41)  simvastatin 40 mg oral tablet: 1 tab(s) orally once a day (at bedtime) (30 Mar 2023 15:23)  SITagliptin 50 mg oral tablet: 1 tab(s) orally once a day (30 Mar 2023 15:23)  tamsulosin 0.4 mg oral capsule: 1 cap(s) orally once a day (in the evening) (30 Mar 2023 15:23)  Therapeutic Multiple Vitamins oral tablet: 1 tab(s) orally once a day (30 Mar 2023 15:23)  traMADol 50 mg oral tablet: 0.5 tab(s) orally 2 times a day (30 Mar 2023 15:23)  Tylenol Caplet Extra Strength 500 mg oral tablet: 2 tab(s) orally every 8 hours (30 Mar 2023 15:23)      MEDICATIONS  (STANDING):  albuterol/ipratropium for Nebulization 3 milliLiter(s) Nebulizer every 8 hours  budesonide 160 MICROgram(s)/formoterol 4.5 MICROgram(s) Inhaler 2 Puff(s) Inhalation two times a day  dextrose 5%. 1000 milliLiter(s) (50 mL/Hr) IV Continuous <Continuous>  dextrose 5%. 1000 milliLiter(s) (100 mL/Hr) IV Continuous <Continuous>  dextrose 50% Injectable 25 Gram(s) IV Push once  dextrose 50% Injectable 12.5 Gram(s) IV Push once  dextrose 50% Injectable 25 Gram(s) IV Push once  donepezil 5 milliGRAM(s) Oral at bedtime  DULoxetine 60 milliGRAM(s) Oral daily  enoxaparin Injectable 60 milliGRAM(s) SubCutaneous every 12 hours  glucagon  Injectable 1 milliGRAM(s) IntraMuscular once  insulin glargine Injectable (LANTUS) 15 Unit(s) SubCutaneous at bedtime  insulin lispro (ADMELOG) corrective regimen sliding scale   SubCutaneous three times a day before meals  insulin lispro (ADMELOG) corrective regimen sliding scale   SubCutaneous at bedtime  insulin lispro Injectable (ADMELOG) 3 Unit(s) SubCutaneous three times a day before meals  lactobacillus acidophilus 1 Tablet(s) Oral two times a day with meals  losartan 50 milliGRAM(s) Oral daily  metoprolol tartrate 25 milliGRAM(s) Oral two times a day  multivitamin 1 Tablet(s) Oral daily  pantoprazole   Suspension 40 milliGRAM(s) Oral daily  piperacillin/tazobactam IVPB.. 3.375 Gram(s) IV Intermittent every 8 hours  senna 2 Tablet(s) Oral at bedtime  simvastatin 40 milliGRAM(s) Oral at bedtime  sodium chloride 0.45%. 1000 milliLiter(s) (50 mL/Hr) IV Continuous <Continuous>  tamsulosin 0.4 milliGRAM(s) Oral at bedtime    MEDICATIONS  (PRN):  acetaminophen     Tablet .. 650 milliGRAM(s) Oral every 6 hours PRN Temp greater or equal to 38C (100.4F), Mild Pain (1 - 3)  aluminum hydroxide/magnesium hydroxide/simethicone Suspension 30 milliLiter(s) Oral every 4 hours PRN Dyspepsia  bisacodyl Suppository 10 milliGRAM(s) Rectal daily PRN Constipation  dextrose Oral Gel 15 Gram(s) Oral once PRN Blood Glucose LESS THAN 70 milliGRAM(s)/deciliter  hydrALAZINE 50 milliGRAM(s) Oral every 6 hours PRN for systolic BP>160  magnesium hydroxide Suspension 30 milliLiter(s) Oral daily PRN Constipation  melatonin 3 milliGRAM(s) Oral at bedtime PRN Insomnia  morphine  - Injectable 2 milliGRAM(s) IV Push every 6 hours PRN Severe Pain (7 - 10)  ondansetron Injectable 4 milliGRAM(s) IV Push every 8 hours PRN Nausea and/or Vomiting  traMADol 50 milliGRAM(s) Oral four times a day PRN Moderate Pain (4 - 6)      Diet, DASH/TLC:   Sodium & Cholesterol Restricted  Consistent Carbohydrate Evening Snack  Soft and Bite Sized (SOFTBTSZ)  Reginald(7 Gm Arginine/7 Gm Glut/1.2 Gm HMB     Qty per Day:  2  Supplement Feeding Modality:  Oral  Glucerna Shake Cans or Servings Per Day:  1       Frequency:  Daily (03-30-23 @ 13:51) [Pending Verification By Attending]  Diet, DASH/TLC:   Sodium & Cholesterol Restricted  Consistent Carbohydrate Evening Snack  Soft and Bite Sized (SOFTBTSZ)  Supplement Feeding Modality:  Oral  Glucerna Shake Cans or Servings Per Day:  1       Frequency:  Daily (03-30-23 @ 05:10) [Active]          Vital Signs Last 24 Hrs  T(C): 37.3 (01 Apr 2023 05:20), Max: 38.3 (31 Mar 2023 20:57)  T(F): 99.1 (01 Apr 2023 05:20), Max: 101 (31 Mar 2023 20:57)  HR: 103 (01 Apr 2023 07:52) (98 - 110)  BP: 177/76 (01 Apr 2023 05:20) (136/85 - 177/76)  BP(mean): --  RR: 18 (01 Apr 2023 05:20) (18 - 18)  SpO2: 93% (01 Apr 2023 07:52) (92% - 94%)    Parameters below as of 01 Apr 2023 07:52  Patient On (Oxygen Delivery Method): room air          03-31-23 @ 07:01  -  04-01-23 @ 07:00  --------------------------------------------------------  IN: 800 mL / OUT: 0 mL / NET: 800 mL              LABS:                        11.1   11.96 )-----------( 244      ( 01 Apr 2023 05:07 )             36.7     04-01    151<H>  |  122<H>  |  40<H>  ----------------------------<  149<H>  4.0   |  24  |  1.50<H>    Ca    9.3      01 Apr 2023 05:07    TPro  6.6  /  Alb  2.3<L>  /  TBili  0.6  /  DBili  x   /  AST  23  /  ALT  30  /  AlkPhos  56  03-31    PT/INR - ( 31 Mar 2023 06:44 )   PT: 14.8 sec;   INR: 1.26 ratio           Urinalysis Basic - ( 30 Mar 2023 09:53 )    Color: Red / Appearance: Slightly Turbid / SG: >=1.030 / pH: x  Gluc: x / Ketone: Negative  / Bili: Negative / Urobili: <2 mg/dL   Blood: x / Protein: Trace / Nitrite: Negative   Leuk Esterase: Moderate / RBC: 10 /HPF /  /HPF   Sq Epi: x / Non Sq Epi: 0 /HPF / Bacteria: Moderate            WBC:  WBC Count: 11.96 K/uL (04-01 @ 05:07)  WBC Count: 10.50 K/uL (03-31 @ 06:44)  WBC Count: 12.00 K/uL (03-30 @ 07:20)  WBC Count: 11.88 K/uL (03-29 @ 21:20)      MICROBIOLOGY:  RECENT CULTURES:  03-29 .Blood XXXX XXXX   No growth to date.    03-29 .Blood XXXX XXXX   No growth to date.                PT/INR - ( 31 Mar 2023 06:44 )   PT: 14.8 sec;   INR: 1.26 ratio             Sodium:  Sodium, Serum: 151 mmol/L (04-01 @ 05:07)  Sodium, Serum: 147 mmol/L (03-31 @ 06:44)  Sodium, Serum: 143 mmol/L (03-30 @ 07:20)  Sodium, Serum: 144 mmol/L (03-29 @ 21:20)      1.50 mg/dL 04-01 @ 05:07  1.30 mg/dL 03-31 @ 06:44  1.30 mg/dL 03-30 @ 07:20  1.40 mg/dL 03-29 @ 21:20      Hemoglobin:  Hemoglobin: 11.1 g/dL (04-01 @ 05:07)  Hemoglobin: 9.8 g/dL (03-31 @ 06:44)  Hemoglobin: 10.2 g/dL (03-30 @ 07:20)  Hemoglobin: 11.2 g/dL (03-29 @ 21:20)      Platelets: Platelet Count - Automated: 244 K/uL (04-01 @ 05:07)  Platelet Count - Automated: 209 K/uL (03-31 @ 06:44)  Platelet Count - Automated: 202 K/uL (03-30 @ 07:20)  Platelet Count - Automated: 228 K/uL (03-29 @ 21:20)      LIVER FUNCTIONS - ( 31 Mar 2023 06:44 )  Alb: 2.3 g/dL / Pro: 6.6 g/dL / ALK PHOS: 56 U/L / ALT: 30 U/L / AST: 23 U/L / GGT: x             Urinalysis Basic - ( 30 Mar 2023 09:53 )    Color: Red / Appearance: Slightly Turbid / SG: >=1.030 / pH: x  Gluc: x / Ketone: Negative  / Bili: Negative / Urobili: <2 mg/dL   Blood: x / Protein: Trace / Nitrite: Negative   Leuk Esterase: Moderate / RBC: 10 /HPF /  /HPF   Sq Epi: x / Non Sq Epi: 0 /HPF / Bacteria: Moderate        RADIOLOGY & ADDITIONAL STUDIES:      MICROBIOLOGY:  RECENT CULTURES:  03-29 .Blood XXXX XXXX   No growth to date.    03-29 .Blood XXXX XXXX   No growth to date.                 ANA MARIA ELIGH    PLV 1EAS 100 D1    Allergies    No Known Allergies    Intolerances        PAST MEDICAL & SURGICAL HISTORY:  ASHD (arteriosclerotic heart disease)      BPH without urinary obstruction      COPD, moderate      Stage 3 chronic kidney disease      Chronic GERD      HLD (hyperlipidemia)      MDD (major depressive disorder)      Obstructive and reflux uropathy      Cellulitis      HTN (hypertension)      Sepsis      Dementia      Moderate protein-calorie malnutrition      Brain TIA      History of RSV infection      DM type 2, not at goal      Pressure ulcer of unspecified heel, unspecified stage      Venous stasis ulcer without varicose veins      Multiple open wounds of foot          FAMILY HISTORY:      Home Medications:  Admelog SoloStar 100 units/mL injectable solution: injectable 4 times a day (before meals and at bedtime) per sliding scale (30 Mar 2023 15:23)  Aricept 5 mg oral tablet: 1 tab(s) orally once a day (30 Mar 2023 15:23)  atenolol 25 mg oral tablet: 1 tab(s) orally once a day (30 Mar 2023 15:23)  Bacid (LAC) oral tablet: 1 tab(s) orally 2 times a day (30 Mar 2023 15:23)  Cymbalta 60 mg oral delayed release capsule: 1 cap(s) orally once a day (30 Mar 2023 15:23)  docusate sodium 100 mg oral capsule: 2 cap(s) orally once a day (at bedtime) (30 Mar 2023 15:23)  doxycycline hyclate 100 mg oral tablet: 1 tab(s) orally 2 times a day for 7 days  3/28/23-4/4/23 (30 Mar 2023 15:23)  Dulcolax Laxative 10 mg rectal suppository: 1 suppository(ies) rectally as needed for  constipation (30 Mar 2023 15:23)  famotidine 40 mg oral tablet: 1 tab(s) orally once a day (30 Mar 2023 15:23)  Fleet Enema 19 g-7 g rectal enema: 133 milliliter(s) rectally as needed for  constipation (30 Mar 2023 15:23)  Flovent 44 mcg/inh inhalation aerosol with adapter: 1 puff(s) inhaled every 12 hours (30 Mar 2023 15:23)  ipratropium-albuterol 0.5 mg-2.5 mg/3 mL inhalation solution: 3 milliliter(s) by nebulizer 4 times a day (30 Mar 2023 15:23)  losartan 50 mg oral tablet: 1 tab(s) orally once a day (30 Mar 2023 15:23)  Milk of Magnesia 8% oral suspension: 30 milliliter(s) orally once a day as needed for  constipation (30 Mar 2023 15:23)  Santyl 250 units/g topical ointment: Apply topically to affected area (30 Mar 2023 12:41)  simvastatin 40 mg oral tablet: 1 tab(s) orally once a day (at bedtime) (30 Mar 2023 15:23)  SITagliptin 50 mg oral tablet: 1 tab(s) orally once a day (30 Mar 2023 15:23)  tamsulosin 0.4 mg oral capsule: 1 cap(s) orally once a day (in the evening) (30 Mar 2023 15:23)  Therapeutic Multiple Vitamins oral tablet: 1 tab(s) orally once a day (30 Mar 2023 15:23)  traMADol 50 mg oral tablet: 0.5 tab(s) orally 2 times a day (30 Mar 2023 15:23)  Tylenol Caplet Extra Strength 500 mg oral tablet: 2 tab(s) orally every 8 hours (30 Mar 2023 15:23)      MEDICATIONS  (STANDING):  albuterol/ipratropium for Nebulization 3 milliLiter(s) Nebulizer every 8 hours  budesonide 160 MICROgram(s)/formoterol 4.5 MICROgram(s) Inhaler 2 Puff(s) Inhalation two times a day  dextrose 5%. 1000 milliLiter(s) (50 mL/Hr) IV Continuous <Continuous>  dextrose 5%. 1000 milliLiter(s) (100 mL/Hr) IV Continuous <Continuous>  dextrose 50% Injectable 25 Gram(s) IV Push once  dextrose 50% Injectable 12.5 Gram(s) IV Push once  dextrose 50% Injectable 25 Gram(s) IV Push once  donepezil 5 milliGRAM(s) Oral at bedtime  DULoxetine 60 milliGRAM(s) Oral daily  enoxaparin Injectable 60 milliGRAM(s) SubCutaneous every 12 hours  glucagon  Injectable 1 milliGRAM(s) IntraMuscular once  insulin glargine Injectable (LANTUS) 15 Unit(s) SubCutaneous at bedtime  insulin lispro (ADMELOG) corrective regimen sliding scale   SubCutaneous three times a day before meals  insulin lispro (ADMELOG) corrective regimen sliding scale   SubCutaneous at bedtime  insulin lispro Injectable (ADMELOG) 3 Unit(s) SubCutaneous three times a day before meals  lactobacillus acidophilus 1 Tablet(s) Oral two times a day with meals  losartan 50 milliGRAM(s) Oral daily  metoprolol tartrate 25 milliGRAM(s) Oral two times a day  multivitamin 1 Tablet(s) Oral daily  pantoprazole   Suspension 40 milliGRAM(s) Oral daily  piperacillin/tazobactam IVPB.. 3.375 Gram(s) IV Intermittent every 8 hours  senna 2 Tablet(s) Oral at bedtime  simvastatin 40 milliGRAM(s) Oral at bedtime  sodium chloride 0.45%. 1000 milliLiter(s) (50 mL/Hr) IV Continuous <Continuous>  tamsulosin 0.4 milliGRAM(s) Oral at bedtime    MEDICATIONS  (PRN):  acetaminophen     Tablet .. 650 milliGRAM(s) Oral every 6 hours PRN Temp greater or equal to 38C (100.4F), Mild Pain (1 - 3)  aluminum hydroxide/magnesium hydroxide/simethicone Suspension 30 milliLiter(s) Oral every 4 hours PRN Dyspepsia  bisacodyl Suppository 10 milliGRAM(s) Rectal daily PRN Constipation  dextrose Oral Gel 15 Gram(s) Oral once PRN Blood Glucose LESS THAN 70 milliGRAM(s)/deciliter  hydrALAZINE 50 milliGRAM(s) Oral every 6 hours PRN for systolic BP>160  magnesium hydroxide Suspension 30 milliLiter(s) Oral daily PRN Constipation  melatonin 3 milliGRAM(s) Oral at bedtime PRN Insomnia  morphine  - Injectable 2 milliGRAM(s) IV Push every 6 hours PRN Severe Pain (7 - 10)  ondansetron Injectable 4 milliGRAM(s) IV Push every 8 hours PRN Nausea and/or Vomiting  traMADol 50 milliGRAM(s) Oral four times a day PRN Moderate Pain (4 - 6)      Diet, DASH/TLC:   Sodium & Cholesterol Restricted  Consistent Carbohydrate Evening Snack  Soft and Bite Sized (SOFTBTSZ)  Reginald(7 Gm Arginine/7 Gm Glut/1.2 Gm HMB     Qty per Day:  2  Supplement Feeding Modality:  Oral  Glucerna Shake Cans or Servings Per Day:  1       Frequency:  Daily (03-30-23 @ 13:51) [Pending Verification By Attending]  Diet, DASH/TLC:   Sodium & Cholesterol Restricted  Consistent Carbohydrate Evening Snack  Soft and Bite Sized (SOFTBTSZ)  Supplement Feeding Modality:  Oral  Glucerna Shake Cans or Servings Per Day:  1       Frequency:  Daily (03-30-23 @ 05:10) [Active]          Vital Signs Last 24 Hrs  T(C): 37.3 (01 Apr 2023 05:20), Max: 38.3 (31 Mar 2023 20:57)  T(F): 99.1 (01 Apr 2023 05:20), Max: 101 (31 Mar 2023 20:57)  HR: 103 (01 Apr 2023 07:52) (98 - 110)  BP: 177/76 (01 Apr 2023 05:20) (136/85 - 177/76)  BP(mean): --  RR: 18 (01 Apr 2023 05:20) (18 - 18)  SpO2: 93% (01 Apr 2023 07:52) (92% - 94%)    Parameters below as of 01 Apr 2023 07:52  Patient On (Oxygen Delivery Method): room air          03-31-23 @ 07:01  -  04-01-23 @ 07:00  --------------------------------------------------------  IN: 800 mL / OUT: 0 mL / NET: 800 mL              LABS:                        11.1   11.96 )-----------( 244      ( 01 Apr 2023 05:07 )             36.7     04-01    151<H>  |  122<H>  |  40<H>  ----------------------------<  149<H>  4.0   |  24  |  1.50<H>    Ca    9.3      01 Apr 2023 05:07    TPro  6.6  /  Alb  2.3<L>  /  TBili  0.6  /  DBili  x   /  AST  23  /  ALT  30  /  AlkPhos  56  03-31    PT/INR - ( 31 Mar 2023 06:44 )   PT: 14.8 sec;   INR: 1.26 ratio           Urinalysis Basic - ( 30 Mar 2023 09:53 )    Color: Red / Appearance: Slightly Turbid / SG: >=1.030 / pH: x  Gluc: x / Ketone: Negative  / Bili: Negative / Urobili: <2 mg/dL   Blood: x / Protein: Trace / Nitrite: Negative   Leuk Esterase: Moderate / RBC: 10 /HPF /  /HPF   Sq Epi: x / Non Sq Epi: 0 /HPF / Bacteria: Moderate            WBC:  WBC Count: 11.96 K/uL (04-01 @ 05:07)  WBC Count: 10.50 K/uL (03-31 @ 06:44)  WBC Count: 12.00 K/uL (03-30 @ 07:20)  WBC Count: 11.88 K/uL (03-29 @ 21:20)      MICROBIOLOGY:  RECENT CULTURES:  03-29 .Blood XXXX XXXX   No growth to date.    03-29 .Blood XXXX XXXX   No growth to date.                PT/INR - ( 31 Mar 2023 06:44 )   PT: 14.8 sec;   INR: 1.26 ratio             Sodium:  Sodium, Serum: 151 mmol/L (04-01 @ 05:07)  Sodium, Serum: 147 mmol/L (03-31 @ 06:44)  Sodium, Serum: 143 mmol/L (03-30 @ 07:20)  Sodium, Serum: 144 mmol/L (03-29 @ 21:20)      1.50 mg/dL 04-01 @ 05:07  1.30 mg/dL 03-31 @ 06:44  1.30 mg/dL 03-30 @ 07:20  1.40 mg/dL 03-29 @ 21:20      Hemoglobin:  Hemoglobin: 11.1 g/dL (04-01 @ 05:07)  Hemoglobin: 9.8 g/dL (03-31 @ 06:44)  Hemoglobin: 10.2 g/dL (03-30 @ 07:20)  Hemoglobin: 11.2 g/dL (03-29 @ 21:20)      Platelets: Platelet Count - Automated: 244 K/uL (04-01 @ 05:07)  Platelet Count - Automated: 209 K/uL (03-31 @ 06:44)  Platelet Count - Automated: 202 K/uL (03-30 @ 07:20)  Platelet Count - Automated: 228 K/uL (03-29 @ 21:20)      LIVER FUNCTIONS - ( 31 Mar 2023 06:44 )  Alb: 2.3 g/dL / Pro: 6.6 g/dL / ALK PHOS: 56 U/L / ALT: 30 U/L / AST: 23 U/L / GGT: x             Urinalysis Basic - ( 30 Mar 2023 09:53 )    Color: Red / Appearance: Slightly Turbid / SG: >=1.030 / pH: x  Gluc: x / Ketone: Negative  / Bili: Negative / Urobili: <2 mg/dL   Blood: x / Protein: Trace / Nitrite: Negative   Leuk Esterase: Moderate / RBC: 10 /HPF /  /HPF   Sq Epi: x / Non Sq Epi: 0 /HPF / Bacteria: Moderate        RADIOLOGY & ADDITIONAL STUDIES:      MICROBIOLOGY:  RECENT CULTURES:  03-29 .Blood XXXX XXXX   No growth to date.    03-29 .Blood XXXX XXXX   No growth to date.

## 2023-04-02 LAB
ANION GAP SERPL CALC-SCNC: 6 MMOL/L — SIGNIFICANT CHANGE UP (ref 5–17)
APPEARANCE UR: ABNORMAL
BILIRUB UR-MCNC: NEGATIVE — SIGNIFICANT CHANGE UP
BUN SERPL-MCNC: 41 MG/DL — HIGH (ref 7–23)
CALCIUM SERPL-MCNC: 8.8 MG/DL — SIGNIFICANT CHANGE UP (ref 8.5–10.1)
CHLORIDE SERPL-SCNC: 124 MMOL/L — HIGH (ref 96–108)
CO2 SERPL-SCNC: 22 MMOL/L — SIGNIFICANT CHANGE UP (ref 22–31)
COLOR SPEC: YELLOW — SIGNIFICANT CHANGE UP
CREAT SERPL-MCNC: 1.3 MG/DL — SIGNIFICANT CHANGE UP (ref 0.5–1.3)
DIFF PNL FLD: ABNORMAL
EGFR: 53 ML/MIN/1.73M2 — LOW
GLUCOSE SERPL-MCNC: 108 MG/DL — HIGH (ref 70–99)
GLUCOSE UR QL: 100 MG/DL
HCT VFR BLD CALC: 34.5 % — LOW (ref 39–50)
HGB BLD-MCNC: 10.6 G/DL — LOW (ref 13–17)
KETONES UR-MCNC: ABNORMAL
LEUKOCYTE ESTERASE UR-ACNC: ABNORMAL
MAGNESIUM SERPL-MCNC: 2 MG/DL — SIGNIFICANT CHANGE UP (ref 1.6–2.6)
MCHC RBC-ENTMCNC: 28.6 PG — SIGNIFICANT CHANGE UP (ref 27–34)
MCHC RBC-ENTMCNC: 30.7 GM/DL — LOW (ref 32–36)
MCV RBC AUTO: 93.2 FL — SIGNIFICANT CHANGE UP (ref 80–100)
NITRITE UR-MCNC: NEGATIVE — SIGNIFICANT CHANGE UP
NRBC # BLD: 0 /100 WBCS — SIGNIFICANT CHANGE UP (ref 0–0)
NT-PROBNP SERPL-SCNC: HIGH PG/ML (ref 0–450)
PH UR: 5 — SIGNIFICANT CHANGE UP (ref 5–8)
PHOSPHATE SERPL-MCNC: 3.9 MG/DL — SIGNIFICANT CHANGE UP (ref 2.5–4.5)
PLATELET # BLD AUTO: 223 K/UL — SIGNIFICANT CHANGE UP (ref 150–400)
POTASSIUM SERPL-MCNC: 3.4 MMOL/L — LOW (ref 3.5–5.3)
POTASSIUM SERPL-SCNC: 3.4 MMOL/L — LOW (ref 3.5–5.3)
PROT UR-MCNC: 30 MG/DL
RBC # BLD: 3.7 M/UL — LOW (ref 4.2–5.8)
RBC # FLD: 17 % — HIGH (ref 10.3–14.5)
SODIUM SERPL-SCNC: 152 MMOL/L — HIGH (ref 135–145)
SP GR SPEC: 1.02 — SIGNIFICANT CHANGE UP (ref 1.01–1.02)
UROBILINOGEN FLD QL: NEGATIVE — SIGNIFICANT CHANGE UP
VIT B12 SERPL-MCNC: 512 PG/ML — SIGNIFICANT CHANGE UP (ref 232–1245)
WBC # BLD: 10.33 K/UL — SIGNIFICANT CHANGE UP (ref 3.8–10.5)
WBC # FLD AUTO: 10.33 K/UL — SIGNIFICANT CHANGE UP (ref 3.8–10.5)

## 2023-04-02 PROCEDURE — 99233 SBSQ HOSP IP/OBS HIGH 50: CPT

## 2023-04-02 RX ORDER — SODIUM CHLORIDE 9 MG/ML
1000 INJECTION, SOLUTION INTRAVENOUS
Refills: 0 | Status: DISCONTINUED | OUTPATIENT
Start: 2023-04-02 | End: 2023-04-04

## 2023-04-02 RX ORDER — POTASSIUM CHLORIDE 20 MEQ
10 PACKET (EA) ORAL
Refills: 0 | Status: COMPLETED | OUTPATIENT
Start: 2023-04-02 | End: 2023-04-02

## 2023-04-02 RX ORDER — ASCORBIC ACID 60 MG
500 TABLET,CHEWABLE ORAL DAILY
Refills: 0 | Status: CANCELLED | OUTPATIENT
Start: 2023-04-02 | End: 2023-04-10

## 2023-04-02 RX ORDER — CHLORHEXIDINE GLUCONATE 213 G/1000ML
1 SOLUTION TOPICAL DAILY
Refills: 0 | Status: DISCONTINUED | OUTPATIENT
Start: 2023-04-02 | End: 2023-04-05

## 2023-04-02 RX ADMIN — Medication 3 MILLILITER(S): at 15:17

## 2023-04-02 RX ADMIN — PANTOPRAZOLE SODIUM 40 MILLIGRAM(S): 20 TABLET, DELAYED RELEASE ORAL at 12:32

## 2023-04-02 RX ADMIN — Medication 100 MILLIEQUIVALENT(S): at 13:38

## 2023-04-02 RX ADMIN — Medication 25 MILLIGRAM(S): at 05:17

## 2023-04-02 RX ADMIN — CHLORHEXIDINE GLUCONATE 1 APPLICATION(S): 213 SOLUTION TOPICAL at 12:32

## 2023-04-02 RX ADMIN — ENOXAPARIN SODIUM 60 MILLIGRAM(S): 100 INJECTION SUBCUTANEOUS at 05:09

## 2023-04-02 RX ADMIN — Medication 1 TABLET(S): at 08:01

## 2023-04-02 RX ADMIN — DULOXETINE HYDROCHLORIDE 60 MILLIGRAM(S): 30 CAPSULE, DELAYED RELEASE ORAL at 12:32

## 2023-04-02 RX ADMIN — BUDESONIDE AND FORMOTEROL FUMARATE DIHYDRATE 2 PUFF(S): 160; 4.5 AEROSOL RESPIRATORY (INHALATION) at 18:41

## 2023-04-02 RX ADMIN — PIPERACILLIN AND TAZOBACTAM 25 GRAM(S): 4; .5 INJECTION, POWDER, LYOPHILIZED, FOR SOLUTION INTRAVENOUS at 21:59

## 2023-04-02 RX ADMIN — LOSARTAN POTASSIUM 50 MILLIGRAM(S): 100 TABLET, FILM COATED ORAL at 05:18

## 2023-04-02 RX ADMIN — Medication 3 UNIT(S): at 08:01

## 2023-04-02 RX ADMIN — SIMVASTATIN 40 MILLIGRAM(S): 20 TABLET, FILM COATED ORAL at 21:59

## 2023-04-02 RX ADMIN — BUDESONIDE AND FORMOTEROL FUMARATE DIHYDRATE 2 PUFF(S): 160; 4.5 AEROSOL RESPIRATORY (INHALATION) at 07:03

## 2023-04-02 RX ADMIN — Medication 100 MILLIEQUIVALENT(S): at 12:32

## 2023-04-02 RX ADMIN — PIPERACILLIN AND TAZOBACTAM 25 GRAM(S): 4; .5 INJECTION, POWDER, LYOPHILIZED, FOR SOLUTION INTRAVENOUS at 05:18

## 2023-04-02 RX ADMIN — INSULIN GLARGINE 15 UNIT(S): 100 INJECTION, SOLUTION SUBCUTANEOUS at 21:59

## 2023-04-02 RX ADMIN — Medication 1 TABLET(S): at 12:32

## 2023-04-02 RX ADMIN — TAMSULOSIN HYDROCHLORIDE 0.4 MILLIGRAM(S): 0.4 CAPSULE ORAL at 21:59

## 2023-04-02 RX ADMIN — SENNA PLUS 2 TABLET(S): 8.6 TABLET ORAL at 21:59

## 2023-04-02 RX ADMIN — Medication 25 MILLIGRAM(S): at 18:37

## 2023-04-02 RX ADMIN — SODIUM CHLORIDE 70 MILLILITER(S): 9 INJECTION, SOLUTION INTRAVENOUS at 14:46

## 2023-04-02 RX ADMIN — ENOXAPARIN SODIUM 60 MILLIGRAM(S): 100 INJECTION SUBCUTANEOUS at 18:37

## 2023-04-02 RX ADMIN — Medication 1 TABLET(S): at 18:37

## 2023-04-02 RX ADMIN — DONEPEZIL HYDROCHLORIDE 5 MILLIGRAM(S): 10 TABLET, FILM COATED ORAL at 22:00

## 2023-04-02 RX ADMIN — SODIUM CHLORIDE 50 MILLILITER(S): 9 INJECTION, SOLUTION INTRAVENOUS at 12:32

## 2023-04-02 RX ADMIN — Medication 3 MILLILITER(S): at 23:28

## 2023-04-02 RX ADMIN — Medication 1: at 22:00

## 2023-04-02 RX ADMIN — PIPERACILLIN AND TAZOBACTAM 25 GRAM(S): 4; .5 INJECTION, POWDER, LYOPHILIZED, FOR SOLUTION INTRAVENOUS at 14:46

## 2023-04-02 RX ADMIN — Medication 3 MILLILITER(S): at 07:27

## 2023-04-02 RX ADMIN — Medication 100 MILLIEQUIVALENT(S): at 14:47

## 2023-04-02 RX ADMIN — Medication 2: at 17:51

## 2023-04-02 NOTE — PROGRESS NOTE ADULT - SUBJECTIVE AND OBJECTIVE BOX
Patient is a 87y Male with a known history of :  ASHD (arteriosclerotic heart disease) [I25.10]    BPH without urinary obstruction [N40.0]    COPD, moderate [J44.9]    Stage 3 chronic kidney disease [N18.30]    Chronic GERD [K21.9]    HTN (hypertension) [I10]    Dementia [F03.90]    DM type 2, not at goal [E11.9]    Bone infection of left foot [M86.9]    Multiple open wounds of foot [S91.309A]    Prophylactic measure [Z29.9]    DM (diabetes mellitus), type 2 [E11.9]    PVD (peripheral vascular disease) [I73.9]    Osteomyelitis of left foot [M86.9]    Cellulitis of left foot [L03.116]    Pressure ulcer, unstageable, with eschar [L89.95]      HPI:  88 yo male ,[facility] resident with PMHx - COPD, DM, HTN, CAD, HLD, H/O TIA, dementia,GERD, BPH and depression sent ot ER for evaluation of left foot 1metatarsal wound ,suggestive of osteomyelitis .Patient was seen by ID consult and transfer to the hospital recommended for wound cx/bone biopsy /podiatry evaluation ,likely will require 4-6 weeks of iv abx . Patient was admitted to [facility] with b/l feet wounds and was followed by wound care team ,recently completed 7 days of doxycycline for foot wound cellulitis . (30 Mar 2023 05:21)      REVIEW OF SYSTEMS:    CONSTITUTIONAL: No fever, weight loss, or fatigue  EYES: No eye pain, visual disturbances, or discharge  ENMT:  No difficulty hearing, tinnitus, vertigo; No sinus or throat pain  NECK: No pain or stiffness  BREASTS: No pain, masses, or nipple discharge  RESPIRATORY: No cough, wheezing, chills or hemoptysis; No shortness of breath  CARDIOVASCULAR: No chest pain, palpitations, dizziness, or leg swelling  GASTROINTESTINAL: No abdominal or epigastric pain. No nausea, vomiting, or hematemesis; No diarrhea or constipation. No melena or hematochezia.  GENITOURINARY: No dysuria, frequency, hematuria, or incontinence  NEUROLOGICAL: No headaches, memory loss, loss of strength, numbness, or tremors  SKIN: No itching, burning, rashes, or lesions   LYMPH NODES: No enlarged glands  ENDOCRINE: No heat or cold intolerance; No hair loss  MUSCULOSKELETAL: No joint pain or swelling; No muscle, back, or extremity pain  PSYCHIATRIC: No depression, anxiety, mood swings, or difficulty sleeping  HEME/LYMPH: No easy bruising, or bleeding gums  ALLERGY AND IMMUNOLOGIC: No hives or eczema    MEDICATIONS  (STANDING):  albuterol/ipratropium for Nebulization 3 milliLiter(s) Nebulizer every 8 hours  budesonide 160 MICROgram(s)/formoterol 4.5 MICROgram(s) Inhaler 2 Puff(s) Inhalation two times a day  chlorhexidine 2% Cloths 1 Application(s) Topical daily  dextrose 5%. 1000 milliLiter(s) (50 mL/Hr) IV Continuous <Continuous>  dextrose 5%. 1000 milliLiter(s) (100 mL/Hr) IV Continuous <Continuous>  dextrose 50% Injectable 25 Gram(s) IV Push once  dextrose 50% Injectable 12.5 Gram(s) IV Push once  dextrose 50% Injectable 25 Gram(s) IV Push once  donepezil 5 milliGRAM(s) Oral at bedtime  DULoxetine 60 milliGRAM(s) Oral daily  enoxaparin Injectable 60 milliGRAM(s) SubCutaneous every 12 hours  glucagon  Injectable 1 milliGRAM(s) IntraMuscular once  insulin glargine Injectable (LANTUS) 15 Unit(s) SubCutaneous at bedtime  insulin lispro (ADMELOG) corrective regimen sliding scale   SubCutaneous three times a day before meals  insulin lispro (ADMELOG) corrective regimen sliding scale   SubCutaneous at bedtime  insulin lispro Injectable (ADMELOG) 3 Unit(s) SubCutaneous three times a day before meals  lactobacillus acidophilus 1 Tablet(s) Oral two times a day with meals  losartan 50 milliGRAM(s) Oral daily  metoprolol tartrate 25 milliGRAM(s) Oral two times a day  multivitamin 1 Tablet(s) Oral daily  pantoprazole   Suspension 40 milliGRAM(s) Oral daily  piperacillin/tazobactam IVPB.. 3.375 Gram(s) IV Intermittent every 8 hours  senna 2 Tablet(s) Oral at bedtime  simvastatin 40 milliGRAM(s) Oral at bedtime  sodium chloride 0.45%. 1000 milliLiter(s) (50 mL/Hr) IV Continuous <Continuous>  tamsulosin 0.4 milliGRAM(s) Oral at bedtime    MEDICATIONS  (PRN):  acetaminophen     Tablet .. 650 milliGRAM(s) Oral every 6 hours PRN Temp greater or equal to 38C (100.4F), Mild Pain (1 - 3)  aluminum hydroxide/magnesium hydroxide/simethicone Suspension 30 milliLiter(s) Oral every 4 hours PRN Dyspepsia  bisacodyl Suppository 10 milliGRAM(s) Rectal daily PRN Constipation  dextrose Oral Gel 15 Gram(s) Oral once PRN Blood Glucose LESS THAN 70 milliGRAM(s)/deciliter  hydrALAZINE 50 milliGRAM(s) Oral every 6 hours PRN for systolic BP>160  magnesium hydroxide Suspension 30 milliLiter(s) Oral daily PRN Constipation  melatonin 3 milliGRAM(s) Oral at bedtime PRN Insomnia  morphine  - Injectable 2 milliGRAM(s) IV Push every 6 hours PRN Severe Pain (7 - 10)  ondansetron Injectable 4 milliGRAM(s) IV Push every 8 hours PRN Nausea and/or Vomiting  traMADol 50 milliGRAM(s) Oral four times a day PRN Moderate Pain (4 - 6)      ALLERGIES: No Known Allergies      FAMILY HISTORY:      PHYSICAL EXAMINATION:  -----------------------------  T(C): 37.5 (04-02-23 @ 05:04), Max: 37.5 (04-02-23 @ 05:04)  HR: 100 (04-02-23 @ 07:27) (83 - 121)  BP: 162/92 (04-02-23 @ 05:04) (150/76 - 162/92)  RR: 17 (04-02-23 @ 05:04) (16 - 18)  SpO2: 94% (04-02-23 @ 07:27) (92% - 97%)  Wt(kg): --        VITALS  T(C): 37.5 (04-02-23 @ 05:04), Max: 37.5 (04-02-23 @ 05:04)  HR: 100 (04-02-23 @ 07:27) (83 - 121)  BP: 162/92 (04-02-23 @ 05:04) (150/76 - 162/92)  RR: 17 (04-02-23 @ 05:04) (16 - 18)  SpO2: 94% (04-02-23 @ 07:27) (92% - 97%)    Constitutional: well developed, normal appearance, well groomed, well nourished, no deformities and no acute distress.   Eyes: the conjunctiva exhibited no abnormalities and the eyelids demonstrated no xanthelasmas.   HEENT: normal oral mucosa, no oral pallor and no oral cyanosis.   Neck: normal jugular venous A waves present, normal jugular venous V waves present and no jugular venous paulino A waves.   Pulmonary: no respiratory distress, normal respiratory rhythm and effort, no accessory muscle use and lungs were clear to auscultation bilaterally.   Cardiovascular: heart rate and rhythm were normal, normal S1 and S2 and no murmur, gallop, rub, heave or thrill are present.   Abdomen: soft, non-tender, no hepato-splenomegaly and no abdominal mass palpated.   Musculoskeletal: the gait could not be assessed..   Extremities: no clubbing of the fingernails, no localized cyanosis, no petechial hemorrhages and no ischemic changes.   Skin: normal skin color and pigmentation, no rash, no venous stasis, no skin lesions, no skin ulcer and no xanthoma was observed.   Psychiatric: oriented to person, place, and time, the affect was normal, the mood was normal and not feeling anxious.     LABS:   --------  04-02    152<H>  |  124<H>  |  41<H>  ----------------------------<  108<H>  3.4<L>   |  22  |  1.30    Ca    8.8      02 Apr 2023 04:20  Phos  3.9     04-02  Mg     2.0     04-02                           10.6   10.33 )-----------( 223      ( 02 Apr 2023 04:20 )             34.5                 RADIOLOGY:  -----------------    ECG:     ECHO: Patient is a 87y Male with a known history of :  ASHD (arteriosclerotic heart disease) [I25.10]    BPH without urinary obstruction [N40.0]    COPD, moderate [J44.9]    Stage 3 chronic kidney disease [N18.30]    Chronic GERD [K21.9]    HTN (hypertension) [I10]    Dementia [F03.90]    DM type 2, not at goal [E11.9]    Bone infection of left foot [M86.9]    Multiple open wounds of foot [S91.309A]    Prophylactic measure [Z29.9]    DM (diabetes mellitus), type 2 [E11.9]    PVD (peripheral vascular disease) [I73.9]    Osteomyelitis of left foot [M86.9]    Cellulitis of left foot [L03.116]    Pressure ulcer, unstageable, with eschar [L89.95]      HPI:  86 yo male ,[facility] resident with PMHx - COPD, DM, HTN, CAD, HLD, H/O TIA, dementia,GERD, BPH and depression sent ot ER for evaluation of left foot 1metatarsal wound ,suggestive of osteomyelitis .Patient was seen by ID consult and transfer to the hospital recommended for wound cx/bone biopsy /podiatry evaluation ,likely will require 4-6 weeks of iv abx . Patient was admitted to [facility] with b/l feet wounds and was followed by wound care team ,recently completed 7 days of doxycycline for foot wound cellulitis . (30 Mar 2023 05:21)      REVIEW OF SYSTEMS:    CONSTITUTIONAL: No fever, weight loss, or fatigue  EYES: No eye pain, visual disturbances, or discharge  ENMT:  No difficulty hearing, tinnitus, vertigo; No sinus or throat pain  NECK: No pain or stiffness  BREASTS: No pain, masses, or nipple discharge  RESPIRATORY: No cough, wheezing, chills or hemoptysis; No shortness of breath  CARDIOVASCULAR: No chest pain, palpitations, dizziness, or leg swelling  GASTROINTESTINAL: No abdominal or epigastric pain. No nausea, vomiting, or hematemesis; No diarrhea or constipation. No melena or hematochezia.  GENITOURINARY: No dysuria, frequency, hematuria, or incontinence  NEUROLOGICAL: No headaches, memory loss, loss of strength, numbness, or tremors  SKIN: No itching, burning, rashes, or lesions   LYMPH NODES: No enlarged glands  ENDOCRINE: No heat or cold intolerance; No hair loss  MUSCULOSKELETAL: No joint pain or swelling; No muscle, back, or extremity pain  PSYCHIATRIC: No depression, anxiety, mood swings, or difficulty sleeping  HEME/LYMPH: No easy bruising, or bleeding gums  ALLERGY AND IMMUNOLOGIC: No hives or eczema    MEDICATIONS  (STANDING):  albuterol/ipratropium for Nebulization 3 milliLiter(s) Nebulizer every 8 hours  budesonide 160 MICROgram(s)/formoterol 4.5 MICROgram(s) Inhaler 2 Puff(s) Inhalation two times a day  chlorhexidine 2% Cloths 1 Application(s) Topical daily  dextrose 5%. 1000 milliLiter(s) (50 mL/Hr) IV Continuous <Continuous>  dextrose 5%. 1000 milliLiter(s) (100 mL/Hr) IV Continuous <Continuous>  dextrose 50% Injectable 25 Gram(s) IV Push once  dextrose 50% Injectable 12.5 Gram(s) IV Push once  dextrose 50% Injectable 25 Gram(s) IV Push once  donepezil 5 milliGRAM(s) Oral at bedtime  DULoxetine 60 milliGRAM(s) Oral daily  enoxaparin Injectable 60 milliGRAM(s) SubCutaneous every 12 hours  glucagon  Injectable 1 milliGRAM(s) IntraMuscular once  insulin glargine Injectable (LANTUS) 15 Unit(s) SubCutaneous at bedtime  insulin lispro (ADMELOG) corrective regimen sliding scale   SubCutaneous three times a day before meals  insulin lispro (ADMELOG) corrective regimen sliding scale   SubCutaneous at bedtime  insulin lispro Injectable (ADMELOG) 3 Unit(s) SubCutaneous three times a day before meals  lactobacillus acidophilus 1 Tablet(s) Oral two times a day with meals  losartan 50 milliGRAM(s) Oral daily  metoprolol tartrate 25 milliGRAM(s) Oral two times a day  multivitamin 1 Tablet(s) Oral daily  pantoprazole   Suspension 40 milliGRAM(s) Oral daily  piperacillin/tazobactam IVPB.. 3.375 Gram(s) IV Intermittent every 8 hours  senna 2 Tablet(s) Oral at bedtime  simvastatin 40 milliGRAM(s) Oral at bedtime  sodium chloride 0.45%. 1000 milliLiter(s) (50 mL/Hr) IV Continuous <Continuous>  tamsulosin 0.4 milliGRAM(s) Oral at bedtime    MEDICATIONS  (PRN):  acetaminophen     Tablet .. 650 milliGRAM(s) Oral every 6 hours PRN Temp greater or equal to 38C (100.4F), Mild Pain (1 - 3)  aluminum hydroxide/magnesium hydroxide/simethicone Suspension 30 milliLiter(s) Oral every 4 hours PRN Dyspepsia  bisacodyl Suppository 10 milliGRAM(s) Rectal daily PRN Constipation  dextrose Oral Gel 15 Gram(s) Oral once PRN Blood Glucose LESS THAN 70 milliGRAM(s)/deciliter  hydrALAZINE 50 milliGRAM(s) Oral every 6 hours PRN for systolic BP>160  magnesium hydroxide Suspension 30 milliLiter(s) Oral daily PRN Constipation  melatonin 3 milliGRAM(s) Oral at bedtime PRN Insomnia  morphine  - Injectable 2 milliGRAM(s) IV Push every 6 hours PRN Severe Pain (7 - 10)  ondansetron Injectable 4 milliGRAM(s) IV Push every 8 hours PRN Nausea and/or Vomiting  traMADol 50 milliGRAM(s) Oral four times a day PRN Moderate Pain (4 - 6)      ALLERGIES: No Known Allergies      FAMILY HISTORY:      PHYSICAL EXAMINATION:  -----------------------------  T(C): 37.5 (04-02-23 @ 05:04), Max: 37.5 (04-02-23 @ 05:04)  HR: 100 (04-02-23 @ 07:27) (83 - 121)  BP: 162/92 (04-02-23 @ 05:04) (150/76 - 162/92)  RR: 17 (04-02-23 @ 05:04) (16 - 18)  SpO2: 94% (04-02-23 @ 07:27) (92% - 97%)  Wt(kg): --        VITALS  T(C): 37.5 (04-02-23 @ 05:04), Max: 37.5 (04-02-23 @ 05:04)  HR: 100 (04-02-23 @ 07:27) (83 - 121)  BP: 162/92 (04-02-23 @ 05:04) (150/76 - 162/92)  RR: 17 (04-02-23 @ 05:04) (16 - 18)  SpO2: 94% (04-02-23 @ 07:27) (92% - 97%)    Constitutional: well developed, normal appearance, well groomed, well nourished, no deformities and no acute distress.   Eyes: the conjunctiva exhibited no abnormalities and the eyelids demonstrated no xanthelasmas.   HEENT: normal oral mucosa, no oral pallor and no oral cyanosis.   Neck: normal jugular venous A waves present, normal jugular venous V waves present and no jugular venous paulino A waves.   Pulmonary: no respiratory distress, normal respiratory rhythm and effort, no accessory muscle use and lungs were clear to auscultation bilaterally.   Cardiovascular: heart rate and rhythm were normal, normal S1 and S2 and no murmur, gallop, rub, heave or thrill are present.   Abdomen: soft, non-tender, no hepato-splenomegaly and no abdominal mass palpated.   Musculoskeletal: the gait could not be assessed..   Extremities: no clubbing of the fingernails, no localized cyanosis, no petechial hemorrhages and no ischemic changes.   Skin: normal skin color and pigmentation, no rash, no venous stasis, no skin lesions, no skin ulcer and no xanthoma was observed.   Psychiatric: oriented to person, place, and time, the affect was normal, the mood was normal and not feeling anxious.     LABS:   --------  04-02    152<H>  |  124<H>  |  41<H>  ----------------------------<  108<H>  3.4<L>   |  22  |  1.30    Ca    8.8      02 Apr 2023 04:20  Phos  3.9     04-02  Mg     2.0     04-02                           10.6   10.33 )-----------( 223      ( 02 Apr 2023 04:20 )             34.5                 RADIOLOGY:  -----------------    ECG:     ECHO: Patient is a 87y Male with a known history of :  ASHD (arteriosclerotic heart disease) [I25.10]    BPH without urinary obstruction [N40.0]    COPD, moderate [J44.9]    Stage 3 chronic kidney disease [N18.30]    Chronic GERD [K21.9]    HTN (hypertension) [I10]    Dementia [F03.90]    DM type 2, not at goal [E11.9]    Bone infection of left foot [M86.9]    Multiple open wounds of foot [S91.309A]    Prophylactic measure [Z29.9]    DM (diabetes mellitus), type 2 [E11.9]    PVD (peripheral vascular disease) [I73.9]    Osteomyelitis of left foot [M86.9]    Cellulitis of left foot [L03.116]    Pressure ulcer, unstageable, with eschar [L89.95]      HPI:  88 yo male ,[facility] resident with PMHx - COPD, DM, HTN, CAD, HLD, H/O TIA, dementia,GERD, BPH and depression sent ot ER for evaluation of left foot 1metatarsal wound ,suggestive of osteomyelitis .Patient was seen by ID consult and transfer to the hospital recommended for wound cx/bone biopsy /podiatry evaluation ,likely will require 4-6 weeks of iv abx . Patient was admitted to [facility] with b/l feet wounds and was followed by wound care team ,recently completed 7 days of doxycycline for foot wound cellulitis . (30 Mar 2023 05:21)      REVIEW OF SYSTEMS:    CONSTITUTIONAL: No fever, weight loss, or fatigue  EYES: No eye pain, visual disturbances, or discharge  ENMT:  No difficulty hearing, tinnitus, vertigo; No sinus or throat pain  NECK: No pain or stiffness  BREASTS: No pain, masses, or nipple discharge  RESPIRATORY: No cough, wheezing, chills or hemoptysis; No shortness of breath  CARDIOVASCULAR: No chest pain, palpitations, dizziness, or leg swelling  GASTROINTESTINAL: No abdominal or epigastric pain. No nausea, vomiting, or hematemesis; No diarrhea or constipation. No melena or hematochezia.  GENITOURINARY: No dysuria, frequency, hematuria, or incontinence  NEUROLOGICAL: No headaches, memory loss, loss of strength, numbness, or tremors  SKIN: No itching, burning, rashes, or lesions   LYMPH NODES: No enlarged glands  ENDOCRINE: No heat or cold intolerance; No hair loss  MUSCULOSKELETAL: No joint pain or swelling; No muscle, back, or extremity pain  PSYCHIATRIC: No depression, anxiety, mood swings, or difficulty sleeping  HEME/LYMPH: No easy bruising, or bleeding gums  ALLERGY AND IMMUNOLOGIC: No hives or eczema    MEDICATIONS  (STANDING):  albuterol/ipratropium for Nebulization 3 milliLiter(s) Nebulizer every 8 hours  budesonide 160 MICROgram(s)/formoterol 4.5 MICROgram(s) Inhaler 2 Puff(s) Inhalation two times a day  chlorhexidine 2% Cloths 1 Application(s) Topical daily  dextrose 5%. 1000 milliLiter(s) (50 mL/Hr) IV Continuous <Continuous>  dextrose 5%. 1000 milliLiter(s) (100 mL/Hr) IV Continuous <Continuous>  dextrose 50% Injectable 25 Gram(s) IV Push once  dextrose 50% Injectable 12.5 Gram(s) IV Push once  dextrose 50% Injectable 25 Gram(s) IV Push once  donepezil 5 milliGRAM(s) Oral at bedtime  DULoxetine 60 milliGRAM(s) Oral daily  enoxaparin Injectable 60 milliGRAM(s) SubCutaneous every 12 hours  glucagon  Injectable 1 milliGRAM(s) IntraMuscular once  insulin glargine Injectable (LANTUS) 15 Unit(s) SubCutaneous at bedtime  insulin lispro (ADMELOG) corrective regimen sliding scale   SubCutaneous three times a day before meals  insulin lispro (ADMELOG) corrective regimen sliding scale   SubCutaneous at bedtime  insulin lispro Injectable (ADMELOG) 3 Unit(s) SubCutaneous three times a day before meals  lactobacillus acidophilus 1 Tablet(s) Oral two times a day with meals  losartan 50 milliGRAM(s) Oral daily  metoprolol tartrate 25 milliGRAM(s) Oral two times a day  multivitamin 1 Tablet(s) Oral daily  pantoprazole   Suspension 40 milliGRAM(s) Oral daily  piperacillin/tazobactam IVPB.. 3.375 Gram(s) IV Intermittent every 8 hours  senna 2 Tablet(s) Oral at bedtime  simvastatin 40 milliGRAM(s) Oral at bedtime  sodium chloride 0.45%. 1000 milliLiter(s) (50 mL/Hr) IV Continuous <Continuous>  tamsulosin 0.4 milliGRAM(s) Oral at bedtime    MEDICATIONS  (PRN):  acetaminophen     Tablet .. 650 milliGRAM(s) Oral every 6 hours PRN Temp greater or equal to 38C (100.4F), Mild Pain (1 - 3)  aluminum hydroxide/magnesium hydroxide/simethicone Suspension 30 milliLiter(s) Oral every 4 hours PRN Dyspepsia  bisacodyl Suppository 10 milliGRAM(s) Rectal daily PRN Constipation  dextrose Oral Gel 15 Gram(s) Oral once PRN Blood Glucose LESS THAN 70 milliGRAM(s)/deciliter  hydrALAZINE 50 milliGRAM(s) Oral every 6 hours PRN for systolic BP>160  magnesium hydroxide Suspension 30 milliLiter(s) Oral daily PRN Constipation  melatonin 3 milliGRAM(s) Oral at bedtime PRN Insomnia  morphine  - Injectable 2 milliGRAM(s) IV Push every 6 hours PRN Severe Pain (7 - 10)  ondansetron Injectable 4 milliGRAM(s) IV Push every 8 hours PRN Nausea and/or Vomiting  traMADol 50 milliGRAM(s) Oral four times a day PRN Moderate Pain (4 - 6)      ALLERGIES: No Known Allergies      FAMILY HISTORY:      PHYSICAL EXAMINATION:  -----------------------------  T(C): 37.5 (04-02-23 @ 05:04), Max: 37.5 (04-02-23 @ 05:04)  HR: 100 (04-02-23 @ 07:27) (83 - 121)  BP: 162/92 (04-02-23 @ 05:04) (150/76 - 162/92)  RR: 17 (04-02-23 @ 05:04) (16 - 18)  SpO2: 94% (04-02-23 @ 07:27) (92% - 97%)  Wt(kg): --        VITALS  T(C): 37.5 (04-02-23 @ 05:04), Max: 37.5 (04-02-23 @ 05:04)  HR: 100 (04-02-23 @ 07:27) (83 - 121)  BP: 162/92 (04-02-23 @ 05:04) (150/76 - 162/92)  RR: 17 (04-02-23 @ 05:04) (16 - 18)  SpO2: 94% (04-02-23 @ 07:27) (92% - 97%)    Constitutional: well developed, normal appearance, well groomed, well nourished, no deformities and no acute distress.   Eyes: the conjunctiva exhibited no abnormalities and the eyelids demonstrated no xanthelasmas.   HEENT: normal oral mucosa, no oral pallor and no oral cyanosis.   Neck: normal jugular venous A waves present, normal jugular venous V waves present and no jugular venous paulino A waves.   Pulmonary: no respiratory distress, normal respiratory rhythm and effort, no accessory muscle use and lungs were clear to auscultation bilaterally.   Cardiovascular: heart rate and rhythm were normal, normal S1 and S2 and no murmur, gallop, rub, heave or thrill are present.   Abdomen: soft, non-tender, no hepato-splenomegaly and no abdominal mass palpated.   Musculoskeletal: the gait could not be assessed..   Extremities: no clubbing of the fingernails, no localized cyanosis, no petechial hemorrhages and no ischemic changes.   Skin: normal skin color and pigmentation, no rash, no venous stasis, no skin lesions, no skin ulcer and no xanthoma was observed.   Psychiatric: oriented to person, place, and time, the affect was normal, the mood was normal and not feeling anxious.     LABS:   --------  04-02    152<H>  |  124<H>  |  41<H>  ----------------------------<  108<H>  3.4<L>   |  22  |  1.30    Ca    8.8      02 Apr 2023 04:20  Phos  3.9     04-02  Mg     2.0     04-02                           10.6   10.33 )-----------( 223      ( 02 Apr 2023 04:20 )             34.5                 RADIOLOGY:  -----------------    ECG:     ECHO:

## 2023-04-02 NOTE — PROGRESS NOTE ADULT - SUBJECTIVE AND OBJECTIVE BOX
Patient is a 87y Male whom presented to the hospital with ckd and chelo     PAST MEDICAL & SURGICAL HISTORY:      MEDICATIONS  (STANDING):      Allergies    No Known Allergies    Intolerances        SOCIAL HISTORY:  Denies ETOh,Smoking,     FAMILY HISTORY:      REVIEW OF SYSTEMS:  unable to obtained a good review system                                10.6   10.33 )-----------( 223      ( 2023 04:20 )             34.5       CBC Full  -  ( 2023 04:20 )  WBC Count : 10.33 K/uL  RBC Count : 3.70 M/uL  Hemoglobin : 10.6 g/dL  Hematocrit : 34.5 %  Platelet Count - Automated : 223 K/uL  Mean Cell Volume : 93.2 fl  Mean Cell Hemoglobin : 28.6 pg  Mean Cell Hemoglobin Concentration : 30.7 gm/dL  Auto Neutrophil # : x  Auto Lymphocyte # : x  Auto Monocyte # : x  Auto Eosinophil # : x  Auto Basophil # : x  Auto Neutrophil % : x  Auto Lymphocyte % : x  Auto Monocyte % : x  Auto Eosinophil % : x  Auto Basophil % : x      04-02    152<H>  |  124<H>  |  41<H>  ----------------------------<  108<H>  3.4<L>   |  22  |  1.30    Ca    8.8      2023 04:20  Phos  3.9     04-02  Mg     2.0     04-02        CAPILLARY BLOOD GLUCOSE      POCT Blood Glucose.: 116 mg/dL (2023 07:55)  POCT Blood Glucose.: 103 mg/dL (2023 21:24)  POCT Blood Glucose.: 222 mg/dL (2023 16:48)  POCT Blood Glucose.: 296 mg/dL (2023 11:29)      Vital Signs Last 24 Hrs  T(C): 37.5 (2023 05:04), Max: 37.5 (2023 05:04)  T(F): 99.5 (2023 05:04), Max: 99.5 (2023 05:04)  HR: 100 (2023 07:27) (83 - 121)  BP: 162/92 (2023 05:04) (150/76 - 162/92)  BP(mean): --  RR: 17 (2023 05:04) (16 - 18)  SpO2: 94% (2023 07:27) (92% - 97%)    Parameters below as of 2023 07:27  Patient On (Oxygen Delivery Method): room air        Urinalysis Basic - ( 2023 09:15 )    Color: Yellow / Appearance: Slightly Turbid / S.020 / pH: x  Gluc: x / Ketone: Trace  / Bili: Negative / Urobili: Negative   Blood: x / Protein: 30 mg/dL / Nitrite: Negative   Leuk Esterase: Moderate / RBC: 6-10 /HPF / WBC 26-50   Sq Epi: x / Non Sq Epi: Negative / Bacteria: Moderate                PHYSICAL EXAM:    Constitutional: NAD  HEENT: conjunctive   clear   Neck:  No JVD  Respiratory: CTAB  Cardiovascular: S1 and S2  Gastrointestinal: BS+, soft,   Extremities: No peripheral edema  Neurological:  no focal deficits

## 2023-04-02 NOTE — PROGRESS NOTE ADULT - NUTRITIONAL ASSESSMENT
MEDICATIONS  (STANDING):  albuterol/ipratropium for Nebulization 3 milliLiter(s) Nebulizer every 8 hours  budesonide 160 MICROgram(s)/formoterol 4.5 MICROgram(s) Inhaler 2 Puff(s) Inhalation two times a day  chlorhexidine 2% Cloths 1 Application(s) Topical daily  dextrose 5%. 1000 milliLiter(s) (50 mL/Hr) IV Continuous <Continuous>  dextrose 5%. 1000 milliLiter(s) (50 mL/Hr) IV Continuous <Continuous>  dextrose 5%. 1000 milliLiter(s) (100 mL/Hr) IV Continuous <Continuous>  dextrose 50% Injectable 25 Gram(s) IV Push once  dextrose 50% Injectable 12.5 Gram(s) IV Push once  dextrose 50% Injectable 25 Gram(s) IV Push once  donepezil 5 milliGRAM(s) Oral at bedtime  DULoxetine 60 milliGRAM(s) Oral daily  enoxaparin Injectable 60 milliGRAM(s) SubCutaneous every 12 hours  glucagon  Injectable 1 milliGRAM(s) IntraMuscular once  insulin glargine Injectable (LANTUS) 15 Unit(s) SubCutaneous at bedtime  insulin lispro (ADMELOG) corrective regimen sliding scale   SubCutaneous three times a day before meals  insulin lispro (ADMELOG) corrective regimen sliding scale   SubCutaneous at bedtime  insulin lispro Injectable (ADMELOG) 3 Unit(s) SubCutaneous three times a day before meals  lactobacillus acidophilus 1 Tablet(s) Oral two times a day with meals  losartan 50 milliGRAM(s) Oral daily  metoprolol tartrate 25 milliGRAM(s) Oral two times a day  multivitamin 1 Tablet(s) Oral daily  pantoprazole   Suspension 40 milliGRAM(s) Oral daily  piperacillin/tazobactam IVPB.. 3.375 Gram(s) IV Intermittent every 8 hours  potassium chloride  10 mEq/100 mL IVPB 10 milliEquivalent(s) IV Intermittent every 1 hour  senna 2 Tablet(s) Oral at bedtime  simvastatin 40 milliGRAM(s) Oral at bedtime  tamsulosin 0.4 milliGRAM(s) Oral at bedtime

## 2023-04-02 NOTE — CHART NOTE - NSCHARTNOTEFT_GEN_A_CORE
Called by RN, pt had 6 beats of vtach. Asymptomatic. Called remote tele, pt is in SR, HR stable   - VS stable  - Will order AM Mg, Ph, f/u   - Cardio Dr. Arevalo following, f/u recs   - Will continue to monitor, RN to call if any changes.

## 2023-04-02 NOTE — PROGRESS NOTE ADULT - ASSESSMENT
REVIEW OF SYMPTOMS      Able to give (reliable) ROS  NO     PHYSICAL EXAM    HEENT Unremarkable  atraumatic   RESP Fair air entry EXP prolonged    Harsh breath sound Resp distres mild   CARDIAC S1 S2 No S3     NO JVD    ABDOMEN SOFT BS PRESENT NOT DISTENDED No hepatosplenomegaly   PEDAL EDEMA present No calf tenderness  NO rash       GENERAL DATA .   GOC.     .. 3/30/2023 full code  ALLGY.     .. nka                    WT.   .. 3/30/2023 63  BMI.        .. 3/30/2023 21            ICU STAY.   .. none  COVID.   .. 3/29/2023 scv2 (-)     BEST PRACTICE ISSUES.    HOB ELEVATN.   .. Yes  DVT PPLX.   .. 3/31 lvnx 60.2 Dr Lakhani (a fib)   ..  3/29- 3/31 hpsc   MILLER PPLX.   .. 3/30/2023 protonix 40    INFN PPLX. ..    SP SW LAURENT.   ..  3/30/2023 -> soft bite mild thick        DIET.    ..  3/30/2023 dash  FREE WATER  .. 4/1/2023 fw 250.4    IV fl.  .. 4/2/2023 d5 70     ABGS.    VS/ PO/IO/ VENT/ DRIPS.   4/2/2023 afeb 110 150/90   4/2/2023 ra 95%     PROBLEM/ASSESSMENT/PLAN.  Infection  Osteomyelitius  Possible pneumonia   .. Esr 3/29-3/31/2023 esr 67- 49   .. W 3/29-3/30-3/31-4/1/2023 w 11.8 - 12- 10.5- 11.9    .. cxr 3/30/2023  ........ increasing r lower perihilar infiltrate   .. xr foot left 3/30/2023  ........ stable eroisions around 1st mtp anmd great toe    ........ which could be bone infectn    .. CXR 3/29/2023 Possible hansa pneum  .. 3/29/2023 bc (-)   .. 3/29 zosyn    .. follow cultures  PLEURAL EFFUSION.  .. ct ch 3/30/2023   ........ mild pulm edema  ........ mod r and sml effsn   a/r  .. pl effsn likely sec to chf   COPD  .. 3/30/2023 duoneb.3    .. 3/30/2023 symbicort   hemodynamics  .. La 3/29/2023 la 1.8   .. target map 65 (+)   CAD.  .. 3/30- 3/31/2023 atenolol 25  .. 3/31 metoprolol 25.2   .. 3/30/2023 s  RO DVT  .. 4/1/2023 v duplx (-)imvastat 40   CHF.  .. echo 3/30/2023  ........ ef 40%   ........ mod mr   .. bnp 4/2 bnp [id number]   .. 3/30/2023 losartan 50   .. 3/30/2023 hydralazin 50.4p   Anemia  .. Hb 3/29-3/30-3/31-4/1/2023 Hb 11.2- 10.2 - 9.8 - 11   .. monitor  Hypernatremia.  .. Na 3/31-4/1-4/2 Na 147 - 151- 152    CKD  .. Na 3/29-3/31/2023 Na 144-147   .. Cr 3/29-3/30-3/31-4/1/2023 Cr 1.4 - 1.3 - 1.3 - 1.5   .. monitor  OBS  .. 3/30/2023 donepezil     OVERALL .  86 yo M with PMHx HTN, HLD, T2D, dementia (AOx2-3), OA, BPH, CAD, TIA, CKD 3 and GERD HO recent hospital stay 1/24-1/30/2023 [facility]  COPD ex  now admitted with osteomyelitis possible pneum  Pulm consulted 3/29/2023    PROBLEMS  Osteomyelitis  Pneumonia   .. 3/29 zosyn    COPD   CKD   PLEURAL EFFSN 3/30 ct  HFREF MOD MR 3/30 echo    A fib 4/1 lvnx 60.2     PLAN  Antibio bronchodilators monitor  4/1/2023 free water startd     TIME SPENT   Over 25 minutes aggregate care time spent on encounter; activities included   direct patient care, counseling and/or coordinating care reviewing notes, lab data/ imaging , discussion with multidisciplinary team/ patient  /family and explaining in detail risks, benefits, alternatives  of the recommendations     Paulino Parmar 87 m     REVIEW OF SYMPTOMS      Able to give (reliable) ROS  NO     PHYSICAL EXAM    HEENT Unremarkable  atraumatic   RESP Fair air entry EXP prolonged    Harsh breath sound Resp distres mild   CARDIAC S1 S2 No S3     NO JVD    ABDOMEN SOFT BS PRESENT NOT DISTENDED No hepatosplenomegaly   PEDAL EDEMA present No calf tenderness  NO rash       GENERAL DATA .   GOC.     .. 3/30/2023 full code  ALLGY.     .. nka                    WT.   .. 3/30/2023 63  BMI.        .. 3/30/2023 21            ICU STAY.   .. none  COVID.   .. 3/29/2023 scv2 (-)     BEST PRACTICE ISSUES.    HOB ELEVATN.   .. Yes  DVT PPLX.   .. 3/31 lvnx 60.2 Dr Lakhani (a fib)   ..  3/29- 3/31 hpsc   MILLER PPLX.   .. 3/30/2023 protonix 40    INFN PPLX. ..    SP SW LAURENT.   ..  3/30/2023 -> soft bite mild thick        DIET.    ..  3/30/2023 dash  FREE WATER  .. 4/1/2023 fw 250.4    IV fl.  .. 4/2/2023 d5 70     ABGS.    VS/ PO/IO/ VENT/ DRIPS.   4/2/2023 afeb 110 150/90   4/2/2023 ra 95%     PROBLEM/ASSESSMENT/PLAN.  Infection  Osteomyelitius  Possible pneumonia   .. Esr 3/29-3/31/2023 esr 67- 49   .. W 3/29-3/30-3/31-4/1/2023 w 11.8 - 12- 10.5- 11.9    .. cxr 3/30/2023  ........ increasing r lower perihilar infiltrate   .. xr foot left 3/30/2023  ........ stable eroisions around 1st mtp anmd great toe    ........ which could be bone infectn    .. CXR 3/29/2023 Possible hansa pneum  .. 3/29/2023 bc (-)   .. 3/29 zosyn    .. follow cultures  PLEURAL EFFUSION.  .. ct ch 3/30/2023   ........ mild pulm edema  ........ mod r and sml effsn   a/r  .. pl effsn likely sec to chf   COPD  .. 3/30/2023 duoneb.3    .. 3/30/2023 symbicort   hemodynamics  .. La 3/29/2023 la 1.8   .. target map 65 (+)   CAD.  .. 3/30- 3/31/2023 atenolol 25  .. 3/31 metoprolol 25.2   .. 3/30/2023 s  RO DVT  .. 4/1/2023 v duplx (-)imvastat 40   CHF.  .. echo 3/30/2023  ........ ef 40%   ........ mod mr   .. bnp 4/2 bnp [id number]   .. 3/30/2023 losartan 50   .. 3/30/2023 hydralazin 50.4p   Anemia  .. Hb 3/29-3/30-3/31-4/1/2023 Hb 11.2- 10.2 - 9.8 - 11   .. monitor  Hypernatremia.  .. Na 3/31-4/1-4/2 Na 147 - 151- 152    CKD  .. Na 3/29-3/31/2023 Na 144-147   .. Cr 3/29-3/30-3/31-4/1/2023 Cr 1.4 - 1.3 - 1.3 - 1.5   .. monitor  OBS  .. 3/30/2023 donepezil     OVERALL .  86 yo M with PMHx HTN, HLD, T2D, dementia (AOx2-3), OA, BPH, CAD, TIA, CKD 3 and GERD HO recent hospital stay 1/24-1/30/2023 [facility]  COPD ex  now admitted with osteomyelitis possible pneum  Pulm consulted 3/29/2023    PROBLEMS  Osteomyelitis  Pneumonia   .. 3/29 zosyn    COPD   CKD   PLEURAL EFFSN 3/30 ct  HFREF MOD MR 3/30 echo    A fib 4/1 lvnx 60.2     PLAN  Antibio bronchodilators monitor  4/1/2023 free water startd     TIME SPENT   Over 25 minutes aggregate care time spent on encounter; activities included   direct patient care, counseling and/or coordinating care reviewing notes, lab data/ imaging , discussion with multidisciplinary team/ patient  /family and explaining in detail risks, benefits, alternatives  of the recommendations     Paulino Parmar 87 m     REVIEW OF SYMPTOMS      Able to give (reliable) ROS  NO     PHYSICAL EXAM    HEENT Unremarkable  atraumatic   RESP Fair air entry EXP prolonged    Harsh breath sound Resp distres mild   CARDIAC S1 S2 No S3     NO JVD    ABDOMEN SOFT BS PRESENT NOT DISTENDED No hepatosplenomegaly   PEDAL EDEMA present No calf tenderness  NO rash       GENERAL DATA .   GOC.     .. 3/30/2023 full code  ALLGY.     .. nka                    WT.   .. 3/30/2023 63  BMI.        .. 3/30/2023 21            ICU STAY.   .. none  COVID.   .. 3/29/2023 scv2 (-)     BEST PRACTICE ISSUES.    HOB ELEVATN.   .. Yes  DVT PPLX.   .. 3/31 lvnx 60.2 Dr Lakhani (a fib)   ..  3/29- 3/31 hpsc   MILLER PPLX.   .. 3/30/2023 protonix 40    INFN PPLX. ..    SP SW LAURENT.   ..  3/30/2023 -> soft bite mild thick        DIET.    ..  3/30/2023 dash  FREE WATER  .. 4/1/2023 fw 250.4    IV fl.  .. 4/2/2023 d5 70     ABGS.    VS/ PO/IO/ VENT/ DRIPS.   4/2/2023 afeb 110 150/90   4/2/2023 ra 95%     PROBLEM/ASSESSMENT/PLAN.  Infection  Osteomyelitius  Possible pneumonia   .. Esr 3/29-3/31/2023 esr 67- 49   .. W 3/29-3/30-3/31-4/1/2023 w 11.8 - 12- 10.5- 11.9    .. cxr 3/30/2023  ........ increasing r lower perihilar infiltrate   .. xr foot left 3/30/2023  ........ stable eroisions around 1st mtp anmd great toe    ........ which could be bone infectn    .. CXR 3/29/2023 Possible hansa pneum  .. 3/29/2023 bc (-)   .. 3/29 zosyn    .. follow cultures  PLEURAL EFFUSION.  .. ct ch 3/30/2023   ........ mild pulm edema  ........ mod r and sml effsn   a/r  .. pl effsn likely sec to chf   COPD  .. 3/30/2023 duoneb.3    .. 3/30/2023 symbicort   hemodynamics  .. La 3/29/2023 la 1.8   .. target map 65 (+)   CAD.  .. 3/30- 3/31/2023 atenolol 25  .. 3/31 metoprolol 25.2   .. 3/30/2023 s  RO DVT  .. 4/1/2023 v duplx (-)imvastat 40   CHF.  .. echo 3/30/2023  ........ ef 40%   ........ mod mr   .. bnp 4/2 bnp [id number]   .. 3/30/2023 losartan 50   .. 3/30/2023 hydralazin 50.4p   Anemia  .. Hb 3/29-3/30-3/31-4/1/2023 Hb 11.2- 10.2 - 9.8 - 11   .. monitor  Hypernatremia.  .. Na 3/31-4/1-4/2 Na 147 - 151- 152    CKD  .. Na 3/29-3/31/2023 Na 144-147   .. Cr 3/29-3/30-3/31-4/1/2023 Cr 1.4 - 1.3 - 1.3 - 1.5   .. monitor  OBS  .. 3/30/2023 donepezil     OVERALL .  86 yo M with PMHx HTN, HLD, T2D, dementia (AOx2-3), OA, BPH, CAD, TIA, CKD 3 and GERD HO recent hospital stay 1/24-1/30/2023 [facility]  COPD ex  now admitted with osteomyelitis possible pneum  Pulm consulted 3/29/2023    PROBLEMS  Osteomyelitis  Pneumonia   .. 3/29 zosyn    COPD   CKD   PLEURAL EFFSN 3/30 ct  HFREF MOD MR 3/30 echo    A fib 4/1 lvnx 60.2     PLAN  Antibio bronchodilators monitor  4/1/2023 free water startd     TIME SPENT   Over 25 minutes aggregate care time spent on encounter; activities included   direct patient care, counseling and/or coordinating care reviewing notes, lab data/ imaging , discussion with multidisciplinary team/ patient  /family and explaining in detail risks, benefits, alternatives  of the recommendations     Paulino Parmar 87 m

## 2023-04-02 NOTE — PROGRESS NOTE ADULT - ASSESSMENT
86 yo male ,[facility] resident with PMHx - COPD, DM, HTN, CAD, HLD, H/O TIA, dementia,GERD, BPH and depression sent ot ER for evaluation of left foot 1metatarsal wound ,suggestive of osteomyelitis .Patient was seen by ID consult and transfer to the hospital recommended for wound cx/bone biopsy /podiatry evaluation ,likely will require 4-6 weeks of iv abx . Patient was admitted to [facility] with b/l feet wounds and was followed by wound care team ,recently completed 7 days of doxycycline for foot wound cellulitis . 88 yo male ,[facility] resident with PMHx - COPD, DM, HTN, CAD, HLD, H/O TIA, dementia,GERD, BPH and depression sent ot ER for evaluation of left foot 1metatarsal wound ,suggestive of osteomyelitis .Patient was seen by ID consult and transfer to the hospital recommended for wound cx/bone biopsy /podiatry evaluation ,likely will require 4-6 weeks of iv abx . Patient was admitted to [facility] with b/l feet wounds and was followed by wound care team ,recently completed 7 days of doxycycline for foot wound cellulitis . 88 yo male ,[facility] resident with PMHx - COPD, DM, HTN, CAD, HLD, H/O TIA, dementia,GERD, BPH and depression sent ot ER for evaluation of left foot 1metatarsal wound ,suggestive of osteomyelitis .Patient was seen by ID consult and transfer to the hospital recommended for wound cx/bone biopsy /podiatry evaluation ,likely will require 4-6 weeks of iv abx . Patient was admitted to [facility] with b/l feet wounds and was followed by wound care team ,recently completed 7 days of doxycycline for foot wound cellulitis .

## 2023-04-02 NOTE — CHART NOTE - NSCHARTNOTEFT_GEN_A_CORE
Assessment: Pt confused at visit; seen for nutrition follow up. Continues on rx diet; tolerating with fair/fluctuating appetite/po intake; 0-25% 3/31, 51-75% 4/1 with good intake oral nutritional supplement 4/1. GI wdl, BM 4/1. Worsening hypernatremia, current Na 152 (4/2). Encourage po fluids with IVF per MD order. Uncontrolled BS, now improved on 15units lantus qhs, ss admelog TID. Recommend assistance/encouragment with meals/supplements prn.     Factors impacting intake: [ ] none [ ] nausea  [ ] vomiting [ ] diarrhea [ x] constipation  [x ]chewing problems [ ] swallowing issues  [ ] other:     Diet Presciption: Diet, DASH/TLC:   Sodium & Cholesterol Restricted  Consistent Carbohydrate {Evening Snack}  Soft and Bite Sized (SOFTBTSZ)  Supplement Feeding Modality:  Oral  Glucerna Shake Cans or Servings Per Day:  1       Frequency:  Daily (03-30-23 @ 05:10)    Intake: Fair/fluctuating    Current Weight: Weight (kg): 63.5 (03-30 @ 16:49); 140.8lbs(4/2), deepak feet 1+edema      Pertinent Medications: MEDICATIONS  (STANDING):  albuterol/ipratropium for Nebulization 3 milliLiter(s) Nebulizer every 8 hours  budesonide 160 MICROgram(s)/formoterol 4.5 MICROgram(s) Inhaler 2 Puff(s) Inhalation two times a day  chlorhexidine 2% Cloths 1 Application(s) Topical daily  dextrose 5%. 1000 milliLiter(s) (50 mL/Hr) IV Continuous <Continuous>  dextrose 5%. 1000 milliLiter(s) (100 mL/Hr) IV Continuous <Continuous>  dextrose 50% Injectable 25 Gram(s) IV Push once  dextrose 50% Injectable 12.5 Gram(s) IV Push once  dextrose 50% Injectable 25 Gram(s) IV Push once  donepezil 5 milliGRAM(s) Oral at bedtime  DULoxetine 60 milliGRAM(s) Oral daily  enoxaparin Injectable 60 milliGRAM(s) SubCutaneous every 12 hours  glucagon  Injectable 1 milliGRAM(s) IntraMuscular once  insulin glargine Injectable (LANTUS) 15 Unit(s) SubCutaneous at bedtime  insulin lispro (ADMELOG) corrective regimen sliding scale   SubCutaneous three times a day before meals  insulin lispro (ADMELOG) corrective regimen sliding scale   SubCutaneous at bedtime  insulin lispro Injectable (ADMELOG) 3 Unit(s) SubCutaneous three times a day before meals  lactobacillus acidophilus 1 Tablet(s) Oral two times a day with meals  losartan 50 milliGRAM(s) Oral daily  metoprolol tartrate 25 milliGRAM(s) Oral two times a day  multivitamin 1 Tablet(s) Oral daily  pantoprazole   Suspension 40 milliGRAM(s) Oral daily  piperacillin/tazobactam IVPB.. 3.375 Gram(s) IV Intermittent every 8 hours  senna 2 Tablet(s) Oral at bedtime  simvastatin 40 milliGRAM(s) Oral at bedtime  sodium chloride 0.45%. 1000 milliLiter(s) (50 mL/Hr) IV Continuous <Continuous>  tamsulosin 0.4 milliGRAM(s) Oral at bedtime    MEDICATIONS  (PRN):  acetaminophen     Tablet .. 650 milliGRAM(s) Oral every 6 hours PRN Temp greater or equal to 38C (100.4F), Mild Pain (1 - 3)  aluminum hydroxide/magnesium hydroxide/simethicone Suspension 30 milliLiter(s) Oral every 4 hours PRN Dyspepsia  bisacodyl Suppository 10 milliGRAM(s) Rectal daily PRN Constipation  dextrose Oral Gel 15 Gram(s) Oral once PRN Blood Glucose LESS THAN 70 milliGRAM(s)/deciliter  hydrALAZINE 50 milliGRAM(s) Oral every 6 hours PRN for systolic BP>160  magnesium hydroxide Suspension 30 milliLiter(s) Oral daily PRN Constipation  melatonin 3 milliGRAM(s) Oral at bedtime PRN Insomnia  morphine  - Injectable 2 milliGRAM(s) IV Push every 6 hours PRN Severe Pain (7 - 10)  ondansetron Injectable 4 milliGRAM(s) IV Push every 8 hours PRN Nausea and/or Vomiting  traMADol 50 milliGRAM(s) Oral four times a day PRN Moderate Pain (4 - 6)    Pertinent Labs: 04-02 Na152 mmol/L<H> Glu 108 mg/dL<H> K+ 3.4 mmol/L<L> Cr  1.30 mg/dL BUN 41 mg/dL<H> 04-02 Phos 3.9 mg/dL 03-31 Alb 2.3 g/dL<L> 03-30 Chol 108 mg/dL LDL --    HDL 32 mg/dL<L> Trig 74 mg/dL     CAPILLARY BLOOD GLUCOSE      POCT Blood Glucose.: 116 mg/dL (02 Apr 2023 07:55)  POCT Blood Glucose.: 103 mg/dL (01 Apr 2023 21:24)  POCT Blood Glucose.: 222 mg/dL (01 Apr 2023 16:48)  POCT Blood Glucose.: 296 mg/dL (01 Apr 2023 11:29)    Skin: left heel unstageable, left lateral foot unstageable, left foot bunion, right heel unstageable. [place] 13.     Estimated Needs:   [x ] no change since previous assessment  [ ] recalculated:     Previous Nutrition Diagnosis:   [ ] Inadequate Energy Intake [ ]Inadequate Oral Intake [ ] Excessive Energy Intake   [ ] Underweight [x ] Increased Nutrient Needs [ ] Overweight/Obesity   [ ] Altered GI Function [ ] Unintended Weight Loss [ ] Food & Nutrition Related Knowledge Deficit [x ] Malnutrition     Nutrition Diagnosis is [x ] ongoing  [ ] resolved [ ] not applicable     New Nutrition Diagnosis: [x ] not applicable       Interventions:   Recommend  [ ] Change Diet To:  [x ] Nutrition Supplement: recommend increase glucerna to 8oz BID.  [ ] Nutrition Support  [ x] Other: Reginald 1 packet BID. 500mg Vit C daily.    Monitoring and Evaluation:   [x ] PO intake [ x ] Tolerance to diet prescription [ x ] weights [ x ] labs[ x ] follow up per protocol  [ ] other: Assessment: Pt lethargic/confused at time of visit; seen for nutrition follow up. Continues on rx diet; small/fluctuating appetite/po intake per RN and EMR; 0-25% 3/31, 51-75% 4/1. Per RN small intake at breakfast today 4/2, observed spitting out food. Consider SLP re-evaluation. GI wdl, BM 4/1. Worsening hypernatremia, current Na 152 (4/2). Encourage po fluids plus IVF per MD order. Uncontrolled BS, now improved on 15units lantus qhs, ss admelog TID. Recommend continued assistance/encouragment with meals/supplements.     Factors impacting intake: [ ] none [ ] nausea  [ ] vomiting [ ] diarrhea [ x] constipation  [x ]chewing problems [ ] swallowing issues  [ x] other: confusion, lack of appetite    Diet Presciption: Diet, DASH/TLC:   Sodium & Cholesterol Restricted  Consistent Carbohydrate {Evening Snack}  Soft and Bite Sized (SOFTBTSZ)  Supplement Feeding Modality:  Oral  Glucerna Shake Cans or Servings Per Day:  1       Frequency:  Daily (03-30-23 @ 05:10)    Intake: Small/fluctuating    Current Weight: Weight (kg): 63.5 (03-30 @ 16:49); 140.8lbs(4/2), deepak feet 1+edema      Pertinent Medications: MEDICATIONS  (STANDING):  albuterol/ipratropium for Nebulization 3 milliLiter(s) Nebulizer every 8 hours  budesonide 160 MICROgram(s)/formoterol 4.5 MICROgram(s) Inhaler 2 Puff(s) Inhalation two times a day  chlorhexidine 2% Cloths 1 Application(s) Topical daily  dextrose 5%. 1000 milliLiter(s) (50 mL/Hr) IV Continuous <Continuous>  dextrose 5%. 1000 milliLiter(s) (100 mL/Hr) IV Continuous <Continuous>  dextrose 50% Injectable 25 Gram(s) IV Push once  dextrose 50% Injectable 12.5 Gram(s) IV Push once  dextrose 50% Injectable 25 Gram(s) IV Push once  donepezil 5 milliGRAM(s) Oral at bedtime  DULoxetine 60 milliGRAM(s) Oral daily  enoxaparin Injectable 60 milliGRAM(s) SubCutaneous every 12 hours  glucagon  Injectable 1 milliGRAM(s) IntraMuscular once  insulin glargine Injectable (LANTUS) 15 Unit(s) SubCutaneous at bedtime  insulin lispro (ADMELOG) corrective regimen sliding scale   SubCutaneous three times a day before meals  insulin lispro (ADMELOG) corrective regimen sliding scale   SubCutaneous at bedtime  insulin lispro Injectable (ADMELOG) 3 Unit(s) SubCutaneous three times a day before meals  lactobacillus acidophilus 1 Tablet(s) Oral two times a day with meals  losartan 50 milliGRAM(s) Oral daily  metoprolol tartrate 25 milliGRAM(s) Oral two times a day  multivitamin 1 Tablet(s) Oral daily  pantoprazole   Suspension 40 milliGRAM(s) Oral daily  piperacillin/tazobactam IVPB.. 3.375 Gram(s) IV Intermittent every 8 hours  senna 2 Tablet(s) Oral at bedtime  simvastatin 40 milliGRAM(s) Oral at bedtime  sodium chloride 0.45%. 1000 milliLiter(s) (50 mL/Hr) IV Continuous <Continuous>  tamsulosin 0.4 milliGRAM(s) Oral at bedtime    MEDICATIONS  (PRN):  acetaminophen     Tablet .. 650 milliGRAM(s) Oral every 6 hours PRN Temp greater or equal to 38C (100.4F), Mild Pain (1 - 3)  aluminum hydroxide/magnesium hydroxide/simethicone Suspension 30 milliLiter(s) Oral every 4 hours PRN Dyspepsia  bisacodyl Suppository 10 milliGRAM(s) Rectal daily PRN Constipation  dextrose Oral Gel 15 Gram(s) Oral once PRN Blood Glucose LESS THAN 70 milliGRAM(s)/deciliter  hydrALAZINE 50 milliGRAM(s) Oral every 6 hours PRN for systolic BP>160  magnesium hydroxide Suspension 30 milliLiter(s) Oral daily PRN Constipation  melatonin 3 milliGRAM(s) Oral at bedtime PRN Insomnia  morphine  - Injectable 2 milliGRAM(s) IV Push every 6 hours PRN Severe Pain (7 - 10)  ondansetron Injectable 4 milliGRAM(s) IV Push every 8 hours PRN Nausea and/or Vomiting  traMADol 50 milliGRAM(s) Oral four times a day PRN Moderate Pain (4 - 6)    Pertinent Labs: 04-02 Na152 mmol/L<H> Glu 108 mg/dL<H> K+ 3.4 mmol/L<L> Cr  1.30 mg/dL BUN 41 mg/dL<H> 04-02 Phos 3.9 mg/dL 03-31 Alb 2.3 g/dL<L> 03-30 Chol 108 mg/dL LDL --    HDL 32 mg/dL<L> Trig 74 mg/dL     CAPILLARY BLOOD GLUCOSE      POCT Blood Glucose.: 116 mg/dL (02 Apr 2023 07:55)  POCT Blood Glucose.: 103 mg/dL (01 Apr 2023 21:24)  POCT Blood Glucose.: 222 mg/dL (01 Apr 2023 16:48)  POCT Blood Glucose.: 296 mg/dL (01 Apr 2023 11:29)    Skin: left heel unstageable, left lateral foot unstageable, left foot bunion, right heel unstageable. [place] 13.     Estimated Needs:   [x ] no change since previous assessment  [ ] recalculated:     Previous Nutrition Diagnosis:   [ ] Inadequate Energy Intake [ ]Inadequate Oral Intake [ ] Excessive Energy Intake   [ ] Underweight [x ] Increased Nutrient Needs [ ] Overweight/Obesity   [ ] Altered GI Function [ ] Unintended Weight Loss [ ] Food & Nutrition Related Knowledge Deficit [x ] Malnutrition     Nutrition Diagnosis is [x ] ongoing  [ ] resolved [ ] not applicable     New Nutrition Diagnosis: [x ] not applicable       Interventions:   Recommend  [ ] Change Diet To:  [x ] Nutrition Supplement: recommend increase glucerna to 8oz BID.  [ ] Nutrition Support  [ x] Other: Reginald 1 packet BID. 500mg Vit C daily. SLP re-evaluation.     Monitoring and Evaluation:   [x ] PO intake [ x ] Tolerance to diet prescription [ x ] weights [ x ] labs[ x ] follow up per protocol  [ ] other: Assessment: Pt lethargic/confused at time of visit; seen for nutrition follow up. Continues on rx diet; small/fluctuating appetite/po intake per RN and EMR; 0-25% 3/31, 51-75% 4/1. Per RN small intake at breakfast today 4/2, observed spitting out food. Consider SLP re-evaluation. GI wdl, BM 4/1. Worsening hypernatremia, current Na 152 (4/2). Encourage po fluids plus IVF per MD order. Uncontrolled BS, now improved on 15units lantus qhs, ss admelog TID. Recommend continued assistance/encouragment with meals/supplements.     Factors impacting intake: [ ] none [ ] nausea  [ ] vomiting [ ] diarrhea [ x] constipation  [x ]chewing problems [ ] swallowing issues  [ x] other: confusion, lack of appetite    Diet Presciption: Diet, DASH/TLC:   Sodium & Cholesterol Restricted  Consistent Carbohydrate {Evening Snack}  Soft and Bite Sized (SOFTBTSZ)  Supplement Feeding Modality:  Oral  Glucerna Shake Cans or Servings Per Day:  1       Frequency:  Daily (03-30-23 @ 05:10)    Intake: Small/fluctuating    Current Weight: Weight (kg): 63.5 (03-30 @ 16:49); 140.8lbs(4/2), deepak feet 1+edema      Pertinent Medications: MEDICATIONS  (STANDING):  albuterol/ipratropium for Nebulization 3 milliLiter(s) Nebulizer every 8 hours  budesonide 160 MICROgram(s)/formoterol 4.5 MICROgram(s) Inhaler 2 Puff(s) Inhalation two times a day  chlorhexidine 2% Cloths 1 Application(s) Topical daily  dextrose 5%. 1000 milliLiter(s) (50 mL/Hr) IV Continuous <Continuous>  dextrose 5%. 1000 milliLiter(s) (100 mL/Hr) IV Continuous <Continuous>  dextrose 50% Injectable 25 Gram(s) IV Push once  dextrose 50% Injectable 12.5 Gram(s) IV Push once  dextrose 50% Injectable 25 Gram(s) IV Push once  donepezil 5 milliGRAM(s) Oral at bedtime  DULoxetine 60 milliGRAM(s) Oral daily  enoxaparin Injectable 60 milliGRAM(s) SubCutaneous every 12 hours  glucagon  Injectable 1 milliGRAM(s) IntraMuscular once  insulin glargine Injectable (LANTUS) 15 Unit(s) SubCutaneous at bedtime  insulin lispro (ADMELOG) corrective regimen sliding scale   SubCutaneous three times a day before meals  insulin lispro (ADMELOG) corrective regimen sliding scale   SubCutaneous at bedtime  insulin lispro Injectable (ADMELOG) 3 Unit(s) SubCutaneous three times a day before meals  lactobacillus acidophilus 1 Tablet(s) Oral two times a day with meals  losartan 50 milliGRAM(s) Oral daily  metoprolol tartrate 25 milliGRAM(s) Oral two times a day  multivitamin 1 Tablet(s) Oral daily  pantoprazole   Suspension 40 milliGRAM(s) Oral daily  piperacillin/tazobactam IVPB.. 3.375 Gram(s) IV Intermittent every 8 hours  senna 2 Tablet(s) Oral at bedtime  simvastatin 40 milliGRAM(s) Oral at bedtime  sodium chloride 0.45%. 1000 milliLiter(s) (50 mL/Hr) IV Continuous <Continuous>  tamsulosin 0.4 milliGRAM(s) Oral at bedtime    MEDICATIONS  (PRN):  acetaminophen     Tablet .. 650 milliGRAM(s) Oral every 6 hours PRN Temp greater or equal to 38C (100.4F), Mild Pain (1 - 3)  aluminum hydroxide/magnesium hydroxide/simethicone Suspension 30 milliLiter(s) Oral every 4 hours PRN Dyspepsia  bisacodyl Suppository 10 milliGRAM(s) Rectal daily PRN Constipation  dextrose Oral Gel 15 Gram(s) Oral once PRN Blood Glucose LESS THAN 70 milliGRAM(s)/deciliter  hydrALAZINE 50 milliGRAM(s) Oral every 6 hours PRN for systolic BP>160  magnesium hydroxide Suspension 30 milliLiter(s) Oral daily PRN Constipation  melatonin 3 milliGRAM(s) Oral at bedtime PRN Insomnia  morphine  - Injectable 2 milliGRAM(s) IV Push every 6 hours PRN Severe Pain (7 - 10)  ondansetron Injectable 4 milliGRAM(s) IV Push every 8 hours PRN Nausea and/or Vomiting  traMADol 50 milliGRAM(s) Oral four times a day PRN Moderate Pain (4 - 6)    Pertinent Labs: 04-02 Na152 mmol/L<H> Glu 108 mg/dL<H> K+ 3.4 mmol/L<L> Cr  1.30 mg/dL BUN 41 mg/dL<H> 04-02 Phos 3.9 mg/dL 03-31 Alb 2.3 g/dL<L> 03-30 Chol 108 mg/dL LDL --    HDL 32 mg/dL<L> Trig 74 mg/dL     CAPILLARY BLOOD GLUCOSE      POCT Blood Glucose.: 116 mg/dL (02 Apr 2023 07:55)  POCT Blood Glucose.: 103 mg/dL (01 Apr 2023 21:24)  POCT Blood Glucose.: 222 mg/dL (01 Apr 2023 16:48)  POCT Blood Glucose.: 296 mg/dL (01 Apr 2023 11:29)    Skin: left heel unstageable, left lateral foot unstageable, left foot bunion, right heel unstageable. [place] 13.     Estimated Needs:   [x ] no change since previous assessment  [ ] recalculated:     Previous Nutrition Diagnosis:   [ ] Inadequate Energy Intake [ ]Inadequate Oral Intake [ ] Excessive Energy Intake   [ ] Underweight [x ] Increased Nutrient Needs [ ] Overweight/Obesity   [ ] Altered GI Function [ ] Unintended Weight Loss [ ] Food & Nutrition Related Knowledge Deficit [x ] Malnutrition     Nutrition Diagnosis is [x ] ongoing  [ ] resolved [ ] not applicable     New Nutrition Diagnosis: [x ] not applicable       Interventions:   Recommend  [x ] Change Diet To: Soft and bite sized, Consistent Carbohydrate, Low Na w/ hs snack.   [x ] Nutrition Supplement: Recommend increase glucerna to 8oz BID.  [ ] Nutrition Support  [ x] Other: Reginald 1 packet BID. 500mg Vit C daily. SLP re-evaluation.     Monitoring and Evaluation:   [x ] PO intake [ x ] Tolerance to diet prescription [ x ] weights [ x ] labs[ x ] follow up per protocol  [ ] other: Assessment: Pt lethargic/confused at time of visit; seen for nutrition follow up. Continues on rx diet; small/fluctuating appetite/po intake per RN and EMR; 0-25% 3/31, 51-75% 4/1. Per RN small intake at breakfast today 4/2, observed spitting out food. Consider SLP re-evaluation. GI wdl, BM 4/1. Worsening hypernatremia, current Na 152 (4/2). Encourage po fluids plus IVF per MD order. Provider to RN for free water 250cc at meals and before bed. Uncontrolled BS, now improved on 15units lantus qhs, ss admelog TID. Recommend continued assistance/encouragment with meals/supplements.     Factors impacting intake: [ ] none [ ] nausea  [ ] vomiting [ ] diarrhea [ x] constipation  [x ]chewing problems [ ] swallowing issues  [ x] other: confusion, lack of appetite    Diet Presciption: Diet, DASH/TLC:   Sodium & Cholesterol Restricted  Consistent Carbohydrate {Evening Snack}  Soft and Bite Sized (SOFTBTSZ)  Supplement Feeding Modality:  Oral  Glucerna Shake Cans or Servings Per Day:  1       Frequency:  Daily (03-30-23 @ 05:10)    Intake: Small/fluctuating    Current Weight: Weight (kg): 63.5 (03-30 @ 16:49); 140.8lbs(4/2), deepak feet 1+edema      Pertinent Medications: MEDICATIONS  (STANDING):  albuterol/ipratropium for Nebulization 3 milliLiter(s) Nebulizer every 8 hours  budesonide 160 MICROgram(s)/formoterol 4.5 MICROgram(s) Inhaler 2 Puff(s) Inhalation two times a day  chlorhexidine 2% Cloths 1 Application(s) Topical daily  dextrose 5%. 1000 milliLiter(s) (50 mL/Hr) IV Continuous <Continuous>  dextrose 5%. 1000 milliLiter(s) (100 mL/Hr) IV Continuous <Continuous>  dextrose 50% Injectable 25 Gram(s) IV Push once  dextrose 50% Injectable 12.5 Gram(s) IV Push once  dextrose 50% Injectable 25 Gram(s) IV Push once  donepezil 5 milliGRAM(s) Oral at bedtime  DULoxetine 60 milliGRAM(s) Oral daily  enoxaparin Injectable 60 milliGRAM(s) SubCutaneous every 12 hours  glucagon  Injectable 1 milliGRAM(s) IntraMuscular once  insulin glargine Injectable (LANTUS) 15 Unit(s) SubCutaneous at bedtime  insulin lispro (ADMELOG) corrective regimen sliding scale   SubCutaneous three times a day before meals  insulin lispro (ADMELOG) corrective regimen sliding scale   SubCutaneous at bedtime  insulin lispro Injectable (ADMELOG) 3 Unit(s) SubCutaneous three times a day before meals  lactobacillus acidophilus 1 Tablet(s) Oral two times a day with meals  losartan 50 milliGRAM(s) Oral daily  metoprolol tartrate 25 milliGRAM(s) Oral two times a day  multivitamin 1 Tablet(s) Oral daily  pantoprazole   Suspension 40 milliGRAM(s) Oral daily  piperacillin/tazobactam IVPB.. 3.375 Gram(s) IV Intermittent every 8 hours  senna 2 Tablet(s) Oral at bedtime  simvastatin 40 milliGRAM(s) Oral at bedtime  sodium chloride 0.45%. 1000 milliLiter(s) (50 mL/Hr) IV Continuous <Continuous>  tamsulosin 0.4 milliGRAM(s) Oral at bedtime    MEDICATIONS  (PRN):  acetaminophen     Tablet .. 650 milliGRAM(s) Oral every 6 hours PRN Temp greater or equal to 38C (100.4F), Mild Pain (1 - 3)  aluminum hydroxide/magnesium hydroxide/simethicone Suspension 30 milliLiter(s) Oral every 4 hours PRN Dyspepsia  bisacodyl Suppository 10 milliGRAM(s) Rectal daily PRN Constipation  dextrose Oral Gel 15 Gram(s) Oral once PRN Blood Glucose LESS THAN 70 milliGRAM(s)/deciliter  hydrALAZINE 50 milliGRAM(s) Oral every 6 hours PRN for systolic BP>160  magnesium hydroxide Suspension 30 milliLiter(s) Oral daily PRN Constipation  melatonin 3 milliGRAM(s) Oral at bedtime PRN Insomnia  morphine  - Injectable 2 milliGRAM(s) IV Push every 6 hours PRN Severe Pain (7 - 10)  ondansetron Injectable 4 milliGRAM(s) IV Push every 8 hours PRN Nausea and/or Vomiting  traMADol 50 milliGRAM(s) Oral four times a day PRN Moderate Pain (4 - 6)    Pertinent Labs: 04-02 Na152 mmol/L<H> Glu 108 mg/dL<H> K+ 3.4 mmol/L<L> Cr  1.30 mg/dL BUN 41 mg/dL<H> 04-02 Phos 3.9 mg/dL 03-31 Alb 2.3 g/dL<L> 03-30 Chol 108 mg/dL LDL --    HDL 32 mg/dL<L> Trig 74 mg/dL     CAPILLARY BLOOD GLUCOSE      POCT Blood Glucose.: 116 mg/dL (02 Apr 2023 07:55)  POCT Blood Glucose.: 103 mg/dL (01 Apr 2023 21:24)  POCT Blood Glucose.: 222 mg/dL (01 Apr 2023 16:48)  POCT Blood Glucose.: 296 mg/dL (01 Apr 2023 11:29)    Skin: left heel unstageable, left lateral foot unstageable, left foot bunion, right heel unstageable. [place] 13.     Estimated Needs:   [x ] no change since previous assessment  [ ] recalculated:     Previous Nutrition Diagnosis:   [ ] Inadequate Energy Intake [ ]Inadequate Oral Intake [ ] Excessive Energy Intake   [ ] Underweight [x ] Increased Nutrient Needs [ ] Overweight/Obesity   [ ] Altered GI Function [ ] Unintended Weight Loss [ ] Food & Nutrition Related Knowledge Deficit [x ] Malnutrition     Nutrition Diagnosis is [x ] ongoing  [ ] resolved [ ] not applicable     New Nutrition Diagnosis: [x ] not applicable       Interventions:   Recommend  [x ] Change Diet To: Soft and bite sized, Consistent Carbohydrate, Low Na w/ hs snack.   [x ] Nutrition Supplement: Recommend increase glucerna to 8oz BID.  [ ] Nutrition Support  [ x] Other: Reginald 1 packet BID. 500mg Vit C daily. SLP re-evaluation.     Monitoring and Evaluation:   [x ] PO intake [ x ] Tolerance to diet prescription [ x ] weights [ x ] labs[ x ] follow up per protocol  [ ] other:

## 2023-04-02 NOTE — PROGRESS NOTE ADULT - SUBJECTIVE AND OBJECTIVE BOX
ANA MARIA LEIGH    PLV 1EAS 100 D1    Allergies    No Known Allergies    Intolerances        PAST MEDICAL & SURGICAL HISTORY:  ASHD (arteriosclerotic heart disease)      BPH without urinary obstruction      COPD, moderate      Stage 3 chronic kidney disease      Chronic GERD      HLD (hyperlipidemia)      MDD (major depressive disorder)      Obstructive and reflux uropathy      Cellulitis      HTN (hypertension)      Sepsis      Dementia      Moderate protein-calorie malnutrition      Brain TIA      History of RSV infection      DM type 2, not at goal      Pressure ulcer of unspecified heel, unspecified stage      Venous stasis ulcer without varicose veins      Multiple open wounds of foot          FAMILY HISTORY:      Home Medications:  Admelog SoloStar 100 units/mL injectable solution: injectable 4 times a day (before meals and at bedtime) per sliding scale (30 Mar 2023 15:23)  Aricept 5 mg oral tablet: 1 tab(s) orally once a day (30 Mar 2023 15:23)  atenolol 25 mg oral tablet: 1 tab(s) orally once a day (30 Mar 2023 15:23)  Bacid (LAC) oral tablet: 1 tab(s) orally 2 times a day (30 Mar 2023 15:23)  Cymbalta 60 mg oral delayed release capsule: 1 cap(s) orally once a day (30 Mar 2023 15:23)  docusate sodium 100 mg oral capsule: 2 cap(s) orally once a day (at bedtime) (30 Mar 2023 15:23)  doxycycline hyclate 100 mg oral tablet: 1 tab(s) orally 2 times a day for 7 days  3/28/23-4/4/23 (30 Mar 2023 15:23)  Dulcolax Laxative 10 mg rectal suppository: 1 suppository(ies) rectally as needed for  constipation (30 Mar 2023 15:23)  famotidine 40 mg oral tablet: 1 tab(s) orally once a day (30 Mar 2023 15:23)  Fleet Enema 19 g-7 g rectal enema: 133 milliliter(s) rectally as needed for  constipation (30 Mar 2023 15:23)  Flovent 44 mcg/inh inhalation aerosol with adapter: 1 puff(s) inhaled every 12 hours (30 Mar 2023 15:23)  ipratropium-albuterol 0.5 mg-2.5 mg/3 mL inhalation solution: 3 milliliter(s) by nebulizer 4 times a day (30 Mar 2023 15:23)  losartan 50 mg oral tablet: 1 tab(s) orally once a day (30 Mar 2023 15:23)  Milk of Magnesia 8% oral suspension: 30 milliliter(s) orally once a day as needed for  constipation (30 Mar 2023 15:23)  Santyl 250 units/g topical ointment: Apply topically to affected area (30 Mar 2023 12:41)  simvastatin 40 mg oral tablet: 1 tab(s) orally once a day (at bedtime) (30 Mar 2023 15:23)  SITagliptin 50 mg oral tablet: 1 tab(s) orally once a day (30 Mar 2023 15:23)  tamsulosin 0.4 mg oral capsule: 1 cap(s) orally once a day (in the evening) (30 Mar 2023 15:23)  Therapeutic Multiple Vitamins oral tablet: 1 tab(s) orally once a day (30 Mar 2023 15:23)  traMADol 50 mg oral tablet: 0.5 tab(s) orally 2 times a day (30 Mar 2023 15:23)  Tylenol Caplet Extra Strength 500 mg oral tablet: 2 tab(s) orally every 8 hours (30 Mar 2023 15:23)      MEDICATIONS  (STANDING):  albuterol/ipratropium for Nebulization 3 milliLiter(s) Nebulizer every 8 hours  budesonide 160 MICROgram(s)/formoterol 4.5 MICROgram(s) Inhaler 2 Puff(s) Inhalation two times a day  chlorhexidine 2% Cloths 1 Application(s) Topical daily  dextrose 5%. 1000 milliLiter(s) (50 mL/Hr) IV Continuous <Continuous>  dextrose 5%. 1000 milliLiter(s) (100 mL/Hr) IV Continuous <Continuous>  dextrose 50% Injectable 25 Gram(s) IV Push once  dextrose 50% Injectable 12.5 Gram(s) IV Push once  dextrose 50% Injectable 25 Gram(s) IV Push once  donepezil 5 milliGRAM(s) Oral at bedtime  DULoxetine 60 milliGRAM(s) Oral daily  enoxaparin Injectable 60 milliGRAM(s) SubCutaneous every 12 hours  glucagon  Injectable 1 milliGRAM(s) IntraMuscular once  insulin glargine Injectable (LANTUS) 15 Unit(s) SubCutaneous at bedtime  insulin lispro (ADMELOG) corrective regimen sliding scale   SubCutaneous three times a day before meals  insulin lispro (ADMELOG) corrective regimen sliding scale   SubCutaneous at bedtime  insulin lispro Injectable (ADMELOG) 3 Unit(s) SubCutaneous three times a day before meals  lactobacillus acidophilus 1 Tablet(s) Oral two times a day with meals  losartan 50 milliGRAM(s) Oral daily  metoprolol tartrate 25 milliGRAM(s) Oral two times a day  multivitamin 1 Tablet(s) Oral daily  pantoprazole   Suspension 40 milliGRAM(s) Oral daily  piperacillin/tazobactam IVPB.. 3.375 Gram(s) IV Intermittent every 8 hours  senna 2 Tablet(s) Oral at bedtime  simvastatin 40 milliGRAM(s) Oral at bedtime  sodium chloride 0.45%. 1000 milliLiter(s) (50 mL/Hr) IV Continuous <Continuous>  tamsulosin 0.4 milliGRAM(s) Oral at bedtime    MEDICATIONS  (PRN):  acetaminophen     Tablet .. 650 milliGRAM(s) Oral every 6 hours PRN Temp greater or equal to 38C (100.4F), Mild Pain (1 - 3)  aluminum hydroxide/magnesium hydroxide/simethicone Suspension 30 milliLiter(s) Oral every 4 hours PRN Dyspepsia  bisacodyl Suppository 10 milliGRAM(s) Rectal daily PRN Constipation  dextrose Oral Gel 15 Gram(s) Oral once PRN Blood Glucose LESS THAN 70 milliGRAM(s)/deciliter  hydrALAZINE 50 milliGRAM(s) Oral every 6 hours PRN for systolic BP>160  magnesium hydroxide Suspension 30 milliLiter(s) Oral daily PRN Constipation  melatonin 3 milliGRAM(s) Oral at bedtime PRN Insomnia  morphine  - Injectable 2 milliGRAM(s) IV Push every 6 hours PRN Severe Pain (7 - 10)  ondansetron Injectable 4 milliGRAM(s) IV Push every 8 hours PRN Nausea and/or Vomiting  traMADol 50 milliGRAM(s) Oral four times a day PRN Moderate Pain (4 - 6)      Diet, DASH/TLC:   Sodium & Cholesterol Restricted  Consistent Carbohydrate Evening Snack  Soft and Bite Sized (SOFTBTSZ)  Reginald(7 Gm Arginine/7 Gm Glut/1.2 Gm HMB     Qty per Day:  2  Supplement Feeding Modality:  Oral  Glucerna Shake Cans or Servings Per Day:  1       Frequency:  Daily (03-30-23 @ 13:51) [Pending Verification By Attending]  Diet, DASH/TLC:   Sodium & Cholesterol Restricted  Consistent Carbohydrate Evening Snack  Soft and Bite Sized (SOFTBTSZ)  Supplement Feeding Modality:  Oral  Glucerna Shake Cans or Servings Per Day:  1       Frequency:  Daily (03-30-23 @ 05:10) [Active]          Vital Signs Last 24 Hrs  T(C): 37.5 (02 Apr 2023 05:04), Max: 37.5 (02 Apr 2023 05:04)  T(F): 99.5 (02 Apr 2023 05:04), Max: 99.5 (02 Apr 2023 05:04)  HR: 100 (02 Apr 2023 07:27) (83 - 121)  BP: 162/92 (02 Apr 2023 05:04) (150/76 - 162/92)  BP(mean): --  RR: 17 (02 Apr 2023 05:04) (16 - 18)  SpO2: 94% (02 Apr 2023 07:27) (92% - 97%)    Parameters below as of 02 Apr 2023 07:27  Patient On (Oxygen Delivery Method): room air                  LABS:                        10.6   10.33 )-----------( 223      ( 02 Apr 2023 04:20 )             34.5     04-01    151<H>  |  122<H>  |  40<H>  ----------------------------<  149<H>  4.0   |  24  |  1.50<H>    Ca    9.3      01 Apr 2023 05:07  Phos  3.9     04-02  Mg     2.0     04-02                WBC:  WBC Count: 10.33 K/uL (04-02 @ 04:20)  WBC Count: 11.96 K/uL (04-01 @ 05:07)  WBC Count: 10.50 K/uL (03-31 @ 06:44)  WBC Count: 12.00 K/uL (03-30 @ 07:20)  WBC Count: 11.88 K/uL (03-29 @ 21:20)      MICROBIOLOGY:  RECENT CULTURES:  03-29 .Blood XXXX XXXX   No growth to date.    03-29 .Blood XXXX XXXX   No growth to date.                    Sodium:  Sodium, Serum: 151 mmol/L (04-01 @ 05:07)  Sodium, Serum: 147 mmol/L (03-31 @ 06:44)  Sodium, Serum: 143 mmol/L (03-30 @ 07:20)  Sodium, Serum: 144 mmol/L (03-29 @ 21:20)      1.50 mg/dL 04-01 @ 05:07  1.30 mg/dL 03-31 @ 06:44  1.30 mg/dL 03-30 @ 07:20  1.40 mg/dL 03-29 @ 21:20      Hemoglobin:  Hemoglobin: 10.6 g/dL (04-02 @ 04:20)  Hemoglobin: 11.1 g/dL (04-01 @ 05:07)  Hemoglobin: 9.8 g/dL (03-31 @ 06:44)  Hemoglobin: 10.2 g/dL (03-30 @ 07:20)  Hemoglobin: 11.2 g/dL (03-29 @ 21:20)      Platelets: Platelet Count - Automated: 223 K/uL (04-02 @ 04:20)  Platelet Count - Automated: 244 K/uL (04-01 @ 05:07)  Platelet Count - Automated: 209 K/uL (03-31 @ 06:44)  Platelet Count - Automated: 202 K/uL (03-30 @ 07:20)  Platelet Count - Automated: 228 K/uL (03-29 @ 21:20)              RADIOLOGY & ADDITIONAL STUDIES:      MICROBIOLOGY:  RECENT CULTURES:  03-29 .Blood XXXX XXXX   No growth to date.    03-29 .Blood XXXX XXXX   No growth to date.

## 2023-04-03 LAB
ANION GAP SERPL CALC-SCNC: 3 MMOL/L — LOW (ref 5–17)
BUN SERPL-MCNC: 38 MG/DL — HIGH (ref 7–23)
CALCIUM SERPL-MCNC: 8.5 MG/DL — SIGNIFICANT CHANGE UP (ref 8.5–10.1)
CHLORIDE SERPL-SCNC: 120 MMOL/L — HIGH (ref 96–108)
CO2 SERPL-SCNC: 25 MMOL/L — SIGNIFICANT CHANGE UP (ref 22–31)
CREAT SERPL-MCNC: 1.5 MG/DL — HIGH (ref 0.5–1.3)
EGFR: 45 ML/MIN/1.73M2 — LOW
GLUCOSE SERPL-MCNC: 272 MG/DL — HIGH (ref 70–99)
HCT VFR BLD CALC: 33.1 % — LOW (ref 39–50)
HGB BLD-MCNC: 10 G/DL — LOW (ref 13–17)
INR BLD: 1.43 RATIO — HIGH (ref 0.88–1.16)
MAGNESIUM SERPL-MCNC: 2.1 MG/DL — SIGNIFICANT CHANGE UP (ref 1.6–2.6)
MCHC RBC-ENTMCNC: 28.2 PG — SIGNIFICANT CHANGE UP (ref 27–34)
MCHC RBC-ENTMCNC: 30.2 GM/DL — LOW (ref 32–36)
MCV RBC AUTO: 93.5 FL — SIGNIFICANT CHANGE UP (ref 80–100)
NRBC # BLD: 0 /100 WBCS — SIGNIFICANT CHANGE UP (ref 0–0)
PHOSPHATE SERPL-MCNC: 3.2 MG/DL — SIGNIFICANT CHANGE UP (ref 2.5–4.5)
PLATELET # BLD AUTO: 222 K/UL — SIGNIFICANT CHANGE UP (ref 150–400)
POTASSIUM SERPL-MCNC: 3.8 MMOL/L — SIGNIFICANT CHANGE UP (ref 3.5–5.3)
POTASSIUM SERPL-SCNC: 3.8 MMOL/L — SIGNIFICANT CHANGE UP (ref 3.5–5.3)
PROTHROM AB SERPL-ACNC: 16.8 SEC — HIGH (ref 10.5–13.4)
RBC # BLD: 3.54 M/UL — LOW (ref 4.2–5.8)
RBC # FLD: 17.2 % — HIGH (ref 10.3–14.5)
SODIUM SERPL-SCNC: 148 MMOL/L — HIGH (ref 135–145)
WBC # BLD: 9.46 K/UL — SIGNIFICANT CHANGE UP (ref 3.8–10.5)
WBC # FLD AUTO: 9.46 K/UL — SIGNIFICANT CHANGE UP (ref 3.8–10.5)

## 2023-04-03 PROCEDURE — 99231 SBSQ HOSP IP/OBS SF/LOW 25: CPT

## 2023-04-03 RX ADMIN — Medication 3 MILLILITER(S): at 22:55

## 2023-04-03 RX ADMIN — PIPERACILLIN AND TAZOBACTAM 25 GRAM(S): 4; .5 INJECTION, POWDER, LYOPHILIZED, FOR SOLUTION INTRAVENOUS at 21:45

## 2023-04-03 RX ADMIN — Medication 3 UNIT(S): at 08:15

## 2023-04-03 RX ADMIN — Medication 1 TABLET(S): at 08:14

## 2023-04-03 RX ADMIN — Medication 1 TABLET(S): at 17:20

## 2023-04-03 RX ADMIN — Medication 3 MILLILITER(S): at 07:14

## 2023-04-03 RX ADMIN — BUDESONIDE AND FORMOTEROL FUMARATE DIHYDRATE 2 PUFF(S): 160; 4.5 AEROSOL RESPIRATORY (INHALATION) at 05:34

## 2023-04-03 RX ADMIN — Medication 1 TABLET(S): at 13:48

## 2023-04-03 RX ADMIN — Medication 3 UNIT(S): at 12:29

## 2023-04-03 RX ADMIN — PANTOPRAZOLE SODIUM 40 MILLIGRAM(S): 20 TABLET, DELAYED RELEASE ORAL at 13:48

## 2023-04-03 RX ADMIN — CHLORHEXIDINE GLUCONATE 1 APPLICATION(S): 213 SOLUTION TOPICAL at 13:52

## 2023-04-03 RX ADMIN — SENNA PLUS 2 TABLET(S): 8.6 TABLET ORAL at 21:49

## 2023-04-03 RX ADMIN — Medication 3 UNIT(S): at 19:54

## 2023-04-03 RX ADMIN — ENOXAPARIN SODIUM 60 MILLIGRAM(S): 100 INJECTION SUBCUTANEOUS at 05:34

## 2023-04-03 RX ADMIN — Medication 4: at 12:29

## 2023-04-03 RX ADMIN — Medication 4: at 08:14

## 2023-04-03 RX ADMIN — ENOXAPARIN SODIUM 60 MILLIGRAM(S): 100 INJECTION SUBCUTANEOUS at 17:20

## 2023-04-03 RX ADMIN — SODIUM CHLORIDE 50 MILLILITER(S): 9 INJECTION, SOLUTION INTRAVENOUS at 20:36

## 2023-04-03 RX ADMIN — LOSARTAN POTASSIUM 50 MILLIGRAM(S): 100 TABLET, FILM COATED ORAL at 05:34

## 2023-04-03 RX ADMIN — Medication 25 MILLIGRAM(S): at 05:34

## 2023-04-03 RX ADMIN — DONEPEZIL HYDROCHLORIDE 5 MILLIGRAM(S): 10 TABLET, FILM COATED ORAL at 21:46

## 2023-04-03 RX ADMIN — TAMSULOSIN HYDROCHLORIDE 0.4 MILLIGRAM(S): 0.4 CAPSULE ORAL at 21:46

## 2023-04-03 RX ADMIN — INSULIN GLARGINE 15 UNIT(S): 100 INJECTION, SOLUTION SUBCUTANEOUS at 21:45

## 2023-04-03 RX ADMIN — BUDESONIDE AND FORMOTEROL FUMARATE DIHYDRATE 2 PUFF(S): 160; 4.5 AEROSOL RESPIRATORY (INHALATION) at 20:36

## 2023-04-03 RX ADMIN — PIPERACILLIN AND TAZOBACTAM 25 GRAM(S): 4; .5 INJECTION, POWDER, LYOPHILIZED, FOR SOLUTION INTRAVENOUS at 13:49

## 2023-04-03 RX ADMIN — DULOXETINE HYDROCHLORIDE 60 MILLIGRAM(S): 30 CAPSULE, DELAYED RELEASE ORAL at 13:48

## 2023-04-03 RX ADMIN — SIMVASTATIN 40 MILLIGRAM(S): 20 TABLET, FILM COATED ORAL at 21:46

## 2023-04-03 RX ADMIN — PIPERACILLIN AND TAZOBACTAM 25 GRAM(S): 4; .5 INJECTION, POWDER, LYOPHILIZED, FOR SOLUTION INTRAVENOUS at 05:34

## 2023-04-03 RX ADMIN — Medication 3 MILLILITER(S): at 15:07

## 2023-04-03 NOTE — PROGRESS NOTE ADULT - ASSESSMENT
86 yo male ,[facility] resident with PMHx - COPD, DM, HTN, CAD, HLD, H/O TIA, dementia,GERD, BPH and depression sent ot ER for evaluation of left foot 1metatarsal wound ,suggestive of osteomyelitis .Patient was seen by ID consult and transfer to the hospital recommended for wound cx/bone biopsy /podiatry evaluation ,likely will require 4-6 weeks of iv abx . [facility] with b/l feet wounds and was followed by wound care team ,recently completed 7 days of doxycycline for foot wound cellulitis . (30 Mar 2023 05:21)      hypernatremia   d5w iv fluid     hypokalemia potassium chloride  10 mEq/100 mL IVPB 10 milliEquivalent(s) IV Intermittent every 1 hour    ACUTE RENAL FAILURE: sodium chloride 0.45%. 1000 milliLiter(s) (50 mL/Hr) IV Continuous  Serum creatinine is improving    There is no progression . No uremic symptoms  No evidence of anemia .  Fluid status stable.  Will continue to avoid nephrotoxic drugs.  Patient remains asymptomatic.   Continue current therapy.  hold  diuretic.        BP monitoring,continue current antihypertensive meds, low salt diet,followup with PMD in 1-2 weeks  losartan 50 milliGRAM(s) Oral daily    f/u  blood and urine cx,serial lactate levels,monitor vitals clement saenz hydration,monitor urine output and renal profile,iv abx   piperacillin/tazobactam IVPB.. 3.375 Gram(s) IV Intermittent every 8 hours 88 yo male ,[facility] resident with PMHx - COPD, DM, HTN, CAD, HLD, H/O TIA, dementia,GERD, BPH and depression sent ot ER for evaluation of left foot 1metatarsal wound ,suggestive of osteomyelitis .Patient was seen by ID consult and transfer to the hospital recommended for wound cx/bone biopsy /podiatry evaluation ,likely will require 4-6 weeks of iv abx . Patient was admitted to [facility] with b/l feet wounds and was followed by wound care team ,recently completed 7 days of doxycycline for foot wound cellulitis . (30 Mar 2023 05:21)      hypernatremia   d5w iv fluid     hypokalemia potassium chloride  10 mEq/100 mL IVPB 10 milliEquivalent(s) IV Intermittent every 1 hour    ACUTE RENAL FAILURE: sodium chloride 0.45%. 1000 milliLiter(s) (50 mL/Hr) IV Continuous  Serum creatinine is improving    There is no progression . No uremic symptoms  No evidence of anemia .  Fluid status stable.  Will continue to avoid nephrotoxic drugs.  Patient remains asymptomatic.   Continue current therapy.  hold  diuretic.        BP monitoring,continue current antihypertensive meds, low salt diet,followup with PMD in 1-2 weeks  losartan 50 milliGRAM(s) Oral daily    f/u  blood and urine cx,serial lactate levels,monitor vitals clement saenz hydration,monitor urine output and renal profile,iv abx   piperacillin/tazobactam IVPB.. 3.375 Gram(s) IV Intermittent every 8 hours 88 yo male ,[facility] resident with PMHx - COPD, DM, HTN, CAD, HLD, H/O TIA, dementia,GERD, BPH and depression sent ot ER for evaluation of left foot 1metatarsal wound ,suggestive of osteomyelitis .Patient was seen by ID consult and transfer to the hospital recommended for wound cx/bone biopsy /podiatry evaluation ,likely will require 4-6 weeks of iv abx . Patient was admitted to [facility] with b/l feet wounds and was followed by wound care team ,recently completed 7 days of doxycycline for foot wound cellulitis . (30 Mar 2023 05:21)      hypernatremia   d5w iv fluid     hypokalemia potassium chloride  10 mEq/100 mL IVPB 10 milliEquivalent(s) IV Intermittent every 1 hour    ACUTE RENAL FAILURE: sodium chloride 0.45%. 1000 milliLiter(s) (50 mL/Hr) IV Continuous  Serum creatinine is improving    There is no progression . No uremic symptoms  No evidence of anemia .  Fluid status stable.  Will continue to avoid nephrotoxic drugs.  Patient remains asymptomatic.   Continue current therapy.  hold  diuretic.        BP monitoring,continue current antihypertensive meds, low salt diet,followup with PMD in 1-2 weeks  losartan 50 milliGRAM(s) Oral daily    f/u  blood and urine cx,serial lactate levels,monitor vitals clement saenz hydration,monitor urine output and renal profile,iv abx   piperacillin/tazobactam IVPB.. 3.375 Gram(s) IV Intermittent every 8 hours

## 2023-04-03 NOTE — PROGRESS NOTE ADULT - ASSESSMENT
REVIEW OF SYMPTOMS      Able to give (reliable) ROS  NO     PHYSICAL EXAM    HEENT Unremarkable  atraumatic   RESP Fair air entry EXP prolonged    Harsh breath sound Resp distres mild   CARDIAC S1 S2 No S3     NO JVD    ABDOMEN SOFT BS PRESENT NOT DISTENDED No hepatosplenomegaly   PEDAL EDEMA present No calf tenderness  NO rash       GENERAL DATA .   GOC.     .. 3/30/2023 full code  ALLGY.     .. nka                    WT.   .. 3/30/2023 63  BMI.        .. 3/30/2023 21            ICU STAY.   .. none  COVID.   .. 3/29/2023 scv2 (-)     BEST PRACTICE ISSUES.    HOB ELEVATN.   .. Yes  DVT PPLX.   .. 3/31 lvnx 60.2 Dr Lakhani (a fib)   ..  3/29- 3/31 hpsc   MILLER PPLX.   .. 3/30/2023 protonix 40    INFN PPLX. ..    SP SW LAURENT.   ..  3/30/2023 -> soft bite mild thick        DIET.    ..  3/30/2023 dash  FREE WATER  .. 4/1/2023 fw 250.4    IV fl.  .. 4/3/2023 d5 50     ABGS.    VS/ PO/IO/ VENT/ DRIPS.   4/3/2023 afeb 100 130/60   4/3/2023 ra 95%     PROBLEM/ASSESSMENT/PLAN.  Infection  Osteomyelitius  Possible pneumonia   .. Esr 3/29-3/31/2023 esr 67- 49   .. W 3/29-3/30-3/31-4/1/2023 w 11.8 - 12- 10.5- 11.9    .. cxr 3/30/2023  ........ increasing r lower perihilar infiltrate   .. xr foot left 3/30/2023  ........ stable eroisions around 1st mtp anmd great toe    ........ which could be bone infectn    .. CXR 3/29/2023 Possible hansa pneum  .. 3/29/2023 bc (-)   .. 3/29 zosyn    .. follow cultures  PLEURAL EFFUSION.  .. ct ch 3/30/2023   ........ mild pulm edema  ........ mod r and sml effsn   a/r  .. pl effsn likely sec to chf   COPD  .. 3/30/2023 duoneb.3    .. 3/30/2023 symbicort   hemodynamics  .. La 3/29/2023 la 1.8   .. target map 65 (+)   CAD.  .. 3/30- 3/31/2023 atenolol 25  .. 3/31 metoprolol 25.2   .. 3/30/2023 simvastat 40   RO DVT  .. 4/1/2023 v duplx (-)  CHF.  .. echo 3/30/2023  ........ ef 40%   ........ mod mr   .. bnp 4/2 bnp [id number]   .. 3/30/2023 losartan 50   .. 3/30/2023 hydralazin 50.4p   Anemia  .. Hb 3/29-3/30-3/31-4/1/2023 Hb 11.2- 10.2 - 9.8 - 11   .. monitor  Hypernatremia.  .. Na 3/31-4/1-4/2-4/3 Na 147 - 151- 152 - 148   CKD  .. Na 3/29-3/31/2023 Na 144-147   .. Cr 3/29-3/30-3/31-4/1-4/3/2023 Cr 1.4 - 1.3 - 1.3 - 1.5 - 1.5   .. monitor  OBS  .. 3/30/2023 donepezil   .     OVERALL .  86 yo M with PMHx HTN, HLD, T2D, dementia (AOx2-3), OA, BPH, CAD, TIA, CKD 3 and GERD HO recent hospital stay 1/24-1/30/2023 [facility] RSV  COPD ex  now admitted with osteomyelitis possible pneum  Pulm consulted 3/29/2023    PROBLEMS  Osteomyelitis  Pneumonia   .. 3/29 zosyn    COPD   CKD   PLEURAL EFFSN 3/30 ct  HFREF MOD MR 3/30 echo    A fib 4/1 lvnx 60.2     PLAN  Antibio bronchodilators monitor  4/1/2023 free water startd     TIME SPENT   Over 25 minutes aggregate care time spent on encounter; activities included   direct patient care, counseling and/or coordinating care reviewing notes, lab data/ imaging , discussion with multidisciplinary team/ patient  /family and explaining in detail risks, benefits, alternatives  of the recommendations     Paulino Parmar 87 m     REVIEW OF SYMPTOMS      Able to give (reliable) ROS  NO     PHYSICAL EXAM    HEENT Unremarkable  atraumatic   RESP Fair air entry EXP prolonged    Harsh breath sound Resp distres mild   CARDIAC S1 S2 No S3     NO JVD    ABDOMEN SOFT BS PRESENT NOT DISTENDED No hepatosplenomegaly   PEDAL EDEMA present No calf tenderness  NO rash       GENERAL DATA .   GOC.     .. 3/30/2023 full code  ALLGY.     .. nka                    WT.   .. 3/30/2023 63  BMI.        .. 3/30/2023 21            ICU STAY.   .. none  COVID.   .. 3/29/2023 scv2 (-)     BEST PRACTICE ISSUES.    HOB ELEVATN.   .. Yes  DVT PPLX.   .. 3/31 lvnx 60.2 Dr Lakhani (a fib)   ..  3/29- 3/31 hpsc   MILLER PPLX.   .. 3/30/2023 protonix 40    INFN PPLX. ..    SP SW LAURENT.   ..  3/30/2023 -> soft bite mild thick        DIET.    ..  3/30/2023 dash  FREE WATER  .. 4/1/2023 fw 250.4    IV fl.  .. 4/3/2023 d5 50     ABGS.    VS/ PO/IO/ VENT/ DRIPS.   4/3/2023 afeb 100 130/60   4/3/2023 ra 95%     PROBLEM/ASSESSMENT/PLAN.  Infection  Osteomyelitius  Possible pneumonia   .. Esr 3/29-3/31/2023 esr 67- 49   .. W 3/29-3/30-3/31-4/1/2023 w 11.8 - 12- 10.5- 11.9    .. cxr 3/30/2023  ........ increasing r lower perihilar infiltrate   .. xr foot left 3/30/2023  ........ stable eroisions around 1st mtp anmd great toe    ........ which could be bone infectn    .. CXR 3/29/2023 Possible hansa pneum  .. 3/29/2023 bc (-)   .. 3/29 zosyn    .. follow cultures  PLEURAL EFFUSION.  .. ct ch 3/30/2023   ........ mild pulm edema  ........ mod r and sml effsn   a/r  .. pl effsn likely sec to chf   COPD  .. 3/30/2023 duoneb.3    .. 3/30/2023 symbicort   hemodynamics  .. La 3/29/2023 la 1.8   .. target map 65 (+)   CAD.  .. 3/30- 3/31/2023 atenolol 25  .. 3/31 metoprolol 25.2   .. 3/30/2023 simvastat 40   RO DVT  .. 4/1/2023 v duplx (-)  CHF.  .. echo 3/30/2023  ........ ef 40%   ........ mod mr   .. bnp 4/2 bnp [id number]   .. 3/30/2023 losartan 50   .. 3/30/2023 hydralazin 50.4p   Anemia  .. Hb 3/29-3/30-3/31-4/1/2023 Hb 11.2- 10.2 - 9.8 - 11   .. monitor  Hypernatremia.  .. Na 3/31-4/1-4/2-4/3 Na 147 - 151- 152 - 148   CKD  .. Na 3/29-3/31/2023 Na 144-147   .. Cr 3/29-3/30-3/31-4/1-4/3/2023 Cr 1.4 - 1.3 - 1.3 - 1.5 - 1.5   .. monitor  OBS  .. 3/30/2023 donepezil   .     OVERALL .  88 yo M with PMHx HTN, HLD, T2D, dementia (AOx2-3), OA, BPH, CAD, TIA, CKD 3 and GERD HO recent hospital stay 1/24-1/30/2023 [facility]  COPD ex  now admitted with osteomyelitis possible pneum  Pulm consulted 3/29/2023    PROBLEMS  Osteomyelitis  Pneumonia   .. 3/29 zosyn    COPD   CKD   PLEURAL EFFSN 3/30 ct  HFREF MOD MR 3/30 echo    A fib 4/1 lvnx 60.2     PLAN  Antibio bronchodilators monitor  4/1/2023 free water startd     TIME SPENT   Over 25 minutes aggregate care time spent on encounter; activities included   direct patient care, counseling and/or coordinating care reviewing notes, lab data/ imaging , discussion with multidisciplinary team/ patient  /family and explaining in detail risks, benefits, alternatives  of the recommendations     Paulino Parmar 87 m     REVIEW OF SYMPTOMS      Able to give (reliable) ROS  NO     PHYSICAL EXAM    HEENT Unremarkable  atraumatic   RESP Fair air entry EXP prolonged    Harsh breath sound Resp distres mild   CARDIAC S1 S2 No S3     NO JVD    ABDOMEN SOFT BS PRESENT NOT DISTENDED No hepatosplenomegaly   PEDAL EDEMA present No calf tenderness  NO rash       GENERAL DATA .   GOC.     .. 3/30/2023 full code  ALLGY.     .. nka                    WT.   .. 3/30/2023 63  BMI.        .. 3/30/2023 21            ICU STAY.   .. none  COVID.   .. 3/29/2023 scv2 (-)     BEST PRACTICE ISSUES.    HOB ELEVATN.   .. Yes  DVT PPLX.   .. 3/31 lvnx 60.2 Dr Lakhani (a fib)   ..  3/29- 3/31 hpsc   MILLER PPLX.   .. 3/30/2023 protonix 40    INFN PPLX. ..    SP SW LAURENT.   ..  3/30/2023 -> soft bite mild thick        DIET.    ..  3/30/2023 dash  FREE WATER  .. 4/1/2023 fw 250.4    IV fl.  .. 4/3/2023 d5 50     ABGS.    VS/ PO/IO/ VENT/ DRIPS.   4/3/2023 afeb 100 130/60   4/3/2023 ra 95%     PROBLEM/ASSESSMENT/PLAN.  Infection  Osteomyelitius  Possible pneumonia   .. Esr 3/29-3/31/2023 esr 67- 49   .. W 3/29-3/30-3/31-4/1/2023 w 11.8 - 12- 10.5- 11.9    .. cxr 3/30/2023  ........ increasing r lower perihilar infiltrate   .. xr foot left 3/30/2023  ........ stable eroisions around 1st mtp anmd great toe    ........ which could be bone infectn    .. CXR 3/29/2023 Possible hansa pneum  .. 3/29/2023 bc (-)   .. 3/29 zosyn    .. follow cultures  PLEURAL EFFUSION.  .. ct ch 3/30/2023   ........ mild pulm edema  ........ mod r and sml effsn   a/r  .. pl effsn likely sec to chf   COPD  .. 3/30/2023 duoneb.3    .. 3/30/2023 symbicort   hemodynamics  .. La 3/29/2023 la 1.8   .. target map 65 (+)   CAD.  .. 3/30- 3/31/2023 atenolol 25  .. 3/31 metoprolol 25.2   .. 3/30/2023 simvastat 40   RO DVT  .. 4/1/2023 v duplx (-)  CHF.  .. echo 3/30/2023  ........ ef 40%   ........ mod mr   .. bnp 4/2 bnp [id number]   .. 3/30/2023 losartan 50   .. 3/30/2023 hydralazin 50.4p   Anemia  .. Hb 3/29-3/30-3/31-4/1/2023 Hb 11.2- 10.2 - 9.8 - 11   .. monitor  Hypernatremia.  .. Na 3/31-4/1-4/2-4/3 Na 147 - 151- 152 - 148   CKD  .. Na 3/29-3/31/2023 Na 144-147   .. Cr 3/29-3/30-3/31-4/1-4/3/2023 Cr 1.4 - 1.3 - 1.3 - 1.5 - 1.5   .. monitor  OBS  .. 3/30/2023 donepezil   .     OVERALL .  86 yo M with PMHx HTN, HLD, T2D, dementia (AOx2-3), OA, BPH, CAD, TIA, CKD 3 and GERD HO recent hospital stay 1/24-1/30/2023 [facility]  COPD ex  now admitted with osteomyelitis possible pneum  Pulm consulted 3/29/2023    PROBLEMS  Osteomyelitis  Pneumonia   .. 3/29 zosyn    COPD   CKD   PLEURAL EFFSN 3/30 ct  HFREF MOD MR 3/30 echo    A fib 4/1 lvnx 60.2     PLAN  Antibio bronchodilators monitor  4/1/2023 free water startd     TIME SPENT   Over 25 minutes aggregate care time spent on encounter; activities included   direct patient care, counseling and/or coordinating care reviewing notes, lab data/ imaging , discussion with multidisciplinary team/ patient  /family and explaining in detail risks, benefits, alternatives  of the recommendations     Paulino Parmar 87 m

## 2023-04-03 NOTE — PROGRESS NOTE ADULT - SUBJECTIVE AND OBJECTIVE BOX
[facility]  INFECTIOUS DISEASES - Ruma Mayes, [place] [address]  Tel: [phone]     Fax: [phone]  =======================================================    ANA MARIA LEIGH [id number]    Follow up: No fevers. Patient sleepy but arousable.    Allergies:  No Known Allergies      Antibiotics:  acetaminophen     Tablet .. 650 milliGRAM(s) Oral every 6 hours PRN  albuterol/ipratropium for Nebulization 3 milliLiter(s) Nebulizer every 8 hours  aluminum hydroxide/magnesium hydroxide/simethicone Suspension 30 milliLiter(s) Oral every 4 hours PRN  bisacodyl Suppository 10 milliGRAM(s) Rectal daily PRN  budesonide 160 MICROgram(s)/formoterol 4.5 MICROgram(s) Inhaler 2 Puff(s) Inhalation two times a day  chlorhexidine 2% Cloths 1 Application(s) Topical daily  dextrose 5%. 1000 milliLiter(s) IV Continuous <Continuous>  dextrose 5%. 1000 milliLiter(s) IV Continuous <Continuous>  dextrose 5%. 1000 milliLiter(s) IV Continuous <Continuous>  dextrose 50% Injectable 25 Gram(s) IV Push once  dextrose 50% Injectable 12.5 Gram(s) IV Push once  dextrose 50% Injectable 25 Gram(s) IV Push once  dextrose Oral Gel 15 Gram(s) Oral once PRN  donepezil 5 milliGRAM(s) Oral at bedtime  DULoxetine 60 milliGRAM(s) Oral daily  enoxaparin Injectable 60 milliGRAM(s) SubCutaneous every 12 hours  glucagon  Injectable 1 milliGRAM(s) IntraMuscular once  hydrALAZINE 50 milliGRAM(s) Oral every 6 hours PRN  insulin glargine Injectable (LANTUS) 15 Unit(s) SubCutaneous at bedtime  insulin lispro (ADMELOG) corrective regimen sliding scale   SubCutaneous three times a day before meals  insulin lispro (ADMELOG) corrective regimen sliding scale   SubCutaneous at bedtime  insulin lispro Injectable (ADMELOG) 3 Unit(s) SubCutaneous three times a day before meals  lactobacillus acidophilus 1 Tablet(s) Oral two times a day with meals  losartan 50 milliGRAM(s) Oral daily  magnesium hydroxide Suspension 30 milliLiter(s) Oral daily PRN  melatonin 3 milliGRAM(s) Oral at bedtime PRN  morphine  - Injectable 2 milliGRAM(s) IV Push every 6 hours PRN  multivitamin 1 Tablet(s) Oral daily  ondansetron Injectable 4 milliGRAM(s) IV Push every 8 hours PRN  pantoprazole   Suspension 40 milliGRAM(s) Oral daily  piperacillin/tazobactam IVPB.. 3.375 Gram(s) IV Intermittent every 8 hours  senna 2 Tablet(s) Oral at bedtime  simvastatin 40 milliGRAM(s) Oral at bedtime  tamsulosin 0.4 milliGRAM(s) Oral at bedtime  traMADol 50 milliGRAM(s) Oral four times a day PRN       REVIEW OF SYSTEMS:  Unable to obtain 2/2 dementia     Physical Exam:  ICU Vital Signs Last 24 Hrs  T(C): 37.1 (2023 12:45), Max: 37.1 (2023 12:45)  T(F): 98.7 (2023 12:45), Max: 98.7 (2023 12:45)  HR: 103 (2023 15:07) (98 - 122)  BP: 148/82 (2023 12:45) (135/62 - 156/79)  BP(mean): --  ABP: --  ABP(mean): --  RR: 18 (2023 12:45) (17 - 19)  SpO2: 95% (2023 15:07) (92% - 96%)    O2 Parameters below as of 2023 15:07  Patient On (Oxygen Delivery Method): room air      GEN: NAD  HEENT: normocephalic and atraumatic.   NECK: Supple.   LUNGS: Normal respiratory effort  HEART: Regular rate and rhythm   ABDOMEN: Soft, nontender, and nondistended.    EXTREMITIES: No leg edema.  NEUROLOGIC: sleepy but arousable  SKIN: (+) L foot foot 1st metatarsal wound with probe to bone, bilateral heel wounds    Labs:      148<H>  |  120<H>  |  38<H>  ----------------------------<  272<H>  3.8   |  25  |  1.50<H>    Ca    8.5      2023 07:38  Phos  3.2     04-03  Mg     2.1     -                            10.0   9.46  )-----------( 222      ( 2023 07:38 )             33.1     PT/INR - ( 2023 07:38 )   PT: 16.8 sec;   INR: 1.43 ratio           Urinalysis Basic - ( 2023 09:15 )    Color: Yellow / Appearance: Slightly Turbid / S.020 / pH: x  Gluc: x / Ketone: Trace  / Bili: Negative / Urobili: Negative   Blood: x / Protein: 30 mg/dL / Nitrite: Negative   Leuk Esterase: Moderate / RBC: 6-10 /HPF / WBC 26-50   Sq Epi: x / Non Sq Epi: Negative / Bacteria: Moderate          RECENT CULTURES:   @ 09:15 Clean Catch Clean Catch (Midstream)     >100,000 CFU/ml Enterococcus faecalis         @ 21:32 .Blood     No growth to date.         @ 21:20 .Blood     No growth to date.              All imaging and data are reviewed.    [facility]  INFECTIOUS DISEASES - Ruma Mayes, [place] [address]  Tel: [phone]     Fax: [phone]  =======================================================    ANA MARIA LEIGH [id number]    Follow up: No fevers. Patient sleepy but arousable.    Allergies:  No Known Allergies      Antibiotics:  acetaminophen     Tablet .. 650 milliGRAM(s) Oral every 6 hours PRN  albuterol/ipratropium for Nebulization 3 milliLiter(s) Nebulizer every 8 hours  aluminum hydroxide/magnesium hydroxide/simethicone Suspension 30 milliLiter(s) Oral every 4 hours PRN  bisacodyl Suppository 10 milliGRAM(s) Rectal daily PRN  budesonide 160 MICROgram(s)/formoterol 4.5 MICROgram(s) Inhaler 2 Puff(s) Inhalation two times a day  chlorhexidine 2% Cloths 1 Application(s) Topical daily  dextrose 5%. 1000 milliLiter(s) IV Continuous <Continuous>  dextrose 5%. 1000 milliLiter(s) IV Continuous <Continuous>  dextrose 5%. 1000 milliLiter(s) IV Continuous <Continuous>  dextrose 50% Injectable 25 Gram(s) IV Push once  dextrose 50% Injectable 12.5 Gram(s) IV Push once  dextrose 50% Injectable 25 Gram(s) IV Push once  dextrose Oral Gel 15 Gram(s) Oral once PRN  donepezil 5 milliGRAM(s) Oral at bedtime  DULoxetine 60 milliGRAM(s) Oral daily  enoxaparin Injectable 60 milliGRAM(s) SubCutaneous every 12 hours  glucagon  Injectable 1 milliGRAM(s) IntraMuscular once  hydrALAZINE 50 milliGRAM(s) Oral every 6 hours PRN  insulin glargine Injectable (LANTUS) 15 Unit(s) SubCutaneous at bedtime  insulin lispro (ADMELOG) corrective regimen sliding scale   SubCutaneous three times a day before meals  insulin lispro (ADMELOG) corrective regimen sliding scale   SubCutaneous at bedtime  insulin lispro Injectable (ADMELOG) 3 Unit(s) SubCutaneous three times a day before meals  lactobacillus acidophilus 1 Tablet(s) Oral two times a day with meals  losartan 50 milliGRAM(s) Oral daily  magnesium hydroxide Suspension 30 milliLiter(s) Oral daily PRN  melatonin 3 milliGRAM(s) Oral at bedtime PRN  morphine  - Injectable 2 milliGRAM(s) IV Push every 6 hours PRN  multivitamin 1 Tablet(s) Oral daily  ondansetron Injectable 4 milliGRAM(s) IV Push every 8 hours PRN  pantoprazole   Suspension 40 milliGRAM(s) Oral daily  piperacillin/tazobactam IVPB.. 3.375 Gram(s) IV Intermittent every 8 hours  senna 2 Tablet(s) Oral at bedtime  simvastatin 40 milliGRAM(s) Oral at bedtime  tamsulosin 0.4 milliGRAM(s) Oral at bedtime  traMADol 50 milliGRAM(s) Oral four times a day PRN       REVIEW OF SYSTEMS:  Unable to obtain 2/2 dementia     Physical Exam:  ICU Vital Signs Last 24 Hrs  T(C): 37.1 (2023 12:45), Max: 37.1 (2023 12:45)  T(F): 98.7 (2023 12:45), Max: 98.7 (2023 12:45)  HR: 103 (2023 15:07) (98 - 122)  BP: 148/82 (2023 12:45) (135/62 - 156/79)  BP(mean): --  ABP: --  ABP(mean): --  RR: 18 (2023 12:45) (17 - 19)  SpO2: 95% (2023 15:07) (92% - 96%)    O2 Parameters below as of 2023 15:07  Patient On (Oxygen Delivery Method): room air      GEN: NAD  HEENT: normocephalic and atraumatic.   NECK: Supple.   LUNGS: Normal respiratory effort  HEART: Regular rate and rhythm   ABDOMEN: Soft, nontender, and nondistended.    EXTREMITIES: No leg edema.  NEUROLOGIC: sleepy but arousable  SKIN: (+) L foot foot 1st metatarsal wound with probe to bone, bilateral heel wounds    Labs:      148<H>  |  120<H>  |  38<H>  ----------------------------<  272<H>  3.8   |  25  |  1.50<H>    Ca    8.5      2023 07:38  Phos  3.2     04-03  Mg     2.1     -                            10.0   9.46  )-----------( 222      ( 2023 07:38 )             33.1     PT/INR - ( 2023 07:38 )   PT: 16.8 sec;   INR: 1.43 ratio           Urinalysis Basic - ( 2023 09:15 )    Color: Yellow / Appearance: Slightly Turbid / S.020 / pH: x  Gluc: x / Ketone: Trace  / Bili: Negative / Urobili: Negative   Blood: x / Protein: 30 mg/dL / Nitrite: Negative   Leuk Esterase: Moderate / RBC: 6-10 /HPF / WBC 26-50   Sq Epi: x / Non Sq Epi: Negative / Bacteria: Moderate          RECENT CULTURES:   @ 09:15 Clean Catch Clean Catch (Midstream)     >100,000 CFU/ml Enterococcus faecalis         @ 21:32 .Blood     No growth to date.         @ 21:20 .Blood     No growth to date.              All imaging and data are reviewed.    [facility]  INFECTIOUS DISEASES - Ruma Mayes, [place] [address]  Tel: [phone]     Fax: [phone]  =======================================================    ANA MARIA LEIGH [id number]    Follow up: No fevers. Patient sleepy but arousable.    Allergies:  No Known Allergies      Antibiotics:  acetaminophen     Tablet .. 650 milliGRAM(s) Oral every 6 hours PRN  albuterol/ipratropium for Nebulization 3 milliLiter(s) Nebulizer every 8 hours  aluminum hydroxide/magnesium hydroxide/simethicone Suspension 30 milliLiter(s) Oral every 4 hours PRN  bisacodyl Suppository 10 milliGRAM(s) Rectal daily PRN  budesonide 160 MICROgram(s)/formoterol 4.5 MICROgram(s) Inhaler 2 Puff(s) Inhalation two times a day  chlorhexidine 2% Cloths 1 Application(s) Topical daily  dextrose 5%. 1000 milliLiter(s) IV Continuous <Continuous>  dextrose 5%. 1000 milliLiter(s) IV Continuous <Continuous>  dextrose 5%. 1000 milliLiter(s) IV Continuous <Continuous>  dextrose 50% Injectable 25 Gram(s) IV Push once  dextrose 50% Injectable 12.5 Gram(s) IV Push once  dextrose 50% Injectable 25 Gram(s) IV Push once  dextrose Oral Gel 15 Gram(s) Oral once PRN  donepezil 5 milliGRAM(s) Oral at bedtime  DULoxetine 60 milliGRAM(s) Oral daily  enoxaparin Injectable 60 milliGRAM(s) SubCutaneous every 12 hours  glucagon  Injectable 1 milliGRAM(s) IntraMuscular once  hydrALAZINE 50 milliGRAM(s) Oral every 6 hours PRN  insulin glargine Injectable (LANTUS) 15 Unit(s) SubCutaneous at bedtime  insulin lispro (ADMELOG) corrective regimen sliding scale   SubCutaneous three times a day before meals  insulin lispro (ADMELOG) corrective regimen sliding scale   SubCutaneous at bedtime  insulin lispro Injectable (ADMELOG) 3 Unit(s) SubCutaneous three times a day before meals  lactobacillus acidophilus 1 Tablet(s) Oral two times a day with meals  losartan 50 milliGRAM(s) Oral daily  magnesium hydroxide Suspension 30 milliLiter(s) Oral daily PRN  melatonin 3 milliGRAM(s) Oral at bedtime PRN  morphine  - Injectable 2 milliGRAM(s) IV Push every 6 hours PRN  multivitamin 1 Tablet(s) Oral daily  ondansetron Injectable 4 milliGRAM(s) IV Push every 8 hours PRN  pantoprazole   Suspension 40 milliGRAM(s) Oral daily  piperacillin/tazobactam IVPB.. 3.375 Gram(s) IV Intermittent every 8 hours  senna 2 Tablet(s) Oral at bedtime  simvastatin 40 milliGRAM(s) Oral at bedtime  tamsulosin 0.4 milliGRAM(s) Oral at bedtime  traMADol 50 milliGRAM(s) Oral four times a day PRN       REVIEW OF SYSTEMS:  Unable to obtain 2/2 dementia     Physical Exam:  ICU Vital Signs Last 24 Hrs  T(C): 37.1 (2023 12:45), Max: 37.1 (2023 12:45)  T(F): 98.7 (2023 12:45), Max: 98.7 (2023 12:45)  HR: 103 (2023 15:07) (98 - 122)  BP: 148/82 (2023 12:45) (135/62 - 156/79)  BP(mean): --  ABP: --  ABP(mean): --  RR: 18 (2023 12:45) (17 - 19)  SpO2: 95% (2023 15:07) (92% - 96%)    O2 Parameters below as of 2023 15:07  Patient On (Oxygen Delivery Method): room air      GEN: NAD  HEENT: normocephalic and atraumatic.   NECK: Supple.   LUNGS: Normal respiratory effort  HEART: Regular rate and rhythm   ABDOMEN: Soft, nontender, and nondistended.    EXTREMITIES: No leg edema.  NEUROLOGIC: sleepy but arousable  SKIN: (+) L foot foot 1st metatarsal wound with probe to bone, bilateral heel wounds    Labs:      148<H>  |  120<H>  |  38<H>  ----------------------------<  272<H>  3.8   |  25  |  1.50<H>    Ca    8.5      2023 07:38  Phos  3.2     04-03  Mg     2.1     -                            10.0   9.46  )-----------( 222      ( 2023 07:38 )             33.1     PT/INR - ( 2023 07:38 )   PT: 16.8 sec;   INR: 1.43 ratio           Urinalysis Basic - ( 2023 09:15 )    Color: Yellow / Appearance: Slightly Turbid / S.020 / pH: x  Gluc: x / Ketone: Trace  / Bili: Negative / Urobili: Negative   Blood: x / Protein: 30 mg/dL / Nitrite: Negative   Leuk Esterase: Moderate / RBC: 6-10 /HPF / WBC 26-50   Sq Epi: x / Non Sq Epi: Negative / Bacteria: Moderate          RECENT CULTURES:   @ 09:15 Clean Catch Clean Catch (Midstream)     >100,000 CFU/ml Enterococcus faecalis         @ 21:32 .Blood     No growth to date.         @ 21:20 .Blood     No growth to date.              All imaging and data are reviewed.

## 2023-04-03 NOTE — PROGRESS NOTE ADULT - ASSESSMENT
88 yo male ,[facility] resident with PMHx - COPD, DM, HTN, CAD, HLD, H/O TIA, dementia,GERD, BPH and depression sent ot ER for evaluation of left foot 1metatarsal wound ,suggestive of osteomyelitis .Patient was seen by ID consult and transfer to the hospital recommended for wound cx/bone biopsy /podiatry evaluation ,likely will require 4-6 weeks of iv abx . Patient was admitted to [facility] with b/l feet wounds and was followed by wound care team ,recently completed 7 days of doxycycline for foot wound cellulitis . 88 yo male ,[facility] resident with PMHx - COPD, DM, HTN, CAD, HLD, H/O TIA, dementia,GERD, BPH and depression sent ot ER for evaluation of left foot 1metatarsal wound ,suggestive of osteomyelitis .Patient was seen by ID consult and transfer to the hospital recommended for wound cx/bone biopsy /podiatry evaluation ,likely will require 4-6 weeks of iv abx . Patient was admitted to [facility] with b/l feet wounds and was followed by wound care team ,recently completed 7 days of doxycycline for foot wound cellulitis . 88 yo male ,[facility] resident with PMHx - COPD, DM, HTN, CAD, HLD, H/O TIA, dementia,GERD, BPH and depression sent ot ER for evaluation of left foot 1metatarsal wound ,suggestive of osteomyelitis .Patient was seen by ID consult and transfer to the hospital recommended for wound cx/bone biopsy /podiatry evaluation ,likely will require 4-6 weeks of iv abx . Patient was admitted to [facility] with b/l feet wounds and was followed by wound care team ,recently completed 7 days of doxycycline for foot wound cellulitis .

## 2023-04-03 NOTE — SOCIAL WORK PROGRESS NOTE - NSSWPROGRESSNOTE_GEN_ALL_CORE
SW received phone call from pt spouse expressing concerns about medical procedures. SW offered support and transferred spouse to  for staff to f/u regarding medical care. SW will remain available if needed.

## 2023-04-03 NOTE — PROGRESS NOTE ADULT - NUTRITIONAL ASSESSMENT
This patient has been assessed with a concern for Malnutrition and has been determined to have a diagnosis/diagnoses of Moderate protein-calorie malnutrition.    This patient is being managed with:   Diet DASH/TLC-  Sodium & Cholesterol Restricted  Consistent Carbohydrate {Evening Snack}  Soft and Bite Sized (SOFTBTSZ)  Reginald(7 Gm Arginine/7 Gm Glut/1.2 Gm HMB     Qty per Day:  2  Supplement Feeding Modality:  Oral  Glucerna Shake Cans or Servings Per Day:  1       Frequency:  Daily  Entered: Mar 30 2023  1:51PM    Diet DASH/TLC-  Sodium & Cholesterol Restricted  Consistent Carbohydrate {Evening Snack}  Soft and Bite Sized (SOFTBTSZ)  Supplement Feeding Modality:  Oral  Glucerna Shake Cans or Servings Per Day:  1       Frequency:  Daily  Entered: Mar 30 2023  5:09AM    The following pending diet order is being considered for treatment of Moderate protein-calorie malnutrition:  Diet Soft and Bite Sized-  Consistent Carbohydrate {Evening Snack}  Low Sodium  Reginald(7 Gm Arginine/7 Gm Glut/1.2 Gm HMB     Qty per Day:  BID  Supplement Feeding Modality:  Oral  Glucerna Shake Cans or Servings Per Day:  1       Frequency:  Two Times a day  Entered: Apr 2 2023 10:30AM

## 2023-04-03 NOTE — PROGRESS NOTE ADULT - SUBJECTIVE AND OBJECTIVE BOX
Patient is a 87y Male whom presented to the hospital with ckd and chelo     PAST MEDICAL & SURGICAL HISTORY:      MEDICATIONS  (STANDING):      Allergies    No Known Allergies    Intolerances        SOCIAL HISTORY:  Denies ETOh,Smoking,     FAMILY HISTORY:      REVIEW OF SYSTEMS:  unable to obtained a good review system                                                      10.0   9.46  )-----------( 222      ( 2023 07:38 )             33.1       CBC Full  -  ( 2023 07:38 )  WBC Count : 9.46 K/uL  RBC Count : 3.54 M/uL  Hemoglobin : 10.0 g/dL  Hematocrit : 33.1 %  Platelet Count - Automated : 222 K/uL  Mean Cell Volume : 93.5 fl  Mean Cell Hemoglobin : 28.2 pg  Mean Cell Hemoglobin Concentration : 30.2 gm/dL  Auto Neutrophil # : x  Auto Lymphocyte # : x  Auto Monocyte # : x  Auto Eosinophil # : x  Auto Basophil # : x  Auto Neutrophil % : x  Auto Lymphocyte % : x  Auto Monocyte % : x  Auto Eosinophil % : x  Auto Basophil % : x      04-03    148<H>  |  120<H>  |  38<H>  ----------------------------<  272<H>  3.8   |  25  |  1.50<H>    Ca    8.5      2023 07:38  Phos  3.2     04-03  Mg     2.1     04-03        CAPILLARY BLOOD GLUCOSE      POCT Blood Glucose.: 109 mg/dL (2023 16:42)  POCT Blood Glucose.: 236 mg/dL (2023 11:39)  POCT Blood Glucose.: 230 mg/dL (2023 07:44)  POCT Blood Glucose.: 260 mg/dL (2023 21:58)  POCT Blood Glucose.: 252 mg/dL (2023 20:51)      Vital Signs Last 24 Hrs  T(C): 37.1 (2023 12:45), Max: 37.1 (2023 12:45)  T(F): 98.7 (2023 12:45), Max: 98.7 (2023 12:45)  HR: 103 (2023 15:07) (98 - 122)  BP: 148/82 (2023 12:45) (135/62 - 156/79)  BP(mean): --  RR: 18 (2023 12:45) (17 - 19)  SpO2: 95% (2023 15:07) (92% - 96%)    Parameters below as of 2023 15:07  Patient On (Oxygen Delivery Method): room air        Urinalysis Basic - ( 2023 09:15 )    Color: Yellow / Appearance: Slightly Turbid / S.020 / pH: x  Gluc: x / Ketone: Trace  / Bili: Negative / Urobili: Negative   Blood: x / Protein: 30 mg/dL / Nitrite: Negative   Leuk Esterase: Moderate / RBC: 6-10 /HPF / WBC 26-50   Sq Epi: x / Non Sq Epi: Negative / Bacteria: Moderate        PT/INR - ( 2023 07:38 )   PT: 16.8 sec;   INR: 1.43 ratio                   PHYSICAL EXAM:    Constitutional: NAD  HEENT: conjunctive   clear   Neck:  No JVD  Respiratory: CTAB  Cardiovascular: S1 and S2  Gastrointestinal: BS+, soft,   Extremities: No peripheral edema  Neurological:  no focal deficits

## 2023-04-03 NOTE — PROGRESS NOTE ADULT - ASSESSMENT
86 yo male ,[facility] with PMHx - COPD, DM, HTN, CAD, HLD, H/O TIA, dementia, GERD, BPH and depression, who was sent for evaluation of left foot 1metatarsal wound. Concern for wound infection with underlying osteomyelitis. He also has bilateral heel wounds R>L.     No fever overnight and no leukocytosis. Blood cultures remain no growth. Urine culture growing E. faecalis, unable to determine any symptoms given dementia but should also be covered by current antibiotics. Plan for potential Podiatry intervention.    -continue Zosyn  -follow up bone scan  -follow cultures to completion    Isis Mancia MD  Division of Infectious Diseases   Cell [phone] between 8am and 6pm   After 6pm and weekends please call ID service at [phone].      86 yo male ,[facility] resident with PMHx - COPD, DM, HTN, CAD, HLD, H/O TIA, dementia, GERD, BPH and depression, who was sent for evaluation of left foot 1metatarsal wound. Concern for wound infection with underlying osteomyelitis. He also has bilateral heel wounds R>L.     No fever overnight and no leukocytosis. Blood cultures remain no growth. Urine culture growing E. faecalis, unable to determine any symptoms given dementia but should also be covered by current antibiotics. Plan for potential Podiatry intervention.    -continue Zosyn  -follow up bone scan  -follow cultures to completion    Isis Mancia MD  Division of Infectious Diseases   Cell [phone] between 8am and 6pm   After 6pm and weekends please call ID service at [phone].      86 yo male ,[facility] resident with PMHx - COPD, DM, HTN, CAD, HLD, H/O TIA, dementia, GERD, BPH and depression, who was sent for evaluation of left foot 1metatarsal wound. Concern for wound infection with underlying osteomyelitis. He also has bilateral heel wounds R>L.     No fever overnight and no leukocytosis. Blood cultures remain no growth. Urine culture growing E. faecalis, unable to determine any symptoms given dementia but should also be covered by current antibiotics. Plan for potential Podiatry intervention.    -continue Zosyn  -follow up bone scan  -follow cultures to completion    Isis Mancia MD  Division of Infectious Diseases   Cell [phone] between 8am and 6pm   After 6pm and weekends please call ID service at [phone].

## 2023-04-03 NOTE — PROGRESS NOTE ADULT - SUBJECTIVE AND OBJECTIVE BOX
[facility] [place]       HPI:  86 yo male ,[facility] resident with PMHx - COPD, DM, HTN, CAD, HLD, H/O TIA, dementia,GERD, BPH and depression sent ot ER for evaluation of left foot 1metatarsal wound ,suggestive of osteomyelitis .Patient was seen by ID consult and transfer to the hospital recommended for wound cx/bone biopsy /podiatry evaluation ,likely will require 4-6 weeks of iv abx . Patient was admitted to [facility] with b/l feet wounds and was followed by wound care team ,recently completed 7 days of doxycycline for foot wound cellulitis . (30 Mar 2023 05:21)        SUBJECTIVE:      ALLERGIES:  Allergies    No Known Allergies    Intolerances          MEDICATIONS  (STANDING):  albuterol/ipratropium for Nebulization 3 milliLiter(s) Nebulizer every 8 hours  budesonide 160 MICROgram(s)/formoterol 4.5 MICROgram(s) Inhaler 2 Puff(s) Inhalation two times a day  chlorhexidine 2% Cloths 1 Application(s) Topical daily  dextrose 5%. 1000 milliLiter(s) (50 mL/Hr) IV Continuous <Continuous>  dextrose 5%. 1000 milliLiter(s) (70 mL/Hr) IV Continuous <Continuous>  dextrose 5%. 1000 milliLiter(s) (100 mL/Hr) IV Continuous <Continuous>  dextrose 50% Injectable 25 Gram(s) IV Push once  dextrose 50% Injectable 12.5 Gram(s) IV Push once  dextrose 50% Injectable 25 Gram(s) IV Push once  donepezil 5 milliGRAM(s) Oral at bedtime  DULoxetine 60 milliGRAM(s) Oral daily  enoxaparin Injectable 60 milliGRAM(s) SubCutaneous every 12 hours  glucagon  Injectable 1 milliGRAM(s) IntraMuscular once  insulin glargine Injectable (LANTUS) 15 Unit(s) SubCutaneous at bedtime  insulin lispro (ADMELOG) corrective regimen sliding scale   SubCutaneous three times a day before meals  insulin lispro (ADMELOG) corrective regimen sliding scale   SubCutaneous at bedtime  insulin lispro Injectable (ADMELOG) 3 Unit(s) SubCutaneous three times a day before meals  lactobacillus acidophilus 1 Tablet(s) Oral two times a day with meals  losartan 50 milliGRAM(s) Oral daily  metoprolol tartrate 25 milliGRAM(s) Oral two times a day  multivitamin 1 Tablet(s) Oral daily  pantoprazole   Suspension 40 milliGRAM(s) Oral daily  piperacillin/tazobactam IVPB.. 3.375 Gram(s) IV Intermittent every 8 hours  senna 2 Tablet(s) Oral at bedtime  simvastatin 40 milliGRAM(s) Oral at bedtime  tamsulosin 0.4 milliGRAM(s) Oral at bedtime    MEDICATIONS  (PRN):  acetaminophen     Tablet .. 650 milliGRAM(s) Oral every 6 hours PRN Temp greater or equal to 38C (100.4F), Mild Pain (1 - 3)  aluminum hydroxide/magnesium hydroxide/simethicone Suspension 30 milliLiter(s) Oral every 4 hours PRN Dyspepsia  bisacodyl Suppository 10 milliGRAM(s) Rectal daily PRN Constipation  dextrose Oral Gel 15 Gram(s) Oral once PRN Blood Glucose LESS THAN 70 milliGRAM(s)/deciliter  hydrALAZINE 50 milliGRAM(s) Oral every 6 hours PRN for systolic BP>160  magnesium hydroxide Suspension 30 milliLiter(s) Oral daily PRN Constipation  melatonin 3 milliGRAM(s) Oral at bedtime PRN Insomnia  morphine  - Injectable 2 milliGRAM(s) IV Push every 6 hours PRN Severe Pain (7 - 10)  ondansetron Injectable 4 milliGRAM(s) IV Push every 8 hours PRN Nausea and/or Vomiting  traMADol 50 milliGRAM(s) Oral four times a day PRN Moderate Pain (4 - 6)      REVIEW OF SYSTEMS:  CONSTITUTIONAL: No fever,  RESPIRATORY: No cough, wheezing, shortness of breath  CARDIOVASCULAR: No chest pain, dyspnea, palpitations, dizziness, syncope, paroxysmal nocturnal dyspnea, orthopnea, or arm or leg swelling  GASTROINTESTINAL: No abdominal  or epigastric pain, nausea, vomiting,  diarrhea  NEUROLOGICAL: No headaches,  loss of strength, numbness, or tremors    Vital Signs Last 24 Hrs  T(C): 36.9 (2023 05:00), Max: 37.3 (2023 12:14)  T(F): 98.5 (2023 05:00), Max: 99.1 (2023 12:14)  HR: 98 (2023 05:00) (89 - 122)  BP: 156/79 (2023 05:00) (141/80 - 159/98)  BP(mean): --  RR: 19 (2023 05:00) (17 - 19)  SpO2: 93% (2023 05:00) (92% - 97%)    Parameters below as of 2023 05:00  Patient On (Oxygen Delivery Method): room air        PHYSICAL EXAM:  HEAD:  Atraumatic, Normocephalic  NECK: Supple and normal thyroid.  No JVD or carotid bruit.   HEART: S1, S2 regular , 1/6 soft ejection systolic murmur in mitral area , no thrill and no gallops .  PULMONARY: Bilateral vesicular breathing , few scattered ronchi and few scattered rales are present .  ABDOMEN: Soft nontender and positive bowl sounds   EXTREMITIES:  No clubbing, cyanosis, or pedal  edema  NEUROLOGICAL: AAOX3 , no focal deficit .    LABS:                        10.6   10.33 )-----------( 223      ( 2023 04:20 )             34.5     04-02    152<H>  |  124<H>  |  41<H>  ----------------------------<  108<H>  3.4<L>   |  22  |  1.30    Ca    8.8      2023 04:20  Phos  3.9     04-02  Mg     2.0     04-02            Urinalysis Basic - ( 2023 09:15 )    Color: Yellow / Appearance: Slightly Turbid / S.020 / pH: x  Gluc: x / Ketone: Trace  / Bili: Negative / Urobili: Negative   Blood: x / Protein: 30 mg/dL / Nitrite: Negative   Leuk Esterase: Moderate / RBC: 6-10 /HPF / WBC 26-50   Sq Epi: x / Non Sq Epi: Negative / Bacteria: Moderate      BNP      EKG:  ECHO:  IMAGING:    Assessment/Plan    Will continue to follow during hospital course and give further recommendations as needed . Thanks for your referral . if any questions please contact me at office ([phone])cell [phone])  [facility] [place]       HPI:  88 yo male ,[facility] resident with PMHx - COPD, DM, HTN, CAD, HLD, H/O TIA, dementia,GERD, BPH and depression sent ot ER for evaluation of left foot 1metatarsal wound ,suggestive of osteomyelitis .Patient was seen by ID consult and transfer to the hospital recommended for wound cx/bone biopsy /podiatry evaluation ,likely will require 4-6 weeks of iv abx . Patient was admitted to [facility] with b/l feet wounds and was followed by wound care team ,recently completed 7 days of doxycycline for foot wound cellulitis . (30 Mar 2023 05:21)        SUBJECTIVE:      ALLERGIES:  Allergies    No Known Allergies    Intolerances          MEDICATIONS  (STANDING):  albuterol/ipratropium for Nebulization 3 milliLiter(s) Nebulizer every 8 hours  budesonide 160 MICROgram(s)/formoterol 4.5 MICROgram(s) Inhaler 2 Puff(s) Inhalation two times a day  chlorhexidine 2% Cloths 1 Application(s) Topical daily  dextrose 5%. 1000 milliLiter(s) (50 mL/Hr) IV Continuous <Continuous>  dextrose 5%. 1000 milliLiter(s) (70 mL/Hr) IV Continuous <Continuous>  dextrose 5%. 1000 milliLiter(s) (100 mL/Hr) IV Continuous <Continuous>  dextrose 50% Injectable 25 Gram(s) IV Push once  dextrose 50% Injectable 12.5 Gram(s) IV Push once  dextrose 50% Injectable 25 Gram(s) IV Push once  donepezil 5 milliGRAM(s) Oral at bedtime  DULoxetine 60 milliGRAM(s) Oral daily  enoxaparin Injectable 60 milliGRAM(s) SubCutaneous every 12 hours  glucagon  Injectable 1 milliGRAM(s) IntraMuscular once  insulin glargine Injectable (LANTUS) 15 Unit(s) SubCutaneous at bedtime  insulin lispro (ADMELOG) corrective regimen sliding scale   SubCutaneous three times a day before meals  insulin lispro (ADMELOG) corrective regimen sliding scale   SubCutaneous at bedtime  insulin lispro Injectable (ADMELOG) 3 Unit(s) SubCutaneous three times a day before meals  lactobacillus acidophilus 1 Tablet(s) Oral two times a day with meals  losartan 50 milliGRAM(s) Oral daily  metoprolol tartrate 25 milliGRAM(s) Oral two times a day  multivitamin 1 Tablet(s) Oral daily  pantoprazole   Suspension 40 milliGRAM(s) Oral daily  piperacillin/tazobactam IVPB.. 3.375 Gram(s) IV Intermittent every 8 hours  senna 2 Tablet(s) Oral at bedtime  simvastatin 40 milliGRAM(s) Oral at bedtime  tamsulosin 0.4 milliGRAM(s) Oral at bedtime    MEDICATIONS  (PRN):  acetaminophen     Tablet .. 650 milliGRAM(s) Oral every 6 hours PRN Temp greater or equal to 38C (100.4F), Mild Pain (1 - 3)  aluminum hydroxide/magnesium hydroxide/simethicone Suspension 30 milliLiter(s) Oral every 4 hours PRN Dyspepsia  bisacodyl Suppository 10 milliGRAM(s) Rectal daily PRN Constipation  dextrose Oral Gel 15 Gram(s) Oral once PRN Blood Glucose LESS THAN 70 milliGRAM(s)/deciliter  hydrALAZINE 50 milliGRAM(s) Oral every 6 hours PRN for systolic BP>160  magnesium hydroxide Suspension 30 milliLiter(s) Oral daily PRN Constipation  melatonin 3 milliGRAM(s) Oral at bedtime PRN Insomnia  morphine  - Injectable 2 milliGRAM(s) IV Push every 6 hours PRN Severe Pain (7 - 10)  ondansetron Injectable 4 milliGRAM(s) IV Push every 8 hours PRN Nausea and/or Vomiting  traMADol 50 milliGRAM(s) Oral four times a day PRN Moderate Pain (4 - 6)      REVIEW OF SYSTEMS:  CONSTITUTIONAL: No fever,  RESPIRATORY: No cough, wheezing, shortness of breath  CARDIOVASCULAR: No chest pain, dyspnea, palpitations, dizziness, syncope, paroxysmal nocturnal dyspnea, orthopnea, or arm or leg swelling  GASTROINTESTINAL: No abdominal  or epigastric pain, nausea, vomiting,  diarrhea  NEUROLOGICAL: No headaches,  loss of strength, numbness, or tremors    Vital Signs Last 24 Hrs  T(C): 36.9 (2023 05:00), Max: 37.3 (2023 12:14)  T(F): 98.5 (2023 05:00), Max: 99.1 (2023 12:14)  HR: 98 (2023 05:00) (89 - 122)  BP: 156/79 (2023 05:00) (141/80 - 159/98)  BP(mean): --  RR: 19 (2023 05:00) (17 - 19)  SpO2: 93% (2023 05:00) (92% - 97%)    Parameters below as of 2023 05:00  Patient On (Oxygen Delivery Method): room air        PHYSICAL EXAM:  HEAD:  Atraumatic, Normocephalic  NECK: Supple and normal thyroid.  No JVD or carotid bruit.   HEART: S1, S2 regular , 1/6 soft ejection systolic murmur in mitral area , no thrill and no gallops .  PULMONARY: Bilateral vesicular breathing , few scattered ronchi and few scattered rales are present .  ABDOMEN: Soft nontender and positive bowl sounds   EXTREMITIES:  No clubbing, cyanosis, or pedal  edema  NEUROLOGICAL: AAOX3 , no focal deficit .    LABS:                        10.6   10.33 )-----------( 223      ( 2023 04:20 )             34.5     04-02    152<H>  |  124<H>  |  41<H>  ----------------------------<  108<H>  3.4<L>   |  22  |  1.30    Ca    8.8      2023 04:20  Phos  3.9     04-02  Mg     2.0     04-02            Urinalysis Basic - ( 2023 09:15 )    Color: Yellow / Appearance: Slightly Turbid / S.020 / pH: x  Gluc: x / Ketone: Trace  / Bili: Negative / Urobili: Negative   Blood: x / Protein: 30 mg/dL / Nitrite: Negative   Leuk Esterase: Moderate / RBC: 6-10 /HPF / WBC 26-50   Sq Epi: x / Non Sq Epi: Negative / Bacteria: Moderate      BNP      EKG:  ECHO:  IMAGING:    Assessment/Plan    Will continue to follow during hospital course and give further recommendations as needed . Thanks for your referral . if any questions please contact me at office ([phone])cell [phone])  [facility] [place]       HPI:  88 yo male ,[facility] resident with PMHx - COPD, DM, HTN, CAD, HLD, H/O TIA, dementia,GERD, BPH and depression sent ot ER for evaluation of left foot 1metatarsal wound ,suggestive of osteomyelitis .Patient was seen by ID consult and transfer to the hospital recommended for wound cx/bone biopsy /podiatry evaluation ,likely will require 4-6 weeks of iv abx . Patient was admitted to [facility] with b/l feet wounds and was followed by wound care team ,recently completed 7 days of doxycycline for foot wound cellulitis . (30 Mar 2023 05:21)        SUBJECTIVE:      ALLERGIES:  Allergies    No Known Allergies    Intolerances          MEDICATIONS  (STANDING):  albuterol/ipratropium for Nebulization 3 milliLiter(s) Nebulizer every 8 hours  budesonide 160 MICROgram(s)/formoterol 4.5 MICROgram(s) Inhaler 2 Puff(s) Inhalation two times a day  chlorhexidine 2% Cloths 1 Application(s) Topical daily  dextrose 5%. 1000 milliLiter(s) (50 mL/Hr) IV Continuous <Continuous>  dextrose 5%. 1000 milliLiter(s) (70 mL/Hr) IV Continuous <Continuous>  dextrose 5%. 1000 milliLiter(s) (100 mL/Hr) IV Continuous <Continuous>  dextrose 50% Injectable 25 Gram(s) IV Push once  dextrose 50% Injectable 12.5 Gram(s) IV Push once  dextrose 50% Injectable 25 Gram(s) IV Push once  donepezil 5 milliGRAM(s) Oral at bedtime  DULoxetine 60 milliGRAM(s) Oral daily  enoxaparin Injectable 60 milliGRAM(s) SubCutaneous every 12 hours  glucagon  Injectable 1 milliGRAM(s) IntraMuscular once  insulin glargine Injectable (LANTUS) 15 Unit(s) SubCutaneous at bedtime  insulin lispro (ADMELOG) corrective regimen sliding scale   SubCutaneous three times a day before meals  insulin lispro (ADMELOG) corrective regimen sliding scale   SubCutaneous at bedtime  insulin lispro Injectable (ADMELOG) 3 Unit(s) SubCutaneous three times a day before meals  lactobacillus acidophilus 1 Tablet(s) Oral two times a day with meals  losartan 50 milliGRAM(s) Oral daily  metoprolol tartrate 25 milliGRAM(s) Oral two times a day  multivitamin 1 Tablet(s) Oral daily  pantoprazole   Suspension 40 milliGRAM(s) Oral daily  piperacillin/tazobactam IVPB.. 3.375 Gram(s) IV Intermittent every 8 hours  senna 2 Tablet(s) Oral at bedtime  simvastatin 40 milliGRAM(s) Oral at bedtime  tamsulosin 0.4 milliGRAM(s) Oral at bedtime    MEDICATIONS  (PRN):  acetaminophen     Tablet .. 650 milliGRAM(s) Oral every 6 hours PRN Temp greater or equal to 38C (100.4F), Mild Pain (1 - 3)  aluminum hydroxide/magnesium hydroxide/simethicone Suspension 30 milliLiter(s) Oral every 4 hours PRN Dyspepsia  bisacodyl Suppository 10 milliGRAM(s) Rectal daily PRN Constipation  dextrose Oral Gel 15 Gram(s) Oral once PRN Blood Glucose LESS THAN 70 milliGRAM(s)/deciliter  hydrALAZINE 50 milliGRAM(s) Oral every 6 hours PRN for systolic BP>160  magnesium hydroxide Suspension 30 milliLiter(s) Oral daily PRN Constipation  melatonin 3 milliGRAM(s) Oral at bedtime PRN Insomnia  morphine  - Injectable 2 milliGRAM(s) IV Push every 6 hours PRN Severe Pain (7 - 10)  ondansetron Injectable 4 milliGRAM(s) IV Push every 8 hours PRN Nausea and/or Vomiting  traMADol 50 milliGRAM(s) Oral four times a day PRN Moderate Pain (4 - 6)      REVIEW OF SYSTEMS:  CONSTITUTIONAL: No fever,  RESPIRATORY: No cough, wheezing, shortness of breath  CARDIOVASCULAR: No chest pain, dyspnea, palpitations, dizziness, syncope, paroxysmal nocturnal dyspnea, orthopnea, or arm or leg swelling  GASTROINTESTINAL: No abdominal  or epigastric pain, nausea, vomiting,  diarrhea  NEUROLOGICAL: No headaches,  loss of strength, numbness, or tremors    Vital Signs Last 24 Hrs  T(C): 36.9 (2023 05:00), Max: 37.3 (2023 12:14)  T(F): 98.5 (2023 05:00), Max: 99.1 (2023 12:14)  HR: 98 (2023 05:00) (89 - 122)  BP: 156/79 (2023 05:00) (141/80 - 159/98)  BP(mean): --  RR: 19 (2023 05:00) (17 - 19)  SpO2: 93% (2023 05:00) (92% - 97%)    Parameters below as of 2023 05:00  Patient On (Oxygen Delivery Method): room air        PHYSICAL EXAM:  HEAD:  Atraumatic, Normocephalic  NECK: Supple and normal thyroid.  No JVD or carotid bruit.   HEART: S1, S2 regular , 1/6 soft ejection systolic murmur in mitral area , no thrill and no gallops .  PULMONARY: Bilateral vesicular breathing , few scattered ronchi and few scattered rales are present .  ABDOMEN: Soft nontender and positive bowl sounds   EXTREMITIES:  No clubbing, cyanosis, or pedal  edema  NEUROLOGICAL: AAOX3 , no focal deficit .    LABS:                        10.6   10.33 )-----------( 223      ( 2023 04:20 )             34.5     04-02    152<H>  |  124<H>  |  41<H>  ----------------------------<  108<H>  3.4<L>   |  22  |  1.30    Ca    8.8      2023 04:20  Phos  3.9     04-02  Mg     2.0     04-02            Urinalysis Basic - ( 2023 09:15 )    Color: Yellow / Appearance: Slightly Turbid / S.020 / pH: x  Gluc: x / Ketone: Trace  / Bili: Negative / Urobili: Negative   Blood: x / Protein: 30 mg/dL / Nitrite: Negative   Leuk Esterase: Moderate / RBC: 6-10 /HPF / WBC 26-50   Sq Epi: x / Non Sq Epi: Negative / Bacteria: Moderate      BNP      EKG:  ECHO:  IMAGING:    Assessment/Plan    Will continue to follow during hospital course and give further recommendations as needed . Thanks for your referral . if any questions please contact me at office ([phone])cell [phone])  [facility] [place]       HPI:  88 yo male ,[facility] resident with PMHx - COPD, DM, HTN, CAD, HLD, H/O TIA, dementia,GERD, BPH and depression sent ot ER for evaluation of left foot 1metatarsal wound ,suggestive of osteomyelitis .Patient was seen by ID consult and transfer to the hospital recommended for wound cx/bone biopsy /podiatry evaluation ,likely will require 4-6 weeks of iv abx . Patient was admitted to [facility] with b/l feet wounds and was followed by wound care team ,recently completed 7 days of doxycycline for foot wound cellulitis . (30 Mar 2023 05:21)        SUBJECTIVE: Patient feeling better .      ALLERGIES:  Allergies    No Known Allergies    Intolerances          MEDICATIONS  (STANDING):  albuterol/ipratropium for Nebulization 3 milliLiter(s) Nebulizer every 8 hours  budesonide 160 MICROgram(s)/formoterol 4.5 MICROgram(s) Inhaler 2 Puff(s) Inhalation two times a day  chlorhexidine 2% Cloths 1 Application(s) Topical daily  dextrose 5%. 1000 milliLiter(s) (50 mL/Hr) IV Continuous <Continuous>  dextrose 5%. 1000 milliLiter(s) (70 mL/Hr) IV Continuous <Continuous>  dextrose 5%. 1000 milliLiter(s) (100 mL/Hr) IV Continuous <Continuous>  dextrose 50% Injectable 25 Gram(s) IV Push once  dextrose 50% Injectable 12.5 Gram(s) IV Push once  dextrose 50% Injectable 25 Gram(s) IV Push once  donepezil 5 milliGRAM(s) Oral at bedtime  DULoxetine 60 milliGRAM(s) Oral daily  enoxaparin Injectable 60 milliGRAM(s) SubCutaneous every 12 hours  glucagon  Injectable 1 milliGRAM(s) IntraMuscular once  insulin glargine Injectable (LANTUS) 15 Unit(s) SubCutaneous at bedtime  insulin lispro (ADMELOG) corrective regimen sliding scale   SubCutaneous three times a day before meals  insulin lispro (ADMELOG) corrective regimen sliding scale   SubCutaneous at bedtime  insulin lispro Injectable (ADMELOG) 3 Unit(s) SubCutaneous three times a day before meals  lactobacillus acidophilus 1 Tablet(s) Oral two times a day with meals  losartan 50 milliGRAM(s) Oral daily  metoprolol tartrate 25 milliGRAM(s) Oral two times a day  multivitamin 1 Tablet(s) Oral daily  pantoprazole   Suspension 40 milliGRAM(s) Oral daily  piperacillin/tazobactam IVPB.. 3.375 Gram(s) IV Intermittent every 8 hours  senna 2 Tablet(s) Oral at bedtime  simvastatin 40 milliGRAM(s) Oral at bedtime  tamsulosin 0.4 milliGRAM(s) Oral at bedtime    MEDICATIONS  (PRN):  acetaminophen     Tablet .. 650 milliGRAM(s) Oral every 6 hours PRN Temp greater or equal to 38C (100.4F), Mild Pain (1 - 3)  aluminum hydroxide/magnesium hydroxide/simethicone Suspension 30 milliLiter(s) Oral every 4 hours PRN Dyspepsia  bisacodyl Suppository 10 milliGRAM(s) Rectal daily PRN Constipation  dextrose Oral Gel 15 Gram(s) Oral once PRN Blood Glucose LESS THAN 70 milliGRAM(s)/deciliter  hydrALAZINE 50 milliGRAM(s) Oral every 6 hours PRN for systolic BP>160  magnesium hydroxide Suspension 30 milliLiter(s) Oral daily PRN Constipation  melatonin 3 milliGRAM(s) Oral at bedtime PRN Insomnia  morphine  - Injectable 2 milliGRAM(s) IV Push every 6 hours PRN Severe Pain (7 - 10)  ondansetron Injectable 4 milliGRAM(s) IV Push every 8 hours PRN Nausea and/or Vomiting  traMADol 50 milliGRAM(s) Oral four times a day PRN Moderate Pain (4 - 6)      REVIEW OF SYSTEMS:  CONSTITUTIONAL: No fever,  RESPIRATORY: No cough, wheezing, shortness of breath  CARDIOVASCULAR: No chest pain, dyspnea, palpitations, dizziness, syncope, paroxysmal nocturnal dyspnea, orthopnea, or arm or leg swelling  GASTROINTESTINAL: No abdominal  or epigastric pain, nausea, vomiting,  diarrhea  NEUROLOGICAL: No headaches,  loss of strength, numbness, or tremors    Vital Signs Last 24 Hrs  T(C): 36.9 (2023 05:00), Max: 37.3 (2023 12:14)  T(F): 98.5 (2023 05:00), Max: 99.1 (2023 12:14)  HR: 98 (2023 05:00) (89 - 122)  BP: 156/79 (2023 05:00) (141/80 - 159/98)  BP(mean): --  RR: 19 (2023 05:00) (17 - 19)  SpO2: 93% (2023 05:00) (92% - 97%)    Parameters below as of 2023 05:00  Patient On (Oxygen Delivery Method): room air        PHYSICAL EXAM:  HEAD:  Atraumatic, Normocephalic  NECK: Supple and normal thyroid.  No JVD or carotid bruit.   HEART: S1, S2 irregular , 1/6 soft ejection systolic murmur in mitral area , no thrill and no gallops .  PULMONARY: Bilateral vesicular breathing , few scattered rhonchi and few scattered rales are present .  ABDOMEN: Soft nontender and positive bowl sounds   EXTREMITIES:  No clubbing, cyanosis, or pedal  edema  NEUROLOGICAL: AA and mild confused  , no focal deficit .  Skin : No rashes .  Musculoskeletal : No joint swellings .    LABS:                        10.6   10.33 )-----------( 223      ( 2023 04:20 )             34.5     04-02    152<H>  |  124<H>  |  41<H>  ----------------------------<  108<H>  3.4<L>   |  22  |  1.30    Ca    8.8      2023 04:20  Phos  3.9     04-02  Mg     2.0     04-02            Urinalysis Basic - ( 2023 09:15 )    Color: Yellow / Appearance: Slightly Turbid / S.020 / pH: x  Gluc: x / Ketone: Trace  / Bili: Negative / Urobili: Negative   Blood: x / Protein: 30 mg/dL / Nitrite: Negative   Leuk Esterase: Moderate / RBC: 6-10 /HPF / WBC 26-50   Sq Epi: x / Non Sq Epi: Negative / Bacteria: Moderate    Assessment/Plan  Patient has :  1) Left foot infection .  2) Hypertension and stable .  3) DM .  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 4) Patient cardiac wise stable and cleared for foot surgery if needed .  Will continue to follow during hospital course and give further recommendations as needed . Thanks for your referral . if any questions please contact me at office ([phone] cell [phone])       [facility] [place]       HPI:  86 yo male ,[facility] resident with PMHx - COPD, DM, HTN, CAD, HLD, H/O TIA, dementia,GERD, BPH and depression sent ot ER for evaluation of left foot 1metatarsal wound ,suggestive of osteomyelitis .Patient was seen by ID consult and transfer to the hospital recommended for wound cx/bone biopsy /podiatry evaluation ,likely will require 4-6 weeks of iv abx . Patient was admitted to [facility] with b/l feet wounds and was followed by wound care team ,recently completed 7 days of doxycycline for foot wound cellulitis . (30 Mar 2023 05:21)        SUBJECTIVE: Patient feeling better .      ALLERGIES:  Allergies    No Known Allergies    Intolerances          MEDICATIONS  (STANDING):  albuterol/ipratropium for Nebulization 3 milliLiter(s) Nebulizer every 8 hours  budesonide 160 MICROgram(s)/formoterol 4.5 MICROgram(s) Inhaler 2 Puff(s) Inhalation two times a day  chlorhexidine 2% Cloths 1 Application(s) Topical daily  dextrose 5%. 1000 milliLiter(s) (50 mL/Hr) IV Continuous <Continuous>  dextrose 5%. 1000 milliLiter(s) (70 mL/Hr) IV Continuous <Continuous>  dextrose 5%. 1000 milliLiter(s) (100 mL/Hr) IV Continuous <Continuous>  dextrose 50% Injectable 25 Gram(s) IV Push once  dextrose 50% Injectable 12.5 Gram(s) IV Push once  dextrose 50% Injectable 25 Gram(s) IV Push once  donepezil 5 milliGRAM(s) Oral at bedtime  DULoxetine 60 milliGRAM(s) Oral daily  enoxaparin Injectable 60 milliGRAM(s) SubCutaneous every 12 hours  glucagon  Injectable 1 milliGRAM(s) IntraMuscular once  insulin glargine Injectable (LANTUS) 15 Unit(s) SubCutaneous at bedtime  insulin lispro (ADMELOG) corrective regimen sliding scale   SubCutaneous three times a day before meals  insulin lispro (ADMELOG) corrective regimen sliding scale   SubCutaneous at bedtime  insulin lispro Injectable (ADMELOG) 3 Unit(s) SubCutaneous three times a day before meals  lactobacillus acidophilus 1 Tablet(s) Oral two times a day with meals  losartan 50 milliGRAM(s) Oral daily  metoprolol tartrate 25 milliGRAM(s) Oral two times a day  multivitamin 1 Tablet(s) Oral daily  pantoprazole   Suspension 40 milliGRAM(s) Oral daily  piperacillin/tazobactam IVPB.. 3.375 Gram(s) IV Intermittent every 8 hours  senna 2 Tablet(s) Oral at bedtime  simvastatin 40 milliGRAM(s) Oral at bedtime  tamsulosin 0.4 milliGRAM(s) Oral at bedtime    MEDICATIONS  (PRN):  acetaminophen     Tablet .. 650 milliGRAM(s) Oral every 6 hours PRN Temp greater or equal to 38C (100.4F), Mild Pain (1 - 3)  aluminum hydroxide/magnesium hydroxide/simethicone Suspension 30 milliLiter(s) Oral every 4 hours PRN Dyspepsia  bisacodyl Suppository 10 milliGRAM(s) Rectal daily PRN Constipation  dextrose Oral Gel 15 Gram(s) Oral once PRN Blood Glucose LESS THAN 70 milliGRAM(s)/deciliter  hydrALAZINE 50 milliGRAM(s) Oral every 6 hours PRN for systolic BP>160  magnesium hydroxide Suspension 30 milliLiter(s) Oral daily PRN Constipation  melatonin 3 milliGRAM(s) Oral at bedtime PRN Insomnia  morphine  - Injectable 2 milliGRAM(s) IV Push every 6 hours PRN Severe Pain (7 - 10)  ondansetron Injectable 4 milliGRAM(s) IV Push every 8 hours PRN Nausea and/or Vomiting  traMADol 50 milliGRAM(s) Oral four times a day PRN Moderate Pain (4 - 6)      REVIEW OF SYSTEMS:  CONSTITUTIONAL: No fever,  RESPIRATORY: No cough, wheezing, shortness of breath  CARDIOVASCULAR: No chest pain, dyspnea, palpitations, dizziness, syncope, paroxysmal nocturnal dyspnea, orthopnea, or arm or leg swelling  GASTROINTESTINAL: No abdominal  or epigastric pain, nausea, vomiting,  diarrhea  NEUROLOGICAL: No headaches,  loss of strength, numbness, or tremors    Vital Signs Last 24 Hrs  T(C): 36.9 (2023 05:00), Max: 37.3 (2023 12:14)  T(F): 98.5 (2023 05:00), Max: 99.1 (2023 12:14)  HR: 98 (2023 05:00) (89 - 122)  BP: 156/79 (2023 05:00) (141/80 - 159/98)  BP(mean): --  RR: 19 (2023 05:00) (17 - 19)  SpO2: 93% (2023 05:00) (92% - 97%)    Parameters below as of 2023 05:00  Patient On (Oxygen Delivery Method): room air        PHYSICAL EXAM:  HEAD:  Atraumatic, Normocephalic  NECK: Supple and normal thyroid.  No JVD or carotid bruit.   HEART: S1, S2 irregular , 1/6 soft ejection systolic murmur in mitral area , no thrill and no gallops .  PULMONARY: Bilateral vesicular breathing , few scattered rhonchi and few scattered rales are present .  ABDOMEN: Soft nontender and positive bowl sounds   EXTREMITIES:  No clubbing, cyanosis, or pedal  edema  NEUROLOGICAL: AA and mild confused  , no focal deficit .  Skin : No rashes .  Musculoskeletal : No joint swellings .    LABS:                        10.6   10.33 )-----------( 223      ( 2023 04:20 )             34.5     04-02    152<H>  |  124<H>  |  41<H>  ----------------------------<  108<H>  3.4<L>   |  22  |  1.30    Ca    8.8      2023 04:20  Phos  3.9     04-02  Mg     2.0     04-02            Urinalysis Basic - ( 2023 09:15 )    Color: Yellow / Appearance: Slightly Turbid / S.020 / pH: x  Gluc: x / Ketone: Trace  / Bili: Negative / Urobili: Negative   Blood: x / Protein: 30 mg/dL / Nitrite: Negative   Leuk Esterase: Moderate / RBC: 6-10 /HPF / WBC 26-50   Sq Epi: x / Non Sq Epi: Negative / Bacteria: Moderate    Assessment/Plan  Patient has :  1) Left foot infection .  2) Hypertension and stable .  3) DM .  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 4) Patient cardiac wise stable and cleared for foot surgery if needed .  Will continue to follow during hospital course and give further recommendations as needed . Thanks for your referral . if any questions please contact me at office ([phone] cell [phone])       [facility]       HPI:  86 yo male ,[facility] resident with PMHx - COPD, DM, HTN, CAD, HLD, H/O TIA, dementia,GERD, BPH and depression sent ot ER for evaluation of left foot 1metatarsal wound ,suggestive of osteomyelitis .Patient was seen by ID consult and transfer to the hospital recommended for wound cx/bone biopsy /podiatry evaluation ,likely will require 4-6 weeks of iv abx . Patient was admitted to [facility] with b/l feet wounds and was followed by wound care team ,recently completed 7 days of doxycycline for foot wound cellulitis . (30 Mar 2023 05:21)        SUBJECTIVE: Patient feeling better .      ALLERGIES:  Allergies    No Known Allergies    Intolerances          MEDICATIONS  (STANDING):  albuterol/ipratropium for Nebulization 3 milliLiter(s) Nebulizer every 8 hours  budesonide 160 MICROgram(s)/formoterol 4.5 MICROgram(s) Inhaler 2 Puff(s) Inhalation two times a day  chlorhexidine 2% Cloths 1 Application(s) Topical daily  dextrose 5%. 1000 milliLiter(s) (50 mL/Hr) IV Continuous <Continuous>  dextrose 5%. 1000 milliLiter(s) (70 mL/Hr) IV Continuous <Continuous>  dextrose 5%. 1000 milliLiter(s) (100 mL/Hr) IV Continuous <Continuous>  dextrose 50% Injectable 25 Gram(s) IV Push once  dextrose 50% Injectable 12.5 Gram(s) IV Push once  dextrose 50% Injectable 25 Gram(s) IV Push once  donepezil 5 milliGRAM(s) Oral at bedtime  DULoxetine 60 milliGRAM(s) Oral daily  enoxaparin Injectable 60 milliGRAM(s) SubCutaneous every 12 hours  glucagon  Injectable 1 milliGRAM(s) IntraMuscular once  insulin glargine Injectable (LANTUS) 15 Unit(s) SubCutaneous at bedtime  insulin lispro (ADMELOG) corrective regimen sliding scale   SubCutaneous three times a day before meals  insulin lispro (ADMELOG) corrective regimen sliding scale   SubCutaneous at bedtime  insulin lispro Injectable (ADMELOG) 3 Unit(s) SubCutaneous three times a day before meals  lactobacillus acidophilus 1 Tablet(s) Oral two times a day with meals  losartan 50 milliGRAM(s) Oral daily  metoprolol tartrate 25 milliGRAM(s) Oral two times a day  multivitamin 1 Tablet(s) Oral daily  pantoprazole   Suspension 40 milliGRAM(s) Oral daily  piperacillin/tazobactam IVPB.. 3.375 Gram(s) IV Intermittent every 8 hours  senna 2 Tablet(s) Oral at bedtime  simvastatin 40 milliGRAM(s) Oral at bedtime  tamsulosin 0.4 milliGRAM(s) Oral at bedtime    MEDICATIONS  (PRN):  acetaminophen     Tablet .. 650 milliGRAM(s) Oral every 6 hours PRN Temp greater or equal to 38C (100.4F), Mild Pain (1 - 3)  aluminum hydroxide/magnesium hydroxide/simethicone Suspension 30 milliLiter(s) Oral every 4 hours PRN Dyspepsia  bisacodyl Suppository 10 milliGRAM(s) Rectal daily PRN Constipation  dextrose Oral Gel 15 Gram(s) Oral once PRN Blood Glucose LESS THAN 70 milliGRAM(s)/deciliter  hydrALAZINE 50 milliGRAM(s) Oral every 6 hours PRN for systolic BP>160  magnesium hydroxide Suspension 30 milliLiter(s) Oral daily PRN Constipation  melatonin 3 milliGRAM(s) Oral at bedtime PRN Insomnia  morphine  - Injectable 2 milliGRAM(s) IV Push every 6 hours PRN Severe Pain (7 - 10)  ondansetron Injectable 4 milliGRAM(s) IV Push every 8 hours PRN Nausea and/or Vomiting  traMADol 50 milliGRAM(s) Oral four times a day PRN Moderate Pain (4 - 6)      REVIEW OF SYSTEMS:  CONSTITUTIONAL: No fever,  RESPIRATORY: No cough, wheezing, shortness of breath  CARDIOVASCULAR: No chest pain, dyspnea, palpitations, dizziness, syncope, paroxysmal nocturnal dyspnea, orthopnea, or arm or leg swelling  GASTROINTESTINAL: No abdominal  or epigastric pain, nausea, vomiting,  diarrhea  NEUROLOGICAL: No headaches,  loss of strength, numbness, or tremors    Vital Signs Last 24 Hrs  T(C): 36.9 (2023 05:00), Max: 37.3 (2023 12:14)  T(F): 98.5 (2023 05:00), Max: 99.1 (2023 12:14)  HR: 98 (2023 05:00) (89 - 122)  BP: 156/79 (2023 05:00) (141/80 - 159/98)  BP(mean): --  RR: 19 (2023 05:00) (17 - 19)  SpO2: 93% (2023 05:00) (92% - 97%)    Parameters below as of 2023 05:00  Patient On (Oxygen Delivery Method): room air        PHYSICAL EXAM:  HEAD:  Atraumatic, Normocephalic  NECK: Supple and normal thyroid.  No JVD or carotid bruit.   HEART: S1, S2 irregular , 1/6 soft ejection systolic murmur in mitral area , no thrill and no gallops .  PULMONARY: Bilateral vesicular breathing , few scattered rhonchi and few scattered rales are present .  ABDOMEN: Soft nontender and positive bowl sounds   EXTREMITIES:  No clubbing, cyanosis, or pedal  edema  NEUROLOGICAL: AA and mild confused  , no focal deficit .  Skin : No rashes .  Musculoskeletal : No joint swellings .    LABS:                        10.6   10.33 )-----------( 223      ( 2023 04:20 )             34.5     04-02    152<H>  |  124<H>  |  41<H>  ----------------------------<  108<H>  3.4<L>   |  22  |  1.30    Ca    8.8      2023 04:20  Phos  3.9     04-02  Mg     2.0     04-02            Urinalysis Basic - ( 2023 09:15 )    Color: Yellow / Appearance: Slightly Turbid / S.020 / pH: x  Gluc: x / Ketone: Trace  / Bili: Negative / Urobili: Negative   Blood: x / Protein: 30 mg/dL / Nitrite: Negative   Leuk Esterase: Moderate / RBC: 6-10 /HPF / WBC 26-50   Sq Epi: x / Non Sq Epi: Negative / Bacteria: Moderate    Assessment/Plan  Patient has :  1) Left foot infection .  2) Hypertension and stable .  3) DM .  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 4) Patient cardiac wise stable and cleared for foot surgery if needed .  Will continue to follow during hospital course and give further recommendations as needed . Thanks for your referral . if any questions please contact me at office ([phone] cell [phone])       KARIME WYNN MD [address] [address]  [address]  OFFICE : [phone]  CELL : [phone]  CARDIOLOGY F/U :     HPI:  88 yo male ,[facility] resident with PMHx - COPD, DM, HTN, CAD, HLD, H/O TIA, dementia,GERD, BPH and depression sent ot ER for evaluation of left foot 1metatarsal wound ,suggestive of osteomyelitis .Patient was seen by ID consult and transfer to the hospital recommended for wound cx/bone biopsy /podiatry evaluation ,likely will require 4-6 weeks of iv abx . Patient was admitted to [facility] with b/l feet wounds and was followed by wound care team ,recently completed 7 days of doxycycline for foot wound cellulitis . (30 Mar 2023 05:21)        SUBJECTIVE: Patient feeling better .      ALLERGIES:  Allergies    No Known Allergies    Intolerances          MEDICATIONS  (STANDING):  albuterol/ipratropium for Nebulization 3 milliLiter(s) Nebulizer every 8 hours  budesonide 160 MICROgram(s)/formoterol 4.5 MICROgram(s) Inhaler 2 Puff(s) Inhalation two times a day  chlorhexidine 2% Cloths 1 Application(s) Topical daily  dextrose 5%. 1000 milliLiter(s) (50 mL/Hr) IV Continuous <Continuous>  dextrose 5%. 1000 milliLiter(s) (70 mL/Hr) IV Continuous <Continuous>  dextrose 5%. 1000 milliLiter(s) (100 mL/Hr) IV Continuous <Continuous>  dextrose 50% Injectable 25 Gram(s) IV Push once  dextrose 50% Injectable 12.5 Gram(s) IV Push once  dextrose 50% Injectable 25 Gram(s) IV Push once  donepezil 5 milliGRAM(s) Oral at bedtime  DULoxetine 60 milliGRAM(s) Oral daily  enoxaparin Injectable 60 milliGRAM(s) SubCutaneous every 12 hours  glucagon  Injectable 1 milliGRAM(s) IntraMuscular once  insulin glargine Injectable (LANTUS) 15 Unit(s) SubCutaneous at bedtime  insulin lispro (ADMELOG) corrective regimen sliding scale   SubCutaneous three times a day before meals  insulin lispro (ADMELOG) corrective regimen sliding scale   SubCutaneous at bedtime  insulin lispro Injectable (ADMELOG) 3 Unit(s) SubCutaneous three times a day before meals  lactobacillus acidophilus 1 Tablet(s) Oral two times a day with meals  losartan 50 milliGRAM(s) Oral daily  metoprolol tartrate 25 milliGRAM(s) Oral two times a day  multivitamin 1 Tablet(s) Oral daily  pantoprazole   Suspension 40 milliGRAM(s) Oral daily  piperacillin/tazobactam IVPB.. 3.375 Gram(s) IV Intermittent every 8 hours  senna 2 Tablet(s) Oral at bedtime  simvastatin 40 milliGRAM(s) Oral at bedtime  tamsulosin 0.4 milliGRAM(s) Oral at bedtime    MEDICATIONS  (PRN):  acetaminophen     Tablet .. 650 milliGRAM(s) Oral every 6 hours PRN Temp greater or equal to 38C (100.4F), Mild Pain (1 - 3)  aluminum hydroxide/magnesium hydroxide/simethicone Suspension 30 milliLiter(s) Oral every 4 hours PRN Dyspepsia  bisacodyl Suppository 10 milliGRAM(s) Rectal daily PRN Constipation  dextrose Oral Gel 15 Gram(s) Oral once PRN Blood Glucose LESS THAN 70 milliGRAM(s)/deciliter  hydrALAZINE 50 milliGRAM(s) Oral every 6 hours PRN for systolic BP>160  magnesium hydroxide Suspension 30 milliLiter(s) Oral daily PRN Constipation  melatonin 3 milliGRAM(s) Oral at bedtime PRN Insomnia  morphine  - Injectable 2 milliGRAM(s) IV Push every 6 hours PRN Severe Pain (7 - 10)  ondansetron Injectable 4 milliGRAM(s) IV Push every 8 hours PRN Nausea and/or Vomiting  traMADol 50 milliGRAM(s) Oral four times a day PRN Moderate Pain (4 - 6)      REVIEW OF SYSTEMS:  CONSTITUTIONAL: No fever,  RESPIRATORY: No cough, wheezing, shortness of breath  CARDIOVASCULAR: No chest pain, dyspnea, palpitations, dizziness, syncope, paroxysmal nocturnal dyspnea, orthopnea, or arm or leg swelling  GASTROINTESTINAL: No abdominal  or epigastric pain, nausea, vomiting,  diarrhea  NEUROLOGICAL: No headaches,  loss of strength, numbness, or tremors    Vital Signs Last 24 Hrs  T(C): 36.9 (2023 05:00), Max: 37.3 (2023 12:14)  T(F): 98.5 (2023 05:00), Max: 99.1 (2023 12:14)  HR: 98 (2023 05:00) (89 - 122)  BP: 156/79 (2023 05:00) (141/80 - 159/98)  BP(mean): --  RR: 19 (2023 05:00) (17 - 19)  SpO2: 93% (2023 05:00) (92% - 97%)    Parameters below as of 2023 05:00  Patient On (Oxygen Delivery Method): room air        PHYSICAL EXAM:  HEAD:  Atraumatic, Normocephalic  NECK: Supple and normal thyroid.  No JVD or carotid bruit.   HEART: S1, S2 irregular , 1/6 soft ejection systolic murmur in mitral area , no thrill and no gallops .  PULMONARY: Bilateral vesicular breathing , few scattered rhonchi and few scattered rales are present .  ABDOMEN: Soft nontender and positive bowl sounds   EXTREMITIES:  No clubbing, cyanosis, or pedal  edema  NEUROLOGICAL: AA and mild confused  , no focal deficit .  Skin : No rashes .  Musculoskeletal : No joint swellings .    LABS:                        10.6   10.33 )-----------( 223      ( 2023 04:20 )             34.5     04-02    152<H>  |  124<H>  |  41<H>  ----------------------------<  108<H>  3.4<L>   |  22  |  1.30    Ca    8.8      2023 04:20  Phos  3.9     04-02  Mg     2.0     04-02            Urinalysis Basic - ( 2023 09:15 )    Color: Yellow / Appearance: Slightly Turbid / S.020 / pH: x  Gluc: x / Ketone: Trace  / Bili: Negative / Urobili: Negative   Blood: x / Protein: 30 mg/dL / Nitrite: Negative   Leuk Esterase: Moderate / RBC: 6-10 /HPF / WBC 26-50   Sq Epi: x / Non Sq Epi: Negative / Bacteria: Moderate    Assessment/Plan  Patient has :  1) Left foot infection .  2) Hypertension and stable .  3) DM .  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 4) Patient cardiac wise stable and cleared for foot surgery if needed .  Will continue to follow during hospital course and give further recommendations as needed . Thanks for your referral . if any questions please contact me at office ([phone] cell [phone])       KARIME WYNN MD [address] [address]  [address]  OFFICE : [phone]  CELL : [phone]  CARDIOLOGY F/U :     HPI:  86 yo male ,[facility] resident with PMHx - COPD, DM, HTN, CAD, HLD, H/O TIA, dementia,GERD, BPH and depression sent ot ER for evaluation of left foot 1metatarsal wound ,suggestive of osteomyelitis .Patient was seen by ID consult and transfer to the hospital recommended for wound cx/bone biopsy /podiatry evaluation ,likely will require 4-6 weeks of iv abx . Patient was admitted to [facility] with b/l feet wounds and was followed by wound care team ,recently completed 7 days of doxycycline for foot wound cellulitis . (30 Mar 2023 05:21)        SUBJECTIVE: Patient feeling better .      ALLERGIES:  Allergies    No Known Allergies    Intolerances          MEDICATIONS  (STANDING):  albuterol/ipratropium for Nebulization 3 milliLiter(s) Nebulizer every 8 hours  budesonide 160 MICROgram(s)/formoterol 4.5 MICROgram(s) Inhaler 2 Puff(s) Inhalation two times a day  chlorhexidine 2% Cloths 1 Application(s) Topical daily  dextrose 5%. 1000 milliLiter(s) (50 mL/Hr) IV Continuous <Continuous>  dextrose 5%. 1000 milliLiter(s) (70 mL/Hr) IV Continuous <Continuous>  dextrose 5%. 1000 milliLiter(s) (100 mL/Hr) IV Continuous <Continuous>  dextrose 50% Injectable 25 Gram(s) IV Push once  dextrose 50% Injectable 12.5 Gram(s) IV Push once  dextrose 50% Injectable 25 Gram(s) IV Push once  donepezil 5 milliGRAM(s) Oral at bedtime  DULoxetine 60 milliGRAM(s) Oral daily  enoxaparin Injectable 60 milliGRAM(s) SubCutaneous every 12 hours  glucagon  Injectable 1 milliGRAM(s) IntraMuscular once  insulin glargine Injectable (LANTUS) 15 Unit(s) SubCutaneous at bedtime  insulin lispro (ADMELOG) corrective regimen sliding scale   SubCutaneous three times a day before meals  insulin lispro (ADMELOG) corrective regimen sliding scale   SubCutaneous at bedtime  insulin lispro Injectable (ADMELOG) 3 Unit(s) SubCutaneous three times a day before meals  lactobacillus acidophilus 1 Tablet(s) Oral two times a day with meals  losartan 50 milliGRAM(s) Oral daily  metoprolol tartrate 25 milliGRAM(s) Oral two times a day  multivitamin 1 Tablet(s) Oral daily  pantoprazole   Suspension 40 milliGRAM(s) Oral daily  piperacillin/tazobactam IVPB.. 3.375 Gram(s) IV Intermittent every 8 hours  senna 2 Tablet(s) Oral at bedtime  simvastatin 40 milliGRAM(s) Oral at bedtime  tamsulosin 0.4 milliGRAM(s) Oral at bedtime    MEDICATIONS  (PRN):  acetaminophen     Tablet .. 650 milliGRAM(s) Oral every 6 hours PRN Temp greater or equal to 38C (100.4F), Mild Pain (1 - 3)  aluminum hydroxide/magnesium hydroxide/simethicone Suspension 30 milliLiter(s) Oral every 4 hours PRN Dyspepsia  bisacodyl Suppository 10 milliGRAM(s) Rectal daily PRN Constipation  dextrose Oral Gel 15 Gram(s) Oral once PRN Blood Glucose LESS THAN 70 milliGRAM(s)/deciliter  hydrALAZINE 50 milliGRAM(s) Oral every 6 hours PRN for systolic BP>160  magnesium hydroxide Suspension 30 milliLiter(s) Oral daily PRN Constipation  melatonin 3 milliGRAM(s) Oral at bedtime PRN Insomnia  morphine  - Injectable 2 milliGRAM(s) IV Push every 6 hours PRN Severe Pain (7 - 10)  ondansetron Injectable 4 milliGRAM(s) IV Push every 8 hours PRN Nausea and/or Vomiting  traMADol 50 milliGRAM(s) Oral four times a day PRN Moderate Pain (4 - 6)      REVIEW OF SYSTEMS:  CONSTITUTIONAL: No fever,  RESPIRATORY: No cough, wheezing, shortness of breath  CARDIOVASCULAR: No chest pain, dyspnea, palpitations, dizziness, syncope, paroxysmal nocturnal dyspnea, orthopnea, or arm or leg swelling  GASTROINTESTINAL: No abdominal  or epigastric pain, nausea, vomiting,  diarrhea  NEUROLOGICAL: No headaches,  loss of strength, numbness, or tremors    Vital Signs Last 24 Hrs  T(C): 36.9 (2023 05:00), Max: 37.3 (2023 12:14)  T(F): 98.5 (2023 05:00), Max: 99.1 (2023 12:14)  HR: 98 (2023 05:00) (89 - 122)  BP: 156/79 (2023 05:00) (141/80 - 159/98)  BP(mean): --  RR: 19 (2023 05:00) (17 - 19)  SpO2: 93% (2023 05:00) (92% - 97%)    Parameters below as of 2023 05:00  Patient On (Oxygen Delivery Method): room air        PHYSICAL EXAM:  HEAD:  Atraumatic, Normocephalic  NECK: Supple and normal thyroid.  No JVD or carotid bruit.   HEART: S1, S2 irregular , 1/6 soft ejection systolic murmur in mitral area , no thrill and no gallops .  PULMONARY: Bilateral vesicular breathing , few scattered rhonchi and few scattered rales are present .  ABDOMEN: Soft nontender and positive bowl sounds   EXTREMITIES:  No clubbing, cyanosis, or pedal  edema  NEUROLOGICAL: AA and mild confused  , no focal deficit .  Skin : No rashes .  Musculoskeletal : No joint swellings .    LABS:                        10.6   10.33 )-----------( 223      ( 2023 04:20 )             34.5     04-02    152<H>  |  124<H>  |  41<H>  ----------------------------<  108<H>  3.4<L>   |  22  |  1.30    Ca    8.8      2023 04:20  Phos  3.9     04-02  Mg     2.0     04-02            Urinalysis Basic - ( 2023 09:15 )    Color: Yellow / Appearance: Slightly Turbid / S.020 / pH: x  Gluc: x / Ketone: Trace  / Bili: Negative / Urobili: Negative   Blood: x / Protein: 30 mg/dL / Nitrite: Negative   Leuk Esterase: Moderate / RBC: 6-10 /HPF / WBC 26-50   Sq Epi: x / Non Sq Epi: Negative / Bacteria: Moderate    Assessment/Plan  Patient has :  1) Left foot infection .  2) Hypertension and stable .  3) DM .  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 4) Patient cardiac wise stable and cleared for foot surgery if needed .  Will continue to follow during hospital course and give further recommendations as needed . Thanks for your referral . if any questions please contact me at office ([phone] cell [phone])       KARIME WYNN MD [address] [address]  [address]  OFFICE : [phone]  CELL : [phone]  CARDIOLOGY F/U :     HPI:  88 yo male ,[facility] resident with PMHx - COPD, DM, HTN, CAD, HLD, H/O TIA, dementia,GERD, BPH and depression sent ot ER for evaluation of left foot 1metatarsal wound ,suggestive of osteomyelitis .Patient was seen by ID consult and transfer to the hospital recommended for wound cx/bone biopsy /podiatry evaluation ,likely will require 4-6 weeks of iv abx . Patient was admitted to [facility] with b/l feet wounds and was followed by wound care team ,recently completed 7 days of doxycycline for foot wound cellulitis . (30 Mar 2023 05:21)        SUBJECTIVE: Patient feeling better .      ALLERGIES:  Allergies    No Known Allergies    Intolerances          MEDICATIONS  (STANDING):  albuterol/ipratropium for Nebulization 3 milliLiter(s) Nebulizer every 8 hours  budesonide 160 MICROgram(s)/formoterol 4.5 MICROgram(s) Inhaler 2 Puff(s) Inhalation two times a day  chlorhexidine 2% Cloths 1 Application(s) Topical daily  dextrose 5%. 1000 milliLiter(s) (50 mL/Hr) IV Continuous <Continuous>  dextrose 5%. 1000 milliLiter(s) (70 mL/Hr) IV Continuous <Continuous>  dextrose 5%. 1000 milliLiter(s) (100 mL/Hr) IV Continuous <Continuous>  dextrose 50% Injectable 25 Gram(s) IV Push once  dextrose 50% Injectable 12.5 Gram(s) IV Push once  dextrose 50% Injectable 25 Gram(s) IV Push once  donepezil 5 milliGRAM(s) Oral at bedtime  DULoxetine 60 milliGRAM(s) Oral daily  enoxaparin Injectable 60 milliGRAM(s) SubCutaneous every 12 hours  glucagon  Injectable 1 milliGRAM(s) IntraMuscular once  insulin glargine Injectable (LANTUS) 15 Unit(s) SubCutaneous at bedtime  insulin lispro (ADMELOG) corrective regimen sliding scale   SubCutaneous three times a day before meals  insulin lispro (ADMELOG) corrective regimen sliding scale   SubCutaneous at bedtime  insulin lispro Injectable (ADMELOG) 3 Unit(s) SubCutaneous three times a day before meals  lactobacillus acidophilus 1 Tablet(s) Oral two times a day with meals  losartan 50 milliGRAM(s) Oral daily  metoprolol tartrate 25 milliGRAM(s) Oral two times a day  multivitamin 1 Tablet(s) Oral daily  pantoprazole   Suspension 40 milliGRAM(s) Oral daily  piperacillin/tazobactam IVPB.. 3.375 Gram(s) IV Intermittent every 8 hours  senna 2 Tablet(s) Oral at bedtime  simvastatin 40 milliGRAM(s) Oral at bedtime  tamsulosin 0.4 milliGRAM(s) Oral at bedtime    MEDICATIONS  (PRN):  acetaminophen     Tablet .. 650 milliGRAM(s) Oral every 6 hours PRN Temp greater or equal to 38C (100.4F), Mild Pain (1 - 3)  aluminum hydroxide/magnesium hydroxide/simethicone Suspension 30 milliLiter(s) Oral every 4 hours PRN Dyspepsia  bisacodyl Suppository 10 milliGRAM(s) Rectal daily PRN Constipation  dextrose Oral Gel 15 Gram(s) Oral once PRN Blood Glucose LESS THAN 70 milliGRAM(s)/deciliter  hydrALAZINE 50 milliGRAM(s) Oral every 6 hours PRN for systolic BP>160  magnesium hydroxide Suspension 30 milliLiter(s) Oral daily PRN Constipation  melatonin 3 milliGRAM(s) Oral at bedtime PRN Insomnia  morphine  - Injectable 2 milliGRAM(s) IV Push every 6 hours PRN Severe Pain (7 - 10)  ondansetron Injectable 4 milliGRAM(s) IV Push every 8 hours PRN Nausea and/or Vomiting  traMADol 50 milliGRAM(s) Oral four times a day PRN Moderate Pain (4 - 6)      REVIEW OF SYSTEMS:  CONSTITUTIONAL: No fever,  RESPIRATORY: No cough, wheezing, shortness of breath  CARDIOVASCULAR: No chest pain, dyspnea, palpitations, dizziness, syncope, paroxysmal nocturnal dyspnea, orthopnea, or arm or leg swelling  GASTROINTESTINAL: No abdominal  or epigastric pain, nausea, vomiting,  diarrhea  NEUROLOGICAL: No headaches,  loss of strength, numbness, or tremors    Vital Signs Last 24 Hrs  T(C): 36.9 (2023 05:00), Max: 37.3 (2023 12:14)  T(F): 98.5 (2023 05:00), Max: 99.1 (2023 12:14)  HR: 98 (2023 05:00) (89 - 122)  BP: 156/79 (2023 05:00) (141/80 - 159/98)  BP(mean): --  RR: 19 (2023 05:00) (17 - 19)  SpO2: 93% (2023 05:00) (92% - 97%)    Parameters below as of 2023 05:00  Patient On (Oxygen Delivery Method): room air        PHYSICAL EXAM:  HEAD:  Atraumatic, Normocephalic  NECK: Supple and normal thyroid.  No JVD or carotid bruit.   HEART: S1, S2 irregular , 1/6 soft ejection systolic murmur in mitral area , no thrill and no gallops .  PULMONARY: Bilateral vesicular breathing , few scattered rhonchi and few scattered rales are present .  ABDOMEN: Soft nontender and positive bowl sounds   EXTREMITIES:  No clubbing, cyanosis, or pedal  edema  NEUROLOGICAL: AA and mild confused  , no focal deficit .  Skin : No rashes .  Musculoskeletal : No joint swellings .    LABS:                        10.6   10.33 )-----------( 223      ( 2023 04:20 )             34.5     04-02    152<H>  |  124<H>  |  41<H>  ----------------------------<  108<H>  3.4<L>   |  22  |  1.30    Ca    8.8      2023 04:20  Phos  3.9     04-02  Mg     2.0     04-02            Urinalysis Basic - ( 2023 09:15 )    Color: Yellow / Appearance: Slightly Turbid / S.020 / pH: x  Gluc: x / Ketone: Trace  / Bili: Negative / Urobili: Negative   Blood: x / Protein: 30 mg/dL / Nitrite: Negative   Leuk Esterase: Moderate / RBC: 6-10 /HPF / WBC 26-50   Sq Epi: x / Non Sq Epi: Negative / Bacteria: Moderate    Assessment/Plan  Patient has :  1) Left foot infection .  2) Hypertension and stable .  3) DM .  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 4) Patient cardiac wise stable and cleared for foot surgery if needed .  Will continue to follow during hospital course and give further recommendations as needed . Thanks for your referral . if any questions please contact me at office ([phone] cell [phone])       KARIME WYNN MD [address] [address]  [address]  OFFICE : [id number]  CELL : [phone]  CARDIOLOGY F/U :     HPI:  86 yo male ,[facility] resident with PMHx - COPD, DM, HTN, CAD, HLD, H/O TIA, dementia,GERD, BPH and depression sent ot ER for evaluation of left foot 1metatarsal wound ,suggestive of osteomyelitis .Patient was seen by ID consult and transfer to the hospital recommended for wound cx/bone biopsy /podiatry evaluation ,likely will require 4-6 weeks of iv abx . Patient was admitted to [facility] with b/l feet wounds and was followed by wound care team ,recently completed 7 days of doxycycline for foot wound cellulitis . (30 Mar 2023 05:21)        SUBJECTIVE: Patient feeling better .      ALLERGIES:  Allergies    No Known Allergies    Intolerances          MEDICATIONS  (STANDING):  albuterol/ipratropium for Nebulization 3 milliLiter(s) Nebulizer every 8 hours  budesonide 160 MICROgram(s)/formoterol 4.5 MICROgram(s) Inhaler 2 Puff(s) Inhalation two times a day  chlorhexidine 2% Cloths 1 Application(s) Topical daily  dextrose 5%. 1000 milliLiter(s) (50 mL/Hr) IV Continuous <Continuous>  dextrose 5%. 1000 milliLiter(s) (70 mL/Hr) IV Continuous <Continuous>  dextrose 5%. 1000 milliLiter(s) (100 mL/Hr) IV Continuous <Continuous>  dextrose 50% Injectable 25 Gram(s) IV Push once  dextrose 50% Injectable 12.5 Gram(s) IV Push once  dextrose 50% Injectable 25 Gram(s) IV Push once  donepezil 5 milliGRAM(s) Oral at bedtime  DULoxetine 60 milliGRAM(s) Oral daily  enoxaparin Injectable 60 milliGRAM(s) SubCutaneous every 12 hours  glucagon  Injectable 1 milliGRAM(s) IntraMuscular once  insulin glargine Injectable (LANTUS) 15 Unit(s) SubCutaneous at bedtime  insulin lispro (ADMELOG) corrective regimen sliding scale   SubCutaneous three times a day before meals  insulin lispro (ADMELOG) corrective regimen sliding scale   SubCutaneous at bedtime  insulin lispro Injectable (ADMELOG) 3 Unit(s) SubCutaneous three times a day before meals  lactobacillus acidophilus 1 Tablet(s) Oral two times a day with meals  losartan 50 milliGRAM(s) Oral daily  metoprolol tartrate 25 milliGRAM(s) Oral two times a day  multivitamin 1 Tablet(s) Oral daily  pantoprazole   Suspension 40 milliGRAM(s) Oral daily  piperacillin/tazobactam IVPB.. 3.375 Gram(s) IV Intermittent every 8 hours  senna 2 Tablet(s) Oral at bedtime  simvastatin 40 milliGRAM(s) Oral at bedtime  tamsulosin 0.4 milliGRAM(s) Oral at bedtime    MEDICATIONS  (PRN):  acetaminophen     Tablet .. 650 milliGRAM(s) Oral every 6 hours PRN Temp greater or equal to 38C (100.4F), Mild Pain (1 - 3)  aluminum hydroxide/magnesium hydroxide/simethicone Suspension 30 milliLiter(s) Oral every 4 hours PRN Dyspepsia  bisacodyl Suppository 10 milliGRAM(s) Rectal daily PRN Constipation  dextrose Oral Gel 15 Gram(s) Oral once PRN Blood Glucose LESS THAN 70 milliGRAM(s)/deciliter  hydrALAZINE 50 milliGRAM(s) Oral every 6 hours PRN for systolic BP>160  magnesium hydroxide Suspension 30 milliLiter(s) Oral daily PRN Constipation  melatonin 3 milliGRAM(s) Oral at bedtime PRN Insomnia  morphine  - Injectable 2 milliGRAM(s) IV Push every 6 hours PRN Severe Pain (7 - 10)  ondansetron Injectable 4 milliGRAM(s) IV Push every 8 hours PRN Nausea and/or Vomiting  traMADol 50 milliGRAM(s) Oral four times a day PRN Moderate Pain (4 - 6)      REVIEW OF SYSTEMS:  CONSTITUTIONAL: No fever,  RESPIRATORY: No cough, wheezing, shortness of breath  CARDIOVASCULAR: No chest pain, dyspnea, palpitations, dizziness, syncope, paroxysmal nocturnal dyspnea, orthopnea, or arm or leg swelling  GASTROINTESTINAL: No abdominal  or epigastric pain, nausea, vomiting,  diarrhea  NEUROLOGICAL: No headaches, numbness, or tremors  Skin : No itching .  Hematology : No active bleeding .  Endocrinology : No heat and cold intolerance .  Psychiatry : Patient is confused .  Genitourinary : No dysuria .  Musculoskeletal : Patient has mild arthritis .    Vital Signs Last 24 Hrs  T(C): 36.9 (2023 05:00), Max: 37.3 (2023 12:14)  T(F): 98.5 (2023 05:00), Max: 99.1 (2023 12:14)  HR: 98 (2023 05:00) (89 - 122)  BP: 156/79 (2023 05:00) (141/80 - 159/98)  BP(mean): --  RR: 19 (2023 05:00) (17 - 19)  SpO2: 93% (2023 05:00) (92% - 97%)    Parameters below as of 2023 05:00  Patient On (Oxygen Delivery Method): room air        PHYSICAL EXAM:  HEAD:  Atraumatic, Normocephalic  NECK: Supple and normal thyroid.  No JVD or carotid bruit.   HEART: S1, S2 irregular , 1/6 soft ejection systolic murmur in mitral area , no thrill and no gallops .  PULMONARY: Bilateral vesicular breathing , few scattered rhonchi and few scattered rales are present .  ABDOMEN: Soft nontender and positive bowl sounds   EXTREMITIES:  No clubbing, cyanosis, or pedal  edema  NEUROLOGICAL: AA and mild confused  , no focal deficit .  Skin : No rashes .  Musculoskeletal : No joint swellings .    LABS:                        10.6   10.33 )-----------( 223      ( 2023 04:20 )             34.5     04-02    152<H>  |  124<H>  |  41<H>  ----------------------------<  108<H>  3.4<L>   |  22  |  1.30    Ca    8.8      2023 04:20  Phos  3.9     04-02  Mg     2.0     04-02            Urinalysis Basic - ( 2023 09:15 )    Color: Yellow / Appearance: Slightly Turbid / S.020 / pH: x  Gluc: x / Ketone: Trace  / Bili: Negative / Urobili: Negative   Blood: x / Protein: 30 mg/dL / Nitrite: Negative   Leuk Esterase: Moderate / RBC: 6-10 /HPF / WBC 26-50   Sq Epi: x / Non Sq Epi: Negative / Bacteria: Moderate    Assessment/Plan  Patient has :  1) Left foot infection .  2) Hypertension and stable .  3) DM .  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 4) Patient cardiac wise stable and cleared for foot surgery if needed . Benefits of surgery outweigh the risks . 5) Will hold Lovenox 18-24 hours prior to surgery 6) Metoprolol as ordered for atrial fibrillation control .   Will continue to follow during hospital course and give further recommendations as needed . Thanks for your referral . if any questions please contact me at office ([phone] cell [phone])       KARIME WYNN MD [address] [address]  [address]  OFFICE : [id number]  CELL : [phone]  CARDIOLOGY F/U :     HPI:  86 yo male ,[facility] resident with PMHx - COPD, DM, HTN, CAD, HLD, H/O TIA, dementia,GERD, BPH and depression sent ot ER for evaluation of left foot 1metatarsal wound ,suggestive of osteomyelitis .Patient was seen by ID consult and transfer to the hospital recommended for wound cx/bone biopsy /podiatry evaluation ,likely will require 4-6 weeks of iv abx . Patient was admitted to [facility] with b/l feet wounds and was followed by wound care team ,recently completed 7 days of doxycycline for foot wound cellulitis . (30 Mar 2023 05:21)        SUBJECTIVE: Patient feeling better .      ALLERGIES:  Allergies    No Known Allergies    Intolerances          MEDICATIONS  (STANDING):  albuterol/ipratropium for Nebulization 3 milliLiter(s) Nebulizer every 8 hours  budesonide 160 MICROgram(s)/formoterol 4.5 MICROgram(s) Inhaler 2 Puff(s) Inhalation two times a day  chlorhexidine 2% Cloths 1 Application(s) Topical daily  dextrose 5%. 1000 milliLiter(s) (50 mL/Hr) IV Continuous <Continuous>  dextrose 5%. 1000 milliLiter(s) (70 mL/Hr) IV Continuous <Continuous>  dextrose 5%. 1000 milliLiter(s) (100 mL/Hr) IV Continuous <Continuous>  dextrose 50% Injectable 25 Gram(s) IV Push once  dextrose 50% Injectable 12.5 Gram(s) IV Push once  dextrose 50% Injectable 25 Gram(s) IV Push once  donepezil 5 milliGRAM(s) Oral at bedtime  DULoxetine 60 milliGRAM(s) Oral daily  enoxaparin Injectable 60 milliGRAM(s) SubCutaneous every 12 hours  glucagon  Injectable 1 milliGRAM(s) IntraMuscular once  insulin glargine Injectable (LANTUS) 15 Unit(s) SubCutaneous at bedtime  insulin lispro (ADMELOG) corrective regimen sliding scale   SubCutaneous three times a day before meals  insulin lispro (ADMELOG) corrective regimen sliding scale   SubCutaneous at bedtime  insulin lispro Injectable (ADMELOG) 3 Unit(s) SubCutaneous three times a day before meals  lactobacillus acidophilus 1 Tablet(s) Oral two times a day with meals  losartan 50 milliGRAM(s) Oral daily  metoprolol tartrate 25 milliGRAM(s) Oral two times a day  multivitamin 1 Tablet(s) Oral daily  pantoprazole   Suspension 40 milliGRAM(s) Oral daily  piperacillin/tazobactam IVPB.. 3.375 Gram(s) IV Intermittent every 8 hours  senna 2 Tablet(s) Oral at bedtime  simvastatin 40 milliGRAM(s) Oral at bedtime  tamsulosin 0.4 milliGRAM(s) Oral at bedtime    MEDICATIONS  (PRN):  acetaminophen     Tablet .. 650 milliGRAM(s) Oral every 6 hours PRN Temp greater or equal to 38C (100.4F), Mild Pain (1 - 3)  aluminum hydroxide/magnesium hydroxide/simethicone Suspension 30 milliLiter(s) Oral every 4 hours PRN Dyspepsia  bisacodyl Suppository 10 milliGRAM(s) Rectal daily PRN Constipation  dextrose Oral Gel 15 Gram(s) Oral once PRN Blood Glucose LESS THAN 70 milliGRAM(s)/deciliter  hydrALAZINE 50 milliGRAM(s) Oral every 6 hours PRN for systolic BP>160  magnesium hydroxide Suspension 30 milliLiter(s) Oral daily PRN Constipation  melatonin 3 milliGRAM(s) Oral at bedtime PRN Insomnia  morphine  - Injectable 2 milliGRAM(s) IV Push every 6 hours PRN Severe Pain (7 - 10)  ondansetron Injectable 4 milliGRAM(s) IV Push every 8 hours PRN Nausea and/or Vomiting  traMADol 50 milliGRAM(s) Oral four times a day PRN Moderate Pain (4 - 6)      REVIEW OF SYSTEMS:  CONSTITUTIONAL: No fever,  RESPIRATORY: No cough, wheezing, shortness of breath  CARDIOVASCULAR: No chest pain, dyspnea, palpitations, dizziness, syncope, paroxysmal nocturnal dyspnea, orthopnea, or arm or leg swelling  GASTROINTESTINAL: No abdominal  or epigastric pain, nausea, vomiting,  diarrhea  NEUROLOGICAL: No headaches, numbness, or tremors  Skin : No itching .  Hematology : No active bleeding .  Endocrinology : No heat and cold intolerance .  Psychiatry : Patient is confused .  Genitourinary : No dysuria .  Musculoskeletal : Patient has mild arthritis .    Vital Signs Last 24 Hrs  T(C): 36.9 (2023 05:00), Max: 37.3 (2023 12:14)  T(F): 98.5 (2023 05:00), Max: 99.1 (2023 12:14)  HR: 98 (2023 05:00) (89 - 122)  BP: 156/79 (2023 05:00) (141/80 - 159/98)  BP(mean): --  RR: 19 (2023 05:00) (17 - 19)  SpO2: 93% (2023 05:00) (92% - 97%)    Parameters below as of 2023 05:00  Patient On (Oxygen Delivery Method): room air        PHYSICAL EXAM:  HEAD:  Atraumatic, Normocephalic  NECK: Supple and normal thyroid.  No JVD or carotid bruit.   HEART: S1, S2 irregular , 1/6 soft ejection systolic murmur in mitral area , no thrill and no gallops .  PULMONARY: Bilateral vesicular breathing , few scattered rhonchi and few scattered rales are present .  ABDOMEN: Soft nontender and positive bowl sounds   EXTREMITIES:  No clubbing, cyanosis, or pedal  edema  NEUROLOGICAL: AA and mild confused  , no focal deficit .  Skin : No rashes .  Musculoskeletal : No joint swellings .    LABS:                        10.6   10.33 )-----------( 223      ( 2023 04:20 )             34.5     04-02    152<H>  |  124<H>  |  41<H>  ----------------------------<  108<H>  3.4<L>   |  22  |  1.30    Ca    8.8      2023 04:20  Phos  3.9     04-02  Mg     2.0     04-02            Urinalysis Basic - ( 2023 09:15 )    Color: Yellow / Appearance: Slightly Turbid / S.020 / pH: x  Gluc: x / Ketone: Trace  / Bili: Negative / Urobili: Negative   Blood: x / Protein: 30 mg/dL / Nitrite: Negative   Leuk Esterase: Moderate / RBC: 6-10 /HPF / WBC 26-50   Sq Epi: x / Non Sq Epi: Negative / Bacteria: Moderate    Assessment/Plan  Patient has :  1) Left foot infection .  2) Hypertension and stable .  3) DM .  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 4) Patient cardiac wise stable and cleared for foot surgery if needed . Benefits of surgery outweigh the risks . 5) Will hold Lovenox 18-24 hours prior to surgery 6) Metoprolol as ordered for atrial fibrillation control .   Will continue to follow during hospital course and give further recommendations as needed . Thanks for your referral . if any questions please contact me at office ([phone] cell [phone])       KARIME WYNN MD [address] [address]  [address]  OFFICE : [phone]  CELL : [phone]  CARDIOLOGY F/U :     HPI:  86 yo male ,[facility] resident with PMHx - COPD, DM, HTN, CAD, HLD, H/O TIA, dementia,GERD, BPH and depression sent ot ER for evaluation of left foot 1metatarsal wound ,suggestive of osteomyelitis .Patient was seen by ID consult and transfer to the hospital recommended for wound cx/bone biopsy /podiatry evaluation ,likely will require 4-6 weeks of iv abx . Patient was admitted to [facility] with b/l feet wounds and was followed by wound care team ,recently completed 7 days of doxycycline for foot wound cellulitis . (30 Mar 2023 05:21)        SUBJECTIVE: Patient feeling better .      ALLERGIES:  Allergies    No Known Allergies    Intolerances          MEDICATIONS  (STANDING):  albuterol/ipratropium for Nebulization 3 milliLiter(s) Nebulizer every 8 hours  budesonide 160 MICROgram(s)/formoterol 4.5 MICROgram(s) Inhaler 2 Puff(s) Inhalation two times a day  chlorhexidine 2% Cloths 1 Application(s) Topical daily  dextrose 5%. 1000 milliLiter(s) (50 mL/Hr) IV Continuous <Continuous>  dextrose 5%. 1000 milliLiter(s) (70 mL/Hr) IV Continuous <Continuous>  dextrose 5%. 1000 milliLiter(s) (100 mL/Hr) IV Continuous <Continuous>  dextrose 50% Injectable 25 Gram(s) IV Push once  dextrose 50% Injectable 12.5 Gram(s) IV Push once  dextrose 50% Injectable 25 Gram(s) IV Push once  donepezil 5 milliGRAM(s) Oral at bedtime  DULoxetine 60 milliGRAM(s) Oral daily  enoxaparin Injectable 60 milliGRAM(s) SubCutaneous every 12 hours  glucagon  Injectable 1 milliGRAM(s) IntraMuscular once  insulin glargine Injectable (LANTUS) 15 Unit(s) SubCutaneous at bedtime  insulin lispro (ADMELOG) corrective regimen sliding scale   SubCutaneous three times a day before meals  insulin lispro (ADMELOG) corrective regimen sliding scale   SubCutaneous at bedtime  insulin lispro Injectable (ADMELOG) 3 Unit(s) SubCutaneous three times a day before meals  lactobacillus acidophilus 1 Tablet(s) Oral two times a day with meals  losartan 50 milliGRAM(s) Oral daily  metoprolol tartrate 25 milliGRAM(s) Oral two times a day  multivitamin 1 Tablet(s) Oral daily  pantoprazole   Suspension 40 milliGRAM(s) Oral daily  piperacillin/tazobactam IVPB.. 3.375 Gram(s) IV Intermittent every 8 hours  senna 2 Tablet(s) Oral at bedtime  simvastatin 40 milliGRAM(s) Oral at bedtime  tamsulosin 0.4 milliGRAM(s) Oral at bedtime    MEDICATIONS  (PRN):  acetaminophen     Tablet .. 650 milliGRAM(s) Oral every 6 hours PRN Temp greater or equal to 38C (100.4F), Mild Pain (1 - 3)  aluminum hydroxide/magnesium hydroxide/simethicone Suspension 30 milliLiter(s) Oral every 4 hours PRN Dyspepsia  bisacodyl Suppository 10 milliGRAM(s) Rectal daily PRN Constipation  dextrose Oral Gel 15 Gram(s) Oral once PRN Blood Glucose LESS THAN 70 milliGRAM(s)/deciliter  hydrALAZINE 50 milliGRAM(s) Oral every 6 hours PRN for systolic BP>160  magnesium hydroxide Suspension 30 milliLiter(s) Oral daily PRN Constipation  melatonin 3 milliGRAM(s) Oral at bedtime PRN Insomnia  morphine  - Injectable 2 milliGRAM(s) IV Push every 6 hours PRN Severe Pain (7 - 10)  ondansetron Injectable 4 milliGRAM(s) IV Push every 8 hours PRN Nausea and/or Vomiting  traMADol 50 milliGRAM(s) Oral four times a day PRN Moderate Pain (4 - 6)      REVIEW OF SYSTEMS:  CONSTITUTIONAL: No fever,  RESPIRATORY: No cough, wheezing, shortness of breath  CARDIOVASCULAR: No chest pain, dyspnea, palpitations, dizziness, syncope, paroxysmal nocturnal dyspnea, orthopnea, or arm or leg swelling  GASTROINTESTINAL: No abdominal  or epigastric pain, nausea, vomiting,  diarrhea  NEUROLOGICAL: No headaches, numbness, or tremors  Skin : No itching .  Hematology : No active bleeding .  Endocrinology : No heat and cold intolerance .  Psychiatry : Patient is confused .  Genitourinary : No dysuria .  Musculoskeletal : Patient has mild arthritis .    Vital Signs Last 24 Hrs  T(C): 36.9 (2023 05:00), Max: 37.3 (2023 12:14)  T(F): 98.5 (2023 05:00), Max: 99.1 (2023 12:14)  HR: 98 (2023 05:00) (89 - 122)  BP: 156/79 (2023 05:00) (141/80 - 159/98)  BP(mean): --  RR: 19 (2023 05:00) (17 - 19)  SpO2: 93% (2023 05:00) (92% - 97%)    Parameters below as of 2023 05:00  Patient On (Oxygen Delivery Method): room air        PHYSICAL EXAM:  HEAD:  Atraumatic, Normocephalic  NECK: Supple and normal thyroid.  No JVD or carotid bruit.   HEART: S1, S2 irregular , 1/6 soft ejection systolic murmur in mitral area , no thrill and no gallops .  PULMONARY: Bilateral vesicular breathing , few scattered rhonchi and few scattered rales are present .  ABDOMEN: Soft nontender and positive bowl sounds   EXTREMITIES:  No clubbing, cyanosis, or pedal  edema  NEUROLOGICAL: AA and mild confused  , no focal deficit .  Skin : No rashes .  Musculoskeletal : No joint swellings .    LABS:                        10.6   10.33 )-----------( 223      ( 2023 04:20 )             34.5     04-02    152<H>  |  124<H>  |  41<H>  ----------------------------<  108<H>  3.4<L>   |  22  |  1.30    Ca    8.8      2023 04:20  Phos  3.9     04-02  Mg     2.0     04-02            Urinalysis Basic - ( 2023 09:15 )    Color: Yellow / Appearance: Slightly Turbid / S.020 / pH: x  Gluc: x / Ketone: Trace  / Bili: Negative / Urobili: Negative   Blood: x / Protein: 30 mg/dL / Nitrite: Negative   Leuk Esterase: Moderate / RBC: 6-10 /HPF / WBC 26-50   Sq Epi: x / Non Sq Epi: Negative / Bacteria: Moderate    Assessment/Plan  Patient has :  1) Left foot infection .  2) Hypertension and stable .  3) DM .  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 4) Patient cardiac wise stable and cleared for foot surgery if needed . Benefits of surgery outweigh the risks . 5) Will hold Lovenox 18-24 hours prior to surgery 6) Metoprolol as ordered for atrial fibrillation control .   Will continue to follow during hospital course and give further recommendations as needed . Thanks for your referral . if any questions please contact me at office ([phone] cell [phone])

## 2023-04-03 NOTE — PROGRESS NOTE ADULT - SUBJECTIVE AND OBJECTIVE BOX
NAA MARIA LEIGH    PLV 1EAS 106 D1    Allergies    No Known Allergies    Intolerances        PAST MEDICAL & SURGICAL HISTORY:  ASHD (arteriosclerotic heart disease)      BPH without urinary obstruction      COPD, moderate      Stage 3 chronic kidney disease      Chronic GERD      HLD (hyperlipidemia)      MDD (major depressive disorder)      Obstructive and reflux uropathy      Cellulitis      HTN (hypertension)      Sepsis      Dementia      Moderate protein-calorie malnutrition      Brain TIA      History of RSV infection      DM type 2, not at goal      Pressure ulcer of unspecified heel, unspecified stage      Venous stasis ulcer without varicose veins      Multiple open wounds of foot          FAMILY HISTORY:      Home Medications:  Admelog SoloStar 100 units/mL injectable solution: injectable 4 times a day (before meals and at bedtime) per sliding scale (30 Mar 2023 15:23)  Aricept 5 mg oral tablet: 1 tab(s) orally once a day (30 Mar 2023 15:23)  atenolol 25 mg oral tablet: 1 tab(s) orally once a day (30 Mar 2023 15:23)  Bacid (LAC) oral tablet: 1 tab(s) orally 2 times a day (30 Mar 2023 15:23)  Cymbalta 60 mg oral delayed release capsule: 1 cap(s) orally once a day (30 Mar 2023 15:23)  docusate sodium 100 mg oral capsule: 2 cap(s) orally once a day (at bedtime) (30 Mar 2023 15:23)  doxycycline hyclate 100 mg oral tablet: 1 tab(s) orally 2 times a day for 7 days  3/28/23-23 (30 Mar 2023 15:23)  Dulcolax Laxative 10 mg rectal suppository: 1 suppository(ies) rectally as needed for  constipation (30 Mar 2023 15:23)  famotidine 40 mg oral tablet: 1 tab(s) orally once a day (30 Mar 2023 15:23)  Fleet Enema 19 g-7 g rectal enema: 133 milliliter(s) rectally as needed for  constipation (30 Mar 2023 15:23)  Flovent 44 mcg/inh inhalation aerosol with adapter: 1 puff(s) inhaled every 12 hours (30 Mar 2023 15:23)  ipratropium-albuterol 0.5 mg-2.5 mg/3 mL inhalation solution: 3 milliliter(s) by nebulizer 4 times a day (30 Mar 2023 15:23)  losartan 50 mg oral tablet: 1 tab(s) orally once a day (30 Mar 2023 15:23)  Milk of Magnesia 8% oral suspension: 30 milliliter(s) orally once a day as needed for  constipation (30 Mar 2023 15:23)  Santyl 250 units/g topical ointment: Apply topically to affected area (30 Mar 2023 12:41)  simvastatin 40 mg oral tablet: 1 tab(s) orally once a day (at bedtime) (30 Mar 2023 15:23)  SITagliptin 50 mg oral tablet: 1 tab(s) orally once a day (30 Mar 2023 15:23)  tamsulosin 0.4 mg oral capsule: 1 cap(s) orally once a day (in the evening) (30 Mar 2023 15:23)  Therapeutic Multiple Vitamins oral tablet: 1 tab(s) orally once a day (30 Mar 2023 15:23)  traMADol 50 mg oral tablet: 0.5 tab(s) orally 2 times a day (30 Mar 2023 15:23)  Tylenol Caplet Extra Strength 500 mg oral tablet: 2 tab(s) orally every 8 hours (30 Mar 2023 15:23)      MEDICATIONS  (STANDING):  albuterol/ipratropium for Nebulization 3 milliLiter(s) Nebulizer every 8 hours  budesonide 160 MICROgram(s)/formoterol 4.5 MICROgram(s) Inhaler 2 Puff(s) Inhalation two times a day  chlorhexidine 2% Cloths 1 Application(s) Topical daily  dextrose 5%. 1000 milliLiter(s) (50 mL/Hr) IV Continuous <Continuous>  dextrose 5%. 1000 milliLiter(s) (70 mL/Hr) IV Continuous <Continuous>  dextrose 5%. 1000 milliLiter(s) (100 mL/Hr) IV Continuous <Continuous>  dextrose 50% Injectable 25 Gram(s) IV Push once  dextrose 50% Injectable 12.5 Gram(s) IV Push once  dextrose 50% Injectable 25 Gram(s) IV Push once  donepezil 5 milliGRAM(s) Oral at bedtime  DULoxetine 60 milliGRAM(s) Oral daily  enoxaparin Injectable 60 milliGRAM(s) SubCutaneous every 12 hours  glucagon  Injectable 1 milliGRAM(s) IntraMuscular once  insulin glargine Injectable (LANTUS) 15 Unit(s) SubCutaneous at bedtime  insulin lispro (ADMELOG) corrective regimen sliding scale   SubCutaneous three times a day before meals  insulin lispro (ADMELOG) corrective regimen sliding scale   SubCutaneous at bedtime  insulin lispro Injectable (ADMELOG) 3 Unit(s) SubCutaneous three times a day before meals  lactobacillus acidophilus 1 Tablet(s) Oral two times a day with meals  losartan 50 milliGRAM(s) Oral daily  multivitamin 1 Tablet(s) Oral daily  pantoprazole   Suspension 40 milliGRAM(s) Oral daily  piperacillin/tazobactam IVPB.. 3.375 Gram(s) IV Intermittent every 8 hours  senna 2 Tablet(s) Oral at bedtime  simvastatin 40 milliGRAM(s) Oral at bedtime  tamsulosin 0.4 milliGRAM(s) Oral at bedtime    MEDICATIONS  (PRN):  acetaminophen     Tablet .. 650 milliGRAM(s) Oral every 6 hours PRN Temp greater or equal to 38C (100.4F), Mild Pain (1 - 3)  aluminum hydroxide/magnesium hydroxide/simethicone Suspension 30 milliLiter(s) Oral every 4 hours PRN Dyspepsia  bisacodyl Suppository 10 milliGRAM(s) Rectal daily PRN Constipation  dextrose Oral Gel 15 Gram(s) Oral once PRN Blood Glucose LESS THAN 70 milliGRAM(s)/deciliter  hydrALAZINE 50 milliGRAM(s) Oral every 6 hours PRN for systolic BP>160  magnesium hydroxide Suspension 30 milliLiter(s) Oral daily PRN Constipation  melatonin 3 milliGRAM(s) Oral at bedtime PRN Insomnia  morphine  - Injectable 2 milliGRAM(s) IV Push every 6 hours PRN Severe Pain (7 - 10)  ondansetron Injectable 4 milliGRAM(s) IV Push every 8 hours PRN Nausea and/or Vomiting  traMADol 50 milliGRAM(s) Oral four times a day PRN Moderate Pain (4 - 6)      Diet, DASH/TLC:   Sodium & Cholesterol Restricted  Consistent Carbohydrate Evening Snack  Soft and Bite Sized (SOFTBTSZ)  Reginald(7 Gm Arginine/7 Gm Glut/1.2 Gm HMB     Qty per Day:  2  Supplement Feeding Modality:  Oral  Glucerna Shake Cans or Servings Per Day:  1       Frequency:  Daily (23 @ 13:51) [Pending Verification By Attending]  Diet, DASH/TLC:   Sodium & Cholesterol Restricted  Consistent Carbohydrate Evening Snack  Soft and Bite Sized (SOFTBTSZ)  Supplement Feeding Modality:  Oral  Glucerna Shake Cans or Servings Per Day:  1       Frequency:  Daily (23 @ 05:10) [Active]          Vital Signs Last 24 Hrs  T(C): 36.9 (2023 05:00), Max: 37.3 (2023 12:14)  T(F): 98.5 (2023 05:00), Max: 99.1 (2023 12:14)  HR: 104 (2023 07:14) (89 - 122)  BP: 156/79 (2023 05:00) (141/80 - 159/98)  BP(mean): --  RR: 19 (2023 05:00) (17 - 19)  SpO2: 96% (2023 07:14) (92% - 97%)    Parameters below as of 2023 07:14  Patient On (Oxygen Delivery Method): room air          23 @ 07:01  -  23 @ 07:00  --------------------------------------------------------  IN: 1010 mL / OUT: 0 mL / NET: 1010 mL              LABS:                        10.0   9.46  )-----------( 222      ( 2023 07:38 )             33.1     04-03    148<H>  |  120<H>  |  38<H>  ----------------------------<  272<H>  3.8   |  25  |  1.50<H>    Ca    8.5      2023 07:38  Phos  3.2     04-03  Mg     2.1     04-03      PT/INR - ( 2023 07:38 )   PT: 16.8 sec;   INR: 1.43 ratio           Urinalysis Basic - ( 2023 09:15 )    Color: Yellow / Appearance: Slightly Turbid / S.020 / pH: x  Gluc: x / Ketone: Trace  / Bili: Negative / Urobili: Negative   Blood: x / Protein: 30 mg/dL / Nitrite: Negative   Leuk Esterase: Moderate / RBC: 6-10 /HPF / WBC 26-50   Sq Epi: x / Non Sq Epi: Negative / Bacteria: Moderate            WBC:  WBC Count: 9.46 K/uL ( @ 07:38)  WBC Count: 10.33 K/uL ( @ 04:20)  WBC Count: 11.96 K/uL ( @ 05:07)  WBC Count: 10.50 K/uL ( @ 06:44)      MICROBIOLOGY:  RECENT CULTURES:   .Blood XXXX XXXX   No growth to date.     .Blood XXXX XXXX   No growth to date.                PT/INR - ( 2023 07:38 )   PT: 16.8 sec;   INR: 1.43 ratio             Sodium:  Sodium, Serum: 148 mmol/L ( @ 07:38)  Sodium, Serum: 152 mmol/L ( @ 04:20)  Sodium, Serum: 151 mmol/L ( @ 05:07)  Sodium, Serum: 147 mmol/L ( @ 06:44)      1.50 mg/dL  07:38  1.30 mg/dL  @ 04:20  1.50 mg/dL  @ 05:07  1.30 mg/dL  @ 06:44      Hemoglobin:  Hemoglobin: 10.0 g/dL ( @ 07:38)  Hemoglobin: 10.6 g/dL ( @ 04:20)  Hemoglobin: 11.1 g/dL ( @ 05:07)  Hemoglobin: 9.8 g/dL ( @ 06:44)      Platelets: Platelet Count - Automated: 222 K/uL ( @ 07:38)  Platelet Count - Automated: 223 K/uL ( @ 04:20)  Platelet Count - Automated: 244 K/uL ( @ 05:07)  Platelet Count - Automated: 209 K/uL ( @ 06:44)          Urinalysis Basic - ( 2023 09:15 )    Color: Yellow / Appearance: Slightly Turbid / S.020 / pH: x  Gluc: x / Ketone: Trace  / Bili: Negative / Urobili: Negative   Blood: x / Protein: 30 mg/dL / Nitrite: Negative   Leuk Esterase: Moderate / RBC: 6-10 /HPF / WBC 26-50   Sq Epi: x / Non Sq Epi: Negative / Bacteria: Moderate        RADIOLOGY & ADDITIONAL STUDIES:      MICROBIOLOGY:  RECENT CULTURES:   .Blood XXXX XXXX   No growth to date.     .Blood XXXX XXXX   No growth to date.                 ANA MARIA LEIGH    PLV 1EAS 106 D1    Allergies    No Known Allergies    Intolerances        PAST MEDICAL & SURGICAL HISTORY:  ASHD (arteriosclerotic heart disease)      BPH without urinary obstruction      COPD, moderate      Stage 3 chronic kidney disease      Chronic GERD      HLD (hyperlipidemia)      MDD (major depressive disorder)      Obstructive and reflux uropathy      Cellulitis      HTN (hypertension)      Sepsis      Dementia      Moderate protein-calorie malnutrition      Brain TIA      History of RSV infection      DM type 2, not at goal      Pressure ulcer of unspecified heel, unspecified stage      Venous stasis ulcer without varicose veins      Multiple open wounds of foot          FAMILY HISTORY:      Home Medications:  Admelog SoloStar 100 units/mL injectable solution: injectable 4 times a day (before meals and at bedtime) per sliding scale (30 Mar 2023 15:23)  Aricept 5 mg oral tablet: 1 tab(s) orally once a day (30 Mar 2023 15:23)  atenolol 25 mg oral tablet: 1 tab(s) orally once a day (30 Mar 2023 15:23)  Bacid (LAC) oral tablet: 1 tab(s) orally 2 times a day (30 Mar 2023 15:23)  Cymbalta 60 mg oral delayed release capsule: 1 cap(s) orally once a day (30 Mar 2023 15:23)  docusate sodium 100 mg oral capsule: 2 cap(s) orally once a day (at bedtime) (30 Mar 2023 15:23)  doxycycline hyclate 100 mg oral tablet: 1 tab(s) orally 2 times a day for 7 days  3/28/23-23 (30 Mar 2023 15:23)  Dulcolax Laxative 10 mg rectal suppository: 1 suppository(ies) rectally as needed for  constipation (30 Mar 2023 15:23)  famotidine 40 mg oral tablet: 1 tab(s) orally once a day (30 Mar 2023 15:23)  Fleet Enema 19 g-7 g rectal enema: 133 milliliter(s) rectally as needed for  constipation (30 Mar 2023 15:23)  Flovent 44 mcg/inh inhalation aerosol with adapter: 1 puff(s) inhaled every 12 hours (30 Mar 2023 15:23)  ipratropium-albuterol 0.5 mg-2.5 mg/3 mL inhalation solution: 3 milliliter(s) by nebulizer 4 times a day (30 Mar 2023 15:23)  losartan 50 mg oral tablet: 1 tab(s) orally once a day (30 Mar 2023 15:23)  Milk of Magnesia 8% oral suspension: 30 milliliter(s) orally once a day as needed for  constipation (30 Mar 2023 15:23)  Santyl 250 units/g topical ointment: Apply topically to affected area (30 Mar 2023 12:41)  simvastatin 40 mg oral tablet: 1 tab(s) orally once a day (at bedtime) (30 Mar 2023 15:23)  SITagliptin 50 mg oral tablet: 1 tab(s) orally once a day (30 Mar 2023 15:23)  tamsulosin 0.4 mg oral capsule: 1 cap(s) orally once a day (in the evening) (30 Mar 2023 15:23)  Therapeutic Multiple Vitamins oral tablet: 1 tab(s) orally once a day (30 Mar 2023 15:23)  traMADol 50 mg oral tablet: 0.5 tab(s) orally 2 times a day (30 Mar 2023 15:23)  Tylenol Caplet Extra Strength 500 mg oral tablet: 2 tab(s) orally every 8 hours (30 Mar 2023 15:23)      MEDICATIONS  (STANDING):  albuterol/ipratropium for Nebulization 3 milliLiter(s) Nebulizer every 8 hours  budesonide 160 MICROgram(s)/formoterol 4.5 MICROgram(s) Inhaler 2 Puff(s) Inhalation two times a day  chlorhexidine 2% Cloths 1 Application(s) Topical daily  dextrose 5%. 1000 milliLiter(s) (50 mL/Hr) IV Continuous <Continuous>  dextrose 5%. 1000 milliLiter(s) (70 mL/Hr) IV Continuous <Continuous>  dextrose 5%. 1000 milliLiter(s) (100 mL/Hr) IV Continuous <Continuous>  dextrose 50% Injectable 25 Gram(s) IV Push once  dextrose 50% Injectable 12.5 Gram(s) IV Push once  dextrose 50% Injectable 25 Gram(s) IV Push once  donepezil 5 milliGRAM(s) Oral at bedtime  DULoxetine 60 milliGRAM(s) Oral daily  enoxaparin Injectable 60 milliGRAM(s) SubCutaneous every 12 hours  glucagon  Injectable 1 milliGRAM(s) IntraMuscular once  insulin glargine Injectable (LANTUS) 15 Unit(s) SubCutaneous at bedtime  insulin lispro (ADMELOG) corrective regimen sliding scale   SubCutaneous three times a day before meals  insulin lispro (ADMELOG) corrective regimen sliding scale   SubCutaneous at bedtime  insulin lispro Injectable (ADMELOG) 3 Unit(s) SubCutaneous three times a day before meals  lactobacillus acidophilus 1 Tablet(s) Oral two times a day with meals  losartan 50 milliGRAM(s) Oral daily  multivitamin 1 Tablet(s) Oral daily  pantoprazole   Suspension 40 milliGRAM(s) Oral daily  piperacillin/tazobactam IVPB.. 3.375 Gram(s) IV Intermittent every 8 hours  senna 2 Tablet(s) Oral at bedtime  simvastatin 40 milliGRAM(s) Oral at bedtime  tamsulosin 0.4 milliGRAM(s) Oral at bedtime    MEDICATIONS  (PRN):  acetaminophen     Tablet .. 650 milliGRAM(s) Oral every 6 hours PRN Temp greater or equal to 38C (100.4F), Mild Pain (1 - 3)  aluminum hydroxide/magnesium hydroxide/simethicone Suspension 30 milliLiter(s) Oral every 4 hours PRN Dyspepsia  bisacodyl Suppository 10 milliGRAM(s) Rectal daily PRN Constipation  dextrose Oral Gel 15 Gram(s) Oral once PRN Blood Glucose LESS THAN 70 milliGRAM(s)/deciliter  hydrALAZINE 50 milliGRAM(s) Oral every 6 hours PRN for systolic BP>160  magnesium hydroxide Suspension 30 milliLiter(s) Oral daily PRN Constipation  melatonin 3 milliGRAM(s) Oral at bedtime PRN Insomnia  morphine  - Injectable 2 milliGRAM(s) IV Push every 6 hours PRN Severe Pain (7 - 10)  ondansetron Injectable 4 milliGRAM(s) IV Push every 8 hours PRN Nausea and/or Vomiting  traMADol 50 milliGRAM(s) Oral four times a day PRN Moderate Pain (4 - 6)      Diet, DASH/TLC:   Sodium & Cholesterol Restricted  Consistent Carbohydrate Evening Snack  Soft and Bite Sized (SOFTBTSZ)  Reginald(7 Gm Arginine/7 Gm Glut/1.2 Gm HMB     Qty per Day:  2  Supplement Feeding Modality:  Oral  Glucerna Shake Cans or Servings Per Day:  1       Frequency:  Daily (23 @ 13:51) [Pending Verification By Attending]  Diet, DASH/TLC:   Sodium & Cholesterol Restricted  Consistent Carbohydrate Evening Snack  Soft and Bite Sized (SOFTBTSZ)  Supplement Feeding Modality:  Oral  Glucerna Shake Cans or Servings Per Day:  1       Frequency:  Daily (23 @ 05:10) [Active]          Vital Signs Last 24 Hrs  T(C): 36.9 (2023 05:00), Max: 37.3 (2023 12:14)  T(F): 98.5 (2023 05:00), Max: 99.1 (2023 12:14)  HR: 104 (2023 07:14) (89 - 122)  BP: 156/79 (2023 05:00) (141/80 - 159/98)  BP(mean): --  RR: 19 (2023 05:00) (17 - 19)  SpO2: 96% (2023 07:14) (92% - 97%)    Parameters below as of 2023 07:14  Patient On (Oxygen Delivery Method): room air          23 @ 07:01  -  23 @ 07:00  --------------------------------------------------------  IN: 1010 mL / OUT: 0 mL / NET: 1010 mL              LABS:                        10.0   9.46  )-----------( 222      ( 2023 07:38 )             33.1     04-03    148<H>  |  120<H>  |  38<H>  ----------------------------<  272<H>  3.8   |  25  |  1.50<H>    Ca    8.5      2023 07:38  Phos  3.2     04-03  Mg     2.1     04-03      PT/INR - ( 2023 07:38 )   PT: 16.8 sec;   INR: 1.43 ratio           Urinalysis Basic - ( 2023 09:15 )    Color: Yellow / Appearance: Slightly Turbid / S.020 / pH: x  Gluc: x / Ketone: Trace  / Bili: Negative / Urobili: Negative   Blood: x / Protein: 30 mg/dL / Nitrite: Negative   Leuk Esterase: Moderate / RBC: 6-10 /HPF / WBC 26-50   Sq Epi: x / Non Sq Epi: Negative / Bacteria: Moderate            WBC:  WBC Count: 9.46 K/uL ( @ 07:38)  WBC Count: 10.33 K/uL ( @ 04:20)  WBC Count: 11.96 K/uL ( @ 05:07)  WBC Count: 10.50 K/uL ( @ 06:44)      MICROBIOLOGY:  RECENT CULTURES:   .Blood XXXX XXXX   No growth to date.     .Blood XXXX XXXX   No growth to date.                PT/INR - ( 2023 07:38 )   PT: 16.8 sec;   INR: 1.43 ratio             Sodium:  Sodium, Serum: 148 mmol/L ( @ 07:38)  Sodium, Serum: 152 mmol/L ( @ 04:20)  Sodium, Serum: 151 mmol/L ( @ 05:07)  Sodium, Serum: 147 mmol/L ( @ 06:44)      1.50 mg/dL  07:38  1.30 mg/dL  @ 04:20  1.50 mg/dL  @ 05:07  1.30 mg/dL  @ 06:44      Hemoglobin:  Hemoglobin: 10.0 g/dL ( @ 07:38)  Hemoglobin: 10.6 g/dL ( @ 04:20)  Hemoglobin: 11.1 g/dL ( @ 05:07)  Hemoglobin: 9.8 g/dL ( @ 06:44)      Platelets: Platelet Count - Automated: 222 K/uL ( @ 07:38)  Platelet Count - Automated: 223 K/uL ( @ 04:20)  Platelet Count - Automated: 244 K/uL ( @ 05:07)  Platelet Count - Automated: 209 K/uL ( @ 06:44)          Urinalysis Basic - ( 2023 09:15 )    Color: Yellow / Appearance: Slightly Turbid / S.020 / pH: x  Gluc: x / Ketone: Trace  / Bili: Negative / Urobili: Negative   Blood: x / Protein: 30 mg/dL / Nitrite: Negative   Leuk Esterase: Moderate / RBC: 6-10 /HPF / WBC 26-50   Sq Epi: x / Non Sq Epi: Negative / Bacteria: Moderate        RADIOLOGY & ADDITIONAL STUDIES:      MICROBIOLOGY:  RECENT CULTURES:   .Blood XXXX XXXX   No growth to date.     .Blood XXXX XXXX   No growth to date.

## 2023-04-03 NOTE — PROGRESS NOTE ADULT - SUBJECTIVE AND OBJECTIVE BOX
PROGRESS NOTE  Patient is a 87y old  Male who presents with a chief complaint of left foot infection (2023 08:47)    Chart and available morning labs /imaging are reviewed electronically , urgent issues addressed . More information  is being added upon completion of rounds , when more information is collected and management discussed with consultants , medical staff and social service/case management on the floor   OVERNIGHT  No new issues reported by medical staff . All above noted Patient is resting in a bed comfortably .Confused ,poor mentation .No distress noted   Periods of somnolnece reported .Awake during rounds ,answers my questions . Bone scan is in progress   HPI:  88 yo male ,[facility] resident with PMHx - COPD, DM, HTN, CAD, HLD, H/O TIA, dementia,GERD, BPH and depression sent ot ER for evaluation of left foot 1metatarsal wound ,suggestive of osteomyelitis .Patient was seen by ID consult and transfer to the hospital recommended for wound cx/bone biopsy /podiatry evaluation ,likely will require 4-6 weeks of iv abx . Patient was admitted to [facility] with b/l feet wounds and was followed by wound care team ,recently completed 7 days of doxycycline for foot wound cellulitis . (30 Mar 2023 05:21)    PAST MEDICAL & SURGICAL HISTORY:  HLD (hyperlipidemia)      MDD (major depressive disorder)      Obstructive and reflux uropathy      Cellulitis      Sepsis      Moderate protein-calorie malnutrition      Brain TIA      History of RSV infection      Pressure ulcer of unspecified heel, unspecified stage      Venous stasis ulcer without varicose veins      ASHD (arteriosclerotic heart disease)      BPH without urinary obstruction      COPD, moderate      Stage 3 chronic kidney disease      Chronic GERD      HTN (hypertension)      Dementia      DM type 2, not at goal      Multiple open wounds of foot          MEDICATIONS  (STANDING):  albuterol/ipratropium for Nebulization 3 milliLiter(s) Nebulizer every 8 hours  budesonide 160 MICROgram(s)/formoterol 4.5 MICROgram(s) Inhaler 2 Puff(s) Inhalation two times a day  chlorhexidine 2% Cloths 1 Application(s) Topical daily  dextrose 5%. 1000 milliLiter(s) (50 mL/Hr) IV Continuous <Continuous>  dextrose 5%. 1000 milliLiter(s) (100 mL/Hr) IV Continuous <Continuous>  dextrose 5%. 1000 milliLiter(s) (50 mL/Hr) IV Continuous <Continuous>  dextrose 50% Injectable 25 Gram(s) IV Push once  dextrose 50% Injectable 12.5 Gram(s) IV Push once  dextrose 50% Injectable 25 Gram(s) IV Push once  donepezil 5 milliGRAM(s) Oral at bedtime  DULoxetine 60 milliGRAM(s) Oral daily  enoxaparin Injectable 60 milliGRAM(s) SubCutaneous every 12 hours  glucagon  Injectable 1 milliGRAM(s) IntraMuscular once  insulin glargine Injectable (LANTUS) 15 Unit(s) SubCutaneous at bedtime  insulin lispro (ADMELOG) corrective regimen sliding scale   SubCutaneous three times a day before meals  insulin lispro (ADMELOG) corrective regimen sliding scale   SubCutaneous at bedtime  insulin lispro Injectable (ADMELOG) 3 Unit(s) SubCutaneous three times a day before meals  lactobacillus acidophilus 1 Tablet(s) Oral two times a day with meals  losartan 50 milliGRAM(s) Oral daily  multivitamin 1 Tablet(s) Oral daily  pantoprazole   Suspension 40 milliGRAM(s) Oral daily  piperacillin/tazobactam IVPB.. 3.375 Gram(s) IV Intermittent every 8 hours  senna 2 Tablet(s) Oral at bedtime  simvastatin 40 milliGRAM(s) Oral at bedtime  tamsulosin 0.4 milliGRAM(s) Oral at bedtime    MEDICATIONS  (PRN):  acetaminophen     Tablet .. 650 milliGRAM(s) Oral every 6 hours PRN Temp greater or equal to 38C (100.4F), Mild Pain (1 - 3)  aluminum hydroxide/magnesium hydroxide/simethicone Suspension 30 milliLiter(s) Oral every 4 hours PRN Dyspepsia  bisacodyl Suppository 10 milliGRAM(s) Rectal daily PRN Constipation  dextrose Oral Gel 15 Gram(s) Oral once PRN Blood Glucose LESS THAN 70 milliGRAM(s)/deciliter  hydrALAZINE 50 milliGRAM(s) Oral every 6 hours PRN for systolic BP>160  magnesium hydroxide Suspension 30 milliLiter(s) Oral daily PRN Constipation  melatonin 3 milliGRAM(s) Oral at bedtime PRN Insomnia  morphine  - Injectable 2 milliGRAM(s) IV Push every 6 hours PRN Severe Pain (7 - 10)  ondansetron Injectable 4 milliGRAM(s) IV Push every 8 hours PRN Nausea and/or Vomiting  traMADol 50 milliGRAM(s) Oral four times a day PRN Moderate Pain (4 - 6)      OBJECTIVE    T(C): 37.1 (23 @ 12:45), Max: 37.1 (23 @ 12:45)  HR: 103 (23 @ 15:07) (98 - 122)  BP: 148/82 (23 @ 12:45) (135/62 - 156/79)  RR: 18 (23 @ 12:45) (17 - 19)  SpO2: 95% (23 @ 15:07) (92% - 96%)  Wt(kg): --  I&O's Summary    2023 07:  -  2023 07:00  --------------------------------------------------------  IN: 1010 mL / OUT: 0 mL / NET: 1010 mL    2023 07:01  -  2023 17:52  --------------------------------------------------------  IN: 360 mL / OUT: 0 mL / NET: 360 mL          REVIEW OF SYSTEMS:  Patient is  unable to provide any information/ROS  due to baseline mental status.     PHYSICAL EXAM:  Appearance: NAD. VS past 24 hrs -as above   HEENT:   Moist oral mucosa. Conjunctiva clear b/l.   Neck : supple  Respiratory: Lungs CTAB.  Gastrointestinal:  Soft, nontender. No rebound. No rigidity. BS present	  Cardiovascular: RRR ,S1S2 present  Neurologic: Non-focal. Moving all extremities.  Extremities: No edema. No erythema. No calf tenderness.  Skin: No rashes, No ecchymoses, No cyanosis.	  wounds ,skin lesions-See skin assesment flow sheet   LABS:                        10.0   9.46  )-----------( 222      ( 2023 07:38 )             33.1     -03    148<H>  |  120<H>  |  38<H>  ----------------------------<  272<H>  3.8   |  25  |  1.50<H>    Ca    8.5      2023 07:38  Phos  3.2     04-03  Mg     2.1     04-03      CAPILLARY BLOOD GLUCOSE      POCT Blood Glucose.: 109 mg/dL (2023 16:42)  POCT Blood Glucose.: 236 mg/dL (2023 11:39)  POCT Blood Glucose.: 230 mg/dL (2023 07:44)  POCT Blood Glucose.: 260 mg/dL (2023 21:58)  POCT Blood Glucose.: 252 mg/dL (2023 20:51)    PT/INR - ( 2023 07:38 )   PT: 16.8 sec;   INR: 1.43 ratio           Urinalysis Basic - ( 2023 09:15 )    Color: Yellow / Appearance: Slightly Turbid / S.020 / pH: x  Gluc: x / Ketone: Trace  / Bili: Negative / Urobili: Negative   Blood: x / Protein: 30 mg/dL / Nitrite: Negative   Leuk Esterase: Moderate / RBC: 6-10 /HPF / WBC 26-50   Sq Epi: x / Non Sq Epi: Negative / Bacteria: Moderate        Culture - Urine (collected 2023 09:15)  Source: Clean Catch Clean Catch (Midstream)  Preliminary Report (2023 14:55):    >100,000 CFU/ml Gram positive organisms    Culture - Blood (collected 29 Mar 2023 21:32)  Source: .Blood  Preliminary Report (31 Mar 2023 01:03):    No growth to date.    Culture - Blood (collected 29 Mar 2023 21:20)  Source: .Blood  Preliminary Report (31 Mar 2023 01:03):    No growth to date.      RADIOLOGY & ADDITIONAL TESTS:   reviewed elctronically  ASSESSMENT/PLAN: 	  25 minutes aggregate time was spent on this visit, 50% visit time spent in care co-ordination with other attendings and counselling patient .I have discussed care plan with patient / HCP/family member hari laureano on a phone  ,who expressed understanding of problems treatment and their effect and side effects, alternatives in details. I have asked if they have any questions and concerns and appropriately addressed them to best of my ability. Advance care planning was discussed , pallitaive care issues ,CMO ,hospice levels of care were discussed in details , forms ,advance directives were reviewed .All questions were answered to the best of my knowledge - 25 min spent. left message for wife    PROGRESS NOTE  Patient is a 87y old  Male who presents with a chief complaint of left foot infection (2023 08:47)    Chart and available morning labs /imaging are reviewed electronically , urgent issues addressed . More information  is being added upon completion of rounds , when more information is collected and management discussed with consultants , medical staff and social service/case management on the floor   OVERNIGHT  No new issues reported by medical staff . All above noted Patient is resting in a bed comfortably .Confused ,poor mentation .No distress noted   Periods of somnolnece reported .Awake during rounds ,answers my questions . Bone scan is in progress   HPI:  88 yo male ,[facility] resident with PMHx - COPD, DM, HTN, CAD, HLD, H/O TIA, dementia,GERD, BPH and depression sent ot ER for evaluation of left foot 1metatarsal wound ,suggestive of osteomyelitis .Patient was seen by ID consult and transfer to the hospital recommended for wound cx/bone biopsy /podiatry evaluation ,likely will require 4-6 weeks of iv abx . Patient was admitted to [facility] with b/l feet wounds and was followed by wound care team ,recently completed 7 days of doxycycline for foot wound cellulitis . (30 Mar 2023 05:21)    PAST MEDICAL & SURGICAL HISTORY:  HLD (hyperlipidemia)      MDD (major depressive disorder)      Obstructive and reflux uropathy      Cellulitis      Sepsis      Moderate protein-calorie malnutrition      Brain TIA      History of RSV infection      Pressure ulcer of unspecified heel, unspecified stage      Venous stasis ulcer without varicose veins      ASHD (arteriosclerotic heart disease)      BPH without urinary obstruction      COPD, moderate      Stage 3 chronic kidney disease      Chronic GERD      HTN (hypertension)      Dementia      DM type 2, not at goal      Multiple open wounds of foot          MEDICATIONS  (STANDING):  albuterol/ipratropium for Nebulization 3 milliLiter(s) Nebulizer every 8 hours  budesonide 160 MICROgram(s)/formoterol 4.5 MICROgram(s) Inhaler 2 Puff(s) Inhalation two times a day  chlorhexidine 2% Cloths 1 Application(s) Topical daily  dextrose 5%. 1000 milliLiter(s) (50 mL/Hr) IV Continuous <Continuous>  dextrose 5%. 1000 milliLiter(s) (100 mL/Hr) IV Continuous <Continuous>  dextrose 5%. 1000 milliLiter(s) (50 mL/Hr) IV Continuous <Continuous>  dextrose 50% Injectable 25 Gram(s) IV Push once  dextrose 50% Injectable 12.5 Gram(s) IV Push once  dextrose 50% Injectable 25 Gram(s) IV Push once  donepezil 5 milliGRAM(s) Oral at bedtime  DULoxetine 60 milliGRAM(s) Oral daily  enoxaparin Injectable 60 milliGRAM(s) SubCutaneous every 12 hours  glucagon  Injectable 1 milliGRAM(s) IntraMuscular once  insulin glargine Injectable (LANTUS) 15 Unit(s) SubCutaneous at bedtime  insulin lispro (ADMELOG) corrective regimen sliding scale   SubCutaneous three times a day before meals  insulin lispro (ADMELOG) corrective regimen sliding scale   SubCutaneous at bedtime  insulin lispro Injectable (ADMELOG) 3 Unit(s) SubCutaneous three times a day before meals  lactobacillus acidophilus 1 Tablet(s) Oral two times a day with meals  losartan 50 milliGRAM(s) Oral daily  multivitamin 1 Tablet(s) Oral daily  pantoprazole   Suspension 40 milliGRAM(s) Oral daily  piperacillin/tazobactam IVPB.. 3.375 Gram(s) IV Intermittent every 8 hours  senna 2 Tablet(s) Oral at bedtime  simvastatin 40 milliGRAM(s) Oral at bedtime  tamsulosin 0.4 milliGRAM(s) Oral at bedtime    MEDICATIONS  (PRN):  acetaminophen     Tablet .. 650 milliGRAM(s) Oral every 6 hours PRN Temp greater or equal to 38C (100.4F), Mild Pain (1 - 3)  aluminum hydroxide/magnesium hydroxide/simethicone Suspension 30 milliLiter(s) Oral every 4 hours PRN Dyspepsia  bisacodyl Suppository 10 milliGRAM(s) Rectal daily PRN Constipation  dextrose Oral Gel 15 Gram(s) Oral once PRN Blood Glucose LESS THAN 70 milliGRAM(s)/deciliter  hydrALAZINE 50 milliGRAM(s) Oral every 6 hours PRN for systolic BP>160  magnesium hydroxide Suspension 30 milliLiter(s) Oral daily PRN Constipation  melatonin 3 milliGRAM(s) Oral at bedtime PRN Insomnia  morphine  - Injectable 2 milliGRAM(s) IV Push every 6 hours PRN Severe Pain (7 - 10)  ondansetron Injectable 4 milliGRAM(s) IV Push every 8 hours PRN Nausea and/or Vomiting  traMADol 50 milliGRAM(s) Oral four times a day PRN Moderate Pain (4 - 6)      OBJECTIVE    T(C): 37.1 (23 @ 12:45), Max: 37.1 (23 @ 12:45)  HR: 103 (23 @ 15:07) (98 - 122)  BP: 148/82 (23 @ 12:45) (135/62 - 156/79)  RR: 18 (23 @ 12:45) (17 - 19)  SpO2: 95% (23 @ 15:07) (92% - 96%)  Wt(kg): --  I&O's Summary    2023 07:  -  2023 07:00  --------------------------------------------------------  IN: 1010 mL / OUT: 0 mL / NET: 1010 mL    2023 07:01  -  2023 17:52  --------------------------------------------------------  IN: 360 mL / OUT: 0 mL / NET: 360 mL          REVIEW OF SYSTEMS:  Patient is  unable to provide any information/ROS  due to baseline mental status.     PHYSICAL EXAM:  Appearance: NAD. VS past 24 hrs -as above   HEENT:   Moist oral mucosa. Conjunctiva clear b/l.   Neck : supple  Respiratory: Lungs CTAB.  Gastrointestinal:  Soft, nontender. No rebound. No rigidity. BS present	  Cardiovascular: RRR ,S1S2 present  Neurologic: Non-focal. Moving all extremities.  Extremities: No edema. No erythema. No calf tenderness.  Skin: No rashes, No ecchymoses, No cyanosis.	  wounds ,skin lesions-See skin assesment flow sheet   LABS:                        10.0   9.46  )-----------( 222      ( 2023 07:38 )             33.1     -03    148<H>  |  120<H>  |  38<H>  ----------------------------<  272<H>  3.8   |  25  |  1.50<H>    Ca    8.5      2023 07:38  Phos  3.2     04-03  Mg     2.1     04-03      CAPILLARY BLOOD GLUCOSE      POCT Blood Glucose.: 109 mg/dL (2023 16:42)  POCT Blood Glucose.: 236 mg/dL (2023 11:39)  POCT Blood Glucose.: 230 mg/dL (2023 07:44)  POCT Blood Glucose.: 260 mg/dL (2023 21:58)  POCT Blood Glucose.: 252 mg/dL (2023 20:51)    PT/INR - ( 2023 07:38 )   PT: 16.8 sec;   INR: 1.43 ratio           Urinalysis Basic - ( 2023 09:15 )    Color: Yellow / Appearance: Slightly Turbid / S.020 / pH: x  Gluc: x / Ketone: Trace  / Bili: Negative / Urobili: Negative   Blood: x / Protein: 30 mg/dL / Nitrite: Negative   Leuk Esterase: Moderate / RBC: 6-10 /HPF / WBC 26-50   Sq Epi: x / Non Sq Epi: Negative / Bacteria: Moderate        Culture - Urine (collected 2023 09:15)  Source: Clean Catch Clean Catch (Midstream)  Preliminary Report (2023 14:55):    >100,000 CFU/ml Gram positive organisms    Culture - Blood (collected 29 Mar 2023 21:32)  Source: .Blood  Preliminary Report (31 Mar 2023 01:03):    No growth to date.    Culture - Blood (collected 29 Mar 2023 21:20)  Source: .Blood  Preliminary Report (31 Mar 2023 01:03):    No growth to date.      RADIOLOGY & ADDITIONAL TESTS:   reviewed elctronically  ASSESSMENT/PLAN: 	  25 minutes aggregate time was spent on this visit, 50% visit time spent in care co-ordination with other attendings and counselling patient .I have discussed care plan with patient / HCP/family member hari laureano on a phone  ,who expressed understanding of problems treatment and their effect and side effects, alternatives in details. I have asked if they have any questions and concerns and appropriately addressed them to best of my ability. Advance care planning was discussed , pallitaive care issues ,CMO ,hospice levels of care were discussed in details , forms ,advance directives were reviewed .All questions were answered to the best of my knowledge - 25 min spent. left message for wife    PROGRESS NOTE  Patient is a 87y old  Male who presents with a chief complaint of left foot infection (2023 08:47)    Chart and available morning labs /imaging are reviewed electronically , urgent issues addressed . More information  is being added upon completion of rounds , when more information is collected and management discussed with consultants , medical staff and social service/case management on the floor   OVERNIGHT  No new issues reported by medical staff . All above noted Patient is resting in a bed comfortably .Confused ,poor mentation .No distress noted   Periods of somnolnece reported .Awake during rounds ,answers my questions . Bone scan is in progress   HPI:  88 yo male ,[facility] resident with PMHx - COPD, DM, HTN, CAD, HLD, H/O TIA, dementia,GERD, BPH and depression sent ot ER for evaluation of left foot 1metatarsal wound ,suggestive of osteomyelitis .Patient was seen by ID consult and transfer to the hospital recommended for wound cx/bone biopsy /podiatry evaluation ,likely will require 4-6 weeks of iv abx . Patient was admitted to [facility] with b/l feet wounds and was followed by wound care team ,recently completed 7 days of doxycycline for foot wound cellulitis . (30 Mar 2023 05:21)    PAST MEDICAL & SURGICAL HISTORY:  HLD (hyperlipidemia)      MDD (major depressive disorder)      Obstructive and reflux uropathy      Cellulitis      Sepsis      Moderate protein-calorie malnutrition      Brain TIA      History of RSV infection      Pressure ulcer of unspecified heel, unspecified stage      Venous stasis ulcer without varicose veins      ASHD (arteriosclerotic heart disease)      BPH without urinary obstruction      COPD, moderate      Stage 3 chronic kidney disease      Chronic GERD      HTN (hypertension)      Dementia      DM type 2, not at goal      Multiple open wounds of foot          MEDICATIONS  (STANDING):  albuterol/ipratropium for Nebulization 3 milliLiter(s) Nebulizer every 8 hours  budesonide 160 MICROgram(s)/formoterol 4.5 MICROgram(s) Inhaler 2 Puff(s) Inhalation two times a day  chlorhexidine 2% Cloths 1 Application(s) Topical daily  dextrose 5%. 1000 milliLiter(s) (50 mL/Hr) IV Continuous <Continuous>  dextrose 5%. 1000 milliLiter(s) (100 mL/Hr) IV Continuous <Continuous>  dextrose 5%. 1000 milliLiter(s) (50 mL/Hr) IV Continuous <Continuous>  dextrose 50% Injectable 25 Gram(s) IV Push once  dextrose 50% Injectable 12.5 Gram(s) IV Push once  dextrose 50% Injectable 25 Gram(s) IV Push once  donepezil 5 milliGRAM(s) Oral at bedtime  DULoxetine 60 milliGRAM(s) Oral daily  enoxaparin Injectable 60 milliGRAM(s) SubCutaneous every 12 hours  glucagon  Injectable 1 milliGRAM(s) IntraMuscular once  insulin glargine Injectable (LANTUS) 15 Unit(s) SubCutaneous at bedtime  insulin lispro (ADMELOG) corrective regimen sliding scale   SubCutaneous three times a day before meals  insulin lispro (ADMELOG) corrective regimen sliding scale   SubCutaneous at bedtime  insulin lispro Injectable (ADMELOG) 3 Unit(s) SubCutaneous three times a day before meals  lactobacillus acidophilus 1 Tablet(s) Oral two times a day with meals  losartan 50 milliGRAM(s) Oral daily  multivitamin 1 Tablet(s) Oral daily  pantoprazole   Suspension 40 milliGRAM(s) Oral daily  piperacillin/tazobactam IVPB.. 3.375 Gram(s) IV Intermittent every 8 hours  senna 2 Tablet(s) Oral at bedtime  simvastatin 40 milliGRAM(s) Oral at bedtime  tamsulosin 0.4 milliGRAM(s) Oral at bedtime    MEDICATIONS  (PRN):  acetaminophen     Tablet .. 650 milliGRAM(s) Oral every 6 hours PRN Temp greater or equal to 38C (100.4F), Mild Pain (1 - 3)  aluminum hydroxide/magnesium hydroxide/simethicone Suspension 30 milliLiter(s) Oral every 4 hours PRN Dyspepsia  bisacodyl Suppository 10 milliGRAM(s) Rectal daily PRN Constipation  dextrose Oral Gel 15 Gram(s) Oral once PRN Blood Glucose LESS THAN 70 milliGRAM(s)/deciliter  hydrALAZINE 50 milliGRAM(s) Oral every 6 hours PRN for systolic BP>160  magnesium hydroxide Suspension 30 milliLiter(s) Oral daily PRN Constipation  melatonin 3 milliGRAM(s) Oral at bedtime PRN Insomnia  morphine  - Injectable 2 milliGRAM(s) IV Push every 6 hours PRN Severe Pain (7 - 10)  ondansetron Injectable 4 milliGRAM(s) IV Push every 8 hours PRN Nausea and/or Vomiting  traMADol 50 milliGRAM(s) Oral four times a day PRN Moderate Pain (4 - 6)      OBJECTIVE    T(C): 37.1 (23 @ 12:45), Max: 37.1 (23 @ 12:45)  HR: 103 (23 @ 15:07) (98 - 122)  BP: 148/82 (23 @ 12:45) (135/62 - 156/79)  RR: 18 (23 @ 12:45) (17 - 19)  SpO2: 95% (23 @ 15:07) (92% - 96%)  Wt(kg): --  I&O's Summary    2023 07:  -  2023 07:00  --------------------------------------------------------  IN: 1010 mL / OUT: 0 mL / NET: 1010 mL    2023 07:01  -  2023 17:52  --------------------------------------------------------  IN: 360 mL / OUT: 0 mL / NET: 360 mL          REVIEW OF SYSTEMS:  Patient is  unable to provide any information/ROS  due to baseline mental status.     PHYSICAL EXAM:  Appearance: NAD. VS past 24 hrs -as above   HEENT:   Moist oral mucosa. Conjunctiva clear b/l.   Neck : supple  Respiratory: Lungs CTAB.  Gastrointestinal:  Soft, nontender. No rebound. No rigidity. BS present	  Cardiovascular: RRR ,S1S2 present  Neurologic: Non-focal. Moving all extremities.  Extremities: No edema. No erythema. No calf tenderness.  Skin: No rashes, No ecchymoses, No cyanosis.	  wounds ,skin lesions-See skin assesment flow sheet   LABS:                        10.0   9.46  )-----------( 222      ( 2023 07:38 )             33.1     -03    148<H>  |  120<H>  |  38<H>  ----------------------------<  272<H>  3.8   |  25  |  1.50<H>    Ca    8.5      2023 07:38  Phos  3.2     04-03  Mg     2.1     04-03      CAPILLARY BLOOD GLUCOSE      POCT Blood Glucose.: 109 mg/dL (2023 16:42)  POCT Blood Glucose.: 236 mg/dL (2023 11:39)  POCT Blood Glucose.: 230 mg/dL (2023 07:44)  POCT Blood Glucose.: 260 mg/dL (2023 21:58)  POCT Blood Glucose.: 252 mg/dL (2023 20:51)    PT/INR - ( 2023 07:38 )   PT: 16.8 sec;   INR: 1.43 ratio           Urinalysis Basic - ( 2023 09:15 )    Color: Yellow / Appearance: Slightly Turbid / S.020 / pH: x  Gluc: x / Ketone: Trace  / Bili: Negative / Urobili: Negative   Blood: x / Protein: 30 mg/dL / Nitrite: Negative   Leuk Esterase: Moderate / RBC: 6-10 /HPF / WBC 26-50   Sq Epi: x / Non Sq Epi: Negative / Bacteria: Moderate        Culture - Urine (collected 2023 09:15)  Source: Clean Catch Clean Catch (Midstream)  Preliminary Report (2023 14:55):    >100,000 CFU/ml Gram positive organisms    Culture - Blood (collected 29 Mar 2023 21:32)  Source: .Blood  Preliminary Report (31 Mar 2023 01:03):    No growth to date.    Culture - Blood (collected 29 Mar 2023 21:20)  Source: .Blood  Preliminary Report (31 Mar 2023 01:03):    No growth to date.      RADIOLOGY & ADDITIONAL TESTS:   reviewed elctronically  ASSESSMENT/PLAN: 	  25 minutes aggregate time was spent on this visit, 50% visit time spent in care co-ordination with other attendings and counselling patient .I have discussed care plan with patient / HCP/family member hari laureano on a phone  ,who expressed understanding of problems treatment and their effect and side effects, alternatives in details. I have asked if they have any questions and concerns and appropriately addressed them to best of my ability. Advance care planning was discussed , pallitaive care issues ,CMO ,hospice levels of care were discussed in details , forms ,advance directives were reviewed .All questions were answered to the best of my knowledge - 25 min spent. left message for wife

## 2023-04-04 DIAGNOSIS — U07.1 COVID-19: ICD-10-CM

## 2023-04-04 LAB
-  AMPICILLIN: SIGNIFICANT CHANGE UP
-  CIPROFLOXACIN: SIGNIFICANT CHANGE UP
-  LEVOFLOXACIN: SIGNIFICANT CHANGE UP
-  NITROFURANTOIN: SIGNIFICANT CHANGE UP
-  TETRACYCLINE: SIGNIFICANT CHANGE UP
-  VANCOMYCIN: SIGNIFICANT CHANGE UP
ALBUMIN SERPL ELPH-MCNC: 2 G/DL — LOW (ref 3.3–5)
ALP SERPL-CCNC: 59 U/L — SIGNIFICANT CHANGE UP (ref 40–120)
ALT FLD-CCNC: 51 U/L — SIGNIFICANT CHANGE UP (ref 12–78)
ANION GAP SERPL CALC-SCNC: 2 MMOL/L — LOW (ref 5–17)
AST SERPL-CCNC: 29 U/L — SIGNIFICANT CHANGE UP (ref 15–37)
BILIRUB DIRECT SERPL-MCNC: 0.2 MG/DL — SIGNIFICANT CHANGE UP (ref 0–0.3)
BILIRUB INDIRECT FLD-MCNC: 0.4 MG/DL — SIGNIFICANT CHANGE UP (ref 0.2–1)
BILIRUB SERPL-MCNC: 0.6 MG/DL — SIGNIFICANT CHANGE UP (ref 0.2–1.2)
BUN SERPL-MCNC: 28 MG/DL — HIGH (ref 7–23)
CALCIUM SERPL-MCNC: 8.4 MG/DL — LOW (ref 8.5–10.1)
CHLORIDE SERPL-SCNC: 118 MMOL/L — HIGH (ref 96–108)
CO2 SERPL-SCNC: 24 MMOL/L — SIGNIFICANT CHANGE UP (ref 22–31)
CREAT SERPL-MCNC: 1.3 MG/DL — SIGNIFICANT CHANGE UP (ref 0.5–1.3)
CULTURE RESULTS: SIGNIFICANT CHANGE UP
EGFR: 53 ML/MIN/1.73M2 — LOW
GLUCOSE SERPL-MCNC: 184 MG/DL — HIGH (ref 70–99)
HCT VFR BLD CALC: 30.9 % — LOW (ref 39–50)
HGB BLD-MCNC: 9.4 G/DL — LOW (ref 13–17)
MAGNESIUM SERPL-MCNC: 2.2 MG/DL — SIGNIFICANT CHANGE UP (ref 1.6–2.6)
MCHC RBC-ENTMCNC: 28.4 PG — SIGNIFICANT CHANGE UP (ref 27–34)
MCHC RBC-ENTMCNC: 30.4 GM/DL — LOW (ref 32–36)
MCV RBC AUTO: 93.4 FL — SIGNIFICANT CHANGE UP (ref 80–100)
METHOD TYPE: SIGNIFICANT CHANGE UP
NRBC # BLD: 0 /100 WBCS — SIGNIFICANT CHANGE UP (ref 0–0)
ORGANISM # SPEC MICROSCOPIC CNT: SIGNIFICANT CHANGE UP
PHOSPHATE SERPL-MCNC: 3.2 MG/DL — SIGNIFICANT CHANGE UP (ref 2.5–4.5)
PLATELET # BLD AUTO: 214 K/UL — SIGNIFICANT CHANGE UP (ref 150–400)
POTASSIUM SERPL-MCNC: 3.5 MMOL/L — SIGNIFICANT CHANGE UP (ref 3.5–5.3)
POTASSIUM SERPL-SCNC: 3.5 MMOL/L — SIGNIFICANT CHANGE UP (ref 3.5–5.3)
PROT SERPL-MCNC: 5.8 G/DL — LOW (ref 6–8.3)
RBC # BLD: 3.31 M/UL — LOW (ref 4.2–5.8)
RBC # FLD: 17.1 % — HIGH (ref 10.3–14.5)
SARS-COV-2 RNA SPEC QL NAA+PROBE: DETECTED
SODIUM SERPL-SCNC: 144 MMOL/L — SIGNIFICANT CHANGE UP (ref 135–145)
SPECIMEN SOURCE: SIGNIFICANT CHANGE UP
WBC # BLD: 9.06 K/UL — SIGNIFICANT CHANGE UP (ref 3.8–10.5)
WBC # FLD AUTO: 9.06 K/UL — SIGNIFICANT CHANGE UP (ref 3.8–10.5)

## 2023-04-04 PROCEDURE — 99233 SBSQ HOSP IP/OBS HIGH 50: CPT

## 2023-04-04 PROCEDURE — 78800 RP LOCLZJ TUM 1 AREA 1 D IMG: CPT | Mod: 26

## 2023-04-04 PROCEDURE — 78102 BONE MARROW IMAGING LTD: CPT | Mod: 26

## 2023-04-04 RX ORDER — REMDESIVIR 5 MG/ML
100 INJECTION INTRAVENOUS EVERY 24 HOURS
Refills: 0 | Status: COMPLETED | OUTPATIENT
Start: 2023-04-05 | End: 2023-04-06

## 2023-04-04 RX ORDER — REMDESIVIR 5 MG/ML
200 INJECTION INTRAVENOUS EVERY 24 HOURS
Refills: 0 | Status: COMPLETED | OUTPATIENT
Start: 2023-04-04 | End: 2023-04-04

## 2023-04-04 RX ORDER — REMDESIVIR 5 MG/ML
INJECTION INTRAVENOUS
Refills: 0 | Status: COMPLETED | OUTPATIENT
Start: 2023-04-04 | End: 2023-04-06

## 2023-04-04 RX ORDER — LOSARTAN POTASSIUM 100 MG/1
25 TABLET, FILM COATED ORAL DAILY
Refills: 0 | Status: DISCONTINUED | OUTPATIENT
Start: 2023-04-04 | End: 2023-04-10

## 2023-04-04 RX ORDER — METOPROLOL TARTRATE 50 MG
25 TABLET ORAL THREE TIMES A DAY
Refills: 0 | Status: DISCONTINUED | OUTPATIENT
Start: 2023-04-04 | End: 2023-04-08

## 2023-04-04 RX ADMIN — ENOXAPARIN SODIUM 60 MILLIGRAM(S): 100 INJECTION SUBCUTANEOUS at 05:28

## 2023-04-04 RX ADMIN — REMDESIVIR 200 MILLIGRAM(S): 5 INJECTION INTRAVENOUS at 15:38

## 2023-04-04 RX ADMIN — BUDESONIDE AND FORMOTEROL FUMARATE DIHYDRATE 2 PUFF(S): 160; 4.5 AEROSOL RESPIRATORY (INHALATION) at 20:57

## 2023-04-04 RX ADMIN — PIPERACILLIN AND TAZOBACTAM 25 GRAM(S): 4; .5 INJECTION, POWDER, LYOPHILIZED, FOR SOLUTION INTRAVENOUS at 22:10

## 2023-04-04 RX ADMIN — DONEPEZIL HYDROCHLORIDE 5 MILLIGRAM(S): 10 TABLET, FILM COATED ORAL at 22:09

## 2023-04-04 RX ADMIN — ENOXAPARIN SODIUM 60 MILLIGRAM(S): 100 INJECTION SUBCUTANEOUS at 18:59

## 2023-04-04 RX ADMIN — LOSARTAN POTASSIUM 50 MILLIGRAM(S): 100 TABLET, FILM COATED ORAL at 05:27

## 2023-04-04 RX ADMIN — DULOXETINE HYDROCHLORIDE 60 MILLIGRAM(S): 30 CAPSULE, DELAYED RELEASE ORAL at 21:37

## 2023-04-04 RX ADMIN — SENNA PLUS 2 TABLET(S): 8.6 TABLET ORAL at 21:37

## 2023-04-04 RX ADMIN — TAMSULOSIN HYDROCHLORIDE 0.4 MILLIGRAM(S): 0.4 CAPSULE ORAL at 21:37

## 2023-04-04 RX ADMIN — Medication 1 TABLET(S): at 18:59

## 2023-04-04 RX ADMIN — Medication 3 MILLILITER(S): at 07:59

## 2023-04-04 RX ADMIN — PIPERACILLIN AND TAZOBACTAM 25 GRAM(S): 4; .5 INJECTION, POWDER, LYOPHILIZED, FOR SOLUTION INTRAVENOUS at 16:27

## 2023-04-04 RX ADMIN — Medication 2: at 07:55

## 2023-04-04 RX ADMIN — PIPERACILLIN AND TAZOBACTAM 25 GRAM(S): 4; .5 INJECTION, POWDER, LYOPHILIZED, FOR SOLUTION INTRAVENOUS at 05:27

## 2023-04-04 RX ADMIN — INSULIN GLARGINE 15 UNIT(S): 100 INJECTION, SOLUTION SUBCUTANEOUS at 21:37

## 2023-04-04 RX ADMIN — BUDESONIDE AND FORMOTEROL FUMARATE DIHYDRATE 2 PUFF(S): 160; 4.5 AEROSOL RESPIRATORY (INHALATION) at 05:27

## 2023-04-04 RX ADMIN — SIMVASTATIN 40 MILLIGRAM(S): 20 TABLET, FILM COATED ORAL at 22:09

## 2023-04-04 RX ADMIN — Medication 3 MILLILITER(S): at 20:31

## 2023-04-04 RX ADMIN — CHLORHEXIDINE GLUCONATE 1 APPLICATION(S): 213 SOLUTION TOPICAL at 08:25

## 2023-04-04 RX ADMIN — PANTOPRAZOLE SODIUM 40 MILLIGRAM(S): 20 TABLET, DELAYED RELEASE ORAL at 19:00

## 2023-04-04 NOTE — PROGRESS NOTE ADULT - SUBJECTIVE AND OBJECTIVE BOX
ANA MARIA LEIGH    PLV 1EAS 101 W1    Allergies    No Known Allergies    Intolerances        PAST MEDICAL & SURGICAL HISTORY:  ASHD (arteriosclerotic heart disease)      BPH without urinary obstruction      COPD, moderate      Stage 3 chronic kidney disease      Chronic GERD      HLD (hyperlipidemia)      MDD (major depressive disorder)      Obstructive and reflux uropathy      Cellulitis      HTN (hypertension)      Sepsis      Dementia      Moderate protein-calorie malnutrition      Brain TIA      History of RSV infection      DM type 2, not at goal      Pressure ulcer of unspecified heel, unspecified stage      Venous stasis ulcer without varicose veins      Multiple open wounds of foot          FAMILY HISTORY:      Home Medications:  Admelog SoloStar 100 units/mL injectable solution: injectable 4 times a day (before meals and at bedtime) per sliding scale (30 Mar 2023 15:23)  Aricept 5 mg oral tablet: 1 tab(s) orally once a day (30 Mar 2023 15:23)  atenolol 25 mg oral tablet: 1 tab(s) orally once a day (30 Mar 2023 15:23)  Bacid (LAC) oral tablet: 1 tab(s) orally 2 times a day (30 Mar 2023 15:23)  Cymbalta 60 mg oral delayed release capsule: 1 cap(s) orally once a day (30 Mar 2023 15:23)  docusate sodium 100 mg oral capsule: 2 cap(s) orally once a day (at bedtime) (30 Mar 2023 15:23)  doxycycline hyclate 100 mg oral tablet: 1 tab(s) orally 2 times a day for 7 days  3/28/23-4/4/23 (30 Mar 2023 15:23)  Dulcolax Laxative 10 mg rectal suppository: 1 suppository(ies) rectally as needed for  constipation (30 Mar 2023 15:23)  famotidine 40 mg oral tablet: 1 tab(s) orally once a day (30 Mar 2023 15:23)  Fleet Enema 19 g-7 g rectal enema: 133 milliliter(s) rectally as needed for  constipation (30 Mar 2023 15:23)  Flovent 44 mcg/inh inhalation aerosol with adapter: 1 puff(s) inhaled every 12 hours (30 Mar 2023 15:23)  ipratropium-albuterol 0.5 mg-2.5 mg/3 mL inhalation solution: 3 milliliter(s) by nebulizer 4 times a day (30 Mar 2023 15:23)  losartan 50 mg oral tablet: 1 tab(s) orally once a day (30 Mar 2023 15:23)  Milk of Magnesia 8% oral suspension: 30 milliliter(s) orally once a day as needed for  constipation (30 Mar 2023 15:23)  Santyl 250 units/g topical ointment: Apply topically to affected area (30 Mar 2023 12:41)  simvastatin 40 mg oral tablet: 1 tab(s) orally once a day (at bedtime) (30 Mar 2023 15:23)  SITagliptin 50 mg oral tablet: 1 tab(s) orally once a day (30 Mar 2023 15:23)  tamsulosin 0.4 mg oral capsule: 1 cap(s) orally once a day (in the evening) (30 Mar 2023 15:23)  Therapeutic Multiple Vitamins oral tablet: 1 tab(s) orally once a day (30 Mar 2023 15:23)  traMADol 50 mg oral tablet: 0.5 tab(s) orally 2 times a day (30 Mar 2023 15:23)  Tylenol Caplet Extra Strength 500 mg oral tablet: 2 tab(s) orally every 8 hours (30 Mar 2023 15:23)      MEDICATIONS  (STANDING):  albuterol/ipratropium for Nebulization 3 milliLiter(s) Nebulizer every 8 hours  budesonide 160 MICROgram(s)/formoterol 4.5 MICROgram(s) Inhaler 2 Puff(s) Inhalation two times a day  chlorhexidine 2% Cloths 1 Application(s) Topical daily  dextrose 5%. 1000 milliLiter(s) (100 mL/Hr) IV Continuous <Continuous>  dextrose 5%. 1000 milliLiter(s) (50 mL/Hr) IV Continuous <Continuous>  dextrose 5%. 1000 milliLiter(s) (50 mL/Hr) IV Continuous <Continuous>  dextrose 50% Injectable 25 Gram(s) IV Push once  dextrose 50% Injectable 12.5 Gram(s) IV Push once  dextrose 50% Injectable 25 Gram(s) IV Push once  donepezil 5 milliGRAM(s) Oral at bedtime  DULoxetine 60 milliGRAM(s) Oral daily  enoxaparin Injectable 60 milliGRAM(s) SubCutaneous every 12 hours  glucagon  Injectable 1 milliGRAM(s) IntraMuscular once  insulin glargine Injectable (LANTUS) 15 Unit(s) SubCutaneous at bedtime  insulin lispro (ADMELOG) corrective regimen sliding scale   SubCutaneous at bedtime  insulin lispro (ADMELOG) corrective regimen sliding scale   SubCutaneous three times a day before meals  insulin lispro Injectable (ADMELOG) 3 Unit(s) SubCutaneous three times a day before meals  lactobacillus acidophilus 1 Tablet(s) Oral two times a day with meals  losartan 50 milliGRAM(s) Oral daily  multivitamin 1 Tablet(s) Oral daily  pantoprazole   Suspension 40 milliGRAM(s) Oral daily  piperacillin/tazobactam IVPB.. 3.375 Gram(s) IV Intermittent every 8 hours  senna 2 Tablet(s) Oral at bedtime  simvastatin 40 milliGRAM(s) Oral at bedtime  tamsulosin 0.4 milliGRAM(s) Oral at bedtime    MEDICATIONS  (PRN):  acetaminophen     Tablet .. 650 milliGRAM(s) Oral every 6 hours PRN Temp greater or equal to 38C (100.4F), Mild Pain (1 - 3)  aluminum hydroxide/magnesium hydroxide/simethicone Suspension 30 milliLiter(s) Oral every 4 hours PRN Dyspepsia  bisacodyl Suppository 10 milliGRAM(s) Rectal daily PRN Constipation  dextrose Oral Gel 15 Gram(s) Oral once PRN Blood Glucose LESS THAN 70 milliGRAM(s)/deciliter  hydrALAZINE 50 milliGRAM(s) Oral every 6 hours PRN for systolic BP>160  magnesium hydroxide Suspension 30 milliLiter(s) Oral daily PRN Constipation  melatonin 3 milliGRAM(s) Oral at bedtime PRN Insomnia  morphine  - Injectable 2 milliGRAM(s) IV Push every 6 hours PRN Severe Pain (7 - 10)  ondansetron Injectable 4 milliGRAM(s) IV Push every 8 hours PRN Nausea and/or Vomiting  traMADol 50 milliGRAM(s) Oral four times a day PRN Moderate Pain (4 - 6)      Diet, DASH/TLC:   Sodium & Cholesterol Restricted  Consistent Carbohydrate Evening Snack  Soft and Bite Sized (SOFTBTSZ)  Reginald(7 Gm Arginine/7 Gm Glut/1.2 Gm HMB     Qty per Day:  2  Supplement Feeding Modality:  Oral  Glucerna Shake Cans or Servings Per Day:  1       Frequency:  Daily (03-30-23 @ 13:51) [Pending Verification By Attending]  Diet, DASH/TLC:   Sodium & Cholesterol Restricted  Consistent Carbohydrate Evening Snack  Soft and Bite Sized (SOFTBTSZ)  Supplement Feeding Modality:  Oral  Glucerna Shake Cans or Servings Per Day:  1       Frequency:  Daily (03-30-23 @ 05:10) [Active]          Vital Signs Last 24 Hrs  T(C): 36.8 (04 Apr 2023 05:06), Max: 37.1 (03 Apr 2023 12:45)  T(F): 98.2 (04 Apr 2023 05:06), Max: 98.7 (03 Apr 2023 12:45)  HR: 103 (04 Apr 2023 08:00) (96 - 110)  BP: 137/76 (04 Apr 2023 05:06) (135/62 - 148/82)  BP(mean): --  RR: 18 (04 Apr 2023 05:06) (17 - 18)  SpO2: 97% (04 Apr 2023 08:00) (93% - 97%)    Parameters below as of 04 Apr 2023 08:00  Patient On (Oxygen Delivery Method): room air          04-03-23 @ 07:01  -  04-04-23 @ 07:00  --------------------------------------------------------  IN: 1710 mL / OUT: 0 mL / NET: 1710 mL              LABS:                        9.4    9.06  )-----------( 214      ( 04 Apr 2023 07:45 )             30.9     04-04    144  |  118<H>  |  28<H>  ----------------------------<  184<H>  3.5   |  24  |  1.30    Ca    8.4<L>      04 Apr 2023 07:45  Phos  3.2     04-04  Mg     2.2     04-04      PT/INR - ( 03 Apr 2023 07:38 )   PT: 16.8 sec;   INR: 1.43 ratio                   WBC:  WBC Count: 9.06 K/uL (04-04 @ 07:45)  WBC Count: 9.46 K/uL (04-03 @ 07:38)  WBC Count: 10.33 K/uL (04-02 @ 04:20)  WBC Count: 11.96 K/uL (04-01 @ 05:07)      MICROBIOLOGY:  RECENT CULTURES:  04-02 Clean Catch Clean Catch (Midstream) XXXX XXXX   >100,000 CFU/ml Enterococcus faecalis    03-29 .Blood XXXX XXXX   No Growth Final    03-29 .Blood XXXX XXXX   No Growth Final                PT/INR - ( 03 Apr 2023 07:38 )   PT: 16.8 sec;   INR: 1.43 ratio             Sodium:  Sodium, Serum: 144 mmol/L (04-04 @ 07:45)  Sodium, Serum: 148 mmol/L (04-03 @ 07:38)  Sodium, Serum: 152 mmol/L (04-02 @ 04:20)  Sodium, Serum: 151 mmol/L (04-01 @ 05:07)      1.30 mg/dL 04-04 @ 07:45  1.50 mg/dL 04-03 @ 07:38  1.30 mg/dL 04-02 @ 04:20  1.50 mg/dL 04-01 @ 05:07      Hemoglobin:  Hemoglobin: 9.4 g/dL (04-04 @ 07:45)  Hemoglobin: 10.0 g/dL (04-03 @ 07:38)  Hemoglobin: 10.6 g/dL (04-02 @ 04:20)  Hemoglobin: 11.1 g/dL (04-01 @ 05:07)      Platelets: Platelet Count - Automated: 214 K/uL (04-04 @ 07:45)  Platelet Count - Automated: 222 K/uL (04-03 @ 07:38)  Platelet Count - Automated: 223 K/uL (04-02 @ 04:20)  Platelet Count - Automated: 244 K/uL (04-01 @ 05:07)              RADIOLOGY & ADDITIONAL STUDIES:      MICROBIOLOGY:  RECENT CULTURES:  04-02 Clean Catch Clean Catch (Midstream) XXXX XXXX   >100,000 CFU/ml Enterococcus faecalis    03-29 .Blood XXXX XXXX   No Growth Final    03-29 .Blood XXXX XXXX   No Growth Final

## 2023-04-04 NOTE — PROGRESS NOTE ADULT - ASSESSMENT
86 yo male ,[facility] resident with PMHx - COPD, DM, HTN, CAD, HLD, H/O TIA, dementia, GERD, BPH and depression, who was sent for evaluation of left foot 1metatarsal wound. Concern for wound infection with underlying osteomyelitis. He also has bilateral heel wounds R>L. Plan for potential Podiatry intervention, bone scan pending.    Patient remains afebrile and without leukocytosis. Incidentally found to be COVID positive. On room air. Recent CT chest showed evidence of pulmonary edema.    1. COVID  -suggest remdesivir, can complete 3 doses if remain on room air  -AC per primary team  -monitor creatinine, LFT's    2. Left foot osteomyelitis  -continue Zosyn  -follow up bone scan  -follow cultures to completion    Isis Mancia MD  Division of Infectious Diseases   Cell [phone] between 8am and 6pm   After 6pm and weekends please call ID service at [phone].  86 yo male ,[facility] resident with PMHx - COPD, DM, HTN, CAD, HLD, H/O TIA, dementia, GERD, BPH and depression, who was sent for evaluation of left foot 1metatarsal wound. Concern for wound infection with underlying osteomyelitis. He also has bilateral heel wounds R>L. Plan for potential Podiatry intervention, bone scan pending.    Patient remains afebrile and without leukocytosis. Incidentally found to be COVID positive. On room air. Recent CT chest showed evidence of pulmonary edema.    1. COVID  -suggest remdesivir, can complete 3 doses if remain on room air  -AC per primary team  -monitor creatinine, LFT's    2. Left foot osteomyelitis  -continue Zosyn  -follow up bone scan  -follow cultures to completion    Isis Mancia MD  Division of Infectious Diseases   Cell [phone] between 8am and 6pm   After 6pm and weekends please call ID service at [phone].  86 yo male ,[facility] resident with PMHx - COPD, DM, HTN, CAD, HLD, H/O TIA, dementia, GERD, BPH and depression, who was sent for evaluation of left foot 1metatarsal wound. Concern for wound infection with underlying osteomyelitis. He also has bilateral heel wounds R>L. Plan for potential Podiatry intervention, bone scan pending.    Patient remains afebrile and without leukocytosis. Incidentally found to be COVID positive. On room air. Recent CT chest showed evidence of pulmonary edema.    1. COVID  -suggest remdesivir, can complete 3 doses if remain on room air  -AC per primary team  -monitor creatinine, LFT's    2. Left foot osteomyelitis  -continue Zosyn  -follow up bone scan  -follow cultures to completion    Isis Mancia MD  Division of Infectious Diseases   Cell [phone] between 8am and 6pm   After 6pm and weekends please call ID service at [phone].  86 yo male ,[facility] resident with PMHx - COPD, DM, HTN, CAD, HLD, H/O TIA, dementia, GERD, BPH and depression, who was sent for evaluation of left foot 1metatarsal wound. Concern for wound infection with underlying osteomyelitis. He also has bilateral heel wounds R>L. Plan for potential Podiatry intervention, bone scan pending.    Patient remains afebrile and without leukocytosis. Incidentally found to be COVID positive. On room air. Recent CT chest showed evidence of pulmonary edema.    1. COVID  -suggest remdesivir, can complete 3 doses if remain on room air  -AC per primary team  -monitor creatinine, LFT's  -repeat CRP    2. Left foot osteomyelitis  -continue Zosyn  -follow up bone scan  -follow cultures to completion    Isis Mancia MD  Division of Infectious Diseases   Cell [phone] between 8am and 6pm   After 6pm and weekends please call ID service at [phone].  86 yo male ,[facility] resident with PMHx - COPD, DM, HTN, CAD, HLD, H/O TIA, dementia, GERD, BPH and depression, who was sent for evaluation of left foot 1metatarsal wound. Concern for wound infection with underlying osteomyelitis. He also has bilateral heel wounds R>L. Plan for potential Podiatry intervention, bone scan pending.    Patient remains afebrile and without leukocytosis. Incidentally found to be COVID positive. On room air. Recent CT chest showed evidence of pulmonary edema.    1. COVID  -suggest remdesivir, can complete 3 doses if remain on room air  -AC per primary team  -monitor creatinine, LFT's  -repeat CRP    2. Left foot osteomyelitis  -continue Zosyn  -follow up bone scan  -follow cultures to completion    Isis Mancia MD  Division of Infectious Diseases   Cell [phone] between 8am and 6pm   After 6pm and weekends please call ID service at [phone].  88 yo male ,[facility] resident with PMHx - COPD, DM, HTN, CAD, HLD, H/O TIA, dementia, GERD, BPH and depression, who was sent for evaluation of left foot 1metatarsal wound. Concern for wound infection with underlying osteomyelitis. He also has bilateral heel wounds R>L. Plan for potential Podiatry intervention, bone scan pending.    Patient remains afebrile and without leukocytosis. Incidentally found to be COVID positive. On room air. Recent CT chest showed evidence of pulmonary edema.    1. COVID  -suggest remdesivir, can complete 3 doses if remain on room air  -AC per primary team  -monitor creatinine, LFT's  -repeat CRP    2. Left foot osteomyelitis  -continue Zosyn  -follow up bone scan  -follow cultures to completion    Isis Mancia MD  Division of Infectious Diseases   Cell [phone] between 8am and 6pm   After 6pm and weekends please call ID service at [phone].

## 2023-04-04 NOTE — PROGRESS NOTE ADULT - ASSESSMENT
86 yo male ,[facility] resident with PMHx - COPD, DM, HTN, CAD, HLD, H/O TIA, dementia,GERD, BPH and depression sent ot ER for evaluation of left foot 1metatarsal wound ,suggestive of osteomyelitis .Patient was seen by ID consult and transfer to the hospital recommended for wound cx/bone biopsy /podiatry evaluation ,likely will require 4-6 weeks of iv abx . Patient was admitted to [facility] with b/l feet wounds and was followed by wound care team ,recently completed 7 days of doxycycline for foot wound cellulitis . 86 yo male ,[facility] resident with PMHx - COPD, DM, HTN, CAD, HLD, H/O TIA, dementia,GERD, BPH and depression sent ot ER for evaluation of left foot 1metatarsal wound ,suggestive of osteomyelitis .Patient was seen by ID consult and transfer to the hospital recommended for wound cx/bone biopsy /podiatry evaluation ,likely will require 4-6 weeks of iv abx . Patient was admitted to [facility] with b/l feet wounds and was followed by wound care team ,recently completed 7 days of doxycycline for foot wound cellulitis . 88 yo male ,[facility] resident with PMHx - COPD, DM, HTN, CAD, HLD, H/O TIA, dementia,GERD, BPH and depression sent ot ER for evaluation of left foot 1metatarsal wound ,suggestive of osteomyelitis .Patient was seen by ID consult and transfer to the hospital recommended for wound cx/bone biopsy /podiatry evaluation ,likely will require 4-6 weeks of iv abx . Patient was admitted to [facility] with b/l feet wounds and was followed by wound care team ,recently completed 7 days of doxycycline for foot wound cellulitis .

## 2023-04-04 NOTE — PROGRESS NOTE ADULT - SUBJECTIVE AND OBJECTIVE BOX
[facility] [place]       HPI:  88 yo male ,[facility] resident with PMHx - COPD, DM, HTN, CAD, HLD, H/O TIA, dementia,GERD, BPH and depression sent ot ER for evaluation of left foot 1metatarsal wound ,suggestive of osteomyelitis .Patient was seen by ID consult and transfer to the hospital recommended for wound cx/bone biopsy /podiatry evaluation ,likely will require 4-6 weeks of iv abx . Patient was admitted to [facility] with b/l feet wounds and was followed by wound care team ,recently completed 7 days of doxycycline for foot wound cellulitis . (30 Mar 2023 05:21)        SUBJECTIVE:      ALLERGIES:  Allergies    No Known Allergies    Intolerances          MEDICATIONS  (STANDING):  albuterol/ipratropium for Nebulization 3 milliLiter(s) Nebulizer every 8 hours  budesonide 160 MICROgram(s)/formoterol 4.5 MICROgram(s) Inhaler 2 Puff(s) Inhalation two times a day  chlorhexidine 2% Cloths 1 Application(s) Topical daily  dextrose 5%. 1000 milliLiter(s) (50 mL/Hr) IV Continuous <Continuous>  dextrose 5%. 1000 milliLiter(s) (100 mL/Hr) IV Continuous <Continuous>  dextrose 50% Injectable 25 Gram(s) IV Push once  dextrose 50% Injectable 12.5 Gram(s) IV Push once  dextrose 50% Injectable 25 Gram(s) IV Push once  donepezil 5 milliGRAM(s) Oral at bedtime  DULoxetine 60 milliGRAM(s) Oral daily  enoxaparin Injectable 60 milliGRAM(s) SubCutaneous every 12 hours  glucagon  Injectable 1 milliGRAM(s) IntraMuscular once  insulin glargine Injectable (LANTUS) 15 Unit(s) SubCutaneous at bedtime  insulin lispro (ADMELOG) corrective regimen sliding scale   SubCutaneous three times a day before meals  insulin lispro (ADMELOG) corrective regimen sliding scale   SubCutaneous at bedtime  insulin lispro Injectable (ADMELOG) 3 Unit(s) SubCutaneous three times a day before meals  lactobacillus acidophilus 1 Tablet(s) Oral two times a day with meals  losartan 50 milliGRAM(s) Oral daily  multivitamin 1 Tablet(s) Oral daily  pantoprazole   Suspension 40 milliGRAM(s) Oral daily  piperacillin/tazobactam IVPB.. 3.375 Gram(s) IV Intermittent every 8 hours  remdesivir  IVPB   IV Intermittent   senna 2 Tablet(s) Oral at bedtime  simvastatin 40 milliGRAM(s) Oral at bedtime  tamsulosin 0.4 milliGRAM(s) Oral at bedtime    MEDICATIONS  (PRN):  acetaminophen     Tablet .. 650 milliGRAM(s) Oral every 6 hours PRN Temp greater or equal to 38C (100.4F), Mild Pain (1 - 3)  aluminum hydroxide/magnesium hydroxide/simethicone Suspension 30 milliLiter(s) Oral every 4 hours PRN Dyspepsia  bisacodyl Suppository 10 milliGRAM(s) Rectal daily PRN Constipation  dextrose Oral Gel 15 Gram(s) Oral once PRN Blood Glucose LESS THAN 70 milliGRAM(s)/deciliter  hydrALAZINE 50 milliGRAM(s) Oral every 6 hours PRN for systolic BP>160  magnesium hydroxide Suspension 30 milliLiter(s) Oral daily PRN Constipation  melatonin 3 milliGRAM(s) Oral at bedtime PRN Insomnia  morphine  - Injectable 2 milliGRAM(s) IV Push every 6 hours PRN Severe Pain (7 - 10)  ondansetron Injectable 4 milliGRAM(s) IV Push every 8 hours PRN Nausea and/or Vomiting  traMADol 50 milliGRAM(s) Oral four times a day PRN Moderate Pain (4 - 6)      REVIEW OF SYSTEMS:  CONSTITUTIONAL: No fever,  RESPIRATORY: No cough, wheezing, shortness of breath  CARDIOVASCULAR: No chest pain, dyspnea, palpitations, dizziness, syncope, paroxysmal nocturnal dyspnea, orthopnea, or arm or leg swelling  GASTROINTESTINAL: No abdominal  or epigastric pain, nausea, vomiting,  diarrhea  NEUROLOGICAL: No headaches,  loss of strength, numbness, or tremors    Vital Signs Last 24 Hrs  T(C): 36.4 (04 Apr 2023 21:14), Max: 37.2 (04 Apr 2023 12:55)  T(F): 97.5 (04 Apr 2023 21:14), Max: 98.9 (04 Apr 2023 12:55)  HR: 99 (04 Apr 2023 21:14) (88 - 110)  BP: 136/76 (04 Apr 2023 21:14) (136/76 - 143/76)  BP(mean): --  RR: 17 (04 Apr 2023 21:14) (17 - 18)  SpO2: 100% (04 Apr 2023 21:14) (93% - 100%)    Parameters below as of 04 Apr 2023 21:14  Patient On (Oxygen Delivery Method): room air        PHYSICAL EXAM:  HEAD:  Atraumatic, Normocephalic  NECK: Supple and normal thyroid.  No JVD or carotid bruit.   HEART: S1, S2 regular , 1/6 soft ejection systolic murmur in mitral area , no thrill and no gallops .  PULMONARY: Bilateral vesicular breathing , few scattered ronchi and few scattered rales are present .  ABDOMEN: Soft nontender and positive bowl sounds   EXTREMITIES:  No clubbing, cyanosis, or pedal  edema  NEUROLOGICAL: AAOX3 , no focal deficit .    LABS:                        9.4    9.06  )-----------( 214      ( 04 Apr 2023 07:45 )             30.9     04-04    144  |  118<H>  |  28<H>  ----------------------------<  184<H>  3.5   |  24  |  1.30    Ca    8.4<L>      04 Apr 2023 07:45  Phos  3.2     04-04  Mg     2.2     04-04    TPro  5.8<L>  /  Alb  2.0<L>  /  TBili  0.6  /  DBili  0.2  /  AST  29  /  ALT  51  /  AlkPhos  59  04-04        PT/INR - ( 03 Apr 2023 07:38 )   PT: 16.8 sec;   INR: 1.43 ratio             BNP      EKG:  ECHO:  IMAGING:    Assessment/Plan    Will continue to follow during hospital course and give further recommendations as needed . Thanks for your referral . if any questions please contact me at office [phone])cell [phone])  [facility] [place]       HPI:  86 yo male ,[facility] resident with PMHx - COPD, DM, HTN, CAD, HLD, H/O TIA, dementia,GERD, BPH and depression sent ot ER for evaluation of left foot 1metatarsal wound ,suggestive of osteomyelitis .Patient was seen by ID consult and transfer to the hospital recommended for wound cx/bone biopsy /podiatry evaluation ,likely will require 4-6 weeks of iv abx . Patient was admitted to [facility] with b/l feet wounds and was followed by wound care team ,recently completed 7 days of doxycycline for foot wound cellulitis . (30 Mar 2023 05:21)        SUBJECTIVE:      ALLERGIES:  Allergies    No Known Allergies    Intolerances          MEDICATIONS  (STANDING):  albuterol/ipratropium for Nebulization 3 milliLiter(s) Nebulizer every 8 hours  budesonide 160 MICROgram(s)/formoterol 4.5 MICROgram(s) Inhaler 2 Puff(s) Inhalation two times a day  chlorhexidine 2% Cloths 1 Application(s) Topical daily  dextrose 5%. 1000 milliLiter(s) (50 mL/Hr) IV Continuous <Continuous>  dextrose 5%. 1000 milliLiter(s) (100 mL/Hr) IV Continuous <Continuous>  dextrose 50% Injectable 25 Gram(s) IV Push once  dextrose 50% Injectable 12.5 Gram(s) IV Push once  dextrose 50% Injectable 25 Gram(s) IV Push once  donepezil 5 milliGRAM(s) Oral at bedtime  DULoxetine 60 milliGRAM(s) Oral daily  enoxaparin Injectable 60 milliGRAM(s) SubCutaneous every 12 hours  glucagon  Injectable 1 milliGRAM(s) IntraMuscular once  insulin glargine Injectable (LANTUS) 15 Unit(s) SubCutaneous at bedtime  insulin lispro (ADMELOG) corrective regimen sliding scale   SubCutaneous three times a day before meals  insulin lispro (ADMELOG) corrective regimen sliding scale   SubCutaneous at bedtime  insulin lispro Injectable (ADMELOG) 3 Unit(s) SubCutaneous three times a day before meals  lactobacillus acidophilus 1 Tablet(s) Oral two times a day with meals  losartan 50 milliGRAM(s) Oral daily  multivitamin 1 Tablet(s) Oral daily  pantoprazole   Suspension 40 milliGRAM(s) Oral daily  piperacillin/tazobactam IVPB.. 3.375 Gram(s) IV Intermittent every 8 hours  remdesivir  IVPB   IV Intermittent   senna 2 Tablet(s) Oral at bedtime  simvastatin 40 milliGRAM(s) Oral at bedtime  tamsulosin 0.4 milliGRAM(s) Oral at bedtime    MEDICATIONS  (PRN):  acetaminophen     Tablet .. 650 milliGRAM(s) Oral every 6 hours PRN Temp greater or equal to 38C (100.4F), Mild Pain (1 - 3)  aluminum hydroxide/magnesium hydroxide/simethicone Suspension 30 milliLiter(s) Oral every 4 hours PRN Dyspepsia  bisacodyl Suppository 10 milliGRAM(s) Rectal daily PRN Constipation  dextrose Oral Gel 15 Gram(s) Oral once PRN Blood Glucose LESS THAN 70 milliGRAM(s)/deciliter  hydrALAZINE 50 milliGRAM(s) Oral every 6 hours PRN for systolic BP>160  magnesium hydroxide Suspension 30 milliLiter(s) Oral daily PRN Constipation  melatonin 3 milliGRAM(s) Oral at bedtime PRN Insomnia  morphine  - Injectable 2 milliGRAM(s) IV Push every 6 hours PRN Severe Pain (7 - 10)  ondansetron Injectable 4 milliGRAM(s) IV Push every 8 hours PRN Nausea and/or Vomiting  traMADol 50 milliGRAM(s) Oral four times a day PRN Moderate Pain (4 - 6)      REVIEW OF SYSTEMS:  CONSTITUTIONAL: No fever,  RESPIRATORY: No cough, wheezing, shortness of breath  CARDIOVASCULAR: No chest pain, dyspnea, palpitations, dizziness, syncope, paroxysmal nocturnal dyspnea, orthopnea, or arm or leg swelling  GASTROINTESTINAL: No abdominal  or epigastric pain, nausea, vomiting,  diarrhea  NEUROLOGICAL: No headaches,  loss of strength, numbness, or tremors    Vital Signs Last 24 Hrs  T(C): 36.4 (04 Apr 2023 21:14), Max: 37.2 (04 Apr 2023 12:55)  T(F): 97.5 (04 Apr 2023 21:14), Max: 98.9 (04 Apr 2023 12:55)  HR: 99 (04 Apr 2023 21:14) (88 - 110)  BP: 136/76 (04 Apr 2023 21:14) (136/76 - 143/76)  BP(mean): --  RR: 17 (04 Apr 2023 21:14) (17 - 18)  SpO2: 100% (04 Apr 2023 21:14) (93% - 100%)    Parameters below as of 04 Apr 2023 21:14  Patient On (Oxygen Delivery Method): room air        PHYSICAL EXAM:  HEAD:  Atraumatic, Normocephalic  NECK: Supple and normal thyroid.  No JVD or carotid bruit.   HEART: S1, S2 regular , 1/6 soft ejection systolic murmur in mitral area , no thrill and no gallops .  PULMONARY: Bilateral vesicular breathing , few scattered ronchi and few scattered rales are present .  ABDOMEN: Soft nontender and positive bowl sounds   EXTREMITIES:  No clubbing, cyanosis, or pedal  edema  NEUROLOGICAL: AAOX3 , no focal deficit .    LABS:                        9.4    9.06  )-----------( 214      ( 04 Apr 2023 07:45 )             30.9     04-04    144  |  118<H>  |  28<H>  ----------------------------<  184<H>  3.5   |  24  |  1.30    Ca    8.4<L>      04 Apr 2023 07:45  Phos  3.2     04-04  Mg     2.2     04-04    TPro  5.8<L>  /  Alb  2.0<L>  /  TBili  0.6  /  DBili  0.2  /  AST  29  /  ALT  51  /  AlkPhos  59  04-04        PT/INR - ( 03 Apr 2023 07:38 )   PT: 16.8 sec;   INR: 1.43 ratio             BNP      EKG:  ECHO:  IMAGING:    Assessment/Plan    Will continue to follow during hospital course and give further recommendations as needed . Thanks for your referral . if any questions please contact me at office ([phone])cell [phone])  [facility] [place]       HPI:  88 yo male ,[facility] resident with PMHx - COPD, DM, HTN, CAD, HLD, H/O TIA, dementia,GERD, BPH and depression sent ot ER for evaluation of left foot 1metatarsal wound ,suggestive of osteomyelitis .Patient was seen by ID consult and transfer to the hospital recommended for wound cx/bone biopsy /podiatry evaluation ,likely will require 4-6 weeks of iv abx . Patient was admitted to [facility] with b/l feet wounds and was followed by wound care team ,recently completed 7 days of doxycycline for foot wound cellulitis . (30 Mar 2023 05:21)        SUBJECTIVE:      ALLERGIES:  Allergies    No Known Allergies    Intolerances          MEDICATIONS  (STANDING):  albuterol/ipratropium for Nebulization 3 milliLiter(s) Nebulizer every 8 hours  budesonide 160 MICROgram(s)/formoterol 4.5 MICROgram(s) Inhaler 2 Puff(s) Inhalation two times a day  chlorhexidine 2% Cloths 1 Application(s) Topical daily  dextrose 5%. 1000 milliLiter(s) (50 mL/Hr) IV Continuous <Continuous>  dextrose 5%. 1000 milliLiter(s) (100 mL/Hr) IV Continuous <Continuous>  dextrose 50% Injectable 25 Gram(s) IV Push once  dextrose 50% Injectable 12.5 Gram(s) IV Push once  dextrose 50% Injectable 25 Gram(s) IV Push once  donepezil 5 milliGRAM(s) Oral at bedtime  DULoxetine 60 milliGRAM(s) Oral daily  enoxaparin Injectable 60 milliGRAM(s) SubCutaneous every 12 hours  glucagon  Injectable 1 milliGRAM(s) IntraMuscular once  insulin glargine Injectable (LANTUS) 15 Unit(s) SubCutaneous at bedtime  insulin lispro (ADMELOG) corrective regimen sliding scale   SubCutaneous three times a day before meals  insulin lispro (ADMELOG) corrective regimen sliding scale   SubCutaneous at bedtime  insulin lispro Injectable (ADMELOG) 3 Unit(s) SubCutaneous three times a day before meals  lactobacillus acidophilus 1 Tablet(s) Oral two times a day with meals  losartan 50 milliGRAM(s) Oral daily  multivitamin 1 Tablet(s) Oral daily  pantoprazole   Suspension 40 milliGRAM(s) Oral daily  piperacillin/tazobactam IVPB.. 3.375 Gram(s) IV Intermittent every 8 hours  remdesivir  IVPB   IV Intermittent   senna 2 Tablet(s) Oral at bedtime  simvastatin 40 milliGRAM(s) Oral at bedtime  tamsulosin 0.4 milliGRAM(s) Oral at bedtime    MEDICATIONS  (PRN):  acetaminophen     Tablet .. 650 milliGRAM(s) Oral every 6 hours PRN Temp greater or equal to 38C (100.4F), Mild Pain (1 - 3)  aluminum hydroxide/magnesium hydroxide/simethicone Suspension 30 milliLiter(s) Oral every 4 hours PRN Dyspepsia  bisacodyl Suppository 10 milliGRAM(s) Rectal daily PRN Constipation  dextrose Oral Gel 15 Gram(s) Oral once PRN Blood Glucose LESS THAN 70 milliGRAM(s)/deciliter  hydrALAZINE 50 milliGRAM(s) Oral every 6 hours PRN for systolic BP>160  magnesium hydroxide Suspension 30 milliLiter(s) Oral daily PRN Constipation  melatonin 3 milliGRAM(s) Oral at bedtime PRN Insomnia  morphine  - Injectable 2 milliGRAM(s) IV Push every 6 hours PRN Severe Pain (7 - 10)  ondansetron Injectable 4 milliGRAM(s) IV Push every 8 hours PRN Nausea and/or Vomiting  traMADol 50 milliGRAM(s) Oral four times a day PRN Moderate Pain (4 - 6)      REVIEW OF SYSTEMS:  CONSTITUTIONAL: No fever,  RESPIRATORY: No cough, wheezing, shortness of breath  CARDIOVASCULAR: No chest pain, dyspnea, palpitations, dizziness, syncope, paroxysmal nocturnal dyspnea, orthopnea, or arm or leg swelling  GASTROINTESTINAL: No abdominal  or epigastric pain, nausea, vomiting,  diarrhea  NEUROLOGICAL: No headaches,  loss of strength, numbness, or tremors    Vital Signs Last 24 Hrs  T(C): 36.4 (04 Apr 2023 21:14), Max: 37.2 (04 Apr 2023 12:55)  T(F): 97.5 (04 Apr 2023 21:14), Max: 98.9 (04 Apr 2023 12:55)  HR: 99 (04 Apr 2023 21:14) (88 - 110)  BP: 136/76 (04 Apr 2023 21:14) (136/76 - 143/76)  BP(mean): --  RR: 17 (04 Apr 2023 21:14) (17 - 18)  SpO2: 100% (04 Apr 2023 21:14) (93% - 100%)    Parameters below as of 04 Apr 2023 21:14  Patient On (Oxygen Delivery Method): room air        PHYSICAL EXAM:  HEAD:  Atraumatic, Normocephalic  NECK: Supple and normal thyroid.  No JVD or carotid bruit.   HEART: S1, S2 regular , 1/6 soft ejection systolic murmur in mitral area , no thrill and no gallops .  PULMONARY: Bilateral vesicular breathing , few scattered ronchi and few scattered rales are present .  ABDOMEN: Soft nontender and positive bowl sounds   EXTREMITIES:  No clubbing, cyanosis, or pedal  edema  NEUROLOGICAL: AAOX3 , no focal deficit .    LABS:                        9.4    9.06  )-----------( 214      ( 04 Apr 2023 07:45 )             30.9     04-04    144  |  118<H>  |  28<H>  ----------------------------<  184<H>  3.5   |  24  |  1.30    Ca    8.4<L>      04 Apr 2023 07:45  Phos  3.2     04-04  Mg     2.2     04-04    TPro  5.8<L>  /  Alb  2.0<L>  /  TBili  0.6  /  DBili  0.2  /  AST  29  /  ALT  51  /  AlkPhos  59  04-04        PT/INR - ( 03 Apr 2023 07:38 )   PT: 16.8 sec;   INR: 1.43 ratio             BNP      EKG:  ECHO:  IMAGING:    Assessment/Plan    Will continue to follow during hospital course and give further recommendations as needed . Thanks for your referral . if any questions please contact me at office ([phone])cell [phone])  [facility] [place]       HPI:  88 yo male ,[facility] resident with PMHx - COPD, DM, HTN, CAD, HLD, H/O TIA, dementia,GERD, BPH and depression sent ot ER for evaluation of left foot 1metatarsal wound ,suggestive of osteomyelitis .Patient was seen by ID consult and transfer to the hospital recommended for wound cx/bone biopsy /podiatry evaluation ,likely will require 4-6 weeks of iv abx . Patient was admitted to [facility] with b/l feet wounds and was followed by wound care team ,recently completed 7 days of doxycycline for foot wound cellulitis . (30 Mar 2023 05:21)        SUBJECTIVE: Patient  feeling better .      ALLERGIES:  Allergies    No Known Allergies    Intolerances          MEDICATIONS  (STANDING):  albuterol/ipratropium for Nebulization 3 milliLiter(s) Nebulizer every 8 hours  budesonide 160 MICROgram(s)/formoterol 4.5 MICROgram(s) Inhaler 2 Puff(s) Inhalation two times a day  chlorhexidine 2% Cloths 1 Application(s) Topical daily  dextrose 5%. 1000 milliLiter(s) (50 mL/Hr) IV Continuous <Continuous>  dextrose 5%. 1000 milliLiter(s) (100 mL/Hr) IV Continuous <Continuous>  dextrose 50% Injectable 25 Gram(s) IV Push once  dextrose 50% Injectable 12.5 Gram(s) IV Push once  dextrose 50% Injectable 25 Gram(s) IV Push once  donepezil 5 milliGRAM(s) Oral at bedtime  DULoxetine 60 milliGRAM(s) Oral daily  enoxaparin Injectable 60 milliGRAM(s) SubCutaneous every 12 hours  glucagon  Injectable 1 milliGRAM(s) IntraMuscular once  insulin glargine Injectable (LANTUS) 15 Unit(s) SubCutaneous at bedtime  insulin lispro (ADMELOG) corrective regimen sliding scale   SubCutaneous three times a day before meals  insulin lispro (ADMELOG) corrective regimen sliding scale   SubCutaneous at bedtime  insulin lispro Injectable (ADMELOG) 3 Unit(s) SubCutaneous three times a day before meals  lactobacillus acidophilus 1 Tablet(s) Oral two times a day with meals  losartan 50 milliGRAM(s) Oral daily  multivitamin 1 Tablet(s) Oral daily  pantoprazole   Suspension 40 milliGRAM(s) Oral daily  piperacillin/tazobactam IVPB.. 3.375 Gram(s) IV Intermittent every 8 hours  remdesivir  IVPB   IV Intermittent   senna 2 Tablet(s) Oral at bedtime  simvastatin 40 milliGRAM(s) Oral at bedtime  tamsulosin 0.4 milliGRAM(s) Oral at bedtime    MEDICATIONS  (PRN):  acetaminophen     Tablet .. 650 milliGRAM(s) Oral every 6 hours PRN Temp greater or equal to 38C (100.4F), Mild Pain (1 - 3)  aluminum hydroxide/magnesium hydroxide/simethicone Suspension 30 milliLiter(s) Oral every 4 hours PRN Dyspepsia  bisacodyl Suppository 10 milliGRAM(s) Rectal daily PRN Constipation  dextrose Oral Gel 15 Gram(s) Oral once PRN Blood Glucose LESS THAN 70 milliGRAM(s)/deciliter  hydrALAZINE 50 milliGRAM(s) Oral every 6 hours PRN for systolic BP>160  magnesium hydroxide Suspension 30 milliLiter(s) Oral daily PRN Constipation  melatonin 3 milliGRAM(s) Oral at bedtime PRN Insomnia  morphine  - Injectable 2 milliGRAM(s) IV Push every 6 hours PRN Severe Pain (7 - 10)  ondansetron Injectable 4 milliGRAM(s) IV Push every 8 hours PRN Nausea and/or Vomiting  traMADol 50 milliGRAM(s) Oral four times a day PRN Moderate Pain (4 - 6)      REVIEW OF SYSTEMS:  CONSTITUTIONAL: No fever,  RESPIRATORY: No cough, wheezing, shortness of breath  CARDIOVASCULAR: No chest pain, dyspnea, palpitations, dizziness, syncope, paroxysmal nocturnal dyspnea, orthopnea, or arm or leg swelling  GASTROINTESTINAL: No abdominal  or epigastric pain, nausea, vomiting,  diarrhea  NEUROLOGICAL: No headaches,  loss of strength, numbness, or tremors    Vital Signs Last 24 Hrs  T(C): 36.4 (04 Apr 2023 21:14), Max: 37.2 (04 Apr 2023 12:55)  T(F): 97.5 (04 Apr 2023 21:14), Max: 98.9 (04 Apr 2023 12:55)  HR: 99 (04 Apr 2023 21:14) (88 - 110)  BP: 136/76 (04 Apr 2023 21:14) (136/76 - 143/76)  BP(mean): --  RR: 17 (04 Apr 2023 21:14) (17 - 18)  SpO2: 100% (04 Apr 2023 21:14) (93% - 100%)    Parameters below as of 04 Apr 2023 21:14  Patient On (Oxygen Delivery Method): room air        PHYSICAL EXAM:  HEAD:  Atraumatic, Normocephalic  NECK: Supple and normal thyroid.  No JVD or carotid bruit.   HEART: S1, S2 irregular , 1/6 soft ejection systolic murmur in mitral area , no thrill and no gallops .  PULMONARY: Bilateral vesicular breathing , few scattered rhonchi and few scattered rales are present .  ABDOMEN: Soft nontender and positive bowl sounds   EXTREMITIES:  No clubbing, cyanosis, or pedal  edema  NEUROLOGICAL: AA and mild confused  , no focal deficit .  Skin : No rashes .  Musculoskeletal : No joint swellings .    LABS:                        9.4    9.06  )-----------( 214      ( 04 Apr 2023 07:45 )             30.9     04-04    144  |  118<H>  |  28<H>  ----------------------------<  184<H>  3.5   |  24  |  1.30    Ca    8.4<L>      04 Apr 2023 07:45  Phos  3.2     04-04  Mg     2.2     04-04    TPro  5.8<L>  /  Alb  2.0<L>  /  TBili  0.6  /  DBili  0.2  /  AST  29  /  ALT  51  /  AlkPhos  59  04-04        PT/INR - ( 03 Apr 2023 07:38 )   PT: 16.8 sec;   INR: 1.43 ratio           Assessment/Plan  Patient has :  1) Left foot infection and toe osteomyelitis   2) Hypertension and stable .  3) DM .  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 4) Patient cardiac wise stable and cleared for foot surgery if needed . Benefits of surgery outweigh the risks . 5) Will hold Lovenox 18-24 hours prior to surgery 6) Metoprolol as ordered for atrial fibrillation control .   Will continue to follow during hospital course and give further recommendations as needed . Thanks for your referral . if any questions please contact me at office ([phone] cell [phone])    [facility] [place]       HPI:  86 yo male ,[facility] resident with PMHx - COPD, DM, HTN, CAD, HLD, H/O TIA, dementia,GERD, BPH and depression sent ot ER for evaluation of left foot 1metatarsal wound ,suggestive of osteomyelitis .Patient was seen by ID consult and transfer to the hospital recommended for wound cx/bone biopsy /podiatry evaluation ,likely will require 4-6 weeks of iv abx . Patient was admitted to [facility] with b/l feet wounds and was followed by wound care team ,recently completed 7 days of doxycycline for foot wound cellulitis . (30 Mar 2023 05:21)        SUBJECTIVE: Patient  feeling better .      ALLERGIES:  Allergies    No Known Allergies    Intolerances          MEDICATIONS  (STANDING):  albuterol/ipratropium for Nebulization 3 milliLiter(s) Nebulizer every 8 hours  budesonide 160 MICROgram(s)/formoterol 4.5 MICROgram(s) Inhaler 2 Puff(s) Inhalation two times a day  chlorhexidine 2% Cloths 1 Application(s) Topical daily  dextrose 5%. 1000 milliLiter(s) (50 mL/Hr) IV Continuous <Continuous>  dextrose 5%. 1000 milliLiter(s) (100 mL/Hr) IV Continuous <Continuous>  dextrose 50% Injectable 25 Gram(s) IV Push once  dextrose 50% Injectable 12.5 Gram(s) IV Push once  dextrose 50% Injectable 25 Gram(s) IV Push once  donepezil 5 milliGRAM(s) Oral at bedtime  DULoxetine 60 milliGRAM(s) Oral daily  enoxaparin Injectable 60 milliGRAM(s) SubCutaneous every 12 hours  glucagon  Injectable 1 milliGRAM(s) IntraMuscular once  insulin glargine Injectable (LANTUS) 15 Unit(s) SubCutaneous at bedtime  insulin lispro (ADMELOG) corrective regimen sliding scale   SubCutaneous three times a day before meals  insulin lispro (ADMELOG) corrective regimen sliding scale   SubCutaneous at bedtime  insulin lispro Injectable (ADMELOG) 3 Unit(s) SubCutaneous three times a day before meals  lactobacillus acidophilus 1 Tablet(s) Oral two times a day with meals  losartan 50 milliGRAM(s) Oral daily  multivitamin 1 Tablet(s) Oral daily  pantoprazole   Suspension 40 milliGRAM(s) Oral daily  piperacillin/tazobactam IVPB.. 3.375 Gram(s) IV Intermittent every 8 hours  remdesivir  IVPB   IV Intermittent   senna 2 Tablet(s) Oral at bedtime  simvastatin 40 milliGRAM(s) Oral at bedtime  tamsulosin 0.4 milliGRAM(s) Oral at bedtime    MEDICATIONS  (PRN):  acetaminophen     Tablet .. 650 milliGRAM(s) Oral every 6 hours PRN Temp greater or equal to 38C (100.4F), Mild Pain (1 - 3)  aluminum hydroxide/magnesium hydroxide/simethicone Suspension 30 milliLiter(s) Oral every 4 hours PRN Dyspepsia  bisacodyl Suppository 10 milliGRAM(s) Rectal daily PRN Constipation  dextrose Oral Gel 15 Gram(s) Oral once PRN Blood Glucose LESS THAN 70 milliGRAM(s)/deciliter  hydrALAZINE 50 milliGRAM(s) Oral every 6 hours PRN for systolic BP>160  magnesium hydroxide Suspension 30 milliLiter(s) Oral daily PRN Constipation  melatonin 3 milliGRAM(s) Oral at bedtime PRN Insomnia  morphine  - Injectable 2 milliGRAM(s) IV Push every 6 hours PRN Severe Pain (7 - 10)  ondansetron Injectable 4 milliGRAM(s) IV Push every 8 hours PRN Nausea and/or Vomiting  traMADol 50 milliGRAM(s) Oral four times a day PRN Moderate Pain (4 - 6)      REVIEW OF SYSTEMS:  CONSTITUTIONAL: No fever,  RESPIRATORY: No cough, wheezing, shortness of breath  CARDIOVASCULAR: No chest pain, dyspnea, palpitations, dizziness, syncope, paroxysmal nocturnal dyspnea, orthopnea, or arm or leg swelling  GASTROINTESTINAL: No abdominal  or epigastric pain, nausea, vomiting,  diarrhea  NEUROLOGICAL: No headaches,  loss of strength, numbness, or tremors    Vital Signs Last 24 Hrs  T(C): 36.4 (04 Apr 2023 21:14), Max: 37.2 (04 Apr 2023 12:55)  T(F): 97.5 (04 Apr 2023 21:14), Max: 98.9 (04 Apr 2023 12:55)  HR: 99 (04 Apr 2023 21:14) (88 - 110)  BP: 136/76 (04 Apr 2023 21:14) (136/76 - 143/76)  BP(mean): --  RR: 17 (04 Apr 2023 21:14) (17 - 18)  SpO2: 100% (04 Apr 2023 21:14) (93% - 100%)    Parameters below as of 04 Apr 2023 21:14  Patient On (Oxygen Delivery Method): room air        PHYSICAL EXAM:  HEAD:  Atraumatic, Normocephalic  NECK: Supple and normal thyroid.  No JVD or carotid bruit.   HEART: S1, S2 irregular , 1/6 soft ejection systolic murmur in mitral area , no thrill and no gallops .  PULMONARY: Bilateral vesicular breathing , few scattered rhonchi and few scattered rales are present .  ABDOMEN: Soft nontender and positive bowl sounds   EXTREMITIES:  No clubbing, cyanosis, or pedal  edema  NEUROLOGICAL: AA and mild confused  , no focal deficit .  Skin : No rashes .  Musculoskeletal : No joint swellings .    LABS:                        9.4    9.06  )-----------( 214      ( 04 Apr 2023 07:45 )             30.9     04-04    144  |  118<H>  |  28<H>  ----------------------------<  184<H>  3.5   |  24  |  1.30    Ca    8.4<L>      04 Apr 2023 07:45  Phos  3.2     04-04  Mg     2.2     04-04    TPro  5.8<L>  /  Alb  2.0<L>  /  TBili  0.6  /  DBili  0.2  /  AST  29  /  ALT  51  /  AlkPhos  59  04-04        PT/INR - ( 03 Apr 2023 07:38 )   PT: 16.8 sec;   INR: 1.43 ratio           Assessment/Plan  Patient has :  1) Left foot infection and toe osteomyelitis   2) Hypertension and stable .  3) DM .  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 4) Patient cardiac wise stable and cleared for foot surgery if needed . Benefits of surgery outweigh the risks . 5) Will hold Lovenox 18-24 hours prior to surgery 6) Metoprolol as ordered for atrial fibrillation control .   Will continue to follow during hospital course and give further recommendations as needed . Thanks for your referral . if any questions please contact me at office ([phone] cell [phone])    [facility] [place]       HPI:  86 yo male ,[facility] resident with PMHx - COPD, DM, HTN, CAD, HLD, H/O TIA, dementia,GERD, BPH and depression sent ot ER for evaluation of left foot 1metatarsal wound ,suggestive of osteomyelitis .Patient was seen by ID consult and transfer to the hospital recommended for wound cx/bone biopsy /podiatry evaluation ,likely will require 4-6 weeks of iv abx . Patient was admitted to [facility] with b/l feet wounds and was followed by wound care team ,recently completed 7 days of doxycycline for foot wound cellulitis . (30 Mar 2023 05:21)        SUBJECTIVE: Patient  feeling better .      ALLERGIES:  Allergies    No Known Allergies    Intolerances          MEDICATIONS  (STANDING):  albuterol/ipratropium for Nebulization 3 milliLiter(s) Nebulizer every 8 hours  budesonide 160 MICROgram(s)/formoterol 4.5 MICROgram(s) Inhaler 2 Puff(s) Inhalation two times a day  chlorhexidine 2% Cloths 1 Application(s) Topical daily  dextrose 5%. 1000 milliLiter(s) (50 mL/Hr) IV Continuous <Continuous>  dextrose 5%. 1000 milliLiter(s) (100 mL/Hr) IV Continuous <Continuous>  dextrose 50% Injectable 25 Gram(s) IV Push once  dextrose 50% Injectable 12.5 Gram(s) IV Push once  dextrose 50% Injectable 25 Gram(s) IV Push once  donepezil 5 milliGRAM(s) Oral at bedtime  DULoxetine 60 milliGRAM(s) Oral daily  enoxaparin Injectable 60 milliGRAM(s) SubCutaneous every 12 hours  glucagon  Injectable 1 milliGRAM(s) IntraMuscular once  insulin glargine Injectable (LANTUS) 15 Unit(s) SubCutaneous at bedtime  insulin lispro (ADMELOG) corrective regimen sliding scale   SubCutaneous three times a day before meals  insulin lispro (ADMELOG) corrective regimen sliding scale   SubCutaneous at bedtime  insulin lispro Injectable (ADMELOG) 3 Unit(s) SubCutaneous three times a day before meals  lactobacillus acidophilus 1 Tablet(s) Oral two times a day with meals  losartan 50 milliGRAM(s) Oral daily  multivitamin 1 Tablet(s) Oral daily  pantoprazole   Suspension 40 milliGRAM(s) Oral daily  piperacillin/tazobactam IVPB.. 3.375 Gram(s) IV Intermittent every 8 hours  remdesivir  IVPB   IV Intermittent   senna 2 Tablet(s) Oral at bedtime  simvastatin 40 milliGRAM(s) Oral at bedtime  tamsulosin 0.4 milliGRAM(s) Oral at bedtime    MEDICATIONS  (PRN):  acetaminophen     Tablet .. 650 milliGRAM(s) Oral every 6 hours PRN Temp greater or equal to 38C (100.4F), Mild Pain (1 - 3)  aluminum hydroxide/magnesium hydroxide/simethicone Suspension 30 milliLiter(s) Oral every 4 hours PRN Dyspepsia  bisacodyl Suppository 10 milliGRAM(s) Rectal daily PRN Constipation  dextrose Oral Gel 15 Gram(s) Oral once PRN Blood Glucose LESS THAN 70 milliGRAM(s)/deciliter  hydrALAZINE 50 milliGRAM(s) Oral every 6 hours PRN for systolic BP>160  magnesium hydroxide Suspension 30 milliLiter(s) Oral daily PRN Constipation  melatonin 3 milliGRAM(s) Oral at bedtime PRN Insomnia  morphine  - Injectable 2 milliGRAM(s) IV Push every 6 hours PRN Severe Pain (7 - 10)  ondansetron Injectable 4 milliGRAM(s) IV Push every 8 hours PRN Nausea and/or Vomiting  traMADol 50 milliGRAM(s) Oral four times a day PRN Moderate Pain (4 - 6)      REVIEW OF SYSTEMS:  CONSTITUTIONAL: No fever,  RESPIRATORY: No cough, wheezing, shortness of breath  CARDIOVASCULAR: No chest pain, dyspnea, palpitations, dizziness, syncope, paroxysmal nocturnal dyspnea, orthopnea, or arm or leg swelling  GASTROINTESTINAL: No abdominal  or epigastric pain, nausea, vomiting,  diarrhea  NEUROLOGICAL: No headaches,  loss of strength, numbness, or tremors    Vital Signs Last 24 Hrs  T(C): 36.4 (04 Apr 2023 21:14), Max: 37.2 (04 Apr 2023 12:55)  T(F): 97.5 (04 Apr 2023 21:14), Max: 98.9 (04 Apr 2023 12:55)  HR: 99 (04 Apr 2023 21:14) (88 - 110)  BP: 136/76 (04 Apr 2023 21:14) (136/76 - 143/76)  BP(mean): --  RR: 17 (04 Apr 2023 21:14) (17 - 18)  SpO2: 100% (04 Apr 2023 21:14) (93% - 100%)    Parameters below as of 04 Apr 2023 21:14  Patient On (Oxygen Delivery Method): room air        PHYSICAL EXAM:  HEAD:  Atraumatic, Normocephalic  NECK: Supple and normal thyroid.  No JVD or carotid bruit.   HEART: S1, S2 irregular , 1/6 soft ejection systolic murmur in mitral area , no thrill and no gallops .  PULMONARY: Bilateral vesicular breathing , few scattered rhonchi and few scattered rales are present .  ABDOMEN: Soft nontender and positive bowl sounds   EXTREMITIES:  No clubbing, cyanosis, or pedal  edema  NEUROLOGICAL: AA and mild confused  , no focal deficit .  Skin : No rashes .  Musculoskeletal : No joint swellings .    LABS:                        9.4    9.06  )-----------( 214      ( 04 Apr 2023 07:45 )             30.9     04-04    144  |  118<H>  |  28<H>  ----------------------------<  184<H>  3.5   |  24  |  1.30    Ca    8.4<L>      04 Apr 2023 07:45  Phos  3.2     04-04  Mg     2.2     04-04    TPro  5.8<L>  /  Alb  2.0<L>  /  TBili  0.6  /  DBili  0.2  /  AST  29  /  ALT  51  /  AlkPhos  59  04-04        PT/INR - ( 03 Apr 2023 07:38 )   PT: 16.8 sec;   INR: 1.43 ratio           Assessment/Plan  Patient has :  1) Left foot infection and toe osteomyelitis   2) Hypertension and stable .  3) DM .  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 4) Patient cardiac wise stable and cleared for foot surgery if needed . Benefits of surgery outweigh the risks . 5) Will hold Lovenox 18-24 hours prior to surgery 6) Metoprolol as ordered for atrial fibrillation control .   Will continue to follow during hospital course and give further recommendations as needed . Thanks for your referral . if any questions please contact me at office ([phone] cell [phone])    KARIME WYNN MD [place] [id number]  [address]  OFFICE : [id number]  CELL : [phone]  CARDIOLOGY F/U :       HPI:  86 yo male ,[facility] resident with PMHx - COPD, DM, HTN, CAD, HLD, H/O TIA, dementia,GERD, BPH and depression sent ot ER for evaluation of left foot 1metatarsal wound ,suggestive of osteomyelitis .Patient was seen by ID consult and transfer to the hospital recommended for wound cx/bone biopsy /podiatry evaluation ,likely will require 4-6 weeks of iv abx . Patient was admitted to [facility] with b/l feet wounds and was followed by wound care team ,recently completed 7 days of doxycycline for foot wound cellulitis . (30 Mar 2023 05:21)        SUBJECTIVE: Patient  feeling better .      ALLERGIES:  Allergies    No Known Allergies    Intolerances          MEDICATIONS  (STANDING):  albuterol/ipratropium for Nebulization 3 milliLiter(s) Nebulizer every 8 hours  budesonide 160 MICROgram(s)/formoterol 4.5 MICROgram(s) Inhaler 2 Puff(s) Inhalation two times a day  chlorhexidine 2% Cloths 1 Application(s) Topical daily  dextrose 5%. 1000 milliLiter(s) (50 mL/Hr) IV Continuous <Continuous>  dextrose 5%. 1000 milliLiter(s) (100 mL/Hr) IV Continuous <Continuous>  dextrose 50% Injectable 25 Gram(s) IV Push once  dextrose 50% Injectable 12.5 Gram(s) IV Push once  dextrose 50% Injectable 25 Gram(s) IV Push once  donepezil 5 milliGRAM(s) Oral at bedtime  DULoxetine 60 milliGRAM(s) Oral daily  enoxaparin Injectable 60 milliGRAM(s) SubCutaneous every 12 hours  glucagon  Injectable 1 milliGRAM(s) IntraMuscular once  insulin glargine Injectable (LANTUS) 15 Unit(s) SubCutaneous at bedtime  insulin lispro (ADMELOG) corrective regimen sliding scale   SubCutaneous three times a day before meals  insulin lispro (ADMELOG) corrective regimen sliding scale   SubCutaneous at bedtime  insulin lispro Injectable (ADMELOG) 3 Unit(s) SubCutaneous three times a day before meals  lactobacillus acidophilus 1 Tablet(s) Oral two times a day with meals  losartan 50 milliGRAM(s) Oral daily  multivitamin 1 Tablet(s) Oral daily  pantoprazole   Suspension 40 milliGRAM(s) Oral daily  piperacillin/tazobactam IVPB.. 3.375 Gram(s) IV Intermittent every 8 hours  remdesivir  IVPB   IV Intermittent   senna 2 Tablet(s) Oral at bedtime  simvastatin 40 milliGRAM(s) Oral at bedtime  tamsulosin 0.4 milliGRAM(s) Oral at bedtime    MEDICATIONS  (PRN):  acetaminophen     Tablet .. 650 milliGRAM(s) Oral every 6 hours PRN Temp greater or equal to 38C (100.4F), Mild Pain (1 - 3)  aluminum hydroxide/magnesium hydroxide/simethicone Suspension 30 milliLiter(s) Oral every 4 hours PRN Dyspepsia  bisacodyl Suppository 10 milliGRAM(s) Rectal daily PRN Constipation  dextrose Oral Gel 15 Gram(s) Oral once PRN Blood Glucose LESS THAN 70 milliGRAM(s)/deciliter  hydrALAZINE 50 milliGRAM(s) Oral every 6 hours PRN for systolic BP>160  magnesium hydroxide Suspension 30 milliLiter(s) Oral daily PRN Constipation  melatonin 3 milliGRAM(s) Oral at bedtime PRN Insomnia  morphine  - Injectable 2 milliGRAM(s) IV Push every 6 hours PRN Severe Pain (7 - 10)  ondansetron Injectable 4 milliGRAM(s) IV Push every 8 hours PRN Nausea and/or Vomiting  traMADol 50 milliGRAM(s) Oral four times a day PRN Moderate Pain (4 - 6)      REVIEW OF SYSTEMS:  CONSTITUTIONAL: No fever,  RESPIRATORY: No cough, wheezing, shortness of breath  CARDIOVASCULAR: No chest pain, dyspnea, palpitations, dizziness, syncope, paroxysmal nocturnal dyspnea, orthopnea, or arm or leg swelling  GASTROINTESTINAL: No abdominal  or epigastric pain, nausea, vomiting,  diarrhea  NEUROLOGICAL: No headaches,  loss of strength, numbness, or tremors    Vital Signs Last 24 Hrs  T(C): 36.4 (04 Apr 2023 21:14), Max: 37.2 (04 Apr 2023 12:55)  T(F): 97.5 (04 Apr 2023 21:14), Max: 98.9 (04 Apr 2023 12:55)  HR: 99 (04 Apr 2023 21:14) (88 - 110)  BP: 136/76 (04 Apr 2023 21:14) (136/76 - 143/76)  BP(mean): --  RR: 17 (04 Apr 2023 21:14) (17 - 18)  SpO2: 100% (04 Apr 2023 21:14) (93% - 100%)    Parameters below as of 04 Apr 2023 21:14  Patient On (Oxygen Delivery Method): room air        PHYSICAL EXAM:  HEAD:  Atraumatic, Normocephalic  NECK: Supple and normal thyroid.  No JVD or carotid bruit.   HEART: S1, S2 irregular , 1/6 soft ejection systolic murmur in mitral area , no thrill and no gallops .  PULMONARY: Bilateral vesicular breathing , few scattered rhonchi and few scattered rales are present .  ABDOMEN: Soft nontender and positive bowl sounds   EXTREMITIES:  No clubbing, cyanosis, or pedal  edema  NEUROLOGICAL: AA and mild confused  , no focal deficit .  Skin : No rashes .  Musculoskeletal : No joint swellings .    LABS:                        9.4    9.06  )-----------( 214      ( 04 Apr 2023 07:45 )             30.9     04-04    144  |  118<H>  |  28<H>  ----------------------------<  184<H>  3.5   |  24  |  1.30    Ca    8.4<L>      04 Apr 2023 07:45  Phos  3.2     04-04  Mg     2.2     04-04    TPro  5.8<L>  /  Alb  2.0<L>  /  TBili  0.6  /  DBili  0.2  /  AST  29  /  ALT  51  /  AlkPhos  59  04-04        PT/INR - ( 03 Apr 2023 07:38 )   PT: 16.8 sec;   INR: 1.43 ratio           Assessment/Plan  Patient has :  1) Left foot infection and toe osteomyelitis   2) Hypertension and stable .  3) DM .  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 4) Patient cardiac wise stable and cleared for foot surgery if needed . Benefits of surgery outweigh the risks . 5) Will hold Lovenox 18-24 hours prior to surgery 6) Metoprolol as ordered for atrial fibrillation control .   Will continue to follow during hospital course and give further recommendations as needed . Thanks for your referral . if any questions please contact me at office ([phone] cell [phone])    KARIME WYNN MD [place] [id number]  [address]  OFFICE : [id number]  CELL : [phone]  CARDIOLOGY F/U :       HPI:  88 yo male ,[facility] resident with PMHx - COPD, DM, HTN, CAD, HLD, H/O TIA, dementia,GERD, BPH and depression sent ot ER for evaluation of left foot 1metatarsal wound ,suggestive of osteomyelitis .Patient was seen by ID consult and transfer to the hospital recommended for wound cx/bone biopsy /podiatry evaluation ,likely will require 4-6 weeks of iv abx . Patient was admitted to [facility] with b/l feet wounds and was followed by wound care team ,recently completed 7 days of doxycycline for foot wound cellulitis . (30 Mar 2023 05:21)        SUBJECTIVE: Patient  feeling better .      ALLERGIES:  Allergies    No Known Allergies    Intolerances          MEDICATIONS  (STANDING):  albuterol/ipratropium for Nebulization 3 milliLiter(s) Nebulizer every 8 hours  budesonide 160 MICROgram(s)/formoterol 4.5 MICROgram(s) Inhaler 2 Puff(s) Inhalation two times a day  chlorhexidine 2% Cloths 1 Application(s) Topical daily  dextrose 5%. 1000 milliLiter(s) (50 mL/Hr) IV Continuous <Continuous>  dextrose 5%. 1000 milliLiter(s) (100 mL/Hr) IV Continuous <Continuous>  dextrose 50% Injectable 25 Gram(s) IV Push once  dextrose 50% Injectable 12.5 Gram(s) IV Push once  dextrose 50% Injectable 25 Gram(s) IV Push once  donepezil 5 milliGRAM(s) Oral at bedtime  DULoxetine 60 milliGRAM(s) Oral daily  enoxaparin Injectable 60 milliGRAM(s) SubCutaneous every 12 hours  glucagon  Injectable 1 milliGRAM(s) IntraMuscular once  insulin glargine Injectable (LANTUS) 15 Unit(s) SubCutaneous at bedtime  insulin lispro (ADMELOG) corrective regimen sliding scale   SubCutaneous three times a day before meals  insulin lispro (ADMELOG) corrective regimen sliding scale   SubCutaneous at bedtime  insulin lispro Injectable (ADMELOG) 3 Unit(s) SubCutaneous three times a day before meals  lactobacillus acidophilus 1 Tablet(s) Oral two times a day with meals  losartan 50 milliGRAM(s) Oral daily  multivitamin 1 Tablet(s) Oral daily  pantoprazole   Suspension 40 milliGRAM(s) Oral daily  piperacillin/tazobactam IVPB.. 3.375 Gram(s) IV Intermittent every 8 hours  remdesivir  IVPB   IV Intermittent   senna 2 Tablet(s) Oral at bedtime  simvastatin 40 milliGRAM(s) Oral at bedtime  tamsulosin 0.4 milliGRAM(s) Oral at bedtime    MEDICATIONS  (PRN):  acetaminophen     Tablet .. 650 milliGRAM(s) Oral every 6 hours PRN Temp greater or equal to 38C (100.4F), Mild Pain (1 - 3)  aluminum hydroxide/magnesium hydroxide/simethicone Suspension 30 milliLiter(s) Oral every 4 hours PRN Dyspepsia  bisacodyl Suppository 10 milliGRAM(s) Rectal daily PRN Constipation  dextrose Oral Gel 15 Gram(s) Oral once PRN Blood Glucose LESS THAN 70 milliGRAM(s)/deciliter  hydrALAZINE 50 milliGRAM(s) Oral every 6 hours PRN for systolic BP>160  magnesium hydroxide Suspension 30 milliLiter(s) Oral daily PRN Constipation  melatonin 3 milliGRAM(s) Oral at bedtime PRN Insomnia  morphine  - Injectable 2 milliGRAM(s) IV Push every 6 hours PRN Severe Pain (7 - 10)  ondansetron Injectable 4 milliGRAM(s) IV Push every 8 hours PRN Nausea and/or Vomiting  traMADol 50 milliGRAM(s) Oral four times a day PRN Moderate Pain (4 - 6)      REVIEW OF SYSTEMS:  CONSTITUTIONAL: No fever,  RESPIRATORY: No cough, wheezing, shortness of breath  CARDIOVASCULAR: No chest pain, dyspnea, palpitations, dizziness, syncope, paroxysmal nocturnal dyspnea, orthopnea, or arm or leg swelling  GASTROINTESTINAL: No abdominal  or epigastric pain, nausea, vomiting,  diarrhea  NEUROLOGICAL: No headaches,  loss of strength, numbness, or tremors    Vital Signs Last 24 Hrs  T(C): 36.4 (04 Apr 2023 21:14), Max: 37.2 (04 Apr 2023 12:55)  T(F): 97.5 (04 Apr 2023 21:14), Max: 98.9 (04 Apr 2023 12:55)  HR: 99 (04 Apr 2023 21:14) (88 - 110)  BP: 136/76 (04 Apr 2023 21:14) (136/76 - 143/76)  BP(mean): --  RR: 17 (04 Apr 2023 21:14) (17 - 18)  SpO2: 100% (04 Apr 2023 21:14) (93% - 100%)    Parameters below as of 04 Apr 2023 21:14  Patient On (Oxygen Delivery Method): room air        PHYSICAL EXAM:  HEAD:  Atraumatic, Normocephalic  NECK: Supple and normal thyroid.  No JVD or carotid bruit.   HEART: S1, S2 irregular , 1/6 soft ejection systolic murmur in mitral area , no thrill and no gallops .  PULMONARY: Bilateral vesicular breathing , few scattered rhonchi and few scattered rales are present .  ABDOMEN: Soft nontender and positive bowl sounds   EXTREMITIES:  No clubbing, cyanosis, or pedal  edema  NEUROLOGICAL: AA and mild confused  , no focal deficit .  Skin : No rashes .  Musculoskeletal : No joint swellings .    LABS:                        9.4    9.06  )-----------( 214      ( 04 Apr 2023 07:45 )             30.9     04-04    144  |  118<H>  |  28<H>  ----------------------------<  184<H>  3.5   |  24  |  1.30    Ca    8.4<L>      04 Apr 2023 07:45  Phos  3.2     04-04  Mg     2.2     04-04    TPro  5.8<L>  /  Alb  2.0<L>  /  TBili  0.6  /  DBili  0.2  /  AST  29  /  ALT  51  /  AlkPhos  59  04-04        PT/INR - ( 03 Apr 2023 07:38 )   PT: 16.8 sec;   INR: 1.43 ratio           Assessment/Plan  Patient has :  1) Left foot infection and toe osteomyelitis   2) Hypertension and stable .  3) DM .  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 4) Patient cardiac wise stable and cleared for foot surgery if needed . Benefits of surgery outweigh the risks . 5) Will hold Lovenox 18-24 hours prior to surgery 6) Metoprolol as ordered for atrial fibrillation control .   Will continue to follow during hospital course and give further recommendations as needed . Thanks for your referral . if any questions please contact me at office ([phone] cell [phone])    KARIME WYNN MD [place] [id number]  [address]  OFFICE : [id number]  CELL : [phone]  CARDIOLOGY F/U :       HPI:  88 [facility] with [facility]Hx - COPD, DM, HTN, CAD, HLD, H/O TIA, dementia,GERD, BPH and depression sent ot ER for evaluation of left foot 1metatarsal wound ,suggestive of osteomyelitis .Patient was seen by ID consult and transfer to the hospital recommended for wound cx/bone biopsy /podiatry evaluation ,likely will require 4-6 weeks of iv abx . [facility] and was followed by wound care team ,recently completed 7 days of doxycycline for foot wound cellulitis . (30 Mar 2023 05:21)        SUBJECTIVE: Patient  feeling better .      ALLERGIES:  Allergies    No Known Allergies    Intolerances          MEDICATIONS  (STANDING):  albuterol/ipratropium for Nebulization 3 milliLiter(s) Nebulizer every 8 hours  budesonide 160 MICROgram(s)/formoterol 4.5 MICROgram(s) Inhaler 2 Puff(s) Inhalation two times a day  chlorhexidine 2% Cloths 1 Application(s) Topical daily  dextrose 5%. 1000 milliLiter(s) (50 mL/Hr) IV Continuous <Continuous>  dextrose 5%. 1000 milliLiter(s) (100 mL/Hr) IV Continuous <Continuous>  dextrose 50% Injectable 25 Gram(s) IV Push once  dextrose 50% Injectable 12.5 Gram(s) IV Push once  dextrose 50% Injectable 25 Gram(s) IV Push once  donepezil 5 milliGRAM(s) Oral at bedtime  DULoxetine 60 milliGRAM(s) Oral daily  enoxaparin Injectable 60 milliGRAM(s) SubCutaneous every 12 hours  glucagon  Injectable 1 milliGRAM(s) IntraMuscular once  insulin glargine Injectable (LANTUS) 15 Unit(s) SubCutaneous at bedtime  insulin lispro (ADMELOG) corrective regimen sliding scale   SubCutaneous three times a day before meals  insulin lispro (ADMELOG) corrective regimen sliding scale   SubCutaneous at bedtime  insulin lispro Injectable (ADMELOG) 3 Unit(s) SubCutaneous three times a day before meals  lactobacillus acidophilus 1 Tablet(s) Oral two times a day with meals  losartan 50 milliGRAM(s) Oral daily  multivitamin 1 Tablet(s) Oral daily  pantoprazole   Suspension 40 milliGRAM(s) Oral daily  piperacillin/tazobactam IVPB.. 3.375 Gram(s) IV Intermittent every 8 hours  remdesivir  IVPB   IV Intermittent   senna 2 Tablet(s) Oral at bedtime  simvastatin 40 milliGRAM(s) Oral at bedtime  tamsulosin 0.4 milliGRAM(s) Oral at bedtime    MEDICATIONS  (PRN):  acetaminophen     Tablet .. 650 milliGRAM(s) Oral every 6 hours PRN Temp greater or equal to 38C (100.4F), Mild Pain (1 - 3)  aluminum hydroxide/magnesium hydroxide/simethicone Suspension 30 milliLiter(s) Oral every 4 hours PRN Dyspepsia  bisacodyl Suppository 10 milliGRAM(s) Rectal daily PRN Constipation  dextrose Oral Gel 15 Gram(s) Oral once PRN Blood Glucose LESS THAN 70 milliGRAM(s)/deciliter  hydrALAZINE 50 milliGRAM(s) Oral every 6 hours PRN for systolic BP>160  magnesium hydroxide Suspension 30 milliLiter(s) Oral daily PRN Constipation  melatonin 3 milliGRAM(s) Oral at bedtime PRN Insomnia  morphine  - Injectable 2 milliGRAM(s) IV Push every 6 hours PRN Severe Pain (7 - 10)  ondansetron Injectable 4 milliGRAM(s) IV Push every 8 hours PRN Nausea and/or Vomiting  traMADol 50 milliGRAM(s) Oral four times a day PRN Moderate Pain (4 - 6)      REVIEW OF SYSTEMS:  CONSTITUTIONAL: No fever,  RESPIRATORY: No cough, wheezing, shortness of breath  CARDIOVASCULAR: No chest pain, dyspnea, palpitations, dizziness, syncope, paroxysmal nocturnal dyspnea, orthopnea, or arm or leg swelling  GASTROINTESTINAL: No abdominal  or epigastric pain, nausea, vomiting,  diarrhea  NEUROLOGICAL: No headaches,  loss of strength, numbness, or tremors    Vital Signs Last 24 Hrs  T(C): 36.4 (04 Apr 2023 21:14), Max: 37.2 (04 Apr 2023 12:55)  T(F): 97.5 (04 Apr 2023 21:14), Max: 98.9 (04 Apr 2023 12:55)  HR: 99 (04 Apr 2023 21:14) (88 - 110)  BP: 136/76 (04 Apr 2023 21:14) (136/76 - 143/76)  BP(mean): --  RR: 17 (04 Apr 2023 21:14) (17 - 18)  SpO2: 100% (04 Apr 2023 21:14) (93% - 100%)    Parameters below as of 04 Apr 2023 21:14  Patient On (Oxygen Delivery Method): room air        PHYSICAL EXAM:  HEAD:  Atraumatic, Normocephalic  NECK: Supple and normal thyroid.  No JVD or carotid bruit.   HEART: S1, S2 irregular , 1/6 soft ejection systolic murmur in mitral area , no thrill and no gallops .  PULMONARY: Bilateral vesicular breathing , few scattered rhonchi and few scattered rales are present .  ABDOMEN: Soft nontender and positive bowl sounds   EXTREMITIES:  No clubbing, cyanosis, or pedal  edema  NEUROLOGICAL: AA and mild confused  , no focal deficit .  Skin : No rashes .  Musculoskeletal : No joint swellings .    LABS:                        9.4    9.06  )-----------( 214      ( 04 Apr 2023 07:45 )             30.9     04-04    144  |  118<H>  |  28<H>  ----------------------------<  184<H>  3.5   |  24  |  1.30    Ca    8.4<L>      04 Apr 2023 07:45  Phos  3.2     04-04  Mg     2.2     04-04    TPro  5.8<L>  /  Alb  2.0<L>  /  TBili  0.6  /  DBili  0.2  /  AST  29  /  ALT  51  /  AlkPhos  59  04-04        PT/INR - ( 03 Apr 2023 07:38 )   PT: 16.8 sec;   INR: 1.43 ratio           Assessment/Plan  Patient has :  1) Left foot infection and toe osteomyelitis   2) Hypertension and stable .  3) DM .  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 4) Patient cardiac wise stable and cleared for foot surgery if needed . Benefits of surgery outweigh the risks . 5) Will hold Lovenox 18-24 hours prior to surgery 6) Metoprolol as ordered for atrial fibrillation control .   Will continue to follow during hospital course and give further recommendations as needed . Thanks for your referral . if any questions please contact me at office ([phone] cell [phone])    KARIME WYNN MD [place] [id number]  [address]  OFFICE : [id number]  CELL : [phone]  CARDIOLOGY F/U :       HPI:  88 yo male ,[facility] resident with PMHx - COPD, DM, HTN, CAD, HLD, H/O TIA, dementia,GERD, BPH and depression sent ot ER for evaluation of left foot 1metatarsal wound ,suggestive of osteomyelitis .Patient was seen by ID consult and transfer to the hospital recommended for wound cx/bone biopsy /podiatry evaluation ,likely will require 4-6 weeks of iv abx . Patient was admitted to [facility] with b/l feet wounds and was followed by wound care team ,recently completed 7 days of doxycycline for foot wound cellulitis . (30 Mar 2023 05:21)        SUBJECTIVE: Patient  feeling better .      ALLERGIES:  Allergies    No Known Allergies    Intolerances          MEDICATIONS  (STANDING):  albuterol/ipratropium for Nebulization 3 milliLiter(s) Nebulizer every 8 hours  budesonide 160 MICROgram(s)/formoterol 4.5 MICROgram(s) Inhaler 2 Puff(s) Inhalation two times a day  chlorhexidine 2% Cloths 1 Application(s) Topical daily  dextrose 5%. 1000 milliLiter(s) (50 mL/Hr) IV Continuous <Continuous>  dextrose 5%. 1000 milliLiter(s) (100 mL/Hr) IV Continuous <Continuous>  dextrose 50% Injectable 25 Gram(s) IV Push once  dextrose 50% Injectable 12.5 Gram(s) IV Push once  dextrose 50% Injectable 25 Gram(s) IV Push once  donepezil 5 milliGRAM(s) Oral at bedtime  DULoxetine 60 milliGRAM(s) Oral daily  enoxaparin Injectable 60 milliGRAM(s) SubCutaneous every 12 hours  glucagon  Injectable 1 milliGRAM(s) IntraMuscular once  insulin glargine Injectable (LANTUS) 15 Unit(s) SubCutaneous at bedtime  insulin lispro (ADMELOG) corrective regimen sliding scale   SubCutaneous three times a day before meals  insulin lispro (ADMELOG) corrective regimen sliding scale   SubCutaneous at bedtime  insulin lispro Injectable (ADMELOG) 3 Unit(s) SubCutaneous three times a day before meals  lactobacillus acidophilus 1 Tablet(s) Oral two times a day with meals  losartan 50 milliGRAM(s) Oral daily  multivitamin 1 Tablet(s) Oral daily  pantoprazole   Suspension 40 milliGRAM(s) Oral daily  piperacillin/tazobactam IVPB.. 3.375 Gram(s) IV Intermittent every 8 hours  remdesivir  IVPB   IV Intermittent   senna 2 Tablet(s) Oral at bedtime  simvastatin 40 milliGRAM(s) Oral at bedtime  tamsulosin 0.4 milliGRAM(s) Oral at bedtime    MEDICATIONS  (PRN):  acetaminophen     Tablet .. 650 milliGRAM(s) Oral every 6 hours PRN Temp greater or equal to 38C (100.4F), Mild Pain (1 - 3)  aluminum hydroxide/magnesium hydroxide/simethicone Suspension 30 milliLiter(s) Oral every 4 hours PRN Dyspepsia  bisacodyl Suppository 10 milliGRAM(s) Rectal daily PRN Constipation  dextrose Oral Gel 15 Gram(s) Oral once PRN Blood Glucose LESS THAN 70 milliGRAM(s)/deciliter  hydrALAZINE 50 milliGRAM(s) Oral every 6 hours PRN for systolic BP>160  magnesium hydroxide Suspension 30 milliLiter(s) Oral daily PRN Constipation  melatonin 3 milliGRAM(s) Oral at bedtime PRN Insomnia  morphine  - Injectable 2 milliGRAM(s) IV Push every 6 hours PRN Severe Pain (7 - 10)  ondansetron Injectable 4 milliGRAM(s) IV Push every 8 hours PRN Nausea and/or Vomiting  traMADol 50 milliGRAM(s) Oral four times a day PRN Moderate Pain (4 - 6)      REVIEW OF SYSTEMS:  CONSTITUTIONAL: No fever,  RESPIRATORY: No cough, wheezing, shortness of breath  CARDIOVASCULAR: No chest pain, dyspnea, palpitations, dizziness, syncope, paroxysmal nocturnal dyspnea, orthopnea, or arm or leg swelling  GASTROINTESTINAL: No abdominal  or epigastric pain, nausea, vomiting,  diarrhea  NEUROLOGICAL: No headaches, numbness, or tremors  Skin : No itching .  Hematology : No active bleeding .  Endocrinology : No heat and cold intolerance .  Psychiatry : Patient is confused .  Genitourinary : No dysuria .  Musculoskeletal : Patient has mild arthritis .          Vital Signs Last 24 Hrs  T(C): 36.4 (04 Apr 2023 21:14), Max: 37.2 (04 Apr 2023 12:55)  T(F): 97.5 (04 Apr 2023 21:14), Max: 98.9 (04 Apr 2023 12:55)  HR: 99 (04 Apr 2023 21:14) (88 - 110)  BP: 136/76 (04 Apr 2023 21:14) (136/76 - 143/76)  BP(mean): --  RR: 17 (04 Apr 2023 21:14) (17 - 18)  SpO2: 100% (04 Apr 2023 21:14) (93% - 100%)    Parameters below as of 04 Apr 2023 21:14  Patient On (Oxygen Delivery Method): room air        PHYSICAL EXAM:  HEAD:  Atraumatic, Normocephalic  NECK: Supple and normal thyroid.  No JVD or carotid bruit.   HEART: S1, S2 irregular , 1/6 soft ejection systolic murmur in mitral area , no thrill and no gallops .  PULMONARY: Bilateral vesicular breathing , few scattered rhonchi and few scattered rales are present .  ABDOMEN: Soft nontender and positive bowl sounds   EXTREMITIES:  No clubbing, cyanosis, or pedal  edema  NEUROLOGICAL: AA and mild confused  , no focal deficit .  Skin : No rashes .  Musculoskeletal : No joint swellings .    LABS:                        9.4    9.06  )-----------( 214      ( 04 Apr 2023 07:45 )             30.9     04-04    144  |  118<H>  |  28<H>  ----------------------------<  184<H>  3.5   |  24  |  1.30    Ca    8.4<L>      04 Apr 2023 07:45  Phos  3.2     04-04  Mg     2.2     04-04    TPro  5.8<L>  /  Alb  2.0<L>  /  TBili  0.6  /  DBili  0.2  /  AST  29  /  ALT  51  /  AlkPhos  59  04-04        PT/INR - ( 03 Apr 2023 07:38 )   PT: 16.8 sec;   INR: 1.43 ratio           Assessment/Plan  Patient has :  1) Left foot infection and toe osteomyelitis   2) Hypertension and stable .  3) DM .  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 4) Patient cardiac wise stable and cleared for foot surgery if needed . Benefits of surgery outweigh the risks . 5) Will hold Lovenox 18-24 hours prior to surgery 6) Metoprolol as ordered for atrial fibrillation control .   Will continue to follow during hospital course and give further recommendations as needed . Thanks for your referral . if any questions please contact me at office ([phone] cell [phone])    KARIME WYNN MD [place] [id number]  [address]  OFFICE : [id number]  CELL : [phone]  CARDIOLOGY F/U :       HPI:  86 yo male ,[facility] with PMHx - COPD, DM, HTN, CAD, HLD, H/O TIA, dementia,GERD, BPH and depression sent ot ER for evaluation of left foot 1metatarsal wound ,suggestive of osteomyelitis .Patient was seen by ID consult and transfer to the hospital recommended for wound cx/bone biopsy /podiatry evaluation ,likely will require 4-6 weeks of iv abx . [facility] and was followed by wound care team ,recently completed 7 days of doxycycline for foot wound cellulitis . (30 Mar 2023 05:21)        SUBJECTIVE: Patient  feeling better .      ALLERGIES:  Allergies    No Known Allergies    Intolerances          MEDICATIONS  (STANDING):  albuterol/ipratropium for Nebulization 3 milliLiter(s) Nebulizer every 8 hours  budesonide 160 MICROgram(s)/formoterol 4.5 MICROgram(s) Inhaler 2 Puff(s) Inhalation two times a day  chlorhexidine 2% Cloths 1 Application(s) Topical daily  dextrose 5%. 1000 milliLiter(s) (50 mL/Hr) IV Continuous <Continuous>  dextrose 5%. 1000 milliLiter(s) (100 mL/Hr) IV Continuous <Continuous>  dextrose 50% Injectable 25 Gram(s) IV Push once  dextrose 50% Injectable 12.5 Gram(s) IV Push once  dextrose 50% Injectable 25 Gram(s) IV Push once  donepezil 5 milliGRAM(s) Oral at bedtime  DULoxetine 60 milliGRAM(s) Oral daily  enoxaparin Injectable 60 milliGRAM(s) SubCutaneous every 12 hours  glucagon  Injectable 1 milliGRAM(s) IntraMuscular once  insulin glargine Injectable (LANTUS) 15 Unit(s) SubCutaneous at bedtime  insulin lispro (ADMELOG) corrective regimen sliding scale   SubCutaneous three times a day before meals  insulin lispro (ADMELOG) corrective regimen sliding scale   SubCutaneous at bedtime  insulin lispro Injectable (ADMELOG) 3 Unit(s) SubCutaneous three times a day before meals  lactobacillus acidophilus 1 Tablet(s) Oral two times a day with meals  losartan 50 milliGRAM(s) Oral daily  multivitamin 1 Tablet(s) Oral daily  pantoprazole   Suspension 40 milliGRAM(s) Oral daily  piperacillin/tazobactam IVPB.. 3.375 Gram(s) IV Intermittent every 8 hours  remdesivir  IVPB   IV Intermittent   senna 2 Tablet(s) Oral at bedtime  simvastatin 40 milliGRAM(s) Oral at bedtime  tamsulosin 0.4 milliGRAM(s) Oral at bedtime    MEDICATIONS  (PRN):  acetaminophen     Tablet .. 650 milliGRAM(s) Oral every 6 hours PRN Temp greater or equal to 38C (100.4F), Mild Pain (1 - 3)  aluminum hydroxide/magnesium hydroxide/simethicone Suspension 30 milliLiter(s) Oral every 4 hours PRN Dyspepsia  bisacodyl Suppository 10 milliGRAM(s) Rectal daily PRN Constipation  dextrose Oral Gel 15 Gram(s) Oral once PRN Blood Glucose LESS THAN 70 milliGRAM(s)/deciliter  hydrALAZINE 50 milliGRAM(s) Oral every 6 hours PRN for systolic BP>160  magnesium hydroxide Suspension 30 milliLiter(s) Oral daily PRN Constipation  melatonin 3 milliGRAM(s) Oral at bedtime PRN Insomnia  morphine  - Injectable 2 milliGRAM(s) IV Push every 6 hours PRN Severe Pain (7 - 10)  ondansetron Injectable 4 milliGRAM(s) IV Push every 8 hours PRN Nausea and/or Vomiting  traMADol 50 milliGRAM(s) Oral four times a day PRN Moderate Pain (4 - 6)      REVIEW OF SYSTEMS:  CONSTITUTIONAL: No fever,  RESPIRATORY: No cough, wheezing, shortness of breath  CARDIOVASCULAR: No chest pain, dyspnea, palpitations, dizziness, syncope, paroxysmal nocturnal dyspnea, orthopnea, or arm or leg swelling  GASTROINTESTINAL: No abdominal  or epigastric pain, nausea, vomiting,  diarrhea  NEUROLOGICAL: No headaches, numbness, or tremors  Skin : No itching .  Hematology : No active bleeding .  Endocrinology : No heat and cold intolerance .  Psychiatry : Patient is confused .  Genitourinary : No dysuria .  Musculoskeletal : Patient has mild arthritis .          Vital Signs Last 24 Hrs  T(C): 36.4 (04 Apr 2023 21:14), Max: 37.2 (04 Apr 2023 12:55)  T(F): 97.5 (04 Apr 2023 21:14), Max: 98.9 (04 Apr 2023 12:55)  HR: 99 (04 Apr 2023 21:14) (88 - 110)  BP: 136/76 (04 Apr 2023 21:14) (136/76 - 143/76)  BP(mean): --  RR: 17 (04 Apr 2023 21:14) (17 - 18)  SpO2: 100% (04 Apr 2023 21:14) (93% - 100%)    Parameters below as of 04 Apr 2023 21:14  Patient On (Oxygen Delivery Method): room air        PHYSICAL EXAM:  HEAD:  Atraumatic, Normocephalic  NECK: Supple and normal thyroid.  No JVD or carotid bruit.   HEART: S1, S2 irregular , 1/6 soft ejection systolic murmur in mitral area , no thrill and no gallops .  PULMONARY: Bilateral vesicular breathing , few scattered rhonchi and few scattered rales are present .  ABDOMEN: Soft nontender and positive bowl sounds   EXTREMITIES:  No clubbing, cyanosis, or pedal  edema  NEUROLOGICAL: AA and mild confused  , no focal deficit .  Skin : No rashes .  Musculoskeletal : No joint swellings .    LABS:                        9.4    9.06  )-----------( 214      ( 04 Apr 2023 07:45 )             30.9     04-04    144  |  118<H>  |  28<H>  ----------------------------<  184<H>  3.5   |  24  |  1.30    Ca    8.4<L>      04 Apr 2023 07:45  Phos  3.2     04-04  Mg     2.2     04-04    TPro  5.8<L>  /  Alb  2.0<L>  /  TBili  0.6  /  DBili  0.2  /  AST  29  /  ALT  51  /  AlkPhos  59  04-04        PT/INR - ( 03 Apr 2023 07:38 )   PT: 16.8 sec;   INR: 1.43 ratio           Assessment/Plan  Patient has :  1) Left foot infection and toe osteomyelitis   2) Hypertension and stable .  3) DM .  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 4) Patient cardiac wise stable and cleared for foot surgery if needed . Benefits of surgery outweigh the risks . 5) Will hold Lovenox 18-24 hours prior to surgery 6) Metoprolol as ordered for atrial fibrillation control .   Will continue to follow during hospital course and give further recommendations as needed . Thanks for your referral . if any questions please contact me at office ([phone] cell [phone])    KARIME WYNN MD [place] [id number]  [address]  OFFICE : [id number]  CELL : [phone]  CARDIOLOGY F/U :       HPI:  86 yo male ,[facility] resident with PMHx - COPD, DM, HTN, CAD, HLD, H/O TIA, dementia,GERD, BPH and depression sent ot ER for evaluation of left foot 1metatarsal wound ,suggestive of osteomyelitis .Patient was seen by ID consult and transfer to the hospital recommended for wound cx/bone biopsy /podiatry evaluation ,likely will require 4-6 weeks of iv abx . Patient was admitted to [facility] with b/l feet wounds and was followed by wound care team ,recently completed 7 days of doxycycline for foot wound cellulitis . (30 Mar 2023 05:21)        SUBJECTIVE: Patient  feeling better .      ALLERGIES:  Allergies    No Known Allergies    Intolerances          MEDICATIONS  (STANDING):  albuterol/ipratropium for Nebulization 3 milliLiter(s) Nebulizer every 8 hours  budesonide 160 MICROgram(s)/formoterol 4.5 MICROgram(s) Inhaler 2 Puff(s) Inhalation two times a day  chlorhexidine 2% Cloths 1 Application(s) Topical daily  dextrose 5%. 1000 milliLiter(s) (50 mL/Hr) IV Continuous <Continuous>  dextrose 5%. 1000 milliLiter(s) (100 mL/Hr) IV Continuous <Continuous>  dextrose 50% Injectable 25 Gram(s) IV Push once  dextrose 50% Injectable 12.5 Gram(s) IV Push once  dextrose 50% Injectable 25 Gram(s) IV Push once  donepezil 5 milliGRAM(s) Oral at bedtime  DULoxetine 60 milliGRAM(s) Oral daily  enoxaparin Injectable 60 milliGRAM(s) SubCutaneous every 12 hours  glucagon  Injectable 1 milliGRAM(s) IntraMuscular once  insulin glargine Injectable (LANTUS) 15 Unit(s) SubCutaneous at bedtime  insulin lispro (ADMELOG) corrective regimen sliding scale   SubCutaneous three times a day before meals  insulin lispro (ADMELOG) corrective regimen sliding scale   SubCutaneous at bedtime  insulin lispro Injectable (ADMELOG) 3 Unit(s) SubCutaneous three times a day before meals  lactobacillus acidophilus 1 Tablet(s) Oral two times a day with meals  losartan 50 milliGRAM(s) Oral daily  multivitamin 1 Tablet(s) Oral daily  pantoprazole   Suspension 40 milliGRAM(s) Oral daily  piperacillin/tazobactam IVPB.. 3.375 Gram(s) IV Intermittent every 8 hours  remdesivir  IVPB   IV Intermittent   senna 2 Tablet(s) Oral at bedtime  simvastatin 40 milliGRAM(s) Oral at bedtime  tamsulosin 0.4 milliGRAM(s) Oral at bedtime    MEDICATIONS  (PRN):  acetaminophen     Tablet .. 650 milliGRAM(s) Oral every 6 hours PRN Temp greater or equal to 38C (100.4F), Mild Pain (1 - 3)  aluminum hydroxide/magnesium hydroxide/simethicone Suspension 30 milliLiter(s) Oral every 4 hours PRN Dyspepsia  bisacodyl Suppository 10 milliGRAM(s) Rectal daily PRN Constipation  dextrose Oral Gel 15 Gram(s) Oral once PRN Blood Glucose LESS THAN 70 milliGRAM(s)/deciliter  hydrALAZINE 50 milliGRAM(s) Oral every 6 hours PRN for systolic BP>160  magnesium hydroxide Suspension 30 milliLiter(s) Oral daily PRN Constipation  melatonin 3 milliGRAM(s) Oral at bedtime PRN Insomnia  morphine  - Injectable 2 milliGRAM(s) IV Push every 6 hours PRN Severe Pain (7 - 10)  ondansetron Injectable 4 milliGRAM(s) IV Push every 8 hours PRN Nausea and/or Vomiting  traMADol 50 milliGRAM(s) Oral four times a day PRN Moderate Pain (4 - 6)      REVIEW OF SYSTEMS:  CONSTITUTIONAL: No fever,  RESPIRATORY: No cough, wheezing, shortness of breath  CARDIOVASCULAR: No chest pain, dyspnea, palpitations, dizziness, syncope, paroxysmal nocturnal dyspnea, orthopnea, or arm or leg swelling  GASTROINTESTINAL: No abdominal  or epigastric pain, nausea, vomiting,  diarrhea  NEUROLOGICAL: No headaches, numbness, or tremors  Skin : No itching .  Hematology : No active bleeding .  Endocrinology : No heat and cold intolerance .  Psychiatry : Patient is confused .  Genitourinary : No dysuria .  Musculoskeletal : Patient has mild arthritis .          Vital Signs Last 24 Hrs  T(C): 36.4 (04 Apr 2023 21:14), Max: 37.2 (04 Apr 2023 12:55)  T(F): 97.5 (04 Apr 2023 21:14), Max: 98.9 (04 Apr 2023 12:55)  HR: 99 (04 Apr 2023 21:14) (88 - 110)  BP: 136/76 (04 Apr 2023 21:14) (136/76 - 143/76)  BP(mean): --  RR: 17 (04 Apr 2023 21:14) (17 - 18)  SpO2: 100% (04 Apr 2023 21:14) (93% - 100%)    Parameters below as of 04 Apr 2023 21:14  Patient On (Oxygen Delivery Method): room air        PHYSICAL EXAM:  HEAD:  Atraumatic, Normocephalic  NECK: Supple and normal thyroid.  No JVD or carotid bruit.   HEART: S1, S2 irregular , 1/6 soft ejection systolic murmur in mitral area , no thrill and no gallops .  PULMONARY: Bilateral vesicular breathing , few scattered rhonchi and few scattered rales are present .  ABDOMEN: Soft nontender and positive bowl sounds   EXTREMITIES:  No clubbing, cyanosis, or pedal  edema  NEUROLOGICAL: AA and mild confused  , no focal deficit .  Skin : No rashes .  Musculoskeletal : No joint swellings .    LABS:                        9.4    9.06  )-----------( 214      ( 04 Apr 2023 07:45 )             30.9     04-04    144  |  118<H>  |  28<H>  ----------------------------<  184<H>  3.5   |  24  |  1.30    Ca    8.4<L>      04 Apr 2023 07:45  Phos  3.2     04-04  Mg     2.2     04-04    TPro  5.8<L>  /  Alb  2.0<L>  /  TBili  0.6  /  DBili  0.2  /  AST  29  /  ALT  51  /  AlkPhos  59  04-04        PT/INR - ( 03 Apr 2023 07:38 )   PT: 16.8 sec;   INR: 1.43 ratio           Assessment/Plan  Patient has :  1) Left foot infection and toe osteomyelitis   2) Hypertension and stable .  3) DM .  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 4) Patient cardiac wise stable and cleared for foot surgery if needed . Benefits of surgery outweigh the risks . 5) Will hold Lovenox 18-24 hours prior to surgery 6) Metoprolol as ordered for atrial fibrillation control .   Will continue to follow during hospital course and give further recommendations as needed . Thanks for your referral . if any questions please contact me at office ([phone] cell [phone])

## 2023-04-04 NOTE — PROGRESS NOTE ADULT - NUTRITIONAL ASSESSMENT
This patient has been assessed with a concern for Malnutrition and has been determined to have a diagnosis/diagnoses of Moderate protein-calorie malnutrition.    This patient is being managed with:   Diet DASH/TLC-  Sodium & Cholesterol Restricted  Consistent Carbohydrate {Evening Snack}  Soft and Bite Sized (SOFTBTSZ)  Reginald(7 Gm Arginine/7 Gm Glut/1.2 Gm HMB     Qty per Day:  2  Supplement Feeding Modality:  Oral  Glucerna Shake Cans or Servings Per Day:  1       Frequency:  Daily  Entered: Mar 30 2023  1:51PM    Diet DASH/TLC-  Sodium & Cholesterol Restricted  Consistent Carbohydrate {Evening Snack}  Soft and Bite Sized (SOFTBTSZ)  Supplement Feeding Modality:  Oral  Glucerna Shake Cans or Servings Per Day:  1       Frequency:  Daily  Entered: Mar 30 2023  5:09AM    The following pending diet order is being considered for treatment of Moderate protein-calorie malnutrition:  Diet Soft and Bite Sized-  Consistent Carbohydrate {Evening Snack}  Low Sodium  Reginadl(7 Gm Arginine/7 Gm Glut/1.2 Gm HMB     Qty per Day:  BID  Supplement Feeding Modality:  Oral  Glucerna Shake Cans or Servings Per Day:  1       Frequency:  Two Times a day  Entered: Apr 2 2023 10:30AM   This patient has been assessed with a concern for Malnutrition and has been determined to have a diagnosis/diagnoses of Moderate protein-calorie malnutrition.    This patient is being managed with:   Diet DASH/TLC-  Sodium & Cholesterol Restricted  Consistent Carbohydrate {Evening Snack}  Soft and Bite Sized (SOFTBTSZ)  Reginald(7 Gm Arginine/7 Gm Glut/1.2 Gm HMB     Qty per Day:  2  Supplement Feeding Modality:  Oral  Glucerna Shake Cans or Servings Per Day:  1       Frequency:  Daily  Entered: Mar 30 2023  1:51PM    Diet DASH/TLC-  Sodium & Cholesterol Restricted  Consistent Carbohydrate {Evening Snack}  Soft and Bite Sized (SOFTBTSZ)  Supplement Feeding Modality:  Oral  Glucerna Shake Cans or Servings Per Day:  1       Frequency:  Daily  Entered: Mar 30 2023  5:09AM    The following pending diet order is being considered for treatment of Moderate protein-calorie malnutrition:  Diet Soft and Bite Sized-  Consistent Carbohydrate {Evening Snack}  Low Sodium  Reginald(7 Gm Arginine/7 Gm Glut/1.2 Gm HMB     Qty per Day:  BID  Supplement Feeding Modality:  Oral  Glucerna Shake Cans or Servings Per Day:  1       Frequency:  Two Times a day  Entered: Apr 2 2023 10:30AM

## 2023-04-04 NOTE — PROGRESS NOTE ADULT - ASSESSMENT
REVIEW OF SYMPTOMS      Able to give (reliable) ROS  NO     PHYSICAL EXAM    HEENT Unremarkable  atraumatic   RESP Fair air entry EXP prolonged    Harsh breath sound Resp distres mild   CARDIAC S1 S2 No S3     NO JVD    ABDOMEN SOFT BS PRESENT NOT DISTENDED No hepatosplenomegaly   PEDAL EDEMA present No calf tenderness  NO rash       GENERAL DATA .   GOC.     .. 3/30/2023 full code  ALLGY.     .. nka                    WT.   .. 3/30/2023 63  BMI.        .. 3/30/2023 21            ICU STAY.   .. none  COVID.   .. 4/4/2023 scv2 (+)  .. 3/29/2023 scv2 (-)     BEST PRACTICE ISSUES.    HOB ELEVATN.   .. Yes  DVT PPLX.   .. 3/31 lvnx 60.2 Dr Lakhani (a fib)   ..  3/29- 3/31 hpsc   MILLER PPLX.   .. 3/30/2023 protonix 40    INFN PPLX. ..    SP SW LAURENT.   ..  3/30/2023 -> soft bite mild thick        DIET.    ..  3/30/2023 dash  FREE WATER  .. 4/1/2023 fw 250.4    IV fl.  .. 4/3/2023 d5 50     ABGS.    VS/ PO/IO/ VENT/ DRIPS.   4/4/2023 90 140/70   4/4/2023 ra 100%     PROBLEM/ASSESSMENT/PLAN.  COVID   .. 4/4/2023 scv2 (+)  .. 4/4/2023 oxygenation ok  .. 4/4/2023 pt has mild disease   .. 4/4/2023 rdsv 3 d course started by Dr Mancia   Infection  Osteomyelitius  Possible pneumonia   .. Esr 3/29-3/31/2023 esr 67- 49   .. W 3/29-3/30-3/31-4/1-4/4/2023       w 11.8 - 12- 10.5- 11.9 -  9    .. cxr 3/30/2023  ........ increasing r lower perihilar infiltrate   .. xr foot left 3/30/2023  ........ stable eroisions around 1st mtp anmd great toe    ........ which could be bone infectn    .. CXR 3/29/2023 Possible hansa pneum  .. 3/29/2023 bc (-)   .. 3/29 zosyn    .. follow cultures  PLEURAL EFFUSION.  .. ct ch 3/30/2023   ........ mild pulm edema  ........ mod r and sml effsn   a/r  .. pl effsn likely sec to chf   COPD  .. 3/30/2023 duoneb.3    .. 3/30/2023 symbicort   hemodynamics  .. La 3/29/2023 la 1.8   .. target map 65 (+)   CAD.  .. 3/30- 3/31/2023 atenolol 25  .. 3/31 metoprolol 25.2   .. 3/30/2023 simvastat 40   RO DVT  .. 4/1/2023 v duplx (-)  CHF.  .. echo 3/30/2023  ........ ef 40%   ........ mod mr   .. bnp 4/2 bnp 53650   .. 3/30/2023 losartan 50   .. 3/30/2023 hydralazin 50.4p   Anemia  .. Hb 3/29-3/30-3/31-4/1-4/4/2023      Hb 11.2- 10.2 - 9.8 - 11 - 9.4   .. monitor  Hypernatremia.  .. Na 3/31-4/1-4/2-4/3-4/4 Na 147 - 151- 152 - 148- 144    CKD  .. Na 3/29-3/31/2023 Na 144-147   .. Cr 3/29-3/30-3/31-4/1-4/3-4/4/2023      Cr 1.4 - 1.3 - 1.3 - 1.5 - 1.5 - 1.3  .. monitor  OBS  .. 3/30/2023 donepezil   .     OVERALL .  86 yo M with PMHx HTN, HLD, T2D, dementia (AOx2-3), OA, BPH, CAD, TIA, CKD 3 and GERD HO recent hospital stay 1/24-1/30/2023 [facility]  COPD ex  now admitted with osteomyelitis possible pneum  Pulm consulted 3/29/2023  .. 4/4/2023 pt tested covid (+)  started rdsv Dr Mancia     PROBLEMS  COVID 4/4/2023  Osteomyelitis  Pneumonia   .. 3/29 zosyn    COPD   CKD   PLEURAL EFFSN 3/30 ct  HFREF MOD MR 3/30 echo    A fib 4/1 lvnx 60.2     PLAN  Antibio bronchodilators monitor  4/1/2023 free water startd     TIME SPENT   Over 25 minutes aggregate care time spent on encounter; activities included   direct patient care, counseling and/or coordinating care reviewing notes, lab data/ imaging , discussion with multidisciplinary team/ patient  /family and explaining in detail risks, benefits, alternatives  of the recommendations     Paulino Parmar 87 m     REVIEW OF SYMPTOMS      Able to give (reliable) ROS  NO     PHYSICAL EXAM    HEENT Unremarkable  atraumatic   RESP Fair air entry EXP prolonged    Harsh breath sound Resp distres mild   CARDIAC S1 S2 No S3     NO JVD    ABDOMEN SOFT BS PRESENT NOT DISTENDED No hepatosplenomegaly   PEDAL EDEMA present No calf tenderness  NO rash       GENERAL DATA .   GOC.     .. 3/30/2023 full code  ALLGY.     .. nka                    WT.   .. 3/30/2023 63  BMI.        .. 3/30/2023 21            ICU STAY.   .. none  COVID.   .. 4/4/2023 scv2 (+)  .. 3/29/2023 scv2 (-)     BEST PRACTICE ISSUES.    HOB ELEVATN.   .. Yes  DVT PPLX.   .. 3/31 lvnx 60.2 Dr Lakhani (a fib)   ..  3/29- 3/31 hpsc   MILLER PPLX.   .. 3/30/2023 protonix 40    INFN PPLX. ..    SP SW LAURENT.   ..  3/30/2023 -> soft bite mild thick        DIET.    ..  3/30/2023 dash  FREE WATER  .. 4/1/2023 fw 250.4    IV fl.  .. 4/3/2023 d5 50     ABGS.    VS/ PO/IO/ VENT/ DRIPS.   4/4/2023 90 140/70   4/4/2023 ra 100%     PROBLEM/ASSESSMENT/PLAN.  COVID   .. 4/4/2023 scv2 (+)  .. 4/4/2023 oxygenation ok  .. 4/4/2023 pt has mild disease   .. 4/4/2023 rdsv 3 d course started by Dr Mancia   Infection  Osteomyelitius  Possible pneumonia   .. Esr 3/29-3/31/2023 esr 67- 49   .. W 3/29-3/30-3/31-4/1-4/4/2023       w 11.8 - 12- 10.5- 11.9 -  9    .. cxr 3/30/2023  ........ increasing r lower perihilar infiltrate   .. xr foot left 3/30/2023  ........ stable eroisions around 1st mtp anmd great toe    ........ which could be bone infectn    .. CXR 3/29/2023 Possible hansa pneum  .. 3/29/2023 bc (-)   .. 3/29 zosyn    .. follow cultures  PLEURAL EFFUSION.  .. ct ch 3/30/2023   ........ mild pulm edema  ........ mod r and sml effsn   a/r  .. pl effsn likely sec to chf   COPD  .. 3/30/2023 duoneb.3    .. 3/30/2023 symbicort   hemodynamics  .. La 3/29/2023 la 1.8   .. target map 65 (+)   CAD.  .. 3/30- 3/31/2023 atenolol 25  .. 3/31 metoprolol 25.2   .. 3/30/2023 simvastat 40   RO DVT  .. 4/1/2023 v duplx (-)  CHF.  .. echo 3/30/2023  ........ ef 40%   ........ mod mr   .. bnp 4/2 bnp 44150   .. 3/30/2023 losartan 50   .. 3/30/2023 hydralazin 50.4p   Anemia  .. Hb 3/29-3/30-3/31-4/1-4/4/2023      Hb 11.2- 10.2 - 9.8 - 11 - 9.4   .. monitor  Hypernatremia.  .. Na 3/31-4/1-4/2-4/3-4/4 Na 147 - 151- 152 - 148- 144    CKD  .. Na 3/29-3/31/2023 Na 144-147   .. Cr 3/29-3/30-3/31-4/1-4/3-4/4/2023      Cr 1.4 - 1.3 - 1.3 - 1.5 - 1.5 - 1.3  .. monitor  OBS  .. 3/30/2023 donepezil   .     OVERALL .  86 yo M with PMHx HTN, HLD, T2D, dementia (AOx2-3), OA, BPH, CAD, TIA, CKD 3 and GERD HO recent hospital stay 1/24-1/30/2023 [facility]  COPD ex  now admitted with osteomyelitis possible pneum  Pulm consulted 3/29/2023  .. 4/4/2023 pt tested covid (+)  started rdsv Dr Mancia     PROBLEMS  COVID 4/4/2023  Osteomyelitis  Pneumonia   .. 3/29 zosyn    COPD   CKD   PLEURAL EFFSN 3/30 ct  HFREF MOD MR 3/30 echo    A fib 4/1 lvnx 60.2     PLAN  Antibio bronchodilators monitor  4/1/2023 free water startd     TIME SPENT   Over 25 minutes aggregate care time spent on encounter; activities included   direct patient care, counseling and/or coordinating care reviewing notes, lab data/ imaging , discussion with multidisciplinary team/ patient  /family and explaining in detail risks, benefits, alternatives  of the recommendations     Paulino Parmar 87 m     REVIEW OF SYMPTOMS      Able to give (reliable) ROS  NO     PHYSICAL EXAM    HEENT Unremarkable  atraumatic   RESP Fair air entry EXP prolonged    Harsh breath sound Resp distres mild   CARDIAC S1 S2 No S3     NO JVD    ABDOMEN SOFT BS PRESENT NOT DISTENDED No hepatosplenomegaly   PEDAL EDEMA present No calf tenderness  NO rash       GENERAL DATA .   GOC.     .. 3/30/2023 full code  ALLGY.     .. nka                    WT.   .. 3/30/2023 63  BMI.        .. 3/30/2023 21            ICU STAY.   .. none  COVID.   .. 4/4/2023 scv2 (+)  .. 3/29/2023 scv2 (-)     BEST PRACTICE ISSUES.    HOB ELEVATN.   .. Yes  DVT PPLX.   .. 3/31 lvnx 60.2 Dr Lakhani (a fib)   ..  3/29- 3/31 hpsc   MILLER PPLX.   .. 3/30/2023 protonix 40    INFN PPLX. ..    SP SW LAURENT.   ..  3/30/2023 -> soft bite mild thick        DIET.    ..  3/30/2023 dash  FREE WATER  .. 4/1/2023 fw 250.4    IV fl.  .. 4/3/2023 d5 50     ABGS.    VS/ PO/IO/ VENT/ DRIPS.   4/4/2023 90 140/70   4/4/2023 ra 100%     PROBLEM/ASSESSMENT/PLAN.  COVID   .. 4/4/2023 scv2 (+)  .. 4/4/2023 oxygenation ok  .. 4/4/2023 pt has mild disease   .. 4/4/2023 rdsv 3 d course started by Dr Mancia   Infection  Osteomyelitius  Possible pneumonia   .. Esr 3/29-3/31/2023 esr 67- 49   .. W 3/29-3/30-3/31-4/1-4/4/2023       w 11.8 - 12- 10.5- 11.9 -  9    .. cxr 3/30/2023  ........ increasing r lower perihilar infiltrate   .. xr foot left 3/30/2023  ........ stable eroisions around 1st mtp anmd great toe    ........ which could be bone infectn    .. CXR 3/29/2023 Possible hansa pneum  .. 3/29/2023 bc (-)   .. 3/29 zosyn    .. follow cultures  PLEURAL EFFUSION.  .. ct ch 3/30/2023   ........ mild pulm edema  ........ mod r and sml effsn   a/r  .. pl effsn likely sec to chf   COPD  .. 3/30/2023 duoneb.3    .. 3/30/2023 symbicort   hemodynamics  .. La 3/29/2023 la 1.8   .. target map 65 (+)   CAD.  .. 3/30- 3/31/2023 atenolol 25  .. 3/31 metoprolol 25.2   .. 3/30/2023 simvastat 40   RO DVT  .. 4/1/2023 v duplx (-)  CHF.  .. echo 3/30/2023  ........ ef 40%   ........ mod mr   .. bnp 4/2 bnp 30919   .. 3/30/2023 losartan 50   .. 3/30/2023 hydralazin 50.4p   Anemia  .. Hb 3/29-3/30-3/31-4/1-4/4/2023      Hb 11.2- 10.2 - 9.8 - 11 - 9.4   .. monitor  Hypernatremia.  .. Na 3/31-4/1-4/2-4/3-4/4 Na 147 - 151- 152 - 148- 144    CKD  .. Na 3/29-3/31/2023 Na 144-147   .. Cr 3/29-3/30-3/31-4/1-4/3-4/4/2023      Cr 1.4 - 1.3 - 1.3 - 1.5 - 1.5 - 1.3  .. monitor  OBS  .. 3/30/2023 donepezil   .     OVERALL .  86 yo M with PMHx HTN, HLD, T2D, dementia (AOx2-3), OA, BPH, CAD, TIA, CKD 3 and GERD HO recent hospital stay 1/24-1/30/2023 [facility]  COPD ex  now admitted with osteomyelitis possible pneum  Pulm consulted 3/29/2023  .. 4/4/2023 pt tested covid (+)  started rdsv Dr Mancia     PROBLEMS  COVID 4/4/2023  Osteomyelitis  Pneumonia   .. 3/29 zosyn    COPD   CKD   PLEURAL EFFSN 3/30 ct  HFREF MOD MR 3/30 echo    A fib 4/1 lvnx 60.2     PLAN  Antibio bronchodilators monitor  4/1/2023 free water startd     TIME SPENT   Over 25 minutes aggregate care time spent on encounter; activities included   direct patient care, counseling and/or coordinating care reviewing notes, lab data/ imaging , discussion with multidisciplinary team/ patient  /family and explaining in detail risks, benefits, alternatives  of the recommendations     Paulino Parmar 87 m

## 2023-04-04 NOTE — PROGRESS NOTE ADULT - SUBJECTIVE AND OBJECTIVE BOX
[facility]  INFECTIOUS DISEASES - Ruma Mayes, [place] [address]  Tel: [phone]     Fax: [phone]  =======================================================    ANA MARIA LEIGH [id number]    Follow up: No fevers. On room air. Patient arousable and mumbles, but unable to provide any history.    Allergies:  No Known Allergies      Antibiotics:  acetaminophen     Tablet .. 650 milliGRAM(s) Oral every 6 hours PRN  albuterol/ipratropium for Nebulization 3 milliLiter(s) Nebulizer every 8 hours  aluminum hydroxide/magnesium hydroxide/simethicone Suspension 30 milliLiter(s) Oral every 4 hours PRN  bisacodyl Suppository 10 milliGRAM(s) Rectal daily PRN  budesonide 160 MICROgram(s)/formoterol 4.5 MICROgram(s) Inhaler 2 Puff(s) Inhalation two times a day  chlorhexidine 2% Cloths 1 Application(s) Topical daily  dextrose 5%. 1000 milliLiter(s) IV Continuous <Continuous>  dextrose 5%. 1000 milliLiter(s) IV Continuous <Continuous>  dextrose 5%. 1000 milliLiter(s) IV Continuous <Continuous>  dextrose 50% Injectable 25 Gram(s) IV Push once  dextrose 50% Injectable 12.5 Gram(s) IV Push once  dextrose 50% Injectable 25 Gram(s) IV Push once  dextrose Oral Gel 15 Gram(s) Oral once PRN  donepezil 5 milliGRAM(s) Oral at bedtime  DULoxetine 60 milliGRAM(s) Oral daily  enoxaparin Injectable 60 milliGRAM(s) SubCutaneous every 12 hours  glucagon  Injectable 1 milliGRAM(s) IntraMuscular once  hydrALAZINE 50 milliGRAM(s) Oral every 6 hours PRN  insulin glargine Injectable (LANTUS) 15 Unit(s) SubCutaneous at bedtime  insulin lispro (ADMELOG) corrective regimen sliding scale   SubCutaneous three times a day before meals  insulin lispro (ADMELOG) corrective regimen sliding scale   SubCutaneous at bedtime  insulin lispro Injectable (ADMELOG) 3 Unit(s) SubCutaneous three times a day before meals  lactobacillus acidophilus 1 Tablet(s) Oral two times a day with meals  losartan 50 milliGRAM(s) Oral daily  magnesium hydroxide Suspension 30 milliLiter(s) Oral daily PRN  melatonin 3 milliGRAM(s) Oral at bedtime PRN  morphine  - Injectable 2 milliGRAM(s) IV Push every 6 hours PRN  multivitamin 1 Tablet(s) Oral daily  ondansetron Injectable 4 milliGRAM(s) IV Push every 8 hours PRN  pantoprazole   Suspension 40 milliGRAM(s) Oral daily  piperacillin/tazobactam IVPB.. 3.375 Gram(s) IV Intermittent every 8 hours  remdesivir  IVPB   IV Intermittent   remdesivir  IVPB 200 milliGRAM(s) IV Intermittent every 24 hours  senna 2 Tablet(s) Oral at bedtime  simvastatin 40 milliGRAM(s) Oral at bedtime  tamsulosin 0.4 milliGRAM(s) Oral at bedtime  traMADol 50 milliGRAM(s) Oral four times a day PRN       REVIEW OF SYSTEMS:  Unable to obtain 2/2 dementia     Physical Exam:  ICU Vital Signs Last 24 Hrs  T(C): 36.8 (04 Apr 2023 05:06), Max: 37.1 (03 Apr 2023 12:45)  T(F): 98.2 (04 Apr 2023 05:06), Max: 98.7 (03 Apr 2023 12:45)  HR: 103 (04 Apr 2023 08:00) (96 - 110)  BP: 137/76 (04 Apr 2023 05:06) (137/76 - 148/82)  BP(mean): --  ABP: --  ABP(mean): --  RR: 18 (04 Apr 2023 05:06) (18 - 18)  SpO2: 97% (04 Apr 2023 08:00) (93% - 97%)    O2 Parameters below as of 04 Apr 2023 08:00  Patient On (Oxygen Delivery Method): room air      GEN: NAD  HEENT: normocephalic and atraumatic.   NECK: Supple.   LUNGS: Normal respiratory effort  HEART: Regular rate and rhythm   ABDOMEN: Soft, nontender, and nondistended.    EXTREMITIES: No leg edema.  NEUROLOGIC: sleepy but arousable  SKIN: (+) L foot foot 1st metatarsal wound with probe to bone, bilateral heel wounds      Labs:  04-04    144  |  118<H>  |  28<H>  ----------------------------<  184<H>  3.5   |  24  |  1.30    Ca    8.4<L>      04 Apr 2023 07:45  Phos  3.2     04-04  Mg     2.2     04-04                            9.4    9.06  )-----------( 214      ( 04 Apr 2023 07:45 )             30.9     PT/INR - ( 03 Apr 2023 07:38 )   PT: 16.8 sec;   INR: 1.43 ratio                 RECENT CULTURES:  04-02 @ 09:15 Clean Catch Clean Catch (Midstream)     >100,000 CFU/ml Enterococcus faecalis        03-29 @ 21:32 .Blood     No Growth Final        03-29 @ 21:20 .Blood     No Growth Final              All imaging and data are reviewed.     CT chest 3/30:  FINDINGS:    LUNGS/AIRWAYS/PLEURA: Patent trachea and bronchi. Interlobular septal   thickening in both lungs. Moderate right and small left pleural   effusions. Passive atelectasis in the lower lobes.    LYMPH NODES/MEDIASTINUM: Small hiatal hernia. No lymphadenopathy.    HEART/VASCULATURE: Enlarged left atrium. Unremarkable pericardium.   Coronary artery calcifications. Normal caliber aorta.    UPPER ABDOMEN: Cholelithiasis.    BONES/SOFT TISSUES: Unremarkable.      IMPRESSION:    Mild pulmonary edema.    Moderate right and small left pleural effusions.   [facility]  INFECTIOUS DISEASES - Ruma Mayes, [place] [address]  Tel: [phone]     Fax: [phone]  =======================================================    ANA MARIA LEIGH [id number]    Follow up: No fevers. On room air. Patient arousable and mumbles, but unable to provide any history.    Allergies:  No Known Allergies      Antibiotics:  acetaminophen     Tablet .. 650 milliGRAM(s) Oral every 6 hours PRN  albuterol/ipratropium for Nebulization 3 milliLiter(s) Nebulizer every 8 hours  aluminum hydroxide/magnesium hydroxide/simethicone Suspension 30 milliLiter(s) Oral every 4 hours PRN  bisacodyl Suppository 10 milliGRAM(s) Rectal daily PRN  budesonide 160 MICROgram(s)/formoterol 4.5 MICROgram(s) Inhaler 2 Puff(s) Inhalation two times a day  chlorhexidine 2% Cloths 1 Application(s) Topical daily  dextrose 5%. 1000 milliLiter(s) IV Continuous <Continuous>  dextrose 5%. 1000 milliLiter(s) IV Continuous <Continuous>  dextrose 5%. 1000 milliLiter(s) IV Continuous <Continuous>  dextrose 50% Injectable 25 Gram(s) IV Push once  dextrose 50% Injectable 12.5 Gram(s) IV Push once  dextrose 50% Injectable 25 Gram(s) IV Push once  dextrose Oral Gel 15 Gram(s) Oral once PRN  donepezil 5 milliGRAM(s) Oral at bedtime  DULoxetine 60 milliGRAM(s) Oral daily  enoxaparin Injectable 60 milliGRAM(s) SubCutaneous every 12 hours  glucagon  Injectable 1 milliGRAM(s) IntraMuscular once  hydrALAZINE 50 milliGRAM(s) Oral every 6 hours PRN  insulin glargine Injectable (LANTUS) 15 Unit(s) SubCutaneous at bedtime  insulin lispro (ADMELOG) corrective regimen sliding scale   SubCutaneous three times a day before meals  insulin lispro (ADMELOG) corrective regimen sliding scale   SubCutaneous at bedtime  insulin lispro Injectable (ADMELOG) 3 Unit(s) SubCutaneous three times a day before meals  lactobacillus acidophilus 1 Tablet(s) Oral two times a day with meals  losartan 50 milliGRAM(s) Oral daily  magnesium hydroxide Suspension 30 milliLiter(s) Oral daily PRN  melatonin 3 milliGRAM(s) Oral at bedtime PRN  morphine  - Injectable 2 milliGRAM(s) IV Push every 6 hours PRN  multivitamin 1 Tablet(s) Oral daily  ondansetron Injectable 4 milliGRAM(s) IV Push every 8 hours PRN  pantoprazole   Suspension 40 milliGRAM(s) Oral daily  piperacillin/tazobactam IVPB.. 3.375 Gram(s) IV Intermittent every 8 hours  remdesivir  IVPB   IV Intermittent   remdesivir  IVPB 200 milliGRAM(s) IV Intermittent every 24 hours  senna 2 Tablet(s) Oral at bedtime  simvastatin 40 milliGRAM(s) Oral at bedtime  tamsulosin 0.4 milliGRAM(s) Oral at bedtime  traMADol 50 milliGRAM(s) Oral four times a day PRN       REVIEW OF SYSTEMS:  Unable to obtain 2/2 dementia     Physical Exam:  ICU Vital Signs Last 24 Hrs  T(C): 36.8 (04 Apr 2023 05:06), Max: 37.1 (03 Apr 2023 12:45)  T(F): 98.2 (04 Apr 2023 05:06), Max: 98.7 (03 Apr 2023 12:45)  HR: 103 (04 Apr 2023 08:00) (96 - 110)  BP: 137/76 (04 Apr 2023 05:06) (137/76 - 148/82)  BP(mean): --  ABP: --  ABP(mean): --  RR: 18 (04 Apr 2023 05:06) (18 - 18)  SpO2: 97% (04 Apr 2023 08:00) (93% - 97%)    O2 Parameters below as of 04 Apr 2023 08:00  Patient On (Oxygen Delivery Method): room air      GEN: NAD  HEENT: normocephalic and atraumatic.   NECK: Supple.   LUNGS: Normal respiratory effort  HEART: Regular rate and rhythm   ABDOMEN: Soft, nontender, and nondistended.    EXTREMITIES: No leg edema.  NEUROLOGIC: sleepy but arousable  SKIN: (+) L foot foot 1st metatarsal wound with probe to bone, bilateral heel wounds      Labs:  04-04    144  |  118<H>  |  28<H>  ----------------------------<  184<H>  3.5   |  24  |  1.30    Ca    8.4<L>      04 Apr 2023 07:45  Phos  3.2     04-04  Mg     2.2     04-04                            9.4    9.06  )-----------( 214      ( 04 Apr 2023 07:45 )             30.9     PT/INR - ( 03 Apr 2023 07:38 )   PT: 16.8 sec;   INR: 1.43 ratio                 RECENT CULTURES:  04-02 @ 09:15 Clean Catch Clean Catch (Midstream)     >100,000 CFU/ml Enterococcus faecalis        03-29 @ 21:32 .Blood     No Growth Final        03-29 @ 21:20 .Blood     No Growth Final              All imaging and data are reviewed.     CT chest 3/30:  FINDINGS:    LUNGS/AIRWAYS/PLEURA: Patent trachea and bronchi. Interlobular septal   thickening in both lungs. Moderate right and small left pleural   effusions. Passive atelectasis in the lower lobes.    LYMPH NODES/MEDIASTINUM: Small hiatal hernia. No lymphadenopathy.    HEART/VASCULATURE: Enlarged left atrium. Unremarkable pericardium.   Coronary artery calcifications. Normal caliber aorta.    UPPER ABDOMEN: Cholelithiasis.    BONES/SOFT TISSUES: Unremarkable.      IMPRESSION:    Mild pulmonary edema.    Moderate right and small left pleural effusions.   [facility]  INFECTIOUS DISEASES - Ruma Mayes, [place] [address]  Tel: [phone]     Fax: [phone]  =======================================================    ANA MARIA LEIGH [id number]    Follow up: No fevers. On room air. Patient arousable and mumbles, but unable to provide any history.    Allergies:  No Known Allergies      Antibiotics:  acetaminophen     Tablet .. 650 milliGRAM(s) Oral every 6 hours PRN  albuterol/ipratropium for Nebulization 3 milliLiter(s) Nebulizer every 8 hours  aluminum hydroxide/magnesium hydroxide/simethicone Suspension 30 milliLiter(s) Oral every 4 hours PRN  bisacodyl Suppository 10 milliGRAM(s) Rectal daily PRN  budesonide 160 MICROgram(s)/formoterol 4.5 MICROgram(s) Inhaler 2 Puff(s) Inhalation two times a day  chlorhexidine 2% Cloths 1 Application(s) Topical daily  dextrose 5%. 1000 milliLiter(s) IV Continuous <Continuous>  dextrose 5%. 1000 milliLiter(s) IV Continuous <Continuous>  dextrose 5%. 1000 milliLiter(s) IV Continuous <Continuous>  dextrose 50% Injectable 25 Gram(s) IV Push once  dextrose 50% Injectable 12.5 Gram(s) IV Push once  dextrose 50% Injectable 25 Gram(s) IV Push once  dextrose Oral Gel 15 Gram(s) Oral once PRN  donepezil 5 milliGRAM(s) Oral at bedtime  DULoxetine 60 milliGRAM(s) Oral daily  enoxaparin Injectable 60 milliGRAM(s) SubCutaneous every 12 hours  glucagon  Injectable 1 milliGRAM(s) IntraMuscular once  hydrALAZINE 50 milliGRAM(s) Oral every 6 hours PRN  insulin glargine Injectable (LANTUS) 15 Unit(s) SubCutaneous at bedtime  insulin lispro (ADMELOG) corrective regimen sliding scale   SubCutaneous three times a day before meals  insulin lispro (ADMELOG) corrective regimen sliding scale   SubCutaneous at bedtime  insulin lispro Injectable (ADMELOG) 3 Unit(s) SubCutaneous three times a day before meals  lactobacillus acidophilus 1 Tablet(s) Oral two times a day with meals  losartan 50 milliGRAM(s) Oral daily  magnesium hydroxide Suspension 30 milliLiter(s) Oral daily PRN  melatonin 3 milliGRAM(s) Oral at bedtime PRN  morphine  - Injectable 2 milliGRAM(s) IV Push every 6 hours PRN  multivitamin 1 Tablet(s) Oral daily  ondansetron Injectable 4 milliGRAM(s) IV Push every 8 hours PRN  pantoprazole   Suspension 40 milliGRAM(s) Oral daily  piperacillin/tazobactam IVPB.. 3.375 Gram(s) IV Intermittent every 8 hours  remdesivir  IVPB   IV Intermittent   remdesivir  IVPB 200 milliGRAM(s) IV Intermittent every 24 hours  senna 2 Tablet(s) Oral at bedtime  simvastatin 40 milliGRAM(s) Oral at bedtime  tamsulosin 0.4 milliGRAM(s) Oral at bedtime  traMADol 50 milliGRAM(s) Oral four times a day PRN       REVIEW OF SYSTEMS:  Unable to obtain 2/2 dementia     Physical Exam:  ICU Vital Signs Last 24 Hrs  T(C): 36.8 (04 Apr 2023 05:06), Max: 37.1 (03 Apr 2023 12:45)  T(F): 98.2 (04 Apr 2023 05:06), Max: 98.7 (03 Apr 2023 12:45)  HR: 103 (04 Apr 2023 08:00) (96 - 110)  BP: 137/76 (04 Apr 2023 05:06) (137/76 - 148/82)  BP(mean): --  ABP: --  ABP(mean): --  RR: 18 (04 Apr 2023 05:06) (18 - 18)  SpO2: 97% (04 Apr 2023 08:00) (93% - 97%)    O2 Parameters below as of 04 Apr 2023 08:00  Patient On (Oxygen Delivery Method): room air      GEN: NAD  HEENT: normocephalic and atraumatic.   NECK: Supple.   LUNGS: Normal respiratory effort  HEART: Regular rate and rhythm   ABDOMEN: Soft, nontender, and nondistended.    EXTREMITIES: No leg edema.  NEUROLOGIC: sleepy but arousable  SKIN: (+) L foot foot 1st metatarsal wound with probe to bone, bilateral heel wounds      Labs:  04-04    144  |  118<H>  |  28<H>  ----------------------------<  184<H>  3.5   |  24  |  1.30    Ca    8.4<L>      04 Apr 2023 07:45  Phos  3.2     04-04  Mg     2.2     04-04                            9.4    9.06  )-----------( 214      ( 04 Apr 2023 07:45 )             30.9     PT/INR - ( 03 Apr 2023 07:38 )   PT: 16.8 sec;   INR: 1.43 ratio                 RECENT CULTURES:  04-02 @ 09:15 Clean Catch Clean Catch (Midstream)     >100,000 CFU/ml Enterococcus faecalis        03-29 @ 21:32 .Blood     No Growth Final        03-29 @ 21:20 .Blood     No Growth Final              All imaging and data are reviewed.     CT chest 3/30:  FINDINGS:    LUNGS/AIRWAYS/PLEURA: Patent trachea and bronchi. Interlobular septal   thickening in both lungs. Moderate right and small left pleural   effusions. Passive atelectasis in the lower lobes.    LYMPH NODES/MEDIASTINUM: Small hiatal hernia. No lymphadenopathy.    HEART/VASCULATURE: Enlarged left atrium. Unremarkable pericardium.   Coronary artery calcifications. Normal caliber aorta.    UPPER ABDOMEN: Cholelithiasis.    BONES/SOFT TISSUES: Unremarkable.      IMPRESSION:    Mild pulmonary edema.    Moderate right and small left pleural effusions.

## 2023-04-04 NOTE — PROVIDER CONTACT NOTE (OTHER) - ACTION/TREATMENT ORDERED:
Provider notified. Strip placed in chart. Mag and Phos labs ordered. No other interventions at this time. Will continue plan of care

## 2023-04-04 NOTE — PROGRESS NOTE ADULT - PROBLEM SELECTOR PLAN 1
monitor pulse oximetry , started on remdesivir by ID cons ,pulm is following ,d/w wife on a  phone ,isolation as per facility protocol

## 2023-04-04 NOTE — PROGRESS NOTE ADULT - ASSESSMENT
86 yo male ,[facility] resident with PMHx - COPD, DM, HTN, CAD, HLD, H/O TIA, dementia,GERD, BPH and depression sent ot ER for evaluation of left foot 1metatarsal wound ,suggestive of osteomyelitis .Patient was seen by ID consult and transfer to the hospital recommended for wound cx/bone biopsy /podiatry evaluation ,likely will require 4-6 weeks of iv abx . Patient was admitted to [facility] with b/l feet wounds and was followed by wound care team ,recently completed 7 days of doxycycline for foot wound cellulitis . (30 Mar 2023 05:21)      hypernatremia   d5w iv fluid     hypokalemia potassium chloride  10 mEq/100 mL IVPB 10 milliEquivalent(s) IV Intermittent every 1 hour    ACUTE RENAL FAILURE: sodium chloride 0.45%. 1000 milliLiter(s) (50 mL/Hr) IV Continuous  Serum creatinine is improving    There is no progression . No uremic symptoms  No evidence of anemia .  Fluid status stable.  Will continue to avoid nephrotoxic drugs.  Patient remains asymptomatic.   Continue current therapy.  hold  diuretic.        BP monitoring,continue current antihypertensive meds, low salt diet,followup with PMD in 1-2 weeks  losartan 50 milliGRAM(s) Oral daily    f/u  blood and urine cx,serial lactate levels,monitor vitals clement saenz hydration,monitor urine output and renal profile,iv abx   piperacillin/tazobactam IVPB.. 3.375 Gram(s) IV Intermittent every 8 hours 86 yo male ,[facility] resident with PMHx - COPD, DM, HTN, CAD, HLD, H/O TIA, dementia,GERD, BPH and depression sent ot ER for evaluation of left foot 1metatarsal wound ,suggestive of osteomyelitis .Patient was seen by ID consult and transfer to the hospital recommended for wound cx/bone biopsy /podiatry evaluation ,likely will require 4-6 weeks of iv abx . Patient was admitted to [facility] with b/l feet wounds and was followed by wound care team ,recently completed 7 days of doxycycline for foot wound cellulitis . (30 Mar 2023 05:21)      hypernatremia   d5w iv fluid     hypokalemia potassium chloride  10 mEq/100 mL IVPB 10 milliEquivalent(s) IV Intermittent every 1 hour    ACUTE RENAL FAILURE: sodium chloride 0.45%. 1000 milliLiter(s) (50 mL/Hr) IV Continuous  Serum creatinine is improving    There is no progression . No uremic symptoms  No evidence of anemia .  Fluid status stable.  Will continue to avoid nephrotoxic drugs.  Patient remains asymptomatic.   Continue current therapy.  hold  diuretic.        BP monitoring,continue current antihypertensive meds, low salt diet,followup with PMD in 1-2 weeks  losartan 50 milliGRAM(s) Oral daily    f/u  blood and urine cx,serial lactate levels,monitor vitals clement saenz hydration,monitor urine output and renal profile,iv abx   piperacillin/tazobactam IVPB.. 3.375 Gram(s) IV Intermittent every 8 hours 88 yo male ,[facility] resident with PMHx - COPD, DM, HTN, CAD, HLD, H/O TIA, dementia,GERD, BPH and depression sent ot ER for evaluation of left foot 1metatarsal wound ,suggestive of osteomyelitis .Patient was seen by ID consult and transfer to the hospital recommended for wound cx/bone biopsy /podiatry evaluation ,likely will require 4-6 weeks of iv abx . Patient was admitted to [facility] with b/l feet wounds and was followed by wound care team ,recently completed 7 days of doxycycline for foot wound cellulitis . (30 Mar 2023 05:21)      hypernatremia   d5w iv fluid     hypokalemia potassium chloride  10 mEq/100 mL IVPB 10 milliEquivalent(s) IV Intermittent every 1 hour    ACUTE RENAL FAILURE: sodium chloride 0.45%. 1000 milliLiter(s) (50 mL/Hr) IV Continuous  Serum creatinine is improving    There is no progression . No uremic symptoms  No evidence of anemia .  Fluid status stable.  Will continue to avoid nephrotoxic drugs.  Patient remains asymptomatic.   Continue current therapy.  hold  diuretic.        BP monitoring,continue current antihypertensive meds, low salt diet,followup with PMD in 1-2 weeks  losartan 50 milliGRAM(s) Oral daily    f/u  blood and urine cx,serial lactate levels,monitor vitals clement saenz hydration,monitor urine output and renal profile,iv abx   piperacillin/tazobactam IVPB.. 3.375 Gram(s) IV Intermittent every 8 hours

## 2023-04-04 NOTE — PROVIDER CONTACT NOTE (OTHER) - REASON
Completing weekly PICC line dressing change with 2nd RN and ANM, and line was out of place with no blood return. Upon removal of old dressing PICC line came out.

## 2023-04-04 NOTE — PROGRESS NOTE ADULT - SUBJECTIVE AND OBJECTIVE BOX
PROGRESS NOTE  Patient is a 87y old  Male who presents with a chief complaint of left foot infection (04 Apr 2023 12:25)    Chart and available morning labs /imaging are reviewed electronically , urgent issues addressed . More information  is being added upon completion of rounds , when more information is collected and management discussed with consultants , medical staff and social service/case management on the floor   OVERNIGHT    COVID POSITIVE STATUS  reported by medical staff . All above noted Patient is resting in a bed comfortably .Confused ,poor mentation .No distress noted   HPI:  88 yo male ,[facility] resident with PMHx - COPD, DM, HTN, CAD, HLD, H/O TIA, dementia,GERD, BPH and depression sent ot ER for evaluation of left foot 1metatarsal wound ,suggestive of osteomyelitis .Patient was seen by ID consult and transfer to the hospital recommended for wound cx/bone biopsy /podiatry evaluation ,likely will require 4-6 weeks of iv abx . Patient was admitted to [facility] with b/l feet wounds and was followed by wound care team ,recently completed 7 days of doxycycline for foot wound cellulitis . (30 Mar 2023 05:21)    PAST MEDICAL & SURGICAL HISTORY:  HLD (hyperlipidemia)      MDD (major depressive disorder)      Obstructive and reflux uropathy      Cellulitis      Sepsis      Moderate protein-calorie malnutrition      Brain TIA      History of RSV infection      Pressure ulcer of unspecified heel, unspecified stage      Venous stasis ulcer without varicose veins      ASHD (arteriosclerotic heart disease)      BPH without urinary obstruction      COPD, moderate      Stage 3 chronic kidney disease      Chronic GERD      HTN (hypertension)      Dementia      DM type 2, not at goal      Multiple open wounds of foot          MEDICATIONS  (STANDING):  albuterol/ipratropium for Nebulization 3 milliLiter(s) Nebulizer every 8 hours  budesonide 160 MICROgram(s)/formoterol 4.5 MICROgram(s) Inhaler 2 Puff(s) Inhalation two times a day  chlorhexidine 2% Cloths 1 Application(s) Topical daily  dextrose 5%. 1000 milliLiter(s) (100 mL/Hr) IV Continuous <Continuous>  dextrose 5%. 1000 milliLiter(s) (50 mL/Hr) IV Continuous <Continuous>  dextrose 50% Injectable 25 Gram(s) IV Push once  dextrose 50% Injectable 12.5 Gram(s) IV Push once  dextrose 50% Injectable 25 Gram(s) IV Push once  donepezil 5 milliGRAM(s) Oral at bedtime  DULoxetine 60 milliGRAM(s) Oral daily  enoxaparin Injectable 60 milliGRAM(s) SubCutaneous every 12 hours  glucagon  Injectable 1 milliGRAM(s) IntraMuscular once  insulin glargine Injectable (LANTUS) 15 Unit(s) SubCutaneous at bedtime  insulin lispro (ADMELOG) corrective regimen sliding scale   SubCutaneous three times a day before meals  insulin lispro (ADMELOG) corrective regimen sliding scale   SubCutaneous at bedtime  insulin lispro Injectable (ADMELOG) 3 Unit(s) SubCutaneous three times a day before meals  lactobacillus acidophilus 1 Tablet(s) Oral two times a day with meals  losartan 50 milliGRAM(s) Oral daily  multivitamin 1 Tablet(s) Oral daily  pantoprazole   Suspension 40 milliGRAM(s) Oral daily  piperacillin/tazobactam IVPB.. 3.375 Gram(s) IV Intermittent every 8 hours  remdesivir  IVPB   IV Intermittent   senna 2 Tablet(s) Oral at bedtime  simvastatin 40 milliGRAM(s) Oral at bedtime  tamsulosin 0.4 milliGRAM(s) Oral at bedtime    MEDICATIONS  (PRN):  acetaminophen     Tablet .. 650 milliGRAM(s) Oral every 6 hours PRN Temp greater or equal to 38C (100.4F), Mild Pain (1 - 3)  aluminum hydroxide/magnesium hydroxide/simethicone Suspension 30 milliLiter(s) Oral every 4 hours PRN Dyspepsia  bisacodyl Suppository 10 milliGRAM(s) Rectal daily PRN Constipation  dextrose Oral Gel 15 Gram(s) Oral once PRN Blood Glucose LESS THAN 70 milliGRAM(s)/deciliter  hydrALAZINE 50 milliGRAM(s) Oral every 6 hours PRN for systolic BP>160  magnesium hydroxide Suspension 30 milliLiter(s) Oral daily PRN Constipation  melatonin 3 milliGRAM(s) Oral at bedtime PRN Insomnia  morphine  - Injectable 2 milliGRAM(s) IV Push every 6 hours PRN Severe Pain (7 - 10)  ondansetron Injectable 4 milliGRAM(s) IV Push every 8 hours PRN Nausea and/or Vomiting  traMADol 50 milliGRAM(s) Oral four times a day PRN Moderate Pain (4 - 6)      OBJECTIVE    T(C): 37.2 (04-04-23 @ 12:55), Max: 37.2 (04-04-23 @ 12:55)  HR: 98 (04-04-23 @ 12:55) (96 - 110)  BP: 143/76 (04-04-23 @ 12:55) (137/76 - 143/77)  RR: 18 (04-04-23 @ 12:55) (18 - 18)  SpO2: 94% (04-04-23 @ 12:55) (93% - 97%)  Wt(kg): --  I&O's Summary    03 Apr 2023 07:01  -  04 Apr 2023 07:00  --------------------------------------------------------  IN: 1710 mL / OUT: 0 mL / NET: 1710 mL          REVIEW OF SYSTEMS:  CONSTITUTIONAL: No fever, weight loss, or fatigue  EYES: No eye pain, visual disturbances, or discharge  ENMT:   No sinus or throat pain  NECK: No pain or stiffness  RESPIRATORY: No cough, wheezing, chills or hemoptysis; No shortness of breath  CARDIOVASCULAR: No chest pain, palpitations, dizziness, or leg swelling  GASTROINTESTINAL: No abdominal pain. No nausea, vomiting; No diarrhea or constipation. No melena or hematochezia.  GENITOURINARY: No dysuria, frequency, hematuria, or incontinence  NEUROLOGICAL: No headaches, memory loss, loss of strength, numbness, or tremors  SKIN: No itching, burning, rashes, or lesions   MUSCULOSKELETAL: No joint pain or swelling; No muscle, back, or extremity pain    PHYSICAL EXAM:  Appearance: NAD. VS past 24 hrs -as above   HEENT:   Moist oral mucosa. Conjunctiva clear b/l.   Neck : supple  Respiratory: Lungs CTAB.  Gastrointestinal:  Soft, nontender. No rebound. No rigidity. BS present	  Cardiovascular: RRR ,S1S2 present  Neurologic: Non-focal. Moving all extremities.  Extremities: No edema. No erythema. No calf tenderness.  Skin: No rashes, No ecchymoses, No cyanosis.	  wounds ,skin lesions-See skin assesment flow sheet   LABS:                        9.4    9.06  )-----------( 214      ( 04 Apr 2023 07:45 )             30.9     04-04    144  |  118<H>  |  28<H>  ----------------------------<  184<H>  3.5   |  24  |  1.30    Ca    8.4<L>      04 Apr 2023 07:45  Phos  3.2     04-04  Mg     2.2     04-04    TPro  5.8<L>  /  Alb  2.0<L>  /  TBili  0.6  /  DBili  0.2  /  AST  29  /  ALT  51  /  AlkPhos  59  04-04    CAPILLARY BLOOD GLUCOSE      POCT Blood Glucose.: 205 mg/dL (04 Apr 2023 12:08)  POCT Blood Glucose.: 198 mg/dL (04 Apr 2023 08:00)  POCT Blood Glucose.: 96 mg/dL (03 Apr 2023 21:21)  POCT Blood Glucose.: 109 mg/dL (03 Apr 2023 16:42)    PT/INR - ( 03 Apr 2023 07:38 )   PT: 16.8 sec;   INR: 1.43 ratio               Culture - Urine (collected 02 Apr 2023 09:15)  Source: Clean Catch Clean Catch (Midstream)  Preliminary Report (03 Apr 2023 19:15):    >100,000 CFU/ml Enterococcus faecalis    Culture - Blood (collected 29 Mar 2023 21:32)  Source: .Blood  Final Report (04 Apr 2023 01:01):    No Growth Final    Culture - Blood (collected 29 Mar 2023 21:20)  Source: .Blood  Final Report (04 Apr 2023 01:01):    No Growth Final      RADIOLOGY & ADDITIONAL TESTS:   reviewed elctronically  ASSESSMENT/PLAN: 	    25 minutes aggregate time was spent on this visit, 50% visit time spent in care co-ordination with other attendings and counselling patient .I have discussed care plan with patient / HCP/family member wife today and son yesterday  ,who expressed understanding of problems treatment and their effect and side effects, alternatives in details. I have asked if they have any questions and concerns and appropriately addressed them to best of my ability. Advance care planning was discussed , pallitaive care issues ,CMO ,hospice levels of care were discussed in details , forms ,advance directives were reviewed .All questions were answered to the best of my knowledge - 25 min spent.  PROGRESS NOTE  Patient is a 87y old  Male who presents with a chief complaint of left foot infection (04 Apr 2023 12:25)    Chart and available morning labs /imaging are reviewed electronically , urgent issues addressed . More information  is being added upon completion of rounds , when more information is collected and management discussed with consultants , medical staff and social service/case management on the floor   OVERNIGHT    COVID POSITIVE STATUS  reported by medical staff . All above noted Patient is resting in a bed comfortably .Confused ,poor mentation .No distress noted   HPI:  86 yo male ,[facility] resident with PMHx - COPD, DM, HTN, CAD, HLD, H/O TIA, dementia,GERD, BPH and depression sent ot ER for evaluation of left foot 1metatarsal wound ,suggestive of osteomyelitis .Patient was seen by ID consult and transfer to the hospital recommended for wound cx/bone biopsy /podiatry evaluation ,likely will require 4-6 weeks of iv abx . Patient was admitted to [facility] with b/l feet wounds and was followed by wound care team ,recently completed 7 days of doxycycline for foot wound cellulitis . (30 Mar 2023 05:21)    PAST MEDICAL & SURGICAL HISTORY:  HLD (hyperlipidemia)      MDD (major depressive disorder)      Obstructive and reflux uropathy      Cellulitis      Sepsis      Moderate protein-calorie malnutrition      Brain TIA      History of RSV infection      Pressure ulcer of unspecified heel, unspecified stage      Venous stasis ulcer without varicose veins      ASHD (arteriosclerotic heart disease)      BPH without urinary obstruction      COPD, moderate      Stage 3 chronic kidney disease      Chronic GERD      HTN (hypertension)      Dementia      DM type 2, not at goal      Multiple open wounds of foot          MEDICATIONS  (STANDING):  albuterol/ipratropium for Nebulization 3 milliLiter(s) Nebulizer every 8 hours  budesonide 160 MICROgram(s)/formoterol 4.5 MICROgram(s) Inhaler 2 Puff(s) Inhalation two times a day  chlorhexidine 2% Cloths 1 Application(s) Topical daily  dextrose 5%. 1000 milliLiter(s) (100 mL/Hr) IV Continuous <Continuous>  dextrose 5%. 1000 milliLiter(s) (50 mL/Hr) IV Continuous <Continuous>  dextrose 50% Injectable 25 Gram(s) IV Push once  dextrose 50% Injectable 12.5 Gram(s) IV Push once  dextrose 50% Injectable 25 Gram(s) IV Push once  donepezil 5 milliGRAM(s) Oral at bedtime  DULoxetine 60 milliGRAM(s) Oral daily  enoxaparin Injectable 60 milliGRAM(s) SubCutaneous every 12 hours  glucagon  Injectable 1 milliGRAM(s) IntraMuscular once  insulin glargine Injectable (LANTUS) 15 Unit(s) SubCutaneous at bedtime  insulin lispro (ADMELOG) corrective regimen sliding scale   SubCutaneous three times a day before meals  insulin lispro (ADMELOG) corrective regimen sliding scale   SubCutaneous at bedtime  insulin lispro Injectable (ADMELOG) 3 Unit(s) SubCutaneous three times a day before meals  lactobacillus acidophilus 1 Tablet(s) Oral two times a day with meals  losartan 50 milliGRAM(s) Oral daily  multivitamin 1 Tablet(s) Oral daily  pantoprazole   Suspension 40 milliGRAM(s) Oral daily  piperacillin/tazobactam IVPB.. 3.375 Gram(s) IV Intermittent every 8 hours  remdesivir  IVPB   IV Intermittent   senna 2 Tablet(s) Oral at bedtime  simvastatin 40 milliGRAM(s) Oral at bedtime  tamsulosin 0.4 milliGRAM(s) Oral at bedtime    MEDICATIONS  (PRN):  acetaminophen     Tablet .. 650 milliGRAM(s) Oral every 6 hours PRN Temp greater or equal to 38C (100.4F), Mild Pain (1 - 3)  aluminum hydroxide/magnesium hydroxide/simethicone Suspension 30 milliLiter(s) Oral every 4 hours PRN Dyspepsia  bisacodyl Suppository 10 milliGRAM(s) Rectal daily PRN Constipation  dextrose Oral Gel 15 Gram(s) Oral once PRN Blood Glucose LESS THAN 70 milliGRAM(s)/deciliter  hydrALAZINE 50 milliGRAM(s) Oral every 6 hours PRN for systolic BP>160  magnesium hydroxide Suspension 30 milliLiter(s) Oral daily PRN Constipation  melatonin 3 milliGRAM(s) Oral at bedtime PRN Insomnia  morphine  - Injectable 2 milliGRAM(s) IV Push every 6 hours PRN Severe Pain (7 - 10)  ondansetron Injectable 4 milliGRAM(s) IV Push every 8 hours PRN Nausea and/or Vomiting  traMADol 50 milliGRAM(s) Oral four times a day PRN Moderate Pain (4 - 6)      OBJECTIVE    T(C): 37.2 (04-04-23 @ 12:55), Max: 37.2 (04-04-23 @ 12:55)  HR: 98 (04-04-23 @ 12:55) (96 - 110)  BP: 143/76 (04-04-23 @ 12:55) (137/76 - 143/77)  RR: 18 (04-04-23 @ 12:55) (18 - 18)  SpO2: 94% (04-04-23 @ 12:55) (93% - 97%)  Wt(kg): --  I&O's Summary    03 Apr 2023 07:01  -  04 Apr 2023 07:00  --------------------------------------------------------  IN: 1710 mL / OUT: 0 mL / NET: 1710 mL          REVIEW OF SYSTEMS:  CONSTITUTIONAL: No fever, weight loss, or fatigue  EYES: No eye pain, visual disturbances, or discharge  ENMT:   No sinus or throat pain  NECK: No pain or stiffness  RESPIRATORY: No cough, wheezing, chills or hemoptysis; No shortness of breath  CARDIOVASCULAR: No chest pain, palpitations, dizziness, or leg swelling  GASTROINTESTINAL: No abdominal pain. No nausea, vomiting; No diarrhea or constipation. No melena or hematochezia.  GENITOURINARY: No dysuria, frequency, hematuria, or incontinence  NEUROLOGICAL: No headaches, memory loss, loss of strength, numbness, or tremors  SKIN: No itching, burning, rashes, or lesions   MUSCULOSKELETAL: No joint pain or swelling; No muscle, back, or extremity pain    PHYSICAL EXAM:  Appearance: NAD. VS past 24 hrs -as above   HEENT:   Moist oral mucosa. Conjunctiva clear b/l.   Neck : supple  Respiratory: Lungs CTAB.  Gastrointestinal:  Soft, nontender. No rebound. No rigidity. BS present	  Cardiovascular: RRR ,S1S2 present  Neurologic: Non-focal. Moving all extremities.  Extremities: No edema. No erythema. No calf tenderness.  Skin: No rashes, No ecchymoses, No cyanosis.	  wounds ,skin lesions-See skin assesment flow sheet   LABS:                        9.4    9.06  )-----------( 214      ( 04 Apr 2023 07:45 )             30.9     04-04    144  |  118<H>  |  28<H>  ----------------------------<  184<H>  3.5   |  24  |  1.30    Ca    8.4<L>      04 Apr 2023 07:45  Phos  3.2     04-04  Mg     2.2     04-04    TPro  5.8<L>  /  Alb  2.0<L>  /  TBili  0.6  /  DBili  0.2  /  AST  29  /  ALT  51  /  AlkPhos  59  04-04    CAPILLARY BLOOD GLUCOSE      POCT Blood Glucose.: 205 mg/dL (04 Apr 2023 12:08)  POCT Blood Glucose.: 198 mg/dL (04 Apr 2023 08:00)  POCT Blood Glucose.: 96 mg/dL (03 Apr 2023 21:21)  POCT Blood Glucose.: 109 mg/dL (03 Apr 2023 16:42)    PT/INR - ( 03 Apr 2023 07:38 )   PT: 16.8 sec;   INR: 1.43 ratio               Culture - Urine (collected 02 Apr 2023 09:15)  Source: Clean Catch Clean Catch (Midstream)  Preliminary Report (03 Apr 2023 19:15):    >100,000 CFU/ml Enterococcus faecalis    Culture - Blood (collected 29 Mar 2023 21:32)  Source: .Blood  Final Report (04 Apr 2023 01:01):    No Growth Final    Culture - Blood (collected 29 Mar 2023 21:20)  Source: .Blood  Final Report (04 Apr 2023 01:01):    No Growth Final      RADIOLOGY & ADDITIONAL TESTS:   reviewed elctronically  ASSESSMENT/PLAN: 	    25 minutes aggregate time was spent on this visit, 50% visit time spent in care co-ordination with other attendings and counselling patient .I have discussed care plan with patient / HCP/family member wife today and son yesterday  ,who expressed understanding of problems treatment and their effect and side effects, alternatives in details. I have asked if they have any questions and concerns and appropriately addressed them to best of my ability. Advance care planning was discussed , pallitaive care issues ,CMO ,hospice levels of care were discussed in details , forms ,advance directives were reviewed .All questions were answered to the best of my knowledge - 25 min spent.  PROGRESS NOTE  Patient is a 87y old  Male who presents with a chief complaint of left foot infection (04 Apr 2023 12:25)    Chart and available morning labs /imaging are reviewed electronically , urgent issues addressed . More information  is being added upon completion of rounds , when more information is collected and management discussed with consultants , medical staff and social service/case management on the floor   OVERNIGHT    COVID POSITIVE STATUS  reported by medical staff . All above noted Patient is resting in a bed comfortably .Confused ,poor mentation .No distress noted   HPI:  88 yo male ,[facility] resident with PMHx - COPD, DM, HTN, CAD, HLD, H/O TIA, dementia,GERD, BPH and depression sent ot ER for evaluation of left foot 1metatarsal wound ,suggestive of osteomyelitis .Patient was seen by ID consult and transfer to the hospital recommended for wound cx/bone biopsy /podiatry evaluation ,likely will require 4-6 weeks of iv abx . Patient was admitted to [facility] with b/l feet wounds and was followed by wound care team ,recently completed 7 days of doxycycline for foot wound cellulitis . (30 Mar 2023 05:21)    PAST MEDICAL & SURGICAL HISTORY:  HLD (hyperlipidemia)      MDD (major depressive disorder)      Obstructive and reflux uropathy      Cellulitis      Sepsis      Moderate protein-calorie malnutrition      Brain TIA      History of RSV infection      Pressure ulcer of unspecified heel, unspecified stage      Venous stasis ulcer without varicose veins      ASHD (arteriosclerotic heart disease)      BPH without urinary obstruction      COPD, moderate      Stage 3 chronic kidney disease      Chronic GERD      HTN (hypertension)      Dementia      DM type 2, not at goal      Multiple open wounds of foot          MEDICATIONS  (STANDING):  albuterol/ipratropium for Nebulization 3 milliLiter(s) Nebulizer every 8 hours  budesonide 160 MICROgram(s)/formoterol 4.5 MICROgram(s) Inhaler 2 Puff(s) Inhalation two times a day  chlorhexidine 2% Cloths 1 Application(s) Topical daily  dextrose 5%. 1000 milliLiter(s) (100 mL/Hr) IV Continuous <Continuous>  dextrose 5%. 1000 milliLiter(s) (50 mL/Hr) IV Continuous <Continuous>  dextrose 50% Injectable 25 Gram(s) IV Push once  dextrose 50% Injectable 12.5 Gram(s) IV Push once  dextrose 50% Injectable 25 Gram(s) IV Push once  donepezil 5 milliGRAM(s) Oral at bedtime  DULoxetine 60 milliGRAM(s) Oral daily  enoxaparin Injectable 60 milliGRAM(s) SubCutaneous every 12 hours  glucagon  Injectable 1 milliGRAM(s) IntraMuscular once  insulin glargine Injectable (LANTUS) 15 Unit(s) SubCutaneous at bedtime  insulin lispro (ADMELOG) corrective regimen sliding scale   SubCutaneous three times a day before meals  insulin lispro (ADMELOG) corrective regimen sliding scale   SubCutaneous at bedtime  insulin lispro Injectable (ADMELOG) 3 Unit(s) SubCutaneous three times a day before meals  lactobacillus acidophilus 1 Tablet(s) Oral two times a day with meals  losartan 50 milliGRAM(s) Oral daily  multivitamin 1 Tablet(s) Oral daily  pantoprazole   Suspension 40 milliGRAM(s) Oral daily  piperacillin/tazobactam IVPB.. 3.375 Gram(s) IV Intermittent every 8 hours  remdesivir  IVPB   IV Intermittent   senna 2 Tablet(s) Oral at bedtime  simvastatin 40 milliGRAM(s) Oral at bedtime  tamsulosin 0.4 milliGRAM(s) Oral at bedtime    MEDICATIONS  (PRN):  acetaminophen     Tablet .. 650 milliGRAM(s) Oral every 6 hours PRN Temp greater or equal to 38C (100.4F), Mild Pain (1 - 3)  aluminum hydroxide/magnesium hydroxide/simethicone Suspension 30 milliLiter(s) Oral every 4 hours PRN Dyspepsia  bisacodyl Suppository 10 milliGRAM(s) Rectal daily PRN Constipation  dextrose Oral Gel 15 Gram(s) Oral once PRN Blood Glucose LESS THAN 70 milliGRAM(s)/deciliter  hydrALAZINE 50 milliGRAM(s) Oral every 6 hours PRN for systolic BP>160  magnesium hydroxide Suspension 30 milliLiter(s) Oral daily PRN Constipation  melatonin 3 milliGRAM(s) Oral at bedtime PRN Insomnia  morphine  - Injectable 2 milliGRAM(s) IV Push every 6 hours PRN Severe Pain (7 - 10)  ondansetron Injectable 4 milliGRAM(s) IV Push every 8 hours PRN Nausea and/or Vomiting  traMADol 50 milliGRAM(s) Oral four times a day PRN Moderate Pain (4 - 6)      OBJECTIVE    T(C): 37.2 (04-04-23 @ 12:55), Max: 37.2 (04-04-23 @ 12:55)  HR: 98 (04-04-23 @ 12:55) (96 - 110)  BP: 143/76 (04-04-23 @ 12:55) (137/76 - 143/77)  RR: 18 (04-04-23 @ 12:55) (18 - 18)  SpO2: 94% (04-04-23 @ 12:55) (93% - 97%)  Wt(kg): --  I&O's Summary    03 Apr 2023 07:01  -  04 Apr 2023 07:00  --------------------------------------------------------  IN: 1710 mL / OUT: 0 mL / NET: 1710 mL          REVIEW OF SYSTEMS:  CONSTITUTIONAL: No fever, weight loss, or fatigue  EYES: No eye pain, visual disturbances, or discharge  ENMT:   No sinus or throat pain  NECK: No pain or stiffness  RESPIRATORY: No cough, wheezing, chills or hemoptysis; No shortness of breath  CARDIOVASCULAR: No chest pain, palpitations, dizziness, or leg swelling  GASTROINTESTINAL: No abdominal pain. No nausea, vomiting; No diarrhea or constipation. No melena or hematochezia.  GENITOURINARY: No dysuria, frequency, hematuria, or incontinence  NEUROLOGICAL: No headaches, memory loss, loss of strength, numbness, or tremors  SKIN: No itching, burning, rashes, or lesions   MUSCULOSKELETAL: No joint pain or swelling; No muscle, back, or extremity pain    PHYSICAL EXAM:  Appearance: NAD. VS past 24 hrs -as above   HEENT:   Moist oral mucosa. Conjunctiva clear b/l.   Neck : supple  Respiratory: Lungs CTAB.  Gastrointestinal:  Soft, nontender. No rebound. No rigidity. BS present	  Cardiovascular: RRR ,S1S2 present  Neurologic: Non-focal. Moving all extremities.  Extremities: No edema. No erythema. No calf tenderness.  Skin: No rashes, No ecchymoses, No cyanosis.	  wounds ,skin lesions-See skin assesment flow sheet   LABS:                        9.4    9.06  )-----------( 214      ( 04 Apr 2023 07:45 )             30.9     04-04    144  |  118<H>  |  28<H>  ----------------------------<  184<H>  3.5   |  24  |  1.30    Ca    8.4<L>      04 Apr 2023 07:45  Phos  3.2     04-04  Mg     2.2     04-04    TPro  5.8<L>  /  Alb  2.0<L>  /  TBili  0.6  /  DBili  0.2  /  AST  29  /  ALT  51  /  AlkPhos  59  04-04    CAPILLARY BLOOD GLUCOSE      POCT Blood Glucose.: 205 mg/dL (04 Apr 2023 12:08)  POCT Blood Glucose.: 198 mg/dL (04 Apr 2023 08:00)  POCT Blood Glucose.: 96 mg/dL (03 Apr 2023 21:21)  POCT Blood Glucose.: 109 mg/dL (03 Apr 2023 16:42)    PT/INR - ( 03 Apr 2023 07:38 )   PT: 16.8 sec;   INR: 1.43 ratio               Culture - Urine (collected 02 Apr 2023 09:15)  Source: Clean Catch Clean Catch (Midstream)  Preliminary Report (03 Apr 2023 19:15):    >100,000 CFU/ml Enterococcus faecalis    Culture - Blood (collected 29 Mar 2023 21:32)  Source: .Blood  Final Report (04 Apr 2023 01:01):    No Growth Final    Culture - Blood (collected 29 Mar 2023 21:20)  Source: .Blood  Final Report (04 Apr 2023 01:01):    No Growth Final      RADIOLOGY & ADDITIONAL TESTS:   reviewed elctronically  ASSESSMENT/PLAN: 	    25 minutes aggregate time was spent on this visit, 50% visit time spent in care co-ordination with other attendings and counselling patient .I have discussed care plan with patient / HCP/family member wife today and son yesterday  ,who expressed understanding of problems treatment and their effect and side effects, alternatives in details. I have asked if they have any questions and concerns and appropriately addressed them to best of my ability. Advance care planning was discussed , pallitaive care issues ,CMO ,hospice levels of care were discussed in details , forms ,advance directives were reviewed .All questions were answered to the best of my knowledge - 25 min spent.

## 2023-04-05 DIAGNOSIS — L89.609 PRESSURE ULCER OF UNSPECIFIED HEEL, UNSPECIFIED STAGE: ICD-10-CM

## 2023-04-05 LAB
ALBUMIN SERPL ELPH-MCNC: 2.2 G/DL — LOW (ref 3.3–5)
ALBUMIN SERPL ELPH-MCNC: 2.3 G/DL — LOW (ref 3.3–5)
ALP SERPL-CCNC: 69 U/L — SIGNIFICANT CHANGE UP (ref 40–120)
ALP SERPL-CCNC: 72 U/L — SIGNIFICANT CHANGE UP (ref 40–120)
ALT FLD-CCNC: 47 U/L — SIGNIFICANT CHANGE UP (ref 12–78)
ANION GAP SERPL CALC-SCNC: 3 MMOL/L — LOW (ref 5–17)
AST SERPL-CCNC: 27 U/L — SIGNIFICANT CHANGE UP (ref 15–37)
AST SERPL-CCNC: 28 U/L — SIGNIFICANT CHANGE UP (ref 15–37)
BILIRUB DIRECT SERPL-MCNC: 0.3 MG/DL — SIGNIFICANT CHANGE UP (ref 0–0.3)
BILIRUB INDIRECT FLD-MCNC: 0.3 MG/DL — SIGNIFICANT CHANGE UP (ref 0.2–1)
BILIRUB SERPL-MCNC: 0.6 MG/DL — SIGNIFICANT CHANGE UP (ref 0.2–1.2)
BUN SERPL-MCNC: 26 MG/DL — HIGH (ref 7–23)
CALCIUM SERPL-MCNC: 8.8 MG/DL — SIGNIFICANT CHANGE UP (ref 8.5–10.1)
CHLORIDE SERPL-SCNC: 116 MMOL/L — HIGH (ref 96–108)
CO2 SERPL-SCNC: 25 MMOL/L — SIGNIFICANT CHANGE UP (ref 22–31)
CREAT SERPL-MCNC: 1.2 MG/DL — SIGNIFICANT CHANGE UP (ref 0.5–1.3)
CRP SERPL-MCNC: 48 MG/L — HIGH
EGFR: 59 ML/MIN/1.73M2 — LOW
GLUCOSE SERPL-MCNC: 132 MG/DL — HIGH (ref 70–99)
HCT VFR BLD CALC: 37.9 % — LOW (ref 39–50)
HGB BLD-MCNC: 11.5 G/DL — LOW (ref 13–17)
MCHC RBC-ENTMCNC: 28.4 PG — SIGNIFICANT CHANGE UP (ref 27–34)
MCHC RBC-ENTMCNC: 30.3 GM/DL — LOW (ref 32–36)
MCV RBC AUTO: 93.6 FL — SIGNIFICANT CHANGE UP (ref 80–100)
NRBC # BLD: 0 /100 WBCS — SIGNIFICANT CHANGE UP (ref 0–0)
PLATELET # BLD AUTO: 251 K/UL — SIGNIFICANT CHANGE UP (ref 150–400)
POTASSIUM SERPL-MCNC: 3.9 MMOL/L — SIGNIFICANT CHANGE UP (ref 3.5–5.3)
POTASSIUM SERPL-SCNC: 3.9 MMOL/L — SIGNIFICANT CHANGE UP (ref 3.5–5.3)
PROT SERPL-MCNC: 6.7 G/DL — SIGNIFICANT CHANGE UP (ref 6–8.3)
PROT SERPL-MCNC: 6.8 G/DL — SIGNIFICANT CHANGE UP (ref 6–8.3)
RBC # BLD: 4.05 M/UL — LOW (ref 4.2–5.8)
RBC # FLD: 17.3 % — HIGH (ref 10.3–14.5)
SODIUM SERPL-SCNC: 144 MMOL/L — SIGNIFICANT CHANGE UP (ref 135–145)
WBC # BLD: 7.72 K/UL — SIGNIFICANT CHANGE UP (ref 3.8–10.5)
WBC # FLD AUTO: 7.72 K/UL — SIGNIFICANT CHANGE UP (ref 3.8–10.5)

## 2023-04-05 PROCEDURE — 99232 SBSQ HOSP IP/OBS MODERATE 35: CPT

## 2023-04-05 PROCEDURE — 93010 ELECTROCARDIOGRAM REPORT: CPT | Mod: 76,77

## 2023-04-05 RX ORDER — DEXTROSE 50 % IN WATER 50 %
12.5 SYRINGE (ML) INTRAVENOUS ONCE
Refills: 0 | Status: COMPLETED | OUTPATIENT
Start: 2023-04-05 | End: 2023-04-05

## 2023-04-05 RX ORDER — SODIUM CHLORIDE 9 MG/ML
1000 INJECTION, SOLUTION INTRAVENOUS
Refills: 0 | Status: DISCONTINUED | OUTPATIENT
Start: 2023-04-05 | End: 2023-04-05

## 2023-04-05 RX ADMIN — DULOXETINE HYDROCHLORIDE 60 MILLIGRAM(S): 30 CAPSULE, DELAYED RELEASE ORAL at 12:15

## 2023-04-05 RX ADMIN — REMDESIVIR 200 MILLIGRAM(S): 5 INJECTION INTRAVENOUS at 12:26

## 2023-04-05 RX ADMIN — PIPERACILLIN AND TAZOBACTAM 25 GRAM(S): 4; .5 INJECTION, POWDER, LYOPHILIZED, FOR SOLUTION INTRAVENOUS at 05:40

## 2023-04-05 RX ADMIN — PIPERACILLIN AND TAZOBACTAM 25 GRAM(S): 4; .5 INJECTION, POWDER, LYOPHILIZED, FOR SOLUTION INTRAVENOUS at 13:06

## 2023-04-05 RX ADMIN — Medication 25 MILLIGRAM(S): at 13:02

## 2023-04-05 RX ADMIN — Medication 1 TABLET(S): at 16:38

## 2023-04-05 RX ADMIN — Medication 3 MILLILITER(S): at 07:43

## 2023-04-05 RX ADMIN — Medication 1 TABLET(S): at 12:15

## 2023-04-05 RX ADMIN — SENNA PLUS 2 TABLET(S): 8.6 TABLET ORAL at 22:48

## 2023-04-05 RX ADMIN — Medication 25 MILLIGRAM(S): at 22:48

## 2023-04-05 RX ADMIN — Medication 3 UNIT(S): at 08:27

## 2023-04-05 RX ADMIN — BUDESONIDE AND FORMOTEROL FUMARATE DIHYDRATE 2 PUFF(S): 160; 4.5 AEROSOL RESPIRATORY (INHALATION) at 16:39

## 2023-04-05 RX ADMIN — PANTOPRAZOLE SODIUM 40 MILLIGRAM(S): 20 TABLET, DELAYED RELEASE ORAL at 12:15

## 2023-04-05 RX ADMIN — ENOXAPARIN SODIUM 60 MILLIGRAM(S): 100 INJECTION SUBCUTANEOUS at 08:27

## 2023-04-05 RX ADMIN — SIMVASTATIN 40 MILLIGRAM(S): 20 TABLET, FILM COATED ORAL at 22:49

## 2023-04-05 RX ADMIN — DONEPEZIL HYDROCHLORIDE 5 MILLIGRAM(S): 10 TABLET, FILM COATED ORAL at 22:48

## 2023-04-05 RX ADMIN — Medication 12.5 GRAM(S): at 16:57

## 2023-04-05 RX ADMIN — LOSARTAN POTASSIUM 25 MILLIGRAM(S): 100 TABLET, FILM COATED ORAL at 05:40

## 2023-04-05 RX ADMIN — Medication 1 TABLET(S): at 08:28

## 2023-04-05 RX ADMIN — ENOXAPARIN SODIUM 60 MILLIGRAM(S): 100 INJECTION SUBCUTANEOUS at 16:38

## 2023-04-05 RX ADMIN — BUDESONIDE AND FORMOTEROL FUMARATE DIHYDRATE 2 PUFF(S): 160; 4.5 AEROSOL RESPIRATORY (INHALATION) at 05:40

## 2023-04-05 RX ADMIN — INSULIN GLARGINE 15 UNIT(S): 100 INJECTION, SOLUTION SUBCUTANEOUS at 22:35

## 2023-04-05 RX ADMIN — TAMSULOSIN HYDROCHLORIDE 0.4 MILLIGRAM(S): 0.4 CAPSULE ORAL at 22:49

## 2023-04-05 RX ADMIN — SODIUM CHLORIDE 50 MILLILITER(S): 9 INJECTION, SOLUTION INTRAVENOUS at 16:57

## 2023-04-05 RX ADMIN — Medication 3 MILLILITER(S): at 15:58

## 2023-04-05 RX ADMIN — Medication 25 MILLIGRAM(S): at 05:39

## 2023-04-05 RX ADMIN — PIPERACILLIN AND TAZOBACTAM 25 GRAM(S): 4; .5 INJECTION, POWDER, LYOPHILIZED, FOR SOLUTION INTRAVENOUS at 22:48

## 2023-04-05 NOTE — SOCIAL WORK PROGRESS NOTE - NSSWPROGRESSNOTE_GEN_ALL_CORE
Savi has sent all information on pt to [facility] for review. Anticipated dc back to [facility] tomorrow or Friday. Plan remains to ret to [facility] ltc.  Savi has sent all information on pt to [facility] for review. Anticipated dc back to [facility] tomorrow or Friday. Plan remains to ret to [facility] ltc.  Savi has sent all information on pt to [facility] for review. Anticipated dc back to [facility] tomorrow or Friday. Plan remains to ret to [facility] ltc.

## 2023-04-05 NOTE — CHART NOTE - NSCHARTNOTEFT_GEN_A_CORE
Assessment: Pt seen for nutrition follow-up. Chart reviewed, hospital course noted.    Brief hx: Pt is a "86 yo male, Pending sale to [facility] resident with PMHx - COPD, DM, HTN, CAD, HLD, H/O TIA, dementia, GERD, BPH and depression, who was sent for evaluation of left foot 1metatarsal wound. Concern for wound infection with underlying osteomyelitis. He also has bilateral heel wounds. Incidentally found to be COVID positive."    Visited pt at bedside this am. Pt alert/confused during visit. Spoke with RN, pt consumed >75% of breakfast tray this am with assistance. PO intakes 0-75% per nursing documentation. Pt tolerating diet well. No chewing/swallowing difficulties noted. No N/V/D/C per chart review. +BM 4/3. Good acceptance of Glucerna supplement. Provider to RN for free water 250cc at meals and before bed.Recommend continued assistance/encouragement with meals and oral nutrition supplements.     Factors impacting intake: [ ] none [ ] nausea  [ ] vomiting [ ] diarrhea [ ] constipation  [ ]chewing problems [ ] swallowing issues  [X] other: dementia, assistance with feeding    Diet Prescription: Diet, DASH/TLC:   Sodium & Cholesterol Restricted  Consistent Carbohydrate {Evening Snack}  Soft and Bite Sized (SOFTBTSZ)  Supplement Feeding Modality:  Oral  Glucerna Shake Cans or Servings Per Day:  1       Frequency:  Daily (03-30-23 @ 05:10)    Intake: fair    Current Weight: Weight (kg): 63.5 (03-30 @ 16:49), 4/5 156.7# (1+ BL foot edema noted)  % Weight Change    Pertinent Medications: MEDICATIONS  (STANDING):  albuterol/ipratropium for Nebulization 3 milliLiter(s) Nebulizer every 8 hours  budesonide 160 MICROgram(s)/formoterol 4.5 MICROgram(s) Inhaler 2 Puff(s) Inhalation two times a day  chlorhexidine 2% Cloths 1 Application(s) Topical daily  dextrose 5%. 1000 milliLiter(s) (50 mL/Hr) IV Continuous <Continuous>  dextrose 5%. 1000 milliLiter(s) (100 mL/Hr) IV Continuous <Continuous>  dextrose 50% Injectable 25 Gram(s) IV Push once  dextrose 50% Injectable 12.5 Gram(s) IV Push once  dextrose 50% Injectable 25 Gram(s) IV Push once  donepezil 5 milliGRAM(s) Oral at bedtime  DULoxetine 60 milliGRAM(s) Oral daily  enoxaparin Injectable 60 milliGRAM(s) SubCutaneous every 12 hours  glucagon  Injectable 1 milliGRAM(s) IntraMuscular once  insulin glargine Injectable (LANTUS) 15 Unit(s) SubCutaneous at bedtime  insulin lispro (ADMELOG) corrective regimen sliding scale   SubCutaneous three times a day before meals  insulin lispro (ADMELOG) corrective regimen sliding scale   SubCutaneous at bedtime  insulin lispro Injectable (ADMELOG) 3 Unit(s) SubCutaneous three times a day before meals  lactobacillus acidophilus 1 Tablet(s) Oral two times a day with meals  losartan 25 milliGRAM(s) Oral daily  metoprolol tartrate 25 milliGRAM(s) Oral three times a day  multivitamin 1 Tablet(s) Oral daily  pantoprazole   Suspension 40 milliGRAM(s) Oral daily  piperacillin/tazobactam IVPB.. 3.375 Gram(s) IV Intermittent every 8 hours  remdesivir  IVPB   IV Intermittent   remdesivir  IVPB 100 milliGRAM(s) IV Intermittent every 24 hours  senna 2 Tablet(s) Oral at bedtime  simvastatin 40 milliGRAM(s) Oral at bedtime  tamsulosin 0.4 milliGRAM(s) Oral at bedtime    MEDICATIONS  (PRN):  acetaminophen     Tablet .. 650 milliGRAM(s) Oral every 6 hours PRN Temp greater or equal to 38C (100.4F), Mild Pain (1 - 3)  aluminum hydroxide/magnesium hydroxide/simethicone Suspension 30 milliLiter(s) Oral every 4 hours PRN Dyspepsia  bisacodyl Suppository 10 milliGRAM(s) Rectal daily PRN Constipation  dextrose Oral Gel 15 Gram(s) Oral once PRN Blood Glucose LESS THAN 70 milliGRAM(s)/deciliter  hydrALAZINE 50 milliGRAM(s) Oral every 6 hours PRN for systolic BP>160  magnesium hydroxide Suspension 30 milliLiter(s) Oral daily PRN Constipation  melatonin 3 milliGRAM(s) Oral at bedtime PRN Insomnia  morphine  - Injectable 2 milliGRAM(s) IV Push every 6 hours PRN Severe Pain (7 - 10)  ondansetron Injectable 4 milliGRAM(s) IV Push every 8 hours PRN Nausea and/or Vomiting  traMADol 50 milliGRAM(s) Oral four times a day PRN Moderate Pain (4 - 6)    Pertinent Labs: 04-05 Na144 mmol/L Glu 132 mg/dL<H> K+ 3.9 mmol/L Cr  1.20 mg/dL BUN 26 mg/dL<H> 04-04 Phos 3.2 mg/dL 04-05 Alb 2.2 g/dL<L> 03-30 Chol 108 mg/dL LDL --    HDL 32 mg/dL<L> Trig 74 mg/dL    CAPILLARY BLOOD GLUCOSE  POCT Blood Glucose.: 116 mg/dL (05 Apr 2023 08:11)  POCT Blood Glucose.: 167 mg/dL (04 Apr 2023 21:11)  POCT Blood Glucose.: 150 mg/dL (04 Apr 2023 17:10)  POCT Blood Glucose.: 205 mg/dL (04 Apr 2023 12:08)    Skin: L hand wound, unstageable to BL heels, unstageable to L foot    Estimated Needs:   [X] no change since previous assessment: based on 154#/69.8kg  25-30kcal/kg (1745-2094kcal)  1.3-1.6g pro/kg (91-111gm protein)  [ ] recalculated:     Previous Nutrition Diagnosis:   [ ] Inadequate Energy Intake [ ]Inadequate Oral Intake [ ] Excessive Energy Intake   [ ] Underweight [X] Increased Nutrient Needs [ ] Overweight/Obesity   [ ] Altered GI Function [ ] Unintended Weight Loss [ ] Food & Nutrition Related Knowledge Deficit [X] Malnutrition (moderate/chronic)    Nutrition Diagnosis is [X] ongoing  [ ] resolved [ ] not applicable     New Nutrition Diagnosis: [X] not applicable     Interventions:   Recommend  [ ] Change Diet To:  [X] Nutrition Supplement: recommend Glucerna BID, Reginald BID  [ ] Nutrition Support  [X] Other: Continue DASH/TLC, consistent carbohydrate, soft/bite size diet  Continue MVI daily, recommend Vitamin C 500mg daily    Monitoring and Evaluation:   [X] PO intake [ x ] Tolerance to diet prescription [ x ] weights [ x ] labs[ x ] follow up per protocol  [X] other: s/s GI distress, bowel function, skin integrity/ edema Assessment: Pt seen for nutrition follow-up. Chart reviewed, hospital course noted.    Brief hx: Pt is a "88 yo male, [facility] with PMHx - COPD, DM, HTN, CAD, HLD, H/O TIA, dementia, GERD, BPH and depression, who was sent for evaluation of left foot 1metatarsal wound. Concern for wound infection with underlying osteomyelitis. He also has bilateral heel wounds. Incidentally found to be COVID positive."    Visited pt at bedside this am. Pt alert/confused during visit. Spoke with RN, pt consumed >75% of breakfast tray this am with assistance. PO intakes 0-75% per nursing documentation. Pt tolerating diet well. No chewing/swallowing difficulties noted. No N/V/D/C per chart review. +BM 4/3. Good acceptance of Glucerna supplement. Provider to RN for free water 250cc at meals and before bed.Recommend continued assistance/encouragement with meals and oral nutrition supplements.     Factors impacting intake: [ ] none [ ] nausea  [ ] vomiting [ ] diarrhea [ ] constipation  [ ]chewing problems [ ] swallowing issues  [X] other: dementia, assistance with feeding    Diet Prescription: Diet, DASH/TLC:   Sodium & Cholesterol Restricted  Consistent Carbohydrate {Evening Snack}  Soft and Bite Sized (SOFTBTSZ)  Supplement Feeding Modality:  Oral  Glucerna Shake Cans or Servings Per Day:  1       Frequency:  Daily (03-30-23 @ 05:10)    Intake: fair    Current Weight: Weight (kg): 63.5 (03-30 @ 16:49), 4/5 156.7# (1+ BL foot edema noted)  % Weight Change    Pertinent Medications: MEDICATIONS  (STANDING):  albuterol/ipratropium for Nebulization 3 milliLiter(s) Nebulizer every 8 hours  budesonide 160 MICROgram(s)/formoterol 4.5 MICROgram(s) Inhaler 2 Puff(s) Inhalation two times a day  chlorhexidine 2% Cloths 1 Application(s) Topical daily  dextrose 5%. 1000 milliLiter(s) (50 mL/Hr) IV Continuous <Continuous>  dextrose 5%. 1000 milliLiter(s) (100 mL/Hr) IV Continuous <Continuous>  dextrose 50% Injectable 25 Gram(s) IV Push once  dextrose 50% Injectable 12.5 Gram(s) IV Push once  dextrose 50% Injectable 25 Gram(s) IV Push once  donepezil 5 milliGRAM(s) Oral at bedtime  DULoxetine 60 milliGRAM(s) Oral daily  enoxaparin Injectable 60 milliGRAM(s) SubCutaneous every 12 hours  glucagon  Injectable 1 milliGRAM(s) IntraMuscular once  insulin glargine Injectable (LANTUS) 15 Unit(s) SubCutaneous at bedtime  insulin lispro (ADMELOG) corrective regimen sliding scale   SubCutaneous three times a day before meals  insulin lispro (ADMELOG) corrective regimen sliding scale   SubCutaneous at bedtime  insulin lispro Injectable (ADMELOG) 3 Unit(s) SubCutaneous three times a day before meals  lactobacillus acidophilus 1 Tablet(s) Oral two times a day with meals  losartan 25 milliGRAM(s) Oral daily  metoprolol tartrate 25 milliGRAM(s) Oral three times a day  multivitamin 1 Tablet(s) Oral daily  pantoprazole   Suspension 40 milliGRAM(s) Oral daily  piperacillin/tazobactam IVPB.. 3.375 Gram(s) IV Intermittent every 8 hours  remdesivir  IVPB   IV Intermittent   remdesivir  IVPB 100 milliGRAM(s) IV Intermittent every 24 hours  senna 2 Tablet(s) Oral at bedtime  simvastatin 40 milliGRAM(s) Oral at bedtime  tamsulosin 0.4 milliGRAM(s) Oral at bedtime    MEDICATIONS  (PRN):  acetaminophen     Tablet .. 650 milliGRAM(s) Oral every 6 hours PRN Temp greater or equal to 38C (100.4F), Mild Pain (1 - 3)  aluminum hydroxide/magnesium hydroxide/simethicone Suspension 30 milliLiter(s) Oral every 4 hours PRN Dyspepsia  bisacodyl Suppository 10 milliGRAM(s) Rectal daily PRN Constipation  dextrose Oral Gel 15 Gram(s) Oral once PRN Blood Glucose LESS THAN 70 milliGRAM(s)/deciliter  hydrALAZINE 50 milliGRAM(s) Oral every 6 hours PRN for systolic BP>160  magnesium hydroxide Suspension 30 milliLiter(s) Oral daily PRN Constipation  melatonin 3 milliGRAM(s) Oral at bedtime PRN Insomnia  morphine  - Injectable 2 milliGRAM(s) IV Push every 6 hours PRN Severe Pain (7 - 10)  ondansetron Injectable 4 milliGRAM(s) IV Push every 8 hours PRN Nausea and/or Vomiting  traMADol 50 milliGRAM(s) Oral four times a day PRN Moderate Pain (4 - 6)    Pertinent Labs: 04-05 Na144 mmol/L Glu 132 mg/dL<H> K+ 3.9 mmol/L Cr  1.20 mg/dL BUN 26 mg/dL<H> 04-04 Phos 3.2 mg/dL 04-05 Alb 2.2 g/dL<L> 03-30 Chol 108 mg/dL LDL --    HDL 32 mg/dL<L> Trig 74 mg/dL    CAPILLARY BLOOD GLUCOSE  POCT Blood Glucose.: 116 mg/dL (05 Apr 2023 08:11)  POCT Blood Glucose.: 167 mg/dL (04 Apr 2023 21:11)  POCT Blood Glucose.: 150 mg/dL (04 Apr 2023 17:10)  POCT Blood Glucose.: 205 mg/dL (04 Apr 2023 12:08)    Skin: L hand wound, unstageable to BL heels, unstageable to L foot    Estimated Needs:   [X] no change since previous assessment: based on 154#/69.8kg  25-30kcal/kg (1745-2094kcal)  1.3-1.6g pro/kg (91-111gm protein)  [ ] recalculated:     Previous Nutrition Diagnosis:   [ ] Inadequate Energy Intake [ ]Inadequate Oral Intake [ ] Excessive Energy Intake   [ ] Underweight [X] Increased Nutrient Needs [ ] Overweight/Obesity   [ ] Altered GI Function [ ] Unintended Weight Loss [ ] Food & Nutrition Related Knowledge Deficit [X] Malnutrition (moderate/chronic)    Nutrition Diagnosis is [X] ongoing  [ ] resolved [ ] not applicable     New Nutrition Diagnosis: [X] not applicable     Interventions:   Recommend  [ ] Change Diet To:  [X] Nutrition Supplement: recommend Glucerna BID, Reginald BID  [ ] Nutrition Support  [X] Other: Continue DASH/TLC, consistent carbohydrate, soft/bite size diet  Continue MVI daily, recommend Vitamin C 500mg daily    Monitoring and Evaluation:   [X] PO intake [ x ] Tolerance to diet prescription [ x ] weights [ x ] labs[ x ] follow up per protocol  [X] other: s/s GI distress, bowel function, skin integrity/ edema Assessment: Pt seen for nutrition follow-up. Chart reviewed, hospital course noted.    Brief hx: Pt is a "86 yo male, [facility] resident with PMHx - COPD, DM, HTN, CAD, HLD, H/O TIA, dementia, GERD, BPH and depression, who was sent for evaluation of left foot 1metatarsal wound. Concern for wound infection with underlying osteomyelitis. He also has bilateral heel wounds. Incidentally found to be COVID positive."    Visited pt at bedside this am. Pt alert/confused during visit. Spoke with RN, pt consumed >75% of breakfast tray this am with assistance. PO intakes 0-75% per nursing documentation. Pt tolerating diet well. No chewing/swallowing difficulties noted. No N/V/D/C per chart review. +BM 4/3. Good acceptance of Glucerna supplement. Provider to RN for free water 250cc at meals and before bed.Recommend continued assistance/encouragement with meals and oral nutrition supplements.     Factors impacting intake: [ ] none [ ] nausea  [ ] vomiting [ ] diarrhea [ ] constipation  [ ]chewing problems [ ] swallowing issues  [X] other: dementia, assistance with feeding    Diet Prescription: Diet, DASH/TLC:   Sodium & Cholesterol Restricted  Consistent Carbohydrate {Evening Snack}  Soft and Bite Sized (SOFTBTSZ)  Supplement Feeding Modality:  Oral  Glucerna Shake Cans or Servings Per Day:  1       Frequency:  Daily (03-30-23 @ 05:10)    Intake: fair    Current Weight: Weight (kg): 63.5 (03-30 @ 16:49), 4/5 156.7# (1+ BL foot edema noted)  % Weight Change    Pertinent Medications: MEDICATIONS  (STANDING):  albuterol/ipratropium for Nebulization 3 milliLiter(s) Nebulizer every 8 hours  budesonide 160 MICROgram(s)/formoterol 4.5 MICROgram(s) Inhaler 2 Puff(s) Inhalation two times a day  chlorhexidine 2% Cloths 1 Application(s) Topical daily  dextrose 5%. 1000 milliLiter(s) (50 mL/Hr) IV Continuous <Continuous>  dextrose 5%. 1000 milliLiter(s) (100 mL/Hr) IV Continuous <Continuous>  dextrose 50% Injectable 25 Gram(s) IV Push once  dextrose 50% Injectable 12.5 Gram(s) IV Push once  dextrose 50% Injectable 25 Gram(s) IV Push once  donepezil 5 milliGRAM(s) Oral at bedtime  DULoxetine 60 milliGRAM(s) Oral daily  enoxaparin Injectable 60 milliGRAM(s) SubCutaneous every 12 hours  glucagon  Injectable 1 milliGRAM(s) IntraMuscular once  insulin glargine Injectable (LANTUS) 15 Unit(s) SubCutaneous at bedtime  insulin lispro (ADMELOG) corrective regimen sliding scale   SubCutaneous three times a day before meals  insulin lispro (ADMELOG) corrective regimen sliding scale   SubCutaneous at bedtime  insulin lispro Injectable (ADMELOG) 3 Unit(s) SubCutaneous three times a day before meals  lactobacillus acidophilus 1 Tablet(s) Oral two times a day with meals  losartan 25 milliGRAM(s) Oral daily  metoprolol tartrate 25 milliGRAM(s) Oral three times a day  multivitamin 1 Tablet(s) Oral daily  pantoprazole   Suspension 40 milliGRAM(s) Oral daily  piperacillin/tazobactam IVPB.. 3.375 Gram(s) IV Intermittent every 8 hours  remdesivir  IVPB   IV Intermittent   remdesivir  IVPB 100 milliGRAM(s) IV Intermittent every 24 hours  senna 2 Tablet(s) Oral at bedtime  simvastatin 40 milliGRAM(s) Oral at bedtime  tamsulosin 0.4 milliGRAM(s) Oral at bedtime    MEDICATIONS  (PRN):  acetaminophen     Tablet .. 650 milliGRAM(s) Oral every 6 hours PRN Temp greater or equal to 38C (100.4F), Mild Pain (1 - 3)  aluminum hydroxide/magnesium hydroxide/simethicone Suspension 30 milliLiter(s) Oral every 4 hours PRN Dyspepsia  bisacodyl Suppository 10 milliGRAM(s) Rectal daily PRN Constipation  dextrose Oral Gel 15 Gram(s) Oral once PRN Blood Glucose LESS THAN 70 milliGRAM(s)/deciliter  hydrALAZINE 50 milliGRAM(s) Oral every 6 hours PRN for systolic BP>160  magnesium hydroxide Suspension 30 milliLiter(s) Oral daily PRN Constipation  melatonin 3 milliGRAM(s) Oral at bedtime PRN Insomnia  morphine  - Injectable 2 milliGRAM(s) IV Push every 6 hours PRN Severe Pain (7 - 10)  ondansetron Injectable 4 milliGRAM(s) IV Push every 8 hours PRN Nausea and/or Vomiting  traMADol 50 milliGRAM(s) Oral four times a day PRN Moderate Pain (4 - 6)    Pertinent Labs: 04-05 Na144 mmol/L Glu 132 mg/dL<H> K+ 3.9 mmol/L Cr  1.20 mg/dL BUN 26 mg/dL<H> 04-04 Phos 3.2 mg/dL 04-05 Alb 2.2 g/dL<L> 03-30 Chol 108 mg/dL LDL --    HDL 32 mg/dL<L> Trig 74 mg/dL    CAPILLARY BLOOD GLUCOSE  POCT Blood Glucose.: 116 mg/dL (05 Apr 2023 08:11)  POCT Blood Glucose.: 167 mg/dL (04 Apr 2023 21:11)  POCT Blood Glucose.: 150 mg/dL (04 Apr 2023 17:10)  POCT Blood Glucose.: 205 mg/dL (04 Apr 2023 12:08)    Skin: L hand wound, unstageable to BL heels, unstageable to L foot    Estimated Needs:   [X] no change since previous assessment: based on 154#/69.8kg  25-30kcal/kg (1745-2094kcal)  1.3-1.6g pro/kg (91-111gm protein)  [ ] recalculated:     Previous Nutrition Diagnosis:   [ ] Inadequate Energy Intake [ ]Inadequate Oral Intake [ ] Excessive Energy Intake   [ ] Underweight [X] Increased Nutrient Needs [ ] Overweight/Obesity   [ ] Altered GI Function [ ] Unintended Weight Loss [ ] Food & Nutrition Related Knowledge Deficit [X] Malnutrition (moderate/chronic)    Nutrition Diagnosis is [X] ongoing  [ ] resolved [ ] not applicable     New Nutrition Diagnosis: [X] not applicable     Interventions:   Recommend  [ ] Change Diet To:  [X] Nutrition Supplement: recommend Glucerna BID, Reginald BID  [ ] Nutrition Support  [X] Other: Continue DASH/TLC, consistent carbohydrate, soft/bite size diet  Continue MVI daily, recommend Vitamin C 500mg daily    Monitoring and Evaluation:   [X] PO intake [ x ] Tolerance to diet prescription [ x ] weights [ x ] labs[ x ] follow up per protocol  [X] other: s/s GI distress, bowel function, skin integrity/ edema

## 2023-04-05 NOTE — PROGRESS NOTE ADULT - SUBJECTIVE AND OBJECTIVE BOX
[facility]  INFECTIOUS DISEASES - Ruma Mayes, [place] [address]  Tel: [phone]     Fax: [phone]  =======================================================    ANA MARIA LEIGH [id number]    Follow up: No fevers. On room air. Awake but confused.    Allergies:  No Known Allergies      Antibiotics:  acetaminophen     Tablet .. 650 milliGRAM(s) Oral every 6 hours PRN  albuterol/ipratropium for Nebulization 3 milliLiter(s) Nebulizer every 8 hours  aluminum hydroxide/magnesium hydroxide/simethicone Suspension 30 milliLiter(s) Oral every 4 hours PRN  bisacodyl Suppository 10 milliGRAM(s) Rectal daily PRN  budesonide 160 MICROgram(s)/formoterol 4.5 MICROgram(s) Inhaler 2 Puff(s) Inhalation two times a day  chlorhexidine 2% Cloths 1 Application(s) Topical daily  dextrose 5%. 1000 milliLiter(s) IV Continuous <Continuous>  dextrose 5%. 1000 milliLiter(s) IV Continuous <Continuous>  dextrose 5%. 1000 milliLiter(s) IV Continuous <Continuous>  dextrose 50% Injectable 25 Gram(s) IV Push once  dextrose 50% Injectable 12.5 Gram(s) IV Push once  dextrose 50% Injectable 25 Gram(s) IV Push once  dextrose 50% Injectable 12.5 Gram(s) IV Push once  dextrose Oral Gel 15 Gram(s) Oral once PRN  donepezil 5 milliGRAM(s) Oral at bedtime  DULoxetine 60 milliGRAM(s) Oral daily  enoxaparin Injectable 60 milliGRAM(s) SubCutaneous every 12 hours  glucagon  Injectable 1 milliGRAM(s) IntraMuscular once  hydrALAZINE 50 milliGRAM(s) Oral every 6 hours PRN  insulin glargine Injectable (LANTUS) 15 Unit(s) SubCutaneous at bedtime  insulin lispro (ADMELOG) corrective regimen sliding scale   SubCutaneous three times a day before meals  insulin lispro (ADMELOG) corrective regimen sliding scale   SubCutaneous at bedtime  insulin lispro Injectable (ADMELOG) 3 Unit(s) SubCutaneous three times a day before meals  lactobacillus acidophilus 1 Tablet(s) Oral two times a day with meals  losartan 25 milliGRAM(s) Oral daily  magnesium hydroxide Suspension 30 milliLiter(s) Oral daily PRN  melatonin 3 milliGRAM(s) Oral at bedtime PRN  metoprolol tartrate 25 milliGRAM(s) Oral three times a day  morphine  - Injectable 2 milliGRAM(s) IV Push every 6 hours PRN  multivitamin 1 Tablet(s) Oral daily  ondansetron Injectable 4 milliGRAM(s) IV Push every 8 hours PRN  pantoprazole   Suspension 40 milliGRAM(s) Oral daily  piperacillin/tazobactam IVPB.. 3.375 Gram(s) IV Intermittent every 8 hours  remdesivir  IVPB   IV Intermittent   remdesivir  IVPB 100 milliGRAM(s) IV Intermittent every 24 hours  senna 2 Tablet(s) Oral at bedtime  simvastatin 40 milliGRAM(s) Oral at bedtime  tamsulosin 0.4 milliGRAM(s) Oral at bedtime  traMADol 50 milliGRAM(s) Oral four times a day PRN       REVIEW OF SYSTEMS:  Unable to obtain 2/2 dementia     Physical Exam:  ICU Vital Signs Last 24 Hrs  T(C): 36.3 (05 Apr 2023 13:05), Max: 36.8 (05 Apr 2023 05:24)  T(F): 97.4 (05 Apr 2023 13:05), Max: 98.2 (05 Apr 2023 05:24)  HR: 83 (05 Apr 2023 13:05) (83 - 102)  BP: 119/68 (05 Apr 2023 13:05) (119/68 - 152/73)  BP(mean): --  ABP: --  ABP(mean): --  RR: 18 (05 Apr 2023 13:05) (17 - 18)  SpO2: 90% (05 Apr 2023 13:05) (90% - 100%)    O2 Parameters below as of 05 Apr 2023 13:05  Patient On (Oxygen Delivery Method): room air      GEN: NAD  HEENT: normocephalic and atraumatic.   NECK: Supple.   LUNGS: No wheezes, crackles or rhonchi  HEART: Regular rate and rhythm   ABDOMEN: Soft, nontender, and nondistended.    EXTREMITIES: No leg edema.  NEUROLOGIC: awake, not responding to most questions appropriately  SKIN: (+) L foot foot 1st metatarsal wound with probe to bone, bilateral heel wounds        Labs:  04-05    144  |  116<H>  |  26<H>  ----------------------------<  132<H>  3.9   |  25  |  1.20    Ca    8.8      05 Apr 2023 06:40  Phos  3.2     04-04  Mg     2.2     04-04    TPro  6.7  /  Alb  2.2<L>  /  TBili  0.6  /  DBili  0.3  /  AST  27  /  ALT  47  /  AlkPhos  69  04-05                          11.5   7.72  )-----------( 251      ( 05 Apr 2023 06:40 )             37.9         LIVER FUNCTIONS - ( 05 Apr 2023 06:40 )  Alb: 2.2 g/dL / Pro: 6.7 g/dL / ALK PHOS: 69 U/L / ALT: 47 U/L / AST: 27 U/L / GGT: x             RECENT CULTURES:  04-02 @ 09:15 Clean Catch Clean Catch (Midstream) Enterococcus faecalis    >100,000 CFU/ml Enterococcus faecalis        03-29 @ 21:32 .Blood     No Growth Final        03-29 @ 21:20 .Blood     No Growth Final              All imaging and data are reviewed.     NM inflammatory scan:  FINDINGS: There are multiple foci of increased white cell accumulation   seen BILATERALLY.    In the LEFT foot, a focus seen at the heel shows no corresponding   activity at supper colloid imaging and likely represents soft tissue   infection. A punctate focus of increased activity seen along the plantar   surface of the LEFT foot appears matched on sulfur colloid and is   indeterminate. An area of intense uptake in the region of the LEFT first   toe appears larger and more intense on white cell imaging, suspicious for   osteomyelitis.  A small focus of white cell accumulation at the LEFT fifth toe shows no   corresponding uptake on sulfur colloid and likely represents soft tissue   infection.    A focus of increased activity seen at the medial RIGHT heel shows no   corresponding activity on the lateral projection, and appears different   in distribution and intensity on the posterior projections. The medial   portion of this focus appears more intense and different contour than the   sulfur colloid images, an area of osteomyelitis is not excluded.      IMPRESSION:   Abnormal combined Indium-111 labeled leukocyte study and   marrow scan. In the LEFT foot, findings at the first toe are suspicious   for osteomyelitis; the other foci appear equivocal or are more likely   soft tissue infection. On the RIGHT, an area of osteomyelitis at the   medial portion of the RIGHT heel wound is not excluded.     [facility]  INFECTIOUS DISEASES - Ruma Mayes, [place] [address]  Tel: [phone]     Fax: [phone]  =======================================================    ANA MARIA LEIGH [id number]    Follow up: No fevers. On room air. Awake but confused.    Allergies:  No Known Allergies      Antibiotics:  acetaminophen     Tablet .. 650 milliGRAM(s) Oral every 6 hours PRN  albuterol/ipratropium for Nebulization 3 milliLiter(s) Nebulizer every 8 hours  aluminum hydroxide/magnesium hydroxide/simethicone Suspension 30 milliLiter(s) Oral every 4 hours PRN  bisacodyl Suppository 10 milliGRAM(s) Rectal daily PRN  budesonide 160 MICROgram(s)/formoterol 4.5 MICROgram(s) Inhaler 2 Puff(s) Inhalation two times a day  chlorhexidine 2% Cloths 1 Application(s) Topical daily  dextrose 5%. 1000 milliLiter(s) IV Continuous <Continuous>  dextrose 5%. 1000 milliLiter(s) IV Continuous <Continuous>  dextrose 5%. 1000 milliLiter(s) IV Continuous <Continuous>  dextrose 50% Injectable 25 Gram(s) IV Push once  dextrose 50% Injectable 12.5 Gram(s) IV Push once  dextrose 50% Injectable 25 Gram(s) IV Push once  dextrose 50% Injectable 12.5 Gram(s) IV Push once  dextrose Oral Gel 15 Gram(s) Oral once PRN  donepezil 5 milliGRAM(s) Oral at bedtime  DULoxetine 60 milliGRAM(s) Oral daily  enoxaparin Injectable 60 milliGRAM(s) SubCutaneous every 12 hours  glucagon  Injectable 1 milliGRAM(s) IntraMuscular once  hydrALAZINE 50 milliGRAM(s) Oral every 6 hours PRN  insulin glargine Injectable (LANTUS) 15 Unit(s) SubCutaneous at bedtime  insulin lispro (ADMELOG) corrective regimen sliding scale   SubCutaneous three times a day before meals  insulin lispro (ADMELOG) corrective regimen sliding scale   SubCutaneous at bedtime  insulin lispro Injectable (ADMELOG) 3 Unit(s) SubCutaneous three times a day before meals  lactobacillus acidophilus 1 Tablet(s) Oral two times a day with meals  losartan 25 milliGRAM(s) Oral daily  magnesium hydroxide Suspension 30 milliLiter(s) Oral daily PRN  melatonin 3 milliGRAM(s) Oral at bedtime PRN  metoprolol tartrate 25 milliGRAM(s) Oral three times a day  morphine  - Injectable 2 milliGRAM(s) IV Push every 6 hours PRN  multivitamin 1 Tablet(s) Oral daily  ondansetron Injectable 4 milliGRAM(s) IV Push every 8 hours PRN  pantoprazole   Suspension 40 milliGRAM(s) Oral daily  piperacillin/tazobactam IVPB.. 3.375 Gram(s) IV Intermittent every 8 hours  remdesivir  IVPB   IV Intermittent   remdesivir  IVPB 100 milliGRAM(s) IV Intermittent every 24 hours  senna 2 Tablet(s) Oral at bedtime  simvastatin 40 milliGRAM(s) Oral at bedtime  tamsulosin 0.4 milliGRAM(s) Oral at bedtime  traMADol 50 milliGRAM(s) Oral four times a day PRN       REVIEW OF SYSTEMS:  Unable to obtain 2/2 dementia     Physical Exam:  ICU Vital Signs Last 24 Hrs  T(C): 36.3 (05 Apr 2023 13:05), Max: 36.8 (05 Apr 2023 05:24)  T(F): 97.4 (05 Apr 2023 13:05), Max: 98.2 (05 Apr 2023 05:24)  HR: 83 (05 Apr 2023 13:05) (83 - 102)  BP: 119/68 (05 Apr 2023 13:05) (119/68 - 152/73)  BP(mean): --  ABP: --  ABP(mean): --  RR: 18 (05 Apr 2023 13:05) (17 - 18)  SpO2: 90% (05 Apr 2023 13:05) (90% - 100%)    O2 Parameters below as of 05 Apr 2023 13:05  Patient On (Oxygen Delivery Method): room air      GEN: NAD  HEENT: normocephalic and atraumatic.   NECK: Supple.   LUNGS: No wheezes, crackles or rhonchi  HEART: Regular rate and rhythm   ABDOMEN: Soft, nontender, and nondistended.    EXTREMITIES: No leg edema.  NEUROLOGIC: awake, not responding to most questions appropriately  SKIN: (+) L foot foot 1st metatarsal wound with probe to bone, bilateral heel wounds        Labs:  04-05    144  |  116<H>  |  26<H>  ----------------------------<  132<H>  3.9   |  25  |  1.20    Ca    8.8      05 Apr 2023 06:40  Phos  3.2     04-04  Mg     2.2     04-04    TPro  6.7  /  Alb  2.2<L>  /  TBili  0.6  /  DBili  0.3  /  AST  27  /  ALT  47  /  AlkPhos  69  04-05                          11.5   7.72  )-----------( 251      ( 05 Apr 2023 06:40 )             37.9         LIVER FUNCTIONS - ( 05 Apr 2023 06:40 )  Alb: 2.2 g/dL / Pro: 6.7 g/dL / ALK PHOS: 69 U/L / ALT: 47 U/L / AST: 27 U/L / GGT: x             RECENT CULTURES:  04-02 @ 09:15 Clean Catch Clean Catch (Midstream) Enterococcus faecalis    >100,000 CFU/ml Enterococcus faecalis        03-29 @ 21:32 .Blood     No Growth Final        03-29 @ 21:20 .Blood     No Growth Final              All imaging and data are reviewed.     NM inflammatory scan:  FINDINGS: There are multiple foci of increased white cell accumulation   seen BILATERALLY.    In the LEFT foot, a focus seen at the heel shows no corresponding   activity at supper colloid imaging and likely represents soft tissue   infection. A punctate focus of increased activity seen along the plantar   surface of the LEFT foot appears matched on sulfur colloid and is   indeterminate. An area of intense uptake in the region of the LEFT first   toe appears larger and more intense on white cell imaging, suspicious for   osteomyelitis.  A small focus of white cell accumulation at the LEFT fifth toe shows no   corresponding uptake on sulfur colloid and likely represents soft tissue   infection.    A focus of increased activity seen at the medial RIGHT heel shows no   corresponding activity on the lateral projection, and appears different   in distribution and intensity on the posterior projections. The medial   portion of this focus appears more intense and different contour than the   sulfur colloid images, an area of osteomyelitis is not excluded.      IMPRESSION:   Abnormal combined Indium-111 labeled leukocyte study and   marrow scan. In the LEFT foot, findings at the first toe are suspicious   for osteomyelitis; the other foci appear equivocal or are more likely   soft tissue infection. On the RIGHT, an area of osteomyelitis at the   medial portion of the RIGHT heel wound is not excluded.     [facility]  INFECTIOUS DISEASES - Ruma Mayes, [place] [address]  Tel: [phone]     Fax: [phone]  =======================================================    ANA MARIA LEIGH [id number]    Follow up: No fevers. On room air. Awake but confused.    Allergies:  No Known Allergies      Antibiotics:  acetaminophen     Tablet .. 650 milliGRAM(s) Oral every 6 hours PRN  albuterol/ipratropium for Nebulization 3 milliLiter(s) Nebulizer every 8 hours  aluminum hydroxide/magnesium hydroxide/simethicone Suspension 30 milliLiter(s) Oral every 4 hours PRN  bisacodyl Suppository 10 milliGRAM(s) Rectal daily PRN  budesonide 160 MICROgram(s)/formoterol 4.5 MICROgram(s) Inhaler 2 Puff(s) Inhalation two times a day  chlorhexidine 2% Cloths 1 Application(s) Topical daily  dextrose 5%. 1000 milliLiter(s) IV Continuous <Continuous>  dextrose 5%. 1000 milliLiter(s) IV Continuous <Continuous>  dextrose 5%. 1000 milliLiter(s) IV Continuous <Continuous>  dextrose 50% Injectable 25 Gram(s) IV Push once  dextrose 50% Injectable 12.5 Gram(s) IV Push once  dextrose 50% Injectable 25 Gram(s) IV Push once  dextrose 50% Injectable 12.5 Gram(s) IV Push once  dextrose Oral Gel 15 Gram(s) Oral once PRN  donepezil 5 milliGRAM(s) Oral at bedtime  DULoxetine 60 milliGRAM(s) Oral daily  enoxaparin Injectable 60 milliGRAM(s) SubCutaneous every 12 hours  glucagon  Injectable 1 milliGRAM(s) IntraMuscular once  hydrALAZINE 50 milliGRAM(s) Oral every 6 hours PRN  insulin glargine Injectable (LANTUS) 15 Unit(s) SubCutaneous at bedtime  insulin lispro (ADMELOG) corrective regimen sliding scale   SubCutaneous three times a day before meals  insulin lispro (ADMELOG) corrective regimen sliding scale   SubCutaneous at bedtime  insulin lispro Injectable (ADMELOG) 3 Unit(s) SubCutaneous three times a day before meals  lactobacillus acidophilus 1 Tablet(s) Oral two times a day with meals  losartan 25 milliGRAM(s) Oral daily  magnesium hydroxide Suspension 30 milliLiter(s) Oral daily PRN  melatonin 3 milliGRAM(s) Oral at bedtime PRN  metoprolol tartrate 25 milliGRAM(s) Oral three times a day  morphine  - Injectable 2 milliGRAM(s) IV Push every 6 hours PRN  multivitamin 1 Tablet(s) Oral daily  ondansetron Injectable 4 milliGRAM(s) IV Push every 8 hours PRN  pantoprazole   Suspension 40 milliGRAM(s) Oral daily  piperacillin/tazobactam IVPB.. 3.375 Gram(s) IV Intermittent every 8 hours  remdesivir  IVPB   IV Intermittent   remdesivir  IVPB 100 milliGRAM(s) IV Intermittent every 24 hours  senna 2 Tablet(s) Oral at bedtime  simvastatin 40 milliGRAM(s) Oral at bedtime  tamsulosin 0.4 milliGRAM(s) Oral at bedtime  traMADol 50 milliGRAM(s) Oral four times a day PRN       REVIEW OF SYSTEMS:  Unable to obtain 2/2 dementia     Physical Exam:  ICU Vital Signs Last 24 Hrs  T(C): 36.3 (05 Apr 2023 13:05), Max: 36.8 (05 Apr 2023 05:24)  T(F): 97.4 (05 Apr 2023 13:05), Max: 98.2 (05 Apr 2023 05:24)  HR: 83 (05 Apr 2023 13:05) (83 - 102)  BP: 119/68 (05 Apr 2023 13:05) (119/68 - 152/73)  BP(mean): --  ABP: --  ABP(mean): --  RR: 18 (05 Apr 2023 13:05) (17 - 18)  SpO2: 90% (05 Apr 2023 13:05) (90% - 100%)    O2 Parameters below as of 05 Apr 2023 13:05  Patient On (Oxygen Delivery Method): room air      GEN: NAD  HEENT: normocephalic and atraumatic.   NECK: Supple.   LUNGS: No wheezes, crackles or rhonchi  HEART: Regular rate and rhythm   ABDOMEN: Soft, nontender, and nondistended.    EXTREMITIES: No leg edema.  NEUROLOGIC: awake, not responding to most questions appropriately  SKIN: (+) L foot foot 1st metatarsal wound with probe to bone, bilateral heel wounds        Labs:  04-05    144  |  116<H>  |  26<H>  ----------------------------<  132<H>  3.9   |  25  |  1.20    Ca    8.8      05 Apr 2023 06:40  Phos  3.2     04-04  Mg     2.2     04-04    TPro  6.7  /  Alb  2.2<L>  /  TBili  0.6  /  DBili  0.3  /  AST  27  /  ALT  47  /  AlkPhos  69  04-05                          11.5   7.72  )-----------( 251      ( 05 Apr 2023 06:40 )             37.9         LIVER FUNCTIONS - ( 05 Apr 2023 06:40 )  Alb: 2.2 g/dL / Pro: 6.7 g/dL / ALK PHOS: 69 U/L / ALT: 47 U/L / AST: 27 U/L / GGT: x             RECENT CULTURES:  04-02 @ 09:15 Clean Catch Clean Catch (Midstream) Enterococcus faecalis    >100,000 CFU/ml Enterococcus faecalis        03-29 @ 21:32 .Blood     No Growth Final        03-29 @ 21:20 .Blood     No Growth Final              All imaging and data are reviewed.     NM inflammatory scan:  FINDINGS: There are multiple foci of increased white cell accumulation   seen BILATERALLY.    In the LEFT foot, a focus seen at the heel shows no corresponding   activity at supper colloid imaging and likely represents soft tissue   infection. A punctate focus of increased activity seen along the plantar   surface of the LEFT foot appears matched on sulfur colloid and is   indeterminate. An area of intense uptake in the region of the LEFT first   toe appears larger and more intense on white cell imaging, suspicious for   osteomyelitis.  A small focus of white cell accumulation at the LEFT fifth toe shows no   corresponding uptake on sulfur colloid and likely represents soft tissue   infection.    A focus of increased activity seen at the medial RIGHT heel shows no   corresponding activity on the lateral projection, and appears different   in distribution and intensity on the posterior projections. The medial   portion of this focus appears more intense and different contour than the   sulfur colloid images, an area of osteomyelitis is not excluded.      IMPRESSION:   Abnormal combined Indium-111 labeled leukocyte study and   marrow scan. In the LEFT foot, findings at the first toe are suspicious   for osteomyelitis; the other foci appear equivocal or are more likely   soft tissue infection. On the RIGHT, an area of osteomyelitis at the   medial portion of the RIGHT heel wound is not excluded.

## 2023-04-05 NOTE — PROGRESS NOTE ADULT - ASSESSMENT
86 yo male ,[facility] includes the [facility] resident with PMHx - COPD, DM, HTN, CAD, HLD, H/O TIA, dementia,GERD, BPH and depression sent ot ER for evaluation of left foot 1metatarsal wound ,suggestive of osteomyelitis .Patient was seen by ID consult and transfer to the hospital recommended for wound cx/bone biopsy /podiatry evaluation ,likely will require 4-6 weeks of iv abx . Patient was admitted to [facility] the [facility] with b/l feet wounds and was followed by wound care team ,recently completed 7 days of doxycycline for foot wound cellulitis . (30 Mar 2023 05:21)      hypernatremia   d5w iv fluid     hypokalemia potassium chloride  10 mEq/100 mL IVPB 10 milliEquivalent(s) IV Intermittent every 1 hour    ACUTE RENAL FAILURE: sodium chloride 0.45%. 1000 milliLiter(s) (50 mL/Hr) IV Continuous  Serum creatinine is improving    There is no progression . No uremic symptoms  No evidence of anemia .  Fluid status stable.  Will continue to avoid nephrotoxic drugs.  Patient remains asymptomatic.   Continue current therapy.  hold  diuretic.        BP monitoring,continue current antihypertensive meds, low salt diet,followup with PMD in 1-2 weeks  losartan 50 milliGRAM(s) Oral daily    f/u  blood and urine cx,serial lactate levels,monitor vitals clement saenz hydration,monitor urine output and renal profile,iv abx   piperacillin/tazobactam IVPB.. 3.375 Gram(s) IV Intermittent every 8 hours 86 yo male ,[facility] resident with PMHx - COPD, DM, HTN, CAD, HLD, H/O TIA, dementia,GERD, BPH and depression sent ot ER for evaluation of left foot 1metatarsal wound ,suggestive of osteomyelitis .Patient was seen by ID consult and transfer to the hospital recommended for wound cx/bone biopsy /podiatry evaluation ,likely will require 4-6 weeks of iv abx . Patient was admitted to [facility] with b/l feet wounds and was followed by wound care team ,recently completed 7 days of doxycycline for foot wound cellulitis . (30 Mar 2023 05:21)      hypernatremia   d5w iv fluid     hypokalemia potassium chloride  10 mEq/100 mL IVPB 10 milliEquivalent(s) IV Intermittent every 1 hour    ACUTE RENAL FAILURE: sodium chloride 0.45%. 1000 milliLiter(s) (50 mL/Hr) IV Continuous  Serum creatinine is improving    There is no progression . No uremic symptoms  No evidence of anemia .  Fluid status stable.  Will continue to avoid nephrotoxic drugs.  Patient remains asymptomatic.   Continue current therapy.  hold  diuretic.        BP monitoring,continue current antihypertensive meds, low salt diet,followup with PMD in 1-2 weeks  losartan 50 milliGRAM(s) Oral daily    f/u  blood and urine cx,serial lactate levels,monitor vitals clement saenz hydration,monitor urine output and renal profile,iv abx   piperacillin/tazobactam IVPB.. 3.375 Gram(s) IV Intermittent every 8 hours 86 yo male ,[facility] resident with PMHx - COPD, DM, HTN, CAD, HLD, H/O TIA, dementia,GERD, BPH and depression sent ot ER for evaluation of left foot 1metatarsal wound ,suggestive of osteomyelitis .Patient was seen by ID consult and transfer to the hospital recommended for wound cx/bone biopsy /podiatry evaluation ,likely will require 4-6 weeks of iv abx . Patient was admitted to [facility] with b/l feet wounds and was followed by wound care team ,recently completed 7 days of doxycycline for foot wound cellulitis . (30 Mar 2023 05:21)      hypernatremia   d5w iv fluid     hypokalemia potassium chloride  10 mEq/100 mL IVPB 10 milliEquivalent(s) IV Intermittent every 1 hour    ACUTE RENAL FAILURE: sodium chloride 0.45%. 1000 milliLiter(s) (50 mL/Hr) IV Continuous  Serum creatinine is improving    There is no progression . No uremic symptoms  No evidence of anemia .  Fluid status stable.  Will continue to avoid nephrotoxic drugs.  Patient remains asymptomatic.   Continue current therapy.  hold  diuretic.        BP monitoring,continue current antihypertensive meds, low salt diet,followup with PMD in 1-2 weeks  losartan 50 milliGRAM(s) Oral daily    f/u  blood and urine cx,serial lactate levels,monitor vitals clement saenz hydration,monitor urine output and renal profile,iv abx   piperacillin/tazobactam IVPB.. 3.375 Gram(s) IV Intermittent every 8 hours

## 2023-04-05 NOTE — PROGRESS NOTE ADULT - SUBJECTIVE AND OBJECTIVE BOX
Patient is a 87y Male whom presented to the hospital with ckd and chelo     PAST MEDICAL & SURGICAL HISTORY:      MEDICATIONS  (STANDING):      Allergies    No Known Allergies    Intolerances        SOCIAL HISTORY:  Denies ETOh,Smoking,     FAMILY HISTORY:      REVIEW OF SYSTEMS:  unable to obtained a good review system                                      11.5   7.72  )-----------( 251      ( 05 Apr 2023 06:40 )             37.9       CBC Full  -  ( 05 Apr 2023 06:40 )  WBC Count : 7.72 K/uL  RBC Count : 4.05 M/uL  Hemoglobin : 11.5 g/dL  Hematocrit : 37.9 %  Platelet Count - Automated : 251 K/uL  Mean Cell Volume : 93.6 fl  Mean Cell Hemoglobin : 28.4 pg  Mean Cell Hemoglobin Concentration : 30.3 gm/dL  Auto Neutrophil # : x  Auto Lymphocyte # : x  Auto Monocyte # : x  Auto Eosinophil # : x  Auto Basophil # : x  Auto Neutrophil % : x  Auto Lymphocyte % : x  Auto Monocyte % : x  Auto Eosinophil % : x  Auto Basophil % : x      04-05    144  |  116<H>  |  26<H>  ----------------------------<  132<H>  3.9   |  25  |  1.20    Ca    8.8      05 Apr 2023 06:40  Phos  3.2     04-04  Mg     2.2     04-04    TPro  6.7  /  Alb  2.2<L>  /  TBili  0.6  /  DBili  0.3  /  AST  27  /  ALT  47  /  AlkPhos  69  04-05      CAPILLARY BLOOD GLUCOSE      POCT Blood Glucose.: 77 mg/dL (05 Apr 2023 11:33)  POCT Blood Glucose.: 116 mg/dL (05 Apr 2023 08:11)  POCT Blood Glucose.: 167 mg/dL (04 Apr 2023 21:11)  POCT Blood Glucose.: 150 mg/dL (04 Apr 2023 17:10)      Vital Signs Last 24 Hrs  T(C): 36.3 (05 Apr 2023 13:05), Max: 36.8 (05 Apr 2023 05:24)  T(F): 97.4 (05 Apr 2023 13:05), Max: 98.2 (05 Apr 2023 05:24)  HR: 83 (05 Apr 2023 13:05) (83 - 102)  BP: 119/68 (05 Apr 2023 13:05) (119/68 - 152/73)  BP(mean): --  RR: 18 (05 Apr 2023 13:05) (17 - 18)  SpO2: 90% (05 Apr 2023 13:05) (90% - 100%)    Parameters below as of 05 Apr 2023 13:05  Patient On (Oxygen Delivery Method): room air                    PHYSICAL EXAM:    Constitutional: NAD  HEENT: conjunctive   clear   Neck:  No JVD  Respiratory: CTAB  Cardiovascular: S1 and S2  Gastrointestinal: BS+, soft,   Extremities: No peripheral edema  Neurological:  no focal deficits

## 2023-04-05 NOTE — PROGRESS NOTE ADULT - ASSESSMENT
88 yo male ,[facility] resident with PMHx - COPD, DM, HTN, CAD, HLD, H/O TIA, dementia,GERD, BPH and depression sent ot ER for evaluation of left foot 1metatarsal wound ,suggestive of osteomyelitis .Patient was seen by ID consult and transfer to the hospital recommended for wound cx/bone biopsy /podiatry evaluation ,likely will require 4-6 weeks of iv abx . Patient was admitted to [facility] with b/l feet wounds and was followed by wound care team ,recently completed 7 days of doxycycline for foot wound cellulitis . 88 yo male ,[facility] resident with PMHx - COPD, DM, HTN, CAD, HLD, H/O TIA, dementia,GERD, BPH and depression sent ot ER for evaluation of left foot 1metatarsal wound ,suggestive of osteomyelitis .Patient was seen by ID consult and transfer to the hospital recommended for wound cx/bone biopsy /podiatry evaluation ,likely will require 4-6 weeks of iv abx . Patient was admitted to [facility] with b/l feet wounds and was followed by wound care team ,recently completed 7 days of doxycycline for foot wound cellulitis . 88 yo male ,[facility] resident with PMHx - COPD, DM, HTN, CAD, HLD, H/O TIA, dementia,GERD, BPH and depression sent ot ER for evaluation of left foot 1metatarsal wound ,suggestive of osteomyelitis .Patient was seen by ID consult and transfer to the hospital recommended for wound cx/bone biopsy /podiatry evaluation ,likely will require 4-6 weeks of iv abx . Patient was admitted to [facility] with b/l feet wounds and was followed by wound care team ,recently completed 7 days of doxycycline for foot wound cellulitis .

## 2023-04-05 NOTE — PROGRESS NOTE ADULT - SUBJECTIVE AND OBJECTIVE BOX
ANA MARIA LEIGH    PLV 1EAS 101 W1    Allergies    No Known Allergies    Intolerances        PAST MEDICAL & SURGICAL HISTORY:  ASHD (arteriosclerotic heart disease)      BPH without urinary obstruction      COPD, moderate      Stage 3 chronic kidney disease      Chronic GERD      HLD (hyperlipidemia)      MDD (major depressive disorder)      Obstructive and reflux uropathy      Cellulitis      HTN (hypertension)      Sepsis      Dementia      Moderate protein-calorie malnutrition      Brain TIA      History of RSV infection      DM type 2, not at goal      Pressure ulcer of unspecified heel, unspecified stage      Venous stasis ulcer without varicose veins      Multiple open wounds of foot          FAMILY HISTORY:      Home Medications:  Admelog SoloStar 100 units/mL injectable solution: injectable 4 times a day (before meals and at bedtime) per sliding scale (30 Mar 2023 15:23)  Aricept 5 mg oral tablet: 1 tab(s) orally once a day (30 Mar 2023 15:23)  atenolol 25 mg oral tablet: 1 tab(s) orally once a day (30 Mar 2023 15:23)  Bacid (LAC) oral tablet: 1 tab(s) orally 2 times a day (30 Mar 2023 15:23)  Cymbalta 60 mg oral delayed release capsule: 1 cap(s) orally once a day (30 Mar 2023 15:23)  docusate sodium 100 mg oral capsule: 2 cap(s) orally once a day (at bedtime) (30 Mar 2023 15:23)  doxycycline hyclate 100 mg oral tablet: 1 tab(s) orally 2 times a day for 7 days  3/28/23-4/4/23 (30 Mar 2023 15:23)  Dulcolax Laxative 10 mg rectal suppository: 1 suppository(ies) rectally as needed for  constipation (30 Mar 2023 15:23)  famotidine 40 mg oral tablet: 1 tab(s) orally once a day (30 Mar 2023 15:23)  Fleet Enema 19 g-7 g rectal enema: 133 milliliter(s) rectally as needed for  constipation (30 Mar 2023 15:23)  Flovent 44 mcg/inh inhalation aerosol with adapter: 1 puff(s) inhaled every 12 hours (30 Mar 2023 15:23)  ipratropium-albuterol 0.5 mg-2.5 mg/3 mL inhalation solution: 3 milliliter(s) by nebulizer 4 times a day (30 Mar 2023 15:23)  losartan 50 mg oral tablet: 1 tab(s) orally once a day (30 Mar 2023 15:23)  Milk of Magnesia 8% oral suspension: 30 milliliter(s) orally once a day as needed for  constipation (30 Mar 2023 15:23)  Santyl 250 units/g topical ointment: Apply topically to affected area (30 Mar 2023 12:41)  simvastatin 40 mg oral tablet: 1 tab(s) orally once a day (at bedtime) (30 Mar 2023 15:23)  SITagliptin 50 mg oral tablet: 1 tab(s) orally once a day (30 Mar 2023 15:23)  tamsulosin 0.4 mg oral capsule: 1 cap(s) orally once a day (in the evening) (30 Mar 2023 15:23)  Therapeutic Multiple Vitamins oral tablet: 1 tab(s) orally once a day (30 Mar 2023 15:23)  traMADol 50 mg oral tablet: 0.5 tab(s) orally 2 times a day (30 Mar 2023 15:23)  Tylenol Caplet Extra Strength 500 mg oral tablet: 2 tab(s) orally every 8 hours (30 Mar 2023 15:23)      MEDICATIONS  (STANDING):  albuterol/ipratropium for Nebulization 3 milliLiter(s) Nebulizer every 8 hours  budesonide 160 MICROgram(s)/formoterol 4.5 MICROgram(s) Inhaler 2 Puff(s) Inhalation two times a day  chlorhexidine 2% Cloths 1 Application(s) Topical daily  dextrose 5%. 1000 milliLiter(s) (100 mL/Hr) IV Continuous <Continuous>  dextrose 5%. 1000 milliLiter(s) (50 mL/Hr) IV Continuous <Continuous>  dextrose 50% Injectable 25 Gram(s) IV Push once  dextrose 50% Injectable 12.5 Gram(s) IV Push once  dextrose 50% Injectable 25 Gram(s) IV Push once  donepezil 5 milliGRAM(s) Oral at bedtime  DULoxetine 60 milliGRAM(s) Oral daily  enoxaparin Injectable 60 milliGRAM(s) SubCutaneous every 12 hours  glucagon  Injectable 1 milliGRAM(s) IntraMuscular once  insulin glargine Injectable (LANTUS) 15 Unit(s) SubCutaneous at bedtime  insulin lispro (ADMELOG) corrective regimen sliding scale   SubCutaneous three times a day before meals  insulin lispro (ADMELOG) corrective regimen sliding scale   SubCutaneous at bedtime  insulin lispro Injectable (ADMELOG) 3 Unit(s) SubCutaneous three times a day before meals  lactobacillus acidophilus 1 Tablet(s) Oral two times a day with meals  losartan 25 milliGRAM(s) Oral daily  metoprolol tartrate 25 milliGRAM(s) Oral three times a day  multivitamin 1 Tablet(s) Oral daily  pantoprazole   Suspension 40 milliGRAM(s) Oral daily  piperacillin/tazobactam IVPB.. 3.375 Gram(s) IV Intermittent every 8 hours  remdesivir  IVPB   IV Intermittent   remdesivir  IVPB 100 milliGRAM(s) IV Intermittent every 24 hours  senna 2 Tablet(s) Oral at bedtime  simvastatin 40 milliGRAM(s) Oral at bedtime  tamsulosin 0.4 milliGRAM(s) Oral at bedtime    MEDICATIONS  (PRN):  acetaminophen     Tablet .. 650 milliGRAM(s) Oral every 6 hours PRN Temp greater or equal to 38C (100.4F), Mild Pain (1 - 3)  aluminum hydroxide/magnesium hydroxide/simethicone Suspension 30 milliLiter(s) Oral every 4 hours PRN Dyspepsia  bisacodyl Suppository 10 milliGRAM(s) Rectal daily PRN Constipation  dextrose Oral Gel 15 Gram(s) Oral once PRN Blood Glucose LESS THAN 70 milliGRAM(s)/deciliter  hydrALAZINE 50 milliGRAM(s) Oral every 6 hours PRN for systolic BP>160  magnesium hydroxide Suspension 30 milliLiter(s) Oral daily PRN Constipation  melatonin 3 milliGRAM(s) Oral at bedtime PRN Insomnia  morphine  - Injectable 2 milliGRAM(s) IV Push every 6 hours PRN Severe Pain (7 - 10)  ondansetron Injectable 4 milliGRAM(s) IV Push every 8 hours PRN Nausea and/or Vomiting  traMADol 50 milliGRAM(s) Oral four times a day PRN Moderate Pain (4 - 6)      Diet, DASH/TLC:   Sodium & Cholesterol Restricted  Consistent Carbohydrate Evening Snack  Soft and Bite Sized (SOFTBTSZ)  Reginald(7 Gm Arginine/7 Gm Glut/1.2 Gm HMB     Qty per Day:  2  Supplement Feeding Modality:  Oral  Glucerna Shake Cans or Servings Per Day:  1       Frequency:  Daily (03-30-23 @ 13:51) [Pending Verification By Attending]  Diet, DASH/TLC:   Sodium & Cholesterol Restricted  Consistent Carbohydrate Evening Snack  Soft and Bite Sized (SOFTBTSZ)  Supplement Feeding Modality:  Oral  Glucerna Shake Cans or Servings Per Day:  1       Frequency:  Daily (03-30-23 @ 05:10) [Active]          Vital Signs Last 24 Hrs  T(C): 36.8 (05 Apr 2023 05:24), Max: 37.2 (04 Apr 2023 12:55)  T(F): 98.2 (05 Apr 2023 05:24), Max: 98.9 (04 Apr 2023 12:55)  HR: 88 (05 Apr 2023 07:58) (88 - 102)  BP: 152/73 (05 Apr 2023 05:24) (136/76 - 152/73)  BP(mean): --  RR: 18 (05 Apr 2023 05:24) (17 - 18)  SpO2: 97% (05 Apr 2023 07:58) (94% - 100%)    Parameters below as of 05 Apr 2023 07:58  Patient On (Oxygen Delivery Method): room air                  LABS:                        11.5   7.72  )-----------( 251      ( 05 Apr 2023 06:40 )             37.9     04-05    144  |  116<H>  |  26<H>  ----------------------------<  132<H>  3.9   |  25  |  1.20    Ca    8.8      05 Apr 2023 06:40  Phos  3.2     04-04  Mg     2.2     04-04    TPro  6.7  /  Alb  2.2<L>  /  TBili  0.6  /  DBili  0.3  /  AST  27  /  ALT  47  /  AlkPhos  69  04-05              WBC:  WBC Count: 7.72 K/uL (04-05 @ 06:40)  WBC Count: 9.06 K/uL (04-04 @ 07:45)  WBC Count: 9.46 K/uL (04-03 @ 07:38)  WBC Count: 10.33 K/uL (04-02 @ 04:20)      MICROBIOLOGY:  RECENT CULTURES:  04-02 Clean Catch Clean Catch (Midstream) Enterococcus faecalis XXXX   >100,000 CFU/ml Enterococcus faecalis    03-29 .Blood XXXX XXXX   No Growth Final    03-29 .Blood XXXX XXXX   No Growth Final                    Sodium:  Sodium, Serum: 144 mmol/L (04-05 @ 06:40)  Sodium, Serum: 144 mmol/L (04-04 @ 07:45)  Sodium, Serum: 148 mmol/L (04-03 @ 07:38)  Sodium, Serum: 152 mmol/L (04-02 @ 04:20)      1.20 mg/dL 04-05 @ 06:40  1.30 mg/dL 04-04 @ 07:45  1.50 mg/dL 04-03 @ 07:38  1.30 mg/dL 04-02 @ 04:20      Hemoglobin:  Hemoglobin: 11.5 g/dL (04-05 @ 06:40)  Hemoglobin: 9.4 g/dL (04-04 @ 07:45)  Hemoglobin: 10.0 g/dL (04-03 @ 07:38)  Hemoglobin: 10.6 g/dL (04-02 @ 04:20)      Platelets: Platelet Count - Automated: 251 K/uL (04-05 @ 06:40)  Platelet Count - Automated: 214 K/uL (04-04 @ 07:45)  Platelet Count - Automated: 222 K/uL (04-03 @ 07:38)  Platelet Count - Automated: 223 K/uL (04-02 @ 04:20)      LIVER FUNCTIONS - ( 05 Apr 2023 06:40 )  Alb: 2.2 g/dL / Pro: 6.7 g/dL / ALK PHOS: 69 U/L / ALT: 47 U/L / AST: 27 U/L / GGT: x                 RADIOLOGY & ADDITIONAL STUDIES:      MICROBIOLOGY:  RECENT CULTURES:  04-02 Clean Catch Clean Catch (Midstream) Enterococcus faecalis XXXX   >100,000 CFU/ml Enterococcus faecalis    03-29 .Blood XXXX XXXX   No Growth Final    03-29 .Blood XXXX XXXX   No Growth Final

## 2023-04-05 NOTE — CHART NOTE - NSCHARTNOTEFT_GEN_A_CORE
Discussed bone scan findings with patient's spouse over telephone conversation. Bone scan (+) OM on the left metatarsal, also right posterior heel wound. Discussed surgical intervention at this time for left foot first metatarsal head resection with sesamoidectomy. Right heel wound is with a stable eschar at this time and will continue local wound care and offloading. Patient to be scheduled for surgery on Monday 04/10/23 since patient's wife is undergoing surgery on Friday and is the health proxy, and is requesting for the procedure to be performed Monday. Discussed at length that patient is high risk for limb loss, more proximal amputation, sepsis, loss of life.

## 2023-04-05 NOTE — PROGRESS NOTE ADULT - NUTRITIONAL ASSESSMENT
MEDICATIONS  (STANDING):  albuterol/ipratropium for Nebulization 3 milliLiter(s) Nebulizer every 8 hours  budesonide 160 MICROgram(s)/formoterol 4.5 MICROgram(s) Inhaler 2 Puff(s) Inhalation two times a day  chlorhexidine 2% Cloths 1 Application(s) Topical daily  dextrose 5%. 1000 milliLiter(s) (50 mL/Hr) IV Continuous <Continuous>  dextrose 5%. 1000 milliLiter(s) (100 mL/Hr) IV Continuous <Continuous>  dextrose 50% Injectable 25 Gram(s) IV Push once  dextrose 50% Injectable 12.5 Gram(s) IV Push once  dextrose 50% Injectable 25 Gram(s) IV Push once  donepezil 5 milliGRAM(s) Oral at bedtime  DULoxetine 60 milliGRAM(s) Oral daily  enoxaparin Injectable 60 milliGRAM(s) SubCutaneous every 12 hours  glucagon  Injectable 1 milliGRAM(s) IntraMuscular once  insulin glargine Injectable (LANTUS) 15 Unit(s) SubCutaneous at bedtime  insulin lispro (ADMELOG) corrective regimen sliding scale   SubCutaneous three times a day before meals  insulin lispro (ADMELOG) corrective regimen sliding scale   SubCutaneous at bedtime  insulin lispro Injectable (ADMELOG) 3 Unit(s) SubCutaneous three times a day before meals  lactobacillus acidophilus 1 Tablet(s) Oral two times a day with meals  losartan 25 milliGRAM(s) Oral daily  metoprolol tartrate 25 milliGRAM(s) Oral three times a day  multivitamin 1 Tablet(s) Oral daily  pantoprazole   Suspension 40 milliGRAM(s) Oral daily  piperacillin/tazobactam IVPB.. 3.375 Gram(s) IV Intermittent every 8 hours  remdesivir  IVPB   IV Intermittent   remdesivir  IVPB 100 milliGRAM(s) IV Intermittent every 24 hours  senna 2 Tablet(s) Oral at bedtime  simvastatin 40 milliGRAM(s) Oral at bedtime  tamsulosin 0.4 milliGRAM(s) Oral at bedtime

## 2023-04-05 NOTE — PROGRESS NOTE ADULT - ASSESSMENT
REVIEW OF SYMPTOMS      Able to give (reliable) ROS  NO     PHYSICAL EXAM    HEENT Unremarkable  atraumatic   RESP Fair air entry EXP prolonged    Harsh breath sound Resp distres mild   CARDIAC S1 S2 No S3     NO JVD    ABDOMEN SOFT BS PRESENT NOT DISTENDED No hepatosplenomegaly   PEDAL EDEMA present No calf tenderness  NO rash       GENERAL DATA .   GOC.     .. 3/30/2023 full code  ALLGY.     .. nka                    WT.   .. 3/30/2023 63  BMI.        .. 3/30/2023 21            ICU STAY.   .. none  COVID.   .. 4/4/2023 scv2 (+)  .. 3/29/2023 scv2 (-)     BEST PRACTICE ISSUES.    HOB ELEVATN.   .. Yes  DVT PPLX.   .. 3/31 lvnx 60.2 Dr Lakhani (a fib)   ..  3/29- 3/31 hpsc   MILLER PPLX.   .. 3/30/2023 protonix 40    INFN PPLX. ..    SP SW LAURENT.   ..  3/30/2023 -> soft bite mild thick        DIET.    ..  3/30/2023 dash  FREE WATER  .. 4/1/2023 fw 250.4      ABGS.    VS/ PO/IO/ VENT/ DRIPS.   4/5/2023 afeb 80 110/60   4/5/2023 ra 96%    PROBLEM/ASSESSMENT/PLAN.  COVID   .. 4/4/2023 scv2 (+)  .. 4/4/2023 oxygenation ok  .. 4/4/2023 pt has mild disease   .. 4/4/2023 rdsv 3 d course started by Dr Mancia   Infection  Osteomyelitius  Possible pneumonia   .. Esr 3/29-3/31/2023 esr 67- 49   .. W 3/29-3/30-3/31-4/1-4/4-4/5/2023       w 11.8 - 12- 10.5- 11.9 -  9 - 7.7   .. cxr 3/30/2023  ........ increasing r lower perihilar infiltrate   .. xr foot left 3/30/2023  ........ stable eroisions around 1st mtp anmd great toe    ........ which could be bone infectn    .. CXR 3/29/2023 Possible hansa pneum  .. 3/29/2023 bc (-)   .. 3/29 zosyn    .. follow cultures  PLEURAL EFFUSION.  .. ct ch 3/30/2023   ........ mild pulm edema  ........ mod r and sml effsn   a/r  .. pl effsn likely sec to chf   COPD  .. 3/30/2023 duoneb.3    .. 3/30/2023 symbicort   hemodynamics  .. La 3/29/2023 la 1.8   .. target map 65 (+)   CAD.  .. 3/30- 3/31/2023 atenolol 25  .. 3/31-4/4      metoprolol 25.2 - metoprolol 25.3   .. 3/30/2023 simvastat 40   RO DVT  .. 4/1/2023 v duplx (-)  CHF.  .. echo 3/30/2023  ........ ef 40%   ........ mod mr   .. bnp 4/2 bnp [id number]   .. 3/30-4/4/2023      losartan 50 - losartan 25   .. 3/30/2023 hydralazin 50.4p   Anemia  .. Hb 3/29-3/30-3/31-4/1-4/4-4/5/2023      Hb 11.2- 10.2 - 9.8 - 11 - 9.4 - 11.5   .. monitor  Hypernatremia.  .. Na 3/31-4/1-4/2-4/3-4/4 Na 147 - 151- 152 - 148- 144    CKD  .. Na 3/29-3/31/2023 Na 144-147   .. Cr 3/29-3/30-3/31-4/1-4/3-4/4/2023      Cr 1.4 - 1.3 - 1.3 - 1.5 - 1.5 - 1.3  .. monitor  OBS  .. 3/30/2023 donepezil     OVERALL .  86 yo M with PMHx HTN, HLD, T2D, dementia (AOx2-3), OA, BPH, CAD, TIA, CKD 3 and GERD HO recent hospital stay 1/24-1/30/2023 [facility]  COPD ex  now admitted with osteomyelitis possible pneum  Pulm consulted 3/29/2023    .. 4/4/2023 pt tested covid (+)  started rdsv Dr Mancia     PROBLEMS  COVID 4/4/2023  .. 4/4/2023 rdsv 3 d  Possible Osteomyelitis  .. l foot 1st mtp  Pneumonia   .. 3/29 zosyn    COPD   CKD   PLEURAL EFFSN 3/30 ct  HFREF MOD MR 3/30 echo    A fib 4/1 lvnx 60.2     PLAN  Antibio bronchodilators monitor  4/1/2023 free water startd     TIME SPENT   Over 25 minutes aggregate care time spent on encounter; activities included   direct patient care, counseling and/or coordinating care reviewing notes, lab data/ imaging , discussion with multidisciplinary team/ patient  /family and explaining in detail risks, benefits, alternatives  of the recommendations     Paulino Rossi m     REVIEW OF SYMPTOMS      Able to give (reliable) ROS  NO     PHYSICAL EXAM    HEENT Unremarkable  atraumatic   RESP Fair air entry EXP prolonged    Harsh breath sound Resp distres mild   CARDIAC S1 S2 No S3     NO JVD    ABDOMEN SOFT BS PRESENT NOT DISTENDED No hepatosplenomegaly   PEDAL EDEMA present No calf tenderness  NO rash       GENERAL DATA .   GOC.     .. 3/30/2023 full code  ALLGY.     .. nka                    WT.   .. 3/30/2023 63  BMI.        .. 3/30/2023 21            ICU STAY.   .. none  COVID.   .. 4/4/2023 scv2 (+)  .. 3/29/2023 scv2 (-)     BEST PRACTICE ISSUES.    HOB ELEVATN.   .. Yes  DVT PPLX.   .. 3/31 lvnx 60.2 Dr Lakhani (a fib)   ..  3/29- 3/31 hpsc   MILLER PPLX.   .. 3/30/2023 protonix 40    INFN PPLX. ..    SP SW LAURENT.   ..  3/30/2023 -> soft bite mild thick        DIET.    ..  3/30/2023 dash  FREE WATER  .. 4/1/2023 fw 250.4      ABGS.    VS/ PO/IO/ VENT/ DRIPS.   4/5/2023 afeb 80 110/60   4/5/2023 ra 96%    PROBLEM/ASSESSMENT/PLAN.  COVID   .. 4/4/2023 scv2 (+)  .. 4/4/2023 oxygenation ok  .. 4/4/2023 pt has mild disease   .. 4/4/2023 rdsv 3 d course started by Dr Mancia   Infection  Osteomyelitius  Possible pneumonia   .. Esr 3/29-3/31/2023 esr 67- 49   .. W 3/29-3/30-3/31-4/1-4/4-4/5/2023       w 11.8 - 12- 10.5- 11.9 -  9 - 7.7   .. cxr 3/30/2023  ........ increasing r lower perihilar infiltrate   .. xr foot left 3/30/2023  ........ stable eroisions around 1st mtp anmd great toe    ........ which could be bone infectn    .. CXR 3/29/2023 Possible hansa pneum  .. 3/29/2023 bc (-)   .. 3/29 zosyn    .. follow cultures  PLEURAL EFFUSION.  .. ct ch 3/30/2023   ........ mild pulm edema  ........ mod r and sml effsn   a/r  .. pl effsn likely sec to chf   COPD  .. 3/30/2023 duoneb.3    .. 3/30/2023 symbicort   hemodynamics  .. La 3/29/2023 la 1.8   .. target map 65 (+)   CAD.  .. 3/30- 3/31/2023 atenolol 25  .. 3/31-4/4      metoprolol 25.2 - metoprolol 25.3   .. 3/30/2023 simvastat 40   RO DVT  .. 4/1/2023 v duplx (-)  CHF.  .. echo 3/30/2023  ........ ef 40%   ........ mod mr   .. bnp 4/2 bnp [id number]   .. 3/30-4/4/2023      losartan 50 - losartan 25   .. 3/30/2023 hydralazin 50.4p   Anemia  .. Hb 3/29-3/30-3/31-4/1-4/4-4/5/2023      Hb 11.2- 10.2 - 9.8 - 11 - 9.4 - 11.5   .. monitor  Hypernatremia.  .. Na 3/31-4/1-4/2-4/3-4/4 Na 147 - 151- 152 - 148- 144    CKD  .. Na 3/29-3/31/2023 Na 144-147   .. Cr 3/29-3/30-3/31-4/1-4/3-4/4/2023      Cr 1.4 - 1.3 - 1.3 - 1.5 - 1.5 - 1.3  .. monitor  OBS  .. 3/30/2023 donepezil     OVERALL .  86 yo M with PMHx HTN, HLD, T2D, dementia (AOx2-3), OA, BPH, CAD, TIA, CKD 3 and GERD HO recent hospital stay 1/24-1/30/2023 [facility]  COPD ex  now admitted with osteomyelitis possible pneum  Pulm consulted 3/29/2023    .. 4/4/2023 pt tested covid (+)  started rdsv Dr Mancia     PROBLEMS  COVID 4/4/2023  .. 4/4/2023 rdsv 3 d  Possible Osteomyelitis  .. l foot 1st mtp  Pneumonia   .. 3/29 zosyn    COPD   CKD   PLEURAL EFFSN 3/30 ct  HFREF MOD MR 3/30 echo    A fib 4/1 lvnx 60.2     PLAN  Antibio bronchodilators monitor  4/1/2023 free water startd     TIME SPENT   Over 25 minutes aggregate care time spent on encounter; activities included   direct patient care, counseling and/or coordinating care reviewing notes, lab data/ imaging , discussion with multidisciplinary team/ patient  /family and explaining in detail risks, benefits, alternatives  of the recommendations     aPulino Rossi m     REVIEW OF SYMPTOMS      Able to give (reliable) ROS  NO     PHYSICAL EXAM    HEENT Unremarkable  atraumatic   RESP Fair air entry EXP prolonged    Harsh breath sound Resp distres mild   CARDIAC S1 S2 No S3     NO JVD    ABDOMEN SOFT BS PRESENT NOT DISTENDED No hepatosplenomegaly   PEDAL EDEMA present No calf tenderness  NO rash       GENERAL DATA .   GOC.     .. 3/30/2023 full code  ALLGY.     .. nka                    WT.   .. 3/30/2023 63  BMI.        .. 3/30/2023 21            ICU STAY.   .. none  COVID.   .. 4/4/2023 scv2 (+)  .. 3/29/2023 scv2 (-)     BEST PRACTICE ISSUES.    HOB ELEVATN.   .. Yes  DVT PPLX.   .. 3/31 lvnx 60.2 Dr Lakhani (a fib)   ..  3/29- 3/31 hpsc   MILLER PPLX.   .. 3/30/2023 protonix 40    INFN PPLX. ..    SP SW LAURENT.   ..  3/30/2023 -> soft bite mild thick        DIET.    ..  3/30/2023 dash  FREE WATER  .. 4/1/2023 fw 250.4      ABGS.    VS/ PO/IO/ VENT/ DRIPS.   4/5/2023 afeb 80 110/60   4/5/2023 ra 96%    PROBLEM/ASSESSMENT/PLAN.  COVID   .. 4/4/2023 scv2 (+)  .. 4/4/2023 oxygenation ok  .. 4/4/2023 pt has mild disease   .. 4/4/2023 rdsv 3 d course started by Dr Mancia   Infection  Osteomyelitius  Possible pneumonia   .. Esr 3/29-3/31/2023 esr 67- 49   .. W 3/29-3/30-3/31-4/1-4/4-4/5/2023       w 11.8 - 12- 10.5- 11.9 -  9 - 7.7   .. cxr 3/30/2023  ........ increasing r lower perihilar infiltrate   .. xr foot left 3/30/2023  ........ stable eroisions around 1st mtp anmd great toe    ........ which could be bone infectn    .. CXR 3/29/2023 Possible hansa pneum  .. 3/29/2023 bc (-)   .. 3/29 zosyn    .. follow cultures  PLEURAL EFFUSION.  .. ct ch 3/30/2023   ........ mild pulm edema  ........ mod r and sml effsn   a/r  .. pl effsn likely sec to chf   COPD  .. 3/30/2023 duoneb.3    .. 3/30/2023 symbicort   hemodynamics  .. La 3/29/2023 la 1.8   .. target map 65 (+)   CAD.  .. 3/30- 3/31/2023 atenolol 25  .. 3/31-4/4      metoprolol 25.2 - metoprolol 25.3   .. 3/30/2023 simvastat 40   RO DVT  .. 4/1/2023 v duplx (-)  CHF.  .. echo 3/30/2023  ........ ef 40%   ........ mod mr   .. bnp 4/2 bnp [id number]   .. 3/30-4/4/2023      losartan 50 - losartan 25   .. 3/30/2023 hydralazin 50.4p   Anemia  .. Hb 3/29-3/30-3/31-4/1-4/4-4/5/2023      Hb 11.2- 10.2 - 9.8 - 11 - 9.4 - 11.5   .. monitor  Hypernatremia.  .. Na 3/31-4/1-4/2-4/3-4/4 Na 147 - 151- 152 - 148- 144    CKD  .. Na 3/29-3/31/2023 Na 144-147   .. Cr 3/29-3/30-3/31-4/1-4/3-4/4/2023      Cr 1.4 - 1.3 - 1.3 - 1.5 - 1.5 - 1.3  .. monitor  OBS  .. 3/30/2023 donepezil     OVERALL .  88 yo M with PMHx HTN, HLD, T2D, dementia (AOx2-3), OA, BPH, CAD, TIA, CKD 3 and GERD HO recent hospital stay 1/24-1/30/2023 [facility]  COPD ex  now admitted with osteomyelitis possible pneum  Pulm consulted 3/29/2023    .. 4/4/2023 pt tested covid (+)  started rdsv Dr Mancia     PROBLEMS  COVID 4/4/2023  .. 4/4/2023 rdsv 3 d  Possible Osteomyelitis  .. l foot 1st mtp  Pneumonia   .. 3/29 zosyn    COPD   CKD   PLEURAL EFFSN 3/30 ct  HFREF MOD MR 3/30 echo    A fib 4/1 lvnx 60.2     PLAN  Antibio bronchodilators monitor  4/1/2023 free water startd     TIME SPENT   Over 25 minutes aggregate care time spent on encounter; activities included   direct patient care, counseling and/or coordinating care reviewing notes, lab data/ imaging , discussion with multidisciplinary team/ patient  /family and explaining in detail risks, benefits, alternatives  of the recommendations     Paulino Rossi m

## 2023-04-05 NOTE — PROGRESS NOTE ADULT - ASSESSMENT
86 yo male ,[facility] resident with PMHx - COPD, DM, HTN, CAD, HLD, H/O TIA, dementia, GERD, BPH and depression, who was sent for evaluation of left foot 1metatarsal wound. Concern for wound infection with underlying osteomyelitis. Bone scan suspicious for L foot 1st toe osteomyelitis. Cannot exclude osteomyelitis on R heel. Discussed with Podiatry, plan for Podiatry intervention this Monday.    Incidentally found to be COVID positive on 4/4. Patient remains afebrile and without leukocytosis. Follow up CRP improved. On room air. Recent CT chest showed evidence of pulmonary edema.    1. COVID  -continue remdesivir until tomorrow 4/6 to complete 3 doses  -AC per primary team    2. Left foot osteomyelitis  -continue Zosyn  -will follow up OR cultures, path    Isis Mancia MD  Division of Infectious Diseases   Cell [phone] between 8am and 6pm   After 6pm and weekends please call ID service at [phone].      86 yo male ,[facility] resident with PMHx - COPD, DM, HTN, CAD, HLD, H/O TIA, dementia, GERD, BPH and depression, who was sent for evaluation of left foot 1metatarsal wound. Concern for wound infection with underlying osteomyelitis. Bone scan suspicious for L foot 1st toe osteomyelitis. Cannot exclude osteomyelitis on R heel. Discussed with Podiatry, plan for Podiatry intervention this Monday.    Incidentally found to be COVID positive on 4/4. Patient remains afebrile and without leukocytosis. Follow up CRP improved. On room air. Recent CT chest showed evidence of pulmonary edema.    1. COVID  -continue remdesivir until tomorrow 4/6 to complete 3 doses  -AC per primary team    2. Left foot osteomyelitis  -continue Zosyn  -will follow up OR cultures, path    Isis Mancia MD  Division of Infectious Diseases   Cell [phone] between 8am and 6pm   After 6pm and weekends please call ID service at [phone].      86 yo male ,[facility] resident with PMHx - COPD, DM, HTN, CAD, HLD, H/O TIA, dementia, GERD, BPH and depression, who was sent for evaluation of left foot 1metatarsal wound. Concern for wound infection with underlying osteomyelitis. Bone scan suspicious for L foot 1st toe osteomyelitis. Cannot exclude osteomyelitis on R heel. Discussed with Podiatry, plan for Podiatry intervention this Monday.    Incidentally found to be COVID positive on 4/4. Patient remains afebrile and without leukocytosis. Follow up CRP improved. On room air. Recent CT chest showed evidence of pulmonary edema.    1. COVID  -continue remdesivir until tomorrow 4/6 to complete 3 doses  -AC per primary team    2. Left foot osteomyelitis  -continue Zosyn  -will follow up OR cultures, path    Isis Mancia MD  Division of Infectious Diseases   Cell [phone] between 8am and 6pm   After 6pm and weekends please call ID service at [phone].

## 2023-04-05 NOTE — PROGRESS NOTE ADULT - SUBJECTIVE AND OBJECTIVE BOX
PROGRESS NOTE  Patient is a 87y old  Male who presents with a chief complaint of left foot infection (05 Apr 2023 09:12)  Chart and available morning labs /imaging are reviewed electronically , urgent issues addressed . More information  is being added upon completion of rounds , when more information is collected and management discussed with consultants , medical staff and social service/case management on the floor   OVERNIGHT  No new issues reported by medical staff . All above noted Patient is resting in a bed comfortably .Confused ,poor mentation .No distress noted Bone scan noted and d/w wife on a phone   HPI:  88 yo male ,[facility] resident with PMHx - COPD, DM, HTN, CAD, HLD, H/O TIA, dementia,GERD, BPH and depression sent ot ER for evaluation of left foot 1metatarsal wound ,suggestive of osteomyelitis .Patient was seen by ID consult and transfer to the hospital recommended for wound cx/bone biopsy /podiatry evaluation ,likely will require 4-6 weeks of iv abx . Patient was admitted to [facility] with b/l feet wounds and was followed by wound care team ,recently completed 7 days of doxycycline for foot wound cellulitis . (30 Mar 2023 05:21)    PAST MEDICAL & SURGICAL HISTORY:  ASHD (arteriosclerotic heart disease)      BPH without urinary obstruction      COPD, moderate      Stage 3 chronic kidney disease      Chronic GERD      HLD (hyperlipidemia)      MDD (major depressive disorder)      Obstructive and reflux uropathy      Cellulitis      HTN (hypertension)      Sepsis      Dementia      Moderate protein-calorie malnutrition      Brain TIA      History of RSV infection      DM type 2, not at goal      Pressure ulcer of unspecified heel, unspecified stage      Venous stasis ulcer without varicose veins      Multiple open wounds of foot          MEDICATIONS  (STANDING):  albuterol/ipratropium for Nebulization 3 milliLiter(s) Nebulizer every 8 hours  budesonide 160 MICROgram(s)/formoterol 4.5 MICROgram(s) Inhaler 2 Puff(s) Inhalation two times a day  chlorhexidine 2% Cloths 1 Application(s) Topical daily  dextrose 5%. 1000 milliLiter(s) (100 mL/Hr) IV Continuous <Continuous>  dextrose 5%. 1000 milliLiter(s) (50 mL/Hr) IV Continuous <Continuous>  dextrose 50% Injectable 25 Gram(s) IV Push once  dextrose 50% Injectable 12.5 Gram(s) IV Push once  dextrose 50% Injectable 25 Gram(s) IV Push once  donepezil 5 milliGRAM(s) Oral at bedtime  DULoxetine 60 milliGRAM(s) Oral daily  enoxaparin Injectable 60 milliGRAM(s) SubCutaneous every 12 hours  glucagon  Injectable 1 milliGRAM(s) IntraMuscular once  insulin glargine Injectable (LANTUS) 15 Unit(s) SubCutaneous at bedtime  insulin lispro (ADMELOG) corrective regimen sliding scale   SubCutaneous three times a day before meals  insulin lispro (ADMELOG) corrective regimen sliding scale   SubCutaneous at bedtime  insulin lispro Injectable (ADMELOG) 3 Unit(s) SubCutaneous three times a day before meals  lactobacillus acidophilus 1 Tablet(s) Oral two times a day with meals  losartan 25 milliGRAM(s) Oral daily  metoprolol tartrate 25 milliGRAM(s) Oral three times a day  multivitamin 1 Tablet(s) Oral daily  pantoprazole   Suspension 40 milliGRAM(s) Oral daily  piperacillin/tazobactam IVPB.. 3.375 Gram(s) IV Intermittent every 8 hours  remdesivir  IVPB   IV Intermittent   remdesivir  IVPB 100 milliGRAM(s) IV Intermittent every 24 hours  senna 2 Tablet(s) Oral at bedtime  simvastatin 40 milliGRAM(s) Oral at bedtime  tamsulosin 0.4 milliGRAM(s) Oral at bedtime    MEDICATIONS  (PRN):  acetaminophen     Tablet .. 650 milliGRAM(s) Oral every 6 hours PRN Temp greater or equal to 38C (100.4F), Mild Pain (1 - 3)  aluminum hydroxide/magnesium hydroxide/simethicone Suspension 30 milliLiter(s) Oral every 4 hours PRN Dyspepsia  bisacodyl Suppository 10 milliGRAM(s) Rectal daily PRN Constipation  dextrose Oral Gel 15 Gram(s) Oral once PRN Blood Glucose LESS THAN 70 milliGRAM(s)/deciliter  hydrALAZINE 50 milliGRAM(s) Oral every 6 hours PRN for systolic BP>160  magnesium hydroxide Suspension 30 milliLiter(s) Oral daily PRN Constipation  melatonin 3 milliGRAM(s) Oral at bedtime PRN Insomnia  morphine  - Injectable 2 milliGRAM(s) IV Push every 6 hours PRN Severe Pain (7 - 10)  ondansetron Injectable 4 milliGRAM(s) IV Push every 8 hours PRN Nausea and/or Vomiting  traMADol 50 milliGRAM(s) Oral four times a day PRN Moderate Pain (4 - 6)      OBJECTIVE    T(C): 36.8 (04-05-23 @ 05:24), Max: 37.2 (04-04-23 @ 12:55)  HR: 88 (04-05-23 @ 07:58) (88 - 102)  BP: 152/73 (04-05-23 @ 05:24) (136/76 - 152/73)  RR: 18 (04-05-23 @ 05:24) (17 - 18)  SpO2: 97% (04-05-23 @ 07:58) (94% - 100%)  Wt(kg): --  I&O's Summary        REVIEW OF SYSTEMS:  CONSTITUTIONAL: No fever, weight loss, or fatigue  Patient is  unable to provide any information/ROS  due to baseline mental status.     PHYSICAL EXAM:  Appearance: NAD. VS past 24 hrs -as above   HEENT:   Moist oral mucosa. Conjunctiva clear b/l.   Neck : supple  Respiratory: Lungs CTAB.  Gastrointestinal:  Soft, nontender. No rebound. No rigidity. BS present	  Cardiovascular: RRR ,S1S2 present  Neurologic: Non-focal. Moving all extremities.  Extremities: No edema. No erythema. No calf tenderness.  Skin: No rashes, No ecchymoses, No cyanosis.	  wounds ,skin lesions-See skin assesment flow sheet   LABS:                        11.5   7.72  )-----------( 251      ( 05 Apr 2023 06:40 )             37.9     04-05    144  |  116<H>  |  26<H>  ----------------------------<  132<H>  3.9   |  25  |  1.20    Ca    8.8      05 Apr 2023 06:40  Phos  3.2     04-04  Mg     2.2     04-04    TPro  6.7  /  Alb  2.2<L>  /  TBili  0.6  /  DBili  0.3  /  AST  27  /  ALT  47  /  AlkPhos  69  04-05    CAPILLARY BLOOD GLUCOSE      POCT Blood Glucose.: 116 mg/dL (05 Apr 2023 08:11)  POCT Blood Glucose.: 167 mg/dL (04 Apr 2023 21:11)  POCT Blood Glucose.: 150 mg/dL (04 Apr 2023 17:10)  POCT Blood Glucose.: 205 mg/dL (04 Apr 2023 12:08)          Culture - Urine (collected 02 Apr 2023 09:15)  Source: Clean Catch Clean Catch (Midstream)  Final Report (04 Apr 2023 19:44):    >100,000 CFU/ml Enterococcus faecalis  Organism: Enterococcus faecalis (04 Apr 2023 19:44)  Organism: Enterococcus faecalis (04 Apr 2023 19:44)    Culture - Blood (collected 29 Mar 2023 21:32)  Source: .Blood  Final Report (04 Apr 2023 01:01):    No Growth Final    Culture - Blood (collected 29 Mar 2023 21:20)  Source: .Blood  Final Report (04 Apr 2023 01:01):    No Growth Final      RADIOLOGY & ADDITIONAL TESTS: < from: NM Multiple Day Procedure (04.04.23 @ 15:46) >  FINDINGS: There are multiple foci of increased white cell accumulation   seen BILATERALLY.    In the LEFT foot, a focus seen at the heel shows no corresponding   activity at supper colloid imaging and likely represents soft tissue   infection. A punctate focus of increased activity seen along the plantar   surface of theLEFT foot appears matched on sulfur colloid and is   indeterminate. An area of intense uptake in the region of the LEFT first   toe appears larger and more intense on white cell imaging, suspicious for   osteomyelitis.  A small focus of white cell accumulation at the LEFT fifth toe shows no   corresponding uptake on sulfur colloid and likely represents soft tissue   infection.    A focus of increased activity seen at the medial RIGHT heel shows no   corresponding activity on the lateral projection, and appears different   in distribution and intensity on the posterior projections. The medial   portion of this focus appears more intense and different contour than the   sulfur colloid images, an area of osteomyelitis is not excluded.      IMPRESSION:   Abnormal combined Indium-111 labeled leukocyte study and   marrow scan. In the LEFT foot, findings at the first toe are suspicious   for osteomyelitis; the other foci appear equivocal or are more likely   soft tissue infection. On the RIGHT, an area of osteomyelitis at the   medial portion of the RIGHT heel wound is not excluded.    < end of copied text >     reviewed elctronically  ASSESSMENT/PLAN: 	  25 minutes aggregate time was spent on this visit, 50% visit time spent in care co-ordination with other attendings and counselling patient .I have discussed care plan with patient / HCP/family member wife o na phone ,who expressed understanding of problems treatment and their effect and side effects, alternatives in details. I have asked if they have any questions and concerns and appropriately addressed them to best of my ability. Advance care planning was discussed , pallitaive care issues ,CMO ,hospice levels of care were discussed in details , forms ,advance directives were reviewed .All questions were answered to the best of my knowledge - 25 min spent.    PROGRESS NOTE  Patient is a 87y old  Male who presents with a chief complaint of left foot infection (05 Apr 2023 09:12)  Chart and available morning labs /imaging are reviewed electronically , urgent issues addressed . More information  is being added upon completion of rounds , when more information is collected and management discussed with consultants , medical staff and social service/case management on the floor   OVERNIGHT  No new issues reported by medical staff . All above noted Patient is resting in a bed comfortably .Confused ,poor mentation .No distress noted Bone scan noted and d/w wife on a phone   HPI:  86 yo male ,[facility] resident with PMHx - COPD, DM, HTN, CAD, HLD, H/O TIA, dementia,GERD, BPH and depression sent ot ER for evaluation of left foot 1metatarsal wound ,suggestive of osteomyelitis .Patient was seen by ID consult and transfer to the hospital recommended for wound cx/bone biopsy /podiatry evaluation ,likely will require 4-6 weeks of iv abx . Patient was admitted to [facility] with b/l feet wounds and was followed by wound care team ,recently completed 7 days of doxycycline for foot wound cellulitis . (30 Mar 2023 05:21)    PAST MEDICAL & SURGICAL HISTORY:  ASHD (arteriosclerotic heart disease)      BPH without urinary obstruction      COPD, moderate      Stage 3 chronic kidney disease      Chronic GERD      HLD (hyperlipidemia)      MDD (major depressive disorder)      Obstructive and reflux uropathy      Cellulitis      HTN (hypertension)      Sepsis      Dementia      Moderate protein-calorie malnutrition      Brain TIA      History of RSV infection      DM type 2, not at goal      Pressure ulcer of unspecified heel, unspecified stage      Venous stasis ulcer without varicose veins      Multiple open wounds of foot          MEDICATIONS  (STANDING):  albuterol/ipratropium for Nebulization 3 milliLiter(s) Nebulizer every 8 hours  budesonide 160 MICROgram(s)/formoterol 4.5 MICROgram(s) Inhaler 2 Puff(s) Inhalation two times a day  chlorhexidine 2% Cloths 1 Application(s) Topical daily  dextrose 5%. 1000 milliLiter(s) (100 mL/Hr) IV Continuous <Continuous>  dextrose 5%. 1000 milliLiter(s) (50 mL/Hr) IV Continuous <Continuous>  dextrose 50% Injectable 25 Gram(s) IV Push once  dextrose 50% Injectable 12.5 Gram(s) IV Push once  dextrose 50% Injectable 25 Gram(s) IV Push once  donepezil 5 milliGRAM(s) Oral at bedtime  DULoxetine 60 milliGRAM(s) Oral daily  enoxaparin Injectable 60 milliGRAM(s) SubCutaneous every 12 hours  glucagon  Injectable 1 milliGRAM(s) IntraMuscular once  insulin glargine Injectable (LANTUS) 15 Unit(s) SubCutaneous at bedtime  insulin lispro (ADMELOG) corrective regimen sliding scale   SubCutaneous three times a day before meals  insulin lispro (ADMELOG) corrective regimen sliding scale   SubCutaneous at bedtime  insulin lispro Injectable (ADMELOG) 3 Unit(s) SubCutaneous three times a day before meals  lactobacillus acidophilus 1 Tablet(s) Oral two times a day with meals  losartan 25 milliGRAM(s) Oral daily  metoprolol tartrate 25 milliGRAM(s) Oral three times a day  multivitamin 1 Tablet(s) Oral daily  pantoprazole   Suspension 40 milliGRAM(s) Oral daily  piperacillin/tazobactam IVPB.. 3.375 Gram(s) IV Intermittent every 8 hours  remdesivir  IVPB   IV Intermittent   remdesivir  IVPB 100 milliGRAM(s) IV Intermittent every 24 hours  senna 2 Tablet(s) Oral at bedtime  simvastatin 40 milliGRAM(s) Oral at bedtime  tamsulosin 0.4 milliGRAM(s) Oral at bedtime    MEDICATIONS  (PRN):  acetaminophen     Tablet .. 650 milliGRAM(s) Oral every 6 hours PRN Temp greater or equal to 38C (100.4F), Mild Pain (1 - 3)  aluminum hydroxide/magnesium hydroxide/simethicone Suspension 30 milliLiter(s) Oral every 4 hours PRN Dyspepsia  bisacodyl Suppository 10 milliGRAM(s) Rectal daily PRN Constipation  dextrose Oral Gel 15 Gram(s) Oral once PRN Blood Glucose LESS THAN 70 milliGRAM(s)/deciliter  hydrALAZINE 50 milliGRAM(s) Oral every 6 hours PRN for systolic BP>160  magnesium hydroxide Suspension 30 milliLiter(s) Oral daily PRN Constipation  melatonin 3 milliGRAM(s) Oral at bedtime PRN Insomnia  morphine  - Injectable 2 milliGRAM(s) IV Push every 6 hours PRN Severe Pain (7 - 10)  ondansetron Injectable 4 milliGRAM(s) IV Push every 8 hours PRN Nausea and/or Vomiting  traMADol 50 milliGRAM(s) Oral four times a day PRN Moderate Pain (4 - 6)      OBJECTIVE    T(C): 36.8 (04-05-23 @ 05:24), Max: 37.2 (04-04-23 @ 12:55)  HR: 88 (04-05-23 @ 07:58) (88 - 102)  BP: 152/73 (04-05-23 @ 05:24) (136/76 - 152/73)  RR: 18 (04-05-23 @ 05:24) (17 - 18)  SpO2: 97% (04-05-23 @ 07:58) (94% - 100%)  Wt(kg): --  I&O's Summary        REVIEW OF SYSTEMS:  CONSTITUTIONAL: No fever, weight loss, or fatigue  Patient is  unable to provide any information/ROS  due to baseline mental status.     PHYSICAL EXAM:  Appearance: NAD. VS past 24 hrs -as above   HEENT:   Moist oral mucosa. Conjunctiva clear b/l.   Neck : supple  Respiratory: Lungs CTAB.  Gastrointestinal:  Soft, nontender. No rebound. No rigidity. BS present	  Cardiovascular: RRR ,S1S2 present  Neurologic: Non-focal. Moving all extremities.  Extremities: No edema. No erythema. No calf tenderness.  Skin: No rashes, No ecchymoses, No cyanosis.	  wounds ,skin lesions-See skin assesment flow sheet   LABS:                        11.5   7.72  )-----------( 251      ( 05 Apr 2023 06:40 )             37.9     04-05    144  |  116<H>  |  26<H>  ----------------------------<  132<H>  3.9   |  25  |  1.20    Ca    8.8      05 Apr 2023 06:40  Phos  3.2     04-04  Mg     2.2     04-04    TPro  6.7  /  Alb  2.2<L>  /  TBili  0.6  /  DBili  0.3  /  AST  27  /  ALT  47  /  AlkPhos  69  04-05    CAPILLARY BLOOD GLUCOSE      POCT Blood Glucose.: 116 mg/dL (05 Apr 2023 08:11)  POCT Blood Glucose.: 167 mg/dL (04 Apr 2023 21:11)  POCT Blood Glucose.: 150 mg/dL (04 Apr 2023 17:10)  POCT Blood Glucose.: 205 mg/dL (04 Apr 2023 12:08)          Culture - Urine (collected 02 Apr 2023 09:15)  Source: Clean Catch Clean Catch (Midstream)  Final Report (04 Apr 2023 19:44):    >100,000 CFU/ml Enterococcus faecalis  Organism: Enterococcus faecalis (04 Apr 2023 19:44)  Organism: Enterococcus faecalis (04 Apr 2023 19:44)    Culture - Blood (collected 29 Mar 2023 21:32)  Source: .Blood  Final Report (04 Apr 2023 01:01):    No Growth Final    Culture - Blood (collected 29 Mar 2023 21:20)  Source: .Blood  Final Report (04 Apr 2023 01:01):    No Growth Final      RADIOLOGY & ADDITIONAL TESTS: < from: NM Multiple Day Procedure (04.04.23 @ 15:46) >  FINDINGS: There are multiple foci of increased white cell accumulation   seen BILATERALLY.    In the LEFT foot, a focus seen at the heel shows no corresponding   activity at supper colloid imaging and likely represents soft tissue   infection. A punctate focus of increased activity seen along the plantar   surface of theLEFT foot appears matched on sulfur colloid and is   indeterminate. An area of intense uptake in the region of the LEFT first   toe appears larger and more intense on white cell imaging, suspicious for   osteomyelitis.  A small focus of white cell accumulation at the LEFT fifth toe shows no   corresponding uptake on sulfur colloid and likely represents soft tissue   infection.    A focus of increased activity seen at the medial RIGHT heel shows no   corresponding activity on the lateral projection, and appears different   in distribution and intensity on the posterior projections. The medial   portion of this focus appears more intense and different contour than the   sulfur colloid images, an area of osteomyelitis is not excluded.      IMPRESSION:   Abnormal combined Indium-111 labeled leukocyte study and   marrow scan. In the LEFT foot, findings at the first toe are suspicious   for osteomyelitis; the other foci appear equivocal or are more likely   soft tissue infection. On the RIGHT, an area of osteomyelitis at the   medial portion of the RIGHT heel wound is not excluded.    < end of copied text >     reviewed elctronically  ASSESSMENT/PLAN: 	  25 minutes aggregate time was spent on this visit, 50% visit time spent in care co-ordination with other attendings and counselling patient .I have discussed care plan with patient / HCP/family member wife o na phone ,who expressed understanding of problems treatment and their effect and side effects, alternatives in details. I have asked if they have any questions and concerns and appropriately addressed them to best of my ability. Advance care planning was discussed , pallitaive care issues ,CMO ,hospice levels of care were discussed in details , forms ,advance directives were reviewed .All questions were answered to the best of my knowledge - 25 min spent.    PROGRESS NOTE  Patient is a 87y old  Male who presents with a chief complaint of left foot infection (05 Apr 2023 09:12)  Chart and available morning labs /imaging are reviewed electronically , urgent issues addressed . More information  is being added upon completion of rounds , when more information is collected and management discussed with consultants , medical staff and social service/case management on the floor   OVERNIGHT  No new issues reported by medical staff . All above noted Patient is resting in a bed comfortably .Confused ,poor mentation .No distress noted Bone scan noted and d/w wife on a phone   HPI:  86 yo male ,[facility] resident with PMHx - COPD, DM, HTN, CAD, HLD, H/O TIA, dementia,GERD, BPH and depression sent ot ER for evaluation of left foot 1metatarsal wound ,suggestive of osteomyelitis .Patient was seen by ID consult and transfer to the hospital recommended for wound cx/bone biopsy /podiatry evaluation ,likely will require 4-6 weeks of iv abx . Patient was admitted to [facility] with b/l feet wounds and was followed by wound care team ,recently completed 7 days of doxycycline for foot wound cellulitis . (30 Mar 2023 05:21)    PAST MEDICAL & SURGICAL HISTORY:  ASHD (arteriosclerotic heart disease)      BPH without urinary obstruction      COPD, moderate      Stage 3 chronic kidney disease      Chronic GERD      HLD (hyperlipidemia)      MDD (major depressive disorder)      Obstructive and reflux uropathy      Cellulitis      HTN (hypertension)      Sepsis      Dementia      Moderate protein-calorie malnutrition      Brain TIA      History of RSV infection      DM type 2, not at goal      Pressure ulcer of unspecified heel, unspecified stage      Venous stasis ulcer without varicose veins      Multiple open wounds of foot          MEDICATIONS  (STANDING):  albuterol/ipratropium for Nebulization 3 milliLiter(s) Nebulizer every 8 hours  budesonide 160 MICROgram(s)/formoterol 4.5 MICROgram(s) Inhaler 2 Puff(s) Inhalation two times a day  chlorhexidine 2% Cloths 1 Application(s) Topical daily  dextrose 5%. 1000 milliLiter(s) (100 mL/Hr) IV Continuous <Continuous>  dextrose 5%. 1000 milliLiter(s) (50 mL/Hr) IV Continuous <Continuous>  dextrose 50% Injectable 25 Gram(s) IV Push once  dextrose 50% Injectable 12.5 Gram(s) IV Push once  dextrose 50% Injectable 25 Gram(s) IV Push once  donepezil 5 milliGRAM(s) Oral at bedtime  DULoxetine 60 milliGRAM(s) Oral daily  enoxaparin Injectable 60 milliGRAM(s) SubCutaneous every 12 hours  glucagon  Injectable 1 milliGRAM(s) IntraMuscular once  insulin glargine Injectable (LANTUS) 15 Unit(s) SubCutaneous at bedtime  insulin lispro (ADMELOG) corrective regimen sliding scale   SubCutaneous three times a day before meals  insulin lispro (ADMELOG) corrective regimen sliding scale   SubCutaneous at bedtime  insulin lispro Injectable (ADMELOG) 3 Unit(s) SubCutaneous three times a day before meals  lactobacillus acidophilus 1 Tablet(s) Oral two times a day with meals  losartan 25 milliGRAM(s) Oral daily  metoprolol tartrate 25 milliGRAM(s) Oral three times a day  multivitamin 1 Tablet(s) Oral daily  pantoprazole   Suspension 40 milliGRAM(s) Oral daily  piperacillin/tazobactam IVPB.. 3.375 Gram(s) IV Intermittent every 8 hours  remdesivir  IVPB   IV Intermittent   remdesivir  IVPB 100 milliGRAM(s) IV Intermittent every 24 hours  senna 2 Tablet(s) Oral at bedtime  simvastatin 40 milliGRAM(s) Oral at bedtime  tamsulosin 0.4 milliGRAM(s) Oral at bedtime    MEDICATIONS  (PRN):  acetaminophen     Tablet .. 650 milliGRAM(s) Oral every 6 hours PRN Temp greater or equal to 38C (100.4F), Mild Pain (1 - 3)  aluminum hydroxide/magnesium hydroxide/simethicone Suspension 30 milliLiter(s) Oral every 4 hours PRN Dyspepsia  bisacodyl Suppository 10 milliGRAM(s) Rectal daily PRN Constipation  dextrose Oral Gel 15 Gram(s) Oral once PRN Blood Glucose LESS THAN 70 milliGRAM(s)/deciliter  hydrALAZINE 50 milliGRAM(s) Oral every 6 hours PRN for systolic BP>160  magnesium hydroxide Suspension 30 milliLiter(s) Oral daily PRN Constipation  melatonin 3 milliGRAM(s) Oral at bedtime PRN Insomnia  morphine  - Injectable 2 milliGRAM(s) IV Push every 6 hours PRN Severe Pain (7 - 10)  ondansetron Injectable 4 milliGRAM(s) IV Push every 8 hours PRN Nausea and/or Vomiting  traMADol 50 milliGRAM(s) Oral four times a day PRN Moderate Pain (4 - 6)      OBJECTIVE    T(C): 36.8 (04-05-23 @ 05:24), Max: 37.2 (04-04-23 @ 12:55)  HR: 88 (04-05-23 @ 07:58) (88 - 102)  BP: 152/73 (04-05-23 @ 05:24) (136/76 - 152/73)  RR: 18 (04-05-23 @ 05:24) (17 - 18)  SpO2: 97% (04-05-23 @ 07:58) (94% - 100%)  Wt(kg): --  I&O's Summary        REVIEW OF SYSTEMS:  CONSTITUTIONAL: No fever, weight loss, or fatigue  Patient is  unable to provide any information/ROS  due to baseline mental status.     PHYSICAL EXAM:  Appearance: NAD. VS past 24 hrs -as above   HEENT:   Moist oral mucosa. Conjunctiva clear b/l.   Neck : supple  Respiratory: Lungs CTAB.  Gastrointestinal:  Soft, nontender. No rebound. No rigidity. BS present	  Cardiovascular: RRR ,S1S2 present  Neurologic: Non-focal. Moving all extremities.  Extremities: No edema. No erythema. No calf tenderness.  Skin: No rashes, No ecchymoses, No cyanosis.	  wounds ,skin lesions-See skin assesment flow sheet   LABS:                        11.5   7.72  )-----------( 251      ( 05 Apr 2023 06:40 )             37.9     04-05    144  |  116<H>  |  26<H>  ----------------------------<  132<H>  3.9   |  25  |  1.20    Ca    8.8      05 Apr 2023 06:40  Phos  3.2     04-04  Mg     2.2     04-04    TPro  6.7  /  Alb  2.2<L>  /  TBili  0.6  /  DBili  0.3  /  AST  27  /  ALT  47  /  AlkPhos  69  04-05    CAPILLARY BLOOD GLUCOSE      POCT Blood Glucose.: 116 mg/dL (05 Apr 2023 08:11)  POCT Blood Glucose.: 167 mg/dL (04 Apr 2023 21:11)  POCT Blood Glucose.: 150 mg/dL (04 Apr 2023 17:10)  POCT Blood Glucose.: 205 mg/dL (04 Apr 2023 12:08)          Culture - Urine (collected 02 Apr 2023 09:15)  Source: Clean Catch Clean Catch (Midstream)  Final Report (04 Apr 2023 19:44):    >100,000 CFU/ml Enterococcus faecalis  Organism: Enterococcus faecalis (04 Apr 2023 19:44)  Organism: Enterococcus faecalis (04 Apr 2023 19:44)    Culture - Blood (collected 29 Mar 2023 21:32)  Source: .Blood  Final Report (04 Apr 2023 01:01):    No Growth Final    Culture - Blood (collected 29 Mar 2023 21:20)  Source: .Blood  Final Report (04 Apr 2023 01:01):    No Growth Final      RADIOLOGY & ADDITIONAL TESTS: < from: NM Multiple Day Procedure (04.04.23 @ 15:46) >  FINDINGS: There are multiple foci of increased white cell accumulation   seen BILATERALLY.    In the LEFT foot, a focus seen at the heel shows no corresponding   activity at supper colloid imaging and likely represents soft tissue   infection. A punctate focus of increased activity seen along the plantar   surface of theLEFT foot appears matched on sulfur colloid and is   indeterminate. An area of intense uptake in the region of the LEFT first   toe appears larger and more intense on white cell imaging, suspicious for   osteomyelitis.  A small focus of white cell accumulation at the LEFT fifth toe shows no   corresponding uptake on sulfur colloid and likely represents soft tissue   infection.    A focus of increased activity seen at the medial RIGHT heel shows no   corresponding activity on the lateral projection, and appears different   in distribution and intensity on the posterior projections. The medial   portion of this focus appears more intense and different contour than the   sulfur colloid images, an area of osteomyelitis is not excluded.      IMPRESSION:   Abnormal combined Indium-111 labeled leukocyte study and   marrow scan. In the LEFT foot, findings at the first toe are suspicious   for osteomyelitis; the other foci appear equivocal or are more likely   soft tissue infection. On the RIGHT, an area of osteomyelitis at the   medial portion of the RIGHT heel wound is not excluded.    < end of copied text >     reviewed elctronically  ASSESSMENT/PLAN: 	  25 minutes aggregate time was spent on this visit, 50% visit time spent in care co-ordination with other attendings and counselling patient .I have discussed care plan with patient / HCP/family member wife o na phone ,who expressed understanding of problems treatment and their effect and side effects, alternatives in details. I have asked if they have any questions and concerns and appropriately addressed them to best of my ability. Advance care planning was discussed , pallitaive care issues ,CMO ,hospice levels of care were discussed in details , forms ,advance directives were reviewed .All questions were answered to the best of my knowledge - 25 min spent.

## 2023-04-06 LAB
ALBUMIN SERPL ELPH-MCNC: 2.1 G/DL — LOW (ref 3.3–5)
ALP SERPL-CCNC: 61 U/L — SIGNIFICANT CHANGE UP (ref 40–120)
ALP SERPL-CCNC: 63 U/L — SIGNIFICANT CHANGE UP (ref 40–120)
ALT FLD-CCNC: 34 U/L — SIGNIFICANT CHANGE UP (ref 12–78)
ANION GAP SERPL CALC-SCNC: 1 MMOL/L — LOW (ref 5–17)
AST SERPL-CCNC: 19 U/L — SIGNIFICANT CHANGE UP (ref 15–37)
BASOPHILS # BLD AUTO: 0.04 K/UL — SIGNIFICANT CHANGE UP (ref 0–0.2)
BASOPHILS NFR BLD AUTO: 0.4 % — SIGNIFICANT CHANGE UP (ref 0–2)
BILIRUB DIRECT SERPL-MCNC: 0.2 MG/DL — SIGNIFICANT CHANGE UP (ref 0–0.3)
BILIRUB INDIRECT FLD-MCNC: 0.2 MG/DL — SIGNIFICANT CHANGE UP (ref 0.2–1)
BILIRUB SERPL-MCNC: 0.4 MG/DL — SIGNIFICANT CHANGE UP (ref 0.2–1.2)
BUN SERPL-MCNC: 32 MG/DL — HIGH (ref 7–23)
CALCIUM SERPL-MCNC: 8.4 MG/DL — LOW (ref 8.5–10.1)
CHLORIDE SERPL-SCNC: 117 MMOL/L — HIGH (ref 96–108)
CO2 SERPL-SCNC: 26 MMOL/L — SIGNIFICANT CHANGE UP (ref 22–31)
CREAT SERPL-MCNC: 1.3 MG/DL — SIGNIFICANT CHANGE UP (ref 0.5–1.3)
EGFR: 53 ML/MIN/1.73M2 — LOW
EOSINOPHIL # BLD AUTO: 0.71 K/UL — HIGH (ref 0–0.5)
EOSINOPHIL NFR BLD AUTO: 7.4 % — HIGH (ref 0–6)
GLUCOSE SERPL-MCNC: 65 MG/DL — LOW (ref 70–99)
HCT VFR BLD CALC: 34.3 % — LOW (ref 39–50)
HGB BLD-MCNC: 10.5 G/DL — LOW (ref 13–17)
IMM GRANULOCYTES NFR BLD AUTO: 0.3 % — SIGNIFICANT CHANGE UP (ref 0–0.9)
INR BLD: 1.4 RATIO — HIGH (ref 0.88–1.16)
LYMPHOCYTES # BLD AUTO: 1.24 K/UL — SIGNIFICANT CHANGE UP (ref 1–3.3)
LYMPHOCYTES # BLD AUTO: 13 % — SIGNIFICANT CHANGE UP (ref 13–44)
MAGNESIUM SERPL-MCNC: 2.3 MG/DL — SIGNIFICANT CHANGE UP (ref 1.6–2.6)
MCHC RBC-ENTMCNC: 29 PG — SIGNIFICANT CHANGE UP (ref 27–34)
MCHC RBC-ENTMCNC: 30.6 GM/DL — LOW (ref 32–36)
MCV RBC AUTO: 94.8 FL — SIGNIFICANT CHANGE UP (ref 80–100)
MONOCYTES # BLD AUTO: 0.67 K/UL — SIGNIFICANT CHANGE UP (ref 0–0.9)
MONOCYTES NFR BLD AUTO: 7 % — SIGNIFICANT CHANGE UP (ref 2–14)
NEUTROPHILS # BLD AUTO: 6.86 K/UL — SIGNIFICANT CHANGE UP (ref 1.8–7.4)
NEUTROPHILS NFR BLD AUTO: 71.9 % — SIGNIFICANT CHANGE UP (ref 43–77)
NRBC # BLD: 0 /100 WBCS — SIGNIFICANT CHANGE UP (ref 0–0)
PHOSPHATE SERPL-MCNC: 3.1 MG/DL — SIGNIFICANT CHANGE UP (ref 2.5–4.5)
PLATELET # BLD AUTO: 286 K/UL — SIGNIFICANT CHANGE UP (ref 150–400)
POTASSIUM SERPL-MCNC: 3.4 MMOL/L — LOW (ref 3.5–5.3)
POTASSIUM SERPL-SCNC: 3.4 MMOL/L — LOW (ref 3.5–5.3)
PROT SERPL-MCNC: 6.3 G/DL — SIGNIFICANT CHANGE UP (ref 6–8.3)
PROT SERPL-MCNC: 6.4 G/DL — SIGNIFICANT CHANGE UP (ref 6–8.3)
PROTHROM AB SERPL-ACNC: 16.4 SEC — HIGH (ref 10.5–13.4)
RBC # BLD: 3.62 M/UL — LOW (ref 4.2–5.8)
RBC # FLD: 17.4 % — HIGH (ref 10.3–14.5)
SARS-COV-2 RNA SPEC QL NAA+PROBE: SIGNIFICANT CHANGE UP
SODIUM SERPL-SCNC: 144 MMOL/L — SIGNIFICANT CHANGE UP (ref 135–145)
WBC # BLD: 9.55 K/UL — SIGNIFICANT CHANGE UP (ref 3.8–10.5)
WBC # FLD AUTO: 9.55 K/UL — SIGNIFICANT CHANGE UP (ref 3.8–10.5)

## 2023-04-06 PROCEDURE — 99232 SBSQ HOSP IP/OBS MODERATE 35: CPT | Mod: 57

## 2023-04-06 RX ORDER — SODIUM CHLORIDE 9 MG/ML
1000 INJECTION, SOLUTION INTRAVENOUS
Refills: 0 | Status: DISCONTINUED | OUTPATIENT
Start: 2023-04-06 | End: 2023-04-07

## 2023-04-06 RX ORDER — INSULIN GLARGINE 100 [IU]/ML
8 INJECTION, SOLUTION SUBCUTANEOUS AT BEDTIME
Refills: 0 | Status: DISCONTINUED | OUTPATIENT
Start: 2023-04-06 | End: 2023-04-10

## 2023-04-06 RX ORDER — DEXTROSE 50 % IN WATER 50 %
12.5 SYRINGE (ML) INTRAVENOUS ONCE
Refills: 0 | Status: COMPLETED | OUTPATIENT
Start: 2023-04-06 | End: 2023-04-06

## 2023-04-06 RX ORDER — POTASSIUM CHLORIDE 20 MEQ
40 PACKET (EA) ORAL ONCE
Refills: 0 | Status: COMPLETED | OUTPATIENT
Start: 2023-04-06 | End: 2023-04-06

## 2023-04-06 RX ADMIN — Medication 1 TABLET(S): at 08:35

## 2023-04-06 RX ADMIN — BUDESONIDE AND FORMOTEROL FUMARATE DIHYDRATE 2 PUFF(S): 160; 4.5 AEROSOL RESPIRATORY (INHALATION) at 06:43

## 2023-04-06 RX ADMIN — PANTOPRAZOLE SODIUM 40 MILLIGRAM(S): 20 TABLET, DELAYED RELEASE ORAL at 12:02

## 2023-04-06 RX ADMIN — Medication 1 TABLET(S): at 18:05

## 2023-04-06 RX ADMIN — PIPERACILLIN AND TAZOBACTAM 25 GRAM(S): 4; .5 INJECTION, POWDER, LYOPHILIZED, FOR SOLUTION INTRAVENOUS at 14:54

## 2023-04-06 RX ADMIN — Medication 25 MILLIGRAM(S): at 06:43

## 2023-04-06 RX ADMIN — Medication 3 MILLILITER(S): at 07:55

## 2023-04-06 RX ADMIN — Medication 1 TABLET(S): at 12:02

## 2023-04-06 RX ADMIN — DULOXETINE HYDROCHLORIDE 60 MILLIGRAM(S): 30 CAPSULE, DELAYED RELEASE ORAL at 12:02

## 2023-04-06 RX ADMIN — TAMSULOSIN HYDROCHLORIDE 0.4 MILLIGRAM(S): 0.4 CAPSULE ORAL at 21:51

## 2023-04-06 RX ADMIN — LOSARTAN POTASSIUM 25 MILLIGRAM(S): 100 TABLET, FILM COATED ORAL at 06:43

## 2023-04-06 RX ADMIN — REMDESIVIR 200 MILLIGRAM(S): 5 INJECTION INTRAVENOUS at 18:06

## 2023-04-06 RX ADMIN — ENOXAPARIN SODIUM 60 MILLIGRAM(S): 100 INJECTION SUBCUTANEOUS at 18:05

## 2023-04-06 RX ADMIN — ENOXAPARIN SODIUM 60 MILLIGRAM(S): 100 INJECTION SUBCUTANEOUS at 06:43

## 2023-04-06 RX ADMIN — Medication 25 MILLIGRAM(S): at 14:54

## 2023-04-06 RX ADMIN — Medication 25 MILLIGRAM(S): at 22:09

## 2023-04-06 RX ADMIN — SIMVASTATIN 40 MILLIGRAM(S): 20 TABLET, FILM COATED ORAL at 21:51

## 2023-04-06 RX ADMIN — Medication 12.5 GRAM(S): at 07:56

## 2023-04-06 RX ADMIN — INSULIN GLARGINE 8 UNIT(S): 100 INJECTION, SOLUTION SUBCUTANEOUS at 21:51

## 2023-04-06 RX ADMIN — PIPERACILLIN AND TAZOBACTAM 25 GRAM(S): 4; .5 INJECTION, POWDER, LYOPHILIZED, FOR SOLUTION INTRAVENOUS at 06:42

## 2023-04-06 RX ADMIN — SODIUM CHLORIDE 40 MILLILITER(S): 9 INJECTION, SOLUTION INTRAVENOUS at 21:52

## 2023-04-06 RX ADMIN — SODIUM CHLORIDE 40 MILLILITER(S): 9 INJECTION, SOLUTION INTRAVENOUS at 10:42

## 2023-04-06 RX ADMIN — DONEPEZIL HYDROCHLORIDE 5 MILLIGRAM(S): 10 TABLET, FILM COATED ORAL at 22:01

## 2023-04-06 RX ADMIN — Medication 3 MILLILITER(S): at 15:50

## 2023-04-06 RX ADMIN — Medication 40 MILLIEQUIVALENT(S): at 12:02

## 2023-04-06 NOTE — PROVIDER CONTACT NOTE (OTHER) - ACTION/TREATMENT ORDERED:
EKG ordered & done placed in chart. Tele strip received from [employer] and placed in chart. EKG ordered & done placed in chart. Tele strip received from "[employer] and placed in chart. EKG ordered & done placed in chart. Tele strip received from [facility] and placed in chart.

## 2023-04-06 NOTE — CONSULT NOTE ADULT - CONSULT REQUESTED BY NAME
Bilateral foot wounds
Dr Sanders
dr estes
Dr Armando
DR LAZARO
Dr. Sanders
HERNANDO Sanders
Dr. Linda Sanders

## 2023-04-06 NOTE — CONSULT NOTE ADULT - PROBLEM SELECTOR RECOMMENDATION 9
decrease lantus 8 units qhs (am hypoglycemia)  cont admelog corrective scale coverage qac/qhs  cont cons cho diet  goal bg 100-180 in hosp setting

## 2023-04-06 NOTE — CONSULT NOTE ADULT - CONSULT REASON
COPD
dm2 uncontrolled
multiple foot wounds
chelo
EKG abnormalities . Pre-op cardiac clearance
Bilateral foot wounds
BLE foot ulcers
87y A1C with Estimated Average Glucose Result: 8.0 % (03-29-23 @ 21:20)   diabetes mellitus uncontrolled type 2

## 2023-04-06 NOTE — PROGRESS NOTE ADULT - NUTRITIONAL ASSESSMENT
This patient has been assessed with a concern for Malnutrition and has been determined to have a diagnosis/diagnoses of Moderate protein-calorie malnutrition.    This patient is being managed with:   Diet Soft and Bite Sized-  Consistent Carbohydrate {Evening Snack}  Low Sodium  Reginald(7 Gm Arginine/7 Gm Glut/1.2 Gm HMB     Qty per Day:  BID  Supplement Feeding Modality:  Oral  Glucerna Shake Cans or Servings Per Day:  1       Frequency:  Two Times a day  Entered: Apr 2 2023 10:30AM    The following pending diet order is being considered for treatment of Moderate protein-calorie malnutrition:  Diet DASH/TLC-  Sodium & Cholesterol Restricted  Consistent Carbohydrate {Evening Snack}  Soft and Bite Sized (SOFTBTSZ)  Reginald(7 Gm Arginine/7 Gm Glut/1.2 Gm HMB     Qty per Day:  2  Supplement Feeding Modality:  Oral  Glucerna Shake Cans or Servings Per Day:  1       Frequency:  Daily  Entered: Mar 30 2023  1:51PM

## 2023-04-06 NOTE — PROGRESS NOTE ADULT - NUTRITIONAL ASSESSMENT
This patient has been assessed with a concern for Malnutrition and has been determined to have a diagnosis/diagnoses of Moderate protein-calorie malnutrition.    This patient is being managed with:   Diet Soft and Bite Sized-  Consistent Carbohydrate {Evening Snack}  Low Sodium  Reginald(7 Gm Arginine/7 Gm Glut/1.2 Gm HMB     Qty per Day:  BID  Supplement Feeding Modality:  Oral  Glucerna Shake Cans or Servings Per Day:  1       Frequency:  Two Times a day  Entered: Apr 2 2023 10:30AM

## 2023-04-06 NOTE — PROGRESS NOTE ADULT - SUBJECTIVE AND OBJECTIVE BOX
87y year old Male seen at [facility] 1EAS 101 W1 for -bilateral foot wounds. Patient was in deep sleep and was not awake during the assessment Denies any fever, chills, nausea, vomiting, chest pain, shortness of breath, or calf pain at this time.    Allergies    No Known Allergies    Intolerances      MEDICATIONS  (STANDING):  albuterol/ipratropium for Nebulization 3 milliLiter(s) Nebulizer every 8 hours  budesonide 160 MICROgram(s)/formoterol 4.5 MICROgram(s) Inhaler 2 Puff(s) Inhalation two times a day  dextrose 5% + sodium chloride 0.45%. 1000 milliLiter(s) (40 mL/Hr) IV Continuous <Continuous>  dextrose 5%. 1000 milliLiter(s) (50 mL/Hr) IV Continuous <Continuous>  dextrose 5%. 1000 milliLiter(s) (100 mL/Hr) IV Continuous <Continuous>  dextrose 50% Injectable 25 Gram(s) IV Push once  dextrose 50% Injectable 12.5 Gram(s) IV Push once  dextrose 50% Injectable 25 Gram(s) IV Push once  donepezil 5 milliGRAM(s) Oral at bedtime  DULoxetine 60 milliGRAM(s) Oral daily  enoxaparin Injectable 60 milliGRAM(s) SubCutaneous every 12 hours  glucagon  Injectable 1 milliGRAM(s) IntraMuscular once  insulin glargine Injectable (LANTUS) 8 Unit(s) SubCutaneous at bedtime  insulin lispro (ADMELOG) corrective regimen sliding scale   SubCutaneous three times a day before meals  insulin lispro (ADMELOG) corrective regimen sliding scale   SubCutaneous at bedtime  lactobacillus acidophilus 1 Tablet(s) Oral two times a day with meals  losartan 25 milliGRAM(s) Oral daily  metoprolol tartrate 25 milliGRAM(s) Oral three times a day  multivitamin 1 Tablet(s) Oral daily  pantoprazole   Suspension 40 milliGRAM(s) Oral daily  remdesivir  IVPB   IV Intermittent   remdesivir  IVPB 100 milliGRAM(s) IV Intermittent every 24 hours  senna 2 Tablet(s) Oral at bedtime  simvastatin 40 milliGRAM(s) Oral at bedtime  tamsulosin 0.4 milliGRAM(s) Oral at bedtime    MEDICATIONS  (PRN):  acetaminophen     Tablet .. 650 milliGRAM(s) Oral every 6 hours PRN Temp greater or equal to 38C (100.4F), Mild Pain (1 - 3)  aluminum hydroxide/magnesium hydroxide/simethicone Suspension 30 milliLiter(s) Oral every 4 hours PRN Dyspepsia  bisacodyl Suppository 10 milliGRAM(s) Rectal daily PRN Constipation  dextrose Oral Gel 15 Gram(s) Oral once PRN Blood Glucose LESS THAN 70 milliGRAM(s)/deciliter  hydrALAZINE 50 milliGRAM(s) Oral every 6 hours PRN for systolic BP>160  magnesium hydroxide Suspension 30 milliLiter(s) Oral daily PRN Constipation  melatonin 3 milliGRAM(s) Oral at bedtime PRN Insomnia  morphine  - Injectable 2 milliGRAM(s) IV Push every 6 hours PRN Severe Pain (7 - 10)  ondansetron Injectable 4 milliGRAM(s) IV Push every 8 hours PRN Nausea and/or Vomiting  traMADol 50 milliGRAM(s) Oral four times a day PRN Moderate Pain (4 - 6)      Vital Signs Last 24 Hrs  T(C): 36.8 (06 Apr 2023 12:10), Max: 36.8 (06 Apr 2023 12:10)  T(F): 98.2 (06 Apr 2023 12:10), Max: 98.2 (06 Apr 2023 12:10)  HR: 93 (06 Apr 2023 15:53) (87 - 101)  BP: 123/73 (06 Apr 2023 12:10) (102/62 - 123/73)  BP(mean): --  RR: 18 (06 Apr 2023 12:10) (18 - 18)  SpO2: 98% (06 Apr 2023 15:53) (90% - 98%)    Parameters below as of 06 Apr 2023 15:53  Patient On (Oxygen Delivery Method): room air      PHYSICAL EXAM:  Vascular: DP & PT non palpable bilaterally, Capillary refill delayed to the digits  Digital hair absent bilaterally  Neurological: Left foot wound very tender to touch   Musculoskeletal: Unable to assess   Dermatological: Left foot 1st metatarsal head 1.0cm x 1.5cm x probe to bone cm ulceration, lateral 5th metatarsal base 1.0cm x 0.5cm x o.3 fibrotic wounds with periwound erythema and serous drainage noted, no fluctuance, no malodor, no proximal streaking at this time. Bilateral posterior plantar  heel eschars - right measuring 3.0cm x 4.0cm x necrotic eschar and left 1.0cm x 0.7cm x necrotic eschar, stable with no fluctuance or drainage.       CBC Full  -  ( 06 Apr 2023 06:37 )  WBC Count : 9.55 K/uL  RBC Count : 3.62 M/uL  Hemoglobin : 10.5 g/dL  Hematocrit : 34.3 %  Platelet Count - Automated : 286 K/uL  Mean Cell Volume : 94.8 fl  Mean Cell Hemoglobin : 29.0 pg  Mean Cell Hemoglobin Concentration : 30.6 gm/dL  Auto Neutrophil # : 6.86 K/uL  Auto Lymphocyte # : 1.24 K/uL  Auto Monocyte # : 0.67 K/uL  Auto Eosinophil # : 0.71 K/uL  Auto Basophil # : 0.04 K/uL  Auto Neutrophil % : 71.9 %  Auto Lymphocyte % : 13.0 %  Auto Monocyte % : 7.0 %  Auto Eosinophil % : 7.4 %  Auto Basophil % : 0.4 %    04-06    144  |  117<H>  |  32<H>  ----------------------------<  65<L>  3.4<L>   |  26  |  1.30    Ca    8.4<L>      06 Apr 2023 06:37  Phos  3.1     04-06  Mg     2.3     04-06    TPro  6.3  /  Alb  2.1<L>  /  TBili  0.4  /  DBili  0.2  /  AST  19  /  ALT  34  /  AlkPhos  61  04-06      PT/INR - ( 06 Apr 2023 06:37 )   PT: 16.4 sec;   INR: 1.40 ratio            Imaging: ----------  ACC: [id number] EXAM: XR FOOT COMP MIN 3 VIEWS LT ORDERED BY: JOHN OLGUIN    PROCEDURE DATE: 03/29/2023        INTERPRETATION: LEFT foot    CLINICAL INFORMATION: Infection:    TECHNIQUE: Oblique radiographs submitted for interpretation..    FINDINGS:  Marked hallux valgus deformity of first metatarsal phalangeal joint with periarticular erosions of the medial first metatarsal head and arthritic narrowing of the metatarsal phalangeal joint with subchondral metatarsal head cystic changes.  Medial soft tissue ulceration adjacent to metatarsal head. No subcutaneous air.    No gross fracture.    .   87y [facility] 1EAS 101 W1 for -bilateral foot wounds. Patient was in deep sleep and was not awake during the assessment Denies any fever, chills, nausea, vomiting, chest pain, shortness of breath, or calf pain at this time.    Allergies    No Known Allergies    Intolerances      MEDICATIONS  (STANDING):  albuterol/ipratropium for Nebulization 3 milliLiter(s) Nebulizer every 8 hours  budesonide 160 MICROgram(s)/formoterol 4.5 MICROgram(s) Inhaler 2 Puff(s) Inhalation two times a day  dextrose 5% + sodium chloride 0.45%. 1000 milliLiter(s) (40 mL/Hr) IV Continuous <Continuous>  dextrose 5%. 1000 milliLiter(s) (50 mL/Hr) IV Continuous <Continuous>  dextrose 5%. 1000 milliLiter(s) (100 mL/Hr) IV Continuous <Continuous>  dextrose 50% Injectable 25 Gram(s) IV Push once  dextrose 50% Injectable 12.5 Gram(s) IV Push once  dextrose 50% Injectable 25 Gram(s) IV Push once  donepezil 5 milliGRAM(s) Oral at bedtime  DULoxetine 60 milliGRAM(s) Oral daily  enoxaparin Injectable 60 milliGRAM(s) SubCutaneous every 12 hours  glucagon  Injectable 1 milliGRAM(s) IntraMuscular once  insulin glargine Injectable (LANTUS) 8 Unit(s) SubCutaneous at bedtime  insulin lispro (ADMELOG) corrective regimen sliding scale   SubCutaneous three times a day before meals  insulin lispro (ADMELOG) corrective regimen sliding scale   SubCutaneous at bedtime  lactobacillus acidophilus 1 Tablet(s) Oral two times a day with meals  losartan 25 milliGRAM(s) Oral daily  metoprolol tartrate 25 milliGRAM(s) Oral three times a day  multivitamin 1 Tablet(s) Oral daily  pantoprazole   Suspension 40 milliGRAM(s) Oral daily  remdesivir  IVPB   IV Intermittent   remdesivir  IVPB 100 milliGRAM(s) IV Intermittent every 24 hours  senna 2 Tablet(s) Oral at bedtime  simvastatin 40 milliGRAM(s) Oral at bedtime  tamsulosin 0.4 milliGRAM(s) Oral at bedtime    MEDICATIONS  (PRN):  acetaminophen     Tablet .. 650 milliGRAM(s) Oral every 6 hours PRN Temp greater or equal to 38C (100.4F), Mild Pain (1 - 3)  aluminum hydroxide/magnesium hydroxide/simethicone Suspension 30 milliLiter(s) Oral every 4 hours PRN Dyspepsia  bisacodyl Suppository 10 milliGRAM(s) Rectal daily PRN Constipation  dextrose Oral Gel 15 Gram(s) Oral once PRN Blood Glucose LESS THAN 70 milliGRAM(s)/deciliter  hydrALAZINE 50 milliGRAM(s) Oral every 6 hours PRN for systolic BP>160  magnesium hydroxide Suspension 30 milliLiter(s) Oral daily PRN Constipation  melatonin 3 milliGRAM(s) Oral at bedtime PRN Insomnia  morphine  - Injectable 2 milliGRAM(s) IV Push every 6 hours PRN Severe Pain (7 - 10)  ondansetron Injectable 4 milliGRAM(s) IV Push every 8 hours PRN Nausea and/or Vomiting  traMADol 50 milliGRAM(s) Oral four times a day PRN Moderate Pain (4 - 6)      Vital Signs Last 24 Hrs  T(C): 36.8 (06 Apr 2023 12:10), Max: 36.8 (06 Apr 2023 12:10)  T(F): 98.2 (06 Apr 2023 12:10), Max: 98.2 (06 Apr 2023 12:10)  HR: 93 (06 Apr 2023 15:53) (87 - 101)  BP: 123/73 (06 Apr 2023 12:10) (102/62 - 123/73)  BP(mean): --  RR: 18 (06 Apr 2023 12:10) (18 - 18)  SpO2: 98% (06 Apr 2023 15:53) (90% - 98%)    Parameters below as of 06 Apr 2023 15:53  Patient On (Oxygen Delivery Method): room air      PHYSICAL EXAM:  Vascular: DP & PT non palpable bilaterally, Capillary refill delayed to the digits  Digital hair absent bilaterally  Neurological: Left foot wound very tender to touch   Musculoskeletal: Unable to assess   Dermatological: Left foot 1st metatarsal head 1.0cm x 1.5cm x probe to bone cm ulceration, lateral 5th metatarsal base 1.0cm x 0.5cm x o.3 fibrotic wounds with periwound erythema and serous drainage noted, no fluctuance, no malodor, no proximal streaking at this time. Bilateral posterior plantar  heel eschars - right measuring 3.0cm x 4.0cm x necrotic eschar and left 1.0cm x 0.7cm x necrotic eschar, stable with no fluctuance or drainage.       CBC Full  -  ( 06 Apr 2023 06:37 )  WBC Count : 9.55 K/uL  RBC Count : 3.62 M/uL  Hemoglobin : 10.5 g/dL  Hematocrit : 34.3 %  Platelet Count - Automated : 286 K/uL  Mean Cell Volume : 94.8 fl  Mean Cell Hemoglobin : 29.0 pg  Mean Cell Hemoglobin Concentration : 30.6 gm/dL  Auto Neutrophil # : 6.86 K/uL  Auto Lymphocyte # : 1.24 K/uL  Auto Monocyte # : 0.67 K/uL  Auto Eosinophil # : 0.71 K/uL  Auto Basophil # : 0.04 K/uL  Auto Neutrophil % : 71.9 %  Auto Lymphocyte % : 13.0 %  Auto Monocyte % : 7.0 %  Auto Eosinophil % : 7.4 %  Auto Basophil % : 0.4 %    04-06    144  |  117<H>  |  32<H>  ----------------------------<  65<L>  3.4<L>   |  26  |  1.30    Ca    8.4<L>      06 Apr 2023 06:37  Phos  3.1     04-06  Mg     2.3     04-06    TPro  6.3  /  Alb  2.1<L>  /  TBili  0.4  /  DBili  0.2  /  AST  19  /  ALT  34  /  AlkPhos  61  04-06      PT/INR - ( 06 Apr 2023 06:37 )   PT: 16.4 sec;   INR: 1.40 ratio            Imaging: ----------  ACC: [id number] EXAM: XR FOOT COMP MIN 3 VIEWS LT ORDERED BY: JOHN OLGUIN    PROCEDURE DATE: 03/29/2023        INTERPRETATION: LEFT foot    CLINICAL INFORMATION: Infection:    TECHNIQUE: Oblique radiographs submitted for interpretation..    FINDINGS:  Marked hallux valgus deformity of first metatarsal phalangeal joint with periarticular erosions of the medial first metatarsal head and arthritic narrowing of the metatarsal phalangeal joint with subchondral metatarsal head cystic changes.  Medial soft tissue ulceration adjacent to metatarsal head. No subcutaneous air.    No gross fracture.    .   87y year old Male seen at [facility] 1EAS 101 W1 for -bilateral foot wounds. Patient was in deep sleep and was not awake during the assessment Denies any fever, chills, nausea, vomiting, chest pain, shortness of breath, or calf pain at this time.    Allergies    No Known Allergies    Intolerances      MEDICATIONS  (STANDING):  albuterol/ipratropium for Nebulization 3 milliLiter(s) Nebulizer every 8 hours  budesonide 160 MICROgram(s)/formoterol 4.5 MICROgram(s) Inhaler 2 Puff(s) Inhalation two times a day  dextrose 5% + sodium chloride 0.45%. 1000 milliLiter(s) (40 mL/Hr) IV Continuous <Continuous>  dextrose 5%. 1000 milliLiter(s) (50 mL/Hr) IV Continuous <Continuous>  dextrose 5%. 1000 milliLiter(s) (100 mL/Hr) IV Continuous <Continuous>  dextrose 50% Injectable 25 Gram(s) IV Push once  dextrose 50% Injectable 12.5 Gram(s) IV Push once  dextrose 50% Injectable 25 Gram(s) IV Push once  donepezil 5 milliGRAM(s) Oral at bedtime  DULoxetine 60 milliGRAM(s) Oral daily  enoxaparin Injectable 60 milliGRAM(s) SubCutaneous every 12 hours  glucagon  Injectable 1 milliGRAM(s) IntraMuscular once  insulin glargine Injectable (LANTUS) 8 Unit(s) SubCutaneous at bedtime  insulin lispro (ADMELOG) corrective regimen sliding scale   SubCutaneous three times a day before meals  insulin lispro (ADMELOG) corrective regimen sliding scale   SubCutaneous at bedtime  lactobacillus acidophilus 1 Tablet(s) Oral two times a day with meals  losartan 25 milliGRAM(s) Oral daily  metoprolol tartrate 25 milliGRAM(s) Oral three times a day  multivitamin 1 Tablet(s) Oral daily  pantoprazole   Suspension 40 milliGRAM(s) Oral daily  remdesivir  IVPB   IV Intermittent   remdesivir  IVPB 100 milliGRAM(s) IV Intermittent every 24 hours  senna 2 Tablet(s) Oral at bedtime  simvastatin 40 milliGRAM(s) Oral at bedtime  tamsulosin 0.4 milliGRAM(s) Oral at bedtime    MEDICATIONS  (PRN):  acetaminophen     Tablet .. 650 milliGRAM(s) Oral every 6 hours PRN Temp greater or equal to 38C (100.4F), Mild Pain (1 - 3)  aluminum hydroxide/magnesium hydroxide/simethicone Suspension 30 milliLiter(s) Oral every 4 hours PRN Dyspepsia  bisacodyl Suppository 10 milliGRAM(s) Rectal daily PRN Constipation  dextrose Oral Gel 15 Gram(s) Oral once PRN Blood Glucose LESS THAN 70 milliGRAM(s)/deciliter  hydrALAZINE 50 milliGRAM(s) Oral every 6 hours PRN for systolic BP>160  magnesium hydroxide Suspension 30 milliLiter(s) Oral daily PRN Constipation  melatonin 3 milliGRAM(s) Oral at bedtime PRN Insomnia  morphine  - Injectable 2 milliGRAM(s) IV Push every 6 hours PRN Severe Pain (7 - 10)  ondansetron Injectable 4 milliGRAM(s) IV Push every 8 hours PRN Nausea and/or Vomiting  traMADol 50 milliGRAM(s) Oral four times a day PRN Moderate Pain (4 - 6)      Vital Signs Last 24 Hrs  T(C): 36.8 (06 Apr 2023 12:10), Max: 36.8 (06 Apr 2023 12:10)  T(F): 98.2 (06 Apr 2023 12:10), Max: 98.2 (06 Apr 2023 12:10)  HR: 93 (06 Apr 2023 15:53) (87 - 101)  BP: 123/73 (06 Apr 2023 12:10) (102/62 - 123/73)  BP(mean): --  RR: 18 (06 Apr 2023 12:10) (18 - 18)  SpO2: 98% (06 Apr 2023 15:53) (90% - 98%)    Parameters below as of 06 Apr 2023 15:53  Patient On (Oxygen Delivery Method): room air      PHYSICAL EXAM:  Vascular: DP & PT non palpable bilaterally, Capillary refill delayed to the digits  Digital hair absent bilaterally  Neurological: Left foot wound very tender to touch   Musculoskeletal: Unable to assess   Dermatological: Left foot 1st metatarsal head 1.0cm x 1.5cm x probe to bone cm ulceration, lateral 5th metatarsal base 1.0cm x 0.5cm x o.3 fibrotic wounds with periwound erythema and serous drainage noted, no fluctuance, no malodor, no proximal streaking at this time. Bilateral posterior plantar  heel eschars - right measuring 3.0cm x 4.0cm x necrotic eschar and left 1.0cm x 0.7cm x necrotic eschar, stable with no fluctuance or drainage.       CBC Full  -  ( 06 Apr 2023 06:37 )  WBC Count : 9.55 K/uL  RBC Count : 3.62 M/uL  Hemoglobin : 10.5 g/dL  Hematocrit : 34.3 %  Platelet Count - Automated : 286 K/uL  Mean Cell Volume : 94.8 fl  Mean Cell Hemoglobin : 29.0 pg  Mean Cell Hemoglobin Concentration : 30.6 gm/dL  Auto Neutrophil # : 6.86 K/uL  Auto Lymphocyte # : 1.24 K/uL  Auto Monocyte # : 0.67 K/uL  Auto Eosinophil # : 0.71 K/uL  Auto Basophil # : 0.04 K/uL  Auto Neutrophil % : 71.9 %  Auto Lymphocyte % : 13.0 %  Auto Monocyte % : 7.0 %  Auto Eosinophil % : 7.4 %  Auto Basophil % : 0.4 %    04-06    144  |  117<H>  |  32<H>  ----------------------------<  65<L>  3.4<L>   |  26  |  1.30    Ca    8.4<L>      06 Apr 2023 06:37  Phos  3.1     04-06  Mg     2.3     04-06    TPro  6.3  /  Alb  2.1<L>  /  TBili  0.4  /  DBili  0.2  /  AST  19  /  ALT  34  /  AlkPhos  61  04-06      PT/INR - ( 06 Apr 2023 06:37 )   PT: 16.4 sec;   INR: 1.40 ratio            Imaging: ----------  ACC: [id number] EXAM: XR FOOT COMP MIN 3 VIEWS LT ORDERED BY: JOHN OLGUIN    PROCEDURE DATE: 03/29/2023        INTERPRETATION: LEFT foot    CLINICAL INFORMATION: Infection:    TECHNIQUE: Oblique radiographs submitted for interpretation..    FINDINGS:  Marked hallux valgus deformity of first metatarsal phalangeal joint with periarticular erosions of the medial first metatarsal head and arthritic narrowing of the metatarsal phalangeal joint with subchondral metatarsal head cystic changes.  Medial soft tissue ulceration adjacent to metatarsal head. No subcutaneous air.    No gross fracture.    .

## 2023-04-06 NOTE — PROGRESS NOTE ADULT - PROBLEM SELECTOR PLAN 1
Left foot X- Rays (+) OM at the first metatarsal head   Ordered MRI for the left and right foot to r/o OM - Unable to obtain MRI due to technical problems   Bone scan (+) for OM to the left hallux and right posterior heel   Discussed with patient's wife over the phone  have ordered bone scan will discuss surgical intervention pending results   Discussed with family conservative vs surgical intervention for the left foot wound. Patient is high risk for amputation and for sepsis, loss of limb and loss life.   Discussed bone scan findings with patient's spouse over telephone conversation again today. Bone scan (+) OM on the left metatarsal, also right posterior heel wound. Discussed surgical intervention at this time for left foot first metatarsal head resection with sesamoidectomy. Right heel wound is with a stable eschar at this time and will continue local wound care and offloading. Patient scheduled for surgery on Monday 04/10/23 since patient's wife is undergoing surgery on Friday and is the health proxy, and is requesting for the procedure to be performed Monday. Discussed at length that patient is high risk for limb loss, more proximal amputation, sepsis, loss of life.  Vascular recs appreciated   Continue local wound care and offloading at this time.  Dressing changes per wound care orders.  Please keep patient NPO after midnight on Sunday for surgical procedure Monday  Request medical and cardiac risk stratification

## 2023-04-06 NOTE — PROGRESS NOTE ADULT - ASSESSMENT
86 yo male ,[facility] resident with PMHx - COPD, DM, HTN, CAD, HLD, H/O TIA, dementia,GERD, BPH and depression sent ot ER for evaluation of left foot 1metatarsal wound ,suggestive of osteomyelitis .Patient was seen by ID consult and transfer to the hospital recommended for wound cx/bone biopsy /podiatry evaluation ,likely will require 4-6 weeks of iv abx . Patient was admitted to [facility] with b/l feet wounds and was followed by wound care team ,recently completed 7 days of doxycycline for foot wound cellulitis . (30 Mar 2023 05:21)      hypernatremia   d5w iv fluid     hypokalemia potassium chloride  10 mEq/100 mL IVPB 10 milliEquivalent(s) IV Intermittent every 1 hour    ACUTE RENAL FAILURE: sodium chloride 0.45%. 1000 milliLiter(s) (50 mL/Hr) IV Continuous  Serum creatinine is improving    There is no progression . No uremic symptoms  No evidence of anemia .  Fluid status stable.  Will continue to avoid nephrotoxic drugs.  Patient remains asymptomatic.   Continue current therapy.  hold  diuretic.        BP monitoring,continue current antihypertensive meds, low salt diet,followup with PMD in 1-2 weeks  losartan 50 milliGRAM(s) Oral daily    f/u  blood and urine cx,serial lactate levels,monitor vitals clement saenz hydration,monitor urine output and renal profile,iv abx   piperacillin/tazobactam IVPB.. 3.375 Gram(s) IV Intermittent every 8 hours 86 yo male ,[facility] resident with PMHx - COPD, DM, HTN, CAD, HLD, H/O TIA, dementia,GERD, BPH and depression sent ot ER for evaluation of left foot 1metatarsal wound ,suggestive of osteomyelitis .Patient was seen by ID consult and transfer to the hospital recommended for wound cx/bone biopsy /podiatry evaluation ,likely will require 4-6 weeks of iv abx . Patient was admitted to [facility] with b/l feet wounds and was followed by wound care team ,recently completed 7 days of doxycycline for foot wound cellulitis . (30 Mar 2023 05:21)      hypernatremia   d5w iv fluid     hypokalemia potassium chloride  10 mEq/100 mL IVPB 10 milliEquivalent(s) IV Intermittent every 1 hour    ACUTE RENAL FAILURE: sodium chloride 0.45%. 1000 milliLiter(s) (50 mL/Hr) IV Continuous  Serum creatinine is improving    There is no progression . No uremic symptoms  No evidence of anemia .  Fluid status stable.  Will continue to avoid nephrotoxic drugs.  Patient remains asymptomatic.   Continue current therapy.  hold  diuretic.        BP monitoring,continue current antihypertensive meds, low salt diet,followup with PMD in 1-2 weeks  losartan 50 milliGRAM(s) Oral daily    f/u  blood and urine cx,serial lactate levels,monitor vitals clement saenz hydration,monitor urine output and renal profile,iv abx   piperacillin/tazobactam IVPB.. 3.375 Gram(s) IV Intermittent every 8 hours 86 yo male ,[facility] resident with PMHx - COPD, DM, HTN, CAD, HLD, H/O TIA, dementia,GERD, BPH and depression sent ot ER for evaluation of left foot 1metatarsal wound ,suggestive of osteomyelitis .Patient was seen by ID consult and transfer to the hospital recommended for wound cx/bone biopsy /podiatry evaluation ,likely will require 4-6 weeks of iv abx . Patient was admitted to [facility] with b/l feet wounds and was followed by wound care team ,recently completed 7 days of doxycycline for foot wound cellulitis . (30 Mar 2023 05:21)      hypernatremia   d5w iv fluid     hypokalemia potassium chloride  10 mEq/100 mL IVPB 10 milliEquivalent(s) IV Intermittent every 1 hour    ACUTE RENAL FAILURE: sodium chloride 0.45%. 1000 milliLiter(s) (50 mL/Hr) IV Continuous  Serum creatinine is improving    There is no progression . No uremic symptoms  No evidence of anemia .  Fluid status stable.  Will continue to avoid nephrotoxic drugs.  Patient remains asymptomatic.   Continue current therapy.  hold  diuretic.        BP monitoring,continue current antihypertensive meds, low salt diet,followup with PMD in 1-2 weeks  losartan 50 milliGRAM(s) Oral daily    f/u  blood and urine cx,serial lactate levels,monitor vitals clement saenz hydration,monitor urine output and renal profile,iv abx   piperacillin/tazobactam IVPB.. 3.375 Gram(s) IV Intermittent every 8 hours

## 2023-04-06 NOTE — CONSULT NOTE ADULT - PROVIDER SPECIALTY LIST ADULT
Infectious Disease
Podiatry
Pulmonology
Vascular Surgery
Endocrinology
Cardiology
Nephrology
Diabetes

## 2023-04-06 NOTE — PROGRESS NOTE ADULT - SUBJECTIVE AND OBJECTIVE BOX
PROGRESS NOTE  Patient is a 87y old  Male who presents with a chief complaint of left foot infection (06 Apr 2023 12:43)    Chart and available morning labs /imaging are reviewed electronically , urgent issues addressed . More information  is being added upon completion of rounds , when more information is collected and management discussed with consultants , medical staff and social service/case management on the floor   OVERNIGHT  No new issues reported by medical staff . All above noted Patient is resting in a bed comfortably .Confused ,poor mentation .No distress noted   Low BG reported this am  ,d/w diabetic team and adjustments are made .Surgery planned for monday ,cardiology clearance requested ,d/w Dr Arevalo  HPI:  88 yo male ,[facility] resident with PMHx - COPD, DM, HTN, CAD, HLD, H/O TIA, dementia,GERD, BPH and depression sent ot ER for evaluation of left foot 1metatarsal wound ,suggestive of osteomyelitis .Patient was seen by ID consult and transfer to the hospital recommended for wound cx/bone biopsy /podiatry evaluation ,likely will require 4-6 weeks of iv abx . Patient was admitted to [facility] with b/l feet wounds and was followed by wound care team ,recently completed 7 days of doxycycline for foot wound cellulitis . (30 Mar 2023 05:21)    PAST MEDICAL & SURGICAL HISTORY:  ASHD (arteriosclerotic heart disease)      BPH without urinary obstruction      COPD, moderate      Stage 3 chronic kidney disease      Chronic GERD      HLD (hyperlipidemia)      MDD (major depressive disorder)      Obstructive and reflux uropathy      Cellulitis      HTN (hypertension)      Sepsis      Dementia      Moderate protein-calorie malnutrition      Brain TIA      History of RSV infection      DM type 2, not at goal      Pressure ulcer of unspecified heel, unspecified stage      Venous stasis ulcer without varicose veins      Multiple open wounds of foot          MEDICATIONS  (STANDING):  albuterol/ipratropium for Nebulization 3 milliLiter(s) Nebulizer every 8 hours  budesonide 160 MICROgram(s)/formoterol 4.5 MICROgram(s) Inhaler 2 Puff(s) Inhalation two times a day  dextrose 5% + sodium chloride 0.45%. 1000 milliLiter(s) (40 mL/Hr) IV Continuous <Continuous>  dextrose 5%. 1000 milliLiter(s) (100 mL/Hr) IV Continuous <Continuous>  dextrose 5%. 1000 milliLiter(s) (50 mL/Hr) IV Continuous <Continuous>  dextrose 50% Injectable 25 Gram(s) IV Push once  dextrose 50% Injectable 12.5 Gram(s) IV Push once  dextrose 50% Injectable 25 Gram(s) IV Push once  donepezil 5 milliGRAM(s) Oral at bedtime  DULoxetine 60 milliGRAM(s) Oral daily  enoxaparin Injectable 60 milliGRAM(s) SubCutaneous every 12 hours  glucagon  Injectable 1 milliGRAM(s) IntraMuscular once  insulin glargine Injectable (LANTUS) 8 Unit(s) SubCutaneous at bedtime  insulin lispro (ADMELOG) corrective regimen sliding scale   SubCutaneous three times a day before meals  insulin lispro (ADMELOG) corrective regimen sliding scale   SubCutaneous at bedtime  lactobacillus acidophilus 1 Tablet(s) Oral two times a day with meals  losartan 25 milliGRAM(s) Oral daily  metoprolol tartrate 25 milliGRAM(s) Oral three times a day  multivitamin 1 Tablet(s) Oral daily  pantoprazole   Suspension 40 milliGRAM(s) Oral daily  remdesivir  IVPB   IV Intermittent   remdesivir  IVPB 100 milliGRAM(s) IV Intermittent every 24 hours  senna 2 Tablet(s) Oral at bedtime  simvastatin 40 milliGRAM(s) Oral at bedtime  tamsulosin 0.4 milliGRAM(s) Oral at bedtime    MEDICATIONS  (PRN):  acetaminophen     Tablet .. 650 milliGRAM(s) Oral every 6 hours PRN Temp greater or equal to 38C (100.4F), Mild Pain (1 - 3)  aluminum hydroxide/magnesium hydroxide/simethicone Suspension 30 milliLiter(s) Oral every 4 hours PRN Dyspepsia  bisacodyl Suppository 10 milliGRAM(s) Rectal daily PRN Constipation  dextrose Oral Gel 15 Gram(s) Oral once PRN Blood Glucose LESS THAN 70 milliGRAM(s)/deciliter  hydrALAZINE 50 milliGRAM(s) Oral every 6 hours PRN for systolic BP>160  magnesium hydroxide Suspension 30 milliLiter(s) Oral daily PRN Constipation  melatonin 3 milliGRAM(s) Oral at bedtime PRN Insomnia  morphine  - Injectable 2 milliGRAM(s) IV Push every 6 hours PRN Severe Pain (7 - 10)  ondansetron Injectable 4 milliGRAM(s) IV Push every 8 hours PRN Nausea and/or Vomiting  traMADol 50 milliGRAM(s) Oral four times a day PRN Moderate Pain (4 - 6)      OBJECTIVE    T(C): 36.8 (04-06-23 @ 12:10), Max: 36.8 (04-06-23 @ 12:10)  HR: 93 (04-06-23 @ 15:53) (87 - 101)  BP: 123/73 (04-06-23 @ 12:10) (102/62 - 123/73)  RR: 18 (04-06-23 @ 12:10) (18 - 18)  SpO2: 98% (04-06-23 @ 15:53) (90% - 98%)  Wt(kg): --  I&O's Summary    05 Apr 2023 07:01  -  06 Apr 2023 07:00  --------------------------------------------------------  IN: 240 mL / OUT: 600 mL / NET: -360 mL    06 Apr 2023 07:01  -  06 Apr 2023 17:07  --------------------------------------------------------  IN: 350 mL / OUT: 0 mL / NET: 350 mL          REVIEW OF SYSTEMS:  CONSTITUTIONAL: No fever, weight loss, or fatigue  Patient is  unable to provide any information/ROS  due to baseline mental status.     PHYSICAL EXAM:  Appearance: NAD. VS past 24 hrs -as above   HEENT:   Moist oral mucosa. Conjunctiva clear b/l.   Neck : supple  Respiratory: Lungs CTAB.  Gastrointestinal:  Soft, nontender. No rebound. No rigidity. BS present	  Cardiovascular: RRR ,S1S2 present  Neurologic: Non-focal. Moving all extremities.  Extremities: No edema. No erythema. No calf tenderness.  Skin: No rashes, No ecchymoses, No cyanosis.	  wounds ,skin lesions-See skin assesment flow sheet   LABS:                        10.5   9.55  )-----------( 286      ( 06 Apr 2023 06:37 )             34.3     04-06    144  |  117<H>  |  32<H>  ----------------------------<  65<L>  3.4<L>   |  26  |  1.30    Ca    8.4<L>      06 Apr 2023 06:37  Phos  3.1     04-06  Mg     2.3     04-06    TPro  6.3  /  Alb  2.1<L>  /  TBili  0.4  /  DBili  0.2  /  AST  19  /  ALT  34  /  AlkPhos  61  04-06    CAPILLARY BLOOD GLUCOSE      POCT Blood Glucose.: 139 mg/dL (06 Apr 2023 16:46)  POCT Blood Glucose.: 99 mg/dL (06 Apr 2023 11:46)  POCT Blood Glucose.: 126 mg/dL (06 Apr 2023 08:16)  POCT Blood Glucose.: 59 mg/dL (06 Apr 2023 07:45)  POCT Blood Glucose.: 69 mg/dL (06 Apr 2023 07:42)  POCT Blood Glucose.: 175 mg/dL (05 Apr 2023 21:39)  POCT Blood Glucose.: 116 mg/dL (05 Apr 2023 19:03)  POCT Blood Glucose.: 97 mg/dL (05 Apr 2023 17:51)  POCT Blood Glucose.: 86 mg/dL (05 Apr 2023 17:14)    PT/INR - ( 06 Apr 2023 06:37 )   PT: 16.4 sec;   INR: 1.40 ratio               Culture - Urine (collected 02 Apr 2023 09:15)  Source: Clean Catch Clean Catch (Midstream)  Final Report (04 Apr 2023 19:44):    >100,000 CFU/ml Enterococcus faecalis  Organism: Enterococcus faecalis (04 Apr 2023 19:44)  Organism: Enterococcus faecalis (04 Apr 2023 19:44)    Culture - Blood (collected 29 Mar 2023 21:32)  Source: .Blood  Final Report (04 Apr 2023 01:01):    No Growth Final    Culture - Blood (collected 29 Mar 2023 21:20)  Source: .Blood  Final Report (04 Apr 2023 01:01):    No Growth Final      RADIOLOGY & ADDITIONAL TESTS:   reviewed elctronically  ASSESSMENT/PLAN: 	    25 minutes aggregate time was spent on this visit, 50% visit time spent in care co-ordination with other attendings and counselling patient .I have discussed care plan with patient / HCP/family member ,who expressed understanding of problems treatment and their effect and side effects, alternatives in details. I have asked if they have any questions and concerns and appropriately addressed them to best of my ability.  PROGRESS NOTE  Patient is a 87y old  Male who presents with a chief complaint of left foot infection (06 Apr 2023 12:43)    Chart and available morning labs /imaging are reviewed electronically , urgent issues addressed . More information  is being added upon completion of rounds , when more information is collected and management discussed with consultants , medical staff and social service/case management on the floor   OVERNIGHT  No new issues reported by medical staff . All above noted Patient is resting in a bed comfortably .Confused ,poor mentation .No distress noted   Low BG reported this am  ,d/w diabetic team and adjustments are made .Surgery planned for monday ,cardiology clearance requested ,d/w Dr Arevalo  HPI:  86 yo male ,[facility] resident with PMHx - COPD, DM, HTN, CAD, HLD, H/O TIA, dementia,GERD, BPH and depression sent ot ER for evaluation of left foot 1metatarsal wound ,suggestive of osteomyelitis .Patient was seen by ID consult and transfer to the hospital recommended for wound cx/bone biopsy /podiatry evaluation ,likely will require 4-6 weeks of iv abx . Patient was admitted to [facility] with b/l feet wounds and was followed by wound care team ,recently completed 7 days of doxycycline for foot wound cellulitis . (30 Mar 2023 05:21)    PAST MEDICAL & SURGICAL HISTORY:  ASHD (arteriosclerotic heart disease)      BPH without urinary obstruction      COPD, moderate      Stage 3 chronic kidney disease      Chronic GERD      HLD (hyperlipidemia)      MDD (major depressive disorder)      Obstructive and reflux uropathy      Cellulitis      HTN (hypertension)      Sepsis      Dementia      Moderate protein-calorie malnutrition      Brain TIA      History of RSV infection      DM type 2, not at goal      Pressure ulcer of unspecified heel, unspecified stage      Venous stasis ulcer without varicose veins      Multiple open wounds of foot          MEDICATIONS  (STANDING):  albuterol/ipratropium for Nebulization 3 milliLiter(s) Nebulizer every 8 hours  budesonide 160 MICROgram(s)/formoterol 4.5 MICROgram(s) Inhaler 2 Puff(s) Inhalation two times a day  dextrose 5% + sodium chloride 0.45%. 1000 milliLiter(s) (40 mL/Hr) IV Continuous <Continuous>  dextrose 5%. 1000 milliLiter(s) (100 mL/Hr) IV Continuous <Continuous>  dextrose 5%. 1000 milliLiter(s) (50 mL/Hr) IV Continuous <Continuous>  dextrose 50% Injectable 25 Gram(s) IV Push once  dextrose 50% Injectable 12.5 Gram(s) IV Push once  dextrose 50% Injectable 25 Gram(s) IV Push once  donepezil 5 milliGRAM(s) Oral at bedtime  DULoxetine 60 milliGRAM(s) Oral daily  enoxaparin Injectable 60 milliGRAM(s) SubCutaneous every 12 hours  glucagon  Injectable 1 milliGRAM(s) IntraMuscular once  insulin glargine Injectable (LANTUS) 8 Unit(s) SubCutaneous at bedtime  insulin lispro (ADMELOG) corrective regimen sliding scale   SubCutaneous three times a day before meals  insulin lispro (ADMELOG) corrective regimen sliding scale   SubCutaneous at bedtime  lactobacillus acidophilus 1 Tablet(s) Oral two times a day with meals  losartan 25 milliGRAM(s) Oral daily  metoprolol tartrate 25 milliGRAM(s) Oral three times a day  multivitamin 1 Tablet(s) Oral daily  pantoprazole   Suspension 40 milliGRAM(s) Oral daily  remdesivir  IVPB   IV Intermittent   remdesivir  IVPB 100 milliGRAM(s) IV Intermittent every 24 hours  senna 2 Tablet(s) Oral at bedtime  simvastatin 40 milliGRAM(s) Oral at bedtime  tamsulosin 0.4 milliGRAM(s) Oral at bedtime    MEDICATIONS  (PRN):  acetaminophen     Tablet .. 650 milliGRAM(s) Oral every 6 hours PRN Temp greater or equal to 38C (100.4F), Mild Pain (1 - 3)  aluminum hydroxide/magnesium hydroxide/simethicone Suspension 30 milliLiter(s) Oral every 4 hours PRN Dyspepsia  bisacodyl Suppository 10 milliGRAM(s) Rectal daily PRN Constipation  dextrose Oral Gel 15 Gram(s) Oral once PRN Blood Glucose LESS THAN 70 milliGRAM(s)/deciliter  hydrALAZINE 50 milliGRAM(s) Oral every 6 hours PRN for systolic BP>160  magnesium hydroxide Suspension 30 milliLiter(s) Oral daily PRN Constipation  melatonin 3 milliGRAM(s) Oral at bedtime PRN Insomnia  morphine  - Injectable 2 milliGRAM(s) IV Push every 6 hours PRN Severe Pain (7 - 10)  ondansetron Injectable 4 milliGRAM(s) IV Push every 8 hours PRN Nausea and/or Vomiting  traMADol 50 milliGRAM(s) Oral four times a day PRN Moderate Pain (4 - 6)      OBJECTIVE    T(C): 36.8 (04-06-23 @ 12:10), Max: 36.8 (04-06-23 @ 12:10)  HR: 93 (04-06-23 @ 15:53) (87 - 101)  BP: 123/73 (04-06-23 @ 12:10) (102/62 - 123/73)  RR: 18 (04-06-23 @ 12:10) (18 - 18)  SpO2: 98% (04-06-23 @ 15:53) (90% - 98%)  Wt(kg): --  I&O's Summary    05 Apr 2023 07:01  -  06 Apr 2023 07:00  --------------------------------------------------------  IN: 240 mL / OUT: 600 mL / NET: -360 mL    06 Apr 2023 07:01  -  06 Apr 2023 17:07  --------------------------------------------------------  IN: 350 mL / OUT: 0 mL / NET: 350 mL          REVIEW OF SYSTEMS:  CONSTITUTIONAL: No fever, weight loss, or fatigue  Patient is  unable to provide any information/ROS  due to baseline mental status.     PHYSICAL EXAM:  Appearance: NAD. VS past 24 hrs -as above   HEENT:   Moist oral mucosa. Conjunctiva clear b/l.   Neck : supple  Respiratory: Lungs CTAB.  Gastrointestinal:  Soft, nontender. No rebound. No rigidity. BS present	  Cardiovascular: RRR ,S1S2 present  Neurologic: Non-focal. Moving all extremities.  Extremities: No edema. No erythema. No calf tenderness.  Skin: No rashes, No ecchymoses, No cyanosis.	  wounds ,skin lesions-See skin assesment flow sheet   LABS:                        10.5   9.55  )-----------( 286      ( 06 Apr 2023 06:37 )             34.3     04-06    144  |  117<H>  |  32<H>  ----------------------------<  65<L>  3.4<L>   |  26  |  1.30    Ca    8.4<L>      06 Apr 2023 06:37  Phos  3.1     04-06  Mg     2.3     04-06    TPro  6.3  /  Alb  2.1<L>  /  TBili  0.4  /  DBili  0.2  /  AST  19  /  ALT  34  /  AlkPhos  61  04-06    CAPILLARY BLOOD GLUCOSE      POCT Blood Glucose.: 139 mg/dL (06 Apr 2023 16:46)  POCT Blood Glucose.: 99 mg/dL (06 Apr 2023 11:46)  POCT Blood Glucose.: 126 mg/dL (06 Apr 2023 08:16)  POCT Blood Glucose.: 59 mg/dL (06 Apr 2023 07:45)  POCT Blood Glucose.: 69 mg/dL (06 Apr 2023 07:42)  POCT Blood Glucose.: 175 mg/dL (05 Apr 2023 21:39)  POCT Blood Glucose.: 116 mg/dL (05 Apr 2023 19:03)  POCT Blood Glucose.: 97 mg/dL (05 Apr 2023 17:51)  POCT Blood Glucose.: 86 mg/dL (05 Apr 2023 17:14)    PT/INR - ( 06 Apr 2023 06:37 )   PT: 16.4 sec;   INR: 1.40 ratio               Culture - Urine (collected 02 Apr 2023 09:15)  Source: Clean Catch Clean Catch (Midstream)  Final Report (04 Apr 2023 19:44):    >100,000 CFU/ml Enterococcus faecalis  Organism: Enterococcus faecalis (04 Apr 2023 19:44)  Organism: Enterococcus faecalis (04 Apr 2023 19:44)    Culture - Blood (collected 29 Mar 2023 21:32)  Source: .Blood  Final Report (04 Apr 2023 01:01):    No Growth Final    Culture - Blood (collected 29 Mar 2023 21:20)  Source: .Blood  Final Report (04 Apr 2023 01:01):    No Growth Final      RADIOLOGY & ADDITIONAL TESTS:   reviewed elctronically  ASSESSMENT/PLAN: 	    25 minutes aggregate time was spent on this visit, 50% visit time spent in care co-ordination with other attendings and counselling patient .I have discussed care plan with patient / HCP/family member ,who expressed understanding of problems treatment and their effect and side effects, alternatives in details. I have asked if they have any questions and concerns and appropriately addressed them to best of my ability.  PROGRESS NOTE  Patient is a 87y old  Male who presents with a chief complaint of left foot infection (06 Apr 2023 12:43)    Chart and available morning labs /imaging are reviewed electronically , urgent issues addressed . More information  is being added upon completion of rounds , when more information is collected and management discussed with consultants , medical staff and social service/case management on the floor   OVERNIGHT  No new issues reported by medical staff . All above noted Patient is resting in a bed comfortably .Confused ,poor mentation .No distress noted   Low BG reported this am  ,d/w diabetic team and adjustments are made .Surgery planned for monday ,cardiology clearance requested ,d/w Dr Arevalo  HPI:  88 yo male ,[facility] resident with PMHx - COPD, DM, HTN, CAD, HLD, H/O TIA, dementia,GERD, BPH and depression sent ot ER for evaluation of left foot 1metatarsal wound ,suggestive of osteomyelitis .Patient was seen by ID consult and transfer to the hospital recommended for wound cx/bone biopsy /podiatry evaluation ,likely will require 4-6 weeks of iv abx . Patient was admitted to [facility] with b/l feet wounds and was followed by wound care team ,recently completed 7 days of doxycycline for foot wound cellulitis . (30 Mar 2023 05:21)    PAST MEDICAL & SURGICAL HISTORY:  ASHD (arteriosclerotic heart disease)      BPH without urinary obstruction      COPD, moderate      Stage 3 chronic kidney disease      Chronic GERD      HLD (hyperlipidemia)      MDD (major depressive disorder)      Obstructive and reflux uropathy      Cellulitis      HTN (hypertension)      Sepsis      Dementia      Moderate protein-calorie malnutrition      Brain TIA      History of RSV infection      DM type 2, not at goal      Pressure ulcer of unspecified heel, unspecified stage      Venous stasis ulcer without varicose veins      Multiple open wounds of foot          MEDICATIONS  (STANDING):  albuterol/ipratropium for Nebulization 3 milliLiter(s) Nebulizer every 8 hours  budesonide 160 MICROgram(s)/formoterol 4.5 MICROgram(s) Inhaler 2 Puff(s) Inhalation two times a day  dextrose 5% + sodium chloride 0.45%. 1000 milliLiter(s) (40 mL/Hr) IV Continuous <Continuous>  dextrose 5%. 1000 milliLiter(s) (100 mL/Hr) IV Continuous <Continuous>  dextrose 5%. 1000 milliLiter(s) (50 mL/Hr) IV Continuous <Continuous>  dextrose 50% Injectable 25 Gram(s) IV Push once  dextrose 50% Injectable 12.5 Gram(s) IV Push once  dextrose 50% Injectable 25 Gram(s) IV Push once  donepezil 5 milliGRAM(s) Oral at bedtime  DULoxetine 60 milliGRAM(s) Oral daily  enoxaparin Injectable 60 milliGRAM(s) SubCutaneous every 12 hours  glucagon  Injectable 1 milliGRAM(s) IntraMuscular once  insulin glargine Injectable (LANTUS) 8 Unit(s) SubCutaneous at bedtime  insulin lispro (ADMELOG) corrective regimen sliding scale   SubCutaneous three times a day before meals  insulin lispro (ADMELOG) corrective regimen sliding scale   SubCutaneous at bedtime  lactobacillus acidophilus 1 Tablet(s) Oral two times a day with meals  losartan 25 milliGRAM(s) Oral daily  metoprolol tartrate 25 milliGRAM(s) Oral three times a day  multivitamin 1 Tablet(s) Oral daily  pantoprazole   Suspension 40 milliGRAM(s) Oral daily  remdesivir  IVPB   IV Intermittent   remdesivir  IVPB 100 milliGRAM(s) IV Intermittent every 24 hours  senna 2 Tablet(s) Oral at bedtime  simvastatin 40 milliGRAM(s) Oral at bedtime  tamsulosin 0.4 milliGRAM(s) Oral at bedtime    MEDICATIONS  (PRN):  acetaminophen     Tablet .. 650 milliGRAM(s) Oral every 6 hours PRN Temp greater or equal to 38C (100.4F), Mild Pain (1 - 3)  aluminum hydroxide/magnesium hydroxide/simethicone Suspension 30 milliLiter(s) Oral every 4 hours PRN Dyspepsia  bisacodyl Suppository 10 milliGRAM(s) Rectal daily PRN Constipation  dextrose Oral Gel 15 Gram(s) Oral once PRN Blood Glucose LESS THAN 70 milliGRAM(s)/deciliter  hydrALAZINE 50 milliGRAM(s) Oral every 6 hours PRN for systolic BP>160  magnesium hydroxide Suspension 30 milliLiter(s) Oral daily PRN Constipation  melatonin 3 milliGRAM(s) Oral at bedtime PRN Insomnia  morphine  - Injectable 2 milliGRAM(s) IV Push every 6 hours PRN Severe Pain (7 - 10)  ondansetron Injectable 4 milliGRAM(s) IV Push every 8 hours PRN Nausea and/or Vomiting  traMADol 50 milliGRAM(s) Oral four times a day PRN Moderate Pain (4 - 6)      OBJECTIVE    T(C): 36.8 (04-06-23 @ 12:10), Max: 36.8 (04-06-23 @ 12:10)  HR: 93 (04-06-23 @ 15:53) (87 - 101)  BP: 123/73 (04-06-23 @ 12:10) (102/62 - 123/73)  RR: 18 (04-06-23 @ 12:10) (18 - 18)  SpO2: 98% (04-06-23 @ 15:53) (90% - 98%)  Wt(kg): --  I&O's Summary    05 Apr 2023 07:01  -  06 Apr 2023 07:00  --------------------------------------------------------  IN: 240 mL / OUT: 600 mL / NET: -360 mL    06 Apr 2023 07:01  -  06 Apr 2023 17:07  --------------------------------------------------------  IN: 350 mL / OUT: 0 mL / NET: 350 mL          REVIEW OF SYSTEMS:  CONSTITUTIONAL: No fever, weight loss, or fatigue  Patient is  unable to provide any information/ROS  due to baseline mental status.     PHYSICAL EXAM:  Appearance: NAD. VS past 24 hrs -as above   HEENT:   Moist oral mucosa. Conjunctiva clear b/l.   Neck : supple  Respiratory: Lungs CTAB.  Gastrointestinal:  Soft, nontender. No rebound. No rigidity. BS present	  Cardiovascular: RRR ,S1S2 present  Neurologic: Non-focal. Moving all extremities.  Extremities: No edema. No erythema. No calf tenderness.  Skin: No rashes, No ecchymoses, No cyanosis.	  wounds ,skin lesions-See skin assesment flow sheet   LABS:                        10.5   9.55  )-----------( 286      ( 06 Apr 2023 06:37 )             34.3     04-06    144  |  117<H>  |  32<H>  ----------------------------<  65<L>  3.4<L>   |  26  |  1.30    Ca    8.4<L>      06 Apr 2023 06:37  Phos  3.1     04-06  Mg     2.3     04-06    TPro  6.3  /  Alb  2.1<L>  /  TBili  0.4  /  DBili  0.2  /  AST  19  /  ALT  34  /  AlkPhos  61  04-06    CAPILLARY BLOOD GLUCOSE      POCT Blood Glucose.: 139 mg/dL (06 Apr 2023 16:46)  POCT Blood Glucose.: 99 mg/dL (06 Apr 2023 11:46)  POCT Blood Glucose.: 126 mg/dL (06 Apr 2023 08:16)  POCT Blood Glucose.: 59 mg/dL (06 Apr 2023 07:45)  POCT Blood Glucose.: 69 mg/dL (06 Apr 2023 07:42)  POCT Blood Glucose.: 175 mg/dL (05 Apr 2023 21:39)  POCT Blood Glucose.: 116 mg/dL (05 Apr 2023 19:03)  POCT Blood Glucose.: 97 mg/dL (05 Apr 2023 17:51)  POCT Blood Glucose.: 86 mg/dL (05 Apr 2023 17:14)    PT/INR - ( 06 Apr 2023 06:37 )   PT: 16.4 sec;   INR: 1.40 ratio               Culture - Urine (collected 02 Apr 2023 09:15)  Source: Clean Catch Clean Catch (Midstream)  Final Report (04 Apr 2023 19:44):    >100,000 CFU/ml Enterococcus faecalis  Organism: Enterococcus faecalis (04 Apr 2023 19:44)  Organism: Enterococcus faecalis (04 Apr 2023 19:44)    Culture - Blood (collected 29 Mar 2023 21:32)  Source: .Blood  Final Report (04 Apr 2023 01:01):    No Growth Final    Culture - Blood (collected 29 Mar 2023 21:20)  Source: .Blood  Final Report (04 Apr 2023 01:01):    No Growth Final      RADIOLOGY & ADDITIONAL TESTS:   reviewed elctronically  ASSESSMENT/PLAN: 	    25 minutes aggregate time was spent on this visit, 50% visit time spent in care co-ordination with other attendings and counselling patient .I have discussed care plan with patient / HCP/family member ,who expressed understanding of problems treatment and their effect and side effects, alternatives in details. I have asked if they have any questions and concerns and appropriately addressed them to best of my ability.

## 2023-04-06 NOTE — CONSULT NOTE ADULT - CONSULT REQUESTED DATE/TIME
29-Mar-2023 22:25
30-Mar-2023 14:48
30-Mar-2023 16:50
30-Mar-2023 01:36
30-Mar-2023 12:45
06-Apr-2023 12:43
29-Mar-2023
30-Mar-2023 12:00

## 2023-04-06 NOTE — PROGRESS NOTE ADULT - ASSESSMENT
REVIEW OF SYMPTOMS      Able to give (reliable) ROS  NO     PHYSICAL EXAM    HEENT Unremarkable  atraumatic   RESP Fair air entry EXP prolonged    Harsh breath sound Resp distres mild   CARDIAC S1 S2 No S3     NO JVD    ABDOMEN SOFT BS PRESENT NOT DISTENDED No hepatosplenomegaly   PEDAL EDEMA present No calf tenderness  NO rash     GENERAL DATA .   GOC.     .. 3/30/2023 full code  ALLGY.     .. nka                    WT.   .. 3/30/2023 63  BMI.        .. 3/30/2023 21            ICU STAY.   .. none  COVID.   .. 4/4/2023 scv2 (+)  .. 3/29/2023 scv2 (-)     BEST PRACTICE ISSUES.    HOB ELEVATN.   .. Yes  DVT PPLX.   .. 3/31 lvnx 60.2 Dr Lakhani (a fib)   ..  3/29- 3/31 hpsc   MILLER PPLX.   .. 3/30/2023 protonix 40    INFN PPLX. ..    SP SW LAURENT.   ..  3/30/2023 -> soft bite mild thick        DIET.    ..  3/30/2023 dash  FREE WATER  .. 4/1/2023 fw 250.4    IV fl.  .. 4/6/2023 d5 1/2 40     ABGS.    VS/ PO/IO/ VENT/ DRIPS.   4/6/2023 afeb 91 120/70  4/6/2023 ra 91%     PROBLEM/ASSESSMENT/PLAN.  COVID   .. 4/4/2023 scv2 (+)  .. 4/4/2023 oxygenation ok  .. 4/4/2023 pt has mild disease   .. 4/4/2023 rdsv 3 d course started by Dr Mancia   Infection  Osteomyelitius  Possible pneumonia   .. Esr 3/29-3/31/2023 esr 67- 49   .. W 3/29-3/30-3/31-4/1-4/4-4/5/2023       w 11.8 - 12- 10.5- 11.9 -  9 - 7.7   .. cxr 3/30/2023  ........ increasing r lower perihilar infiltrate   .. xr foot left 3/30/2023  ........ stable eroisions around 1st mtp anmd great toe    ........ which could be bone infectn    .. CXR 3/29/2023 Possible hansa pneum  .. 3/29/2023 bc (-)   .. 3/29-4/5 zosyn    .. follow cultures  PLEURAL EFFUSION.  .. ct ch 3/30/2023   ........ mild pulm edema  ........ mod r and sml effsn   a/r  .. pl effsn likely sec to chf   COPD  .. 3/30/2023 duoneb.3    .. 3/30/2023 symbicort   hemodynamics  .. La 3/29/2023 la 1.8   .. target map 65 (+)   CAD.  .. 3/30- 3/31/2023 atenolol 25  .. 3/31-4/4      metoprolol 25.2 - metoprolol 25.3   .. 3/30/2023 simvastat 40   RO DVT  .. 4/1/2023 v duplx (-)  CHF.  .. echo 3/30/2023  ........ ef 40%   ........ mod mr   .. bnp 4/2 bnp [id number]   .. 3/30-4/4/2023      losartan 50 - losartan 25   .. 3/30/2023 hydralazin 50.4p   Anemia  .. Hb 3/29-3/30-3/31-4/1-4/4-4/5/2023      Hb 11.2- 10.2 - 9.8 - 11 - 9.4 - 11.5   .. monitor  Hypernatremia.  .. Na 3/31-4/1-4/2-4/3-4/4 Na 147 - 151- 152 - 148- 144    CKD  .. Na 3/29-3/31/2023 Na 144-147   .. Cr 3/29-3/30-3/31-4/1-4/3-4/4/2023      Cr 1.4 - 1.3 - 1.3 - 1.5 - 1.5 - 1.3  .. monitor  OBS  .. 3/30/2023 donepezil     OVERALL .  86 yo M with PMHx HTN, HLD, T2D, dementia (AOx2-3), OA, BPH, CAD, TIA, CKD 3 and GERD HO recent hospital stay 1/24-1/30/2023 [facility]  COPD ex  now admitted with osteomyelitis possible pneum  Pulm consulted 3/29/2023    .. 4/4/2023 pt tested covid (+)  started rdsv Dr Mancia     PROBLEMS  COVID 4/4/2023  .. 4/4/2023 rdsv 3 d  Possible Osteomyelitis  .. l foot 1st mtp  Pneumonia   .. 3/29-4/5 zosyn    COPD   CKD   PLEURAL EFFSN   .. 3/30 ct  HFREF   .. MOD MR 3/30 echo    A fib  ..  4/1 lvnx 60.2       TIME SPENT   Over 25 minutes aggregate care time spent on encounter; activities included   direct patient care, counseling and/or coordinating care reviewing notes, lab data/ imaging , discussion with multidisciplinary team/ patient  /family and explaining in detail risks, benefits, alternatives  of the recommendations     Paulino Parmar 87 m     REVIEW OF SYMPTOMS      Able to give (reliable) ROS  NO     PHYSICAL EXAM    HEENT Unremarkable  atraumatic   RESP Fair air entry EXP prolonged    Harsh breath sound Resp distres mild   CARDIAC S1 S2 No S3     NO JVD    ABDOMEN SOFT BS PRESENT NOT DISTENDED No hepatosplenomegaly   PEDAL EDEMA present No calf tenderness  NO rash     GENERAL DATA .   GOC.     .. 3/30/2023 full code  ALLGY.     .. nka                    WT.   .. 3/30/2023 63  BMI.        .. 3/30/2023 21            ICU STAY.   .. none  COVID.   .. 4/4/2023 scv2 (+)  .. 3/29/2023 scv2 (-)     BEST PRACTICE ISSUES.    HOB ELEVATN.   .. Yes  DVT PPLX.   .. 3/31 lvnx 60.2 Dr Lakhani (a fib)   ..  3/29- 3/31 hpsc   MILLER PPLX.   .. 3/30/2023 protonix 40    INFN PPLX. ..    SP SW LAURENT.   ..  3/30/2023 -> soft bite mild thick        DIET.    ..  3/30/2023 dash  FREE WATER  .. 4/1/2023 fw 250.4    IV fl.  .. 4/6/2023 d5 1/2 40     ABGS.    VS/ PO/IO/ VENT/ DRIPS.   4/6/2023 afeb 91 120/70  4/6/2023 ra 91%     PROBLEM/ASSESSMENT/PLAN.  COVID   .. 4/4/2023 scv2 (+)  .. 4/4/2023 oxygenation ok  .. 4/4/2023 pt has mild disease   .. 4/4/2023 rdsv 3 d course started by Dr Mancia   Infection  Osteomyelitius  Possible pneumonia   .. Esr 3/29-3/31/2023 esr 67- 49   .. W 3/29-3/30-3/31-4/1-4/4-4/5/2023       w 11.8 - 12- 10.5- 11.9 -  9 - 7.7   .. cxr 3/30/2023  ........ increasing r lower perihilar infiltrate   .. xr foot left 3/30/2023  ........ stable eroisions around 1st mtp anmd great toe    ........ which could be bone infectn    .. CXR 3/29/2023 Possible hansa pneum  .. 3/29/2023 bc (-)   .. 3/29-4/5 zosyn    .. follow cultures  PLEURAL EFFUSION.  .. ct ch 3/30/2023   ........ mild pulm edema  ........ mod r and sml effsn   a/r  .. pl effsn likely sec to chf   COPD  .. 3/30/2023 duoneb.3    .. 3/30/2023 symbicort   hemodynamics  .. La 3/29/2023 la 1.8   .. target map 65 (+)   CAD.  .. 3/30- 3/31/2023 atenolol 25  .. 3/31-4/4      metoprolol 25.2 - metoprolol 25.3   .. 3/30/2023 simvastat 40   RO DVT  .. 4/1/2023 v duplx (-)  CHF.  .. echo 3/30/2023  ........ ef 40%   ........ mod mr   .. bnp 4/2 bnp [id number]   .. 3/30-4/4/2023      losartan 50 - losartan 25   .. 3/30/2023 hydralazin 50.4p   Anemia  .. Hb 3/29-3/30-3/31-4/1-4/4-4/5/2023      Hb 11.2- 10.2 - 9.8 - 11 - 9.4 - 11.5   .. monitor  Hypernatremia.  .. Na 3/31-4/1-4/2-4/3-4/4 Na 147 - 151- 152 - 148- 144    CKD  .. Na 3/29-3/31/2023 Na 144-147   .. Cr 3/29-3/30-3/31-4/1-4/3-4/4/2023      Cr 1.4 - 1.3 - 1.3 - 1.5 - 1.5 - 1.3  .. monitor  OBS  .. 3/30/2023 donepezil     OVERALL .  86 yo M with PMHx HTN, HLD, T2D, dementia (AOx2-3), OA, BPH, CAD, TIA, CKD 3 and GERD HO recent hospital stay 1/24-1/30/2023 [facility]  COPD ex  now admitted with osteomyelitis possible pneum  Pulm consulted 3/29/2023    .. 4/4/2023 pt tested covid (+)  started rdsv Dr Mancia     PROBLEMS  COVID 4/4/2023  .. 4/4/2023 rdsv 3 d  Possible Osteomyelitis  .. l foot 1st mtp  Pneumonia   .. 3/29-4/5 zosyn    COPD   CKD   PLEURAL EFFSN   .. 3/30 ct  HFREF   .. MOD MR 3/30 echo    A fib  ..  4/1 lvnx 60.2       TIME SPENT   Over 25 minutes aggregate care time spent on encounter; activities included   direct patient care, counseling and/or coordinating care reviewing notes, lab data/ imaging , discussion with multidisciplinary team/ patient  /family and explaining in detail risks, benefits, alternatives  of the recommendations     Paulino Parmar 87 m     REVIEW OF SYMPTOMS      Able to give (reliable) ROS  NO     PHYSICAL EXAM    HEENT Unremarkable  atraumatic   RESP Fair air entry EXP prolonged    Harsh breath sound Resp distres mild   CARDIAC S1 S2 No S3     NO JVD    ABDOMEN SOFT BS PRESENT NOT DISTENDED No hepatosplenomegaly   PEDAL EDEMA present No calf tenderness  NO rash     GENERAL DATA .   GOC.     .. 3/30/2023 full code  ALLGY.     .. nka                    WT.   .. 3/30/2023 63  BMI.        .. 3/30/2023 21            ICU STAY.   .. none  COVID.   .. 4/4/2023 scv2 (+)  .. 3/29/2023 scv2 (-)     BEST PRACTICE ISSUES.    HOB ELEVATN.   .. Yes  DVT PPLX.   .. 3/31 lvnx 60.2 Dr Lakhani (a fib)   ..  3/29- 3/31 hpsc   MILLER PPLX.   .. 3/30/2023 protonix 40    INFN PPLX. ..    SP SW LAURENT.   ..  3/30/2023 -> soft bite mild thick        DIET.    ..  3/30/2023 dash  FREE WATER  .. 4/1/2023 fw 250.4    IV fl.  .. 4/6/2023 d5 1/2 40     ABGS.    VS/ PO/IO/ VENT/ DRIPS.   4/6/2023 afeb 91 120/70  4/6/2023 ra 91%     PROBLEM/ASSESSMENT/PLAN.  COVID   .. 4/4/2023 scv2 (+)  .. 4/4/2023 oxygenation ok  .. 4/4/2023 pt has mild disease   .. 4/4/2023 rdsv 3 d course started by Dr Mancia   Infection  Osteomyelitius  Possible pneumonia   .. Esr 3/29-3/31/2023 esr 67- 49   .. W 3/29-3/30-3/31-4/1-4/4-4/5/2023       w 11.8 - 12- 10.5- 11.9 -  9 - 7.7   .. cxr 3/30/2023  ........ increasing r lower perihilar infiltrate   .. xr foot left 3/30/2023  ........ stable eroisions around 1st mtp anmd great toe    ........ which could be bone infectn    .. CXR 3/29/2023 Possible hansa pneum  .. 3/29/2023 bc (-)   .. 3/29-4/5 zosyn    .. follow cultures  PLEURAL EFFUSION.  .. ct ch 3/30/2023   ........ mild pulm edema  ........ mod r and sml effsn   a/r  .. pl effsn likely sec to chf   COPD  .. 3/30/2023 duoneb.3    .. 3/30/2023 symbicort   hemodynamics  .. La 3/29/2023 la 1.8   .. target map 65 (+)   CAD.  .. 3/30- 3/31/2023 atenolol 25  .. 3/31-4/4      metoprolol 25.2 - metoprolol 25.3   .. 3/30/2023 simvastat 40   RO DVT  .. 4/1/2023 v duplx (-)  CHF.  .. echo 3/30/2023  ........ ef 40%   ........ mod mr   .. bnp 4/2 bnp [id number]   .. 3/30-4/4/2023      losartan 50 - losartan 25   .. 3/30/2023 hydralazin 50.4p   Anemia  .. Hb 3/29-3/30-3/31-4/1-4/4-4/5/2023      Hb 11.2- 10.2 - 9.8 - 11 - 9.4 - 11.5   .. monitor  Hypernatremia.  .. Na 3/31-4/1-4/2-4/3-4/4 Na 147 - 151- 152 - 148- 144    CKD  .. Na 3/29-3/31/2023 Na 144-147   .. Cr 3/29-3/30-3/31-4/1-4/3-4/4/2023      Cr 1.4 - 1.3 - 1.3 - 1.5 - 1.5 - 1.3  .. monitor  OBS  .. 3/30/2023 donepezil     OVERALL .  86 yo M with PMHx HTN, HLD, T2D, dementia (AOx2-3), OA, BPH, CAD, TIA, CKD 3 and GERD HO recent hospital stay 1/24-1/30/2023 [facility]  COPD ex  now admitted with osteomyelitis possible pneum  Pulm consulted 3/29/2023    .. 4/4/2023 pt tested covid (+)  started rdsv Dr Mancia     PROBLEMS  COVID 4/4/2023  .. 4/4/2023 rdsv 3 d  Possible Osteomyelitis  .. l foot 1st mtp  Pneumonia   .. 3/29-4/5 zosyn    COPD   CKD   PLEURAL EFFSN   .. 3/30 ct  HFREF   .. MOD MR 3/30 echo    A fib  ..  4/1 lvnx 60.2       TIME SPENT   Over 25 minutes aggregate care time spent on encounter; activities included   direct patient care, counseling and/or coordinating care reviewing notes, lab data/ imaging , discussion with multidisciplinary team/ patient  /family and explaining in detail risks, benefits, alternatives  of the recommendations     Paulino Parmar 87 m

## 2023-04-06 NOTE — PROGRESS NOTE ADULT - SUBJECTIVE AND OBJECTIVE BOX
Patient is a 87y Male whom presented to the hospital with ckd and chelo     PAST MEDICAL & SURGICAL HISTORY:      MEDICATIONS  (STANDING):      Allergies    No Known Allergies    Intolerances        SOCIAL HISTORY:  Denies ETOh,Smoking,     FAMILY HISTORY:      REVIEW OF SYSTEMS:  unable to obtained a good review system                                     10.5   9.55  )-----------( 286      ( 06 Apr 2023 06:37 )             34.3       CBC Full  -  ( 06 Apr 2023 06:37 )  WBC Count : 9.55 K/uL  RBC Count : 3.62 M/uL  Hemoglobin : 10.5 g/dL  Hematocrit : 34.3 %  Platelet Count - Automated : 286 K/uL  Mean Cell Volume : 94.8 fl  Mean Cell Hemoglobin : 29.0 pg  Mean Cell Hemoglobin Concentration : 30.6 gm/dL  Auto Neutrophil # : 6.86 K/uL  Auto Lymphocyte # : 1.24 K/uL  Auto Monocyte # : 0.67 K/uL  Auto Eosinophil # : 0.71 K/uL  Auto Basophil # : 0.04 K/uL  Auto Neutrophil % : 71.9 %  Auto Lymphocyte % : 13.0 %  Auto Monocyte % : 7.0 %  Auto Eosinophil % : 7.4 %  Auto Basophil % : 0.4 %      04-06    144  |  117<H>  |  32<H>  ----------------------------<  65<L>  3.4<L>   |  26  |  1.30    Ca    8.4<L>      06 Apr 2023 06:37  Phos  3.1     04-06  Mg     2.3     04-06    TPro  6.3  /  Alb  2.1<L>  /  TBili  0.4  /  DBili  0.2  /  AST  19  /  ALT  34  /  AlkPhos  61  04-06      CAPILLARY BLOOD GLUCOSE      POCT Blood Glucose.: 99 mg/dL (06 Apr 2023 11:46)  POCT Blood Glucose.: 126 mg/dL (06 Apr 2023 08:16)  POCT Blood Glucose.: 59 mg/dL (06 Apr 2023 07:45)  POCT Blood Glucose.: 69 mg/dL (06 Apr 2023 07:42)  POCT Blood Glucose.: 175 mg/dL (05 Apr 2023 21:39)  POCT Blood Glucose.: 116 mg/dL (05 Apr 2023 19:03)  POCT Blood Glucose.: 97 mg/dL (05 Apr 2023 17:51)  POCT Blood Glucose.: 86 mg/dL (05 Apr 2023 17:14)  POCT Blood Glucose.: 66 mg/dL (05 Apr 2023 16:41)      Vital Signs Last 24 Hrs  T(C): 36.8 (06 Apr 2023 12:10), Max: 36.8 (06 Apr 2023 12:10)  T(F): 98.2 (06 Apr 2023 12:10), Max: 98.2 (06 Apr 2023 12:10)  HR: 90 (06 Apr 2023 12:10) (83 - 101)  BP: 123/73 (06 Apr 2023 12:10) (102/62 - 123/73)  BP(mean): --  RR: 18 (06 Apr 2023 12:10) (18 - 18)  SpO2: 90% (06 Apr 2023 12:10) (90% - 98%)    Parameters below as of 06 Apr 2023 12:10  Patient On (Oxygen Delivery Method): room air            PT/INR - ( 06 Apr 2023 06:37 )   PT: 16.4 sec;   INR: 1.40 ratio                                11.5   7.72  )-----------( 251      ( 05 Apr 2023 06:40 )             37.9       CBC Full  -  ( 05 Apr 2023 06:40 )  WBC Count : 7.72 K/uL  RBC Count : 4.05 M/uL  Hemoglobin : 11.5 g/dL  Hematocrit : 37.9 %  Platelet Count - Automated : 251 K/uL  Mean Cell Volume : 93.6 fl  Mean Cell Hemoglobin : 28.4 pg  Mean Cell Hemoglobin Concentration : 30.3 gm/dL  Auto Neutrophil # : x  Auto Lymphocyte # : x  Auto Monocyte # : x  Auto Eosinophil # : x  Auto Basophil # : x  Auto Neutrophil % : x  Auto Lymphocyte % : x  Auto Monocyte % : x  Auto Eosinophil % : x  Auto Basophil % : x      04-05    144  |  116<H>  |  26<H>  ----------------------------<  132<H>  3.9   |  25  |  1.20    Ca    8.8      05 Apr 2023 06:40  Phos  3.2     04-04  Mg     2.2     04-04    TPro  6.7  /  Alb  2.2<L>  /  TBili  0.6  /  DBili  0.3  /  AST  27  /  ALT  47  /  AlkPhos  69  04-05      CAPILLARY BLOOD GLUCOSE      POCT Blood Glucose.: 77 mg/dL (05 Apr 2023 11:33)  POCT Blood Glucose.: 116 mg/dL (05 Apr 2023 08:11)  POCT Blood Glucose.: 167 mg/dL (04 Apr 2023 21:11)  POCT Blood Glucose.: 150 mg/dL (04 Apr 2023 17:10)      Vital Signs Last 24 Hrs  T(C): 36.3 (05 Apr 2023 13:05), Max: 36.8 (05 Apr 2023 05:24)  T(F): 97.4 (05 Apr 2023 13:05), Max: 98.2 (05 Apr 2023 05:24)  HR: 83 (05 Apr 2023 13:05) (83 - 102)  BP: 119/68 (05 Apr 2023 13:05) (119/68 - 152/73)  BP(mean): --  RR: 18 (05 Apr 2023 13:05) (17 - 18)  SpO2: 90% (05 Apr 2023 13:05) (90% - 100%)    Parameters below as of 05 Apr 2023 13:05  Patient On (Oxygen Delivery Method): room air                    PHYSICAL EXAM:    Constitutional: NAD  HEENT: conjunctive   clear   Neck:  No JVD  Respiratory: CTAB  Cardiovascular: S1 and S2  Gastrointestinal: BS+, soft,   Extremities: No peripheral edema  Neurological:  no focal deficits

## 2023-04-06 NOTE — CONSULT NOTE ADULT - SUBJECTIVE AND OBJECTIVE BOX
Patient is a 87y old  Male who presents with a chief complaint of left foot infection (06 Apr 2023 09:59)      Reason For Consult: dm2 uncontrolled    HPI:  86 yo male ,[facility] resident with PMHx - COPD, DM, HTN, CAD, HLD, H/O TIA, dementia,GERD, BPH and depression sent ot ER for evaluation of left foot 1metatarsal wound ,suggestive of osteomyelitis .Patient was seen by ID consult and transfer to the hospital recommended for wound cx/bone biopsy /podiatry evaluation ,likely will require 4-6 weeks of iv abx . Patient was admitted to [facility] with b/l feet wounds and was followed by wound care team ,recently completed 7 days of doxycycline for foot wound cellulitis . (30 Mar 2023 05:21)      PAST MEDICAL & SURGICAL HISTORY:  ASHD (arteriosclerotic heart disease)      BPH without urinary obstruction      COPD, moderate      Stage 3 chronic kidney disease      Chronic GERD      HLD (hyperlipidemia)      MDD (major depressive disorder)      Obstructive and reflux uropathy      Cellulitis      HTN (hypertension)      Sepsis      Dementia      Moderate protein-calorie malnutrition      Brain TIA      History of RSV infection      DM type 2, not at goal      Pressure ulcer of unspecified heel, unspecified stage      Venous stasis ulcer without varicose veins      Multiple open wounds of foot          FAMILY HISTORY:        Social History:    MEDICATIONS  (STANDING):  albuterol/ipratropium for Nebulization 3 milliLiter(s) Nebulizer every 8 hours  budesonide 160 MICROgram(s)/formoterol 4.5 MICROgram(s) Inhaler 2 Puff(s) Inhalation two times a day  dextrose 5% + sodium chloride 0.45%. 1000 milliLiter(s) (40 mL/Hr) IV Continuous <Continuous>  dextrose 5%. 1000 milliLiter(s) (100 mL/Hr) IV Continuous <Continuous>  dextrose 5%. 1000 milliLiter(s) (50 mL/Hr) IV Continuous <Continuous>  dextrose 50% Injectable 25 Gram(s) IV Push once  dextrose 50% Injectable 12.5 Gram(s) IV Push once  dextrose 50% Injectable 25 Gram(s) IV Push once  donepezil 5 milliGRAM(s) Oral at bedtime  DULoxetine 60 milliGRAM(s) Oral daily  enoxaparin Injectable 60 milliGRAM(s) SubCutaneous every 12 hours  glucagon  Injectable 1 milliGRAM(s) IntraMuscular once  insulin lispro (ADMELOG) corrective regimen sliding scale   SubCutaneous three times a day before meals  insulin lispro (ADMELOG) corrective regimen sliding scale   SubCutaneous at bedtime  lactobacillus acidophilus 1 Tablet(s) Oral two times a day with meals  losartan 25 milliGRAM(s) Oral daily  metoprolol tartrate 25 milliGRAM(s) Oral three times a day  multivitamin 1 Tablet(s) Oral daily  pantoprazole   Suspension 40 milliGRAM(s) Oral daily  piperacillin/tazobactam IVPB.. 3.375 Gram(s) IV Intermittent every 8 hours  remdesivir  IVPB   IV Intermittent   remdesivir  IVPB 100 milliGRAM(s) IV Intermittent every 24 hours  senna 2 Tablet(s) Oral at bedtime  simvastatin 40 milliGRAM(s) Oral at bedtime  tamsulosin 0.4 milliGRAM(s) Oral at bedtime    MEDICATIONS  (PRN):  acetaminophen     Tablet .. 650 milliGRAM(s) Oral every 6 hours PRN Temp greater or equal to 38C (100.4F), Mild Pain (1 - 3)  aluminum hydroxide/magnesium hydroxide/simethicone Suspension 30 milliLiter(s) Oral every 4 hours PRN Dyspepsia  bisacodyl Suppository 10 milliGRAM(s) Rectal daily PRN Constipation  dextrose Oral Gel 15 Gram(s) Oral once PRN Blood Glucose LESS THAN 70 milliGRAM(s)/deciliter  hydrALAZINE 50 milliGRAM(s) Oral every 6 hours PRN for systolic BP>160  magnesium hydroxide Suspension 30 milliLiter(s) Oral daily PRN Constipation  melatonin 3 milliGRAM(s) Oral at bedtime PRN Insomnia  morphine  - Injectable 2 milliGRAM(s) IV Push every 6 hours PRN Severe Pain (7 - 10)  ondansetron Injectable 4 milliGRAM(s) IV Push every 8 hours PRN Nausea and/or Vomiting  traMADol 50 milliGRAM(s) Oral four times a day PRN Moderate Pain (4 - 6)        T(C): 36.8 (04-06-23 @ 12:10), Max: 36.8 (04-06-23 @ 12:10)  HR: 90 (04-06-23 @ 12:10) (83 - 101)  BP: 123/73 (04-06-23 @ 12:10) (102/62 - 123/73)  RR: 18 (04-06-23 @ 12:10) (18 - 18)  SpO2: 90% (04-06-23 @ 12:10) (90% - 98%)  Wt(kg): --    PHYSICAL EXAM:  GENERAL: NAD, well-groomed, well-developed  HEAD:  Atraumatic, Normocephalic  NECK: Supple, No JVD, Normal thyroid  CHEST/LUNG: Clear to percussion bilaterally; No rales, rhonchi, wheezing, or rubs  HEART: Regular rate and rhythm; No murmurs, rubs, or gallops  ABDOMEN: Soft, Nontender, Nondistended; Bowel sounds present  EXTREMITIES:  2+ Peripheral Pulses, No clubbing, cyanosis, or edema  SKIN: No rashes or lesions    CAPILLARY BLOOD GLUCOSE      POCT Blood Glucose.: 99 mg/dL (06 Apr 2023 11:46)  POCT Blood Glucose.: 126 mg/dL (06 Apr 2023 08:16)  POCT Blood Glucose.: 59 mg/dL (06 Apr 2023 07:45)  POCT Blood Glucose.: 69 mg/dL (06 Apr 2023 07:42)  POCT Blood Glucose.: 175 mg/dL (05 Apr 2023 21:39)  POCT Blood Glucose.: 116 mg/dL (05 Apr 2023 19:03)  POCT Blood Glucose.: 97 mg/dL (05 Apr 2023 17:51)  POCT Blood Glucose.: 86 mg/dL (05 Apr 2023 17:14)  POCT Blood Glucose.: 66 mg/dL (05 Apr 2023 16:41)                            10.5   9.55  )-----------( 286      ( 06 Apr 2023 06:37 )             34.3       CMP:  04-06 @ 06:37  SGPT 34  Albumin 2.1   Alk Phos 61   Anion Gap 1   SGOT 19   Total Bili 0.4   BUN 32   Calcium Total 8.4   CO2 26   Chloride 117   Creatinine 1.30   eGFR if AA --   eGFR if non AA --   Glucose 65   Potassium 3.4   Protein 6.3   Sodium 144      Thyroid Function Tests:      Diabetes Tests:       Radiology:                  Patient is a 87y old  Male who presents with a chief complaint of left foot infection (06 Apr 2023 09:59)      Reason For Consult: dm2 uncontrolled    HPI:  86 yo male ,[facility] resident with PMHx - COPD, DM, HTN, CAD, HLD, H/O TIA, dementia,GERD, BPH and depression sent ot ER for evaluation of left foot 1metatarsal wound ,suggestive of osteomyelitis .Patient was seen by ID consult and transfer to the hospital recommended for wound cx/bone biopsy /podiatry evaluation ,likely will require 4-6 weeks of iv abx . Patient was admitted to [facility] with b/l feet wounds and was followed by wound care team ,recently completed 7 days of doxycycline for foot wound cellulitis . (30 Mar 2023 05:21)      PAST MEDICAL & SURGICAL HISTORY:  ASHD (arteriosclerotic heart disease)      BPH without urinary obstruction      COPD, moderate      Stage 3 chronic kidney disease      Chronic GERD      HLD (hyperlipidemia)      MDD (major depressive disorder)      Obstructive and reflux uropathy      Cellulitis      HTN (hypertension)      Sepsis      Dementia      Moderate protein-calorie malnutrition      Brain TIA      History of RSV infection      DM type 2, not at goal      Pressure ulcer of unspecified heel, unspecified stage      Venous stasis ulcer without varicose veins      Multiple open wounds of foot          FAMILY HISTORY:        Social History:    MEDICATIONS  (STANDING):  albuterol/ipratropium for Nebulization 3 milliLiter(s) Nebulizer every 8 hours  budesonide 160 MICROgram(s)/formoterol 4.5 MICROgram(s) Inhaler 2 Puff(s) Inhalation two times a day  dextrose 5% + sodium chloride 0.45%. 1000 milliLiter(s) (40 mL/Hr) IV Continuous <Continuous>  dextrose 5%. 1000 milliLiter(s) (100 mL/Hr) IV Continuous <Continuous>  dextrose 5%. 1000 milliLiter(s) (50 mL/Hr) IV Continuous <Continuous>  dextrose 50% Injectable 25 Gram(s) IV Push once  dextrose 50% Injectable 12.5 Gram(s) IV Push once  dextrose 50% Injectable 25 Gram(s) IV Push once  donepezil 5 milliGRAM(s) Oral at bedtime  DULoxetine 60 milliGRAM(s) Oral daily  enoxaparin Injectable 60 milliGRAM(s) SubCutaneous every 12 hours  glucagon  Injectable 1 milliGRAM(s) IntraMuscular once  insulin lispro (ADMELOG) corrective regimen sliding scale   SubCutaneous three times a day before meals  insulin lispro (ADMELOG) corrective regimen sliding scale   SubCutaneous at bedtime  lactobacillus acidophilus 1 Tablet(s) Oral two times a day with meals  losartan 25 milliGRAM(s) Oral daily  metoprolol tartrate 25 milliGRAM(s) Oral three times a day  multivitamin 1 Tablet(s) Oral daily  pantoprazole   Suspension 40 milliGRAM(s) Oral daily  piperacillin/tazobactam IVPB.. 3.375 Gram(s) IV Intermittent every 8 hours  remdesivir  IVPB   IV Intermittent   remdesivir  IVPB 100 milliGRAM(s) IV Intermittent every 24 hours  senna 2 Tablet(s) Oral at bedtime  simvastatin 40 milliGRAM(s) Oral at bedtime  tamsulosin 0.4 milliGRAM(s) Oral at bedtime    MEDICATIONS  (PRN):  acetaminophen     Tablet .. 650 milliGRAM(s) Oral every 6 hours PRN Temp greater or equal to 38C (100.4F), Mild Pain (1 - 3)  aluminum hydroxide/magnesium hydroxide/simethicone Suspension 30 milliLiter(s) Oral every 4 hours PRN Dyspepsia  bisacodyl Suppository 10 milliGRAM(s) Rectal daily PRN Constipation  dextrose Oral Gel 15 Gram(s) Oral once PRN Blood Glucose LESS THAN 70 milliGRAM(s)/deciliter  hydrALAZINE 50 milliGRAM(s) Oral every 6 hours PRN for systolic BP>160  magnesium hydroxide Suspension 30 milliLiter(s) Oral daily PRN Constipation  melatonin 3 milliGRAM(s) Oral at bedtime PRN Insomnia  morphine  - Injectable 2 milliGRAM(s) IV Push every 6 hours PRN Severe Pain (7 - 10)  ondansetron Injectable 4 milliGRAM(s) IV Push every 8 hours PRN Nausea and/or Vomiting  traMADol 50 milliGRAM(s) Oral four times a day PRN Moderate Pain (4 - 6)        T(C): 36.8 (04-06-23 @ 12:10), Max: 36.8 (04-06-23 @ 12:10)  HR: 90 (04-06-23 @ 12:10) (83 - 101)  BP: 123/73 (04-06-23 @ 12:10) (102/62 - 123/73)  RR: 18 (04-06-23 @ 12:10) (18 - 18)  SpO2: 90% (04-06-23 @ 12:10) (90% - 98%)  Wt(kg): --    PHYSICAL EXAM:  GENERAL: NAD, well-groomed, well-developed  HEAD:  Atraumatic, Normocephalic  NECK: Supple, No JVD, Normal thyroid  CHEST/LUNG: Clear to percussion bilaterally; No rales, rhonchi, wheezing, or rubs  HEART: Regular rate and rhythm; No murmurs, rubs, or gallops  ABDOMEN: Soft, Nontender, Nondistended; Bowel sounds present  EXTREMITIES:  2+ Peripheral Pulses, No clubbing, cyanosis, or edema  SKIN: No rashes or lesions    CAPILLARY BLOOD GLUCOSE      POCT Blood Glucose.: 99 mg/dL (06 Apr 2023 11:46)  POCT Blood Glucose.: 126 mg/dL (06 Apr 2023 08:16)  POCT Blood Glucose.: 59 mg/dL (06 Apr 2023 07:45)  POCT Blood Glucose.: 69 mg/dL (06 Apr 2023 07:42)  POCT Blood Glucose.: 175 mg/dL (05 Apr 2023 21:39)  POCT Blood Glucose.: 116 mg/dL (05 Apr 2023 19:03)  POCT Blood Glucose.: 97 mg/dL (05 Apr 2023 17:51)  POCT Blood Glucose.: 86 mg/dL (05 Apr 2023 17:14)  POCT Blood Glucose.: 66 mg/dL (05 Apr 2023 16:41)                            10.5   9.55  )-----------( 286      ( 06 Apr 2023 06:37 )             34.3       CMP:  04-06 @ 06:37  SGPT 34  Albumin 2.1   Alk Phos 61   Anion Gap 1   SGOT 19   Total Bili 0.4   BUN 32   Calcium Total 8.4   CO2 26   Chloride 117   Creatinine 1.30   eGFR if AA --   eGFR if non AA --   Glucose 65   Potassium 3.4   Protein 6.3   Sodium 144      Thyroid Function Tests:      Diabetes Tests:       Radiology:                  Patient is a 87y old  Male who presents with a chief complaint of left foot infection (06 Apr 2023 09:59)      Reason For Consult: dm2 uncontrolled    HPI:  88 yo male ,[facility] resident with PMHx - COPD, DM, HTN, CAD, HLD, H/O TIA, dementia,GERD, BPH and depression sent ot ER for evaluation of left foot 1metatarsal wound ,suggestive of osteomyelitis .Patient was seen by ID consult and transfer to the hospital recommended for wound cx/bone biopsy /podiatry evaluation ,likely will require 4-6 weeks of iv abx . Patient was admitted to [facility] with b/l feet wounds and was followed by wound care team ,recently completed 7 days of doxycycline for foot wound cellulitis . (30 Mar 2023 05:21)      PAST MEDICAL & SURGICAL HISTORY:  ASHD (arteriosclerotic heart disease)      BPH without urinary obstruction      COPD, moderate      Stage 3 chronic kidney disease      Chronic GERD      HLD (hyperlipidemia)      MDD (major depressive disorder)      Obstructive and reflux uropathy      Cellulitis      HTN (hypertension)      Sepsis      Dementia      Moderate protein-calorie malnutrition      Brain TIA      History of RSV infection      DM type 2, not at goal      Pressure ulcer of unspecified heel, unspecified stage      Venous stasis ulcer without varicose veins      Multiple open wounds of foot          FAMILY HISTORY:        Social History:    MEDICATIONS  (STANDING):  albuterol/ipratropium for Nebulization 3 milliLiter(s) Nebulizer every 8 hours  budesonide 160 MICROgram(s)/formoterol 4.5 MICROgram(s) Inhaler 2 Puff(s) Inhalation two times a day  dextrose 5% + sodium chloride 0.45%. 1000 milliLiter(s) (40 mL/Hr) IV Continuous <Continuous>  dextrose 5%. 1000 milliLiter(s) (100 mL/Hr) IV Continuous <Continuous>  dextrose 5%. 1000 milliLiter(s) (50 mL/Hr) IV Continuous <Continuous>  dextrose 50% Injectable 25 Gram(s) IV Push once  dextrose 50% Injectable 12.5 Gram(s) IV Push once  dextrose 50% Injectable 25 Gram(s) IV Push once  donepezil 5 milliGRAM(s) Oral at bedtime  DULoxetine 60 milliGRAM(s) Oral daily  enoxaparin Injectable 60 milliGRAM(s) SubCutaneous every 12 hours  glucagon  Injectable 1 milliGRAM(s) IntraMuscular once  insulin lispro (ADMELOG) corrective regimen sliding scale   SubCutaneous three times a day before meals  insulin lispro (ADMELOG) corrective regimen sliding scale   SubCutaneous at bedtime  lactobacillus acidophilus 1 Tablet(s) Oral two times a day with meals  losartan 25 milliGRAM(s) Oral daily  metoprolol tartrate 25 milliGRAM(s) Oral three times a day  multivitamin 1 Tablet(s) Oral daily  pantoprazole   Suspension 40 milliGRAM(s) Oral daily  piperacillin/tazobactam IVPB.. 3.375 Gram(s) IV Intermittent every 8 hours  remdesivir  IVPB   IV Intermittent   remdesivir  IVPB 100 milliGRAM(s) IV Intermittent every 24 hours  senna 2 Tablet(s) Oral at bedtime  simvastatin 40 milliGRAM(s) Oral at bedtime  tamsulosin 0.4 milliGRAM(s) Oral at bedtime    MEDICATIONS  (PRN):  acetaminophen     Tablet .. 650 milliGRAM(s) Oral every 6 hours PRN Temp greater or equal to 38C (100.4F), Mild Pain (1 - 3)  aluminum hydroxide/magnesium hydroxide/simethicone Suspension 30 milliLiter(s) Oral every 4 hours PRN Dyspepsia  bisacodyl Suppository 10 milliGRAM(s) Rectal daily PRN Constipation  dextrose Oral Gel 15 Gram(s) Oral once PRN Blood Glucose LESS THAN 70 milliGRAM(s)/deciliter  hydrALAZINE 50 milliGRAM(s) Oral every 6 hours PRN for systolic BP>160  magnesium hydroxide Suspension 30 milliLiter(s) Oral daily PRN Constipation  melatonin 3 milliGRAM(s) Oral at bedtime PRN Insomnia  morphine  - Injectable 2 milliGRAM(s) IV Push every 6 hours PRN Severe Pain (7 - 10)  ondansetron Injectable 4 milliGRAM(s) IV Push every 8 hours PRN Nausea and/or Vomiting  traMADol 50 milliGRAM(s) Oral four times a day PRN Moderate Pain (4 - 6)        T(C): 36.8 (04-06-23 @ 12:10), Max: 36.8 (04-06-23 @ 12:10)  HR: 90 (04-06-23 @ 12:10) (83 - 101)  BP: 123/73 (04-06-23 @ 12:10) (102/62 - 123/73)  RR: 18 (04-06-23 @ 12:10) (18 - 18)  SpO2: 90% (04-06-23 @ 12:10) (90% - 98%)  Wt(kg): --    PHYSICAL EXAM:  GENERAL: NAD, well-groomed, well-developed  HEAD:  Atraumatic, Normocephalic  NECK: Supple, No JVD, Normal thyroid  CHEST/LUNG: Clear to percussion bilaterally; No rales, rhonchi, wheezing, or rubs  HEART: Regular rate and rhythm; No murmurs, rubs, or gallops  ABDOMEN: Soft, Nontender, Nondistended; Bowel sounds present  EXTREMITIES:  2+ Peripheral Pulses, No clubbing, cyanosis, or edema  SKIN: No rashes or lesions    CAPILLARY BLOOD GLUCOSE      POCT Blood Glucose.: 99 mg/dL (06 Apr 2023 11:46)  POCT Blood Glucose.: 126 mg/dL (06 Apr 2023 08:16)  POCT Blood Glucose.: 59 mg/dL (06 Apr 2023 07:45)  POCT Blood Glucose.: 69 mg/dL (06 Apr 2023 07:42)  POCT Blood Glucose.: 175 mg/dL (05 Apr 2023 21:39)  POCT Blood Glucose.: 116 mg/dL (05 Apr 2023 19:03)  POCT Blood Glucose.: 97 mg/dL (05 Apr 2023 17:51)  POCT Blood Glucose.: 86 mg/dL (05 Apr 2023 17:14)  POCT Blood Glucose.: 66 mg/dL (05 Apr 2023 16:41)                            10.5   9.55  )-----------( 286      ( 06 Apr 2023 06:37 )             34.3       CMP:  04-06 @ 06:37  SGPT 34  Albumin 2.1   Alk Phos 61   Anion Gap 1   SGOT 19   Total Bili 0.4   BUN 32   Calcium Total 8.4   CO2 26   Chloride 117   Creatinine 1.30   eGFR if AA --   eGFR if non AA --   Glucose 65   Potassium 3.4   Protein 6.3   Sodium 144      Thyroid Function Tests:      Diabetes Tests:       Radiology:

## 2023-04-06 NOTE — PROGRESS NOTE ADULT - SUBJECTIVE AND OBJECTIVE BOX
ANA MARIA LEIGH    PLV 1EAS 101 W1    Allergies    No Known Allergies    Intolerances        PAST MEDICAL & SURGICAL HISTORY:  ASHD (arteriosclerotic heart disease)      BPH without urinary obstruction      COPD, moderate      Stage 3 chronic kidney disease      Chronic GERD      HLD (hyperlipidemia)      MDD (major depressive disorder)      Obstructive and reflux uropathy      Cellulitis      HTN (hypertension)      Sepsis      Dementia      Moderate protein-calorie malnutrition      Brain TIA      History of RSV infection      DM type 2, not at goal      Pressure ulcer of unspecified heel, unspecified stage      Venous stasis ulcer without varicose veins      Multiple open wounds of foot          FAMILY HISTORY:      Home Medications:  Admelog SoloStar 100 units/mL injectable solution: injectable 4 times a day (before meals and at bedtime) per sliding scale (30 Mar 2023 15:23)  Aricept 5 mg oral tablet: 1 tab(s) orally once a day (30 Mar 2023 15:23)  atenolol 25 mg oral tablet: 1 tab(s) orally once a day (30 Mar 2023 15:23)  Bacid (LAC) oral tablet: 1 tab(s) orally 2 times a day (30 Mar 2023 15:23)  Cymbalta 60 mg oral delayed release capsule: 1 cap(s) orally once a day (30 Mar 2023 15:23)  docusate sodium 100 mg oral capsule: 2 cap(s) orally once a day (at bedtime) (30 Mar 2023 15:23)  doxycycline hyclate 100 mg oral tablet: 1 tab(s) orally 2 times a day for 7 days  3/28/23-4/4/23 (30 Mar 2023 15:23)  Dulcolax Laxative 10 mg rectal suppository: 1 suppository(ies) rectally as needed for  constipation (30 Mar 2023 15:23)  famotidine 40 mg oral tablet: 1 tab(s) orally once a day (30 Mar 2023 15:23)  Fleet Enema 19 g-7 g rectal enema: 133 milliliter(s) rectally as needed for  constipation (30 Mar 2023 15:23)  Flovent 44 mcg/inh inhalation aerosol with adapter: 1 puff(s) inhaled every 12 hours (30 Mar 2023 15:23)  ipratropium-albuterol 0.5 mg-2.5 mg/3 mL inhalation solution: 3 milliliter(s) by nebulizer 4 times a day (30 Mar 2023 15:23)  losartan 50 mg oral tablet: 1 tab(s) orally once a day (30 Mar 2023 15:23)  Milk of Magnesia 8% oral suspension: 30 milliliter(s) orally once a day as needed for  constipation (30 Mar 2023 15:23)  Santyl 250 units/g topical ointment: Apply topically to affected area (30 Mar 2023 12:41)  simvastatin 40 mg oral tablet: 1 tab(s) orally once a day (at bedtime) (30 Mar 2023 15:23)  SITagliptin 50 mg oral tablet: 1 tab(s) orally once a day (30 Mar 2023 15:23)  tamsulosin 0.4 mg oral capsule: 1 cap(s) orally once a day (in the evening) (30 Mar 2023 15:23)  Therapeutic Multiple Vitamins oral tablet: 1 tab(s) orally once a day (30 Mar 2023 15:23)  traMADol 50 mg oral tablet: 0.5 tab(s) orally 2 times a day (30 Mar 2023 15:23)  Tylenol Caplet Extra Strength 500 mg oral tablet: 2 tab(s) orally every 8 hours (30 Mar 2023 15:23)      MEDICATIONS  (STANDING):  albuterol/ipratropium for Nebulization 3 milliLiter(s) Nebulizer every 8 hours  budesonide 160 MICROgram(s)/formoterol 4.5 MICROgram(s) Inhaler 2 Puff(s) Inhalation two times a day  dextrose 5% + sodium chloride 0.45%. 1000 milliLiter(s) (40 mL/Hr) IV Continuous <Continuous>  dextrose 5%. 1000 milliLiter(s) (100 mL/Hr) IV Continuous <Continuous>  dextrose 5%. 1000 milliLiter(s) (50 mL/Hr) IV Continuous <Continuous>  dextrose 50% Injectable 25 Gram(s) IV Push once  dextrose 50% Injectable 12.5 Gram(s) IV Push once  dextrose 50% Injectable 25 Gram(s) IV Push once  donepezil 5 milliGRAM(s) Oral at bedtime  DULoxetine 60 milliGRAM(s) Oral daily  enoxaparin Injectable 60 milliGRAM(s) SubCutaneous every 12 hours  glucagon  Injectable 1 milliGRAM(s) IntraMuscular once  insulin lispro (ADMELOG) corrective regimen sliding scale   SubCutaneous three times a day before meals  insulin lispro (ADMELOG) corrective regimen sliding scale   SubCutaneous at bedtime  lactobacillus acidophilus 1 Tablet(s) Oral two times a day with meals  losartan 25 milliGRAM(s) Oral daily  metoprolol tartrate 25 milliGRAM(s) Oral three times a day  multivitamin 1 Tablet(s) Oral daily  pantoprazole   Suspension 40 milliGRAM(s) Oral daily  piperacillin/tazobactam IVPB.. 3.375 Gram(s) IV Intermittent every 8 hours  remdesivir  IVPB   IV Intermittent   remdesivir  IVPB 100 milliGRAM(s) IV Intermittent every 24 hours  senna 2 Tablet(s) Oral at bedtime  simvastatin 40 milliGRAM(s) Oral at bedtime  tamsulosin 0.4 milliGRAM(s) Oral at bedtime    MEDICATIONS  (PRN):  acetaminophen     Tablet .. 650 milliGRAM(s) Oral every 6 hours PRN Temp greater or equal to 38C (100.4F), Mild Pain (1 - 3)  aluminum hydroxide/magnesium hydroxide/simethicone Suspension 30 milliLiter(s) Oral every 4 hours PRN Dyspepsia  bisacodyl Suppository 10 milliGRAM(s) Rectal daily PRN Constipation  dextrose Oral Gel 15 Gram(s) Oral once PRN Blood Glucose LESS THAN 70 milliGRAM(s)/deciliter  hydrALAZINE 50 milliGRAM(s) Oral every 6 hours PRN for systolic BP>160  magnesium hydroxide Suspension 30 milliLiter(s) Oral daily PRN Constipation  melatonin 3 milliGRAM(s) Oral at bedtime PRN Insomnia  morphine  - Injectable 2 milliGRAM(s) IV Push every 6 hours PRN Severe Pain (7 - 10)  ondansetron Injectable 4 milliGRAM(s) IV Push every 8 hours PRN Nausea and/or Vomiting  traMADol 50 milliGRAM(s) Oral four times a day PRN Moderate Pain (4 - 6)      Diet, DASH/TLC:   Sodium & Cholesterol Restricted  Consistent Carbohydrate Evening Snack  Soft and Bite Sized (SOFTBTSZ)  Reginald(7 Gm Arginine/7 Gm Glut/1.2 Gm HMB     Qty per Day:  2  Supplement Feeding Modality:  Oral  Glucerna Shake Cans or Servings Per Day:  1       Frequency:  Daily (03-30-23 @ 13:51) [Pending Verification By Attending]          Vital Signs Last 24 Hrs  T(C): 36.1 (06 Apr 2023 05:13), Max: 36.6 (05 Apr 2023 20:05)  T(F): 97 (06 Apr 2023 05:13), Max: 97.9 (05 Apr 2023 20:05)  HR: 87 (06 Apr 2023 08:07) (83 - 101)  BP: 121/70 (06 Apr 2023 05:13) (102/62 - 121/70)  BP(mean): --  RR: 18 (06 Apr 2023 05:13) (18 - 18)  SpO2: 98% (06 Apr 2023 08:07) (90% - 98%)    Parameters below as of 06 Apr 2023 08:07  Patient On (Oxygen Delivery Method): room air          04-05-23 @ 07:01  -  04-06-23 @ 07:00  --------------------------------------------------------  IN: 240 mL / OUT: 600 mL / NET: -360 mL    04-06-23 @ 07:01  -  04-06-23 @ 10:00  --------------------------------------------------------  IN: 350 mL / OUT: 0 mL / NET: 350 mL              LABS:                        10.5   9.55  )-----------( 286      ( 06 Apr 2023 06:37 )             34.3     04-06    144  |  117<H>  |  32<H>  ----------------------------<  65<L>  3.4<L>   |  26  |  1.30    Ca    8.4<L>      06 Apr 2023 06:37  Phos  3.1     04-06  Mg     2.3     04-06    TPro  6.3  /  Alb  2.1<L>  /  TBili  0.4  /  DBili  0.2  /  AST  19  /  ALT  34  /  AlkPhos  61  04-06    PT/INR - ( 06 Apr 2023 06:37 )   PT: 16.4 sec;   INR: 1.40 ratio                   WBC:  WBC Count: 9.55 K/uL (04-06 @ 06:37)  WBC Count: 7.72 K/uL (04-05 @ 06:40)  WBC Count: 9.06 K/uL (04-04 @ 07:45)  WBC Count: 9.46 K/uL (04-03 @ 07:38)      MICROBIOLOGY:  RECENT CULTURES:  04-02 Clean Catch Clean Catch (Midstream) Enterococcus faecalis XXXX   >100,000 CFU/ml Enterococcus faecalis                PT/INR - ( 06 Apr 2023 06:37 )   PT: 16.4 sec;   INR: 1.40 ratio             Sodium:  Sodium, Serum: 144 mmol/L (04-06 @ 06:37)  Sodium, Serum: 144 mmol/L (04-05 @ 06:40)  Sodium, Serum: 144 mmol/L (04-04 @ 07:45)  Sodium, Serum: 148 mmol/L (04-03 @ 07:38)      1.30 mg/dL 04-06 @ 06:37  1.20 mg/dL 04-05 @ 06:40  1.30 mg/dL 04-04 @ 07:45  1.50 mg/dL 04-03 @ 07:38      Hemoglobin:  Hemoglobin: 10.5 g/dL (04-06 @ 06:37)  Hemoglobin: 11.5 g/dL (04-05 @ 06:40)  Hemoglobin: 9.4 g/dL (04-04 @ 07:45)  Hemoglobin: 10.0 g/dL (04-03 @ 07:38)      Platelets: Platelet Count - Automated: 286 K/uL (04-06 @ 06:37)  Platelet Count - Automated: 251 K/uL (04-05 @ 06:40)  Platelet Count - Automated: 214 K/uL (04-04 @ 07:45)  Platelet Count - Automated: 222 K/uL (04-03 @ 07:38)      LIVER FUNCTIONS - ( 06 Apr 2023 06:37 )  Alb: 2.1 g/dL / Pro: 6.3 g/dL / ALK PHOS: 61 U/L / ALT: 34 U/L / AST: 19 U/L / GGT: x                 RADIOLOGY & ADDITIONAL STUDIES:      MICROBIOLOGY:  RECENT CULTURES:  04-02 Clean Catch Clean Catch (Midstream) Enterococcus faecalis XXXX   >100,000 CFU/ml Enterococcus faecalis

## 2023-04-06 NOTE — CONSULT NOTE ADULT - REASON FOR ADMISSION
left foot infection
foot infection .
left foot infection

## 2023-04-06 NOTE — PROGRESS NOTE ADULT - ASSESSMENT
86 yo male ,[facility] resident with PMHx - COPD, DM, HTN, CAD, HLD, H/O TIA, dementia,GERD, BPH and depression sent ot ER for evaluation of left foot 1metatarsal wound ,suggestive of osteomyelitis .Patient was seen by ID consult and transfer to the hospital recommended for wound cx/bone biopsy /podiatry evaluation ,likely will require 4-6 weeks of iv abx . Patient was admitted to [facility] with b/l feet wounds and was followed by wound care team ,recently completed 7 days of doxycycline for foot wound cellulitis . 86 yo male ,[facility] resident with PMHx - COPD, DM, HTN, CAD, HLD, H/O TIA, dementia,GERD, BPH and depression sent ot ER for evaluation of left foot 1metatarsal wound ,suggestive of osteomyelitis .Patient was seen by ID consult and transfer to the hospital recommended for wound cx/bone biopsy /podiatry evaluation ,likely will require 4-6 weeks of iv abx . Patient was admitted to [facility] with b/l feet wounds and was followed by wound care team ,recently completed 7 days of doxycycline for foot wound cellulitis . 86 yo male ,[facility] resident with PMHx - COPD, DM, HTN, CAD, HLD, H/O TIA, dementia,GERD, BPH and depression sent ot ER for evaluation of left foot 1metatarsal wound ,suggestive of osteomyelitis .Patient was seen by ID consult and transfer to the hospital recommended for wound cx/bone biopsy /podiatry evaluation ,likely will require 4-6 weeks of iv abx . Patient was admitted to [facility] with b/l feet wounds and was followed by wound care team ,recently completed 7 days of doxycycline for foot wound cellulitis .

## 2023-04-07 DIAGNOSIS — R82.79 OTHER ABNORMAL FINDINGS ON MICROBIOLOGICAL EXAMINATION OF URINE: ICD-10-CM

## 2023-04-07 LAB
ALBUMIN SERPL ELPH-MCNC: 2.2 G/DL — LOW (ref 3.3–5)
ALP SERPL-CCNC: 66 U/L — SIGNIFICANT CHANGE UP (ref 40–120)
ALT FLD-CCNC: 31 U/L — SIGNIFICANT CHANGE UP (ref 12–78)
ANION GAP SERPL CALC-SCNC: 4 MMOL/L — LOW (ref 5–17)
AST SERPL-CCNC: 19 U/L — SIGNIFICANT CHANGE UP (ref 15–37)
BILIRUB DIRECT SERPL-MCNC: 0.2 MG/DL — SIGNIFICANT CHANGE UP (ref 0–0.3)
BILIRUB INDIRECT FLD-MCNC: 0.2 MG/DL — SIGNIFICANT CHANGE UP (ref 0.2–1)
BILIRUB SERPL-MCNC: 0.4 MG/DL — SIGNIFICANT CHANGE UP (ref 0.2–1.2)
BUN SERPL-MCNC: 35 MG/DL — HIGH (ref 7–23)
CALCIUM SERPL-MCNC: 8.5 MG/DL — SIGNIFICANT CHANGE UP (ref 8.5–10.1)
CHLORIDE SERPL-SCNC: 118 MMOL/L — HIGH (ref 96–108)
CO2 SERPL-SCNC: 23 MMOL/L — SIGNIFICANT CHANGE UP (ref 22–31)
CREAT SERPL-MCNC: 1.3 MG/DL — SIGNIFICANT CHANGE UP (ref 0.5–1.3)
EGFR: 53 ML/MIN/1.73M2 — LOW
GLUCOSE SERPL-MCNC: 126 MG/DL — HIGH (ref 70–99)
HCT VFR BLD CALC: 36.4 % — LOW (ref 39–50)
HGB BLD-MCNC: 10.9 G/DL — LOW (ref 13–17)
MAGNESIUM SERPL-MCNC: 2.3 MG/DL — SIGNIFICANT CHANGE UP (ref 1.6–2.6)
MCHC RBC-ENTMCNC: 28.6 PG — SIGNIFICANT CHANGE UP (ref 27–34)
MCHC RBC-ENTMCNC: 29.9 GM/DL — LOW (ref 32–36)
MCV RBC AUTO: 95.5 FL — SIGNIFICANT CHANGE UP (ref 80–100)
NRBC # BLD: 0 /100 WBCS — SIGNIFICANT CHANGE UP (ref 0–0)
PHOSPHATE SERPL-MCNC: 2.6 MG/DL — SIGNIFICANT CHANGE UP (ref 2.5–4.5)
PLATELET # BLD AUTO: 284 K/UL — SIGNIFICANT CHANGE UP (ref 150–400)
POTASSIUM SERPL-MCNC: 4.1 MMOL/L — SIGNIFICANT CHANGE UP (ref 3.5–5.3)
POTASSIUM SERPL-SCNC: 4.1 MMOL/L — SIGNIFICANT CHANGE UP (ref 3.5–5.3)
PROT SERPL-MCNC: 6.6 G/DL — SIGNIFICANT CHANGE UP (ref 6–8.3)
RBC # BLD: 3.81 M/UL — LOW (ref 4.2–5.8)
RBC # FLD: 17.6 % — HIGH (ref 10.3–14.5)
SODIUM SERPL-SCNC: 145 MMOL/L — SIGNIFICANT CHANGE UP (ref 135–145)
WBC # BLD: 8.42 K/UL — SIGNIFICANT CHANGE UP (ref 3.8–10.5)
WBC # FLD AUTO: 8.42 K/UL — SIGNIFICANT CHANGE UP (ref 3.8–10.5)

## 2023-04-07 PROCEDURE — 76937 US GUIDE VASCULAR ACCESS: CPT | Mod: 26

## 2023-04-07 PROCEDURE — 71045 X-RAY EXAM CHEST 1 VIEW: CPT | Mod: 26

## 2023-04-07 PROCEDURE — 36573 INSJ PICC RS&I 5 YR+: CPT

## 2023-04-07 RX ORDER — PIPERACILLIN AND TAZOBACTAM 4; .5 G/20ML; G/20ML
3.38 INJECTION, POWDER, LYOPHILIZED, FOR SOLUTION INTRAVENOUS EVERY 8 HOURS
Refills: 0 | Status: DISCONTINUED | OUTPATIENT
Start: 2023-04-08 | End: 2023-04-10

## 2023-04-07 RX ORDER — CHLORHEXIDINE GLUCONATE 213 G/1000ML
1 SOLUTION TOPICAL
Refills: 0 | Status: DISCONTINUED | OUTPATIENT
Start: 2023-04-07 | End: 2023-04-10

## 2023-04-07 RX ORDER — DEXTROSE MONOHYDRATE, SODIUM CHLORIDE, AND POTASSIUM CHLORIDE 50; .745; 4.5 G/1000ML; G/1000ML; G/1000ML
1000 INJECTION, SOLUTION INTRAVENOUS
Refills: 0 | Status: DISCONTINUED | OUTPATIENT
Start: 2023-04-07 | End: 2023-04-09

## 2023-04-07 RX ORDER — PIPERACILLIN AND TAZOBACTAM 4; .5 G/20ML; G/20ML
3.38 INJECTION, POWDER, LYOPHILIZED, FOR SOLUTION INTRAVENOUS ONCE
Refills: 0 | Status: COMPLETED | OUTPATIENT
Start: 2023-04-07 | End: 2023-04-07

## 2023-04-07 RX ORDER — SODIUM CHLORIDE 9 MG/ML
10 INJECTION INTRAMUSCULAR; INTRAVENOUS; SUBCUTANEOUS
Refills: 0 | Status: DISCONTINUED | OUTPATIENT
Start: 2023-04-07 | End: 2023-04-10

## 2023-04-07 RX ADMIN — PIPERACILLIN AND TAZOBACTAM 25 GRAM(S): 4; .5 INJECTION, POWDER, LYOPHILIZED, FOR SOLUTION INTRAVENOUS at 21:28

## 2023-04-07 RX ADMIN — Medication 3 MILLILITER(S): at 08:00

## 2023-04-07 RX ADMIN — Medication 1 TABLET(S): at 17:30

## 2023-04-07 RX ADMIN — PANTOPRAZOLE SODIUM 40 MILLIGRAM(S): 20 TABLET, DELAYED RELEASE ORAL at 17:30

## 2023-04-07 RX ADMIN — BUDESONIDE AND FORMOTEROL FUMARATE DIHYDRATE 2 PUFF(S): 160; 4.5 AEROSOL RESPIRATORY (INHALATION) at 21:27

## 2023-04-07 RX ADMIN — Medication 2: at 12:05

## 2023-04-07 RX ADMIN — DULOXETINE HYDROCHLORIDE 60 MILLIGRAM(S): 30 CAPSULE, DELAYED RELEASE ORAL at 13:53

## 2023-04-07 RX ADMIN — Medication 25 MILLIGRAM(S): at 13:53

## 2023-04-07 RX ADMIN — BUDESONIDE AND FORMOTEROL FUMARATE DIHYDRATE 2 PUFF(S): 160; 4.5 AEROSOL RESPIRATORY (INHALATION) at 06:31

## 2023-04-07 RX ADMIN — Medication 2: at 17:30

## 2023-04-07 RX ADMIN — SIMVASTATIN 40 MILLIGRAM(S): 20 TABLET, FILM COATED ORAL at 21:27

## 2023-04-07 RX ADMIN — TAMSULOSIN HYDROCHLORIDE 0.4 MILLIGRAM(S): 0.4 CAPSULE ORAL at 21:27

## 2023-04-07 RX ADMIN — DEXTROSE MONOHYDRATE, SODIUM CHLORIDE, AND POTASSIUM CHLORIDE 40 MILLILITER(S): 50; .745; 4.5 INJECTION, SOLUTION INTRAVENOUS at 13:53

## 2023-04-07 RX ADMIN — SENNA PLUS 2 TABLET(S): 8.6 TABLET ORAL at 21:27

## 2023-04-07 RX ADMIN — Medication 3 MILLILITER(S): at 15:37

## 2023-04-07 RX ADMIN — INSULIN GLARGINE 8 UNIT(S): 100 INJECTION, SOLUTION SUBCUTANEOUS at 21:28

## 2023-04-07 RX ADMIN — Medication 25 MILLIGRAM(S): at 06:31

## 2023-04-07 RX ADMIN — ENOXAPARIN SODIUM 60 MILLIGRAM(S): 100 INJECTION SUBCUTANEOUS at 06:31

## 2023-04-07 RX ADMIN — Medication 1 TABLET(S): at 08:26

## 2023-04-07 RX ADMIN — ENOXAPARIN SODIUM 60 MILLIGRAM(S): 100 INJECTION SUBCUTANEOUS at 17:30

## 2023-04-07 RX ADMIN — Medication 1 TABLET(S): at 18:38

## 2023-04-07 RX ADMIN — DONEPEZIL HYDROCHLORIDE 5 MILLIGRAM(S): 10 TABLET, FILM COATED ORAL at 21:27

## 2023-04-07 RX ADMIN — Medication 25 MILLIGRAM(S): at 21:28

## 2023-04-07 RX ADMIN — LOSARTAN POTASSIUM 25 MILLIGRAM(S): 100 TABLET, FILM COATED ORAL at 06:31

## 2023-04-07 RX ADMIN — PIPERACILLIN AND TAZOBACTAM 200 GRAM(S): 4; .5 INJECTION, POWDER, LYOPHILIZED, FOR SOLUTION INTRAVENOUS at 18:37

## 2023-04-07 RX ADMIN — Medication 3 MILLILITER(S): at 00:29

## 2023-04-07 NOTE — PROGRESS NOTE ADULT - ASSESSMENT
88 yo male ,[facility] resident with PMHx - COPD, DM, HTN, CAD, HLD, H/O TIA, dementia,GERD, BPH and depression sent ot ER for evaluation of left foot 1metatarsal wound ,suggestive of osteomyelitis .Patient was seen by ID consult and transfer to the hospital recommended for wound cx/bone biopsy /podiatry evaluation ,likely will require 4-6 weeks of iv abx . Patient was admitted to [facility] with b/l feet wounds and was followed by wound care team ,recently completed 7 days of doxycycline for foot wound cellulitis . (30 Mar 2023 05:21)      hypernatremia   d5w iv fluid     hypokalemia potassium chloride  10 mEq/100 mL IVPB 10 milliEquivalent(s) IV Intermittent every 1 hour    ACUTE RENAL FAILURE: sodium chloride 0.45%. 1000 milliLiter(s) (50 mL/Hr) IV Continuous  Serum creatinine is improving    There is no progression . No uremic symptoms  No evidence of anemia .  Fluid status stable.  Will continue to avoid nephrotoxic drugs.  Patient remains asymptomatic.   Continue current therapy.  hold  diuretic.        BP monitoring,continue current antihypertensive meds, low salt diet,followup with PMD in 1-2 weeks  losartan 50 milliGRAM(s) Oral daily    f/u  blood and urine cx,serial lactate levels,monitor vitals clement saenz hydration,monitor urine output and renal profile,iv abx   piperacillin/tazobactam IVPB.. 3.375 Gram(s) IV Intermittent every 8 hours 86 yo male ,[facility] resident with PMHx - COPD, DM, HTN, CAD, HLD, H/O TIA, dementia,GERD, BPH and depression sent ot ER for evaluation of left foot 1metatarsal wound ,suggestive of osteomyelitis .Patient was seen by ID consult and transfer to the hospital recommended for wound cx/bone biopsy /podiatry evaluation ,likely will require 4-6 weeks of iv abx . Patient was admitted to [facility] with b/l feet wounds and was followed by wound care team ,recently completed 7 days of doxycycline for foot wound cellulitis . (30 Mar 2023 05:21)      hypernatremia   d5w iv fluid     hypokalemia potassium chloride  10 mEq/100 mL IVPB 10 milliEquivalent(s) IV Intermittent every 1 hour    ACUTE RENAL FAILURE: sodium chloride 0.45%. 1000 milliLiter(s) (50 mL/Hr) IV Continuous  Serum creatinine is improving    There is no progression . No uremic symptoms  No evidence of anemia .  Fluid status stable.  Will continue to avoid nephrotoxic drugs.  Patient remains asymptomatic.   Continue current therapy.  hold  diuretic.        BP monitoring,continue current antihypertensive meds, low salt diet,followup with PMD in 1-2 weeks  losartan 50 milliGRAM(s) Oral daily    f/u  blood and urine cx,serial lactate levels,monitor vitals clement saenz hydration,monitor urine output and renal profile,iv abx   piperacillin/tazobactam IVPB.. 3.375 Gram(s) IV Intermittent every 8 hours 86 yo male ,[facility] resident with PMHx - COPD, DM, HTN, CAD, HLD, H/O TIA, dementia,GERD, BPH and depression sent ot ER for evaluation of left foot 1metatarsal wound ,suggestive of osteomyelitis .Patient was seen by ID consult and transfer to the hospital recommended for wound cx/bone biopsy /podiatry evaluation ,likely will require 4-6 weeks of iv abx . Patient was admitted to [facility] with b/l feet wounds and was followed by wound care team ,recently completed 7 days of doxycycline for foot wound cellulitis . (30 Mar 2023 05:21)      hypernatremia   d5w iv fluid     hypokalemia potassium chloride  10 mEq/100 mL IVPB 10 milliEquivalent(s) IV Intermittent every 1 hour    ACUTE RENAL FAILURE: sodium chloride 0.45%. 1000 milliLiter(s) (50 mL/Hr) IV Continuous  Serum creatinine is improving    There is no progression . No uremic symptoms  No evidence of anemia .  Fluid status stable.  Will continue to avoid nephrotoxic drugs.  Patient remains asymptomatic.   Continue current therapy.  hold  diuretic.        BP monitoring,continue current antihypertensive meds, low salt diet,followup with PMD in 1-2 weeks  losartan 50 milliGRAM(s) Oral daily    f/u  blood and urine cx,serial lactate levels,monitor vitals clement saenz hydration,monitor urine output and renal profile,iv abx   piperacillin/tazobactam IVPB.. 3.375 Gram(s) IV Intermittent every 8 hours

## 2023-04-07 NOTE — PROGRESS NOTE ADULT - ASSESSMENT
REVIEW OF SYMPTOMS      Able to give (reliable) ROS  NO     PHYSICAL EXAM    HEENT Unremarkable  atraumatic   RESP Fair air entry EXP prolonged    Harsh breath sound Resp distres mild   CARDIAC S1 S2 No S3     NO JVD    ABDOMEN SOFT BS PRESENT NOT DISTENDED No hepatosplenomegaly   PEDAL EDEMA present No calf tenderness  NO rash       GENERAL DATA .   GOC.     .. 3/30/2023 full code  ALLGY.     .. nka                    WT.   .. 3/30/2023 63  BMI.        .. 3/30/2023 21            ICU STAY.   .. none  COVID.   .. 4/4/2023 scv2 (+)  .. 3/29/2023 scv2 (-)     BEST PRACTICE ISSUES.    HOB ELEVATN.   .. Yes  DVT PPLX.   .. 3/31 lvnx 60.2 Dr Lakhani (a fib)   ..  3/29- 3/31 hpsc   MILLER PPLX.   .. 3/30/2023 protonix 40    INFN PPLX. ..    SP SW LAURENT.   ..  3/30/2023 -> soft bite mild thick        DIET.    ..  3/30/2023 dash  FREE WATER  .. 4/1/2023 fw 250.4    IV fl.  .. 4/6/2023 d5 1/2 40   PROCEDURE  .. 4/7/2023 r brach picc     PROBLEM/ASSESSMENT/PLAN.  COVID   .. 4/4/2023 scv2 (+)  .. 4/4/2023 oxygenation ok  .. 4/4/2023 pt has mild disease   .. 4/4/2023 rdsv 3 d course started by Dr Mancia   Infection  Osteomyelitius  Possible pneumonia   .. Esr 3/29-3/31/2023 esr 67- 49   .. W 3/29-3/30-3/31-4/1-4/4-4/5/2023       w 11.8 - 12- 10.5- 11.9 -  9 - 7.7   .. cxr 3/30/2023  ........ increasing r lower perihilar infiltrate   .. xr foot left 3/30/2023  ........ stable eroisions around 1st mtp anmd great toe    ........ which could be bone infectn    .. CXR 3/29/2023 Possible hansa pneum  .. w scann 4/4 l foot susp for osteom r heel osteomyelitis   .. bc 3/29/2023 bc (-)   .. uc 4/2/2023 100K eneterococcs fecal  .. 3/29-4/5 zosyn    .. 4/7/2023 zosyn 42 d   .. follow cultures  PLEURAL EFFUSION.  .. ct ch 3/30/2023   ........ mild pulm edema  ........ mod r and sml effsn   a/r  .. pl effsn likely sec to chf   COPD  .. 3/30/2023 duoneb.3    .. 3/30/2023 symbicort   hemodynamics  .. La 3/29/2023 la 1.8   .. target map 65 (+)   CAD.  .. 3/30- 3/31/2023 atenolol 25  .. 3/31-4/4      metoprolol 25.2 - metoprolol 25.3   .. 3/30/2023 simvastat 40   RO DVT  .. 4/1/2023 v duplx (-)  CHF.  .. echo 3/30/2023  ........ ef 40%   ........ mod mr   .. bnp 4/2 bnp [id number]   .. 3/30-4/4/2023      losartan 50 - losartan 25   .. 3/30/2023 hydralazin 50.4p   Anemia  .. Hb 3/29-3/30-3/31-4/1-4/4-4/5/2023      Hb 11.2- 10.2 - 9.8 - 11 - 9.4 - 11.5   .. monitor  Hypernatremia.  .. Na 3/31-4/1-4/2-4/3-4/4 Na 147 - 151- 152 - 148- 144    CKD  .. Na 3/29-3/31/2023 Na 144-147   .. Cr 3/29-3/30-3/31-4/1-4/3-4/4/2023      Cr 1.4 - 1.3 - 1.3 - 1.5 - 1.5 - 1.3  .. monitor  OBS  .. 3/30/2023 donepezil     OVERALL .  86 yo M with PMHx HTN, HLD, T2D, dementia (AOx2-3), OA, BPH, CAD, TIA, CKD 3 and GERD HO recent hospital stay 1/24-1/30/2023 [facility]  COPD ex  now admitted with osteomyelitis possible pneum  Pulm consulted 3/29/2023    .. 4/4/2023 pt tested covid (+)  started rdsv Dr Mancia     PROBLEMS  COVID 4/4/2023  .. 4/4/2023 rdsv 3 d  E FECA UTI   .. uc 4/2/2023 100K eneterococcs fecal  Possible Osteomyelitis  .. w scann 4/4 l foot susp for osteom r heel osteomyelitis   Pneumonia   .. 3/29-4/5 zosyn   .. 4/7 zosyn x 42 d    COPD   CKD   PLEURAL EFFSN   .. 3/30 ct  HFREF   .. MOD MR 3/30 echo    A fib  ..  4/1 lvnx 60.2       TIME SPENT   Over 25 minutes aggregate care time spent on encounter; activities included   direct patient care, counseling and/or coordinating care reviewing notes, lab data/ imaging , discussion with multidisciplinary team/ patient  /family and explaining in detail risks, benefits, alternatives  of the recommendations     Paulino Parmar 87 m     REVIEW OF SYMPTOMS      Able to give (reliable) ROS  NO     PHYSICAL EXAM    HEENT Unremarkable  atraumatic   RESP Fair air entry EXP prolonged    Harsh breath sound Resp distres mild   CARDIAC S1 S2 No S3     NO JVD    ABDOMEN SOFT BS PRESENT NOT DISTENDED No hepatosplenomegaly   PEDAL EDEMA present No calf tenderness  NO rash       GENERAL DATA .   GOC.     .. 3/30/2023 full code  ALLGY.     .. nka                    WT.   .. 3/30/2023 63  BMI.        .. 3/30/2023 21            ICU STAY.   .. none  COVID.   .. 4/4/2023 scv2 (+)  .. 3/29/2023 scv2 (-)     BEST PRACTICE ISSUES.    HOB ELEVATN.   .. Yes  DVT PPLX.   .. 3/31 lvnx 60.2 Dr Lakhani (a fib)   ..  3/29- 3/31 hpsc   MILLER PPLX.   .. 3/30/2023 protonix 40    INFN PPLX. ..    SP SW LAURENT.   ..  3/30/2023 -> soft bite mild thick        DIET.    ..  3/30/2023 dash  FREE WATER  .. 4/1/2023 fw 250.4    IV fl.  .. 4/6/2023 d5 1/2 40   PROCEDURE  .. 4/7/2023 r brach picc     PROBLEM/ASSESSMENT/PLAN.  COVID   .. 4/4/2023 scv2 (+)  .. 4/4/2023 oxygenation ok  .. 4/4/2023 pt has mild disease   .. 4/4/2023 rdsv 3 d course started by Dr Mancia   Infection  Osteomyelitius  Possible pneumonia   .. Esr 3/29-3/31/2023 esr 67- 49   .. W 3/29-3/30-3/31-4/1-4/4-4/5/2023       w 11.8 - 12- 10.5- 11.9 -  9 - 7.7   .. cxr 3/30/2023  ........ increasing r lower perihilar infiltrate   .. xr foot left 3/30/2023  ........ stable eroisions around 1st mtp anmd great toe    ........ which could be bone infectn    .. CXR 3/29/2023 Possible hansa pneum  .. w scann 4/4 l foot susp for osteom r heel osteomyelitis   .. bc 3/29/2023 bc (-)   .. uc 4/2/2023 100K eneterococcs fecal  .. 3/29-4/5 zosyn    .. 4/7/2023 zosyn 42 d   .. follow cultures  PLEURAL EFFUSION.  .. ct ch 3/30/2023   ........ mild pulm edema  ........ mod r and sml effsn   a/r  .. pl effsn likely sec to chf   COPD  .. 3/30/2023 duoneb.3    .. 3/30/2023 symbicort   hemodynamics  .. La 3/29/2023 la 1.8   .. target map 65 (+)   CAD.  .. 3/30- 3/31/2023 atenolol 25  .. 3/31-4/4      metoprolol 25.2 - metoprolol 25.3   .. 3/30/2023 simvastat 40   RO DVT  .. 4/1/2023 v duplx (-)  CHF.  .. echo 3/30/2023  ........ ef 40%   ........ mod mr   .. bnp 4/2 bnp [id number]   .. 3/30-4/4/2023      losartan 50 - losartan 25   .. 3/30/2023 hydralazin 50.4p   Anemia  .. Hb 3/29-3/30-3/31-4/1-4/4-4/5/2023      Hb 11.2- 10.2 - 9.8 - 11 - 9.4 - 11.5   .. monitor  Hypernatremia.  .. Na 3/31-4/1-4/2-4/3-4/4 Na 147 - 151- 152 - 148- 144    CKD  .. Na 3/29-3/31/2023 Na 144-147   .. Cr 3/29-3/30-3/31-4/1-4/3-4/4/2023      Cr 1.4 - 1.3 - 1.3 - 1.5 - 1.5 - 1.3  .. monitor  OBS  .. 3/30/2023 donepezil     OVERALL .  86 yo M with PMHx HTN, HLD, T2D, dementia (AOx2-3), OA, BPH, CAD, TIA, CKD 3 and GERD HO recent hospital stay 1/24-1/30/2023 [facility]  COPD ex  now admitted with osteomyelitis possible pneum  Pulm consulted 3/29/2023    .. 4/4/2023 pt tested covid (+)  started rdsv Dr Mancia     PROBLEMS  COVID 4/4/2023  .. 4/4/2023 rdsv 3 d  E FECA UTI   .. uc 4/2/2023 100K eneterococcs fecal  Possible Osteomyelitis  .. w scann 4/4 l foot susp for osteom r heel osteomyelitis   Pneumonia   .. 3/29-4/5 zosyn   .. 4/7 zosyn x 42 d    COPD   CKD   PLEURAL EFFSN   .. 3/30 ct  HFREF   .. MOD MR 3/30 echo    A fib  ..  4/1 lvnx 60.2       TIME SPENT   Over 25 minutes aggregate care time spent on encounter; activities included   direct patient care, counseling and/or coordinating care reviewing notes, lab data/ imaging , discussion with multidisciplinary team/ patient  /family and explaining in detail risks, benefits, alternatives  of the recommendations     Paulino Parmar 87 m     REVIEW OF SYMPTOMS      Able to give (reliable) ROS  NO     PHYSICAL EXAM    HEENT Unremarkable  atraumatic   RESP Fair air entry EXP prolonged    Harsh breath sound Resp distres mild   CARDIAC S1 S2 No S3     NO JVD    ABDOMEN SOFT BS PRESENT NOT DISTENDED No hepatosplenomegaly   PEDAL EDEMA present No calf tenderness  NO rash       GENERAL DATA .   GOC.     .. 3/30/2023 full code  ALLGY.     .. nka                    WT.   .. 3/30/2023 63  BMI.        .. 3/30/2023 21            ICU STAY.   .. none  COVID.   .. 4/4/2023 scv2 (+)  .. 3/29/2023 scv2 (-)     BEST PRACTICE ISSUES.    HOB ELEVATN.   .. Yes  DVT PPLX.   .. 3/31 lvnx 60.2 Dr Lakhani (a fib)   ..  3/29- 3/31 hpsc   MILLER PPLX.   .. 3/30/2023 protonix 40    INFN PPLX. ..    SP SW LAURENT.   ..  3/30/2023 -> soft bite mild thick        DIET.    ..  3/30/2023 dash  FREE WATER  .. 4/1/2023 fw 250.4    IV fl.  .. 4/6/2023 d5 1/2 40   PROCEDURE  .. 4/7/2023 r brach picc     PROBLEM/ASSESSMENT/PLAN.  COVID   .. 4/4/2023 scv2 (+)  .. 4/4/2023 oxygenation ok  .. 4/4/2023 pt has mild disease   .. 4/4/2023 rdsv 3 d course started by Dr Mancia   Infection  Osteomyelitius  Possible pneumonia   .. Esr 3/29-3/31/2023 esr 67- 49   .. W 3/29-3/30-3/31-4/1-4/4-4/5/2023       w 11.8 - 12- 10.5- 11.9 -  9 - 7.7   .. cxr 3/30/2023  ........ increasing r lower perihilar infiltrate   .. xr foot left 3/30/2023  ........ stable eroisions around 1st mtp anmd great toe    ........ which could be bone infectn    .. CXR 3/29/2023 Possible hansa pneum  .. w scann 4/4 l foot susp for osteom r heel osteomyelitis   .. bc 3/29/2023 bc (-)   .. uc 4/2/2023 100K eneterococcs fecal  .. 3/29-4/5 zosyn    .. 4/7/2023 zosyn 42 d   .. follow cultures  PLEURAL EFFUSION.  .. ct ch 3/30/2023   ........ mild pulm edema  ........ mod r and sml effsn   a/r  .. pl effsn likely sec to chf   COPD  .. 3/30/2023 duoneb.3    .. 3/30/2023 symbicort   hemodynamics  .. La 3/29/2023 la 1.8   .. target map 65 (+)   CAD.  .. 3/30- 3/31/2023 atenolol 25  .. 3/31-4/4      metoprolol 25.2 - metoprolol 25.3   .. 3/30/2023 simvastat 40   RO DVT  .. 4/1/2023 v duplx (-)  CHF.  .. echo 3/30/2023  ........ ef 40%   ........ mod mr   .. bnp 4/2 bnp [id number]   .. 3/30-4/4/2023      losartan 50 - losartan 25   .. 3/30/2023 hydralazin 50.4p   Anemia  .. Hb 3/29-3/30-3/31-4/1-4/4-4/5/2023      Hb 11.2- 10.2 - 9.8 - 11 - 9.4 - 11.5   .. monitor  Hypernatremia.  .. Na 3/31-4/1-4/2-4/3-4/4 Na 147 - 151- 152 - 148- 144    CKD  .. Na 3/29-3/31/2023 Na 144-147   .. Cr 3/29-3/30-3/31-4/1-4/3-4/4/2023      Cr 1.4 - 1.3 - 1.3 - 1.5 - 1.5 - 1.3  .. monitor  OBS  .. 3/30/2023 donepezil     OVERALL .  86 yo M with PMHx HTN, HLD, T2D, dementia (AOx2-3), OA, BPH, CAD, TIA, CKD 3 and GERD HO recent hospital stay 1/24-1/30/2023 [facility]  COPD ex  now admitted with osteomyelitis possible pneum  Pulm consulted 3/29/2023    .. 4/4/2023 pt tested covid (+)  started rdsv Dr Mancia     PROBLEMS  COVID 4/4/2023  .. 4/4/2023 rdsv 3 d  E FECA UTI   .. uc 4/2/2023 100K eneterococcs fecal  Possible Osteomyelitis  .. w scann 4/4 l foot susp for osteom r heel osteomyelitis   Pneumonia   .. 3/29-4/5 zosyn   .. 4/7 zosyn x 42 d    COPD   CKD   PLEURAL EFFSN   .. 3/30 ct  HFREF   .. MOD MR 3/30 echo    A fib  ..  4/1 lvnx 60.2       TIME SPENT   Over 25 minutes aggregate care time spent on encounter; activities included   direct patient care, counseling and/or coordinating care reviewing notes, lab data/ imaging , discussion with multidisciplinary team/ patient  /family and explaining in detail risks, benefits, alternatives  of the recommendations     Paulino Parmar 87 m

## 2023-04-07 NOTE — PROGRESS NOTE ADULT - SUBJECTIVE AND OBJECTIVE BOX
Patient is a 87y Male whom presented to the hospital with ckd and chelo     PAST MEDICAL & SURGICAL HISTORY:      MEDICATIONS  (STANDING):      Allergies    No Known Allergies    Intolerances        SOCIAL HISTORY:  Denies ETOh,Smoking,     FAMILY HISTORY:      REVIEW OF SYSTEMS:  unable to obtained a good review system                                                           10.9   8.42  )-----------( 284      ( 07 Apr 2023 08:43 )             36.4       CBC Full  -  ( 07 Apr 2023 08:43 )  WBC Count : 8.42 K/uL  RBC Count : 3.81 M/uL  Hemoglobin : 10.9 g/dL  Hematocrit : 36.4 %  Platelet Count - Automated : 284 K/uL  Mean Cell Volume : 95.5 fl  Mean Cell Hemoglobin : 28.6 pg  Mean Cell Hemoglobin Concentration : 29.9 gm/dL  Auto Neutrophil # : x  Auto Lymphocyte # : x  Auto Monocyte # : x  Auto Eosinophil # : x  Auto Basophil # : x  Auto Neutrophil % : x  Auto Lymphocyte % : x  Auto Monocyte % : x  Auto Eosinophil % : x  Auto Basophil % : x      04-07    145  |  118<H>  |  35<H>  ----------------------------<  126<H>  4.1   |  23  |  1.30    Ca    8.5      07 Apr 2023 08:43  Phos  2.6     04-07  Mg     2.3     04-07    TPro  6.6  /  Alb  2.2<L>  /  TBili  0.4  /  DBili  0.2  /  AST  19  /  ALT  31  /  AlkPhos  66  04-07      CAPILLARY BLOOD GLUCOSE      POCT Blood Glucose.: 170 mg/dL (07 Apr 2023 11:59)  POCT Blood Glucose.: 126 mg/dL (07 Apr 2023 08:04)  POCT Blood Glucose.: 226 mg/dL (06 Apr 2023 21:34)  POCT Blood Glucose.: 139 mg/dL (06 Apr 2023 16:46)      Vital Signs Last 24 Hrs  T(C): 36.8 (07 Apr 2023 12:33), Max: 36.9 (07 Apr 2023 06:05)  T(F): 98.2 (07 Apr 2023 12:33), Max: 98.4 (07 Apr 2023 06:05)  HR: 94 (07 Apr 2023 13:45) (76 - 94)  BP: 141/94 (07 Apr 2023 13:45) (123/73 - 143/73)  BP(mean): --  RR: 19 (07 Apr 2023 13:45) (18 - 20)  SpO2: 96% (07 Apr 2023 13:45) (95% - 99%)    Parameters below as of 07 Apr 2023 12:33  Patient On (Oxygen Delivery Method): room air            PT/INR - ( 06 Apr 2023 06:37 )   PT: 16.4 sec;   INR: 1.40 ratio                           PHYSICAL EXAM:    Constitutional: NAD  HEENT: conjunctive   clear   Neck:  No JVD  Respiratory: CTAB  Cardiovascular: S1 and S2  Gastrointestinal: BS+, soft,   Extremities: No peripheral edema  Neurological:  no focal deficits

## 2023-04-07 NOTE — PROGRESS NOTE ADULT - SUBJECTIVE AND OBJECTIVE BOX
[facility] [place]       HPI:  88 yo male ,[facility] resident with PMHx - COPD, DM, HTN, CAD, HLD, H/O TIA, dementia,GERD, BPH and depression sent ot ER for evaluation of left foot 1metatarsal wound ,suggestive of osteomyelitis .Patient was seen by ID consult and transfer to the hospital recommended for wound cx/bone biopsy /podiatry evaluation ,likely will require 4-6 weeks of iv abx . Patient was admitted to [facility] with b/l feet wounds and was followed by wound care team ,recently completed 7 days of doxycycline for foot wound cellulitis . (30 Mar 2023 05:21)        SUBJECTIVE:      ALLERGIES:  Allergies    No Known Allergies    Intolerances          MEDICATIONS  (STANDING):  albuterol/ipratropium for Nebulization 3 milliLiter(s) Nebulizer every 8 hours  budesonide 160 MICROgram(s)/formoterol 4.5 MICROgram(s) Inhaler 2 Puff(s) Inhalation two times a day  dextrose 5% + sodium chloride 0.45%. 1000 milliLiter(s) (40 mL/Hr) IV Continuous <Continuous>  dextrose 5%. 1000 milliLiter(s) (100 mL/Hr) IV Continuous <Continuous>  dextrose 5%. 1000 milliLiter(s) (50 mL/Hr) IV Continuous <Continuous>  dextrose 50% Injectable 25 Gram(s) IV Push once  dextrose 50% Injectable 12.5 Gram(s) IV Push once  dextrose 50% Injectable 25 Gram(s) IV Push once  donepezil 5 milliGRAM(s) Oral at bedtime  DULoxetine 60 milliGRAM(s) Oral daily  enoxaparin Injectable 60 milliGRAM(s) SubCutaneous every 12 hours  glucagon  Injectable 1 milliGRAM(s) IntraMuscular once  insulin glargine Injectable (LANTUS) 8 Unit(s) SubCutaneous at bedtime  insulin lispro (ADMELOG) corrective regimen sliding scale   SubCutaneous three times a day before meals  insulin lispro (ADMELOG) corrective regimen sliding scale   SubCutaneous at bedtime  lactobacillus acidophilus 1 Tablet(s) Oral two times a day with meals  losartan 25 milliGRAM(s) Oral daily  metoprolol tartrate 25 milliGRAM(s) Oral three times a day  multivitamin 1 Tablet(s) Oral daily  pantoprazole   Suspension 40 milliGRAM(s) Oral daily  senna 2 Tablet(s) Oral at bedtime  simvastatin 40 milliGRAM(s) Oral at bedtime  tamsulosin 0.4 milliGRAM(s) Oral at bedtime    MEDICATIONS  (PRN):  acetaminophen     Tablet .. 650 milliGRAM(s) Oral every 6 hours PRN Temp greater or equal to 38C (100.4F), Mild Pain (1 - 3)  aluminum hydroxide/magnesium hydroxide/simethicone Suspension 30 milliLiter(s) Oral every 4 hours PRN Dyspepsia  bisacodyl Suppository 10 milliGRAM(s) Rectal daily PRN Constipation  dextrose Oral Gel 15 Gram(s) Oral once PRN Blood Glucose LESS THAN 70 milliGRAM(s)/deciliter  hydrALAZINE 50 milliGRAM(s) Oral every 6 hours PRN for systolic BP>160  magnesium hydroxide Suspension 30 milliLiter(s) Oral daily PRN Constipation  melatonin 3 milliGRAM(s) Oral at bedtime PRN Insomnia  ondansetron Injectable 4 milliGRAM(s) IV Push every 8 hours PRN Nausea and/or Vomiting      REVIEW OF SYSTEMS:  CONSTITUTIONAL: No fever,  RESPIRATORY: No cough, wheezing, shortness of breath  CARDIOVASCULAR: No chest pain, dyspnea, palpitations, dizziness, syncope, paroxysmal nocturnal dyspnea, orthopnea, or arm or leg swelling  GASTROINTESTINAL: No abdominal  or epigastric pain, nausea, vomiting,  diarrhea  NEUROLOGICAL: No headaches,  loss of strength, numbness, or tremors    Vital Signs Last 24 Hrs  T(C): 36.9 (07 Apr 2023 06:05), Max: 36.9 (07 Apr 2023 06:05)  T(F): 98.4 (07 Apr 2023 06:05), Max: 98.4 (07 Apr 2023 06:05)  HR: 90 (07 Apr 2023 06:05) (76 - 93)  BP: 143/73 (07 Apr 2023 06:05) (123/73 - 143/73)  BP(mean): --  RR: 20 (07 Apr 2023 06:05) (18 - 20)  SpO2: 99% (07 Apr 2023 06:05) (90% - 99%)    Parameters below as of 07 Apr 2023 06:05  Patient On (Oxygen Delivery Method): room air        PHYSICAL EXAM:  HEAD:  Atraumatic, Normocephalic  NECK: Supple and normal thyroid.  No JVD or carotid bruit.   HEART: S1, S2 regular , 1/6 soft ejection systolic murmur in mitral area , no thrill and no gallops .  PULMONARY: Bilateral vesicular breathing , few scattered ronchi and few scattered rales are present .  ABDOMEN: Soft nontender and positive bowl sounds   EXTREMITIES:  No clubbing, cyanosis, or pedal  edema  NEUROLOGICAL: AAOX3 , no focal deficit .    LABS:                        10.5   9.55  )-----------( 286      ( 06 Apr 2023 06:37 )             34.3     04-06    144  |  117<H>  |  32<H>  ----------------------------<  65<L>  3.4<L>   |  26  |  1.30    Ca    8.4<L>      06 Apr 2023 06:37  Phos  3.1     04-06  Mg     2.3     04-06    TPro  6.3  /  Alb  2.1<L>  /  TBili  0.4  /  DBili  0.2  /  AST  19  /  ALT  34  /  AlkPhos  61  04-06        PT/INR - ( 06 Apr 2023 06:37 )   PT: 16.4 sec;   INR: 1.40 ratio             BNP      EKG:  ECHO:  IMAGING:    Assessment/Plan    Will continue to follow during hospital course and give further recommendations as needed . Thanks for your referral . if any questions please contact me at office ([phone])cell [phone])  [facility] [place]       HPI:  86 yo male ,[facility] resident with PMHx - COPD, DM, HTN, CAD, HLD, H/O TIA, dementia,GERD, BPH and depression sent ot ER for evaluation of left foot 1metatarsal wound ,suggestive of osteomyelitis .Patient was seen by ID consult and transfer to the hospital recommended for wound cx/bone biopsy /podiatry evaluation ,likely will require 4-6 weeks of iv abx . Patient was admitted to [facility] with b/l feet wounds and was followed by wound care team ,recently completed 7 days of doxycycline for foot wound cellulitis . (30 Mar 2023 05:21)        SUBJECTIVE:      ALLERGIES:  Allergies    No Known Allergies    Intolerances          MEDICATIONS  (STANDING):  albuterol/ipratropium for Nebulization 3 milliLiter(s) Nebulizer every 8 hours  budesonide 160 MICROgram(s)/formoterol 4.5 MICROgram(s) Inhaler 2 Puff(s) Inhalation two times a day  dextrose 5% + sodium chloride 0.45%. 1000 milliLiter(s) (40 mL/Hr) IV Continuous <Continuous>  dextrose 5%. 1000 milliLiter(s) (100 mL/Hr) IV Continuous <Continuous>  dextrose 5%. 1000 milliLiter(s) (50 mL/Hr) IV Continuous <Continuous>  dextrose 50% Injectable 25 Gram(s) IV Push once  dextrose 50% Injectable 12.5 Gram(s) IV Push once  dextrose 50% Injectable 25 Gram(s) IV Push once  donepezil 5 milliGRAM(s) Oral at bedtime  DULoxetine 60 milliGRAM(s) Oral daily  enoxaparin Injectable 60 milliGRAM(s) SubCutaneous every 12 hours  glucagon  Injectable 1 milliGRAM(s) IntraMuscular once  insulin glargine Injectable (LANTUS) 8 Unit(s) SubCutaneous at bedtime  insulin lispro (ADMELOG) corrective regimen sliding scale   SubCutaneous three times a day before meals  insulin lispro (ADMELOG) corrective regimen sliding scale   SubCutaneous at bedtime  lactobacillus acidophilus 1 Tablet(s) Oral two times a day with meals  losartan 25 milliGRAM(s) Oral daily  metoprolol tartrate 25 milliGRAM(s) Oral three times a day  multivitamin 1 Tablet(s) Oral daily  pantoprazole   Suspension 40 milliGRAM(s) Oral daily  senna 2 Tablet(s) Oral at bedtime  simvastatin 40 milliGRAM(s) Oral at bedtime  tamsulosin 0.4 milliGRAM(s) Oral at bedtime    MEDICATIONS  (PRN):  acetaminophen     Tablet .. 650 milliGRAM(s) Oral every 6 hours PRN Temp greater or equal to 38C (100.4F), Mild Pain (1 - 3)  aluminum hydroxide/magnesium hydroxide/simethicone Suspension 30 milliLiter(s) Oral every 4 hours PRN Dyspepsia  bisacodyl Suppository 10 milliGRAM(s) Rectal daily PRN Constipation  dextrose Oral Gel 15 Gram(s) Oral once PRN Blood Glucose LESS THAN 70 milliGRAM(s)/deciliter  hydrALAZINE 50 milliGRAM(s) Oral every 6 hours PRN for systolic BP>160  magnesium hydroxide Suspension 30 milliLiter(s) Oral daily PRN Constipation  melatonin 3 milliGRAM(s) Oral at bedtime PRN Insomnia  ondansetron Injectable 4 milliGRAM(s) IV Push every 8 hours PRN Nausea and/or Vomiting      REVIEW OF SYSTEMS:  CONSTITUTIONAL: No fever,  RESPIRATORY: No cough, wheezing, shortness of breath  CARDIOVASCULAR: No chest pain, dyspnea, palpitations, dizziness, syncope, paroxysmal nocturnal dyspnea, orthopnea, or arm or leg swelling  GASTROINTESTINAL: No abdominal  or epigastric pain, nausea, vomiting,  diarrhea  NEUROLOGICAL: No headaches,  loss of strength, numbness, or tremors    Vital Signs Last 24 Hrs  T(C): 36.9 (07 Apr 2023 06:05), Max: 36.9 (07 Apr 2023 06:05)  T(F): 98.4 (07 Apr 2023 06:05), Max: 98.4 (07 Apr 2023 06:05)  HR: 90 (07 Apr 2023 06:05) (76 - 93)  BP: 143/73 (07 Apr 2023 06:05) (123/73 - 143/73)  BP(mean): --  RR: 20 (07 Apr 2023 06:05) (18 - 20)  SpO2: 99% (07 Apr 2023 06:05) (90% - 99%)    Parameters below as of 07 Apr 2023 06:05  Patient On (Oxygen Delivery Method): room air        PHYSICAL EXAM:  HEAD:  Atraumatic, Normocephalic  NECK: Supple and normal thyroid.  No JVD or carotid bruit.   HEART: S1, S2 regular , 1/6 soft ejection systolic murmur in mitral area , no thrill and no gallops .  PULMONARY: Bilateral vesicular breathing , few scattered ronchi and few scattered rales are present .  ABDOMEN: Soft nontender and positive bowl sounds   EXTREMITIES:  No clubbing, cyanosis, or pedal  edema  NEUROLOGICAL: AAOX3 , no focal deficit .    LABS:                        10.5   9.55  )-----------( 286      ( 06 Apr 2023 06:37 )             34.3     04-06    144  |  117<H>  |  32<H>  ----------------------------<  65<L>  3.4<L>   |  26  |  1.30    Ca    8.4<L>      06 Apr 2023 06:37  Phos  3.1     04-06  Mg     2.3     04-06    TPro  6.3  /  Alb  2.1<L>  /  TBili  0.4  /  DBili  0.2  /  AST  19  /  ALT  34  /  AlkPhos  61  04-06        PT/INR - ( 06 Apr 2023 06:37 )   PT: 16.4 sec;   INR: 1.40 ratio             BNP      EKG:  ECHO:  IMAGING:    Assessment/Plan    Will continue to follow during hospital course and give further recommendations as needed . Thanks for your referral . if any questions please contact me at office ([phone] [phone])  [facility] [place]       HPI:  86 yo male ,[facility] with PMHx - COPD, DM, HTN, CAD, HLD, H/O TIA, dementia,GERD, BPH and depression sent ot ER for evaluation of left foot 1metatarsal wound ,suggestive of osteomyelitis .Patient was seen by ID consult and transfer to the hospital recommended for wound cx/bone biopsy /podiatry evaluation ,likely will require 4-6 weeks of iv abx . [facility] and was followed by wound care team ,recently completed 7 days of doxycycline for foot wound cellulitis . (30 Mar 2023 05:21)        SUBJECTIVE:      ALLERGIES:  Allergies    No Known Allergies    Intolerances          MEDICATIONS  (STANDING):  albuterol/ipratropium for Nebulization 3 milliLiter(s) Nebulizer every 8 hours  budesonide 160 MICROgram(s)/formoterol 4.5 MICROgram(s) Inhaler 2 Puff(s) Inhalation two times a day  dextrose 5% + sodium chloride 0.45%. 1000 milliLiter(s) (40 mL/Hr) IV Continuous <Continuous>  dextrose 5%. 1000 milliLiter(s) (100 mL/Hr) IV Continuous <Continuous>  dextrose 5%. 1000 milliLiter(s) (50 mL/Hr) IV Continuous <Continuous>  dextrose 50% Injectable 25 Gram(s) IV Push once  dextrose 50% Injectable 12.5 Gram(s) IV Push once  dextrose 50% Injectable 25 Gram(s) IV Push once  donepezil 5 milliGRAM(s) Oral at bedtime  DULoxetine 60 milliGRAM(s) Oral daily  enoxaparin Injectable 60 milliGRAM(s) SubCutaneous every 12 hours  glucagon  Injectable 1 milliGRAM(s) IntraMuscular once  insulin glargine Injectable (LANTUS) 8 Unit(s) SubCutaneous at bedtime  insulin lispro (ADMELOG) corrective regimen sliding scale   SubCutaneous three times a day before meals  insulin lispro (ADMELOG) corrective regimen sliding scale   SubCutaneous at bedtime  lactobacillus acidophilus 1 Tablet(s) Oral two times a day with meals  losartan 25 milliGRAM(s) Oral daily  metoprolol tartrate 25 milliGRAM(s) Oral three times a day  multivitamin 1 Tablet(s) Oral daily  pantoprazole   Suspension 40 milliGRAM(s) Oral daily  senna 2 Tablet(s) Oral at bedtime  simvastatin 40 milliGRAM(s) Oral at bedtime  tamsulosin 0.4 milliGRAM(s) Oral at bedtime    MEDICATIONS  (PRN):  acetaminophen     Tablet .. 650 milliGRAM(s) Oral every 6 hours PRN Temp greater or equal to 38C (100.4F), Mild Pain (1 - 3)  aluminum hydroxide/magnesium hydroxide/simethicone Suspension 30 milliLiter(s) Oral every 4 hours PRN Dyspepsia  bisacodyl Suppository 10 milliGRAM(s) Rectal daily PRN Constipation  dextrose Oral Gel 15 Gram(s) Oral once PRN Blood Glucose LESS THAN 70 milliGRAM(s)/deciliter  hydrALAZINE 50 milliGRAM(s) Oral every 6 hours PRN for systolic BP>160  magnesium hydroxide Suspension 30 milliLiter(s) Oral daily PRN Constipation  melatonin 3 milliGRAM(s) Oral at bedtime PRN Insomnia  ondansetron Injectable 4 milliGRAM(s) IV Push every 8 hours PRN Nausea and/or Vomiting      REVIEW OF SYSTEMS:  CONSTITUTIONAL: No fever,  RESPIRATORY: No cough, wheezing, shortness of breath  CARDIOVASCULAR: No chest pain, dyspnea, palpitations, dizziness, syncope, paroxysmal nocturnal dyspnea, orthopnea, or arm or leg swelling  GASTROINTESTINAL: No abdominal  or epigastric pain, nausea, vomiting,  diarrhea  NEUROLOGICAL: No headaches,  loss of strength, numbness, or tremors    Vital Signs Last 24 Hrs  T(C): 36.9 (07 Apr 2023 06:05), Max: 36.9 (07 Apr 2023 06:05)  T(F): 98.4 (07 Apr 2023 06:05), Max: 98.4 (07 Apr 2023 06:05)  HR: 90 (07 Apr 2023 06:05) (76 - 93)  BP: 143/73 (07 Apr 2023 06:05) (123/73 - 143/73)  BP(mean): --  RR: 20 (07 Apr 2023 06:05) (18 - 20)  SpO2: 99% (07 Apr 2023 06:05) (90% - 99%)    Parameters below as of 07 Apr 2023 06:05  Patient On (Oxygen Delivery Method): room air        PHYSICAL EXAM:  HEAD:  Atraumatic, Normocephalic  NECK: Supple and normal thyroid.  No JVD or carotid bruit.   HEART: S1, S2 regular , 1/6 soft ejection systolic murmur in mitral area , no thrill and no gallops .  PULMONARY: Bilateral vesicular breathing , few scattered ronchi and few scattered rales are present .  ABDOMEN: Soft nontender and positive bowl sounds   EXTREMITIES:  No clubbing, cyanosis, or pedal  edema  NEUROLOGICAL: AAOX3 , no focal deficit .    LABS:                        10.5   9.55  )-----------( 286      ( 06 Apr 2023 06:37 )             34.3     04-06    144  |  117<H>  |  32<H>  ----------------------------<  65<L>  3.4<L>   |  26  |  1.30    Ca    8.4<L>      06 Apr 2023 06:37  Phos  3.1     04-06  Mg     2.3     04-06    TPro  6.3  /  Alb  2.1<L>  /  TBili  0.4  /  DBili  0.2  /  AST  19  /  ALT  34  /  AlkPhos  61  04-06        PT/INR - ( 06 Apr 2023 06:37 )   PT: 16.4 sec;   INR: 1.40 ratio             BNP      EKG:  ECHO:  IMAGING:    Assessment/Plan    Will continue to follow during hospital course and give further recommendations as needed . Thanks for your referral . if any questions please contact me at office ([phone] [phone])  [facility] [place]       HPI:  88 yo male ,[facility] resident with PMHx - COPD, DM, HTN, CAD, HLD, H/O TIA, dementia,GERD, BPH and depression sent ot ER for evaluation of left foot 1metatarsal wound ,suggestive of osteomyelitis .Patient was seen by ID consult and transfer to the hospital recommended for wound cx/bone biopsy /podiatry evaluation ,likely will require 4-6 weeks of iv abx . Patient was admitted to [facility] with b/l feet wounds and was followed by wound care team ,recently completed 7 days of doxycycline for foot wound cellulitis . (30 Mar 2023 05:21)        SUBJECTIVE: Patient feeling better .      ALLERGIES:  Allergies    No Known Allergies    Intolerances          MEDICATIONS  (STANDING):  albuterol/ipratropium for Nebulization 3 milliLiter(s) Nebulizer every 8 hours  budesonide 160 MICROgram(s)/formoterol 4.5 MICROgram(s) Inhaler 2 Puff(s) Inhalation two times a day  dextrose 5% + sodium chloride 0.45%. 1000 milliLiter(s) (40 mL/Hr) IV Continuous <Continuous>  dextrose 5%. 1000 milliLiter(s) (100 mL/Hr) IV Continuous <Continuous>  dextrose 5%. 1000 milliLiter(s) (50 mL/Hr) IV Continuous <Continuous>  dextrose 50% Injectable 25 Gram(s) IV Push once  dextrose 50% Injectable 12.5 Gram(s) IV Push once  dextrose 50% Injectable 25 Gram(s) IV Push once  donepezil 5 milliGRAM(s) Oral at bedtime  DULoxetine 60 milliGRAM(s) Oral daily  enoxaparin Injectable 60 milliGRAM(s) SubCutaneous every 12 hours  glucagon  Injectable 1 milliGRAM(s) IntraMuscular once  insulin glargine Injectable (LANTUS) 8 Unit(s) SubCutaneous at bedtime  insulin lispro (ADMELOG) corrective regimen sliding scale   SubCutaneous three times a day before meals  insulin lispro (ADMELOG) corrective regimen sliding scale   SubCutaneous at bedtime  lactobacillus acidophilus 1 Tablet(s) Oral two times a day with meals  losartan 25 milliGRAM(s) Oral daily  metoprolol tartrate 25 milliGRAM(s) Oral three times a day  multivitamin 1 Tablet(s) Oral daily  pantoprazole   Suspension 40 milliGRAM(s) Oral daily  senna 2 Tablet(s) Oral at bedtime  simvastatin 40 milliGRAM(s) Oral at bedtime  tamsulosin 0.4 milliGRAM(s) Oral at bedtime    MEDICATIONS  (PRN):  acetaminophen     Tablet .. 650 milliGRAM(s) Oral every 6 hours PRN Temp greater or equal to 38C (100.4F), Mild Pain (1 - 3)  aluminum hydroxide/magnesium hydroxide/simethicone Suspension 30 milliLiter(s) Oral every 4 hours PRN Dyspepsia  bisacodyl Suppository 10 milliGRAM(s) Rectal daily PRN Constipation  dextrose Oral Gel 15 Gram(s) Oral once PRN Blood Glucose LESS THAN 70 milliGRAM(s)/deciliter  hydrALAZINE 50 milliGRAM(s) Oral every 6 hours PRN for systolic BP>160  magnesium hydroxide Suspension 30 milliLiter(s) Oral daily PRN Constipation  melatonin 3 milliGRAM(s) Oral at bedtime PRN Insomnia  ondansetron Injectable 4 milliGRAM(s) IV Push every 8 hours PRN Nausea and/or Vomiting      REVIEW OF SYSTEMS:  CONSTITUTIONAL: No fever,  RESPIRATORY: No cough, wheezing, shortness of breath  CARDIOVASCULAR: No chest pain, dyspnea, palpitations, dizziness, syncope, paroxysmal nocturnal dyspnea, orthopnea, or arm or leg swelling  GASTROINTESTINAL: No abdominal  or epigastric pain, nausea, vomiting,  diarrhea  NEUROLOGICAL: No headaches,  loss of strength, numbness, or tremors    Vital Signs Last 24 Hrs  T(C): 36.9 (07 Apr 2023 06:05), Max: 36.9 (07 Apr 2023 06:05)  T(F): 98.4 (07 Apr 2023 06:05), Max: 98.4 (07 Apr 2023 06:05)  HR: 90 (07 Apr 2023 06:05) (76 - 93)  BP: 143/73 (07 Apr 2023 06:05) (123/73 - 143/73)  BP(mean): --  RR: 20 (07 Apr 2023 06:05) (18 - 20)  SpO2: 99% (07 Apr 2023 06:05) (90% - 99%)    Parameters below as of 07 Apr 2023 06:05  Patient On (Oxygen Delivery Method): room air        PHYSICAL EXAM:  HEAD:  Atraumatic, Normocephalic  NECK: Supple and normal thyroid.  No JVD or carotid bruit.   HEART: S1, S2 regular , 1/6 soft ejection systolic murmur in mitral area , no thrill and no gallops .  PULMONARY: Bilateral vesicular breathing , few scattered rhonchi and few scattered rales are present .  ABDOMEN: Soft nontender and positive bowl sounds   EXTREMITIES:  No clubbing, cyanosis, or pedal  edema . Bilateral feet wound present .  NEUROLOGICAL: AA and mild confused    Skin : No rashes .  Musculoskeletal : No joint swellings .    LABS:                        10.5   9.55  )-----------( 286      ( 06 Apr 2023 06:37 )             34.3     04-06    144  |  117<H>  |  32<H>  ----------------------------<  65<L>  3.4<L>   |  26  |  1.30    Ca    8.4<L>      06 Apr 2023 06:37  Phos  3.1     04-06  Mg     2.3     04-06    TPro  6.3  /  Alb  2.1<L>  /  TBili  0.4  /  DBili  0.2  /  AST  19  /  ALT  34  /  AlkPhos  61  04-06        PT/INR - ( 06 Apr 2023 06:37 )   PT: 16.4 sec;   INR: 1.40 ratio             Assessment/Plan  Patient has :  1) Left foot infection and toe osteomyelitis   2) Hypertension and stable .  3) DM .  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 4) Patient cardiac wise stable and cleared for foot surgery if needed . Benefits of surgery outweigh the risks . 5) Will hold Lovenox 18-24 hours prior to surgery 6) Metoprolol as ordered for atrial fibrillation control .   Will continue to follow during hospital course and give further recommendations as needed . Thanks for your referral . if any questions please contact me at office ([phone] cell [phone])    [facility] [place]       HPI:  88 yo male ,[facility] resident with PMHx - COPD, DM, HTN, CAD, HLD, H/O TIA, dementia,GERD, BPH and depression sent ot ER for evaluation of left foot 1metatarsal wound ,suggestive of osteomyelitis .Patient was seen by ID consult and transfer to the hospital recommended for wound cx/bone biopsy /podiatry evaluation ,likely will require 4-6 weeks of iv abx . Patient was admitted to [facility] with b/l feet wounds and was followed by wound care team ,recently completed 7 days of doxycycline for foot wound cellulitis . (30 Mar 2023 05:21)        SUBJECTIVE: Patient feeling better .      ALLERGIES:  Allergies    No Known Allergies    Intolerances          MEDICATIONS  (STANDING):  albuterol/ipratropium for Nebulization 3 milliLiter(s) Nebulizer every 8 hours  budesonide 160 MICROgram(s)/formoterol 4.5 MICROgram(s) Inhaler 2 Puff(s) Inhalation two times a day  dextrose 5% + sodium chloride 0.45%. 1000 milliLiter(s) (40 mL/Hr) IV Continuous <Continuous>  dextrose 5%. 1000 milliLiter(s) (100 mL/Hr) IV Continuous <Continuous>  dextrose 5%. 1000 milliLiter(s) (50 mL/Hr) IV Continuous <Continuous>  dextrose 50% Injectable 25 Gram(s) IV Push once  dextrose 50% Injectable 12.5 Gram(s) IV Push once  dextrose 50% Injectable 25 Gram(s) IV Push once  donepezil 5 milliGRAM(s) Oral at bedtime  DULoxetine 60 milliGRAM(s) Oral daily  enoxaparin Injectable 60 milliGRAM(s) SubCutaneous every 12 hours  glucagon  Injectable 1 milliGRAM(s) IntraMuscular once  insulin glargine Injectable (LANTUS) 8 Unit(s) SubCutaneous at bedtime  insulin lispro (ADMELOG) corrective regimen sliding scale   SubCutaneous three times a day before meals  insulin lispro (ADMELOG) corrective regimen sliding scale   SubCutaneous at bedtime  lactobacillus acidophilus 1 Tablet(s) Oral two times a day with meals  losartan 25 milliGRAM(s) Oral daily  metoprolol tartrate 25 milliGRAM(s) Oral three times a day  multivitamin 1 Tablet(s) Oral daily  pantoprazole   Suspension 40 milliGRAM(s) Oral daily  senna 2 Tablet(s) Oral at bedtime  simvastatin 40 milliGRAM(s) Oral at bedtime  tamsulosin 0.4 milliGRAM(s) Oral at bedtime    MEDICATIONS  (PRN):  acetaminophen     Tablet .. 650 milliGRAM(s) Oral every 6 hours PRN Temp greater or equal to 38C (100.4F), Mild Pain (1 - 3)  aluminum hydroxide/magnesium hydroxide/simethicone Suspension 30 milliLiter(s) Oral every 4 hours PRN Dyspepsia  bisacodyl Suppository 10 milliGRAM(s) Rectal daily PRN Constipation  dextrose Oral Gel 15 Gram(s) Oral once PRN Blood Glucose LESS THAN 70 milliGRAM(s)/deciliter  hydrALAZINE 50 milliGRAM(s) Oral every 6 hours PRN for systolic BP>160  magnesium hydroxide Suspension 30 milliLiter(s) Oral daily PRN Constipation  melatonin 3 milliGRAM(s) Oral at bedtime PRN Insomnia  ondansetron Injectable 4 milliGRAM(s) IV Push every 8 hours PRN Nausea and/or Vomiting      REVIEW OF SYSTEMS:  CONSTITUTIONAL: No fever,  RESPIRATORY: No cough, wheezing, shortness of breath  CARDIOVASCULAR: No chest pain, dyspnea, palpitations, dizziness, syncope, paroxysmal nocturnal dyspnea, orthopnea, or arm or leg swelling  GASTROINTESTINAL: No abdominal  or epigastric pain, nausea, vomiting,  diarrhea  NEUROLOGICAL: No headaches,  loss of strength, numbness, or tremors    Vital Signs Last 24 Hrs  T(C): 36.9 (07 Apr 2023 06:05), Max: 36.9 (07 Apr 2023 06:05)  T(F): 98.4 (07 Apr 2023 06:05), Max: 98.4 (07 Apr 2023 06:05)  HR: 90 (07 Apr 2023 06:05) (76 - 93)  BP: 143/73 (07 Apr 2023 06:05) (123/73 - 143/73)  BP(mean): --  RR: 20 (07 Apr 2023 06:05) (18 - 20)  SpO2: 99% (07 Apr 2023 06:05) (90% - 99%)    Parameters below as of 07 Apr 2023 06:05  Patient On (Oxygen Delivery Method): room air        PHYSICAL EXAM:  HEAD:  Atraumatic, Normocephalic  NECK: Supple and normal thyroid.  No JVD or carotid bruit.   HEART: S1, S2 regular , 1/6 soft ejection systolic murmur in mitral area , no thrill and no gallops .  PULMONARY: Bilateral vesicular breathing , few scattered rhonchi and few scattered rales are present .  ABDOMEN: Soft nontender and positive bowl sounds   EXTREMITIES:  No clubbing, cyanosis, or pedal  edema . Bilateral feet wound present .  NEUROLOGICAL: AA and mild confused    Skin : No rashes .  Musculoskeletal : No joint swellings .    LABS:                        10.5   9.55  )-----------( 286      ( 06 Apr 2023 06:37 )             34.3     04-06    144  |  117<H>  |  32<H>  ----------------------------<  65<L>  3.4<L>   |  26  |  1.30    Ca    8.4<L>      06 Apr 2023 06:37  Phos  3.1     04-06  Mg     2.3     04-06    TPro  6.3  /  Alb  2.1<L>  /  TBili  0.4  /  DBili  0.2  /  AST  19  /  ALT  34  /  AlkPhos  61  04-06        PT/INR - ( 06 Apr 2023 06:37 )   PT: 16.4 sec;   INR: 1.40 ratio             Assessment/Plan  Patient has :  1) Left foot infection and toe osteomyelitis   2) Hypertension and stable .  3) DM .  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 4) Patient cardiac wise stable and cleared for foot surgery if needed . Benefits of surgery outweigh the risks . 5) Will hold Lovenox 18-24 hours prior to surgery 6) Metoprolol as ordered for atrial fibrillation control .   Will continue to follow during hospital course and give further recommendations as needed . Thanks for your referral . if any questions please contact me at office ([phone] cell [phone])    [facility] [place]       HPI:  88 yo male ,[facility] resident with PMHx - COPD, DM, HTN, CAD, HLD, H/O TIA, dementia,GERD, BPH and depression sent ot ER for evaluation of left foot 1metatarsal wound ,suggestive of osteomyelitis .Patient was seen by ID consult and transfer to the hospital recommended for wound cx/bone biopsy /podiatry evaluation ,likely will require 4-6 weeks of iv abx . Patient was admitted to [facility] with b/l feet wounds and was followed by wound care team ,recently completed 7 days of doxycycline for foot wound cellulitis . (30 Mar 2023 05:21)        SUBJECTIVE: Patient feeling better .      ALLERGIES:  Allergies    No Known Allergies    Intolerances          MEDICATIONS  (STANDING):  albuterol/ipratropium for Nebulization 3 milliLiter(s) Nebulizer every 8 hours  budesonide 160 MICROgram(s)/formoterol 4.5 MICROgram(s) Inhaler 2 Puff(s) Inhalation two times a day  dextrose 5% + sodium chloride 0.45%. 1000 milliLiter(s) (40 mL/Hr) IV Continuous <Continuous>  dextrose 5%. 1000 milliLiter(s) (100 mL/Hr) IV Continuous <Continuous>  dextrose 5%. 1000 milliLiter(s) (50 mL/Hr) IV Continuous <Continuous>  dextrose 50% Injectable 25 Gram(s) IV Push once  dextrose 50% Injectable 12.5 Gram(s) IV Push once  dextrose 50% Injectable 25 Gram(s) IV Push once  donepezil 5 milliGRAM(s) Oral at bedtime  DULoxetine 60 milliGRAM(s) Oral daily  enoxaparin Injectable 60 milliGRAM(s) SubCutaneous every 12 hours  glucagon  Injectable 1 milliGRAM(s) IntraMuscular once  insulin glargine Injectable (LANTUS) 8 Unit(s) SubCutaneous at bedtime  insulin lispro (ADMELOG) corrective regimen sliding scale   SubCutaneous three times a day before meals  insulin lispro (ADMELOG) corrective regimen sliding scale   SubCutaneous at bedtime  lactobacillus acidophilus 1 Tablet(s) Oral two times a day with meals  losartan 25 milliGRAM(s) Oral daily  metoprolol tartrate 25 milliGRAM(s) Oral three times a day  multivitamin 1 Tablet(s) Oral daily  pantoprazole   Suspension 40 milliGRAM(s) Oral daily  senna 2 Tablet(s) Oral at bedtime  simvastatin 40 milliGRAM(s) Oral at bedtime  tamsulosin 0.4 milliGRAM(s) Oral at bedtime    MEDICATIONS  (PRN):  acetaminophen     Tablet .. 650 milliGRAM(s) Oral every 6 hours PRN Temp greater or equal to 38C (100.4F), Mild Pain (1 - 3)  aluminum hydroxide/magnesium hydroxide/simethicone Suspension 30 milliLiter(s) Oral every 4 hours PRN Dyspepsia  bisacodyl Suppository 10 milliGRAM(s) Rectal daily PRN Constipation  dextrose Oral Gel 15 Gram(s) Oral once PRN Blood Glucose LESS THAN 70 milliGRAM(s)/deciliter  hydrALAZINE 50 milliGRAM(s) Oral every 6 hours PRN for systolic BP>160  magnesium hydroxide Suspension 30 milliLiter(s) Oral daily PRN Constipation  melatonin 3 milliGRAM(s) Oral at bedtime PRN Insomnia  ondansetron Injectable 4 milliGRAM(s) IV Push every 8 hours PRN Nausea and/or Vomiting      REVIEW OF SYSTEMS:  CONSTITUTIONAL: No fever,  RESPIRATORY: No cough, wheezing, shortness of breath  CARDIOVASCULAR: No chest pain, dyspnea, palpitations, dizziness, syncope, paroxysmal nocturnal dyspnea, orthopnea, or arm or leg swelling  GASTROINTESTINAL: No abdominal  or epigastric pain, nausea, vomiting,  diarrhea  NEUROLOGICAL: No headaches,  loss of strength, numbness, or tremors    Vital Signs Last 24 Hrs  T(C): 36.9 (07 Apr 2023 06:05), Max: 36.9 (07 Apr 2023 06:05)  T(F): 98.4 (07 Apr 2023 06:05), Max: 98.4 (07 Apr 2023 06:05)  HR: 90 (07 Apr 2023 06:05) (76 - 93)  BP: 143/73 (07 Apr 2023 06:05) (123/73 - 143/73)  BP(mean): --  RR: 20 (07 Apr 2023 06:05) (18 - 20)  SpO2: 99% (07 Apr 2023 06:05) (90% - 99%)    Parameters below as of 07 Apr 2023 06:05  Patient On (Oxygen Delivery Method): room air        PHYSICAL EXAM:  HEAD:  Atraumatic, Normocephalic  NECK: Supple and normal thyroid.  No JVD or carotid bruit.   HEART: S1, S2 regular , 1/6 soft ejection systolic murmur in mitral area , no thrill and no gallops .  PULMONARY: Bilateral vesicular breathing , few scattered rhonchi and few scattered rales are present .  ABDOMEN: Soft nontender and positive bowl sounds   EXTREMITIES:  No clubbing, cyanosis, or pedal  edema . Bilateral feet wound present .  NEUROLOGICAL: AA and mild confused    Skin : No rashes .  Musculoskeletal : No joint swellings .    LABS:                        10.5   9.55  )-----------( 286      ( 06 Apr 2023 06:37 )             34.3     04-06    144  |  117<H>  |  32<H>  ----------------------------<  65<L>  3.4<L>   |  26  |  1.30    Ca    8.4<L>      06 Apr 2023 06:37  Phos  3.1     04-06  Mg     2.3     04-06    TPro  6.3  /  Alb  2.1<L>  /  TBili  0.4  /  DBili  0.2  /  AST  19  /  ALT  34  /  AlkPhos  61  04-06        PT/INR - ( 06 Apr 2023 06:37 )   PT: 16.4 sec;   INR: 1.40 ratio             Assessment/Plan  Patient has :  1) Left foot infection and toe osteomyelitis   2) Hypertension and stable .  3) DM .  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 4) Patient cardiac wise stable and cleared for foot surgery if needed . Benefits of surgery outweigh the risks . 5) Will hold Lovenox 18-24 hours prior to surgery 6) Metoprolol as ordered for atrial fibrillation control .   Will continue to follow during hospital course and give further recommendations as needed . Thanks for your referral . if any questions please contact me at office ([phone] cell [phone])    KARIME WYNN MD [place] [id number]  [address]  OFFICE : [phone]  CELL : [phone]  CARDIOLOGY F/U :       HPI:  88 yo male ,[facility] resident with PMHx - COPD, DM, HTN, CAD, HLD, H/O TIA, dementia,GERD, BPH and depression sent ot ER for evaluation of left foot 1metatarsal wound ,suggestive of osteomyelitis .Patient was seen by ID consult and transfer to the hospital recommended for wound cx/bone biopsy /podiatry evaluation ,likely will require 4-6 weeks of iv abx . Patient was admitted to [facility] with b/l feet wounds and was followed by wound care team ,recently completed 7 days of doxycycline for foot wound cellulitis . (30 Mar 2023 05:21)        SUBJECTIVE: Patient feeling better .      ALLERGIES:  Allergies    No Known Allergies    Intolerances          MEDICATIONS  (STANDING):  albuterol/ipratropium for Nebulization 3 milliLiter(s) Nebulizer every 8 hours  budesonide 160 MICROgram(s)/formoterol 4.5 MICROgram(s) Inhaler 2 Puff(s) Inhalation two times a day  dextrose 5% + sodium chloride 0.45%. 1000 milliLiter(s) (40 mL/Hr) IV Continuous <Continuous>  dextrose 5%. 1000 milliLiter(s) (100 mL/Hr) IV Continuous <Continuous>  dextrose 5%. 1000 milliLiter(s) (50 mL/Hr) IV Continuous <Continuous>  dextrose 50% Injectable 25 Gram(s) IV Push once  dextrose 50% Injectable 12.5 Gram(s) IV Push once  dextrose 50% Injectable 25 Gram(s) IV Push once  donepezil 5 milliGRAM(s) Oral at bedtime  DULoxetine 60 milliGRAM(s) Oral daily  enoxaparin Injectable 60 milliGRAM(s) SubCutaneous every 12 hours  glucagon  Injectable 1 milliGRAM(s) IntraMuscular once  insulin glargine Injectable (LANTUS) 8 Unit(s) SubCutaneous at bedtime  insulin lispro (ADMELOG) corrective regimen sliding scale   SubCutaneous three times a day before meals  insulin lispro (ADMELOG) corrective regimen sliding scale   SubCutaneous at bedtime  lactobacillus acidophilus 1 Tablet(s) Oral two times a day with meals  losartan 25 milliGRAM(s) Oral daily  metoprolol tartrate 25 milliGRAM(s) Oral three times a day  multivitamin 1 Tablet(s) Oral daily  pantoprazole   Suspension 40 milliGRAM(s) Oral daily  senna 2 Tablet(s) Oral at bedtime  simvastatin 40 milliGRAM(s) Oral at bedtime  tamsulosin 0.4 milliGRAM(s) Oral at bedtime    MEDICATIONS  (PRN):  acetaminophen     Tablet .. 650 milliGRAM(s) Oral every 6 hours PRN Temp greater or equal to 38C (100.4F), Mild Pain (1 - 3)  aluminum hydroxide/magnesium hydroxide/simethicone Suspension 30 milliLiter(s) Oral every 4 hours PRN Dyspepsia  bisacodyl Suppository 10 milliGRAM(s) Rectal daily PRN Constipation  dextrose Oral Gel 15 Gram(s) Oral once PRN Blood Glucose LESS THAN 70 milliGRAM(s)/deciliter  hydrALAZINE 50 milliGRAM(s) Oral every 6 hours PRN for systolic BP>160  magnesium hydroxide Suspension 30 milliLiter(s) Oral daily PRN Constipation  melatonin 3 milliGRAM(s) Oral at bedtime PRN Insomnia  ondansetron Injectable 4 milliGRAM(s) IV Push every 8 hours PRN Nausea and/or Vomiting      REVIEW OF SYSTEMS:  CONSTITUTIONAL: No fever,  RESPIRATORY: No cough, wheezing, shortness of breath  CARDIOVASCULAR: No chest pain, dyspnea, palpitations, dizziness, syncope, paroxysmal nocturnal dyspnea, orthopnea, or arm or leg swelling  GASTROINTESTINAL: No abdominal  or epigastric pain, nausea, vomiting,  diarrhea  NEUROLOGICAL: No headaches,  loss of strength, numbness, or tremors    Vital Signs Last 24 Hrs  T(C): 36.9 (07 Apr 2023 06:05), Max: 36.9 (07 Apr 2023 06:05)  T(F): 98.4 (07 Apr 2023 06:05), Max: 98.4 (07 Apr 2023 06:05)  HR: 90 (07 Apr 2023 06:05) (76 - 93)  BP: 143/73 (07 Apr 2023 06:05) (123/73 - 143/73)  BP(mean): --  RR: 20 (07 Apr 2023 06:05) (18 - 20)  SpO2: 99% (07 Apr 2023 06:05) (90% - 99%)    Parameters below as of 07 Apr 2023 06:05  Patient On (Oxygen Delivery Method): room air        PHYSICAL EXAM:  HEAD:  Atraumatic, Normocephalic  NECK: Supple and normal thyroid.  No JVD or carotid bruit.   HEART: S1, S2 regular , 1/6 soft ejection systolic murmur in mitral area , no thrill and no gallops .  PULMONARY: Bilateral vesicular breathing , few scattered rhonchi and few scattered rales are present .  ABDOMEN: Soft nontender and positive bowl sounds   EXTREMITIES:  No clubbing, cyanosis, or pedal  edema . Bilateral feet wound present .  NEUROLOGICAL: AA and mild confused    Skin : No rashes .  Musculoskeletal : No joint swellings .    LABS:                        10.5   9.55  )-----------( 286      ( 06 Apr 2023 06:37 )             34.3     04-06    144  |  117<H>  |  32<H>  ----------------------------<  65<L>  3.4<L>   |  26  |  1.30    Ca    8.4<L>      06 Apr 2023 06:37  Phos  3.1     04-06  Mg     2.3     04-06    TPro  6.3  /  Alb  2.1<L>  /  TBili  0.4  /  DBili  0.2  /  AST  19  /  ALT  34  /  AlkPhos  61  04-06        PT/INR - ( 06 Apr 2023 06:37 )   PT: 16.4 sec;   INR: 1.40 ratio             Assessment/Plan  Patient has :  1) Left foot infection and toe osteomyelitis   2) Hypertension and stable .  3) DM .  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 4) Patient cardiac wise stable and cleared for foot surgery if needed . Benefits of surgery outweigh the risks . 5) Will hold Lovenox 18-24 hours prior to surgery 6) Metoprolol as ordered for atrial fibrillation control .   Will continue to follow during hospital course and give further recommendations as needed . Thanks for your referral . if any questions please contact me at office ([phone] cell [phone])    KARIME WYNN MD [place] [id number]  [address]  OFFICE : [phone]  CELL : [phone]  CARDIOLOGY F/U :       HPI:  86 yo male ,[facility] resident with PMHx - COPD, DM, HTN, CAD, HLD, H/O TIA, dementia,GERD, BPH and depression sent ot ER for evaluation of left foot 1metatarsal wound ,suggestive of osteomyelitis .Patient was seen by ID consult and transfer to the hospital recommended for wound cx/bone biopsy /podiatry evaluation ,likely will require 4-6 weeks of iv abx . Patient was admitted to [facility] with b/l feet wounds and was followed by wound care team ,recently completed 7 days of doxycycline for foot wound cellulitis . (30 Mar 2023 05:21)        SUBJECTIVE: Patient feeling better .      ALLERGIES:  Allergies    No Known Allergies    Intolerances          MEDICATIONS  (STANDING):  albuterol/ipratropium for Nebulization 3 milliLiter(s) Nebulizer every 8 hours  budesonide 160 MICROgram(s)/formoterol 4.5 MICROgram(s) Inhaler 2 Puff(s) Inhalation two times a day  dextrose 5% + sodium chloride 0.45%. 1000 milliLiter(s) (40 mL/Hr) IV Continuous <Continuous>  dextrose 5%. 1000 milliLiter(s) (100 mL/Hr) IV Continuous <Continuous>  dextrose 5%. 1000 milliLiter(s) (50 mL/Hr) IV Continuous <Continuous>  dextrose 50% Injectable 25 Gram(s) IV Push once  dextrose 50% Injectable 12.5 Gram(s) IV Push once  dextrose 50% Injectable 25 Gram(s) IV Push once  donepezil 5 milliGRAM(s) Oral at bedtime  DULoxetine 60 milliGRAM(s) Oral daily  enoxaparin Injectable 60 milliGRAM(s) SubCutaneous every 12 hours  glucagon  Injectable 1 milliGRAM(s) IntraMuscular once  insulin glargine Injectable (LANTUS) 8 Unit(s) SubCutaneous at bedtime  insulin lispro (ADMELOG) corrective regimen sliding scale   SubCutaneous three times a day before meals  insulin lispro (ADMELOG) corrective regimen sliding scale   SubCutaneous at bedtime  lactobacillus acidophilus 1 Tablet(s) Oral two times a day with meals  losartan 25 milliGRAM(s) Oral daily  metoprolol tartrate 25 milliGRAM(s) Oral three times a day  multivitamin 1 Tablet(s) Oral daily  pantoprazole   Suspension 40 milliGRAM(s) Oral daily  senna 2 Tablet(s) Oral at bedtime  simvastatin 40 milliGRAM(s) Oral at bedtime  tamsulosin 0.4 milliGRAM(s) Oral at bedtime    MEDICATIONS  (PRN):  acetaminophen     Tablet .. 650 milliGRAM(s) Oral every 6 hours PRN Temp greater or equal to 38C (100.4F), Mild Pain (1 - 3)  aluminum hydroxide/magnesium hydroxide/simethicone Suspension 30 milliLiter(s) Oral every 4 hours PRN Dyspepsia  bisacodyl Suppository 10 milliGRAM(s) Rectal daily PRN Constipation  dextrose Oral Gel 15 Gram(s) Oral once PRN Blood Glucose LESS THAN 70 milliGRAM(s)/deciliter  hydrALAZINE 50 milliGRAM(s) Oral every 6 hours PRN for systolic BP>160  magnesium hydroxide Suspension 30 milliLiter(s) Oral daily PRN Constipation  melatonin 3 milliGRAM(s) Oral at bedtime PRN Insomnia  ondansetron Injectable 4 milliGRAM(s) IV Push every 8 hours PRN Nausea and/or Vomiting      REVIEW OF SYSTEMS:  CONSTITUTIONAL: No fever,  RESPIRATORY: No cough, wheezing, shortness of breath  CARDIOVASCULAR: No chest pain, dyspnea, palpitations, dizziness, syncope, paroxysmal nocturnal dyspnea, orthopnea, or arm or leg swelling  GASTROINTESTINAL: No abdominal  or epigastric pain, nausea, vomiting,  diarrhea  NEUROLOGICAL: No headaches,  loss of strength, numbness, or tremors    Vital Signs Last 24 Hrs  T(C): 36.9 (07 Apr 2023 06:05), Max: 36.9 (07 Apr 2023 06:05)  T(F): 98.4 (07 Apr 2023 06:05), Max: 98.4 (07 Apr 2023 06:05)  HR: 90 (07 Apr 2023 06:05) (76 - 93)  BP: 143/73 (07 Apr 2023 06:05) (123/73 - 143/73)  BP(mean): --  RR: 20 (07 Apr 2023 06:05) (18 - 20)  SpO2: 99% (07 Apr 2023 06:05) (90% - 99%)    Parameters below as of 07 Apr 2023 06:05  Patient On (Oxygen Delivery Method): room air        PHYSICAL EXAM:  HEAD:  Atraumatic, Normocephalic  NECK: Supple and normal thyroid.  No JVD or carotid bruit.   HEART: S1, S2 regular , 1/6 soft ejection systolic murmur in mitral area , no thrill and no gallops .  PULMONARY: Bilateral vesicular breathing , few scattered rhonchi and few scattered rales are present .  ABDOMEN: Soft nontender and positive bowl sounds   EXTREMITIES:  No clubbing, cyanosis, or pedal  edema . Bilateral feet wound present .  NEUROLOGICAL: AA and mild confused    Skin : No rashes .  Musculoskeletal : No joint swellings .    LABS:                        10.5   9.55  )-----------( 286      ( 06 Apr 2023 06:37 )             34.3     04-06    144  |  117<H>  |  32<H>  ----------------------------<  65<L>  3.4<L>   |  26  |  1.30    Ca    8.4<L>      06 Apr 2023 06:37  Phos  3.1     04-06  Mg     2.3     04-06    TPro  6.3  /  Alb  2.1<L>  /  TBili  0.4  /  DBili  0.2  /  AST  19  /  ALT  34  /  AlkPhos  61  04-06        PT/INR - ( 06 Apr 2023 06:37 )   PT: 16.4 sec;   INR: 1.40 ratio             Assessment/Plan  Patient has :  1) Left foot infection and toe osteomyelitis   2) Hypertension and stable .  3) DM .  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 4) Patient cardiac wise stable and cleared for foot surgery if needed . Benefits of surgery outweigh the risks . 5) Will hold Lovenox 18-24 hours prior to surgery 6) Metoprolol as ordered for atrial fibrillation control .   Will continue to follow during hospital course and give further recommendations as needed . Thanks for your referral . if any questions please contact me at office ([phone] cell [phone])    KARIME WYNN MD [place] [id number]  [address]  OFFICE : [phone]  CELL : [phone]  CARDIOLOGY F/U :       HPI:  86 yo male ,[facility] resident with PMHx - COPD, DM, HTN, CAD, HLD, H/O TIA, dementia,GERD, BPH and depression sent ot ER for evaluation of left foot 1metatarsal wound ,suggestive of osteomyelitis .Patient was seen by ID consult and transfer to the hospital recommended for wound cx/bone biopsy /podiatry evaluation ,likely will require 4-6 weeks of iv abx . Patient was admitted to [facility] with b/l feet wounds and was followed by wound care team ,recently completed 7 days of doxycycline for foot wound cellulitis . (30 Mar 2023 05:21)        SUBJECTIVE: Patient feeling better .      ALLERGIES:  Allergies    No Known Allergies    Intolerances          MEDICATIONS  (STANDING):  albuterol/ipratropium for Nebulization 3 milliLiter(s) Nebulizer every 8 hours  budesonide 160 MICROgram(s)/formoterol 4.5 MICROgram(s) Inhaler 2 Puff(s) Inhalation two times a day  dextrose 5% + sodium chloride 0.45%. 1000 milliLiter(s) (40 mL/Hr) IV Continuous <Continuous>  dextrose 5%. 1000 milliLiter(s) (100 mL/Hr) IV Continuous <Continuous>  dextrose 5%. 1000 milliLiter(s) (50 mL/Hr) IV Continuous <Continuous>  dextrose 50% Injectable 25 Gram(s) IV Push once  dextrose 50% Injectable 12.5 Gram(s) IV Push once  dextrose 50% Injectable 25 Gram(s) IV Push once  donepezil 5 milliGRAM(s) Oral at bedtime  DULoxetine 60 milliGRAM(s) Oral daily  enoxaparin Injectable 60 milliGRAM(s) SubCutaneous every 12 hours  glucagon  Injectable 1 milliGRAM(s) IntraMuscular once  insulin glargine Injectable (LANTUS) 8 Unit(s) SubCutaneous at bedtime  insulin lispro (ADMELOG) corrective regimen sliding scale   SubCutaneous three times a day before meals  insulin lispro (ADMELOG) corrective regimen sliding scale   SubCutaneous at bedtime  lactobacillus acidophilus 1 Tablet(s) Oral two times a day with meals  losartan 25 milliGRAM(s) Oral daily  metoprolol tartrate 25 milliGRAM(s) Oral three times a day  multivitamin 1 Tablet(s) Oral daily  pantoprazole   Suspension 40 milliGRAM(s) Oral daily  senna 2 Tablet(s) Oral at bedtime  simvastatin 40 milliGRAM(s) Oral at bedtime  tamsulosin 0.4 milliGRAM(s) Oral at bedtime    MEDICATIONS  (PRN):  acetaminophen     Tablet .. 650 milliGRAM(s) Oral every 6 hours PRN Temp greater or equal to 38C (100.4F), Mild Pain (1 - 3)  aluminum hydroxide/magnesium hydroxide/simethicone Suspension 30 milliLiter(s) Oral every 4 hours PRN Dyspepsia  bisacodyl Suppository 10 milliGRAM(s) Rectal daily PRN Constipation  dextrose Oral Gel 15 Gram(s) Oral once PRN Blood Glucose LESS THAN 70 milliGRAM(s)/deciliter  hydrALAZINE 50 milliGRAM(s) Oral every 6 hours PRN for systolic BP>160  magnesium hydroxide Suspension 30 milliLiter(s) Oral daily PRN Constipation  melatonin 3 milliGRAM(s) Oral at bedtime PRN Insomnia  ondansetron Injectable 4 milliGRAM(s) IV Push every 8 hours PRN Nausea and/or Vomiting      REVIEW OF SYSTEMS:  CONSTITUTIONAL: No fever,  RESPIRATORY: No cough, wheezing, shortness of breath  CARDIOVASCULAR: No chest pain, dyspnea, palpitations, dizziness, syncope, paroxysmal nocturnal dyspnea, orthopnea, or arm or leg swelling  GASTROINTESTINAL: No abdominal  or epigastric pain, nausea, vomiting,  diarrhea  NEUROLOGICAL: No headaches,  loss of strength, numbness, or tremors    Vital Signs Last 24 Hrs  T(C): 36.9 (07 Apr 2023 06:05), Max: 36.9 (07 Apr 2023 06:05)  T(F): 98.4 (07 Apr 2023 06:05), Max: 98.4 (07 Apr 2023 06:05)  HR: 90 (07 Apr 2023 06:05) (76 - 93)  BP: 143/73 (07 Apr 2023 06:05) (123/73 - 143/73)  BP(mean): --  RR: 20 (07 Apr 2023 06:05) (18 - 20)  SpO2: 99% (07 Apr 2023 06:05) (90% - 99%)    Parameters below as of 07 Apr 2023 06:05  Patient On (Oxygen Delivery Method): room air        PHYSICAL EXAM:  HEAD:  Atraumatic, Normocephalic  NECK: Supple and normal thyroid.  No JVD or carotid bruit.   HEART: S1, S2 regular , 1/6 soft ejection systolic murmur in mitral area , no thrill and no gallops .  PULMONARY: Bilateral vesicular breathing , few scattered rhonchi and few scattered rales are present .  ABDOMEN: Soft nontender and positive bowl sounds   EXTREMITIES:  No clubbing, cyanosis, or pedal  edema . Bilateral feet wound present .  NEUROLOGICAL: AA and mild confused    Skin : No rashes .  Musculoskeletal : No joint swellings .    LABS:                        10.5   9.55  )-----------( 286      ( 06 Apr 2023 06:37 )             34.3     04-06    144  |  117<H>  |  32<H>  ----------------------------<  65<L>  3.4<L>   |  26  |  1.30    Ca    8.4<L>      06 Apr 2023 06:37  Phos  3.1     04-06  Mg     2.3     04-06    TPro  6.3  /  Alb  2.1<L>  /  TBili  0.4  /  DBili  0.2  /  AST  19  /  ALT  34  /  AlkPhos  61  04-06        PT/INR - ( 06 Apr 2023 06:37 )   PT: 16.4 sec;   INR: 1.40 ratio             Assessment/Plan  Patient has :  1) Left foot infection and toe osteomyelitis   2) Hypertension and stable .  3) DM .  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 4) Patient cardiac wise stable and cleared for foot surgery if needed . Benefits of surgery outweigh the risks . 5) Will hold Lovenox 18-24 hours prior to surgery 6) Metoprolol as ordered for atrial fibrillation control .   Will continue to follow during hospital course and give further recommendations as needed . Thanks for your referral . if any questions please contact me at office ([phone] cell [phone])    KARIME WYNN MD [place] [id number]  [address]  OFFICE : [phone]  CELL : [phone]  CARDIOLOGY F/U :       HPI:  88 yo male ,[facility] resident with PMHx - COPD, DM, HTN, CAD, HLD, H/O TIA, dementia,GERD, BPH and depression sent ot ER for evaluation of left foot 1metatarsal wound ,suggestive of osteomyelitis .Patient was seen by ID consult and transfer to the hospital recommended for wound cx/bone biopsy /podiatry evaluation ,likely will require 4-6 weeks of iv abx . Patient was admitted to [facility] with b/l feet wounds and was followed by wound care team ,recently completed 7 days of doxycycline for foot wound cellulitis . (30 Mar 2023 05:21)        SUBJECTIVE: Patient feeling better .      ALLERGIES:  Allergies    No Known Allergies    Intolerances          MEDICATIONS  (STANDING):  albuterol/ipratropium for Nebulization 3 milliLiter(s) Nebulizer every 8 hours  budesonide 160 MICROgram(s)/formoterol 4.5 MICROgram(s) Inhaler 2 Puff(s) Inhalation two times a day  dextrose 5% + sodium chloride 0.45%. 1000 milliLiter(s) (40 mL/Hr) IV Continuous <Continuous>  dextrose 5%. 1000 milliLiter(s) (100 mL/Hr) IV Continuous <Continuous>  dextrose 5%. 1000 milliLiter(s) (50 mL/Hr) IV Continuous <Continuous>  dextrose 50% Injectable 25 Gram(s) IV Push once  dextrose 50% Injectable 12.5 Gram(s) IV Push once  dextrose 50% Injectable 25 Gram(s) IV Push once  donepezil 5 milliGRAM(s) Oral at bedtime  DULoxetine 60 milliGRAM(s) Oral daily  enoxaparin Injectable 60 milliGRAM(s) SubCutaneous every 12 hours  glucagon  Injectable 1 milliGRAM(s) IntraMuscular once  insulin glargine Injectable (LANTUS) 8 Unit(s) SubCutaneous at bedtime  insulin lispro (ADMELOG) corrective regimen sliding scale   SubCutaneous three times a day before meals  insulin lispro (ADMELOG) corrective regimen sliding scale   SubCutaneous at bedtime  lactobacillus acidophilus 1 Tablet(s) Oral two times a day with meals  losartan 25 milliGRAM(s) Oral daily  metoprolol tartrate 25 milliGRAM(s) Oral three times a day  multivitamin 1 Tablet(s) Oral daily  pantoprazole   Suspension 40 milliGRAM(s) Oral daily  senna 2 Tablet(s) Oral at bedtime  simvastatin 40 milliGRAM(s) Oral at bedtime  tamsulosin 0.4 milliGRAM(s) Oral at bedtime    MEDICATIONS  (PRN):  acetaminophen     Tablet .. 650 milliGRAM(s) Oral every 6 hours PRN Temp greater or equal to 38C (100.4F), Mild Pain (1 - 3)  aluminum hydroxide/magnesium hydroxide/simethicone Suspension 30 milliLiter(s) Oral every 4 hours PRN Dyspepsia  bisacodyl Suppository 10 milliGRAM(s) Rectal daily PRN Constipation  dextrose Oral Gel 15 Gram(s) Oral once PRN Blood Glucose LESS THAN 70 milliGRAM(s)/deciliter  hydrALAZINE 50 milliGRAM(s) Oral every 6 hours PRN for systolic BP>160  magnesium hydroxide Suspension 30 milliLiter(s) Oral daily PRN Constipation  melatonin 3 milliGRAM(s) Oral at bedtime PRN Insomnia  ondansetron Injectable 4 milliGRAM(s) IV Push every 8 hours PRN Nausea and/or Vomiting      REVIEW OF SYSTEMS:  CONSTITUTIONAL: No fever,  RESPIRATORY: No cough, wheezing, shortness of breath  CARDIOVASCULAR: No chest pain, dyspnea, palpitations, dizziness, syncope, paroxysmal nocturnal dyspnea, orthopnea, or arm or leg swelling  GASTROINTESTINAL: No abdominal  or epigastric pain, nausea, vomiting,  diarrhea  NEUROLOGICAL: No headaches, numbness, or tremors  Skin : No itching .  Hematology : No active bleeding .  Endocrinology : No heat and cold intolerance .  Psychiatry : Patient is confused .  Genitourinary : No dysuria .  Musculoskeletal : Patient has mild arthritis .            Vital Signs Last 24 Hrs  T(C): 36.9 (07 Apr 2023 06:05), Max: 36.9 (07 Apr 2023 06:05)  T(F): 98.4 (07 Apr 2023 06:05), Max: 98.4 (07 Apr 2023 06:05)  HR: 90 (07 Apr 2023 06:05) (76 - 93)  BP: 143/73 (07 Apr 2023 06:05) (123/73 - 143/73)  BP(mean): --  RR: 20 (07 Apr 2023 06:05) (18 - 20)  SpO2: 99% (07 Apr 2023 06:05) (90% - 99%)    Parameters below as of 07 Apr 2023 06:05  Patient On (Oxygen Delivery Method): room air        PHYSICAL EXAM:  HEAD:  Atraumatic, Normocephalic  NECK: Supple and normal thyroid.  No JVD or carotid bruit.   HEART: S1, S2 regular , 1/6 soft ejection systolic murmur in mitral area , no thrill and no gallops .  PULMONARY: Bilateral vesicular breathing , few scattered rhonchi and few scattered rales are present .  ABDOMEN: Soft nontender and positive bowl sounds   EXTREMITIES:  No clubbing, cyanosis, or pedal  edema . Bilateral feet wound present .  NEUROLOGICAL: AA and mild confused    Skin : No rashes .  Musculoskeletal : No joint swellings .    LABS:                        10.5   9.55  )-----------( 286      ( 06 Apr 2023 06:37 )             34.3     04-06    144  |  117<H>  |  32<H>  ----------------------------<  65<L>  3.4<L>   |  26  |  1.30    Ca    8.4<L>      06 Apr 2023 06:37  Phos  3.1     04-06  Mg     2.3     04-06    TPro  6.3  /  Alb  2.1<L>  /  TBili  0.4  /  DBili  0.2  /  AST  19  /  ALT  34  /  AlkPhos  61  04-06        PT/INR - ( 06 Apr 2023 06:37 )   PT: 16.4 sec;   INR: 1.40 ratio             Assessment/Plan  Patient has :  1) Left foot infection and toe osteomyelitis   2) Hypertension and stable .  3) DM .  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 4) Patient cardiac wise stable and cleared for foot surgery if needed . Benefits of surgery outweigh the risks . 5) Will hold Lovenox 18-24 hours prior to surgery 6) Metoprolol as ordered for atrial fibrillation control .   Will continue to follow during hospital course and give further recommendations as needed . Thanks for your referral . if any questions please contact me at office ([phone] cell [phone])    KARIME WYNN MD [place] [id number]  [address]  OFFICE : [phone]  CELL : [phone]  CARDIOLOGY F/U :       HPI:  86 yo male ,[facility] resident with PMHx - COPD, DM, HTN, CAD, HLD, H/O TIA, dementia,GERD, BPH and depression sent ot ER for evaluation of left foot 1metatarsal wound ,suggestive of osteomyelitis .Patient was seen by ID consult and transfer to the hospital recommended for wound cx/bone biopsy /podiatry evaluation ,likely will require 4-6 weeks of iv abx . Patient was admitted to [facility] with b/l feet wounds and was followed by wound care team ,recently completed 7 days of doxycycline for foot wound cellulitis . (30 Mar 2023 05:21)        SUBJECTIVE: Patient feeling better .      ALLERGIES:  Allergies    No Known Allergies    Intolerances          MEDICATIONS  (STANDING):  albuterol/ipratropium for Nebulization 3 milliLiter(s) Nebulizer every 8 hours  budesonide 160 MICROgram(s)/formoterol 4.5 MICROgram(s) Inhaler 2 Puff(s) Inhalation two times a day  dextrose 5% + sodium chloride 0.45%. 1000 milliLiter(s) (40 mL/Hr) IV Continuous <Continuous>  dextrose 5%. 1000 milliLiter(s) (100 mL/Hr) IV Continuous <Continuous>  dextrose 5%. 1000 milliLiter(s) (50 mL/Hr) IV Continuous <Continuous>  dextrose 50% Injectable 25 Gram(s) IV Push once  dextrose 50% Injectable 12.5 Gram(s) IV Push once  dextrose 50% Injectable 25 Gram(s) IV Push once  donepezil 5 milliGRAM(s) Oral at bedtime  DULoxetine 60 milliGRAM(s) Oral daily  enoxaparin Injectable 60 milliGRAM(s) SubCutaneous every 12 hours  glucagon  Injectable 1 milliGRAM(s) IntraMuscular once  insulin glargine Injectable (LANTUS) 8 Unit(s) SubCutaneous at bedtime  insulin lispro (ADMELOG) corrective regimen sliding scale   SubCutaneous three times a day before meals  insulin lispro (ADMELOG) corrective regimen sliding scale   SubCutaneous at bedtime  lactobacillus acidophilus 1 Tablet(s) Oral two times a day with meals  losartan 25 milliGRAM(s) Oral daily  metoprolol tartrate 25 milliGRAM(s) Oral three times a day  multivitamin 1 Tablet(s) Oral daily  pantoprazole   Suspension 40 milliGRAM(s) Oral daily  senna 2 Tablet(s) Oral at bedtime  simvastatin 40 milliGRAM(s) Oral at bedtime  tamsulosin 0.4 milliGRAM(s) Oral at bedtime    MEDICATIONS  (PRN):  acetaminophen     Tablet .. 650 milliGRAM(s) Oral every 6 hours PRN Temp greater or equal to 38C (100.4F), Mild Pain (1 - 3)  aluminum hydroxide/magnesium hydroxide/simethicone Suspension 30 milliLiter(s) Oral every 4 hours PRN Dyspepsia  bisacodyl Suppository 10 milliGRAM(s) Rectal daily PRN Constipation  dextrose Oral Gel 15 Gram(s) Oral once PRN Blood Glucose LESS THAN 70 milliGRAM(s)/deciliter  hydrALAZINE 50 milliGRAM(s) Oral every 6 hours PRN for systolic BP>160  magnesium hydroxide Suspension 30 milliLiter(s) Oral daily PRN Constipation  melatonin 3 milliGRAM(s) Oral at bedtime PRN Insomnia  ondansetron Injectable 4 milliGRAM(s) IV Push every 8 hours PRN Nausea and/or Vomiting      REVIEW OF SYSTEMS:  CONSTITUTIONAL: No fever,  RESPIRATORY: No cough, wheezing, shortness of breath  CARDIOVASCULAR: No chest pain, dyspnea, palpitations, dizziness, syncope, paroxysmal nocturnal dyspnea, orthopnea, or arm or leg swelling  GASTROINTESTINAL: No abdominal  or epigastric pain, nausea, vomiting,  diarrhea  NEUROLOGICAL: No headaches, numbness, or tremors  Skin : No itching .  Hematology : No active bleeding .  Endocrinology : No heat and cold intolerance .  Psychiatry : Patient is confused .  Genitourinary : No dysuria .  Musculoskeletal : Patient has mild arthritis .            Vital Signs Last 24 Hrs  T(C): 36.9 (07 Apr 2023 06:05), Max: 36.9 (07 Apr 2023 06:05)  T(F): 98.4 (07 Apr 2023 06:05), Max: 98.4 (07 Apr 2023 06:05)  HR: 90 (07 Apr 2023 06:05) (76 - 93)  BP: 143/73 (07 Apr 2023 06:05) (123/73 - 143/73)  BP(mean): --  RR: 20 (07 Apr 2023 06:05) (18 - 20)  SpO2: 99% (07 Apr 2023 06:05) (90% - 99%)    Parameters below as of 07 Apr 2023 06:05  Patient On (Oxygen Delivery Method): room air        PHYSICAL EXAM:  HEAD:  Atraumatic, Normocephalic  NECK: Supple and normal thyroid.  No JVD or carotid bruit.   HEART: S1, S2 regular , 1/6 soft ejection systolic murmur in mitral area , no thrill and no gallops .  PULMONARY: Bilateral vesicular breathing , few scattered rhonchi and few scattered rales are present .  ABDOMEN: Soft nontender and positive bowl sounds   EXTREMITIES:  No clubbing, cyanosis, or pedal  edema . Bilateral feet wound present .  NEUROLOGICAL: AA and mild confused    Skin : No rashes .  Musculoskeletal : No joint swellings .    LABS:                        10.5   9.55  )-----------( 286      ( 06 Apr 2023 06:37 )             34.3     04-06    144  |  117<H>  |  32<H>  ----------------------------<  65<L>  3.4<L>   |  26  |  1.30    Ca    8.4<L>      06 Apr 2023 06:37  Phos  3.1     04-06  Mg     2.3     04-06    TPro  6.3  /  Alb  2.1<L>  /  TBili  0.4  /  DBili  0.2  /  AST  19  /  ALT  34  /  AlkPhos  61  04-06        PT/INR - ( 06 Apr 2023 06:37 )   PT: 16.4 sec;   INR: 1.40 ratio             Assessment/Plan  Patient has :  1) Left foot infection and toe osteomyelitis   2) Hypertension and stable .  3) DM .  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 4) Patient cardiac wise stable and cleared for foot surgery if needed . Benefits of surgery outweigh the risks . 5) Will hold Lovenox 18-24 hours prior to surgery 6) Metoprolol as ordered for atrial fibrillation control .   Will continue to follow during hospital course and give further recommendations as needed . Thanks for your referral . if any questions please contact me at office ([phone] cell [phone])    KARIME WYNN MD [place] [id number]  [address]  OFFICE : [phone]  CELL : [phone]  CARDIOLOGY F/U :       HPI:  86 yo male ,[facility] resident with PMHx - COPD, DM, HTN, CAD, HLD, H/O TIA, dementia,GERD, BPH and depression sent ot ER for evaluation of left foot 1metatarsal wound ,suggestive of osteomyelitis .Patient was seen by ID consult and transfer to the hospital recommended for wound cx/bone biopsy /podiatry evaluation ,likely will require 4-6 weeks of iv abx . Patient was admitted to [facility] with b/l feet wounds and was followed by wound care team ,recently completed 7 days of doxycycline for foot wound cellulitis . (30 Mar 2023 05:21)        SUBJECTIVE: Patient feeling better .      ALLERGIES:  Allergies    No Known Allergies    Intolerances          MEDICATIONS  (STANDING):  albuterol/ipratropium for Nebulization 3 milliLiter(s) Nebulizer every 8 hours  budesonide 160 MICROgram(s)/formoterol 4.5 MICROgram(s) Inhaler 2 Puff(s) Inhalation two times a day  dextrose 5% + sodium chloride 0.45%. 1000 milliLiter(s) (40 mL/Hr) IV Continuous <Continuous>  dextrose 5%. 1000 milliLiter(s) (100 mL/Hr) IV Continuous <Continuous>  dextrose 5%. 1000 milliLiter(s) (50 mL/Hr) IV Continuous <Continuous>  dextrose 50% Injectable 25 Gram(s) IV Push once  dextrose 50% Injectable 12.5 Gram(s) IV Push once  dextrose 50% Injectable 25 Gram(s) IV Push once  donepezil 5 milliGRAM(s) Oral at bedtime  DULoxetine 60 milliGRAM(s) Oral daily  enoxaparin Injectable 60 milliGRAM(s) SubCutaneous every 12 hours  glucagon  Injectable 1 milliGRAM(s) IntraMuscular once  insulin glargine Injectable (LANTUS) 8 Unit(s) SubCutaneous at bedtime  insulin lispro (ADMELOG) corrective regimen sliding scale   SubCutaneous three times a day before meals  insulin lispro (ADMELOG) corrective regimen sliding scale   SubCutaneous at bedtime  lactobacillus acidophilus 1 Tablet(s) Oral two times a day with meals  losartan 25 milliGRAM(s) Oral daily  metoprolol tartrate 25 milliGRAM(s) Oral three times a day  multivitamin 1 Tablet(s) Oral daily  pantoprazole   Suspension 40 milliGRAM(s) Oral daily  senna 2 Tablet(s) Oral at bedtime  simvastatin 40 milliGRAM(s) Oral at bedtime  tamsulosin 0.4 milliGRAM(s) Oral at bedtime    MEDICATIONS  (PRN):  acetaminophen     Tablet .. 650 milliGRAM(s) Oral every 6 hours PRN Temp greater or equal to 38C (100.4F), Mild Pain (1 - 3)  aluminum hydroxide/magnesium hydroxide/simethicone Suspension 30 milliLiter(s) Oral every 4 hours PRN Dyspepsia  bisacodyl Suppository 10 milliGRAM(s) Rectal daily PRN Constipation  dextrose Oral Gel 15 Gram(s) Oral once PRN Blood Glucose LESS THAN 70 milliGRAM(s)/deciliter  hydrALAZINE 50 milliGRAM(s) Oral every 6 hours PRN for systolic BP>160  magnesium hydroxide Suspension 30 milliLiter(s) Oral daily PRN Constipation  melatonin 3 milliGRAM(s) Oral at bedtime PRN Insomnia  ondansetron Injectable 4 milliGRAM(s) IV Push every 8 hours PRN Nausea and/or Vomiting      REVIEW OF SYSTEMS:  CONSTITUTIONAL: No fever,  RESPIRATORY: No cough, wheezing, shortness of breath  CARDIOVASCULAR: No chest pain, dyspnea, palpitations, dizziness, syncope, paroxysmal nocturnal dyspnea, orthopnea, or arm or leg swelling  GASTROINTESTINAL: No abdominal  or epigastric pain, nausea, vomiting,  diarrhea  NEUROLOGICAL: No headaches, numbness, or tremors  Skin : No itching .  Hematology : No active bleeding .  Endocrinology : No heat and cold intolerance .  Psychiatry : Patient is confused .  Genitourinary : No dysuria .  Musculoskeletal : Patient has mild arthritis .            Vital Signs Last 24 Hrs  T(C): 36.9 (07 Apr 2023 06:05), Max: 36.9 (07 Apr 2023 06:05)  T(F): 98.4 (07 Apr 2023 06:05), Max: 98.4 (07 Apr 2023 06:05)  HR: 90 (07 Apr 2023 06:05) (76 - 93)  BP: 143/73 (07 Apr 2023 06:05) (123/73 - 143/73)  BP(mean): --  RR: 20 (07 Apr 2023 06:05) (18 - 20)  SpO2: 99% (07 Apr 2023 06:05) (90% - 99%)    Parameters below as of 07 Apr 2023 06:05  Patient On (Oxygen Delivery Method): room air        PHYSICAL EXAM:  HEAD:  Atraumatic, Normocephalic  NECK: Supple and normal thyroid.  No JVD or carotid bruit.   HEART: S1, S2 regular , 1/6 soft ejection systolic murmur in mitral area , no thrill and no gallops .  PULMONARY: Bilateral vesicular breathing , few scattered rhonchi and few scattered rales are present .  ABDOMEN: Soft nontender and positive bowl sounds   EXTREMITIES:  No clubbing, cyanosis, or pedal  edema . Bilateral feet wound present .  NEUROLOGICAL: AA and mild confused    Skin : No rashes .  Musculoskeletal : No joint swellings .    LABS:                        10.5   9.55  )-----------( 286      ( 06 Apr 2023 06:37 )             34.3     04-06    144  |  117<H>  |  32<H>  ----------------------------<  65<L>  3.4<L>   |  26  |  1.30    Ca    8.4<L>      06 Apr 2023 06:37  Phos  3.1     04-06  Mg     2.3     04-06    TPro  6.3  /  Alb  2.1<L>  /  TBili  0.4  /  DBili  0.2  /  AST  19  /  ALT  34  /  AlkPhos  61  04-06        PT/INR - ( 06 Apr 2023 06:37 )   PT: 16.4 sec;   INR: 1.40 ratio             Assessment/Plan  Patient has :  1) Left foot infection and toe osteomyelitis   2) Hypertension and stable .  3) DM .  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 4) Patient cardiac wise stable and cleared for foot surgery if needed . Benefits of surgery outweigh the risks . 5) Will hold Lovenox 18-24 hours prior to surgery 6) Metoprolol as ordered for atrial fibrillation control .   Will continue to follow during hospital course and give further recommendations as needed . Thanks for your referral . if any questions please contact me at office ([phone] cell [phone])

## 2023-04-07 NOTE — PROGRESS NOTE ADULT - SUBJECTIVE AND OBJECTIVE BOX
CHIEF COMPLAINT/ REASON FOR VISIT  .. Patient was seen to address the  issue listed under PROBLEM LIST which is located toward bottom of this note     ANA MARIA LEIGH    PLV 1EAS 101 W1    Allergies    No Known Allergies    Intolerances        PAST MEDICAL & SURGICAL HISTORY:  ASHD (arteriosclerotic heart disease)      BPH without urinary obstruction      COPD, moderate      Stage 3 chronic kidney disease      Chronic GERD      HLD (hyperlipidemia)      MDD (major depressive disorder)      Obstructive and reflux uropathy      Cellulitis      HTN (hypertension)      Sepsis      Dementia      Moderate protein-calorie malnutrition      Brain TIA      History of RSV infection      DM type 2, not at goal      Pressure ulcer of unspecified heel, unspecified stage      Venous stasis ulcer without varicose veins      Multiple open wounds of foot          FAMILY HISTORY:      Home Medications:  Admelog SoloStar 100 units/mL injectable solution: injectable 4 times a day (before meals and at bedtime) per sliding scale (30 Mar 2023 15:23)  Aricept 5 mg oral tablet: 1 tab(s) orally once a day (30 Mar 2023 15:23)  atenolol 25 mg oral tablet: 1 tab(s) orally once a day (30 Mar 2023 15:23)  Bacid (LAC) oral tablet: 1 tab(s) orally 2 times a day (30 Mar 2023 15:23)  Cymbalta 60 mg oral delayed release capsule: 1 cap(s) orally once a day (30 Mar 2023 15:23)  docusate sodium 100 mg oral capsule: 2 cap(s) orally once a day (at bedtime) (30 Mar 2023 15:23)  doxycycline hyclate 100 mg oral tablet: 1 tab(s) orally 2 times a day for 7 days  3/28/23-4/4/23 (30 Mar 2023 15:23)  Dulcolax Laxative 10 mg rectal suppository: 1 suppository(ies) rectally as needed for  constipation (30 Mar 2023 15:23)  famotidine 40 mg oral tablet: 1 tab(s) orally once a day (30 Mar 2023 15:23)  Fleet Enema 19 g-7 g rectal enema: 133 milliliter(s) rectally as needed for  constipation (30 Mar 2023 15:23)  Flovent 44 mcg/inh inhalation aerosol with adapter: 1 puff(s) inhaled every 12 hours (30 Mar 2023 15:23)  ipratropium-albuterol 0.5 mg-2.5 mg/3 mL inhalation solution: 3 milliliter(s) by nebulizer 4 times a day (30 Mar 2023 15:23)  losartan 50 mg oral tablet: 1 tab(s) orally once a day (30 Mar 2023 15:23)  Milk of Magnesia 8% oral suspension: 30 milliliter(s) orally once a day as needed for  constipation (30 Mar 2023 15:23)  Santyl 250 units/g topical ointment: Apply topically to affected area (30 Mar 2023 12:41)  simvastatin 40 mg oral tablet: 1 tab(s) orally once a day (at bedtime) (30 Mar 2023 15:23)  SITagliptin 50 mg oral tablet: 1 tab(s) orally once a day (30 Mar 2023 15:23)  tamsulosin 0.4 mg oral capsule: 1 cap(s) orally once a day (in the evening) (30 Mar 2023 15:23)  Therapeutic Multiple Vitamins oral tablet: 1 tab(s) orally once a day (30 Mar 2023 15:23)  traMADol 50 mg oral tablet: 0.5 tab(s) orally 2 times a day (30 Mar 2023 15:23)  Tylenol Caplet Extra Strength 500 mg oral tablet: 2 tab(s) orally every 8 hours (30 Mar 2023 15:23)      MEDICATIONS  (STANDING):  albuterol/ipratropium for Nebulization 3 milliLiter(s) Nebulizer every 8 hours  budesonide 160 MICROgram(s)/formoterol 4.5 MICROgram(s) Inhaler 2 Puff(s) Inhalation two times a day  dextrose 5% + sodium chloride 0.45% with potassium chloride 20 mEq/L 1000 milliLiter(s) (40 mL/Hr) IV Continuous <Continuous>  dextrose 5%. 1000 milliLiter(s) (100 mL/Hr) IV Continuous <Continuous>  dextrose 5%. 1000 milliLiter(s) (50 mL/Hr) IV Continuous <Continuous>  dextrose 50% Injectable 25 Gram(s) IV Push once  dextrose 50% Injectable 12.5 Gram(s) IV Push once  dextrose 50% Injectable 25 Gram(s) IV Push once  donepezil 5 milliGRAM(s) Oral at bedtime  DULoxetine 60 milliGRAM(s) Oral daily  enoxaparin Injectable 60 milliGRAM(s) SubCutaneous every 12 hours  glucagon  Injectable 1 milliGRAM(s) IntraMuscular once  insulin glargine Injectable (LANTUS) 8 Unit(s) SubCutaneous at bedtime  insulin lispro (ADMELOG) corrective regimen sliding scale   SubCutaneous three times a day before meals  insulin lispro (ADMELOG) corrective regimen sliding scale   SubCutaneous at bedtime  lactobacillus acidophilus 1 Tablet(s) Oral two times a day with meals  losartan 25 milliGRAM(s) Oral daily  metoprolol tartrate 25 milliGRAM(s) Oral three times a day  multivitamin 1 Tablet(s) Oral daily  pantoprazole   Suspension 40 milliGRAM(s) Oral daily  senna 2 Tablet(s) Oral at bedtime  simvastatin 40 milliGRAM(s) Oral at bedtime  tamsulosin 0.4 milliGRAM(s) Oral at bedtime    MEDICATIONS  (PRN):  acetaminophen     Tablet .. 650 milliGRAM(s) Oral every 6 hours PRN Temp greater or equal to 38C (100.4F), Mild Pain (1 - 3)  aluminum hydroxide/magnesium hydroxide/simethicone Suspension 30 milliLiter(s) Oral every 4 hours PRN Dyspepsia  bisacodyl Suppository 10 milliGRAM(s) Rectal daily PRN Constipation  dextrose Oral Gel 15 Gram(s) Oral once PRN Blood Glucose LESS THAN 70 milliGRAM(s)/deciliter  hydrALAZINE 50 milliGRAM(s) Oral every 6 hours PRN for systolic BP>160  magnesium hydroxide Suspension 30 milliLiter(s) Oral daily PRN Constipation  melatonin 3 milliGRAM(s) Oral at bedtime PRN Insomnia  ondansetron Injectable 4 milliGRAM(s) IV Push every 8 hours PRN Nausea and/or Vomiting              Vital Signs Last 24 Hrs  T(C): 36.9 (07 Apr 2023 06:05), Max: 36.9 (07 Apr 2023 06:05)  T(F): 98.4 (07 Apr 2023 06:05), Max: 98.4 (07 Apr 2023 06:05)  HR: 77 (07 Apr 2023 08:19) (76 - 93)  BP: 143/73 (07 Apr 2023 06:05) (123/73 - 143/73)  BP(mean): --  RR: 20 (07 Apr 2023 06:05) (18 - 20)  SpO2: 95% (07 Apr 2023 08:19) (90% - 99%)    Parameters below as of 07 Apr 2023 08:19  Patient On (Oxygen Delivery Method): room air          04-06-23 @ 07:01  -  04-07-23 @ 07:00  --------------------------------------------------------  IN: 910 mL / OUT: 0 mL / NET: 910 mL              LABS:                        10.9   8.42  )-----------( 284      ( 07 Apr 2023 08:43 )             36.4     04-07    145  |  118<H>  |  35<H>  ----------------------------<  126<H>  4.1   |  23  |  1.30    Ca    8.5      07 Apr 2023 08:43  Phos  2.6     04-07  Mg     2.3     04-07    TPro  6.3  /  Alb  2.1<L>  /  TBili  0.4  /  DBili  0.2  /  AST  19  /  ALT  34  /  AlkPhos  61  04-06    PT/INR - ( 06 Apr 2023 06:37 )   PT: 16.4 sec;   INR: 1.40 ratio                   WBC:  WBC Count: 8.42 K/uL (04-07 @ 08:43)  WBC Count: 9.55 K/uL (04-06 @ 06:37)  WBC Count: 7.72 K/uL (04-05 @ 06:40)  WBC Count: 9.06 K/uL (04-04 @ 07:45)      MICROBIOLOGY:  RECENT CULTURES:  04-02 Clean Catch Clean Catch (Midstream) Enterococcus faecalis XXXX   >100,000 CFU/ml Enterococcus faecalis                PT/INR - ( 06 Apr 2023 06:37 )   PT: 16.4 sec;   INR: 1.40 ratio             Sodium:  Sodium, Serum: 145 mmol/L (04-07 @ 08:43)  Sodium, Serum: 144 mmol/L (04-06 @ 06:37)  Sodium, Serum: 144 mmol/L (04-05 @ 06:40)  Sodium, Serum: 144 mmol/L (04-04 @ 07:45)      1.30 mg/dL 04-07 @ 08:43  1.30 mg/dL 04-06 @ 06:37  1.20 mg/dL 04-05 @ 06:40  1.30 mg/dL 04-04 @ 07:45      Hemoglobin:  Hemoglobin: 10.9 g/dL (04-07 @ 08:43)  Hemoglobin: 10.5 g/dL (04-06 @ 06:37)  Hemoglobin: 11.5 g/dL (04-05 @ 06:40)  Hemoglobin: 9.4 g/dL (04-04 @ 07:45)      Platelets: Platelet Count - Automated: 284 K/uL (04-07 @ 08:43)  Platelet Count - Automated: 286 K/uL (04-06 @ 06:37)  Platelet Count - Automated: 251 K/uL (04-05 @ 06:40)  Platelet Count - Automated: 214 K/uL (04-04 @ 07:45)      LIVER FUNCTIONS - ( 06 Apr 2023 06:37 )  Alb: 2.1 g/dL / Pro: 6.3 g/dL / ALK PHOS: 61 U/L / ALT: 34 U/L / AST: 19 U/L / GGT: x                 RADIOLOGY & ADDITIONAL STUDIES:      MICROBIOLOGY:  RECENT CULTURES:  04-02 Clean Catch Clean Catch (Midstream) Enterococcus faecalis XXXX   >100,000 CFU/ml Enterococcus faecalis

## 2023-04-07 NOTE — PROVIDER CONTACT NOTE (OTHER) - RECOMMENDATIONS
Tele strips obtained from [facility] and placed in pt's chart Tele strips obtained from [facility] and placed in pt's chart Tele strips obtained from [facility] and placed in pt's chart

## 2023-04-07 NOTE — PROGRESS NOTE ADULT - SUBJECTIVE AND OBJECTIVE BOX
PROGRESS NOTE  Patient is a 87y old  Male who presents with a chief complaint of left foot infection (07 Apr 2023 09:29)  Chart and available morning labs /imaging are reviewed electronically , urgent issues addressed . More information  is being added upon completion of rounds , when more information is collected and management discussed with consultants , medical staff and social service/case management on the floor   OVERNIGHT -planned for sx on moday , d/w podiatry team ,card clearance requested   No new issues reported by medical staff . PICC line is being placed by IR ,d/w IR technician and RN on a floor   HPI:  88 yo male ,[facility] with PMHx - COPD, DM, HTN, CAD, HLD, H/O TIA, dementia,GERD, BPH and depression sent ot ER for evaluation of left foot 1metatarsal wound ,suggestive of osteomyelitis .Patient was seen by ID consult and transfer to the hospital recommended for wound cx/bone biopsy /podiatry evaluation ,likely will require 4-6 weeks of iv abx . [facility] with b/l feet wounds and was followed by wound care team ,recently completed 7 days of doxycycline for foot wound cellulitis . (30 Mar 2023 05:21)    PAST MEDICAL & SURGICAL HISTORY:  ASHD (arteriosclerotic heart disease)      BPH without urinary obstruction      COPD, moderate      Stage 3 chronic kidney disease      Chronic GERD      HLD (hyperlipidemia)      MDD (major depressive disorder)      Obstructive and reflux uropathy      Cellulitis      HTN (hypertension)      Sepsis      Dementia      Moderate protein-calorie malnutrition      Brain TIA      History of RSV infection      DM type 2, not at goal      Pressure ulcer of unspecified heel, unspecified stage      Venous stasis ulcer without varicose veins      Multiple open wounds of foot          MEDICATIONS  (STANDING):  albuterol/ipratropium for Nebulization 3 milliLiter(s) Nebulizer every 8 hours  budesonide 160 MICROgram(s)/formoterol 4.5 MICROgram(s) Inhaler 2 Puff(s) Inhalation two times a day  dextrose 5% + sodium chloride 0.45% with potassium chloride 20 mEq/L 1000 milliLiter(s) (40 mL/Hr) IV Continuous <Continuous>  dextrose 5%. 1000 milliLiter(s) (100 mL/Hr) IV Continuous <Continuous>  dextrose 5%. 1000 milliLiter(s) (50 mL/Hr) IV Continuous <Continuous>  dextrose 50% Injectable 25 Gram(s) IV Push once  dextrose 50% Injectable 12.5 Gram(s) IV Push once  dextrose 50% Injectable 25 Gram(s) IV Push once  donepezil 5 milliGRAM(s) Oral at bedtime  DULoxetine 60 milliGRAM(s) Oral daily  enoxaparin Injectable 60 milliGRAM(s) SubCutaneous every 12 hours  glucagon  Injectable 1 milliGRAM(s) IntraMuscular once  insulin glargine Injectable (LANTUS) 8 Unit(s) SubCutaneous at bedtime  insulin lispro (ADMELOG) corrective regimen sliding scale   SubCutaneous three times a day before meals  insulin lispro (ADMELOG) corrective regimen sliding scale   SubCutaneous at bedtime  lactobacillus acidophilus 1 Tablet(s) Oral two times a day with meals  losartan 25 milliGRAM(s) Oral daily  metoprolol tartrate 25 milliGRAM(s) Oral three times a day  multivitamin 1 Tablet(s) Oral daily  pantoprazole   Suspension 40 milliGRAM(s) Oral daily  senna 2 Tablet(s) Oral at bedtime  simvastatin 40 milliGRAM(s) Oral at bedtime  tamsulosin 0.4 milliGRAM(s) Oral at bedtime    MEDICATIONS  (PRN):  acetaminophen     Tablet .. 650 milliGRAM(s) Oral every 6 hours PRN Temp greater or equal to 38C (100.4F), Mild Pain (1 - 3)  aluminum hydroxide/magnesium hydroxide/simethicone Suspension 30 milliLiter(s) Oral every 4 hours PRN Dyspepsia  bisacodyl Suppository 10 milliGRAM(s) Rectal daily PRN Constipation  dextrose Oral Gel 15 Gram(s) Oral once PRN Blood Glucose LESS THAN 70 milliGRAM(s)/deciliter  hydrALAZINE 50 milliGRAM(s) Oral every 6 hours PRN for systolic BP>160  magnesium hydroxide Suspension 30 milliLiter(s) Oral daily PRN Constipation  melatonin 3 milliGRAM(s) Oral at bedtime PRN Insomnia  ondansetron Injectable 4 milliGRAM(s) IV Push every 8 hours PRN Nausea and/or Vomiting      OBJECTIVE    T(C): 36.8 (04-07-23 @ 12:33), Max: 36.9 (04-07-23 @ 06:05)  HR: 92 (04-07-23 @ 12:33) (76 - 93)  BP: 133/84 (04-07-23 @ 12:33) (123/73 - 143/73)  RR: 20 (04-07-23 @ 12:33) (18 - 20)  SpO2: 99% (04-07-23 @ 12:33) (95% - 99%)  Wt(kg): --  I&O's Summary  06 Apr 2023 07:01  -  07 Apr 2023 07:00  --------------------------------------------------------  IN: 910 mL / OUT: 0 mL / NET: 910 mL    07 Apr 2023 07:01  -  07 Apr 2023 12:36  --------------------------------------------------------  IN: 450 mL / OUT: 0 mL / NET: 450 mL  REVIEW OF SYSTEMS:  Patient is  unable to provide any information/ROS  due to baseline mental status.   PHYSICAL EXAM:  Appearance: NAD. VS past 24 hrs -as above   HEENT:   Moist oral mucosa. Conjunctiva clear b/l.   Neck : supple  Respiratory: Lungs CTAB.  Gastrointestinal:  Soft, nontender. No rebound. No rigidity. BS present	  Cardiovascular: RRR ,S1S2 present  Neurologic: Non-focal. Moving all extremities.  Extremities: No edema. No erythema. No calf tenderness.  Skin: No rashes, No ecchymoses, No cyanosis.	  wounds ,skin lesions-See skin assesment flow sheet   LABS:                        10.9   8.42  )-----------( 284      ( 07 Apr 2023 08:43 )             36.4     04-07    145  |  118<H>  |  35<H>  ----------------------------<  126<H>  4.1   |  23  |  1.30    Ca    8.5      07 Apr 2023 08:43  Phos  2.6     04-07  Mg     2.3     04-07    TPro  6.6  /  Alb  2.2<L>  /  TBili  0.4  /  DBili  0.2  /  AST  19  /  ALT  31  /  AlkPhos  66  04-07    CAPILLARY BLOOD GLUCOSE      POCT Blood Glucose.: 170 mg/dL (07 Apr 2023 11:59)  POCT Blood Glucose.: 126 mg/dL (07 Apr 2023 08:04)  POCT Blood Glucose.: 226 mg/dL (06 Apr 2023 21:34)  POCT Blood Glucose.: 139 mg/dL (06 Apr 2023 16:46)    PT/INR - ( 06 Apr 2023 06:37 )   PT: 16.4 sec;   INR: 1.40 ratio               Culture - Urine (collected 02 Apr 2023 09:15)  Source: Clean Catch Clean Catch (Midstream)  Final Report (04 Apr 2023 19:44):    >100,000 CFU/ml Enterococcus faecalis  Organism: Enterococcus faecalis (04 Apr 2023 19:44)  Organism: Enterococcus faecalis (04 Apr 2023 19:44)    Culture - Blood (collected 29 Mar 2023 21:32)  Source: .Blood  Final Report (04 Apr 2023 01:01):    No Growth Final    Culture - Blood (collected 29 Mar 2023 21:20)  Source: .Blood  Final Report (04 Apr 2023 01:01):    No Growth Final      RADIOLOGY & ADDITIONAL TESTS:   reviewed elctronically  ASSESSMENT/PLAN: 	    25 minutes aggregate time was spent on this visit, 50% visit time spent in care co-ordination with other attendings and counselling patient .I have discussed care plan with patient / HCP/family member ,who expressed understanding of problems treatment and their effect and side effects, alternatives in details. I have asked if they have any questions and concerns and appropriately addressed them to best of my ability.  PROGRESS NOTE  Patient is a 87y old  Male who presents with a chief complaint of left foot infection (07 Apr 2023 09:29)  Chart and available morning labs /imaging are reviewed electronically , urgent issues addressed . More information  is being added upon completion of rounds , when more information is collected and management discussed with consultants , medical staff and social service/case management on the floor   OVERNIGHT -planned for sx on [place]ay , d/w podiatry team ,card clearance requested   No new issues reported by medical staff . PICC line is being placed by IR ,d/w IR technician and RN on a floor   HPI:  88 yo male ,[facility] resident with PMHx - COPD, DM, HTN, CAD, HLD, H/O TIA, dementia,GERD, BPH and depression sent ot ER for evaluation of left foot 1metatarsal wound ,suggestive of osteomyelitis .Patient was seen by ID consult and transfer to the hospital recommended for wound cx/bone biopsy /podiatry evaluation ,likely will require 4-6 weeks of iv abx . Patient was admitted to [facility] with b/l feet wounds and was followed by wound care team ,recently completed 7 days of doxycycline for foot wound cellulitis . (30 Mar 2023 05:21)    PAST MEDICAL & SURGICAL HISTORY:  ASHD (arteriosclerotic heart disease)      BPH without urinary obstruction      COPD, moderate      Stage 3 chronic kidney disease      Chronic GERD      HLD (hyperlipidemia)      MDD (major depressive disorder)      Obstructive and reflux uropathy      Cellulitis      HTN (hypertension)      Sepsis      Dementia      Moderate protein-calorie malnutrition      Brain TIA      History of RSV infection      DM type 2, not at goal      Pressure ulcer of unspecified heel, unspecified stage      Venous stasis ulcer without varicose veins      Multiple open wounds of foot          MEDICATIONS  (STANDING):  albuterol/ipratropium for Nebulization 3 milliLiter(s) Nebulizer every 8 hours  budesonide 160 MICROgram(s)/formoterol 4.5 MICROgram(s) Inhaler 2 Puff(s) Inhalation two times a day  dextrose 5% + sodium chloride 0.45% with potassium chloride 20 mEq/L 1000 milliLiter(s) (40 mL/Hr) IV Continuous <Continuous>  dextrose 5%. 1000 milliLiter(s) (100 mL/Hr) IV Continuous <Continuous>  dextrose 5%. 1000 milliLiter(s) (50 mL/Hr) IV Continuous <Continuous>  dextrose 50% Injectable 25 Gram(s) IV Push once  dextrose 50% Injectable 12.5 Gram(s) IV Push once  dextrose 50% Injectable 25 Gram(s) IV Push once  donepezil 5 milliGRAM(s) Oral at bedtime  DULoxetine 60 milliGRAM(s) Oral daily  enoxaparin Injectable 60 milliGRAM(s) SubCutaneous every 12 hours  glucagon  Injectable 1 milliGRAM(s) IntraMuscular once  insulin glargine Injectable (LANTUS) 8 Unit(s) SubCutaneous at bedtime  insulin lispro (ADMELOG) corrective regimen sliding scale   SubCutaneous three times a day before meals  insulin lispro (ADMELOG) corrective regimen sliding scale   SubCutaneous at bedtime  lactobacillus acidophilus 1 Tablet(s) Oral two times a day with meals  losartan 25 milliGRAM(s) Oral daily  metoprolol tartrate 25 milliGRAM(s) Oral three times a day  multivitamin 1 Tablet(s) Oral daily  pantoprazole   Suspension 40 milliGRAM(s) Oral daily  senna 2 Tablet(s) Oral at bedtime  simvastatin 40 milliGRAM(s) Oral at bedtime  tamsulosin 0.4 milliGRAM(s) Oral at bedtime    MEDICATIONS  (PRN):  acetaminophen     Tablet .. 650 milliGRAM(s) Oral every 6 hours PRN Temp greater or equal to 38C (100.4F), Mild Pain (1 - 3)  aluminum hydroxide/magnesium hydroxide/simethicone Suspension 30 milliLiter(s) Oral every 4 hours PRN Dyspepsia  bisacodyl Suppository 10 milliGRAM(s) Rectal daily PRN Constipation  dextrose Oral Gel 15 Gram(s) Oral once PRN Blood Glucose LESS THAN 70 milliGRAM(s)/deciliter  hydrALAZINE 50 milliGRAM(s) Oral every 6 hours PRN for systolic BP>160  magnesium hydroxide Suspension 30 milliLiter(s) Oral daily PRN Constipation  melatonin 3 milliGRAM(s) Oral at bedtime PRN Insomnia  ondansetron Injectable 4 milliGRAM(s) IV Push every 8 hours PRN Nausea and/or Vomiting      OBJECTIVE    T(C): 36.8 (04-07-23 @ 12:33), Max: 36.9 (04-07-23 @ 06:05)  HR: 92 (04-07-23 @ 12:33) (76 - 93)  BP: 133/84 (04-07-23 @ 12:33) (123/73 - 143/73)  RR: 20 (04-07-23 @ 12:33) (18 - 20)  SpO2: 99% (04-07-23 @ 12:33) (95% - 99%)  Wt(kg): --  I&O's Summary  06 Apr 2023 07:01  -  07 Apr 2023 07:00  --------------------------------------------------------  IN: 910 mL / OUT: 0 mL / NET: 910 mL    07 Apr 2023 07:01  -  07 Apr 2023 12:36  --------------------------------------------------------  IN: 450 mL / OUT: 0 mL / NET: 450 mL  REVIEW OF SYSTEMS:  Patient is  unable to provide any information/ROS  due to baseline mental status.   PHYSICAL EXAM:  Appearance: NAD. VS past 24 hrs -as above   HEENT:   Moist oral mucosa. Conjunctiva clear b/l.   Neck : supple  Respiratory: Lungs CTAB.  Gastrointestinal:  Soft, nontender. No rebound. No rigidity. BS present	  Cardiovascular: RRR ,S1S2 present  Neurologic: Non-focal. Moving all extremities.  Extremities: No edema. No erythema. No calf tenderness.  Skin: No rashes, No ecchymoses, No cyanosis.	  wounds ,skin lesions-See skin assesment flow sheet   LABS:                        10.9   8.42  )-----------( 284      ( 07 Apr 2023 08:43 )             36.4     04-07    145  |  118<H>  |  35<H>  ----------------------------<  126<H>  4.1   |  23  |  1.30    Ca    8.5      07 Apr 2023 08:43  Phos  2.6     04-07  Mg     2.3     04-07    TPro  6.6  /  Alb  2.2<L>  /  TBili  0.4  /  DBili  0.2  /  AST  19  /  ALT  31  /  AlkPhos  66  04-07    CAPILLARY BLOOD GLUCOSE      POCT Blood Glucose.: 170 mg/dL (07 Apr 2023 11:59)  POCT Blood Glucose.: 126 mg/dL (07 Apr 2023 08:04)  POCT Blood Glucose.: 226 mg/dL (06 Apr 2023 21:34)  POCT Blood Glucose.: 139 mg/dL (06 Apr 2023 16:46)    PT/INR - ( 06 Apr 2023 06:37 )   PT: 16.4 sec;   INR: 1.40 ratio               Culture - Urine (collected 02 Apr 2023 09:15)  Source: Clean Catch Clean Catch (Midstream)  Final Report (04 Apr 2023 19:44):    >100,000 CFU/ml Enterococcus faecalis  Organism: Enterococcus faecalis (04 Apr 2023 19:44)  Organism: Enterococcus faecalis (04 Apr 2023 19:44)    Culture - Blood (collected 29 Mar 2023 21:32)  Source: .Blood  Final Report (04 Apr 2023 01:01):    No Growth Final    Culture - Blood (collected 29 Mar 2023 21:20)  Source: .Blood  Final Report (04 Apr 2023 01:01):    No Growth Final      RADIOLOGY & ADDITIONAL TESTS:   reviewed elctronically  ASSESSMENT/PLAN: 	    25 minutes aggregate time was spent on this visit, 50% visit time spent in care co-ordination with other attendings and counselling patient .I have discussed care plan with patient / HCP/family member ,who expressed understanding of problems treatment and their effect and side effects, alternatives in details. I have asked if they have any questions and concerns and appropriately addressed them to best of my ability.  PROGRESS NOTE  Patient is a 87y old  Male who presents with a chief complaint of left foot infection (07 Apr 2023 09:29)  Chart and available morning labs /imaging are reviewed electronically , urgent issues addressed . More information  is being added upon completion of rounds , when more information is collected and management discussed with consultants , medical staff and social service/case management on the floor   OVERNIGHT -planned for sx on [place] , d/w podiatry team ,card clearance requested   No new issues reported by medical staff . PICC line is being placed by IR ,d/w IR technician and RN on a floor   HPI:  88 yo male ,[facility] beds at the [facility] resident with PMHx - COPD, DM, HTN, CAD, HLD, H/O TIA, dementia,GERD, BPH and depression sent ot ER for evaluation of left foot 1metatarsal wound ,suggestive of osteomyelitis .Patient was seen by ID consult and transfer to the hospital recommended for wound cx/bone biopsy /podiatry evaluation ,likely will require 4-6 weeks of iv abx . Patient was admitted to Counts include 234 beds at the [facility] with b/l feet wounds and was followed by wound care team ,recently completed 7 days of doxycycline for foot wound cellulitis . (30 Mar 2023 05:21)    PAST MEDICAL & SURGICAL HISTORY:  ASHD (arteriosclerotic heart disease)      BPH without urinary obstruction      COPD, moderate      Stage 3 chronic kidney disease      Chronic GERD      HLD (hyperlipidemia)      MDD (major depressive disorder)      Obstructive and reflux uropathy      Cellulitis      HTN (hypertension)      Sepsis      Dementia      Moderate protein-calorie malnutrition      Brain TIA      History of RSV infection      DM type 2, not at goal      Pressure ulcer of unspecified heel, unspecified stage      Venous stasis ulcer without varicose veins      Multiple open wounds of foot          MEDICATIONS  (STANDING):  albuterol/ipratropium for Nebulization 3 milliLiter(s) Nebulizer every 8 hours  budesonide 160 MICROgram(s)/formoterol 4.5 MICROgram(s) Inhaler 2 Puff(s) Inhalation two times a day  dextrose 5% + sodium chloride 0.45% with potassium chloride 20 mEq/L 1000 milliLiter(s) (40 mL/Hr) IV Continuous <Continuous>  dextrose 5%. 1000 milliLiter(s) (100 mL/Hr) IV Continuous <Continuous>  dextrose 5%. 1000 milliLiter(s) (50 mL/Hr) IV Continuous <Continuous>  dextrose 50% Injectable 25 Gram(s) IV Push once  dextrose 50% Injectable 12.5 Gram(s) IV Push once  dextrose 50% Injectable 25 Gram(s) IV Push once  donepezil 5 milliGRAM(s) Oral at bedtime  DULoxetine 60 milliGRAM(s) Oral daily  enoxaparin Injectable 60 milliGRAM(s) SubCutaneous every 12 hours  glucagon  Injectable 1 milliGRAM(s) IntraMuscular once  insulin glargine Injectable (LANTUS) 8 Unit(s) SubCutaneous at bedtime  insulin lispro (ADMELOG) corrective regimen sliding scale   SubCutaneous three times a day before meals  insulin lispro (ADMELOG) corrective regimen sliding scale   SubCutaneous at bedtime  lactobacillus acidophilus 1 Tablet(s) Oral two times a day with meals  losartan 25 milliGRAM(s) Oral daily  metoprolol tartrate 25 milliGRAM(s) Oral three times a day  multivitamin 1 Tablet(s) Oral daily  pantoprazole   Suspension 40 milliGRAM(s) Oral daily  senna 2 Tablet(s) Oral at bedtime  simvastatin 40 milliGRAM(s) Oral at bedtime  tamsulosin 0.4 milliGRAM(s) Oral at bedtime    MEDICATIONS  (PRN):  acetaminophen     Tablet .. 650 milliGRAM(s) Oral every 6 hours PRN Temp greater or equal to 38C (100.4F), Mild Pain (1 - 3)  aluminum hydroxide/magnesium hydroxide/simethicone Suspension 30 milliLiter(s) Oral every 4 hours PRN Dyspepsia  bisacodyl Suppository 10 milliGRAM(s) Rectal daily PRN Constipation  dextrose Oral Gel 15 Gram(s) Oral once PRN Blood Glucose LESS THAN 70 milliGRAM(s)/deciliter  hydrALAZINE 50 milliGRAM(s) Oral every 6 hours PRN for systolic BP>160  magnesium hydroxide Suspension 30 milliLiter(s) Oral daily PRN Constipation  melatonin 3 milliGRAM(s) Oral at bedtime PRN Insomnia  ondansetron Injectable 4 milliGRAM(s) IV Push every 8 hours PRN Nausea and/or Vomiting      OBJECTIVE    T(C): 36.8 (04-07-23 @ 12:33), Max: 36.9 (04-07-23 @ 06:05)  HR: 92 (04-07-23 @ 12:33) (76 - 93)  BP: 133/84 (04-07-23 @ 12:33) (123/73 - 143/73)  RR: 20 (04-07-23 @ 12:33) (18 - 20)  SpO2: 99% (04-07-23 @ 12:33) (95% - 99%)  Wt(kg): --  I&O's Summary  06 Apr 2023 07:01  -  07 Apr 2023 07:00  --------------------------------------------------------  IN: 910 mL / OUT: 0 mL / NET: 910 mL    07 Apr 2023 07:01  -  07 Apr 2023 12:36  --------------------------------------------------------  IN: 450 mL / OUT: 0 mL / NET: 450 mL  REVIEW OF SYSTEMS:  Patient is  unable to provide any information/ROS  due to baseline mental status.   PHYSICAL EXAM:  Appearance: NAD. VS past 24 hrs -as above   HEENT:   Moist oral mucosa. Conjunctiva clear b/l.   Neck : supple  Respiratory: Lungs CTAB.  Gastrointestinal:  Soft, nontender. No rebound. No rigidity. BS present	  Cardiovascular: RRR ,S1S2 present  Neurologic: Non-focal. Moving all extremities.  Extremities: No edema. No erythema. No calf tenderness.  Skin: No rashes, No ecchymoses, No cyanosis.	  wounds ,skin lesions-See skin assesment flow sheet   LABS:                        10.9   8.42  )-----------( 284      ( 07 Apr 2023 08:43 )             36.4     04-07    145  |  118<H>  |  35<H>  ----------------------------<  126<H>  4.1   |  23  |  1.30    Ca    8.5      07 Apr 2023 08:43  Phos  2.6     04-07  Mg     2.3     04-07    TPro  6.6  /  Alb  2.2<L>  /  TBili  0.4  /  DBili  0.2  /  AST  19  /  ALT  31  /  AlkPhos  66  04-07    CAPILLARY BLOOD GLUCOSE      POCT Blood Glucose.: 170 mg/dL (07 Apr 2023 11:59)  POCT Blood Glucose.: 126 mg/dL (07 Apr 2023 08:04)  POCT Blood Glucose.: 226 mg/dL (06 Apr 2023 21:34)  POCT Blood Glucose.: 139 mg/dL (06 Apr 2023 16:46)    PT/INR - ( 06 Apr 2023 06:37 )   PT: 16.4 sec;   INR: 1.40 ratio               Culture - Urine (collected 02 Apr 2023 09:15)  Source: Clean Catch Clean Catch (Midstream)  Final Report (04 Apr 2023 19:44):    >100,000 CFU/ml Enterococcus faecalis  Organism: Enterococcus faecalis (04 Apr 2023 19:44)  Organism: Enterococcus faecalis (04 Apr 2023 19:44)    Culture - Blood (collected 29 Mar 2023 21:32)  Source: .Blood  Final Report (04 Apr 2023 01:01):    No Growth Final    Culture - Blood (collected 29 Mar 2023 21:20)  Source: .Blood  Final Report (04 Apr 2023 01:01):    No Growth Final      RADIOLOGY & ADDITIONAL TESTS:   reviewed elctronically  ASSESSMENT/PLAN: 	    25 minutes aggregate time was spent on this visit, 50% visit time spent in care co-ordination with other attendings and counselling patient .I have discussed care plan with patient / HCP/family member ,who expressed understanding of problems treatment and their effect and side effects, alternatives in details. I have asked if they have any questions and concerns and appropriately addressed them to best of my ability.

## 2023-04-07 NOTE — PROGRESS NOTE ADULT - ASSESSMENT
88 yo male ,[facility] resident with PMHx - COPD, DM, HTN, CAD, HLD, H/O TIA, dementia,GERD, BPH and depression sent ot ER for evaluation of left foot 1metatarsal wound ,suggestive of osteomyelitis .Patient was seen by ID consult and transfer to the hospital recommended for wound cx/bone biopsy /podiatry evaluation ,likely will require 4-6 weeks of iv abx . Patient was admitted to [facility] with b/l feet wounds and was followed by wound care team ,recently completed 7 days of doxycycline for foot wound cellulitis . 86 yo male ,[facility] resident with PMHx - COPD, DM, HTN, CAD, HLD, H/O TIA, dementia,GERD, BPH and depression sent ot ER for evaluation of left foot 1metatarsal wound ,suggestive of osteomyelitis .Patient was seen by ID consult and transfer to the hospital recommended for wound cx/bone biopsy /podiatry evaluation ,likely will require 4-6 weeks of iv abx . Patient was admitted to [facility] with b/l feet wounds and was followed by wound care team ,recently completed 7 days of doxycycline for foot wound cellulitis . 88 yo male ,[facility] resident with PMHx - COPD, DM, HTN, CAD, HLD, H/O TIA, dementia,GERD, BPH and depression sent ot ER for evaluation of left foot 1metatarsal wound ,suggestive of osteomyelitis .Patient was seen by ID consult and transfer to the hospital recommended for wound cx/bone biopsy /podiatry evaluation ,likely will require 4-6 weeks of iv abx . Patient was admitted to [facility] with b/l feet wounds and was followed by wound care team ,recently completed 7 days of doxycycline for foot wound cellulitis .

## 2023-04-08 LAB
ALBUMIN SERPL ELPH-MCNC: 2 G/DL — LOW (ref 3.3–5)
ALP SERPL-CCNC: 64 U/L — SIGNIFICANT CHANGE UP (ref 40–120)
ALT FLD-CCNC: 27 U/L — SIGNIFICANT CHANGE UP (ref 12–78)
AST SERPL-CCNC: 20 U/L — SIGNIFICANT CHANGE UP (ref 15–37)
BILIRUB DIRECT SERPL-MCNC: 0.2 MG/DL — SIGNIFICANT CHANGE UP (ref 0–0.3)
BILIRUB INDIRECT FLD-MCNC: 0.3 MG/DL — SIGNIFICANT CHANGE UP (ref 0.2–1)
BILIRUB SERPL-MCNC: 0.5 MG/DL — SIGNIFICANT CHANGE UP (ref 0.2–1.2)
CREAT SERPL-MCNC: 1.2 MG/DL — SIGNIFICANT CHANGE UP (ref 0.5–1.3)
EGFR: 59 ML/MIN/1.73M2 — LOW
MAGNESIUM SERPL-MCNC: 2 MG/DL — SIGNIFICANT CHANGE UP (ref 1.6–2.6)
PHOSPHATE SERPL-MCNC: 2.4 MG/DL — LOW (ref 2.5–4.5)
PROT SERPL-MCNC: 5.9 G/DL — LOW (ref 6–8.3)

## 2023-04-08 RX ORDER — METOPROLOL TARTRATE 50 MG
50 TABLET ORAL
Refills: 0 | Status: DISCONTINUED | OUTPATIENT
Start: 2023-04-08 | End: 2023-04-10

## 2023-04-08 RX ADMIN — Medication 1 TABLET(S): at 08:20

## 2023-04-08 RX ADMIN — DULOXETINE HYDROCHLORIDE 60 MILLIGRAM(S): 30 CAPSULE, DELAYED RELEASE ORAL at 11:22

## 2023-04-08 RX ADMIN — Medication 25 MILLIGRAM(S): at 21:32

## 2023-04-08 RX ADMIN — Medication 2: at 08:19

## 2023-04-08 RX ADMIN — Medication 1 TABLET(S): at 17:23

## 2023-04-08 RX ADMIN — BUDESONIDE AND FORMOTEROL FUMARATE DIHYDRATE 2 PUFF(S): 160; 4.5 AEROSOL RESPIRATORY (INHALATION) at 19:18

## 2023-04-08 RX ADMIN — Medication 4: at 17:23

## 2023-04-08 RX ADMIN — TAMSULOSIN HYDROCHLORIDE 0.4 MILLIGRAM(S): 0.4 CAPSULE ORAL at 21:32

## 2023-04-08 RX ADMIN — INSULIN GLARGINE 8 UNIT(S): 100 INJECTION, SOLUTION SUBCUTANEOUS at 21:31

## 2023-04-08 RX ADMIN — Medication 2: at 11:29

## 2023-04-08 RX ADMIN — CHLORHEXIDINE GLUCONATE 1 APPLICATION(S): 213 SOLUTION TOPICAL at 05:34

## 2023-04-08 RX ADMIN — DEXTROSE MONOHYDRATE, SODIUM CHLORIDE, AND POTASSIUM CHLORIDE 40 MILLILITER(S): 50; .745; 4.5 INJECTION, SOLUTION INTRAVENOUS at 08:20

## 2023-04-08 RX ADMIN — Medication 25 MILLIGRAM(S): at 13:10

## 2023-04-08 RX ADMIN — Medication 3 MILLILITER(S): at 00:33

## 2023-04-08 RX ADMIN — Medication 25 MILLIGRAM(S): at 05:34

## 2023-04-08 RX ADMIN — PIPERACILLIN AND TAZOBACTAM 25 GRAM(S): 4; .5 INJECTION, POWDER, LYOPHILIZED, FOR SOLUTION INTRAVENOUS at 05:33

## 2023-04-08 RX ADMIN — Medication 3 MILLILITER(S): at 08:44

## 2023-04-08 RX ADMIN — ENOXAPARIN SODIUM 60 MILLIGRAM(S): 100 INJECTION SUBCUTANEOUS at 17:23

## 2023-04-08 RX ADMIN — BUDESONIDE AND FORMOTEROL FUMARATE DIHYDRATE 2 PUFF(S): 160; 4.5 AEROSOL RESPIRATORY (INHALATION) at 05:42

## 2023-04-08 RX ADMIN — Medication 1 TABLET(S): at 11:22

## 2023-04-08 RX ADMIN — LOSARTAN POTASSIUM 25 MILLIGRAM(S): 100 TABLET, FILM COATED ORAL at 05:34

## 2023-04-08 RX ADMIN — Medication 3 MILLILITER(S): at 22:49

## 2023-04-08 RX ADMIN — PIPERACILLIN AND TAZOBACTAM 25 GRAM(S): 4; .5 INJECTION, POWDER, LYOPHILIZED, FOR SOLUTION INTRAVENOUS at 21:32

## 2023-04-08 RX ADMIN — PIPERACILLIN AND TAZOBACTAM 25 GRAM(S): 4; .5 INJECTION, POWDER, LYOPHILIZED, FOR SOLUTION INTRAVENOUS at 13:10

## 2023-04-08 RX ADMIN — Medication 3 MILLILITER(S): at 16:25

## 2023-04-08 RX ADMIN — SENNA PLUS 2 TABLET(S): 8.6 TABLET ORAL at 21:32

## 2023-04-08 RX ADMIN — ENOXAPARIN SODIUM 60 MILLIGRAM(S): 100 INJECTION SUBCUTANEOUS at 05:34

## 2023-04-08 RX ADMIN — SIMVASTATIN 40 MILLIGRAM(S): 20 TABLET, FILM COATED ORAL at 21:32

## 2023-04-08 RX ADMIN — DONEPEZIL HYDROCHLORIDE 5 MILLIGRAM(S): 10 TABLET, FILM COATED ORAL at 21:32

## 2023-04-08 RX ADMIN — PANTOPRAZOLE SODIUM 40 MILLIGRAM(S): 20 TABLET, DELAYED RELEASE ORAL at 11:22

## 2023-04-08 NOTE — PROGRESS NOTE ADULT - SUBJECTIVE AND OBJECTIVE BOX
PROGRESS NOTE  Patient is a 87y old  Male who presents with a chief complaint of left foot infection (08 Apr 2023 14:07)    Chart and available morning labs /imaging are reviewed electronically , urgent issues addressed . More information  is being added upon completion of rounds , when more information is collected and management discussed with consultants , medical staff and social service/case management on the floor   OVERNIGHT  No new issues reported by medical staff . All above noted Patient is resting in a bed comfortably .Confused ,poor mentation .No distress noted     HPI:  86 yo male ,[facility] resident with PMHx - COPD, DM, HTN, CAD, HLD, H/O TIA, dementia,GERD, BPH and depression sent ot ER for evaluation of left foot 1metatarsal wound ,suggestive of osteomyelitis .Patient was seen by ID consult and transfer to the hospital recommended for wound cx/bone biopsy /podiatry evaluation ,likely will require 4-6 weeks of iv abx . Patient was admitted to [facility] with b/l feet wounds and was followed by wound care team ,recently completed 7 days of doxycycline for foot wound cellulitis . (30 Mar 2023 05:21)    PAST MEDICAL & SURGICAL HISTORY:  ASHD (arteriosclerotic heart disease)      BPH without urinary obstruction      COPD, moderate      Stage 3 chronic kidney disease      Chronic GERD      HLD (hyperlipidemia)      MDD (major depressive disorder)      Obstructive and reflux uropathy      Cellulitis      HTN (hypertension)      Sepsis      Dementia      Moderate protein-calorie malnutrition      Brain TIA      History of RSV infection      DM type 2, not at goal      Pressure ulcer of unspecified heel, unspecified stage      Venous stasis ulcer without varicose veins      Multiple open wounds of foot          MEDICATIONS  (STANDING):  albuterol/ipratropium for Nebulization 3 milliLiter(s) Nebulizer every 8 hours  budesonide 160 MICROgram(s)/formoterol 4.5 MICROgram(s) Inhaler 2 Puff(s) Inhalation two times a day  chlorhexidine 4% Liquid 1 Application(s) Topical <User Schedule>  dextrose 5% + sodium chloride 0.45% with potassium chloride 20 mEq/L 1000 milliLiter(s) (40 mL/Hr) IV Continuous <Continuous>  dextrose 5%. 1000 milliLiter(s) (50 mL/Hr) IV Continuous <Continuous>  dextrose 5%. 1000 milliLiter(s) (100 mL/Hr) IV Continuous <Continuous>  dextrose 50% Injectable 25 Gram(s) IV Push once  dextrose 50% Injectable 12.5 Gram(s) IV Push once  dextrose 50% Injectable 25 Gram(s) IV Push once  donepezil 5 milliGRAM(s) Oral at bedtime  DULoxetine 60 milliGRAM(s) Oral daily  enoxaparin Injectable 60 milliGRAM(s) SubCutaneous every 12 hours  glucagon  Injectable 1 milliGRAM(s) IntraMuscular once  insulin glargine Injectable (LANTUS) 8 Unit(s) SubCutaneous at bedtime  insulin lispro (ADMELOG) corrective regimen sliding scale   SubCutaneous three times a day before meals  insulin lispro (ADMELOG) corrective regimen sliding scale   SubCutaneous at bedtime  lactobacillus acidophilus 1 Tablet(s) Oral two times a day with meals  losartan 25 milliGRAM(s) Oral daily  metoprolol tartrate 25 milliGRAM(s) Oral three times a day  multivitamin 1 Tablet(s) Oral daily  pantoprazole   Suspension 40 milliGRAM(s) Oral daily  piperacillin/tazobactam IVPB.. 3.375 Gram(s) IV Intermittent every 8 hours  senna 2 Tablet(s) Oral at bedtime  simvastatin 40 milliGRAM(s) Oral at bedtime  tamsulosin 0.4 milliGRAM(s) Oral at bedtime    MEDICATIONS  (PRN):  acetaminophen     Tablet .. 650 milliGRAM(s) Oral every 6 hours PRN Temp greater or equal to 38C (100.4F), Mild Pain (1 - 3)  aluminum hydroxide/magnesium hydroxide/simethicone Suspension 30 milliLiter(s) Oral every 4 hours PRN Dyspepsia  bisacodyl Suppository 10 milliGRAM(s) Rectal daily PRN Constipation  dextrose Oral Gel 15 Gram(s) Oral once PRN Blood Glucose LESS THAN 70 milliGRAM(s)/deciliter  hydrALAZINE 50 milliGRAM(s) Oral every 6 hours PRN for systolic BP>160  magnesium hydroxide Suspension 30 milliLiter(s) Oral daily PRN Constipation  melatonin 3 milliGRAM(s) Oral at bedtime PRN Insomnia  ondansetron Injectable 4 milliGRAM(s) IV Push every 8 hours PRN Nausea and/or Vomiting  sodium chloride 0.9% lock flush 10 milliLiter(s) IV Push every 1 hour PRN Pre/post blood products, medications, blood draw, and to maintain line patency      OBJECTIVE    T(C): 37.3 (04-08-23 @ 12:54), Max: 37.3 (04-08-23 @ 12:54)  HR: 98 (04-08-23 @ 12:54) (75 - 118)  BP: 124/69 (04-08-23 @ 12:54) (124/69 - 151/89)  RR: 18 (04-08-23 @ 12:54) (18 - 18)  SpO2: 97% (04-08-23 @ 12:54) (91% - 97%)  Wt(kg): --  I&O's Summary    07 Apr 2023 07:01  -  08 Apr 2023 07:00  --------------------------------------------------------  IN: 1430 mL / OUT: 0 mL / NET: 1430 mL    08 Apr 2023 07:01  -  08 Apr 2023 15:02  --------------------------------------------------------  IN: 400 mL / OUT: 0 mL / NET: 400 mL          REVIEW OF SYSTEMS:  CONSTITUTIONAL: No fever, weight loss, or fatigue  EYES: No eye pain, visual disturbances, or discharge  ENMT:   No sinus or throat pain  NECK: No pain or stiffness  RESPIRATORY: No cough, wheezing, chills or hemoptysis; No shortness of breath  CARDIOVASCULAR: No chest pain, palpitations, dizziness, or leg swelling  GASTROINTESTINAL: No abdominal pain. No nausea, vomiting; No diarrhea or constipation. No melena or hematochezia.  GENITOURINARY: No dysuria, frequency, hematuria, or incontinence  NEUROLOGICAL: No headaches, memory loss, loss of strength, numbness, or tremors  SKIN: No itching, burning, rashes, or lesions   MUSCULOSKELETAL: No joint pain or swelling; No muscle, back, or extremity pain    PHYSICAL EXAM:  Appearance: NAD. VS past 24 hrs -as above   HEENT:   Moist oral mucosa. Conjunctiva clear b/l.   Neck : supple  Respiratory: Lungs CTAB.  Gastrointestinal:  Soft, nontender. No rebound. No rigidity. BS present	  Cardiovascular: RRR ,S1S2 present  Neurologic: Non-focal. Moving all extremities.  Extremities: No edema. No erythema. No calf tenderness.  Skin: No rashes, No ecchymoses, No cyanosis.	  wounds ,skin lesions-See skin assesment flow sheet   LABS:                        10.9   8.42  )-----------( 284      ( 07 Apr 2023 08:43 )             36.4     04-08    x   |  x   |  x   ----------------------------<  x   x    |  x   |  1.20    Ca    8.5      07 Apr 2023 08:43  Phos  2.4     04-08  Mg     2.0     04-08    TPro  5.9<L>  /  Alb  2.0<L>  /  TBili  0.5  /  DBili  0.2  /  AST  20  /  ALT  27  /  AlkPhos  64  04-08    CAPILLARY BLOOD GLUCOSE      POCT Blood Glucose.: 197 mg/dL (08 Apr 2023 11:25)  POCT Blood Glucose.: 159 mg/dL (08 Apr 2023 08:10)  POCT Blood Glucose.: 122 mg/dL (07 Apr 2023 21:24)  POCT Blood Glucose.: 170 mg/dL (07 Apr 2023 16:54)          Culture - Urine (collected 02 Apr 2023 09:15)  Source: Clean Catch Clean Catch (Midstream)  Final Report (04 Apr 2023 19:44):    >100,000 CFU/ml Enterococcus faecalis  Organism: Enterococcus faecalis (04 Apr 2023 19:44)  Organism: Enterococcus faecalis (04 Apr 2023 19:44)    Culture - Blood (collected 29 Mar 2023 21:32)  Source: .Blood  Final Report (04 Apr 2023 01:01):    No Growth Final    Culture - Blood (collected 29 Mar 2023 21:20)  Source: .Blood  Final Report (04 Apr 2023 01:01):    No Growth Final      RADIOLOGY & ADDITIONAL TESTS:   reviewed elctronically  ASSESSMENT/PLAN: 	  25 minutes aggregate time was spent on this visit, 50% visit time spent in care co-ordination with other attendings and counselling patient .I have discussed care plan with patient / HCP/family member ,who expressed understanding of problems treatment and their effect and side effects, alternatives in details. I have asked if they have any questions and concerns and appropriately addressed them to best of my ability.    PROGRESS NOTE  Patient is a 87y old  Male who presents with a chief complaint of left foot infection (08 Apr 2023 14:07)    Chart and available morning labs /imaging are reviewed electronically , urgent issues addressed . More information  is being added upon completion of rounds , when more information is collected and management discussed with consultants , medical staff and social service/case management on the floor   OVERNIGHT  No new issues reported by medical staff . All above noted Patient is resting in a bed comfortably .Confused ,poor mentation .No distress noted     HPI:  88 yo male ,[facility] resident with PMHx - COPD, DM, HTN, CAD, HLD, H/O TIA, dementia,GERD, BPH and depression sent ot ER for evaluation of left foot 1metatarsal wound ,suggestive of osteomyelitis .Patient was seen by ID consult and transfer to the hospital recommended for wound cx/bone biopsy /podiatry evaluation ,likely will require 4-6 weeks of iv abx . Patient was admitted to [facility] with b/l feet wounds and was followed by wound care team ,recently completed 7 days of doxycycline for foot wound cellulitis . (30 Mar 2023 05:21)    PAST MEDICAL & SURGICAL HISTORY:  ASHD (arteriosclerotic heart disease)      BPH without urinary obstruction      COPD, moderate      Stage 3 chronic kidney disease      Chronic GERD      HLD (hyperlipidemia)      MDD (major depressive disorder)      Obstructive and reflux uropathy      Cellulitis      HTN (hypertension)      Sepsis      Dementia      Moderate protein-calorie malnutrition      Brain TIA      History of RSV infection      DM type 2, not at goal      Pressure ulcer of unspecified heel, unspecified stage      Venous stasis ulcer without varicose veins      Multiple open wounds of foot          MEDICATIONS  (STANDING):  albuterol/ipratropium for Nebulization 3 milliLiter(s) Nebulizer every 8 hours  budesonide 160 MICROgram(s)/formoterol 4.5 MICROgram(s) Inhaler 2 Puff(s) Inhalation two times a day  chlorhexidine 4% Liquid 1 Application(s) Topical <User Schedule>  dextrose 5% + sodium chloride 0.45% with potassium chloride 20 mEq/L 1000 milliLiter(s) (40 mL/Hr) IV Continuous <Continuous>  dextrose 5%. 1000 milliLiter(s) (50 mL/Hr) IV Continuous <Continuous>  dextrose 5%. 1000 milliLiter(s) (100 mL/Hr) IV Continuous <Continuous>  dextrose 50% Injectable 25 Gram(s) IV Push once  dextrose 50% Injectable 12.5 Gram(s) IV Push once  dextrose 50% Injectable 25 Gram(s) IV Push once  donepezil 5 milliGRAM(s) Oral at bedtime  DULoxetine 60 milliGRAM(s) Oral daily  enoxaparin Injectable 60 milliGRAM(s) SubCutaneous every 12 hours  glucagon  Injectable 1 milliGRAM(s) IntraMuscular once  insulin glargine Injectable (LANTUS) 8 Unit(s) SubCutaneous at bedtime  insulin lispro (ADMELOG) corrective regimen sliding scale   SubCutaneous three times a day before meals  insulin lispro (ADMELOG) corrective regimen sliding scale   SubCutaneous at bedtime  lactobacillus acidophilus 1 Tablet(s) Oral two times a day with meals  losartan 25 milliGRAM(s) Oral daily  metoprolol tartrate 25 milliGRAM(s) Oral three times a day  multivitamin 1 Tablet(s) Oral daily  pantoprazole   Suspension 40 milliGRAM(s) Oral daily  piperacillin/tazobactam IVPB.. 3.375 Gram(s) IV Intermittent every 8 hours  senna 2 Tablet(s) Oral at bedtime  simvastatin 40 milliGRAM(s) Oral at bedtime  tamsulosin 0.4 milliGRAM(s) Oral at bedtime    MEDICATIONS  (PRN):  acetaminophen     Tablet .. 650 milliGRAM(s) Oral every 6 hours PRN Temp greater or equal to 38C (100.4F), Mild Pain (1 - 3)  aluminum hydroxide/magnesium hydroxide/simethicone Suspension 30 milliLiter(s) Oral every 4 hours PRN Dyspepsia  bisacodyl Suppository 10 milliGRAM(s) Rectal daily PRN Constipation  dextrose Oral Gel 15 Gram(s) Oral once PRN Blood Glucose LESS THAN 70 milliGRAM(s)/deciliter  hydrALAZINE 50 milliGRAM(s) Oral every 6 hours PRN for systolic BP>160  magnesium hydroxide Suspension 30 milliLiter(s) Oral daily PRN Constipation  melatonin 3 milliGRAM(s) Oral at bedtime PRN Insomnia  ondansetron Injectable 4 milliGRAM(s) IV Push every 8 hours PRN Nausea and/or Vomiting  sodium chloride 0.9% lock flush 10 milliLiter(s) IV Push every 1 hour PRN Pre/post blood products, medications, blood draw, and to maintain line patency      OBJECTIVE    T(C): 37.3 (04-08-23 @ 12:54), Max: 37.3 (04-08-23 @ 12:54)  HR: 98 (04-08-23 @ 12:54) (75 - 118)  BP: 124/69 (04-08-23 @ 12:54) (124/69 - 151/89)  RR: 18 (04-08-23 @ 12:54) (18 - 18)  SpO2: 97% (04-08-23 @ 12:54) (91% - 97%)  Wt(kg): --  I&O's Summary    07 Apr 2023 07:01  -  08 Apr 2023 07:00  --------------------------------------------------------  IN: 1430 mL / OUT: 0 mL / NET: 1430 mL    08 Apr 2023 07:01  -  08 Apr 2023 15:02  --------------------------------------------------------  IN: 400 mL / OUT: 0 mL / NET: 400 mL          REVIEW OF SYSTEMS:  CONSTITUTIONAL: No fever, weight loss, or fatigue  EYES: No eye pain, visual disturbances, or discharge  ENMT:   No sinus or throat pain  NECK: No pain or stiffness  RESPIRATORY: No cough, wheezing, chills or hemoptysis; No shortness of breath  CARDIOVASCULAR: No chest pain, palpitations, dizziness, or leg swelling  GASTROINTESTINAL: No abdominal pain. No nausea, vomiting; No diarrhea or constipation. No melena or hematochezia.  GENITOURINARY: No dysuria, frequency, hematuria, or incontinence  NEUROLOGICAL: No headaches, memory loss, loss of strength, numbness, or tremors  SKIN: No itching, burning, rashes, or lesions   MUSCULOSKELETAL: No joint pain or swelling; No muscle, back, or extremity pain    PHYSICAL EXAM:  Appearance: NAD. VS past 24 hrs -as above   HEENT:   Moist oral mucosa. Conjunctiva clear b/l.   Neck : supple  Respiratory: Lungs CTAB.  Gastrointestinal:  Soft, nontender. No rebound. No rigidity. BS present	  Cardiovascular: RRR ,S1S2 present  Neurologic: Non-focal. Moving all extremities.  Extremities: No edema. No erythema. No calf tenderness.  Skin: No rashes, No ecchymoses, No cyanosis.	  wounds ,skin lesions-See skin assesment flow sheet   LABS:                        10.9   8.42  )-----------( 284      ( 07 Apr 2023 08:43 )             36.4     04-08    x   |  x   |  x   ----------------------------<  x   x    |  x   |  1.20    Ca    8.5      07 Apr 2023 08:43  Phos  2.4     04-08  Mg     2.0     04-08    TPro  5.9<L>  /  Alb  2.0<L>  /  TBili  0.5  /  DBili  0.2  /  AST  20  /  ALT  27  /  AlkPhos  64  04-08    CAPILLARY BLOOD GLUCOSE      POCT Blood Glucose.: 197 mg/dL (08 Apr 2023 11:25)  POCT Blood Glucose.: 159 mg/dL (08 Apr 2023 08:10)  POCT Blood Glucose.: 122 mg/dL (07 Apr 2023 21:24)  POCT Blood Glucose.: 170 mg/dL (07 Apr 2023 16:54)          Culture - Urine (collected 02 Apr 2023 09:15)  Source: Clean Catch Clean Catch (Midstream)  Final Report (04 Apr 2023 19:44):    >100,000 CFU/ml Enterococcus faecalis  Organism: Enterococcus faecalis (04 Apr 2023 19:44)  Organism: Enterococcus faecalis (04 Apr 2023 19:44)    Culture - Blood (collected 29 Mar 2023 21:32)  Source: .Blood  Final Report (04 Apr 2023 01:01):    No Growth Final    Culture - Blood (collected 29 Mar 2023 21:20)  Source: .Blood  Final Report (04 Apr 2023 01:01):    No Growth Final      RADIOLOGY & ADDITIONAL TESTS:   reviewed elctronically  ASSESSMENT/PLAN: 	  25 minutes aggregate time was spent on this visit, 50% visit time spent in care co-ordination with other attendings and counselling patient .I have discussed care plan with patient / HCP/family member ,who expressed understanding of problems treatment and their effect and side effects, alternatives in details. I have asked if they have any questions and concerns and appropriately addressed them to best of my ability.    PROGRESS NOTE  Patient is a 87y old  Male who presents with a chief complaint of left foot infection (08 Apr 2023 14:07)    Chart and available morning labs /imaging are reviewed electronically , urgent issues addressed . More information  is being added upon completion of rounds , when more information is collected and management discussed with consultants , medical staff and social service/case management on the floor   OVERNIGHT  No new issues reported by medical staff . All above noted Patient is resting in a bed comfortably .Confused ,poor mentation .No distress noted     HPI:  88 yo male ,[facility] resident with PMHx - COPD, DM, HTN, CAD, HLD, H/O TIA, dementia,GERD, BPH and depression sent ot ER for evaluation of left foot 1metatarsal wound ,suggestive of osteomyelitis .Patient was seen by ID consult and transfer to the hospital recommended for wound cx/bone biopsy /podiatry evaluation ,likely will require 4-6 weeks of iv abx . Patient was admitted to [facility] with b/l feet wounds and was followed by wound care team ,recently completed 7 days of doxycycline for foot wound cellulitis . (30 Mar 2023 05:21)    PAST MEDICAL & SURGICAL HISTORY:  ASHD (arteriosclerotic heart disease)      BPH without urinary obstruction      COPD, moderate      Stage 3 chronic kidney disease      Chronic GERD      HLD (hyperlipidemia)      MDD (major depressive disorder)      Obstructive and reflux uropathy      Cellulitis      HTN (hypertension)      Sepsis      Dementia      Moderate protein-calorie malnutrition      Brain TIA      History of RSV infection      DM type 2, not at goal      Pressure ulcer of unspecified heel, unspecified stage      Venous stasis ulcer without varicose veins      Multiple open wounds of foot          MEDICATIONS  (STANDING):  albuterol/ipratropium for Nebulization 3 milliLiter(s) Nebulizer every 8 hours  budesonide 160 MICROgram(s)/formoterol 4.5 MICROgram(s) Inhaler 2 Puff(s) Inhalation two times a day  chlorhexidine 4% Liquid 1 Application(s) Topical <User Schedule>  dextrose 5% + sodium chloride 0.45% with potassium chloride 20 mEq/L 1000 milliLiter(s) (40 mL/Hr) IV Continuous <Continuous>  dextrose 5%. 1000 milliLiter(s) (50 mL/Hr) IV Continuous <Continuous>  dextrose 5%. 1000 milliLiter(s) (100 mL/Hr) IV Continuous <Continuous>  dextrose 50% Injectable 25 Gram(s) IV Push once  dextrose 50% Injectable 12.5 Gram(s) IV Push once  dextrose 50% Injectable 25 Gram(s) IV Push once  donepezil 5 milliGRAM(s) Oral at bedtime  DULoxetine 60 milliGRAM(s) Oral daily  enoxaparin Injectable 60 milliGRAM(s) SubCutaneous every 12 hours  glucagon  Injectable 1 milliGRAM(s) IntraMuscular once  insulin glargine Injectable (LANTUS) 8 Unit(s) SubCutaneous at bedtime  insulin lispro (ADMELOG) corrective regimen sliding scale   SubCutaneous three times a day before meals  insulin lispro (ADMELOG) corrective regimen sliding scale   SubCutaneous at bedtime  lactobacillus acidophilus 1 Tablet(s) Oral two times a day with meals  losartan 25 milliGRAM(s) Oral daily  metoprolol tartrate 25 milliGRAM(s) Oral three times a day  multivitamin 1 Tablet(s) Oral daily  pantoprazole   Suspension 40 milliGRAM(s) Oral daily  piperacillin/tazobactam IVPB.. 3.375 Gram(s) IV Intermittent every 8 hours  senna 2 Tablet(s) Oral at bedtime  simvastatin 40 milliGRAM(s) Oral at bedtime  tamsulosin 0.4 milliGRAM(s) Oral at bedtime    MEDICATIONS  (PRN):  acetaminophen     Tablet .. 650 milliGRAM(s) Oral every 6 hours PRN Temp greater or equal to 38C (100.4F), Mild Pain (1 - 3)  aluminum hydroxide/magnesium hydroxide/simethicone Suspension 30 milliLiter(s) Oral every 4 hours PRN Dyspepsia  bisacodyl Suppository 10 milliGRAM(s) Rectal daily PRN Constipation  dextrose Oral Gel 15 Gram(s) Oral once PRN Blood Glucose LESS THAN 70 milliGRAM(s)/deciliter  hydrALAZINE 50 milliGRAM(s) Oral every 6 hours PRN for systolic BP>160  magnesium hydroxide Suspension 30 milliLiter(s) Oral daily PRN Constipation  melatonin 3 milliGRAM(s) Oral at bedtime PRN Insomnia  ondansetron Injectable 4 milliGRAM(s) IV Push every 8 hours PRN Nausea and/or Vomiting  sodium chloride 0.9% lock flush 10 milliLiter(s) IV Push every 1 hour PRN Pre/post blood products, medications, blood draw, and to maintain line patency      OBJECTIVE    T(C): 37.3 (04-08-23 @ 12:54), Max: 37.3 (04-08-23 @ 12:54)  HR: 98 (04-08-23 @ 12:54) (75 - 118)  BP: 124/69 (04-08-23 @ 12:54) (124/69 - 151/89)  RR: 18 (04-08-23 @ 12:54) (18 - 18)  SpO2: 97% (04-08-23 @ 12:54) (91% - 97%)  Wt(kg): --  I&O's Summary    07 Apr 2023 07:01  -  08 Apr 2023 07:00  --------------------------------------------------------  IN: 1430 mL / OUT: 0 mL / NET: 1430 mL    08 Apr 2023 07:01  -  08 Apr 2023 15:02  --------------------------------------------------------  IN: 400 mL / OUT: 0 mL / NET: 400 mL          REVIEW OF SYSTEMS:  CONSTITUTIONAL: No fever, weight loss, or fatigue  EYES: No eye pain, visual disturbances, or discharge  ENMT:   No sinus or throat pain  NECK: No pain or stiffness  RESPIRATORY: No cough, wheezing, chills or hemoptysis; No shortness of breath  CARDIOVASCULAR: No chest pain, palpitations, dizziness, or leg swelling  GASTROINTESTINAL: No abdominal pain. No nausea, vomiting; No diarrhea or constipation. No melena or hematochezia.  GENITOURINARY: No dysuria, frequency, hematuria, or incontinence  NEUROLOGICAL: No headaches, memory loss, loss of strength, numbness, or tremors  SKIN: No itching, burning, rashes, or lesions   MUSCULOSKELETAL: No joint pain or swelling; No muscle, back, or extremity pain    PHYSICAL EXAM:  Appearance: NAD. VS past 24 hrs -as above   HEENT:   Moist oral mucosa. Conjunctiva clear b/l.   Neck : supple  Respiratory: Lungs CTAB.  Gastrointestinal:  Soft, nontender. No rebound. No rigidity. BS present	  Cardiovascular: RRR ,S1S2 present  Neurologic: Non-focal. Moving all extremities.  Extremities: No edema. No erythema. No calf tenderness.  Skin: No rashes, No ecchymoses, No cyanosis.	  wounds ,skin lesions-See skin assesment flow sheet   LABS:                        10.9   8.42  )-----------( 284      ( 07 Apr 2023 08:43 )             36.4     04-08    x   |  x   |  x   ----------------------------<  x   x    |  x   |  1.20    Ca    8.5      07 Apr 2023 08:43  Phos  2.4     04-08  Mg     2.0     04-08    TPro  5.9<L>  /  Alb  2.0<L>  /  TBili  0.5  /  DBili  0.2  /  AST  20  /  ALT  27  /  AlkPhos  64  04-08    CAPILLARY BLOOD GLUCOSE      POCT Blood Glucose.: 197 mg/dL (08 Apr 2023 11:25)  POCT Blood Glucose.: 159 mg/dL (08 Apr 2023 08:10)  POCT Blood Glucose.: 122 mg/dL (07 Apr 2023 21:24)  POCT Blood Glucose.: 170 mg/dL (07 Apr 2023 16:54)          Culture - Urine (collected 02 Apr 2023 09:15)  Source: Clean Catch Clean Catch (Midstream)  Final Report (04 Apr 2023 19:44):    >100,000 CFU/ml Enterococcus faecalis  Organism: Enterococcus faecalis (04 Apr 2023 19:44)  Organism: Enterococcus faecalis (04 Apr 2023 19:44)    Culture - Blood (collected 29 Mar 2023 21:32)  Source: .Blood  Final Report (04 Apr 2023 01:01):    No Growth Final    Culture - Blood (collected 29 Mar 2023 21:20)  Source: .Blood  Final Report (04 Apr 2023 01:01):    No Growth Final      RADIOLOGY & ADDITIONAL TESTS:   reviewed elctronically  ASSESSMENT/PLAN: 	  25 minutes aggregate time was spent on this visit, 50% visit time spent in care co-ordination with other attendings and counselling patient .I have discussed care plan with patient / HCP/family member ,who expressed understanding of problems treatment and their effect and side effects, alternatives in details. I have asked if they have any questions and concerns and appropriately addressed them to best of my ability.

## 2023-04-08 NOTE — PROGRESS NOTE ADULT - ASSESSMENT
88 yo male ,[facility] resident with PMHx - COPD, DM, HTN, CAD, HLD, H/O TIA, dementia,GERD, BPH and depression sent ot ER for evaluation of left foot 1metatarsal wound ,suggestive of osteomyelitis .Patient was seen by ID consult and transfer to the hospital recommended for wound cx/bone biopsy /podiatry evaluation ,likely will require 4-6 weeks of iv abx . Patient was admitted to [facility] with b/l feet wounds and was followed by wound care team ,recently completed 7 days of doxycycline for foot wound cellulitis . 88 yo male ,[facility] resident with PMHx - COPD, DM, HTN, CAD, HLD, H/O TIA, dementia,GERD, BPH and depression sent ot ER for evaluation of left foot 1metatarsal wound ,suggestive of osteomyelitis .Patient was seen by ID consult and transfer to the hospital recommended for wound cx/bone biopsy /podiatry evaluation ,likely will require 4-6 weeks of iv abx . Patient was admitted to [facility] with b/l feet wounds and was followed by wound care team ,recently completed 7 days of doxycycline for foot wound cellulitis . 88 yo male ,[facility] resident with PMHx - COPD, DM, HTN, CAD, HLD, H/O TIA, dementia,GERD, BPH and depression sent ot ER for evaluation of left foot 1metatarsal wound ,suggestive of osteomyelitis .Patient was seen by ID consult and transfer to the hospital recommended for wound cx/bone biopsy /podiatry evaluation ,likely will require 4-6 weeks of iv abx . Patient was admitted to [facility] with b/l feet wounds and was followed by wound care team ,recently completed 7 days of doxycycline for foot wound cellulitis .

## 2023-04-08 NOTE — PROGRESS NOTE ADULT - SUBJECTIVE AND OBJECTIVE BOX
[facility] [place]       HPI:  88 yo male ,[facility] resident with PMHx - COPD, DM, HTN, CAD, HLD, H/O TIA, dementia,GERD, BPH and depression sent ot ER for evaluation of left foot 1metatarsal wound ,suggestive of osteomyelitis .Patient was seen by ID consult and transfer to the hospital recommended for wound cx/bone biopsy /podiatry evaluation ,likely will require 4-6 weeks of iv abx . Patient was admitted to [facility] with b/l feet wounds and was followed by wound care team ,recently completed 7 days of doxycycline for foot wound cellulitis . (30 Mar 2023 05:21)        SUBJECTIVE:      ALLERGIES:  Allergies    No Known Allergies    Intolerances          MEDICATIONS  (STANDING):  albuterol/ipratropium for Nebulization 3 milliLiter(s) Nebulizer every 8 hours  budesonide 160 MICROgram(s)/formoterol 4.5 MICROgram(s) Inhaler 2 Puff(s) Inhalation two times a day  chlorhexidine 4% Liquid 1 Application(s) Topical <User Schedule>  dextrose 5% + sodium chloride 0.45% with potassium chloride 20 mEq/L 1000 milliLiter(s) (40 mL/Hr) IV Continuous <Continuous>  dextrose 5%. 1000 milliLiter(s) (50 mL/Hr) IV Continuous <Continuous>  dextrose 5%. 1000 milliLiter(s) (100 mL/Hr) IV Continuous <Continuous>  dextrose 50% Injectable 25 Gram(s) IV Push once  dextrose 50% Injectable 12.5 Gram(s) IV Push once  dextrose 50% Injectable 25 Gram(s) IV Push once  donepezil 5 milliGRAM(s) Oral at bedtime  DULoxetine 60 milliGRAM(s) Oral daily  enoxaparin Injectable 60 milliGRAM(s) SubCutaneous every 12 hours  glucagon  Injectable 1 milliGRAM(s) IntraMuscular once  insulin glargine Injectable (LANTUS) 8 Unit(s) SubCutaneous at bedtime  insulin lispro (ADMELOG) corrective regimen sliding scale   SubCutaneous three times a day before meals  insulin lispro (ADMELOG) corrective regimen sliding scale   SubCutaneous at bedtime  lactobacillus acidophilus 1 Tablet(s) Oral two times a day with meals  losartan 25 milliGRAM(s) Oral daily  metoprolol tartrate 25 milliGRAM(s) Oral three times a day  multivitamin 1 Tablet(s) Oral daily  pantoprazole   Suspension 40 milliGRAM(s) Oral daily  piperacillin/tazobactam IVPB.. 3.375 Gram(s) IV Intermittent every 8 hours  senna 2 Tablet(s) Oral at bedtime  simvastatin 40 milliGRAM(s) Oral at bedtime  tamsulosin 0.4 milliGRAM(s) Oral at bedtime    MEDICATIONS  (PRN):  acetaminophen     Tablet .. 650 milliGRAM(s) Oral every 6 hours PRN Temp greater or equal to 38C (100.4F), Mild Pain (1 - 3)  aluminum hydroxide/magnesium hydroxide/simethicone Suspension 30 milliLiter(s) Oral every 4 hours PRN Dyspepsia  bisacodyl Suppository 10 milliGRAM(s) Rectal daily PRN Constipation  dextrose Oral Gel 15 Gram(s) Oral once PRN Blood Glucose LESS THAN 70 milliGRAM(s)/deciliter  hydrALAZINE 50 milliGRAM(s) Oral every 6 hours PRN for systolic BP>160  magnesium hydroxide Suspension 30 milliLiter(s) Oral daily PRN Constipation  melatonin 3 milliGRAM(s) Oral at bedtime PRN Insomnia  ondansetron Injectable 4 milliGRAM(s) IV Push every 8 hours PRN Nausea and/or Vomiting  sodium chloride 0.9% lock flush 10 milliLiter(s) IV Push every 1 hour PRN Pre/post blood products, medications, blood draw, and to maintain line patency      REVIEW OF SYSTEMS:  CONSTITUTIONAL: No fever,  RESPIRATORY: No cough, wheezing, shortness of breath  CARDIOVASCULAR: No chest pain, dyspnea, palpitations, dizziness, syncope, paroxysmal nocturnal dyspnea, orthopnea, or arm or leg swelling  GASTROINTESTINAL: No abdominal  or epigastric pain, nausea, vomiting,  diarrhea  NEUROLOGICAL: No headaches,  loss of strength, numbness, or tremors    Vital Signs Last 24 Hrs  T(C): 36.4 (08 Apr 2023 20:20), Max: 37.3 (08 Apr 2023 12:54)  T(F): 97.6 (08 Apr 2023 20:20), Max: 99.1 (08 Apr 2023 12:54)  HR: 88 (08 Apr 2023 20:20) (79 - 98)  BP: 125/73 (08 Apr 2023 20:20) (124/69 - 132/66)  BP(mean): --  RR: 18 (08 Apr 2023 20:20) (18 - 18)  SpO2: 93% (08 Apr 2023 20:20) (93% - 97%)    Parameters below as of 08 Apr 2023 20:20  Patient On (Oxygen Delivery Method): room air        PHYSICAL EXAM:  HEAD:  Atraumatic, Normocephalic  NECK: Supple and normal thyroid.  No JVD or carotid bruit.   HEART: S1, S2 regular , 1/6 soft ejection systolic murmur in mitral area , no thrill and no gallops .  PULMONARY: Bilateral vesicular breathing , few scattered ronchi and few scattered rales are present .  ABDOMEN: Soft nontender and positive bowl sounds   EXTREMITIES:  No clubbing, cyanosis, or pedal  edema  NEUROLOGICAL: AAOX3 , no focal deficit .    LABS:                        10.9   8.42  )-----------( 284      ( 07 Apr 2023 08:43 )             36.4     04-08    x   |  x   |  x   ----------------------------<  x   x    |  x   |  1.20    Ca    8.5      07 Apr 2023 08:43  Phos  2.4     04-08  Mg     2.0     04-08    TPro  5.9<L>  /  Alb  2.0<L>  /  TBili  0.5  /  DBili  0.2  /  AST  20  /  ALT  27  /  AlkPhos  64  04-08            BNP      EKG:  ECHO:  IMAGING:    Assessment/Plan    Will continue to follow during hospital course and give further recommendations as needed . Thanks for your referral . if any questions please contact me at office ([phone])cell [phone])  [facility] [place]       HPI:  86 yo male ,[facility] with PMHx - COPD, DM, HTN, CAD, HLD, H/O TIA, dementia,GERD, BPH and depression sent ot ER for evaluation of left foot 1metatarsal wound ,suggestive of osteomyelitis .Patient was seen by ID consult and transfer to the hospital recommended for wound cx/bone biopsy /podiatry evaluation ,likely will require 4-6 weeks of iv abx . [facility] and was followed by wound care team ,recently completed 7 days of doxycycline for foot wound cellulitis . (30 Mar 2023 05:21)        SUBJECTIVE:      ALLERGIES:  Allergies    No Known Allergies    Intolerances          MEDICATIONS  (STANDING):  albuterol/ipratropium for Nebulization 3 milliLiter(s) Nebulizer every 8 hours  budesonide 160 MICROgram(s)/formoterol 4.5 MICROgram(s) Inhaler 2 Puff(s) Inhalation two times a day  chlorhexidine 4% Liquid 1 Application(s) Topical <User Schedule>  dextrose 5% + sodium chloride 0.45% with potassium chloride 20 mEq/L 1000 milliLiter(s) (40 mL/Hr) IV Continuous <Continuous>  dextrose 5%. 1000 milliLiter(s) (50 mL/Hr) IV Continuous <Continuous>  dextrose 5%. 1000 milliLiter(s) (100 mL/Hr) IV Continuous <Continuous>  dextrose 50% Injectable 25 Gram(s) IV Push once  dextrose 50% Injectable 12.5 Gram(s) IV Push once  dextrose 50% Injectable 25 Gram(s) IV Push once  donepezil 5 milliGRAM(s) Oral at bedtime  DULoxetine 60 milliGRAM(s) Oral daily  enoxaparin Injectable 60 milliGRAM(s) SubCutaneous every 12 hours  glucagon  Injectable 1 milliGRAM(s) IntraMuscular once  insulin glargine Injectable (LANTUS) 8 Unit(s) SubCutaneous at bedtime  insulin lispro (ADMELOG) corrective regimen sliding scale   SubCutaneous three times a day before meals  insulin lispro (ADMELOG) corrective regimen sliding scale   SubCutaneous at bedtime  lactobacillus acidophilus 1 Tablet(s) Oral two times a day with meals  losartan 25 milliGRAM(s) Oral daily  metoprolol tartrate 25 milliGRAM(s) Oral three times a day  multivitamin 1 Tablet(s) Oral daily  pantoprazole   Suspension 40 milliGRAM(s) Oral daily  piperacillin/tazobactam IVPB.. 3.375 Gram(s) IV Intermittent every 8 hours  senna 2 Tablet(s) Oral at bedtime  simvastatin 40 milliGRAM(s) Oral at bedtime  tamsulosin 0.4 milliGRAM(s) Oral at bedtime    MEDICATIONS  (PRN):  acetaminophen     Tablet .. 650 milliGRAM(s) Oral every 6 hours PRN Temp greater or equal to 38C (100.4F), Mild Pain (1 - 3)  aluminum hydroxide/magnesium hydroxide/simethicone Suspension 30 milliLiter(s) Oral every 4 hours PRN Dyspepsia  bisacodyl Suppository 10 milliGRAM(s) Rectal daily PRN Constipation  dextrose Oral Gel 15 Gram(s) Oral once PRN Blood Glucose LESS THAN 70 milliGRAM(s)/deciliter  hydrALAZINE 50 milliGRAM(s) Oral every 6 hours PRN for systolic BP>160  magnesium hydroxide Suspension 30 milliLiter(s) Oral daily PRN Constipation  melatonin 3 milliGRAM(s) Oral at bedtime PRN Insomnia  ondansetron Injectable 4 milliGRAM(s) IV Push every 8 hours PRN Nausea and/or Vomiting  sodium chloride 0.9% lock flush 10 milliLiter(s) IV Push every 1 hour PRN Pre/post blood products, medications, blood draw, and to maintain line patency      REVIEW OF SYSTEMS:  CONSTITUTIONAL: No fever,  RESPIRATORY: No cough, wheezing, shortness of breath  CARDIOVASCULAR: No chest pain, dyspnea, palpitations, dizziness, syncope, paroxysmal nocturnal dyspnea, orthopnea, or arm or leg swelling  GASTROINTESTINAL: No abdominal  or epigastric pain, nausea, vomiting,  diarrhea  NEUROLOGICAL: No headaches,  loss of strength, numbness, or tremors    Vital Signs Last 24 Hrs  T(C): 36.4 (08 Apr 2023 20:20), Max: 37.3 (08 Apr 2023 12:54)  T(F): 97.6 (08 Apr 2023 20:20), Max: 99.1 (08 Apr 2023 12:54)  HR: 88 (08 Apr 2023 20:20) (79 - 98)  BP: 125/73 (08 Apr 2023 20:20) (124/69 - 132/66)  BP(mean): --  RR: 18 (08 Apr 2023 20:20) (18 - 18)  SpO2: 93% (08 Apr 2023 20:20) (93% - 97%)    Parameters below as of 08 Apr 2023 20:20  Patient On (Oxygen Delivery Method): room air        PHYSICAL EXAM:  HEAD:  Atraumatic, Normocephalic  NECK: Supple and normal thyroid.  No JVD or carotid bruit.   HEART: S1, S2 regular , 1/6 soft ejection systolic murmur in mitral area , no thrill and no gallops .  PULMONARY: Bilateral vesicular breathing , few scattered ronchi and few scattered rales are present .  ABDOMEN: Soft nontender and positive bowl sounds   EXTREMITIES:  No clubbing, cyanosis, or pedal  edema  NEUROLOGICAL: AAOX3 , no focal deficit .    LABS:                        10.9   8.42  )-----------( 284      ( 07 Apr 2023 08:43 )             36.4     04-08    x   |  x   |  x   ----------------------------<  x   x    |  x   |  1.20    Ca    8.5      07 Apr 2023 08:43  Phos  2.4     04-08  Mg     2.0     04-08    TPro  5.9<L>  /  Alb  2.0<L>  /  TBili  0.5  /  DBili  0.2  /  AST  20  /  ALT  27  /  AlkPhos  64  04-08            BNP      EKG:  ECHO:  IMAGING:    Assessment/Plan    Will continue to follow during hospital course and give further recommendations as needed . Thanks for your referral . if any questions please contact me at office ([phone])cell [phone])  [facility] [place]       HPI:  86 yo male ,[facility] resident with PMHx - COPD, DM, HTN, CAD, HLD, H/O TIA, dementia,GERD, BPH and depression sent ot ER for evaluation of left foot 1metatarsal wound ,suggestive of osteomyelitis .Patient was seen by ID consult and transfer to the hospital recommended for wound cx/bone biopsy /podiatry evaluation ,likely will require 4-6 weeks of iv abx . Patient was admitted to [facility] with b/l feet wounds and was followed by wound care team ,recently completed 7 days of doxycycline for foot wound cellulitis . (30 Mar 2023 05:21)        SUBJECTIVE:      ALLERGIES:  Allergies    No Known Allergies    Intolerances          MEDICATIONS  (STANDING):  albuterol/ipratropium for Nebulization 3 milliLiter(s) Nebulizer every 8 hours  budesonide 160 MICROgram(s)/formoterol 4.5 MICROgram(s) Inhaler 2 Puff(s) Inhalation two times a day  chlorhexidine 4% Liquid 1 Application(s) Topical <User Schedule>  dextrose 5% + sodium chloride 0.45% with potassium chloride 20 mEq/L 1000 milliLiter(s) (40 mL/Hr) IV Continuous <Continuous>  dextrose 5%. 1000 milliLiter(s) (50 mL/Hr) IV Continuous <Continuous>  dextrose 5%. 1000 milliLiter(s) (100 mL/Hr) IV Continuous <Continuous>  dextrose 50% Injectable 25 Gram(s) IV Push once  dextrose 50% Injectable 12.5 Gram(s) IV Push once  dextrose 50% Injectable 25 Gram(s) IV Push once  donepezil 5 milliGRAM(s) Oral at bedtime  DULoxetine 60 milliGRAM(s) Oral daily  enoxaparin Injectable 60 milliGRAM(s) SubCutaneous every 12 hours  glucagon  Injectable 1 milliGRAM(s) IntraMuscular once  insulin glargine Injectable (LANTUS) 8 Unit(s) SubCutaneous at bedtime  insulin lispro (ADMELOG) corrective regimen sliding scale   SubCutaneous three times a day before meals  insulin lispro (ADMELOG) corrective regimen sliding scale   SubCutaneous at bedtime  lactobacillus acidophilus 1 Tablet(s) Oral two times a day with meals  losartan 25 milliGRAM(s) Oral daily  metoprolol tartrate 25 milliGRAM(s) Oral three times a day  multivitamin 1 Tablet(s) Oral daily  pantoprazole   Suspension 40 milliGRAM(s) Oral daily  piperacillin/tazobactam IVPB.. 3.375 Gram(s) IV Intermittent every 8 hours  senna 2 Tablet(s) Oral at bedtime  simvastatin 40 milliGRAM(s) Oral at bedtime  tamsulosin 0.4 milliGRAM(s) Oral at bedtime    MEDICATIONS  (PRN):  acetaminophen     Tablet .. 650 milliGRAM(s) Oral every 6 hours PRN Temp greater or equal to 38C (100.4F), Mild Pain (1 - 3)  aluminum hydroxide/magnesium hydroxide/simethicone Suspension 30 milliLiter(s) Oral every 4 hours PRN Dyspepsia  bisacodyl Suppository 10 milliGRAM(s) Rectal daily PRN Constipation  dextrose Oral Gel 15 Gram(s) Oral once PRN Blood Glucose LESS THAN 70 milliGRAM(s)/deciliter  hydrALAZINE 50 milliGRAM(s) Oral every 6 hours PRN for systolic BP>160  magnesium hydroxide Suspension 30 milliLiter(s) Oral daily PRN Constipation  melatonin 3 milliGRAM(s) Oral at bedtime PRN Insomnia  ondansetron Injectable 4 milliGRAM(s) IV Push every 8 hours PRN Nausea and/or Vomiting  sodium chloride 0.9% lock flush 10 milliLiter(s) IV Push every 1 hour PRN Pre/post blood products, medications, blood draw, and to maintain line patency      REVIEW OF SYSTEMS:  CONSTITUTIONAL: No fever,  RESPIRATORY: No cough, wheezing, shortness of breath  CARDIOVASCULAR: No chest pain, dyspnea, palpitations, dizziness, syncope, paroxysmal nocturnal dyspnea, orthopnea, or arm or leg swelling  GASTROINTESTINAL: No abdominal  or epigastric pain, nausea, vomiting,  diarrhea  NEUROLOGICAL: No headaches,  loss of strength, numbness, or tremors    Vital Signs Last 24 Hrs  T(C): 36.4 (08 Apr 2023 20:20), Max: 37.3 (08 Apr 2023 12:54)  T(F): 97.6 (08 Apr 2023 20:20), Max: 99.1 (08 Apr 2023 12:54)  HR: 88 (08 Apr 2023 20:20) (79 - 98)  BP: 125/73 (08 Apr 2023 20:20) (124/69 - 132/66)  BP(mean): --  RR: 18 (08 Apr 2023 20:20) (18 - 18)  SpO2: 93% (08 Apr 2023 20:20) (93% - 97%)    Parameters below as of 08 Apr 2023 20:20  Patient On (Oxygen Delivery Method): room air        PHYSICAL EXAM:  HEAD:  Atraumatic, Normocephalic  NECK: Supple and normal thyroid.  No JVD or carotid bruit.   HEART: S1, S2 regular , 1/6 soft ejection systolic murmur in mitral area , no thrill and no gallops .  PULMONARY: Bilateral vesicular breathing , few scattered ronchi and few scattered rales are present .  ABDOMEN: Soft nontender and positive bowl sounds   EXTREMITIES:  No clubbing, cyanosis, or pedal  edema  NEUROLOGICAL: AAOX3 , no focal deficit .    LABS:                        10.9   8.42  )-----------( 284      ( 07 Apr 2023 08:43 )             36.4     04-08    x   |  x   |  x   ----------------------------<  x   x    |  x   |  1.20    Ca    8.5      07 Apr 2023 08:43  Phos  2.4     04-08  Mg     2.0     04-08    TPro  5.9<L>  /  Alb  2.0<L>  /  TBili  0.5  /  DBili  0.2  /  AST  20  /  ALT  27  /  AlkPhos  64  04-08            BNP      EKG:  ECHO:  IMAGING:    Assessment/Plan    Will continue to follow during hospital course and give further recommendations as needed . Thanks for your referral . if any questions please contact me at office ([phone])cell [phone])  KARIME WYNN MD [place] [id number]  [address]  OFFICE : [id number]  CELL : [phone]  CARDIOLOGY F/U :       HPI:  86 yo male ,[facility] resident with PMHx - COPD, DM, HTN, CAD, HLD, H/O TIA, dementia,GERD, BPH and depression sent ot ER for evaluation of left foot 1metatarsal wound ,suggestive of osteomyelitis .Patient was seen by ID consult and transfer to the hospital recommended for wound cx/bone biopsy /podiatry evaluation ,likely will require 4-6 weeks of iv abx . Patient was admitted to [facility] with b/l feet wounds and was followed by wound care team ,recently completed 7 days of doxycycline for foot wound cellulitis . (30 Mar 2023 05:21)        SUBJECTIVE: Patient feeling better .      ALLERGIES:  Allergies    No Known Allergies    Intolerances          MEDICATIONS  (STANDING):  albuterol/ipratropium for Nebulization 3 milliLiter(s) Nebulizer every 8 hours  budesonide 160 MICROgram(s)/formoterol 4.5 MICROgram(s) Inhaler 2 Puff(s) Inhalation two times a day  chlorhexidine 4% Liquid 1 Application(s) Topical <User Schedule>  dextrose 5% + sodium chloride 0.45% with potassium chloride 20 mEq/L 1000 milliLiter(s) (40 mL/Hr) IV Continuous <Continuous>  dextrose 5%. 1000 milliLiter(s) (50 mL/Hr) IV Continuous <Continuous>  dextrose 5%. 1000 milliLiter(s) (100 mL/Hr) IV Continuous <Continuous>  dextrose 50% Injectable 25 Gram(s) IV Push once  dextrose 50% Injectable 12.5 Gram(s) IV Push once  dextrose 50% Injectable 25 Gram(s) IV Push once  donepezil 5 milliGRAM(s) Oral at bedtime  DULoxetine 60 milliGRAM(s) Oral daily  enoxaparin Injectable 60 milliGRAM(s) SubCutaneous every 12 hours  glucagon  Injectable 1 milliGRAM(s) IntraMuscular once  insulin glargine Injectable (LANTUS) 8 Unit(s) SubCutaneous at bedtime  insulin lispro (ADMELOG) corrective regimen sliding scale   SubCutaneous three times a day before meals  insulin lispro (ADMELOG) corrective regimen sliding scale   SubCutaneous at bedtime  lactobacillus acidophilus 1 Tablet(s) Oral two times a day with meals  losartan 25 milliGRAM(s) Oral daily  metoprolol tartrate 25 milliGRAM(s) Oral three times a day  multivitamin 1 Tablet(s) Oral daily  pantoprazole   Suspension 40 milliGRAM(s) Oral daily  piperacillin/tazobactam IVPB.. 3.375 Gram(s) IV Intermittent every 8 hours  senna 2 Tablet(s) Oral at bedtime  simvastatin 40 milliGRAM(s) Oral at bedtime  tamsulosin 0.4 milliGRAM(s) Oral at bedtime    MEDICATIONS  (PRN):  acetaminophen     Tablet .. 650 milliGRAM(s) Oral every 6 hours PRN Temp greater or equal to 38C (100.4F), Mild Pain (1 - 3)  aluminum hydroxide/magnesium hydroxide/simethicone Suspension 30 milliLiter(s) Oral every 4 hours PRN Dyspepsia  bisacodyl Suppository 10 milliGRAM(s) Rectal daily PRN Constipation  dextrose Oral Gel 15 Gram(s) Oral once PRN Blood Glucose LESS THAN 70 milliGRAM(s)/deciliter  hydrALAZINE 50 milliGRAM(s) Oral every 6 hours PRN for systolic BP>160  magnesium hydroxide Suspension 30 milliLiter(s) Oral daily PRN Constipation  melatonin 3 milliGRAM(s) Oral at bedtime PRN Insomnia  ondansetron Injectable 4 milliGRAM(s) IV Push every 8 hours PRN Nausea and/or Vomiting  sodium chloride 0.9% lock flush 10 milliLiter(s) IV Push every 1 hour PRN Pre/post blood products, medications, blood draw, and to maintain line patency      REVIEW OF SYSTEMS:  CONSTITUTIONAL: No fever,  RESPIRATORY: No cough, wheezing, shortness of breath  CARDIOVASCULAR: No chest pain, dyspnea, palpitations, dizziness, syncope, paroxysmal nocturnal dyspnea, orthopnea, or arm or leg swelling  GASTROINTESTINAL: No abdominal  or epigastric pain, nausea, vomiting,  diarrhea  NEUROLOGICAL: No headaches,  loss of strength, numbness, or tremors    Vital Signs Last 24 Hrs  T(C): 36.4 (08 Apr 2023 20:20), Max: 37.3 (08 Apr 2023 12:54)  T(F): 97.6 (08 Apr 2023 20:20), Max: 99.1 (08 Apr 2023 12:54)  HR: 88 (08 Apr 2023 20:20) (79 - 98)  BP: 125/73 (08 Apr 2023 20:20) (124/69 - 132/66)  BP(mean): --  RR: 18 (08 Apr 2023 20:20) (18 - 18)  SpO2: 93% (08 Apr 2023 20:20) (93% - 97%)    Parameters below as of 08 Apr 2023 20:20  Patient On (Oxygen Delivery Method): room air        PHYSICAL EXAM:  HEAD:  Atraumatic, Normocephalic  NECK: Supple and normal thyroid.  No JVD or carotid bruit.   HEART: S1, S2 irregular , 1/6 soft ejection systolic murmur in mitral area , no thrill and no gallops .  PULMONARY: Bilateral vesicular breathing , few scattered rhonchi and few scattered rales are present .  ABDOMEN: Soft nontender and positive bowl sounds   EXTREMITIES:  No clubbing, cyanosis, or pedal  edema . bilateral feet in dressings .  NEUROLOGICAL: AA and mild confused .  Skin : No rashes .  musculoskeletal : No joint swellings .    LABS:                        10.9   8.42  )-----------( 284      ( 07 Apr 2023 08:43 )             36.4     04-08    x   |  x   |  x   ----------------------------<  x   x    |  x   |  1.20    Ca    8.5      07 Apr 2023 08:43  Phos  2.4     04-08  Mg     2.0     04-08    TPro  5.9<L>  /  Alb  2.0<L>  /  TBili  0.5  /  DBili  0.2  /  AST  20  /  ALT  27  /  AlkPhos  64  04-08            Assessment/Plan    Will continue to follow during hospital course and give further recommendations as needed . Thanks for your referral . if any questions please contact me at office ([phone])cell [phone])  KARIME WYNN MD [place] [id number]  [address]  OFFICE : [phone]  CELL : [phone]  CARDIOLOGY F/U :       HPI:  86 yo male ,[facility] resident with PMHx - COPD, DM, HTN, CAD, HLD, H/O TIA, dementia,GERD, BPH and depression sent ot ER for evaluation of left foot 1metatarsal wound ,suggestive of osteomyelitis .Patient was seen by ID consult and transfer to the hospital recommended for wound cx/bone biopsy /podiatry evaluation ,likely will require 4-6 weeks of iv abx . Patient was admitted to [facility] with b/l feet wounds and was followed by wound care team ,recently completed 7 days of doxycycline for foot wound cellulitis . (30 Mar 2023 05:21)        SUBJECTIVE: Patient feeling better .      ALLERGIES:  Allergies    No Known Allergies    Intolerances          MEDICATIONS  (STANDING):  albuterol/ipratropium for Nebulization 3 milliLiter(s) Nebulizer every 8 hours  budesonide 160 MICROgram(s)/formoterol 4.5 MICROgram(s) Inhaler 2 Puff(s) Inhalation two times a day  chlorhexidine 4% Liquid 1 Application(s) Topical <User Schedule>  dextrose 5% + sodium chloride 0.45% with potassium chloride 20 mEq/L 1000 milliLiter(s) (40 mL/Hr) IV Continuous <Continuous>  dextrose 5%. 1000 milliLiter(s) (50 mL/Hr) IV Continuous <Continuous>  dextrose 5%. 1000 milliLiter(s) (100 mL/Hr) IV Continuous <Continuous>  dextrose 50% Injectable 25 Gram(s) IV Push once  dextrose 50% Injectable 12.5 Gram(s) IV Push once  dextrose 50% Injectable 25 Gram(s) IV Push once  donepezil 5 milliGRAM(s) Oral at bedtime  DULoxetine 60 milliGRAM(s) Oral daily  enoxaparin Injectable 60 milliGRAM(s) SubCutaneous every 12 hours  glucagon  Injectable 1 milliGRAM(s) IntraMuscular once  insulin glargine Injectable (LANTUS) 8 Unit(s) SubCutaneous at bedtime  insulin lispro (ADMELOG) corrective regimen sliding scale   SubCutaneous three times a day before meals  insulin lispro (ADMELOG) corrective regimen sliding scale   SubCutaneous at bedtime  lactobacillus acidophilus 1 Tablet(s) Oral two times a day with meals  losartan 25 milliGRAM(s) Oral daily  metoprolol tartrate 25 milliGRAM(s) Oral three times a day  multivitamin 1 Tablet(s) Oral daily  pantoprazole   Suspension 40 milliGRAM(s) Oral daily  piperacillin/tazobactam IVPB.. 3.375 Gram(s) IV Intermittent every 8 hours  senna 2 Tablet(s) Oral at bedtime  simvastatin 40 milliGRAM(s) Oral at bedtime  tamsulosin 0.4 milliGRAM(s) Oral at bedtime    MEDICATIONS  (PRN):  acetaminophen     Tablet .. 650 milliGRAM(s) Oral every 6 hours PRN Temp greater or equal to 38C (100.4F), Mild Pain (1 - 3)  aluminum hydroxide/magnesium hydroxide/simethicone Suspension 30 milliLiter(s) Oral every 4 hours PRN Dyspepsia  bisacodyl Suppository 10 milliGRAM(s) Rectal daily PRN Constipation  dextrose Oral Gel 15 Gram(s) Oral once PRN Blood Glucose LESS THAN 70 milliGRAM(s)/deciliter  hydrALAZINE 50 milliGRAM(s) Oral every 6 hours PRN for systolic BP>160  magnesium hydroxide Suspension 30 milliLiter(s) Oral daily PRN Constipation  melatonin 3 milliGRAM(s) Oral at bedtime PRN Insomnia  ondansetron Injectable 4 milliGRAM(s) IV Push every 8 hours PRN Nausea and/or Vomiting  sodium chloride 0.9% lock flush 10 milliLiter(s) IV Push every 1 hour PRN Pre/post blood products, medications, blood draw, and to maintain line patency      REVIEW OF SYSTEMS:  CONSTITUTIONAL: No fever,  RESPIRATORY: No cough, wheezing, shortness of breath  CARDIOVASCULAR: No chest pain, dyspnea, palpitations, dizziness, syncope, paroxysmal nocturnal dyspnea, orthopnea, or arm or leg swelling  GASTROINTESTINAL: No abdominal  or epigastric pain, nausea, vomiting,  diarrhea  NEUROLOGICAL: No headaches,  loss of strength, numbness, or tremors    Vital Signs Last 24 Hrs  T(C): 36.4 (08 Apr 2023 20:20), Max: 37.3 (08 Apr 2023 12:54)  T(F): 97.6 (08 Apr 2023 20:20), Max: 99.1 (08 Apr 2023 12:54)  HR: 88 (08 Apr 2023 20:20) (79 - 98)  BP: 125/73 (08 Apr 2023 20:20) (124/69 - 132/66)  BP(mean): --  RR: 18 (08 Apr 2023 20:20) (18 - 18)  SpO2: 93% (08 Apr 2023 20:20) (93% - 97%)    Parameters below as of 08 Apr 2023 20:20  Patient On (Oxygen Delivery Method): room air        PHYSICAL EXAM:  HEAD:  Atraumatic, Normocephalic  NECK: Supple and normal thyroid.  No JVD or carotid bruit.   HEART: S1, S2 irregular , 1/6 soft ejection systolic murmur in mitral area , no thrill and no gallops .  PULMONARY: Bilateral vesicular breathing , few scattered rhonchi and few scattered rales are present .  ABDOMEN: Soft nontender and positive bowl sounds   EXTREMITIES:  No clubbing, cyanosis, or pedal  edema . bilateral feet in dressings .  NEUROLOGICAL: AA and mild confused .  Skin : No rashes .  musculoskeletal : No joint swellings .    LABS:                        10.9   8.42  )-----------( 284      ( 07 Apr 2023 08:43 )             36.4     04-08    x   |  x   |  x   ----------------------------<  x   x    |  x   |  1.20    Ca    8.5      07 Apr 2023 08:43  Phos  2.4     04-08  Mg     2.0     04-08    TPro  5.9<L>  /  Alb  2.0<L>  /  TBili  0.5  /  DBili  0.2  /  AST  20  /  ALT  27  /  AlkPhos  64  04-08            Assessment/Plan    Will continue to follow during hospital course and give further recommendations as needed . Thanks for your referral . if any questions please contact me at office ([phone])cell [phone])  KARIME WYNN MD [place] [id number]  [address]  OFFICE : [phone]  CELL : [phone]  CARDIOLOGY F/U :       HPI:  88 yo male ,[facility] resident with PMHx - COPD, DM, HTN, CAD, HLD, H/O TIA, dementia,GERD, BPH and depression sent ot ER for evaluation of left foot 1metatarsal wound ,suggestive of osteomyelitis .Patient was seen by ID consult and transfer to the hospital recommended for wound cx/bone biopsy /podiatry evaluation ,likely will require 4-6 weeks of iv abx . Patient was admitted to [facility] with b/l feet wounds and was followed by wound care team ,recently completed 7 days of doxycycline for foot wound cellulitis . (30 Mar 2023 05:21)        SUBJECTIVE: Patient feeling better .      ALLERGIES:  Allergies    No Known Allergies    Intolerances          MEDICATIONS  (STANDING):  albuterol/ipratropium for Nebulization 3 milliLiter(s) Nebulizer every 8 hours  budesonide 160 MICROgram(s)/formoterol 4.5 MICROgram(s) Inhaler 2 Puff(s) Inhalation two times a day  chlorhexidine 4% Liquid 1 Application(s) Topical <User Schedule>  dextrose 5% + sodium chloride 0.45% with potassium chloride 20 mEq/L 1000 milliLiter(s) (40 mL/Hr) IV Continuous <Continuous>  dextrose 5%. 1000 milliLiter(s) (50 mL/Hr) IV Continuous <Continuous>  dextrose 5%. 1000 milliLiter(s) (100 mL/Hr) IV Continuous <Continuous>  dextrose 50% Injectable 25 Gram(s) IV Push once  dextrose 50% Injectable 12.5 Gram(s) IV Push once  dextrose 50% Injectable 25 Gram(s) IV Push once  donepezil 5 milliGRAM(s) Oral at bedtime  DULoxetine 60 milliGRAM(s) Oral daily  enoxaparin Injectable 60 milliGRAM(s) SubCutaneous every 12 hours  glucagon  Injectable 1 milliGRAM(s) IntraMuscular once  insulin glargine Injectable (LANTUS) 8 Unit(s) SubCutaneous at bedtime  insulin lispro (ADMELOG) corrective regimen sliding scale   SubCutaneous three times a day before meals  insulin lispro (ADMELOG) corrective regimen sliding scale   SubCutaneous at bedtime  lactobacillus acidophilus 1 Tablet(s) Oral two times a day with meals  losartan 25 milliGRAM(s) Oral daily  metoprolol tartrate 25 milliGRAM(s) Oral three times a day  multivitamin 1 Tablet(s) Oral daily  pantoprazole   Suspension 40 milliGRAM(s) Oral daily  piperacillin/tazobactam IVPB.. 3.375 Gram(s) IV Intermittent every 8 hours  senna 2 Tablet(s) Oral at bedtime  simvastatin 40 milliGRAM(s) Oral at bedtime  tamsulosin 0.4 milliGRAM(s) Oral at bedtime    MEDICATIONS  (PRN):  acetaminophen     Tablet .. 650 milliGRAM(s) Oral every 6 hours PRN Temp greater or equal to 38C (100.4F), Mild Pain (1 - 3)  aluminum hydroxide/magnesium hydroxide/simethicone Suspension 30 milliLiter(s) Oral every 4 hours PRN Dyspepsia  bisacodyl Suppository 10 milliGRAM(s) Rectal daily PRN Constipation  dextrose Oral Gel 15 Gram(s) Oral once PRN Blood Glucose LESS THAN 70 milliGRAM(s)/deciliter  hydrALAZINE 50 milliGRAM(s) Oral every 6 hours PRN for systolic BP>160  magnesium hydroxide Suspension 30 milliLiter(s) Oral daily PRN Constipation  melatonin 3 milliGRAM(s) Oral at bedtime PRN Insomnia  ondansetron Injectable 4 milliGRAM(s) IV Push every 8 hours PRN Nausea and/or Vomiting  sodium chloride 0.9% lock flush 10 milliLiter(s) IV Push every 1 hour PRN Pre/post blood products, medications, blood draw, and to maintain line patency      REVIEW OF SYSTEMS:  CONSTITUTIONAL: No fever,  RESPIRATORY: No cough, wheezing, shortness of breath  CARDIOVASCULAR: No chest pain, dyspnea, palpitations, dizziness, syncope, paroxysmal nocturnal dyspnea, orthopnea, or arm or leg swelling  GASTROINTESTINAL: No abdominal  or epigastric pain, nausea, vomiting,  diarrhea  NEUROLOGICAL: No headaches,  loss of strength, numbness, or tremors    Vital Signs Last 24 Hrs  T(C): 36.4 (08 Apr 2023 20:20), Max: 37.3 (08 Apr 2023 12:54)  T(F): 97.6 (08 Apr 2023 20:20), Max: 99.1 (08 Apr 2023 12:54)  HR: 88 (08 Apr 2023 20:20) (79 - 98)  BP: 125/73 (08 Apr 2023 20:20) (124/69 - 132/66)  BP(mean): --  RR: 18 (08 Apr 2023 20:20) (18 - 18)  SpO2: 93% (08 Apr 2023 20:20) (93% - 97%)    Parameters below as of 08 Apr 2023 20:20  Patient On (Oxygen Delivery Method): room air        PHYSICAL EXAM:  HEAD:  Atraumatic, Normocephalic  NECK: Supple and normal thyroid.  No JVD or carotid bruit.   HEART: S1, S2 irregular , 1/6 soft ejection systolic murmur in mitral area , no thrill and no gallops .  PULMONARY: Bilateral vesicular breathing , few scattered rhonchi and few scattered rales are present .  ABDOMEN: Soft nontender and positive bowl sounds   EXTREMITIES:  No clubbing, cyanosis, or pedal  edema . bilateral feet in dressings .  NEUROLOGICAL: AA and mild confused .  Skin : No rashes .  musculoskeletal : No joint swellings .    LABS:                        10.9   8.42  )-----------( 284      ( 07 Apr 2023 08:43 )             36.4     04-08    x   |  x   |  x   ----------------------------<  x   x    |  x   |  1.20    Ca    8.5      07 Apr 2023 08:43  Phos  2.4     04-08  Mg     2.0     04-08    TPro  5.9<L>  /  Alb  2.0<L>  /  TBili  0.5  /  DBili  0.2  /  AST  20  /  ALT  27  /  AlkPhos  64  04-08            Assessment/Plan    Will continue to follow during hospital course and give further recommendations as needed . Thanks for your referral . if any questions please contact me at office ([phone])cell [phone])  KARIME WYNN MD [place] [id number]  [address]  OFFICE : [phone]  CELL : [phone]  CARDIOLOGY F/U :       HPI:  86 yo male ,[facility] resident with PMHx - COPD, DM, HTN, CAD, HLD, H/O TIA, dementia,GERD, BPH and depression sent ot ER for evaluation of left foot 1metatarsal wound ,suggestive of osteomyelitis .Patient was seen by ID consult and transfer to the hospital recommended for wound cx/bone biopsy /podiatry evaluation ,likely will require 4-6 weeks of iv abx . Patient was admitted to [facility] with b/l feet wounds and was followed by wound care team ,recently completed 7 days of doxycycline for foot wound cellulitis . (30 Mar 2023 05:21)        SUBJECTIVE: Patient feeling better .      ALLERGIES:  Allergies    No Known Allergies    Intolerances          MEDICATIONS  (STANDING):  albuterol/ipratropium for Nebulization 3 milliLiter(s) Nebulizer every 8 hours  budesonide 160 MICROgram(s)/formoterol 4.5 MICROgram(s) Inhaler 2 Puff(s) Inhalation two times a day  chlorhexidine 4% Liquid 1 Application(s) Topical <User Schedule>  dextrose 5% + sodium chloride 0.45% with potassium chloride 20 mEq/L 1000 milliLiter(s) (40 mL/Hr) IV Continuous <Continuous>  dextrose 5%. 1000 milliLiter(s) (50 mL/Hr) IV Continuous <Continuous>  dextrose 5%. 1000 milliLiter(s) (100 mL/Hr) IV Continuous <Continuous>  dextrose 50% Injectable 25 Gram(s) IV Push once  dextrose 50% Injectable 12.5 Gram(s) IV Push once  dextrose 50% Injectable 25 Gram(s) IV Push once  donepezil 5 milliGRAM(s) Oral at bedtime  DULoxetine 60 milliGRAM(s) Oral daily  enoxaparin Injectable 60 milliGRAM(s) SubCutaneous every 12 hours  glucagon  Injectable 1 milliGRAM(s) IntraMuscular once  insulin glargine Injectable (LANTUS) 8 Unit(s) SubCutaneous at bedtime  insulin lispro (ADMELOG) corrective regimen sliding scale   SubCutaneous three times a day before meals  insulin lispro (ADMELOG) corrective regimen sliding scale   SubCutaneous at bedtime  lactobacillus acidophilus 1 Tablet(s) Oral two times a day with meals  losartan 25 milliGRAM(s) Oral daily  metoprolol tartrate 25 milliGRAM(s) Oral three times a day  multivitamin 1 Tablet(s) Oral daily  pantoprazole   Suspension 40 milliGRAM(s) Oral daily  piperacillin/tazobactam IVPB.. 3.375 Gram(s) IV Intermittent every 8 hours  senna 2 Tablet(s) Oral at bedtime  simvastatin 40 milliGRAM(s) Oral at bedtime  tamsulosin 0.4 milliGRAM(s) Oral at bedtime    MEDICATIONS  (PRN):  acetaminophen     Tablet .. 650 milliGRAM(s) Oral every 6 hours PRN Temp greater or equal to 38C (100.4F), Mild Pain (1 - 3)  aluminum hydroxide/magnesium hydroxide/simethicone Suspension 30 milliLiter(s) Oral every 4 hours PRN Dyspepsia  bisacodyl Suppository 10 milliGRAM(s) Rectal daily PRN Constipation  dextrose Oral Gel 15 Gram(s) Oral once PRN Blood Glucose LESS THAN 70 milliGRAM(s)/deciliter  hydrALAZINE 50 milliGRAM(s) Oral every 6 hours PRN for systolic BP>160  magnesium hydroxide Suspension 30 milliLiter(s) Oral daily PRN Constipation  melatonin 3 milliGRAM(s) Oral at bedtime PRN Insomnia  ondansetron Injectable 4 milliGRAM(s) IV Push every 8 hours PRN Nausea and/or Vomiting  sodium chloride 0.9% lock flush 10 milliLiter(s) IV Push every 1 hour PRN Pre/post blood products, medications, blood draw, and to maintain line patency      REVIEW OF SYSTEMS:  CONSTITUTIONAL: No fever,  RESPIRATORY: No cough, wheezing, shortness of breath  CARDIOVASCULAR: No chest pain, dyspnea, palpitations, dizziness, syncope, paroxysmal nocturnal dyspnea, orthopnea, or arm or leg swelling  GASTROINTESTINAL: No abdominal  or epigastric pain, nausea, vomiting,  diarrhea  NEUROLOGICAL: No headaches,  loss of strength, numbness, or tremors    Vital Signs Last 24 Hrs  T(C): 36.4 (08 Apr 2023 20:20), Max: 37.3 (08 Apr 2023 12:54)  T(F): 97.6 (08 Apr 2023 20:20), Max: 99.1 (08 Apr 2023 12:54)  HR: 88 (08 Apr 2023 20:20) (79 - 98)  BP: 125/73 (08 Apr 2023 20:20) (124/69 - 132/66)  BP(mean): --  RR: 18 (08 Apr 2023 20:20) (18 - 18)  SpO2: 93% (08 Apr 2023 20:20) (93% - 97%)    Parameters below as of 08 Apr 2023 20:20  Patient On (Oxygen Delivery Method): room air        PHYSICAL EXAM:  HEAD:  Atraumatic, Normocephalic  NECK: Supple and normal thyroid.  No JVD or carotid bruit.   HEART: S1, S2 irregular , 1/6 soft ejection systolic murmur in mitral area , no thrill and no gallops .  PULMONARY: Bilateral vesicular breathing , few scattered rhonchi and few scattered rales are present .  ABDOMEN: Soft nontender and positive bowl sounds   EXTREMITIES:  No clubbing, cyanosis, or pedal  edema . bilateral feet in dressings .  NEUROLOGICAL: AA and mild confused .  Skin : No rashes .  musculoskeletal : No joint swellings .    LABS:                        10.9   8.42  )-----------( 284      ( 07 Apr 2023 08:43 )             36.4     04-08    x   |  x   |  x   ----------------------------<  x   x    |  x   |  1.20    Ca    8.5      07 Apr 2023 08:43  Phos  2.4     04-08  Mg     2.0     04-08    TPro  5.9<L>  /  Alb  2.0<L>  /  TBili  0.5  /  DBili  0.2  /  AST  20  /  ALT  27  /  AlkPhos  64  04-08            Assessment/Plan  Patient has :  1) Left foot infection and toe osteomyelitis   2) Hypertension and stable .  3) DM .  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 4) Patient cardiac wise stable and cleared for foot surgery if needed . Benefits of surgery outweigh the risks . 5) Will hold Lovenox 18-24 hours prior to surgery 6) Metoprolol as ordered for atrial fibrillation control .   Will continue to follow during hospital course and give further recommendations as needed . Thanks for your referral . if any questions please contact me at office ([phone] cell [phone])        KARIME WYNN MD [place] [id number]  [address]  OFFICE : [id number]  CELL : [phone]  CARDIOLOGY F/U :       HPI:  86 yo male ,[facility] resident with PMHx - COPD, DM, HTN, CAD, HLD, H/O TIA, dementia,GERD, BPH and depression sent ot ER for evaluation of left foot 1metatarsal wound ,suggestive of osteomyelitis .Patient was seen by ID consult and transfer to the hospital recommended for wound cx/bone biopsy /podiatry evaluation ,likely will require 4-6 weeks of iv abx . Patient was admitted to [facility] with b/l feet wounds and was followed by wound care team ,recently completed 7 days of doxycycline for foot wound cellulitis . (30 Mar 2023 05:21)        SUBJECTIVE: Patient feeling better .      ALLERGIES:  Allergies    No Known Allergies    Intolerances          MEDICATIONS  (STANDING):  albuterol/ipratropium for Nebulization 3 milliLiter(s) Nebulizer every 8 hours  budesonide 160 MICROgram(s)/formoterol 4.5 MICROgram(s) Inhaler 2 Puff(s) Inhalation two times a day  chlorhexidine 4% Liquid 1 Application(s) Topical <User Schedule>  dextrose 5% + sodium chloride 0.45% with potassium chloride 20 mEq/L 1000 milliLiter(s) (40 mL/Hr) IV Continuous <Continuous>  dextrose 5%. 1000 milliLiter(s) (50 mL/Hr) IV Continuous <Continuous>  dextrose 5%. 1000 milliLiter(s) (100 mL/Hr) IV Continuous <Continuous>  dextrose 50% Injectable 25 Gram(s) IV Push once  dextrose 50% Injectable 12.5 Gram(s) IV Push once  dextrose 50% Injectable 25 Gram(s) IV Push once  donepezil 5 milliGRAM(s) Oral at bedtime  DULoxetine 60 milliGRAM(s) Oral daily  enoxaparin Injectable 60 milliGRAM(s) SubCutaneous every 12 hours  glucagon  Injectable 1 milliGRAM(s) IntraMuscular once  insulin glargine Injectable (LANTUS) 8 Unit(s) SubCutaneous at bedtime  insulin lispro (ADMELOG) corrective regimen sliding scale   SubCutaneous three times a day before meals  insulin lispro (ADMELOG) corrective regimen sliding scale   SubCutaneous at bedtime  lactobacillus acidophilus 1 Tablet(s) Oral two times a day with meals  losartan 25 milliGRAM(s) Oral daily  metoprolol tartrate 25 milliGRAM(s) Oral three times a day  multivitamin 1 Tablet(s) Oral daily  pantoprazole   Suspension 40 milliGRAM(s) Oral daily  piperacillin/tazobactam IVPB.. 3.375 Gram(s) IV Intermittent every 8 hours  senna 2 Tablet(s) Oral at bedtime  simvastatin 40 milliGRAM(s) Oral at bedtime  tamsulosin 0.4 milliGRAM(s) Oral at bedtime    MEDICATIONS  (PRN):  acetaminophen     Tablet .. 650 milliGRAM(s) Oral every 6 hours PRN Temp greater or equal to 38C (100.4F), Mild Pain (1 - 3)  aluminum hydroxide/magnesium hydroxide/simethicone Suspension 30 milliLiter(s) Oral every 4 hours PRN Dyspepsia  bisacodyl Suppository 10 milliGRAM(s) Rectal daily PRN Constipation  dextrose Oral Gel 15 Gram(s) Oral once PRN Blood Glucose LESS THAN 70 milliGRAM(s)/deciliter  hydrALAZINE 50 milliGRAM(s) Oral every 6 hours PRN for systolic BP>160  magnesium hydroxide Suspension 30 milliLiter(s) Oral daily PRN Constipation  melatonin 3 milliGRAM(s) Oral at bedtime PRN Insomnia  ondansetron Injectable 4 milliGRAM(s) IV Push every 8 hours PRN Nausea and/or Vomiting  sodium chloride 0.9% lock flush 10 milliLiter(s) IV Push every 1 hour PRN Pre/post blood products, medications, blood draw, and to maintain line patency      REVIEW OF SYSTEMS:  CONSTITUTIONAL: No fever,  RESPIRATORY: No cough, wheezing, shortness of breath  CARDIOVASCULAR: No chest pain, dyspnea, palpitations, dizziness, syncope, paroxysmal nocturnal dyspnea, orthopnea, or arm or leg swelling  GASTROINTESTINAL: No abdominal  or epigastric pain, nausea, vomiting,  diarrhea  NEUROLOGICAL: No headaches,  loss of strength, numbness, or tremors    Vital Signs Last 24 Hrs  T(C): 36.4 (08 Apr 2023 20:20), Max: 37.3 (08 Apr 2023 12:54)  T(F): 97.6 (08 Apr 2023 20:20), Max: 99.1 (08 Apr 2023 12:54)  HR: 88 (08 Apr 2023 20:20) (79 - 98)  BP: 125/73 (08 Apr 2023 20:20) (124/69 - 132/66)  BP(mean): --  RR: 18 (08 Apr 2023 20:20) (18 - 18)  SpO2: 93% (08 Apr 2023 20:20) (93% - 97%)    Parameters below as of 08 Apr 2023 20:20  Patient On (Oxygen Delivery Method): room air        PHYSICAL EXAM:  HEAD:  Atraumatic, Normocephalic  NECK: Supple and normal thyroid.  No JVD or carotid bruit.   HEART: S1, S2 irregular , 1/6 soft ejection systolic murmur in mitral area , no thrill and no gallops .  PULMONARY: Bilateral vesicular breathing , few scattered rhonchi and few scattered rales are present .  ABDOMEN: Soft nontender and positive bowl sounds   EXTREMITIES:  No clubbing, cyanosis, or pedal  edema . bilateral feet in dressings .  NEUROLOGICAL: AA and mild confused .  Skin : No rashes .  musculoskeletal : No joint swellings .    LABS:                        10.9   8.42  )-----------( 284      ( 07 Apr 2023 08:43 )             36.4     04-08    x   |  x   |  x   ----------------------------<  x   x    |  x   |  1.20    Ca    8.5      07 Apr 2023 08:43  Phos  2.4     04-08  Mg     2.0     04-08    TPro  5.9<L>  /  Alb  2.0<L>  /  TBili  0.5  /  DBili  0.2  /  AST  20  /  ALT  27  /  AlkPhos  64  04-08            Assessment/Plan  Patient has :  1) Left foot infection and toe osteomyelitis   2) Hypertension and stable .  3) DM .  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 4) Patient cardiac wise stable and cleared for foot surgery if needed . Benefits of surgery outweigh the risks . 5) Will hold Lovenox 18-24 hours prior to surgery 6) Metoprolol as ordered for atrial fibrillation control .   Will continue to follow during hospital course and give further recommendations as needed . Thanks for your referral . if any questions please contact me at office ([phone] cell [phone])        KARIME WYNN MD [place] [id number]  [address]  OFFICE : [phone]  CELL : [phone]  CARDIOLOGY F/U :       HPI:  88 yo male ,[facility] resident with PMHx - COPD, DM, HTN, CAD, HLD, H/O TIA, dementia,GERD, BPH and depression sent ot ER for evaluation of left foot 1metatarsal wound ,suggestive of osteomyelitis .Patient was seen by ID consult and transfer to the hospital recommended for wound cx/bone biopsy /podiatry evaluation ,likely will require 4-6 weeks of iv abx . Patient was admitted to [facility] with b/l feet wounds and was followed by wound care team ,recently completed 7 days of doxycycline for foot wound cellulitis . (30 Mar 2023 05:21)        SUBJECTIVE: Patient feeling better .      ALLERGIES:  Allergies    No Known Allergies    Intolerances          MEDICATIONS  (STANDING):  albuterol/ipratropium for Nebulization 3 milliLiter(s) Nebulizer every 8 hours  budesonide 160 MICROgram(s)/formoterol 4.5 MICROgram(s) Inhaler 2 Puff(s) Inhalation two times a day  chlorhexidine 4% Liquid 1 Application(s) Topical <User Schedule>  dextrose 5% + sodium chloride 0.45% with potassium chloride 20 mEq/L 1000 milliLiter(s) (40 mL/Hr) IV Continuous <Continuous>  dextrose 5%. 1000 milliLiter(s) (50 mL/Hr) IV Continuous <Continuous>  dextrose 5%. 1000 milliLiter(s) (100 mL/Hr) IV Continuous <Continuous>  dextrose 50% Injectable 25 Gram(s) IV Push once  dextrose 50% Injectable 12.5 Gram(s) IV Push once  dextrose 50% Injectable 25 Gram(s) IV Push once  donepezil 5 milliGRAM(s) Oral at bedtime  DULoxetine 60 milliGRAM(s) Oral daily  enoxaparin Injectable 60 milliGRAM(s) SubCutaneous every 12 hours  glucagon  Injectable 1 milliGRAM(s) IntraMuscular once  insulin glargine Injectable (LANTUS) 8 Unit(s) SubCutaneous at bedtime  insulin lispro (ADMELOG) corrective regimen sliding scale   SubCutaneous three times a day before meals  insulin lispro (ADMELOG) corrective regimen sliding scale   SubCutaneous at bedtime  lactobacillus acidophilus 1 Tablet(s) Oral two times a day with meals  losartan 25 milliGRAM(s) Oral daily  metoprolol tartrate 25 milliGRAM(s) Oral three times a day  multivitamin 1 Tablet(s) Oral daily  pantoprazole   Suspension 40 milliGRAM(s) Oral daily  piperacillin/tazobactam IVPB.. 3.375 Gram(s) IV Intermittent every 8 hours  senna 2 Tablet(s) Oral at bedtime  simvastatin 40 milliGRAM(s) Oral at bedtime  tamsulosin 0.4 milliGRAM(s) Oral at bedtime    MEDICATIONS  (PRN):  acetaminophen     Tablet .. 650 milliGRAM(s) Oral every 6 hours PRN Temp greater or equal to 38C (100.4F), Mild Pain (1 - 3)  aluminum hydroxide/magnesium hydroxide/simethicone Suspension 30 milliLiter(s) Oral every 4 hours PRN Dyspepsia  bisacodyl Suppository 10 milliGRAM(s) Rectal daily PRN Constipation  dextrose Oral Gel 15 Gram(s) Oral once PRN Blood Glucose LESS THAN 70 milliGRAM(s)/deciliter  hydrALAZINE 50 milliGRAM(s) Oral every 6 hours PRN for systolic BP>160  magnesium hydroxide Suspension 30 milliLiter(s) Oral daily PRN Constipation  melatonin 3 milliGRAM(s) Oral at bedtime PRN Insomnia  ondansetron Injectable 4 milliGRAM(s) IV Push every 8 hours PRN Nausea and/or Vomiting  sodium chloride 0.9% lock flush 10 milliLiter(s) IV Push every 1 hour PRN Pre/post blood products, medications, blood draw, and to maintain line patency      REVIEW OF SYSTEMS:  CONSTITUTIONAL: No fever,  RESPIRATORY: No cough, wheezing, shortness of breath  CARDIOVASCULAR: No chest pain, dyspnea, palpitations, dizziness, syncope, paroxysmal nocturnal dyspnea, orthopnea, or arm or leg swelling  GASTROINTESTINAL: No abdominal  or epigastric pain, nausea, vomiting,  diarrhea  NEUROLOGICAL: No headaches,  loss of strength, numbness, or tremors    Vital Signs Last 24 Hrs  T(C): 36.4 (08 Apr 2023 20:20), Max: 37.3 (08 Apr 2023 12:54)  T(F): 97.6 (08 Apr 2023 20:20), Max: 99.1 (08 Apr 2023 12:54)  HR: 88 (08 Apr 2023 20:20) (79 - 98)  BP: 125/73 (08 Apr 2023 20:20) (124/69 - 132/66)  BP(mean): --  RR: 18 (08 Apr 2023 20:20) (18 - 18)  SpO2: 93% (08 Apr 2023 20:20) (93% - 97%)    Parameters below as of 08 Apr 2023 20:20  Patient On (Oxygen Delivery Method): room air        PHYSICAL EXAM:  HEAD:  Atraumatic, Normocephalic  NECK: Supple and normal thyroid.  No JVD or carotid bruit.   HEART: S1, S2 irregular , 1/6 soft ejection systolic murmur in mitral area , no thrill and no gallops .  PULMONARY: Bilateral vesicular breathing , few scattered rhonchi and few scattered rales are present .  ABDOMEN: Soft nontender and positive bowl sounds   EXTREMITIES:  No clubbing, cyanosis, or pedal  edema . bilateral feet in dressings .  NEUROLOGICAL: AA and mild confused .  Skin : No rashes .  musculoskeletal : No joint swellings .    LABS:                        10.9   8.42  )-----------( 284      ( 07 Apr 2023 08:43 )             36.4     04-08    x   |  x   |  x   ----------------------------<  x   x    |  x   |  1.20    Ca    8.5      07 Apr 2023 08:43  Phos  2.4     04-08  Mg     2.0     04-08    TPro  5.9<L>  /  Alb  2.0<L>  /  TBili  0.5  /  DBili  0.2  /  AST  20  /  ALT  27  /  AlkPhos  64  04-08            Assessment/Plan  Patient has :  1) Left foot infection and toe osteomyelitis   2) Hypertension and stable .  3) DM .  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 4) Patient cardiac wise stable and cleared for foot surgery if needed . Benefits of surgery outweigh the risks . 5) Will hold Lovenox 18-24 hours prior to surgery 6) Metoprolol as ordered for atrial fibrillation control .   Will continue to follow during hospital course and give further recommendations as needed . Thanks for your referral . if any questions please contact me at office ([phone] cell [phone])        KARIME WYNN MD [place] [id number]  [address]  OFFICE : [phone]  CELL : [phone]  CARDIOLOGY F/U :       HPI:  88 yo male ,[facility] resident with PMHx - COPD, DM, HTN, CAD, HLD, H/O TIA, dementia,GERD, BPH and depression sent ot ER for evaluation of left foot 1metatarsal wound ,suggestive of osteomyelitis .Patient was seen by ID consult and transfer to the hospital recommended for wound cx/bone biopsy /podiatry evaluation ,likely will require 4-6 weeks of iv abx . Patient was admitted to [facility] with b/l feet wounds and was followed by wound care team ,recently completed 7 days of doxycycline for foot wound cellulitis . (30 Mar 2023 05:21)        SUBJECTIVE: Patient feeling better .      ALLERGIES:  Allergies    No Known Allergies    Intolerances          MEDICATIONS  (STANDING):  albuterol/ipratropium for Nebulization 3 milliLiter(s) Nebulizer every 8 hours  budesonide 160 MICROgram(s)/formoterol 4.5 MICROgram(s) Inhaler 2 Puff(s) Inhalation two times a day  chlorhexidine 4% Liquid 1 Application(s) Topical <User Schedule>  dextrose 5% + sodium chloride 0.45% with potassium chloride 20 mEq/L 1000 milliLiter(s) (40 mL/Hr) IV Continuous <Continuous>  dextrose 5%. 1000 milliLiter(s) (50 mL/Hr) IV Continuous <Continuous>  dextrose 5%. 1000 milliLiter(s) (100 mL/Hr) IV Continuous <Continuous>  dextrose 50% Injectable 25 Gram(s) IV Push once  dextrose 50% Injectable 12.5 Gram(s) IV Push once  dextrose 50% Injectable 25 Gram(s) IV Push once  donepezil 5 milliGRAM(s) Oral at bedtime  DULoxetine 60 milliGRAM(s) Oral daily  enoxaparin Injectable 60 milliGRAM(s) SubCutaneous every 12 hours  glucagon  Injectable 1 milliGRAM(s) IntraMuscular once  insulin glargine Injectable (LANTUS) 8 Unit(s) SubCutaneous at bedtime  insulin lispro (ADMELOG) corrective regimen sliding scale   SubCutaneous three times a day before meals  insulin lispro (ADMELOG) corrective regimen sliding scale   SubCutaneous at bedtime  lactobacillus acidophilus 1 Tablet(s) Oral two times a day with meals  losartan 25 milliGRAM(s) Oral daily  metoprolol tartrate 25 milliGRAM(s) Oral three times a day  multivitamin 1 Tablet(s) Oral daily  pantoprazole   Suspension 40 milliGRAM(s) Oral daily  piperacillin/tazobactam IVPB.. 3.375 Gram(s) IV Intermittent every 8 hours  senna 2 Tablet(s) Oral at bedtime  simvastatin 40 milliGRAM(s) Oral at bedtime  tamsulosin 0.4 milliGRAM(s) Oral at bedtime    MEDICATIONS  (PRN):  acetaminophen     Tablet .. 650 milliGRAM(s) Oral every 6 hours PRN Temp greater or equal to 38C (100.4F), Mild Pain (1 - 3)  aluminum hydroxide/magnesium hydroxide/simethicone Suspension 30 milliLiter(s) Oral every 4 hours PRN Dyspepsia  bisacodyl Suppository 10 milliGRAM(s) Rectal daily PRN Constipation  dextrose Oral Gel 15 Gram(s) Oral once PRN Blood Glucose LESS THAN 70 milliGRAM(s)/deciliter  hydrALAZINE 50 milliGRAM(s) Oral every 6 hours PRN for systolic BP>160  magnesium hydroxide Suspension 30 milliLiter(s) Oral daily PRN Constipation  melatonin 3 milliGRAM(s) Oral at bedtime PRN Insomnia  ondansetron Injectable 4 milliGRAM(s) IV Push every 8 hours PRN Nausea and/or Vomiting  sodium chloride 0.9% lock flush 10 milliLiter(s) IV Push every 1 hour PRN Pre/post blood products, medications, blood draw, and to maintain line patency      REVIEW OF SYSTEMS:  CONSTITUTIONAL: No fever,  RESPIRATORY: No cough, wheezing, shortness of breath  CARDIOVASCULAR: No chest pain, dyspnea, palpitations, dizziness, syncope, paroxysmal nocturnal dyspnea, orthopnea, or arm or leg swelling  GASTROINTESTINAL: No abdominal  or epigastric pain, nausea, vomiting,  diarrhea  NEUROLOGICAL: No headaches, numbness, or tremors  Skin : No itching .  Hematology : No active bleeding .  Endocrinology : No heat and cold intolerance .  Psychiatry : Patient is confused .  Genitourinary : No dysuria .  Musculoskeletal : Patient has mild arthritis .            Vital Signs Last 24 Hrs  T(C): 36.9 (07 Apr 2023 06:05), Max: 36.9 (07 Apr 2023 06:05)  T(F): 98.4 (07 Apr 2023 06:05), Max: 98.4 (07 Apr 2023 06:05)  HR: 90 (07 Apr 2023 06:05) (76 - 93)  BP: 143/73 (07 Apr 2023 06:05) (123/73 - 143/73)  BP(mean): --  RR: 20 (07 Apr 2023 06:05) (18 - 20)  SpO2: 99% (07 Apr 2023 06:05) (90% - 99%)    Parameters below as of 07 Apr 2023 06:05  Patient On (Oxygen Delivery Method): room air        PHYSICAL EXAM:  HEAD:  Atraumatic, Normocephalic  NECK: Supple and normal thyroid.  No JVD or carotid bruit.   HEART: S1, S2 regular , 1/6 soft ejection systolic murmur in mitral area , no thrill and no gallops .  PULMONARY: Bilateral vesicular breathing , few scattered rhonchi and few scattered rales are present .  ABDOMEN: Soft nontender and positive bowl sounds   EXTREMITIES:  No clubbing, cyanosis, or pedal  edema . Bilateral feet wound present .  NEUROLOGICAL: AA and mild confused    Skin : No rashes .  Musculoskeletal : No joint swellings .    Vital Signs Last 24 Hrs  T(C): 36.4 (08 Apr 2023 20:20), Max: 37.3 (08 Apr 2023 12:54)  T(F): 97.6 (08 Apr 2023 20:20), Max: 99.1 (08 Apr 2023 12:54)  HR: 88 (08 Apr 2023 20:20) (79 - 98)  BP: 125/73 (08 Apr 2023 20:20) (124/69 - 132/66)  BP(mean): --  RR: 18 (08 Apr 2023 20:20) (18 - 18)  SpO2: 93% (08 Apr 2023 20:20) (93% - 97%)    Parameters below as of 08 Apr 2023 20:20  Patient On (Oxygen Delivery Method): room air        PHYSICAL EXAM:  HEAD:  Atraumatic, Normocephalic  NECK: Supple and normal thyroid.  No JVD or carotid bruit.   HEART: S1, S2 irregular , 1/6 soft ejection systolic murmur in mitral area , no thrill and no gallops .  PULMONARY: Bilateral vesicular breathing , few scattered rhonchi and few scattered rales are present .  ABDOMEN: Soft nontender and positive bowl sounds   EXTREMITIES:  No clubbing, cyanosis, or pedal  edema . bilateral feet in dressings .  NEUROLOGICAL: AA and mild confused .  Skin : No rashes .  musculoskeletal : No joint swellings .    LABS:                        10.9   8.42  )-----------( 284      ( 07 Apr 2023 08:43 )             36.4     04-08    x   |  x   |  x   ----------------------------<  x   x    |  x   |  1.20    Ca    8.5      07 Apr 2023 08:43  Phos  2.4     04-08  Mg     2.0     04-08    TPro  5.9<L>  /  Alb  2.0<L>  /  TBili  0.5  /  DBili  0.2  /  AST  20  /  ALT  27  /  AlkPhos  64  04-08            Assessment/Plan  Patient has :  1) Left foot infection and toe osteomyelitis   2) Hypertension and stable .  3) DM .  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 4) Patient cardiac wise stable and cleared for foot surgery if needed . Benefits of surgery outweigh the risks . 5) Will hold Lovenox 18-24 hours prior to surgery 6) Metoprolol as ordered for atrial fibrillation control .   Will continue to follow during hospital course and give further recommendations as needed . Thanks for your referral . if any questions please contact me at office ([phone] cell [phone])        KARIME WYNN MD [place] [id number]  [address]  OFFICE : [id number]  CELL : [phone]  CARDIOLOGY F/U :       HPI:  88 yo male ,[facility] resident with PMHx - COPD, DM, HTN, CAD, HLD, H/O TIA, dementia,GERD, BPH and depression sent ot ER for evaluation of left foot 1metatarsal wound ,suggestive of osteomyelitis .Patient was seen by ID consult and transfer to the hospital recommended for wound cx/bone biopsy /podiatry evaluation ,likely will require 4-6 weeks of iv abx . Patient was admitted to [facility] with b/l feet wounds and was followed by wound care team ,recently completed 7 days of doxycycline for foot wound cellulitis . (30 Mar 2023 05:21)        SUBJECTIVE: Patient feeling better .      ALLERGIES:  Allergies    No Known Allergies    Intolerances          MEDICATIONS  (STANDING):  albuterol/ipratropium for Nebulization 3 milliLiter(s) Nebulizer every 8 hours  budesonide 160 MICROgram(s)/formoterol 4.5 MICROgram(s) Inhaler 2 Puff(s) Inhalation two times a day  chlorhexidine 4% Liquid 1 Application(s) Topical <User Schedule>  dextrose 5% + sodium chloride 0.45% with potassium chloride 20 mEq/L 1000 milliLiter(s) (40 mL/Hr) IV Continuous <Continuous>  dextrose 5%. 1000 milliLiter(s) (50 mL/Hr) IV Continuous <Continuous>  dextrose 5%. 1000 milliLiter(s) (100 mL/Hr) IV Continuous <Continuous>  dextrose 50% Injectable 25 Gram(s) IV Push once  dextrose 50% Injectable 12.5 Gram(s) IV Push once  dextrose 50% Injectable 25 Gram(s) IV Push once  donepezil 5 milliGRAM(s) Oral at bedtime  DULoxetine 60 milliGRAM(s) Oral daily  enoxaparin Injectable 60 milliGRAM(s) SubCutaneous every 12 hours  glucagon  Injectable 1 milliGRAM(s) IntraMuscular once  insulin glargine Injectable (LANTUS) 8 Unit(s) SubCutaneous at bedtime  insulin lispro (ADMELOG) corrective regimen sliding scale   SubCutaneous three times a day before meals  insulin lispro (ADMELOG) corrective regimen sliding scale   SubCutaneous at bedtime  lactobacillus acidophilus 1 Tablet(s) Oral two times a day with meals  losartan 25 milliGRAM(s) Oral daily  metoprolol tartrate 25 milliGRAM(s) Oral three times a day  multivitamin 1 Tablet(s) Oral daily  pantoprazole   Suspension 40 milliGRAM(s) Oral daily  piperacillin/tazobactam IVPB.. 3.375 Gram(s) IV Intermittent every 8 hours  senna 2 Tablet(s) Oral at bedtime  simvastatin 40 milliGRAM(s) Oral at bedtime  tamsulosin 0.4 milliGRAM(s) Oral at bedtime    MEDICATIONS  (PRN):  acetaminophen     Tablet .. 650 milliGRAM(s) Oral every 6 hours PRN Temp greater or equal to 38C (100.4F), Mild Pain (1 - 3)  aluminum hydroxide/magnesium hydroxide/simethicone Suspension 30 milliLiter(s) Oral every 4 hours PRN Dyspepsia  bisacodyl Suppository 10 milliGRAM(s) Rectal daily PRN Constipation  dextrose Oral Gel 15 Gram(s) Oral once PRN Blood Glucose LESS THAN 70 milliGRAM(s)/deciliter  hydrALAZINE 50 milliGRAM(s) Oral every 6 hours PRN for systolic BP>160  magnesium hydroxide Suspension 30 milliLiter(s) Oral daily PRN Constipation  melatonin 3 milliGRAM(s) Oral at bedtime PRN Insomnia  ondansetron Injectable 4 milliGRAM(s) IV Push every 8 hours PRN Nausea and/or Vomiting  sodium chloride 0.9% lock flush 10 milliLiter(s) IV Push every 1 hour PRN Pre/post blood products, medications, blood draw, and to maintain line patency      REVIEW OF SYSTEMS:  CONSTITUTIONAL: No fever,  RESPIRATORY: No cough, wheezing, shortness of breath  CARDIOVASCULAR: No chest pain, dyspnea, palpitations, dizziness, syncope, paroxysmal nocturnal dyspnea, orthopnea, or arm or leg swelling  GASTROINTESTINAL: No abdominal  or epigastric pain, nausea, vomiting,  diarrhea  NEUROLOGICAL: No headaches, numbness, or tremors  Skin : No itching .  Hematology : No active bleeding .  Endocrinology : No heat and cold intolerance .  Psychiatry : Patient is confused .  Genitourinary : No dysuria .  Musculoskeletal : Patient has mild arthritis .            Vital Signs Last 24 Hrs  T(C): 36.9 (07 Apr 2023 06:05), Max: 36.9 (07 Apr 2023 06:05)  T(F): 98.4 (07 Apr 2023 06:05), Max: 98.4 (07 Apr 2023 06:05)  HR: 90 (07 Apr 2023 06:05) (76 - 93)  BP: 143/73 (07 Apr 2023 06:05) (123/73 - 143/73)  BP(mean): --  RR: 20 (07 Apr 2023 06:05) (18 - 20)  SpO2: 99% (07 Apr 2023 06:05) (90% - 99%)    Parameters below as of 07 Apr 2023 06:05  Patient On (Oxygen Delivery Method): room air        PHYSICAL EXAM:  HEAD:  Atraumatic, Normocephalic  NECK: Supple and normal thyroid.  No JVD or carotid bruit.   HEART: S1, S2 regular , 1/6 soft ejection systolic murmur in mitral area , no thrill and no gallops .  PULMONARY: Bilateral vesicular breathing , few scattered rhonchi and few scattered rales are present .  ABDOMEN: Soft nontender and positive bowl sounds   EXTREMITIES:  No clubbing, cyanosis, or pedal  edema . Bilateral feet wound present .  NEUROLOGICAL: AA and mild confused    Skin : No rashes .  Musculoskeletal : No joint swellings .    Vital Signs Last 24 Hrs  T(C): 36.4 (08 Apr 2023 20:20), Max: 37.3 (08 Apr 2023 12:54)  T(F): 97.6 (08 Apr 2023 20:20), Max: 99.1 (08 Apr 2023 12:54)  HR: 88 (08 Apr 2023 20:20) (79 - 98)  BP: 125/73 (08 Apr 2023 20:20) (124/69 - 132/66)  BP(mean): --  RR: 18 (08 Apr 2023 20:20) (18 - 18)  SpO2: 93% (08 Apr 2023 20:20) (93% - 97%)    Parameters below as of 08 Apr 2023 20:20  Patient On (Oxygen Delivery Method): room air        PHYSICAL EXAM:  HEAD:  Atraumatic, Normocephalic  NECK: Supple and normal thyroid.  No JVD or carotid bruit.   HEART: S1, S2 irregular , 1/6 soft ejection systolic murmur in mitral area , no thrill and no gallops .  PULMONARY: Bilateral vesicular breathing , few scattered rhonchi and few scattered rales are present .  ABDOMEN: Soft nontender and positive bowl sounds   EXTREMITIES:  No clubbing, cyanosis, or pedal  edema . bilateral feet in dressings .  NEUROLOGICAL: AA and mild confused .  Skin : No rashes .  musculoskeletal : No joint swellings .    LABS:                        10.9   8.42  )-----------( 284      ( 07 Apr 2023 08:43 )             36.4     04-08    x   |  x   |  x   ----------------------------<  x   x    |  x   |  1.20    Ca    8.5      07 Apr 2023 08:43  Phos  2.4     04-08  Mg     2.0     04-08    TPro  5.9<L>  /  Alb  2.0<L>  /  TBili  0.5  /  DBili  0.2  /  AST  20  /  ALT  27  /  AlkPhos  64  04-08            Assessment/Plan  Patient has :  1) Left foot infection and toe osteomyelitis   2) Hypertension and stable .  3) DM .  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 4) Patient cardiac wise stable and cleared for foot surgery if needed . Benefits of surgery outweigh the risks . 5) Will hold Lovenox 18-24 hours prior to surgery 6) Metoprolol as ordered for atrial fibrillation control .   Will continue to follow during hospital course and give further recommendations as needed . Thanks for your referral . if any questions please contact me at office ([phone] cell [phone])        KARIME WYNN MD [place] [id number]  [address]  OFFICE : [phone]  CELL : [phone]  CARDIOLOGY F/U :       HPI:  88 yo male ,[facility] resident with PMHx - COPD, DM, HTN, CAD, HLD, H/O TIA, dementia,GERD, BPH and depression sent ot ER for evaluation of left foot 1metatarsal wound ,suggestive of osteomyelitis .Patient was seen by ID consult and transfer to the hospital recommended for wound cx/bone biopsy /podiatry evaluation ,likely will require 4-6 weeks of iv abx . Patient was admitted to [facility] with b/l feet wounds and was followed by wound care team ,recently completed 7 days of doxycycline for foot wound cellulitis . (30 Mar 2023 05:21)        SUBJECTIVE: Patient feeling better .      ALLERGIES:  Allergies    No Known Allergies    Intolerances          MEDICATIONS  (STANDING):  albuterol/ipratropium for Nebulization 3 milliLiter(s) Nebulizer every 8 hours  budesonide 160 MICROgram(s)/formoterol 4.5 MICROgram(s) Inhaler 2 Puff(s) Inhalation two times a day  chlorhexidine 4% Liquid 1 Application(s) Topical <User Schedule>  dextrose 5% + sodium chloride 0.45% with potassium chloride 20 mEq/L 1000 milliLiter(s) (40 mL/Hr) IV Continuous <Continuous>  dextrose 5%. 1000 milliLiter(s) (50 mL/Hr) IV Continuous <Continuous>  dextrose 5%. 1000 milliLiter(s) (100 mL/Hr) IV Continuous <Continuous>  dextrose 50% Injectable 25 Gram(s) IV Push once  dextrose 50% Injectable 12.5 Gram(s) IV Push once  dextrose 50% Injectable 25 Gram(s) IV Push once  donepezil 5 milliGRAM(s) Oral at bedtime  DULoxetine 60 milliGRAM(s) Oral daily  enoxaparin Injectable 60 milliGRAM(s) SubCutaneous every 12 hours  glucagon  Injectable 1 milliGRAM(s) IntraMuscular once  insulin glargine Injectable (LANTUS) 8 Unit(s) SubCutaneous at bedtime  insulin lispro (ADMELOG) corrective regimen sliding scale   SubCutaneous three times a day before meals  insulin lispro (ADMELOG) corrective regimen sliding scale   SubCutaneous at bedtime  lactobacillus acidophilus 1 Tablet(s) Oral two times a day with meals  losartan 25 milliGRAM(s) Oral daily  metoprolol tartrate 25 milliGRAM(s) Oral three times a day  multivitamin 1 Tablet(s) Oral daily  pantoprazole   Suspension 40 milliGRAM(s) Oral daily  piperacillin/tazobactam IVPB.. 3.375 Gram(s) IV Intermittent every 8 hours  senna 2 Tablet(s) Oral at bedtime  simvastatin 40 milliGRAM(s) Oral at bedtime  tamsulosin 0.4 milliGRAM(s) Oral at bedtime    MEDICATIONS  (PRN):  acetaminophen     Tablet .. 650 milliGRAM(s) Oral every 6 hours PRN Temp greater or equal to 38C (100.4F), Mild Pain (1 - 3)  aluminum hydroxide/magnesium hydroxide/simethicone Suspension 30 milliLiter(s) Oral every 4 hours PRN Dyspepsia  bisacodyl Suppository 10 milliGRAM(s) Rectal daily PRN Constipation  dextrose Oral Gel 15 Gram(s) Oral once PRN Blood Glucose LESS THAN 70 milliGRAM(s)/deciliter  hydrALAZINE 50 milliGRAM(s) Oral every 6 hours PRN for systolic BP>160  magnesium hydroxide Suspension 30 milliLiter(s) Oral daily PRN Constipation  melatonin 3 milliGRAM(s) Oral at bedtime PRN Insomnia  ondansetron Injectable 4 milliGRAM(s) IV Push every 8 hours PRN Nausea and/or Vomiting  sodium chloride 0.9% lock flush 10 milliLiter(s) IV Push every 1 hour PRN Pre/post blood products, medications, blood draw, and to maintain line patency      REVIEW OF SYSTEMS:  CONSTITUTIONAL: No fever,  RESPIRATORY: No cough, wheezing, shortness of breath  CARDIOVASCULAR: No chest pain, dyspnea, palpitations, dizziness, syncope, paroxysmal nocturnal dyspnea, orthopnea, or arm or leg swelling  GASTROINTESTINAL: No abdominal  or epigastric pain, nausea, vomiting,  diarrhea  NEUROLOGICAL: No headaches, numbness, or tremors  Skin : No itching .  Hematology : No active bleeding .  Endocrinology : No heat and cold intolerance .  Psychiatry : Patient is confused .  Genitourinary : No dysuria .  Musculoskeletal : Patient has mild arthritis .            Vital Signs Last 24 Hrs  T(C): 36.9 (07 Apr 2023 06:05), Max: 36.9 (07 Apr 2023 06:05)  T(F): 98.4 (07 Apr 2023 06:05), Max: 98.4 (07 Apr 2023 06:05)  HR: 90 (07 Apr 2023 06:05) (76 - 93)  BP: 143/73 (07 Apr 2023 06:05) (123/73 - 143/73)  BP(mean): --  RR: 20 (07 Apr 2023 06:05) (18 - 20)  SpO2: 99% (07 Apr 2023 06:05) (90% - 99%)    Parameters below as of 07 Apr 2023 06:05  Patient On (Oxygen Delivery Method): room air        PHYSICAL EXAM:  HEAD:  Atraumatic, Normocephalic  NECK: Supple and normal thyroid.  No JVD or carotid bruit.   HEART: S1, S2 regular , 1/6 soft ejection systolic murmur in mitral area , no thrill and no gallops .  PULMONARY: Bilateral vesicular breathing , few scattered rhonchi and few scattered rales are present .  ABDOMEN: Soft nontender and positive bowl sounds   EXTREMITIES:  No clubbing, cyanosis, or pedal  edema . Bilateral feet wound present .  NEUROLOGICAL: AA and mild confused    Skin : No rashes .  Musculoskeletal : No joint swellings .    Vital Signs Last 24 Hrs  T(C): 36.4 (08 Apr 2023 20:20), Max: 37.3 (08 Apr 2023 12:54)  T(F): 97.6 (08 Apr 2023 20:20), Max: 99.1 (08 Apr 2023 12:54)  HR: 88 (08 Apr 2023 20:20) (79 - 98)  BP: 125/73 (08 Apr 2023 20:20) (124/69 - 132/66)  BP(mean): --  RR: 18 (08 Apr 2023 20:20) (18 - 18)  SpO2: 93% (08 Apr 2023 20:20) (93% - 97%)    Parameters below as of 08 Apr 2023 20:20  Patient On (Oxygen Delivery Method): room air        PHYSICAL EXAM:  HEAD:  Atraumatic, Normocephalic  NECK: Supple and normal thyroid.  No JVD or carotid bruit.   HEART: S1, S2 irregular , 1/6 soft ejection systolic murmur in mitral area , no thrill and no gallops .  PULMONARY: Bilateral vesicular breathing , few scattered rhonchi and few scattered rales are present .  ABDOMEN: Soft nontender and positive bowl sounds   EXTREMITIES:  No clubbing, cyanosis, or pedal  edema . bilateral feet in dressings .  NEUROLOGICAL: AA and mild confused .  Skin : No rashes .  musculoskeletal : No joint swellings .    LABS:                        10.9   8.42  )-----------( 284      ( 07 Apr 2023 08:43 )             36.4     04-08    x   |  x   |  x   ----------------------------<  x   x    |  x   |  1.20    Ca    8.5      07 Apr 2023 08:43  Phos  2.4     04-08  Mg     2.0     04-08    TPro  5.9<L>  /  Alb  2.0<L>  /  TBili  0.5  /  DBili  0.2  /  AST  20  /  ALT  27  /  AlkPhos  64  04-08            Assessment/Plan  Patient has :  1) Left foot infection and toe osteomyelitis   2) Hypertension and stable .  3) DM .  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 4) Patient cardiac wise stable and cleared for foot surgery if needed . Benefits of surgery outweigh the risks . 5) Will hold Lovenox 18-24 hours prior to surgery 6) Metoprolol as ordered for atrial fibrillation control .   Will continue to follow during hospital course and give further recommendations as needed . Thanks for your referral . if any questions please contact me at office ([phone] cell [phone])        KARIME WYNN MD [place] [id number]  [address]  OFFICE : [id number]  CELL : [phone]  CARDIOLOGY F/U :       HPI:  88 [facility] with PMHx - COPD, DM, HTN, CAD, HLD, H/O TIA, dementia,GERD, BPH and depression sent ot ER for evaluation of left foot 1metatarsal wound ,suggestive of osteomyelitis .Patient was seen by ID consult and transfer to the hospital recommended for wound cx/bone biopsy /podiatry evaluation ,likely will require 4-6 weeks of iv abx . [facility] and was followed by wound care team ,recently completed 7 days of doxycycline for foot wound cellulitis . (30 Mar 2023 05:21)        SUBJECTIVE: Patient feeling better .      ALLERGIES:  Allergies    No Known Allergies    Intolerances          MEDICATIONS  (STANDING):  albuterol/ipratropium for Nebulization 3 milliLiter(s) Nebulizer every 8 hours  budesonide 160 MICROgram(s)/formoterol 4.5 MICROgram(s) Inhaler 2 Puff(s) Inhalation two times a day  chlorhexidine 4% Liquid 1 Application(s) Topical <User Schedule>  dextrose 5% + sodium chloride 0.45% with potassium chloride 20 mEq/L 1000 milliLiter(s) (40 mL/Hr) IV Continuous <Continuous>  dextrose 5%. 1000 milliLiter(s) (50 mL/Hr) IV Continuous <Continuous>  dextrose 5%. 1000 milliLiter(s) (100 mL/Hr) IV Continuous <Continuous>  dextrose 50% Injectable 25 Gram(s) IV Push once  dextrose 50% Injectable 12.5 Gram(s) IV Push once  dextrose 50% Injectable 25 Gram(s) IV Push once  donepezil 5 milliGRAM(s) Oral at bedtime  DULoxetine 60 milliGRAM(s) Oral daily  enoxaparin Injectable 60 milliGRAM(s) SubCutaneous every 12 hours  glucagon  Injectable 1 milliGRAM(s) IntraMuscular once  insulin glargine Injectable (LANTUS) 8 Unit(s) SubCutaneous at bedtime  insulin lispro (ADMELOG) corrective regimen sliding scale   SubCutaneous three times a day before meals  insulin lispro (ADMELOG) corrective regimen sliding scale   SubCutaneous at bedtime  lactobacillus acidophilus 1 Tablet(s) Oral two times a day with meals  losartan 25 milliGRAM(s) Oral daily  metoprolol tartrate 25 milliGRAM(s) Oral three times a day  multivitamin 1 Tablet(s) Oral daily  pantoprazole   Suspension 40 milliGRAM(s) Oral daily  piperacillin/tazobactam IVPB.. 3.375 Gram(s) IV Intermittent every 8 hours  senna 2 Tablet(s) Oral at bedtime  simvastatin 40 milliGRAM(s) Oral at bedtime  tamsulosin 0.4 milliGRAM(s) Oral at bedtime    MEDICATIONS  (PRN):  acetaminophen     Tablet .. 650 milliGRAM(s) Oral every 6 hours PRN Temp greater or equal to 38C (100.4F), Mild Pain (1 - 3)  aluminum hydroxide/magnesium hydroxide/simethicone Suspension 30 milliLiter(s) Oral every 4 hours PRN Dyspepsia  bisacodyl Suppository 10 milliGRAM(s) Rectal daily PRN Constipation  dextrose Oral Gel 15 Gram(s) Oral once PRN Blood Glucose LESS THAN 70 milliGRAM(s)/deciliter  hydrALAZINE 50 milliGRAM(s) Oral every 6 hours PRN for systolic BP>160  magnesium hydroxide Suspension 30 milliLiter(s) Oral daily PRN Constipation  melatonin 3 milliGRAM(s) Oral at bedtime PRN Insomnia  ondansetron Injectable 4 milliGRAM(s) IV Push every 8 hours PRN Nausea and/or Vomiting  sodium chloride 0.9% lock flush 10 milliLiter(s) IV Push every 1 hour PRN Pre/post blood products, medications, blood draw, and to maintain line patency      REVIEW OF SYSTEMS:  CONSTITUTIONAL: No fever,  RESPIRATORY: No cough, wheezing, shortness of breath  CARDIOVASCULAR: No chest pain, dyspnea, palpitations, dizziness, syncope, paroxysmal nocturnal dyspnea, orthopnea, or arm or leg swelling  GASTROINTESTINAL: No abdominal  or epigastric pain, nausea, vomiting,  diarrhea  NEUROLOGICAL: No headaches, numbness, or tremors  Skin : No itching .  Hematology : No active bleeding .  Endocrinology : No heat and cold intolerance .  Psychiatry : Patient is confused .  Genitourinary : No dysuria .  Musculoskeletal : Patient has mild arthritis .            Vital Signs Last 24 Hrs  T(C): 36.9 (07 Apr 2023 06:05), Max: 36.9 (07 Apr 2023 06:05)  T(F): 98.4 (07 Apr 2023 06:05), Max: 98.4 (07 Apr 2023 06:05)  HR: 90 (07 Apr 2023 06:05) (76 - 93)  BP: 143/73 (07 Apr 2023 06:05) (123/73 - 143/73)  BP(mean): --  RR: 20 (07 Apr 2023 06:05) (18 - 20)  SpO2: 99% (07 Apr 2023 06:05) (90% - 99%)    Parameters below as of 07 Apr 2023 06:05  Patient On (Oxygen Delivery Method): room air        PHYSICAL EXAM:  HEAD:  Atraumatic, Normocephalic  NECK: Supple and normal thyroid.  No JVD or carotid bruit.   HEART: S1, S2 regular , 1/6 soft ejection systolic murmur in mitral area , no thrill and no gallops .  PULMONARY: Bilateral vesicular breathing , few scattered rhonchi and few scattered rales are present .  ABDOMEN: Soft nontender and positive bowl sounds   EXTREMITIES:  No clubbing, cyanosis, or pedal  edema . Bilateral feet wound present .  NEUROLOGICAL: AA and mild confused    Skin : No rashes .  Musculoskeletal : No joint swellings .    Vital Signs Last 24 Hrs  T(C): 36.4 (08 Apr 2023 20:20), Max: 37.3 (08 Apr 2023 12:54)  T(F): 97.6 (08 Apr 2023 20:20), Max: 99.1 (08 Apr 2023 12:54)  HR: 88 (08 Apr 2023 20:20) (79 - 98)  BP: 125/73 (08 Apr 2023 20:20) (124/69 - 132/66)  BP(mean): --  RR: 18 (08 Apr 2023 20:20) (18 - 18)  SpO2: 93% (08 Apr 2023 20:20) (93% - 97%)    Parameters below as of 08 Apr 2023 20:20  Patient On (Oxygen Delivery Method): room air        PHYSICAL EXAM:  HEAD:  Atraumatic, Normocephalic  NECK: Supple and normal thyroid.  No JVD or carotid bruit.   HEART: S1, S2 irregular , 1/6 soft ejection systolic murmur in mitral area , no thrill and no gallops .  PULMONARY: Bilateral vesicular breathing , few scattered rhonchi and few scattered rales are present .  ABDOMEN: Soft nontender and positive bowl sounds   EXTREMITIES:  No clubbing, cyanosis, or pedal  edema . bilateral feet in dressings .  NEUROLOGICAL: AA and mild confused .  Skin : No rashes .  musculoskeletal : No joint swellings .    LABS:                        10.9   8.42  )-----------( 284      ( 07 Apr 2023 08:43 )             36.4     04-08    x   |  x   |  x   ----------------------------<  x   x    |  x   |  1.20    Ca    8.5      07 Apr 2023 08:43  Phos  2.4     04-08  Mg     2.0     04-08    TPro  5.9<L>  /  Alb  2.0<L>  /  TBili  0.5  /  DBili  0.2  /  AST  20  /  ALT  27  /  AlkPhos  64  04-08            Assessment/Plan  Patient has :  1) Left foot infection and toe osteomyelitis   2) Hypertension and stable .  3) DM .  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 4) Patient cardiac wise stable and cleared for foot surgery if needed . Benefits of surgery outweigh the risks . 5) Will hold Lovenox 18-24 hours prior to surgery 6) Metoprolol as ordered for atrial fibrillation control .   Will continue to follow during hospital course and give further recommendations as needed . Thanks for your referral . if any questions please contact me at office ([phone] cell [phone])        KARIME WYNN MD [place] [id number]  [address]  OFFICE : [phone]  CELL : [phone]  CARDIOLOGY F/U :       HPI:  88 yo male ,[facility] resident with PMHx - COPD, DM, HTN, CAD, HLD, H/O TIA, dementia,GERD, BPH and depression sent ot ER for evaluation of left foot 1metatarsal wound ,suggestive of osteomyelitis .Patient was seen by ID consult and transfer to the hospital recommended for wound cx/bone biopsy /podiatry evaluation ,likely will require 4-6 weeks of iv abx . Patient was admitted to [facility] with b/l feet wounds and was followed by wound care team ,recently completed 7 days of doxycycline for foot wound cellulitis . (30 Mar 2023 05:21)        SUBJECTIVE: Patient feeling better .      ALLERGIES:  Allergies    No Known Allergies    Intolerances          MEDICATIONS  (STANDING):  albuterol/ipratropium for Nebulization 3 milliLiter(s) Nebulizer every 8 hours  budesonide 160 MICROgram(s)/formoterol 4.5 MICROgram(s) Inhaler 2 Puff(s) Inhalation two times a day  chlorhexidine 4% Liquid 1 Application(s) Topical <User Schedule>  dextrose 5% + sodium chloride 0.45% with potassium chloride 20 mEq/L 1000 milliLiter(s) (40 mL/Hr) IV Continuous <Continuous>  dextrose 5%. 1000 milliLiter(s) (50 mL/Hr) IV Continuous <Continuous>  dextrose 5%. 1000 milliLiter(s) (100 mL/Hr) IV Continuous <Continuous>  dextrose 50% Injectable 25 Gram(s) IV Push once  dextrose 50% Injectable 12.5 Gram(s) IV Push once  dextrose 50% Injectable 25 Gram(s) IV Push once  donepezil 5 milliGRAM(s) Oral at bedtime  DULoxetine 60 milliGRAM(s) Oral daily  enoxaparin Injectable 60 milliGRAM(s) SubCutaneous every 12 hours  glucagon  Injectable 1 milliGRAM(s) IntraMuscular once  insulin glargine Injectable (LANTUS) 8 Unit(s) SubCutaneous at bedtime  insulin lispro (ADMELOG) corrective regimen sliding scale   SubCutaneous three times a day before meals  insulin lispro (ADMELOG) corrective regimen sliding scale   SubCutaneous at bedtime  lactobacillus acidophilus 1 Tablet(s) Oral two times a day with meals  losartan 25 milliGRAM(s) Oral daily  metoprolol tartrate 25 milliGRAM(s) Oral three times a day  multivitamin 1 Tablet(s) Oral daily  pantoprazole   Suspension 40 milliGRAM(s) Oral daily  piperacillin/tazobactam IVPB.. 3.375 Gram(s) IV Intermittent every 8 hours  senna 2 Tablet(s) Oral at bedtime  simvastatin 40 milliGRAM(s) Oral at bedtime  tamsulosin 0.4 milliGRAM(s) Oral at bedtime    MEDICATIONS  (PRN):  acetaminophen     Tablet .. 650 milliGRAM(s) Oral every 6 hours PRN Temp greater or equal to 38C (100.4F), Mild Pain (1 - 3)  aluminum hydroxide/magnesium hydroxide/simethicone Suspension 30 milliLiter(s) Oral every 4 hours PRN Dyspepsia  bisacodyl Suppository 10 milliGRAM(s) Rectal daily PRN Constipation  dextrose Oral Gel 15 Gram(s) Oral once PRN Blood Glucose LESS THAN 70 milliGRAM(s)/deciliter  hydrALAZINE 50 milliGRAM(s) Oral every 6 hours PRN for systolic BP>160  magnesium hydroxide Suspension 30 milliLiter(s) Oral daily PRN Constipation  melatonin 3 milliGRAM(s) Oral at bedtime PRN Insomnia  ondansetron Injectable 4 milliGRAM(s) IV Push every 8 hours PRN Nausea and/or Vomiting  sodium chloride 0.9% lock flush 10 milliLiter(s) IV Push every 1 hour PRN Pre/post blood products, medications, blood draw, and to maintain line patency      REVIEW OF SYSTEMS:  CONSTITUTIONAL: No fever,  RESPIRATORY: No cough, wheezing, shortness of breath  CARDIOVASCULAR: No chest pain, dyspnea, palpitations, dizziness, syncope, paroxysmal nocturnal dyspnea, orthopnea, or arm or leg swelling  GASTROINTESTINAL: No abdominal  or epigastric pain, nausea, vomiting,  diarrhea  NEUROLOGICAL: No headaches, numbness, or tremors  Skin : No itching .  Hematology : No active bleeding .  Endocrinology : No heat and cold intolerance .  Psychiatry : Patient is confused .  Genitourinary : No dysuria .  Musculoskeletal : Patient has mild arthritis .            Vital Signs Last 24 Hrs  T(C): 36.9 (07 Apr 2023 06:05), Max: 36.9 (07 Apr 2023 06:05)  T(F): 98.4 (07 Apr 2023 06:05), Max: 98.4 (07 Apr 2023 06:05)  HR: 90 (07 Apr 2023 06:05) (76 - 93)  BP: 143/73 (07 Apr 2023 06:05) (123/73 - 143/73)  BP(mean): --  RR: 20 (07 Apr 2023 06:05) (18 - 20)  SpO2: 99% (07 Apr 2023 06:05) (90% - 99%)    Parameters below as of 07 Apr 2023 06:05  Patient On (Oxygen Delivery Method): room air        PHYSICAL EXAM:  HEAD:  Atraumatic, Normocephalic  NECK: Supple and normal thyroid.  No JVD or carotid bruit.   HEART: S1, S2 regular , 1/6 soft ejection systolic murmur in mitral area , no thrill and no gallops .  PULMONARY: Bilateral vesicular breathing , few scattered rhonchi and few scattered rales are present .  ABDOMEN: Soft nontender and positive bowl sounds   EXTREMITIES:  No clubbing, cyanosis, or pedal  edema . Bilateral feet wound present .  NEUROLOGICAL: AA and mild confused    Skin : No rashes .  Musculoskeletal : No joint swellings .    Vital Signs Last 24 Hrs  T(C): 36.4 (08 Apr 2023 20:20), Max: 37.3 (08 Apr 2023 12:54)  T(F): 97.6 (08 Apr 2023 20:20), Max: 99.1 (08 Apr 2023 12:54)  HR: 88 (08 Apr 2023 20:20) (79 - 98)  BP: 125/73 (08 Apr 2023 20:20) (124/69 - 132/66)  BP(mean): --  RR: 18 (08 Apr 2023 20:20) (18 - 18)  SpO2: 93% (08 Apr 2023 20:20) (93% - 97%)    Parameters below as of 08 Apr 2023 20:20  Patient On (Oxygen Delivery Method): room air        PHYSICAL EXAM:  HEAD:  Atraumatic, Normocephalic  NECK: Supple and normal thyroid.  No JVD or carotid bruit.   HEART: S1, S2 irregular , 1/6 soft ejection systolic murmur in mitral area , no thrill and no gallops .  PULMONARY: Bilateral vesicular breathing , few scattered rhonchi and few scattered rales are present .  ABDOMEN: Soft nontender and positive bowl sounds   EXTREMITIES:  No clubbing, cyanosis, or pedal  edema . bilateral feet in dressings .  NEUROLOGICAL: AA and mild confused .  Skin : No rashes .  musculoskeletal : No joint swellings .    LABS:                        10.9   8.42  )-----------( 284      ( 07 Apr 2023 08:43 )             36.4     04-08    x   |  x   |  x   ----------------------------<  x   x    |  x   |  1.20    Ca    8.5      07 Apr 2023 08:43  Phos  2.4     04-08  Mg     2.0     04-08    TPro  5.9<L>  /  Alb  2.0<L>  /  TBili  0.5  /  DBili  0.2  /  AST  20  /  ALT  27  /  AlkPhos  64  04-08            Assessment/Plan  Patient has :  1) Left foot infection and toe osteomyelitis   2) Hypertension and stable .  3) DM .  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 4) Patient cardiac wise stable and cleared for foot surgery if needed . Benefits of surgery outweigh the risks . 5) Will hold Lovenox 18-24 hours prior to surgery 6) Metoprolol as ordered for atrial fibrillation control .   Will continue to follow during hospital course and give further recommendations as needed . Thanks for your referral . if any questions please contact me at office ([phone] cell [phone])        KARIME WYNN MD [place] [id number]  [address]  OFFICE : [id number]  CELL : [phone]  CARDIOLOGY F/U :       HPI:  88 yo male ,[facility] resident with PMHx - COPD, DM, HTN, CAD, HLD, H/O TIA, dementia,GERD, BPH and depression sent ot ER for evaluation of left foot 1metatarsal wound ,suggestive of osteomyelitis .Patient was seen by ID consult and transfer to the hospital recommended for wound cx/bone biopsy /podiatry evaluation ,likely will require 4-6 weeks of iv abx . Patient was admitted to [facility] with b/l feet wounds and was followed by wound care team ,recently completed 7 days of doxycycline for foot wound cellulitis . (30 Mar 2023 05:21)        SUBJECTIVE: Patient feeling better .      ALLERGIES:  Allergies    No Known Allergies    Intolerances          MEDICATIONS  (STANDING):  albuterol/ipratropium for Nebulization 3 milliLiter(s) Nebulizer every 8 hours  budesonide 160 MICROgram(s)/formoterol 4.5 MICROgram(s) Inhaler 2 Puff(s) Inhalation two times a day  chlorhexidine 4% Liquid 1 Application(s) Topical <User Schedule>  dextrose 5% + sodium chloride 0.45% with potassium chloride 20 mEq/L 1000 milliLiter(s) (40 mL/Hr) IV Continuous <Continuous>  dextrose 5%. 1000 milliLiter(s) (50 mL/Hr) IV Continuous <Continuous>  dextrose 5%. 1000 milliLiter(s) (100 mL/Hr) IV Continuous <Continuous>  dextrose 50% Injectable 25 Gram(s) IV Push once  dextrose 50% Injectable 12.5 Gram(s) IV Push once  dextrose 50% Injectable 25 Gram(s) IV Push once  donepezil 5 milliGRAM(s) Oral at bedtime  DULoxetine 60 milliGRAM(s) Oral daily  enoxaparin Injectable 60 milliGRAM(s) SubCutaneous every 12 hours  glucagon  Injectable 1 milliGRAM(s) IntraMuscular once  insulin glargine Injectable (LANTUS) 8 Unit(s) SubCutaneous at bedtime  insulin lispro (ADMELOG) corrective regimen sliding scale   SubCutaneous three times a day before meals  insulin lispro (ADMELOG) corrective regimen sliding scale   SubCutaneous at bedtime  lactobacillus acidophilus 1 Tablet(s) Oral two times a day with meals  losartan 25 milliGRAM(s) Oral daily  metoprolol tartrate 25 milliGRAM(s) Oral three times a day  multivitamin 1 Tablet(s) Oral daily  pantoprazole   Suspension 40 milliGRAM(s) Oral daily  piperacillin/tazobactam IVPB.. 3.375 Gram(s) IV Intermittent every 8 hours  senna 2 Tablet(s) Oral at bedtime  simvastatin 40 milliGRAM(s) Oral at bedtime  tamsulosin 0.4 milliGRAM(s) Oral at bedtime    MEDICATIONS  (PRN):  acetaminophen     Tablet .. 650 milliGRAM(s) Oral every 6 hours PRN Temp greater or equal to 38C (100.4F), Mild Pain (1 - 3)  aluminum hydroxide/magnesium hydroxide/simethicone Suspension 30 milliLiter(s) Oral every 4 hours PRN Dyspepsia  bisacodyl Suppository 10 milliGRAM(s) Rectal daily PRN Constipation  dextrose Oral Gel 15 Gram(s) Oral once PRN Blood Glucose LESS THAN 70 milliGRAM(s)/deciliter  hydrALAZINE 50 milliGRAM(s) Oral every 6 hours PRN for systolic BP>160  magnesium hydroxide Suspension 30 milliLiter(s) Oral daily PRN Constipation  melatonin 3 milliGRAM(s) Oral at bedtime PRN Insomnia  ondansetron Injectable 4 milliGRAM(s) IV Push every 8 hours PRN Nausea and/or Vomiting  sodium chloride 0.9% lock flush 10 milliLiter(s) IV Push every 1 hour PRN Pre/post blood products, medications, blood draw, and to maintain line patency      REVIEW OF SYSTEMS:  CONSTITUTIONAL: No fever,  RESPIRATORY: No cough, wheezing, shortness of breath  CARDIOVASCULAR: No chest pain, dyspnea, palpitations, dizziness, syncope, paroxysmal nocturnal dyspnea, orthopnea, or arm or leg swelling  GASTROINTESTINAL: No abdominal  or epigastric pain, nausea, vomiting,  diarrhea  NEUROLOGICAL: No headaches, numbness, or tremors  Skin : No itching .  Hematology : No active bleeding .  Endocrinology : No heat and cold intolerance .  Psychiatry : Patient is confused .  Genitourinary : No dysuria .  Musculoskeletal : Patient has mild arthritis .            Vital Signs Last 24 Hrs  T(C): 36.9 (07 Apr 2023 06:05), Max: 36.9 (07 Apr 2023 06:05)  T(F): 98.4 (07 Apr 2023 06:05), Max: 98.4 (07 Apr 2023 06:05)  HR: 90 (07 Apr 2023 06:05) (76 - 93)  BP: 143/73 (07 Apr 2023 06:05) (123/73 - 143/73)  BP(mean): --  RR: 20 (07 Apr 2023 06:05) (18 - 20)  SpO2: 99% (07 Apr 2023 06:05) (90% - 99%)    Parameters below as of 07 Apr 2023 06:05  Patient On (Oxygen Delivery Method): room air        PHYSICAL EXAM:  HEAD:  Atraumatic, Normocephalic  NECK: Supple and normal thyroid.  No JVD or carotid bruit.   HEART: S1, S2 regular , 1/6 soft ejection systolic murmur in mitral area , no thrill and no gallops .  PULMONARY: Bilateral vesicular breathing , few scattered rhonchi and few scattered rales are present .  ABDOMEN: Soft nontender and positive bowl sounds   EXTREMITIES:  No clubbing, cyanosis, or pedal  edema . Bilateral feet wound present .  NEUROLOGICAL: AA and mild confused    Skin : No rashes .  Musculoskeletal : No joint swellings .    Vital Signs Last 24 Hrs  T(C): 36.4 (08 Apr 2023 20:20), Max: 37.3 (08 Apr 2023 12:54)  T(F): 97.6 (08 Apr 2023 20:20), Max: 99.1 (08 Apr 2023 12:54)  HR: 88 (08 Apr 2023 20:20) (79 - 98)  BP: 125/73 (08 Apr 2023 20:20) (124/69 - 132/66)  BP(mean): --  RR: 18 (08 Apr 2023 20:20) (18 - 18)  SpO2: 93% (08 Apr 2023 20:20) (93% - 97%)    Parameters below as of 08 Apr 2023 20:20  Patient On (Oxygen Delivery Method): room air        PHYSICAL EXAM:  HEAD:  Atraumatic, Normocephalic  NECK: Supple and normal thyroid.  No JVD or carotid bruit.   HEART: S1, S2 irregular , 1/6 soft ejection systolic murmur in mitral area , no thrill and no gallops .  PULMONARY: Bilateral vesicular breathing , few scattered rhonchi and few scattered rales are present .  ABDOMEN: Soft nontender and positive bowl sounds   EXTREMITIES:  No clubbing, cyanosis, or pedal  edema . bilateral feet in dressings .  NEUROLOGICAL: AA and mild confused .  Skin : No rashes .  musculoskeletal : No joint swellings .    LABS:                        10.9   8.42  )-----------( 284      ( 07 Apr 2023 08:43 )             36.4     04-08    x   |  x   |  x   ----------------------------<  x   x    |  x   |  1.20    Ca    8.5      07 Apr 2023 08:43  Phos  2.4     04-08  Mg     2.0     04-08    TPro  5.9<L>  /  Alb  2.0<L>  /  TBili  0.5  /  DBili  0.2  /  AST  20  /  ALT  27  /  AlkPhos  64  04-08            Assessment/Plan  Patient has :  1) Left foot infection and toe osteomyelitis   2) Hypertension and stable .  3) DM .  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 4) Patient cardiac wise stable and cleared for foot surgery if needed . Benefits of surgery outweigh the risks . 5) Will hold Lovenox 18-24 hours prior to surgery 6) Metoprolol as ordered for atrial fibrillation control .   Will continue to follow during hospital course and give further recommendations as needed . Thanks for your referral . if any questions please contact me at office ([phone] cell [phone])        KARIME WYNN MD [place] [id number]  [address]  OFFICE : [phone]  CELL : [phone]  CARDIOLOGY F/U :       HPI:  86 yo male ,[facility] resident with PMHx - COPD, DM, HTN, CAD, HLD, H/O TIA, dementia,GERD, BPH and depression sent ot ER for evaluation of left foot 1metatarsal wound ,suggestive of osteomyelitis .Patient was seen by ID consult and transfer to the hospital recommended for wound cx/bone biopsy /podiatry evaluation ,likely will require 4-6 weeks of iv abx . Patient was admitted to [facility] with b/l feet wounds and was followed by wound care team ,recently completed 7 days of doxycycline for foot wound cellulitis . (30 Mar 2023 05:21)        SUBJECTIVE: Patient feeling better .      ALLERGIES:  Allergies    No Known Allergies    Intolerances          MEDICATIONS  (STANDING):  albuterol/ipratropium for Nebulization 3 milliLiter(s) Nebulizer every 8 hours  budesonide 160 MICROgram(s)/formoterol 4.5 MICROgram(s) Inhaler 2 Puff(s) Inhalation two times a day  chlorhexidine 4% Liquid 1 Application(s) Topical <User Schedule>  dextrose 5% + sodium chloride 0.45% with potassium chloride 20 mEq/L 1000 milliLiter(s) (40 mL/Hr) IV Continuous <Continuous>  dextrose 5%. 1000 milliLiter(s) (50 mL/Hr) IV Continuous <Continuous>  dextrose 5%. 1000 milliLiter(s) (100 mL/Hr) IV Continuous <Continuous>  dextrose 50% Injectable 25 Gram(s) IV Push once  dextrose 50% Injectable 12.5 Gram(s) IV Push once  dextrose 50% Injectable 25 Gram(s) IV Push once  donepezil 5 milliGRAM(s) Oral at bedtime  DULoxetine 60 milliGRAM(s) Oral daily  enoxaparin Injectable 60 milliGRAM(s) SubCutaneous every 12 hours  glucagon  Injectable 1 milliGRAM(s) IntraMuscular once  insulin glargine Injectable (LANTUS) 8 Unit(s) SubCutaneous at bedtime  insulin lispro (ADMELOG) corrective regimen sliding scale   SubCutaneous three times a day before meals  insulin lispro (ADMELOG) corrective regimen sliding scale   SubCutaneous at bedtime  lactobacillus acidophilus 1 Tablet(s) Oral two times a day with meals  losartan 25 milliGRAM(s) Oral daily  metoprolol tartrate 25 milliGRAM(s) Oral three times a day  multivitamin 1 Tablet(s) Oral daily  pantoprazole   Suspension 40 milliGRAM(s) Oral daily  piperacillin/tazobactam IVPB.. 3.375 Gram(s) IV Intermittent every 8 hours  senna 2 Tablet(s) Oral at bedtime  simvastatin 40 milliGRAM(s) Oral at bedtime  tamsulosin 0.4 milliGRAM(s) Oral at bedtime    MEDICATIONS  (PRN):  acetaminophen     Tablet .. 650 milliGRAM(s) Oral every 6 hours PRN Temp greater or equal to 38C (100.4F), Mild Pain (1 - 3)  aluminum hydroxide/magnesium hydroxide/simethicone Suspension 30 milliLiter(s) Oral every 4 hours PRN Dyspepsia  bisacodyl Suppository 10 milliGRAM(s) Rectal daily PRN Constipation  dextrose Oral Gel 15 Gram(s) Oral once PRN Blood Glucose LESS THAN 70 milliGRAM(s)/deciliter  hydrALAZINE 50 milliGRAM(s) Oral every 6 hours PRN for systolic BP>160  magnesium hydroxide Suspension 30 milliLiter(s) Oral daily PRN Constipation  melatonin 3 milliGRAM(s) Oral at bedtime PRN Insomnia  ondansetron Injectable 4 milliGRAM(s) IV Push every 8 hours PRN Nausea and/or Vomiting  sodium chloride 0.9% lock flush 10 milliLiter(s) IV Push every 1 hour PRN Pre/post blood products, medications, blood draw, and to maintain line patency      REVIEW OF SYSTEMS:  CONSTITUTIONAL: No fever,  RESPIRATORY: No cough, wheezing, shortness of breath  CARDIOVASCULAR: No chest pain, dyspnea, palpitations, dizziness, syncope, paroxysmal nocturnal dyspnea, orthopnea, or arm or leg swelling  GASTROINTESTINAL: No abdominal  or epigastric pain, nausea, vomiting,  diarrhea  NEUROLOGICAL: No headaches, numbness, or tremors  Skin : No itching .  Hematology : No active bleeding .  Endocrinology : No heat and cold intolerance .  Psychiatry : Patient is confused .  Genitourinary : No dysuria .  Musculoskeletal : Patient has mild arthritis .            Vital Signs Last 24 Hrs  T(C): 36.9 (07 Apr 2023 06:05), Max: 36.9 (07 Apr 2023 06:05)  T(F): 98.4 (07 Apr 2023 06:05), Max: 98.4 (07 Apr 2023 06:05)  HR: 90 (07 Apr 2023 06:05) (76 - 93)  BP: 143/73 (07 Apr 2023 06:05) (123/73 - 143/73)  BP(mean): --  RR: 20 (07 Apr 2023 06:05) (18 - 20)  SpO2: 99% (07 Apr 2023 06:05) (90% - 99%)    Parameters below as of 07 Apr 2023 06:05  Patient On (Oxygen Delivery Method): room air        PHYSICAL EXAM:  HEAD:  Atraumatic, Normocephalic  NECK: Supple and normal thyroid.  No JVD or carotid bruit.   HEART: S1, S2 regular , 1/6 soft ejection systolic murmur in mitral area , no thrill and no gallops .  PULMONARY: Bilateral vesicular breathing , few scattered rhonchi and few scattered rales are present .  ABDOMEN: Soft nontender and positive bowl sounds   EXTREMITIES:  No clubbing, cyanosis, or pedal  edema . Bilateral feet wound present .  NEUROLOGICAL: AA and mild confused    Skin : No rashes .  Musculoskeletal : No joint swellings .    Vital Signs Last 24 Hrs  T(C): 36.4 (08 Apr 2023 20:20), Max: 37.3 (08 Apr 2023 12:54)  T(F): 97.6 (08 Apr 2023 20:20), Max: 99.1 (08 Apr 2023 12:54)  HR: 88 (08 Apr 2023 20:20) (79 - 98)  BP: 125/73 (08 Apr 2023 20:20) (124/69 - 132/66)  BP(mean): --  RR: 18 (08 Apr 2023 20:20) (18 - 18)  SpO2: 93% (08 Apr 2023 20:20) (93% - 97%)    Parameters below as of 08 Apr 2023 20:20  Patient On (Oxygen Delivery Method): room air        PHYSICAL EXAM:  HEAD:  Atraumatic, Normocephalic  NECK: Supple and normal thyroid.  No JVD or carotid bruit.   HEART: S1, S2 irregular , 1/6 soft ejection systolic murmur in mitral area , no thrill and no gallops .  PULMONARY: Bilateral vesicular breathing , few scattered rhonchi and few scattered rales are present .  ABDOMEN: Soft nontender and positive bowl sounds   EXTREMITIES:  No clubbing, cyanosis, or pedal  edema . bilateral feet in dressings .  NEUROLOGICAL: AA and mild confused .  Skin : No rashes .  musculoskeletal : No joint swellings .    LABS:                        10.9   8.42  )-----------( 284      ( 07 Apr 2023 08:43 )             36.4     04-08    x   |  x   |  x   ----------------------------<  x   x    |  x   |  1.20    Ca    8.5      07 Apr 2023 08:43  Phos  2.4     04-08  Mg     2.0     04-08    TPro  5.9<L>  /  Alb  2.0<L>  /  TBili  0.5  /  DBili  0.2  /  AST  20  /  ALT  27  /  AlkPhos  64  04-08            Assessment/Plan  Patient has :  1) Left foot infection and toe osteomyelitis   2) Hypertension and stable .  3) DM .  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 4) Patient cardiac wise stable and cleared for foot surgery if needed . Benefits of surgery outweigh the risks . 5) Will hold Lovenox 18-24 hours prior to surgery 6) Increase metoprolol 50 mg twice a day .  Will continue to follow during hospital course and give further recommendations as needed . Thanks for your referral . if any questions please contact me at office ([phone] cell [phone])        KARIME WYNN MD [place] [id number]  [address]  OFFICE : [phone]  CELL : [phone]  CARDIOLOGY F/U :       HPI:  86 yo male ,formerly [facility] resident with PMHx - COPD, DM, HTN, CAD, HLD, H/O TIA, dementia,GERD, BPH and depression sent ot ER for evaluation of left foot 1metatarsal wound ,suggestive of osteomyelitis .Patient was seen by ID consult and transfer to the hospital recommended for wound cx/bone biopsy /podiatry evaluation ,likely will require 4-6 weeks of iv abx . Patient was admitted to formerly [facility] with b/l feet wounds and was followed by wound care team ,recently completed 7 days of doxycycline for foot wound cellulitis . (30 Mar 2023 05:21)        SUBJECTIVE: Patient feeling better .      ALLERGIES:  Allergies    No Known Allergies    Intolerances          MEDICATIONS  (STANDING):  albuterol/ipratropium for Nebulization 3 milliLiter(s) Nebulizer every 8 hours  budesonide 160 MICROgram(s)/formoterol 4.5 MICROgram(s) Inhaler 2 Puff(s) Inhalation two times a day  chlorhexidine 4% Liquid 1 Application(s) Topical <User Schedule>  dextrose 5% + sodium chloride 0.45% with potassium chloride 20 mEq/L 1000 milliLiter(s) (40 mL/Hr) IV Continuous <Continuous>  dextrose 5%. 1000 milliLiter(s) (50 mL/Hr) IV Continuous <Continuous>  dextrose 5%. 1000 milliLiter(s) (100 mL/Hr) IV Continuous <Continuous>  dextrose 50% Injectable 25 Gram(s) IV Push once  dextrose 50% Injectable 12.5 Gram(s) IV Push once  dextrose 50% Injectable 25 Gram(s) IV Push once  donepezil 5 milliGRAM(s) Oral at bedtime  DULoxetine 60 milliGRAM(s) Oral daily  enoxaparin Injectable 60 milliGRAM(s) SubCutaneous every 12 hours  glucagon  Injectable 1 milliGRAM(s) IntraMuscular once  insulin glargine Injectable (LANTUS) 8 Unit(s) SubCutaneous at bedtime  insulin lispro (ADMELOG) corrective regimen sliding scale   SubCutaneous three times a day before meals  insulin lispro (ADMELOG) corrective regimen sliding scale   SubCutaneous at bedtime  lactobacillus acidophilus 1 Tablet(s) Oral two times a day with meals  losartan 25 milliGRAM(s) Oral daily  metoprolol tartrate 25 milliGRAM(s) Oral three times a day  multivitamin 1 Tablet(s) Oral daily  pantoprazole   Suspension 40 milliGRAM(s) Oral daily  piperacillin/tazobactam IVPB.. 3.375 Gram(s) IV Intermittent every 8 hours  senna 2 Tablet(s) Oral at bedtime  simvastatin 40 milliGRAM(s) Oral at bedtime  tamsulosin 0.4 milliGRAM(s) Oral at bedtime    MEDICATIONS  (PRN):  acetaminophen     Tablet .. 650 milliGRAM(s) Oral every 6 hours PRN Temp greater or equal to 38C (100.4F), Mild Pain (1 - 3)  aluminum hydroxide/magnesium hydroxide/simethicone Suspension 30 milliLiter(s) Oral every 4 hours PRN Dyspepsia  bisacodyl Suppository 10 milliGRAM(s) Rectal daily PRN Constipation  dextrose Oral Gel 15 Gram(s) Oral once PRN Blood Glucose LESS THAN 70 milliGRAM(s)/deciliter  hydrALAZINE 50 milliGRAM(s) Oral every 6 hours PRN for systolic BP>160  magnesium hydroxide Suspension 30 milliLiter(s) Oral daily PRN Constipation  melatonin 3 milliGRAM(s) Oral at bedtime PRN Insomnia  ondansetron Injectable 4 milliGRAM(s) IV Push every 8 hours PRN Nausea and/or Vomiting  sodium chloride 0.9% lock flush 10 milliLiter(s) IV Push every 1 hour PRN Pre/post blood products, medications, blood draw, and to maintain line patency      REVIEW OF SYSTEMS:  CONSTITUTIONAL: No fever,  RESPIRATORY: No cough, wheezing, shortness of breath  CARDIOVASCULAR: No chest pain, dyspnea, palpitations, dizziness, syncope, paroxysmal nocturnal dyspnea, orthopnea, or arm or leg swelling  GASTROINTESTINAL: No abdominal  or epigastric pain, nausea, vomiting,  diarrhea  NEUROLOGICAL: No headaches, numbness, or tremors  Skin : No itching .  Hematology : No active bleeding .  Endocrinology : No heat and cold intolerance .  Psychiatry : Patient is confused .  Genitourinary : No dysuria .  Musculoskeletal : Patient has mild arthritis .            Vital Signs Last 24 Hrs  T(C): 36.9 (07 Apr 2023 06:05), Max: 36.9 (07 Apr 2023 06:05)  T(F): 98.4 (07 Apr 2023 06:05), Max: 98.4 (07 Apr 2023 06:05)  HR: 90 (07 Apr 2023 06:05) (76 - 93)  BP: 143/73 (07 Apr 2023 06:05) (123/73 - 143/73)  BP(mean): --  RR: 20 (07 Apr 2023 06:05) (18 - 20)  SpO2: 99% (07 Apr 2023 06:05) (90% - 99%)    Parameters below as of 07 Apr 2023 06:05  Patient On (Oxygen Delivery Method): room air        PHYSICAL EXAM:  HEAD:  Atraumatic, Normocephalic  NECK: Supple and normal thyroid.  No JVD or carotid bruit.   HEART: S1, S2 regular , 1/6 soft ejection systolic murmur in mitral area , no thrill and no gallops .  PULMONARY: Bilateral vesicular breathing , few scattered rhonchi and few scattered rales are present .  ABDOMEN: Soft nontender and positive bowl sounds   EXTREMITIES:  No clubbing, cyanosis, or pedal  edema . Bilateral feet wound present .  NEUROLOGICAL: AA and mild confused    Skin : No rashes .  Musculoskeletal : No joint swellings .    Vital Signs Last 24 Hrs  T(C): 36.4 (08 Apr 2023 20:20), Max: 37.3 (08 Apr 2023 12:54)  T(F): 97.6 (08 Apr 2023 20:20), Max: 99.1 (08 Apr 2023 12:54)  HR: 88 (08 Apr 2023 20:20) (79 - 98)  BP: 125/73 (08 Apr 2023 20:20) (124/69 - 132/66)  BP(mean): --  RR: 18 (08 Apr 2023 20:20) (18 - 18)  SpO2: 93% (08 Apr 2023 20:20) (93% - 97%)    Parameters below as of 08 Apr 2023 20:20  Patient On (Oxygen Delivery Method): room air        PHYSICAL EXAM:  HEAD:  Atraumatic, Normocephalic  NECK: Supple and normal thyroid.  No JVD or carotid bruit.   HEART: S1, S2 irregular , 1/6 soft ejection systolic murmur in mitral area , no thrill and no gallops .  PULMONARY: Bilateral vesicular breathing , few scattered rhonchi and few scattered rales are present .  ABDOMEN: Soft nontender and positive bowl sounds   EXTREMITIES:  No clubbing, cyanosis, or pedal  edema . bilateral feet in dressings .  NEUROLOGICAL: AA and mild confused .  Skin : No rashes .  musculoskeletal : No joint swellings .    LABS:                        10.9   8.42  )-----------( 284      ( 07 Apr 2023 08:43 )             36.4     04-08    x   |  x   |  x   ----------------------------<  x   x    |  x   |  1.20    Ca    8.5      07 Apr 2023 08:43  Phos  2.4     04-08  Mg     2.0     04-08    TPro  5.9<L>  /  Alb  2.0<L>  /  TBili  0.5  /  DBili  0.2  /  AST  20  /  ALT  27  /  AlkPhos  64  04-08            Assessment/Plan  Patient has :  1) Left foot infection and toe osteomyelitis   2) Hypertension and stable .  3) DM .  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 4) Patient cardiac wise stable and cleared for foot surgery if needed . Benefits of surgery outweigh the risks . 5) Will hold Lovenox 18-24 hours prior to surgery 6) Increase metoprolol 50 mg twice a day .  Will continue to follow during hospital course and give further recommendations as needed . Thanks for your referral . if any questions please contact me at office ([phone] cell [phone])        KARIME WYNN MD [place] [id number]  [address]  OFFICE : [phone]  CELL : [phone]  CARDIOLOGY F/U :       HPI:  88 yo male ,[facility] resident with PMHx - COPD, DM, HTN, CAD, HLD, H/O TIA, dementia,GERD, BPH and depression sent ot ER for evaluation of left foot 1metatarsal wound ,suggestive of osteomyelitis .Patient was seen by ID consult and transfer to the hospital recommended for wound cx/bone biopsy /podiatry evaluation ,likely will require 4-6 weeks of iv abx . Patient was admitted to [facility] with b/l feet wounds and was followed by wound care team ,recently completed 7 days of doxycycline for foot wound cellulitis . (30 Mar 2023 05:21)        SUBJECTIVE: Patient feeling better .      ALLERGIES:  Allergies    No Known Allergies    Intolerances          MEDICATIONS  (STANDING):  albuterol/ipratropium for Nebulization 3 milliLiter(s) Nebulizer every 8 hours  budesonide 160 MICROgram(s)/formoterol 4.5 MICROgram(s) Inhaler 2 Puff(s) Inhalation two times a day  chlorhexidine 4% Liquid 1 Application(s) Topical <User Schedule>  dextrose 5% + sodium chloride 0.45% with potassium chloride 20 mEq/L 1000 milliLiter(s) (40 mL/Hr) IV Continuous <Continuous>  dextrose 5%. 1000 milliLiter(s) (50 mL/Hr) IV Continuous <Continuous>  dextrose 5%. 1000 milliLiter(s) (100 mL/Hr) IV Continuous <Continuous>  dextrose 50% Injectable 25 Gram(s) IV Push once  dextrose 50% Injectable 12.5 Gram(s) IV Push once  dextrose 50% Injectable 25 Gram(s) IV Push once  donepezil 5 milliGRAM(s) Oral at bedtime  DULoxetine 60 milliGRAM(s) Oral daily  enoxaparin Injectable 60 milliGRAM(s) SubCutaneous every 12 hours  glucagon  Injectable 1 milliGRAM(s) IntraMuscular once  insulin glargine Injectable (LANTUS) 8 Unit(s) SubCutaneous at bedtime  insulin lispro (ADMELOG) corrective regimen sliding scale   SubCutaneous three times a day before meals  insulin lispro (ADMELOG) corrective regimen sliding scale   SubCutaneous at bedtime  lactobacillus acidophilus 1 Tablet(s) Oral two times a day with meals  losartan 25 milliGRAM(s) Oral daily  metoprolol tartrate 25 milliGRAM(s) Oral three times a day  multivitamin 1 Tablet(s) Oral daily  pantoprazole   Suspension 40 milliGRAM(s) Oral daily  piperacillin/tazobactam IVPB.. 3.375 Gram(s) IV Intermittent every 8 hours  senna 2 Tablet(s) Oral at bedtime  simvastatin 40 milliGRAM(s) Oral at bedtime  tamsulosin 0.4 milliGRAM(s) Oral at bedtime    MEDICATIONS  (PRN):  acetaminophen     Tablet .. 650 milliGRAM(s) Oral every 6 hours PRN Temp greater or equal to 38C (100.4F), Mild Pain (1 - 3)  aluminum hydroxide/magnesium hydroxide/simethicone Suspension 30 milliLiter(s) Oral every 4 hours PRN Dyspepsia  bisacodyl Suppository 10 milliGRAM(s) Rectal daily PRN Constipation  dextrose Oral Gel 15 Gram(s) Oral once PRN Blood Glucose LESS THAN 70 milliGRAM(s)/deciliter  hydrALAZINE 50 milliGRAM(s) Oral every 6 hours PRN for systolic BP>160  magnesium hydroxide Suspension 30 milliLiter(s) Oral daily PRN Constipation  melatonin 3 milliGRAM(s) Oral at bedtime PRN Insomnia  ondansetron Injectable 4 milliGRAM(s) IV Push every 8 hours PRN Nausea and/or Vomiting  sodium chloride 0.9% lock flush 10 milliLiter(s) IV Push every 1 hour PRN Pre/post blood products, medications, blood draw, and to maintain line patency      REVIEW OF SYSTEMS:  CONSTITUTIONAL: No fever,  RESPIRATORY: No cough, wheezing, shortness of breath  CARDIOVASCULAR: No chest pain, dyspnea, palpitations, dizziness, syncope, paroxysmal nocturnal dyspnea, orthopnea, or arm or leg swelling  GASTROINTESTINAL: No abdominal  or epigastric pain, nausea, vomiting,  diarrhea  NEUROLOGICAL: No headaches, numbness, or tremors  Skin : No itching .  Hematology : No active bleeding .  Endocrinology : No heat and cold intolerance .  Psychiatry : Patient is confused .  Genitourinary : No dysuria .  Musculoskeletal : Patient has mild arthritis .            Vital Signs Last 24 Hrs  T(C): 36.9 (07 Apr 2023 06:05), Max: 36.9 (07 Apr 2023 06:05)  T(F): 98.4 (07 Apr 2023 06:05), Max: 98.4 (07 Apr 2023 06:05)  HR: 90 (07 Apr 2023 06:05) (76 - 93)  BP: 143/73 (07 Apr 2023 06:05) (123/73 - 143/73)  BP(mean): --  RR: 20 (07 Apr 2023 06:05) (18 - 20)  SpO2: 99% (07 Apr 2023 06:05) (90% - 99%)    Parameters below as of 07 Apr 2023 06:05  Patient On (Oxygen Delivery Method): room air        PHYSICAL EXAM:  HEAD:  Atraumatic, Normocephalic  NECK: Supple and normal thyroid.  No JVD or carotid bruit.   HEART: S1, S2 regular , 1/6 soft ejection systolic murmur in mitral area , no thrill and no gallops .  PULMONARY: Bilateral vesicular breathing , few scattered rhonchi and few scattered rales are present .  ABDOMEN: Soft nontender and positive bowl sounds   EXTREMITIES:  No clubbing, cyanosis, or pedal  edema . Bilateral feet wound present .  NEUROLOGICAL: AA and mild confused    Skin : No rashes .  Musculoskeletal : No joint swellings .    Vital Signs Last 24 Hrs  T(C): 36.4 (08 Apr 2023 20:20), Max: 37.3 (08 Apr 2023 12:54)  T(F): 97.6 (08 Apr 2023 20:20), Max: 99.1 (08 Apr 2023 12:54)  HR: 88 (08 Apr 2023 20:20) (79 - 98)  BP: 125/73 (08 Apr 2023 20:20) (124/69 - 132/66)  BP(mean): --  RR: 18 (08 Apr 2023 20:20) (18 - 18)  SpO2: 93% (08 Apr 2023 20:20) (93% - 97%)    Parameters below as of 08 Apr 2023 20:20  Patient On (Oxygen Delivery Method): room air        PHYSICAL EXAM:  HEAD:  Atraumatic, Normocephalic  NECK: Supple and normal thyroid.  No JVD or carotid bruit.   HEART: S1, S2 irregular , 1/6 soft ejection systolic murmur in mitral area , no thrill and no gallops .  PULMONARY: Bilateral vesicular breathing , few scattered rhonchi and few scattered rales are present .  ABDOMEN: Soft nontender and positive bowl sounds   EXTREMITIES:  No clubbing, cyanosis, or pedal  edema . bilateral feet in dressings .  NEUROLOGICAL: AA and mild confused .  Skin : No rashes .  musculoskeletal : No joint swellings .    LABS:                        10.9   8.42  )-----------( 284      ( 07 Apr 2023 08:43 )             36.4     04-08    x   |  x   |  x   ----------------------------<  x   x    |  x   |  1.20    Ca    8.5      07 Apr 2023 08:43  Phos  2.4     04-08  Mg     2.0     04-08    TPro  5.9<L>  /  Alb  2.0<L>  /  TBili  0.5  /  DBili  0.2  /  AST  20  /  ALT  27  /  AlkPhos  64  04-08            Assessment/Plan  Patient has :  1) Left foot infection and toe osteomyelitis   2) Hypertension and stable .  3) DM .  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 4) Patient cardiac wise stable and cleared for foot surgery if needed . Benefits of surgery outweigh the risks . 5) Will hold Lovenox 18-24 hours prior to surgery 6) Increase metoprolol 50 mg twice a day .  Will continue to follow during hospital course and give further recommendations as needed . Thanks for your referral . if any questions please contact me at office ([phone] cell [phone])

## 2023-04-08 NOTE — PROGRESS NOTE ADULT - SUBJECTIVE AND OBJECTIVE BOX
Patient is a 87y Male whom presented to the hospital with ckd and chelo     PAST MEDICAL & SURGICAL HISTORY:      MEDICATIONS  (STANDING):      Allergies    No Known Allergies    Intolerances        SOCIAL HISTORY:  Denies ETOh,Smoking,     FAMILY HISTORY:      REVIEW OF SYSTEMS:  unable to obtained a good review system                                                                                 10.9   8.42  )-----------( 284      ( 07 Apr 2023 08:43 )             36.4       CBC Full  -  ( 07 Apr 2023 08:43 )  WBC Count : 8.42 K/uL  RBC Count : 3.81 M/uL  Hemoglobin : 10.9 g/dL  Hematocrit : 36.4 %  Platelet Count - Automated : 284 K/uL  Mean Cell Volume : 95.5 fl  Mean Cell Hemoglobin : 28.6 pg  Mean Cell Hemoglobin Concentration : 29.9 gm/dL  Auto Neutrophil # : x  Auto Lymphocyte # : x  Auto Monocyte # : x  Auto Eosinophil # : x  Auto Basophil # : x  Auto Neutrophil % : x  Auto Lymphocyte % : x  Auto Monocyte % : x  Auto Eosinophil % : x  Auto Basophil % : x      04-08    x   |  x   |  x   ----------------------------<  x   x    |  x   |  1.20    Ca    8.5      07 Apr 2023 08:43  Phos  2.4     04-08  Mg     2.0     04-08    TPro  5.9<L>  /  Alb  2.0<L>  /  TBili  0.5  /  DBili  0.2  /  AST  20  /  ALT  27  /  AlkPhos  64  04-08      CAPILLARY BLOOD GLUCOSE      POCT Blood Glucose.: 197 mg/dL (08 Apr 2023 11:25)  POCT Blood Glucose.: 159 mg/dL (08 Apr 2023 08:10)  POCT Blood Glucose.: 122 mg/dL (07 Apr 2023 21:24)  POCT Blood Glucose.: 170 mg/dL (07 Apr 2023 16:54)      Vital Signs Last 24 Hrs  T(C): 37.3 (08 Apr 2023 12:54), Max: 37.3 (08 Apr 2023 12:54)  T(F): 99.1 (08 Apr 2023 12:54), Max: 99.1 (08 Apr 2023 12:54)  HR: 98 (08 Apr 2023 12:54) (75 - 118)  BP: 124/69 (08 Apr 2023 12:54) (124/69 - 151/89)  BP(mean): --  RR: 18 (08 Apr 2023 12:54) (18 - 18)  SpO2: 97% (08 Apr 2023 12:54) (91% - 97%)    Parameters below as of 08 Apr 2023 12:54  Patient On (Oxygen Delivery Method): room air                        PT/INR - ( 06 Apr 2023 06:37 )   PT: 16.4 sec;   INR: 1.40 ratio                           PHYSICAL EXAM:    Constitutional: NAD  HEENT: conjunctive   clear   Neck:  No JVD  Respiratory: CTAB  Cardiovascular: S1 and S2  Gastrointestinal: BS+, soft,   Extremities: No peripheral edema  Neurological:  no focal deficits

## 2023-04-08 NOTE — PROGRESS NOTE ADULT - ASSESSMENT
REVIEW OF SYMPTOMS      Able to give (reliable) ROS  NO     PHYSICAL EXAM    HEENT Unremarkable  atraumatic   RESP Fair air entry EXP prolonged    Harsh breath sound Resp distres mild   CARDIAC S1 S2 No S3     NO JVD    ABDOMEN SOFT BS PRESENT NOT DISTENDED No hepatosplenomegaly   PEDAL EDEMA present No calf tenderness  NO rash       GENERAL DATA .   GOC.     .. 3/30/2023 full code  ALLGY.     .. nka                    WT.   .. 3/30/2023 63  BMI.        .. 3/30/2023 21            ICU STAY.   .. none  COVID.   .. 4/4/2023 scv2 (+)  .. 3/29/2023 scv2 (-)     BEST PRACTICE ISSUES.    HOB ELEVATN.   .. Yes  DVT PPLX.   .. 3/31 lvnx 60.2 Dr Lakhani (a fib)   ..  3/29- 3/31 hpsc   MILLER PPLX.   .. 3/30/2023 protonix 40    INFN PPLX. ..    SP SW LAURENT.   ..  3/30/2023 -> soft bite mild thick        DIET.    ..  3/30/2023 dash  FREE WATER  .. 4/1/2023 fw 250.4    IV fl.  .. 4/6/2023 d5 1/2 40   PROCEDURE  .. 4/7/2023 r brach picc     ABGS.    VS/ PO/IO/ VENT/ DRIPS.   4/8/2023 afeb 98 130/60   4/8/2023 ra 97%     PROBLEM/ASSESSMENT/PLAN.  COVID   .. 4/4/2023 scv2 (+)  .. 4/4/2023 oxygenation ok  .. 4/4/2023 pt has mild disease   .. 4/4/2023 rdsv 3 d course started by Dr Mancia   Infection  Osteomyelitius  Possible pneumonia   .. Esr 3/29-3/31/2023 esr 67- 49   .. W 3/29-3/30-3/31-4/1-4/4-4/5/2023       w 11.8 - 12- 10.5- 11.9 -  9 - 7.7   .. cxr 3/30/2023  ........ increasing r lower perihilar infiltrate   .. xr foot left 3/30/2023  ........ stable eroisions around 1st mtp anmd great toe    ........ which could be bone infectn    .. CXR 3/29/2023 Possible hansa pneum  .. w scann 4/4 l foot susp for osteom r heel osteomyelitis   .. bc 3/29/2023 bc (-)   .. uc 4/2/2023 100K eneterococcs fecal  .. 3/29-4/5 zosyn    .. 4/7/2023 zosyn 42 d   .. follow cultures  PLEURAL EFFUSION.  .. ct ch 3/30/2023   ........ mild pulm edema  ........ mod r and sml effsn   a/r  .. pl effsn likely sec to chf   COPD  .. 3/30/2023 duoneb.3    .. 3/30/2023 symbicort   hemodynamics  .. La 3/29/2023 la 1.8   .. target map 65 (+)   CAD.  .. 3/30- 3/31/2023 atenolol 25  .. 3/31-4/4      metoprolol 25.2 - metoprolol 25.3   .. 3/30/2023 simvastat 40   RO DVT  .. 4/1/2023 v duplx (-)  CHF.  .. echo 3/30/2023  ........ ef 40%   ........ mod mr   .. bnp 4/2 bnp [id number]   .. 3/30-4/4/2023      losartan 50 - losartan 25   .. 3/30/2023 hydralazin 50.4p   Anemia  .. Hb 3/29-3/30-3/31-4/1-4/4-4/5/2023      Hb 11.2- 10.2 - 9.8 - 11 - 9.4 - 11.5   .. monitor  Hypernatremia.  .. Na 3/31-4/1-4/2-4/3-4/4 Na 147 - 151- 152 - 148- 144    CKD  .. Na 3/29-3/31/2023 Na 144-147   .. Cr 3/29-3/30-3/31-4/1-4/3-4/4/2023      Cr 1.4 - 1.3 - 1.3 - 1.5 - 1.5 - 1.3  .. monitor  OBS  .. 3/30/2023 donepezil     OVERALL .  88 yo M with PMHx HTN, HLD, T2D, dementia (AOx2-3), OA, BPH, CAD, TIA, CKD 3 and GERD HO recent hospital stay 1/24-1/30/2023 [facility]  COPD ex  now admitted with osteomyelitis possible pneum  Pulm consulted 3/29/2023    .. 4/4/2023 pt tested covid (+)  started rdsv Dr Mancia     PROBLEMS  COVID 4/4/2023  .. 4/4/2023 rdsv 3 d  E FECA UTI   .. uc 4/2/2023 100K eneterococcs fecal  Possible Osteomyelitis  .. w scann 4/4 l foot susp for osteom r heel osteomyelitis   Pneumonia   .. 3/29-4/5 zosyn   .. 4/7 zosyn x 42 d    COPD   CKD   PLEURAL EFFSN   .. 3/30 ct  HFREF   .. MOD MR 3/30 echo    A fib  ..  4/1 lvnx 60.2       TIME SPENT   Over 25 minutes aggregate care time spent on encounter; activities included   direct patient care, counseling and/or coordinating care reviewing notes, lab data/ imaging , discussion with multidisciplinary team/ patient  /family and explaining in detail risks, benefits, alternatives  of the recommendations     Paulino Parmar 87 m     REVIEW OF SYMPTOMS      Able to give (reliable) ROS  NO     PHYSICAL EXAM    HEENT Unremarkable  atraumatic   RESP Fair air entry EXP prolonged    Harsh breath sound Resp distres mild   CARDIAC S1 S2 No S3     NO JVD    ABDOMEN SOFT BS PRESENT NOT DISTENDED No hepatosplenomegaly   PEDAL EDEMA present No calf tenderness  NO rash       GENERAL DATA .   GOC.     .. 3/30/2023 full code  ALLGY.     .. nka                    WT.   .. 3/30/2023 63  BMI.        .. 3/30/2023 21            ICU STAY.   .. none  COVID.   .. 4/4/2023 scv2 (+)  .. 3/29/2023 scv2 (-)     BEST PRACTICE ISSUES.    HOB ELEVATN.   .. Yes  DVT PPLX.   .. 3/31 lvnx 60.2 Dr Lakhani (a fib)   ..  3/29- 3/31 hpsc   MILLER PPLX.   .. 3/30/2023 protonix 40    INFN PPLX. ..    SP SW LAURENT.   ..  3/30/2023 -> soft bite mild thick        DIET.    ..  3/30/2023 dash  FREE WATER  .. 4/1/2023 fw 250.4    IV fl.  .. 4/6/2023 d5 1/2 40   PROCEDURE  .. 4/7/2023 r brach picc     ABGS.    VS/ PO/IO/ VENT/ DRIPS.   4/8/2023 afeb 98 130/60   4/8/2023 ra 97%     PROBLEM/ASSESSMENT/PLAN.  COVID   .. 4/4/2023 scv2 (+)  .. 4/4/2023 oxygenation ok  .. 4/4/2023 pt has mild disease   .. 4/4/2023 rdsv 3 d course started by Dr Mancia   Infection  Osteomyelitius  Possible pneumonia   .. Esr 3/29-3/31/2023 esr 67- 49   .. W 3/29-3/30-3/31-4/1-4/4-4/5/2023       w 11.8 - 12- 10.5- 11.9 -  9 - 7.7   .. cxr 3/30/2023  ........ increasing r lower perihilar infiltrate   .. xr foot left 3/30/2023  ........ stable eroisions around 1st mtp anmd great toe    ........ which could be bone infectn    .. CXR 3/29/2023 Possible hansa pneum  .. w scann 4/4 l foot susp for osteom r heel osteomyelitis   .. bc 3/29/2023 bc (-)   .. uc 4/2/2023 100K eneterococcs fecal  .. 3/29-4/5 zosyn    .. 4/7/2023 zosyn 42 d   .. follow cultures  PLEURAL EFFUSION.  .. ct ch 3/30/2023   ........ mild pulm edema  ........ mod r and sml effsn   a/r  .. pl effsn likely sec to chf   COPD  .. 3/30/2023 duoneb.3    .. 3/30/2023 symbicort   hemodynamics  .. La 3/29/2023 la 1.8   .. target map 65 (+)   CAD.  .. 3/30- 3/31/2023 atenolol 25  .. 3/31-4/4      metoprolol 25.2 - metoprolol 25.3   .. 3/30/2023 simvastat 40   RO DVT  .. 4/1/2023 v duplx (-)  CHF.  .. echo 3/30/2023  ........ ef 40%   ........ mod mr   .. bnp 4/2 bnp [id number]   .. 3/30-4/4/2023      losartan 50 - losartan 25   .. 3/30/2023 hydralazin 50.4p   Anemia  .. Hb 3/29-3/30-3/31-4/1-4/4-4/5/2023      Hb 11.2- 10.2 - 9.8 - 11 - 9.4 - 11.5   .. monitor  Hypernatremia.  .. Na 3/31-4/1-4/2-4/3-4/4 Na 147 - 151- 152 - 148- 144    CKD  .. Na 3/29-3/31/2023 Na 144-147   .. Cr 3/29-3/30-3/31-4/1-4/3-4/4/2023      Cr 1.4 - 1.3 - 1.3 - 1.5 - 1.5 - 1.3  .. monitor  OBS  .. 3/30/2023 donepezil     OVERALL .  88 yo M with PMHx HTN, HLD, T2D, dementia (AOx2-3), OA, BPH, CAD, TIA, CKD 3 and GERD HO recent hospital stay 1/24-1/30/2023 [facility]  COPD ex  now admitted with osteomyelitis possible pneum  Pulm consulted 3/29/2023    .. 4/4/2023 pt tested covid (+)  started rdsv Dr Mancia     PROBLEMS  COVID 4/4/2023  .. 4/4/2023 rdsv 3 d  E FECA UTI   .. uc 4/2/2023 100K eneterococcs fecal  Possible Osteomyelitis  .. w scann 4/4 l foot susp for osteom r heel osteomyelitis   Pneumonia   .. 3/29-4/5 zosyn   .. 4/7 zosyn x 42 d    COPD   CKD   PLEURAL EFFSN   .. 3/30 ct  HFREF   .. MOD MR 3/30 echo    A fib  ..  4/1 lvnx 60.2       TIME SPENT   Over 25 minutes aggregate care time spent on encounter; activities included   direct patient care, counseling and/or coordinating care reviewing notes, lab data/ imaging , discussion with multidisciplinary team/ patient  /family and explaining in detail risks, benefits, alternatives  of the recommendations     Paulino Parmar 87 m     REVIEW OF SYMPTOMS      Able to give (reliable) ROS  NO     PHYSICAL EXAM    HEENT Unremarkable  atraumatic   RESP Fair air entry EXP prolonged    Harsh breath sound Resp distres mild   CARDIAC S1 S2 No S3     NO JVD    ABDOMEN SOFT BS PRESENT NOT DISTENDED No hepatosplenomegaly   PEDAL EDEMA present No calf tenderness  NO rash       GENERAL DATA .   GOC.     .. 3/30/2023 full code  ALLGY.     .. nka                    WT.   .. 3/30/2023 63  BMI.        .. 3/30/2023 21            ICU STAY.   .. none  COVID.   .. 4/4/2023 scv2 (+)  .. 3/29/2023 scv2 (-)     BEST PRACTICE ISSUES.    HOB ELEVATN.   .. Yes  DVT PPLX.   .. 3/31 lvnx 60.2 Dr Lakhani (a fib)   ..  3/29- 3/31 hpsc   MILLER PPLX.   .. 3/30/2023 protonix 40    INFN PPLX. ..    SP SW LAURENT.   ..  3/30/2023 -> soft bite mild thick        DIET.    ..  3/30/2023 dash  FREE WATER  .. 4/1/2023 fw 250.4    IV fl.  .. 4/6/2023 d5 1/2 40   PROCEDURE  .. 4/7/2023 r brach picc     ABGS.    VS/ PO/IO/ VENT/ DRIPS.   4/8/2023 afeb 98 130/60   4/8/2023 ra 97%     PROBLEM/ASSESSMENT/PLAN.  COVID   .. 4/4/2023 scv2 (+)  .. 4/4/2023 oxygenation ok  .. 4/4/2023 pt has mild disease   .. 4/4/2023 rdsv 3 d course started by Dr Mancia   Infection  Osteomyelitius  Possible pneumonia   .. Esr 3/29-3/31/2023 esr 67- 49   .. W 3/29-3/30-3/31-4/1-4/4-4/5/2023       w 11.8 - 12- 10.5- 11.9 -  9 - 7.7   .. cxr 3/30/2023  ........ increasing r lower perihilar infiltrate   .. xr foot left 3/30/2023  ........ stable eroisions around 1st mtp anmd great toe    ........ which could be bone infectn    .. CXR 3/29/2023 Possible hansa pneum  .. w scann 4/4 l foot susp for osteom r heel osteomyelitis   .. bc 3/29/2023 bc (-)   .. uc 4/2/2023 100K eneterococcs fecal  .. 3/29-4/5 zosyn    .. 4/7/2023 zosyn 42 d   .. follow cultures  PLEURAL EFFUSION.  .. ct ch 3/30/2023   ........ mild pulm edema  ........ mod r and sml effsn   a/r  .. pl effsn likely sec to chf   COPD  .. 3/30/2023 duoneb.3    .. 3/30/2023 symbicort   hemodynamics  .. La 3/29/2023 la 1.8   .. target map 65 (+)   CAD.  .. 3/30- 3/31/2023 atenolol 25  .. 3/31-4/4      metoprolol 25.2 - metoprolol 25.3   .. 3/30/2023 simvastat 40   RO DVT  .. 4/1/2023 v duplx (-)  CHF.  .. echo 3/30/2023  ........ ef 40%   ........ mod mr   .. bnp 4/2 bnp [id number]   .. 3/30-4/4/2023      losartan 50 - losartan 25   .. 3/30/2023 hydralazin 50.4p   Anemia  .. Hb 3/29-3/30-3/31-4/1-4/4-4/5/2023      Hb 11.2- 10.2 - 9.8 - 11 - 9.4 - 11.5   .. monitor  Hypernatremia.  .. Na 3/31-4/1-4/2-4/3-4/4 Na 147 - 151- 152 - 148- 144    CKD  .. Na 3/29-3/31/2023 Na 144-147   .. Cr 3/29-3/30-3/31-4/1-4/3-4/4/2023      Cr 1.4 - 1.3 - 1.3 - 1.5 - 1.5 - 1.3  .. monitor  OBS  .. 3/30/2023 donepezil     OVERALL .  88 yo M with PMHx HTN, HLD, T2D, dementia (AOx2-3), OA, BPH, CAD, TIA, CKD 3 and GERD HO recent hospital stay 1/24-1/30/2023 [facility]  COPD ex  now admitted with osteomyelitis possible pneum  Pulm consulted 3/29/2023    .. 4/4/2023 pt tested covid (+)  started rdsv Dr Mancia     PROBLEMS  COVID 4/4/2023  .. 4/4/2023 rdsv 3 d  E FECA UTI   .. uc 4/2/2023 100K eneterococcs fecal  Possible Osteomyelitis  .. w scann 4/4 l foot susp for osteom r heel osteomyelitis   Pneumonia   .. 3/29-4/5 zosyn   .. 4/7 zosyn x 42 d    COPD   CKD   PLEURAL EFFSN   .. 3/30 ct  HFREF   .. MOD MR 3/30 echo    A fib  ..  4/1 lvnx 60.2       TIME SPENT   Over 25 minutes aggregate care time spent on encounter; activities included   direct patient care, counseling and/or coordinating care reviewing notes, lab data/ imaging , discussion with multidisciplinary team/ patient  /family and explaining in detail risks, benefits, alternatives  of the recommendations     Paulino Parmar 87 m

## 2023-04-08 NOTE — PROGRESS NOTE ADULT - SUBJECTIVE AND OBJECTIVE BOX
CHIEF COMPLAINT/ REASON FOR VISIT  .. Patient was seen to address the  issue listed under PROBLEM LIST which is located toward bottom of this note     ANA MARIA LEIGH    PLV 1EAS 101 W1    Allergies    No Known Allergies    Intolerances        PAST MEDICAL & SURGICAL HISTORY:  ASHD (arteriosclerotic heart disease)      BPH without urinary obstruction      COPD, moderate      Stage 3 chronic kidney disease      Chronic GERD      HLD (hyperlipidemia)      MDD (major depressive disorder)      Obstructive and reflux uropathy      Cellulitis      HTN (hypertension)      Sepsis      Dementia      Moderate protein-calorie malnutrition      Brain TIA      History of RSV infection      DM type 2, not at goal      Pressure ulcer of unspecified heel, unspecified stage      Venous stasis ulcer without varicose veins      Multiple open wounds of foot          FAMILY HISTORY:      Home Medications:  Admelog SoloStar 100 units/mL injectable solution: injectable 4 times a day (before meals and at bedtime) per sliding scale (30 Mar 2023 15:23)  Aricept 5 mg oral tablet: 1 tab(s) orally once a day (30 Mar 2023 15:23)  atenolol 25 mg oral tablet: 1 tab(s) orally once a day (30 Mar 2023 15:23)  Bacid (LAC) oral tablet: 1 tab(s) orally 2 times a day (30 Mar 2023 15:23)  Cymbalta 60 mg oral delayed release capsule: 1 cap(s) orally once a day (30 Mar 2023 15:23)  docusate sodium 100 mg oral capsule: 2 cap(s) orally once a day (at bedtime) (30 Mar 2023 15:23)  doxycycline hyclate 100 mg oral tablet: 1 tab(s) orally 2 times a day for 7 days  3/28/23-4/4/23 (30 Mar 2023 15:23)  Dulcolax Laxative 10 mg rectal suppository: 1 suppository(ies) rectally as needed for  constipation (30 Mar 2023 15:23)  famotidine 40 mg oral tablet: 1 tab(s) orally once a day (30 Mar 2023 15:23)  Fleet Enema 19 g-7 g rectal enema: 133 milliliter(s) rectally as needed for  constipation (30 Mar 2023 15:23)  Flovent 44 mcg/inh inhalation aerosol with adapter: 1 puff(s) inhaled every 12 hours (30 Mar 2023 15:23)  ipratropium-albuterol 0.5 mg-2.5 mg/3 mL inhalation solution: 3 milliliter(s) by nebulizer 4 times a day (30 Mar 2023 15:23)  losartan 50 mg oral tablet: 1 tab(s) orally once a day (30 Mar 2023 15:23)  Milk of Magnesia 8% oral suspension: 30 milliliter(s) orally once a day as needed for  constipation (30 Mar 2023 15:23)  Santyl 250 units/g topical ointment: Apply topically to affected area (30 Mar 2023 12:41)  simvastatin 40 mg oral tablet: 1 tab(s) orally once a day (at bedtime) (30 Mar 2023 15:23)  SITagliptin 50 mg oral tablet: 1 tab(s) orally once a day (30 Mar 2023 15:23)  tamsulosin 0.4 mg oral capsule: 1 cap(s) orally once a day (in the evening) (30 Mar 2023 15:23)  Therapeutic Multiple Vitamins oral tablet: 1 tab(s) orally once a day (30 Mar 2023 15:23)  traMADol 50 mg oral tablet: 0.5 tab(s) orally 2 times a day (30 Mar 2023 15:23)  Tylenol Caplet Extra Strength 500 mg oral tablet: 2 tab(s) orally every 8 hours (30 Mar 2023 15:23)      MEDICATIONS  (STANDING):  albuterol/ipratropium for Nebulization 3 milliLiter(s) Nebulizer every 8 hours  budesonide 160 MICROgram(s)/formoterol 4.5 MICROgram(s) Inhaler 2 Puff(s) Inhalation two times a day  chlorhexidine 4% Liquid 1 Application(s) Topical <User Schedule>  dextrose 5% + sodium chloride 0.45% with potassium chloride 20 mEq/L 1000 milliLiter(s) (40 mL/Hr) IV Continuous <Continuous>  dextrose 5%. 1000 milliLiter(s) (100 mL/Hr) IV Continuous <Continuous>  dextrose 5%. 1000 milliLiter(s) (50 mL/Hr) IV Continuous <Continuous>  dextrose 50% Injectable 25 Gram(s) IV Push once  dextrose 50% Injectable 12.5 Gram(s) IV Push once  dextrose 50% Injectable 25 Gram(s) IV Push once  donepezil 5 milliGRAM(s) Oral at bedtime  DULoxetine 60 milliGRAM(s) Oral daily  enoxaparin Injectable 60 milliGRAM(s) SubCutaneous every 12 hours  glucagon  Injectable 1 milliGRAM(s) IntraMuscular once  insulin glargine Injectable (LANTUS) 8 Unit(s) SubCutaneous at bedtime  insulin lispro (ADMELOG) corrective regimen sliding scale   SubCutaneous three times a day before meals  insulin lispro (ADMELOG) corrective regimen sliding scale   SubCutaneous at bedtime  lactobacillus acidophilus 1 Tablet(s) Oral two times a day with meals  losartan 25 milliGRAM(s) Oral daily  metoprolol tartrate 25 milliGRAM(s) Oral three times a day  multivitamin 1 Tablet(s) Oral daily  pantoprazole   Suspension 40 milliGRAM(s) Oral daily  piperacillin/tazobactam IVPB.. 3.375 Gram(s) IV Intermittent every 8 hours  senna 2 Tablet(s) Oral at bedtime  simvastatin 40 milliGRAM(s) Oral at bedtime  tamsulosin 0.4 milliGRAM(s) Oral at bedtime    MEDICATIONS  (PRN):  acetaminophen     Tablet .. 650 milliGRAM(s) Oral every 6 hours PRN Temp greater or equal to 38C (100.4F), Mild Pain (1 - 3)  aluminum hydroxide/magnesium hydroxide/simethicone Suspension 30 milliLiter(s) Oral every 4 hours PRN Dyspepsia  bisacodyl Suppository 10 milliGRAM(s) Rectal daily PRN Constipation  dextrose Oral Gel 15 Gram(s) Oral once PRN Blood Glucose LESS THAN 70 milliGRAM(s)/deciliter  hydrALAZINE 50 milliGRAM(s) Oral every 6 hours PRN for systolic BP>160  magnesium hydroxide Suspension 30 milliLiter(s) Oral daily PRN Constipation  melatonin 3 milliGRAM(s) Oral at bedtime PRN Insomnia  ondansetron Injectable 4 milliGRAM(s) IV Push every 8 hours PRN Nausea and/or Vomiting  sodium chloride 0.9% lock flush 10 milliLiter(s) IV Push every 1 hour PRN Pre/post blood products, medications, blood draw, and to maintain line patency              Vital Signs Last 24 Hrs  T(C): 36.7 (08 Apr 2023 05:45), Max: 36.8 (07 Apr 2023 12:33)  T(F): 98.1 (08 Apr 2023 05:45), Max: 98.2 (07 Apr 2023 12:33)  HR: 84 (08 Apr 2023 08:49) (75 - 118)  BP: 132/66 (08 Apr 2023 05:45) (132/66 - 151/89)  BP(mean): --  RR: 18 (08 Apr 2023 05:45) (18 - 20)  SpO2: 94% (08 Apr 2023 05:45) (91% - 99%)    Parameters below as of 08 Apr 2023 08:49  Patient On (Oxygen Delivery Method): room air          04-07-23 @ 07:01  -  04-08-23 @ 07:00  --------------------------------------------------------  IN: 1430 mL / OUT: 0 mL / NET: 1430 mL              LABS:                        10.9   8.42  )-----------( 284      ( 07 Apr 2023 08:43 )             36.4     04-08    x   |  x   |  x   ----------------------------<  x   x    |  x   |  1.20    Ca    8.5      07 Apr 2023 08:43  Phos  2.4     04-08  Mg     2.0     04-08    TPro  5.9<L>  /  Alb  2.0<L>  /  TBili  0.5  /  DBili  0.2  /  AST  20  /  ALT  27  /  AlkPhos  64  04-08              WBC:  WBC Count: 8.42 K/uL (04-07 @ 08:43)  WBC Count: 9.55 K/uL (04-06 @ 06:37)  WBC Count: 7.72 K/uL (04-05 @ 06:40)      MICROBIOLOGY:  RECENT CULTURES:  04-02 Clean Catch Clean Catch (Midstream) Enterococcus faecalis XXXX   >100,000 CFU/ml Enterococcus faecalis                    Sodium:  Sodium, Serum: 145 mmol/L (04-07 @ 08:43)  Sodium, Serum: 144 mmol/L (04-06 @ 06:37)  Sodium, Serum: 144 mmol/L (04-05 @ 06:40)      1.20 mg/dL 04-08 @ 06:14  1.30 mg/dL 04-07 @ 08:43  1.30 mg/dL 04-06 @ 06:37  1.20 mg/dL 04-05 @ 06:40      Hemoglobin:  Hemoglobin: 10.9 g/dL (04-07 @ 08:43)  Hemoglobin: 10.5 g/dL (04-06 @ 06:37)  Hemoglobin: 11.5 g/dL (04-05 @ 06:40)      Platelets: Platelet Count - Automated: 284 K/uL (04-07 @ 08:43)  Platelet Count - Automated: 286 K/uL (04-06 @ 06:37)  Platelet Count - Automated: 251 K/uL (04-05 @ 06:40)      LIVER FUNCTIONS - ( 08 Apr 2023 06:14 )  Alb: 2.0 g/dL / Pro: 5.9 g/dL / ALK PHOS: 64 U/L / ALT: 27 U/L / AST: 20 U/L / GGT: x                 RADIOLOGY & ADDITIONAL STUDIES:      MICROBIOLOGY:  RECENT CULTURES:  04-02 Clean Catch Clean Catch (Midstream) Enterococcus faecalis XXXX   >100,000 CFU/ml Enterococcus faecalis

## 2023-04-08 NOTE — PROGRESS NOTE ADULT - SUBJECTIVE AND OBJECTIVE BOX
CAPILLARY BLOOD GLUCOSE      POCT Blood Glucose.: 122 mg/dL (07 Apr 2023 21:24)  POCT Blood Glucose.: 170 mg/dL (07 Apr 2023 16:54)  POCT Blood Glucose.: 170 mg/dL (07 Apr 2023 11:59)  POCT Blood Glucose.: 126 mg/dL (07 Apr 2023 08:04)      Vital Signs Last 24 Hrs  T(C): 36.7 (08 Apr 2023 05:45), Max: 36.8 (07 Apr 2023 12:33)  T(F): 98.1 (08 Apr 2023 05:45), Max: 98.2 (07 Apr 2023 12:33)  HR: 88 (08 Apr 2023 05:45) (75 - 118)  BP: 132/66 (08 Apr 2023 05:45) (132/66 - 151/89)  BP(mean): --  RR: 18 (08 Apr 2023 05:45) (18 - 20)  SpO2: 94% (08 Apr 2023 05:45) (91% - 99%)    Parameters below as of 08 Apr 2023 05:45  Patient On (Oxygen Delivery Method): room air        General: WN/WD NAD  Respiratory: CTA B/L  CV: RRR, S1S2, no murmurs, rubs or gallops  Abdominal: Soft, NT, ND +BS, Last BM  Extremities: No edema, + peripheral pulses     04-07    145  |  118<H>  |  35<H>  ----------------------------<  126<H>  4.1   |  23  |  1.30    Ca    8.5      07 Apr 2023 08:43  Phos  2.6     04-07  Mg     2.3     04-07    TPro  6.6  /  Alb  2.2<L>  /  TBili  0.4  /  DBili  0.2  /  AST  19  /  ALT  31  /  AlkPhos  66  04-07      dextrose 50% Injectable 25 Gram(s) IV Push once  dextrose 50% Injectable 12.5 Gram(s) IV Push once  dextrose 50% Injectable 25 Gram(s) IV Push once  dextrose Oral Gel 15 Gram(s) Oral once PRN  glucagon  Injectable 1 milliGRAM(s) IntraMuscular once  insulin glargine Injectable (LANTUS) 8 Unit(s) SubCutaneous at bedtime  insulin lispro (ADMELOG) corrective regimen sliding scale   SubCutaneous three times a day before meals  insulin lispro (ADMELOG) corrective regimen sliding scale   SubCutaneous at bedtime  simvastatin 40 milliGRAM(s) Oral at bedtime

## 2023-04-08 NOTE — CHART NOTE - [id number]_GEN_A_CORE
Assessment:     Factors impacting intake: [ ] none [ ] nausea  [ ] vomiting [ ] diarrhea [ ] constipation  [ ]chewing problems [ ] swallowing issues  [ ] other:     Diet Presciption:   Intake:     Current Weight:   % Weight Change    Pertinent Medications: MEDICATIONS  (STANDING):  albuterol/ipratropium for Nebulization 3 milliLiter(s) Nebulizer every 8 hours  budesonide 160 MICROgram(s)/formoterol 4.5 MICROgram(s) Inhaler 2 Puff(s) Inhalation two times a day  chlorhexidine 4% Liquid 1 Application(s) Topical <User Schedule>  dextrose 5% + sodium chloride 0.45% with potassium chloride 20 mEq/L 1000 milliLiter(s) (40 mL/Hr) IV Continuous <Continuous>  dextrose 5%. 1000 milliLiter(s) (100 mL/Hr) IV Continuous <Continuous>  dextrose 5%. 1000 milliLiter(s) (50 mL/Hr) IV Continuous <Continuous>  dextrose 50% Injectable 25 Gram(s) IV Push once  dextrose 50% Injectable 12.5 Gram(s) IV Push once  dextrose 50% Injectable 25 Gram(s) IV Push once  donepezil 5 milliGRAM(s) Oral at bedtime  DULoxetine 60 milliGRAM(s) Oral daily  enoxaparin Injectable 60 milliGRAM(s) SubCutaneous every 12 hours  glucagon  Injectable 1 milliGRAM(s) IntraMuscular once  insulin glargine Injectable (LANTUS) 8 Unit(s) SubCutaneous at bedtime  insulin lispro (ADMELOG) corrective regimen sliding scale   SubCutaneous three times a day before meals  insulin lispro (ADMELOG) corrective regimen sliding scale   SubCutaneous at bedtime  lactobacillus acidophilus 1 Tablet(s) Oral two times a day with meals  losartan 25 milliGRAM(s) Oral daily  metoprolol tartrate 25 milliGRAM(s) Oral three times a day  multivitamin 1 Tablet(s) Oral daily  pantoprazole   Suspension 40 milliGRAM(s) Oral daily  piperacillin/tazobactam IVPB.. 3.375 Gram(s) IV Intermittent every 8 hours  senna 2 Tablet(s) Oral at bedtime  simvastatin 40 milliGRAM(s) Oral at bedtime  tamsulosin 0.4 milliGRAM(s) Oral at bedtime    MEDICATIONS  (PRN):  acetaminophen     Tablet .. 650 milliGRAM(s) Oral every 6 hours PRN Temp greater or equal to 38C (100.4F), Mild Pain (1 - 3)  aluminum hydroxide/magnesium hydroxide/simethicone Suspension 30 milliLiter(s) Oral every 4 hours PRN Dyspepsia  bisacodyl Suppository 10 milliGRAM(s) Rectal daily PRN Constipation  dextrose Oral Gel 15 Gram(s) Oral once PRN Blood Glucose LESS THAN 70 milliGRAM(s)/deciliter  hydrALAZINE 50 milliGRAM(s) Oral every 6 hours PRN for systolic BP>160  magnesium hydroxide Suspension 30 milliLiter(s) Oral daily PRN Constipation  melatonin 3 milliGRAM(s) Oral at bedtime PRN Insomnia  ondansetron Injectable 4 milliGRAM(s) IV Push every 8 hours PRN Nausea and/or Vomiting  sodium chloride 0.9% lock flush 10 milliLiter(s) IV Push every 1 hour PRN Pre/post blood products, medications, blood draw, and to maintain line patency    Pertinent Labs: 04-07 Na145 mmol/L Glu 126 mg/dL<H> K+ 4.1 mmol/L Cr  1.30 mg/dL BUN 35 mg/dL<H> 04-07 Phos 2.6 mg/dL 04-07 Alb 2.2 g/dL<L> 03-30 Chol 108 mg/dL LDL --    HDL 32 mg/dL<L> Trig 74 mg/dL     CAPILLARY BLOOD GLUCOSE      POCT Blood Glucose.: 122 mg/dL (07 Apr 2023 21:24)  POCT Blood Glucose.: 170 mg/dL (07 Apr 2023 16:54)  POCT Blood Glucose.: 170 mg/dL (07 Apr 2023 11:59)  POCT Blood Glucose.: 126 mg/dL (07 Apr 2023 08:04)    Skin:     Estimated Needs:   [ ] no change since previous assessment  [ ] recalculated:     Previous Nutrition Diagnosis:   [ ] Inadequate Energy Intake [ ]Inadequate Oral Intake [ ] Excessive Energy Intake   [ ] Underweight [ ] Increased Nutrient Needs [ ] Overweight/Obesity   [ ] Altered GI Function [ ] Unintended Weight Loss [ ] Food & Nutrition Related Knowledge Deficit [ ] Malnutrition     Nutrition Diagnosis is [ ] ongoing  [ ] resolved [ ] not applicable     New Nutrition Diagnosis: [ ] not applicable       Interventions:   Recommend  [ ] Change Diet To:  [ ] Nutrition Supplement  [ ] Nutrition Support  [ ] Other:     Monitoring and Evaluation:   [ ] PO intake [ x ] Tolerance to diet prescription [ x ] weights [ x ] labs[ x ] follow up per protocol  [ ] other: Assessment:     Factors impacting intake: [ ] none [ ] nausea  [ ] vomiting [ ] diarrhea [ ] constipation  [ ]chewing problems [ ] swallowing issues  [ ] other:     Diet Prescription: soft bite cc( e) madeline BID , Glucerna BID  Intake:     Current Weight:       Pertinent Medications: MEDICATIONS  (STANDING):  albuterol/ipratropium for Nebulization 3 milliLiter(s) Nebulizer every 8 hours  budesonide 160 MICROgram(s)/formoterol 4.5 MICROgram(s) Inhaler 2 Puff(s) Inhalation two times a day  chlorhexidine 4% Liquid 1 Application(s) Topical <User Schedule>  dextrose 5% + sodium chloride 0.45% with potassium chloride 20 mEq/L 1000 milliLiter(s) (40 mL/Hr) IV Continuous <Continuous>  dextrose 5%. 1000 milliLiter(s) (100 mL/Hr) IV Continuous <Continuous>  dextrose 5%. 1000 milliLiter(s) (50 mL/Hr) IV Continuous <Continuous>  dextrose 50% Injectable 25 Gram(s) IV Push once  dextrose 50% Injectable 12.5 Gram(s) IV Push once  dextrose 50% Injectable 25 Gram(s) IV Push once  donepezil 5 milliGRAM(s) Oral at bedtime  DULoxetine 60 milliGRAM(s) Oral daily  enoxaparin Injectable 60 milliGRAM(s) SubCutaneous every 12 hours  glucagon  Injectable 1 milliGRAM(s) IntraMuscular once  insulin glargine Injectable (LANTUS) 8 Unit(s) SubCutaneous at bedtime  insulin lispro (ADMELOG) corrective regimen sliding scale   SubCutaneous three times a day before meals  insulin lispro (ADMELOG) corrective regimen sliding scale   SubCutaneous at bedtime  lactobacillus acidophilus 1 Tablet(s) Oral two times a day with meals  losartan 25 milliGRAM(s) Oral daily  metoprolol tartrate 25 milliGRAM(s) Oral three times a day  multivitamin 1 Tablet(s) Oral daily  pantoprazole   Suspension 40 milliGRAM(s) Oral daily  piperacillin/tazobactam IVPB.. 3.375 Gram(s) IV Intermittent every 8 hours  senna 2 Tablet(s) Oral at bedtime  simvastatin 40 milliGRAM(s) Oral at bedtime  tamsulosin 0.4 milliGRAM(s) Oral at bedtime    MEDICATIONS  (PRN):  acetaminophen     Tablet .. 650 milliGRAM(s) Oral every 6 hours PRN Temp greater or equal to 38C (100.4F), Mild Pain (1 - 3)  aluminum hydroxide/magnesium hydroxide/simethicone Suspension 30 milliLiter(s) Oral every 4 hours PRN Dyspepsia  bisacodyl Suppository 10 milliGRAM(s) Rectal daily PRN Constipation  dextrose Oral Gel 15 Gram(s) Oral once PRN Blood Glucose LESS THAN 70 milliGRAM(s)/deciliter  hydrALAZINE 50 milliGRAM(s) Oral every 6 hours PRN for systolic BP>160  magnesium hydroxide Suspension 30 milliLiter(s) Oral daily PRN Constipation  melatonin 3 milliGRAM(s) Oral at bedtime PRN Insomnia  ondansetron Injectable 4 milliGRAM(s) IV Push every 8 hours PRN Nausea and/or Vomiting  sodium chloride 0.9% lock flush 10 milliLiter(s) IV Push every 1 hour PRN Pre/post blood products, medications, blood draw, and to maintain line patency    Pertinent Labs: 04-07 Na145 mmol/L Glu 126 mg/dL<H> K+ 4.1 mmol/L Cr  1.30 mg/dL BUN 35 mg/dL<H> 04-07 Phos 2.6 mg/dL 04-07 Alb 2.2 g/dL<L> 03-30 Chol 108 mg/dL LDL --    HDL 32 mg/dL<L> Trig 74 mg/dL     CAPILLARY BLOOD GLUCOSE      POCT Blood Glucose.: 122 mg/dL (07 Apr 2023 21:24)  POCT Blood Glucose.: 170 mg/dL (07 Apr 2023 16:54)  POCT Blood Glucose.: 170 mg/dL (07 Apr 2023 11:59)  POCT Blood Glucose.: 126 mg/dL (07 Apr 2023 08:04)    Skin: Skin: left heel unstageable, left lateral foot unstageable, left foot bunion, right heel unstageable. [place].     Estimated Needs:   [X ] no change since previous assessment  [ ] recalculated:     Previous Nutrition Diagnosis:   [ ] Inadequate Energy Intake [ ]Inadequate Oral Intake [ ] Excessive Energy Intake   [ ] Underweight [ ] Increased Nutrient Needs [ ] Overweight/Obesity   [ ] Altered GI Function [ ] Unintended Weight Loss [ ] Food & Nutrition Related Knowledge Deficit [ ] Malnutrition     Nutrition Diagnosis is [ ] ongoing  [ ] resolved [ ] not applicable     New Nutrition Diagnosis: [ ] not applicable       Interventions:   Recommend  [ ] Change Diet To:  [ ] Nutrition Supplement  [ ] Nutrition Support  [ ] Other:     Monitoring and Evaluation:   [ ] PO intake [ x ] Tolerance to diet prescription [ x ] weights [ x ] labs[ x ] follow up per protocol  [ ] other: Nutrition follow up assessment ( chart reviewed, events noted) Brief hx : 86 yo male, [facility] resident with PMHx - COPD, DM, HTN, CAD, HLD, H/O TIA, dementia, GERD, BPH and depression, who was sent for evaluation of left foot 1metatarsal wound. Concern for wound infection with underlying osteomyelitis. He also has bilateral heel wounds. Incidentally found to be COVID positive."    Factors impacting intake: [ ] none [ ] nausea  [ ] vomiting [ ] diarrhea [ ] constipation  [ ]chewing problems [ ] swallowing issues  [ ] other:     Diet Prescription: soft bite cc( e) madeline BID , Glucerna BID  Intake:     Current Weight:       Pertinent Medications: MEDICATIONS  (STANDING):  albuterol/ipratropium for Nebulization 3 milliLiter(s) Nebulizer every 8 hours  budesonide 160 MICROgram(s)/formoterol 4.5 MICROgram(s) Inhaler 2 Puff(s) Inhalation two times a day  chlorhexidine 4% Liquid 1 Application(s) Topical <User Schedule>  dextrose 5% + sodium chloride 0.45% with potassium chloride 20 mEq/L 1000 milliLiter(s) (40 mL/Hr) IV Continuous <Continuous>  dextrose 5%. 1000 milliLiter(s) (100 mL/Hr) IV Continuous <Continuous>  dextrose 5%. 1000 milliLiter(s) (50 mL/Hr) IV Continuous <Continuous>  dextrose 50% Injectable 25 Gram(s) IV Push once  dextrose 50% Injectable 12.5 Gram(s) IV Push once  dextrose 50% Injectable 25 Gram(s) IV Push once  donepezil 5 milliGRAM(s) Oral at bedtime  DULoxetine 60 milliGRAM(s) Oral daily  enoxaparin Injectable 60 milliGRAM(s) SubCutaneous every 12 hours  glucagon  Injectable 1 milliGRAM(s) IntraMuscular once  insulin glargine Injectable (LANTUS) 8 Unit(s) SubCutaneous at bedtime  insulin lispro (ADMELOG) corrective regimen sliding scale   SubCutaneous three times a day before meals  insulin lispro (ADMELOG) corrective regimen sliding scale   SubCutaneous at bedtime  lactobacillus acidophilus 1 Tablet(s) Oral two times a day with meals  losartan 25 milliGRAM(s) Oral daily  metoprolol tartrate 25 milliGRAM(s) Oral three times a day  multivitamin 1 Tablet(s) Oral daily  pantoprazole   Suspension 40 milliGRAM(s) Oral daily  piperacillin/tazobactam IVPB.. 3.375 Gram(s) IV Intermittent every 8 hours  senna 2 Tablet(s) Oral at bedtime  simvastatin 40 milliGRAM(s) Oral at bedtime  tamsulosin 0.4 milliGRAM(s) Oral at bedtime    MEDICATIONS  (PRN):  acetaminophen     Tablet .. 650 milliGRAM(s) Oral every 6 hours PRN Temp greater or equal to 38C (100.4F), Mild Pain (1 - 3)  aluminum hydroxide/magnesium hydroxide/simethicone Suspension 30 milliLiter(s) Oral every 4 hours PRN Dyspepsia  bisacodyl Suppository 10 milliGRAM(s) Rectal daily PRN Constipation  dextrose Oral Gel 15 Gram(s) Oral once PRN Blood Glucose LESS THAN 70 milliGRAM(s)/deciliter  hydrALAZINE 50 milliGRAM(s) Oral every 6 hours PRN for systolic BP>160  magnesium hydroxide Suspension 30 milliLiter(s) Oral daily PRN Constipation  melatonin 3 milliGRAM(s) Oral at bedtime PRN Insomnia  ondansetron Injectable 4 milliGRAM(s) IV Push every 8 hours PRN Nausea and/or Vomiting  sodium chloride 0.9% lock flush 10 milliLiter(s) IV Push every 1 hour PRN Pre/post blood products, medications, blood draw, and to maintain line patency    Pertinent Labs: 04-07 Na145 mmol/L Glu 126 mg/dL<H> K+ 4.1 mmol/L Cr  1.30 mg/dL BUN 35 mg/dL<H> 04-07 Phos 2.6 mg/dL 04-07 Alb 2.2 g/dL<L> 03-30 Chol 108 mg/dL LDL --    HDL 32 mg/dL<L> Trig 74 mg/dL     CAPILLARY BLOOD GLUCOSE      POCT Blood Glucose.: 122 mg/dL (07 Apr 2023 21:24)  POCT Blood Glucose.: 170 mg/dL (07 Apr 2023 16:54)  POCT Blood Glucose.: 170 mg/dL (07 Apr 2023 11:59)  POCT Blood Glucose.: 126 mg/dL (07 Apr 2023 08:04)    Skin: Skin: left heel unstageable, left lateral foot unstageable, left foot bunion, right heel unstageable. [place] 13.     Estimated Needs:   [X ] no change since previous assessment  [ ] recalculated:     Previous Nutrition Diagnosis:   [ ] Inadequate Energy Intake [ ]Inadequate Oral Intake [ ] Excessive Energy Intake   [ ] Underweight [ ] Increased Nutrient Needs [ ] Overweight/Obesity   [ ] Altered GI Function [ ] Unintended Weight Loss [ ] Food & Nutrition Related Knowledge Deficit [ ] Malnutrition     Nutrition Diagnosis is [ ] ongoing  [ ] resolved [ ] not applicable     New Nutrition Diagnosis: [ ] not applicable       Interventions:   Recommend  [ ] Change Diet To:  [ ] Nutrition Supplement  [ ] Nutrition Support  [ ] Other:     Monitoring and Evaluation:   [ ] PO intake [ x ] Tolerance to diet prescription [ x ] weights [ x ] labs[ x ] follow up per protocol  [ ] other: Nutrition follow up assessment ( chart reviewed, events noted) Brief hx : 88 yo male, [facility] resident with PMHx - COPD, DM, HTN, CAD, HLD, H/O TIA, dementia, GERD, BPH and depression, who was sent for evaluation of left foot 1metatarsal wound. Concern for wound infection with underlying osteomyelitis. He also has bilateral heel wounds. Incidentally found to be COVID positive."    Factors impacting intake: [ ] none [ ] nausea  [ ] vomiting [ ] diarrhea [ ] constipation  [ ]chewing problems [ ] swallowing issues  [ ] other:     Diet Prescription: soft bite cc( e) madeline BID , Glucerna BID  Intake:     Current Weight:       Pertinent Medications: MEDICATIONS  (STANDING):  albuterol/ipratropium for Nebulization 3 milliLiter(s) Nebulizer every 8 hours  budesonide 160 MICROgram(s)/formoterol 4.5 MICROgram(s) Inhaler 2 Puff(s) Inhalation two times a day  chlorhexidine 4% Liquid 1 Application(s) Topical <User Schedule>  dextrose 5% + sodium chloride 0.45% with potassium chloride 20 mEq/L 1000 milliLiter(s) (40 mL/Hr) IV Continuous <Continuous>  dextrose 5%. 1000 milliLiter(s) (100 mL/Hr) IV Continuous <Continuous>  dextrose 5%. 1000 milliLiter(s) (50 mL/Hr) IV Continuous <Continuous>  dextrose 50% Injectable 25 Gram(s) IV Push once  dextrose 50% Injectable 12.5 Gram(s) IV Push once  dextrose 50% Injectable 25 Gram(s) IV Push once  donepezil 5 milliGRAM(s) Oral at bedtime  DULoxetine 60 milliGRAM(s) Oral daily  enoxaparin Injectable 60 milliGRAM(s) SubCutaneous every 12 hours  glucagon  Injectable 1 milliGRAM(s) IntraMuscular once  insulin glargine Injectable (LANTUS) 8 Unit(s) SubCutaneous at bedtime  insulin lispro (ADMELOG) corrective regimen sliding scale   SubCutaneous three times a day before meals  insulin lispro (ADMELOG) corrective regimen sliding scale   SubCutaneous at bedtime  lactobacillus acidophilus 1 Tablet(s) Oral two times a day with meals  losartan 25 milliGRAM(s) Oral daily  metoprolol tartrate 25 milliGRAM(s) Oral three times a day  multivitamin 1 Tablet(s) Oral daily  pantoprazole   Suspension 40 milliGRAM(s) Oral daily  piperacillin/tazobactam IVPB.. 3.375 Gram(s) IV Intermittent every 8 hours  senna 2 Tablet(s) Oral at bedtime  simvastatin 40 milliGRAM(s) Oral at bedtime  tamsulosin 0.4 milliGRAM(s) Oral at bedtime    MEDICATIONS  (PRN):  acetaminophen     Tablet .. 650 milliGRAM(s) Oral every 6 hours PRN Temp greater or equal to 38C (100.4F), Mild Pain (1 - 3)  aluminum hydroxide/magnesium hydroxide/simethicone Suspension 30 milliLiter(s) Oral every 4 hours PRN Dyspepsia  bisacodyl Suppository 10 milliGRAM(s) Rectal daily PRN Constipation  dextrose Oral Gel 15 Gram(s) Oral once PRN Blood Glucose LESS THAN 70 milliGRAM(s)/deciliter  hydrALAZINE 50 milliGRAM(s) Oral every 6 hours PRN for systolic BP>160  magnesium hydroxide Suspension 30 milliLiter(s) Oral daily PRN Constipation  melatonin 3 milliGRAM(s) Oral at bedtime PRN Insomnia  ondansetron Injectable 4 milliGRAM(s) IV Push every 8 hours PRN Nausea and/or Vomiting  sodium chloride 0.9% lock flush 10 milliLiter(s) IV Push every 1 hour PRN Pre/post blood products, medications, blood draw, and to maintain line patency    Pertinent Labs: 04-07 Na145 mmol/L Glu 126 mg/dL<H> K+ 4.1 mmol/L Cr  1.30 mg/dL BUN 35 mg/dL<H> 04-07 Phos 2.6 mg/dL 04-07 Alb 2.2 g/dL<L> 03-30 Chol 108 mg/dL LDL --    HDL 32 mg/dL<L> Trig 74 mg/dL     CAPILLARY BLOOD GLUCOSE      POCT Blood Glucose.: 122 mg/dL (07 Apr 2023 21:24)  POCT Blood Glucose.: 170 mg/dL (07 Apr 2023 16:54)  POCT Blood Glucose.: 170 mg/dL (07 Apr 2023 11:59)  POCT Blood Glucose.: 126 mg/dL (07 Apr 2023 08:04)    Skin: Skin: left heel unstageable, left lateral foot unstageable, left foot bunion, right heel unstageable. [place] 13.     Estimated Needs:   [X ] no change since previous assessment  [ ] recalculated:     Previous Nutrition Diagnosis:   [ ] Inadequate Energy Intake [ ]Inadequate Oral Intake [ ] Excessive Energy Intake   [ ] Underweight [ ] Increased Nutrient Needs [ ] Overweight/Obesity   [ ] Altered GI Function [ ] Unintended Weight Loss [ ] Food & Nutrition Related Knowledge Deficit [ ] Malnutrition     Nutrition Diagnosis is [ ] ongoing  [ ] resolved [ ] not applicable     New Nutrition Diagnosis: [ ] not applicable       Interventions:   Recommend  [ ] Change Diet To:  [ ] Nutrition Supplement  [ ] Nutrition Support  [ ] Other:     Monitoring and Evaluation:   [ ] PO intake [ x ] Tolerance to diet prescription [ x ] weights [ x ] labs[ x ] follow up per protocol  [ ] other: Nutrition follow up assessment ( chart reviewed, events noted) Brief hx : 86 yo male, [facility] resident with PMHx - COPD, DM, HTN, CAD, HLD, H/O TIA, dementia, GERD, BPH and depression, who was sent for evaluation of left foot 1metatarsal wound. Concern for wound infection with underlying osteomyelitis. He also has bilateral heel wounds. Incidentally found to be COVID positive."    Factors impacting intake: [ ] none [ ] nausea  [ ] vomiting [ ] diarrhea [ ] constipation  [ ]chewing problems [ ] swallowing issues  [ ] other:     Diet Prescription: soft bite cc( e) madeline BID , Glucerna BID  Intake:     Current Weight:       Pertinent Medications: MEDICATIONS  (STANDING):  albuterol/ipratropium for Nebulization 3 milliLiter(s) Nebulizer every 8 hours  budesonide 160 MICROgram(s)/formoterol 4.5 MICROgram(s) Inhaler 2 Puff(s) Inhalation two times a day  chlorhexidine 4% Liquid 1 Application(s) Topical <User Schedule>  dextrose 5% + sodium chloride 0.45% with potassium chloride 20 mEq/L 1000 milliLiter(s) (40 mL/Hr) IV Continuous <Continuous>  dextrose 5%. 1000 milliLiter(s) (100 mL/Hr) IV Continuous <Continuous>  dextrose 5%. 1000 milliLiter(s) (50 mL/Hr) IV Continuous <Continuous>  dextrose 50% Injectable 25 Gram(s) IV Push once  dextrose 50% Injectable 12.5 Gram(s) IV Push once  dextrose 50% Injectable 25 Gram(s) IV Push once  donepezil 5 milliGRAM(s) Oral at bedtime  DULoxetine 60 milliGRAM(s) Oral daily  enoxaparin Injectable 60 milliGRAM(s) SubCutaneous every 12 hours  glucagon  Injectable 1 milliGRAM(s) IntraMuscular once  insulin glargine Injectable (LANTUS) 8 Unit(s) SubCutaneous at bedtime  insulin lispro (ADMELOG) corrective regimen sliding scale   SubCutaneous three times a day before meals  insulin lispro (ADMELOG) corrective regimen sliding scale   SubCutaneous at bedtime  lactobacillus acidophilus 1 Tablet(s) Oral two times a day with meals  losartan 25 milliGRAM(s) Oral daily  metoprolol tartrate 25 milliGRAM(s) Oral three times a day  multivitamin 1 Tablet(s) Oral daily  pantoprazole   Suspension 40 milliGRAM(s) Oral daily  piperacillin/tazobactam IVPB.. 3.375 Gram(s) IV Intermittent every 8 hours  senna 2 Tablet(s) Oral at bedtime  simvastatin 40 milliGRAM(s) Oral at bedtime  tamsulosin 0.4 milliGRAM(s) Oral at bedtime    MEDICATIONS  (PRN):  acetaminophen     Tablet .. 650 milliGRAM(s) Oral every 6 hours PRN Temp greater or equal to 38C (100.4F), Mild Pain (1 - 3)  aluminum hydroxide/magnesium hydroxide/simethicone Suspension 30 milliLiter(s) Oral every 4 hours PRN Dyspepsia  bisacodyl Suppository 10 milliGRAM(s) Rectal daily PRN Constipation  dextrose Oral Gel 15 Gram(s) Oral once PRN Blood Glucose LESS THAN 70 milliGRAM(s)/deciliter  hydrALAZINE 50 milliGRAM(s) Oral every 6 hours PRN for systolic BP>160  magnesium hydroxide Suspension 30 milliLiter(s) Oral daily PRN Constipation  melatonin 3 milliGRAM(s) Oral at bedtime PRN Insomnia  ondansetron Injectable 4 milliGRAM(s) IV Push every 8 hours PRN Nausea and/or Vomiting  sodium chloride 0.9% lock flush 10 milliLiter(s) IV Push every 1 hour PRN Pre/post blood products, medications, blood draw, and to maintain line patency    Pertinent Labs: 04-07 Na145 mmol/L Glu 126 mg/dL<H> K+ 4.1 mmol/L Cr  1.30 mg/dL BUN 35 mg/dL<H> 04-07 Phos 2.6 mg/dL 04-07 Alb 2.2 g/dL<L> 03-30 Chol 108 mg/dL LDL --    HDL 32 mg/dL<L> Trig 74 mg/dL     CAPILLARY BLOOD GLUCOSE      POCT Blood Glucose.: 122 mg/dL (07 Apr 2023 21:24)  POCT Blood Glucose.: 170 mg/dL (07 Apr 2023 16:54)  POCT Blood Glucose.: 170 mg/dL (07 Apr 2023 11:59)  POCT Blood Glucose.: 126 mg/dL (07 Apr 2023 08:04)    Skin: Skin: left heel unstageable, left lateral foot unstageable, left foot bunion, right heel unstageable. [place] [address].     Estimated Needs:   [X ] no change since previous assessment  [ ] recalculated:     Previous Nutrition Diagnosis:   [ ] Inadequate Energy Intake [ ]Inadequate Oral Intake [ ] Excessive Energy Intake   [ ] Underweight [ ] Increased Nutrient Needs [ ] Overweight/Obesity   [ ] Altered GI Function [ ] Unintended Weight Loss [ ] Food & Nutrition Related Knowledge Deficit [ ] Malnutrition     Nutrition Diagnosis is [ ] ongoing  [ ] resolved [ ] not applicable     New Nutrition Diagnosis: [ ] not applicable       Interventions:   Recommend  [ ] Change Diet To:  [ ] Nutrition Supplement  [ ] Nutrition Support  [ ] Other:     Monitoring and Evaluation:   [ ] PO intake [ x ] Tolerance to diet prescription [ x ] weights [ x ] labs[ x ] follow up per protocol  [ ] other: Nutrition follow up assessment ( chart reviewed, events noted) Brief hx : 88 yo male, [facility] resident with PMHx - COPD, DM, HTN, CAD, HLD, H/O TIA, dementia, GERD, BPH and depression, who was sent for evaluation of left foot 1metatarsal wound. Concern for wound infection with underlying osteomyelitis. He also has bilateral heel wounds. Incidentally found to be COVID positive."    88 yo male ,[facility] resident with PMHx - COPD, DM, HTN, CAD, HLD, H/O TIA, dementia,GERD, BPH and depression sent ot ER for evaluation of left foot 1metatarsal wound ,suggestive of osteomyelitis .Patient was seen by ID consult and transfer to the hospital recommended for wound cx/bone biopsy /podiatry evaluation ,likely will require 4-6 weeks of iv abx . [facility] with b/l feet wounds and was followed by wound care team ,recently completed 7 days of doxycycline for foot wound cellulitis .          Factors impacting intake: [ ] none [ ] nausea  [ ] vomiting [ ] diarrhea [ ] constipation  [ ]chewing problems [ ] swallowing issues  [ ] other:     Diet Prescription: soft bite cc( e) madeline BID , Glucerna BID  Intake:     Current Weight:       Pertinent Medications: MEDICATIONS  (STANDING):  albuterol/ipratropium for Nebulization 3 milliLiter(s) Nebulizer every 8 hours  budesonide 160 MICROgram(s)/formoterol 4.5 MICROgram(s) Inhaler 2 Puff(s) Inhalation two times a day  chlorhexidine 4% Liquid 1 Application(s) Topical <User Schedule>  dextrose 5% + sodium chloride 0.45% with potassium chloride 20 mEq/L 1000 milliLiter(s) (40 mL/Hr) IV Continuous <Continuous>  dextrose 5%. 1000 milliLiter(s) (100 mL/Hr) IV Continuous <Continuous>  dextrose 5%. 1000 milliLiter(s) (50 mL/Hr) IV Continuous <Continuous>  dextrose 50% Injectable 25 Gram(s) IV Push once  dextrose 50% Injectable 12.5 Gram(s) IV Push once  dextrose 50% Injectable 25 Gram(s) IV Push once  donepezil 5 milliGRAM(s) Oral at bedtime  DULoxetine 60 milliGRAM(s) Oral daily  enoxaparin Injectable 60 milliGRAM(s) SubCutaneous every 12 hours  glucagon  Injectable 1 milliGRAM(s) IntraMuscular once  insulin glargine Injectable (LANTUS) 8 Unit(s) SubCutaneous at bedtime  insulin lispro (ADMELOG) corrective regimen sliding scale   SubCutaneous three times a day before meals  insulin lispro (ADMELOG) corrective regimen sliding scale   SubCutaneous at bedtime  lactobacillus acidophilus 1 Tablet(s) Oral two times a day with meals  losartan 25 milliGRAM(s) Oral daily  metoprolol tartrate 25 milliGRAM(s) Oral three times a day  multivitamin 1 Tablet(s) Oral daily  pantoprazole   Suspension 40 milliGRAM(s) Oral daily  piperacillin/tazobactam IVPB.. 3.375 Gram(s) IV Intermittent every 8 hours  senna 2 Tablet(s) Oral at bedtime  simvastatin 40 milliGRAM(s) Oral at bedtime  tamsulosin 0.4 milliGRAM(s) Oral at bedtime    MEDICATIONS  (PRN):  acetaminophen     Tablet .. 650 milliGRAM(s) Oral every 6 hours PRN Temp greater or equal to 38C (100.4F), Mild Pain (1 - 3)  aluminum hydroxide/magnesium hydroxide/simethicone Suspension 30 milliLiter(s) Oral every 4 hours PRN Dyspepsia  bisacodyl Suppository 10 milliGRAM(s) Rectal daily PRN Constipation  dextrose Oral Gel 15 Gram(s) Oral once PRN Blood Glucose LESS THAN 70 milliGRAM(s)/deciliter  hydrALAZINE 50 milliGRAM(s) Oral every 6 hours PRN for systolic BP>160  magnesium hydroxide Suspension 30 milliLiter(s) Oral daily PRN Constipation  melatonin 3 milliGRAM(s) Oral at bedtime PRN Insomnia  ondansetron Injectable 4 milliGRAM(s) IV Push every 8 hours PRN Nausea and/or Vomiting  sodium chloride 0.9% lock flush 10 milliLiter(s) IV Push every 1 hour PRN Pre/post blood products, medications, blood draw, and to maintain line patency    Pertinent Labs: 04-07 Na145 mmol/L Glu 126 mg/dL<H> K+ 4.1 mmol/L Cr  1.30 mg/dL BUN 35 mg/dL<H> 04-07 Phos 2.6 mg/dL 04-07 Alb 2.2 g/dL<L> 03-30 Chol 108 mg/dL LDL --    HDL 32 mg/dL<L> Trig 74 mg/dL     CAPILLARY BLOOD GLUCOSE      POCT Blood Glucose.: 122 mg/dL (07 Apr 2023 21:24)  POCT Blood Glucose.: 170 mg/dL (07 Apr 2023 16:54)  POCT Blood Glucose.: 170 mg/dL (07 Apr 2023 11:59)  POCT Blood Glucose.: 126 mg/dL (07 Apr 2023 08:04)    Skin: Skin: left heel unstageable, left lateral foot unstageable, left foot bunion, right heel unstageable. [place] 13.     Estimated Needs:   [X ] no change since previous assessment  [ ] recalculated:     Previous Nutrition Diagnosis:   [ ] Inadequate Energy Intake [ ]Inadequate Oral Intake [ ] Excessive Energy Intake   [ ] Underweight [ ] Increased Nutrient Needs [ ] Overweight/Obesity   [ ] Altered GI Function [ ] Unintended Weight Loss [ ] Food & Nutrition Related Knowledge Deficit [ ] Malnutrition     Nutrition Diagnosis is [ ] ongoing  [ ] resolved [ ] not applicable     New Nutrition Diagnosis: [ ] not applicable       Interventions:   Recommend  [ ] Change Diet To:  [ ] Nutrition Supplement  [ ] Nutrition Support  [ ] Other:     Monitoring and Evaluation:   [ ] PO intake [ x ] Tolerance to diet prescription [ x ] weights [ x ] labs[ x ] follow up per protocol  [ ] other: Nutrition follow up assessment ( chart reviewed, events noted) Brief hx : 88 yo male, [facility] resident with PMHx - COPD, DM, HTN, CAD, HLD, H/O TIA, dementia, GERD, BPH and depression, who was sent for evaluation of left foot 1metatarsal wound. Concern for wound infection with underlying osteomyelitis. He also has bilateral heel wounds. Incidentally found to be COVID positive."    88 yo male ,[facility] resident with PMHx - COPD, DM, HTN, CAD, HLD, H/O TIA, dementia,GERD, BPH and depression sent ot ER for evaluation of left foot 1metatarsal wound ,suggestive of osteomyelitis .Patient was seen by ID consult and transfer to the hospital recommended for wound cx/bone biopsy /podiatry evaluation ,likely will require 4-6 weeks of iv abx . Patient was admitted to [facility] with b/l feet wounds and was followed by wound care team ,recently completed 7 days of doxycycline for foot wound cellulitis .          Factors impacting intake: [ ] none [ ] nausea  [ ] vomiting [ ] diarrhea [ ] constipation  [ ]chewing problems [ ] swallowing issues  [ ] other:     Diet Prescription: soft bite cc( e) madeline BID , Glucerna BID  Intake:     Current Weight:       Pertinent Medications: MEDICATIONS  (STANDING):  albuterol/ipratropium for Nebulization 3 milliLiter(s) Nebulizer every 8 hours  budesonide 160 MICROgram(s)/formoterol 4.5 MICROgram(s) Inhaler 2 Puff(s) Inhalation two times a day  chlorhexidine 4% Liquid 1 Application(s) Topical <User Schedule>  dextrose 5% + sodium chloride 0.45% with potassium chloride 20 mEq/L 1000 milliLiter(s) (40 mL/Hr) IV Continuous <Continuous>  dextrose 5%. 1000 milliLiter(s) (100 mL/Hr) IV Continuous <Continuous>  dextrose 5%. 1000 milliLiter(s) (50 mL/Hr) IV Continuous <Continuous>  dextrose 50% Injectable 25 Gram(s) IV Push once  dextrose 50% Injectable 12.5 Gram(s) IV Push once  dextrose 50% Injectable 25 Gram(s) IV Push once  donepezil 5 milliGRAM(s) Oral at bedtime  DULoxetine 60 milliGRAM(s) Oral daily  enoxaparin Injectable 60 milliGRAM(s) SubCutaneous every 12 hours  glucagon  Injectable 1 milliGRAM(s) IntraMuscular once  insulin glargine Injectable (LANTUS) 8 Unit(s) SubCutaneous at bedtime  insulin lispro (ADMELOG) corrective regimen sliding scale   SubCutaneous three times a day before meals  insulin lispro (ADMELOG) corrective regimen sliding scale   SubCutaneous at bedtime  lactobacillus acidophilus 1 Tablet(s) Oral two times a day with meals  losartan 25 milliGRAM(s) Oral daily  metoprolol tartrate 25 milliGRAM(s) Oral three times a day  multivitamin 1 Tablet(s) Oral daily  pantoprazole   Suspension 40 milliGRAM(s) Oral daily  piperacillin/tazobactam IVPB.. 3.375 Gram(s) IV Intermittent every 8 hours  senna 2 Tablet(s) Oral at bedtime  simvastatin 40 milliGRAM(s) Oral at bedtime  tamsulosin 0.4 milliGRAM(s) Oral at bedtime    MEDICATIONS  (PRN):  acetaminophen     Tablet .. 650 milliGRAM(s) Oral every 6 hours PRN Temp greater or equal to 38C (100.4F), Mild Pain (1 - 3)  aluminum hydroxide/magnesium hydroxide/simethicone Suspension 30 milliLiter(s) Oral every 4 hours PRN Dyspepsia  bisacodyl Suppository 10 milliGRAM(s) Rectal daily PRN Constipation  dextrose Oral Gel 15 Gram(s) Oral once PRN Blood Glucose LESS THAN 70 milliGRAM(s)/deciliter  hydrALAZINE 50 milliGRAM(s) Oral every 6 hours PRN for systolic BP>160  magnesium hydroxide Suspension 30 milliLiter(s) Oral daily PRN Constipation  melatonin 3 milliGRAM(s) Oral at bedtime PRN Insomnia  ondansetron Injectable 4 milliGRAM(s) IV Push every 8 hours PRN Nausea and/or Vomiting  sodium chloride 0.9% lock flush 10 milliLiter(s) IV Push every 1 hour PRN Pre/post blood products, medications, blood draw, and to maintain line patency    Pertinent Labs: 04-07 Na145 mmol/L Glu 126 mg/dL<H> K+ 4.1 mmol/L Cr  1.30 mg/dL BUN 35 mg/dL<H> 04-07 Phos 2.6 mg/dL 04-07 Alb 2.2 g/dL<L> 03-30 Chol 108 mg/dL LDL --    HDL 32 mg/dL<L> Trig 74 mg/dL     CAPILLARY BLOOD GLUCOSE      POCT Blood Glucose.: 122 mg/dL (07 Apr 2023 21:24)  POCT Blood Glucose.: 170 mg/dL (07 Apr 2023 16:54)  POCT Blood Glucose.: 170 mg/dL (07 Apr 2023 11:59)  POCT Blood Glucose.: 126 mg/dL (07 Apr 2023 08:04)    Skin: Skin: left heel unstageable, left lateral foot unstageable, left foot bunion, right heel unstageable. [place] 13.     Estimated Needs:   [X ] no change since previous assessment  [ ] recalculated:     Previous Nutrition Diagnosis:   [ ] Inadequate Energy Intake [ ]Inadequate Oral Intake [ ] Excessive Energy Intake   [ ] Underweight [ ] Increased Nutrient Needs [ ] Overweight/Obesity   [ ] Altered GI Function [ ] Unintended Weight Loss [ ] Food & Nutrition Related Knowledge Deficit [ ] Malnutrition     Nutrition Diagnosis is [ ] ongoing  [ ] resolved [ ] not applicable     New Nutrition Diagnosis: [ ] not applicable       Interventions:   Recommend  [ ] Change Diet To:  [ ] Nutrition Supplement  [ ] Nutrition Support  [ ] Other:     Monitoring and Evaluation:   [ ] PO intake [ x ] Tolerance to diet prescription [ x ] weights [ x ] labs[ x ] follow up per protocol  [ ] other: Nutrition follow up assessment ( chart reviewed, events noted) Brief hx : 86 yo male, [facility] resident with PMHx - COPD, DM, HTN, CAD, HLD, H/O TIA, dementia, GERD, BPH and depression, who was sent for evaluation of left foot 1metatarsal wound. Concern for wound infection with underlying osteomyelitis. He also has bilateral heel wounds. Incidentally found to be COVID positive."    86 yo male ,[facility] resident with PMHx - COPD, DM, HTN, CAD, HLD, H/O TIA, dementia,GERD, BPH and depression sent ot ER for evaluation of left foot 1metatarsal wound ,suggestive of osteomyelitis .Patient was seen by ID consult and transfer to the hospital recommended for wound cx/bone biopsy /podiatry evaluation ,likely will require 4-6 weeks of iv abx . Patient was admitted to [facility] with b/l feet wounds and was followed by wound care team ,recently completed 7 days of doxycycline for foot wound cellulitis .          Factors impacting intake: [ ] none [ ] nausea  [ ] vomiting [ ] diarrhea [ ] constipation  [ ]chewing problems [ ] swallowing issues  [ ] other:     Diet Prescription: soft bite cc( e) madeline BID , Glucerna BID  Intake:     Current Weight:       Pertinent Medications: MEDICATIONS  (STANDING):  albuterol/ipratropium for Nebulization 3 milliLiter(s) Nebulizer every 8 hours  budesonide 160 MICROgram(s)/formoterol 4.5 MICROgram(s) Inhaler 2 Puff(s) Inhalation two times a day  chlorhexidine 4% Liquid 1 Application(s) Topical <User Schedule>  dextrose 5% + sodium chloride 0.45% with potassium chloride 20 mEq/L 1000 milliLiter(s) (40 mL/Hr) IV Continuous <Continuous>  dextrose 5%. 1000 milliLiter(s) (100 mL/Hr) IV Continuous <Continuous>  dextrose 5%. 1000 milliLiter(s) (50 mL/Hr) IV Continuous <Continuous>  dextrose 50% Injectable 25 Gram(s) IV Push once  dextrose 50% Injectable 12.5 Gram(s) IV Push once  dextrose 50% Injectable 25 Gram(s) IV Push once  donepezil 5 milliGRAM(s) Oral at bedtime  DULoxetine 60 milliGRAM(s) Oral daily  enoxaparin Injectable 60 milliGRAM(s) SubCutaneous every 12 hours  glucagon  Injectable 1 milliGRAM(s) IntraMuscular once  insulin glargine Injectable (LANTUS) 8 Unit(s) SubCutaneous at bedtime  insulin lispro (ADMELOG) corrective regimen sliding scale   SubCutaneous three times a day before meals  insulin lispro (ADMELOG) corrective regimen sliding scale   SubCutaneous at bedtime  lactobacillus acidophilus 1 Tablet(s) Oral two times a day with meals  losartan 25 milliGRAM(s) Oral daily  metoprolol tartrate 25 milliGRAM(s) Oral three times a day  multivitamin 1 Tablet(s) Oral daily  pantoprazole   Suspension 40 milliGRAM(s) Oral daily  piperacillin/tazobactam IVPB.. 3.375 Gram(s) IV Intermittent every 8 hours  senna 2 Tablet(s) Oral at bedtime  simvastatin 40 milliGRAM(s) Oral at bedtime  tamsulosin 0.4 milliGRAM(s) Oral at bedtime    MEDICATIONS  (PRN):  acetaminophen     Tablet .. 650 milliGRAM(s) Oral every 6 hours PRN Temp greater or equal to 38C (100.4F), Mild Pain (1 - 3)  aluminum hydroxide/magnesium hydroxide/simethicone Suspension 30 milliLiter(s) Oral every 4 hours PRN Dyspepsia  bisacodyl Suppository 10 milliGRAM(s) Rectal daily PRN Constipation  dextrose Oral Gel 15 Gram(s) Oral once PRN Blood Glucose LESS THAN 70 milliGRAM(s)/deciliter  hydrALAZINE 50 milliGRAM(s) Oral every 6 hours PRN for systolic BP>160  magnesium hydroxide Suspension 30 milliLiter(s) Oral daily PRN Constipation  melatonin 3 milliGRAM(s) Oral at bedtime PRN Insomnia  ondansetron Injectable 4 milliGRAM(s) IV Push every 8 hours PRN Nausea and/or Vomiting  sodium chloride 0.9% lock flush 10 milliLiter(s) IV Push every 1 hour PRN Pre/post blood products, medications, blood draw, and to maintain line patency    Pertinent Labs: 04-07 Na145 mmol/L Glu 126 mg/dL<H> K+ 4.1 mmol/L Cr  1.30 mg/dL BUN 35 mg/dL<H> 04-07 Phos 2.6 mg/dL 04-07 Alb 2.2 g/dL<L> 03-30 Chol 108 mg/dL LDL --    HDL 32 mg/dL<L> Trig 74 mg/dL     CAPILLARY BLOOD GLUCOSE      POCT Blood Glucose.: 122 mg/dL (07 Apr 2023 21:24)  POCT Blood Glucose.: 170 mg/dL (07 Apr 2023 16:54)  POCT Blood Glucose.: 170 mg/dL (07 Apr 2023 11:59)  POCT Blood Glucose.: 126 mg/dL (07 Apr 2023 08:04)    Skin: Skin: left heel unstageable, left lateral foot unstageable, left foot bunion, right heel unstageable. [place] 13.     Estimated Needs:   [X ] no change since previous assessment  [ ] recalculated:     Previous Nutrition Diagnosis:   [ ] Inadequate Energy Intake [ ]Inadequate Oral Intake [ ] Excessive Energy Intake   [ ] Underweight [ ] Increased Nutrient Needs [ ] Overweight/Obesity   [ ] Altered GI Function [ ] Unintended Weight Loss [ ] Food & Nutrition Related Knowledge Deficit [ ] Malnutrition     Nutrition Diagnosis is [ ] ongoing  [ ] resolved [ ] not applicable     New Nutrition Diagnosis: [ ] not applicable       Interventions:   Recommend  [ ] Change Diet To:  [ ] Nutrition Supplement  [ ] Nutrition Support  [ ] Other:     Monitoring and Evaluation:   [ ] PO intake [ x ] Tolerance to diet prescription [ x ] weights [ x ] labs[ x ] follow up per protocol  [ ] other: Nutrition follow up assessment ( chart reviewed, events noted) Brief hx : 88 yo male, [facility] resident with PMHx - COPD, DM, HTN, CAD, HLD, H/O TIA, dementia, GERD, BPH and depression, who was sent for evaluation of left foot 1metatarsal wound. Concern for wound infection with underlying osteomyelitis. He also has bilateral heel wounds. Incidentally found to be COVID positive." A PICC line was placed yesterday for 4-6 weeks of IV ABX     Pt visited thia am , sleeping presently . Per nsg, pt needs to be fed, po intake is fair to good.    Factors impacting intake: [ ] none [ ] nausea  [ ] vomiting [ ] diarrhea [ ] constipation  [X ]chewing problems [ X] swallowing issues  [X ] other: dementia    Diet Prescription: soft bite cc( e) madeline BID , Glucerna BID    Intake: 51-75%    Current Weight: since admission wts documented fluctuating 63.5-74.3 kg . Pt has 1+ bilat foot edema this would not account for 10,8kg wt gain, more likely r/t scale accuracy      Pertinent Medications: MEDICATIONS  (STANDING):  albuterol/ipratropium for Nebulization 3 milliLiter(s) Nebulizer every 8 hours  budesonide 160 MICROgram(s)/formoterol 4.5 MICROgram(s) Inhaler 2 Puff(s) Inhalation two times a day  chlorhexidine 4% Liquid 1 Application(s) Topical <User Schedule>  dextrose 5% + sodium chloride 0.45% with potassium chloride 20 mEq/L 1000 milliLiter(s) (40 mL/Hr) IV Continuous <Continuous>  dextrose 5%. 1000 milliLiter(s) (100 mL/Hr) IV Continuous <Continuous>  dextrose 5%. 1000 milliLiter(s) (50 mL/Hr) IV Continuous <Continuous>  dextrose 50% Injectable 25 Gram(s) IV Push once  dextrose 50% Injectable 12.5 Gram(s) IV Push once  dextrose 50% Injectable 25 Gram(s) IV Push once  donepezil 5 milliGRAM(s) Oral at bedtime  DULoxetine 60 milliGRAM(s) Oral daily  enoxaparin Injectable 60 milliGRAM(s) SubCutaneous every 12 hours  glucagon  Injectable 1 milliGRAM(s) IntraMuscular once  insulin glargine Injectable (LANTUS) 8 Unit(s) SubCutaneous at bedtime  insulin lispro (ADMELOG) corrective regimen sliding scale   SubCutaneous three times a day before meals  insulin lispro (ADMELOG) corrective regimen sliding scale   SubCutaneous at bedtime  lactobacillus acidophilus 1 Tablet(s) Oral two times a day with meals  losartan 25 milliGRAM(s) Oral daily  metoprolol tartrate 25 milliGRAM(s) Oral three times a day  multivitamin 1 Tablet(s) Oral daily  pantoprazole   Suspension 40 milliGRAM(s) Oral daily  piperacillin/tazobactam IVPB.. 3.375 Gram(s) IV Intermittent every 8 hours  senna 2 Tablet(s) Oral at bedtime  simvastatin 40 milliGRAM(s) Oral at bedtime  tamsulosin 0.4 milliGRAM(s) Oral at bedtime    MEDICATIONS  (PRN):  acetaminophen     Tablet .. 650 milliGRAM(s) Oral every 6 hours PRN Temp greater or equal to 38C (100.4F), Mild Pain (1 - 3)  aluminum hydroxide/magnesium hydroxide/simethicone Suspension 30 milliLiter(s) Oral every 4 hours PRN Dyspepsia  bisacodyl Suppository 10 milliGRAM(s) Rectal daily PRN Constipation  dextrose Oral Gel 15 Gram(s) Oral once PRN Blood Glucose LESS THAN 70 milliGRAM(s)/deciliter  hydrALAZINE 50 milliGRAM(s) Oral every 6 hours PRN for systolic BP>160  magnesium hydroxide Suspension 30 milliLiter(s) Oral daily PRN Constipation  melatonin 3 milliGRAM(s) Oral at bedtime PRN Insomnia  ondansetron Injectable 4 milliGRAM(s) IV Push every 8 hours PRN Nausea and/or Vomiting  sodium chloride 0.9% lock flush 10 milliLiter(s) IV Push every 1 hour PRN Pre/post blood products, medications, blood draw, and to maintain line patency    Pertinent Labs: 04-07 Na145 mmol/L Glu 126 mg/dL<H> K+ 4.1 mmol/L Cr  1.30 mg/dL BUN 35 mg/dL<H> 04-07 Phos 2.6 mg/dL 04-07 Alb 2.2 g/dL<L> 03-30 Chol 108 mg/dL LDL --    HDL 32 mg/dL<L> Trig 74 mg/dL     CAPILLARY BLOOD GLUCOSE      POCT Blood Glucose.: 122 mg/dL (07 Apr 2023 21:24)  POCT Blood Glucose.: 170 mg/dL (07 Apr 2023 16:54)  POCT Blood Glucose.: 170 mg/dL (07 Apr 2023 11:59)  POCT Blood Glucose.: 126 mg/dL (07 Apr 2023 08:04)    Skin: see above, MASD sacral gluteal   BM: 4/6    Estimated Needs:   [X ] no change since previous assessment, based on wt if ~ 70 kg ( 25-30 kcals/kg) 5226-3747 kcals and ( 1.3-1.6 gm pro/kg)  gm pro  [ ] recalculated:     Previous Nutrition Diagnosis:   [ ] Inadequate Energy Intake [ ]Inadequate Oral Intake [ ] Excessive Energy Intake   [ ] Underweight [x] Increased Nutrient Needs [ ] Overweight/Obesity   [ ] Altered GI Function [ ] Unintended Weight Loss [ ] Food & Nutrition Related Knowledge Deficit [X ] Malnutrition     Nutrition Diagnosis is [X ] ongoing  [ ] resolved [ ] not applicable     New Nutrition Diagnosis: [ ] not applicable       Interventions:   Recommend  [ ] Change Diet To:  [ ] Nutrition Supplement  [ ] Nutrition Support  [X ] Other: cont POC    Monitoring and Evaluation:   [X ] PO intake [ x ] Tolerance to diet prescription [ x ] weights [ x ] labs[ x ] follow up per protocol  [ X] other: skin integrity Nutrition follow up assessment ( chart reviewed, events noted) Brief hx : 88 yo male, [facility] resident with PMHx - COPD, DM, HTN, CAD, HLD, H/O TIA, dementia, GERD, BPH and depression, who was sent for evaluation of left foot 1metatarsal wound. Concern for wound infection with underlying osteomyelitis. He also has bilateral heel wounds. Incidentally found to be COVID positive." A PICC line was placed yesterday for 4-6 weeks of IV ABX     Pt visited thia am , sleeping presently . Per nsg, pt needs to be fed, po intake is fair to good.    Factors impacting intake: [ ] none [ ] nausea  [ ] vomiting [ ] diarrhea [ ] constipation  [X ]chewing problems [ X] swallowing issues  [X ] other: dementia    Diet Prescription: soft bite cc( e) madeline BID , Glucerna BID    Intake: 51-75%    Current Weight: since admission wts documented fluctuating 63.5-74.3 kg . Pt has 1+ bilat foot edema this would not account for 10,8kg wt gain, more likely r/t scale accuracy      Pertinent Medications: MEDICATIONS  (STANDING):  albuterol/ipratropium for Nebulization 3 milliLiter(s) Nebulizer every 8 hours  budesonide 160 MICROgram(s)/formoterol 4.5 MICROgram(s) Inhaler 2 Puff(s) Inhalation two times a day  chlorhexidine 4% Liquid 1 Application(s) Topical <User Schedule>  dextrose 5% + sodium chloride 0.45% with potassium chloride 20 mEq/L 1000 milliLiter(s) (40 mL/Hr) IV Continuous <Continuous>  dextrose 5%. 1000 milliLiter(s) (100 mL/Hr) IV Continuous <Continuous>  dextrose 5%. 1000 milliLiter(s) (50 mL/Hr) IV Continuous <Continuous>  dextrose 50% Injectable 25 Gram(s) IV Push once  dextrose 50% Injectable 12.5 Gram(s) IV Push once  dextrose 50% Injectable 25 Gram(s) IV Push once  donepezil 5 milliGRAM(s) Oral at bedtime  DULoxetine 60 milliGRAM(s) Oral daily  enoxaparin Injectable 60 milliGRAM(s) SubCutaneous every 12 hours  glucagon  Injectable 1 milliGRAM(s) IntraMuscular once  insulin glargine Injectable (LANTUS) 8 Unit(s) SubCutaneous at bedtime  insulin lispro (ADMELOG) corrective regimen sliding scale   SubCutaneous three times a day before meals  insulin lispro (ADMELOG) corrective regimen sliding scale   SubCutaneous at bedtime  lactobacillus acidophilus 1 Tablet(s) Oral two times a day with meals  losartan 25 milliGRAM(s) Oral daily  metoprolol tartrate 25 milliGRAM(s) Oral three times a day  multivitamin 1 Tablet(s) Oral daily  pantoprazole   Suspension 40 milliGRAM(s) Oral daily  piperacillin/tazobactam IVPB.. 3.375 Gram(s) IV Intermittent every 8 hours  senna 2 Tablet(s) Oral at bedtime  simvastatin 40 milliGRAM(s) Oral at bedtime  tamsulosin 0.4 milliGRAM(s) Oral at bedtime    MEDICATIONS  (PRN):  acetaminophen     Tablet .. 650 milliGRAM(s) Oral every 6 hours PRN Temp greater or equal to 38C (100.4F), Mild Pain (1 - 3)  aluminum hydroxide/magnesium hydroxide/simethicone Suspension 30 milliLiter(s) Oral every 4 hours PRN Dyspepsia  bisacodyl Suppository 10 milliGRAM(s) Rectal daily PRN Constipation  dextrose Oral Gel 15 Gram(s) Oral once PRN Blood Glucose LESS THAN 70 milliGRAM(s)/deciliter  hydrALAZINE 50 milliGRAM(s) Oral every 6 hours PRN for systolic BP>160  magnesium hydroxide Suspension 30 milliLiter(s) Oral daily PRN Constipation  melatonin 3 milliGRAM(s) Oral at bedtime PRN Insomnia  ondansetron Injectable 4 milliGRAM(s) IV Push every 8 hours PRN Nausea and/or Vomiting  sodium chloride 0.9% lock flush 10 milliLiter(s) IV Push every 1 hour PRN Pre/post blood products, medications, blood draw, and to maintain line patency    Pertinent Labs: 04-07 Na145 mmol/L Glu 126 mg/dL<H> K+ 4.1 mmol/L Cr  1.30 mg/dL BUN 35 mg/dL<H> 04-07 Phos 2.6 mg/dL 04-07 Alb 2.2 g/dL<L> 03-30 Chol 108 mg/dL LDL --    HDL 32 mg/dL<L> Trig 74 mg/dL     CAPILLARY BLOOD GLUCOSE      POCT Blood Glucose.: 122 mg/dL (07 Apr 2023 21:24)  POCT Blood Glucose.: 170 mg/dL (07 Apr 2023 16:54)  POCT Blood Glucose.: 170 mg/dL (07 Apr 2023 11:59)  POCT Blood Glucose.: 126 mg/dL (07 Apr 2023 08:04)    Skin: see above, MASD sacral gluteal   BM: 4/6    Estimated Needs:   [X ] no change since previous assessment, based on wt if ~ 70 kg ( 25-30 kcals/kg) 6464-0653 kcals and ( 1.3-1.6 gm pro/kg)  gm pro  [ ] recalculated:     Previous Nutrition Diagnosis:   [ ] Inadequate Energy Intake [ ]Inadequate Oral Intake [ ] Excessive Energy Intake   [ ] Underweight [x] Increased Nutrient Needs [ ] Overweight/Obesity   [ ] Altered GI Function [ ] Unintended Weight Loss [ ] Food & Nutrition Related Knowledge Deficit [X ] Malnutrition     Nutrition Diagnosis is [X ] ongoing  [ ] resolved [ ] not applicable     New Nutrition Diagnosis: [ ] not applicable       Interventions:   Recommend  [ ] Change Diet To:  [ ] Nutrition Supplement  [ ] Nutrition Support  [X ] Other: cont POC    Monitoring and Evaluation:   [X ] PO intake [ x ] Tolerance to diet prescription [ x ] weights [ x ] labs[ x ] follow up per protocol  [ X] other: skin integrity Nutrition follow up assessment ( chart reviewed, events noted) Brief hx : 86 yo male, [facility] resident with PMHx - COPD, DM, HTN, CAD, HLD, H/O TIA, dementia, GERD, BPH and depression, who was sent for evaluation of left foot 1metatarsal wound. Concern for wound infection with underlying osteomyelitis. He also has bilateral heel wounds. Incidentally found to be COVID positive." A PICC line was placed yesterday for 4-6 weeks of IV ABX     Pt visited thia am , sleeping presently . Per nsg, pt needs to be fed, po intake is fair to good.    Factors impacting intake: [ ] none [ ] nausea  [ ] vomiting [ ] diarrhea [ ] constipation  [X ]chewing problems [ X] swallowing issues  [X ] other: dementia    Diet Prescription: soft bite cc( e) madeline BID , Glucerna BID    Intake: 51-75%    Current Weight: since admission wts documented fluctuating 63.5-74.3 kg . Pt has 1+ bilat foot edema this would not account for 10,8kg wt gain, more likely r/t scale accuracy      Pertinent Medications: MEDICATIONS  (STANDING):  albuterol/ipratropium for Nebulization 3 milliLiter(s) Nebulizer every 8 hours  budesonide 160 MICROgram(s)/formoterol 4.5 MICROgram(s) Inhaler 2 Puff(s) Inhalation two times a day  chlorhexidine 4% Liquid 1 Application(s) Topical <User Schedule>  dextrose 5% + sodium chloride 0.45% with potassium chloride 20 mEq/L 1000 milliLiter(s) (40 mL/Hr) IV Continuous <Continuous>  dextrose 5%. 1000 milliLiter(s) (100 mL/Hr) IV Continuous <Continuous>  dextrose 5%. 1000 milliLiter(s) (50 mL/Hr) IV Continuous <Continuous>  dextrose 50% Injectable 25 Gram(s) IV Push once  dextrose 50% Injectable 12.5 Gram(s) IV Push once  dextrose 50% Injectable 25 Gram(s) IV Push once  donepezil 5 milliGRAM(s) Oral at bedtime  DULoxetine 60 milliGRAM(s) Oral daily  enoxaparin Injectable 60 milliGRAM(s) SubCutaneous every 12 hours  glucagon  Injectable 1 milliGRAM(s) IntraMuscular once  insulin glargine Injectable (LANTUS) 8 Unit(s) SubCutaneous at bedtime  insulin lispro (ADMELOG) corrective regimen sliding scale   SubCutaneous three times a day before meals  insulin lispro (ADMELOG) corrective regimen sliding scale   SubCutaneous at bedtime  lactobacillus acidophilus 1 Tablet(s) Oral two times a day with meals  losartan 25 milliGRAM(s) Oral daily  metoprolol tartrate 25 milliGRAM(s) Oral three times a day  multivitamin 1 Tablet(s) Oral daily  pantoprazole   Suspension 40 milliGRAM(s) Oral daily  piperacillin/tazobactam IVPB.. 3.375 Gram(s) IV Intermittent every 8 hours  senna 2 Tablet(s) Oral at bedtime  simvastatin 40 milliGRAM(s) Oral at bedtime  tamsulosin 0.4 milliGRAM(s) Oral at bedtime    MEDICATIONS  (PRN):  acetaminophen     Tablet .. 650 milliGRAM(s) Oral every 6 hours PRN Temp greater or equal to 38C (100.4F), Mild Pain (1 - 3)  aluminum hydroxide/magnesium hydroxide/simethicone Suspension 30 milliLiter(s) Oral every 4 hours PRN Dyspepsia  bisacodyl Suppository 10 milliGRAM(s) Rectal daily PRN Constipation  dextrose Oral Gel 15 Gram(s) Oral once PRN Blood Glucose LESS THAN 70 milliGRAM(s)/deciliter  hydrALAZINE 50 milliGRAM(s) Oral every 6 hours PRN for systolic BP>160  magnesium hydroxide Suspension 30 milliLiter(s) Oral daily PRN Constipation  melatonin 3 milliGRAM(s) Oral at bedtime PRN Insomnia  ondansetron Injectable 4 milliGRAM(s) IV Push every 8 hours PRN Nausea and/or Vomiting  sodium chloride 0.9% lock flush 10 milliLiter(s) IV Push every 1 hour PRN Pre/post blood products, medications, blood draw, and to maintain line patency    Pertinent Labs: 04-07 Na145 mmol/L Glu 126 mg/dL<H> K+ 4.1 mmol/L Cr  1.30 mg/dL BUN 35 mg/dL<H> 04-07 Phos 2.6 mg/dL 04-07 Alb 2.2 g/dL<L> 03-30 Chol 108 mg/dL LDL --    HDL 32 mg/dL<L> Trig 74 mg/dL     CAPILLARY BLOOD GLUCOSE      POCT Blood Glucose.: 122 mg/dL (07 Apr 2023 21:24)  POCT Blood Glucose.: 170 mg/dL (07 Apr 2023 16:54)  POCT Blood Glucose.: 170 mg/dL (07 Apr 2023 11:59)  POCT Blood Glucose.: 126 mg/dL (07 Apr 2023 08:04)    Skin: see above, MASD sacral gluteal   BM: 4/6    Estimated Needs:   [X ] no change since previous assessment, based on wt if ~ 70 kg ( 25-30 kcals/kg) 5851-4695 kcals and ( 1.3-1.6 gm pro/kg)  gm pro  [ ] recalculated:     Previous Nutrition Diagnosis:   [ ] Inadequate Energy Intake [ ]Inadequate Oral Intake [ ] Excessive Energy Intake   [ ] Underweight [x] Increased Nutrient Needs [ ] Overweight/Obesity   [ ] Altered GI Function [ ] Unintended Weight Loss [ ] Food & Nutrition Related Knowledge Deficit [X ] Malnutrition     Nutrition Diagnosis is [X ] ongoing  [ ] resolved [ ] not applicable     New Nutrition Diagnosis: [ ] not applicable       Interventions:   Recommend  [ ] Change Diet To:  [ ] Nutrition Supplement  [ ] Nutrition Support  [X ] Other: cont POC    Monitoring and Evaluation:   [X ] PO intake [ x ] Tolerance to diet prescription [ x ] weights [ x ] labs[ x ] follow up per protocol  [ X] other: skin integrity

## 2023-04-08 NOTE — PROVIDER CONTACT NOTE (CHANGE IN STATUS NOTIFICATION) - SITUATION
As per [employer]- Pt had 12 Beats Vtach R/O Abarancy As per [employer]- Pt had 12 Beats Vtach R/O Abarancy. Supervisor Tiago made aware

## 2023-04-08 NOTE — PROGRESS NOTE ADULT - ASSESSMENT
86 yo male ,[facility] resident with PMHx - COPD, DM, HTN, CAD, HLD, H/O TIA, dementia,GERD, BPH and depression sent ot ER for evaluation of left foot 1metatarsal wound ,suggestive of osteomyelitis .Patient was seen by ID consult and transfer to the hospital recommended for wound cx/bone biopsy /podiatry evaluation ,likely will require 4-6 weeks of iv abx . Patient was admitted to [facility] with b/l feet wounds and was followed by wound care team ,recently completed 7 days of doxycycline for foot wound cellulitis . (30 Mar 2023 05:21)      hypernatremia   improved      hypokalemia potassium chloride  10 mEq/100 mL IVPB 10 milliEquivalent(s) IV Intermittent every 1 hour  prn     ACUTE RENAL FAILURE: sodium chloride 0.45%. 1000 milliLiter(s) (50 mL/Hr) IV Continuous  Serum creatinine is improving    There is no progression . No uremic symptoms  No evidence of anemia .  Fluid status stable.  Will continue to avoid nephrotoxic drugs.  Patient remains asymptomatic.   Continue current therapy.  hold  diuretic.        BP monitoring,continue current antihypertensive meds, low salt diet,followup with PMD in 1-2 weeks  losartan 50 milliGRAM(s) Oral daily    f/u  blood and urine cx,serial lactate levels,monitor vitals clement saenz hydration,monitor urine output and renal profile,iv abx   piperacillin/tazobactam IVPB.. 3.375 Gram(s) IV Intermittent every 8 hours 86 yo male ,[facility] resident with PMHx - COPD, DM, HTN, CAD, HLD, H/O TIA, dementia,GERD, BPH and depression sent ot ER for evaluation of left foot 1metatarsal wound ,suggestive of osteomyelitis .Patient was seen by ID consult and transfer to the hospital recommended for wound cx/bone biopsy /podiatry evaluation ,likely will require 4-6 weeks of iv abx . Patient was admitted to [facility] with b/l feet wounds and was followed by wound care team ,recently completed 7 days of doxycycline for foot wound cellulitis . (30 Mar 2023 05:21)      hypernatremia   improved      hypokalemia potassium chloride  10 mEq/100 mL IVPB 10 milliEquivalent(s) IV Intermittent every 1 hour  prn     ACUTE RENAL FAILURE: sodium chloride 0.45%. 1000 milliLiter(s) (50 mL/Hr) IV Continuous  Serum creatinine is improving    There is no progression . No uremic symptoms  No evidence of anemia .  Fluid status stable.  Will continue to avoid nephrotoxic drugs.  Patient remains asymptomatic.   Continue current therapy.  hold  diuretic.        BP monitoring,continue current antihypertensive meds, low salt diet,followup with PMD in 1-2 weeks  losartan 50 milliGRAM(s) Oral daily    f/u  blood and urine cx,serial lactate levels,monitor vitals clement saenz hydration,monitor urine output and renal profile,iv abx   piperacillin/tazobactam IVPB.. 3.375 Gram(s) IV Intermittent every 8 hours 86 yo male ,[facility] resident with PMHx - COPD, DM, HTN, CAD, HLD, H/O TIA, dementia,GERD, BPH and depression sent ot ER for evaluation of left foot 1metatarsal wound ,suggestive of osteomyelitis .Patient was seen by ID consult and transfer to the hospital recommended for wound cx/bone biopsy /podiatry evaluation ,likely will require 4-6 weeks of iv abx . Patient was admitted to [facility] with b/l feet wounds and was followed by wound care team ,recently completed 7 days of doxycycline for foot wound cellulitis . (30 Mar 2023 05:21)      hypernatremia   improved      hypokalemia potassium chloride  10 mEq/100 mL IVPB 10 milliEquivalent(s) IV Intermittent every 1 hour  prn     ACUTE RENAL FAILURE: sodium chloride 0.45%. 1000 milliLiter(s) (50 mL/Hr) IV Continuous  Serum creatinine is improving    There is no progression . No uremic symptoms  No evidence of anemia .  Fluid status stable.  Will continue to avoid nephrotoxic drugs.  Patient remains asymptomatic.   Continue current therapy.  hold  diuretic.        BP monitoring,continue current antihypertensive meds, low salt diet,followup with PMD in 1-2 weeks  losartan 50 milliGRAM(s) Oral daily    f/u  blood and urine cx,serial lactate levels,monitor vitals clement saenz hydration,monitor urine output and renal profile,iv abx   piperacillin/tazobactam IVPB.. 3.375 Gram(s) IV Intermittent every 8 hours

## 2023-04-09 ENCOUNTER — TRANSCRIPTION ENCOUNTER (OUTPATIENT)
Age: 88
End: 2023-04-09

## 2023-04-09 LAB
ALBUMIN SERPL ELPH-MCNC: 2 G/DL — LOW (ref 3.3–5)
ALBUMIN SERPL ELPH-MCNC: 2.1 G/DL — LOW (ref 3.3–5)
ALP SERPL-CCNC: 67 U/L — SIGNIFICANT CHANGE UP (ref 40–120)
ALP SERPL-CCNC: 70 U/L — SIGNIFICANT CHANGE UP (ref 40–120)
ALT FLD-CCNC: 25 U/L — SIGNIFICANT CHANGE UP (ref 12–78)
ALT FLD-CCNC: 27 U/L — SIGNIFICANT CHANGE UP (ref 12–78)
ANION GAP SERPL CALC-SCNC: 1 MMOL/L — LOW (ref 5–17)
AST SERPL-CCNC: 16 U/L — SIGNIFICANT CHANGE UP (ref 15–37)
BILIRUB DIRECT SERPL-MCNC: 0.2 MG/DL — SIGNIFICANT CHANGE UP (ref 0–0.3)
BILIRUB INDIRECT FLD-MCNC: 0.4 MG/DL — SIGNIFICANT CHANGE UP (ref 0.2–1)
BILIRUB SERPL-MCNC: 0.5 MG/DL — SIGNIFICANT CHANGE UP (ref 0.2–1.2)
BILIRUB SERPL-MCNC: 0.6 MG/DL — SIGNIFICANT CHANGE UP (ref 0.2–1.2)
BUN SERPL-MCNC: 40 MG/DL — HIGH (ref 7–23)
CALCIUM SERPL-MCNC: 8.3 MG/DL — LOW (ref 8.5–10.1)
CHLORIDE SERPL-SCNC: 112 MMOL/L — HIGH (ref 96–108)
CO2 SERPL-SCNC: 26 MMOL/L — SIGNIFICANT CHANGE UP (ref 22–31)
CREAT SERPL-MCNC: 1.3 MG/DL — SIGNIFICANT CHANGE UP (ref 0.5–1.3)
EGFR: 53 ML/MIN/1.73M2 — LOW
GLUCOSE SERPL-MCNC: 200 MG/DL — HIGH (ref 70–99)
HCT VFR BLD CALC: 31.3 % — LOW (ref 39–50)
HGB BLD-MCNC: 9.7 G/DL — LOW (ref 13–17)
INR BLD: 1.51 RATIO — HIGH (ref 0.88–1.16)
MAGNESIUM SERPL-MCNC: 2.1 MG/DL — SIGNIFICANT CHANGE UP (ref 1.6–2.6)
MCHC RBC-ENTMCNC: 29.1 PG — SIGNIFICANT CHANGE UP (ref 27–34)
MCHC RBC-ENTMCNC: 31 GM/DL — LOW (ref 32–36)
MCV RBC AUTO: 94 FL — SIGNIFICANT CHANGE UP (ref 80–100)
NRBC # BLD: 0 /100 WBCS — SIGNIFICANT CHANGE UP (ref 0–0)
PHOSPHATE SERPL-MCNC: 2.2 MG/DL — LOW (ref 2.5–4.5)
PLATELET # BLD AUTO: 289 K/UL — SIGNIFICANT CHANGE UP (ref 150–400)
POTASSIUM SERPL-MCNC: 4.4 MMOL/L — SIGNIFICANT CHANGE UP (ref 3.5–5.3)
POTASSIUM SERPL-SCNC: 4.4 MMOL/L — SIGNIFICANT CHANGE UP (ref 3.5–5.3)
PROT SERPL-MCNC: 6.1 G/DL — SIGNIFICANT CHANGE UP (ref 6–8.3)
PROT SERPL-MCNC: 6.3 G/DL — SIGNIFICANT CHANGE UP (ref 6–8.3)
PROTHROM AB SERPL-ACNC: 17.7 SEC — HIGH (ref 10.5–13.4)
RBC # BLD: 3.33 M/UL — LOW (ref 4.2–5.8)
RBC # FLD: 17.7 % — HIGH (ref 10.3–14.5)
SARS-COV-2 RNA SPEC QL NAA+PROBE: SIGNIFICANT CHANGE UP
SODIUM SERPL-SCNC: 139 MMOL/L — SIGNIFICANT CHANGE UP (ref 135–145)
TSH SERPL-MCNC: 2.67 UIU/ML — SIGNIFICANT CHANGE UP (ref 0.36–3.74)
WBC # BLD: 8.1 K/UL — SIGNIFICANT CHANGE UP (ref 3.8–10.5)
WBC # FLD AUTO: 8.1 K/UL — SIGNIFICANT CHANGE UP (ref 3.8–10.5)

## 2023-04-09 RX ORDER — SODIUM CHLORIDE 9 MG/ML
1000 INJECTION, SOLUTION INTRAVENOUS
Refills: 0 | Status: DISCONTINUED | OUTPATIENT
Start: 2023-04-09 | End: 2023-04-10

## 2023-04-09 RX ADMIN — Medication 1 TABLET(S): at 11:31

## 2023-04-09 RX ADMIN — SODIUM CHLORIDE 40 MILLILITER(S): 9 INJECTION, SOLUTION INTRAVENOUS at 13:16

## 2023-04-09 RX ADMIN — INSULIN GLARGINE 8 UNIT(S): 100 INJECTION, SOLUTION SUBCUTANEOUS at 22:24

## 2023-04-09 RX ADMIN — SIMVASTATIN 40 MILLIGRAM(S): 20 TABLET, FILM COATED ORAL at 22:25

## 2023-04-09 RX ADMIN — LOSARTAN POTASSIUM 25 MILLIGRAM(S): 100 TABLET, FILM COATED ORAL at 06:01

## 2023-04-09 RX ADMIN — PIPERACILLIN AND TAZOBACTAM 25 GRAM(S): 4; .5 INJECTION, POWDER, LYOPHILIZED, FOR SOLUTION INTRAVENOUS at 21:12

## 2023-04-09 RX ADMIN — CHLORHEXIDINE GLUCONATE 1 APPLICATION(S): 213 SOLUTION TOPICAL at 06:58

## 2023-04-09 RX ADMIN — Medication 3 MILLILITER(S): at 15:35

## 2023-04-09 RX ADMIN — DONEPEZIL HYDROCHLORIDE 5 MILLIGRAM(S): 10 TABLET, FILM COATED ORAL at 22:25

## 2023-04-09 RX ADMIN — Medication 4: at 11:37

## 2023-04-09 RX ADMIN — Medication 1 TABLET(S): at 07:45

## 2023-04-09 RX ADMIN — ENOXAPARIN SODIUM 60 MILLIGRAM(S): 100 INJECTION SUBCUTANEOUS at 06:01

## 2023-04-09 RX ADMIN — PANTOPRAZOLE SODIUM 40 MILLIGRAM(S): 20 TABLET, DELAYED RELEASE ORAL at 11:31

## 2023-04-09 RX ADMIN — Medication 3 MILLILITER(S): at 07:49

## 2023-04-09 RX ADMIN — PIPERACILLIN AND TAZOBACTAM 25 GRAM(S): 4; .5 INJECTION, POWDER, LYOPHILIZED, FOR SOLUTION INTRAVENOUS at 06:01

## 2023-04-09 RX ADMIN — Medication 1 TABLET(S): at 18:19

## 2023-04-09 RX ADMIN — Medication 50 MILLIGRAM(S): at 06:01

## 2023-04-09 RX ADMIN — Medication 3 MILLILITER(S): at 22:59

## 2023-04-09 RX ADMIN — BUDESONIDE AND FORMOTEROL FUMARATE DIHYDRATE 2 PUFF(S): 160; 4.5 AEROSOL RESPIRATORY (INHALATION) at 06:02

## 2023-04-09 RX ADMIN — TAMSULOSIN HYDROCHLORIDE 0.4 MILLIGRAM(S): 0.4 CAPSULE ORAL at 21:12

## 2023-04-09 RX ADMIN — DULOXETINE HYDROCHLORIDE 60 MILLIGRAM(S): 30 CAPSULE, DELAYED RELEASE ORAL at 11:31

## 2023-04-09 RX ADMIN — Medication 2: at 07:55

## 2023-04-09 RX ADMIN — Medication 63.75 MILLIMOLE(S): at 11:31

## 2023-04-09 RX ADMIN — SENNA PLUS 2 TABLET(S): 8.6 TABLET ORAL at 21:12

## 2023-04-09 RX ADMIN — PIPERACILLIN AND TAZOBACTAM 25 GRAM(S): 4; .5 INJECTION, POWDER, LYOPHILIZED, FOR SOLUTION INTRAVENOUS at 13:09

## 2023-04-09 RX ADMIN — Medication 50 MILLIGRAM(S): at 18:21

## 2023-04-09 NOTE — PROGRESS NOTE ADULT - SUBJECTIVE AND OBJECTIVE BOX
Patient is a 87y Male whom presented to the hospital with ckd and chelo     PAST MEDICAL & SURGICAL HISTORY:      MEDICATIONS  (STANDING):      Allergies    No Known Allergies    Intolerances        SOCIAL HISTORY:  Denies ETOh,Smoking,     FAMILY HISTORY:      REVIEW OF SYSTEMS:  unable to obtained a good review system                                                                                 10.9   8.42  )-----------( 284      ( 07 Apr 2023 08:43 )             36.4       CBC Full  -  ( 07 Apr 2023 08:43 )  WBC Count : 8.42 K/uL  RBC Count : 3.81 M/uL  Hemoglobin : 10.9 g/dL  Hematocrit : 36.4 %  Platelet Count - Automated : 284 K/uL  Mean Cell Volume : 95.5 fl  Mean Cell Hemoglobin : 28.6 pg  Mean Cell Hemoglobin Concentration : 29.9 gm/dL  Auto Neutrophil # : x  Auto Lymphocyte # : x  Auto Monocyte # : x  Auto Eosinophil # : x  Auto Basophil # : x  Auto Neutrophil % : x  Auto Lymphocyte % : x  Auto Monocyte % : x  Auto Eosinophil % : x  Auto Basophil % : x      04-08    x   |  x   |  x   ----------------------------<  x   x    |  x   |  1.20    Ca    8.5      07 Apr 2023 08:43  Phos  2.4     04-08  Mg     2.0     04-08    TPro  5.9<L>  /  Alb  2.0<L>  /  TBili  0.5  /  DBili  0.2  /  AST  20  /  ALT  27  /  AlkPhos  64  04-08      CAPILLARY BLOOD GLUCOSE      POCT Blood Glucose.: 197 mg/dL (08 Apr 2023 11:25)  POCT Blood Glucose.: 159 mg/dL (08 Apr 2023 08:10)  POCT Blood Glucose.: 122 mg/dL (07 Apr 2023 21:24)  POCT Blood Glucose.: 170 mg/dL (07 Apr 2023 16:54)      Vital Signs Last 24 Hrs  T(C): 37.3 (08 Apr 2023 12:54), Max: 37.3 (08 Apr 2023 12:54)  T(F): 99.1 (08 Apr 2023 12:54), Max: 99.1 (08 Apr 2023 12:54)  HR: 98 (08 Apr 2023 12:54) (75 - 118)  BP: 124/69 (08 Apr 2023 12:54) (124/69 - 151/89)  BP(mean): --  RR: 18 (08 Apr 2023 12:54) (18 - 18)  SpO2: 97% (08 Apr 2023 12:54) (91% - 97%)    Parameters below as of 08 Apr 2023 12:54  Patient On (Oxygen Delivery Method): room air                          9.7    8.10  )-----------( 289      ( 09 Apr 2023 06:00 )             31.3       CBC Full  -  ( 09 Apr 2023 06:00 )  WBC Count : 8.10 K/uL  RBC Count : 3.33 M/uL  Hemoglobin : 9.7 g/dL  Hematocrit : 31.3 %  Platelet Count - Automated : 289 K/uL  Mean Cell Volume : 94.0 fl  Mean Cell Hemoglobin : 29.1 pg  Mean Cell Hemoglobin Concentration : 31.0 gm/dL  Auto Neutrophil # : x  Auto Lymphocyte # : x  Auto Monocyte # : x  Auto Eosinophil # : x  Auto Basophil # : x  Auto Neutrophil % : x  Auto Lymphocyte % : x  Auto Monocyte % : x  Auto Eosinophil % : x  Auto Basophil % : x      04-09    139  |  112<H>  |  40<H>  ----------------------------<  200<H>  4.4   |  26  |  1.30    Ca    8.3<L>      09 Apr 2023 06:00  Phos  2.2     04-09  Mg     2.1     04-09    TPro  6.1  /  Alb  2.1<L>  /  TBili  0.5  /  DBili  0.2  /  AST  16  /  ALT  25  /  AlkPhos  67  04-09      CAPILLARY BLOOD GLUCOSE      POCT Blood Glucose.: 212 mg/dL (09 Apr 2023 11:35)  POCT Blood Glucose.: 186 mg/dL (09 Apr 2023 07:51)  POCT Blood Glucose.: 184 mg/dL (08 Apr 2023 21:27)  POCT Blood Glucose.: 207 mg/dL (08 Apr 2023 17:11)      Vital Signs Last 24 Hrs  T(C): 36.6 (09 Apr 2023 11:50), Max: 37.1 (09 Apr 2023 06:28)  T(F): 97.9 (09 Apr 2023 11:50), Max: 98.7 (09 Apr 2023 06:28)  HR: 77 (09 Apr 2023 15:45) (72 - 92)  BP: 130/75 (09 Apr 2023 11:50) (125/73 - 138/70)  BP(mean): --  RR: 18 (09 Apr 2023 11:50) (18 - 18)  SpO2: 95% (09 Apr 2023 15:45) (93% - 97%)    Parameters below as of 09 Apr 2023 15:45  Patient On (Oxygen Delivery Method): room air            PT/INR - ( 09 Apr 2023 06:00 )   PT: 17.7 sec;   INR: 1.51 ratio                             PT/INR - ( 06 Apr 2023 06:37 )   PT: 16.4 sec;   INR: 1.40 ratio                           PHYSICAL EXAM:    Constitutional: NAD  HEENT: conjunctive   clear   Neck:  No JVD  Respiratory: CTAB  Cardiovascular: S1 and S2  Gastrointestinal: BS+, soft,   Extremities: No peripheral edema  Neurological:  no focal deficits

## 2023-04-09 NOTE — PROGRESS NOTE ADULT - ASSESSMENT
REVIEW OF SYMPTOMS      Able to give (reliable) ROS  NO     PHYSICAL EXAM    HEENT Unremarkable  atraumatic   RESP Fair air entry EXP prolonged    Harsh breath sound Resp distres mild   CARDIAC S1 S2 No S3     NO JVD    ABDOMEN SOFT BS PRESENT NOT DISTENDED No hepatosplenomegaly   PEDAL EDEMA present No calf tenderness  NO rash       GENERAL DATA .   GOC.     .. 3/30/2023 full code  ALLGY.     .. nka                    WT.   .. 3/30/2023 63  BMI.        .. 3/30/2023 21            ICU STAY.   .. none  COVID.   .. 4/4/2023 scv2 (+)  .. 3/29/2023 scv2 (-)     BEST PRACTICE ISSUES.    HOB ELEVATN.   .. Yes  DVT PPLX.   .. 3/31 lvnx 60.2 Dr Lakhani (a fib)   ..  3/29- 3/31 hpsc   MILLER PPLX.   .. 3/30/2023 protonix 40    INFN PPLX. ..    SP SW LAURENT.   ..  3/30/2023 -> soft bite mild thick        DIET.    ..  3/30/2023 dash  FREE WATER  .. 4/1/2023 fw 250.4    IV fl.  .. 4/6/2023 d5 1/2 40   PROCEDURE  .. 4/7/2023 r brach picc     ABGS.    VS/ PO/IO/ VENT/ DRIPS.   4/9/2023 afeb 92 130/70   4/9/2023 ra 95%     PROBLEM/ASSESSMENT/PLAN.  COVID   .. 4/4/2023 scv2 (+)  .. 4/4/2023 oxygenation ok  .. 4/4/2023 pt has mild disease   .. 4/4/2023 rdsv 3 d course started by Dr Mancia   Infection  Osteomyelitius  Possible pneumonia   .. Esr 3/29-3/31/2023 esr 67- 49   .. W 3/29-3/30-3/31-4/1-4/4-4/5/2023       w 11.8 - 12- 10.5- 11.9 -  9 - 7.7   .. cxr 3/30/2023  ........ increasing r lower perihilar infiltrate   .. xr foot left 3/30/2023  ........ stable eroisions around 1st mtp anmd great toe    ........ which could be bone infectn    .. CXR 3/29/2023 Possible hansa pneum  .. w scann 4/4 l foot susp for osteom r heel osteomyelitis   .. bc 3/29/2023 bc (-)   .. uc 4/2/2023 100K eneterococcs fecal  .. 3/29-4/5 zosyn    .. 4/7/2023 zosyn 42 d   .. follow cultures  PLEURAL EFFUSION.  .. ct ch 3/30/2023   ........ mild pulm edema  ........ mod r and sml effsn   a/r  .. pl effsn likely sec to chf   COPD  .. 3/30/2023 duoneb.3    .. 3/30/2023 symbicort   hemodynamics  .. La 3/29/2023 la 1.8   .. target map 65 (+)   CAD.  .. 3/30- 3/31/2023 atenolol 25  .. 3/31-4/4      metoprolol 25.2 - metoprolol 25.3   .. 3/30/2023 simvastat 40   RO DVT  .. 4/1/2023 v duplx (-)  CHF.  .. echo 3/30/2023  ........ ef 40%   ........ mod mr   .. bnp 4/2 bnp [id number]   .. 3/30-4/4/2023      losartan 50 - losartan 25   .. 3/30/2023 hydralazin 50.4p   Anemia  .. Hb 3/29-3/30-3/31-4/1-4/4-4/5/2023      Hb 11.2- 10.2 - 9.8 - 11 - 9.4 - 11.5   .. monitor  Hypernatremia.  .. Na 3/31-4/1-4/2-4/3-4/4 Na 147 - 151- 152 - 148- 144    CKD  .. Na 3/29-3/31/2023 Na 144-147   .. Cr 3/29-3/30-3/31-4/1-4/3-4/4/2023      Cr 1.4 - 1.3 - 1.3 - 1.5 - 1.5 - 1.3  .. monitor  OBS  .. 3/30/2023 donepezil     OVERALL .  88 yo M with PMHx HTN, HLD, T2D, dementia (AOx2-3), OA, BPH, CAD, TIA, CKD 3 and GERD HO recent hospital stay 1/24-1/30/2023 [facility]  COPD ex  now admitted with osteomyelitis possible pneum  Pulm consulted 3/29/2023    .. 4/4/2023 pt tested covid (+)  started rdsv Dr Mancia     PROBLEMS  COVID 4/4/2023  .. 4/4/2023 rdsv 3 d  E FECA UTI   .. uc 4/2/2023 100K eneterococcs fecal  Possible Osteomyelitis  .. w scann 4/4 l foot susp for osteom r heel osteomyelitis   Pneumonia   .. 3/29-4/5 zosyn   .. 4/7 zosyn x 42 d    COPD   CKD   PLEURAL EFFSN   .. 3/30 ct  HFREF   .. MOD MR 3/30 echo    A fib  ..  4/1 lvnx 60.2       TIME SPENT   Over 25 minutes aggregate care time spent on encounter; activities included   direct patient care, counseling and/or coordinating care reviewing notes, lab data/ imaging , discussion with multidisciplinary team/ patient  /family and explaining in detail risks, benefits, alternatives  of the recommendations     Paulino Parmar 87 m       REVIEW OF SYMPTOMS      Able to give (reliable) ROS  NO     PHYSICAL EXAM    HEENT Unremarkable  atraumatic   RESP Fair air entry EXP prolonged    Harsh breath sound Resp distres mild   CARDIAC S1 S2 No S3     NO JVD    ABDOMEN SOFT BS PRESENT NOT DISTENDED No hepatosplenomegaly   PEDAL EDEMA present No calf tenderness  NO rash       GENERAL DATA .   GOC.     .. 3/30/2023 full code  ALLGY.     .. nka                    WT.   .. 3/30/2023 63  BMI.        .. 3/30/2023 21            ICU STAY.   .. none  COVID.   .. 4/4/2023 scv2 (+)  .. 3/29/2023 scv2 (-)     BEST PRACTICE ISSUES.    HOB ELEVATN.   .. Yes  DVT PPLX.   .. 3/31 lvnx 60.2 Dr Lakhani (a fib)   ..  3/29- 3/31 hpsc   MILLER PPLX.   .. 3/30/2023 protonix 40    INFN PPLX. ..    SP SW LAURENT.   ..  3/30/2023 -> soft bite mild thick        DIET.    ..  3/30/2023 dash  FREE WATER  .. 4/1/2023 fw 250.4    IV fl.  .. 4/6/2023 d5 1/2 40   PROCEDURE  .. 4/7/2023 r brach picc     ABGS.    VS/ PO/IO/ VENT/ DRIPS.   4/9/2023 afeb 92 130/70   4/9/2023 ra 95%     PROBLEM/ASSESSMENT/PLAN.  COVID   .. 4/4/2023 scv2 (+)  .. 4/4/2023 oxygenation ok  .. 4/4/2023 pt has mild disease   .. 4/4/2023 rdsv 3 d course started by Dr Mancia   Infection  Osteomyelitius  Possible pneumonia   .. Esr 3/29-3/31/2023 esr 67- 49   .. W 3/29-3/30-3/31-4/1-4/4-4/5/2023       w 11.8 - 12- 10.5- 11.9 -  9 - 7.7   .. cxr 3/30/2023  ........ increasing r lower perihilar infiltrate   .. xr foot left 3/30/2023  ........ stable eroisions around 1st mtp anmd great toe    ........ which could be bone infectn    .. CXR 3/29/2023 Possible hansa pneum  .. w scann 4/4 l foot susp for osteom r heel osteomyelitis   .. bc 3/29/2023 bc (-)   .. uc 4/2/2023 100K eneterococcs fecal  .. 3/29-4/5 zosyn    .. 4/7/2023 zosyn 42 d   .. follow cultures  PLEURAL EFFUSION.  .. ct ch 3/30/2023   ........ mild pulm edema  ........ mod r and sml effsn   a/r  .. pl effsn likely sec to chf   COPD  .. 3/30/2023 duoneb.3    .. 3/30/2023 symbicort   hemodynamics  .. La 3/29/2023 la 1.8   .. target map 65 (+)   CAD.  .. 3/30- 3/31/2023 atenolol 25  .. 3/31-4/4      metoprolol 25.2 - metoprolol 25.3   .. 3/30/2023 simvastat 40   RO DVT  .. 4/1/2023 v duplx (-)  CHF.  .. echo 3/30/2023  ........ ef 40%   ........ mod mr   .. bnp 4/2 bnp [id number]   .. 3/30-4/4/2023      losartan 50 - losartan 25   .. 3/30/2023 hydralazin 50.4p   Anemia  .. Hb 3/29-3/30-3/31-4/1-4/4-4/5/2023      Hb 11.2- 10.2 - 9.8 - 11 - 9.4 - 11.5   .. monitor  Hypernatremia.  .. Na 3/31-4/1-4/2-4/3-4/4 Na 147 - 151- 152 - 148- 144    CKD  .. Na 3/29-3/31/2023 Na 144-147   .. Cr 3/29-3/30-3/31-4/1-4/3-4/4/2023      Cr 1.4 - 1.3 - 1.3 - 1.5 - 1.5 - 1.3  .. monitor  OBS  .. 3/30/2023 donepezil     OVERALL .  88 yo M with PMHx HTN, HLD, T2D, dementia (AOx2-3), OA, BPH, CAD, TIA, CKD 3 and GERD HO recent hospital stay 1/24-1/30/2023 [facility]  COPD ex  now admitted with osteomyelitis possible pneum  Pulm consulted 3/29/2023    .. 4/4/2023 pt tested covid (+)  started rdsv Dr Mancia     PROBLEMS  COVID 4/4/2023  .. 4/4/2023 rdsv 3 d  E FECA UTI   .. uc 4/2/2023 100K eneterococcs fecal  Possible Osteomyelitis  .. w scann 4/4 l foot susp for osteom r heel osteomyelitis   Pneumonia   .. 3/29-4/5 zosyn   .. 4/7 zosyn x 42 d    COPD   CKD   PLEURAL EFFSN   .. 3/30 ct  HFREF   .. MOD MR 3/30 echo    A fib  ..  4/1 lvnx 60.2       TIME SPENT   Over 25 minutes aggregate care time spent on encounter; activities included   direct patient care, counseling and/or coordinating care reviewing notes, lab data/ imaging , discussion with multidisciplinary team/ patient  /family and explaining in detail risks, benefits, alternatives  of the recommendations     Paulino Parmar 87 m       REVIEW OF SYMPTOMS      Able to give (reliable) ROS  NO     PHYSICAL EXAM    HEENT Unremarkable  atraumatic   RESP Fair air entry EXP prolonged    Harsh breath sound Resp distres mild   CARDIAC S1 S2 No S3     NO JVD    ABDOMEN SOFT BS PRESENT NOT DISTENDED No hepatosplenomegaly   PEDAL EDEMA present No calf tenderness  NO rash       GENERAL DATA .   GOC.     .. 3/30/2023 full code  ALLGY.     .. nka                    WT.   .. 3/30/2023 63  BMI.        .. 3/30/2023 21            ICU STAY.   .. none  COVID.   .. 4/4/2023 scv2 (+)  .. 3/29/2023 scv2 (-)     BEST PRACTICE ISSUES.    HOB ELEVATN.   .. Yes  DVT PPLX.   .. 3/31 lvnx 60.2 Dr Lakhani (a fib)   ..  3/29- 3/31 hpsc   MILLER PPLX.   .. 3/30/2023 protonix 40    INFN PPLX. ..    SP SW LAURENT.   ..  3/30/2023 -> soft bite mild thick        DIET.    ..  3/30/2023 dash  FREE WATER  .. 4/1/2023 fw 250.4    IV fl.  .. 4/6/2023 d5 1/2 40   PROCEDURE  .. 4/7/2023 r brach picc     ABGS.    VS/ PO/IO/ VENT/ DRIPS.   4/9/2023 afeb 92 130/70   4/9/2023 ra 95%     PROBLEM/ASSESSMENT/PLAN.  COVID   .. 4/4/2023 scv2 (+)  .. 4/4/2023 oxygenation ok  .. 4/4/2023 pt has mild disease   .. 4/4/2023 rdsv 3 d course started by Dr Mancia   Infection  Osteomyelitius  Possible pneumonia   .. Esr 3/29-3/31/2023 esr 67- 49   .. W 3/29-3/30-3/31-4/1-4/4-4/5/2023       w 11.8 - 12- 10.5- 11.9 -  9 - 7.7   .. cxr 3/30/2023  ........ increasing r lower perihilar infiltrate   .. xr foot left 3/30/2023  ........ stable eroisions around 1st mtp anmd great toe    ........ which could be bone infectn    .. CXR 3/29/2023 Possible hansa pneum  .. w scann 4/4 l foot susp for osteom r heel osteomyelitis   .. bc 3/29/2023 bc (-)   .. uc 4/2/2023 100K eneterococcs fecal  .. 3/29-4/5 zosyn    .. 4/7/2023 zosyn 42 d   .. follow cultures  PLEURAL EFFUSION.  .. ct ch 3/30/2023   ........ mild pulm edema  ........ mod r and sml effsn   a/r  .. pl effsn likely sec to chf   COPD  .. 3/30/2023 duoneb.3    .. 3/30/2023 symbicort   hemodynamics  .. La 3/29/2023 la 1.8   .. target map 65 (+)   CAD.  .. 3/30- 3/31/2023 atenolol 25  .. 3/31-4/4      metoprolol 25.2 - metoprolol 25.3   .. 3/30/2023 simvastat 40   RO DVT  .. 4/1/2023 v duplx (-)  CHF.  .. echo 3/30/2023  ........ ef 40%   ........ mod mr   .. bnp 4/2 bnp [id number]   .. 3/30-4/4/2023      losartan 50 - losartan 25   .. 3/30/2023 hydralazin 50.4p   Anemia  .. Hb 3/29-3/30-3/31-4/1-4/4-4/5/2023      Hb 11.2- 10.2 - 9.8 - 11 - 9.4 - 11.5   .. monitor  Hypernatremia.  .. Na 3/31-4/1-4/2-4/3-4/4 Na 147 - 151- 152 - 148- 144    CKD  .. Na 3/29-3/31/2023 Na 144-147   .. Cr 3/29-3/30-3/31-4/1-4/3-4/4/2023      Cr 1.4 - 1.3 - 1.3 - 1.5 - 1.5 - 1.3  .. monitor  OBS  .. 3/30/2023 donepezil     OVERALL .  86 yo M with PMHx HTN, HLD, T2D, dementia (AOx2-3), OA, BPH, CAD, TIA, CKD 3 and GERD HO recent hospital stay 1/24-1/30/2023 [facility]  COPD ex  now admitted with osteomyelitis possible pneum  Pulm consulted 3/29/2023    .. 4/4/2023 pt tested covid (+)  started rdsv Dr Mancia     PROBLEMS  COVID 4/4/2023  .. 4/4/2023 rdsv 3 d  E FECA UTI   .. [facility] 4/2/2023 [facility] eneterococcs fecal  Possible Osteomyelitis  .. w scann 4/4 l foot susp for osteom r heel osteomyelitis   Pneumonia   .. 3/29-4/5 zosyn   .. 4/7 zosyn x 42 d    COPD   CKD   PLEURAL EFFSN   .. 3/30 ct  HFREF   .. MOD MR 3/30 echo    A fib  ..  4/1 lvnx 60.2       TIME SPENT   Over 25 minutes aggregate care time spent on encounter; activities included   direct patient care, counseling and/or coordinating care reviewing notes, lab data/ imaging , discussion with multidisciplinary team/ patient  /family and explaining in detail risks, benefits, alternatives  of the recommendations     Paulino Parmar 87 m

## 2023-04-09 NOTE — PROGRESS NOTE ADULT - SUBJECTIVE AND OBJECTIVE BOX
PROGRESS NOTE  Patient is a 87y old  Male who presents with a chief complaint of left foot infection (09 Apr 2023 08:52)    Chart and available morning labs /imaging are reviewed electronically , urgent issues addressed . More information  is being added upon completion of rounds , when more information is collected and management discussed with consultants , medical staff and social service/case management on the floor   OVERNIGHT  No new issues reported by medical staff . All above noted Patient is resting in a bed comfortably .Confused ,poor mentation .No distress noted   Denies complains COVID X 2 NEG  HPI:  88 yo male ,[facility] resident with PMHx - COPD, DM, HTN, CAD, HLD, H/O TIA, dementia,GERD, BPH and depression sent ot ER for evaluation of left foot 1metatarsal wound ,suggestive of osteomyelitis .Patient was seen by ID consult and transfer to the hospital recommended for wound cx/bone biopsy /podiatry evaluation ,likely will require 4-6 weeks of iv abx . Patient was admitted to [facility] with b/l feet wounds and was followed by wound care team ,recently completed 7 days of doxycycline for foot wound cellulitis . (30 Mar 2023 05:21)    PAST MEDICAL & SURGICAL HISTORY:  ASHD (arteriosclerotic heart disease)      BPH without urinary obstruction      COPD, moderate      Stage 3 chronic kidney disease      Chronic GERD      HLD (hyperlipidemia)      MDD (major depressive disorder)      Obstructive and reflux uropathy      Cellulitis      HTN (hypertension)      Sepsis      Dementia      Moderate protein-calorie malnutrition      Brain TIA      History of RSV infection      DM type 2, not at goal      Pressure ulcer of unspecified heel, unspecified stage      Venous stasis ulcer without varicose veins      Multiple open wounds of foot          MEDICATIONS  (STANDING):  albuterol/ipratropium for Nebulization 3 milliLiter(s) Nebulizer every 8 hours  budesonide 160 MICROgram(s)/formoterol 4.5 MICROgram(s) Inhaler 2 Puff(s) Inhalation two times a day  chlorhexidine 4% Liquid 1 Application(s) Topical <User Schedule>  dextrose 5% + sodium chloride 0.45%. 1000 milliLiter(s) (40 mL/Hr) IV Continuous <Continuous>  dextrose 5%. 1000 milliLiter(s) (50 mL/Hr) IV Continuous <Continuous>  dextrose 5%. 1000 milliLiter(s) (100 mL/Hr) IV Continuous <Continuous>  dextrose 50% Injectable 25 Gram(s) IV Push once  dextrose 50% Injectable 12.5 Gram(s) IV Push once  dextrose 50% Injectable 25 Gram(s) IV Push once  donepezil 5 milliGRAM(s) Oral at bedtime  DULoxetine 60 milliGRAM(s) Oral daily  enoxaparin Injectable 60 milliGRAM(s) SubCutaneous every 12 hours  glucagon  Injectable 1 milliGRAM(s) IntraMuscular once  insulin glargine Injectable (LANTUS) 8 Unit(s) SubCutaneous at bedtime  insulin lispro (ADMELOG) corrective regimen sliding scale   SubCutaneous three times a day before meals  insulin lispro (ADMELOG) corrective regimen sliding scale   SubCutaneous at bedtime  lactobacillus acidophilus 1 Tablet(s) Oral two times a day with meals  losartan 25 milliGRAM(s) Oral daily  metoprolol tartrate 50 milliGRAM(s) Oral two times a day  multivitamin 1 Tablet(s) Oral daily  pantoprazole   Suspension 40 milliGRAM(s) Oral daily  piperacillin/tazobactam IVPB.. 3.375 Gram(s) IV Intermittent every 8 hours  senna 2 Tablet(s) Oral at bedtime  simvastatin 40 milliGRAM(s) Oral at bedtime  tamsulosin 0.4 milliGRAM(s) Oral at bedtime    MEDICATIONS  (PRN):  acetaminophen     Tablet .. 650 milliGRAM(s) Oral every 6 hours PRN Temp greater or equal to 38C (100.4F), Mild Pain (1 - 3)  aluminum hydroxide/magnesium hydroxide/simethicone Suspension 30 milliLiter(s) Oral every 4 hours PRN Dyspepsia  bisacodyl Suppository 10 milliGRAM(s) Rectal daily PRN Constipation  dextrose Oral Gel 15 Gram(s) Oral once PRN Blood Glucose LESS THAN 70 milliGRAM(s)/deciliter  hydrALAZINE 50 milliGRAM(s) Oral every 6 hours PRN for systolic BP>160  magnesium hydroxide Suspension 30 milliLiter(s) Oral daily PRN Constipation  melatonin 3 milliGRAM(s) Oral at bedtime PRN Insomnia  ondansetron Injectable 4 milliGRAM(s) IV Push every 8 hours PRN Nausea and/or Vomiting  sodium chloride 0.9% lock flush 10 milliLiter(s) IV Push every 1 hour PRN Pre/post blood products, medications, blood draw, and to maintain line patency      OBJECTIVE    T(C): 37.1 (04-09-23 @ 06:28), Max: 37.3 (04-08-23 @ 12:54)  HR: 72 (04-09-23 @ 08:00) (72 - 98)  BP: 138/70 (04-09-23 @ 06:28) (124/69 - 138/70)  RR: 18 (04-09-23 @ 06:28) (18 - 18)  SpO2: 96% (04-09-23 @ 08:00) (93% - 97%)  Wt(kg): --  I&O's Summary    08 Apr 2023 07:01  -  09 Apr 2023 07:00  --------------------------------------------------------  IN: 1700 mL / OUT: 0 mL / NET: 1700 mL          REVIEW OF SYSTEMS:  Patient is  unable to provide any information/ROS  due to baseline mental status.     PHYSICAL EXAM:  Appearance: NAD. VS past 24 hrs -as above   HEENT:   Moist oral mucosa. Conjunctiva clear b/l.   Neck : supple  Respiratory: Lungs CTAB.  Gastrointestinal:  Soft, nontender. No rebound. No rigidity. BS present	  Cardiovascular: RRR ,S1S2 present  Neurologic: Non-focal. Moving all extremities.  Extremities: No edema. No erythema. No calf tenderness.  Skin: No rashes, No ecchymoses, No cyanosis.	  wounds ,skin lesions-See skin assesment flow sheet   LABS:                        9.7    8.10  )-----------( 289      ( 09 Apr 2023 06:00 )             31.3     04-09    139  |  112<H>  |  40<H>  ----------------------------<  200<H>  4.4   |  26  |  1.30    Ca    8.3<L>      09 Apr 2023 06:00  Phos  2.2     04-09  Mg     2.1     04-09    TPro  6.1  /  Alb  2.1<L>  /  TBili  0.5  /  DBili  0.2  /  AST  16  /  ALT  25  /  AlkPhos  67  04-09    CAPILLARY BLOOD GLUCOSE      POCT Blood Glucose.: 212 mg/dL (09 Apr 2023 11:35)  POCT Blood Glucose.: 186 mg/dL (09 Apr 2023 07:51)  POCT Blood Glucose.: 184 mg/dL (08 Apr 2023 21:27)  POCT Blood Glucose.: 207 mg/dL (08 Apr 2023 17:11)    PT/INR - ( 09 Apr 2023 06:00 )   PT: 17.7 sec;   INR: 1.51 ratio               Culture - Urine (collected 02 Apr 2023 09:15)  Source: Clean Catch Clean Catch (Midstream)  Final Report (04 Apr 2023 19:44):    >100,000 CFU/ml Enterococcus faecalis  Organism: Enterococcus faecalis (04 Apr 2023 19:44)  Organism: Enterococcus faecalis (04 Apr 2023 19:44)      RADIOLOGY & ADDITIONAL TESTS:   reviewed elctronically  ASSESSMENT/PLAN: 	    25 minutes aggregate time was spent on this visit, 50% visit time spent in care co-ordination with other attendings and counselling patient .I have discussed care plan with patient / HCP/family member ,who expressed understanding of problems treatment and their effect and side effects, alternatives in details. I have asked if they have any questions and concerns and appropriately addressed them to best of my ability.  PROGRESS NOTE  Patient is a 87y old  Male who presents with a chief complaint of left foot infection (09 Apr 2023 08:52)    Chart and available morning labs /imaging are reviewed electronically , urgent issues addressed . More information  is being added upon completion of rounds , when more information is collected and management discussed with consultants , medical staff and social service/case management on the floor   OVERNIGHT  No new issues reported by medical staff . All above noted Patient is resting in a bed comfortably .Confused ,poor mentation .No distress noted   Denies complains COVID X 2 NEG  HPI:  88 yo male ,[facility] resident with PMHx - COPD, DM, HTN, CAD, HLD, H/O TIA, dementia,GERD, BPH and depression sent ot ER for evaluation of left foot 1metatarsal wound ,suggestive of osteomyelitis .Patient was seen by ID consult and transfer to the hospital recommended for wound cx/bone biopsy /podiatry evaluation ,likely will require 4-6 weeks of iv abx . Patient was admitted to [facility] with b/l feet wounds and was followed by wound care team ,recently completed 7 days of doxycycline for foot wound cellulitis . (30 Mar 2023 05:21)    PAST MEDICAL & SURGICAL HISTORY:  ASHD (arteriosclerotic heart disease)      BPH without urinary obstruction      COPD, moderate      Stage 3 chronic kidney disease      Chronic GERD      HLD (hyperlipidemia)      MDD (major depressive disorder)      Obstructive and reflux uropathy      Cellulitis      HTN (hypertension)      Sepsis      Dementia      Moderate protein-calorie malnutrition      Brain TIA      History of RSV infection      DM type 2, not at goal      Pressure ulcer of unspecified heel, unspecified stage      Venous stasis ulcer without varicose veins      Multiple open wounds of foot          MEDICATIONS  (STANDING):  albuterol/ipratropium for Nebulization 3 milliLiter(s) Nebulizer every 8 hours  budesonide 160 MICROgram(s)/formoterol 4.5 MICROgram(s) Inhaler 2 Puff(s) Inhalation two times a day  chlorhexidine 4% Liquid 1 Application(s) Topical <User Schedule>  dextrose 5% + sodium chloride 0.45%. 1000 milliLiter(s) (40 mL/Hr) IV Continuous <Continuous>  dextrose 5%. 1000 milliLiter(s) (50 mL/Hr) IV Continuous <Continuous>  dextrose 5%. 1000 milliLiter(s) (100 mL/Hr) IV Continuous <Continuous>  dextrose 50% Injectable 25 Gram(s) IV Push once  dextrose 50% Injectable 12.5 Gram(s) IV Push once  dextrose 50% Injectable 25 Gram(s) IV Push once  donepezil 5 milliGRAM(s) Oral at bedtime  DULoxetine 60 milliGRAM(s) Oral daily  enoxaparin Injectable 60 milliGRAM(s) SubCutaneous every 12 hours  glucagon  Injectable 1 milliGRAM(s) IntraMuscular once  insulin glargine Injectable (LANTUS) 8 Unit(s) SubCutaneous at bedtime  insulin lispro (ADMELOG) corrective regimen sliding scale   SubCutaneous three times a day before meals  insulin lispro (ADMELOG) corrective regimen sliding scale   SubCutaneous at bedtime  lactobacillus acidophilus 1 Tablet(s) Oral two times a day with meals  losartan 25 milliGRAM(s) Oral daily  metoprolol tartrate 50 milliGRAM(s) Oral two times a day  multivitamin 1 Tablet(s) Oral daily  pantoprazole   Suspension 40 milliGRAM(s) Oral daily  piperacillin/tazobactam IVPB.. 3.375 Gram(s) IV Intermittent every 8 hours  senna 2 Tablet(s) Oral at bedtime  simvastatin 40 milliGRAM(s) Oral at bedtime  tamsulosin 0.4 milliGRAM(s) Oral at bedtime    MEDICATIONS  (PRN):  acetaminophen     Tablet .. 650 milliGRAM(s) Oral every 6 hours PRN Temp greater or equal to 38C (100.4F), Mild Pain (1 - 3)  aluminum hydroxide/magnesium hydroxide/simethicone Suspension 30 milliLiter(s) Oral every 4 hours PRN Dyspepsia  bisacodyl Suppository 10 milliGRAM(s) Rectal daily PRN Constipation  dextrose Oral Gel 15 Gram(s) Oral once PRN Blood Glucose LESS THAN 70 milliGRAM(s)/deciliter  hydrALAZINE 50 milliGRAM(s) Oral every 6 hours PRN for systolic BP>160  magnesium hydroxide Suspension 30 milliLiter(s) Oral daily PRN Constipation  melatonin 3 milliGRAM(s) Oral at bedtime PRN Insomnia  ondansetron Injectable 4 milliGRAM(s) IV Push every 8 hours PRN Nausea and/or Vomiting  sodium chloride 0.9% lock flush 10 milliLiter(s) IV Push every 1 hour PRN Pre/post blood products, medications, blood draw, and to maintain line patency      OBJECTIVE    T(C): 37.1 (04-09-23 @ 06:28), Max: 37.3 (04-08-23 @ 12:54)  HR: 72 (04-09-23 @ 08:00) (72 - 98)  BP: 138/70 (04-09-23 @ 06:28) (124/69 - 138/70)  RR: 18 (04-09-23 @ 06:28) (18 - 18)  SpO2: 96% (04-09-23 @ 08:00) (93% - 97%)  Wt(kg): --  I&O's Summary    08 Apr 2023 07:01  -  09 Apr 2023 07:00  --------------------------------------------------------  IN: 1700 mL / OUT: 0 mL / NET: 1700 mL          REVIEW OF SYSTEMS:  Patient is  unable to provide any information/ROS  due to baseline mental status.     PHYSICAL EXAM:  Appearance: NAD. VS past 24 hrs -as above   HEENT:   Moist oral mucosa. Conjunctiva clear b/l.   Neck : supple  Respiratory: Lungs CTAB.  Gastrointestinal:  Soft, nontender. No rebound. No rigidity. BS present	  Cardiovascular: RRR ,S1S2 present  Neurologic: Non-focal. Moving all extremities.  Extremities: No edema. No erythema. No calf tenderness.  Skin: No rashes, No ecchymoses, No cyanosis.	  wounds ,skin lesions-See skin assesment flow sheet   LABS:                        9.7    8.10  )-----------( 289      ( 09 Apr 2023 06:00 )             31.3     04-09    139  |  112<H>  |  40<H>  ----------------------------<  200<H>  4.4   |  26  |  1.30    Ca    8.3<L>      09 Apr 2023 06:00  Phos  2.2     04-09  Mg     2.1     04-09    TPro  6.1  /  Alb  2.1<L>  /  TBili  0.5  /  DBili  0.2  /  AST  16  /  ALT  25  /  AlkPhos  67  04-09    CAPILLARY BLOOD GLUCOSE      POCT Blood Glucose.: 212 mg/dL (09 Apr 2023 11:35)  POCT Blood Glucose.: 186 mg/dL (09 Apr 2023 07:51)  POCT Blood Glucose.: 184 mg/dL (08 Apr 2023 21:27)  POCT Blood Glucose.: 207 mg/dL (08 Apr 2023 17:11)    PT/INR - ( 09 Apr 2023 06:00 )   PT: 17.7 sec;   INR: 1.51 ratio               Culture - Urine (collected 02 Apr 2023 09:15)  Source: Clean Catch Clean Catch (Midstream)  Final Report (04 Apr 2023 19:44):    >100,000 CFU/ml Enterococcus faecalis  Organism: Enterococcus faecalis (04 Apr 2023 19:44)  Organism: Enterococcus faecalis (04 Apr 2023 19:44)      RADIOLOGY & ADDITIONAL TESTS:   reviewed elctronically  ASSESSMENT/PLAN: 	    25 minutes aggregate time was spent on this visit, 50% visit time spent in care co-ordination with other attendings and counselling patient .I have discussed care plan with patient / HCP/family member ,who expressed understanding of problems treatment and their effect and side effects, alternatives in details. I have asked if they have any questions and concerns and appropriately addressed them to best of my ability.  PROGRESS NOTE  Patient is a 87y old  Male who presents with a chief complaint of left foot infection (09 Apr 2023 08:52)    Chart and available morning labs /imaging are reviewed electronically , urgent issues addressed . More information  is being added upon completion of rounds , when more information is collected and management discussed with consultants , medical staff and social service/case management on the floor   OVERNIGHT  No new issues reported by medical staff . All above noted Patient is resting in a bed comfortably .Confused ,poor mentation .No distress noted   Denies complains COVID X 2 NEG  HPI:  86 yo male ,[facility] resident with PMHx - COPD, DM, HTN, CAD, HLD, H/O TIA, dementia,GERD, BPH and depression sent ot ER for evaluation of left foot 1metatarsal wound ,suggestive of osteomyelitis .Patient was seen by ID consult and transfer to the hospital recommended for wound cx/bone biopsy /podiatry evaluation ,likely will require 4-6 weeks of iv abx . Patient was admitted to [facility] with b/l feet wounds and was followed by wound care team ,recently completed 7 days of doxycycline for foot wound cellulitis . (30 Mar 2023 05:21)    PAST MEDICAL & SURGICAL HISTORY:  ASHD (arteriosclerotic heart disease)      BPH without urinary obstruction      COPD, moderate      Stage 3 chronic kidney disease      Chronic GERD      HLD (hyperlipidemia)      MDD (major depressive disorder)      Obstructive and reflux uropathy      Cellulitis      HTN (hypertension)      Sepsis      Dementia      Moderate protein-calorie malnutrition      Brain TIA      History of RSV infection      DM type 2, not at goal      Pressure ulcer of unspecified heel, unspecified stage      Venous stasis ulcer without varicose veins      Multiple open wounds of foot          MEDICATIONS  (STANDING):  albuterol/ipratropium for Nebulization 3 milliLiter(s) Nebulizer every 8 hours  budesonide 160 MICROgram(s)/formoterol 4.5 MICROgram(s) Inhaler 2 Puff(s) Inhalation two times a day  chlorhexidine 4% Liquid 1 Application(s) Topical <User Schedule>  dextrose 5% + sodium chloride 0.45%. 1000 milliLiter(s) (40 mL/Hr) IV Continuous <Continuous>  dextrose 5%. 1000 milliLiter(s) (50 mL/Hr) IV Continuous <Continuous>  dextrose 5%. 1000 milliLiter(s) (100 mL/Hr) IV Continuous <Continuous>  dextrose 50% Injectable 25 Gram(s) IV Push once  dextrose 50% Injectable 12.5 Gram(s) IV Push once  dextrose 50% Injectable 25 Gram(s) IV Push once  donepezil 5 milliGRAM(s) Oral at bedtime  DULoxetine 60 milliGRAM(s) Oral daily  enoxaparin Injectable 60 milliGRAM(s) SubCutaneous every 12 hours  glucagon  Injectable 1 milliGRAM(s) IntraMuscular once  insulin glargine Injectable (LANTUS) 8 Unit(s) SubCutaneous at bedtime  insulin lispro (ADMELOG) corrective regimen sliding scale   SubCutaneous three times a day before meals  insulin lispro (ADMELOG) corrective regimen sliding scale   SubCutaneous at bedtime  lactobacillus acidophilus 1 Tablet(s) Oral two times a day with meals  losartan 25 milliGRAM(s) Oral daily  metoprolol tartrate 50 milliGRAM(s) Oral two times a day  multivitamin 1 Tablet(s) Oral daily  pantoprazole   Suspension 40 milliGRAM(s) Oral daily  piperacillin/tazobactam IVPB.. 3.375 Gram(s) IV Intermittent every 8 hours  senna 2 Tablet(s) Oral at bedtime  simvastatin 40 milliGRAM(s) Oral at bedtime  tamsulosin 0.4 milliGRAM(s) Oral at bedtime    MEDICATIONS  (PRN):  acetaminophen     Tablet .. 650 milliGRAM(s) Oral every 6 hours PRN Temp greater or equal to 38C (100.4F), Mild Pain (1 - 3)  aluminum hydroxide/magnesium hydroxide/simethicone Suspension 30 milliLiter(s) Oral every 4 hours PRN Dyspepsia  bisacodyl Suppository 10 milliGRAM(s) Rectal daily PRN Constipation  dextrose Oral Gel 15 Gram(s) Oral once PRN Blood Glucose LESS THAN 70 milliGRAM(s)/deciliter  hydrALAZINE 50 milliGRAM(s) Oral every 6 hours PRN for systolic BP>160  magnesium hydroxide Suspension 30 milliLiter(s) Oral daily PRN Constipation  melatonin 3 milliGRAM(s) Oral at bedtime PRN Insomnia  ondansetron Injectable 4 milliGRAM(s) IV Push every 8 hours PRN Nausea and/or Vomiting  sodium chloride 0.9% lock flush 10 milliLiter(s) IV Push every 1 hour PRN Pre/post blood products, medications, blood draw, and to maintain line patency      OBJECTIVE    T(C): 37.1 (04-09-23 @ 06:28), Max: 37.3 (04-08-23 @ 12:54)  HR: 72 (04-09-23 @ 08:00) (72 - 98)  BP: 138/70 (04-09-23 @ 06:28) (124/69 - 138/70)  RR: 18 (04-09-23 @ 06:28) (18 - 18)  SpO2: 96% (04-09-23 @ 08:00) (93% - 97%)  Wt(kg): --  I&O's Summary    08 Apr 2023 07:01  -  09 Apr 2023 07:00  --------------------------------------------------------  IN: 1700 mL / OUT: 0 mL / NET: 1700 mL          REVIEW OF SYSTEMS:  Patient is  unable to provide any information/ROS  due to baseline mental status.     PHYSICAL EXAM:  Appearance: NAD. VS past 24 hrs -as above   HEENT:   Moist oral mucosa. Conjunctiva clear b/l.   Neck : supple  Respiratory: Lungs CTAB.  Gastrointestinal:  Soft, nontender. No rebound. No rigidity. BS present	  Cardiovascular: RRR ,S1S2 present  Neurologic: Non-focal. Moving all extremities.  Extremities: No edema. No erythema. No calf tenderness.  Skin: No rashes, No ecchymoses, No cyanosis.	  wounds ,skin lesions-See skin assesment flow sheet   LABS:                        9.7    8.10  )-----------( 289      ( 09 Apr 2023 06:00 )             31.3     04-09    139  |  112<H>  |  40<H>  ----------------------------<  200<H>  4.4   |  26  |  1.30    Ca    8.3<L>      09 Apr 2023 06:00  Phos  2.2     04-09  Mg     2.1     04-09    TPro  6.1  /  Alb  2.1<L>  /  TBili  0.5  /  DBili  0.2  /  AST  16  /  ALT  25  /  AlkPhos  67  04-09    CAPILLARY BLOOD GLUCOSE      POCT Blood Glucose.: 212 mg/dL (09 Apr 2023 11:35)  POCT Blood Glucose.: 186 mg/dL (09 Apr 2023 07:51)  POCT Blood Glucose.: 184 mg/dL (08 Apr 2023 21:27)  POCT Blood Glucose.: 207 mg/dL (08 Apr 2023 17:11)    PT/INR - ( 09 Apr 2023 06:00 )   PT: 17.7 sec;   INR: 1.51 ratio               Culture - Urine (collected 02 Apr 2023 09:15)  Source: Clean Catch Clean Catch (Midstream)  Final Report (04 Apr 2023 19:44):    >100,000 CFU/ml Enterococcus faecalis  Organism: Enterococcus faecalis (04 Apr 2023 19:44)  Organism: Enterococcus faecalis (04 Apr 2023 19:44)      RADIOLOGY & ADDITIONAL TESTS:   reviewed elctronically  ASSESSMENT/PLAN: 	    25 minutes aggregate time was spent on this visit, 50% visit time spent in care co-ordination with other attendings and counselling patient .I have discussed care plan with patient / HCP/family member ,who expressed understanding of problems treatment and their effect and side effects, alternatives in details. I have asked if they have any questions and concerns and appropriately addressed them to best of my ability.

## 2023-04-09 NOTE — PROGRESS NOTE ADULT - NUTRITIONAL ASSESSMENT
This patient has been assessed with a concern for Malnutrition and has been determined to have a diagnosis/diagnoses of Moderate protein-calorie malnutrition.    This patient is being managed with:   Diet NPO after Midnight-     NPO Start Date: 09-Apr-2023   NPO Start Time: 23:59  Except Medications  Entered: Apr 9 2023  9:31AM    Diet Soft and Bite Sized-  Consistent Carbohydrate {Evening Snack}  Low Sodium  Reginald(7 Gm Arginine/7 Gm Glut/1.2 Gm HMB     Qty per Day:  BID  Supplement Feeding Modality:  Oral  Glucerna Shake Cans or Servings Per Day:  1       Frequency:  Two Times a day  Entered: Apr 2 2023 10:30AM

## 2023-04-09 NOTE — PROGRESS NOTE ADULT - SUBJECTIVE AND OBJECTIVE BOX
CHIEF COMPLAINT/ REASON FOR VISIT  .. Patient was seen to address the  issue listed under PROBLEM LIST which is located toward bottom of this note     ANA MARIA LEIGH    PLV 1EAS 101 W1    Allergies    No Known Allergies    Intolerances        PAST MEDICAL & SURGICAL HISTORY:  ASHD (arteriosclerotic heart disease)      BPH without urinary obstruction      COPD, moderate      Stage 3 chronic kidney disease      Chronic GERD      HLD (hyperlipidemia)      MDD (major depressive disorder)      Obstructive and reflux uropathy      Cellulitis      HTN (hypertension)      Sepsis      Dementia      Moderate protein-calorie malnutrition      Brain TIA      History of RSV infection      DM type 2, not at goal      Pressure ulcer of unspecified heel, unspecified stage      Venous stasis ulcer without varicose veins      Multiple open wounds of foot          FAMILY HISTORY:      Home Medications:  Admelog SoloStar 100 units/mL injectable solution: injectable 4 times a day (before meals and at bedtime) per sliding scale (30 Mar 2023 15:23)  Aricept 5 mg oral tablet: 1 tab(s) orally once a day (30 Mar 2023 15:23)  atenolol 25 mg oral tablet: 1 tab(s) orally once a day (30 Mar 2023 15:23)  Bacid (LAC) oral tablet: 1 tab(s) orally 2 times a day (30 Mar 2023 15:23)  Cymbalta 60 mg oral delayed release capsule: 1 cap(s) orally once a day (30 Mar 2023 15:23)  docusate sodium 100 mg oral capsule: 2 cap(s) orally once a day (at bedtime) (30 Mar 2023 15:23)  doxycycline hyclate 100 mg oral tablet: 1 tab(s) orally 2 times a day for 7 days  3/28/23-4/4/23 (30 Mar 2023 15:23)  Dulcolax Laxative 10 mg rectal suppository: 1 suppository(ies) rectally as needed for  constipation (30 Mar 2023 15:23)  famotidine 40 mg oral tablet: 1 tab(s) orally once a day (30 Mar 2023 15:23)  Fleet Enema 19 g-7 g rectal enema: 133 milliliter(s) rectally as needed for  constipation (30 Mar 2023 15:23)  Flovent 44 mcg/inh inhalation aerosol with adapter: 1 puff(s) inhaled every 12 hours (30 Mar 2023 15:23)  ipratropium-albuterol 0.5 mg-2.5 mg/3 mL inhalation solution: 3 milliliter(s) by nebulizer 4 times a day (30 Mar 2023 15:23)  losartan 50 mg oral tablet: 1 tab(s) orally once a day (30 Mar 2023 15:23)  Milk of Magnesia 8% oral suspension: 30 milliliter(s) orally once a day as needed for  constipation (30 Mar 2023 15:23)  Santyl 250 units/g topical ointment: Apply topically to affected area (30 Mar 2023 12:41)  simvastatin 40 mg oral tablet: 1 tab(s) orally once a day (at bedtime) (30 Mar 2023 15:23)  SITagliptin 50 mg oral tablet: 1 tab(s) orally once a day (30 Mar 2023 15:23)  tamsulosin 0.4 mg oral capsule: 1 cap(s) orally once a day (in the evening) (30 Mar 2023 15:23)  Therapeutic Multiple Vitamins oral tablet: 1 tab(s) orally once a day (30 Mar 2023 15:23)  traMADol 50 mg oral tablet: 0.5 tab(s) orally 2 times a day (30 Mar 2023 15:23)  Tylenol Caplet Extra Strength 500 mg oral tablet: 2 tab(s) orally every 8 hours (30 Mar 2023 15:23)      MEDICATIONS  (STANDING):  albuterol/ipratropium for Nebulization 3 milliLiter(s) Nebulizer every 8 hours  budesonide 160 MICROgram(s)/formoterol 4.5 MICROgram(s) Inhaler 2 Puff(s) Inhalation two times a day  chlorhexidine 4% Liquid 1 Application(s) Topical <User Schedule>  dextrose 5% + sodium chloride 0.45% with potassium chloride 20 mEq/L 1000 milliLiter(s) (40 mL/Hr) IV Continuous <Continuous>  dextrose 5%. 1000 milliLiter(s) (50 mL/Hr) IV Continuous <Continuous>  dextrose 5%. 1000 milliLiter(s) (100 mL/Hr) IV Continuous <Continuous>  dextrose 50% Injectable 25 Gram(s) IV Push once  dextrose 50% Injectable 12.5 Gram(s) IV Push once  dextrose 50% Injectable 25 Gram(s) IV Push once  donepezil 5 milliGRAM(s) Oral at bedtime  DULoxetine 60 milliGRAM(s) Oral daily  enoxaparin Injectable 60 milliGRAM(s) SubCutaneous every 12 hours  glucagon  Injectable 1 milliGRAM(s) IntraMuscular once  insulin glargine Injectable (LANTUS) 8 Unit(s) SubCutaneous at bedtime  insulin lispro (ADMELOG) corrective regimen sliding scale   SubCutaneous three times a day before meals  insulin lispro (ADMELOG) corrective regimen sliding scale   SubCutaneous at bedtime  lactobacillus acidophilus 1 Tablet(s) Oral two times a day with meals  losartan 25 milliGRAM(s) Oral daily  metoprolol tartrate 50 milliGRAM(s) Oral two times a day  multivitamin 1 Tablet(s) Oral daily  pantoprazole   Suspension 40 milliGRAM(s) Oral daily  piperacillin/tazobactam IVPB.. 3.375 Gram(s) IV Intermittent every 8 hours  senna 2 Tablet(s) Oral at bedtime  simvastatin 40 milliGRAM(s) Oral at bedtime  tamsulosin 0.4 milliGRAM(s) Oral at bedtime    MEDICATIONS  (PRN):  acetaminophen     Tablet .. 650 milliGRAM(s) Oral every 6 hours PRN Temp greater or equal to 38C (100.4F), Mild Pain (1 - 3)  aluminum hydroxide/magnesium hydroxide/simethicone Suspension 30 milliLiter(s) Oral every 4 hours PRN Dyspepsia  bisacodyl Suppository 10 milliGRAM(s) Rectal daily PRN Constipation  dextrose Oral Gel 15 Gram(s) Oral once PRN Blood Glucose LESS THAN 70 milliGRAM(s)/deciliter  hydrALAZINE 50 milliGRAM(s) Oral every 6 hours PRN for systolic BP>160  magnesium hydroxide Suspension 30 milliLiter(s) Oral daily PRN Constipation  melatonin 3 milliGRAM(s) Oral at bedtime PRN Insomnia  ondansetron Injectable 4 milliGRAM(s) IV Push every 8 hours PRN Nausea and/or Vomiting  sodium chloride 0.9% lock flush 10 milliLiter(s) IV Push every 1 hour PRN Pre/post blood products, medications, blood draw, and to maintain line patency              Vital Signs Last 24 Hrs  T(C): 37.1 (09 Apr 2023 06:28), Max: 37.3 (08 Apr 2023 12:54)  T(F): 98.7 (09 Apr 2023 06:28), Max: 99.1 (08 Apr 2023 12:54)  HR: 72 (09 Apr 2023 08:00) (72 - 98)  BP: 138/70 (09 Apr 2023 06:28) (124/69 - 138/70)  BP(mean): --  RR: 18 (09 Apr 2023 06:28) (18 - 18)  SpO2: 96% (09 Apr 2023 08:00) (93% - 97%)    Parameters below as of 09 Apr 2023 08:00  Patient On (Oxygen Delivery Method): room air          04-08-23 @ 07:01  -  04-09-23 @ 07:00  --------------------------------------------------------  IN: 1700 mL / OUT: 0 mL / NET: 1700 mL              LABS:                        9.7    8.10  )-----------( 289      ( 09 Apr 2023 06:00 )             31.3     04-09    139  |  112<H>  |  40<H>  ----------------------------<  200<H>  4.4   |  26  |  1.30    Ca    8.3<L>      09 Apr 2023 06:00  Phos  2.2     04-09  Mg     2.1     04-09    TPro  6.1  /  Alb  2.1<L>  /  TBili  0.5  /  DBili  0.2  /  AST  16  /  ALT  25  /  AlkPhos  67  04-09    PT/INR - ( 09 Apr 2023 06:00 )   PT: 17.7 sec;   INR: 1.51 ratio                   WBC:  WBC Count: 8.10 K/uL (04-09 @ 06:00)  WBC Count: 8.42 K/uL (04-07 @ 08:43)  WBC Count: 9.55 K/uL (04-06 @ 06:37)      MICROBIOLOGY:  RECENT CULTURES:  04-02 Clean Catch Clean Catch (Midstream) Enterococcus faecalis XXXX   >100,000 CFU/ml Enterococcus faecalis                PT/INR - ( 09 Apr 2023 06:00 )   PT: 17.7 sec;   INR: 1.51 ratio             Sodium:  Sodium, Serum: 139 mmol/L (04-09 @ 06:00)  Sodium, Serum: 145 mmol/L (04-07 @ 08:43)  Sodium, Serum: 144 mmol/L (04-06 @ 06:37)      1.30 mg/dL 04-09 @ 06:00  1.20 mg/dL 04-08 @ 06:14  1.30 mg/dL 04-07 @ 08:43  1.30 mg/dL 04-06 @ 06:37      Hemoglobin:  Hemoglobin: 9.7 g/dL (04-09 @ 06:00)  Hemoglobin: 10.9 g/dL (04-07 @ 08:43)  Hemoglobin: 10.5 g/dL (04-06 @ 06:37)      Platelets: Platelet Count - Automated: 289 K/uL (04-09 @ 06:00)  Platelet Count - Automated: 284 K/uL (04-07 @ 08:43)  Platelet Count - Automated: 286 K/uL (04-06 @ 06:37)      LIVER FUNCTIONS - ( 09 Apr 2023 06:00 )  Alb: 2.1 g/dL / Pro: 6.1 g/dL / ALK PHOS: 67 U/L / ALT: 25 U/L / AST: 16 U/L / GGT: x                 RADIOLOGY & ADDITIONAL STUDIES:      MICROBIOLOGY:  RECENT CULTURES:  04-02 Clean Catch Clean Catch (Midstream) Enterococcus faecalis XXXX   >100,000 CFU/ml Enterococcus faecalis

## 2023-04-09 NOTE — PROGRESS NOTE ADULT - ASSESSMENT
88 yo male ,[facility] resident with PMHx - COPD, DM, HTN, CAD, HLD, H/O TIA, dementia,GERD, BPH and depression sent ot ER for evaluation of left foot 1metatarsal wound ,suggestive of osteomyelitis .Patient was seen by ID consult and transfer to the hospital recommended for wound cx/bone biopsy /podiatry evaluation ,likely will require 4-6 weeks of iv abx . Patient was admitted to [facility] with b/l feet wounds and was followed by wound care team ,recently completed 7 days of doxycycline for foot wound cellulitis . 86 yo male ,[facility] resident with PMHx - COPD, DM, HTN, CAD, HLD, H/O TIA, dementia,GERD, BPH and depression sent ot ER for evaluation of left foot 1metatarsal wound ,suggestive of osteomyelitis .Patient was seen by ID consult and transfer to the hospital recommended for wound cx/bone biopsy /podiatry evaluation ,likely will require 4-6 weeks of iv abx . Patient was admitted to [facility] with b/l feet wounds and was followed by wound care team ,recently completed 7 days of doxycycline for foot wound cellulitis . 88 yo male ,[facility] resident with PMHx - COPD, DM, HTN, CAD, HLD, H/O TIA, dementia,GERD, BPH and depression sent ot ER for evaluation of left foot 1metatarsal wound ,suggestive of osteomyelitis .Patient was seen by ID consult and transfer to the hospital recommended for wound cx/bone biopsy /podiatry evaluation ,likely will require 4-6 weeks of iv abx . Patient was admitted to [facility] with b/l feet wounds and was followed by wound care team ,recently completed 7 days of doxycycline for foot wound cellulitis .

## 2023-04-09 NOTE — PROGRESS NOTE ADULT - ASSESSMENT
88 [facility] ,[facility] with [facility]Hx - COPD, DM, HTN, CAD, HLD, H/O TIA, dementia,GERD, BPH and depression sent ot ER for evaluation of left foot 1metatarsal wound ,suggestive of osteomyelitis .Patient was seen by ID consult and transfer to the hospital recommended for wound cx/bone biopsy /podiatry evaluation ,likely will require 4-6 weeks of iv abx . [facility] and was followed by wound care team ,recently completed 7 days of doxycycline for foot wound cellulitis . (30 Mar 2023 05:21)      hypernatremia   improved      hypokalemia potassium chloride  10 mEq/100 mL IVPB 10 milliEquivalent(s) IV Intermittent every 1 hour  prn     ACUTE RENAL FAILURE: sodium chloride 0.45%. 1000 milliLiter(s) (50 mL/Hr) IV Continuous  Serum creatinine is improving    There is no progression . No uremic symptoms  No evidence of anemia .  Fluid status stable.  Will continue to avoid nephrotoxic drugs.  Patient remains asymptomatic.   Continue current therapy.  hold  diuretic.        BP monitoring,continue current antihypertensive meds, low salt diet,followup with PMD in 1-2 weeks  losartan 50 milliGRAM(s) Oral daily    f/u  blood and urine cx,serial lactate levels,monitor vitals clement saenz hydration,monitor urine output and renal profile,iv abx   piperacillin/tazobactam IVPB.. 3.375 Gram(s) IV Intermittent every 8 hours 86 yo male ,[facility] resident with PMHx - COPD, DM, HTN, CAD, HLD, H/O TIA, dementia,GERD, BPH and depression sent ot ER for evaluation of left foot 1metatarsal wound ,suggestive of osteomyelitis .Patient was seen by ID consult and transfer to the hospital recommended for wound cx/bone biopsy /podiatry evaluation ,likely will require 4-6 weeks of iv abx . Patient was admitted to [facility] with b/l feet wounds and was followed by wound care team ,recently completed 7 days of doxycycline for foot wound cellulitis . (30 Mar 2023 05:21)      hypernatremia   improved      hypokalemia potassium chloride  10 mEq/100 mL IVPB 10 milliEquivalent(s) IV Intermittent every 1 hour  prn     ACUTE RENAL FAILURE: sodium chloride 0.45%. 1000 milliLiter(s) (50 mL/Hr) IV Continuous  Serum creatinine is improving    There is no progression . No uremic symptoms  No evidence of anemia .  Fluid status stable.  Will continue to avoid nephrotoxic drugs.  Patient remains asymptomatic.   Continue current therapy.  hold  diuretic.        BP monitoring,continue current antihypertensive meds, low salt diet,followup with PMD in 1-2 weeks  losartan 50 milliGRAM(s) Oral daily    f/u  blood and urine cx,serial lactate levels,monitor vitals clement asenz hydration,monitor urine output and renal profile,iv abx   piperacillin/tazobactam IVPB.. 3.375 Gram(s) IV Intermittent every 8 hours 86 yo male ,[facility] resident with PMHx - COPD, DM, HTN, CAD, HLD, H/O TIA, dementia,GERD, BPH and depression sent ot ER for evaluation of left foot 1metatarsal wound ,suggestive of osteomyelitis .Patient was seen by ID consult and transfer to the hospital recommended for wound cx/bone biopsy /podiatry evaluation ,likely will require 4-6 weeks of iv abx . Patient was admitted to [facility] with b/l feet wounds and was followed by wound care team ,recently completed 7 days of doxycycline for foot wound cellulitis . (30 Mar 2023 05:21)      hypernatremia   improved      hypokalemia potassium chloride  10 mEq/100 mL IVPB 10 milliEquivalent(s) IV Intermittent every 1 hour  prn     ACUTE RENAL FAILURE: sodium chloride 0.45%. 1000 milliLiter(s) (50 mL/Hr) IV Continuous  Serum creatinine is improving    There is no progression . No uremic symptoms  No evidence of anemia .  Fluid status stable.  Will continue to avoid nephrotoxic drugs.  Patient remains asymptomatic.   Continue current therapy.  hold  diuretic.        BP monitoring,continue current antihypertensive meds, low salt diet,followup with PMD in 1-2 weeks  losartan 50 milliGRAM(s) Oral daily    f/u  blood and urine cx,serial lactate levels,monitor vitals clement saenz hydration,monitor urine output and renal profile,iv abx   piperacillin/tazobactam IVPB.. 3.375 Gram(s) IV Intermittent every 8 hours

## 2023-04-10 LAB
ALBUMIN SERPL ELPH-MCNC: 2 G/DL — LOW (ref 3.3–5)
ALP SERPL-CCNC: 60 U/L — SIGNIFICANT CHANGE UP (ref 40–120)
ALT FLD-CCNC: 21 U/L — SIGNIFICANT CHANGE UP (ref 12–78)
ANION GAP SERPL CALC-SCNC: 2 MMOL/L — LOW (ref 5–17)
AST SERPL-CCNC: 14 U/L — LOW (ref 15–37)
BILIRUB DIRECT SERPL-MCNC: 0.2 MG/DL — SIGNIFICANT CHANGE UP (ref 0–0.3)
BILIRUB INDIRECT FLD-MCNC: 0.2 MG/DL — SIGNIFICANT CHANGE UP (ref 0.2–1)
BILIRUB SERPL-MCNC: 0.4 MG/DL — SIGNIFICANT CHANGE UP (ref 0.2–1.2)
BUN SERPL-MCNC: 36 MG/DL — HIGH (ref 7–23)
CALCIUM SERPL-MCNC: 7.9 MG/DL — LOW (ref 8.5–10.1)
CHLORIDE SERPL-SCNC: 111 MMOL/L — HIGH (ref 96–108)
CO2 SERPL-SCNC: 26 MMOL/L — SIGNIFICANT CHANGE UP (ref 22–31)
CREAT SERPL-MCNC: 1.3 MG/DL — SIGNIFICANT CHANGE UP (ref 0.5–1.3)
EGFR: 53 ML/MIN/1.73M2 — LOW
GLUCOSE SERPL-MCNC: 230 MG/DL — HIGH (ref 70–99)
GRAM STN FLD: SIGNIFICANT CHANGE UP
HCT VFR BLD CALC: 30.1 % — LOW (ref 39–50)
HGB BLD-MCNC: 9.4 G/DL — LOW (ref 13–17)
INR BLD: 1.35 RATIO — HIGH (ref 0.88–1.16)
MAGNESIUM SERPL-MCNC: 2 MG/DL — SIGNIFICANT CHANGE UP (ref 1.6–2.6)
MCHC RBC-ENTMCNC: 29.3 PG — SIGNIFICANT CHANGE UP (ref 27–34)
MCHC RBC-ENTMCNC: 31.2 GM/DL — LOW (ref 32–36)
MCV RBC AUTO: 93.8 FL — SIGNIFICANT CHANGE UP (ref 80–100)
NRBC # BLD: 0 /100 WBCS — SIGNIFICANT CHANGE UP (ref 0–0)
PHOSPHATE SERPL-MCNC: 2.4 MG/DL — LOW (ref 2.5–4.5)
PLATELET # BLD AUTO: 300 K/UL — SIGNIFICANT CHANGE UP (ref 150–400)
POTASSIUM SERPL-MCNC: 4.4 MMOL/L — SIGNIFICANT CHANGE UP (ref 3.5–5.3)
POTASSIUM SERPL-SCNC: 4.4 MMOL/L — SIGNIFICANT CHANGE UP (ref 3.5–5.3)
PROT SERPL-MCNC: 5.8 G/DL — LOW (ref 6–8.3)
PROTHROM AB SERPL-ACNC: 15.8 SEC — HIGH (ref 10.5–13.4)
RBC # BLD: 3.21 M/UL — LOW (ref 4.2–5.8)
RBC # FLD: 17.6 % — HIGH (ref 10.3–14.5)
SODIUM SERPL-SCNC: 139 MMOL/L — SIGNIFICANT CHANGE UP (ref 135–145)
SPECIMEN SOURCE: SIGNIFICANT CHANGE UP
WBC # BLD: 7.13 K/UL — SIGNIFICANT CHANGE UP (ref 3.8–10.5)
WBC # FLD AUTO: 7.13 K/UL — SIGNIFICANT CHANGE UP (ref 3.8–10.5)

## 2023-04-10 PROCEDURE — 88304 TISSUE EXAM BY PATHOLOGIST: CPT | Mod: 26

## 2023-04-10 PROCEDURE — 88311 DECALCIFY TISSUE: CPT | Mod: 26

## 2023-04-10 PROCEDURE — 28315 REMOVAL OF SESAMOID BONE: CPT | Mod: TA

## 2023-04-10 PROCEDURE — 73630 X-RAY EXAM OF FOOT: CPT | Mod: 26,LT

## 2023-04-10 PROCEDURE — 88307 TISSUE EXAM BY PATHOLOGIST: CPT | Mod: 26

## 2023-04-10 PROCEDURE — 99232 SBSQ HOSP IP/OBS MODERATE 35: CPT

## 2023-04-10 PROCEDURE — 28111 PART REMOVAL OF METATARSAL: CPT | Mod: TA

## 2023-04-10 RX ORDER — DEXTROSE 50 % IN WATER 50 %
12.5 SYRINGE (ML) INTRAVENOUS ONCE
Refills: 0 | Status: DISCONTINUED | OUTPATIENT
Start: 2023-04-10 | End: 2023-04-14

## 2023-04-10 RX ORDER — LOSARTAN POTASSIUM 100 MG/1
25 TABLET, FILM COATED ORAL DAILY
Refills: 0 | Status: DISCONTINUED | OUTPATIENT
Start: 2023-04-11 | End: 2023-04-12

## 2023-04-10 RX ORDER — HYDROMORPHONE HYDROCHLORIDE 2 MG/ML
0.5 INJECTION INTRAMUSCULAR; INTRAVENOUS; SUBCUTANEOUS
Refills: 0 | Status: DISCONTINUED | OUTPATIENT
Start: 2023-04-10 | End: 2023-04-10

## 2023-04-10 RX ORDER — SODIUM CHLORIDE 9 MG/ML
1000 INJECTION, SOLUTION INTRAVENOUS
Refills: 0 | Status: DISCONTINUED | OUTPATIENT
Start: 2023-04-10 | End: 2023-04-10

## 2023-04-10 RX ORDER — DONEPEZIL HYDROCHLORIDE 10 MG/1
5 TABLET, FILM COATED ORAL AT BEDTIME
Refills: 0 | Status: DISCONTINUED | OUTPATIENT
Start: 2023-04-10 | End: 2023-04-14

## 2023-04-10 RX ORDER — SENNA PLUS 8.6 MG/1
2 TABLET ORAL AT BEDTIME
Refills: 0 | Status: DISCONTINUED | OUTPATIENT
Start: 2023-04-10 | End: 2023-04-14

## 2023-04-10 RX ORDER — BUDESONIDE AND FORMOTEROL FUMARATE DIHYDRATE 160; 4.5 UG/1; UG/1
2 AEROSOL RESPIRATORY (INHALATION)
Refills: 0 | Status: DISCONTINUED | OUTPATIENT
Start: 2023-04-10 | End: 2023-04-14

## 2023-04-10 RX ORDER — DEXTROSE 50 % IN WATER 50 %
25 SYRINGE (ML) INTRAVENOUS ONCE
Refills: 0 | Status: DISCONTINUED | OUTPATIENT
Start: 2023-04-10 | End: 2023-04-14

## 2023-04-10 RX ORDER — DEXTROSE 50 % IN WATER 50 %
15 SYRINGE (ML) INTRAVENOUS ONCE
Refills: 0 | Status: DISCONTINUED | OUTPATIENT
Start: 2023-04-10 | End: 2023-04-14

## 2023-04-10 RX ORDER — LANOLIN ALCOHOL/MO/W.PET/CERES
3 CREAM (GRAM) TOPICAL AT BEDTIME
Refills: 0 | Status: DISCONTINUED | OUTPATIENT
Start: 2023-04-10 | End: 2023-04-14

## 2023-04-10 RX ORDER — GLUCAGON INJECTION, SOLUTION 0.5 MG/.1ML
1 INJECTION, SOLUTION SUBCUTANEOUS ONCE
Refills: 0 | Status: DISCONTINUED | OUTPATIENT
Start: 2023-04-10 | End: 2023-04-14

## 2023-04-10 RX ORDER — PIPERACILLIN AND TAZOBACTAM 4; .5 G/20ML; G/20ML
3.38 INJECTION, POWDER, LYOPHILIZED, FOR SOLUTION INTRAVENOUS EVERY 8 HOURS
Refills: 0 | Status: DISCONTINUED | OUTPATIENT
Start: 2023-04-10 | End: 2023-04-13

## 2023-04-10 RX ORDER — TAMSULOSIN HYDROCHLORIDE 0.4 MG/1
0.4 CAPSULE ORAL AT BEDTIME
Refills: 0 | Status: DISCONTINUED | OUTPATIENT
Start: 2023-04-10 | End: 2023-04-14

## 2023-04-10 RX ORDER — DULOXETINE HYDROCHLORIDE 30 MG/1
60 CAPSULE, DELAYED RELEASE ORAL DAILY
Refills: 0 | Status: DISCONTINUED | OUTPATIENT
Start: 2023-04-10 | End: 2023-04-14

## 2023-04-10 RX ORDER — SODIUM CHLORIDE 9 MG/ML
10 INJECTION INTRAMUSCULAR; INTRAVENOUS; SUBCUTANEOUS
Refills: 0 | Status: DISCONTINUED | OUTPATIENT
Start: 2023-04-10 | End: 2023-04-14

## 2023-04-10 RX ORDER — INSULIN LISPRO 100/ML
VIAL (ML) SUBCUTANEOUS AT BEDTIME
Refills: 0 | Status: DISCONTINUED | OUTPATIENT
Start: 2023-04-10 | End: 2023-04-14

## 2023-04-10 RX ORDER — SIMVASTATIN 20 MG/1
40 TABLET, FILM COATED ORAL AT BEDTIME
Refills: 0 | Status: DISCONTINUED | OUTPATIENT
Start: 2023-04-10 | End: 2023-04-14

## 2023-04-10 RX ORDER — LACTOBACILLUS ACIDOPHILUS 100MM CELL
1 CAPSULE ORAL
Refills: 0 | Status: DISCONTINUED | OUTPATIENT
Start: 2023-04-10 | End: 2023-04-14

## 2023-04-10 RX ORDER — METOPROLOL TARTRATE 50 MG
50 TABLET ORAL
Refills: 0 | Status: DISCONTINUED | OUTPATIENT
Start: 2023-04-10 | End: 2023-04-11

## 2023-04-10 RX ORDER — ACETAMINOPHEN 500 MG
650 TABLET ORAL EVERY 6 HOURS
Refills: 0 | Status: DISCONTINUED | OUTPATIENT
Start: 2023-04-10 | End: 2023-04-14

## 2023-04-10 RX ORDER — PANTOPRAZOLE SODIUM 20 MG/1
40 TABLET, DELAYED RELEASE ORAL DAILY
Refills: 0 | Status: DISCONTINUED | OUTPATIENT
Start: 2023-04-10 | End: 2023-04-14

## 2023-04-10 RX ORDER — HYDRALAZINE HCL 50 MG
50 TABLET ORAL EVERY 6 HOURS
Refills: 0 | Status: DISCONTINUED | OUTPATIENT
Start: 2023-04-10 | End: 2023-04-14

## 2023-04-10 RX ORDER — INSULIN GLARGINE 100 [IU]/ML
8 INJECTION, SOLUTION SUBCUTANEOUS AT BEDTIME
Refills: 0 | Status: DISCONTINUED | OUTPATIENT
Start: 2023-04-10 | End: 2023-04-14

## 2023-04-10 RX ORDER — ONDANSETRON 8 MG/1
4 TABLET, FILM COATED ORAL EVERY 8 HOURS
Refills: 0 | Status: DISCONTINUED | OUTPATIENT
Start: 2023-04-10 | End: 2023-04-14

## 2023-04-10 RX ORDER — ONDANSETRON 8 MG/1
4 TABLET, FILM COATED ORAL ONCE
Refills: 0 | Status: DISCONTINUED | OUTPATIENT
Start: 2023-04-10 | End: 2023-04-10

## 2023-04-10 RX ORDER — INSULIN LISPRO 100/ML
VIAL (ML) SUBCUTANEOUS
Refills: 0 | Status: DISCONTINUED | OUTPATIENT
Start: 2023-04-10 | End: 2023-04-14

## 2023-04-10 RX ORDER — MAGNESIUM HYDROXIDE 400 MG/1
30 TABLET, CHEWABLE ORAL DAILY
Refills: 0 | Status: DISCONTINUED | OUTPATIENT
Start: 2023-04-10 | End: 2023-04-14

## 2023-04-10 RX ADMIN — PIPERACILLIN AND TAZOBACTAM 25 GRAM(S): 4; .5 INJECTION, POWDER, LYOPHILIZED, FOR SOLUTION INTRAVENOUS at 21:22

## 2023-04-10 RX ADMIN — Medication 50 MILLIGRAM(S): at 16:01

## 2023-04-10 RX ADMIN — DULOXETINE HYDROCHLORIDE 60 MILLIGRAM(S): 30 CAPSULE, DELAYED RELEASE ORAL at 16:00

## 2023-04-10 RX ADMIN — PIPERACILLIN AND TAZOBACTAM 25 GRAM(S): 4; .5 INJECTION, POWDER, LYOPHILIZED, FOR SOLUTION INTRAVENOUS at 05:00

## 2023-04-10 RX ADMIN — PANTOPRAZOLE SODIUM 40 MILLIGRAM(S): 20 TABLET, DELAYED RELEASE ORAL at 16:00

## 2023-04-10 RX ADMIN — Medication 4: at 08:16

## 2023-04-10 RX ADMIN — SIMVASTATIN 40 MILLIGRAM(S): 20 TABLET, FILM COATED ORAL at 21:17

## 2023-04-10 RX ADMIN — Medication 2: at 11:54

## 2023-04-10 RX ADMIN — Medication 1 TABLET(S): at 16:01

## 2023-04-10 RX ADMIN — INSULIN GLARGINE 8 UNIT(S): 100 INJECTION, SOLUTION SUBCUTANEOUS at 22:13

## 2023-04-10 RX ADMIN — LOSARTAN POTASSIUM 25 MILLIGRAM(S): 100 TABLET, FILM COATED ORAL at 05:03

## 2023-04-10 RX ADMIN — Medication 3 MILLILITER(S): at 08:05

## 2023-04-10 RX ADMIN — TAMSULOSIN HYDROCHLORIDE 0.4 MILLIGRAM(S): 0.4 CAPSULE ORAL at 21:16

## 2023-04-10 RX ADMIN — SENNA PLUS 2 TABLET(S): 8.6 TABLET ORAL at 21:22

## 2023-04-10 RX ADMIN — Medication 1 TABLET(S): at 16:00

## 2023-04-10 RX ADMIN — CHLORHEXIDINE GLUCONATE 1 APPLICATION(S): 213 SOLUTION TOPICAL at 05:02

## 2023-04-10 RX ADMIN — Medication 50 MILLIGRAM(S): at 05:02

## 2023-04-10 RX ADMIN — DONEPEZIL HYDROCHLORIDE 5 MILLIGRAM(S): 10 TABLET, FILM COATED ORAL at 21:16

## 2023-04-10 NOTE — PROGRESS NOTE ADULT - ASSESSMENT
86 yo male ,[facility] resident with PMHx - COPD, DM, HTN, CAD, HLD, H/O TIA, dementia, GERD, BPH and depression, who was sent for evaluation of left foot 1metatarsal wound. Concern for wound infection with underlying osteomyelitis. Bone scan suspicious for L foot 1st toe osteomyelitis. Cannot exclude osteomyelitis on R heel. S/p Resection of head of first metatarsal bone and Sesamoidectomy of L foot today. Operatively noted to have Left first metatarsal head exposed with necrotic soft tissue and purulent drainage.    Incidentally found to be COVID positive on 4/4. Completed 3 doses of remdesivir. Remains afebrile and without leukocytosis.     -continue Zosyn  -will follow up OR cultures, path    Isis Mancia MD  Division of Infectious Diseases   Cell [phone] between 8am and 6pm   After 6pm and weekends please call ID service at [phone]. 86 yo male ,[facility] resident with PMHx - COPD, DM, HTN, CAD, HLD, H/O TIA, dementia, GERD, BPH and depression, who was sent for evaluation of left foot 1metatarsal wound. Concern for wound infection with underlying osteomyelitis. Bone scan suspicious for L foot 1st toe osteomyelitis. Cannot exclude osteomyelitis on R heel. S/p Resection of head of first metatarsal bone and Sesamoidectomy of L foot today. Operatively noted to have Left first metatarsal head exposed with necrotic soft tissue and purulent drainage.    Incidentally found to be COVID positive on 4/4. Completed 3 doses of remdesivir. Remains afebrile and without leukocytosis.     -continue Zosyn  -will follow up OR cultures, path    Isis Mancia MD  Division of Infectious Diseases   Cell [phone] between 8am and 6pm   After 6pm and weekends please call ID service at [phone]. 86 yo male ,[facility] resident with PMHx - COPD, DM, HTN, CAD, HLD, H/O TIA, dementia, GERD, BPH and depression, who was sent for evaluation of left foot 1metatarsal wound. Concern for wound infection with underlying osteomyelitis. Bone scan suspicious for L foot 1st toe osteomyelitis. Cannot exclude osteomyelitis on R heel. S/p Resection of head of first metatarsal bone and Sesamoidectomy of L foot today. Operatively noted to have Left first metatarsal head exposed with necrotic soft tissue and purulent drainage.    Incidentally found to be COVID positive on 4/4. Completed 3 doses of remdesivir. Remains afebrile and without leukocytosis.     -continue Zosyn  -will follow up OR cultures, path    Isis Mancia MD  Division of Infectious Diseases   Cell [phone] between 8am and 6pm   After 6pm and weekends please call ID service at [phone].

## 2023-04-10 NOTE — PROGRESS NOTE ADULT - SUBJECTIVE AND OBJECTIVE BOX
Patient is a 87y Male whom presented to the hospital with ckd and chelo     PAST MEDICAL & SURGICAL HISTORY:      MEDICATIONS  (STANDING):      Allergies    No Known Allergies    Intolerances        SOCIAL HISTORY:  Denies ETOh,Smoking,     FAMILY HISTORY:      REVIEW OF SYSTEMS:  unable to obtained a good review system                                                                                                                        9.4    7.13  )-----------( 300      ( 10 Apr 2023 05:10 )             30.1       CBC Full  -  ( 10 Apr 2023 05:10 )  WBC Count : 7.13 K/uL  RBC Count : 3.21 M/uL  Hemoglobin : 9.4 g/dL  Hematocrit : 30.1 %  Platelet Count - Automated : 300 K/uL  Mean Cell Volume : 93.8 fl  Mean Cell Hemoglobin : 29.3 pg  Mean Cell Hemoglobin Concentration : 31.2 gm/dL  Auto Neutrophil # : x  Auto Lymphocyte # : x  Auto Monocyte # : x  Auto Eosinophil # : x  Auto Basophil # : x  Auto Neutrophil % : x  Auto Lymphocyte % : x  Auto Monocyte % : x  Auto Eosinophil % : x  Auto Basophil % : x      04-10    139  |  111<H>  |  36<H>  ----------------------------<  230<H>  4.4   |  26  |  1.30    Ca    7.9<L>      10 Apr 2023 05:10  Phos  2.4     04-10  Mg     2.0     04-10    TPro  5.8<L>  /  Alb  2.0<L>  /  TBili  0.4  /  DBili  0.2  /  AST  14<L>  /  ALT  21  /  AlkPhos  60  04-10      CAPILLARY BLOOD GLUCOSE      POCT Blood Glucose.: 107 mg/dL (10 Apr 2023 16:23)  POCT Blood Glucose.: 156 mg/dL (10 Apr 2023 11:46)  POCT Blood Glucose.: 213 mg/dL (10 Apr 2023 07:28)  POCT Blood Glucose.: 195 mg/dL (09 Apr 2023 21:09)      Vital Signs Last 24 Hrs  T(C): 36.7 (10 Apr 2023 16:23), Max: 36.7 (10 Apr 2023 12:07)  T(F): 98 (10 Apr 2023 16:23), Max: 98 (10 Apr 2023 12:07)  HR: 79 (10 Apr 2023 16:23) (65 - 93)  BP: 117/67 (10 Apr 2023 16:23) (79/49 - 145/83)  BP(mean): --  RR: 18 (10 Apr 2023 16:23) (16 - 20)  SpO2: 100% (10 Apr 2023 16:23) (93% - 100%)    Parameters below as of 10 Apr 2023 16:23  Patient On (Oxygen Delivery Method): nasal cannula  O2 Flow (L/min): 2          PT/INR - ( 10 Apr 2023 05:10 )   PT: 15.8 sec;   INR: 1.35 ratio                   PT/INR - ( 09 Apr 2023 06:00 )   PT: 17.7 sec;   INR: 1.51 ratio                             PT/INR - ( 06 Apr 2023 06:37 )   PT: 16.4 sec;   INR: 1.40 ratio                           PHYSICAL EXAM:    Constitutional: NAD  HEENT: conjunctive   clear   Neck:  No JVD  Respiratory: CTAB  Cardiovascular: S1 and S2  Gastrointestinal: BS+, soft,   Extremities: No peripheral edema  Neurological:  no focal deficits

## 2023-04-10 NOTE — PROGRESS NOTE ADULT - SUBJECTIVE AND OBJECTIVE BOX
PROGRESS NOTE  Patient is a 87y old  Male who presents with a chief complaint of left foot infection (10 Apr 2023 19:31)  Chart and available morning labs /imaging are reviewed electronically , urgent issues addressed . More information  is being added upon completion of rounds , when more information is collected and management discussed with consultants , medical staff and social service/case management on the floor     OVERNIGHT  No new issues reported by medical staff . All above noted Patient is resting in a bed comfortably .Confused ,poor mentation .No distress noted     HPI:  88 yo male ,[facility] resident with PMHx - COPD, DM, HTN, CAD, HLD, H/O TIA, dementia,GERD, BPH and depression sent ot ER for evaluation of left foot 1metatarsal wound ,suggestive of osteomyelitis .Patient was seen by ID consult and transfer to the hospital recommended for wound cx/bone biopsy /podiatry evaluation ,likely will require 4-6 weeks of iv abx . Patient was admitted to [facility] with b/l feet wounds and was followed by wound care team ,recently completed 7 days of doxycycline for foot wound cellulitis . (30 Mar 2023 05:21)    PAST MEDICAL & SURGICAL HISTORY:  ASHD (arteriosclerotic heart disease)      BPH without urinary obstruction      COPD, moderate      Stage 3 chronic kidney disease      Chronic GERD      HLD (hyperlipidemia)      MDD (major depressive disorder)      Obstructive and reflux uropathy      Cellulitis      HTN (hypertension)      Sepsis      Dementia      Moderate protein-calorie malnutrition      Brain TIA      History of RSV infection      DM type 2, not at goal      Pressure ulcer of unspecified heel, unspecified stage      Venous stasis ulcer without varicose veins      Multiple open wounds of foot          MEDICATIONS  (STANDING):  budesonide 160 MICROgram(s)/formoterol 4.5 MICROgram(s) Inhaler 2 Puff(s) Inhalation two times a day  dextrose 50% Injectable 25 Gram(s) IV Push once  dextrose 50% Injectable 12.5 Gram(s) IV Push once  dextrose 50% Injectable 25 Gram(s) IV Push once  donepezil 5 milliGRAM(s) Oral at bedtime  DULoxetine 60 milliGRAM(s) Oral daily  glucagon  Injectable 1 milliGRAM(s) IntraMuscular once  insulin glargine Injectable (LANTUS) 8 Unit(s) SubCutaneous at bedtime  insulin lispro (ADMELOG) corrective regimen sliding scale   SubCutaneous three times a day before meals  insulin lispro (ADMELOG) corrective regimen sliding scale   SubCutaneous at bedtime  lactobacillus acidophilus 1 Tablet(s) Oral two times a day with meals  losartan 25 milliGRAM(s) Oral daily  metoprolol tartrate 50 milliGRAM(s) Oral two times a day  multivitamin 1 Tablet(s) Oral daily  pantoprazole   Suspension 40 milliGRAM(s) Oral daily  piperacillin/tazobactam IVPB.. 3.375 Gram(s) IV Intermittent every 8 hours  senna 2 Tablet(s) Oral at bedtime  simvastatin 40 milliGRAM(s) Oral at bedtime  tamsulosin 0.4 milliGRAM(s) Oral at bedtime    MEDICATIONS  (PRN):  acetaminophen     Tablet .. 650 milliGRAM(s) Oral every 6 hours PRN Temp greater or equal to 38C (100.4F), Mild Pain (1 - 3)  aluminum hydroxide/magnesium hydroxide/simethicone Suspension 30 milliLiter(s) Oral every 4 hours PRN Dyspepsia  bisacodyl Suppository 10 milliGRAM(s) Rectal daily PRN Constipation  dextrose Oral Gel 15 Gram(s) Oral once PRN Blood Glucose LESS THAN 70 milliGRAM(s)/deciliter  hydrALAZINE 50 milliGRAM(s) Oral every 6 hours PRN for systolic BP>160  magnesium hydroxide Suspension 30 milliLiter(s) Oral daily PRN Constipation  melatonin 3 milliGRAM(s) Oral at bedtime PRN Insomnia  ondansetron Injectable 4 milliGRAM(s) IV Push every 8 hours PRN Nausea and/or Vomiting  sodium chloride 0.9% lock flush 10 milliLiter(s) IV Push every 1 hour PRN Pre/post blood products, medications, blood draw, and to maintain line patency      OBJECTIVE    T(C): 36.7 (04-10-23 @ 16:23), Max: 36.7 (04-10-23 @ 12:07)  HR: 79 (04-10-23 @ 16:23) (65 - 93)  BP: 117/67 (04-10-23 @ 16:23) (79/49 - 145/83)  RR: 18 (04-10-23 @ 16:23) (16 - 20)  SpO2: 100% (04-10-23 @ 16:23) (93% - 100%)  Wt(kg): --  I&O's Summary    09 Apr 2023 07:01  -  10 Apr 2023 07:00  --------------------------------------------------------  IN: 1430.2 mL / OUT: 0 mL / NET: 1430.2 mL          REVIEW OF SYSTEMS:  Patient is  unable to provide any information/ROS  due to baseline mental status.   PHYSICAL EXAM:  Appearance: NAD. VS past 24 hrs -as above   HEENT:   Moist oral mucosa. Conjunctiva clear b/l.   Neck : supple  Respiratory: Lungs CTAB.  Gastrointestinal:  Soft, nontender. No rebound. No rigidity. BS present	  Cardiovascular: RRR ,S1S2 present  Neurologic: Non-focal. Moving all extremities.  Extremities: No edema. No erythema. No calf tenderness.  Skin: No rashes, No ecchymoses, No cyanosis.	  wounds ,skin lesions-See skin assesment flow sheet   LABS:                        9.4    7.13  )-----------( 300      ( 10 Apr 2023 05:10 )             30.1     04-10    139  |  111<H>  |  36<H>  ----------------------------<  230<H>  4.4   |  26  |  1.30    Ca    7.9<L>      10 Apr 2023 05:10  Phos  2.4     04-10  Mg     2.0     04-10    TPro  5.8<L>  /  Alb  2.0<L>  /  TBili  0.4  /  DBili  0.2  /  AST  14<L>  /  ALT  21  /  AlkPhos  60  04-10    CAPILLARY BLOOD GLUCOSE      POCT Blood Glucose.: 107 mg/dL (10 Apr 2023 16:23)  POCT Blood Glucose.: 156 mg/dL (10 Apr 2023 11:46)  POCT Blood Glucose.: 213 mg/dL (10 Apr 2023 07:28)  POCT Blood Glucose.: 195 mg/dL (09 Apr 2023 21:09)    PT/INR - ( 10 Apr 2023 05:10 )   PT: 15.8 sec;   INR: 1.35 ratio               Culture - Urine (collected 02 Apr 2023 09:15)  Source: Clean Catch Clean Catch (Midstream)  Final Report (04 Apr 2023 19:44):    >100,000 CFU/ml Enterococcus faecalis  Organism: Enterococcus faecalis (04 Apr 2023 19:44)  Organism: Enterococcus faecalis (04 Apr 2023 19:44)      RADIOLOGY & ADDITIONAL TESTS:   reviewed elctronically  ASSESSMENT/PLAN: 	  25 minutes aggregate time was spent on this visit, 50% visit time spent in care co-ordination with other attendings and counselling patient .I have discussed care plan with patient / HCP/family member ,who expressed understanding of problems treatment and their effect and side effects, alternatives in details. I have asked if they have any questions and concerns and appropriately addressed them to best of my ability.    PROGRESS NOTE  Patient is a 87y old  Male who presents with a chief complaint of left foot infection (10 Apr 2023 19:31)  Chart and available morning labs /imaging are reviewed electronically , urgent issues addressed . More information  is being added upon completion of rounds , when more information is collected and management discussed with consultants , medical staff and social service/case management on the floor     OVERNIGHT  No new issues reported by medical staff . All above noted Patient is resting in a bed comfortably .Confused ,poor mentation .No distress noted     HPI:  88 yo male ,[facility] resident with PMHx - COPD, DM, HTN, CAD, HLD, H/O TIA, dementia,GERD, BPH and depression sent ot ER for evaluation of left foot 1metatarsal wound ,suggestive of osteomyelitis .Patient was seen by ID consult and transfer to the hospital recommended for wound cx/bone biopsy /podiatry evaluation ,likely will require 4-6 weeks of iv abx . Patient was admitted to [facility] with b/l feet wounds and was followed by wound care team ,recently completed 7 days of doxycycline for foot wound cellulitis . (30 Mar 2023 05:21)    PAST MEDICAL & SURGICAL HISTORY:  ASHD (arteriosclerotic heart disease)      BPH without urinary obstruction      COPD, moderate      Stage 3 chronic kidney disease      Chronic GERD      HLD (hyperlipidemia)      MDD (major depressive disorder)      Obstructive and reflux uropathy      Cellulitis      HTN (hypertension)      Sepsis      Dementia      Moderate protein-calorie malnutrition      Brain TIA      History of RSV infection      DM type 2, not at goal      Pressure ulcer of unspecified heel, unspecified stage      Venous stasis ulcer without varicose veins      Multiple open wounds of foot          MEDICATIONS  (STANDING):  budesonide 160 MICROgram(s)/formoterol 4.5 MICROgram(s) Inhaler 2 Puff(s) Inhalation two times a day  dextrose 50% Injectable 25 Gram(s) IV Push once  dextrose 50% Injectable 12.5 Gram(s) IV Push once  dextrose 50% Injectable 25 Gram(s) IV Push once  donepezil 5 milliGRAM(s) Oral at bedtime  DULoxetine 60 milliGRAM(s) Oral daily  glucagon  Injectable 1 milliGRAM(s) IntraMuscular once  insulin glargine Injectable (LANTUS) 8 Unit(s) SubCutaneous at bedtime  insulin lispro (ADMELOG) corrective regimen sliding scale   SubCutaneous three times a day before meals  insulin lispro (ADMELOG) corrective regimen sliding scale   SubCutaneous at bedtime  lactobacillus acidophilus 1 Tablet(s) Oral two times a day with meals  losartan 25 milliGRAM(s) Oral daily  metoprolol tartrate 50 milliGRAM(s) Oral two times a day  multivitamin 1 Tablet(s) Oral daily  pantoprazole   Suspension 40 milliGRAM(s) Oral daily  piperacillin/tazobactam IVPB.. 3.375 Gram(s) IV Intermittent every 8 hours  senna 2 Tablet(s) Oral at bedtime  simvastatin 40 milliGRAM(s) Oral at bedtime  tamsulosin 0.4 milliGRAM(s) Oral at bedtime    MEDICATIONS  (PRN):  acetaminophen     Tablet .. 650 milliGRAM(s) Oral every 6 hours PRN Temp greater or equal to 38C (100.4F), Mild Pain (1 - 3)  aluminum hydroxide/magnesium hydroxide/simethicone Suspension 30 milliLiter(s) Oral every 4 hours PRN Dyspepsia  bisacodyl Suppository 10 milliGRAM(s) Rectal daily PRN Constipation  dextrose Oral Gel 15 Gram(s) Oral once PRN Blood Glucose LESS THAN 70 milliGRAM(s)/deciliter  hydrALAZINE 50 milliGRAM(s) Oral every 6 hours PRN for systolic BP>160  magnesium hydroxide Suspension 30 milliLiter(s) Oral daily PRN Constipation  melatonin 3 milliGRAM(s) Oral at bedtime PRN Insomnia  ondansetron Injectable 4 milliGRAM(s) IV Push every 8 hours PRN Nausea and/or Vomiting  sodium chloride 0.9% lock flush 10 milliLiter(s) IV Push every 1 hour PRN Pre/post blood products, medications, blood draw, and to maintain line patency      OBJECTIVE    T(C): 36.7 (04-10-23 @ 16:23), Max: 36.7 (04-10-23 @ 12:07)  HR: 79 (04-10-23 @ 16:23) (65 - 93)  BP: 117/67 (04-10-23 @ 16:23) (79/49 - 145/83)  RR: 18 (04-10-23 @ 16:23) (16 - 20)  SpO2: 100% (04-10-23 @ 16:23) (93% - 100%)  Wt(kg): --  I&O's Summary    09 Apr 2023 07:01  -  10 Apr 2023 07:00  --------------------------------------------------------  IN: 1430.2 mL / OUT: 0 mL / NET: 1430.2 mL          REVIEW OF SYSTEMS:  Patient is  unable to provide any information/ROS  due to baseline mental status.   PHYSICAL EXAM:  Appearance: NAD. VS past 24 hrs -as above   HEENT:   Moist oral mucosa. Conjunctiva clear b/l.   Neck : supple  Respiratory: Lungs CTAB.  Gastrointestinal:  Soft, nontender. No rebound. No rigidity. BS present	  Cardiovascular: RRR ,S1S2 present  Neurologic: Non-focal. Moving all extremities.  Extremities: No edema. No erythema. No calf tenderness.  Skin: No rashes, No ecchymoses, No cyanosis.	  wounds ,skin lesions-See skin assesment flow sheet   LABS:                        9.4    7.13  )-----------( 300      ( 10 Apr 2023 05:10 )             30.1     04-10    139  |  111<H>  |  36<H>  ----------------------------<  230<H>  4.4   |  26  |  1.30    Ca    7.9<L>      10 Apr 2023 05:10  Phos  2.4     04-10  Mg     2.0     04-10    TPro  5.8<L>  /  Alb  2.0<L>  /  TBili  0.4  /  DBili  0.2  /  AST  14<L>  /  ALT  21  /  AlkPhos  60  04-10    CAPILLARY BLOOD GLUCOSE      POCT Blood Glucose.: 107 mg/dL (10 Apr 2023 16:23)  POCT Blood Glucose.: 156 mg/dL (10 Apr 2023 11:46)  POCT Blood Glucose.: 213 mg/dL (10 Apr 2023 07:28)  POCT Blood Glucose.: 195 mg/dL (09 Apr 2023 21:09)    PT/INR - ( 10 Apr 2023 05:10 )   PT: 15.8 sec;   INR: 1.35 ratio               Culture - Urine (collected 02 Apr 2023 09:15)  Source: Clean Catch Clean Catch (Midstream)  Final Report (04 Apr 2023 19:44):    >100,000 CFU/ml Enterococcus faecalis  Organism: Enterococcus faecalis (04 Apr 2023 19:44)  Organism: Enterococcus faecalis (04 Apr 2023 19:44)      RADIOLOGY & ADDITIONAL TESTS:   reviewed elctronically  ASSESSMENT/PLAN: 	  25 minutes aggregate time was spent on this visit, 50% visit time spent in care co-ordination with other attendings and counselling patient .I have discussed care plan with patient / HCP/family member ,who expressed understanding of problems treatment and their effect and side effects, alternatives in details. I have asked if they have any questions and concerns and appropriately addressed them to best of my ability.    PROGRESS NOTE  Patient is a 87y old  Male who presents with a chief complaint of left foot infection (10 Apr 2023 19:31)  Chart and available morning labs /imaging are reviewed electronically , urgent issues addressed . More information  is being added upon completion of rounds , when more information is collected and management discussed with consultants , medical staff and social service/case management on the floor     OVERNIGHT  No new issues reported by medical staff . All above noted Patient is resting in a bed comfortably .Confused ,poor mentation .No distress noted     HPI:  86 yo male ,[facility] resident with PMHx - COPD, DM, HTN, CAD, HLD, H/O TIA, dementia,GERD, BPH and depression sent ot ER for evaluation of left foot 1metatarsal wound ,suggestive of osteomyelitis .Patient was seen by ID consult and transfer to the hospital recommended for wound cx/bone biopsy /podiatry evaluation ,likely will require 4-6 weeks of iv abx . Patient was admitted to [facility] with b/l feet wounds and was followed by wound care team ,recently completed 7 days of doxycycline for foot wound cellulitis . (30 Mar 2023 05:21)    PAST MEDICAL & SURGICAL HISTORY:  ASHD (arteriosclerotic heart disease)      BPH without urinary obstruction      COPD, moderate      Stage 3 chronic kidney disease      Chronic GERD      HLD (hyperlipidemia)      MDD (major depressive disorder)      Obstructive and reflux uropathy      Cellulitis      HTN (hypertension)      Sepsis      Dementia      Moderate protein-calorie malnutrition      Brain TIA      History of RSV infection      DM type 2, not at goal      Pressure ulcer of unspecified heel, unspecified stage      Venous stasis ulcer without varicose veins      Multiple open wounds of foot          MEDICATIONS  (STANDING):  budesonide 160 MICROgram(s)/formoterol 4.5 MICROgram(s) Inhaler 2 Puff(s) Inhalation two times a day  dextrose 50% Injectable 25 Gram(s) IV Push once  dextrose 50% Injectable 12.5 Gram(s) IV Push once  dextrose 50% Injectable 25 Gram(s) IV Push once  donepezil 5 milliGRAM(s) Oral at bedtime  DULoxetine 60 milliGRAM(s) Oral daily  glucagon  Injectable 1 milliGRAM(s) IntraMuscular once  insulin glargine Injectable (LANTUS) 8 Unit(s) SubCutaneous at bedtime  insulin lispro (ADMELOG) corrective regimen sliding scale   SubCutaneous three times a day before meals  insulin lispro (ADMELOG) corrective regimen sliding scale   SubCutaneous at bedtime  lactobacillus acidophilus 1 Tablet(s) Oral two times a day with meals  losartan 25 milliGRAM(s) Oral daily  metoprolol tartrate 50 milliGRAM(s) Oral two times a day  multivitamin 1 Tablet(s) Oral daily  pantoprazole   Suspension 40 milliGRAM(s) Oral daily  piperacillin/tazobactam IVPB.. 3.375 Gram(s) IV Intermittent every 8 hours  senna 2 Tablet(s) Oral at bedtime  simvastatin 40 milliGRAM(s) Oral at bedtime  tamsulosin 0.4 milliGRAM(s) Oral at bedtime    MEDICATIONS  (PRN):  acetaminophen     Tablet .. 650 milliGRAM(s) Oral every 6 hours PRN Temp greater or equal to 38C (100.4F), Mild Pain (1 - 3)  aluminum hydroxide/magnesium hydroxide/simethicone Suspension 30 milliLiter(s) Oral every 4 hours PRN Dyspepsia  bisacodyl Suppository 10 milliGRAM(s) Rectal daily PRN Constipation  dextrose Oral Gel 15 Gram(s) Oral once PRN Blood Glucose LESS THAN 70 milliGRAM(s)/deciliter  hydrALAZINE 50 milliGRAM(s) Oral every 6 hours PRN for systolic BP>160  magnesium hydroxide Suspension 30 milliLiter(s) Oral daily PRN Constipation  melatonin 3 milliGRAM(s) Oral at bedtime PRN Insomnia  ondansetron Injectable 4 milliGRAM(s) IV Push every 8 hours PRN Nausea and/or Vomiting  sodium chloride 0.9% lock flush 10 milliLiter(s) IV Push every 1 hour PRN Pre/post blood products, medications, blood draw, and to maintain line patency      OBJECTIVE    T(C): 36.7 (04-10-23 @ 16:23), Max: 36.7 (04-10-23 @ 12:07)  HR: 79 (04-10-23 @ 16:23) (65 - 93)  BP: 117/67 (04-10-23 @ 16:23) (79/49 - 145/83)  RR: 18 (04-10-23 @ 16:23) (16 - 20)  SpO2: 100% (04-10-23 @ 16:23) (93% - 100%)  Wt(kg): --  I&O's Summary    09 Apr 2023 07:01  -  10 Apr 2023 07:00  --------------------------------------------------------  IN: 1430.2 mL / OUT: 0 mL / NET: 1430.2 mL          REVIEW OF SYSTEMS:  Patient is  unable to provide any information/ROS  due to baseline mental status.   PHYSICAL EXAM:  Appearance: NAD. VS past 24 hrs -as above   HEENT:   Moist oral mucosa. Conjunctiva clear b/l.   Neck : supple  Respiratory: Lungs CTAB.  Gastrointestinal:  Soft, nontender. No rebound. No rigidity. BS present	  Cardiovascular: RRR ,S1S2 present  Neurologic: Non-focal. Moving all extremities.  Extremities: No edema. No erythema. No calf tenderness.  Skin: No rashes, No ecchymoses, No cyanosis.	  wounds ,skin lesions-See skin assesment flow sheet   LABS:                        9.4    7.13  )-----------( 300      ( 10 Apr 2023 05:10 )             30.1     04-10    139  |  111<H>  |  36<H>  ----------------------------<  230<H>  4.4   |  26  |  1.30    Ca    7.9<L>      10 Apr 2023 05:10  Phos  2.4     04-10  Mg     2.0     04-10    TPro  5.8<L>  /  Alb  2.0<L>  /  TBili  0.4  /  DBili  0.2  /  AST  14<L>  /  ALT  21  /  AlkPhos  60  04-10    CAPILLARY BLOOD GLUCOSE      POCT Blood Glucose.: 107 mg/dL (10 Apr 2023 16:23)  POCT Blood Glucose.: 156 mg/dL (10 Apr 2023 11:46)  POCT Blood Glucose.: 213 mg/dL (10 Apr 2023 07:28)  POCT Blood Glucose.: 195 mg/dL (09 Apr 2023 21:09)    PT/INR - ( 10 Apr 2023 05:10 )   PT: 15.8 sec;   INR: 1.35 ratio               Culture - Urine (collected 02 Apr 2023 09:15)  Source: Clean Catch Clean Catch (Midstream)  Final Report (04 Apr 2023 19:44):    >100,000 CFU/ml Enterococcus faecalis  Organism: Enterococcus faecalis (04 Apr 2023 19:44)  Organism: Enterococcus faecalis (04 Apr 2023 19:44)      RADIOLOGY & ADDITIONAL TESTS:   reviewed elctronically  ASSESSMENT/PLAN: 	  25 minutes aggregate time was spent on this visit, 50% visit time spent in care co-ordination with other attendings and counselling patient .I have discussed care plan with patient / HCP/family member ,who expressed understanding of problems treatment and their effect and side effects, alternatives in details. I have asked if they have any questions and concerns and appropriately addressed them to best of my ability.

## 2023-04-10 NOTE — BRIEF OPERATIVE NOTE - NSICDXBRIEFPREOP_GEN_ALL_CORE_FT
PRE-OP DIAGNOSIS:  Foot osteomyelitis, left 10-Apr-2023 14:03:24  Leyd Armando  Diabetic infection of left foot 10-Apr-2023 14:03:40  Ledy Armando   PRE-OP DIAGNOSIS:  Foot osteomyelitis, left 10-Apr-2023 14:03:24  Ledy Armando  Diabetic infection of left foot 10-Apr-2023 14:03:40  Ledy Armando

## 2023-04-10 NOTE — PROGRESS NOTE ADULT - NUTRITIONAL ASSESSMENT
MEDICATIONS  (STANDING):  budesonide 160 MICROgram(s)/formoterol 4.5 MICROgram(s) Inhaler 2 Puff(s) Inhalation two times a day  dextrose 50% Injectable 25 Gram(s) IV Push once  dextrose 50% Injectable 12.5 Gram(s) IV Push once  dextrose 50% Injectable 25 Gram(s) IV Push once  donepezil 5 milliGRAM(s) Oral at bedtime  DULoxetine 60 milliGRAM(s) Oral daily  glucagon  Injectable 1 milliGRAM(s) IntraMuscular once  insulin glargine Injectable (LANTUS) 8 Unit(s) SubCutaneous at bedtime  insulin lispro (ADMELOG) corrective regimen sliding scale   SubCutaneous three times a day before meals  insulin lispro (ADMELOG) corrective regimen sliding scale   SubCutaneous at bedtime  lactobacillus acidophilus 1 Tablet(s) Oral two times a day with meals  losartan 25 milliGRAM(s) Oral daily  metoprolol tartrate 50 milliGRAM(s) Oral two times a day  multivitamin 1 Tablet(s) Oral daily  pantoprazole   Suspension 40 milliGRAM(s) Oral daily  piperacillin/tazobactam IVPB.. 3.375 Gram(s) IV Intermittent every 8 hours  senna 2 Tablet(s) Oral at bedtime  simvastatin 40 milliGRAM(s) Oral at bedtime  tamsulosin 0.4 milliGRAM(s) Oral at bedtime

## 2023-04-10 NOTE — BRIEF OPERATIVE NOTE - NSICDXBRIEFPOSTOP_GEN_ALL_CORE_FT
POST-OP DIAGNOSIS:  Diabetic infection of left foot 10-Apr-2023 14:03:56  Ledy Armando  Foot osteomyelitis, left 10-Apr-2023 14:04:09  Ledy Armando

## 2023-04-10 NOTE — PROGRESS NOTE ADULT - SUBJECTIVE AND OBJECTIVE BOX
CAPILLARY BLOOD GLUCOSE      POCT Blood Glucose.: 213 mg/dL (10 Apr 2023 07:28)  POCT Blood Glucose.: 195 mg/dL (09 Apr 2023 21:09)  POCT Blood Glucose.: 123 mg/dL (09 Apr 2023 17:02)  POCT Blood Glucose.: 212 mg/dL (09 Apr 2023 11:35)      Vital Signs Last 24 Hrs  T(C): 36.6 (10 Apr 2023 05:33), Max: 36.6 (09 Apr 2023 11:50)  T(F): 97.8 (10 Apr 2023 05:33), Max: 97.9 (09 Apr 2023 11:50)  HR: 82 (10 Apr 2023 08:14) (77 - 98)  BP: 145/74 (10 Apr 2023 05:33) (115/61 - 145/74)  BP(mean): --  RR: 18 (10 Apr 2023 05:33) (18 - 18)  SpO2: 96% (10 Apr 2023 08:14) (93% - 97%)    Parameters below as of 10 Apr 2023 08:14  Patient On (Oxygen Delivery Method): room air        General: WN/WD NAD  Respiratory: CTA B/L  CV: RRR, S1S2, no murmurs, rubs or gallops  Abdominal: Soft, NT, ND +BS, Last BM  Extremities: No edema, + peripheral pulses     04-10    139  |  111<H>  |  36<H>  ----------------------------<  230<H>  4.4   |  26  |  1.30    Ca    7.9<L>      10 Apr 2023 05:10  Phos  2.4     04-10  Mg     2.0     04-10    TPro  5.8<L>  /  Alb  2.0<L>  /  TBili  0.4  /  DBili  0.2  /  AST  14<L>  /  ALT  21  /  AlkPhos  60  04-10      dextrose 50% Injectable 25 Gram(s) IV Push once  dextrose 50% Injectable 12.5 Gram(s) IV Push once  dextrose 50% Injectable 25 Gram(s) IV Push once  dextrose Oral Gel 15 Gram(s) Oral once PRN  glucagon  Injectable 1 milliGRAM(s) IntraMuscular once  insulin glargine Injectable (LANTUS) 8 Unit(s) SubCutaneous at bedtime  insulin lispro (ADMELOG) corrective regimen sliding scale   SubCutaneous three times a day before meals  insulin lispro (ADMELOG) corrective regimen sliding scale   SubCutaneous at bedtime  simvastatin 40 milliGRAM(s) Oral at bedtime

## 2023-04-10 NOTE — BRIEF OPERATIVE NOTE - NSICDXBRIEFPROCEDURE_GEN_ALL_CORE_FT
PROCEDURES:  Resection of head of first metatarsal bone 10-Apr-2023 14:02:48  Ledy Armando  Sesamoidectomy of toe 10-Apr-2023 14:03:04  Ledy Armando

## 2023-04-10 NOTE — PROGRESS NOTE ADULT - ASSESSMENT
86 yo male ,[facility] resident with PMHx - COPD, DM, HTN, CAD, HLD, H/O TIA, dementia,GERD, BPH and depression sent ot ER for evaluation of left foot 1metatarsal wound ,suggestive of osteomyelitis .Patient was seen by ID consult and transfer to the hospital recommended for wound cx/bone biopsy /podiatry evaluation ,likely will require 4-6 weeks of iv abx . Patient was admitted to [facility] with b/l feet wounds and was followed by wound care team ,recently completed 7 days of doxycycline for foot wound cellulitis . 86 yo male ,[facility] resident with PMHx - COPD, DM, HTN, CAD, HLD, H/O TIA, dementia,GERD, BPH and depression sent ot ER for evaluation of left foot 1metatarsal wound ,suggestive of osteomyelitis .Patient was seen by ID consult and transfer to the hospital recommended for wound cx/bone biopsy /podiatry evaluation ,likely will require 4-6 weeks of iv abx . Patient was admitted to [facility] with b/l feet wounds and was followed by wound care team ,recently completed 7 days of doxycycline for foot wound cellulitis . 88 yo male ,[facility] resident with PMHx - COPD, DM, HTN, CAD, HLD, H/O TIA, dementia,GERD, BPH and depression sent ot ER for evaluation of left foot 1metatarsal wound ,suggestive of osteomyelitis .Patient was seen by ID consult and transfer to the hospital recommended for wound cx/bone biopsy /podiatry evaluation ,likely will require 4-6 weeks of iv abx . Patient was admitted to [facility] with b/l feet wounds and was followed by wound care team ,recently completed 7 days of doxycycline for foot wound cellulitis .

## 2023-04-10 NOTE — CHART NOTE - NSCHARTNOTEFT_GEN_A_CORE
Patient has left foot wound with exposed bone to the first metatarsal. Patient has been on IV antibiotics, still has purulent drainage with erythema on the left foot. Patient will need surgical intervention as discussed earlier and will be considered emergent since there is underlying osteomyelitis with worsening infection. Patient is high risk for more proximal amputation, loss of limb, sepsis and loss of life.  Patient's health proxy is aware of the procedure and will be available over the phone for the consent at the scheduled surgical time.

## 2023-04-10 NOTE — PROGRESS NOTE ADULT - ASSESSMENT
REVIEW OF SYMPTOMS      Able to give (reliable) ROS  NO     PHYSICAL EXAM    HEENT Unremarkable  atraumatic   RESP Fair air entry EXP prolonged    Harsh breath sound Resp distres mild   CARDIAC S1 S2 No S3     NO JVD    ABDOMEN SOFT BS PRESENT NOT DISTENDED No hepatosplenomegaly   PEDAL EDEMA present No calf tenderness  NO rash       GENERAL DATA .   [facility].     .. 3/30/2023 full code  ALLGY.     .. nka                    WT.   .. 3/30/2023 63  BMI.        .. 3/30/2023 21            ICU STAY.   .. none  COVID.   .. 4/4/2023 scv2 (+)  .. 3/29/2023 scv2 (-)     BEST PRACTICE ISSUES.    HOB ELEVATN.   .. Yes  DVT PPLX.   .. 3/31 lvnx 60.2 Dr Lakhani (asaf fib)   ..  3/29- 3/31 [facility]   MILLER PPLX.   .. 3/30/2023 protonix 40    INFN PPLX. ..    SP SW LAURENT.   ..  3/30/2023 -> soft bite mild thick        DIET.    ..  3/30/2023 dash  FREE WATER  .. 4/1/2023 fw 250.4    IV fl.  .. 4/6/2023 d5 1/2 40   PROCEDURE  .. 4/7/2023 r [facility] picc     GENERAL DATA .   [facility].     .. 3/30/2023 full code  ALLGY.     .. nka                    WT.   .. 3/30/2023 63  BMI.        .. 3/30/2023 21            ICU STAY.   .. none  COVID.   .. 4/4/2023 scv2 (+)  .. 3/29/2023 scv2 (-)     BEST PRACTICE ISSUES.    HOB ELEVATN.   .. Yes  DVT PPLX.   .. 3/31 lvnx 60.2 Dr Lakhani (asaf fib)   ..  3/29- 3/31 [facility]   MILLER PPLX.   .. 3/30/2023 protonix 40    INFN PPLX. ..    SP SW LAURENT.   ..  3/30/2023 -> soft bite mild thick        DIET.    ..  3/30/2023 dash  FREE WATER  .. 4/1/2023 fw 250.4    IV fl.  .. 4/6/2023 d5 1/2 40   PROCEDURE  .. 4/7/2023 r brach picc     PROBLEM/ASSESSMENT/PLAN.  COVID   .. 4/4/2023 scv2 (+)  .. 4/4/2023 oxygenation ok  .. 4/4/2023 pt has mild disease   .. 4/4/2023 rdsv 3 d course started by Dr Mancia   Infection  Osteomyelitius  Possible pneumonia   .. Esr 3/29-3/31/2023 esr 67- 49   .. W 3/29-3/30-3/31-4/1-4/4-4/5-4/10/2023       w 11.8 - 12- 10.5- 11.9 -  9 - 7.7 - 7.1   .. cxr 3/30/2023  ........ increasing r lower perihilar infiltrate   .. xr foot left 3/30/2023  ........ stable eroisions around 1st mtp anmd great toe    ........ which could be bone infectn    .. CXR 3/29/2023 Possible hansa pneum  .. w scann 4/4 l foot susp for osteom r heel osteomyelitis   .. bc 3/29/2023 bc (-)   .. uc 4/2/2023 100K eneterococcs fecal  .. 3/29-4/5 zosyn    .. 4/7/2023 zosyn 42 d   .. follow cultures  PLEURAL EFFUSION.  .. ct ch 3/30/2023   ........ mild pulm edema  ........ mod r and sml effsn   a/r  .. pl effsn likely sec to chf   COPD  .. 3/30/2023 duoneb.3    .. 3/30/2023 symbicort   hemodynamics  .. La 3/29/2023 la 1.8   .. target map 65 (+)   CAD.  .. 3/30- 3/31/2023 atenolol 25  .. 3/31-4/4      metoprolol 25.2 - metoprolol 25.3   .. 3/30/2023 simvastat 40   RO DVT  .. 4/1/2023 v duplx (-)  CHF.  .. echo 3/30/2023  ........ ef 40%   ........ mod mr   .. bnp 4/2 bnp [id number]   .. 3/30-4/4/2023      losartan 50 - losartan 25   .. 3/30/2023 hydralazin 50.4p   Anemia  .. Hb 3/29-3/30-3/31-4/1-4/4-4/5-4/10/2023      Hb 11.2- 10.2 - 9.8 - 11 - 9.4 - 11.5- 9.4   .. monitor  Hypernatremia.  .. Na 3/31-4/1-4/2-4/3-4/4 Na 147 - 151- 152 - 148- 144    CKD  .. Na 3/29-3/31/2023 Na 144-147   .. Cr 3/29-3/30-3/31-4/1-4/3-4/4-4/10/2023      Cr 1.4 - 1.3 - 1.3 - 1.5 - 1.5 - 1.3- 1.3   .. monitor  OBS  .. 3/30/2023 donepezil     OVERALL .  86 yo M with PMHx HTN, HLD, T2D, dementia (AOx2-3), OA, BPH, CAD, TIA, CKD 3 and GERD HO recent hospital stay 1/24-1/30/2023 [facility] RSV  COPD ex  now admitted with osteomyelitis possible pneum  Pulm consulted 3/29/2023    .. 4/4/2023 pt tested covid (+)  started rdsv Dr Mancia     PROBLEMS  COVID 4/4/2023  .. 4/4/2023 rdsv 3 d  E FECA UTI   .. uc 4/2/2023 100K eneterococcs fecal  Possible Osteomyelitis  .. w scann 4/4 l foot susp for osteom r heel osteomyelitis   Pneumonia   .. 3/29-4/5 zosyn   .. 4/7 zosyn x 42 d    COPD   CKD   PLEURAL EFFSN   .. 3/30 ct  HFREF   .. MOD MR 3/30 echo    A fib  ..  4/1 lvnx 60.2       TIME SPENT   Over 25 minutes aggregate care time spent on encounter; activities included   direct patient care, counseling and/or coordinating care reviewing notes, lab data/ imaging , discussion with multidisciplinary team/ patient  /family and explaining in detail risks, benefits, alternatives  of the recommendations     Paulino Parmar 87 m     REVIEW OF SYMPTOMS      Able to give (reliable) ROS  NO     PHYSICAL EXAM    HEENT Unremarkable  atraumatic   RESP Fair air entry EXP prolonged    Harsh breath sound Resp distres mild   CARDIAC S1 S2 No S3     NO JVD    ABDOMEN SOFT BS PRESENT NOT DISTENDED No hepatosplenomegaly   PEDAL EDEMA present No calf tenderness  NO rash       GENERAL DATA .   [facility].     .. 3/30/2023 full code  ALLGY.     .. nka                    WT.   .. 3/30/2023 63  BMI.        .. 3/30/2023 21            ICU STAY.   .. none  COVID.   .. 4/4/2023 scv2 (+)  .. 3/29/2023 scv2 (-)     BEST PRACTICE ISSUES.    HOB ELEVATN.   .. Yes  DVT PPLX.   .. 3/31 lvnx 60.2 Dr Lakhani (asaf fib)   ..  3/29- 3/31 [facility]   MILLER PPLX.   .. 3/30/2023 protonix 40    INFN PPLX. ..    SP SW LAURENT.   ..  3/30/2023 -> soft bite mild thick        DIET.    ..  3/30/2023 dash  FREE WATER  .. 4/1/2023 fw 250.4    IV fl.  .. 4/6/2023 d5 1/2 40   PROCEDURE  .. 4/7/2023 r [facility] picc     GENERAL DATA .   [facility].     .. 3/30/2023 full code  ALLGY.     .. nka                    WT.   .. 3/30/2023 63  BMI.        .. 3/30/2023 21            ICU STAY.   .. none  COVID.   .. 4/4/2023 scv2 (+)  .. 3/29/2023 scv2 (-)     BEST PRACTICE ISSUES.    HOB ELEVATN.   .. Yes  DVT PPLX.   .. 3/31 lvnx 60.2 Dr Lakhani (asaf fib)   ..  3/29- 3/31 [facility]   MILLER PPLX.   .. 3/30/2023 protonix 40    INFN PPLX. ..    SP SW LAURENT.   ..  3/30/2023 -> soft bite mild thick        DIET.    ..  3/30/2023 dash  FREE WATER  .. 4/1/2023 fw 250.4    IV fl.  .. 4/6/2023 d5 1/2 40   PROCEDURE  .. 4/7/2023 r [facility]ach picc     PROBLEM/ASSESSMENT/PLAN.  COVID   .. 4/4/2023 scv2 (+)  .. 4/4/2023 oxygenation ok  .. 4/4/2023 pt has mild disease   .. 4/4/2023 rdsv 3 d course started by Dr Mancia   Infection  Osteomyelitius  Possible pneumonia   .. Esr 3/29-3/31/2023 esr 67- 49   .. W 3/29-3/30-3/31-4/1-4/4-4/5-4/10/2023       w 11.8 - 12- 10.5- 11.9 -  9 - 7.7 - 7.1   .. cxr 3/30/2023  ........ increasing r lower perihilar infiltrate   .. xr foot left 3/30/2023  ........ stable eroisions around 1st mtp anmd great toe    ........ which could be bone infectn    .. CXR 3/29/2023 Possible hansa pneum  .. w scann 4/4 l foot susp for osteom r heel osteomyelitis   .. bc 3/29/2023 bc (-)   .. uc 4/2/2023 100K eneterococcs fecal  .. 3/29-4/5 zosyn    .. 4/7/2023 zosyn 42 d   .. follow cultures  PLEURAL EFFUSION.  .. ct ch 3/30/2023   ........ mild pulm edema  ........ mod r and sml effsn   a/r  .. pl effsn likely sec to chf   COPD  .. 3/30/2023 duoneb.3    .. 3/30/2023 symbicort   hemodynamics  .. La 3/29/2023 la 1.8   .. target map 65 (+)   CAD.  .. 3/30- 3/31/2023 atenolol 25  .. 3/31-4/4      metoprolol 25.2 - metoprolol 25.3   .. 3/30/2023 simvastat 40   RO DVT  .. 4/1/2023 v duplx (-)  CHF.  .. echo 3/30/2023  ........ ef 40%   ........ mod mr   .. bnp 4/2 bnp [id number]   .. 3/30-4/4/2023      losartan 50 - losartan 25   .. 3/30/2023 hydralazin 50.4p   Anemia  .. Hb 3/29-3/30-3/31-4/1-4/4-4/5-4/10/2023      Hb 11.2- 10.2 - 9.8 - 11 - 9.4 - 11.5- 9.4   .. monitor  Hypernatremia.  .. Na 3/31-4/1-4/2-4/3-4/4 Na 147 - 151- 152 - 148- 144    CKD  .. Na 3/29-3/31/2023 Na 144-147   .. Cr 3/29-3/30-3/31-4/1-4/3-4/4-4/10/2023      Cr 1.4 - 1.3 - 1.3 - 1.5 - 1.5 - 1.3- 1.3   .. monitor  OBS  .. 3/30/2023 donepezil     OVERALL .  88 yo M with PMHx HTN, HLD, T2D, dementia (AOx2-3), OA, BPH, CAD, TIA, CKD 3 and GERD HO recent hospital stay 1/24-1/30/2023 [facility] RSV  COPD ex  now admitted with osteomyelitis possible pneum  Pulm consulted 3/29/2023    .. 4/4/2023 pt tested covid (+)  started rdsv Dr Mancia     PROBLEMS  COVID 4/4/2023  .. 4/4/2023 rdsv 3 d  E FECA UTI   .. uc 4/2/2023 100K eneterococcs fecal  Possible Osteomyelitis  .. w scann 4/4 l foot susp for osteom r heel osteomyelitis   Pneumonia   .. 3/29-4/5 zosyn   .. 4/7 zosyn x 42 d    COPD   CKD   PLEURAL EFFSN   .. 3/30 ct  HFREF   .. MOD MR 3/30 echo    A fib  ..  4/1 lvnx 60.2       TIME SPENT   Over 25 minutes aggregate care time spent on encounter; activities included   direct patient care, counseling and/or coordinating care reviewing notes, lab data/ imaging , discussion with multidisciplinary team/ patient  /family and explaining in detail risks, benefits, alternatives  of the recommendations     Paulino Parmar 87 m     REVIEW OF SYMPTOMS      Able to give (reliable) ROS  NO     PHYSICAL EXAM    HEENT Unremarkable  atraumatic   RESP Fair air entry EXP prolonged    Harsh breath sound Resp distres mild   CARDIAC S1 S2 No S3     NO JVD    ABDOMEN SOFT BS PRESENT NOT DISTENDED No hepatosplenomegaly   PEDAL EDEMA present No calf tenderness  NO rash       GENERAL DATA .   [facility].     .. 3/30/2023 full code  ALLGY.     .. nka                    WT.   .. 3/30/2023 63  BMI.        .. 3/30/2023 21            ICU STAY.   .. none  COVID.   .. 4/4/2023 scv2 (+)  .. 3/29/2023 scv2 (-)     BEST PRACTICE ISSUES.    HOB ELEVATN.   .. Yes  DVT PPLX.   .. 3/31 lvnx 60.2 Dr Lakhani (asaf fib)   ..  3/29- 3/31 [facility]   MILLER PPLX.   .. 3/30/2023 protonix 40    INFN PPLX. ..    SP SW LAURENT.   ..  3/30/2023 -> soft bite mild thick        DIET.    ..  3/30/2023 dash  FREE WATER  .. 4/1/2023 fw 250.4    IV fl.  .. 4/6/2023 d5 1/2 40   PROCEDURE  .. 4/7/2023 r [facility] picc     GENERAL DATA .   [facility].     .. 3/30/2023 full code  ALLGY.     .. nka                    WT.   .. 3/30/2023 63  BMI.        .. 3/30/2023 21            ICU STAY.   .. none  COVID.   .. 4/4/2023 scv2 (+)  .. 3/29/2023 scv2 (-)     BEST PRACTICE ISSUES.    HOB ELEVATN.   .. Yes  DVT PPLX.   .. 3/31 lvnx 60.2 Dr Lakhani (asaf fib)   ..  3/29- 3/31 [facility]   MILLER PPLX.   .. 3/30/2023 protonix 40    INFN PPLX. ..    SP SW LAURENT.   ..  3/30/2023 -> soft bite mild thick        DIET.    ..  3/30/2023 dash  FREE WATER  .. 4/1/2023 fw 250.4    IV fl.  .. 4/6/2023 d5 1/2 40   PROCEDURE  .. 4/7/2023 r brach picc     PROBLEM/ASSESSMENT/PLAN.  COVID   .. 4/4/2023 scv2 (+)  .. 4/4/2023 oxygenation ok  .. 4/4/2023 pt has mild disease   .. 4/4/2023 rdsv 3 d course started by Dr Mancia   Infection  Osteomyelitius  Possible pneumonia   .. Esr 3/29-3/31/2023 esr 67- 49   .. W 3/29-3/30-3/31-4/1-4/4-4/5-4/10/2023       w 11.8 - 12- 10.5- 11.9 -  9 - 7.7 - 7.1   .. cxr 3/30/2023  ........ increasing r lower perihilar infiltrate   .. xr foot left 3/30/2023  ........ stable eroisions around 1st mtp anmd great toe    ........ which could be bone infectn    .. CXR 3/29/2023 Possible hansa pneum  .. w scann 4/4 l foot susp for osteom r heel osteomyelitis   .. bc 3/29/2023 bc (-)   .. uc 4/2/2023 100K eneterococcs fecal  .. 3/29-4/5 zosyn    .. 4/7/2023 zosyn 42 d   .. follow cultures  PLEURAL EFFUSION.  .. ct ch 3/30/2023   ........ mild pulm edema  ........ mod r and sml effsn   a/r  .. pl effsn likely sec to chf   COPD  .. 3/30/2023 duoneb.3    .. 3/30/2023 symbicort   hemodynamics  .. La 3/29/2023 la 1.8   .. target map 65 (+)   CAD.  .. 3/30- 3/31/2023 atenolol 25  .. 3/31-4/4      metoprolol 25.2 - metoprolol 25.3   .. 3/30/2023 simvastat 40   RO DVT  .. 4/1/2023 v duplx (-)  CHF.  .. echo 3/30/2023  ........ ef 40%   ........ mod mr   .. bnp 4/2 bnp [id number]   .. 3/30-4/4/2023      losartan 50 - losartan 25   .. 3/30/2023 hydralazin 50.4p   Anemia  .. Hb 3/29-3/30-3/31-4/1-4/4-4/5-4/10/2023      Hb 11.2- 10.2 - 9.8 - 11 - 9.4 - 11.5- 9.4   .. monitor  Hypernatremia.  .. Na 3/31-4/1-4/2-4/3-4/4 Na 147 - 151- 152 - 148- 144    CKD  .. Na 3/29-3/31/2023 Na 144-147   .. Cr 3/29-3/30-3/31-4/1-4/3-4/4-4/10/2023      Cr 1.4 - 1.3 - 1.3 - 1.5 - 1.5 - 1.3- 1.3   .. monitor  OBS  .. 3/30/2023 donepezil     OVERALL .  88 yo M with PMHx HTN, HLD, T2D, dementia (AOx2-3), OA, BPH, CAD, TIA, CKD 3 and GERD HO recent hospital stay 1/24-1/30/2023 [facility] RSV  COPD ex  now admitted with osteomyelitis possible pneum  Pulm consulted 3/29/2023    .. 4/4/2023 pt tested covid (+)  started rdsv Dr Mancia     PROBLEMS  COVID 4/4/2023  .. 4/4/2023 rdsv 3 d  E FECA UTI   .. uc 4/2/2023 100K eneterococcs fecal  Possible Osteomyelitis  .. w scann 4/4 l foot susp for osteom r heel osteomyelitis   Pneumonia   .. 3/29-4/5 zosyn   .. 4/7 zosyn x 42 d    COPD   CKD   PLEURAL EFFSN   .. 3/30 ct  HFREF   .. MOD MR 3/30 echo    A fib  ..  4/1 lvnx 60.2       TIME SPENT   Over 25 minutes aggregate care time spent on encounter; activities included   direct patient care, counseling and/or coordinating care reviewing notes, lab data/ imaging , discussion with multidisciplinary team/ patient  /family and explaining in detail risks, benefits, alternatives  of the recommendations     Paulino Parmar 87 m

## 2023-04-10 NOTE — PROGRESS NOTE ADULT - ASSESSMENT
88 yo male ,[facility] resident with PMHx - COPD, DM, HTN, CAD, HLD, H/O TIA, dementia,GERD, BPH and depression sent ot ER for evaluation of left foot 1metatarsal wound ,suggestive of osteomyelitis .Patient was seen by ID consult and transfer to the hospital recommended for wound cx/bone biopsy /podiatry evaluation ,likely will require 4-6 weeks of iv abx . Patient was admitted to [facility] with b/l feet wounds and was followed by wound care team ,recently completed 7 days of doxycycline for foot wound cellulitis . (30 Mar 2023 05:21)      hypernatremia   improved      hypokalemia potassium chloride  10 mEq/100 mL IVPB 10 milliEquivalent(s) IV Intermittent every 1 hour  prn     ACUTE RENAL FAILURE: sodium chloride 0.45%. 1000 milliLiter(s) (50 mL/Hr) IV Continuous  Serum creatinine is improving    There is no progression . No uremic symptoms  No evidence of anemia .  Fluid status stable.  Will continue to avoid nephrotoxic drugs.  Patient remains asymptomatic.   Continue current therapy.  hold  diuretic.        BP monitoring,continue current antihypertensive meds, low salt diet,followup with PMD in 1-2 weeks  losartan 25 milliGRAM(s) Oral daily    f/u  blood and urine cx,serial lactate levels,monitor vitals clement saenz hydration,monitor urine output and renal profile,iv abx   piperacillin/tazobactam IVPB.. 3.375 Gram(s) IV Intermittent every 8 hours 86 yo male ,[facility] resident with PMHx - COPD, DM, HTN, CAD, HLD, H/O TIA, dementia,GERD, BPH and depression sent ot ER for evaluation of left foot 1metatarsal wound ,suggestive of osteomyelitis .Patient was seen by ID consult and transfer to the hospital recommended for wound cx/bone biopsy /podiatry evaluation ,likely will require 4-6 weeks of iv abx . Patient was admitted to [facility] with b/l feet wounds and was followed by wound care team ,recently completed 7 days of doxycycline for foot wound cellulitis . (30 Mar 2023 05:21)      hypernatremia   improved      hypokalemia potassium chloride  10 mEq/100 mL IVPB 10 milliEquivalent(s) IV Intermittent every 1 hour  prn     ACUTE RENAL FAILURE: sodium chloride 0.45%. 1000 milliLiter(s) (50 mL/Hr) IV Continuous  Serum creatinine is improving    There is no progression . No uremic symptoms  No evidence of anemia .  Fluid status stable.  Will continue to avoid nephrotoxic drugs.  Patient remains asymptomatic.   Continue current therapy.  hold  diuretic.        BP monitoring,continue current antihypertensive meds, low salt diet,followup with PMD in 1-2 weeks  losartan 25 milliGRAM(s) Oral daily    f/u  blood and urine cx,serial lactate levels,monitor vitals clement saenz hydration,monitor urine output and renal profile,iv abx   piperacillin/tazobactam IVPB.. 3.375 Gram(s) IV Intermittent every 8 hours 86 yo male ,[facility] resident with PMHx - COPD, DM, HTN, CAD, HLD, H/O TIA, dementia,GERD, BPH and depression sent ot ER for evaluation of left foot 1metatarsal wound ,suggestive of osteomyelitis .Patient was seen by ID consult and transfer to the hospital recommended for wound cx/bone biopsy /podiatry evaluation ,likely will require 4-6 weeks of iv abx . Patient was admitted to [facility] with b/l feet wounds and was followed by wound care team ,recently completed 7 days of doxycycline for foot wound cellulitis . (30 Mar 2023 05:21)      hypernatremia   improved      hypokalemia potassium chloride  10 mEq/100 mL IVPB 10 milliEquivalent(s) IV Intermittent every 1 hour  prn     ACUTE RENAL FAILURE: sodium chloride 0.45%. 1000 milliLiter(s) (50 mL/Hr) IV Continuous  Serum creatinine is improving    There is no progression . No uremic symptoms  No evidence of anemia .  Fluid status stable.  Will continue to avoid nephrotoxic drugs.  Patient remains asymptomatic.   Continue current therapy.  hold  diuretic.        BP monitoring,continue current antihypertensive meds, low salt diet,followup with PMD in 1-2 weeks  losartan 25 milliGRAM(s) Oral daily    f/u  blood and urine cx,serial lactate levels,monitor vitals clement saenz hydration,monitor urine output and renal profile,iv abx   piperacillin/tazobactam IVPB.. 3.375 Gram(s) IV Intermittent every 8 hours

## 2023-04-10 NOTE — PROGRESS NOTE ADULT - NUTRITIONAL ASSESSMENT
This patient has been assessed with a concern for Malnutrition and has been determined to have a diagnosis/diagnoses of Moderate protein-calorie malnutrition.    This patient is being managed with:   Diet Soft and Bite Sized-  Consistent Carbohydrate {Evening Snack}  Low Sodium  Reginald(7 Gm Arginine/7 Gm Glut/1.2 Gm HMB     Qty per Day:  BID  Supplement Feeding Modality:  Oral  Glucerna Shake Cans or Servings Per Day:  1       Frequency:  Two Times a day  Entered: Apr 10 2023  1:22PM

## 2023-04-10 NOTE — PROGRESS NOTE ADULT - SUBJECTIVE AND OBJECTIVE BOX
[facility] [place]       HPI:  86 yo male ,[facility] resident with PMHx - COPD, DM, HTN, CAD, HLD, H/O TIA, dementia,GERD, BPH and depression sent ot ER for evaluation of left foot 1metatarsal wound ,suggestive of osteomyelitis .Patient was seen by ID consult and transfer to the hospital recommended for wound cx/bone biopsy /podiatry evaluation ,likely will require 4-6 weeks of iv abx . Patient was admitted to [facility] with b/l feet wounds and was followed by wound care team ,recently completed 7 days of doxycycline for foot wound cellulitis . (30 Mar 2023 05:21)        SUBJECTIVE:      ALLERGIES:  Allergies    No Known Allergies    Intolerances          MEDICATIONS  (STANDING):  budesonide 160 MICROgram(s)/formoterol 4.5 MICROgram(s) Inhaler 2 Puff(s) Inhalation two times a day  dextrose 50% Injectable 25 Gram(s) IV Push once  dextrose 50% Injectable 12.5 Gram(s) IV Push once  dextrose 50% Injectable 25 Gram(s) IV Push once  donepezil 5 milliGRAM(s) Oral at bedtime  DULoxetine 60 milliGRAM(s) Oral daily  glucagon  Injectable 1 milliGRAM(s) IntraMuscular once  insulin glargine Injectable (LANTUS) 8 Unit(s) SubCutaneous at bedtime  insulin lispro (ADMELOG) corrective regimen sliding scale   SubCutaneous three times a day before meals  insulin lispro (ADMELOG) corrective regimen sliding scale   SubCutaneous at bedtime  lactobacillus acidophilus 1 Tablet(s) Oral two times a day with meals  losartan 25 milliGRAM(s) Oral daily  metoprolol tartrate 50 milliGRAM(s) Oral two times a day  multivitamin 1 Tablet(s) Oral daily  pantoprazole   Suspension 40 milliGRAM(s) Oral daily  piperacillin/tazobactam IVPB.. 3.375 Gram(s) IV Intermittent every 8 hours  senna 2 Tablet(s) Oral at bedtime  simvastatin 40 milliGRAM(s) Oral at bedtime  tamsulosin 0.4 milliGRAM(s) Oral at bedtime    MEDICATIONS  (PRN):  acetaminophen     Tablet .. 650 milliGRAM(s) Oral every 6 hours PRN Temp greater or equal to 38C (100.4F), Mild Pain (1 - 3)  aluminum hydroxide/magnesium hydroxide/simethicone Suspension 30 milliLiter(s) Oral every 4 hours PRN Dyspepsia  bisacodyl Suppository 10 milliGRAM(s) Rectal daily PRN Constipation  dextrose Oral Gel 15 Gram(s) Oral once PRN Blood Glucose LESS THAN 70 milliGRAM(s)/deciliter  hydrALAZINE 50 milliGRAM(s) Oral every 6 hours PRN for systolic BP>160  magnesium hydroxide Suspension 30 milliLiter(s) Oral daily PRN Constipation  melatonin 3 milliGRAM(s) Oral at bedtime PRN Insomnia  ondansetron Injectable 4 milliGRAM(s) IV Push every 8 hours PRN Nausea and/or Vomiting  sodium chloride 0.9% lock flush 10 milliLiter(s) IV Push every 1 hour PRN Pre/post blood products, medications, blood draw, and to maintain line patency      REVIEW OF SYSTEMS:  CONSTITUTIONAL: No fever,  RESPIRATORY: No cough, wheezing, shortness of breath  CARDIOVASCULAR: No chest pain, dyspnea, palpitations, dizziness, syncope, paroxysmal nocturnal dyspnea, orthopnea, or arm or leg swelling  GASTROINTESTINAL: No abdominal  or epigastric pain, nausea, vomiting,  diarrhea  NEUROLOGICAL: No headaches,  loss of strength, numbness, or tremors    Vital Signs Last 24 Hrs  T(C): 36.4 (10 Apr 2023 20:37), Max: 36.7 (10 Apr 2023 12:07)  T(F): 97.6 (10 Apr 2023 20:37), Max: 98 (10 Apr 2023 12:07)  HR: 77 (10 Apr 2023 20:37) (65 - 88)  BP: 128/71 (10 Apr 2023 20:37) (79/49 - 145/83)  BP(mean): --  RR: 18 (10 Apr 2023 20:37) (16 - 20)  SpO2: 97% (10 Apr 2023 20:37) (93% - 100%)    Parameters below as of 10 Apr 2023 20:37  Patient On (Oxygen Delivery Method): nasal cannula  O2 Flow (L/min): 2      PHYSICAL EXAM:  HEAD:  Atraumatic, Normocephalic  NECK: Supple and normal thyroid.  No JVD or carotid bruit.   HEART: S1, S2 regular , 1/6 soft ejection systolic murmur in mitral area , no thrill and no gallops .  PULMONARY: Bilateral vesicular breathing , few scattered ronchi and few scattered rales are present .  ABDOMEN: Soft nontender and positive bowl sounds   EXTREMITIES:  No clubbing, cyanosis, or pedal  edema  NEUROLOGICAL: AAOX3 , no focal deficit .    LABS:                        9.4    7.13  )-----------( 300      ( 10 Apr 2023 05:10 )             30.1     04-10    139  |  111<H>  |  36<H>  ----------------------------<  230<H>  4.4   |  26  |  1.30    Ca    7.9<L>      10 Apr 2023 05:10  Phos  2.4     04-10  Mg     2.0     04-10    TPro  5.8<L>  /  Alb  2.0<L>  /  TBili  0.4  /  DBili  0.2  /  AST  14<L>  /  ALT  21  /  AlkPhos  60  04-10        PT/INR - ( 10 Apr 2023 05:10 )   PT: 15.8 sec;   INR: 1.35 ratio             BNP      EKG:  ECHO:  IMAGING:    Assessment/Plan    Will continue to follow during hospital course and give further recommendations as needed . Thanks for your referral . if any questions please contact me at office ([phone])cell [phone])  [facility] [place]       HPI:  88 yo male ,[facility] resident with PMHx - COPD, DM, HTN, CAD, HLD, H/O TIA, dementia,GERD, BPH and depression sent ot ER for evaluation of left foot 1metatarsal wound ,suggestive of osteomyelitis .Patient was seen by ID consult and transfer to the hospital recommended for wound cx/bone biopsy /podiatry evaluation ,likely will require 4-6 weeks of iv abx . Patient was admitted to [facility] with b/l feet wounds and was followed by wound care team ,recently completed 7 days of doxycycline for foot wound cellulitis . (30 Mar 2023 05:21)        SUBJECTIVE:      ALLERGIES:  Allergies    No Known Allergies    Intolerances          MEDICATIONS  (STANDING):  budesonide 160 MICROgram(s)/formoterol 4.5 MICROgram(s) Inhaler 2 Puff(s) Inhalation two times a day  dextrose 50% Injectable 25 Gram(s) IV Push once  dextrose 50% Injectable 12.5 Gram(s) IV Push once  dextrose 50% Injectable 25 Gram(s) IV Push once  donepezil 5 milliGRAM(s) Oral at bedtime  DULoxetine 60 milliGRAM(s) Oral daily  glucagon  Injectable 1 milliGRAM(s) IntraMuscular once  insulin glargine Injectable (LANTUS) 8 Unit(s) SubCutaneous at bedtime  insulin lispro (ADMELOG) corrective regimen sliding scale   SubCutaneous three times a day before meals  insulin lispro (ADMELOG) corrective regimen sliding scale   SubCutaneous at bedtime  lactobacillus acidophilus 1 Tablet(s) Oral two times a day with meals  losartan 25 milliGRAM(s) Oral daily  metoprolol tartrate 50 milliGRAM(s) Oral two times a day  multivitamin 1 Tablet(s) Oral daily  pantoprazole   Suspension 40 milliGRAM(s) Oral daily  piperacillin/tazobactam IVPB.. 3.375 Gram(s) IV Intermittent every 8 hours  senna 2 Tablet(s) Oral at bedtime  simvastatin 40 milliGRAM(s) Oral at bedtime  tamsulosin 0.4 milliGRAM(s) Oral at bedtime    MEDICATIONS  (PRN):  acetaminophen     Tablet .. 650 milliGRAM(s) Oral every 6 hours PRN Temp greater or equal to 38C (100.4F), Mild Pain (1 - 3)  aluminum hydroxide/magnesium hydroxide/simethicone Suspension 30 milliLiter(s) Oral every 4 hours PRN Dyspepsia  bisacodyl Suppository 10 milliGRAM(s) Rectal daily PRN Constipation  dextrose Oral Gel 15 Gram(s) Oral once PRN Blood Glucose LESS THAN 70 milliGRAM(s)/deciliter  hydrALAZINE 50 milliGRAM(s) Oral every 6 hours PRN for systolic BP>160  magnesium hydroxide Suspension 30 milliLiter(s) Oral daily PRN Constipation  melatonin 3 milliGRAM(s) Oral at bedtime PRN Insomnia  ondansetron Injectable 4 milliGRAM(s) IV Push every 8 hours PRN Nausea and/or Vomiting  sodium chloride 0.9% lock flush 10 milliLiter(s) IV Push every 1 hour PRN Pre/post blood products, medications, blood draw, and to maintain line patency      REVIEW OF SYSTEMS:  CONSTITUTIONAL: No fever,  RESPIRATORY: No cough, wheezing, shortness of breath  CARDIOVASCULAR: No chest pain, dyspnea, palpitations, dizziness, syncope, paroxysmal nocturnal dyspnea, orthopnea, or arm or leg swelling  GASTROINTESTINAL: No abdominal  or epigastric pain, nausea, vomiting,  diarrhea  NEUROLOGICAL: No headaches,  loss of strength, numbness, or tremors    Vital Signs Last 24 Hrs  T(C): 36.4 (10 Apr 2023 20:37), Max: 36.7 (10 Apr 2023 12:07)  T(F): 97.6 (10 Apr 2023 20:37), Max: 98 (10 Apr 2023 12:07)  HR: 77 (10 Apr 2023 20:37) (65 - 88)  BP: 128/71 (10 Apr 2023 20:37) (79/49 - 145/83)  BP(mean): --  RR: 18 (10 Apr 2023 20:37) (16 - 20)  SpO2: 97% (10 Apr 2023 20:37) (93% - 100%)    Parameters below as of 10 Apr 2023 20:37  Patient On (Oxygen Delivery Method): nasal cannula  O2 Flow (L/min): 2      PHYSICAL EXAM:  HEAD:  Atraumatic, Normocephalic  NECK: Supple and normal thyroid.  No JVD or carotid bruit.   HEART: S1, S2 regular , 1/6 soft ejection systolic murmur in mitral area , no thrill and no gallops .  PULMONARY: Bilateral vesicular breathing , few scattered ronchi and few scattered rales are present .  ABDOMEN: Soft nontender and positive bowl sounds   EXTREMITIES:  No clubbing, cyanosis, or pedal  edema  NEUROLOGICAL: AAOX3 , no focal deficit .    LABS:                        9.4    7.13  )-----------( 300      ( 10 Apr 2023 05:10 )             30.1     04-10    139  |  111<H>  |  36<H>  ----------------------------<  230<H>  4.4   |  26  |  1.30    Ca    7.9<L>      10 Apr 2023 05:10  Phos  2.4     04-10  Mg     2.0     04-10    TPro  5.8<L>  /  Alb  2.0<L>  /  TBili  0.4  /  DBili  0.2  /  AST  14<L>  /  ALT  21  /  AlkPhos  60  04-10        PT/INR - ( 10 Apr 2023 05:10 )   PT: 15.8 sec;   INR: 1.35 ratio             BNP      EKG:  ECHO:  IMAGING:    Assessment/Plan    Will continue to follow during hospital course and give further recommendations as needed . Thanks for your referral . if any questions please contact me at office ([phone])cell [phone])  [facility] [place]       HPI:  86 yo male ,[facility] resident with PMHx - COPD, DM, HTN, CAD, HLD, H/O TIA, dementia,GERD, BPH and depression sent ot ER for evaluation of left foot 1metatarsal wound ,suggestive of osteomyelitis .Patient was seen by ID consult and transfer to the hospital recommended for wound cx/bone biopsy /podiatry evaluation ,likely will require 4-6 weeks of iv abx . Patient was admitted to [facility] with b/l feet wounds and was followed by wound care team ,recently completed 7 days of doxycycline for foot wound cellulitis . (30 Mar 2023 05:21)        SUBJECTIVE:      ALLERGIES:  Allergies    No Known Allergies    Intolerances          MEDICATIONS  (STANDING):  budesonide 160 MICROgram(s)/formoterol 4.5 MICROgram(s) Inhaler 2 Puff(s) Inhalation two times a day  dextrose 50% Injectable 25 Gram(s) IV Push once  dextrose 50% Injectable 12.5 Gram(s) IV Push once  dextrose 50% Injectable 25 Gram(s) IV Push once  donepezil 5 milliGRAM(s) Oral at bedtime  DULoxetine 60 milliGRAM(s) Oral daily  glucagon  Injectable 1 milliGRAM(s) IntraMuscular once  insulin glargine Injectable (LANTUS) 8 Unit(s) SubCutaneous at bedtime  insulin lispro (ADMELOG) corrective regimen sliding scale   SubCutaneous three times a day before meals  insulin lispro (ADMELOG) corrective regimen sliding scale   SubCutaneous at bedtime  lactobacillus acidophilus 1 Tablet(s) Oral two times a day with meals  losartan 25 milliGRAM(s) Oral daily  metoprolol tartrate 50 milliGRAM(s) Oral two times a day  multivitamin 1 Tablet(s) Oral daily  pantoprazole   Suspension 40 milliGRAM(s) Oral daily  piperacillin/tazobactam IVPB.. 3.375 Gram(s) IV Intermittent every 8 hours  senna 2 Tablet(s) Oral at bedtime  simvastatin 40 milliGRAM(s) Oral at bedtime  tamsulosin 0.4 milliGRAM(s) Oral at bedtime    MEDICATIONS  (PRN):  acetaminophen     Tablet .. 650 milliGRAM(s) Oral every 6 hours PRN Temp greater or equal to 38C (100.4F), Mild Pain (1 - 3)  aluminum hydroxide/magnesium hydroxide/simethicone Suspension 30 milliLiter(s) Oral every 4 hours PRN Dyspepsia  bisacodyl Suppository 10 milliGRAM(s) Rectal daily PRN Constipation  dextrose Oral Gel 15 Gram(s) Oral once PRN Blood Glucose LESS THAN 70 milliGRAM(s)/deciliter  hydrALAZINE 50 milliGRAM(s) Oral every 6 hours PRN for systolic BP>160  magnesium hydroxide Suspension 30 milliLiter(s) Oral daily PRN Constipation  melatonin 3 milliGRAM(s) Oral at bedtime PRN Insomnia  ondansetron Injectable 4 milliGRAM(s) IV Push every 8 hours PRN Nausea and/or Vomiting  sodium chloride 0.9% lock flush 10 milliLiter(s) IV Push every 1 hour PRN Pre/post blood products, medications, blood draw, and to maintain line patency      REVIEW OF SYSTEMS:  CONSTITUTIONAL: No fever,  RESPIRATORY: No cough, wheezing, shortness of breath  CARDIOVASCULAR: No chest pain, dyspnea, palpitations, dizziness, syncope, paroxysmal nocturnal dyspnea, orthopnea, or arm or leg swelling  GASTROINTESTINAL: No abdominal  or epigastric pain, nausea, vomiting,  diarrhea  NEUROLOGICAL: No headaches,  loss of strength, numbness, or tremors    Vital Signs Last 24 Hrs  T(C): 36.4 (10 Apr 2023 20:37), Max: 36.7 (10 Apr 2023 12:07)  T(F): 97.6 (10 Apr 2023 20:37), Max: 98 (10 Apr 2023 12:07)  HR: 77 (10 Apr 2023 20:37) (65 - 88)  BP: 128/71 (10 Apr 2023 20:37) (79/49 - 145/83)  BP(mean): --  RR: 18 (10 Apr 2023 20:37) (16 - 20)  SpO2: 97% (10 Apr 2023 20:37) (93% - 100%)    Parameters below as of 10 Apr 2023 20:37  Patient On (Oxygen Delivery Method): nasal cannula  O2 Flow (L/min): 2      PHYSICAL EXAM:  HEAD:  Atraumatic, Normocephalic  NECK: Supple and normal thyroid.  No JVD or carotid bruit.   HEART: S1, S2 regular , 1/6 soft ejection systolic murmur in mitral area , no thrill and no gallops .  PULMONARY: Bilateral vesicular breathing , few scattered ronchi and few scattered rales are present .  ABDOMEN: Soft nontender and positive bowl sounds   EXTREMITIES:  No clubbing, cyanosis, or pedal  edema  NEUROLOGICAL: AAOX3 , no focal deficit .    LABS:                        9.4    7.13  )-----------( 300      ( 10 Apr 2023 05:10 )             30.1     04-10    139  |  111<H>  |  36<H>  ----------------------------<  230<H>  4.4   |  26  |  1.30    Ca    7.9<L>      10 Apr 2023 05:10  Phos  2.4     04-10  Mg     2.0     04-10    TPro  5.8<L>  /  Alb  2.0<L>  /  TBili  0.4  /  DBili  0.2  /  AST  14<L>  /  ALT  21  /  AlkPhos  60  04-10        PT/INR - ( 10 Apr 2023 05:10 )   PT: 15.8 sec;   INR: 1.35 ratio             BNP      EKG:  ECHO:  IMAGING:    Assessment/Plan    Will continue to follow during hospital course and give further recommendations as needed . Thanks for your referral . if any questions please contact me at office ([phone])cell [phone])  KARIME WYNN MD [place] [id number]  [address]  OFFICE : [phone]  CELL : [phone]  CARDIOLOGY F/U :     HPI:  86 yo male ,[facility] resident with PMHx - COPD, DM, HTN, CAD, HLD, H/O TIA, dementia,GERD, BPH and depression sent ot ER for evaluation of left foot 1metatarsal wound ,suggestive of osteomyelitis .Patient was seen by ID consult and transfer to the hospital recommended for wound cx/bone biopsy /podiatry evaluation ,likely will require 4-6 weeks of iv abx . Patient was admitted to [facility] with b/l feet wounds and was followed by wound care team ,recently completed 7 days of doxycycline for foot wound cellulitis . (30 Mar 2023 05:21)        SUBJECTIVE: Patient feeling better .      ALLERGIES:  Allergies    No Known Allergies    Intolerances          MEDICATIONS  (STANDING):  budesonide 160 MICROgram(s)/formoterol 4.5 MICROgram(s) Inhaler 2 Puff(s) Inhalation two times a day  dextrose 50% Injectable 25 Gram(s) IV Push once  dextrose 50% Injectable 12.5 Gram(s) IV Push once  dextrose 50% Injectable 25 Gram(s) IV Push once  donepezil 5 milliGRAM(s) Oral at bedtime  DULoxetine 60 milliGRAM(s) Oral daily  glucagon  Injectable 1 milliGRAM(s) IntraMuscular once  insulin glargine Injectable (LANTUS) 8 Unit(s) SubCutaneous at bedtime  insulin lispro (ADMELOG) corrective regimen sliding scale   SubCutaneous three times a day before meals  insulin lispro (ADMELOG) corrective regimen sliding scale   SubCutaneous at bedtime  lactobacillus acidophilus 1 Tablet(s) Oral two times a day with meals  losartan 25 milliGRAM(s) Oral daily  metoprolol tartrate 50 milliGRAM(s) Oral two times a day  multivitamin 1 Tablet(s) Oral daily  pantoprazole   Suspension 40 milliGRAM(s) Oral daily  piperacillin/tazobactam IVPB.. 3.375 Gram(s) IV Intermittent every 8 hours  senna 2 Tablet(s) Oral at bedtime  simvastatin 40 milliGRAM(s) Oral at bedtime  tamsulosin 0.4 milliGRAM(s) Oral at bedtime    MEDICATIONS  (PRN):  acetaminophen     Tablet .. 650 milliGRAM(s) Oral every 6 hours PRN Temp greater or equal to 38C (100.4F), Mild Pain (1 - 3)  aluminum hydroxide/magnesium hydroxide/simethicone Suspension 30 milliLiter(s) Oral every 4 hours PRN Dyspepsia  bisacodyl Suppository 10 milliGRAM(s) Rectal daily PRN Constipation  dextrose Oral Gel 15 Gram(s) Oral once PRN Blood Glucose LESS THAN 70 milliGRAM(s)/deciliter  hydrALAZINE 50 milliGRAM(s) Oral every 6 hours PRN for systolic BP>160  magnesium hydroxide Suspension 30 milliLiter(s) Oral daily PRN Constipation  melatonin 3 milliGRAM(s) Oral at bedtime PRN Insomnia  ondansetron Injectable 4 milliGRAM(s) IV Push every 8 hours PRN Nausea and/or Vomiting  sodium chloride 0.9% lock flush 10 milliLiter(s) IV Push every 1 hour PRN Pre/post blood products, medications, blood draw, and to maintain line patency      REVIEW OF SYSTEMS:  CONSTITUTIONAL: No fever,  RESPIRATORY: No cough, wheezing, shortness of breath  CARDIOVASCULAR: No chest pain, dyspnea, palpitations, dizziness, syncope, paroxysmal nocturnal dyspnea, orthopnea, or arm or leg swelling  GASTROINTESTINAL: No abdominal  or epigastric pain, nausea, vomiting,  diarrhea  NEUROLOGICAL: No headaches,  loss of strength, numbness, or tremors    Vital Signs Last 24 Hrs  T(C): 36.4 (10 Apr 2023 20:37), Max: 36.7 (10 Apr 2023 12:07)  T(F): 97.6 (10 Apr 2023 20:37), Max: 98 (10 Apr 2023 12:07)  HR: 77 (10 Apr 2023 20:37) (65 - 88)  BP: 128/71 (10 Apr 2023 20:37) (79/49 - 145/83)  BP(mean): --  RR: 18 (10 Apr 2023 20:37) (16 - 20)  SpO2: 97% (10 Apr 2023 20:37) (93% - 100%)    Parameters below as of 10 Apr 2023 20:37  Patient On (Oxygen Delivery Method): nasal cannula  O2 Flow (L/min): 2      PHYSICAL EXAM:  HEAD:  Atraumatic, Normocephalic  NECK: Supple and normal thyroid.  No JVD or carotid bruit.   HEART: S1, S2 irregular , 1/6 soft ejection systolic murmur in mitral area , no thrill and no gallops .  PULMONARY: Bilateral vesicular breathing , few scattered rhonchi and few scattered rales are present .  ABDOMEN: Soft nontender and positive bowl sounds   EXTREMITIES:  No clubbing, cyanosis, or pedal  edema  NEUROLOGICAL: AA and confused  .  Skin : No rashes .  Musculoskeletal : No joint swellings .    LABS:                        9.4    7.13  )-----------( 300      ( 10 Apr 2023 05:10 )             30.1     04-10    139  |  111<H>  |  36<H>  ----------------------------<  230<H>  4.4   |  26  |  1.30    Ca    7.9<L>      10 Apr 2023 05:10  Phos  2.4     04-10  Mg     2.0     04-10    TPro  5.8<L>  /  Alb  2.0<L>  /  TBili  0.4  /  DBili  0.2  /  AST  14<L>  /  ALT  21  /  AlkPhos  60  04-10        PT/INR - ( 10 Apr 2023 05:10 )   PT: 15.8 sec;   INR: 1.35 ratio             Assessment/Plan    Will continue to follow during hospital course and give further recommendations as needed . Thanks for your referral . if any questions please contact me at office ([phone])cell [phone])  KARIME WYNN MD [place] [id number]  [address]  OFFICE : [phone]  CELL : [phone]  CARDIOLOGY F/U :     HPI:  88 yo male ,[facility] resident with PMHx - COPD, DM, HTN, CAD, HLD, H/O TIA, dementia,GERD, BPH and depression sent ot ER for evaluation of left foot 1metatarsal wound ,suggestive of osteomyelitis .Patient was seen by ID consult and transfer to the hospital recommended for wound cx/bone biopsy /podiatry evaluation ,likely will require 4-6 weeks of iv abx . Patient was admitted to [facility] with b/l feet wounds and was followed by wound care team ,recently completed 7 days of doxycycline for foot wound cellulitis . (30 Mar 2023 05:21)        SUBJECTIVE: Patient feeling better .      ALLERGIES:  Allergies    No Known Allergies    Intolerances          MEDICATIONS  (STANDING):  budesonide 160 MICROgram(s)/formoterol 4.5 MICROgram(s) Inhaler 2 Puff(s) Inhalation two times a day  dextrose 50% Injectable 25 Gram(s) IV Push once  dextrose 50% Injectable 12.5 Gram(s) IV Push once  dextrose 50% Injectable 25 Gram(s) IV Push once  donepezil 5 milliGRAM(s) Oral at bedtime  DULoxetine 60 milliGRAM(s) Oral daily  glucagon  Injectable 1 milliGRAM(s) IntraMuscular once  insulin glargine Injectable (LANTUS) 8 Unit(s) SubCutaneous at bedtime  insulin lispro (ADMELOG) corrective regimen sliding scale   SubCutaneous three times a day before meals  insulin lispro (ADMELOG) corrective regimen sliding scale   SubCutaneous at bedtime  lactobacillus acidophilus 1 Tablet(s) Oral two times a day with meals  losartan 25 milliGRAM(s) Oral daily  metoprolol tartrate 50 milliGRAM(s) Oral two times a day  multivitamin 1 Tablet(s) Oral daily  pantoprazole   Suspension 40 milliGRAM(s) Oral daily  piperacillin/tazobactam IVPB.. 3.375 Gram(s) IV Intermittent every 8 hours  senna 2 Tablet(s) Oral at bedtime  simvastatin 40 milliGRAM(s) Oral at bedtime  tamsulosin 0.4 milliGRAM(s) Oral at bedtime    MEDICATIONS  (PRN):  acetaminophen     Tablet .. 650 milliGRAM(s) Oral every 6 hours PRN Temp greater or equal to 38C (100.4F), Mild Pain (1 - 3)  aluminum hydroxide/magnesium hydroxide/simethicone Suspension 30 milliLiter(s) Oral every 4 hours PRN Dyspepsia  bisacodyl Suppository 10 milliGRAM(s) Rectal daily PRN Constipation  dextrose Oral Gel 15 Gram(s) Oral once PRN Blood Glucose LESS THAN 70 milliGRAM(s)/deciliter  hydrALAZINE 50 milliGRAM(s) Oral every 6 hours PRN for systolic BP>160  magnesium hydroxide Suspension 30 milliLiter(s) Oral daily PRN Constipation  melatonin 3 milliGRAM(s) Oral at bedtime PRN Insomnia  ondansetron Injectable 4 milliGRAM(s) IV Push every 8 hours PRN Nausea and/or Vomiting  sodium chloride 0.9% lock flush 10 milliLiter(s) IV Push every 1 hour PRN Pre/post blood products, medications, blood draw, and to maintain line patency      REVIEW OF SYSTEMS:  CONSTITUTIONAL: No fever,  RESPIRATORY: No cough, wheezing, shortness of breath  CARDIOVASCULAR: No chest pain, dyspnea, palpitations, dizziness, syncope, paroxysmal nocturnal dyspnea, orthopnea, or arm or leg swelling  GASTROINTESTINAL: No abdominal  or epigastric pain, nausea, vomiting,  diarrhea  NEUROLOGICAL: No headaches,  loss of strength, numbness, or tremors    Vital Signs Last 24 Hrs  T(C): 36.4 (10 Apr 2023 20:37), Max: 36.7 (10 Apr 2023 12:07)  T(F): 97.6 (10 Apr 2023 20:37), Max: 98 (10 Apr 2023 12:07)  HR: 77 (10 Apr 2023 20:37) (65 - 88)  BP: 128/71 (10 Apr 2023 20:37) (79/49 - 145/83)  BP(mean): --  RR: 18 (10 Apr 2023 20:37) (16 - 20)  SpO2: 97% (10 Apr 2023 20:37) (93% - 100%)    Parameters below as of 10 Apr 2023 20:37  Patient On (Oxygen Delivery Method): nasal cannula  O2 Flow (L/min): 2      PHYSICAL EXAM:  HEAD:  Atraumatic, Normocephalic  NECK: Supple and normal thyroid.  No JVD or carotid bruit.   HEART: S1, S2 irregular , 1/6 soft ejection systolic murmur in mitral area , no thrill and no gallops .  PULMONARY: Bilateral vesicular breathing , few scattered rhonchi and few scattered rales are present .  ABDOMEN: Soft nontender and positive bowl sounds   EXTREMITIES:  No clubbing, cyanosis, or pedal  edema  NEUROLOGICAL: AA and confused  .  Skin : No rashes .  Musculoskeletal : No joint swellings .    LABS:                        9.4    7.13  )-----------( 300      ( 10 Apr 2023 05:10 )             30.1     04-10    139  |  111<H>  |  36<H>  ----------------------------<  230<H>  4.4   |  26  |  1.30    Ca    7.9<L>      10 Apr 2023 05:10  Phos  2.4     04-10  Mg     2.0     04-10    TPro  5.8<L>  /  Alb  2.0<L>  /  TBili  0.4  /  DBili  0.2  /  AST  14<L>  /  ALT  21  /  AlkPhos  60  04-10        PT/INR - ( 10 Apr 2023 05:10 )   PT: 15.8 sec;   INR: 1.35 ratio             Assessment/Plan    Will continue to follow during hospital course and give further recommendations as needed . Thanks for your referral . if any questions please contact me at office ([phone])cell [phone])  KARIME WYNN MD [place] [id number]  [address]  OFFICE : [id number]  CELL : [phone]  CARDIOLOGY F/U :     HPI:  88 yo male ,[facility] resident with PMHx - COPD, DM, HTN, CAD, HLD, H/O TIA, dementia,GERD, BPH and depression sent ot ER for evaluation of left foot 1metatarsal wound ,suggestive of osteomyelitis .Patient was seen by ID consult and transfer to the hospital recommended for wound cx/bone biopsy /podiatry evaluation ,likely will require 4-6 weeks of iv abx . Patient was admitted to [facility] with b/l feet wounds and was followed by wound care team ,recently completed 7 days of doxycycline for foot wound cellulitis . (30 Mar 2023 05:21)        SUBJECTIVE: Patient feeling better .      ALLERGIES:  Allergies    No Known Allergies    Intolerances          MEDICATIONS  (STANDING):  budesonide 160 MICROgram(s)/formoterol 4.5 MICROgram(s) Inhaler 2 Puff(s) Inhalation two times a day  dextrose 50% Injectable 25 Gram(s) IV Push once  dextrose 50% Injectable 12.5 Gram(s) IV Push once  dextrose 50% Injectable 25 Gram(s) IV Push once  donepezil 5 milliGRAM(s) Oral at bedtime  DULoxetine 60 milliGRAM(s) Oral daily  glucagon  Injectable 1 milliGRAM(s) IntraMuscular once  insulin glargine Injectable (LANTUS) 8 Unit(s) SubCutaneous at bedtime  insulin lispro (ADMELOG) corrective regimen sliding scale   SubCutaneous three times a day before meals  insulin lispro (ADMELOG) corrective regimen sliding scale   SubCutaneous at bedtime  lactobacillus acidophilus 1 Tablet(s) Oral two times a day with meals  losartan 25 milliGRAM(s) Oral daily  metoprolol tartrate 50 milliGRAM(s) Oral two times a day  multivitamin 1 Tablet(s) Oral daily  pantoprazole   Suspension 40 milliGRAM(s) Oral daily  piperacillin/tazobactam IVPB.. 3.375 Gram(s) IV Intermittent every 8 hours  senna 2 Tablet(s) Oral at bedtime  simvastatin 40 milliGRAM(s) Oral at bedtime  tamsulosin 0.4 milliGRAM(s) Oral at bedtime    MEDICATIONS  (PRN):  acetaminophen     Tablet .. 650 milliGRAM(s) Oral every 6 hours PRN Temp greater or equal to 38C (100.4F), Mild Pain (1 - 3)  aluminum hydroxide/magnesium hydroxide/simethicone Suspension 30 milliLiter(s) Oral every 4 hours PRN Dyspepsia  bisacodyl Suppository 10 milliGRAM(s) Rectal daily PRN Constipation  dextrose Oral Gel 15 Gram(s) Oral once PRN Blood Glucose LESS THAN 70 milliGRAM(s)/deciliter  hydrALAZINE 50 milliGRAM(s) Oral every 6 hours PRN for systolic BP>160  magnesium hydroxide Suspension 30 milliLiter(s) Oral daily PRN Constipation  melatonin 3 milliGRAM(s) Oral at bedtime PRN Insomnia  ondansetron Injectable 4 milliGRAM(s) IV Push every 8 hours PRN Nausea and/or Vomiting  sodium chloride 0.9% lock flush 10 milliLiter(s) IV Push every 1 hour PRN Pre/post blood products, medications, blood draw, and to maintain line patency      REVIEW OF SYSTEMS:  CONSTITUTIONAL: No fever,  RESPIRATORY: No cough, wheezing, shortness of breath  CARDIOVASCULAR: No chest pain, dyspnea, palpitations, dizziness, syncope, paroxysmal nocturnal dyspnea, orthopnea, or arm or leg swelling  GASTROINTESTINAL: No abdominal  or epigastric pain, nausea, vomiting,  diarrhea  NEUROLOGICAL: No headaches,  loss of strength, numbness, or tremors    Vital Signs Last 24 Hrs  T(C): 36.4 (10 Apr 2023 20:37), Max: 36.7 (10 Apr 2023 12:07)  T(F): 97.6 (10 Apr 2023 20:37), Max: 98 (10 Apr 2023 12:07)  HR: 77 (10 Apr 2023 20:37) (65 - 88)  BP: 128/71 (10 Apr 2023 20:37) (79/49 - 145/83)  BP(mean): --  RR: 18 (10 Apr 2023 20:37) (16 - 20)  SpO2: 97% (10 Apr 2023 20:37) (93% - 100%)    Parameters below as of 10 Apr 2023 20:37  Patient On (Oxygen Delivery Method): nasal cannula  O2 Flow (L/min): 2      PHYSICAL EXAM:  HEAD:  Atraumatic, Normocephalic  NECK: Supple and normal thyroid.  No JVD or carotid bruit.   HEART: S1, S2 irregular , 1/6 soft ejection systolic murmur in mitral area , no thrill and no gallops .  PULMONARY: Bilateral vesicular breathing , few scattered rhonchi and few scattered rales are present .  ABDOMEN: Soft nontender and positive bowl sounds   EXTREMITIES:  No clubbing, cyanosis, or pedal  edema  NEUROLOGICAL: AA and confused  .  Skin : No rashes .  Musculoskeletal : No joint swellings .    LABS:                        9.4    7.13  )-----------( 300      ( 10 Apr 2023 05:10 )             30.1     04-10    139  |  111<H>  |  36<H>  ----------------------------<  230<H>  4.4   |  26  |  1.30    Ca    7.9<L>      10 Apr 2023 05:10  Phos  2.4     04-10  Mg     2.0     04-10    TPro  5.8<L>  /  Alb  2.0<L>  /  TBili  0.4  /  DBili  0.2  /  AST  14<L>  /  ALT  21  /  AlkPhos  60  04-10        PT/INR - ( 10 Apr 2023 05:10 )   PT: 15.8 sec;   INR: 1.35 ratio             Assessment/Plan    Will continue to follow during hospital course and give further recommendations as needed . Thanks for your referral . if any questions please contact me at office ([phone])cell [phone])  KARIME WYNN MD [place] [id number]  [address]  OFFICE : [id number]  CELL : [phone]  CARDIOLOGY F/U :     HPI:  86 yo male ,[facility] resident with PMHx - COPD, DM, HTN, CAD, HLD, H/O TIA, dementia,GERD, BPH and depression sent ot ER for evaluation of left foot 1metatarsal wound ,suggestive of osteomyelitis .Patient was seen by ID consult and transfer to the hospital recommended for wound cx/bone biopsy /podiatry evaluation ,likely will require 4-6 weeks of iv abx . Patient was admitted to [facility] with b/l feet wounds and was followed by wound care team ,recently completed 7 days of doxycycline for foot wound cellulitis . (30 Mar 2023 05:21)        SUBJECTIVE: Patient feeling better .      ALLERGIES:  Allergies    No Known Allergies    Intolerances          MEDICATIONS  (STANDING):  budesonide 160 MICROgram(s)/formoterol 4.5 MICROgram(s) Inhaler 2 Puff(s) Inhalation two times a day  dextrose 50% Injectable 25 Gram(s) IV Push once  dextrose 50% Injectable 12.5 Gram(s) IV Push once  dextrose 50% Injectable 25 Gram(s) IV Push once  donepezil 5 milliGRAM(s) Oral at bedtime  DULoxetine 60 milliGRAM(s) Oral daily  glucagon  Injectable 1 milliGRAM(s) IntraMuscular once  insulin glargine Injectable (LANTUS) 8 Unit(s) SubCutaneous at bedtime  insulin lispro (ADMELOG) corrective regimen sliding scale   SubCutaneous three times a day before meals  insulin lispro (ADMELOG) corrective regimen sliding scale   SubCutaneous at bedtime  lactobacillus acidophilus 1 Tablet(s) Oral two times a day with meals  losartan 25 milliGRAM(s) Oral daily  metoprolol tartrate 50 milliGRAM(s) Oral two times a day  multivitamin 1 Tablet(s) Oral daily  pantoprazole   Suspension 40 milliGRAM(s) Oral daily  piperacillin/tazobactam IVPB.. 3.375 Gram(s) IV Intermittent every 8 hours  senna 2 Tablet(s) Oral at bedtime  simvastatin 40 milliGRAM(s) Oral at bedtime  tamsulosin 0.4 milliGRAM(s) Oral at bedtime    MEDICATIONS  (PRN):  acetaminophen     Tablet .. 650 milliGRAM(s) Oral every 6 hours PRN Temp greater or equal to 38C (100.4F), Mild Pain (1 - 3)  aluminum hydroxide/magnesium hydroxide/simethicone Suspension 30 milliLiter(s) Oral every 4 hours PRN Dyspepsia  bisacodyl Suppository 10 milliGRAM(s) Rectal daily PRN Constipation  dextrose Oral Gel 15 Gram(s) Oral once PRN Blood Glucose LESS THAN 70 milliGRAM(s)/deciliter  hydrALAZINE 50 milliGRAM(s) Oral every 6 hours PRN for systolic BP>160  magnesium hydroxide Suspension 30 milliLiter(s) Oral daily PRN Constipation  melatonin 3 milliGRAM(s) Oral at bedtime PRN Insomnia  ondansetron Injectable 4 milliGRAM(s) IV Push every 8 hours PRN Nausea and/or Vomiting  sodium chloride 0.9% lock flush 10 milliLiter(s) IV Push every 1 hour PRN Pre/post blood products, medications, blood draw, and to maintain line patency      REVIEW OF SYSTEMS:  CONSTITUTIONAL: No fever,  RESPIRATORY: No cough, wheezing, shortness of breath  CARDIOVASCULAR: No chest pain, dyspnea, palpitations, dizziness, syncope, paroxysmal nocturnal dyspnea, orthopnea, or arm or leg swelling  GASTROINTESTINAL: No abdominal  or epigastric pain, nausea, vomiting,  diarrhea  NEUROLOGICAL: No headaches, numbness, or tremors  Skin : No itching .  Hematology : No active bleeding .  Endocrinology : No heat and cold intolerance .  Psychiatry : Patient is confused .  Genitourinary : No dysuria .  Musculoskeletal : Patient has mild arthritis .              Vital Signs Last 24 Hrs  T(C): 36.4 (10 Apr 2023 20:37), Max: 36.7 (10 Apr 2023 12:07)  T(F): 97.6 (10 Apr 2023 20:37), Max: 98 (10 Apr 2023 12:07)  HR: 77 (10 Apr 2023 20:37) (65 - 88)  BP: 128/71 (10 Apr 2023 20:37) (79/49 - 145/83)  BP(mean): --  RR: 18 (10 Apr 2023 20:37) (16 - 20)  SpO2: 97% (10 Apr 2023 20:37) (93% - 100%)    Parameters below as of 10 Apr 2023 20:37  Patient On (Oxygen Delivery Method): nasal cannula  O2 Flow (L/min): 2      PHYSICAL EXAM:  HEAD:  Atraumatic, Normocephalic  NECK: Supple and normal thyroid.  No JVD or carotid bruit.   HEART: S1, S2 irregular , 1/6 soft ejection systolic murmur in mitral area , no thrill and no gallops .  PULMONARY: Bilateral vesicular breathing , few scattered rhonchi and few scattered rales are present .  ABDOMEN: Soft nontender and positive bowl sounds   EXTREMITIES:  No clubbing, cyanosis, or pedal  edema  NEUROLOGICAL: AA and confused  .  Skin : No rashes .  Musculoskeletal : No joint swellings .    LABS:                        9.4    7.13  )-----------( 300      ( 10 Apr 2023 05:10 )             30.1     04-10    139  |  111<H>  |  36<H>  ----------------------------<  230<H>  4.4   |  26  |  1.30    Ca    7.9<L>      10 Apr 2023 05:10  Phos  2.4     04-10  Mg     2.0     04-10    TPro  5.8<L>  /  Alb  2.0<L>  /  TBili  0.4  /  DBili  0.2  /  AST  14<L>  /  ALT  21  /  AlkPhos  60  04-10        PT/INR - ( 10 Apr 2023 05:10 )   PT: 15.8 sec;   INR: 1.35 ratio             Assessment/Plan    Will continue to follow during hospital course and give further recommendations as needed . Thanks for your referral . if any questions please contact me at office ([phone])cell [phone])  KARIME WYNN MD [place] [id number]  [address]  OFFICE : [id number]  CELL : [phone]  CARDIOLOGY F/U :     HPI:  88 yo male ,[facility] resident with PMHx - COPD, DM, HTN, CAD, HLD, H/O TIA, dementia,GERD, BPH and depression sent ot ER for evaluation of left foot 1metatarsal wound ,suggestive of osteomyelitis .Patient was seen by ID consult and transfer to the hospital recommended for wound cx/bone biopsy /podiatry evaluation ,likely will require 4-6 weeks of iv abx . Patient was admitted to [facility] with b/l feet wounds and was followed by wound care team ,recently completed 7 days of doxycycline for foot wound cellulitis . (30 Mar 2023 05:21)        SUBJECTIVE: Patient feeling better .      ALLERGIES:  Allergies    No Known Allergies    Intolerances          MEDICATIONS  (STANDING):  budesonide 160 MICROgram(s)/formoterol 4.5 MICROgram(s) Inhaler 2 Puff(s) Inhalation two times a day  dextrose 50% Injectable 25 Gram(s) IV Push once  dextrose 50% Injectable 12.5 Gram(s) IV Push once  dextrose 50% Injectable 25 Gram(s) IV Push once  donepezil 5 milliGRAM(s) Oral at bedtime  DULoxetine 60 milliGRAM(s) Oral daily  glucagon  Injectable 1 milliGRAM(s) IntraMuscular once  insulin glargine Injectable (LANTUS) 8 Unit(s) SubCutaneous at bedtime  insulin lispro (ADMELOG) corrective regimen sliding scale   SubCutaneous three times a day before meals  insulin lispro (ADMELOG) corrective regimen sliding scale   SubCutaneous at bedtime  lactobacillus acidophilus 1 Tablet(s) Oral two times a day with meals  losartan 25 milliGRAM(s) Oral daily  metoprolol tartrate 50 milliGRAM(s) Oral two times a day  multivitamin 1 Tablet(s) Oral daily  pantoprazole   Suspension 40 milliGRAM(s) Oral daily  piperacillin/tazobactam IVPB.. 3.375 Gram(s) IV Intermittent every 8 hours  senna 2 Tablet(s) Oral at bedtime  simvastatin 40 milliGRAM(s) Oral at bedtime  tamsulosin 0.4 milliGRAM(s) Oral at bedtime    MEDICATIONS  (PRN):  acetaminophen     Tablet .. 650 milliGRAM(s) Oral every 6 hours PRN Temp greater or equal to 38C (100.4F), Mild Pain (1 - 3)  aluminum hydroxide/magnesium hydroxide/simethicone Suspension 30 milliLiter(s) Oral every 4 hours PRN Dyspepsia  bisacodyl Suppository 10 milliGRAM(s) Rectal daily PRN Constipation  dextrose Oral Gel 15 Gram(s) Oral once PRN Blood Glucose LESS THAN 70 milliGRAM(s)/deciliter  hydrALAZINE 50 milliGRAM(s) Oral every 6 hours PRN for systolic BP>160  magnesium hydroxide Suspension 30 milliLiter(s) Oral daily PRN Constipation  melatonin 3 milliGRAM(s) Oral at bedtime PRN Insomnia  ondansetron Injectable 4 milliGRAM(s) IV Push every 8 hours PRN Nausea and/or Vomiting  sodium chloride 0.9% lock flush 10 milliLiter(s) IV Push every 1 hour PRN Pre/post blood products, medications, blood draw, and to maintain line patency      REVIEW OF SYSTEMS:  CONSTITUTIONAL: No fever,  RESPIRATORY: No cough, wheezing, shortness of breath  CARDIOVASCULAR: No chest pain, dyspnea, palpitations, dizziness, syncope, paroxysmal nocturnal dyspnea, orthopnea, or arm or leg swelling  GASTROINTESTINAL: No abdominal  or epigastric pain, nausea, vomiting,  diarrhea  NEUROLOGICAL: No headaches, numbness, or tremors  Skin : No itching .  Hematology : No active bleeding .  Endocrinology : No heat and cold intolerance .  Psychiatry : Patient is confused .  Genitourinary : No dysuria .  Musculoskeletal : Patient has mild arthritis .              Vital Signs Last 24 Hrs  T(C): 36.4 (10 Apr 2023 20:37), Max: 36.7 (10 Apr 2023 12:07)  T(F): 97.6 (10 Apr 2023 20:37), Max: 98 (10 Apr 2023 12:07)  HR: 77 (10 Apr 2023 20:37) (65 - 88)  BP: 128/71 (10 Apr 2023 20:37) (79/49 - 145/83)  BP(mean): --  RR: 18 (10 Apr 2023 20:37) (16 - 20)  SpO2: 97% (10 Apr 2023 20:37) (93% - 100%)    Parameters below as of 10 Apr 2023 20:37  Patient On (Oxygen Delivery Method): nasal cannula  O2 Flow (L/min): 2      PHYSICAL EXAM:  HEAD:  Atraumatic, Normocephalic  NECK: Supple and normal thyroid.  No JVD or carotid bruit.   HEART: S1, S2 irregular , 1/6 soft ejection systolic murmur in mitral area , no thrill and no gallops .  PULMONARY: Bilateral vesicular breathing , few scattered rhonchi and few scattered rales are present .  ABDOMEN: Soft nontender and positive bowl sounds   EXTREMITIES:  No clubbing, cyanosis, or pedal  edema  NEUROLOGICAL: AA and confused  .  Skin : No rashes .  Musculoskeletal : No joint swellings .    LABS:                        9.4    7.13  )-----------( 300      ( 10 Apr 2023 05:10 )             30.1     04-10    139  |  111<H>  |  36<H>  ----------------------------<  230<H>  4.4   |  26  |  1.30    Ca    7.9<L>      10 Apr 2023 05:10  Phos  2.4     04-10  Mg     2.0     04-10    TPro  5.8<L>  /  Alb  2.0<L>  /  TBili  0.4  /  DBili  0.2  /  AST  14<L>  /  ALT  21  /  AlkPhos  60  04-10        PT/INR - ( 10 Apr 2023 05:10 )   PT: 15.8 sec;   INR: 1.35 ratio             Assessment/Plan    Will continue to follow during hospital course and give further recommendations as needed . Thanks for your referral . if any questions please contact me at office ([phone])cell [phone])  KARIME WYNN MD [place] [id number]  [address]  OFFICE : [phone]  CELL : [phone]  CARDIOLOGY F/U :     HPI:  86 yo male ,[facility] resident with PMHx - COPD, DM, HTN, CAD, HLD, H/O TIA, dementia,GERD, BPH and depression sent ot ER for evaluation of left foot 1metatarsal wound ,suggestive of osteomyelitis .Patient was seen by ID consult and transfer to the hospital recommended for wound cx/bone biopsy /podiatry evaluation ,likely will require 4-6 weeks of iv abx . Patient was admitted to [facility] with b/l feet wounds and was followed by wound care team ,recently completed 7 days of doxycycline for foot wound cellulitis . (30 Mar 2023 05:21)        SUBJECTIVE: Patient feeling better .      ALLERGIES:  Allergies    No Known Allergies    Intolerances          MEDICATIONS  (STANDING):  budesonide 160 MICROgram(s)/formoterol 4.5 MICROgram(s) Inhaler 2 Puff(s) Inhalation two times a day  dextrose 50% Injectable 25 Gram(s) IV Push once  dextrose 50% Injectable 12.5 Gram(s) IV Push once  dextrose 50% Injectable 25 Gram(s) IV Push once  donepezil 5 milliGRAM(s) Oral at bedtime  DULoxetine 60 milliGRAM(s) Oral daily  glucagon  Injectable 1 milliGRAM(s) IntraMuscular once  insulin glargine Injectable (LANTUS) 8 Unit(s) SubCutaneous at bedtime  insulin lispro (ADMELOG) corrective regimen sliding scale   SubCutaneous three times a day before meals  insulin lispro (ADMELOG) corrective regimen sliding scale   SubCutaneous at bedtime  lactobacillus acidophilus 1 Tablet(s) Oral two times a day with meals  losartan 25 milliGRAM(s) Oral daily  metoprolol tartrate 50 milliGRAM(s) Oral two times a day  multivitamin 1 Tablet(s) Oral daily  pantoprazole   Suspension 40 milliGRAM(s) Oral daily  piperacillin/tazobactam IVPB.. 3.375 Gram(s) IV Intermittent every 8 hours  senna 2 Tablet(s) Oral at bedtime  simvastatin 40 milliGRAM(s) Oral at bedtime  tamsulosin 0.4 milliGRAM(s) Oral at bedtime    MEDICATIONS  (PRN):  acetaminophen     Tablet .. 650 milliGRAM(s) Oral every 6 hours PRN Temp greater or equal to 38C (100.4F), Mild Pain (1 - 3)  aluminum hydroxide/magnesium hydroxide/simethicone Suspension 30 milliLiter(s) Oral every 4 hours PRN Dyspepsia  bisacodyl Suppository 10 milliGRAM(s) Rectal daily PRN Constipation  dextrose Oral Gel 15 Gram(s) Oral once PRN Blood Glucose LESS THAN 70 milliGRAM(s)/deciliter  hydrALAZINE 50 milliGRAM(s) Oral every 6 hours PRN for systolic BP>160  magnesium hydroxide Suspension 30 milliLiter(s) Oral daily PRN Constipation  melatonin 3 milliGRAM(s) Oral at bedtime PRN Insomnia  ondansetron Injectable 4 milliGRAM(s) IV Push every 8 hours PRN Nausea and/or Vomiting  sodium chloride 0.9% lock flush 10 milliLiter(s) IV Push every 1 hour PRN Pre/post blood products, medications, blood draw, and to maintain line patency      REVIEW OF SYSTEMS:  CONSTITUTIONAL: No fever,  RESPIRATORY: No cough, wheezing, shortness of breath  CARDIOVASCULAR: No chest pain, dyspnea, palpitations, dizziness, syncope, paroxysmal nocturnal dyspnea, orthopnea, or arm or leg swelling  GASTROINTESTINAL: No abdominal  or epigastric pain, nausea, vomiting,  diarrhea  NEUROLOGICAL: No headaches, numbness, or tremors  Skin : No itching .  Hematology : No active bleeding .  Endocrinology : No heat and cold intolerance .  Psychiatry : Patient is confused .  Genitourinary : No dysuria .  Musculoskeletal : Patient has mild arthritis .              Vital Signs Last 24 Hrs  T(C): 36.4 (10 Apr 2023 20:37), Max: 36.7 (10 Apr 2023 12:07)  T(F): 97.6 (10 Apr 2023 20:37), Max: 98 (10 Apr 2023 12:07)  HR: 77 (10 Apr 2023 20:37) (65 - 88)  BP: 128/71 (10 Apr 2023 20:37) (79/49 - 145/83)  BP(mean): --  RR: 18 (10 Apr 2023 20:37) (16 - 20)  SpO2: 97% (10 Apr 2023 20:37) (93% - 100%)    Parameters below as of 10 Apr 2023 20:37  Patient On (Oxygen Delivery Method): nasal cannula  O2 Flow (L/min): 2      PHYSICAL EXAM:  HEAD:  Atraumatic, Normocephalic  NECK: Supple and normal thyroid.  No JVD or carotid bruit.   HEART: S1, S2 irregular , 1/6 soft ejection systolic murmur in mitral area , no thrill and no gallops .  PULMONARY: Bilateral vesicular breathing , few scattered rhonchi and few scattered rales are present .  ABDOMEN: Soft nontender and positive bowl sounds   EXTREMITIES:  No clubbing, cyanosis, or pedal  edema  NEUROLOGICAL: AA and confused  .  Skin : No rashes .  Musculoskeletal : No joint swellings .    LABS:                        9.4    7.13  )-----------( 300      ( 10 Apr 2023 05:10 )             30.1     04-10    139  |  111<H>  |  36<H>  ----------------------------<  230<H>  4.4   |  26  |  1.30    Ca    7.9<L>      10 Apr 2023 05:10  Phos  2.4     04-10  Mg     2.0     04-10    TPro  5.8<L>  /  Alb  2.0<L>  /  TBili  0.4  /  DBili  0.2  /  AST  14<L>  /  ALT  21  /  AlkPhos  60  04-10        PT/INR - ( 10 Apr 2023 05:10 )   PT: 15.8 sec;   INR: 1.35 ratio             Assessment/Plan    Will continue to follow during hospital course and give further recommendations as needed . Thanks for your referral . if any questions please contact me at office ([phone])cell [phone])  KARIME WYNN MD [place] [id number]  [address]  OFFICE : [id number]  CELL : [phone]  CARDIOLOGY F/U :     HPI:  88 yo male ,[facility] resident with PMHx - COPD, DM, HTN, CAD, HLD, H/O TIA, dementia,GERD, BPH and depression sent ot ER for evaluation of left foot 1metatarsal wound ,suggestive of osteomyelitis .Patient was seen by ID consult and transfer to the hospital recommended for wound cx/bone biopsy /podiatry evaluation ,likely will require 4-6 weeks of iv abx . Patient was admitted to [facility] with b/l feet wounds and was followed by wound care team ,recently completed 7 days of doxycycline for foot wound cellulitis . (30 Mar 2023 05:21)        SUBJECTIVE: Patient feeling better .      ALLERGIES:  Allergies    No Known Allergies    Intolerances          MEDICATIONS  (STANDING):  budesonide 160 MICROgram(s)/formoterol 4.5 MICROgram(s) Inhaler 2 Puff(s) Inhalation two times a day  dextrose 50% Injectable 25 Gram(s) IV Push once  dextrose 50% Injectable 12.5 Gram(s) IV Push once  dextrose 50% Injectable 25 Gram(s) IV Push once  donepezil 5 milliGRAM(s) Oral at bedtime  DULoxetine 60 milliGRAM(s) Oral daily  glucagon  Injectable 1 milliGRAM(s) IntraMuscular once  insulin glargine Injectable (LANTUS) 8 Unit(s) SubCutaneous at bedtime  insulin lispro (ADMELOG) corrective regimen sliding scale   SubCutaneous three times a day before meals  insulin lispro (ADMELOG) corrective regimen sliding scale   SubCutaneous at bedtime  lactobacillus acidophilus 1 Tablet(s) Oral two times a day with meals  losartan 25 milliGRAM(s) Oral daily  metoprolol tartrate 50 milliGRAM(s) Oral two times a day  multivitamin 1 Tablet(s) Oral daily  pantoprazole   Suspension 40 milliGRAM(s) Oral daily  piperacillin/tazobactam IVPB.. 3.375 Gram(s) IV Intermittent every 8 hours  senna 2 Tablet(s) Oral at bedtime  simvastatin 40 milliGRAM(s) Oral at bedtime  tamsulosin 0.4 milliGRAM(s) Oral at bedtime    MEDICATIONS  (PRN):  acetaminophen     Tablet .. 650 milliGRAM(s) Oral every 6 hours PRN Temp greater or equal to 38C (100.4F), Mild Pain (1 - 3)  aluminum hydroxide/magnesium hydroxide/simethicone Suspension 30 milliLiter(s) Oral every 4 hours PRN Dyspepsia  bisacodyl Suppository 10 milliGRAM(s) Rectal daily PRN Constipation  dextrose Oral Gel 15 Gram(s) Oral once PRN Blood Glucose LESS THAN 70 milliGRAM(s)/deciliter  hydrALAZINE 50 milliGRAM(s) Oral every 6 hours PRN for systolic BP>160  magnesium hydroxide Suspension 30 milliLiter(s) Oral daily PRN Constipation  melatonin 3 milliGRAM(s) Oral at bedtime PRN Insomnia  ondansetron Injectable 4 milliGRAM(s) IV Push every 8 hours PRN Nausea and/or Vomiting  sodium chloride 0.9% lock flush 10 milliLiter(s) IV Push every 1 hour PRN Pre/post blood products, medications, blood draw, and to maintain line patency      REVIEW OF SYSTEMS:  CONSTITUTIONAL: No fever,  RESPIRATORY: No cough, wheezing, shortness of breath  CARDIOVASCULAR: No chest pain, dyspnea, palpitations, dizziness, syncope, paroxysmal nocturnal dyspnea, orthopnea, or arm or leg swelling  GASTROINTESTINAL: No abdominal  or epigastric pain, nausea, vomiting,  diarrhea  NEUROLOGICAL: No headaches, numbness, or tremors  Skin : No itching .  Hematology : No active bleeding .  Endocrinology : No heat and cold intolerance .  Psychiatry : Patient is confused .  Genitourinary : No dysuria .  Musculoskeletal : Patient has mild arthritis .              Vital Signs Last 24 Hrs  T(C): 36.4 (10 Apr 2023 20:37), Max: 36.7 (10 Apr 2023 12:07)  T(F): 97.6 (10 Apr 2023 20:37), Max: 98 (10 Apr 2023 12:07)  HR: 77 (10 Apr 2023 20:37) (65 - 88)  BP: 128/71 (10 Apr 2023 20:37) (79/49 - 145/83)  BP(mean): --  RR: 18 (10 Apr 2023 20:37) (16 - 20)  SpO2: 97% (10 Apr 2023 20:37) (93% - 100%)    Parameters below as of 10 Apr 2023 20:37  Patient On (Oxygen Delivery Method): nasal cannula  O2 Flow (L/min): 2      PHYSICAL EXAM:  HEAD:  Atraumatic, Normocephalic  NECK: Supple and normal thyroid.  No JVD or carotid bruit.   HEART: S1, S2 irregular , 1/6 soft ejection systolic murmur in mitral area , no thrill and no gallops .  PULMONARY: Bilateral vesicular breathing , few scattered rhonchi and few scattered rales are present .  ABDOMEN: Soft nontender and positive bowl sounds   EXTREMITIES:  No clubbing, cyanosis, or pedal  edema  NEUROLOGICAL: AA and confused  .  Skin : No rashes .  Musculoskeletal : No joint swellings .    LABS:                        9.4    7.13  )-----------( 300      ( 10 Apr 2023 05:10 )             30.1     04-10    139  |  111<H>  |  36<H>  ----------------------------<  230<H>  4.4   |  26  |  1.30    Ca    7.9<L>      10 Apr 2023 05:10  Phos  2.4     04-10  Mg     2.0     04-10    TPro  5.8<L>  /  Alb  2.0<L>  /  TBili  0.4  /  DBili  0.2  /  AST  14<L>  /  ALT  21  /  AlkPhos  60  04-10        PT/INR - ( 10 Apr 2023 05:10 )   PT: 15.8 sec;   INR: 1.35 ratio             Assessment/Plan  Patient has :  1) Left foot infection and toe osteomyelitis   2) Hypertension and stable .  3) DM .  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 4) Patient cardiac wise stable and cleared for foot surgery if needed . Benefits of surgery outweigh the risks . 5) Will hold Lovenox 18-24 hours prior to surgery 6) Increase metoprolol 50 mg twice a day .  Will continue to follow during hospital course and give further recommendations as needed . Thanks for your referral . if any questions please contact me at office ([phone] cell [phone])            KARIME WYNN MD [place] [id number]  [address]  OFFICE : [phone]  CELL : [phone]  CARDIOLOGY F/U :     HPI:  86 yo male ,[facility] resident with PMHx - COPD, DM, HTN, CAD, HLD, H/O TIA, dementia,GERD, BPH and depression sent ot ER for evaluation of left foot 1metatarsal wound ,suggestive of osteomyelitis .Patient was seen by ID consult and transfer to the hospital recommended for wound cx/bone biopsy /podiatry evaluation ,likely will require 4-6 weeks of iv abx . Patient was admitted to [facility] with b/l feet wounds and was followed by wound care team ,recently completed 7 days of doxycycline for foot wound cellulitis . (30 Mar 2023 05:21)        SUBJECTIVE: Patient feeling better .      ALLERGIES:  Allergies    No Known Allergies    Intolerances          MEDICATIONS  (STANDING):  budesonide 160 MICROgram(s)/formoterol 4.5 MICROgram(s) Inhaler 2 Puff(s) Inhalation two times a day  dextrose 50% Injectable 25 Gram(s) IV Push once  dextrose 50% Injectable 12.5 Gram(s) IV Push once  dextrose 50% Injectable 25 Gram(s) IV Push once  donepezil 5 milliGRAM(s) Oral at bedtime  DULoxetine 60 milliGRAM(s) Oral daily  glucagon  Injectable 1 milliGRAM(s) IntraMuscular once  insulin glargine Injectable (LANTUS) 8 Unit(s) SubCutaneous at bedtime  insulin lispro (ADMELOG) corrective regimen sliding scale   SubCutaneous three times a day before meals  insulin lispro (ADMELOG) corrective regimen sliding scale   SubCutaneous at bedtime  lactobacillus acidophilus 1 Tablet(s) Oral two times a day with meals  losartan 25 milliGRAM(s) Oral daily  metoprolol tartrate 50 milliGRAM(s) Oral two times a day  multivitamin 1 Tablet(s) Oral daily  pantoprazole   Suspension 40 milliGRAM(s) Oral daily  piperacillin/tazobactam IVPB.. 3.375 Gram(s) IV Intermittent every 8 hours  senna 2 Tablet(s) Oral at bedtime  simvastatin 40 milliGRAM(s) Oral at bedtime  tamsulosin 0.4 milliGRAM(s) Oral at bedtime    MEDICATIONS  (PRN):  acetaminophen     Tablet .. 650 milliGRAM(s) Oral every 6 hours PRN Temp greater or equal to 38C (100.4F), Mild Pain (1 - 3)  aluminum hydroxide/magnesium hydroxide/simethicone Suspension 30 milliLiter(s) Oral every 4 hours PRN Dyspepsia  bisacodyl Suppository 10 milliGRAM(s) Rectal daily PRN Constipation  dextrose Oral Gel 15 Gram(s) Oral once PRN Blood Glucose LESS THAN 70 milliGRAM(s)/deciliter  hydrALAZINE 50 milliGRAM(s) Oral every 6 hours PRN for systolic BP>160  magnesium hydroxide Suspension 30 milliLiter(s) Oral daily PRN Constipation  melatonin 3 milliGRAM(s) Oral at bedtime PRN Insomnia  ondansetron Injectable 4 milliGRAM(s) IV Push every 8 hours PRN Nausea and/or Vomiting  sodium chloride 0.9% lock flush 10 milliLiter(s) IV Push every 1 hour PRN Pre/post blood products, medications, blood draw, and to maintain line patency      REVIEW OF SYSTEMS:  CONSTITUTIONAL: No fever,  RESPIRATORY: No cough, wheezing, shortness of breath  CARDIOVASCULAR: No chest pain, dyspnea, palpitations, dizziness, syncope, paroxysmal nocturnal dyspnea, orthopnea, or arm or leg swelling  GASTROINTESTINAL: No abdominal  or epigastric pain, nausea, vomiting,  diarrhea  NEUROLOGICAL: No headaches, numbness, or tremors  Skin : No itching .  Hematology : No active bleeding .  Endocrinology : No heat and cold intolerance .  Psychiatry : Patient is confused .  Genitourinary : No dysuria .  Musculoskeletal : Patient has mild arthritis .              Vital Signs Last 24 Hrs  T(C): 36.4 (10 Apr 2023 20:37), Max: 36.7 (10 Apr 2023 12:07)  T(F): 97.6 (10 Apr 2023 20:37), Max: 98 (10 Apr 2023 12:07)  HR: 77 (10 Apr 2023 20:37) (65 - 88)  BP: 128/71 (10 Apr 2023 20:37) (79/49 - 145/83)  BP(mean): --  RR: 18 (10 Apr 2023 20:37) (16 - 20)  SpO2: 97% (10 Apr 2023 20:37) (93% - 100%)    Parameters below as of 10 Apr 2023 20:37  Patient On (Oxygen Delivery Method): nasal cannula  O2 Flow (L/min): 2      PHYSICAL EXAM:  HEAD:  Atraumatic, Normocephalic  NECK: Supple and normal thyroid.  No JVD or carotid bruit.   HEART: S1, S2 irregular , 1/6 soft ejection systolic murmur in mitral area , no thrill and no gallops .  PULMONARY: Bilateral vesicular breathing , few scattered rhonchi and few scattered rales are present .  ABDOMEN: Soft nontender and positive bowl sounds   EXTREMITIES:  No clubbing, cyanosis, or pedal  edema  NEUROLOGICAL: AA and confused  .  Skin : No rashes .  Musculoskeletal : No joint swellings .    LABS:                        9.4    7.13  )-----------( 300      ( 10 Apr 2023 05:10 )             30.1     04-10    139  |  111<H>  |  36<H>  ----------------------------<  230<H>  4.4   |  26  |  1.30    Ca    7.9<L>      10 Apr 2023 05:10  Phos  2.4     04-10  Mg     2.0     04-10    TPro  5.8<L>  /  Alb  2.0<L>  /  TBili  0.4  /  DBili  0.2  /  AST  14<L>  /  ALT  21  /  AlkPhos  60  04-10        PT/INR - ( 10 Apr 2023 05:10 )   PT: 15.8 sec;   INR: 1.35 ratio             Assessment/Plan  Patient has :  1) Left foot infection and toe osteomyelitis   2) Hypertension and stable .  3) DM .  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 4) Patient cardiac wise stable and cleared for foot surgery if needed . Benefits of surgery outweigh the risks . 5) Will hold Lovenox 18-24 hours prior to surgery 6) Increase metoprolol 50 mg twice a day .  Will continue to follow during hospital course and give further recommendations as needed . Thanks for your referral . if any questions please contact me at office ([phone] cell [phone])            KARIME WYNN MD [place] [id number]  [address]  OFFICE : [phone]  CELL : [phone]  CARDIOLOGY F/U :     HPI:  86 yo male ,[facility] resident with PMHx - COPD, DM, HTN, CAD, HLD, H/O TIA, dementia,GERD, BPH and depression sent ot ER for evaluation of left foot 1metatarsal wound ,suggestive of osteomyelitis .Patient was seen by ID consult and transfer to the hospital recommended for wound cx/bone biopsy /podiatry evaluation ,likely will require 4-6 weeks of iv abx . Patient was admitted to [facility] with b/l feet wounds and was followed by wound care team ,recently completed 7 days of doxycycline for foot wound cellulitis . (30 Mar 2023 05:21)        SUBJECTIVE: Patient feeling better .      ALLERGIES:  Allergies    No Known Allergies    Intolerances          MEDICATIONS  (STANDING):  budesonide 160 MICROgram(s)/formoterol 4.5 MICROgram(s) Inhaler 2 Puff(s) Inhalation two times a day  dextrose 50% Injectable 25 Gram(s) IV Push once  dextrose 50% Injectable 12.5 Gram(s) IV Push once  dextrose 50% Injectable 25 Gram(s) IV Push once  donepezil 5 milliGRAM(s) Oral at bedtime  DULoxetine 60 milliGRAM(s) Oral daily  glucagon  Injectable 1 milliGRAM(s) IntraMuscular once  insulin glargine Injectable (LANTUS) 8 Unit(s) SubCutaneous at bedtime  insulin lispro (ADMELOG) corrective regimen sliding scale   SubCutaneous three times a day before meals  insulin lispro (ADMELOG) corrective regimen sliding scale   SubCutaneous at bedtime  lactobacillus acidophilus 1 Tablet(s) Oral two times a day with meals  losartan 25 milliGRAM(s) Oral daily  metoprolol tartrate 50 milliGRAM(s) Oral two times a day  multivitamin 1 Tablet(s) Oral daily  pantoprazole   Suspension 40 milliGRAM(s) Oral daily  piperacillin/tazobactam IVPB.. 3.375 Gram(s) IV Intermittent every 8 hours  senna 2 Tablet(s) Oral at bedtime  simvastatin 40 milliGRAM(s) Oral at bedtime  tamsulosin 0.4 milliGRAM(s) Oral at bedtime    MEDICATIONS  (PRN):  acetaminophen     Tablet .. 650 milliGRAM(s) Oral every 6 hours PRN Temp greater or equal to 38C (100.4F), Mild Pain (1 - 3)  aluminum hydroxide/magnesium hydroxide/simethicone Suspension 30 milliLiter(s) Oral every 4 hours PRN Dyspepsia  bisacodyl Suppository 10 milliGRAM(s) Rectal daily PRN Constipation  dextrose Oral Gel 15 Gram(s) Oral once PRN Blood Glucose LESS THAN 70 milliGRAM(s)/deciliter  hydrALAZINE 50 milliGRAM(s) Oral every 6 hours PRN for systolic BP>160  magnesium hydroxide Suspension 30 milliLiter(s) Oral daily PRN Constipation  melatonin 3 milliGRAM(s) Oral at bedtime PRN Insomnia  ondansetron Injectable 4 milliGRAM(s) IV Push every 8 hours PRN Nausea and/or Vomiting  sodium chloride 0.9% lock flush 10 milliLiter(s) IV Push every 1 hour PRN Pre/post blood products, medications, blood draw, and to maintain line patency      REVIEW OF SYSTEMS:  CONSTITUTIONAL: No fever,  RESPIRATORY: No cough, wheezing, shortness of breath  CARDIOVASCULAR: No chest pain, dyspnea, palpitations, dizziness, syncope, paroxysmal nocturnal dyspnea, orthopnea, or arm or leg swelling  GASTROINTESTINAL: No abdominal  or epigastric pain, nausea, vomiting,  diarrhea  NEUROLOGICAL: No headaches, numbness, or tremors  Skin : No itching .  Hematology : No active bleeding .  Endocrinology : No heat and cold intolerance .  Psychiatry : Patient is confused .  Genitourinary : No dysuria .  Musculoskeletal : Patient has mild arthritis .              Vital Signs Last 24 Hrs  T(C): 36.4 (10 Apr 2023 20:37), Max: 36.7 (10 Apr 2023 12:07)  T(F): 97.6 (10 Apr 2023 20:37), Max: 98 (10 Apr 2023 12:07)  HR: 77 (10 Apr 2023 20:37) (65 - 88)  BP: 128/71 (10 Apr 2023 20:37) (79/49 - 145/83)  BP(mean): --  RR: 18 (10 Apr 2023 20:37) (16 - 20)  SpO2: 97% (10 Apr 2023 20:37) (93% - 100%)    Parameters below as of 10 Apr 2023 20:37  Patient On (Oxygen Delivery Method): nasal cannula  O2 Flow (L/min): 2      PHYSICAL EXAM:  HEAD:  Atraumatic, Normocephalic  NECK: Supple and normal thyroid.  No JVD or carotid bruit.   HEART: S1, S2 irregular , 1/6 soft ejection systolic murmur in mitral area , no thrill and no gallops .  PULMONARY: Bilateral vesicular breathing , few scattered rhonchi and few scattered rales are present .  ABDOMEN: Soft nontender and positive bowl sounds   EXTREMITIES:  No clubbing, cyanosis, or pedal  edema  NEUROLOGICAL: AA and confused  .  Skin : No rashes .  Musculoskeletal : No joint swellings .    LABS:                        9.4    7.13  )-----------( 300      ( 10 Apr 2023 05:10 )             30.1     04-10    139  |  111<H>  |  36<H>  ----------------------------<  230<H>  4.4   |  26  |  1.30    Ca    7.9<L>      10 Apr 2023 05:10  Phos  2.4     04-10  Mg     2.0     04-10    TPro  5.8<L>  /  Alb  2.0<L>  /  TBili  0.4  /  DBili  0.2  /  AST  14<L>  /  ALT  21  /  AlkPhos  60  04-10        PT/INR - ( 10 Apr 2023 05:10 )   PT: 15.8 sec;   INR: 1.35 ratio             Assessment/Plan  Patient has :  1) Left foot infection and toe osteomyelitis   2) Hypertension and stable .  3) DM .  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 4) Patient cardiac wise stable and cleared for foot surgery if needed . Benefits of surgery outweigh the risks . 5) Will hold Lovenox 18-24 hours prior to surgery 6) Increase metoprolol 50 mg twice a day .  Will continue to follow during hospital course and give further recommendations as needed . Thanks for your referral . if any questions please contact me at office ([phone] cell [phone])

## 2023-04-10 NOTE — BRIEF OPERATIVE NOTE - SPECIMENS
LEft foot first metatarsal head bone culture and pathology, and proximal margin pathology, left foot deep tissue culture

## 2023-04-10 NOTE — PROGRESS NOTE ADULT - NSPROGADDITIONALINFOA_GEN_ALL_CORE
Patient is medically optimized for podiatry surgery and cleared by cardiologist Dr.Raman Arevalo .NPO past midnight , hold Lovenox. D/w med staff on 1 east
Patient is medically optimized for podiatry surgery and cleared by cardiologist Dr.Raman Arevalo .NPO past midnight , hold Lovenox. D/w med staff on 1 east
Requires cardiac stabilization and clearance for OR on Monday -Dr Arevalo is contacted by me and med staff due to reported episode of VT .
Medically optimized for OR -podiatry surgery and cleared  by cardiologist

## 2023-04-10 NOTE — PROGRESS NOTE ADULT - SUBJECTIVE AND OBJECTIVE BOX
CHIEF COMPLAINT/ REASON FOR VISIT  .. Patient was seen to address the  issue listed under PROBLEM LIST which is located toward bottom of this note     ANA MARIA LEIGH    PLV 1EAS 101 W1    Allergies    No Known Allergies    Intolerances        PAST MEDICAL & SURGICAL HISTORY:  ASHD (arteriosclerotic heart disease)      BPH without urinary obstruction      COPD, moderate      Stage 3 chronic kidney disease      Chronic GERD      HLD (hyperlipidemia)      MDD (major depressive disorder)      Obstructive and reflux uropathy      Cellulitis      HTN (hypertension)      Sepsis      Dementia      Moderate protein-calorie malnutrition      Brain TIA      History of RSV infection      DM type 2, not at goal      Pressure ulcer of unspecified heel, unspecified stage      Venous stasis ulcer without varicose veins      Multiple open wounds of foot          FAMILY HISTORY:      Home Medications:  Admelog SoloStar 100 units/mL injectable solution: injectable 4 times a day (before meals and at bedtime) per sliding scale (30 Mar 2023 15:23)  Aricept 5 mg oral tablet: 1 tab(s) orally once a day (30 Mar 2023 15:23)  atenolol 25 mg oral tablet: 1 tab(s) orally once a day (30 Mar 2023 15:23)  Bacid (LAC) oral tablet: 1 tab(s) orally 2 times a day (30 Mar 2023 15:23)  Cymbalta 60 mg oral delayed release capsule: 1 cap(s) orally once a day (30 Mar 2023 15:23)  docusate sodium 100 mg oral capsule: 2 cap(s) orally once a day (at bedtime) (30 Mar 2023 15:23)  doxycycline hyclate 100 mg oral tablet: 1 tab(s) orally 2 times a day for 7 days  3/28/23-4/4/23 (30 Mar 2023 15:23)  Dulcolax Laxative 10 mg rectal suppository: 1 suppository(ies) rectally as needed for  constipation (30 Mar 2023 15:23)  famotidine 40 mg oral tablet: 1 tab(s) orally once a day (30 Mar 2023 15:23)  Fleet Enema 19 g-7 g rectal enema: 133 milliliter(s) rectally as needed for  constipation (30 Mar 2023 15:23)  Flovent 44 mcg/inh inhalation aerosol with adapter: 1 puff(s) inhaled every 12 hours (30 Mar 2023 15:23)  ipratropium-albuterol 0.5 mg-2.5 mg/3 mL inhalation solution: 3 milliliter(s) by nebulizer 4 times a day (30 Mar 2023 15:23)  losartan 50 mg oral tablet: 1 tab(s) orally once a day (30 Mar 2023 15:23)  Milk of Magnesia 8% oral suspension: 30 milliliter(s) orally once a day as needed for  constipation (30 Mar 2023 15:23)  Santyl 250 units/g topical ointment: Apply topically to affected area (30 Mar 2023 12:41)  simvastatin 40 mg oral tablet: 1 tab(s) orally once a day (at bedtime) (30 Mar 2023 15:23)  SITagliptin 50 mg oral tablet: 1 tab(s) orally once a day (30 Mar 2023 15:23)  tamsulosin 0.4 mg oral capsule: 1 cap(s) orally once a day (in the evening) (30 Mar 2023 15:23)  Therapeutic Multiple Vitamins oral tablet: 1 tab(s) orally once a day (30 Mar 2023 15:23)  traMADol 50 mg oral tablet: 0.5 tab(s) orally 2 times a day (30 Mar 2023 15:23)  Tylenol Caplet Extra Strength 500 mg oral tablet: 2 tab(s) orally every 8 hours (30 Mar 2023 15:23)      MEDICATIONS  (STANDING):  albuterol/ipratropium for Nebulization 3 milliLiter(s) Nebulizer every 8 hours  budesonide 160 MICROgram(s)/formoterol 4.5 MICROgram(s) Inhaler 2 Puff(s) Inhalation two times a day  chlorhexidine 4% Liquid 1 Application(s) Topical <User Schedule>  dextrose 5% + sodium chloride 0.45%. 1000 milliLiter(s) (40 mL/Hr) IV Continuous <Continuous>  dextrose 5%. 1000 milliLiter(s) (50 mL/Hr) IV Continuous <Continuous>  dextrose 5%. 1000 milliLiter(s) (100 mL/Hr) IV Continuous <Continuous>  dextrose 50% Injectable 25 Gram(s) IV Push once  dextrose 50% Injectable 12.5 Gram(s) IV Push once  dextrose 50% Injectable 25 Gram(s) IV Push once  donepezil 5 milliGRAM(s) Oral at bedtime  DULoxetine 60 milliGRAM(s) Oral daily  glucagon  Injectable 1 milliGRAM(s) IntraMuscular once  insulin glargine Injectable (LANTUS) 8 Unit(s) SubCutaneous at bedtime  insulin lispro (ADMELOG) corrective regimen sliding scale   SubCutaneous three times a day before meals  insulin lispro (ADMELOG) corrective regimen sliding scale   SubCutaneous at bedtime  lactobacillus acidophilus 1 Tablet(s) Oral two times a day with meals  losartan 25 milliGRAM(s) Oral daily  metoprolol tartrate 50 milliGRAM(s) Oral two times a day  multivitamin 1 Tablet(s) Oral daily  pantoprazole   Suspension 40 milliGRAM(s) Oral daily  piperacillin/tazobactam IVPB.. 3.375 Gram(s) IV Intermittent every 8 hours  senna 2 Tablet(s) Oral at bedtime  simvastatin 40 milliGRAM(s) Oral at bedtime  tamsulosin 0.4 milliGRAM(s) Oral at bedtime    MEDICATIONS  (PRN):  acetaminophen     Tablet .. 650 milliGRAM(s) Oral every 6 hours PRN Temp greater or equal to 38C (100.4F), Mild Pain (1 - 3)  aluminum hydroxide/magnesium hydroxide/simethicone Suspension 30 milliLiter(s) Oral every 4 hours PRN Dyspepsia  bisacodyl Suppository 10 milliGRAM(s) Rectal daily PRN Constipation  dextrose Oral Gel 15 Gram(s) Oral once PRN Blood Glucose LESS THAN 70 milliGRAM(s)/deciliter  hydrALAZINE 50 milliGRAM(s) Oral every 6 hours PRN for systolic BP>160  magnesium hydroxide Suspension 30 milliLiter(s) Oral daily PRN Constipation  melatonin 3 milliGRAM(s) Oral at bedtime PRN Insomnia  ondansetron Injectable 4 milliGRAM(s) IV Push every 8 hours PRN Nausea and/or Vomiting  sodium chloride 0.9% lock flush 10 milliLiter(s) IV Push every 1 hour PRN Pre/post blood products, medications, blood draw, and to maintain line patency      Diet, NPO after Midnight:      NPO Start Date: 09-Apr-2023,   NPO Start Time: 23:59  Except Medications (04-09-23 @ 09:32) [Active]          Vital Signs Last 24 Hrs  T(C): 36.6 (10 Apr 2023 05:33), Max: 36.6 (09 Apr 2023 11:50)  T(F): 97.8 (10 Apr 2023 05:33), Max: 97.9 (09 Apr 2023 11:50)  HR: 82 (10 Apr 2023 08:14) (77 - 98)  BP: 145/74 (10 Apr 2023 05:33) (115/61 - 145/74)  BP(mean): --  RR: 18 (10 Apr 2023 05:33) (18 - 18)  SpO2: 96% (10 Apr 2023 08:14) (93% - 97%)    Parameters below as of 10 Apr 2023 08:14  Patient On (Oxygen Delivery Method): room air          04-09-23 @ 07:01  -  04-10-23 @ 07:00  --------------------------------------------------------  IN: 1430.2 mL / OUT: 0 mL / NET: 1430.2 mL              LABS:                        9.4    7.13  )-----------( 300      ( 10 Apr 2023 05:10 )             30.1     04-10    139  |  111<H>  |  36<H>  ----------------------------<  230<H>  4.4   |  26  |  1.30    Ca    7.9<L>      10 Apr 2023 05:10  Phos  2.4     04-10  Mg     2.0     04-10    TPro  5.8<L>  /  Alb  2.0<L>  /  TBili  0.4  /  DBili  0.2  /  AST  14<L>  /  ALT  21  /  AlkPhos  60  04-10    PT/INR - ( 10 Apr 2023 05:10 )   PT: 15.8 sec;   INR: 1.35 ratio                   WBC:  WBC Count: 7.13 K/uL (04-10 @ 05:10)  WBC Count: 8.10 K/uL (04-09 @ 06:00)  WBC Count: 8.42 K/uL (04-07 @ 08:43)      MICROBIOLOGY:  RECENT CULTURES:              PT/INR - ( 10 Apr 2023 05:10 )   PT: 15.8 sec;   INR: 1.35 ratio             Sodium:  Sodium, Serum: 139 mmol/L (04-10 @ 05:10)  Sodium, Serum: 139 mmol/L (04-09 @ 06:00)  Sodium, Serum: 145 mmol/L (04-07 @ 08:43)      1.30 mg/dL 04-10 @ 05:10  1.30 mg/dL 04-09 @ 06:00  1.20 mg/dL 04-08 @ 06:14  1.30 mg/dL 04-07 @ 08:43      Hemoglobin:  Hemoglobin: 9.4 g/dL (04-10 @ 05:10)  Hemoglobin: 9.7 g/dL (04-09 @ 06:00)  Hemoglobin: 10.9 g/dL (04-07 @ 08:43)      Platelets: Platelet Count - Automated: 300 K/uL (04-10 @ 05:10)  Platelet Count - Automated: 289 K/uL (04-09 @ 06:00)  Platelet Count - Automated: 284 K/uL (04-07 @ 08:43)      LIVER FUNCTIONS - ( 10 Apr 2023 05:10 )  Alb: 2.0 g/dL / Pro: 5.8 g/dL / ALK PHOS: 60 U/L / ALT: 21 U/L / AST: 14 U/L / GGT: x                 RADIOLOGY & ADDITIONAL STUDIES:      MICROBIOLOGY:  RECENT CULTURES:

## 2023-04-10 NOTE — PROGRESS NOTE ADULT - SUBJECTIVE AND OBJECTIVE BOX
[facility]  INFECTIOUS DISEASES - Ruma Mayes, [place] [address]  Tel: [phone]     Fax: [phone]  =======================================================    DENISEANA MARIA VAN [id number]    Follow up: No fevers. Seen earlier today. Awake, appears comfortable.    Allergies:  No Known Allergies      Antibiotics:  acetaminophen     Tablet .. 650 milliGRAM(s) Oral every 6 hours PRN  aluminum hydroxide/magnesium hydroxide/simethicone Suspension 30 milliLiter(s) Oral every 4 hours PRN  bisacodyl Suppository 10 milliGRAM(s) Rectal daily PRN  budesonide 160 MICROgram(s)/formoterol 4.5 MICROgram(s) Inhaler 2 Puff(s) Inhalation two times a day  dextrose 50% Injectable 25 Gram(s) IV Push once  dextrose 50% Injectable 12.5 Gram(s) IV Push once  dextrose 50% Injectable 25 Gram(s) IV Push once  dextrose Oral Gel 15 Gram(s) Oral once PRN  donepezil 5 milliGRAM(s) Oral at bedtime  DULoxetine 60 milliGRAM(s) Oral daily  glucagon  Injectable 1 milliGRAM(s) IntraMuscular once  hydrALAZINE 50 milliGRAM(s) Oral every 6 hours PRN  insulin glargine Injectable (LANTUS) 8 Unit(s) SubCutaneous at bedtime  insulin lispro (ADMELOG) corrective regimen sliding scale   SubCutaneous three times a day before meals  insulin lispro (ADMELOG) corrective regimen sliding scale   SubCutaneous at bedtime  lactobacillus acidophilus 1 Tablet(s) Oral two times a day with meals  losartan 25 milliGRAM(s) Oral daily  magnesium hydroxide Suspension 30 milliLiter(s) Oral daily PRN  melatonin 3 milliGRAM(s) Oral at bedtime PRN  metoprolol tartrate 50 milliGRAM(s) Oral two times a day  multivitamin 1 Tablet(s) Oral daily  ondansetron Injectable 4 milliGRAM(s) IV Push every 8 hours PRN  pantoprazole   Suspension 40 milliGRAM(s) Oral daily  piperacillin/tazobactam IVPB.. 3.375 Gram(s) IV Intermittent every 8 hours  senna 2 Tablet(s) Oral at bedtime  simvastatin 40 milliGRAM(s) Oral at bedtime  sodium chloride 0.9% lock flush 10 milliLiter(s) IV Push every 1 hour PRN  tamsulosin 0.4 milliGRAM(s) Oral at bedtime       REVIEW OF SYSTEMS:  Unable to obtain 2/2 dementia     Physical Exam:  ICU Vital Signs Last 24 Hrs  T(C): 36.7 (10 Apr 2023 16:23), Max: 36.7 (10 Apr 2023 12:07)  T(F): 98 (10 Apr 2023 16:23), Max: 98 (10 Apr 2023 12:07)  HR: 79 (10 Apr 2023 16:23) (65 - 93)  BP: 117/67 (10 Apr 2023 16:23) (79/49 - 145/83)  BP(mean): --  ABP: --  ABP(mean): --  RR: 18 (10 Apr 2023 16:23) (16 - 20)  SpO2: 100% (10 Apr 2023 16:23) (93% - 100%)    O2 Parameters below as of 10 Apr 2023 16:23  Patient On (Oxygen Delivery Method): nasal cannula  O2 Flow (L/min): 2    GEN: NAD  HEENT: normocephalic and atraumatic.   NECK: Supple.   LUNGS: Normal respiratory effort  HEART: Regular rate and rhythm   ABDOMEN: Soft, nontender, and nondistended.    EXTREMITIES: No leg edema.  NEUROLOGIC: awake, not responding to most questions appropriately  SKIN: (+) L foot foot 1st metatarsal wound with probe to bone, bilateral heel wounds    Labs:  04-10    139  |  111<H>  |  36<H>  ----------------------------<  230<H>  4.4   |  26  |  1.30    Ca    7.9<L>      10 Apr 2023 05:10  Phos  2.4     04-10  Mg     2.0     04-10    TPro  5.8<L>  /  Alb  2.0<L>  /  TBili  0.4  /  DBili  0.2  /  AST  14<L>  /  ALT  21  /  AlkPhos  60  04-10                          9.4    7.13  )-----------( 300      ( 10 Apr 2023 05:10 )             30.1     PT/INR - ( 10 Apr 2023 05:10 )   PT: 15.8 sec;   INR: 1.35 ratio             LIVER FUNCTIONS - ( 10 Apr 2023 05:10 )  Alb: 2.0 g/dL / Pro: 5.8 g/dL / ALK PHOS: 60 U/L / ALT: 21 U/L / AST: 14 U/L / GGT: x             RECENT CULTURES:  04-02 @ 09:15 Clean Catch Clean Catch (Midstream) Enterococcus faecalis    >100,000 CFU/ml Enterococcus faecalis        03-29 @ 21:32 .Blood     No Growth Final        03-29 @ 21:20 .Blood     No Growth Final              All imaging and data are reviewed.     Assessment and Plan:       Isis Mancia MD  Division of infectious Diseases  Cell [phone] between 8am and 6pm  After 6pm and over the weekends please call ID service line at [phone].     55 minutes spent on total encounter assessing patient, examination, chart review, counseling and coordinating care by the attending physician/nurse/care manager.  [facility]  INFECTIOUS DISEASES - Ruma Mayes, [place] [address]  Tel: [phone]     Fax: [phone]  =======================================================    DENISEANA MARIA VAN [id number]    Follow up: No fevers. Seen earlier today. Awake, appears comfortable.    Allergies:  No Known Allergies      Antibiotics:  acetaminophen     Tablet .. 650 milliGRAM(s) Oral every 6 hours PRN  aluminum hydroxide/magnesium hydroxide/simethicone Suspension 30 milliLiter(s) Oral every 4 hours PRN  bisacodyl Suppository 10 milliGRAM(s) Rectal daily PRN  budesonide 160 MICROgram(s)/formoterol 4.5 MICROgram(s) Inhaler 2 Puff(s) Inhalation two times a day  dextrose 50% Injectable 25 Gram(s) IV Push once  dextrose 50% Injectable 12.5 Gram(s) IV Push once  dextrose 50% Injectable 25 Gram(s) IV Push once  dextrose Oral Gel 15 Gram(s) Oral once PRN  donepezil 5 milliGRAM(s) Oral at bedtime  DULoxetine 60 milliGRAM(s) Oral daily  glucagon  Injectable 1 milliGRAM(s) IntraMuscular once  hydrALAZINE 50 milliGRAM(s) Oral every 6 hours PRN  insulin glargine Injectable (LANTUS) 8 Unit(s) SubCutaneous at bedtime  insulin lispro (ADMELOG) corrective regimen sliding scale   SubCutaneous three times a day before meals  insulin lispro (ADMELOG) corrective regimen sliding scale   SubCutaneous at bedtime  lactobacillus acidophilus 1 Tablet(s) Oral two times a day with meals  losartan 25 milliGRAM(s) Oral daily  magnesium hydroxide Suspension 30 milliLiter(s) Oral daily PRN  melatonin 3 milliGRAM(s) Oral at bedtime PRN  metoprolol tartrate 50 milliGRAM(s) Oral two times a day  multivitamin 1 Tablet(s) Oral daily  ondansetron Injectable 4 milliGRAM(s) IV Push every 8 hours PRN  pantoprazole   Suspension 40 milliGRAM(s) Oral daily  piperacillin/tazobactam IVPB.. 3.375 Gram(s) IV Intermittent every 8 hours  senna 2 Tablet(s) Oral at bedtime  simvastatin 40 milliGRAM(s) Oral at bedtime  sodium chloride 0.9% lock flush 10 milliLiter(s) IV Push every 1 hour PRN  tamsulosin 0.4 milliGRAM(s) Oral at bedtime       REVIEW OF SYSTEMS:  Unable to obtain 2/2 dementia     Physical Exam:  ICU Vital Signs Last 24 Hrs  T(C): 36.7 (10 Apr 2023 16:23), Max: 36.7 (10 Apr 2023 12:07)  T(F): 98 (10 Apr 2023 16:23), Max: 98 (10 Apr 2023 12:07)  HR: 79 (10 Apr 2023 16:23) (65 - 93)  BP: 117/67 (10 Apr 2023 16:23) (79/49 - 145/83)  BP(mean): --  ABP: --  ABP(mean): --  RR: 18 (10 Apr 2023 16:23) (16 - 20)  SpO2: 100% (10 Apr 2023 16:23) (93% - 100%)    O2 Parameters below as of 10 Apr 2023 16:23  Patient On (Oxygen Delivery Method): nasal cannula  O2 Flow (L/min): 2    GEN: NAD  HEENT: normocephalic and atraumatic.   NECK: Supple.   LUNGS: Normal respiratory effort  HEART: Regular rate and rhythm   ABDOMEN: Soft, nontender, and nondistended.    EXTREMITIES: No leg edema.  NEUROLOGIC: awake, not responding to most questions appropriately  SKIN: (+) L foot foot 1st metatarsal wound with probe to bone, bilateral heel wounds    Labs:  04-10    139  |  111<H>  |  36<H>  ----------------------------<  230<H>  4.4   |  26  |  1.30    Ca    7.9<L>      10 Apr 2023 05:10  Phos  2.4     04-10  Mg     2.0     04-10    TPro  5.8<L>  /  Alb  2.0<L>  /  TBili  0.4  /  DBili  0.2  /  AST  14<L>  /  ALT  21  /  AlkPhos  60  04-10                          9.4    7.13  )-----------( 300      ( 10 Apr 2023 05:10 )             30.1     PT/INR - ( 10 Apr 2023 05:10 )   PT: 15.8 sec;   INR: 1.35 ratio             LIVER FUNCTIONS - ( 10 Apr 2023 05:10 )  Alb: 2.0 g/dL / Pro: 5.8 g/dL / ALK PHOS: 60 U/L / ALT: 21 U/L / AST: 14 U/L / GGT: x             RECENT CULTURES:  04-02 @ 09:15 Clean Catch Clean Catch (Midstream) Enterococcus faecalis    >100,000 CFU/ml Enterococcus faecalis        03-29 @ 21:32 .Blood     No Growth Final        03-29 @ 21:20 .Blood     No Growth Final              All imaging and data are reviewed.     Assessment and Plan:       Isis Mancia MD  Division of infectious Diseases  Cell [phone] between 8am and 6pm  After 6pm and over the weekends please call ID service line at [phone].     55 minutes spent on total encounter assessing patient, examination, chart review, counseling and coordinating care by the attending physician/nurse/care manager.  [facility]  INFECTIOUS DISEASES - Ruma Mayes, [place] [address]  Tel: [phone]     Fax: [phone]  =======================================================    DENISEANA MARIA VAN [id number]    Follow up: No fevers. Seen earlier today. Awake, appears comfortable.    Allergies:  No Known Allergies      Antibiotics:  acetaminophen     Tablet .. 650 milliGRAM(s) Oral every 6 hours PRN  aluminum hydroxide/magnesium hydroxide/simethicone Suspension 30 milliLiter(s) Oral every 4 hours PRN  bisacodyl Suppository 10 milliGRAM(s) Rectal daily PRN  budesonide 160 MICROgram(s)/formoterol 4.5 MICROgram(s) Inhaler 2 Puff(s) Inhalation two times a day  dextrose 50% Injectable 25 Gram(s) IV Push once  dextrose 50% Injectable 12.5 Gram(s) IV Push once  dextrose 50% Injectable 25 Gram(s) IV Push once  dextrose Oral Gel 15 Gram(s) Oral once PRN  donepezil 5 milliGRAM(s) Oral at bedtime  DULoxetine 60 milliGRAM(s) Oral daily  glucagon  Injectable 1 milliGRAM(s) IntraMuscular once  hydrALAZINE 50 milliGRAM(s) Oral every 6 hours PRN  insulin glargine Injectable (LANTUS) 8 Unit(s) SubCutaneous at bedtime  insulin lispro (ADMELOG) corrective regimen sliding scale   SubCutaneous three times a day before meals  insulin lispro (ADMELOG) corrective regimen sliding scale   SubCutaneous at bedtime  lactobacillus acidophilus 1 Tablet(s) Oral two times a day with meals  losartan 25 milliGRAM(s) Oral daily  magnesium hydroxide Suspension 30 milliLiter(s) Oral daily PRN  melatonin 3 milliGRAM(s) Oral at bedtime PRN  metoprolol tartrate 50 milliGRAM(s) Oral two times a day  multivitamin 1 Tablet(s) Oral daily  ondansetron Injectable 4 milliGRAM(s) IV Push every 8 hours PRN  pantoprazole   Suspension 40 milliGRAM(s) Oral daily  piperacillin/tazobactam IVPB.. 3.375 Gram(s) IV Intermittent every 8 hours  senna 2 Tablet(s) Oral at bedtime  simvastatin 40 milliGRAM(s) Oral at bedtime  sodium chloride 0.9% lock flush 10 milliLiter(s) IV Push every 1 hour PRN  tamsulosin 0.4 milliGRAM(s) Oral at bedtime       REVIEW OF SYSTEMS:  Unable to obtain 2/2 dementia     Physical Exam:  ICU Vital Signs Last 24 Hrs  T(C): 36.7 (10 Apr 2023 16:23), Max: 36.7 (10 Apr 2023 12:07)  T(F): 98 (10 Apr 2023 16:23), Max: 98 (10 Apr 2023 12:07)  HR: 79 (10 Apr 2023 16:23) (65 - 93)  BP: 117/67 (10 Apr 2023 16:23) (79/49 - 145/83)  BP(mean): --  ABP: --  ABP(mean): --  RR: 18 (10 Apr 2023 16:23) (16 - 20)  SpO2: 100% (10 Apr 2023 16:23) (93% - 100%)    O2 Parameters below as of 10 Apr 2023 16:23  Patient On (Oxygen Delivery Method): nasal cannula  O2 Flow (L/min): 2    GEN: NAD  HEENT: normocephalic and atraumatic.   NECK: Supple.   LUNGS: Normal respiratory effort  HEART: Regular rate and rhythm   ABDOMEN: Soft, nontender, and nondistended.    EXTREMITIES: No leg edema.  NEUROLOGIC: awake, not responding to most questions appropriately  SKIN: (+) L foot foot 1st metatarsal wound with probe to bone, bilateral heel wounds    Labs:  04-10    139  |  111<H>  |  36<H>  ----------------------------<  230<H>  4.4   |  26  |  1.30    Ca    7.9<L>      10 Apr 2023 05:10  Phos  2.4     04-10  Mg     2.0     04-10    TPro  5.8<L>  /  Alb  2.0<L>  /  TBili  0.4  /  DBili  0.2  /  AST  14<L>  /  ALT  21  /  AlkPhos  60  04-10                          9.4    7.13  )-----------( 300      ( 10 Apr 2023 05:10 )             30.1     PT/INR - ( 10 Apr 2023 05:10 )   PT: 15.8 sec;   INR: 1.35 ratio             LIVER FUNCTIONS - ( 10 Apr 2023 05:10 )  Alb: 2.0 g/dL / Pro: 5.8 g/dL / ALK PHOS: 60 U/L / ALT: 21 U/L / AST: 14 U/L / GGT: x             RECENT CULTURES:  04-02 @ 09:15 Clean Catch Clean Catch (Midstream) Enterococcus faecalis    >100,000 CFU/ml Enterococcus faecalis        03-29 @ 21:32 .Blood     No Growth Final        03-29 @ 21:20 .Blood     No Growth Final              All imaging and data are reviewed.     Assessment and Plan:       Isis Mancia MD  Division of infectious Diseases  Cell [phone] between 8am and 6pm  After 6pm and over the weekends please call ID service line at [phone].     55 minutes spent on total encounter assessing patient, examination, chart review, counseling and coordinating care by the attending physician/nurse/care manager.

## 2023-04-11 LAB
ALBUMIN SERPL ELPH-MCNC: 2.2 G/DL — LOW (ref 3.3–5)
ALP SERPL-CCNC: 72 U/L — SIGNIFICANT CHANGE UP (ref 40–120)
ALT FLD-CCNC: 22 U/L — SIGNIFICANT CHANGE UP (ref 12–78)
ANION GAP SERPL CALC-SCNC: 4 MMOL/L — LOW (ref 5–17)
AST SERPL-CCNC: 16 U/L — SIGNIFICANT CHANGE UP (ref 15–37)
BILIRUB DIRECT SERPL-MCNC: 0.2 MG/DL — SIGNIFICANT CHANGE UP (ref 0–0.3)
BILIRUB INDIRECT FLD-MCNC: 0.3 MG/DL — SIGNIFICANT CHANGE UP (ref 0.2–1)
BILIRUB SERPL-MCNC: 0.5 MG/DL — SIGNIFICANT CHANGE UP (ref 0.2–1.2)
BUN SERPL-MCNC: 29 MG/DL — HIGH (ref 7–23)
CALCIUM SERPL-MCNC: 8.5 MG/DL — SIGNIFICANT CHANGE UP (ref 8.5–10.1)
CHLORIDE SERPL-SCNC: 112 MMOL/L — HIGH (ref 96–108)
CO2 SERPL-SCNC: 24 MMOL/L — SIGNIFICANT CHANGE UP (ref 22–31)
CREAT SERPL-MCNC: 1.3 MG/DL — SIGNIFICANT CHANGE UP (ref 0.5–1.3)
EGFR: 53 ML/MIN/1.73M2 — LOW
GLUCOSE SERPL-MCNC: 193 MG/DL — HIGH (ref 70–99)
HCT VFR BLD CALC: 31.7 % — LOW (ref 39–50)
HGB BLD-MCNC: 9.8 G/DL — LOW (ref 13–17)
MCHC RBC-ENTMCNC: 28.8 PG — SIGNIFICANT CHANGE UP (ref 27–34)
MCHC RBC-ENTMCNC: 30.9 GM/DL — LOW (ref 32–36)
MCV RBC AUTO: 93.2 FL — SIGNIFICANT CHANGE UP (ref 80–100)
NRBC # BLD: 0 /100 WBCS — SIGNIFICANT CHANGE UP (ref 0–0)
PLATELET # BLD AUTO: 339 K/UL — SIGNIFICANT CHANGE UP (ref 150–400)
POTASSIUM SERPL-MCNC: 4.1 MMOL/L — SIGNIFICANT CHANGE UP (ref 3.5–5.3)
POTASSIUM SERPL-SCNC: 4.1 MMOL/L — SIGNIFICANT CHANGE UP (ref 3.5–5.3)
PROT SERPL-MCNC: 6.4 G/DL — SIGNIFICANT CHANGE UP (ref 6–8.3)
RBC # BLD: 3.4 M/UL — LOW (ref 4.2–5.8)
RBC # FLD: 18 % — HIGH (ref 10.3–14.5)
SARS-COV-2 RNA SPEC QL NAA+PROBE: SIGNIFICANT CHANGE UP
SODIUM SERPL-SCNC: 140 MMOL/L — SIGNIFICANT CHANGE UP (ref 135–145)
WBC # BLD: 9.38 K/UL — SIGNIFICANT CHANGE UP (ref 3.8–10.5)
WBC # FLD AUTO: 9.38 K/UL — SIGNIFICANT CHANGE UP (ref 3.8–10.5)

## 2023-04-11 PROCEDURE — 99024 POSTOP FOLLOW-UP VISIT: CPT

## 2023-04-11 RX ORDER — METOPROLOL TARTRATE 50 MG
50 TABLET ORAL EVERY 8 HOURS
Refills: 0 | Status: DISCONTINUED | OUTPATIENT
Start: 2023-04-11 | End: 2023-04-12

## 2023-04-11 RX ADMIN — Medication 1 TABLET(S): at 17:05

## 2023-04-11 RX ADMIN — PIPERACILLIN AND TAZOBACTAM 25 GRAM(S): 4; .5 INJECTION, POWDER, LYOPHILIZED, FOR SOLUTION INTRAVENOUS at 14:29

## 2023-04-11 RX ADMIN — INSULIN GLARGINE 8 UNIT(S): 100 INJECTION, SOLUTION SUBCUTANEOUS at 21:16

## 2023-04-11 RX ADMIN — BUDESONIDE AND FORMOTEROL FUMARATE DIHYDRATE 2 PUFF(S): 160; 4.5 AEROSOL RESPIRATORY (INHALATION) at 17:05

## 2023-04-11 RX ADMIN — Medication 2: at 17:04

## 2023-04-11 RX ADMIN — PIPERACILLIN AND TAZOBACTAM 25 GRAM(S): 4; .5 INJECTION, POWDER, LYOPHILIZED, FOR SOLUTION INTRAVENOUS at 05:21

## 2023-04-11 RX ADMIN — SIMVASTATIN 40 MILLIGRAM(S): 20 TABLET, FILM COATED ORAL at 21:17

## 2023-04-11 RX ADMIN — Medication 50 MILLIGRAM(S): at 14:29

## 2023-04-11 RX ADMIN — Medication 50 MILLIGRAM(S): at 05:21

## 2023-04-11 RX ADMIN — SENNA PLUS 2 TABLET(S): 8.6 TABLET ORAL at 21:17

## 2023-04-11 RX ADMIN — TAMSULOSIN HYDROCHLORIDE 0.4 MILLIGRAM(S): 0.4 CAPSULE ORAL at 21:17

## 2023-04-11 RX ADMIN — PANTOPRAZOLE SODIUM 40 MILLIGRAM(S): 20 TABLET, DELAYED RELEASE ORAL at 11:49

## 2023-04-11 RX ADMIN — Medication 1 TABLET(S): at 08:14

## 2023-04-11 RX ADMIN — DULOXETINE HYDROCHLORIDE 60 MILLIGRAM(S): 30 CAPSULE, DELAYED RELEASE ORAL at 11:49

## 2023-04-11 RX ADMIN — BUDESONIDE AND FORMOTEROL FUMARATE DIHYDRATE 2 PUFF(S): 160; 4.5 AEROSOL RESPIRATORY (INHALATION) at 07:00

## 2023-04-11 RX ADMIN — DONEPEZIL HYDROCHLORIDE 5 MILLIGRAM(S): 10 TABLET, FILM COATED ORAL at 21:17

## 2023-04-11 RX ADMIN — Medication 1 TABLET(S): at 11:49

## 2023-04-11 RX ADMIN — Medication 50 MILLIGRAM(S): at 21:17

## 2023-04-11 RX ADMIN — Medication 2: at 11:49

## 2023-04-11 RX ADMIN — LOSARTAN POTASSIUM 25 MILLIGRAM(S): 100 TABLET, FILM COATED ORAL at 05:22

## 2023-04-11 RX ADMIN — PIPERACILLIN AND TAZOBACTAM 25 GRAM(S): 4; .5 INJECTION, POWDER, LYOPHILIZED, FOR SOLUTION INTRAVENOUS at 21:16

## 2023-04-11 RX ADMIN — Medication 4: at 08:14

## 2023-04-11 NOTE — PROGRESS NOTE ADULT - ASSESSMENT
88 yo male ,[facility] resident with PMHx - COPD, DM, HTN, CAD, HLD, H/O TIA, dementia,GERD, BPH and depression sent ot ER for evaluation of left foot 1metatarsal wound ,suggestive of osteomyelitis .Patient was seen by ID consult and transfer to the hospital recommended for wound cx/bone biopsy /podiatry evaluation ,likely will require 4-6 weeks of iv abx . Patient was admitted to [facility] with b/l feet wounds and was followed by wound care team ,recently completed 7 days of doxycycline for foot wound cellulitis . (30 Mar 2023 05:21)      hypernatremia   improved      hypokalemia potassium chloride  10 mEq/100 mL IVPB 10 milliEquivalent(s) IV Intermittent every 1 hour  prn     ACUTE RENAL FAILURE: sodium chloride 0.45%. 1000 milliLiter(s) (50 mL/Hr) IV Continuous  Serum creatinine is improving    There is no progression . No uremic symptoms  No evidence of anemia .  Fluid status stable.  Will continue to avoid nephrotoxic drugs.  Patient remains asymptomatic.   Continue current therapy.  hold  diuretic.        BP monitoring,continue current antihypertensive meds, low salt diet,followup with PMD in 1-2 weeks  losartan 25 milliGRAM(s) Oral daily    f/u  blood and urine cx,serial lactate levels,monitor vitals clement saenz hydration,monitor urine output and renal profile,iv abx   piperacillin/tazobactam IVPB.. 3.375 Gram(s) IV Intermittent every 8 hours 88 yo male ,[facility] resident with PMHx - COPD, DM, HTN, CAD, HLD, H/O TIA, dementia,GERD, BPH and depression sent ot ER for evaluation of left foot 1metatarsal wound ,suggestive of osteomyelitis .Patient was seen by ID consult and transfer to the hospital recommended for wound cx/bone biopsy /podiatry evaluation ,likely will require 4-6 weeks of iv abx . Patient was admitted to [facility] with b/l feet wounds and was followed by wound care team ,recently completed 7 days of doxycycline for foot wound cellulitis . (30 Mar 2023 05:21)      hypernatremia   improved      hypokalemia potassium chloride  10 mEq/100 mL IVPB 10 milliEquivalent(s) IV Intermittent every 1 hour  prn     ACUTE RENAL FAILURE: sodium chloride 0.45%. 1000 milliLiter(s) (50 mL/Hr) IV Continuous  Serum creatinine is improving    There is no progression . No uremic symptoms  No evidence of anemia .  Fluid status stable.  Will continue to avoid nephrotoxic drugs.  Patient remains asymptomatic.   Continue current therapy.  hold  diuretic.        BP monitoring,continue current antihypertensive meds, low salt diet,followup with PMD in 1-2 weeks  losartan 25 milliGRAM(s) Oral daily    f/u  blood and urine cx,serial lactate levels,monitor vitals clement saenz hydration,monitor urine output and renal profile,iv abx   piperacillin/tazobactam IVPB.. 3.375 Gram(s) IV Intermittent every 8 hours 88 yo male ,[facility] resident with PMHx - COPD, DM, HTN, CAD, HLD, H/O TIA, dementia,GERD, BPH and depression sent ot ER for evaluation of left foot 1metatarsal wound ,suggestive of osteomyelitis .Patient was seen by ID consult and transfer to the hospital recommended for wound cx/bone biopsy /podiatry evaluation ,likely will require 4-6 weeks of iv abx . Patient was admitted to [facility] with b/l feet wounds and was followed by wound care team ,recently completed 7 days of doxycycline for foot wound cellulitis . (30 Mar 2023 05:21)      hypernatremia   improved      hypokalemia potassium chloride  10 mEq/100 mL IVPB 10 milliEquivalent(s) IV Intermittent every 1 hour  prn     ACUTE RENAL FAILURE: sodium chloride 0.45%. 1000 milliLiter(s) (50 mL/Hr) IV Continuous  Serum creatinine is improving    There is no progression . No uremic symptoms  No evidence of anemia .  Fluid status stable.  Will continue to avoid nephrotoxic drugs.  Patient remains asymptomatic.   Continue current therapy.  hold  diuretic.        BP monitoring,continue current antihypertensive meds, low salt diet,followup with PMD in 1-2 weeks  losartan 25 milliGRAM(s) Oral daily    f/u  blood and urine cx,serial lactate levels,monitor vitals clement saenz hydration,monitor urine output and renal profile,iv abx   piperacillin/tazobactam IVPB.. 3.375 Gram(s) IV Intermittent every 8 hours

## 2023-04-11 NOTE — SWALLOW BEDSIDE ASSESSMENT ADULT - SWALLOW EVAL: CRITERIA FOR SKILLED INTERVENTION MET
demonstrates skilled criteria for swallowing intervention
not appropriate for swallowing intervention

## 2023-04-11 NOTE — SWALLOW BEDSIDE ASSESSMENT ADULT - SWALLOW EVAL: RECOMMENDED FEEDING/EATING TECHNIQUES
allow for swallow between intakes/check mouth frequently for oral residue/pocketing/crush medication (when feasible)/maintain upright posture during/after eating for 30 mins/oral hygiene/position upright (90 degrees)/small sips/bites
alternate food with liquid/crush medication (when feasible)/position upright (90 degrees)/small sips/bites

## 2023-04-11 NOTE — PROGRESS NOTE ADULT - SUBJECTIVE AND OBJECTIVE BOX
"PODIATRY NOTE    CHIEF COMPLAINT  Chief Complaint   Patient presents with    Wound Care     left foot callous/ulcer no pain, redness, or drainage        HPI  SUBJECTIVE: Flory Cam is a 72 y.o. female w/ PMH of PVD, Lupus, hx of intermittent leg claudifcation, who is here today for f/u. She notes improvement in symptoms with oral antibiotics and warm epsom salt soaks. Ultrasound notes bilateral severe PVD with left SFA occlusion. She is scheduled for aortogram on 5/2/17 with Dr. Cohen. She has no further complaints.     PCP: Dr. Tayler Ovalle, DO  / Last visit:2/21/17    HgA1c:   Hemoglobin A1C   Date Value Ref Range Status   09/14/2016 6.2 4.5 - 6.2 % Final     Comment:     According to ADA guidelines, hemoglobin A1C <7.0% represents  optimal control in non-pregnant diabetic patients.  Different  metrics may apply to specific populations.   Standards of Medical Care in Diabetes - 2016.  For the purpose of screening for the presence of diabetes:  <5.7%     Consistent with the absence of diabetes  5.7-6.4%  Consistent with increasing risk for diabetes   (prediabetes)  >or=6.5%  Consistent with diabetes  Currently no consensus exists for use of hemoglobin A1C  for diagnosis of diabetes for children.     04/13/2010 5.9 4.0 - 6.2 % Final       REVIEW OF SYSTEMS  General: Denies any fever or chills  Chest: Denies shortness of breath, wheezing, coughing, or sputum production  Heart: Denies chest pain.  As noted above and per history of current illness above, otherwise negative in the remainder of the 14 systems.     PHYSICAL EXAM  Vitals:    04/28/17 1059   Weight: 99.2 kg (218 lb 11.1 oz)   Height: 5' 7" (1.702 m)       GEN:  This patient is well-developed, well-nourished and appears stated age, well-oriented to person, place and time, and cooperative and pleasant on today's visit.      LOWER EXTREMITY  Vascular:   · DP pedal pulse 0/4 b/l, PT pedal pulse 1/4 RIGHT  · Skin temperature warm to warm from prox to " distally  · CFT <5 secs b/l  · There is LE edema noted b/l.     Dermatologic:   · Thickened, dystrophic, elongated toenails with subungal debris 1-5 b/l.   · Webspaces are C/D/I B/L.  · Left foot hyperkeratotic tissue distal shaft 4th/5th metatarsal, post debridement sanguineous drainage   · Skin texture and turgor dry with hyperkeratosis diffusely b/l plantar heel   · There is no pedal hair growth noted    Neurologic:  · Vibratory sensation diminished at level of Hallux IPJ b/l    · Protective sensation absent at 0/10 sites upon examination with Stoddard Weinsten 5.07 g monofilament.   · Propioception intact at 1st MTPJ b/l.   · Achilles and patellar deep tendon reflexes intact  · Babinski reflex absent b/l. Light touch and sharp/dull sensation intact b/l.    Musculoskeletal/Orthopedic:  · No symptomatic structural abnormalities noted.   · Muscle strength is 5/5 for foot inverters, everters, plantarflexors, and dorsiflexors. Muscle tone is normal.  · Pain free range of motion in all four quadrants with stiffness and limitation b/l  · PAIN with palpation of callus plantar medial arch, LEFT      ASSESSMENT  Abscess of left foot  -     Place in Outpatient; Standing  -     Vital signs; Standing  -     Strict bedrest; Standing  -     Insert peripheral IV; Standing  -     Height and weight; Standing  -     POCT glucose; Standing  -     Verify surgical site; Standing  -     Verify informed consent; Standing  -     Void; Standing  -     Clip and prep; Standing  -     Notify physician ; Standing  -     Pulse Oximetry Q4H; Standing  -     Consult to Anesthesiology; Standing  -     Case Request Operating Room: INCISION AND DRAINAGE (I&D), FOOT    PVD (peripheral vascular disease)  -     Place in Outpatient; Standing  -     Vital signs; Standing  -     Strict bedrest; Standing  -     Insert peripheral IV; Standing  -     Height and weight; Standing  -     POCT glucose; Standing  -     Verify surgical site; Standing  -     Verify  informed consent; Standing  -     Void; Standing  -     Clip and prep; Standing  -     Notify physician ; Standing  -     Pulse Oximetry Q4H; Standing  -     Consult to Anesthesiology; Standing  -     Case Request Operating Room: INCISION AND DRAINAGE (I&D), FOOT    Other orders  -     lidocaine (PF) 10 mg/ml (1%) injection 10 mg; Inject 1 mL (10 mg total) into the skin once.  -     ceFAZolin (ANCEF) 2 g in dextrose 5 % 100 mL IVPB; Inject 2 g into the vein On call Procedure (Surgery).      Plan:  -Discuss presenting problems, etiology, pathologic processes and management options with patient today.   -continue with warm epsom soaks, will schedule surgery case for incision and drainage left foot after angiogram    Future Appointments  Date Time Provider Department Center   4/28/2017 1:30 PM LABORATORY, [facility]   5/1/2017 10:00 [facility]   5/3/2017 2:30 PM PODIATRY NURSE, [facility] POD [place]   5/5/2017 2:45 PM Christopher Cohen MD [facility] CARDIO [place]         Report Electronically Signed By:  Melva Orellana DPM   Podiatric Medicine & Surgery  [facility]  4/28/2017               PROGRESS NOTE  Patient is a 87y old  Male who presents with a chief complaint of left foot infection (11 Apr 2023 10:30)  Chart and available morning labs /imaging are reviewed electronically , urgent issues addressed . More information  is being added upon completion of rounds , when more information is collected and management discussed with consultants , medical staff and social service/case management on the floor     OVERNIGHT  17 beats of VT  reported by medical staff ,CARDIOLOGIST  is informed . All above noted Patient is resting in a bed comfortably .Confused ,poor mentation .No distress noted S/p sx 04/10    HPI:  86 yo male ,[facility] resident with PMHx - COPD, DM, HTN, CAD, HLD, H/O TIA, dementia,GERD, BPH and depression sent ot ER for evaluation of left foot 1metatarsal wound ,suggestive of osteomyelitis .Patient was seen by ID consult and transfer to the hospital recommended for wound cx/bone biopsy /podiatry evaluation ,likely will require 4-6 weeks of iv abx . Patient was admitted to [facility] with b/l feet wounds and was followed by wound care team ,recently completed 7 days of doxycycline for foot wound cellulitis . (30 Mar 2023 05:21)    PAST MEDICAL & SURGICAL HISTORY:  ASHD (arteriosclerotic heart disease)      BPH without urinary obstruction      COPD, moderate      Stage 3 chronic kidney disease      Chronic GERD      HLD (hyperlipidemia)      MDD (major depressive disorder)      Obstructive and reflux uropathy      Cellulitis      HTN (hypertension)      Sepsis      Dementia      Moderate protein-calorie malnutrition      Brain TIA      History of RSV infection      DM type 2, not at goal      Pressure ulcer of unspecified heel, unspecified stage      Venous stasis ulcer without varicose veins      Multiple open wounds of foot          MEDICATIONS  (STANDING):  budesonide 160 MICROgram(s)/formoterol 4.5 MICROgram(s) Inhaler 2 Puff(s) Inhalation two times a day  dextrose 50% Injectable 25 Gram(s) IV Push once  dextrose 50% Injectable 12.5 Gram(s) IV Push once  dextrose 50% Injectable 25 Gram(s) IV Push once  donepezil 5 milliGRAM(s) Oral at bedtime  DULoxetine 60 milliGRAM(s) Oral daily  glucagon  Injectable 1 milliGRAM(s) IntraMuscular once  insulin glargine Injectable (LANTUS) 8 Unit(s) SubCutaneous at bedtime  insulin lispro (ADMELOG) corrective regimen sliding scale   SubCutaneous three times a day before meals  insulin lispro (ADMELOG) corrective regimen sliding scale   SubCutaneous at bedtime  lactobacillus acidophilus 1 Tablet(s) Oral two times a day with meals  losartan 25 milliGRAM(s) Oral daily  metoprolol tartrate 50 milliGRAM(s) Oral two times a day  multivitamin 1 Tablet(s) Oral daily  pantoprazole   Suspension 40 milliGRAM(s) Oral daily  piperacillin/tazobactam IVPB.. 3.375 Gram(s) IV Intermittent every 8 hours  senna 2 Tablet(s) Oral at bedtime  simvastatin 40 milliGRAM(s) Oral at bedtime  tamsulosin 0.4 milliGRAM(s) Oral at bedtime    MEDICATIONS  (PRN):  acetaminophen     Tablet .. 650 milliGRAM(s) Oral every 6 hours PRN Temp greater or equal to 38C (100.4F), Mild Pain (1 - 3)  aluminum hydroxide/magnesium hydroxide/simethicone Suspension 30 milliLiter(s) Oral every 4 hours PRN Dyspepsia  bisacodyl Suppository 10 milliGRAM(s) Rectal daily PRN Constipation  dextrose Oral Gel 15 Gram(s) Oral once PRN Blood Glucose LESS THAN 70 milliGRAM(s)/deciliter  hydrALAZINE 50 milliGRAM(s) Oral every 6 hours PRN for systolic BP>160  magnesium hydroxide Suspension 30 milliLiter(s) Oral daily PRN Constipation  melatonin 3 milliGRAM(s) Oral at bedtime PRN Insomnia  ondansetron Injectable 4 milliGRAM(s) IV Push every 8 hours PRN Nausea and/or Vomiting  sodium chloride 0.9% lock flush 10 milliLiter(s) IV Push every 1 hour PRN Pre/post blood products, medications, blood draw, and to maintain line patency      OBJECTIVE    T(C): 37 (04-11-23 @ 13:03), Max: 37 (04-11-23 @ 13:03)  HR: 81 (04-11-23 @ 13:03) (65 - 89)  BP: 149/72 (04-11-23 @ 13:03) (79/49 - 149/72)  RR: 17 (04-11-23 @ 13:03) (16 - 20)  SpO2: 95% (04-11-23 @ 13:03) (95% - 100%)  Wt(kg): --  I&O's Summary        REVIEW OF SYSTEMS:  Patient is  unable to provide any information/ROS  due to baseline mental status.   PHYSICAL EXAM:  Appearance: NAD. VS past 24 hrs -as above   HEENT:   Moist oral mucosa. Conjunctiva clear b/l.   Neck : supple  Respiratory: Lungs CTAB.  Gastrointestinal:  Soft, nontender. No rebound. No rigidity. BS present	  Cardiovascular: RRR ,S1S2 present  Neurologic: Non-focal. Moving all extremities.  Extremities: No edema. No erythema. No calf tenderness. L foot dressing is inatct  Skin: No rashes, No ecchymoses, No cyanosis.	  wounds ,skin lesions-See skin assesment flow sheet   LABS:                        9.8    9.38  )-----------( 339      ( 11 Apr 2023 06:06 )             31.7     04-11    140  |  112<H>  |  29<H>  ----------------------------<  193<H>  4.1   |  24  |  1.30    Ca    8.5      11 Apr 2023 06:06  Phos  2.4     04-10  Mg     2.0     04-10    TPro  6.4  /  Alb  2.2<L>  /  TBili  0.5  /  DBili  0.2  /  AST  16  /  ALT  22  /  AlkPhos  72  04-11    CAPILLARY BLOOD GLUCOSE      POCT Blood Glucose.: 198 mg/dL (11 Apr 2023 11:39)  POCT Blood Glucose.: 201 mg/dL (11 Apr 2023 07:40)  POCT Blood Glucose.: 144 mg/dL (10 Apr 2023 21:46)  POCT Blood Glucose.: 107 mg/dL (10 Apr 2023 16:23)    PT/INR - ( 10 Apr 2023 05:10 )   PT: 15.8 sec;   INR: 1.35 ratio               Culture - Tissue with Gram Stain (collected 10 Apr 2023 13:10)  Source: .Tissue Other, LEFT FOOT DEEP TISSUE CULTURE  Gram Stain (10 Apr 2023 22:21):    No polymorphonuclear cells seen per low power field    No organisms seen per oil power field    Culture - Tissue with Gram Stain (collected 10 Apr 2023 13:10)  Source: .Tissue Other, LEFT FOOT 1ST METATARSAL HEAD  Gram Stain (10 Apr 2023 22:21):    No polymorphonuclear cells seen per low power field    No organisms seen per oil power field    Culture - Urine (collected 02 Apr 2023 09:15)  Source: Clean Catch Clean Catch (Midstream)  Final Report (04 Apr 2023 19:44):    >100,000 CFU/ml Enterococcus faecalis  Organism: Enterococcus faecalis (04 Apr 2023 19:44)  Organism: Enterococcus faecalis (04 Apr 2023 19:44)      RADIOLOGY & ADDITIONAL TESTS:   reviewed elctronically  ASSESSMENT/PLAN: 	    25 minutes aggregate time was spent on this visit, 50% visit time spent in care co-ordination with other attendings and counselling patient .I have discussed care plan with patient / HCP/family member ,who expressed understanding of problems treatment and their effect and side effects, alternatives in details. I have asked if they have any questions and concerns and appropriately addressed them to best of my ability.  PROGRESS NOTE  Patient is a 87y old  Male who presents with a chief complaint of left foot infection (11 Apr 2023 10:30)  Chart and available morning labs /imaging are reviewed electronically , urgent issues addressed . More information  is being added upon completion of rounds , when more information is collected and management discussed with consultants , medical staff and social service/case management on the floor     OVERNIGHT  17 beats of VT  reported by medical staff ,CARDIOLOGIST  is informed . All above noted Patient is resting in a bed comfortably .Confused ,poor mentation .No distress noted S/p sx 04/10    HPI:  88 yo male ,[facility] resident with PMHx - COPD, DM, HTN, CAD, HLD, H/O TIA, dementia,GERD, BPH and depression sent ot ER for evaluation of left foot 1metatarsal wound ,suggestive of osteomyelitis .Patient was seen by ID consult and transfer to the hospital recommended for wound cx/bone biopsy /podiatry evaluation ,likely will require 4-6 weeks of iv abx . Patient was admitted to [facility] with b/l feet wounds and was followed by wound care team ,recently completed 7 days of doxycycline for foot wound cellulitis . (30 Mar 2023 05:21)    PAST MEDICAL & SURGICAL HISTORY:  ASHD (arteriosclerotic heart disease)      BPH without urinary obstruction      COPD, moderate      Stage 3 chronic kidney disease      Chronic GERD      HLD (hyperlipidemia)      MDD (major depressive disorder)      Obstructive and reflux uropathy      Cellulitis      HTN (hypertension)      Sepsis      Dementia      Moderate protein-calorie malnutrition      Brain TIA      History of RSV infection      DM type 2, not at goal      Pressure ulcer of unspecified heel, unspecified stage      Venous stasis ulcer without varicose veins      Multiple open wounds of foot          MEDICATIONS  (STANDING):  budesonide 160 MICROgram(s)/formoterol 4.5 MICROgram(s) Inhaler 2 Puff(s) Inhalation two times a day  dextrose 50% Injectable 25 Gram(s) IV Push once  dextrose 50% Injectable 12.5 Gram(s) IV Push once  dextrose 50% Injectable 25 Gram(s) IV Push once  donepezil 5 milliGRAM(s) Oral at bedtime  DULoxetine 60 milliGRAM(s) Oral daily  glucagon  Injectable 1 milliGRAM(s) IntraMuscular once  insulin glargine Injectable (LANTUS) 8 Unit(s) SubCutaneous at bedtime  insulin lispro (ADMELOG) corrective regimen sliding scale   SubCutaneous three times a day before meals  insulin lispro (ADMELOG) corrective regimen sliding scale   SubCutaneous at bedtime  lactobacillus acidophilus 1 Tablet(s) Oral two times a day with meals  losartan 25 milliGRAM(s) Oral daily  metoprolol tartrate 50 milliGRAM(s) Oral two times a day  multivitamin 1 Tablet(s) Oral daily  pantoprazole   Suspension 40 milliGRAM(s) Oral daily  piperacillin/tazobactam IVPB.. 3.375 Gram(s) IV Intermittent every 8 hours  senna 2 Tablet(s) Oral at bedtime  simvastatin 40 milliGRAM(s) Oral at bedtime  tamsulosin 0.4 milliGRAM(s) Oral at bedtime    MEDICATIONS  (PRN):  acetaminophen     Tablet .. 650 milliGRAM(s) Oral every 6 hours PRN Temp greater or equal to 38C (100.4F), Mild Pain (1 - 3)  aluminum hydroxide/magnesium hydroxide/simethicone Suspension 30 milliLiter(s) Oral every 4 hours PRN Dyspepsia  bisacodyl Suppository 10 milliGRAM(s) Rectal daily PRN Constipation  dextrose Oral Gel 15 Gram(s) Oral once PRN Blood Glucose LESS THAN 70 milliGRAM(s)/deciliter  hydrALAZINE 50 milliGRAM(s) Oral every 6 hours PRN for systolic BP>160  magnesium hydroxide Suspension 30 milliLiter(s) Oral daily PRN Constipation  melatonin 3 milliGRAM(s) Oral at bedtime PRN Insomnia  ondansetron Injectable 4 milliGRAM(s) IV Push every 8 hours PRN Nausea and/or Vomiting  sodium chloride 0.9% lock flush 10 milliLiter(s) IV Push every 1 hour PRN Pre/post blood products, medications, blood draw, and to maintain line patency      OBJECTIVE    T(C): 37 (04-11-23 @ 13:03), Max: 37 (04-11-23 @ 13:03)  HR: 81 (04-11-23 @ 13:03) (65 - 89)  BP: 149/72 (04-11-23 @ 13:03) (79/49 - 149/72)  RR: 17 (04-11-23 @ 13:03) (16 - 20)  SpO2: 95% (04-11-23 @ 13:03) (95% - 100%)  Wt(kg): --  I&O's Summary        REVIEW OF SYSTEMS:  Patient is  unable to provide any information/ROS  due to baseline mental status.   PHYSICAL EXAM:  Appearance: NAD. VS past 24 hrs -as above   HEENT:   Moist oral mucosa. Conjunctiva clear b/l.   Neck : supple  Respiratory: Lungs CTAB.  Gastrointestinal:  Soft, nontender. No rebound. No rigidity. BS present	  Cardiovascular: RRR ,S1S2 present  Neurologic: Non-focal. Moving all extremities.  Extremities: No edema. No erythema. No calf tenderness. L foot dressing is inatct  Skin: No rashes, No ecchymoses, No cyanosis.	  wounds ,skin lesions-See skin assesment flow sheet   LABS:                        9.8    9.38  )-----------( 339      ( 11 Apr 2023 06:06 )             31.7     04-11    140  |  112<H>  |  29<H>  ----------------------------<  193<H>  4.1   |  24  |  1.30    Ca    8.5      11 Apr 2023 06:06  Phos  2.4     04-10  Mg     2.0     04-10    TPro  6.4  /  Alb  2.2<L>  /  TBili  0.5  /  DBili  0.2  /  AST  16  /  ALT  22  /  AlkPhos  72  04-11    CAPILLARY BLOOD GLUCOSE      POCT Blood Glucose.: 198 mg/dL (11 Apr 2023 11:39)  POCT Blood Glucose.: 201 mg/dL (11 Apr 2023 07:40)  POCT Blood Glucose.: 144 mg/dL (10 Apr 2023 21:46)  POCT Blood Glucose.: 107 mg/dL (10 Apr 2023 16:23)    PT/INR - ( 10 Apr 2023 05:10 )   PT: 15.8 sec;   INR: 1.35 ratio               Culture - Tissue with Gram Stain (collected 10 Apr 2023 13:10)  Source: .Tissue Other, LEFT FOOT DEEP TISSUE CULTURE  Gram Stain (10 Apr 2023 22:21):    No polymorphonuclear cells seen per low power field    No organisms seen per oil power field    Culture - Tissue with Gram Stain (collected 10 Apr 2023 13:10)  Source: .Tissue Other, LEFT FOOT 1ST METATARSAL HEAD  Gram Stain (10 Apr 2023 22:21):    No polymorphonuclear cells seen per low power field    No organisms seen per oil power field    Culture - Urine (collected 02 Apr 2023 09:15)  Source: Clean Catch Clean Catch (Midstream)  Final Report (04 Apr 2023 19:44):    >100,000 CFU/ml Enterococcus faecalis  Organism: Enterococcus faecalis (04 Apr 2023 19:44)  Organism: Enterococcus faecalis (04 Apr 2023 19:44)      RADIOLOGY & ADDITIONAL TESTS:   reviewed elctronically  ASSESSMENT/PLAN: 	    25 minutes aggregate time was spent on this visit, 50% visit time spent in care co-ordination with other attendings and counselling patient .I have discussed care plan with patient / HCP/family member ,who expressed understanding of problems treatment and their effect and side effects, alternatives in details. I have asked if they have any questions and concerns and appropriately addressed them to best of my ability.  PROGRESS NOTE  Patient is a 87y old  Male who presents with a chief complaint of left foot infection (11 Apr 2023 10:30)  Chart and available morning labs /imaging are reviewed electronically , urgent issues addressed . More information  is being added upon completion of rounds , when more information is collected and management discussed with consultants , medical staff and social service/case management on the floor     OVERNIGHT  17 beats of VT  reported by medical staff ,CARDIOLOGIST  is informed . All above noted Patient is resting in a bed comfortably .Confused ,poor mentation .No distress noted S/p sx 04/10    HPI:  86 yo male ,[facility] resident with PMHx - COPD, DM, HTN, CAD, HLD, H/O TIA, dementia,GERD, BPH and depression sent ot ER for evaluation of left foot 1metatarsal wound ,suggestive of osteomyelitis .Patient was seen by ID consult and transfer to the hospital recommended for wound cx/bone biopsy /podiatry evaluation ,likely will require 4-6 weeks of iv abx . Patient was admitted to [facility] with b/l feet wounds and was followed by wound care team ,recently completed 7 days of doxycycline for foot wound cellulitis . (30 Mar 2023 05:21)    PAST MEDICAL & SURGICAL HISTORY:  ASHD (arteriosclerotic heart disease)      BPH without urinary obstruction      COPD, moderate      Stage 3 chronic kidney disease      Chronic GERD      HLD (hyperlipidemia)      MDD (major depressive disorder)      Obstructive and reflux uropathy      Cellulitis      HTN (hypertension)      Sepsis      Dementia      Moderate protein-calorie malnutrition      Brain TIA      History of RSV infection      DM type 2, not at goal      Pressure ulcer of unspecified heel, unspecified stage      Venous stasis ulcer without varicose veins      Multiple open wounds of foot          MEDICATIONS  (STANDING):  budesonide 160 MICROgram(s)/formoterol 4.5 MICROgram(s) Inhaler 2 Puff(s) Inhalation two times a day  dextrose 50% Injectable 25 Gram(s) IV Push once  dextrose 50% Injectable 12.5 Gram(s) IV Push once  dextrose 50% Injectable 25 Gram(s) IV Push once  donepezil 5 milliGRAM(s) Oral at bedtime  DULoxetine 60 milliGRAM(s) Oral daily  glucagon  Injectable 1 milliGRAM(s) IntraMuscular once  insulin glargine Injectable (LANTUS) 8 Unit(s) SubCutaneous at bedtime  insulin lispro (ADMELOG) corrective regimen sliding scale   SubCutaneous three times a day before meals  insulin lispro (ADMELOG) corrective regimen sliding scale   SubCutaneous at bedtime  lactobacillus acidophilus 1 Tablet(s) Oral two times a day with meals  losartan 25 milliGRAM(s) Oral daily  metoprolol tartrate 50 milliGRAM(s) Oral two times a day  multivitamin 1 Tablet(s) Oral daily  pantoprazole   Suspension 40 milliGRAM(s) Oral daily  piperacillin/tazobactam IVPB.. 3.375 Gram(s) IV Intermittent every 8 hours  senna 2 Tablet(s) Oral at bedtime  simvastatin 40 milliGRAM(s) Oral at bedtime  tamsulosin 0.4 milliGRAM(s) Oral at bedtime    MEDICATIONS  (PRN):  acetaminophen     Tablet .. 650 milliGRAM(s) Oral every 6 hours PRN Temp greater or equal to 38C (100.4F), Mild Pain (1 - 3)  aluminum hydroxide/magnesium hydroxide/simethicone Suspension 30 milliLiter(s) Oral every 4 hours PRN Dyspepsia  bisacodyl Suppository 10 milliGRAM(s) Rectal daily PRN Constipation  dextrose Oral Gel 15 Gram(s) Oral once PRN Blood Glucose LESS THAN 70 milliGRAM(s)/deciliter  hydrALAZINE 50 milliGRAM(s) Oral every 6 hours PRN for systolic BP>160  magnesium hydroxide Suspension 30 milliLiter(s) Oral daily PRN Constipation  melatonin 3 milliGRAM(s) Oral at bedtime PRN Insomnia  ondansetron Injectable 4 milliGRAM(s) IV Push every 8 hours PRN Nausea and/or Vomiting  sodium chloride 0.9% lock flush 10 milliLiter(s) IV Push every 1 hour PRN Pre/post blood products, medications, blood draw, and to maintain line patency      OBJECTIVE    T(C): 37 (04-11-23 @ 13:03), Max: 37 (04-11-23 @ 13:03)  HR: 81 (04-11-23 @ 13:03) (65 - 89)  BP: 149/72 (04-11-23 @ 13:03) (79/49 - 149/72)  RR: 17 (04-11-23 @ 13:03) (16 - 20)  SpO2: 95% (04-11-23 @ 13:03) (95% - 100%)  Wt(kg): --  I&O's Summary        REVIEW OF SYSTEMS:  Patient is  unable to provide any information/ROS  due to baseline mental status.   PHYSICAL EXAM:  Appearance: NAD. VS past 24 hrs -as above   HEENT:   Moist oral mucosa. Conjunctiva clear b/l.   Neck : supple  Respiratory: Lungs CTAB.  Gastrointestinal:  Soft, nontender. No rebound. No rigidity. BS present	  Cardiovascular: RRR ,S1S2 present  Neurologic: Non-focal. Moving all extremities.  Extremities: No edema. No erythema. No calf tenderness. L foot dressing is inatct  Skin: No rashes, No ecchymoses, No cyanosis.	  wounds ,skin lesions-See skin assesment flow sheet   LABS:                        9.8    9.38  )-----------( 339      ( 11 Apr 2023 06:06 )             31.7     04-11    140  |  112<H>  |  29<H>  ----------------------------<  193<H>  4.1   |  24  |  1.30    Ca    8.5      11 Apr 2023 06:06  Phos  2.4     04-10  Mg     2.0     04-10    TPro  6.4  /  Alb  2.2<L>  /  TBili  0.5  /  DBili  0.2  /  AST  16  /  ALT  22  /  AlkPhos  72  04-11    CAPILLARY BLOOD GLUCOSE      POCT Blood Glucose.: 198 mg/dL (11 Apr 2023 11:39)  POCT Blood Glucose.: 201 mg/dL (11 Apr 2023 07:40)  POCT Blood Glucose.: 144 mg/dL (10 Apr 2023 21:46)  POCT Blood Glucose.: 107 mg/dL (10 Apr 2023 16:23)    PT/INR - ( 10 Apr 2023 05:10 )   PT: 15.8 sec;   INR: 1.35 ratio               Culture - Tissue with Gram Stain (collected 10 Apr 2023 13:10)  Source: .Tissue Other, LEFT FOOT DEEP TISSUE CULTURE  Gram Stain (10 Apr 2023 22:21):    No polymorphonuclear cells seen per low power field    No organisms seen per oil power field    Culture - Tissue with Gram Stain (collected 10 Apr 2023 13:10)  Source: .Tissue Other, LEFT FOOT 1ST METATARSAL HEAD  Gram Stain (10 Apr 2023 22:21):    No polymorphonuclear cells seen per low power field    No organisms seen per oil power field    Culture - Urine (collected 02 Apr 2023 09:15)  Source: Clean Catch Clean Catch (Midstream)  Final Report (04 Apr 2023 19:44):    >100,000 CFU/ml Enterococcus faecalis  Organism: Enterococcus faecalis (04 Apr 2023 19:44)  Organism: Enterococcus faecalis (04 Apr 2023 19:44)      RADIOLOGY & ADDITIONAL TESTS:   reviewed elctronically  ASSESSMENT/PLAN: 	    25 minutes aggregate time was spent on this visit, 50% visit time spent in care co-ordination with other attendings and counselling patient .I have discussed care plan with patient / HCP/family member ,who expressed understanding of problems treatment and their effect and side effects, alternatives in details. I have asked if they have any questions and concerns and appropriately addressed them to best of my ability.

## 2023-04-11 NOTE — PROGRESS NOTE ADULT - SUBJECTIVE AND OBJECTIVE BOX
87y year old Male seen at [facility] 1EAS 101 W1 s/p left foot first metatarsal head resection DOS 04/10/23.  Patient is non verbal and has been in and out of sleep. Patient is tender in the area of the surgical site. Patient had the foot overhanging on the side rale.   Denies any fever, chills, nausea, vomiting, chest pain, shortness of breath, or calf pain at this time.    Allergies    No Known Allergies    Intolerances        MEDICATIONS  (STANDING):  budesonide 160 MICROgram(s)/formoterol 4.5 MICROgram(s) Inhaler 2 Puff(s) Inhalation two times a day  dextrose 50% Injectable 25 Gram(s) IV Push once  dextrose 50% Injectable 12.5 Gram(s) IV Push once  dextrose 50% Injectable 25 Gram(s) IV Push once  donepezil 5 milliGRAM(s) Oral at bedtime  DULoxetine 60 milliGRAM(s) Oral daily  glucagon  Injectable 1 milliGRAM(s) IntraMuscular once  insulin glargine Injectable (LANTUS) 8 Unit(s) SubCutaneous at bedtime  insulin lispro (ADMELOG) corrective regimen sliding scale   SubCutaneous three times a day before meals  insulin lispro (ADMELOG) corrective regimen sliding scale   SubCutaneous at bedtime  lactobacillus acidophilus 1 Tablet(s) Oral two times a day with meals  losartan 25 milliGRAM(s) Oral daily  metoprolol tartrate 50 milliGRAM(s) Oral every 8 hours  multivitamin 1 Tablet(s) Oral daily  pantoprazole   Suspension 40 milliGRAM(s) Oral daily  piperacillin/tazobactam IVPB.. 3.375 Gram(s) IV Intermittent every 8 hours  senna 2 Tablet(s) Oral at bedtime  simvastatin 40 milliGRAM(s) Oral at bedtime  tamsulosin 0.4 milliGRAM(s) Oral at bedtime    MEDICATIONS  (PRN):  acetaminophen     Tablet .. 650 milliGRAM(s) Oral every 6 hours PRN Temp greater or equal to 38C (100.4F), Mild Pain (1 - 3)  aluminum hydroxide/magnesium hydroxide/simethicone Suspension 30 milliLiter(s) Oral every 4 hours PRN Dyspepsia  bisacodyl Suppository 10 milliGRAM(s) Rectal daily PRN Constipation  dextrose Oral Gel 15 Gram(s) Oral once PRN Blood Glucose LESS THAN 70 milliGRAM(s)/deciliter  hydrALAZINE 50 milliGRAM(s) Oral every 6 hours PRN for systolic BP>160  magnesium hydroxide Suspension 30 milliLiter(s) Oral daily PRN Constipation  melatonin 3 milliGRAM(s) Oral at bedtime PRN Insomnia  ondansetron Injectable 4 milliGRAM(s) IV Push every 8 hours PRN Nausea and/or Vomiting  sodium chloride 0.9% lock flush 10 milliLiter(s) IV Push every 1 hour PRN Pre/post blood products, medications, blood draw, and to maintain line patency      Vital Signs Last 24 Hrs  T(C): 37 (11 Apr 2023 13:03), Max: 37 (11 Apr 2023 13:03)  T(F): 98.6 (11 Apr 2023 13:03), Max: 98.6 (11 Apr 2023 13:03)  HR: 81 (11 Apr 2023 13:03) (77 - 89)  BP: 149/72 (11 Apr 2023 13:03) (128/71 - 149/72)  BP(mean): --  RR: 17 (11 Apr 2023 13:03) (17 - 20)  SpO2: 95% (11 Apr 2023 13:03) (95% - 100%)    Parameters below as of 11 Apr 2023 13:03  Patient On (Oxygen Delivery Method): room air          PHYSICAL EXAM:  Vascular: DP & PT non palpable bilaterally, Capillary refill delayed to the digits  Digital hair absent bilaterally  Neurological: unable to assess   Musculoskeletal: Unable to assess   Dermatological: Left foot surgical site at the ist metatarsal head resection site with sutures intact, post op erythema and edema to the forefoot noted, lateral 5th metatarsal base 1.0cm x 0.5cm x o.3 fibrotic wounds with periwound erythema and serous drainage noted, no fluctuance, no malodor, no proximal streaking at this time. Bilateral posterior plantar  heel eschars - right measuring 3.0cm x 4.0cm x necrotic eschar and left 1.0cm x 0.7cm x necrotic eschar, stable with no fluctuance or drainage.  no proximal streaking, no fluctuance, no malodor, no signs of infection at this time.    CBC Full  -  ( 11 Apr 2023 06:06 )  WBC Count : 9.38 K/uL  RBC Count : 3.40 M/uL  Hemoglobin : 9.8 g/dL  Hematocrit : 31.7 %  Platelet Count - Automated : 339 K/uL  Mean Cell Volume : 93.2 fl  Mean Cell Hemoglobin : 28.8 pg  Mean Cell Hemoglobin Concentration : 30.9 gm/dL  Auto Neutrophil # : x  Auto Lymphocyte # : x  Auto Monocyte # : x  Auto Eosinophil # : x  Auto Basophil # : x  Auto Neutrophil % : x  Auto Lymphocyte % : x  Auto Monocyte % : x  Auto Eosinophil % : x  Auto Basophil % : x      04-11    140  |  112<H>  |  29<H>  ----------------------------<  193<H>  4.1   |  24  |  1.30    Ca    8.5      11 Apr 2023 06:06  Phos  2.4     04-10  Mg     2.0     04-10    TPro  6.4  /  Alb  2.2<L>  /  TBili  0.5  /  DBili  0.2  /  AST  16  /  ALT  22  /  AlkPhos  72  04-11      PT/INR - ( 10 Apr 2023 05:10 )   PT: 15.8 sec;   INR: 1.35 ratio               Culture - Tissue with Gram Stain (collected 10 Apr 2023 13:10)  Source: .Tissue Other, LEFT FOOT DEEP TISSUE CULTURE  Gram Stain (10 Apr 2023 22:21):    No polymorphonuclear cells seen per low power field    No organisms seen per oil power field  Preliminary Report (11 Apr 2023 19:49):    Few Staphylococcus aureus    Few Corynebacterium species "Susceptibilities not performed"    Culture - Tissue with Gram Stain (collected 10 Apr 2023 13:10)  Source: .Tissue Other, LEFT FOOT 1ST METATARSAL HEAD  Gram Stain (10 Apr 2023 22:21):    No polymorphonuclear cells seen per low power field    No organisms seen per oil power field  Preliminary Report (11 Apr 2023 19:52):    Rare Staphylococcus aureus        Imaging: ----------    1 / 1 Doc Suero              Report date: 4/11/2023       View Order      (Report matches study selected on Patient History pane)                    ACC: [id number] EXAM: XR FOOT COMP MIN 3 VIEWS LT ORDERED BY: DREW KIMBROUGH    PROCEDURE DATE: 04/10/2023        INTERPRETATION: LEFT foot    CLINICAL INFORMATION: Postoperative radiographs.    TECHNIQUE: AP,lateral and oblique views.  Comparison: 3/30/2023 LEFT foot radiographs FINDINGS:   87y year old Male seen at [facility] 1EAS 101 W1 s/p left foot first metatarsal head resection DOS 04/10/23.  Patient is non verbal and has been in and out of sleep. Patient is tender in the area of the surgical site. Patient had the foot overhanging on the side rale.   Denies any fever, chills, nausea, vomiting, chest pain, shortness of breath, or calf pain at this time.    Allergies    No Known Allergies    Intolerances        MEDICATIONS  (STANDING):  budesonide 160 MICROgram(s)/formoterol 4.5 MICROgram(s) Inhaler 2 Puff(s) Inhalation two times a day  dextrose 50% Injectable 25 Gram(s) IV Push once  dextrose 50% Injectable 12.5 Gram(s) IV Push once  dextrose 50% Injectable 25 Gram(s) IV Push once  donepezil 5 milliGRAM(s) Oral at bedtime  DULoxetine 60 milliGRAM(s) Oral daily  glucagon  Injectable 1 milliGRAM(s) IntraMuscular once  insulin glargine Injectable (LANTUS) 8 Unit(s) SubCutaneous at bedtime  insulin lispro (ADMELOG) corrective regimen sliding scale   SubCutaneous three times a day before meals  insulin lispro (ADMELOG) corrective regimen sliding scale   SubCutaneous at bedtime  lactobacillus acidophilus 1 Tablet(s) Oral two times a day with meals  losartan 25 milliGRAM(s) Oral daily  metoprolol tartrate 50 milliGRAM(s) Oral every 8 hours  multivitamin 1 Tablet(s) Oral daily  pantoprazole   Suspension 40 milliGRAM(s) Oral daily  piperacillin/tazobactam IVPB.. 3.375 Gram(s) IV Intermittent every 8 hours  senna 2 Tablet(s) Oral at bedtime  simvastatin 40 milliGRAM(s) Oral at bedtime  tamsulosin 0.4 milliGRAM(s) Oral at bedtime    MEDICATIONS  (PRN):  acetaminophen     Tablet .. 650 milliGRAM(s) Oral every 6 hours PRN Temp greater or equal to 38C (100.4F), Mild Pain (1 - 3)  aluminum hydroxide/magnesium hydroxide/simethicone Suspension 30 milliLiter(s) Oral every 4 hours PRN Dyspepsia  bisacodyl Suppository 10 milliGRAM(s) Rectal daily PRN Constipation  dextrose Oral Gel 15 Gram(s) Oral once PRN Blood Glucose LESS THAN 70 milliGRAM(s)/deciliter  hydrALAZINE 50 milliGRAM(s) Oral every 6 hours PRN for systolic BP>160  magnesium hydroxide Suspension 30 milliLiter(s) Oral daily PRN Constipation  melatonin 3 milliGRAM(s) Oral at bedtime PRN Insomnia  ondansetron Injectable 4 milliGRAM(s) IV Push every 8 hours PRN Nausea and/or Vomiting  sodium chloride 0.9% lock flush 10 milliLiter(s) IV Push every 1 hour PRN Pre/post blood products, medications, blood draw, and to maintain line patency      Vital Signs Last 24 Hrs  T(C): 37 (11 Apr 2023 13:03), Max: 37 (11 Apr 2023 13:03)  T(F): 98.6 (11 Apr 2023 13:03), Max: 98.6 (11 Apr 2023 13:03)  HR: 81 (11 Apr 2023 13:03) (77 - 89)  BP: 149/72 (11 Apr 2023 13:03) (128/71 - 149/72)  BP(mean): --  RR: 17 (11 Apr 2023 13:03) (17 - 20)  SpO2: 95% (11 Apr 2023 13:03) (95% - 100%)    Parameters below as of 11 Apr 2023 13:03  Patient On (Oxygen Delivery Method): room air          PHYSICAL EXAM:  Vascular: DP & PT non palpable bilaterally, Capillary refill delayed to the digits  Digital hair absent bilaterally  Neurological: unable to assess   Musculoskeletal: Unable to assess   Dermatological: Left foot surgical site at the ist metatarsal head resection site with sutures intact, post op erythema and edema to the forefoot noted, lateral 5th metatarsal base 1.0cm x 0.5cm x o.3 fibrotic wounds with periwound erythema and serous drainage noted, no fluctuance, no malodor, no proximal streaking at this time. Bilateral posterior plantar  heel eschars - right measuring 3.0cm x 4.0cm x necrotic eschar and left 1.0cm x 0.7cm x necrotic eschar, stable with no fluctuance or drainage.  no proximal streaking, no fluctuance, no malodor, no signs of infection at this time.    CBC Full  -  ( 11 Apr 2023 06:06 )  WBC Count : 9.38 K/uL  RBC Count : 3.40 M/uL  Hemoglobin : 9.8 g/dL  Hematocrit : 31.7 %  Platelet Count - Automated : 339 K/uL  Mean Cell Volume : 93.2 fl  Mean Cell Hemoglobin : 28.8 pg  Mean Cell Hemoglobin Concentration : 30.9 gm/dL  Auto Neutrophil # : x  Auto Lymphocyte # : x  Auto Monocyte # : x  Auto Eosinophil # : x  Auto Basophil # : x  Auto Neutrophil % : x  Auto Lymphocyte % : x  Auto Monocyte % : x  Auto Eosinophil % : x  Auto Basophil % : x      04-11    140  |  112<H>  |  29<H>  ----------------------------<  193<H>  4.1   |  24  |  1.30    Ca    8.5      11 Apr 2023 06:06  Phos  2.4     04-10  Mg     2.0     04-10    TPro  6.4  /  Alb  2.2<L>  /  TBili  0.5  /  DBili  0.2  /  AST  16  /  ALT  22  /  AlkPhos  72  04-11      PT/INR - ( 10 Apr 2023 05:10 )   PT: 15.8 sec;   INR: 1.35 ratio               Culture - Tissue with Gram Stain (collected 10 Apr 2023 13:10)  Source: .Tissue Other, LEFT FOOT DEEP TISSUE CULTURE  Gram Stain (10 Apr 2023 22:21):    No polymorphonuclear cells seen per low power field    No organisms seen per oil power field  Preliminary Report (11 Apr 2023 19:49):    Few Staphylococcus aureus    Few Corynebacterium species "Susceptibilities not performed"    Culture - Tissue with Gram Stain (collected 10 Apr 2023 13:10)  Source: .Tissue Other, LEFT FOOT 1ST METATARSAL HEAD  Gram Stain (10 Apr 2023 22:21):    No polymorphonuclear cells seen per low power field    No organisms seen per oil power field  Preliminary Report (11 Apr 2023 19:52):    Rare Staphylococcus aureus        Imaging: ----------    1 / 1 Doc Suero              Report date: 4/11/2023       View Order      (Report matches study selected on Patient History pane)                    ACC: [id number] EXAM: XR FOOT COMP MIN 3 VIEWS LT ORDERED BY: DREW KIMBROUGH    PROCEDURE DATE: 04/10/2023        INTERPRETATION: LEFT foot    CLINICAL INFORMATION: Postoperative radiographs.    TECHNIQUE: AP,lateral and oblique views.  Comparison: 3/30/2023 LEFT foot radiographs FINDINGS:   87y year old Male seen at [facility] 1EAS 101 W1 s/p left foot first metatarsal head resection DOS 04/10/23.  Patient is non verbal and has been in and out of sleep. Patient is tender in the area of the surgical site. Patient had the foot overhanging on the side rale.   Denies any fever, chills, nausea, vomiting, chest pain, shortness of breath, or calf pain at this time.    Allergies    No Known Allergies    Intolerances        MEDICATIONS  (STANDING):  budesonide 160 MICROgram(s)/formoterol 4.5 MICROgram(s) Inhaler 2 Puff(s) Inhalation two times a day  dextrose 50% Injectable 25 Gram(s) IV Push once  dextrose 50% Injectable 12.5 Gram(s) IV Push once  dextrose 50% Injectable 25 Gram(s) IV Push once  donepezil 5 milliGRAM(s) Oral at bedtime  DULoxetine 60 milliGRAM(s) Oral daily  glucagon  Injectable 1 milliGRAM(s) IntraMuscular once  insulin glargine Injectable (LANTUS) 8 Unit(s) SubCutaneous at bedtime  insulin lispro (ADMELOG) corrective regimen sliding scale   SubCutaneous three times a day before meals  insulin lispro (ADMELOG) corrective regimen sliding scale   SubCutaneous at bedtime  lactobacillus acidophilus 1 Tablet(s) Oral two times a day with meals  losartan 25 milliGRAM(s) Oral daily  metoprolol tartrate 50 milliGRAM(s) Oral every 8 hours  multivitamin 1 Tablet(s) Oral daily  pantoprazole   Suspension 40 milliGRAM(s) Oral daily  piperacillin/tazobactam IVPB.. 3.375 Gram(s) IV Intermittent every 8 hours  senna 2 Tablet(s) Oral at bedtime  simvastatin 40 milliGRAM(s) Oral at bedtime  tamsulosin 0.4 milliGRAM(s) Oral at bedtime    MEDICATIONS  (PRN):  acetaminophen     Tablet .. 650 milliGRAM(s) Oral every 6 hours PRN Temp greater or equal to 38C (100.4F), Mild Pain (1 - 3)  aluminum hydroxide/magnesium hydroxide/simethicone Suspension 30 milliLiter(s) Oral every 4 hours PRN Dyspepsia  bisacodyl Suppository 10 milliGRAM(s) Rectal daily PRN Constipation  dextrose Oral Gel 15 Gram(s) Oral once PRN Blood Glucose LESS THAN 70 milliGRAM(s)/deciliter  hydrALAZINE 50 milliGRAM(s) Oral every 6 hours PRN for systolic BP>160  magnesium hydroxide Suspension 30 milliLiter(s) Oral daily PRN Constipation  melatonin 3 milliGRAM(s) Oral at bedtime PRN Insomnia  ondansetron Injectable 4 milliGRAM(s) IV Push every 8 hours PRN Nausea and/or Vomiting  sodium chloride 0.9% lock flush 10 milliLiter(s) IV Push every 1 hour PRN Pre/post blood products, medications, blood draw, and to maintain line patency      Vital Signs Last 24 Hrs  T(C): 37 (11 Apr 2023 13:03), Max: 37 (11 Apr 2023 13:03)  T(F): 98.6 (11 Apr 2023 13:03), Max: 98.6 (11 Apr 2023 13:03)  HR: 81 (11 Apr 2023 13:03) (77 - 89)  BP: 149/72 (11 Apr 2023 13:03) (128/71 - 149/72)  BP(mean): --  RR: 17 (11 Apr 2023 13:03) (17 - 20)  SpO2: 95% (11 Apr 2023 13:03) (95% - 100%)    Parameters below as of 11 Apr 2023 13:03  Patient On (Oxygen Delivery Method): room air          PHYSICAL EXAM:  Vascular: DP & PT non palpable bilaterally, Capillary refill delayed to the digits  Digital hair absent bilaterally  Neurological: unable to assess   Musculoskeletal: Unable to assess   Dermatological: Left foot surgical site at the ist metatarsal head resection site with sutures intact, post op erythema and edema to the forefoot noted, lateral 5th metatarsal base 1.0cm x 0.5cm x o.3 fibrotic wounds with periwound erythema and serous drainage noted, no fluctuance, no malodor, no proximal streaking at this time. Bilateral posterior plantar  heel eschars - right measuring 3.0cm x 4.0cm x necrotic eschar and left 1.0cm x 0.7cm x necrotic eschar, stable with no fluctuance or drainage.  no proximal streaking, no fluctuance, no malodor, no signs of infection at this time.    CBC Full  -  ( 11 Apr 2023 06:06 )  WBC Count : 9.38 K/uL  RBC Count : 3.40 M/uL  Hemoglobin : 9.8 g/dL  Hematocrit : 31.7 %  Platelet Count - Automated : 339 K/uL  Mean Cell Volume : 93.2 fl  Mean Cell Hemoglobin : 28.8 pg  Mean Cell Hemoglobin Concentration : 30.9 gm/dL  Auto Neutrophil # : x  Auto Lymphocyte # : x  Auto Monocyte # : x  Auto Eosinophil # : x  Auto Basophil # : x  Auto Neutrophil % : x  Auto Lymphocyte % : x  Auto Monocyte % : x  Auto Eosinophil % : x  Auto Basophil % : x      04-11    140  |  112<H>  |  29<H>  ----------------------------<  193<H>  4.1   |  24  |  1.30    Ca    8.5      11 Apr 2023 06:06  Phos  2.4     04-10  Mg     2.0     04-10    TPro  6.4  /  Alb  2.2<L>  /  TBili  0.5  /  DBili  0.2  /  AST  16  /  ALT  22  /  AlkPhos  72  04-11      PT/INR - ( 10 Apr 2023 05:10 )   PT: 15.8 sec;   INR: 1.35 ratio               Culture - Tissue with Gram Stain (collected 10 Apr 2023 13:10)  Source: .Tissue Other, LEFT FOOT DEEP TISSUE CULTURE  Gram Stain (10 Apr 2023 22:21):    No polymorphonuclear cells seen per low power field    No organisms seen per oil power field  Preliminary Report (11 Apr 2023 19:49):    Few Staphylococcus aureus    Few Corynebacterium species "Susceptibilities not performed"    Culture - Tissue with Gram Stain (collected 10 Apr 2023 13:10)  Source: .Tissue Other, LEFT FOOT 1ST METATARSAL HEAD  Gram Stain (10 Apr 2023 22:21):    No polymorphonuclear cells seen per low power field    No organisms seen per oil power field  Preliminary Report (11 Apr 2023 19:52):    Rare Staphylococcus aureus        Imaging: ----------    1 / 1 Doc Suero              Report date: 4/11/2023       View Order      (Report matches study selected on Patient History pane)                    ACC: [id number] EXAM: XR FOOT COMP MIN 3 VIEWS LT ORDERED BY: DREW KIMBROUGH    PROCEDURE DATE: 04/10/2023        INTERPRETATION: LEFT foot    CLINICAL INFORMATION: Postoperative radiographs.    TECHNIQUE: AP,lateral and oblique views.  Comparison: 3/30/2023 LEFT foot radiographs FINDINGS:   87y year old Male seen at [facility] 1EAS 101 W1 s/p left foot first metatarsal head resection DOS 04/10/23.  Patient is non verbal and has been in and out of sleep. Patient is tender in the area of the surgical site. Patient had the foot overhanging on the side rale.       Allergies    No Known Allergies    Intolerances        MEDICATIONS  (STANDING):  budesonide 160 MICROgram(s)/formoterol 4.5 MICROgram(s) Inhaler 2 Puff(s) Inhalation two times a day  dextrose 50% Injectable 25 Gram(s) IV Push once  dextrose 50% Injectable 12.5 Gram(s) IV Push once  dextrose 50% Injectable 25 Gram(s) IV Push once  donepezil 5 milliGRAM(s) Oral at bedtime  DULoxetine 60 milliGRAM(s) Oral daily  glucagon  Injectable 1 milliGRAM(s) IntraMuscular once  insulin glargine Injectable (LANTUS) 8 Unit(s) SubCutaneous at bedtime  insulin lispro (ADMELOG) corrective regimen sliding scale   SubCutaneous three times a day before meals  insulin lispro (ADMELOG) corrective regimen sliding scale   SubCutaneous at bedtime  lactobacillus acidophilus 1 Tablet(s) Oral two times a day with meals  losartan 25 milliGRAM(s) Oral daily  metoprolol tartrate 50 milliGRAM(s) Oral every 8 hours  multivitamin 1 Tablet(s) Oral daily  pantoprazole   Suspension 40 milliGRAM(s) Oral daily  piperacillin/tazobactam IVPB.. 3.375 Gram(s) IV Intermittent every 8 hours  senna 2 Tablet(s) Oral at bedtime  simvastatin 40 milliGRAM(s) Oral at bedtime  tamsulosin 0.4 milliGRAM(s) Oral at bedtime    MEDICATIONS  (PRN):  acetaminophen     Tablet .. 650 milliGRAM(s) Oral every 6 hours PRN Temp greater or equal to 38C (100.4F), Mild Pain (1 - 3)  aluminum hydroxide/magnesium hydroxide/simethicone Suspension 30 milliLiter(s) Oral every 4 hours PRN Dyspepsia  bisacodyl Suppository 10 milliGRAM(s) Rectal daily PRN Constipation  dextrose Oral Gel 15 Gram(s) Oral once PRN Blood Glucose LESS THAN 70 milliGRAM(s)/deciliter  hydrALAZINE 50 milliGRAM(s) Oral every 6 hours PRN for systolic BP>160  magnesium hydroxide Suspension 30 milliLiter(s) Oral daily PRN Constipation  melatonin 3 milliGRAM(s) Oral at bedtime PRN Insomnia  ondansetron Injectable 4 milliGRAM(s) IV Push every 8 hours PRN Nausea and/or Vomiting  sodium chloride 0.9% lock flush 10 milliLiter(s) IV Push every 1 hour PRN Pre/post blood products, medications, blood draw, and to maintain line patency      Vital Signs Last 24 Hrs  T(C): 37 (11 Apr 2023 13:03), Max: 37 (11 Apr 2023 13:03)  T(F): 98.6 (11 Apr 2023 13:03), Max: 98.6 (11 Apr 2023 13:03)  HR: 81 (11 Apr 2023 13:03) (77 - 89)  BP: 149/72 (11 Apr 2023 13:03) (128/71 - 149/72)  BP(mean): --  RR: 17 (11 Apr 2023 13:03) (17 - 20)  SpO2: 95% (11 Apr 2023 13:03) (95% - 100%)    Parameters below as of 11 Apr 2023 13:03  Patient On (Oxygen Delivery Method): room air          PHYSICAL EXAM:  Vascular: DP & PT non palpable bilaterally, Capillary refill delayed to the digits  Digital hair absent bilaterally  Neurological: unable to assess   Musculoskeletal: Unable to assess   Dermatological: Left foot surgical site at the ist metatarsal head resection site with sutures intact, post op erythema and edema to the forefoot noted, lateral 5th metatarsal base 1.0cm x 0.5cm x o.3 fibrotic wounds with periwound erythema and serous drainage noted, no fluctuance, no malodor, no proximal streaking at this time. Bilateral posterior plantar  heel eschars - right measuring 3.0cm x 4.0cm x necrotic eschar and left 1.0cm x 0.7cm x necrotic eschar, stable with no fluctuance or drainage.  no proximal streaking, no fluctuance, no malodor, no signs of infection at this time.    CBC Full  -  ( 11 Apr 2023 06:06 )  WBC Count : 9.38 K/uL  RBC Count : 3.40 M/uL  Hemoglobin : 9.8 g/dL  Hematocrit : 31.7 %  Platelet Count - Automated : 339 K/uL  Mean Cell Volume : 93.2 fl  Mean Cell Hemoglobin : 28.8 pg  Mean Cell Hemoglobin Concentration : 30.9 gm/dL  Auto Neutrophil # : x  Auto Lymphocyte # : x  Auto Monocyte # : x  Auto Eosinophil # : x  Auto Basophil # : x  Auto Neutrophil % : x  Auto Lymphocyte % : x  Auto Monocyte % : x  Auto Eosinophil % : x  Auto Basophil % : x      04-11    140  |  112<H>  |  29<H>  ----------------------------<  193<H>  4.1   |  24  |  1.30    Ca    8.5      11 Apr 2023 06:06  Phos  2.4     04-10  Mg     2.0     04-10    TPro  6.4  /  Alb  2.2<L>  /  TBili  0.5  /  DBili  0.2  /  AST  16  /  ALT  22  /  AlkPhos  72  04-11      PT/INR - ( 10 Apr 2023 05:10 )   PT: 15.8 sec;   INR: 1.35 ratio               Culture - Tissue with Gram Stain (collected 10 Apr 2023 13:10)  Source: .Tissue Other, LEFT FOOT DEEP TISSUE CULTURE  Gram Stain (10 Apr 2023 22:21):    No polymorphonuclear cells seen per low power field    No organisms seen per oil power field  Preliminary Report (11 Apr 2023 19:49):    Few Staphylococcus aureus    Few Corynebacterium species "Susceptibilities not performed"    Culture - Tissue with Gram Stain (collected 10 Apr 2023 13:10)  Source: .Tissue Other, LEFT FOOT 1ST METATARSAL HEAD  Gram Stain (10 Apr 2023 22:21):    No polymorphonuclear cells seen per low power field    No organisms seen per oil power field  Preliminary Report (11 Apr 2023 19:52):    Rare Staphylococcus aureus        Imaging: ----------    1 / 1 Doc Suero              Report date: 4/11/2023       View Order      (Report matches study selected on Patient History pane)                    ACC: [id number] EXAM: XR FOOT COMP MIN 3 VIEWS LT ORDERED BY: DREW KIMBROUGH    PROCEDURE DATE: 04/10/2023        INTERPRETATION: LEFT foot    CLINICAL INFORMATION: Postoperative radiographs.    TECHNIQUE: AP,lateral and oblique views.  Comparison: 3/30/2023 LEFT foot radiographs FINDINGS:   87y year old Male seen at [facility] 1EAS 101 W1 s/p left foot first metatarsal head resection DOS 04/10/23.  Patient is non verbal and has been in and out of sleep. Patient is tender in the area of the surgical site. Patient had the foot overhanging on the side rale.       Allergies    No Known Allergies    Intolerances        MEDICATIONS  (STANDING):  budesonide 160 MICROgram(s)/formoterol 4.5 MICROgram(s) Inhaler 2 Puff(s) Inhalation two times a day  dextrose 50% Injectable 25 Gram(s) IV Push once  dextrose 50% Injectable 12.5 Gram(s) IV Push once  dextrose 50% Injectable 25 Gram(s) IV Push once  donepezil 5 milliGRAM(s) Oral at bedtime  DULoxetine 60 milliGRAM(s) Oral daily  glucagon  Injectable 1 milliGRAM(s) IntraMuscular once  insulin glargine Injectable (LANTUS) 8 Unit(s) SubCutaneous at bedtime  insulin lispro (ADMELOG) corrective regimen sliding scale   SubCutaneous three times a day before meals  insulin lispro (ADMELOG) corrective regimen sliding scale   SubCutaneous at bedtime  lactobacillus acidophilus 1 Tablet(s) Oral two times a day with meals  losartan 25 milliGRAM(s) Oral daily  metoprolol tartrate 50 milliGRAM(s) Oral every 8 hours  multivitamin 1 Tablet(s) Oral daily  pantoprazole   Suspension 40 milliGRAM(s) Oral daily  piperacillin/tazobactam IVPB.. 3.375 Gram(s) IV Intermittent every 8 hours  senna 2 Tablet(s) Oral at bedtime  simvastatin 40 milliGRAM(s) Oral at bedtime  tamsulosin 0.4 milliGRAM(s) Oral at bedtime    MEDICATIONS  (PRN):  acetaminophen     Tablet .. 650 milliGRAM(s) Oral every 6 hours PRN Temp greater or equal to 38C (100.4F), Mild Pain (1 - 3)  aluminum hydroxide/magnesium hydroxide/simethicone Suspension 30 milliLiter(s) Oral every 4 hours PRN Dyspepsia  bisacodyl Suppository 10 milliGRAM(s) Rectal daily PRN Constipation  dextrose Oral Gel 15 Gram(s) Oral once PRN Blood Glucose LESS THAN 70 milliGRAM(s)/deciliter  hydrALAZINE 50 milliGRAM(s) Oral every 6 hours PRN for systolic BP>160  magnesium hydroxide Suspension 30 milliLiter(s) Oral daily PRN Constipation  melatonin 3 milliGRAM(s) Oral at bedtime PRN Insomnia  ondansetron Injectable 4 milliGRAM(s) IV Push every 8 hours PRN Nausea and/or Vomiting  sodium chloride 0.9% lock flush 10 milliLiter(s) IV Push every 1 hour PRN Pre/post blood products, medications, blood draw, and to maintain line patency      Vital Signs Last 24 Hrs  T(C): 37 (11 Apr 2023 13:03), Max: 37 (11 Apr 2023 13:03)  T(F): 98.6 (11 Apr 2023 13:03), Max: 98.6 (11 Apr 2023 13:03)  HR: 81 (11 Apr 2023 13:03) (77 - 89)  BP: 149/72 (11 Apr 2023 13:03) (128/71 - 149/72)  BP(mean): --  RR: 17 (11 Apr 2023 13:03) (17 - 20)  SpO2: 95% (11 Apr 2023 13:03) (95% - 100%)    Parameters below as of 11 Apr 2023 13:03  Patient On (Oxygen Delivery Method): room air          PHYSICAL EXAM:  Vascular: DP & PT non palpable bilaterally, Capillary refill delayed to the digits  Digital hair absent bilaterally  Neurological: unable to assess   Musculoskeletal: Unable to assess   Dermatological: Left foot surgical site at the ist metatarsal head resection site with sutures intact, post op erythema and edema to the forefoot noted, lateral 5th metatarsal base 1.0cm x 0.5cm x o.3 fibrotic wounds with periwound erythema and serous drainage noted, no fluctuance, no malodor, no proximal streaking at this time. Bilateral posterior plantar  heel eschars - right measuring 3.0cm x 4.0cm x necrotic eschar and left 1.0cm x 0.7cm x necrotic eschar, stable with no fluctuance or drainage.  no proximal streaking, no fluctuance, no malodor, no signs of infection at this time.    CBC Full  -  ( 11 Apr 2023 06:06 )  WBC Count : 9.38 K/uL  RBC Count : 3.40 M/uL  Hemoglobin : 9.8 g/dL  Hematocrit : 31.7 %  Platelet Count - Automated : 339 K/uL  Mean Cell Volume : 93.2 fl  Mean Cell Hemoglobin : 28.8 pg  Mean Cell Hemoglobin Concentration : 30.9 gm/dL  Auto Neutrophil # : x  Auto Lymphocyte # : x  Auto Monocyte # : x  Auto Eosinophil # : x  Auto Basophil # : x  Auto Neutrophil % : x  Auto Lymphocyte % : x  Auto Monocyte % : x  Auto Eosinophil % : x  Auto Basophil % : x      04-11    140  |  112<H>  |  29<H>  ----------------------------<  193<H>  4.1   |  24  |  1.30    Ca    8.5      11 Apr 2023 06:06  Phos  2.4     04-10  Mg     2.0     04-10    TPro  6.4  /  Alb  2.2<L>  /  TBili  0.5  /  DBili  0.2  /  AST  16  /  ALT  22  /  AlkPhos  72  04-11      PT/INR - ( 10 Apr 2023 05:10 )   PT: 15.8 sec;   INR: 1.35 ratio               Culture - Tissue with Gram Stain (collected 10 Apr 2023 13:10)  Source: .Tissue Other, LEFT FOOT DEEP TISSUE CULTURE  Gram Stain (10 Apr 2023 22:21):    No polymorphonuclear cells seen per low power field    No organisms seen per oil power field  Preliminary Report (11 Apr 2023 19:49):    Few Staphylococcus aureus    Few Corynebacterium species "Susceptibilities not performed"    Culture - Tissue with Gram Stain (collected 10 Apr 2023 13:10)  Source: .Tissue Other, LEFT FOOT 1ST METATARSAL HEAD  Gram Stain (10 Apr 2023 22:21):    No polymorphonuclear cells seen per low power field    No organisms seen per oil power field  Preliminary Report (11 Apr 2023 19:52):    Rare Staphylococcus aureus        Imaging: ----------    1 / 1 Doc Suero              Report date: 4/11/2023       View Order      (Report matches study selected on Patient History pane)                    ACC: [id number] EXAM: XR FOOT COMP MIN 3 VIEWS LT ORDERED BY: DREW KIMBROUGH    PROCEDURE DATE: 04/10/2023        INTERPRETATION: LEFT foot    CLINICAL INFORMATION: Postoperative radiographs.    TECHNIQUE: AP,lateral and oblique views.  Comparison: 3/30/2023 LEFT foot radiographs FINDINGS:   87y year old Male seen at [facility] 1EAS 101 W1 s/p left foot first metatarsal head resection DOS 04/10/23.  Patient is non verbal and has been in and out of sleep. Patient is tender in the area of the surgical site. Patient had the foot overhanging on the side rale.       Allergies    No Known Allergies    Intolerances        MEDICATIONS  (STANDING):  budesonide 160 MICROgram(s)/formoterol 4.5 MICROgram(s) Inhaler 2 Puff(s) Inhalation two times a day  dextrose 50% Injectable 25 Gram(s) IV Push once  dextrose 50% Injectable 12.5 Gram(s) IV Push once  dextrose 50% Injectable 25 Gram(s) IV Push once  donepezil 5 milliGRAM(s) Oral at bedtime  DULoxetine 60 milliGRAM(s) Oral daily  glucagon  Injectable 1 milliGRAM(s) IntraMuscular once  insulin glargine Injectable (LANTUS) 8 Unit(s) SubCutaneous at bedtime  insulin lispro (ADMELOG) corrective regimen sliding scale   SubCutaneous three times a day before meals  insulin lispro (ADMELOG) corrective regimen sliding scale   SubCutaneous at bedtime  lactobacillus acidophilus 1 Tablet(s) Oral two times a day with meals  losartan 25 milliGRAM(s) Oral daily  metoprolol tartrate 50 milliGRAM(s) Oral every 8 hours  multivitamin 1 Tablet(s) Oral daily  pantoprazole   Suspension 40 milliGRAM(s) Oral daily  piperacillin/tazobactam IVPB.. 3.375 Gram(s) IV Intermittent every 8 hours  senna 2 Tablet(s) Oral at bedtime  simvastatin 40 milliGRAM(s) Oral at bedtime  tamsulosin 0.4 milliGRAM(s) Oral at bedtime    MEDICATIONS  (PRN):  acetaminophen     Tablet .. 650 milliGRAM(s) Oral every 6 hours PRN Temp greater or equal to 38C (100.4F), Mild Pain (1 - 3)  aluminum hydroxide/magnesium hydroxide/simethicone Suspension 30 milliLiter(s) Oral every 4 hours PRN Dyspepsia  bisacodyl Suppository 10 milliGRAM(s) Rectal daily PRN Constipation  dextrose Oral Gel 15 Gram(s) Oral once PRN Blood Glucose LESS THAN 70 milliGRAM(s)/deciliter  hydrALAZINE 50 milliGRAM(s) Oral every 6 hours PRN for systolic BP>160  magnesium hydroxide Suspension 30 milliLiter(s) Oral daily PRN Constipation  melatonin 3 milliGRAM(s) Oral at bedtime PRN Insomnia  ondansetron Injectable 4 milliGRAM(s) IV Push every 8 hours PRN Nausea and/or Vomiting  sodium chloride 0.9% lock flush 10 milliLiter(s) IV Push every 1 hour PRN Pre/post blood products, medications, blood draw, and to maintain line patency      Vital Signs Last 24 Hrs  T(C): 37 (11 Apr 2023 13:03), Max: 37 (11 Apr 2023 13:03)  T(F): 98.6 (11 Apr 2023 13:03), Max: 98.6 (11 Apr 2023 13:03)  HR: 81 (11 Apr 2023 13:03) (77 - 89)  BP: 149/72 (11 Apr 2023 13:03) (128/71 - 149/72)  BP(mean): --  RR: 17 (11 Apr 2023 13:03) (17 - 20)  SpO2: 95% (11 Apr 2023 13:03) (95% - 100%)    Parameters below as of 11 Apr 2023 13:03  Patient On (Oxygen Delivery Method): room air          PHYSICAL EXAM:  Vascular: DP & PT non palpable bilaterally, Capillary refill delayed to the digits  Digital hair absent bilaterally  Neurological: unable to assess   Musculoskeletal: Unable to assess   Dermatological: Left foot surgical site at the ist metatarsal head resection site with sutures intact, post op erythema and edema to the forefoot noted, lateral 5th metatarsal base 1.0cm x 0.5cm x o.3 fibrotic wounds with periwound erythema and serous drainage noted, no fluctuance, no malodor, no proximal streaking at this time. Bilateral posterior plantar  heel eschars - right measuring 3.0cm x 4.0cm x necrotic eschar and left 1.0cm x 0.7cm x necrotic eschar, stable with no fluctuance or drainage.  no proximal streaking, no fluctuance, no malodor, no signs of infection at this time.    CBC Full  -  ( 11 Apr 2023 06:06 )  WBC Count : 9.38 K/uL  RBC Count : 3.40 M/uL  Hemoglobin : 9.8 g/dL  Hematocrit : 31.7 %  Platelet Count - Automated : 339 K/uL  Mean Cell Volume : 93.2 fl  Mean Cell Hemoglobin : 28.8 pg  Mean Cell Hemoglobin Concentration : 30.9 gm/dL  Auto Neutrophil # : x  Auto Lymphocyte # : x  Auto Monocyte # : x  Auto Eosinophil # : x  Auto Basophil # : x  Auto Neutrophil % : x  Auto Lymphocyte % : x  Auto Monocyte % : x  Auto Eosinophil % : x  Auto Basophil % : x      04-11    140  |  112<H>  |  29<H>  ----------------------------<  193<H>  4.1   |  24  |  1.30    Ca    8.5      11 Apr 2023 06:06  Phos  2.4     04-10  Mg     2.0     04-10    TPro  6.4  /  Alb  2.2<L>  /  TBili  0.5  /  DBili  0.2  /  AST  16  /  ALT  22  /  AlkPhos  72  04-11      PT/INR - ( 10 Apr 2023 05:10 )   PT: 15.8 sec;   INR: 1.35 ratio               Culture - Tissue with Gram Stain (collected 10 Apr 2023 13:10)  Source: .Tissue Other, LEFT FOOT DEEP TISSUE CULTURE  Gram Stain (10 Apr 2023 22:21):    No polymorphonuclear cells seen per low power field    No organisms seen per oil power field  Preliminary Report (11 Apr 2023 19:49):    Few Staphylococcus aureus    Few Corynebacterium species "Susceptibilities not performed"    Culture - Tissue with Gram Stain (collected 10 Apr 2023 13:10)  Source: .Tissue Other, LEFT FOOT 1ST METATARSAL HEAD  Gram Stain (10 Apr 2023 22:21):    No polymorphonuclear cells seen per low power field    No organisms seen per oil power field  Preliminary Report (11 Apr 2023 19:52):    Rare Staphylococcus aureus        Imaging: ----------    1 / 1 Doc Suero              Report date: 4/11/2023       View Order      (Report matches study selected on Patient History pane)                    ACC: [id number] EXAM: XR FOOT COMP MIN 3 VIEWS LT ORDERED BY: DREW KIMBROUGH    PROCEDURE DATE: 04/10/2023        INTERPRETATION: LEFT foot    CLINICAL INFORMATION: Postoperative radiographs.    TECHNIQUE: AP,lateral and oblique views.  Comparison: 3/30/2023 LEFT foot radiographs FINDINGS:

## 2023-04-11 NOTE — SWALLOW BEDSIDE ASSESSMENT ADULT - SWALLOW EVAL: DIAGNOSIS
1. Patient presents with mild oral dysphagia and functional pharyngeal swallow for puree solids. Oral stage marked by reduced oral grading, delayed posterior transfer, min oral residue present post swallow 2. Pt demonstrates severe oral dysphagia for minced/moist solids marked by absent mastication despite max cues and SLP eventually removed PO form oral cavity.  3. Pt demonstrates functional oropharyngral swallow for thin liquids and mildly thick liquids. No signs of aspiration/penetration (patient did demonstrate overt signs of aspiration for thin liquids on previous bedside swallow assessment)
Mild oral dysphagia with regular solid marked by prolonged mastication and increased oral transit time.  Suspect pharyngeal dysphagia with thin liquids, due to +throat clearing post swallow.  No overt s/s penetration/aspiration with puree, soft & bite sized, regular solid or mildly thick liquids.

## 2023-04-11 NOTE — PROGRESS NOTE ADULT - ASSESSMENT
88 yo male ,[facility] resident with PMHx - COPD, DM, HTN, CAD, HLD, H/O TIA, dementia,GERD, BPH and depression sent ot ER for evaluation of left foot 1metatarsal wound ,suggestive of osteomyelitis .Patient was seen by ID consult and transfer to the hospital recommended for wound cx/bone biopsy /podiatry evaluation ,likely will require 4-6 weeks of iv abx . Patient was admitted to [facility] with b/l feet wounds and was followed by wound care team ,recently completed 7 days of doxycycline for foot wound cellulitis . 86 yo male ,[facility] resident with PMHx - COPD, DM, HTN, CAD, HLD, H/O TIA, dementia,GERD, BPH and depression sent ot ER for evaluation of left foot 1metatarsal wound ,suggestive of osteomyelitis .Patient was seen by ID consult and transfer to the hospital recommended for wound cx/bone biopsy /podiatry evaluation ,likely will require 4-6 weeks of iv abx . Patient was admitted to [facility] with b/l feet wounds and was followed by wound care team ,recently completed 7 days of doxycycline for foot wound cellulitis . 86 yo male ,[facility] resident with PMHx - COPD, DM, HTN, CAD, HLD, H/O TIA, dementia,GERD, BPH and depression sent ot ER for evaluation of left foot 1metatarsal wound ,suggestive of osteomyelitis .Patient was seen by ID consult and transfer to the hospital recommended for wound cx/bone biopsy /podiatry evaluation ,likely will require 4-6 weeks of iv abx . Patient was admitted to [facility] with b/l feet wounds and was followed by wound care team ,recently completed 7 days of doxycycline for foot wound cellulitis .

## 2023-04-11 NOTE — CHART NOTE - NSCHARTNOTEFT_GEN_A_CORE
Called by RN for pt with tissue cultures growing Staph aureus and Corynebacterium, sensitivities pending Called by RN for pt with tissue cultures growing Staph aureus and Corynebacterium, sensitivities pending. ID following, currently on IV Zosyn.

## 2023-04-11 NOTE — PROGRESS NOTE ADULT - PROBLEM SELECTOR PLAN 1
Patient examined and evaluated.  Surgical site with post op erythema and edema and also with tenderness to the left foot   Surgical site dressed with silver alginate  and dry, sterile dressing.  Patient is to be partial weight bearing to the left heel if can tolerate ambulation   Surgical pathology and cultures pending at this time.  Antibiotics as per ID recommendations.    Continue with offloading boots to BLE when not ambulating

## 2023-04-11 NOTE — CHART NOTE - NSCHARTNOTEFT_GEN_A_CORE
Assessment/Follow up:    Brief hx : 88 yo male, [facility] resident with PMHx - COPD, DM, HTN, CAD, HLD, H/O TIA, dementia, GERD, BPH and depression, who was sent for evaluation of left foot 1metatarsal wound. Concern for wound infection with underlying osteomyelitis. He also has bilateral heel wounds. Incidentally found to be COVID positive." A PICC line was placed yesterday for 4-6 weeks of IV ABX.    Pt awake/confused. S/p left 1st toe/head metatarsal resection POD#1. Continues on rx consistent carbohydrate, low na, soft and bite sized diet with glucerna & madeline BID. Per nsg, pt needs to be fed, po intake is fair.  report N/V. Last BM 4/9. Bowel regimen rx. Recommend continued assistance/encouragements during meals & with oral nutritional supplements.     Factors impacting intake: [ ] none [ ] nausea  [ ] vomiting [ ] diarrhea [x ] constipation  [X ]chewing problems [ X] swallowing issues  [X ] other: dementia    Diet Presciption: Consistent Carbohydrate, Low Na, soft and bite sized diet, glucerna 8oz BID, madeline BID    Intake: Fair/fluctuating; 0-100% past 5 days per EMR.    Current Weight: 167.5lbs(4/11)       Pertinent Medications: MEDICATIONS  (STANDING):  budesonide 160 MICROgram(s)/formoterol 4.5 MICROgram(s) Inhaler 2 Puff(s) Inhalation two times a day  dextrose 50% Injectable 25 Gram(s) IV Push once  dextrose 50% Injectable 12.5 Gram(s) IV Push once  dextrose 50% Injectable 25 Gram(s) IV Push once  donepezil 5 milliGRAM(s) Oral at bedtime  DULoxetine 60 milliGRAM(s) Oral daily  glucagon  Injectable 1 milliGRAM(s) IntraMuscular once  insulin glargine Injectable (LANTUS) 8 Unit(s) SubCutaneous at bedtime  insulin lispro (ADMELOG) corrective regimen sliding scale   SubCutaneous three times a day before meals  insulin lispro (ADMELOG) corrective regimen sliding scale   SubCutaneous at bedtime  lactobacillus acidophilus 1 Tablet(s) Oral two times a day with meals  losartan 25 milliGRAM(s) Oral daily  metoprolol tartrate 50 milliGRAM(s) Oral two times a day  multivitamin 1 Tablet(s) Oral daily  pantoprazole   Suspension 40 milliGRAM(s) Oral daily  piperacillin/tazobactam IVPB.. 3.375 Gram(s) IV Intermittent every 8 hours  senna 2 Tablet(s) Oral at bedtime  simvastatin 40 milliGRAM(s) Oral at bedtime  tamsulosin 0.4 milliGRAM(s) Oral at bedtime    MEDICATIONS  (PRN):  acetaminophen     Tablet .. 650 milliGRAM(s) Oral every 6 hours PRN Temp greater or equal to 38C (100.4F), Mild Pain (1 - 3)  aluminum hydroxide/magnesium hydroxide/simethicone Suspension 30 milliLiter(s) Oral every 4 hours PRN Dyspepsia  bisacodyl Suppository 10 milliGRAM(s) Rectal daily PRN Constipation  dextrose Oral Gel 15 Gram(s) Oral once PRN Blood Glucose LESS THAN 70 milliGRAM(s)/deciliter  hydrALAZINE 50 milliGRAM(s) Oral every 6 hours PRN for systolic BP>160  magnesium hydroxide Suspension 30 milliLiter(s) Oral daily PRN Constipation  melatonin 3 milliGRAM(s) Oral at bedtime PRN Insomnia  ondansetron Injectable 4 milliGRAM(s) IV Push every 8 hours PRN Nausea and/or Vomiting  sodium chloride 0.9% lock flush 10 milliLiter(s) IV Push every 1 hour PRN Pre/post blood products, medications, blood draw, and to maintain line patency    Pertinent Labs: 04-11 Na140 mmol/L Glu 193 mg/dL<H> K+ 4.1 mmol/L Cr  1.30 mg/dL BUN 29 mg/dL<H> 04-10 Phos 2.4 mg/dL<L> 04-11 Alb 2.2 g/dL<L> 03-30 Chol 108 mg/dL LDL --    HDL 32 mg/dL<L> Trig 74 mg/dL     CAPILLARY BLOOD GLUCOSE      POCT Blood Glucose.: 201 mg/dL (11 Apr 2023 07:40)  POCT Blood Glucose.: 144 mg/dL (10 Apr 2023 21:46)  POCT Blood Glucose.: 107 mg/dL (10 Apr 2023 16:23)  POCT Blood Glucose.: 156 mg/dL (10 Apr 2023 11:46)    Skin: left foot surgical incision, left heel/lateral foot unstageable, right heel unstageable, MAD/IAD.  [place] [address]    Estimated Needs:   [x ] no change since previous assessment  [ ] recalculated:     Previous Nutrition Diagnosis:   [ ] Inadequate Energy Intake [ ]Inadequate Oral Intake [ ] Excessive Energy Intake   [ ] Underweight [x] Increased Nutrient Needs [ ] Overweight/Obesity   [ ] Altered GI Function [ ] Unintended Weight Loss [ ] Food & Nutrition Related Knowledge Deficit [x ] Malnutrition     Nutrition Diagnosis is [x ] ongoing  [ ] resolved [ ] not applicable     New Nutrition Diagnosis: [ x] not applicable       Interventions:   Recommend  [ ] Change Diet To:  [ ] Nutrition Supplement  [ ] Nutrition Support  [x ] Other: Recommend continued assistance/encouragement with meals/oral nutritional supplements. 500mg Vit C daily.     Monitoring and Evaluation:   [ ] PO intake [ x ] Tolerance to diet prescription [ x ] weights [ x ] labs[ x ] follow up per protocol  [ ] other: Assessment/Follow up:    Brief hx : 86 yo male, [facility] resident with PMHx - COPD, DM, HTN, CAD, HLD, H/O TIA, dementia, GERD, BPH and depression, who was sent for evaluation of left foot 1metatarsal wound. Concern for wound infection with underlying osteomyelitis. He also has bilateral heel wounds. Incidentally found to be COVID positive." A PICC line was placed yesterday for 4-6 weeks of IV ABX.    Pt awake/confused. S/p left 1st toe/head metatarsal resection POD#1. Continues on rx consistent carbohydrate, low na, soft and bite sized diet with glucerna & madeline BID. Per nsg, pt needs to be fed, po intake is fair.  report N/V. Last BM 4/9. Bowel regimen rx. Recommend continued assistance/encouragements during meals & with oral nutritional supplements.     Factors impacting intake: [ ] none [ ] nausea  [ ] vomiting [ ] diarrhea [x ] constipation  [X ]chewing problems [ X] swallowing issues  [X ] other: dementia    Diet Presciption: Consistent Carbohydrate, Low Na, soft and bite sized diet, glucerna 8oz BID, madeline BID    Intake: Fair/fluctuating; 0-100% past 5 days per EMR.    Current Weight: 167.5lbs(4/11)       Pertinent Medications: MEDICATIONS  (STANDING):  budesonide 160 MICROgram(s)/formoterol 4.5 MICROgram(s) Inhaler 2 Puff(s) Inhalation two times a day  dextrose 50% Injectable 25 Gram(s) IV Push once  dextrose 50% Injectable 12.5 Gram(s) IV Push once  dextrose 50% Injectable 25 Gram(s) IV Push once  donepezil 5 milliGRAM(s) Oral at bedtime  DULoxetine 60 milliGRAM(s) Oral daily  glucagon  Injectable 1 milliGRAM(s) IntraMuscular once  insulin glargine Injectable (LANTUS) 8 Unit(s) SubCutaneous at bedtime  insulin lispro (ADMELOG) corrective regimen sliding scale   SubCutaneous three times a day before meals  insulin lispro (ADMELOG) corrective regimen sliding scale   SubCutaneous at bedtime  lactobacillus acidophilus 1 Tablet(s) Oral two times a day with meals  losartan 25 milliGRAM(s) Oral daily  metoprolol tartrate 50 milliGRAM(s) Oral two times a day  multivitamin 1 Tablet(s) Oral daily  pantoprazole   Suspension 40 milliGRAM(s) Oral daily  piperacillin/tazobactam IVPB.. 3.375 Gram(s) IV Intermittent every 8 hours  senna 2 Tablet(s) Oral at bedtime  simvastatin 40 milliGRAM(s) Oral at bedtime  tamsulosin 0.4 milliGRAM(s) Oral at bedtime    MEDICATIONS  (PRN):  acetaminophen     Tablet .. 650 milliGRAM(s) Oral every 6 hours PRN Temp greater or equal to 38C (100.4F), Mild Pain (1 - 3)  aluminum hydroxide/magnesium hydroxide/simethicone Suspension 30 milliLiter(s) Oral every 4 hours PRN Dyspepsia  bisacodyl Suppository 10 milliGRAM(s) Rectal daily PRN Constipation  dextrose Oral Gel 15 Gram(s) Oral once PRN Blood Glucose LESS THAN 70 milliGRAM(s)/deciliter  hydrALAZINE 50 milliGRAM(s) Oral every 6 hours PRN for systolic BP>160  magnesium hydroxide Suspension 30 milliLiter(s) Oral daily PRN Constipation  melatonin 3 milliGRAM(s) Oral at bedtime PRN Insomnia  ondansetron Injectable 4 milliGRAM(s) IV Push every 8 hours PRN Nausea and/or Vomiting  sodium chloride 0.9% lock flush 10 milliLiter(s) IV Push every 1 hour PRN Pre/post blood products, medications, blood draw, and to maintain line patency    Pertinent Labs: 04-11 Na140 mmol/L Glu 193 mg/dL<H> K+ 4.1 mmol/L Cr  1.30 mg/dL BUN 29 mg/dL<H> 04-10 Phos 2.4 mg/dL<L> 04-11 Alb 2.2 g/dL<L> 03-30 Chol 108 mg/dL LDL --    HDL 32 mg/dL<L> Trig 74 mg/dL     CAPILLARY BLOOD GLUCOSE      POCT Blood Glucose.: 201 mg/dL (11 Apr 2023 07:40)  POCT Blood Glucose.: 144 mg/dL (10 Apr 2023 21:46)  POCT Blood Glucose.: 107 mg/dL (10 Apr 2023 16:23)  POCT Blood Glucose.: 156 mg/dL (10 Apr 2023 11:46)    Skin: left foot surgical incision, left heel/lateral foot unstageable, right heel unstageable, MAD/IAD.  [place] 13    Estimated Needs:   [x ] no change since previous assessment  [ ] recalculated:     Previous Nutrition Diagnosis:   [ ] Inadequate Energy Intake [ ]Inadequate Oral Intake [ ] Excessive Energy Intake   [ ] Underweight [x] Increased Nutrient Needs [ ] Overweight/Obesity   [ ] Altered GI Function [ ] Unintended Weight Loss [ ] Food & Nutrition Related Knowledge Deficit [x ] Malnutrition     Nutrition Diagnosis is [x ] ongoing  [ ] resolved [ ] not applicable     New Nutrition Diagnosis: [ x] not applicable       Interventions:   Recommend  [ ] Change Diet To:  [ ] Nutrition Supplement  [ ] Nutrition Support  [x ] Other: Recommend continued assistance/encouragement with meals/oral nutritional supplements. 500mg Vit C daily.     Monitoring and Evaluation:   [ ] PO intake [ x ] Tolerance to diet prescription [ x ] weights [ x ] labs[ x ] follow up per protocol  [ ] other: Assessment/Follow up:    Brief hx : 88 yo male, [facility] resident with PMHx - COPD, DM, HTN, CAD, HLD, H/O TIA, dementia, GERD, BPH and depression, who was sent for evaluation of left foot 1metatarsal wound. Concern for wound infection with underlying osteomyelitis. He also has bilateral heel wounds. Incidentally found to be COVID positive." A PICC line was placed yesterday for 4-6 weeks of IV ABX.    Pt awake/confused. S/p left 1st toe/head metatarsal resection POD#1. Continues on rx consistent carbohydrate, low na, soft and bite sized diet with glucerna & madeline BID. Per nsg, pt needs to be fed, po intake is fair.  report N/V. Last BM 4/9. Bowel regimen rx. Recommend continued assistance/encouragements during meals & with oral nutritional supplements.     Factors impacting intake: [ ] none [ ] nausea  [ ] vomiting [ ] diarrhea [x ] constipation  [X ]chewing problems [ X] swallowing issues  [X ] other: dementia    Diet Presciption: Consistent Carbohydrate, Low Na, soft and bite sized diet, glucerna 8oz BID, madeline BID    Intake: Fair/fluctuating; 0-100% past 5 days per EMR.    Current Weight: 167.5lbs(4/11)       Pertinent Medications: MEDICATIONS  (STANDING):  budesonide 160 MICROgram(s)/formoterol 4.5 MICROgram(s) Inhaler 2 Puff(s) Inhalation two times a day  dextrose 50% Injectable 25 Gram(s) IV Push once  dextrose 50% Injectable 12.5 Gram(s) IV Push once  dextrose 50% Injectable 25 Gram(s) IV Push once  donepezil 5 milliGRAM(s) Oral at bedtime  DULoxetine 60 milliGRAM(s) Oral daily  glucagon  Injectable 1 milliGRAM(s) IntraMuscular once  insulin glargine Injectable (LANTUS) 8 Unit(s) SubCutaneous at bedtime  insulin lispro (ADMELOG) corrective regimen sliding scale   SubCutaneous three times a day before meals  insulin lispro (ADMELOG) corrective regimen sliding scale   SubCutaneous at bedtime  lactobacillus acidophilus 1 Tablet(s) Oral two times a day with meals  losartan 25 milliGRAM(s) Oral daily  metoprolol tartrate 50 milliGRAM(s) Oral two times a day  multivitamin 1 Tablet(s) Oral daily  pantoprazole   Suspension 40 milliGRAM(s) Oral daily  piperacillin/tazobactam IVPB.. 3.375 Gram(s) IV Intermittent every 8 hours  senna 2 Tablet(s) Oral at bedtime  simvastatin 40 milliGRAM(s) Oral at bedtime  tamsulosin 0.4 milliGRAM(s) Oral at bedtime    MEDICATIONS  (PRN):  acetaminophen     Tablet .. 650 milliGRAM(s) Oral every 6 hours PRN Temp greater or equal to 38C (100.4F), Mild Pain (1 - 3)  aluminum hydroxide/magnesium hydroxide/simethicone Suspension 30 milliLiter(s) Oral every 4 hours PRN Dyspepsia  bisacodyl Suppository 10 milliGRAM(s) Rectal daily PRN Constipation  dextrose Oral Gel 15 Gram(s) Oral once PRN Blood Glucose LESS THAN 70 milliGRAM(s)/deciliter  hydrALAZINE 50 milliGRAM(s) Oral every 6 hours PRN for systolic BP>160  magnesium hydroxide Suspension 30 milliLiter(s) Oral daily PRN Constipation  melatonin 3 milliGRAM(s) Oral at bedtime PRN Insomnia  ondansetron Injectable 4 milliGRAM(s) IV Push every 8 hours PRN Nausea and/or Vomiting  sodium chloride 0.9% lock flush 10 milliLiter(s) IV Push every 1 hour PRN Pre/post blood products, medications, blood draw, and to maintain line patency    Pertinent Labs: 04-11 Na140 mmol/L Glu 193 mg/dL<H> K+ 4.1 mmol/L Cr  1.30 mg/dL BUN 29 mg/dL<H> 04-10 Phos 2.4 mg/dL<L> 04-11 Alb 2.2 g/dL<L> 03-30 Chol 108 mg/dL LDL --    HDL 32 mg/dL<L> Trig 74 mg/dL     CAPILLARY BLOOD GLUCOSE      POCT Blood Glucose.: 201 mg/dL (11 Apr 2023 07:40)  POCT Blood Glucose.: 144 mg/dL (10 Apr 2023 21:46)  POCT Blood Glucose.: 107 mg/dL (10 Apr 2023 16:23)  POCT Blood Glucose.: 156 mg/dL (10 Apr 2023 11:46)    Skin: left foot surgical incision, left heel/lateral foot unstageable, right heel unstageable, MAD/IAD.  [place] 13    Estimated Needs:   [x ] no change since previous assessment  [ ] recalculated:     Previous Nutrition Diagnosis:   [ ] Inadequate Energy Intake [ ]Inadequate Oral Intake [ ] Excessive Energy Intake   [ ] Underweight [x] Increased Nutrient Needs [ ] Overweight/Obesity   [ ] Altered GI Function [ ] Unintended Weight Loss [ ] Food & Nutrition Related Knowledge Deficit [x ] Malnutrition     Nutrition Diagnosis is [x ] ongoing  [ ] resolved [ ] not applicable     New Nutrition Diagnosis: [ x] not applicable       Interventions:   Recommend  [ ] Change Diet To:  [ ] Nutrition Supplement  [ ] Nutrition Support  [x ] Other: Recommend continued assistance/encouragement with meals/oral nutritional supplements. 500mg Vit C daily.     Monitoring and Evaluation:   [ ] PO intake [ x ] Tolerance to diet prescription [ x ] weights [ x ] labs[ x ] follow up per protocol  [ ] other: Assessment/Follow up:    Brief hx : 88 yo male, [facility] resident with PMHx - COPD, DM, HTN, CAD, HLD, H/O TIA, dementia, GERD, BPH and depression, who was sent for evaluation of left foot 1metatarsal wound. Concern for wound infection with underlying osteomyelitis. He also has bilateral heel wounds. Incidentally found to be COVID positive.    Pt awake/confused. S/p left 1st toe/head metatarsal resection POD#1. Continues on rx consistent carbohydrate, low na, soft and bite sized diet with glucerna BID & madeline BID. Per RN aide, pt needs to be fed, po intake appears fair; fluctuates at times. For breakfast 4/11 pt consumed 100% yogurt, 50% oatmeal, few spoonfuls of eggs and 100% madeline supplement. No report N/V. Last BM 4/9 in EMR. Small BM smear 4/11 per RN aide. Bowel regimen rx. Recommend continued assistance/encouragements during meals & with oral nutritional supplements.     Factors impacting intake: [ ] none [ ] nausea  [ ] vomiting [ ] diarrhea [x ] constipation  [X ]chewing problems [ ] swallowing issues  [X ] other: dementia    Diet Presciption: Consistent Carbohydrate, Low Na, soft and bite sized diet, glucerna 8oz BID, madeline BID    Intake: Fair/fluctuating; 0-100% past 5 days per EMR.    Current Weight: 167.5lbs(4/11) ; request reweight for accuracy      Pertinent Medications: MEDICATIONS  (STANDING):  budesonide 160 MICROgram(s)/formoterol 4.5 MICROgram(s) Inhaler 2 Puff(s) Inhalation two times a day  dextrose 50% Injectable 25 Gram(s) IV Push once  dextrose 50% Injectable 12.5 Gram(s) IV Push once  dextrose 50% Injectable 25 Gram(s) IV Push once  donepezil 5 milliGRAM(s) Oral at bedtime  DULoxetine 60 milliGRAM(s) Oral daily  glucagon  Injectable 1 milliGRAM(s) IntraMuscular once  insulin glargine Injectable (LANTUS) 8 Unit(s) SubCutaneous at bedtime  insulin lispro (ADMELOG) corrective regimen sliding scale   SubCutaneous three times a day before meals  insulin lispro (ADMELOG) corrective regimen sliding scale   SubCutaneous at bedtime  lactobacillus acidophilus 1 Tablet(s) Oral two times a day with meals  losartan 25 milliGRAM(s) Oral daily  metoprolol tartrate 50 milliGRAM(s) Oral two times a day  multivitamin 1 Tablet(s) Oral daily  pantoprazole   Suspension 40 milliGRAM(s) Oral daily  piperacillin/tazobactam IVPB.. 3.375 Gram(s) IV Intermittent every 8 hours  senna 2 Tablet(s) Oral at bedtime  simvastatin 40 milliGRAM(s) Oral at bedtime  tamsulosin 0.4 milliGRAM(s) Oral at bedtime    MEDICATIONS  (PRN):  acetaminophen     Tablet .. 650 milliGRAM(s) Oral every 6 hours PRN Temp greater or equal to 38C (100.4F), Mild Pain (1 - 3)  aluminum hydroxide/magnesium hydroxide/simethicone Suspension 30 milliLiter(s) Oral every 4 hours PRN Dyspepsia  bisacodyl Suppository 10 milliGRAM(s) Rectal daily PRN Constipation  dextrose Oral Gel 15 Gram(s) Oral once PRN Blood Glucose LESS THAN 70 milliGRAM(s)/deciliter  hydrALAZINE 50 milliGRAM(s) Oral every 6 hours PRN for systolic BP>160  magnesium hydroxide Suspension 30 milliLiter(s) Oral daily PRN Constipation  melatonin 3 milliGRAM(s) Oral at bedtime PRN Insomnia  ondansetron Injectable 4 milliGRAM(s) IV Push every 8 hours PRN Nausea and/or Vomiting  sodium chloride 0.9% lock flush 10 milliLiter(s) IV Push every 1 hour PRN Pre/post blood products, medications, blood draw, and to maintain line patency    Pertinent Labs: 04-11 Na140 mmol/L Glu 193 mg/dL<H> K+ 4.1 mmol/L Cr  1.30 mg/dL BUN 29 mg/dL<H> 04-10 Phos 2.4 mg/dL<L> 04-11 Alb 2.2 g/dL<L> 03-30 Chol 108 mg/dL LDL --    HDL 32 mg/dL<L> Trig 74 mg/dL     CAPILLARY BLOOD GLUCOSE      POCT Blood Glucose.: 201 mg/dL (11 Apr 2023 07:40)  POCT Blood Glucose.: 144 mg/dL (10 Apr 2023 21:46)  POCT Blood Glucose.: 107 mg/dL (10 Apr 2023 16:23)  POCT Blood Glucose.: 156 mg/dL (10 Apr 2023 11:46)    Skin: left foot surgical incision, left heel/lateral foot unstageable, right heel unstageable, MAD/IAD.  [place] 13    Estimated Needs:   [x ] no change since previous assessment  [ ] recalculated:     Previous Nutrition Diagnosis:   [ ] Inadequate Energy Intake [ ]Inadequate Oral Intake [ ] Excessive Energy Intake   [ ] Underweight [x] Increased Nutrient Needs [ ] Overweight/Obesity   [ ] Altered GI Function [ ] Unintended Weight Loss [ ] Food & Nutrition Related Knowledge Deficit [x ] Malnutrition     Nutrition Diagnosis is [x ] ongoing  [ ] resolved [ ] not applicable     New Nutrition Diagnosis: [ x] not applicable       Interventions:   Recommend  [ ] Change Diet To:  [ ] Nutrition Supplement  [ ] Nutrition Support  [x ] Other: Recommend continued assistance/encouragement with meals/oral nutritional supplements. 500mg Vit C daily.     Monitoring and Evaluation:   [x ] PO intake [ x ] Tolerance to diet prescription [ x ] weights [ x ] labs[ x ] follow up per protocol  [ ] other: Assessment/Follow up:    Brief hx : 88 yo male, [facility] resident with PMHx - COPD, DM, HTN, CAD, HLD, H/O TIA, dementia, GERD, BPH and depression, who was sent for evaluation of left foot 1metatarsal wound. Concern for wound infection with underlying osteomyelitis. He also has bilateral heel wounds. Incidentally found to be COVID positive.    Pt awake/confused. S/p left 1st toe/head metatarsal resection POD#1. Continues on rx consistent carbohydrate, low na, soft and bite sized diet with glucerna BID & madeline BID. Per RN aide, pt needs to be fed, po intake appears fair; fluctuates at times. For breakfast 4/11 pt consumed 100% yogurt, 50% oatmeal, few spoonfuls of eggs and 100% madeline supplement. No report N/V. Last BM 4/9 in EMR. Small BM smear 4/11 per RN aide. Bowel regimen rx. Recommend continued assistance/encouragements during meals & with oral nutritional supplements.     Factors impacting intake: [ ] none [ ] nausea  [ ] vomiting [ ] diarrhea [x ] constipation  [X ]chewing problems [ ] swallowing issues  [X ] other: dementia    Diet Presciption: Consistent Carbohydrate, Low Na, soft and bite sized diet, glucerna 8oz BID, madeline BID    Intake: Fair/fluctuating; 0-100% past 5 days per EMR.    Current Weight: 167.5lbs(4/11) ; request reweight for accuracy      Pertinent Medications: MEDICATIONS  (STANDING):  budesonide 160 MICROgram(s)/formoterol 4.5 MICROgram(s) Inhaler 2 Puff(s) Inhalation two times a day  dextrose 50% Injectable 25 Gram(s) IV Push once  dextrose 50% Injectable 12.5 Gram(s) IV Push once  dextrose 50% Injectable 25 Gram(s) IV Push once  donepezil 5 milliGRAM(s) Oral at bedtime  DULoxetine 60 milliGRAM(s) Oral daily  glucagon  Injectable 1 milliGRAM(s) IntraMuscular once  insulin glargine Injectable (LANTUS) 8 Unit(s) SubCutaneous at bedtime  insulin lispro (ADMELOG) corrective regimen sliding scale   SubCutaneous three times a day before meals  insulin lispro (ADMELOG) corrective regimen sliding scale   SubCutaneous at bedtime  lactobacillus acidophilus 1 Tablet(s) Oral two times a day with meals  losartan 25 milliGRAM(s) Oral daily  metoprolol tartrate 50 milliGRAM(s) Oral two times a day  multivitamin 1 Tablet(s) Oral daily  pantoprazole   Suspension 40 milliGRAM(s) Oral daily  piperacillin/tazobactam IVPB.. 3.375 Gram(s) IV Intermittent every 8 hours  senna 2 Tablet(s) Oral at bedtime  simvastatin 40 milliGRAM(s) Oral at bedtime  tamsulosin 0.4 milliGRAM(s) Oral at bedtime    MEDICATIONS  (PRN):  acetaminophen     Tablet .. 650 milliGRAM(s) Oral every 6 hours PRN Temp greater or equal to 38C (100.4F), Mild Pain (1 - 3)  aluminum hydroxide/magnesium hydroxide/simethicone Suspension 30 milliLiter(s) Oral every 4 hours PRN Dyspepsia  bisacodyl Suppository 10 milliGRAM(s) Rectal daily PRN Constipation  dextrose Oral Gel 15 Gram(s) Oral once PRN Blood Glucose LESS THAN 70 milliGRAM(s)/deciliter  hydrALAZINE 50 milliGRAM(s) Oral every 6 hours PRN for systolic BP>160  magnesium hydroxide Suspension 30 milliLiter(s) Oral daily PRN Constipation  melatonin 3 milliGRAM(s) Oral at bedtime PRN Insomnia  ondansetron Injectable 4 milliGRAM(s) IV Push every 8 hours PRN Nausea and/or Vomiting  sodium chloride 0.9% lock flush 10 milliLiter(s) IV Push every 1 hour PRN Pre/post blood products, medications, blood draw, and to maintain line patency    Pertinent Labs: 04-11 Na140 mmol/L Glu 193 mg/dL<H> K+ 4.1 mmol/L Cr  1.30 mg/dL BUN 29 mg/dL<H> 04-10 Phos 2.4 mg/dL<L> 04-11 Alb 2.2 g/dL<L> 03-30 Chol 108 mg/dL LDL --    HDL 32 mg/dL<L> Trig 74 mg/dL     CAPILLARY BLOOD GLUCOSE      POCT Blood Glucose.: 201 mg/dL (11 Apr 2023 07:40)  POCT Blood Glucose.: 144 mg/dL (10 Apr 2023 21:46)  POCT Blood Glucose.: 107 mg/dL (10 Apr 2023 16:23)  POCT Blood Glucose.: 156 mg/dL (10 Apr 2023 11:46)    Skin: left foot surgical incision, left heel/lateral foot unstageable, right heel unstageable, MAD/IAD.  [place] 13    Estimated Needs:   [x ] no change since previous assessment  [ ] recalculated:     Previous Nutrition Diagnosis:   [ ] Inadequate Energy Intake [ ]Inadequate Oral Intake [ ] Excessive Energy Intake   [ ] Underweight [x] Increased Nutrient Needs [ ] Overweight/Obesity   [ ] Altered GI Function [ ] Unintended Weight Loss [ ] Food & Nutrition Related Knowledge Deficit [x ] Malnutrition     Nutrition Diagnosis is [x ] ongoing  [ ] resolved [ ] not applicable     New Nutrition Diagnosis: [ x] not applicable       Interventions:   Recommend  [ ] Change Diet To:  [ ] Nutrition Supplement  [ ] Nutrition Support  [x ] Other: Recommend continued assistance/encouragement with meals/oral nutritional supplements. 500mg Vit C daily.     Monitoring and Evaluation:   [x ] PO intake [ x ] Tolerance to diet prescription [ x ] weights [ x ] labs[ x ] follow up per protocol  [ ] other: Assessment/Follow up:    Brief hx : 86 yo male, [facility] resident with PMHx - COPD, DM, HTN, CAD, HLD, H/O TIA, dementia, GERD, BPH and depression, who was sent for evaluation of left foot 1metatarsal wound. Concern for wound infection with underlying osteomyelitis. He also has bilateral heel wounds. Incidentally found to be COVID positive.    Pt awake/confused. S/p left 1st toe/head metatarsal resection POD#1. Continues on rx consistent carbohydrate, low na, soft and bite sized diet with glucerna BID & madeline BID. Per RN aide, pt needs to be fed, po intake appears fair; fluctuates at times. For breakfast 4/11 pt consumed 100% yogurt, 50% oatmeal, few spoonfuls of eggs and 100% madeline supplement. No report N/V. Last BM 4/9 in EMR. Small BM smear 4/11 per RN aide. Bowel regimen rx. Recommend continued assistance/encouragements during meals & with oral nutritional supplements.     Factors impacting intake: [ ] none [ ] nausea  [ ] vomiting [ ] diarrhea [x ] constipation  [X ]chewing problems [ ] swallowing issues  [X ] other: dementia    Diet Presciption: Consistent Carbohydrate, Low Na, soft and bite sized diet, glucerna 8oz BID, madeline BID    Intake: Fair/fluctuating; 0-100% past 5 days per EMR.    Current Weight: 167.5lbs(4/11) ; request reweight for accuracy      Pertinent Medications: MEDICATIONS  (STANDING):  budesonide 160 MICROgram(s)/formoterol 4.5 MICROgram(s) Inhaler 2 Puff(s) Inhalation two times a day  dextrose 50% Injectable 25 Gram(s) IV Push once  dextrose 50% Injectable 12.5 Gram(s) IV Push once  dextrose 50% Injectable 25 Gram(s) IV Push once  donepezil 5 milliGRAM(s) Oral at bedtime  DULoxetine 60 milliGRAM(s) Oral daily  glucagon  Injectable 1 milliGRAM(s) IntraMuscular once  insulin glargine Injectable (LANTUS) 8 Unit(s) SubCutaneous at bedtime  insulin lispro (ADMELOG) corrective regimen sliding scale   SubCutaneous three times a day before meals  insulin lispro (ADMELOG) corrective regimen sliding scale   SubCutaneous at bedtime  lactobacillus acidophilus 1 Tablet(s) Oral two times a day with meals  losartan 25 milliGRAM(s) Oral daily  metoprolol tartrate 50 milliGRAM(s) Oral two times a day  multivitamin 1 Tablet(s) Oral daily  pantoprazole   Suspension 40 milliGRAM(s) Oral daily  piperacillin/tazobactam IVPB.. 3.375 Gram(s) IV Intermittent every 8 hours  senna 2 Tablet(s) Oral at bedtime  simvastatin 40 milliGRAM(s) Oral at bedtime  tamsulosin 0.4 milliGRAM(s) Oral at bedtime    MEDICATIONS  (PRN):  acetaminophen     Tablet .. 650 milliGRAM(s) Oral every 6 hours PRN Temp greater or equal to 38C (100.4F), Mild Pain (1 - 3)  aluminum hydroxide/magnesium hydroxide/simethicone Suspension 30 milliLiter(s) Oral every 4 hours PRN Dyspepsia  bisacodyl Suppository 10 milliGRAM(s) Rectal daily PRN Constipation  dextrose Oral Gel 15 Gram(s) Oral once PRN Blood Glucose LESS THAN 70 milliGRAM(s)/deciliter  hydrALAZINE 50 milliGRAM(s) Oral every 6 hours PRN for systolic BP>160  magnesium hydroxide Suspension 30 milliLiter(s) Oral daily PRN Constipation  melatonin 3 milliGRAM(s) Oral at bedtime PRN Insomnia  ondansetron Injectable 4 milliGRAM(s) IV Push every 8 hours PRN Nausea and/or Vomiting  sodium chloride 0.9% lock flush 10 milliLiter(s) IV Push every 1 hour PRN Pre/post blood products, medications, blood draw, and to maintain line patency    Pertinent Labs: 04-11 Na140 mmol/L Glu 193 mg/dL<H> K+ 4.1 mmol/L Cr  1.30 mg/dL BUN 29 mg/dL<H> 04-10 Phos 2.4 mg/dL<L> 04-11 Alb 2.2 g/dL<L> 03-30 Chol 108 mg/dL LDL --    HDL 32 mg/dL<L> Trig 74 mg/dL     CAPILLARY BLOOD GLUCOSE      POCT Blood Glucose.: 201 mg/dL (11 Apr 2023 07:40)  POCT Blood Glucose.: 144 mg/dL (10 Apr 2023 21:46)  POCT Blood Glucose.: 107 mg/dL (10 Apr 2023 16:23)  POCT Blood Glucose.: 156 mg/dL (10 Apr 2023 11:46)    Skin: left foot surgical incision, left heel/lateral foot unstageable, right heel unstageable, MAD/IAD.  [place] [address]    Estimated Needs:   [x ] no change since previous assessment  [ ] recalculated:     Previous Nutrition Diagnosis:   [ ] Inadequate Energy Intake [ ]Inadequate Oral Intake [ ] Excessive Energy Intake   [ ] Underweight [x] Increased Nutrient Needs [ ] Overweight/Obesity   [ ] Altered GI Function [ ] Unintended Weight Loss [ ] Food & Nutrition Related Knowledge Deficit [x ] Malnutrition     Nutrition Diagnosis is [x ] ongoing  [ ] resolved [ ] not applicable     New Nutrition Diagnosis: [ x] not applicable       Interventions:   Recommend  [ ] Change Diet To:  [ ] Nutrition Supplement  [ ] Nutrition Support  [x ] Other: Recommend continued assistance/encouragement with meals/oral nutritional supplements. 500mg Vit C daily.     Monitoring and Evaluation:   [x ] PO intake [ x ] Tolerance to diet prescription [ x ] weights [ x ] labs[ x ] follow up per protocol  [ ] other:

## 2023-04-11 NOTE — PROVIDER CONTACT NOTE (CRITICAL VALUE NOTIFICATION) - SITUATION
blood sugar checked 66 mg/dl   activated hypoglycemia protocol  will f/u until > 100
Tissue Cultures- S. Aureus & Cornyebacterium species

## 2023-04-11 NOTE — PROGRESS NOTE ADULT - SUBJECTIVE AND OBJECTIVE BOX
Patient is a 87y Male whom presented to the hospital with ckd and chelo     PAST MEDICAL & SURGICAL HISTORY:      MEDICATIONS  (STANDING):      Allergies    No Known Allergies    Intolerances        SOCIAL HISTORY:  Denies ETOh,Smoking,     FAMILY HISTORY:      REVIEW OF SYSTEMS:  unable to obtained a good review system                                                                                             9.8    9.38  )-----------( 339      ( 11 Apr 2023 06:06 )             31.7       CBC Full  -  ( 11 Apr 2023 06:06 )  WBC Count : 9.38 K/uL  RBC Count : 3.40 M/uL  Hemoglobin : 9.8 g/dL  Hematocrit : 31.7 %  Platelet Count - Automated : 339 K/uL  Mean Cell Volume : 93.2 fl  Mean Cell Hemoglobin : 28.8 pg  Mean Cell Hemoglobin Concentration : 30.9 gm/dL  Auto Neutrophil # : x  Auto Lymphocyte # : x  Auto Monocyte # : x  Auto Eosinophil # : x  Auto Basophil # : x  Auto Neutrophil % : x  Auto Lymphocyte % : x  Auto Monocyte % : x  Auto Eosinophil % : x  Auto Basophil % : x      04-11    140  |  112<H>  |  29<H>  ----------------------------<  193<H>  4.1   |  24  |  1.30    Ca    8.5      11 Apr 2023 06:06  Phos  2.4     04-10  Mg     2.0     04-10    TPro  6.4  /  Alb  2.2<L>  /  TBili  0.5  /  DBili  0.2  /  AST  16  /  ALT  22  /  AlkPhos  72  04-11      CAPILLARY BLOOD GLUCOSE      POCT Blood Glucose.: 194 mg/dL (11 Apr 2023 16:49)  POCT Blood Glucose.: 198 mg/dL (11 Apr 2023 11:39)  POCT Blood Glucose.: 201 mg/dL (11 Apr 2023 07:40)  POCT Blood Glucose.: 144 mg/dL (10 Apr 2023 21:46)      Vital Signs Last 24 Hrs  T(C): 37 (11 Apr 2023 13:03), Max: 37 (11 Apr 2023 13:03)  T(F): 98.6 (11 Apr 2023 13:03), Max: 98.6 (11 Apr 2023 13:03)  HR: 81 (11 Apr 2023 13:03) (77 - 89)  BP: 149/72 (11 Apr 2023 13:03) (128/71 - 149/72)  BP(mean): --  RR: 17 (11 Apr 2023 13:03) (17 - 20)  SpO2: 95% (11 Apr 2023 13:03) (95% - 100%)    Parameters below as of 11 Apr 2023 13:03  Patient On (Oxygen Delivery Method): room air            PT/INR - ( 10 Apr 2023 05:10 )   PT: 15.8 sec;   INR: 1.35 ratio                     PHYSICAL EXAM:    Constitutional: NAD  HEENT: conjunctive   clear   Neck:  No JVD  Respiratory: CTAB  Cardiovascular: S1 and S2  Gastrointestinal: BS+, soft,   Extremities: No peripheral edema  Neurological:  no focal deficits

## 2023-04-11 NOTE — SWALLOW BEDSIDE ASSESSMENT ADULT - COMMENTS
per charting - 86 yo male ,[facility] resident with PMHx - COPD, DM, HTN, CAD, HLD, H/O TIA, dementia,GERD, BPH and depression sent ot ER for evaluation of left foot 1metatarsal wound ,suggestive of osteomyelitis .Patient was seen by ID consult and transfer to the hospital recommended for wound cx/bone biopsy /podiatry evaluation ,likely will require 4-6 weeks of iv abx . Patient was admitted to [facility] with b/l feet wounds and was followed by wound care team ,recently completed 7 days of doxycycline for foot wound cellulitis .    PULMONARY: Bilateral RIGHT greater than LEFT perihilar/basilar diffuse   airspace disease and/or pleural effusions. No pneumothorax.    Pt seen at bedside this date. PCA reporting patient is only consuming puree and expectorating other solid consistencies. Pt received in bed, AAOX1, not following simple commands, significant confusion. Pt left as received, NAD. per charting - 86 yo male ,[facility] resident with PMHx - COPD, DM, HTN, CAD, HLD, H/O TIA, dementia,GERD, BPH and depression sent ot ER for evaluation of left foot 1metatarsal wound ,suggestive of osteomyelitis .Patient was seen by ID consult and transfer to the hospital recommended for wound cx/bone biopsy /podiatry evaluation ,likely will require 4-6 weeks of iv abx . Patient was admitted to [facility] with b/l feet wounds and was followed by wound care team ,recently completed 7 days of doxycycline for foot wound cellulitis .    PULMONARY: Bilateral RIGHT greater than LEFT perihilar/basilar diffuse   airspace disease and/or pleural effusions. No pneumothorax.    Pt seen at bedside this date. PCA reporting patient is only consuming puree and expectorating other solid consistencies. Pt received in bed, AAOX1, not following simple commands, significant confusion. Pt left as received, NAD. per charting - 88 yo male ,[facility] resident with PMHx - COPD, DM, HTN, CAD, HLD, H/O TIA, dementia,GERD, BPH and depression sent ot ER for evaluation of left foot 1metatarsal wound ,suggestive of osteomyelitis .Patient was seen by ID consult and transfer to the hospital recommended for wound cx/bone biopsy /podiatry evaluation ,likely will require 4-6 weeks of iv abx . Patient was admitted to [facility] with b/l feet wounds and was followed by wound care team ,recently completed 7 days of doxycycline for foot wound cellulitis .    PULMONARY: Bilateral RIGHT greater than LEFT perihilar/basilar diffuse   airspace disease and/or pleural effusions. No pneumothorax.    Pt seen at bedside this date. PCA reporting patient is only consuming puree and expectorating other solid consistencies. Pt received in bed, AAOX1, not following simple commands, significant confusion. Pt left as received, NAD.

## 2023-04-11 NOTE — PROGRESS NOTE ADULT - SUBJECTIVE AND OBJECTIVE BOX
CHIEF COMPLAINT/ REASON FOR VISIT  .. Patient was seen to address the  issue listed under PROBLEM LIST which is located toward bottom of this note     ANA MARIA LEIGH    PLV 1EAS 101 W1    Allergies    No Known Allergies    Intolerances        PAST MEDICAL & SURGICAL HISTORY:  ASHD (arteriosclerotic heart disease)      BPH without urinary obstruction      COPD, moderate      Stage 3 chronic kidney disease      Chronic GERD      HLD (hyperlipidemia)      MDD (major depressive disorder)      Obstructive and reflux uropathy      Cellulitis      HTN (hypertension)      Sepsis      Dementia      Moderate protein-calorie malnutrition      Brain TIA      History of RSV infection      DM type 2, not at goal      Pressure ulcer of unspecified heel, unspecified stage      Venous stasis ulcer without varicose veins      Multiple open wounds of foot          FAMILY HISTORY:      Home Medications:  Admelog SoloStar 100 units/mL injectable solution: injectable 4 times a day (before meals and at bedtime) per sliding scale (30 Mar 2023 15:23)  Aricept 5 mg oral tablet: 1 tab(s) orally once a day (30 Mar 2023 15:23)  atenolol 25 mg oral tablet: 1 tab(s) orally once a day (30 Mar 2023 15:23)  Bacid (LAC) oral tablet: 1 tab(s) orally 2 times a day (30 Mar 2023 15:23)  Cymbalta 60 mg oral delayed release capsule: 1 cap(s) orally once a day (30 Mar 2023 15:23)  docusate sodium 100 mg oral capsule: 2 cap(s) orally once a day (at bedtime) (30 Mar 2023 15:23)  doxycycline hyclate 100 mg oral tablet: 1 tab(s) orally 2 times a day for 7 days  3/28/23-4/4/23 (30 Mar 2023 15:23)  Dulcolax Laxative 10 mg rectal suppository: 1 suppository(ies) rectally as needed for  constipation (30 Mar 2023 15:23)  famotidine 40 mg oral tablet: 1 tab(s) orally once a day (30 Mar 2023 15:23)  Fleet Enema 19 g-7 g rectal enema: 133 milliliter(s) rectally as needed for  constipation (30 Mar 2023 15:23)  Flovent 44 mcg/inh inhalation aerosol with adapter: 1 puff(s) inhaled every 12 hours (30 Mar 2023 15:23)  ipratropium-albuterol 0.5 mg-2.5 mg/3 mL inhalation solution: 3 milliliter(s) by nebulizer 4 times a day (30 Mar 2023 15:23)  losartan 50 mg oral tablet: 1 tab(s) orally once a day (30 Mar 2023 15:23)  Milk of Magnesia 8% oral suspension: 30 milliliter(s) orally once a day as needed for  constipation (30 Mar 2023 15:23)  Santyl 250 units/g topical ointment: Apply topically to affected area (30 Mar 2023 12:41)  simvastatin 40 mg oral tablet: 1 tab(s) orally once a day (at bedtime) (30 Mar 2023 15:23)  SITagliptin 50 mg oral tablet: 1 tab(s) orally once a day (30 Mar 2023 15:23)  tamsulosin 0.4 mg oral capsule: 1 cap(s) orally once a day (in the evening) (30 Mar 2023 15:23)  Therapeutic Multiple Vitamins oral tablet: 1 tab(s) orally once a day (30 Mar 2023 15:23)  traMADol 50 mg oral tablet: 0.5 tab(s) orally 2 times a day (30 Mar 2023 15:23)  Tylenol Caplet Extra Strength 500 mg oral tablet: 2 tab(s) orally every 8 hours (30 Mar 2023 15:23)      MEDICATIONS  (STANDING):  budesonide 160 MICROgram(s)/formoterol 4.5 MICROgram(s) Inhaler 2 Puff(s) Inhalation two times a day  dextrose 50% Injectable 25 Gram(s) IV Push once  dextrose 50% Injectable 25 Gram(s) IV Push once  dextrose 50% Injectable 12.5 Gram(s) IV Push once  donepezil 5 milliGRAM(s) Oral at bedtime  DULoxetine 60 milliGRAM(s) Oral daily  glucagon  Injectable 1 milliGRAM(s) IntraMuscular once  insulin glargine Injectable (LANTUS) 8 Unit(s) SubCutaneous at bedtime  insulin lispro (ADMELOG) corrective regimen sliding scale   SubCutaneous three times a day before meals  insulin lispro (ADMELOG) corrective regimen sliding scale   SubCutaneous at bedtime  lactobacillus acidophilus 1 Tablet(s) Oral two times a day with meals  losartan 25 milliGRAM(s) Oral daily  metoprolol tartrate 50 milliGRAM(s) Oral two times a day  multivitamin 1 Tablet(s) Oral daily  pantoprazole   Suspension 40 milliGRAM(s) Oral daily  piperacillin/tazobactam IVPB.. 3.375 Gram(s) IV Intermittent every 8 hours  senna 2 Tablet(s) Oral at bedtime  simvastatin 40 milliGRAM(s) Oral at bedtime  tamsulosin 0.4 milliGRAM(s) Oral at bedtime    MEDICATIONS  (PRN):  acetaminophen     Tablet .. 650 milliGRAM(s) Oral every 6 hours PRN Temp greater or equal to 38C (100.4F), Mild Pain (1 - 3)  aluminum hydroxide/magnesium hydroxide/simethicone Suspension 30 milliLiter(s) Oral every 4 hours PRN Dyspepsia  bisacodyl Suppository 10 milliGRAM(s) Rectal daily PRN Constipation  dextrose Oral Gel 15 Gram(s) Oral once PRN Blood Glucose LESS THAN 70 milliGRAM(s)/deciliter  hydrALAZINE 50 milliGRAM(s) Oral every 6 hours PRN for systolic BP>160  magnesium hydroxide Suspension 30 milliLiter(s) Oral daily PRN Constipation  melatonin 3 milliGRAM(s) Oral at bedtime PRN Insomnia  ondansetron Injectable 4 milliGRAM(s) IV Push every 8 hours PRN Nausea and/or Vomiting  sodium chloride 0.9% lock flush 10 milliLiter(s) IV Push every 1 hour PRN Pre/post blood products, medications, blood draw, and to maintain line patency              Vital Signs Last 24 Hrs  T(C): 36.8 (11 Apr 2023 06:22), Max: 36.8 (11 Apr 2023 06:22)  T(F): 98.2 (11 Apr 2023 06:22), Max: 98.2 (11 Apr 2023 06:22)  HR: 85 (11 Apr 2023 06:22) (65 - 85)  BP: 143/88 (11 Apr 2023 06:22) (79/49 - 145/83)  BP(mean): --  RR: 20 (11 Apr 2023 06:22) (16 - 20)  SpO2: 100% (11 Apr 2023 06:22) (93% - 100%)    Parameters below as of 11 Apr 2023 06:22  Patient On (Oxygen Delivery Method): nasal cannula  O2 Flow (L/min): 2                LABS:                        9.8    9.38  )-----------( 339      ( 11 Apr 2023 06:06 )             31.7     04-11    140  |  112<H>  |  29<H>  ----------------------------<  193<H>  4.1   |  24  |  1.30    Ca    8.5      11 Apr 2023 06:06  Phos  2.4     04-10  Mg     2.0     04-10    TPro  6.4  /  Alb  2.2<L>  /  TBili  0.5  /  DBili  0.2  /  AST  16  /  ALT  22  /  AlkPhos  72  04-11    PT/INR - ( 10 Apr 2023 05:10 )   PT: 15.8 sec;   INR: 1.35 ratio                   WBC:  WBC Count: 9.38 K/uL (04-11 @ 06:06)  WBC Count: 7.13 K/uL (04-10 @ 05:10)  WBC Count: 8.10 K/uL (04-09 @ 06:00)  WBC Count: 8.42 K/uL (04-07 @ 08:43)      MICROBIOLOGY:  RECENT CULTURES:  04-10 .Tissue Other, LEFT FOOT 1ST METATARSAL HEAD XXXX   No polymorphonuclear cells seen per low power field  No organisms seen per oil power field XXXX                PT/INR - ( 10 Apr 2023 05:10 )   PT: 15.8 sec;   INR: 1.35 ratio             Sodium:  Sodium, Serum: 140 mmol/L (04-11 @ 06:06)  Sodium, Serum: 139 mmol/L (04-10 @ 05:10)  Sodium, Serum: 139 mmol/L (04-09 @ 06:00)  Sodium, Serum: 145 mmol/L (04-07 @ 08:43)      1.30 mg/dL 04-11 @ 06:06  1.30 mg/dL 04-10 @ 05:10  1.30 mg/dL 04-09 @ 06:00  1.20 mg/dL 04-08 @ 06:14  1.30 mg/dL 04-07 @ 08:43      Hemoglobin:  Hemoglobin: 9.8 g/dL (04-11 @ 06:06)  Hemoglobin: 9.4 g/dL (04-10 @ 05:10)  Hemoglobin: 9.7 g/dL (04-09 @ 06:00)  Hemoglobin: 10.9 g/dL (04-07 @ 08:43)      Platelets: Platelet Count - Automated: 339 K/uL (04-11 @ 06:06)  Platelet Count - Automated: 300 K/uL (04-10 @ 05:10)  Platelet Count - Automated: 289 K/uL (04-09 @ 06:00)  Platelet Count - Automated: 284 K/uL (04-07 @ 08:43)      LIVER FUNCTIONS - ( 11 Apr 2023 06:06 )  Alb: 2.2 g/dL / Pro: 6.4 g/dL / ALK PHOS: 72 U/L / ALT: 22 U/L / AST: 16 U/L / GGT: x                 RADIOLOGY & ADDITIONAL STUDIES:      MICROBIOLOGY:  RECENT CULTURES:  04-10 .Tissue Other, LEFT FOOT 1ST METATARSAL HEAD XXXX   No polymorphonuclear cells seen per low power field  No organisms seen per oil power field XXXX

## 2023-04-11 NOTE — PROGRESS NOTE ADULT - ASSESSMENT
REVIEW OF SYMPTOMS      Able to give (reliable) ROS  NO     PHYSICAL EXAM    HEENT Unremarkable  atraumatic   RESP Fair air entry EXP prolonged    Harsh breath sound Resp distres mild   CARDIAC S1 S2 No S3     NO JVD    ABDOMEN SOFT BS PRESENT NOT DISTENDED No hepatosplenomegaly   PEDAL EDEMA present No calf tenderness  NO rash       GENERAL DATA .   GOC.     .. 3/30/2023 full code  ALLGY.     .. nka                    WT.   .. 3/30/2023 63  BMI.        .. 3/30/2023 21            ICU STAY.   .. none  COVID.   .. 4/4/2023 scv2 (+)  .. 3/29/2023 scv2 (-)     BEST PRACTICE ISSUES.    HOB ELEVATN.   .. Yes  DVT PPLX.   .. 3/31 lvnx 60.2 Dr Lakhani (a fib)   ..  3/29- 3/31 hpsc   MILLER PPLX.   .. 3/30/2023 protonix 40    INFN PPLX. ..    SP SW LAURENT.   ..  3/30/2023 -> soft bite mild thick        DIET.    ..  3/30/2023 dash  FREE WATER  .. 4/1/2023 fw 250.4    IV fl.  .. 4/6/2023 d5 1/2 40   PROCEDURE  .. 4/7/2023 r brach picc     PROBLEM/ASSESSMENT/PLAN.  COVID   .. 4/4/2023 scv2 (+)  .. 4/4/2023 oxygenation ok  .. 4/4/2023 pt has mild disease   .. 4/4/2023 rdsv 3 d course started by Dr Mancia   Infection  Osteomyelitius  Possible pneumonia   .. Esr 3/29-3/31/2023 esr 67- 49   .. W 3/29-3/30-3/31-4/1-4/4-4/5-4/10-4/11/2023       w 11.8 - 12- 10.5- 11.9 -  9 - 7.7 - 7.1 - 9.8   .. cxr 3/30/2023  ........ increasing r lower perihilar infiltrate   .. xr foot left 3/30/2023  ........ stable eroisions around 1st mtp anmd great toe    ........ which could be bone infectn    .. CXR 3/29/2023 Possible hansa pneum  .. w scann 4/4 l foot susp for osteom r heel osteomyelitis   .. bc 3/29/2023 bc (-)   .. uc 4/2/2023 100K eneterococcs fecal  .. 3/29-4/5 zosyn    .. 4/7/2023 zosyn 42 d   .. follow cultures  PLEURAL EFFUSION.  .. ct ch 3/30/2023   ........ mild pulm edema  ........ mod r and sml effsn   a/r  .. pl effsn likely sec to chf   COPD  .. 3/30/2023 duoneb.3    .. 3/30/2023 symbicort   hemodynamics  .. La 3/29/2023 la 1.8   .. target map 65 (+)   CAD.  .. 3/30- 3/31/2023 atenolol 25  .. 3/31-4/4      metoprolol 25.2 - metoprolol 25.3   .. 3/30/2023 simvastat 40   RO DVT  .. 4/1/2023 v duplx (-)  CHF.  .. echo 3/30/2023  ........ ef 40%   ........ mod mr   .. bnp 4/2 bnp [id number]   .. 3/30-4/4/2023      losartan 50 - losartan 25   .. 3/30/2023 hydralazin 50.4p   Anemia  .. Hb 3/29-3/30-3/31-4/1-4/4-4/5-4/10/2023      Hb 11.2- 10.2 - 9.8 - 11 - 9.4 - 11.5- 9.4   .. monitor  Hypernatremia.  .. Na 3/31-4/1-4/2-4/3-4/4 Na 147 - 151- 152 - 148- 144    CKD  .. Na 3/29-3/31/2023 Na 144-147   .. Cr 3/29-3/30-3/31-4/1-4/3-4/4-4/10/2023      Cr 1.4 - 1.3 - 1.3 - 1.5 - 1.5 - 1.3- 1.3   .. monitor  OBS  .. 3/30/2023 donepezil     OVERALL .  88 yo M with PMHx HTN, HLD, T2D, dementia (AOx2-3), OA, BPH, CAD, TIA, CKD 3 and GERD HO recent hospital stay 1/24-1/30/2023 [facility] RSV  COPD ex  now admitted with osteomyelitis possible pneum  Pulm consulted 3/29/2023    .. 4/4/2023 pt tested covid (+)  started rdsv Dr Mancia     PROBLEMS  COVID 4/4/2023  .. 4/4/2023 rdsv 3 d  E FECA UTI   .. uc 4/2/2023 100K eneterococcs fecal  Possible Osteomyelitis  .. w scann 4/4 l foot susp for osteom r heel osteomyelitis   Pneumonia   .. 3/29-4/5 zosyn   .. 4/7 zosyn x 42 d    COPD   CKD   PLEURAL EFFSN   .. 3/30 ct  HFREF   .. MOD MR 3/30 echo    A fib  ..  4/1 lvnx 60.2       TIME SPENT   Over 25 minutes aggregate care time spent on encounter; activities included   direct patient care, counseling and/or coordinating care reviewing notes, lab data/ imaging , discussion with multidisciplinary team/ patient  /family and explaining in detail risks, benefits, alternatives  of the recommendations     Paulino Rossi m     REVIEW OF SYMPTOMS      Able to give (reliable) ROS  NO     PHYSICAL EXAM    HEENT Unremarkable  atraumatic   RESP Fair air entry EXP prolonged    Harsh breath sound Resp distres mild   CARDIAC S1 S2 No S3     NO JVD    ABDOMEN SOFT BS PRESENT NOT DISTENDED No hepatosplenomegaly   PEDAL EDEMA present No calf tenderness  NO rash       GENERAL DATA .   GOC.     .. 3/30/2023 full code  ALLGY.     .. nka                    WT.   .. 3/30/2023 63  BMI.        .. 3/30/2023 21            ICU STAY.   .. none  COVID.   .. 4/4/2023 scv2 (+)  .. 3/29/2023 scv2 (-)     BEST PRACTICE ISSUES.    HOB ELEVATN.   .. Yes  DVT PPLX.   .. 3/31 lvnx 60.2 Dr Lakhani (a fib)   ..  3/29- 3/31 hpsc   MILLER PPLX.   .. 3/30/2023 protonix 40    INFN PPLX. ..    SP SW LAURENT.   ..  3/30/2023 -> soft bite mild thick        DIET.    ..  3/30/2023 dash  FREE WATER  .. 4/1/2023 fw 250.4    IV fl.  .. 4/6/2023 d5 1/2 40   PROCEDURE  .. 4/7/2023 r brach picc     PROBLEM/ASSESSMENT/PLAN.  COVID   .. 4/4/2023 scv2 (+)  .. 4/4/2023 oxygenation ok  .. 4/4/2023 pt has mild disease   .. 4/4/2023 rdsv 3 d course started by Dr Mancia   Infection  Osteomyelitius  Possible pneumonia   .. Esr 3/29-3/31/2023 esr 67- 49   .. W 3/29-3/30-3/31-4/1-4/4-4/5-4/10-4/11/2023       w 11.8 - 12- 10.5- 11.9 -  9 - 7.7 - 7.1 - 9.8   .. cxr 3/30/2023  ........ increasing r lower perihilar infiltrate   .. xr foot left 3/30/2023  ........ stable eroisions around 1st mtp anmd great toe    ........ which could be bone infectn    .. CXR 3/29/2023 Possible hansa pneum  .. w scann 4/4 l foot susp for osteom r heel osteomyelitis   .. bc 3/29/2023 bc (-)   .. uc 4/2/2023 100K eneterococcs fecal  .. 3/29-4/5 zosyn    .. 4/7/2023 zosyn 42 d   .. follow cultures  PLEURAL EFFUSION.  .. ct ch 3/30/2023   ........ mild pulm edema  ........ mod r and sml effsn   a/r  .. pl effsn likely sec to chf   COPD  .. 3/30/2023 duoneb.3    .. 3/30/2023 symbicort   hemodynamics  .. La 3/29/2023 la 1.8   .. target map 65 (+)   CAD.  .. 3/30- 3/31/2023 atenolol 25  .. 3/31-4/4      metoprolol 25.2 - metoprolol 25.3   .. 3/30/2023 simvastat 40   RO DVT  .. 4/1/2023 v duplx (-)  CHF.  .. echo 3/30/2023  ........ ef 40%   ........ mod mr   .. bnp 4/2 bnp [id number]   .. 3/30-4/4/2023      losartan 50 - losartan 25   .. 3/30/2023 hydralazin 50.4p   Anemia  .. Hb 3/29-3/30-3/31-4/1-4/4-4/5-4/10/2023      Hb 11.2- 10.2 - 9.8 - 11 - 9.4 - 11.5- 9.4   .. monitor  Hypernatremia.  .. Na 3/31-4/1-4/2-4/3-4/4 Na 147 - 151- 152 - 148- 144    CKD  .. Na 3/29-3/31/2023 Na 144-147   .. Cr 3/29-3/30-3/31-4/1-4/3-4/4-4/10/2023      Cr 1.4 - 1.3 - 1.3 - 1.5 - 1.5 - 1.3- 1.3   .. monitor  OBS  .. 3/30/2023 donepezil     OVERALL .  86 yo M with PMHx HTN, HLD, T2D, dementia (AOx2-3), OA, BPH, CAD, TIA, CKD 3 and GERD HO recent hospital stay 1/24-1/30/2023 [facility]  COPD ex  now admitted with osteomyelitis possible pneum  Pulm consulted 3/29/2023    .. 4/4/2023 pt tested covid (+)  started rdsv Dr Mancia     PROBLEMS  COVID 4/4/2023  .. 4/4/2023 rdsv 3 d  E FECA UTI   .. uc 4/2/2023 100K eneterococcs fecal  Possible Osteomyelitis  .. w scann 4/4 l foot susp for osteom r heel osteomyelitis   Pneumonia   .. 3/29-4/5 zosyn   .. 4/7 zosyn x 42 d    COPD   CKD   PLEURAL EFFSN   .. 3/30 ct  HFREF   .. MOD MR 3/30 echo    A fib  ..  4/1 lvnx 60.2       TIME SPENT   Over 25 minutes aggregate care time spent on encounter; activities included   direct patient care, counseling and/or coordinating care reviewing notes, lab data/ imaging , discussion with multidisciplinary team/ patient  /family and explaining in detail risks, benefits, alternatives  of the recommendations     Paulino Rossi m     REVIEW OF SYMPTOMS      Able to give (reliable) ROS  NO     PHYSICAL EXAM    HEENT Unremarkable  atraumatic   RESP Fair air entry EXP prolonged    Harsh breath sound Resp distres mild   CARDIAC S1 S2 No S3     NO JVD    ABDOMEN SOFT BS PRESENT NOT DISTENDED No hepatosplenomegaly   PEDAL EDEMA present No calf tenderness  NO rash       GENERAL DATA .   GOC.     .. 3/30/2023 full code  ALLGY.     .. nka                    WT.   .. 3/30/2023 63  BMI.        .. 3/30/2023 21            ICU STAY.   .. none  COVID.   .. 4/4/2023 scv2 (+)  .. 3/29/2023 scv2 (-)     BEST PRACTICE ISSUES.    HOB ELEVATN.   .. Yes  DVT PPLX.   .. 3/31 lvnx 60.2 Dr Lakhani (a fib)   ..  3/29- 3/31 hpsc   MILLER PPLX.   .. 3/30/2023 protonix 40    INFN PPLX. ..    SP SW LAURENT.   ..  3/30/2023 -> soft bite mild thick        DIET.    ..  3/30/2023 dash  FREE WATER  .. 4/1/2023 fw 250.4    IV fl.  .. 4/6/2023 d5 1/2 40   PROCEDURE  .. 4/7/2023 r brach picc     PROBLEM/ASSESSMENT/PLAN.  COVID   .. 4/4/2023 scv2 (+)  .. 4/4/2023 oxygenation ok  .. 4/4/2023 pt has mild disease   .. 4/4/2023 rdsv 3 d course started by Dr Mancia   Infection  Osteomyelitius  Possible pneumonia   .. Esr 3/29-3/31/2023 esr 67- 49   .. W 3/29-3/30-3/31-4/1-4/4-4/5-4/10-4/11/2023       w 11.8 - 12- 10.5- 11.9 -  9 - 7.7 - 7.1 - 9.8   .. cxr 3/30/2023  ........ increasing r lower perihilar infiltrate   .. xr foot left 3/30/2023  ........ stable eroisions around 1st mtp anmd great toe    ........ which could be bone infectn    .. CXR 3/29/2023 Possible hansa pneum  .. w scann 4/4 l foot susp for osteom r heel osteomyelitis   .. bc 3/29/2023 bc (-)   .. uc 4/2/2023 100K eneterococcs fecal  .. 3/29-4/5 zosyn    .. 4/7/2023 zosyn 42 d   .. follow cultures  PLEURAL EFFUSION.  .. ct ch 3/30/2023   ........ mild pulm edema  ........ mod r and sml effsn   a/r  .. pl effsn likely sec to chf   COPD  .. 3/30/2023 duoneb.3    .. 3/30/2023 symbicort   hemodynamics  .. La 3/29/2023 la 1.8   .. target map 65 (+)   CAD.  .. 3/30- 3/31/2023 atenolol 25  .. 3/31-4/4      metoprolol 25.2 - metoprolol 25.3   .. 3/30/2023 simvastat 40   RO DVT  .. 4/1/2023 v duplx (-)  CHF.  .. echo 3/30/2023  ........ ef 40%   ........ mod mr   .. bnp 4/2 bnp [id number]   .. 3/30-4/4/2023      losartan 50 - losartan 25   .. 3/30/2023 hydralazin 50.4p   Anemia  .. Hb 3/29-3/30-3/31-4/1-4/4-4/5-4/10/2023      Hb 11.2- 10.2 - 9.8 - 11 - 9.4 - 11.5- 9.4   .. monitor  Hypernatremia.  .. Na 3/31-4/1-4/2-4/3-4/4 Na 147 - 151- 152 - 148- 144    CKD  .. Na 3/29-3/31/2023 Na 144-147   .. Cr 3/29-3/30-3/31-4/1-4/3-4/4-4/10/2023      Cr 1.4 - 1.3 - 1.3 - 1.5 - 1.5 - 1.3- 1.3   .. monitor  OBS  .. 3/30/2023 donepezil     OVERALL .  88 yo M with PMHx HTN, HLD, T2D, dementia (AOx2-3), OA, BPH, CAD, TIA, CKD 3 and GERD HO recent hospital stay 1/24-1/30/2023 [facility]  COPD ex  now admitted with osteomyelitis possible pneum  Pulm consulted 3/29/2023    .. 4/4/2023 pt tested covid (+)  started rdsv Dr Mancia     PROBLEMS  COVID 4/4/2023  .. 4/4/2023 rdsv 3 d  E FECA UTI   .. uc 4/2/2023 100K eneterococcs fecal  Possible Osteomyelitis  .. w scann 4/4 l foot susp for osteom r heel osteomyelitis   Pneumonia   .. 3/29-4/5 zosyn   .. 4/7 zosyn x 42 d    COPD   CKD   PLEURAL EFFSN   .. 3/30 ct  HFREF   .. MOD MR 3/30 echo    A fib  ..  4/1 lvnx 60.2       TIME SPENT   Over 25 minutes aggregate care time spent on encounter; activities included   direct patient care, counseling and/or coordinating care reviewing notes, lab data/ imaging , discussion with multidisciplinary team/ patient  /family and explaining in detail risks, benefits, alternatives  of the recommendations     Paulino Rossi m

## 2023-04-11 NOTE — PROGRESS NOTE ADULT - NUTRITIONAL ASSESSMENT
This patient has been assessed with a concern for Malnutrition and has been determined to have a diagnosis/diagnoses of Moderate protein-calorie malnutrition.    This patient is being managed with:   Diet DASH/TLC-  Sodium & Cholesterol Restricted  Consistent Carbohydrate {Evening Snack}  Pureed (PUREED)  Reginald(7 Gm Arginine/7 Gm Glut/1.2 Gm HMB     Qty per Day:  2  Supplement Feeding Modality:  Oral  Glucerna Shake Cans or Servings Per Day:  1       Frequency:  Daily  Entered: Apr 11 2023  1:39PM

## 2023-04-11 NOTE — SWALLOW BEDSIDE ASSESSMENT ADULT - ORAL PREPARATORY PHASE
absent mastication, removed from oral cavity/Reduced oral grading Within functional limits Reduced oral grading

## 2023-04-11 NOTE — SWALLOW BEDSIDE ASSESSMENT ADULT - SWALLOW EVAL: THERAPY FREQUENCY
as schedule permits
SLP to sign off as patient is not a candidate for further swallow tx and is tolerating least restrictive diet at this time

## 2023-04-11 NOTE — SWALLOW BEDSIDE ASSESSMENT ADULT - ASR SWALLOW RECOMMEND DIAG
Will follow to check PO tolerance and reassess at bedside, as well as monitor candidacy for MBS due to reduced cognition.
Not indicated at this time due to clinical presentation/cognitive status

## 2023-04-12 LAB
-  AMPICILLIN/SULBACTAM: SIGNIFICANT CHANGE UP
-  CEFAZOLIN: SIGNIFICANT CHANGE UP
-  CLINDAMYCIN: SIGNIFICANT CHANGE UP
-  DAPTOMYCIN: SIGNIFICANT CHANGE UP
-  ERYTHROMYCIN: SIGNIFICANT CHANGE UP
-  GENTAMICIN: SIGNIFICANT CHANGE UP
-  LINEZOLID: SIGNIFICANT CHANGE UP
-  OXACILLIN: SIGNIFICANT CHANGE UP
-  PENICILLIN: SIGNIFICANT CHANGE UP
-  RIFAMPIN: SIGNIFICANT CHANGE UP
-  TETRACYCLINE: SIGNIFICANT CHANGE UP
-  TRIMETHOPRIM/SULFAMETHOXAZOLE: SIGNIFICANT CHANGE UP
-  VANCOMYCIN: SIGNIFICANT CHANGE UP
ALBUMIN SERPL ELPH-MCNC: 1.9 G/DL — LOW (ref 3.3–5)
ALP SERPL-CCNC: 66 U/L — SIGNIFICANT CHANGE UP (ref 40–120)
ALT FLD-CCNC: 18 U/L — SIGNIFICANT CHANGE UP (ref 12–78)
ANION GAP SERPL CALC-SCNC: 6 MMOL/L — SIGNIFICANT CHANGE UP (ref 5–17)
AST SERPL-CCNC: 17 U/L — SIGNIFICANT CHANGE UP (ref 15–37)
BILIRUB DIRECT SERPL-MCNC: 0.2 MG/DL — SIGNIFICANT CHANGE UP (ref 0–0.3)
BILIRUB INDIRECT FLD-MCNC: 0.3 MG/DL — SIGNIFICANT CHANGE UP (ref 0.2–1)
BILIRUB SERPL-MCNC: 0.5 MG/DL — SIGNIFICANT CHANGE UP (ref 0.2–1.2)
BUN SERPL-MCNC: 31 MG/DL — HIGH (ref 7–23)
CALCIUM SERPL-MCNC: 8.8 MG/DL — SIGNIFICANT CHANGE UP (ref 8.5–10.1)
CHLORIDE SERPL-SCNC: 110 MMOL/L — HIGH (ref 96–108)
CO2 SERPL-SCNC: 23 MMOL/L — SIGNIFICANT CHANGE UP (ref 22–31)
CREAT SERPL-MCNC: 1.4 MG/DL — HIGH (ref 0.5–1.3)
EGFR: 49 ML/MIN/1.73M2 — LOW
GLUCOSE SERPL-MCNC: 216 MG/DL — HIGH (ref 70–99)
HCT VFR BLD CALC: 31 % — LOW (ref 39–50)
HGB BLD-MCNC: 9.3 G/DL — LOW (ref 13–17)
MCHC RBC-ENTMCNC: 28.5 PG — SIGNIFICANT CHANGE UP (ref 27–34)
MCHC RBC-ENTMCNC: 30 GM/DL — LOW (ref 32–36)
MCV RBC AUTO: 95.1 FL — SIGNIFICANT CHANGE UP (ref 80–100)
METHOD TYPE: SIGNIFICANT CHANGE UP
NRBC # BLD: 0 /100 WBCS — SIGNIFICANT CHANGE UP (ref 0–0)
PLATELET # BLD AUTO: 325 K/UL — SIGNIFICANT CHANGE UP (ref 150–400)
POTASSIUM SERPL-MCNC: 3.9 MMOL/L — SIGNIFICANT CHANGE UP (ref 3.5–5.3)
POTASSIUM SERPL-SCNC: 3.9 MMOL/L — SIGNIFICANT CHANGE UP (ref 3.5–5.3)
PROT SERPL-MCNC: 6.2 G/DL — SIGNIFICANT CHANGE UP (ref 6–8.3)
RBC # BLD: 3.26 M/UL — LOW (ref 4.2–5.8)
RBC # FLD: 18.6 % — HIGH (ref 10.3–14.5)
SODIUM SERPL-SCNC: 139 MMOL/L — SIGNIFICANT CHANGE UP (ref 135–145)
WBC # BLD: 7.9 K/UL — SIGNIFICANT CHANGE UP (ref 3.8–10.5)
WBC # FLD AUTO: 7.9 K/UL — SIGNIFICANT CHANGE UP (ref 3.8–10.5)

## 2023-04-12 PROCEDURE — 99232 SBSQ HOSP IP/OBS MODERATE 35: CPT

## 2023-04-12 PROCEDURE — 28111 PART REMOVAL OF METATARSAL: CPT | Mod: TA

## 2023-04-12 PROCEDURE — 28315 REMOVAL OF SESAMOID BONE: CPT | Mod: TA

## 2023-04-12 RX ORDER — ALTEPLASE 100 MG
2 KIT INTRAVENOUS ONCE
Refills: 0 | Status: COMPLETED | OUTPATIENT
Start: 2023-04-12 | End: 2023-04-12

## 2023-04-12 RX ORDER — LISINOPRIL 2.5 MG/1
5 TABLET ORAL DAILY
Refills: 0 | Status: DISCONTINUED | OUTPATIENT
Start: 2023-04-12 | End: 2023-04-12

## 2023-04-12 RX ORDER — AMIODARONE HYDROCHLORIDE 400 MG/1
200 TABLET ORAL DAILY
Refills: 0 | Status: DISCONTINUED | OUTPATIENT
Start: 2023-04-12 | End: 2023-04-12

## 2023-04-12 RX ORDER — LISINOPRIL 2.5 MG/1
2.5 TABLET ORAL DAILY
Refills: 0 | Status: DISCONTINUED | OUTPATIENT
Start: 2023-04-12 | End: 2023-04-14

## 2023-04-12 RX ORDER — METOPROLOL TARTRATE 50 MG
100 TABLET ORAL
Refills: 0 | Status: DISCONTINUED | OUTPATIENT
Start: 2023-04-12 | End: 2023-04-14

## 2023-04-12 RX ORDER — ENOXAPARIN SODIUM 100 MG/ML
60 INJECTION SUBCUTANEOUS EVERY 12 HOURS
Refills: 0 | Status: DISCONTINUED | OUTPATIENT
Start: 2023-04-12 | End: 2023-04-14

## 2023-04-12 RX ADMIN — Medication 1 TABLET(S): at 08:23

## 2023-04-12 RX ADMIN — Medication 2: at 12:16

## 2023-04-12 RX ADMIN — SIMVASTATIN 40 MILLIGRAM(S): 20 TABLET, FILM COATED ORAL at 21:43

## 2023-04-12 RX ADMIN — Medication 1 TABLET(S): at 17:15

## 2023-04-12 RX ADMIN — Medication 2: at 17:18

## 2023-04-12 RX ADMIN — ENOXAPARIN SODIUM 60 MILLIGRAM(S): 100 INJECTION SUBCUTANEOUS at 18:30

## 2023-04-12 RX ADMIN — Medication 650 MILLIGRAM(S): at 19:45

## 2023-04-12 RX ADMIN — DONEPEZIL HYDROCHLORIDE 5 MILLIGRAM(S): 10 TABLET, FILM COATED ORAL at 21:43

## 2023-04-12 RX ADMIN — PANTOPRAZOLE SODIUM 40 MILLIGRAM(S): 20 TABLET, DELAYED RELEASE ORAL at 12:14

## 2023-04-12 RX ADMIN — ALTEPLASE 2 MILLIGRAM(S): KIT at 21:50

## 2023-04-12 RX ADMIN — PIPERACILLIN AND TAZOBACTAM 25 GRAM(S): 4; .5 INJECTION, POWDER, LYOPHILIZED, FOR SOLUTION INTRAVENOUS at 05:28

## 2023-04-12 RX ADMIN — Medication 50 MILLIGRAM(S): at 05:28

## 2023-04-12 RX ADMIN — LISINOPRIL 2.5 MILLIGRAM(S): 2.5 TABLET ORAL at 17:16

## 2023-04-12 RX ADMIN — TAMSULOSIN HYDROCHLORIDE 0.4 MILLIGRAM(S): 0.4 CAPSULE ORAL at 21:43

## 2023-04-12 RX ADMIN — LOSARTAN POTASSIUM 25 MILLIGRAM(S): 100 TABLET, FILM COATED ORAL at 05:28

## 2023-04-12 RX ADMIN — Medication 650 MILLIGRAM(S): at 18:45

## 2023-04-12 RX ADMIN — Medication 100 MILLIGRAM(S): at 18:31

## 2023-04-12 RX ADMIN — DULOXETINE HYDROCHLORIDE 60 MILLIGRAM(S): 30 CAPSULE, DELAYED RELEASE ORAL at 12:15

## 2023-04-12 RX ADMIN — PIPERACILLIN AND TAZOBACTAM 25 GRAM(S): 4; .5 INJECTION, POWDER, LYOPHILIZED, FOR SOLUTION INTRAVENOUS at 21:42

## 2023-04-12 RX ADMIN — INSULIN GLARGINE 8 UNIT(S): 100 INJECTION, SOLUTION SUBCUTANEOUS at 21:56

## 2023-04-12 RX ADMIN — BUDESONIDE AND FORMOTEROL FUMARATE DIHYDRATE 2 PUFF(S): 160; 4.5 AEROSOL RESPIRATORY (INHALATION) at 18:31

## 2023-04-12 RX ADMIN — Medication 1 TABLET(S): at 12:15

## 2023-04-12 RX ADMIN — PIPERACILLIN AND TAZOBACTAM 25 GRAM(S): 4; .5 INJECTION, POWDER, LYOPHILIZED, FOR SOLUTION INTRAVENOUS at 14:41

## 2023-04-12 RX ADMIN — Medication 2: at 08:23

## 2023-04-12 RX ADMIN — SENNA PLUS 2 TABLET(S): 8.6 TABLET ORAL at 21:43

## 2023-04-12 NOTE — PROGRESS NOTE ADULT - PROBLEM SELECTOR PLAN 1
04/10/2023  PRE-OP DIAGNOSIS:  Foot osteomyelitis, left  Diabetic infection of left foot   ·  POST-OP DIAGNOSIS:  Diabetic infection of left foot   Foot osteomyelitis, left  ·  PROCEDURES:  Resection of head of first metatarsal bone   Sesamoidectomy of toe

## 2023-04-12 NOTE — PROGRESS NOTE ADULT - SUBJECTIVE AND OBJECTIVE BOX
CHIEF COMPLAINT/ REASON FOR VISIT  .. Patient was seen to address the  issue listed under PROBLEM LIST which is located toward bottom of this note     ANA MARIA LEIGH    PLV 1EAS 101 W1    Allergies    No Known Allergies    Intolerances        PAST MEDICAL & SURGICAL HISTORY:  ASHD (arteriosclerotic heart disease)      BPH without urinary obstruction      COPD, moderate      Stage 3 chronic kidney disease      Chronic GERD      HLD (hyperlipidemia)      MDD (major depressive disorder)      Obstructive and reflux uropathy      Cellulitis      HTN (hypertension)      Sepsis      Dementia      Moderate protein-calorie malnutrition      Brain TIA      History of RSV infection      DM type 2, not at goal      Pressure ulcer of unspecified heel, unspecified stage      Venous stasis ulcer without varicose veins      Multiple open wounds of foot          FAMILY HISTORY:      Home Medications:  Admelog SoloStar 100 units/mL injectable solution: injectable 4 times a day (before meals and at bedtime) per sliding scale (30 Mar 2023 15:23)  Aricept 5 mg oral tablet: 1 tab(s) orally once a day (30 Mar 2023 15:23)  atenolol 25 mg oral tablet: 1 tab(s) orally once a day (30 Mar 2023 15:23)  Bacid (LAC) oral tablet: 1 tab(s) orally 2 times a day (30 Mar 2023 15:23)  Cymbalta 60 mg oral delayed release capsule: 1 cap(s) orally once a day (30 Mar 2023 15:23)  docusate sodium 100 mg oral capsule: 2 cap(s) orally once a day (at bedtime) (30 Mar 2023 15:23)  doxycycline hyclate 100 mg oral tablet: 1 tab(s) orally 2 times a day for 7 days  3/28/23-4/4/23 (30 Mar 2023 15:23)  Dulcolax Laxative 10 mg rectal suppository: 1 suppository(ies) rectally as needed for  constipation (30 Mar 2023 15:23)  famotidine 40 mg oral tablet: 1 tab(s) orally once a day (30 Mar 2023 15:23)  Fleet Enema 19 g-7 g rectal enema: 133 milliliter(s) rectally as needed for  constipation (30 Mar 2023 15:23)  Flovent 44 mcg/inh inhalation aerosol with adapter: 1 puff(s) inhaled every 12 hours (30 Mar 2023 15:23)  ipratropium-albuterol 0.5 mg-2.5 mg/3 mL inhalation solution: 3 milliliter(s) by nebulizer 4 times a day (30 Mar 2023 15:23)  losartan 50 mg oral tablet: 1 tab(s) orally once a day (30 Mar 2023 15:23)  Milk of Magnesia 8% oral suspension: 30 milliliter(s) orally once a day as needed for  constipation (30 Mar 2023 15:23)  Santyl 250 units/g topical ointment: Apply topically to affected area (30 Mar 2023 12:41)  simvastatin 40 mg oral tablet: 1 tab(s) orally once a day (at bedtime) (30 Mar 2023 15:23)  SITagliptin 50 mg oral tablet: 1 tab(s) orally once a day (30 Mar 2023 15:23)  tamsulosin 0.4 mg oral capsule: 1 cap(s) orally once a day (in the evening) (30 Mar 2023 15:23)  Therapeutic Multiple Vitamins oral tablet: 1 tab(s) orally once a day (30 Mar 2023 15:23)  traMADol 50 mg oral tablet: 0.5 tab(s) orally 2 times a day (30 Mar 2023 15:23)  Tylenol Caplet Extra Strength 500 mg oral tablet: 2 tab(s) orally every 8 hours (30 Mar 2023 15:23)      MEDICATIONS  (STANDING):  budesonide 160 MICROgram(s)/formoterol 4.5 MICROgram(s) Inhaler 2 Puff(s) Inhalation two times a day  dextrose 50% Injectable 25 Gram(s) IV Push once  dextrose 50% Injectable 12.5 Gram(s) IV Push once  dextrose 50% Injectable 25 Gram(s) IV Push once  donepezil 5 milliGRAM(s) Oral at bedtime  DULoxetine 60 milliGRAM(s) Oral daily  glucagon  Injectable 1 milliGRAM(s) IntraMuscular once  insulin glargine Injectable (LANTUS) 8 Unit(s) SubCutaneous at bedtime  insulin lispro (ADMELOG) corrective regimen sliding scale   SubCutaneous three times a day before meals  insulin lispro (ADMELOG) corrective regimen sliding scale   SubCutaneous at bedtime  lactobacillus acidophilus 1 Tablet(s) Oral two times a day with meals  losartan 25 milliGRAM(s) Oral daily  metoprolol tartrate 50 milliGRAM(s) Oral every 8 hours  multivitamin 1 Tablet(s) Oral daily  pantoprazole   Suspension 40 milliGRAM(s) Oral daily  piperacillin/tazobactam IVPB.. 3.375 Gram(s) IV Intermittent every 8 hours  senna 2 Tablet(s) Oral at bedtime  simvastatin 40 milliGRAM(s) Oral at bedtime  tamsulosin 0.4 milliGRAM(s) Oral at bedtime    MEDICATIONS  (PRN):  acetaminophen     Tablet .. 650 milliGRAM(s) Oral every 6 hours PRN Temp greater or equal to 38C (100.4F), Mild Pain (1 - 3)  aluminum hydroxide/magnesium hydroxide/simethicone Suspension 30 milliLiter(s) Oral every 4 hours PRN Dyspepsia  bisacodyl Suppository 10 milliGRAM(s) Rectal daily PRN Constipation  dextrose Oral Gel 15 Gram(s) Oral once PRN Blood Glucose LESS THAN 70 milliGRAM(s)/deciliter  hydrALAZINE 50 milliGRAM(s) Oral every 6 hours PRN for systolic BP>160  magnesium hydroxide Suspension 30 milliLiter(s) Oral daily PRN Constipation  melatonin 3 milliGRAM(s) Oral at bedtime PRN Insomnia  ondansetron Injectable 4 milliGRAM(s) IV Push every 8 hours PRN Nausea and/or Vomiting  sodium chloride 0.9% lock flush 10 milliLiter(s) IV Push every 1 hour PRN Pre/post blood products, medications, blood draw, and to maintain line patency      Diet, DASH/TLC:   Sodium & Cholesterol Restricted  Consistent Carbohydrate Evening Snack  Pureed (PUREED)  Reginald(7 Gm Arginine/7 Gm Glut/1.2 Gm HMB     Qty per Day:  2  Supplement Feeding Modality:  Oral  Glucerna Shake Cans or Servings Per Day:  1       Frequency:  Daily (04-11-23 @ 13:39) [Active]          Vital Signs Last 24 Hrs  T(C): 36.6 (12 Apr 2023 04:37), Max: 37 (11 Apr 2023 13:03)  T(F): 97.9 (12 Apr 2023 04:37), Max: 98.6 (11 Apr 2023 13:03)  HR: 80 (12 Apr 2023 04:37) (68 - 89)  BP: 125/94 (12 Apr 2023 04:37) (125/94 - 149/72)  BP(mean): --  RR: 18 (12 Apr 2023 04:37) (17 - 18)  SpO2: 95% (12 Apr 2023 04:37) (91% - 95%)    Parameters below as of 12 Apr 2023 04:37  Patient On (Oxygen Delivery Method): room air          04-11-23 @ 07:01  -  04-12-23 @ 07:00  --------------------------------------------------------  IN: 120 mL / OUT: 0 mL / NET: 120 mL              LABS:                        9.8    9.38  )-----------( 339      ( 11 Apr 2023 06:06 )             31.7     04-11    140  |  112<H>  |  29<H>  ----------------------------<  193<H>  4.1   |  24  |  1.30    Ca    8.5      11 Apr 2023 06:06    TPro  6.4  /  Alb  2.2<L>  /  TBili  0.5  /  DBili  0.2  /  AST  16  /  ALT  22  /  AlkPhos  72  04-11              WBC:  WBC Count: 9.38 K/uL (04-11 @ 06:06)  WBC Count: 7.13 K/uL (04-10 @ 05:10)  WBC Count: 8.10 K/uL (04-09 @ 06:00)      MICROBIOLOGY:  RECENT CULTURES:  04-10 .Tissue Other, LEFT FOOT 1ST METATARSAL HEAD XXXX   No polymorphonuclear cells seen per low power field  No organisms seen per oil power field   Rare Staphylococcus aureus                    Sodium:  Sodium, Serum: 140 mmol/L (04-11 @ 06:06)  Sodium, Serum: 139 mmol/L (04-10 @ 05:10)  Sodium, Serum: 139 mmol/L (04-09 @ 06:00)      1.30 mg/dL 04-11 @ 06:06  1.30 mg/dL 04-10 @ 05:10  1.30 mg/dL 04-09 @ 06:00      Hemoglobin:  Hemoglobin: 9.8 g/dL (04-11 @ 06:06)  Hemoglobin: 9.4 g/dL (04-10 @ 05:10)  Hemoglobin: 9.7 g/dL (04-09 @ 06:00)      Platelets: Platelet Count - Automated: 339 K/uL (04-11 @ 06:06)  Platelet Count - Automated: 300 K/uL (04-10 @ 05:10)  Platelet Count - Automated: 289 K/uL (04-09 @ 06:00)      LIVER FUNCTIONS - ( 11 Apr 2023 06:06 )  Alb: 2.2 g/dL / Pro: 6.4 g/dL / ALK PHOS: 72 U/L / ALT: 22 U/L / AST: 16 U/L / GGT: x                 RADIOLOGY & ADDITIONAL STUDIES:      MICROBIOLOGY:  RECENT CULTURES:  04-10 .Tissue Other, LEFT FOOT 1ST METATARSAL HEAD XXXX   No polymorphonuclear cells seen per low power field  No organisms seen per oil power field   Rare Staphylococcus aureus

## 2023-04-12 NOTE — PROGRESS NOTE ADULT - NUTRITIONAL ASSESSMENT
MEDICATIONS  (STANDING):  budesonide 160 MICROgram(s)/formoterol 4.5 MICROgram(s) Inhaler 2 Puff(s) Inhalation two times a day  dextrose 50% Injectable 25 Gram(s) IV Push once  dextrose 50% Injectable 12.5 Gram(s) IV Push once  dextrose 50% Injectable 25 Gram(s) IV Push once  donepezil 5 milliGRAM(s) Oral at bedtime  DULoxetine 60 milliGRAM(s) Oral daily  enoxaparin Injectable 60 milliGRAM(s) SubCutaneous every 12 hours  glucagon  Injectable 1 milliGRAM(s) IntraMuscular once  insulin glargine Injectable (LANTUS) 8 Unit(s) SubCutaneous at bedtime  insulin lispro (ADMELOG) corrective regimen sliding scale   SubCutaneous three times a day before meals  insulin lispro (ADMELOG) corrective regimen sliding scale   SubCutaneous at bedtime  lactobacillus acidophilus 1 Tablet(s) Oral two times a day with meals  lisinopril 2.5 milliGRAM(s) Oral daily  metoprolol tartrate 100 milliGRAM(s) Oral two times a day  multivitamin 1 Tablet(s) Oral daily  pantoprazole   Suspension 40 milliGRAM(s) Oral daily  piperacillin/tazobactam IVPB.. 3.375 Gram(s) IV Intermittent every 8 hours  senna 2 Tablet(s) Oral at bedtime  simvastatin 40 milliGRAM(s) Oral at bedtime  tamsulosin 0.4 milliGRAM(s) Oral at bedtime

## 2023-04-12 NOTE — PROGRESS NOTE ADULT - TIME BILLING
in direct care of patient , reviewing labs and other results and adjusting medications and in discussion with other consultants , RN and PMD
in direct care of patient , reviewing labs and other consultants , RN and PMD and in discussion with other consultants , RN and PMD
as above

## 2023-04-12 NOTE — SOCIAL WORK PROGRESS NOTE - NSSWPROGRESSNOTE_GEN_ALL_CORE
tika has forwarded updated zaina to [facility] for review in anticipation of pts return to [facility] once medically cleared.  tika has forwarded updated zaina to [facility] for review in anticipation of pts return to [facility] once medically cleared.  tika has forwarded updated zaina to [facility] for review in anticipation of pts return to [facility] once medically cleared.

## 2023-04-12 NOTE — PROGRESS NOTE ADULT - SUBJECTIVE AND OBJECTIVE BOX
PROGRESS NOTE  Patient is a 87y old  Male who presents with a chief complaint of left foot infection (12 Apr 2023 07:37)    Chart and available morning labs /imaging are reviewed electronically , urgent issues addressed . More information  is being added upon completion of rounds , when more information is collected and management discussed with consultants , medical staff and social service/case management on the floor   OVERNIGHT  No new issues reported by medical staff . All above noted Patient is resting in a chair  comfortably .Confused ,poor mentation .No distress noted     HPI:  88 yo male ,[facility] resident with PMHx - COPD, DM, HTN, CAD, HLD, H/O TIA, dementia,GERD, BPH and depression sent ot ER for evaluation of left foot 1metatarsal wound ,suggestive of osteomyelitis .Patient was seen by ID consult and transfer to the hospital recommended for wound cx/bone biopsy /podiatry evaluation ,likely will require 4-6 weeks of iv abx . Patient was admitted to [facility] with b/l feet wounds and was followed by wound care team ,recently completed 7 days of doxycycline for foot wound cellulitis . (30 Mar 2023 05:21)    PAST MEDICAL & SURGICAL HISTORY:  ASHD (arteriosclerotic heart disease)      BPH without urinary obstruction      COPD, moderate      Stage 3 chronic kidney disease      Chronic GERD      HLD (hyperlipidemia)      MDD (major depressive disorder)      Obstructive and reflux uropathy      Cellulitis      HTN (hypertension)      Sepsis      Dementia      Moderate protein-calorie malnutrition      Brain TIA      History of RSV infection      DM type 2, not at goal      Pressure ulcer of unspecified heel, unspecified stage      Venous stasis ulcer without varicose veins      Multiple open wounds of foot          MEDICATIONS  (STANDING):  budesonide 160 MICROgram(s)/formoterol 4.5 MICROgram(s) Inhaler 2 Puff(s) Inhalation two times a day  dextrose 50% Injectable 25 Gram(s) IV Push once  dextrose 50% Injectable 12.5 Gram(s) IV Push once  dextrose 50% Injectable 25 Gram(s) IV Push once  donepezil 5 milliGRAM(s) Oral at bedtime  DULoxetine 60 milliGRAM(s) Oral daily  glucagon  Injectable 1 milliGRAM(s) IntraMuscular once  insulin glargine Injectable (LANTUS) 8 Unit(s) SubCutaneous at bedtime  insulin lispro (ADMELOG) corrective regimen sliding scale   SubCutaneous three times a day before meals  insulin lispro (ADMELOG) corrective regimen sliding scale   SubCutaneous at bedtime  lactobacillus acidophilus 1 Tablet(s) Oral two times a day with meals  losartan 25 milliGRAM(s) Oral daily  metoprolol tartrate 50 milliGRAM(s) Oral every 8 hours  multivitamin 1 Tablet(s) Oral daily  pantoprazole   Suspension 40 milliGRAM(s) Oral daily  piperacillin/tazobactam IVPB.. 3.375 Gram(s) IV Intermittent every 8 hours  senna 2 Tablet(s) Oral at bedtime  simvastatin 40 milliGRAM(s) Oral at bedtime  tamsulosin 0.4 milliGRAM(s) Oral at bedtime    MEDICATIONS  (PRN):  acetaminophen     Tablet .. 650 milliGRAM(s) Oral every 6 hours PRN Temp greater or equal to 38C (100.4F), Mild Pain (1 - 3)  aluminum hydroxide/magnesium hydroxide/simethicone Suspension 30 milliLiter(s) Oral every 4 hours PRN Dyspepsia  bisacodyl Suppository 10 milliGRAM(s) Rectal daily PRN Constipation  dextrose Oral Gel 15 Gram(s) Oral once PRN Blood Glucose LESS THAN 70 milliGRAM(s)/deciliter  hydrALAZINE 50 milliGRAM(s) Oral every 6 hours PRN for systolic BP>160  magnesium hydroxide Suspension 30 milliLiter(s) Oral daily PRN Constipation  melatonin 3 milliGRAM(s) Oral at bedtime PRN Insomnia  ondansetron Injectable 4 milliGRAM(s) IV Push every 8 hours PRN Nausea and/or Vomiting  sodium chloride 0.9% lock flush 10 milliLiter(s) IV Push every 1 hour PRN Pre/post blood products, medications, blood draw, and to maintain line patency      OBJECTIVE    T(C): 36.6 (04-12-23 @ 04:37), Max: 37 (04-11-23 @ 13:03)  HR: 80 (04-12-23 @ 04:37) (68 - 89)  BP: 125/94 (04-12-23 @ 04:37) (125/94 - 149/72)  RR: 18 (04-12-23 @ 04:37) (17 - 18)  SpO2: 95% (04-12-23 @ 04:37) (91% - 95%)  Wt(kg): --  I&O's Summary    11 Apr 2023 07:01  -  12 Apr 2023 07:00  --------------------------------------------------------  IN: 120 mL / OUT: 0 mL / NET: 120 mL          REVIEW OF SYSTEMS:  Patient is  unable to provide any information/ROS  due to baseline mental status.   PHYSICAL EXAM:  Appearance: NAD. VS past 24 hrs -as above   HEENT:   Moist oral mucosa. Conjunctiva clear b/l.   Neck : supple  Respiratory: Lungs CTAB.  Gastrointestinal:  Soft, nontender. No rebound. No rigidity. BS present	  Cardiovascular: RRR ,S1S2 present  Neurologic: Non-focal. Moving all extremities.  Extremities: No edema. No erythema. No calf tenderness.  Skin: No rashes, No ecchymoses, No cyanosis.	  wounds ,skin lesions-See skin assesment flow sheet   LABS:                        9.8    9.38  )-----------( 339      ( 11 Apr 2023 06:06 )             31.7     04-11    140  |  112<H>  |  29<H>  ----------------------------<  193<H>  4.1   |  24  |  1.30    Ca    8.5      11 Apr 2023 06:06    TPro  6.4  /  Alb  2.2<L>  /  TBili  0.5  /  DBili  0.2  /  AST  16  /  ALT  22  /  AlkPhos  72  04-11    CAPILLARY BLOOD GLUCOSE      POCT Blood Glucose.: 158 mg/dL (12 Apr 2023 07:49)  POCT Blood Glucose.: 169 mg/dL (11 Apr 2023 20:53)  POCT Blood Glucose.: 194 mg/dL (11 Apr 2023 16:49)  POCT Blood Glucose.: 198 mg/dL (11 Apr 2023 11:39)          Culture - Tissue with Gram Stain (collected 10 Apr 2023 13:10)  Source: .Tissue Other, LEFT FOOT DEEP TISSUE CULTURE  Gram Stain (10 Apr 2023 22:21):    No polymorphonuclear cells seen per low power field    No organisms seen per oil power field  Preliminary Report (11 Apr 2023 19:49):    Few Staphylococcus aureus    Few Corynebacterium species "Susceptibilities not performed"    Culture - Tissue with Gram Stain (collected 10 Apr 2023 13:10)  Source: .Tissue Other, LEFT FOOT 1ST METATARSAL HEAD  Gram Stain (10 Apr 2023 22:21):    No polymorphonuclear cells seen per low power field    No organisms seen per oil power field  Preliminary Report (11 Apr 2023 19:52):    Rare Staphylococcus aureus      RADIOLOGY & ADDITIONAL TESTS:   reviewed elctronically  ASSESSMENT/PLAN: 	    25 minutes aggregate time was spent on this visit, 50% visit time spent in care co-ordination with other attendings and counselling patient .I have discussed care plan with patient / HCP/family member ,who expressed understanding of problems treatment and their effect and side effects, alternatives in details. I have asked if they have any questions and concerns and appropriately addressed them to best of my ability. Left a message for wife Usha  PROGRESS NOTE  Patient is a 87y old  Male who presents with a chief complaint of left foot infection (12 Apr 2023 07:37)    Chart and available morning labs /imaging are reviewed electronically , urgent issues addressed . More information  is being added upon completion of rounds , when more information is collected and management discussed with consultants , medical staff and social service/case management on the floor   OVERNIGHT  No new issues reported by medical staff . All above noted Patient is resting in a chair  comfortably .Confused ,poor mentation .No distress noted     HPI:  88 yo male ,[facility] resident with PMHx - COPD, DM, HTN, CAD, HLD, H/O TIA, dementia,GERD, BPH and depression sent ot ER for evaluation of left foot 1metatarsal wound ,suggestive of osteomyelitis .Patient was seen by ID consult and transfer to the hospital recommended for wound cx/bone biopsy /podiatry evaluation ,likely will require 4-6 weeks of iv abx . Patient was admitted to [facility] with b/l feet wounds and was followed by wound care team ,recently completed 7 days of doxycycline for foot wound cellulitis . (30 Mar 2023 05:21)    PAST MEDICAL & SURGICAL HISTORY:  ASHD (arteriosclerotic heart disease)      BPH without urinary obstruction      COPD, moderate      Stage 3 chronic kidney disease      Chronic GERD      HLD (hyperlipidemia)      MDD (major depressive disorder)      Obstructive and reflux uropathy      Cellulitis      HTN (hypertension)      Sepsis      Dementia      Moderate protein-calorie malnutrition      Brain TIA      History of RSV infection      DM type 2, not at goal      Pressure ulcer of unspecified heel, unspecified stage      Venous stasis ulcer without varicose veins      Multiple open wounds of foot          MEDICATIONS  (STANDING):  budesonide 160 MICROgram(s)/formoterol 4.5 MICROgram(s) Inhaler 2 Puff(s) Inhalation two times a day  dextrose 50% Injectable 25 Gram(s) IV Push once  dextrose 50% Injectable 12.5 Gram(s) IV Push once  dextrose 50% Injectable 25 Gram(s) IV Push once  donepezil 5 milliGRAM(s) Oral at bedtime  DULoxetine 60 milliGRAM(s) Oral daily  glucagon  Injectable 1 milliGRAM(s) IntraMuscular once  insulin glargine Injectable (LANTUS) 8 Unit(s) SubCutaneous at bedtime  insulin lispro (ADMELOG) corrective regimen sliding scale   SubCutaneous three times a day before meals  insulin lispro (ADMELOG) corrective regimen sliding scale   SubCutaneous at bedtime  lactobacillus acidophilus 1 Tablet(s) Oral two times a day with meals  losartan 25 milliGRAM(s) Oral daily  metoprolol tartrate 50 milliGRAM(s) Oral every 8 hours  multivitamin 1 Tablet(s) Oral daily  pantoprazole   Suspension 40 milliGRAM(s) Oral daily  piperacillin/tazobactam IVPB.. 3.375 Gram(s) IV Intermittent every 8 hours  senna 2 Tablet(s) Oral at bedtime  simvastatin 40 milliGRAM(s) Oral at bedtime  tamsulosin 0.4 milliGRAM(s) Oral at bedtime    MEDICATIONS  (PRN):  acetaminophen     Tablet .. 650 milliGRAM(s) Oral every 6 hours PRN Temp greater or equal to 38C (100.4F), Mild Pain (1 - 3)  aluminum hydroxide/magnesium hydroxide/simethicone Suspension 30 milliLiter(s) Oral every 4 hours PRN Dyspepsia  bisacodyl Suppository 10 milliGRAM(s) Rectal daily PRN Constipation  dextrose Oral Gel 15 Gram(s) Oral once PRN Blood Glucose LESS THAN 70 milliGRAM(s)/deciliter  hydrALAZINE 50 milliGRAM(s) Oral every 6 hours PRN for systolic BP>160  magnesium hydroxide Suspension 30 milliLiter(s) Oral daily PRN Constipation  melatonin 3 milliGRAM(s) Oral at bedtime PRN Insomnia  ondansetron Injectable 4 milliGRAM(s) IV Push every 8 hours PRN Nausea and/or Vomiting  sodium chloride 0.9% lock flush 10 milliLiter(s) IV Push every 1 hour PRN Pre/post blood products, medications, blood draw, and to maintain line patency      OBJECTIVE    T(C): 36.6 (04-12-23 @ 04:37), Max: 37 (04-11-23 @ 13:03)  HR: 80 (04-12-23 @ 04:37) (68 - 89)  BP: 125/94 (04-12-23 @ 04:37) (125/94 - 149/72)  RR: 18 (04-12-23 @ 04:37) (17 - 18)  SpO2: 95% (04-12-23 @ 04:37) (91% - 95%)  Wt(kg): --  I&O's Summary    11 Apr 2023 07:01  -  12 Apr 2023 07:00  --------------------------------------------------------  IN: 120 mL / OUT: 0 mL / NET: 120 mL          REVIEW OF SYSTEMS:  Patient is  unable to provide any information/ROS  due to baseline mental status.   PHYSICAL EXAM:  Appearance: NAD. VS past 24 hrs -as above   HEENT:   Moist oral mucosa. Conjunctiva clear b/l.   Neck : supple  Respiratory: Lungs CTAB.  Gastrointestinal:  Soft, nontender. No rebound. No rigidity. BS present	  Cardiovascular: RRR ,S1S2 present  Neurologic: Non-focal. Moving all extremities.  Extremities: No edema. No erythema. No calf tenderness.  Skin: No rashes, No ecchymoses, No cyanosis.	  wounds ,skin lesions-See skin assesment flow sheet   LABS:                        9.8    9.38  )-----------( 339      ( 11 Apr 2023 06:06 )             31.7     04-11    140  |  112<H>  |  29<H>  ----------------------------<  193<H>  4.1   |  24  |  1.30    Ca    8.5      11 Apr 2023 06:06    TPro  6.4  /  Alb  2.2<L>  /  TBili  0.5  /  DBili  0.2  /  AST  16  /  ALT  22  /  AlkPhos  72  04-11    CAPILLARY BLOOD GLUCOSE      POCT Blood Glucose.: 158 mg/dL (12 Apr 2023 07:49)  POCT Blood Glucose.: 169 mg/dL (11 Apr 2023 20:53)  POCT Blood Glucose.: 194 mg/dL (11 Apr 2023 16:49)  POCT Blood Glucose.: 198 mg/dL (11 Apr 2023 11:39)          Culture - Tissue with Gram Stain (collected 10 Apr 2023 13:10)  Source: .Tissue Other, LEFT FOOT DEEP TISSUE CULTURE  Gram Stain (10 Apr 2023 22:21):    No polymorphonuclear cells seen per low power field    No organisms seen per oil power field  Preliminary Report (11 Apr 2023 19:49):    Few Staphylococcus aureus    Few Corynebacterium species "Susceptibilities not performed"    Culture - Tissue with Gram Stain (collected 10 Apr 2023 13:10)  Source: .Tissue Other, LEFT FOOT 1ST METATARSAL HEAD  Gram Stain (10 Apr 2023 22:21):    No polymorphonuclear cells seen per low power field    No organisms seen per oil power field  Preliminary Report (11 Apr 2023 19:52):    Rare Staphylococcus aureus      RADIOLOGY & ADDITIONAL TESTS:   reviewed elctronically  ASSESSMENT/PLAN: 	    25 minutes aggregate time was spent on this visit, 50% visit time spent in care co-ordination with other attendings and counselling patient .I have discussed care plan with patient / HCP/family member ,who expressed understanding of problems treatment and their effect and side effects, alternatives in details. I have asked if they have any questions and concerns and appropriately addressed them to best of my ability. Left a message for wife Usha  PROGRESS NOTE  Patient is a 87y old  Male who presents with a chief complaint of left foot infection (12 Apr 2023 07:37)    Chart and available morning labs /imaging are reviewed electronically , urgent issues addressed . More information  is being added upon completion of rounds , when more information is collected and management discussed with consultants , medical staff and social service/case management on the floor   OVERNIGHT  No new issues reported by medical staff . All above noted Patient is resting in a chair  comfortably .Confused ,poor mentation .No distress noted     HPI:  86 yo male ,[facility] resident with PMHx - COPD, DM, HTN, CAD, HLD, H/O TIA, dementia,GERD, BPH and depression sent ot ER for evaluation of left foot 1metatarsal wound ,suggestive of osteomyelitis .Patient was seen by ID consult and transfer to the hospital recommended for wound cx/bone biopsy /podiatry evaluation ,likely will require 4-6 weeks of iv abx . Patient was admitted to [facility] with b/l feet wounds and was followed by wound care team ,recently completed 7 days of doxycycline for foot wound cellulitis . (30 Mar 2023 05:21)    PAST MEDICAL & SURGICAL HISTORY:  ASHD (arteriosclerotic heart disease)      BPH without urinary obstruction      COPD, moderate      Stage 3 chronic kidney disease      Chronic GERD      HLD (hyperlipidemia)      MDD (major depressive disorder)      Obstructive and reflux uropathy      Cellulitis      HTN (hypertension)      Sepsis      Dementia      Moderate protein-calorie malnutrition      Brain TIA      History of RSV infection      DM type 2, not at goal      Pressure ulcer of unspecified heel, unspecified stage      Venous stasis ulcer without varicose veins      Multiple open wounds of foot          MEDICATIONS  (STANDING):  budesonide 160 MICROgram(s)/formoterol 4.5 MICROgram(s) Inhaler 2 Puff(s) Inhalation two times a day  dextrose 50% Injectable 25 Gram(s) IV Push once  dextrose 50% Injectable 12.5 Gram(s) IV Push once  dextrose 50% Injectable 25 Gram(s) IV Push once  donepezil 5 milliGRAM(s) Oral at bedtime  DULoxetine 60 milliGRAM(s) Oral daily  glucagon  Injectable 1 milliGRAM(s) IntraMuscular once  insulin glargine Injectable (LANTUS) 8 Unit(s) SubCutaneous at bedtime  insulin lispro (ADMELOG) corrective regimen sliding scale   SubCutaneous three times a day before meals  insulin lispro (ADMELOG) corrective regimen sliding scale   SubCutaneous at bedtime  lactobacillus acidophilus 1 Tablet(s) Oral two times a day with meals  losartan 25 milliGRAM(s) Oral daily  metoprolol tartrate 50 milliGRAM(s) Oral every 8 hours  multivitamin 1 Tablet(s) Oral daily  pantoprazole   Suspension 40 milliGRAM(s) Oral daily  piperacillin/tazobactam IVPB.. 3.375 Gram(s) IV Intermittent every 8 hours  senna 2 Tablet(s) Oral at bedtime  simvastatin 40 milliGRAM(s) Oral at bedtime  tamsulosin 0.4 milliGRAM(s) Oral at bedtime    MEDICATIONS  (PRN):  acetaminophen     Tablet .. 650 milliGRAM(s) Oral every 6 hours PRN Temp greater or equal to 38C (100.4F), Mild Pain (1 - 3)  aluminum hydroxide/magnesium hydroxide/simethicone Suspension 30 milliLiter(s) Oral every 4 hours PRN Dyspepsia  bisacodyl Suppository 10 milliGRAM(s) Rectal daily PRN Constipation  dextrose Oral Gel 15 Gram(s) Oral once PRN Blood Glucose LESS THAN 70 milliGRAM(s)/deciliter  hydrALAZINE 50 milliGRAM(s) Oral every 6 hours PRN for systolic BP>160  magnesium hydroxide Suspension 30 milliLiter(s) Oral daily PRN Constipation  melatonin 3 milliGRAM(s) Oral at bedtime PRN Insomnia  ondansetron Injectable 4 milliGRAM(s) IV Push every 8 hours PRN Nausea and/or Vomiting  sodium chloride 0.9% lock flush 10 milliLiter(s) IV Push every 1 hour PRN Pre/post blood products, medications, blood draw, and to maintain line patency      OBJECTIVE    T(C): 36.6 (04-12-23 @ 04:37), Max: 37 (04-11-23 @ 13:03)  HR: 80 (04-12-23 @ 04:37) (68 - 89)  BP: 125/94 (04-12-23 @ 04:37) (125/94 - 149/72)  RR: 18 (04-12-23 @ 04:37) (17 - 18)  SpO2: 95% (04-12-23 @ 04:37) (91% - 95%)  Wt(kg): --  I&O's Summary    11 Apr 2023 07:01  -  12 Apr 2023 07:00  --------------------------------------------------------  IN: 120 mL / OUT: 0 mL / NET: 120 mL          REVIEW OF SYSTEMS:  Patient is  unable to provide any information/ROS  due to baseline mental status.   PHYSICAL EXAM:  Appearance: NAD. VS past 24 hrs -as above   HEENT:   Moist oral mucosa. Conjunctiva clear b/l.   Neck : supple  Respiratory: Lungs CTAB.  Gastrointestinal:  Soft, nontender. No rebound. No rigidity. BS present	  Cardiovascular: RRR ,S1S2 present  Neurologic: Non-focal. Moving all extremities.  Extremities: No edema. No erythema. No calf tenderness.  Skin: No rashes, No ecchymoses, No cyanosis.	  wounds ,skin lesions-See skin assesment flow sheet   LABS:                        9.8    9.38  )-----------( 339      ( 11 Apr 2023 06:06 )             31.7     04-11    140  |  112<H>  |  29<H>  ----------------------------<  193<H>  4.1   |  24  |  1.30    Ca    8.5      11 Apr 2023 06:06    TPro  6.4  /  Alb  2.2<L>  /  TBili  0.5  /  DBili  0.2  /  AST  16  /  ALT  22  /  AlkPhos  72  04-11    CAPILLARY BLOOD GLUCOSE      POCT Blood Glucose.: 158 mg/dL (12 Apr 2023 07:49)  POCT Blood Glucose.: 169 mg/dL (11 Apr 2023 20:53)  POCT Blood Glucose.: 194 mg/dL (11 Apr 2023 16:49)  POCT Blood Glucose.: 198 mg/dL (11 Apr 2023 11:39)          Culture - Tissue with Gram Stain (collected 10 Apr 2023 13:10)  Source: .Tissue Other, LEFT FOOT DEEP TISSUE CULTURE  Gram Stain (10 Apr 2023 22:21):    No polymorphonuclear cells seen per low power field    No organisms seen per oil power field  Preliminary Report (11 Apr 2023 19:49):    Few Staphylococcus aureus    Few Corynebacterium species "Susceptibilities not performed"    Culture - Tissue with Gram Stain (collected 10 Apr 2023 13:10)  Source: .Tissue Other, LEFT FOOT 1ST METATARSAL HEAD  Gram Stain (10 Apr 2023 22:21):    No polymorphonuclear cells seen per low power field    No organisms seen per oil power field  Preliminary Report (11 Apr 2023 19:52):    Rare Staphylococcus aureus      RADIOLOGY & ADDITIONAL TESTS:   reviewed elctronically  ASSESSMENT/PLAN: 	    25 minutes aggregate time was spent on this visit, 50% visit time spent in care co-ordination with other attendings and counselling patient .I have discussed care plan with patient / HCP/family member ,who expressed understanding of problems treatment and their effect and side effects, alternatives in details. I have asked if they have any questions and concerns and appropriately addressed them to best of my ability. Left a message for wife Usha

## 2023-04-12 NOTE — PROGRESS NOTE ADULT - SUBJECTIVE AND OBJECTIVE BOX
CAPILLARY BLOOD GLUCOSE      POCT Blood Glucose.: 177 mg/dL (12 Apr 2023 17:07)  POCT Blood Glucose.: 197 mg/dL (12 Apr 2023 11:34)  POCT Blood Glucose.: 158 mg/dL (12 Apr 2023 07:49)  POCT Blood Glucose.: 169 mg/dL (11 Apr 2023 20:53)      Vital Signs Last 24 Hrs  T(C): 36.4 (12 Apr 2023 12:05), Max: 36.6 (12 Apr 2023 04:37)  T(F): 97.6 (12 Apr 2023 12:05), Max: 97.9 (12 Apr 2023 04:37)  HR: 79 (12 Apr 2023 12:05) (79 - 80)  BP: 123/67 (12 Apr 2023 12:05) (123/67 - 125/94)  BP(mean): --  RR: 18 (12 Apr 2023 12:05) (18 - 18)  SpO2: 92% (12 Apr 2023 12:05) (92% - 95%)    Parameters below as of 12 Apr 2023 12:05  Patient On (Oxygen Delivery Method): room air        General: WN/WD NAD  Respiratory: CTA B/L  CV: RRR, S1S2, no murmurs, rubs or gallops  Abdominal: Soft, NT, ND +BS, Last BM  Extremities: No edema, + peripheral pulses     04-12    139  |  110<H>  |  31<H>  ----------------------------<  216<H>  3.9   |  23  |  1.40<H>    Ca    8.8      12 Apr 2023 10:09    TPro  6.2  /  Alb  1.9<L>  /  TBili  0.5  /  DBili  0.2  /  AST  17  /  ALT  18  /  AlkPhos  66  04-12      dextrose 50% Injectable 25 Gram(s) IV Push once  dextrose 50% Injectable 12.5 Gram(s) IV Push once  dextrose 50% Injectable 25 Gram(s) IV Push once  dextrose Oral Gel 15 Gram(s) Oral once PRN  glucagon  Injectable 1 milliGRAM(s) IntraMuscular once  insulin glargine Injectable (LANTUS) 8 Unit(s) SubCutaneous at bedtime  insulin lispro (ADMELOG) corrective regimen sliding scale   SubCutaneous three times a day before meals  insulin lispro (ADMELOG) corrective regimen sliding scale   SubCutaneous at bedtime  simvastatin 40 milliGRAM(s) Oral at bedtime

## 2023-04-12 NOTE — PROGRESS NOTE ADULT - ASSESSMENT
88 yo male ,[facility] resident with PMHx - COPD, DM, HTN, CAD, HLD, H/O TIA, dementia, GERD, BPH and depression, who was sent for evaluation of left foot 1metatarsal wound. Concern for wound infection with underlying osteomyelitis. Bone scan suspicious for L foot 1st toe osteomyelitis. Cannot exclude osteomyelitis on R heel. S/p Resection of head of first metatarsal bone and Sesamoidectomy of L foot today. Operatively noted to have Left first metatarsal head exposed with necrotic soft tissue and purulent drainage. First metatarsal head culture growing staph aureus; and tissue culture growing staph aureus and corynebacterium.    Incidentally found to be COVID positive on 4/4. Completed 3 doses of remdesivir. Remains afebrile and without leukocytosis.     -continue Zosyn pending staph aureus sensitivities  -will follow up OR cultures, path    Isis Mancia MD  Division of Infectious Diseases   Cell [phone] between 8am and 6pm   After 6pm and weekends please call ID service at [phone].     88 yo male ,[facility] resident with PMHx - COPD, DM, HTN, CAD, HLD, H/O TIA, dementia, GERD, BPH and depression, who was sent for evaluation of left foot 1metatarsal wound. Concern for wound infection with underlying osteomyelitis. Bone scan suspicious for L foot 1st toe osteomyelitis. Cannot exclude osteomyelitis on R heel. S/p Resection of head of first metatarsal bone and Sesamoidectomy of L foot today. Operatively noted to have Left first metatarsal head exposed with necrotic soft tissue and purulent drainage. First metatarsal head culture growing staph aureus; and tissue culture growing staph aureus and corynebacterium.    Incidentally found to be COVID positive on 4/4. Completed 3 doses of remdesivir. Remains afebrile and without leukocytosis.     -continue Zosyn pending staph aureus sensitivities  -will follow up OR cultures, path    Isis Mancia MD  Division of Infectious Diseases   Cell [phone] between 8am and 6pm   After 6pm and weekends please call ID service at [phone].     88 yo male ,[facility] resident with PMHx - COPD, DM, HTN, CAD, HLD, H/O TIA, dementia, GERD, BPH and depression, who was sent for evaluation of left foot 1metatarsal wound. Concern for wound infection with underlying osteomyelitis. Bone scan suspicious for L foot 1st toe osteomyelitis. Cannot exclude osteomyelitis on R heel. S/p Resection of head of first metatarsal bone and Sesamoidectomy of L foot today. Operatively noted to have Left first metatarsal head exposed with necrotic soft tissue and purulent drainage. First metatarsal head culture growing staph aureus; and tissue culture growing staph aureus and corynebacterium.    Incidentally found to be COVID positive on 4/4. Completed 3 doses of remdesivir. Remains afebrile and without leukocytosis.     -continue Zosyn pending staph aureus sensitivities  -will follow up OR cultures, path    Isis Mancia MD  Division of Infectious Diseases   Cell [phone] between 8am and 6pm   After 6pm and weekends please call ID service at [phone].

## 2023-04-12 NOTE — PROGRESS NOTE ADULT - SUBJECTIVE AND OBJECTIVE BOX
Patient is a 87y Male whom presented to the hospital with ckd and chelo     PAST MEDICAL & SURGICAL HISTORY:      MEDICATIONS  (STANDING):      Allergies    No Known Allergies    Intolerances        SOCIAL HISTORY:  Denies ETOh,Smoking,     FAMILY HISTORY:      REVIEW OF SYSTEMS:  unable to obtained a good review system                                              9.3    7.90  )-----------( 325      ( 12 Apr 2023 10:09 )             31.0       CBC Full  -  ( 12 Apr 2023 10:09 )  WBC Count : 7.90 K/uL  RBC Count : 3.26 M/uL  Hemoglobin : 9.3 g/dL  Hematocrit : 31.0 %  Platelet Count - Automated : 325 K/uL  Mean Cell Volume : 95.1 fl  Mean Cell Hemoglobin : 28.5 pg  Mean Cell Hemoglobin Concentration : 30.0 gm/dL  Auto Neutrophil # : x  Auto Lymphocyte # : x  Auto Monocyte # : x  Auto Eosinophil # : x  Auto Basophil # : x  Auto Neutrophil % : x  Auto Lymphocyte % : x  Auto Monocyte % : x  Auto Eosinophil % : x  Auto Basophil % : x      04-12    139  |  110<H>  |  31<H>  ----------------------------<  216<H>  3.9   |  23  |  1.40<H>    Ca    8.8      12 Apr 2023 10:09    TPro  6.2  /  Alb  1.9<L>  /  TBili  0.5  /  DBili  0.2  /  AST  17  /  ALT  18  /  AlkPhos  66  04-12      CAPILLARY BLOOD GLUCOSE      POCT Blood Glucose.: 197 mg/dL (12 Apr 2023 11:34)  POCT Blood Glucose.: 158 mg/dL (12 Apr 2023 07:49)  POCT Blood Glucose.: 169 mg/dL (11 Apr 2023 20:53)  POCT Blood Glucose.: 194 mg/dL (11 Apr 2023 16:49)      Vital Signs Last 24 Hrs  T(C): 36.4 (12 Apr 2023 12:05), Max: 36.6 (12 Apr 2023 04:37)  T(F): 97.6 (12 Apr 2023 12:05), Max: 97.9 (12 Apr 2023 04:37)  HR: 79 (12 Apr 2023 12:05) (68 - 80)  BP: 123/67 (12 Apr 2023 12:05) (123/67 - 132/71)  BP(mean): --  RR: 18 (12 Apr 2023 12:05) (18 - 18)  SpO2: 92% (12 Apr 2023 12:05) (91% - 95%)    Parameters below as of 12 Apr 2023 12:05  Patient On (Oxygen Delivery Method): room air                  PHYSICAL EXAM:    Constitutional: NAD  HEENT: conjunctive   clear   Neck:  No JVD  Respiratory: CTAB  Cardiovascular: S1 and S2  Gastrointestinal: BS+, soft,   Extremities: No peripheral edema  Neurological:  no focal deficits

## 2023-04-12 NOTE — PROGRESS NOTE ADULT - ASSESSMENT
REVIEW OF SYMPTOMS      Able to give (reliable) ROS  NO     PHYSICAL EXAM    HEENT Unremarkable  atraumatic   RESP Fair air entry EXP prolonged    Harsh breath sound Resp distres mild   CARDIAC S1 S2 No S3     NO JVD    ABDOMEN SOFT BS PRESENT NOT DISTENDED No hepatosplenomegaly   PEDAL EDEMA present No calf tenderness  NO rash       GENERAL DATA .   GOC.     .. 3/30/2023 full code  ALLGY.     .. nka                    WT.   .. 3/30/2023 63  BMI.        .. 3/30/2023 21            ICU STAY.   .. none  COVID.   .. 4/4/2023 scv2 (+)  .. 3/29/2023 scv2 (-)     BEST PRACTICE ISSUES.    HOB ELEVATN.   .. Yes  DVT PPLX.   .. 3/31 lvnx 60.2 Dr Lakhani (a fib)   ..  3/29- 3/31 hpsc   MILLER PPLX.   .. 3/30/2023 protonix 40    INFN PPLX. ..    SP SW LAURENT.   .. 4/11 -> puree thin   ..  3/30/2023 -> soft bite mild thick        DIET.    ..  3/30/2023 dash  FREE WATER  .. 4/1/2023 fw 250.4    IV fl.  .. 4/6/2023 d5 1/2 40   PROCEDURE  .. 4/7/2023 r brach picc     ABGS.    VS/ PO/IO/ VENT/ DRIPS.   4/12/2023 afeb 80 120/90   4/12/2023 ra 95%     PROBLEM/ASSESSMENT/PLAN.  COVID   .. 4/4/2023 scv2 (+)  .. 4/4/2023 oxygenation ok  .. 4/4/2023 pt has mild disease   .. 4/4/2023 rdsv 3 d course started by Dr Mancia   Infection  Osteomyelitius  Possible pneumonia   .. Esr 3/29-3/31/2023 esr 67- 49   .. W 3/29-3/30-3/31-4/1-4/4-4/5-4/10-4/11-4/12/2023       w 11.8 - 12- 10.5- 11.9 -  9 - 7.7 - 7.1 - 9.8- 7.9    .. cxr 3/30/2023  ........ increasing r lower perihilar infiltrate   .. xr foot left 3/30/2023  ........ stable eroisions around 1st mtp anmd great toe    ........ which could be bone infectn    .. CXR 3/29/2023 Possible hansa pneum  .. w scann 4/4 l foot susp for osteom r heel osteomyelitis   .. bc 3/29/2023 bc (-)   .. uc 4/2/2023 100K eneterococcs fecal  .. 3/29-4/5 zosyn    .. 4/7/2023 zosyn 42 d   .. follow cultures  PLEURAL EFFUSION.  .. ct ch 3/30/2023   ........ mild pulm edema  ........ mod r and sml effsn   a/r  .. pl effsn likely sec to chf   COPD  .. 3/30/2023 duoneb.3    .. 3/30/2023 symbicort   hemodynamics  .. La 3/29/2023 la 1.8   .. target map 65 (+)   CAD.  .. 3/30- 3/31/2023 atenolol 25  .. 3/31-4/4      metoprolol 25.2 - metoprolol 25.3   .. 3/30/2023 simvastat 40   RO DVT  .. 4/1/2023 v duplx (-)  CHF.  .. echo 3/30/2023  ........ ef 40%   ........ mod mr   .. bnp 4/2 bnp [id number]   .. 3/30-4/4/2023      losartan 50 - losartan 25   .. 3/30/2023 hydralazin 50.4p   Anemia  .. Hb 3/29-3/30-3/31-4/1-4/4-4/5-4/10-4/12/2023      Hb 11.2- 10.2 - 9.8 - 11 - 9.4 - 11.5- 9.4- 9.3    .. monitor  Hypernatremia.  .. Na 3/31-4/1-4/2-4/3-4/4 Na 147 - 151- 152 - 148- 144    CKD  .. Na 3/29-3/31/2023 Na 144-147   .. Cr 3/29-3/30-3/31-4/1-4/3-4/4-4/10/2023      Cr 1.4 - 1.3 - 1.3 - 1.5 - 1.5 - 1.3- 1.3   .. monitor  OBS  .. 3/30/2023 donepezil     OVERALL .  86 yo M with PMHx HTN, HLD, T2D, dementia (AOx2-3), OA, BPH, CAD, TIA, CKD 3 and GERD HO recent hospital stay 1/24-1/30/2023 [facility]  COPD ex  now admitted with osteomyelitis possible pneum  Pulm consulted 3/29/2023    .. 4/4/2023 pt tested covid (+)  started rdsv Dr Mancia     PROBLEMS  COVID 4/4/2023  .. 4/4/2023 rdsv 3 d  E FECA UTI   .. uc 4/2/2023 100K eneterococcs fecal  Possible Osteomyelitis  .. w scann 4/4 l foot susp for osteom r heel osteomyelitis   Pneumonia   .. 3/29-4/5 zosyn   .. 4/7 zosyn x 42 d    COPD   CKD   PLEURAL EFFSN   .. 3/30 ct  HFREF   .. MOD MR 3/30 echo    A fib  ..  4/1 lvnx 60.2       TIME SPENT   Over 25 minutes aggregate care time spent on encounter; activities included   direct patient care, counseling and/or coordinating care reviewing notes, lab data/ imaging , discussion with multidisciplinary team/ patient  /family and explaining in detail risks, benefits, alternatives  of the recommendations     Paluino Parmar 87 m     REVIEW OF SYMPTOMS      Able to give (reliable) ROS  NO     PHYSICAL EXAM    HEENT Unremarkable  atraumatic   RESP Fair air entry EXP prolonged    Harsh breath sound Resp distres mild   CARDIAC S1 S2 No S3     NO JVD    ABDOMEN SOFT BS PRESENT NOT DISTENDED No hepatosplenomegaly   PEDAL EDEMA present No calf tenderness  NO rash       GENERAL DATA .   GOC.     .. 3/30/2023 full code  ALLGY.     .. nka                    WT.   .. 3/30/2023 63  BMI.        .. 3/30/2023 21            ICU STAY.   .. none  COVID.   .. 4/4/2023 scv2 (+)  .. 3/29/2023 scv2 (-)     BEST PRACTICE ISSUES.    HOB ELEVATN.   .. Yes  DVT PPLX.   .. 3/31 lvnx 60.2 Dr Lakhani (a fib)   ..  3/29- 3/31 hpsc   MILLER PPLX.   .. 3/30/2023 protonix 40    INFN PPLX. ..    SP SW LAURENT.   .. 4/11 -> puree thin   ..  3/30/2023 -> soft bite mild thick        DIET.    ..  3/30/2023 dash  FREE WATER  .. 4/1/2023 fw 250.4    IV fl.  .. 4/6/2023 d5 1/2 40   PROCEDURE  .. 4/7/2023 r brach picc     ABGS.    VS/ PO/IO/ VENT/ DRIPS.   4/12/2023 afeb 80 120/90   4/12/2023 ra 95%     PROBLEM/ASSESSMENT/PLAN.  COVID   .. 4/4/2023 scv2 (+)  .. 4/4/2023 oxygenation ok  .. 4/4/2023 pt has mild disease   .. 4/4/2023 rdsv 3 d course started by Dr Mancia   Infection  Osteomyelitius  Possible pneumonia   .. Esr 3/29-3/31/2023 esr 67- 49   .. W 3/29-3/30-3/31-4/1-4/4-4/5-4/10-4/11-4/12/2023       w 11.8 - 12- 10.5- 11.9 -  9 - 7.7 - 7.1 - 9.8- 7.9    .. cxr 3/30/2023  ........ increasing r lower perihilar infiltrate   .. xr foot left 3/30/2023  ........ stable eroisions around 1st mtp anmd great toe    ........ which could be bone infectn    .. CXR 3/29/2023 Possible hansa pneum  .. w scann 4/4 l foot susp for osteom r heel osteomyelitis   .. bc 3/29/2023 bc (-)   .. uc 4/2/2023 100K eneterococcs fecal  .. 3/29-4/5 zosyn    .. 4/7/2023 zosyn 42 d   .. follow cultures  PLEURAL EFFUSION.  .. ct ch 3/30/2023   ........ mild pulm edema  ........ mod r and sml effsn   a/r  .. pl effsn likely sec to chf   COPD  .. 3/30/2023 duoneb.3    .. 3/30/2023 symbicort   hemodynamics  .. La 3/29/2023 la 1.8   .. target map 65 (+)   CAD.  .. 3/30- 3/31/2023 atenolol 25  .. 3/31-4/4      metoprolol 25.2 - metoprolol 25.3   .. 3/30/2023 simvastat 40   RO DVT  .. 4/1/2023 v duplx (-)  CHF.  .. echo 3/30/2023  ........ ef 40%   ........ mod mr   .. bnp 4/2 bnp [id number]   .. 3/30-4/4/2023      losartan 50 - losartan 25   .. 3/30/2023 hydralazin 50.4p   Anemia  .. Hb 3/29-3/30-3/31-4/1-4/4-4/5-4/10-4/12/2023      Hb 11.2- 10.2 - 9.8 - 11 - 9.4 - 11.5- 9.4- 9.3    .. monitor  Hypernatremia.  .. Na 3/31-4/1-4/2-4/3-4/4 Na 147 - 151- 152 - 148- 144    CKD  .. Na 3/29-3/31/2023 Na 144-147   .. Cr 3/29-3/30-3/31-4/1-4/3-4/4-4/10/2023      Cr 1.4 - 1.3 - 1.3 - 1.5 - 1.5 - 1.3- 1.3   .. monitor  OBS  .. 3/30/2023 donepezil     OVERALL .  88 yo M with PMHx HTN, HLD, T2D, dementia (AOx2-3), OA, BPH, CAD, TIA, CKD 3 and GERD HO recent hospital stay 1/24-1/30/2023 [facility]  COPD ex  now admitted with osteomyelitis possible pneum  Pulm consulted 3/29/2023    .. 4/4/2023 pt tested covid (+)  started rdsv Dr Mancia     PROBLEMS  COVID 4/4/2023  .. 4/4/2023 rdsv 3 d  E FECA UTI   .. uc 4/2/2023 100K eneterococcs fecal  Possible Osteomyelitis  .. w scann 4/4 l foot susp for osteom r heel osteomyelitis   Pneumonia   .. 3/29-4/5 zosyn   .. 4/7 zosyn x 42 d    COPD   CKD   PLEURAL EFFSN   .. 3/30 ct  HFREF   .. MOD MR 3/30 echo    A fib  ..  4/1 lvnx 60.2       TIME SPENT   Over 25 minutes aggregate care time spent on encounter; activities included   direct patient care, counseling and/or coordinating care reviewing notes, lab data/ imaging , discussion with multidisciplinary team/ patient  /family and explaining in detail risks, benefits, alternatives  of the recommendations     Paulino Parmar 87 m     REVIEW OF SYMPTOMS      Able to give (reliable) ROS  NO     PHYSICAL EXAM    HEENT Unremarkable  atraumatic   RESP Fair air entry EXP prolonged    Harsh breath sound Resp distres mild   CARDIAC S1 S2 No S3     NO JVD    ABDOMEN SOFT BS PRESENT NOT DISTENDED No hepatosplenomegaly   PEDAL EDEMA present No calf tenderness  NO rash       GENERAL DATA .   GOC.     .. 3/30/2023 full code  ALLGY.     .. nka                    WT.   .. 3/30/2023 63  BMI.        .. 3/30/2023 21            ICU STAY.   .. none  COVID.   .. 4/4/2023 scv2 (+)  .. 3/29/2023 scv2 (-)     BEST PRACTICE ISSUES.    HOB ELEVATN.   .. Yes  DVT PPLX.   .. 3/31 lvnx 60.2 Dr Lakhani (a fib)   ..  3/29- 3/31 hpsc   MILLER PPLX.   .. 3/30/2023 protonix 40    INFN PPLX. ..    SP SW LAURENT.   .. 4/11 -> puree thin   ..  3/30/2023 -> soft bite mild thick        DIET.    ..  3/30/2023 dash  FREE WATER  .. 4/1/2023 fw 250.4    IV fl.  .. 4/6/2023 d5 1/2 40   PROCEDURE  .. 4/7/2023 r brach picc     ABGS.    VS/ PO/IO/ VENT/ DRIPS.   4/12/2023 afeb 80 120/90   4/12/2023 ra 95%     PROBLEM/ASSESSMENT/PLAN.  COVID   .. 4/4/2023 scv2 (+)  .. 4/4/2023 oxygenation ok  .. 4/4/2023 pt has mild disease   .. 4/4/2023 rdsv 3 d course started by Dr Mancia   Infection  Osteomyelitius  Possible pneumonia   .. Esr 3/29-3/31/2023 esr 67- 49   .. W 3/29-3/30-3/31-4/1-4/4-4/5-4/10-4/11-4/12/2023       w 11.8 - 12- 10.5- 11.9 -  9 - 7.7 - 7.1 - 9.8- 7.9    .. cxr 3/30/2023  ........ increasing r lower perihilar infiltrate   .. xr foot left 3/30/2023  ........ stable eroisions around 1st mtp anmd great toe    ........ which could be bone infectn    .. CXR 3/29/2023 Possible hansa pneum  .. w scann 4/4 l foot susp for osteom r heel osteomyelitis   .. bc 3/29/2023 bc (-)   .. uc 4/2/2023 100K eneterococcs fecal  .. 3/29-4/5 zosyn    .. 4/7/2023 zosyn 42 d   .. follow cultures  PLEURAL EFFUSION.  .. ct ch 3/30/2023   ........ mild pulm edema  ........ mod r and sml effsn   a/r  .. pl effsn likely sec to chf   COPD  .. 3/30/2023 duoneb.3    .. 3/30/2023 symbicort   hemodynamics  .. La 3/29/2023 la 1.8   .. target map 65 (+)   CAD.  .. 3/30- 3/31/2023 atenolol 25  .. 3/31-4/4      metoprolol 25.2 - metoprolol 25.3   .. 3/30/2023 simvastat 40   RO DVT  .. 4/1/2023 v duplx (-)  CHF.  .. echo 3/30/2023  ........ ef 40%   ........ mod mr   .. bnp 4/2 bnp [id number]   .. 3/30-4/4/2023      losartan 50 - losartan 25   .. 3/30/2023 hydralazin 50.4p   Anemia  .. Hb 3/29-3/30-3/31-4/1-4/4-4/5-4/10-4/12/2023      Hb 11.2- 10.2 - 9.8 - 11 - 9.4 - 11.5- 9.4- 9.3    .. monitor  Hypernatremia.  .. Na 3/31-4/1-4/2-4/3-4/4 Na 147 - 151- 152 - 148- 144    CKD  .. Na 3/29-3/31/2023 Na 144-147   .. Cr 3/29-3/30-3/31-4/1-4/3-4/4-4/10/2023      Cr 1.4 - 1.3 - 1.3 - 1.5 - 1.5 - 1.3- 1.3   .. monitor  OBS  .. 3/30/2023 donepezil     OVERALL .  88 yo M with PMHx HTN, HLD, T2D, dementia (AOx2-3), OA, BPH, CAD, TIA, CKD 3 and GERD HO recent hospital stay 1/24-1/30/2023 [facility]  COPD ex  now admitted with osteomyelitis possible pneum  Pulm consulted 3/29/2023    .. 4/4/2023 pt tested covid (+)  started rdsv Dr Mancia     PROBLEMS  COVID 4/4/2023  .. 4/4/2023 rdsv 3 d  E FECA UTI   .. uc 4/2/2023 100K eneterococcs fecal  Possible Osteomyelitis  .. w scann 4/4 l foot susp for osteom r heel osteomyelitis   Pneumonia   .. 3/29-4/5 zosyn   .. 4/7 zosyn x 42 d    COPD   CKD   PLEURAL EFFSN   .. 3/30 ct  HFREF   .. MOD MR 3/30 echo    A fib  ..  4/1 lvnx 60.2       TIME SPENT   Over 25 minutes aggregate care time spent on encounter; activities included   direct patient care, counseling and/or coordinating care reviewing notes, lab data/ imaging , discussion with multidisciplinary team/ patient  /family and explaining in detail risks, benefits, alternatives  of the recommendations     Paulino Parmar 87 m

## 2023-04-12 NOTE — PROGRESS NOTE ADULT - ASSESSMENT
88 yo male ,[facility] resident with PMHx - COPD, DM, HTN, CAD, HLD, H/O TIA, dementia,GERD, BPH and depression sent ot ER for evaluation of left foot 1metatarsal wound ,suggestive of osteomyelitis .Patient was seen by ID consult and transfer to the hospital recommended for wound cx/bone biopsy /podiatry evaluation ,likely will require 4-6 weeks of iv abx . Patient was admitted to [facility] with b/l feet wounds and was followed by wound care team ,recently completed 7 days of doxycycline for foot wound cellulitis . 88 yo male ,[facility] resident with PMHx - COPD, DM, HTN, CAD, HLD, H/O TIA, dementia,GERD, BPH and depression sent ot ER for evaluation of left foot 1metatarsal wound ,suggestive of osteomyelitis .Patient was seen by ID consult and transfer to the hospital recommended for wound cx/bone biopsy /podiatry evaluation ,likely will require 4-6 weeks of iv abx . Patient was admitted to [facility] with b/l feet wounds and was followed by wound care team ,recently completed 7 days of doxycycline for foot wound cellulitis . 86 yo male ,formerly [facility] resident with PMHx - COPD, DM, HTN, CAD, HLD, H/O TIA, dementia,GERD, BPH and depression sent ot ER for evaluation of left foot 1metatarsal wound ,suggestive of osteomyelitis .Patient was seen by ID consult and transfer to the hospital recommended for wound cx/bone biopsy /podiatry evaluation ,likely will require 4-6 weeks of iv abx . Patient was admitted to [facility] with b/l feet wounds and was followed by wound care team ,recently completed 7 days of doxycycline for foot wound cellulitis .

## 2023-04-12 NOTE — PROGRESS NOTE ADULT - ASSESSMENT
88 yo male ,[facility] resident with PMHx - COPD, DM, HTN, CAD, HLD, H/O TIA, dementia,GERD, BPH and depression sent ot ER for evaluation of left foot 1metatarsal wound ,suggestive of osteomyelitis .Patient was seen by ID consult and transfer to the hospital recommended for wound cx/bone biopsy /podiatry evaluation ,likely will require 4-6 weeks of iv abx . Patient was admitted to [facility] with b/l feet wounds and was followed by wound care team ,recently completed 7 days of doxycycline for foot wound cellulitis . (30 Mar 2023 05:21)      hypernatremia   improved      hypokalemia potassium chloride  10 mEq/100 mL IVPB 10 milliEquivalent(s) IV Intermittent every 1 hour  prn     ACUTE RENAL FAILURE: decrease acei   Serum creatinine is improving    There is no progression . No uremic symptoms  No evidence of anemia .  Fluid status stable.  Will continue to avoid nephrotoxic drugs.  Patient remains asymptomatic.   Continue current therapy.  hold  diuretic.        BP monitoring,continue current antihypertensive meds, low salt diet,followup with PMD in 1-2 weeks  losartan 25 milliGRAM(s) Oral daily    f/u  blood and urine cx,serial lactate levels,monitor vitals closley,kelvins hydration,monitor urine output and renal profile,iv abx   piperacillin/tazobactam IVPB.. 3.375 Gram(s) IV Intermittent every 8 hours 86 yo male ,[facility] resident with PMHx - COPD, DM, HTN, CAD, HLD, H/O TIA, dementia,GERD, BPH and depression sent ot ER for evaluation of left foot 1metatarsal wound ,suggestive of osteomyelitis .Patient was seen by ID consult and transfer to the hospital recommended for wound cx/bone biopsy /podiatry evaluation ,likely will require 4-6 weeks of iv abx . Patient was admitted to [facility] with b/l feet wounds and was followed by wound care team ,recently completed 7 days of doxycycline for foot wound cellulitis . (30 Mar 2023 05:21)      hypernatremia   improved      hypokalemia potassium chloride  10 mEq/100 mL IVPB 10 milliEquivalent(s) IV Intermittent every 1 hour  prn     ACUTE RENAL FAILURE: decrease acei   Serum creatinine is improving    There is no progression . No uremic symptoms  No evidence of anemia .  Fluid status stable.  Will continue to avoid nephrotoxic drugs.  Patient remains asymptomatic.   Continue current therapy.  hold  diuretic.        BP monitoring,continue current antihypertensive meds, low salt diet,followup with PMD in 1-2 weeks  losartan 25 milliGRAM(s) Oral daily    f/u  blood and urine cx,serial lactate levels,monitor vitals closley,kelvins hydration,monitor urine output and renal profile,iv abx   piperacillin/tazobactam IVPB.. 3.375 Gram(s) IV Intermittent every 8 hours 86 yo male ,[facility] resident with PMHx - COPD, DM, HTN, CAD, HLD, H/O TIA, dementia,GERD, BPH and depression sent ot ER for evaluation of left foot 1metatarsal wound ,suggestive of osteomyelitis .Patient was seen by ID consult and transfer to the hospital recommended for wound cx/bone biopsy /podiatry evaluation ,likely will require 4-6 weeks of iv abx . Patient was admitted to [facility] with b/l feet wounds and was followed by wound care team ,recently completed 7 days of doxycycline for foot wound cellulitis . (30 Mar 2023 05:21)      hypernatremia   improved      hypokalemia potassium chloride  10 mEq/100 mL IVPB 10 milliEquivalent(s) IV Intermittent every 1 hour  prn     ACUTE RENAL FAILURE: decrease acei   Serum creatinine is improving    There is no progression . No uremic symptoms  No evidence of anemia .  Fluid status stable.  Will continue to avoid nephrotoxic drugs.  Patient remains asymptomatic.   Continue current therapy.  hold  diuretic.        BP monitoring,continue current antihypertensive meds, low salt diet,followup with PMD in 1-2 weeks  losartan 25 milliGRAM(s) Oral daily    f/u  blood and urine cx,serial lactate levels,monitor vitals closley,kelvins hydration,monitor urine output and renal profile,iv abx   piperacillin/tazobactam IVPB.. 3.375 Gram(s) IV Intermittent every 8 hours

## 2023-04-12 NOTE — PROGRESS NOTE ADULT - SUBJECTIVE AND OBJECTIVE BOX
[facility]  INFECTIOUS DISEASES - Ruma Mayes, [place] [address]  Tel: [phone]     Fax: [phone]  =======================================================    DENISEANA MARIA VAN [id number]    Follow up: No fevers. seen earlier today.    Allergies:  No Known Allergies      Antibiotics:  acetaminophen     Tablet .. 650 milliGRAM(s) Oral every 6 hours PRN  alteplase for catheter clearance 2 milliGRAM(s) Catheter once  aluminum hydroxide/magnesium hydroxide/simethicone Suspension 30 milliLiter(s) Oral every 4 hours PRN  bisacodyl Suppository 10 milliGRAM(s) Rectal daily PRN  budesonide 160 MICROgram(s)/formoterol 4.5 MICROgram(s) Inhaler 2 Puff(s) Inhalation two times a day  dextrose 50% Injectable 25 Gram(s) IV Push once  dextrose 50% Injectable 12.5 Gram(s) IV Push once  dextrose 50% Injectable 25 Gram(s) IV Push once  dextrose Oral Gel 15 Gram(s) Oral once PRN  donepezil 5 milliGRAM(s) Oral at bedtime  DULoxetine 60 milliGRAM(s) Oral daily  enoxaparin Injectable 60 milliGRAM(s) SubCutaneous every 12 hours  glucagon  Injectable 1 milliGRAM(s) IntraMuscular once  hydrALAZINE 50 milliGRAM(s) Oral every 6 hours PRN  insulin glargine Injectable (LANTUS) 8 Unit(s) SubCutaneous at bedtime  insulin lispro (ADMELOG) corrective regimen sliding scale   SubCutaneous three times a day before meals  insulin lispro (ADMELOG) corrective regimen sliding scale   SubCutaneous at bedtime  lactobacillus acidophilus 1 Tablet(s) Oral two times a day with meals  lisinopril 2.5 milliGRAM(s) Oral daily  magnesium hydroxide Suspension 30 milliLiter(s) Oral daily PRN  melatonin 3 milliGRAM(s) Oral at bedtime PRN  metoprolol tartrate 100 milliGRAM(s) Oral two times a day  multivitamin 1 Tablet(s) Oral daily  ondansetron Injectable 4 milliGRAM(s) IV Push every 8 hours PRN  pantoprazole   Suspension 40 milliGRAM(s) Oral daily  piperacillin/tazobactam IVPB.. 3.375 Gram(s) IV Intermittent every 8 hours  senna 2 Tablet(s) Oral at bedtime  simvastatin 40 milliGRAM(s) Oral at bedtime  sodium chloride 0.9% lock flush 10 milliLiter(s) IV Push every 1 hour PRN  tamsulosin 0.4 milliGRAM(s) Oral at bedtime       REVIEW OF SYSTEMS:  unable to obtain 2/2 dementia     Physical Exam:  ICU Vital Signs Last 24 Hrs  T(C): 36.4 (12 Apr 2023 12:05), Max: 36.6 (12 Apr 2023 04:37)  T(F): 97.6 (12 Apr 2023 12:05), Max: 97.9 (12 Apr 2023 04:37)  HR: 79 (12 Apr 2023 12:05) (79 - 80)  BP: 123/67 (12 Apr 2023 12:05) (123/67 - 125/94)  BP(mean): --  ABP: --  ABP(mean): --  RR: 18 (12 Apr 2023 12:05) (18 - 18)  SpO2: 92% (12 Apr 2023 12:05) (92% - 95%)    O2 Parameters below as of 12 Apr 2023 12:05  Patient On (Oxygen Delivery Method): room air           GEN: NAD  HEENT: normocephalic and atraumatic.   NECK: Supple.   LUNGS: Normal respiratory effort  HEART: Regular rate and rhythm   ABDOMEN: Soft, nontender, and nondistended.    EXTREMITIES: No leg edema.  NEUROLOGIC: awake, not responding to most questions appropriately  SKIN: (+) L foot foot 1st metatarsal wound with probe to bone, bilateral heel wounds        Labs:  04-12    139  |  110<H>  |  31<H>  ----------------------------<  216<H>  3.9   |  23  |  1.40<H>    Ca    8.8      12 Apr 2023 10:09    TPro  6.2  /  Alb  1.9<L>  /  TBili  0.5  /  DBili  0.2  /  AST  17  /  ALT  18  /  AlkPhos  66  04-12                          9.3    7.90  )-----------( 325      ( 12 Apr 2023 10:09 )             31.0         LIVER FUNCTIONS - ( 12 Apr 2023 10:09 )  Alb: 1.9 g/dL / Pro: 6.2 g/dL / ALK PHOS: 66 U/L / ALT: 18 U/L / AST: 17 U/L / GGT: x             RECENT CULTURES:  04-10 @ 13:10 .Tissue Other, LEFT FOOT 1ST METATARSAL HEAD     Rare Staphylococcus aureus    No polymorphonuclear cells seen per low power field  No organisms seen per oil power field      04-02 @ 09:15 Clean Catch Clean Catch (Midstream) Enterococcus faecalis    >100,000 CFU/ml Enterococcus faecalis        03-29 @ 21:32 .Blood     No Growth Final        03-29 @ 21:20 .Blood     No Growth Final              All imaging and data are reviewed.    [facility]  INFECTIOUS DISEASES - Ruma Mayes, [place] [address]  Tel: [phone]     Fax: [phone]  =======================================================    DENISEANA MARIA VAN [id number]    Follow up: No fevers. seen earlier today.    Allergies:  No Known Allergies      Antibiotics:  acetaminophen     Tablet .. 650 milliGRAM(s) Oral every 6 hours PRN  alteplase for catheter clearance 2 milliGRAM(s) Catheter once  aluminum hydroxide/magnesium hydroxide/simethicone Suspension 30 milliLiter(s) Oral every 4 hours PRN  bisacodyl Suppository 10 milliGRAM(s) Rectal daily PRN  budesonide 160 MICROgram(s)/formoterol 4.5 MICROgram(s) Inhaler 2 Puff(s) Inhalation two times a day  dextrose 50% Injectable 25 Gram(s) IV Push once  dextrose 50% Injectable 12.5 Gram(s) IV Push once  dextrose 50% Injectable 25 Gram(s) IV Push once  dextrose Oral Gel 15 Gram(s) Oral once PRN  donepezil 5 milliGRAM(s) Oral at bedtime  DULoxetine 60 milliGRAM(s) Oral daily  enoxaparin Injectable 60 milliGRAM(s) SubCutaneous every 12 hours  glucagon  Injectable 1 milliGRAM(s) IntraMuscular once  hydrALAZINE 50 milliGRAM(s) Oral every 6 hours PRN  insulin glargine Injectable (LANTUS) 8 Unit(s) SubCutaneous at bedtime  insulin lispro (ADMELOG) corrective regimen sliding scale   SubCutaneous three times a day before meals  insulin lispro (ADMELOG) corrective regimen sliding scale   SubCutaneous at bedtime  lactobacillus acidophilus 1 Tablet(s) Oral two times a day with meals  lisinopril 2.5 milliGRAM(s) Oral daily  magnesium hydroxide Suspension 30 milliLiter(s) Oral daily PRN  melatonin 3 milliGRAM(s) Oral at bedtime PRN  metoprolol tartrate 100 milliGRAM(s) Oral two times a day  multivitamin 1 Tablet(s) Oral daily  ondansetron Injectable 4 milliGRAM(s) IV Push every 8 hours PRN  pantoprazole   Suspension 40 milliGRAM(s) Oral daily  piperacillin/tazobactam IVPB.. 3.375 Gram(s) IV Intermittent every 8 hours  senna 2 Tablet(s) Oral at bedtime  simvastatin 40 milliGRAM(s) Oral at bedtime  sodium chloride 0.9% lock flush 10 milliLiter(s) IV Push every 1 hour PRN  tamsulosin 0.4 milliGRAM(s) Oral at bedtime       REVIEW OF SYSTEMS:  unable to obtain 2/2 dementia     Physical Exam:  ICU Vital Signs Last 24 Hrs  T(C): 36.4 (12 Apr 2023 12:05), Max: 36.6 (12 Apr 2023 04:37)  T(F): 97.6 (12 Apr 2023 12:05), Max: 97.9 (12 Apr 2023 04:37)  HR: 79 (12 Apr 2023 12:05) (79 - 80)  BP: 123/67 (12 Apr 2023 12:05) (123/67 - 125/94)  BP(mean): --  ABP: --  ABP(mean): --  RR: 18 (12 Apr 2023 12:05) (18 - 18)  SpO2: 92% (12 Apr 2023 12:05) (92% - 95%)    O2 Parameters below as of 12 Apr 2023 12:05  Patient On (Oxygen Delivery Method): room air           GEN: NAD  HEENT: normocephalic and atraumatic.   NECK: Supple.   LUNGS: Normal respiratory effort  HEART: Regular rate and rhythm   ABDOMEN: Soft, nontender, and nondistended.    EXTREMITIES: No leg edema.  NEUROLOGIC: awake, not responding to most questions appropriately  SKIN: (+) L foot foot 1st metatarsal wound with probe to bone, bilateral heel wounds        Labs:  04-12    139  |  110<H>  |  31<H>  ----------------------------<  216<H>  3.9   |  23  |  1.40<H>    Ca    8.8      12 Apr 2023 10:09    TPro  6.2  /  Alb  1.9<L>  /  TBili  0.5  /  DBili  0.2  /  AST  17  /  ALT  18  /  AlkPhos  66  04-12                          9.3    7.90  )-----------( 325      ( 12 Apr 2023 10:09 )             31.0         LIVER FUNCTIONS - ( 12 Apr 2023 10:09 )  Alb: 1.9 g/dL / Pro: 6.2 g/dL / ALK PHOS: 66 U/L / ALT: 18 U/L / AST: 17 U/L / GGT: x             RECENT CULTURES:  04-10 @ 13:10 .Tissue Other, LEFT FOOT 1ST METATARSAL HEAD     Rare Staphylococcus aureus    No polymorphonuclear cells seen per low power field  No organisms seen per oil power field      04-02 @ 09:15 Clean Catch Clean Catch (Midstream) Enterococcus faecalis    >100,000 CFU/ml Enterococcus faecalis        03-29 @ 21:32 .Blood     No Growth Final        03-29 @ 21:20 .Blood     No Growth Final              All imaging and data are reviewed.    [facility]  INFECTIOUS DISEASES - Ruma Mayes, [place] [address]  Tel: [phone]     Fax: [phone]  =======================================================    DENISEANA MARIA VAN [id number]    Follow up: No fevers. seen earlier today.    Allergies:  No Known Allergies      Antibiotics:  acetaminophen     Tablet .. 650 milliGRAM(s) Oral every 6 hours PRN  alteplase for catheter clearance 2 milliGRAM(s) Catheter once  aluminum hydroxide/magnesium hydroxide/simethicone Suspension 30 milliLiter(s) Oral every 4 hours PRN  bisacodyl Suppository 10 milliGRAM(s) Rectal daily PRN  budesonide 160 MICROgram(s)/formoterol 4.5 MICROgram(s) Inhaler 2 Puff(s) Inhalation two times a day  dextrose 50% Injectable 25 Gram(s) IV Push once  dextrose 50% Injectable 12.5 Gram(s) IV Push once  dextrose 50% Injectable 25 Gram(s) IV Push once  dextrose Oral Gel 15 Gram(s) Oral once PRN  donepezil 5 milliGRAM(s) Oral at bedtime  DULoxetine 60 milliGRAM(s) Oral daily  enoxaparin Injectable 60 milliGRAM(s) SubCutaneous every 12 hours  glucagon  Injectable 1 milliGRAM(s) IntraMuscular once  hydrALAZINE 50 milliGRAM(s) Oral every 6 hours PRN  insulin glargine Injectable (LANTUS) 8 Unit(s) SubCutaneous at bedtime  insulin lispro (ADMELOG) corrective regimen sliding scale   SubCutaneous three times a day before meals  insulin lispro (ADMELOG) corrective regimen sliding scale   SubCutaneous at bedtime  lactobacillus acidophilus 1 Tablet(s) Oral two times a day with meals  lisinopril 2.5 milliGRAM(s) Oral daily  magnesium hydroxide Suspension 30 milliLiter(s) Oral daily PRN  melatonin 3 milliGRAM(s) Oral at bedtime PRN  metoprolol tartrate 100 milliGRAM(s) Oral two times a day  multivitamin 1 Tablet(s) Oral daily  ondansetron Injectable 4 milliGRAM(s) IV Push every 8 hours PRN  pantoprazole   Suspension 40 milliGRAM(s) Oral daily  piperacillin/tazobactam IVPB.. 3.375 Gram(s) IV Intermittent every 8 hours  senna 2 Tablet(s) Oral at bedtime  simvastatin 40 milliGRAM(s) Oral at bedtime  sodium chloride 0.9% lock flush 10 milliLiter(s) IV Push every 1 hour PRN  tamsulosin 0.4 milliGRAM(s) Oral at bedtime       REVIEW OF SYSTEMS:  unable to obtain 2/2 dementia     Physical Exam:  ICU Vital Signs Last 24 Hrs  T(C): 36.4 (12 Apr 2023 12:05), Max: 36.6 (12 Apr 2023 04:37)  T(F): 97.6 (12 Apr 2023 12:05), Max: 97.9 (12 Apr 2023 04:37)  HR: 79 (12 Apr 2023 12:05) (79 - 80)  BP: 123/67 (12 Apr 2023 12:05) (123/67 - 125/94)  BP(mean): --  ABP: --  ABP(mean): --  RR: 18 (12 Apr 2023 12:05) (18 - 18)  SpO2: 92% (12 Apr 2023 12:05) (92% - 95%)    O2 Parameters below as of 12 Apr 2023 12:05  Patient On (Oxygen Delivery Method): room air           GEN: NAD  HEENT: normocephalic and atraumatic.   NECK: Supple.   LUNGS: Normal respiratory effort  HEART: Regular rate and rhythm   ABDOMEN: Soft, nontender, and nondistended.    EXTREMITIES: No leg edema.  NEUROLOGIC: awake, not responding to most questions appropriately  SKIN: (+) L foot foot 1st metatarsal wound with probe to bone, bilateral heel wounds        Labs:  04-12    139  |  110<H>  |  31<H>  ----------------------------<  216<H>  3.9   |  23  |  1.40<H>    Ca    8.8      12 Apr 2023 10:09    TPro  6.2  /  Alb  1.9<L>  /  TBili  0.5  /  DBili  0.2  /  AST  17  /  ALT  18  /  AlkPhos  66  04-12                          9.3    7.90  )-----------( 325      ( 12 Apr 2023 10:09 )             31.0         LIVER FUNCTIONS - ( 12 Apr 2023 10:09 )  Alb: 1.9 g/dL / Pro: 6.2 g/dL / ALK PHOS: 66 U/L / ALT: 18 U/L / AST: 17 U/L / GGT: x             RECENT CULTURES:  04-10 @ 13:10 .Tissue Other, LEFT FOOT 1ST METATARSAL HEAD     Rare Staphylococcus aureus    No polymorphonuclear cells seen per low power field  No organisms seen per oil power field      04-02 @ 09:15 Clean Catch Clean Catch (Midstream) Enterococcus faecalis    >100,000 CFU/ml Enterococcus faecalis        03-29 @ 21:32 .Blood     No Growth Final        03-29 @ 21:20 .Blood     No Growth Final              All imaging and data are reviewed.

## 2023-04-12 NOTE — PROGRESS NOTE ADULT - SUBJECTIVE AND OBJECTIVE BOX
[facility] [place]       HPI:  88 yo male ,[facility] resident with PMHx - COPD, DM, HTN, CAD, HLD, H/O TIA, dementia,GERD, BPH and depression sent ot ER for evaluation of left foot 1metatarsal wound ,suggestive of osteomyelitis .Patient was seen by ID consult and transfer to the hospital recommended for wound cx/bone biopsy /podiatry evaluation ,likely will require 4-6 weeks of iv abx . Patient was admitted to [facility] with b/l feet wounds and was followed by wound care team ,recently completed 7 days of doxycycline for foot wound cellulitis . (30 Mar 2023 05:21)        SUBJECTIVE:      ALLERGIES:  Allergies    No Known Allergies    Intolerances          MEDICATIONS  (STANDING):  budesonide 160 MICROgram(s)/formoterol 4.5 MICROgram(s) Inhaler 2 Puff(s) Inhalation two times a day  dextrose 50% Injectable 25 Gram(s) IV Push once  dextrose 50% Injectable 12.5 Gram(s) IV Push once  dextrose 50% Injectable 25 Gram(s) IV Push once  donepezil 5 milliGRAM(s) Oral at bedtime  DULoxetine 60 milliGRAM(s) Oral daily  glucagon  Injectable 1 milliGRAM(s) IntraMuscular once  insulin glargine Injectable (LANTUS) 8 Unit(s) SubCutaneous at bedtime  insulin lispro (ADMELOG) corrective regimen sliding scale   SubCutaneous three times a day before meals  insulin lispro (ADMELOG) corrective regimen sliding scale   SubCutaneous at bedtime  lactobacillus acidophilus 1 Tablet(s) Oral two times a day with meals  losartan 25 milliGRAM(s) Oral daily  metoprolol tartrate 50 milliGRAM(s) Oral every 8 hours  multivitamin 1 Tablet(s) Oral daily  pantoprazole   Suspension 40 milliGRAM(s) Oral daily  piperacillin/tazobactam IVPB.. 3.375 Gram(s) IV Intermittent every 8 hours  senna 2 Tablet(s) Oral at bedtime  simvastatin 40 milliGRAM(s) Oral at bedtime  tamsulosin 0.4 milliGRAM(s) Oral at bedtime    MEDICATIONS  (PRN):  acetaminophen     Tablet .. 650 milliGRAM(s) Oral every 6 hours PRN Temp greater or equal to 38C (100.4F), Mild Pain (1 - 3)  aluminum hydroxide/magnesium hydroxide/simethicone Suspension 30 milliLiter(s) Oral every 4 hours PRN Dyspepsia  bisacodyl Suppository 10 milliGRAM(s) Rectal daily PRN Constipation  dextrose Oral Gel 15 Gram(s) Oral once PRN Blood Glucose LESS THAN 70 milliGRAM(s)/deciliter  hydrALAZINE 50 milliGRAM(s) Oral every 6 hours PRN for systolic BP>160  magnesium hydroxide Suspension 30 milliLiter(s) Oral daily PRN Constipation  melatonin 3 milliGRAM(s) Oral at bedtime PRN Insomnia  ondansetron Injectable 4 milliGRAM(s) IV Push every 8 hours PRN Nausea and/or Vomiting  sodium chloride 0.9% lock flush 10 milliLiter(s) IV Push every 1 hour PRN Pre/post blood products, medications, blood draw, and to maintain line patency      REVIEW OF SYSTEMS:  CONSTITUTIONAL: No fever,  RESPIRATORY: No cough, wheezing, shortness of breath  CARDIOVASCULAR: No chest pain, dyspnea, palpitations, dizziness, syncope, paroxysmal nocturnal dyspnea, orthopnea, or arm or leg swelling  GASTROINTESTINAL: No abdominal  or epigastric pain, nausea, vomiting,  diarrhea  NEUROLOGICAL: No headaches,  loss of strength, numbness, or tremors    Vital Signs Last 24 Hrs  T(C): 36.6 (12 Apr 2023 04:37), Max: 37 (11 Apr 2023 13:03)  T(F): 97.9 (12 Apr 2023 04:37), Max: 98.6 (11 Apr 2023 13:03)  HR: 80 (12 Apr 2023 04:37) (68 - 89)  BP: 125/94 (12 Apr 2023 04:37) (125/94 - 149/72)  BP(mean): --  RR: 18 (12 Apr 2023 04:37) (17 - 18)  SpO2: 95% (12 Apr 2023 04:37) (91% - 95%)    Parameters below as of 12 Apr 2023 04:37  Patient On (Oxygen Delivery Method): room air        PHYSICAL EXAM:  HEAD:  Atraumatic, Normocephalic  NECK: Supple and normal thyroid.  No JVD or carotid bruit.   HEART: S1, S2 regular , 1/6 soft ejection systolic murmur in mitral area , no thrill and no gallops .  PULMONARY: Bilateral vesicular breathing , few scattered ronchi and few scattered rales are present .  ABDOMEN: Soft nontender and positive bowl sounds   EXTREMITIES:  No clubbing, cyanosis, or pedal  edema  NEUROLOGICAL: AAOX3 , no focal deficit .    LABS:                        9.3    7.90  )-----------( 325      ( 12 Apr 2023 10:09 )             31.0     04-12    139  |  110<H>  |  31<H>  ----------------------------<  216<H>  3.9   |  23  |  1.40<H>    Ca    8.8      12 Apr 2023 10:09    TPro  6.2  /  Alb  1.9<L>  /  TBili  0.5  /  DBili  0.2  /  AST  17  /  ALT  18  /  AlkPhos  66  04-12            BNP      EKG:  ECHO:  IMAGING:    Assessment/Plan    Will continue to follow during hospital course and give further recommendations as needed . Thanks for your referral . if any questions please contact me at office ([phone])cell [phone])  [facility] [place]       HPI:  86 yo male ,[facility] resident with PMHx - COPD, DM, HTN, CAD, HLD, H/O TIA, dementia,GERD, BPH and depression sent ot ER for evaluation of left foot 1metatarsal wound ,suggestive of osteomyelitis .Patient was seen by ID consult and transfer to the hospital recommended for wound cx/bone biopsy /podiatry evaluation ,likely will require 4-6 weeks of iv abx . Patient was admitted to [facility] with b/l feet wounds and was followed by wound care team ,recently completed 7 days of doxycycline for foot wound cellulitis . (30 Mar 2023 05:21)        SUBJECTIVE:      ALLERGIES:  Allergies    No Known Allergies    Intolerances          MEDICATIONS  (STANDING):  budesonide 160 MICROgram(s)/formoterol 4.5 MICROgram(s) Inhaler 2 Puff(s) Inhalation two times a day  dextrose 50% Injectable 25 Gram(s) IV Push once  dextrose 50% Injectable 12.5 Gram(s) IV Push once  dextrose 50% Injectable 25 Gram(s) IV Push once  donepezil 5 milliGRAM(s) Oral at bedtime  DULoxetine 60 milliGRAM(s) Oral daily  glucagon  Injectable 1 milliGRAM(s) IntraMuscular once  insulin glargine Injectable (LANTUS) 8 Unit(s) SubCutaneous at bedtime  insulin lispro (ADMELOG) corrective regimen sliding scale   SubCutaneous three times a day before meals  insulin lispro (ADMELOG) corrective regimen sliding scale   SubCutaneous at bedtime  lactobacillus acidophilus 1 Tablet(s) Oral two times a day with meals  losartan 25 milliGRAM(s) Oral daily  metoprolol tartrate 50 milliGRAM(s) Oral every 8 hours  multivitamin 1 Tablet(s) Oral daily  pantoprazole   Suspension 40 milliGRAM(s) Oral daily  piperacillin/tazobactam IVPB.. 3.375 Gram(s) IV Intermittent every 8 hours  senna 2 Tablet(s) Oral at bedtime  simvastatin 40 milliGRAM(s) Oral at bedtime  tamsulosin 0.4 milliGRAM(s) Oral at bedtime    MEDICATIONS  (PRN):  acetaminophen     Tablet .. 650 milliGRAM(s) Oral every 6 hours PRN Temp greater or equal to 38C (100.4F), Mild Pain (1 - 3)  aluminum hydroxide/magnesium hydroxide/simethicone Suspension 30 milliLiter(s) Oral every 4 hours PRN Dyspepsia  bisacodyl Suppository 10 milliGRAM(s) Rectal daily PRN Constipation  dextrose Oral Gel 15 Gram(s) Oral once PRN Blood Glucose LESS THAN 70 milliGRAM(s)/deciliter  hydrALAZINE 50 milliGRAM(s) Oral every 6 hours PRN for systolic BP>160  magnesium hydroxide Suspension 30 milliLiter(s) Oral daily PRN Constipation  melatonin 3 milliGRAM(s) Oral at bedtime PRN Insomnia  ondansetron Injectable 4 milliGRAM(s) IV Push every 8 hours PRN Nausea and/or Vomiting  sodium chloride 0.9% lock flush 10 milliLiter(s) IV Push every 1 hour PRN Pre/post blood products, medications, blood draw, and to maintain line patency      REVIEW OF SYSTEMS:  CONSTITUTIONAL: No fever,  RESPIRATORY: No cough, wheezing, shortness of breath  CARDIOVASCULAR: No chest pain, dyspnea, palpitations, dizziness, syncope, paroxysmal nocturnal dyspnea, orthopnea, or arm or leg swelling  GASTROINTESTINAL: No abdominal  or epigastric pain, nausea, vomiting,  diarrhea  NEUROLOGICAL: No headaches,  loss of strength, numbness, or tremors    Vital Signs Last 24 Hrs  T(C): 36.6 (12 Apr 2023 04:37), Max: 37 (11 Apr 2023 13:03)  T(F): 97.9 (12 Apr 2023 04:37), Max: 98.6 (11 Apr 2023 13:03)  HR: 80 (12 Apr 2023 04:37) (68 - 89)  BP: 125/94 (12 Apr 2023 04:37) (125/94 - 149/72)  BP(mean): --  RR: 18 (12 Apr 2023 04:37) (17 - 18)  SpO2: 95% (12 Apr 2023 04:37) (91% - 95%)    Parameters below as of 12 Apr 2023 04:37  Patient On (Oxygen Delivery Method): room air        PHYSICAL EXAM:  HEAD:  Atraumatic, Normocephalic  NECK: Supple and normal thyroid.  No JVD or carotid bruit.   HEART: S1, S2 regular , 1/6 soft ejection systolic murmur in mitral area , no thrill and no gallops .  PULMONARY: Bilateral vesicular breathing , few scattered ronchi and few scattered rales are present .  ABDOMEN: Soft nontender and positive bowl sounds   EXTREMITIES:  No clubbing, cyanosis, or pedal  edema  NEUROLOGICAL: AAOX3 , no focal deficit .    LABS:                        9.3    7.90  )-----------( 325      ( 12 Apr 2023 10:09 )             31.0     04-12    139  |  110<H>  |  31<H>  ----------------------------<  216<H>  3.9   |  23  |  1.40<H>    Ca    8.8      12 Apr 2023 10:09    TPro  6.2  /  Alb  1.9<L>  /  TBili  0.5  /  DBili  0.2  /  AST  17  /  ALT  18  /  AlkPhos  66  04-12            BNP      EKG:  ECHO:  IMAGING:    Assessment/Plan    Will continue to follow during hospital course and give further recommendations as needed . Thanks for your referral . if any questions please contact me at office ([phone])cell [phone])  [facility] [place]       HPI:  86 yo male ,[facility] resident with PMHx - COPD, DM, HTN, CAD, HLD, H/O TIA, dementia,GERD, BPH and depression sent ot ER for evaluation of left foot 1metatarsal wound ,suggestive of osteomyelitis .Patient was seen by ID consult and transfer to the hospital recommended for wound cx/bone biopsy /podiatry evaluation ,likely will require 4-6 weeks of iv abx . Patient was admitted to [facility] with b/l feet wounds and was followed by wound care team ,recently completed 7 days of doxycycline for foot wound cellulitis . (30 Mar 2023 05:21)        SUBJECTIVE:      ALLERGIES:  Allergies    No Known Allergies    Intolerances          MEDICATIONS  (STANDING):  budesonide 160 MICROgram(s)/formoterol 4.5 MICROgram(s) Inhaler 2 Puff(s) Inhalation two times a day  dextrose 50% Injectable 25 Gram(s) IV Push once  dextrose 50% Injectable 12.5 Gram(s) IV Push once  dextrose 50% Injectable 25 Gram(s) IV Push once  donepezil 5 milliGRAM(s) Oral at bedtime  DULoxetine 60 milliGRAM(s) Oral daily  glucagon  Injectable 1 milliGRAM(s) IntraMuscular once  insulin glargine Injectable (LANTUS) 8 Unit(s) SubCutaneous at bedtime  insulin lispro (ADMELOG) corrective regimen sliding scale   SubCutaneous three times a day before meals  insulin lispro (ADMELOG) corrective regimen sliding scale   SubCutaneous at bedtime  lactobacillus acidophilus 1 Tablet(s) Oral two times a day with meals  losartan 25 milliGRAM(s) Oral daily  metoprolol tartrate 50 milliGRAM(s) Oral every 8 hours  multivitamin 1 Tablet(s) Oral daily  pantoprazole   Suspension 40 milliGRAM(s) Oral daily  piperacillin/tazobactam IVPB.. 3.375 Gram(s) IV Intermittent every 8 hours  senna 2 Tablet(s) Oral at bedtime  simvastatin 40 milliGRAM(s) Oral at bedtime  tamsulosin 0.4 milliGRAM(s) Oral at bedtime    MEDICATIONS  (PRN):  acetaminophen     Tablet .. 650 milliGRAM(s) Oral every 6 hours PRN Temp greater or equal to 38C (100.4F), Mild Pain (1 - 3)  aluminum hydroxide/magnesium hydroxide/simethicone Suspension 30 milliLiter(s) Oral every 4 hours PRN Dyspepsia  bisacodyl Suppository 10 milliGRAM(s) Rectal daily PRN Constipation  dextrose Oral Gel 15 Gram(s) Oral once PRN Blood Glucose LESS THAN 70 milliGRAM(s)/deciliter  hydrALAZINE 50 milliGRAM(s) Oral every 6 hours PRN for systolic BP>160  magnesium hydroxide Suspension 30 milliLiter(s) Oral daily PRN Constipation  melatonin 3 milliGRAM(s) Oral at bedtime PRN Insomnia  ondansetron Injectable 4 milliGRAM(s) IV Push every 8 hours PRN Nausea and/or Vomiting  sodium chloride 0.9% lock flush 10 milliLiter(s) IV Push every 1 hour PRN Pre/post blood products, medications, blood draw, and to maintain line patency      REVIEW OF SYSTEMS:  CONSTITUTIONAL: No fever,  RESPIRATORY: No cough, wheezing, shortness of breath  CARDIOVASCULAR: No chest pain, dyspnea, palpitations, dizziness, syncope, paroxysmal nocturnal dyspnea, orthopnea, or arm or leg swelling  GASTROINTESTINAL: No abdominal  or epigastric pain, nausea, vomiting,  diarrhea  NEUROLOGICAL: No headaches,  loss of strength, numbness, or tremors    Vital Signs Last 24 Hrs  T(C): 36.6 (12 Apr 2023 04:37), Max: 37 (11 Apr 2023 13:03)  T(F): 97.9 (12 Apr 2023 04:37), Max: 98.6 (11 Apr 2023 13:03)  HR: 80 (12 Apr 2023 04:37) (68 - 89)  BP: 125/94 (12 Apr 2023 04:37) (125/94 - 149/72)  BP(mean): --  RR: 18 (12 Apr 2023 04:37) (17 - 18)  SpO2: 95% (12 Apr 2023 04:37) (91% - 95%)    Parameters below as of 12 Apr 2023 04:37  Patient On (Oxygen Delivery Method): room air        PHYSICAL EXAM:  HEAD:  Atraumatic, Normocephalic  NECK: Supple and normal thyroid.  No JVD or carotid bruit.   HEART: S1, S2 regular , 1/6 soft ejection systolic murmur in mitral area , no thrill and no gallops .  PULMONARY: Bilateral vesicular breathing , few scattered ronchi and few scattered rales are present .  ABDOMEN: Soft nontender and positive bowl sounds   EXTREMITIES:  No clubbing, cyanosis, or pedal  edema  NEUROLOGICAL: AAOX3 , no focal deficit .    LABS:                        9.3    7.90  )-----------( 325      ( 12 Apr 2023 10:09 )             31.0     04-12    139  |  110<H>  |  31<H>  ----------------------------<  216<H>  3.9   |  23  |  1.40<H>    Ca    8.8      12 Apr 2023 10:09    TPro  6.2  /  Alb  1.9<L>  /  TBili  0.5  /  DBili  0.2  /  AST  17  /  ALT  18  /  AlkPhos  66  04-12            BNP      EKG:  ECHO:  IMAGING:    Assessment/Plan    Will continue to follow during hospital course and give further recommendations as needed . Thanks for your referral . if any questions please contact me at office ([phone])cell [phone])  [facility] [place]       HPI:  88 yo male ,[facility] resident with PMHx - COPD, DM, HTN, CAD, HLD, H/O TIA, dementia,GERD, BPH and depression sent ot ER for evaluation of left foot 1metatarsal wound ,suggestive of osteomyelitis .Patient was seen by ID consult and transfer to the hospital recommended for wound cx/bone biopsy /podiatry evaluation ,likely will require 4-6 weeks of iv abx . Patient was admitted to [facility] with b/l feet wounds and was followed by wound care team ,recently completed 7 days of doxycycline for foot wound cellulitis . (30 Mar 2023 05:21)        SUBJECTIVE:      ALLERGIES:  Allergies    No Known Allergies    Intolerances          MEDICATIONS  (STANDING):  budesonide 160 MICROgram(s)/formoterol 4.5 MICROgram(s) Inhaler 2 Puff(s) Inhalation two times a day  dextrose 50% Injectable 25 Gram(s) IV Push once  dextrose 50% Injectable 12.5 Gram(s) IV Push once  dextrose 50% Injectable 25 Gram(s) IV Push once  donepezil 5 milliGRAM(s) Oral at bedtime  DULoxetine 60 milliGRAM(s) Oral daily  glucagon  Injectable 1 milliGRAM(s) IntraMuscular once  insulin glargine Injectable (LANTUS) 8 Unit(s) SubCutaneous at bedtime  insulin lispro (ADMELOG) corrective regimen sliding scale   SubCutaneous three times a day before meals  insulin lispro (ADMELOG) corrective regimen sliding scale   SubCutaneous at bedtime  lactobacillus acidophilus 1 Tablet(s) Oral two times a day with meals  losartan 25 milliGRAM(s) Oral daily  metoprolol tartrate 50 milliGRAM(s) Oral every 8 hours  multivitamin 1 Tablet(s) Oral daily  pantoprazole   Suspension 40 milliGRAM(s) Oral daily  piperacillin/tazobactam IVPB.. 3.375 Gram(s) IV Intermittent every 8 hours  senna 2 Tablet(s) Oral at bedtime  simvastatin 40 milliGRAM(s) Oral at bedtime  tamsulosin 0.4 milliGRAM(s) Oral at bedtime    MEDICATIONS  (PRN):  acetaminophen     Tablet .. 650 milliGRAM(s) Oral every 6 hours PRN Temp greater or equal to 38C (100.4F), Mild Pain (1 - 3)  aluminum hydroxide/magnesium hydroxide/simethicone Suspension 30 milliLiter(s) Oral every 4 hours PRN Dyspepsia  bisacodyl Suppository 10 milliGRAM(s) Rectal daily PRN Constipation  dextrose Oral Gel 15 Gram(s) Oral once PRN Blood Glucose LESS THAN 70 milliGRAM(s)/deciliter  hydrALAZINE 50 milliGRAM(s) Oral every 6 hours PRN for systolic BP>160  magnesium hydroxide Suspension 30 milliLiter(s) Oral daily PRN Constipation  melatonin 3 milliGRAM(s) Oral at bedtime PRN Insomnia  ondansetron Injectable 4 milliGRAM(s) IV Push every 8 hours PRN Nausea and/or Vomiting  sodium chloride 0.9% lock flush 10 milliLiter(s) IV Push every 1 hour PRN Pre/post blood products, medications, blood draw, and to maintain line patency      REVIEW OF SYSTEMS:  CONSTITUTIONAL: No fever,  RESPIRATORY: No cough, wheezing, shortness of breath  CARDIOVASCULAR: No chest pain, dyspnea, palpitations, dizziness, syncope, paroxysmal nocturnal dyspnea, orthopnea, or arm or leg swelling  GASTROINTESTINAL: No abdominal  or epigastric pain, nausea, vomiting,  diarrhea  NEUROLOGICAL: No headaches,  loss of strength, numbness, or tremors    Vital Signs Last 24 Hrs  T(C): 36.6 (12 Apr 2023 04:37), Max: 37 (11 Apr 2023 13:03)  T(F): 97.9 (12 Apr 2023 04:37), Max: 98.6 (11 Apr 2023 13:03)  HR: 80 (12 Apr 2023 04:37) (68 - 89)  BP: 125/94 (12 Apr 2023 04:37) (125/94 - 149/72)  BP(mean): --  RR: 18 (12 Apr 2023 04:37) (17 - 18)  SpO2: 95% (12 Apr 2023 04:37) (91% - 95%)    Parameters below as of 12 Apr 2023 04:37  Patient On (Oxygen Delivery Method): room air        PHYSICAL EXAM:  HEAD:  Atraumatic, Normocephalic  NECK: Supple and normal thyroid.  No JVD or carotid bruit.   HEART: S1, S2 regular , 1/6 soft ejection systolic murmur in mitral area , no thrill and no gallops .  PULMONARY: Bilateral vesicular breathing , few scattered ronchi and few scattered rales are present .  ABDOMEN: Soft nontender and positive bowl sounds   EXTREMITIES:  No clubbing, cyanosis, or pedal  edema  NEUROLOGICAL: AAOX3 , no focal deficit .    LABS:                        9.3    7.90  )-----------( 325      ( 12 Apr 2023 10:09 )             31.0     04-12    139  |  110<H>  |  31<H>  ----------------------------<  216<H>  3.9   |  23  |  1.40<H>    Ca    8.8      12 Apr 2023 10:09    TPro  6.2  /  Alb  1.9<L>  /  TBili  0.5  /  DBili  0.2  /  AST  17  /  ALT  18  /  AlkPhos  66  04-12            Assessment/Plan  Patient has :  1) Left foot infection and toe osteomyelitis   2) Hypertension and stable .  3) DM .  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 4) Patient cardiac wise stable and cleared for foot surgery if needed . Benefits of surgery outweigh the risks . 5) Will hold Lovenox 18-24 hours prior to surgery 6) Increase metoprolol 50 mg twice a day .  Will continue to follow during hospital course and give further recommendations as needed . Thanks for your referral . if any questions please contact me at office ([phone] cell [phone])      [facility] [place]       HPI:  88 yo male ,[facility] resident with PMHx - COPD, DM, HTN, CAD, HLD, H/O TIA, dementia,GERD, BPH and depression sent ot ER for evaluation of left foot 1metatarsal wound ,suggestive of osteomyelitis .Patient was seen by ID consult and transfer to the hospital recommended for wound cx/bone biopsy /podiatry evaluation ,likely will require 4-6 weeks of iv abx . Patient was admitted to [facility] with b/l feet wounds and was followed by wound care team ,recently completed 7 days of doxycycline for foot wound cellulitis . (30 Mar 2023 05:21)        SUBJECTIVE:      ALLERGIES:  Allergies    No Known Allergies    Intolerances          MEDICATIONS  (STANDING):  budesonide 160 MICROgram(s)/formoterol 4.5 MICROgram(s) Inhaler 2 Puff(s) Inhalation two times a day  dextrose 50% Injectable 25 Gram(s) IV Push once  dextrose 50% Injectable 12.5 Gram(s) IV Push once  dextrose 50% Injectable 25 Gram(s) IV Push once  donepezil 5 milliGRAM(s) Oral at bedtime  DULoxetine 60 milliGRAM(s) Oral daily  glucagon  Injectable 1 milliGRAM(s) IntraMuscular once  insulin glargine Injectable (LANTUS) 8 Unit(s) SubCutaneous at bedtime  insulin lispro (ADMELOG) corrective regimen sliding scale   SubCutaneous three times a day before meals  insulin lispro (ADMELOG) corrective regimen sliding scale   SubCutaneous at bedtime  lactobacillus acidophilus 1 Tablet(s) Oral two times a day with meals  losartan 25 milliGRAM(s) Oral daily  metoprolol tartrate 50 milliGRAM(s) Oral every 8 hours  multivitamin 1 Tablet(s) Oral daily  pantoprazole   Suspension 40 milliGRAM(s) Oral daily  piperacillin/tazobactam IVPB.. 3.375 Gram(s) IV Intermittent every 8 hours  senna 2 Tablet(s) Oral at bedtime  simvastatin 40 milliGRAM(s) Oral at bedtime  tamsulosin 0.4 milliGRAM(s) Oral at bedtime    MEDICATIONS  (PRN):  acetaminophen     Tablet .. 650 milliGRAM(s) Oral every 6 hours PRN Temp greater or equal to 38C (100.4F), Mild Pain (1 - 3)  aluminum hydroxide/magnesium hydroxide/simethicone Suspension 30 milliLiter(s) Oral every 4 hours PRN Dyspepsia  bisacodyl Suppository 10 milliGRAM(s) Rectal daily PRN Constipation  dextrose Oral Gel 15 Gram(s) Oral once PRN Blood Glucose LESS THAN 70 milliGRAM(s)/deciliter  hydrALAZINE 50 milliGRAM(s) Oral every 6 hours PRN for systolic BP>160  magnesium hydroxide Suspension 30 milliLiter(s) Oral daily PRN Constipation  melatonin 3 milliGRAM(s) Oral at bedtime PRN Insomnia  ondansetron Injectable 4 milliGRAM(s) IV Push every 8 hours PRN Nausea and/or Vomiting  sodium chloride 0.9% lock flush 10 milliLiter(s) IV Push every 1 hour PRN Pre/post blood products, medications, blood draw, and to maintain line patency      REVIEW OF SYSTEMS:  CONSTITUTIONAL: No fever,  RESPIRATORY: No cough, wheezing, shortness of breath  CARDIOVASCULAR: No chest pain, dyspnea, palpitations, dizziness, syncope, paroxysmal nocturnal dyspnea, orthopnea, or arm or leg swelling  GASTROINTESTINAL: No abdominal  or epigastric pain, nausea, vomiting,  diarrhea  NEUROLOGICAL: No headaches,  loss of strength, numbness, or tremors    Vital Signs Last 24 Hrs  T(C): 36.6 (12 Apr 2023 04:37), Max: 37 (11 Apr 2023 13:03)  T(F): 97.9 (12 Apr 2023 04:37), Max: 98.6 (11 Apr 2023 13:03)  HR: 80 (12 Apr 2023 04:37) (68 - 89)  BP: 125/94 (12 Apr 2023 04:37) (125/94 - 149/72)  BP(mean): --  RR: 18 (12 Apr 2023 04:37) (17 - 18)  SpO2: 95% (12 Apr 2023 04:37) (91% - 95%)    Parameters below as of 12 Apr 2023 04:37  Patient On (Oxygen Delivery Method): room air        PHYSICAL EXAM:  HEAD:  Atraumatic, Normocephalic  NECK: Supple and normal thyroid.  No JVD or carotid bruit.   HEART: S1, S2 regular , 1/6 soft ejection systolic murmur in mitral area , no thrill and no gallops .  PULMONARY: Bilateral vesicular breathing , few scattered ronchi and few scattered rales are present .  ABDOMEN: Soft nontender and positive bowl sounds   EXTREMITIES:  No clubbing, cyanosis, or pedal  edema  NEUROLOGICAL: AAOX3 , no focal deficit .    LABS:                        9.3    7.90  )-----------( 325      ( 12 Apr 2023 10:09 )             31.0     04-12    139  |  110<H>  |  31<H>  ----------------------------<  216<H>  3.9   |  23  |  1.40<H>    Ca    8.8      12 Apr 2023 10:09    TPro  6.2  /  Alb  1.9<L>  /  TBili  0.5  /  DBili  0.2  /  AST  17  /  ALT  18  /  AlkPhos  66  04-12            Assessment/Plan  Patient has :  1) Left foot infection and toe osteomyelitis   2) Hypertension and stable .  3) DM .  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 4) Patient cardiac wise stable and cleared for foot surgery if needed . Benefits of surgery outweigh the risks . 5) Will hold Lovenox 18-24 hours prior to surgery 6) Increase metoprolol 50 mg twice a day .  Will continue to follow during hospital course and give further recommendations as needed . Thanks for your referral . if any questions please contact me at office ([phone] cell [phone])      [facility] [place]       HPI:  86 yo male ,[facility] resident with PMHx - COPD, DM, HTN, CAD, HLD, H/O TIA, dementia,GERD, BPH and depression sent ot ER for evaluation of left foot 1metatarsal wound ,suggestive of osteomyelitis .Patient was seen by ID consult and transfer to the hospital recommended for wound cx/bone biopsy /podiatry evaluation ,likely will require 4-6 weeks of iv abx . Patient was admitted to [facility] with b/l feet wounds and was followed by wound care team ,recently completed 7 days of doxycycline for foot wound cellulitis . (30 Mar 2023 05:21)        SUBJECTIVE:      ALLERGIES:  Allergies    No Known Allergies    Intolerances          MEDICATIONS  (STANDING):  budesonide 160 MICROgram(s)/formoterol 4.5 MICROgram(s) Inhaler 2 Puff(s) Inhalation two times a day  dextrose 50% Injectable 25 Gram(s) IV Push once  dextrose 50% Injectable 12.5 Gram(s) IV Push once  dextrose 50% Injectable 25 Gram(s) IV Push once  donepezil 5 milliGRAM(s) Oral at bedtime  DULoxetine 60 milliGRAM(s) Oral daily  glucagon  Injectable 1 milliGRAM(s) IntraMuscular once  insulin glargine Injectable (LANTUS) 8 Unit(s) SubCutaneous at bedtime  insulin lispro (ADMELOG) corrective regimen sliding scale   SubCutaneous three times a day before meals  insulin lispro (ADMELOG) corrective regimen sliding scale   SubCutaneous at bedtime  lactobacillus acidophilus 1 Tablet(s) Oral two times a day with meals  losartan 25 milliGRAM(s) Oral daily  metoprolol tartrate 50 milliGRAM(s) Oral every 8 hours  multivitamin 1 Tablet(s) Oral daily  pantoprazole   Suspension 40 milliGRAM(s) Oral daily  piperacillin/tazobactam IVPB.. 3.375 Gram(s) IV Intermittent every 8 hours  senna 2 Tablet(s) Oral at bedtime  simvastatin 40 milliGRAM(s) Oral at bedtime  tamsulosin 0.4 milliGRAM(s) Oral at bedtime    MEDICATIONS  (PRN):  acetaminophen     Tablet .. 650 milliGRAM(s) Oral every 6 hours PRN Temp greater or equal to 38C (100.4F), Mild Pain (1 - 3)  aluminum hydroxide/magnesium hydroxide/simethicone Suspension 30 milliLiter(s) Oral every 4 hours PRN Dyspepsia  bisacodyl Suppository 10 milliGRAM(s) Rectal daily PRN Constipation  dextrose Oral Gel 15 Gram(s) Oral once PRN Blood Glucose LESS THAN 70 milliGRAM(s)/deciliter  hydrALAZINE 50 milliGRAM(s) Oral every 6 hours PRN for systolic BP>160  magnesium hydroxide Suspension 30 milliLiter(s) Oral daily PRN Constipation  melatonin 3 milliGRAM(s) Oral at bedtime PRN Insomnia  ondansetron Injectable 4 milliGRAM(s) IV Push every 8 hours PRN Nausea and/or Vomiting  sodium chloride 0.9% lock flush 10 milliLiter(s) IV Push every 1 hour PRN Pre/post blood products, medications, blood draw, and to maintain line patency      REVIEW OF SYSTEMS:  CONSTITUTIONAL: No fever,  RESPIRATORY: No cough, wheezing, shortness of breath  CARDIOVASCULAR: No chest pain, dyspnea, palpitations, dizziness, syncope, paroxysmal nocturnal dyspnea, orthopnea, or arm or leg swelling  GASTROINTESTINAL: No abdominal  or epigastric pain, nausea, vomiting,  diarrhea  NEUROLOGICAL: No headaches,  loss of strength, numbness, or tremors    Vital Signs Last 24 Hrs  T(C): 36.6 (12 Apr 2023 04:37), Max: 37 (11 Apr 2023 13:03)  T(F): 97.9 (12 Apr 2023 04:37), Max: 98.6 (11 Apr 2023 13:03)  HR: 80 (12 Apr 2023 04:37) (68 - 89)  BP: 125/94 (12 Apr 2023 04:37) (125/94 - 149/72)  BP(mean): --  RR: 18 (12 Apr 2023 04:37) (17 - 18)  SpO2: 95% (12 Apr 2023 04:37) (91% - 95%)    Parameters below as of 12 Apr 2023 04:37  Patient On (Oxygen Delivery Method): room air        PHYSICAL EXAM:  HEAD:  Atraumatic, Normocephalic  NECK: Supple and normal thyroid.  No JVD or carotid bruit.   HEART: S1, S2 regular , 1/6 soft ejection systolic murmur in mitral area , no thrill and no gallops .  PULMONARY: Bilateral vesicular breathing , few scattered ronchi and few scattered rales are present .  ABDOMEN: Soft nontender and positive bowl sounds   EXTREMITIES:  No clubbing, cyanosis, or pedal  edema  NEUROLOGICAL: AAOX3 , no focal deficit .    LABS:                        9.3    7.90  )-----------( 325      ( 12 Apr 2023 10:09 )             31.0     04-12    139  |  110<H>  |  31<H>  ----------------------------<  216<H>  3.9   |  23  |  1.40<H>    Ca    8.8      12 Apr 2023 10:09    TPro  6.2  /  Alb  1.9<L>  /  TBili  0.5  /  DBili  0.2  /  AST  17  /  ALT  18  /  AlkPhos  66  04-12            Assessment/Plan  Patient has :  1) Left foot infection and toe osteomyelitis   2) Hypertension and stable .  3) DM .  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 4) Patient cardiac wise stable and cleared for foot surgery if needed . Benefits of surgery outweigh the risks . 5) Will hold Lovenox 18-24 hours prior to surgery 6) Increase metoprolol 50 mg twice a day .  Will continue to follow during hospital course and give further recommendations as needed . Thanks for your referral . if any questions please contact me at office ([phone] cell [phone])      [facility] [place]       HPI:  88 yo male ,[facility] resident with PMHx - COPD, DM, HTN, CAD, HLD, H/O TIA, dementia,GERD, BPH and depression sent ot ER for evaluation of left foot 1metatarsal wound ,suggestive of osteomyelitis .Patient was seen by ID consult and transfer to the hospital recommended for wound cx/bone biopsy /podiatry evaluation ,likely will require 4-6 weeks of iv abx . Patient was admitted to [facility] with b/l feet wounds and was followed by wound care team ,recently completed 7 days of doxycycline for foot wound cellulitis . (30 Mar 2023 05:21)        SUBJECTIVE: Patient feeling better       ALLERGIES:  Allergies    No Known Allergies    Intolerances          MEDICATIONS  (STANDING):  budesonide 160 MICROgram(s)/formoterol 4.5 MICROgram(s) Inhaler 2 Puff(s) Inhalation two times a day  dextrose 50% Injectable 25 Gram(s) IV Push once  dextrose 50% Injectable 12.5 Gram(s) IV Push once  dextrose 50% Injectable 25 Gram(s) IV Push once  donepezil 5 milliGRAM(s) Oral at bedtime  DULoxetine 60 milliGRAM(s) Oral daily  glucagon  Injectable 1 milliGRAM(s) IntraMuscular once  insulin glargine Injectable (LANTUS) 8 Unit(s) SubCutaneous at bedtime  insulin lispro (ADMELOG) corrective regimen sliding scale   SubCutaneous three times a day before meals  insulin lispro (ADMELOG) corrective regimen sliding scale   SubCutaneous at bedtime  lactobacillus acidophilus 1 Tablet(s) Oral two times a day with meals  losartan 25 milliGRAM(s) Oral daily  metoprolol tartrate 50 milliGRAM(s) Oral every 8 hours  multivitamin 1 Tablet(s) Oral daily  pantoprazole   Suspension 40 milliGRAM(s) Oral daily  piperacillin/tazobactam IVPB.. 3.375 Gram(s) IV Intermittent every 8 hours  senna 2 Tablet(s) Oral at bedtime  simvastatin 40 milliGRAM(s) Oral at bedtime  tamsulosin 0.4 milliGRAM(s) Oral at bedtime    MEDICATIONS  (PRN):  acetaminophen     Tablet .. 650 milliGRAM(s) Oral every 6 hours PRN Temp greater or equal to 38C (100.4F), Mild Pain (1 - 3)  aluminum hydroxide/magnesium hydroxide/simethicone Suspension 30 milliLiter(s) Oral every 4 hours PRN Dyspepsia  bisacodyl Suppository 10 milliGRAM(s) Rectal daily PRN Constipation  dextrose Oral Gel 15 Gram(s) Oral once PRN Blood Glucose LESS THAN 70 milliGRAM(s)/deciliter  hydrALAZINE 50 milliGRAM(s) Oral every 6 hours PRN for systolic BP>160  magnesium hydroxide Suspension 30 milliLiter(s) Oral daily PRN Constipation  melatonin 3 milliGRAM(s) Oral at bedtime PRN Insomnia  ondansetron Injectable 4 milliGRAM(s) IV Push every 8 hours PRN Nausea and/or Vomiting  sodium chloride 0.9% lock flush 10 milliLiter(s) IV Push every 1 hour PRN Pre/post blood products, medications, blood draw, and to maintain line patency      REVIEW OF SYSTEMS:  CONSTITUTIONAL: No fever,  RESPIRATORY: No cough, wheezing, shortness of breath  CARDIOVASCULAR: No chest pain, dyspnea, palpitations, dizziness, syncope, paroxysmal nocturnal dyspnea, orthopnea, or arm or leg swelling  GASTROINTESTINAL: No abdominal  or epigastric pain, nausea, vomiting,  diarrhea  NEUROLOGICAL: No headaches, numbness, or tremors  Skin : No itching .  Hematology : No active bleeding .  Endocrinology : No heat and cold intolerance .  Psychiatry : Patient is confused .  Genitourinary : No dysuria .  Musculoskeletal : Patient has mild arthritis .              Vital Signs Last 24 Hrs  T(C): 36.6 (12 Apr 2023 04:37), Max: 37 (11 Apr 2023 13:03)  T(F): 97.9 (12 Apr 2023 04:37), Max: 98.6 (11 Apr 2023 13:03)  HR: 80 (12 Apr 2023 04:37) (68 - 89)  BP: 125/94 (12 Apr 2023 04:37) (125/94 - 149/72)  BP(mean): --  RR: 18 (12 Apr 2023 04:37) (17 - 18)  SpO2: 95% (12 Apr 2023 04:37) (91% - 95%)    Parameters below as of 12 Apr 2023 04:37  Patient On (Oxygen Delivery Method): room air        PHYSICAL EXAM:  HEAD:  Atraumatic, Normocephalic  NECK: Supple and normal thyroid.  No JVD or carotid bruit.   HEART: S1, S2 irregular , 1/6 soft ejection systolic murmur in mitral area , no thrill and no gallops .  PULMONARY: Bilateral vesicular breathing , few scattered rhonchi and few scattered rales are present .  ABDOMEN: Soft nontender and positive bowl sounds   EXTREMITIES:  No clubbing, cyanosis, or pedal  edema  NEUROLOGICAL: AA and mild confused . Bilateral feet in dressing .  Skin : No rashes .  Musculoskeletal : No joint swellings     LABS:                        9.3    7.90  )-----------( 325      ( 12 Apr 2023 10:09 )             31.0     04-12    139  |  110<H>  |  31<H>  ----------------------------<  216<H>  3.9   |  23  |  1.40<H>    Ca    8.8      12 Apr 2023 10:09    TPro  6.2  /  Alb  1.9<L>  /  TBili  0.5  /  DBili  0.2  /  AST  17  /  ALT  18  /  AlkPhos  66  04-12            Assessment/Plan  Patient has :  1) Left foot infection and toe osteomyelitis   2) Hypertension and stable .  3) DM .  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 4) Patient cardiac wise stable and cleared for foot surgery if needed . Benefits of surgery outweigh the risks . 5) Will hold Lovenox 18-24 hours prior to surgery 6) Increase metoprolol 50 mg twice a day .  Will continue to follow during hospital course and give further recommendations as needed . Thanks for your referral . if any questions please contact me at office ([phone] cell [phone])      [facility] [place]       HPI:  86 yo male ,[facility] resident with PMHx - COPD, DM, HTN, CAD, HLD, H/O TIA, dementia,GERD, BPH and depression sent ot ER for evaluation of left foot 1metatarsal wound ,suggestive of osteomyelitis .Patient was seen by ID consult and transfer to the hospital recommended for wound cx/bone biopsy /podiatry evaluation ,likely will require 4-6 weeks of iv abx . Patient was admitted to [facility] with b/l feet wounds and was followed by wound care team ,recently completed 7 days of doxycycline for foot wound cellulitis . (30 Mar 2023 05:21)        SUBJECTIVE: Patient feeling better       ALLERGIES:  Allergies    No Known Allergies    Intolerances          MEDICATIONS  (STANDING):  budesonide 160 MICROgram(s)/formoterol 4.5 MICROgram(s) Inhaler 2 Puff(s) Inhalation two times a day  dextrose 50% Injectable 25 Gram(s) IV Push once  dextrose 50% Injectable 12.5 Gram(s) IV Push once  dextrose 50% Injectable 25 Gram(s) IV Push once  donepezil 5 milliGRAM(s) Oral at bedtime  DULoxetine 60 milliGRAM(s) Oral daily  glucagon  Injectable 1 milliGRAM(s) IntraMuscular once  insulin glargine Injectable (LANTUS) 8 Unit(s) SubCutaneous at bedtime  insulin lispro (ADMELOG) corrective regimen sliding scale   SubCutaneous three times a day before meals  insulin lispro (ADMELOG) corrective regimen sliding scale   SubCutaneous at bedtime  lactobacillus acidophilus 1 Tablet(s) Oral two times a day with meals  losartan 25 milliGRAM(s) Oral daily  metoprolol tartrate 50 milliGRAM(s) Oral every 8 hours  multivitamin 1 Tablet(s) Oral daily  pantoprazole   Suspension 40 milliGRAM(s) Oral daily  piperacillin/tazobactam IVPB.. 3.375 Gram(s) IV Intermittent every 8 hours  senna 2 Tablet(s) Oral at bedtime  simvastatin 40 milliGRAM(s) Oral at bedtime  tamsulosin 0.4 milliGRAM(s) Oral at bedtime    MEDICATIONS  (PRN):  acetaminophen     Tablet .. 650 milliGRAM(s) Oral every 6 hours PRN Temp greater or equal to 38C (100.4F), Mild Pain (1 - 3)  aluminum hydroxide/magnesium hydroxide/simethicone Suspension 30 milliLiter(s) Oral every 4 hours PRN Dyspepsia  bisacodyl Suppository 10 milliGRAM(s) Rectal daily PRN Constipation  dextrose Oral Gel 15 Gram(s) Oral once PRN Blood Glucose LESS THAN 70 milliGRAM(s)/deciliter  hydrALAZINE 50 milliGRAM(s) Oral every 6 hours PRN for systolic BP>160  magnesium hydroxide Suspension 30 milliLiter(s) Oral daily PRN Constipation  melatonin 3 milliGRAM(s) Oral at bedtime PRN Insomnia  ondansetron Injectable 4 milliGRAM(s) IV Push every 8 hours PRN Nausea and/or Vomiting  sodium chloride 0.9% lock flush 10 milliLiter(s) IV Push every 1 hour PRN Pre/post blood products, medications, blood draw, and to maintain line patency      REVIEW OF SYSTEMS:  CONSTITUTIONAL: No fever,  RESPIRATORY: No cough, wheezing, shortness of breath  CARDIOVASCULAR: No chest pain, dyspnea, palpitations, dizziness, syncope, paroxysmal nocturnal dyspnea, orthopnea, or arm or leg swelling  GASTROINTESTINAL: No abdominal  or epigastric pain, nausea, vomiting,  diarrhea  NEUROLOGICAL: No headaches, numbness, or tremors  Skin : No itching .  Hematology : No active bleeding .  Endocrinology : No heat and cold intolerance .  Psychiatry : Patient is confused .  Genitourinary : No dysuria .  Musculoskeletal : Patient has mild arthritis .              Vital Signs Last 24 Hrs  T(C): 36.6 (12 Apr 2023 04:37), Max: 37 (11 Apr 2023 13:03)  T(F): 97.9 (12 Apr 2023 04:37), Max: 98.6 (11 Apr 2023 13:03)  HR: 80 (12 Apr 2023 04:37) (68 - 89)  BP: 125/94 (12 Apr 2023 04:37) (125/94 - 149/72)  BP(mean): --  RR: 18 (12 Apr 2023 04:37) (17 - 18)  SpO2: 95% (12 Apr 2023 04:37) (91% - 95%)    Parameters below as of 12 Apr 2023 04:37  Patient On (Oxygen Delivery Method): room air        PHYSICAL EXAM:  HEAD:  Atraumatic, Normocephalic  NECK: Supple and normal thyroid.  No JVD or carotid bruit.   HEART: S1, S2 irregular , 1/6 soft ejection systolic murmur in mitral area , no thrill and no gallops .  PULMONARY: Bilateral vesicular breathing , few scattered rhonchi and few scattered rales are present .  ABDOMEN: Soft nontender and positive bowl sounds   EXTREMITIES:  No clubbing, cyanosis, or pedal  edema  NEUROLOGICAL: AA and mild confused . Bilateral feet in dressing .  Skin : No rashes .  Musculoskeletal : No joint swellings     LABS:                        9.3    7.90  )-----------( 325      ( 12 Apr 2023 10:09 )             31.0     04-12    139  |  110<H>  |  31<H>  ----------------------------<  216<H>  3.9   |  23  |  1.40<H>    Ca    8.8      12 Apr 2023 10:09    TPro  6.2  /  Alb  1.9<L>  /  TBili  0.5  /  DBili  0.2  /  AST  17  /  ALT  18  /  AlkPhos  66  04-12            Assessment/Plan  Patient has :  1) Left foot infection and toe osteomyelitis   2) Hypertension and stable .  3) DM .  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 4) Patient cardiac wise stable and cleared for foot surgery if needed . Benefits of surgery outweigh the risks . 5) Will hold Lovenox 18-24 hours prior to surgery 6) Increase metoprolol 50 mg twice a day .  Will continue to follow during hospital course and give further recommendations as needed . Thanks for your referral . if any questions please contact me at office ([phone] cell [phone])      [facility] [place]       HPI:  88 yo male ,[facility] resident with PMHx - COPD, DM, HTN, CAD, HLD, H/O TIA, dementia,GERD, BPH and depression sent ot ER for evaluation of left foot 1metatarsal wound ,suggestive of osteomyelitis .Patient was seen by ID consult and transfer to the hospital recommended for wound cx/bone biopsy /podiatry evaluation ,likely will require 4-6 weeks of iv abx . Patient was admitted to [facility] with b/l feet wounds and was followed by wound care team ,recently completed 7 days of doxycycline for foot wound cellulitis . (30 Mar 2023 05:21)        SUBJECTIVE: Patient feeling better       ALLERGIES:  Allergies    No Known Allergies    Intolerances          MEDICATIONS  (STANDING):  budesonide 160 MICROgram(s)/formoterol 4.5 MICROgram(s) Inhaler 2 Puff(s) Inhalation two times a day  dextrose 50% Injectable 25 Gram(s) IV Push once  dextrose 50% Injectable 12.5 Gram(s) IV Push once  dextrose 50% Injectable 25 Gram(s) IV Push once  donepezil 5 milliGRAM(s) Oral at bedtime  DULoxetine 60 milliGRAM(s) Oral daily  glucagon  Injectable 1 milliGRAM(s) IntraMuscular once  insulin glargine Injectable (LANTUS) 8 Unit(s) SubCutaneous at bedtime  insulin lispro (ADMELOG) corrective regimen sliding scale   SubCutaneous three times a day before meals  insulin lispro (ADMELOG) corrective regimen sliding scale   SubCutaneous at bedtime  lactobacillus acidophilus 1 Tablet(s) Oral two times a day with meals  losartan 25 milliGRAM(s) Oral daily  metoprolol tartrate 50 milliGRAM(s) Oral every 8 hours  multivitamin 1 Tablet(s) Oral daily  pantoprazole   Suspension 40 milliGRAM(s) Oral daily  piperacillin/tazobactam IVPB.. 3.375 Gram(s) IV Intermittent every 8 hours  senna 2 Tablet(s) Oral at bedtime  simvastatin 40 milliGRAM(s) Oral at bedtime  tamsulosin 0.4 milliGRAM(s) Oral at bedtime    MEDICATIONS  (PRN):  acetaminophen     Tablet .. 650 milliGRAM(s) Oral every 6 hours PRN Temp greater or equal to 38C (100.4F), Mild Pain (1 - 3)  aluminum hydroxide/magnesium hydroxide/simethicone Suspension 30 milliLiter(s) Oral every 4 hours PRN Dyspepsia  bisacodyl Suppository 10 milliGRAM(s) Rectal daily PRN Constipation  dextrose Oral Gel 15 Gram(s) Oral once PRN Blood Glucose LESS THAN 70 milliGRAM(s)/deciliter  hydrALAZINE 50 milliGRAM(s) Oral every 6 hours PRN for systolic BP>160  magnesium hydroxide Suspension 30 milliLiter(s) Oral daily PRN Constipation  melatonin 3 milliGRAM(s) Oral at bedtime PRN Insomnia  ondansetron Injectable 4 milliGRAM(s) IV Push every 8 hours PRN Nausea and/or Vomiting  sodium chloride 0.9% lock flush 10 milliLiter(s) IV Push every 1 hour PRN Pre/post blood products, medications, blood draw, and to maintain line patency      REVIEW OF SYSTEMS:  CONSTITUTIONAL: No fever,  RESPIRATORY: No cough, wheezing, shortness of breath  CARDIOVASCULAR: No chest pain, dyspnea, palpitations, dizziness, syncope, paroxysmal nocturnal dyspnea, orthopnea, or arm or leg swelling  GASTROINTESTINAL: No abdominal  or epigastric pain, nausea, vomiting,  diarrhea  NEUROLOGICAL: No headaches, numbness, or tremors  Skin : No itching .  Hematology : No active bleeding .  Endocrinology : No heat and cold intolerance .  Psychiatry : Patient is confused .  Genitourinary : No dysuria .  Musculoskeletal : Patient has mild arthritis .              Vital Signs Last 24 Hrs  T(C): 36.6 (12 Apr 2023 04:37), Max: 37 (11 Apr 2023 13:03)  T(F): 97.9 (12 Apr 2023 04:37), Max: 98.6 (11 Apr 2023 13:03)  HR: 80 (12 Apr 2023 04:37) (68 - 89)  BP: 125/94 (12 Apr 2023 04:37) (125/94 - 149/72)  BP(mean): --  RR: 18 (12 Apr 2023 04:37) (17 - 18)  SpO2: 95% (12 Apr 2023 04:37) (91% - 95%)    Parameters below as of 12 Apr 2023 04:37  Patient On (Oxygen Delivery Method): room air        PHYSICAL EXAM:  HEAD:  Atraumatic, Normocephalic  NECK: Supple and normal thyroid.  No JVD or carotid bruit.   HEART: S1, S2 irregular , 1/6 soft ejection systolic murmur in mitral area , no thrill and no gallops .  PULMONARY: Bilateral vesicular breathing , few scattered rhonchi and few scattered rales are present .  ABDOMEN: Soft nontender and positive bowl sounds   EXTREMITIES:  No clubbing, cyanosis, or pedal  edema  NEUROLOGICAL: AA and mild confused . Bilateral feet in dressing .  Skin : No rashes .  Musculoskeletal : No joint swellings     LABS:                        9.3    7.90  )-----------( 325      ( 12 Apr 2023 10:09 )             31.0     04-12    139  |  110<H>  |  31<H>  ----------------------------<  216<H>  3.9   |  23  |  1.40<H>    Ca    8.8      12 Apr 2023 10:09    TPro  6.2  /  Alb  1.9<L>  /  TBili  0.5  /  DBili  0.2  /  AST  17  /  ALT  18  /  AlkPhos  66  04-12            Assessment/Plan  Patient has :  1) Left foot infection and toe osteomyelitis   2) Hypertension and stable .  3) DM .  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 4) Patient cardiac wise stable and cleared for foot surgery if needed . Benefits of surgery outweigh the risks . 5) Will hold Lovenox 18-24 hours prior to surgery 6) Increase metoprolol 50 mg twice a day .  Will continue to follow during hospital course and give further recommendations as needed . Thanks for your referral . if any questions please contact me at office ([phone] cell [phone])      [facility] [place]       HPI:  88 yo male ,[facility] resident with PMHx - COPD, DM, HTN, CAD, HLD, H/O TIA, dementia,GERD, BPH and depression sent ot ER for evaluation of left foot 1metatarsal wound ,suggestive of osteomyelitis .Patient was seen by ID consult and transfer to the hospital recommended for wound cx/bone biopsy /podiatry evaluation ,likely will require 4-6 weeks of iv abx . Patient was admitted to [facility] with b/l feet wounds and was followed by wound care team ,recently completed 7 days of doxycycline for foot wound cellulitis . (30 Mar 2023 05:21)        SUBJECTIVE: Patient feeling better       ALLERGIES:  Allergies    No Known Allergies    Intolerances          MEDICATIONS  (STANDING):  budesonide 160 MICROgram(s)/formoterol 4.5 MICROgram(s) Inhaler 2 Puff(s) Inhalation two times a day  dextrose 50% Injectable 25 Gram(s) IV Push once  dextrose 50% Injectable 12.5 Gram(s) IV Push once  dextrose 50% Injectable 25 Gram(s) IV Push once  donepezil 5 milliGRAM(s) Oral at bedtime  DULoxetine 60 milliGRAM(s) Oral daily  glucagon  Injectable 1 milliGRAM(s) IntraMuscular once  insulin glargine Injectable (LANTUS) 8 Unit(s) SubCutaneous at bedtime  insulin lispro (ADMELOG) corrective regimen sliding scale   SubCutaneous three times a day before meals  insulin lispro (ADMELOG) corrective regimen sliding scale   SubCutaneous at bedtime  lactobacillus acidophilus 1 Tablet(s) Oral two times a day with meals  losartan 25 milliGRAM(s) Oral daily  metoprolol tartrate 50 milliGRAM(s) Oral every 8 hours  multivitamin 1 Tablet(s) Oral daily  pantoprazole   Suspension 40 milliGRAM(s) Oral daily  piperacillin/tazobactam IVPB.. 3.375 Gram(s) IV Intermittent every 8 hours  senna 2 Tablet(s) Oral at bedtime  simvastatin 40 milliGRAM(s) Oral at bedtime  tamsulosin 0.4 milliGRAM(s) Oral at bedtime    MEDICATIONS  (PRN):  acetaminophen     Tablet .. 650 milliGRAM(s) Oral every 6 hours PRN Temp greater or equal to 38C (100.4F), Mild Pain (1 - 3)  aluminum hydroxide/magnesium hydroxide/simethicone Suspension 30 milliLiter(s) Oral every 4 hours PRN Dyspepsia  bisacodyl Suppository 10 milliGRAM(s) Rectal daily PRN Constipation  dextrose Oral Gel 15 Gram(s) Oral once PRN Blood Glucose LESS THAN 70 milliGRAM(s)/deciliter  hydrALAZINE 50 milliGRAM(s) Oral every 6 hours PRN for systolic BP>160  magnesium hydroxide Suspension 30 milliLiter(s) Oral daily PRN Constipation  melatonin 3 milliGRAM(s) Oral at bedtime PRN Insomnia  ondansetron Injectable 4 milliGRAM(s) IV Push every 8 hours PRN Nausea and/or Vomiting  sodium chloride 0.9% lock flush 10 milliLiter(s) IV Push every 1 hour PRN Pre/post blood products, medications, blood draw, and to maintain line patency      REVIEW OF SYSTEMS:  CONSTITUTIONAL: No fever,  RESPIRATORY: No cough, wheezing, shortness of breath  CARDIOVASCULAR: No chest pain, dyspnea, palpitations, dizziness, syncope, paroxysmal nocturnal dyspnea, orthopnea, or arm or leg swelling  GASTROINTESTINAL: No abdominal  or epigastric pain, nausea, vomiting,  diarrhea  NEUROLOGICAL: No headaches, numbness, or tremors  Skin : No itching .  Hematology : No active bleeding .  Endocrinology : No heat and cold intolerance .  Psychiatry : Patient is confused .  Genitourinary : No dysuria .  Musculoskeletal : Patient has mild arthritis .              Vital Signs Last 24 Hrs  T(C): 36.6 (12 Apr 2023 04:37), Max: 37 (11 Apr 2023 13:03)  T(F): 97.9 (12 Apr 2023 04:37), Max: 98.6 (11 Apr 2023 13:03)  HR: 80 (12 Apr 2023 04:37) (68 - 89)  BP: 125/94 (12 Apr 2023 04:37) (125/94 - 149/72)  BP(mean): --  RR: 18 (12 Apr 2023 04:37) (17 - 18)  SpO2: 95% (12 Apr 2023 04:37) (91% - 95%)    Parameters below as of 12 Apr 2023 04:37  Patient On (Oxygen Delivery Method): room air        PHYSICAL EXAM:  HEAD:  Atraumatic, Normocephalic  NECK: Supple and normal thyroid.  No JVD or carotid bruit.   HEART: S1, S2 irregular , 1/6 soft ejection systolic murmur in mitral area , no thrill and no gallops .  PULMONARY: Bilateral vesicular breathing , few scattered rhonchi and few scattered rales are present .  ABDOMEN: Soft nontender and positive bowl sounds   EXTREMITIES:  No clubbing, cyanosis, or pedal  edema  NEUROLOGICAL: AA and mild confused . Bilateral feet in dressing .  Skin : No rashes .  Musculoskeletal : No joint swellings     LABS:                        9.3    7.90  )-----------( 325      ( 12 Apr 2023 10:09 )             31.0     04-12    139  |  110<H>  |  31<H>  ----------------------------<  216<H>  3.9   |  23  |  1.40<H>    Ca    8.8      12 Apr 2023 10:09    TPro  6.2  /  Alb  1.9<L>  /  TBili  0.5  /  DBili  0.2  /  AST  17  /  ALT  18  /  AlkPhos  66  04-12            Assessment/Plan  Patient has :  1) Left foot infection and toe osteomyelitis   2) Hypertension and stable .  3) DM .  4) H/O COPD   5) Mild chronic systolic and diastolic heart failure and stable   6) Anemia   7) Dementia   8) Paroxysmal atrial fibrillation with mild fast ventricular rate   9 ) COVID positive   10) Atrial fibrillation with intermittent fast ventricular rate and aberrant conduction and looks like NSVT but it is not NSVT but rapid atrial fibrillation with aberrance .  Plan : 1) I/V antibiotics as per ID 2) Monitor hemoglobin and electrolytes 3) Rest of medications as such . 4) Patient cardiac wise stable and cleared for foot surgery if needed . Benefits of surgery outweigh the risks . 5) Will hold Lovenox 18-24 hours prior to surgery 6) Increase metoprolol 50 mg three times  a day .  Will continue to follow during hospital course and give further recommendations as needed . Thanks for your referral . if any questions please contact me at office ([phone] cell [phone])      [facility]       HPI:  88 [facility] male ,[facility] with PMHx - COPD, DM, HTN, CAD, HLD, H/O TIA, dementia,GERD, BPH and depression sent ot ER for evaluation of left foot 1metatarsal wound ,suggestive of osteomyelitis .Patient was seen by ID consult and transfer to the hospital recommended for wound cx/bone biopsy /podiatry evaluation ,likely will require 4-6 weeks of iv abx . [facility] and was followed by wound care team ,recently completed 7 days of doxycycline for foot wound cellulitis . (30 Mar 2023 05:21)        SUBJECTIVE: Patient feeling better       ALLERGIES:  Allergies    No Known Allergies    Intolerances          MEDICATIONS  (STANDING):  budesonide 160 MICROgram(s)/formoterol 4.5 MICROgram(s) Inhaler 2 Puff(s) Inhalation two times a day  dextrose 50% Injectable 25 Gram(s) IV Push once  dextrose 50% Injectable 12.5 Gram(s) IV Push once  dextrose 50% Injectable 25 Gram(s) IV Push once  donepezil 5 milliGRAM(s) Oral at bedtime  DULoxetine 60 milliGRAM(s) Oral daily  glucagon  Injectable 1 milliGRAM(s) IntraMuscular once  insulin glargine Injectable (LANTUS) 8 Unit(s) SubCutaneous at bedtime  insulin lispro (ADMELOG) corrective regimen sliding scale   SubCutaneous three times a day before meals  insulin lispro (ADMELOG) corrective regimen sliding scale   SubCutaneous at bedtime  lactobacillus acidophilus 1 Tablet(s) Oral two times a day with meals  losartan 25 milliGRAM(s) Oral daily  metoprolol tartrate 50 milliGRAM(s) Oral every 8 hours  multivitamin 1 Tablet(s) Oral daily  pantoprazole   Suspension 40 milliGRAM(s) Oral daily  piperacillin/tazobactam IVPB.. 3.375 Gram(s) IV Intermittent every 8 hours  senna 2 Tablet(s) Oral at bedtime  simvastatin 40 milliGRAM(s) Oral at bedtime  tamsulosin 0.4 milliGRAM(s) Oral at bedtime    MEDICATIONS  (PRN):  acetaminophen     Tablet .. 650 milliGRAM(s) Oral every 6 hours PRN Temp greater or equal to 38C (100.4F), Mild Pain (1 - 3)  aluminum hydroxide/magnesium hydroxide/simethicone Suspension 30 milliLiter(s) Oral every 4 hours PRN Dyspepsia  bisacodyl Suppository 10 milliGRAM(s) Rectal daily PRN Constipation  dextrose Oral Gel 15 Gram(s) Oral once PRN Blood Glucose LESS THAN 70 milliGRAM(s)/deciliter  hydrALAZINE 50 milliGRAM(s) Oral every 6 hours PRN for systolic BP>160  magnesium hydroxide Suspension 30 milliLiter(s) Oral daily PRN Constipation  melatonin 3 milliGRAM(s) Oral at bedtime PRN Insomnia  ondansetron Injectable 4 milliGRAM(s) IV Push every 8 hours PRN Nausea and/or Vomiting  sodium chloride 0.9% lock flush 10 milliLiter(s) IV Push every 1 hour PRN Pre/post blood products, medications, blood draw, and to maintain line patency      REVIEW OF SYSTEMS:  CONSTITUTIONAL: No fever,  RESPIRATORY: No cough, wheezing, shortness of breath  CARDIOVASCULAR: No chest pain, dyspnea, palpitations, dizziness, syncope, paroxysmal nocturnal dyspnea, orthopnea, or arm or leg swelling  GASTROINTESTINAL: No abdominal  or epigastric pain, nausea, vomiting,  diarrhea  NEUROLOGICAL: No headaches, numbness, or tremors  Skin : No itching .  Hematology : No active bleeding .  Endocrinology : No heat and cold intolerance .  Psychiatry : Patient is confused .  Genitourinary : No dysuria .  Musculoskeletal : Patient has mild arthritis .              Vital Signs Last 24 Hrs  T(C): 36.6 (12 Apr 2023 04:37), Max: 37 (11 Apr 2023 13:03)  T(F): 97.9 (12 Apr 2023 04:37), Max: 98.6 (11 Apr 2023 13:03)  HR: 80 (12 Apr 2023 04:37) (68 - 89)  BP: 125/94 (12 Apr 2023 04:37) (125/94 - 149/72)  BP(mean): --  RR: 18 (12 Apr 2023 04:37) (17 - 18)  SpO2: 95% (12 Apr 2023 04:37) (91% - 95%)    Parameters below as of 12 Apr 2023 04:37  Patient On (Oxygen Delivery Method): room air        PHYSICAL EXAM:  HEAD:  Atraumatic, Normocephalic  NECK: Supple and normal thyroid.  No JVD or carotid bruit.   HEART: S1, S2 irregular , 1/6 soft ejection systolic murmur in mitral area , no thrill and no gallops .  PULMONARY: Bilateral vesicular breathing , few scattered rhonchi and few scattered rales are present .  ABDOMEN: Soft nontender and positive bowl sounds   EXTREMITIES:  No clubbing, cyanosis, or pedal  edema  NEUROLOGICAL: AA and mild confused . Bilateral feet in dressing .  Skin : No rashes .  Musculoskeletal : No joint swellings     LABS:                        9.3    7.90  )-----------( 325      ( 12 Apr 2023 10:09 )             31.0     04-12    139  |  110<H>  |  31<H>  ----------------------------<  216<H>  3.9   |  23  |  1.40<H>    Ca    8.8      12 Apr 2023 10:09    TPro  6.2  /  Alb  1.9<L>  /  TBili  0.5  /  DBili  0.2  /  AST  17  /  ALT  18  /  AlkPhos  66  04-12            Assessment/Plan  Patient has :  1) Left foot infection and toe osteomyelitis   2) Hypertension and stable .  3) DM .  4) H/O COPD   5) Mild chronic systolic and diastolic heart failure and stable   6) Anemia   7) Dementia   8) Paroxysmal atrial fibrillation with mild fast ventricular rate   9 ) COVID positive   10) Atrial fibrillation with intermittent fast ventricular rate and aberrant conduction and looks like NSVT but it is not NSVT but rapid atrial fibrillation with aberrance .  Plan : 1) I/V antibiotics as per ID 2) Monitor hemoglobin and electrolytes 3) Rest of medications as such . 4) Patient cardiac wise stable and cleared for foot surgery if needed . Benefits of surgery outweigh the risks . 5) Will hold Lovenox 18-24 hours prior to surgery 6) Increase metoprolol 50 mg three times  a day .  Will continue to follow during hospital course and give further recommendations as needed . Thanks for your referral . if any questions please contact me at office ([phone] cell [phone])      [facility]       HPI:  88 yo male ,[facility] resident with PMHx - COPD, DM, HTN, CAD, HLD, H/O TIA, dementia,GERD, BPH and depression sent ot ER for evaluation of left foot 1metatarsal wound ,suggestive of osteomyelitis .Patient was seen by ID consult and transfer to the hospital recommended for wound cx/bone biopsy /podiatry evaluation ,likely will require 4-6 weeks of iv abx . Patient was admitted to [facility] with b/l feet wounds and was followed by wound care team ,recently completed 7 days of doxycycline for foot wound cellulitis . (30 Mar 2023 05:21)        SUBJECTIVE: Patient feeling better       ALLERGIES:  Allergies    No Known Allergies    Intolerances          MEDICATIONS  (STANDING):  budesonide 160 MICROgram(s)/formoterol 4.5 MICROgram(s) Inhaler 2 Puff(s) Inhalation two times a day  dextrose 50% Injectable 25 Gram(s) IV Push once  dextrose 50% Injectable 12.5 Gram(s) IV Push once  dextrose 50% Injectable 25 Gram(s) IV Push once  donepezil 5 milliGRAM(s) Oral at bedtime  DULoxetine 60 milliGRAM(s) Oral daily  glucagon  Injectable 1 milliGRAM(s) IntraMuscular once  insulin glargine Injectable (LANTUS) 8 Unit(s) SubCutaneous at bedtime  insulin lispro (ADMELOG) corrective regimen sliding scale   SubCutaneous three times a day before meals  insulin lispro (ADMELOG) corrective regimen sliding scale   SubCutaneous at bedtime  lactobacillus acidophilus 1 Tablet(s) Oral two times a day with meals  losartan 25 milliGRAM(s) Oral daily  metoprolol tartrate 50 milliGRAM(s) Oral every 8 hours  multivitamin 1 Tablet(s) Oral daily  pantoprazole   Suspension 40 milliGRAM(s) Oral daily  piperacillin/tazobactam IVPB.. 3.375 Gram(s) IV Intermittent every 8 hours  senna 2 Tablet(s) Oral at bedtime  simvastatin 40 milliGRAM(s) Oral at bedtime  tamsulosin 0.4 milliGRAM(s) Oral at bedtime    MEDICATIONS  (PRN):  acetaminophen     Tablet .. 650 milliGRAM(s) Oral every 6 hours PRN Temp greater or equal to 38C (100.4F), Mild Pain (1 - 3)  aluminum hydroxide/magnesium hydroxide/simethicone Suspension 30 milliLiter(s) Oral every 4 hours PRN Dyspepsia  bisacodyl Suppository 10 milliGRAM(s) Rectal daily PRN Constipation  dextrose Oral Gel 15 Gram(s) Oral once PRN Blood Glucose LESS THAN 70 milliGRAM(s)/deciliter  hydrALAZINE 50 milliGRAM(s) Oral every 6 hours PRN for systolic BP>160  magnesium hydroxide Suspension 30 milliLiter(s) Oral daily PRN Constipation  melatonin 3 milliGRAM(s) Oral at bedtime PRN Insomnia  ondansetron Injectable 4 milliGRAM(s) IV Push every 8 hours PRN Nausea and/or Vomiting  sodium chloride 0.9% lock flush 10 milliLiter(s) IV Push every 1 hour PRN Pre/post blood products, medications, blood draw, and to maintain line patency      REVIEW OF SYSTEMS:  CONSTITUTIONAL: No fever,  RESPIRATORY: No cough, wheezing, shortness of breath  CARDIOVASCULAR: No chest pain, dyspnea, palpitations, dizziness, syncope, paroxysmal nocturnal dyspnea, orthopnea, or arm or leg swelling  GASTROINTESTINAL: No abdominal  or epigastric pain, nausea, vomiting,  diarrhea  NEUROLOGICAL: No headaches, numbness, or tremors  Skin : No itching .  Hematology : No active bleeding .  Endocrinology : No heat and cold intolerance .  Psychiatry : Patient is confused .  Genitourinary : No dysuria .  Musculoskeletal : Patient has mild arthritis .              Vital Signs Last 24 Hrs  T(C): 36.6 (12 Apr 2023 04:37), Max: 37 (11 Apr 2023 13:03)  T(F): 97.9 (12 Apr 2023 04:37), Max: 98.6 (11 Apr 2023 13:03)  HR: 80 (12 Apr 2023 04:37) (68 - 89)  BP: 125/94 (12 Apr 2023 04:37) (125/94 - 149/72)  BP(mean): --  RR: 18 (12 Apr 2023 04:37) (17 - 18)  SpO2: 95% (12 Apr 2023 04:37) (91% - 95%)    Parameters below as of 12 Apr 2023 04:37  Patient On (Oxygen Delivery Method): room air        PHYSICAL EXAM:  HEAD:  Atraumatic, Normocephalic  NECK: Supple and normal thyroid.  No JVD or carotid bruit.   HEART: S1, S2 irregular , 1/6 soft ejection systolic murmur in mitral area , no thrill and no gallops .  PULMONARY: Bilateral vesicular breathing , few scattered rhonchi and few scattered rales are present .  ABDOMEN: Soft nontender and positive bowl sounds   EXTREMITIES:  No clubbing, cyanosis, or pedal  edema  NEUROLOGICAL: AA and mild confused . Bilateral feet in dressing .  Skin : No rashes .  Musculoskeletal : No joint swellings     LABS:                        9.3    7.90  )-----------( 325      ( 12 Apr 2023 10:09 )             31.0     04-12    139  |  110<H>  |  31<H>  ----------------------------<  216<H>  3.9   |  23  |  1.40<H>    Ca    8.8      12 Apr 2023 10:09    TPro  6.2  /  Alb  1.9<L>  /  TBili  0.5  /  DBili  0.2  /  AST  17  /  ALT  18  /  AlkPhos  66  04-12            Assessment/Plan  Patient has :  1) Left foot infection and toe osteomyelitis   2) Hypertension and stable .  3) DM .  4) H/O COPD   5) Mild chronic systolic and diastolic heart failure and stable   6) Anemia   7) Dementia   8) Paroxysmal atrial fibrillation with mild fast ventricular rate   9 ) COVID positive   10) Atrial fibrillation with intermittent fast ventricular rate and aberrant conduction and looks like NSVT but it is not NSVT but rapid atrial fibrillation with aberrance .  Plan : 1) I/V antibiotics as per ID 2) Monitor hemoglobin and electrolytes 3) Rest of medications as such . 4) Patient cardiac wise stable and cleared for foot surgery if needed . Benefits of surgery outweigh the risks . 5) Will hold Lovenox 18-24 hours prior to surgery 6) Increase metoprolol 50 mg three times  a day .  Will continue to follow during hospital course and give further recommendations as needed . Thanks for your referral . if any questions please contact me at office ([phone] cell [phone])      KARIME WYNN MD [place] [id number]  [address]  OFFICE : [phone]  CELL : [phone]  CARDIOLOGY F/U :       HPI:  88 yo male ,[facility] resident with PMHx - COPD, DM, HTN, CAD, HLD, H/O TIA, dementia,GERD, BPH and depression sent ot ER for evaluation of left foot 1metatarsal wound ,suggestive of osteomyelitis .Patient was seen by ID consult and transfer to the hospital recommended for wound cx/bone biopsy /podiatry evaluation ,likely will require 4-6 weeks of iv abx . Patient was admitted to [facility] with b/l feet wounds and was followed by wound care team ,recently completed 7 days of doxycycline for foot wound cellulitis . (30 Mar 2023 05:21)        SUBJECTIVE: Patient feeling better       ALLERGIES:  Allergies    No Known Allergies    Intolerances          MEDICATIONS  (STANDING):  budesonide 160 MICROgram(s)/formoterol 4.5 MICROgram(s) Inhaler 2 Puff(s) Inhalation two times a day  dextrose 50% Injectable 25 Gram(s) IV Push once  dextrose 50% Injectable 12.5 Gram(s) IV Push once  dextrose 50% Injectable 25 Gram(s) IV Push once  donepezil 5 milliGRAM(s) Oral at bedtime  DULoxetine 60 milliGRAM(s) Oral daily  glucagon  Injectable 1 milliGRAM(s) IntraMuscular once  insulin glargine Injectable (LANTUS) 8 Unit(s) SubCutaneous at bedtime  insulin lispro (ADMELOG) corrective regimen sliding scale   SubCutaneous three times a day before meals  insulin lispro (ADMELOG) corrective regimen sliding scale   SubCutaneous at bedtime  lactobacillus acidophilus 1 Tablet(s) Oral two times a day with meals  losartan 25 milliGRAM(s) Oral daily  metoprolol tartrate 50 milliGRAM(s) Oral every 8 hours  multivitamin 1 Tablet(s) Oral daily  pantoprazole   Suspension 40 milliGRAM(s) Oral daily  piperacillin/tazobactam IVPB.. 3.375 Gram(s) IV Intermittent every 8 hours  senna 2 Tablet(s) Oral at bedtime  simvastatin 40 milliGRAM(s) Oral at bedtime  tamsulosin 0.4 milliGRAM(s) Oral at bedtime    MEDICATIONS  (PRN):  acetaminophen     Tablet .. 650 milliGRAM(s) Oral every 6 hours PRN Temp greater or equal to 38C (100.4F), Mild Pain (1 - 3)  aluminum hydroxide/magnesium hydroxide/simethicone Suspension 30 milliLiter(s) Oral every 4 hours PRN Dyspepsia  bisacodyl Suppository 10 milliGRAM(s) Rectal daily PRN Constipation  dextrose Oral Gel 15 Gram(s) Oral once PRN Blood Glucose LESS THAN 70 milliGRAM(s)/deciliter  hydrALAZINE 50 milliGRAM(s) Oral every 6 hours PRN for systolic BP>160  magnesium hydroxide Suspension 30 milliLiter(s) Oral daily PRN Constipation  melatonin 3 milliGRAM(s) Oral at bedtime PRN Insomnia  ondansetron Injectable 4 milliGRAM(s) IV Push every 8 hours PRN Nausea and/or Vomiting  sodium chloride 0.9% lock flush 10 milliLiter(s) IV Push every 1 hour PRN Pre/post blood products, medications, blood draw, and to maintain line patency      REVIEW OF SYSTEMS:  CONSTITUTIONAL: No fever,  RESPIRATORY: No cough, wheezing, shortness of breath  CARDIOVASCULAR: No chest pain, dyspnea, palpitations, dizziness, syncope, paroxysmal nocturnal dyspnea, orthopnea, or arm or leg swelling  GASTROINTESTINAL: No abdominal  or epigastric pain, nausea, vomiting,  diarrhea  NEUROLOGICAL: No headaches, numbness, or tremors  Skin : No itching .  Hematology : No active bleeding .  Endocrinology : No heat and cold intolerance .  Psychiatry : Patient is confused .  Genitourinary : No dysuria .  Musculoskeletal : Patient has mild arthritis .              Vital Signs Last 24 Hrs  T(C): 36.6 (12 Apr 2023 04:37), Max: 37 (11 Apr 2023 13:03)  T(F): 97.9 (12 Apr 2023 04:37), Max: 98.6 (11 Apr 2023 13:03)  HR: 80 (12 Apr 2023 04:37) (68 - 89)  BP: 125/94 (12 Apr 2023 04:37) (125/94 - 149/72)  BP(mean): --  RR: 18 (12 Apr 2023 04:37) (17 - 18)  SpO2: 95% (12 Apr 2023 04:37) (91% - 95%)    Parameters below as of 12 Apr 2023 04:37  Patient On (Oxygen Delivery Method): room air        PHYSICAL EXAM:  HEAD:  Atraumatic, Normocephalic  NECK: Supple and normal thyroid.  No JVD or carotid bruit.   HEART: S1, S2 irregular , 1/6 soft ejection systolic murmur in mitral area , no thrill and no gallops .  PULMONARY: Bilateral vesicular breathing , few scattered rhonchi and few scattered rales are present .  ABDOMEN: Soft nontender and positive bowl sounds   EXTREMITIES:  No clubbing, cyanosis, or pedal  edema  NEUROLOGICAL: AA and mild confused . Bilateral feet in dressing .  Skin : No rashes .  Musculoskeletal : No joint swellings     LABS:                        9.3    7.90  )-----------( 325      ( 12 Apr 2023 10:09 )             31.0     04-12    139  |  110<H>  |  31<H>  ----------------------------<  216<H>  3.9   |  23  |  1.40<H>    Ca    8.8      12 Apr 2023 10:09    TPro  6.2  /  Alb  1.9<L>  /  TBili  0.5  /  DBili  0.2  /  AST  17  /  ALT  18  /  AlkPhos  66  04-12            Assessment/Plan  Patient has :  1) Left foot infection and toe osteomyelitis . S/P Foot surgery   2) Hypertension and stable .  3) DM .  4) H/O COPD   5) Mild chronic systolic and diastolic heart failure and stable   6) Anemia   7) Dementia   8) Paroxysmal atrial fibrillation with mild fast ventricular rate   9 ) COVID positive   10) Atrial fibrillation with intermittent fast ventricular rate and aberrant conduction and looks like NSVT but it is not NSVT but rapid atrial fibrillation with aberrance .  Plan : 1) I/V antibiotics as per ID 2) Monitor hemoglobin and electrolytes 3) Rest of medications as such . 4) Resume Lovenox when OK with surgeon  6) Increase metoprolol 50 mg three times  a day .  Will continue to follow during hospital course and give further recommendations as needed . Thanks for your referral . if any questions please contact me at office ([phone] cell [phone])      KARIME WYNN MD [place] [id number]  [address]  OFFICE : [phone]  CELL : [phone]  CARDIOLOGY F/U :       HPI:  88 yo male ,[facility] resident with PMHx - COPD, DM, HTN, CAD, HLD, H/O TIA, dementia,GERD, BPH and depression sent ot ER for evaluation of left foot 1metatarsal wound ,suggestive of osteomyelitis .Patient was seen by ID consult and transfer to the hospital recommended for wound cx/bone biopsy /podiatry evaluation ,likely will require 4-6 weeks of iv abx . Patient was admitted to [facility] with b/l feet wounds and was followed by wound care team ,recently completed 7 days of doxycycline for foot wound cellulitis . (30 Mar 2023 05:21)        SUBJECTIVE: Patient feeling better       ALLERGIES:  Allergies    No Known Allergies    Intolerances          MEDICATIONS  (STANDING):  budesonide 160 MICROgram(s)/formoterol 4.5 MICROgram(s) Inhaler 2 Puff(s) Inhalation two times a day  dextrose 50% Injectable 25 Gram(s) IV Push once  dextrose 50% Injectable 12.5 Gram(s) IV Push once  dextrose 50% Injectable 25 Gram(s) IV Push once  donepezil 5 milliGRAM(s) Oral at bedtime  DULoxetine 60 milliGRAM(s) Oral daily  glucagon  Injectable 1 milliGRAM(s) IntraMuscular once  insulin glargine Injectable (LANTUS) 8 Unit(s) SubCutaneous at bedtime  insulin lispro (ADMELOG) corrective regimen sliding scale   SubCutaneous three times a day before meals  insulin lispro (ADMELOG) corrective regimen sliding scale   SubCutaneous at bedtime  lactobacillus acidophilus 1 Tablet(s) Oral two times a day with meals  losartan 25 milliGRAM(s) Oral daily  metoprolol tartrate 50 milliGRAM(s) Oral every 8 hours  multivitamin 1 Tablet(s) Oral daily  pantoprazole   Suspension 40 milliGRAM(s) Oral daily  piperacillin/tazobactam IVPB.. 3.375 Gram(s) IV Intermittent every 8 hours  senna 2 Tablet(s) Oral at bedtime  simvastatin 40 milliGRAM(s) Oral at bedtime  tamsulosin 0.4 milliGRAM(s) Oral at bedtime    MEDICATIONS  (PRN):  acetaminophen     Tablet .. 650 milliGRAM(s) Oral every 6 hours PRN Temp greater or equal to 38C (100.4F), Mild Pain (1 - 3)  aluminum hydroxide/magnesium hydroxide/simethicone Suspension 30 milliLiter(s) Oral every 4 hours PRN Dyspepsia  bisacodyl Suppository 10 milliGRAM(s) Rectal daily PRN Constipation  dextrose Oral Gel 15 Gram(s) Oral once PRN Blood Glucose LESS THAN 70 milliGRAM(s)/deciliter  hydrALAZINE 50 milliGRAM(s) Oral every 6 hours PRN for systolic BP>160  magnesium hydroxide Suspension 30 milliLiter(s) Oral daily PRN Constipation  melatonin 3 milliGRAM(s) Oral at bedtime PRN Insomnia  ondansetron Injectable 4 milliGRAM(s) IV Push every 8 hours PRN Nausea and/or Vomiting  sodium chloride 0.9% lock flush 10 milliLiter(s) IV Push every 1 hour PRN Pre/post blood products, medications, blood draw, and to maintain line patency      REVIEW OF SYSTEMS:  CONSTITUTIONAL: No fever,  RESPIRATORY: No cough, wheezing, shortness of breath  CARDIOVASCULAR: No chest pain, dyspnea, palpitations, dizziness, syncope, paroxysmal nocturnal dyspnea, orthopnea, or arm or leg swelling  GASTROINTESTINAL: No abdominal  or epigastric pain, nausea, vomiting,  diarrhea  NEUROLOGICAL: No headaches, numbness, or tremors  Skin : No itching .  Hematology : No active bleeding .  Endocrinology : No heat and cold intolerance .  Psychiatry : Patient is confused .  Genitourinary : No dysuria .  Musculoskeletal : Patient has mild arthritis .              Vital Signs Last 24 Hrs  T(C): 36.6 (12 Apr 2023 04:37), Max: 37 (11 Apr 2023 13:03)  T(F): 97.9 (12 Apr 2023 04:37), Max: 98.6 (11 Apr 2023 13:03)  HR: 80 (12 Apr 2023 04:37) (68 - 89)  BP: 125/94 (12 Apr 2023 04:37) (125/94 - 149/72)  BP(mean): --  RR: 18 (12 Apr 2023 04:37) (17 - 18)  SpO2: 95% (12 Apr 2023 04:37) (91% - 95%)    Parameters below as of 12 Apr 2023 04:37  Patient On (Oxygen Delivery Method): room air        PHYSICAL EXAM:  HEAD:  Atraumatic, Normocephalic  NECK: Supple and normal thyroid.  No JVD or carotid bruit.   HEART: S1, S2 irregular , 1/6 soft ejection systolic murmur in mitral area , no thrill and no gallops .  PULMONARY: Bilateral vesicular breathing , few scattered rhonchi and few scattered rales are present .  ABDOMEN: Soft nontender and positive bowl sounds   EXTREMITIES:  No clubbing, cyanosis, or pedal  edema  NEUROLOGICAL: AA and mild confused . Bilateral feet in dressing .  Skin : No rashes .  Musculoskeletal : No joint swellings     LABS:                        9.3    7.90  )-----------( 325      ( 12 Apr 2023 10:09 )             31.0     04-12    139  |  110<H>  |  31<H>  ----------------------------<  216<H>  3.9   |  23  |  1.40<H>    Ca    8.8      12 Apr 2023 10:09    TPro  6.2  /  Alb  1.9<L>  /  TBili  0.5  /  DBili  0.2  /  AST  17  /  ALT  18  /  AlkPhos  66  04-12            Assessment/Plan  Patient has :  1) Left foot infection and toe osteomyelitis . S/P Foot surgery   2) Hypertension and stable .  3) DM .  4) H/O COPD   5) Mild chronic systolic and diastolic heart failure and stable   6) Anemia   7) Dementia   8) Paroxysmal atrial fibrillation with mild fast ventricular rate   9 ) COVID positive   10) Atrial fibrillation with intermittent fast ventricular rate and aberrant conduction and looks like NSVT but it is not NSVT but rapid atrial fibrillation with aberrance .  Plan : 1) I/V antibiotics as per ID 2) Monitor hemoglobin and electrolytes 3) Rest of medications as such . 4) Resume Lovenox when OK with surgeon  6) Increase metoprolol 50 mg three times  a day .  Will continue to follow during hospital course and give further recommendations as needed . Thanks for your referral . if any questions please contact me at office ([phone] cell [phone])      KARIME WYNN MD [place] [id number]  [address]  OFFICE : [phone]  CELL : [phone]  CARDIOLOGY F/U :       HPI:  86 yo male ,[facility] resident with PMHx - COPD, DM, HTN, CAD, HLD, H/O TIA, dementia,GERD, BPH and depression sent ot ER for evaluation of left foot 1metatarsal wound ,suggestive of osteomyelitis .Patient was seen by ID consult and transfer to the hospital recommended for wound cx/bone biopsy /podiatry evaluation ,likely will require 4-6 weeks of iv abx . Patient was admitted to [facility] with b/l feet wounds and was followed by wound care team ,recently completed 7 days of doxycycline for foot wound cellulitis . (30 Mar 2023 05:21)        SUBJECTIVE: Patient feeling better       ALLERGIES:  Allergies    No Known Allergies    Intolerances          MEDICATIONS  (STANDING):  budesonide 160 MICROgram(s)/formoterol 4.5 MICROgram(s) Inhaler 2 Puff(s) Inhalation two times a day  dextrose 50% Injectable 25 Gram(s) IV Push once  dextrose 50% Injectable 12.5 Gram(s) IV Push once  dextrose 50% Injectable 25 Gram(s) IV Push once  donepezil 5 milliGRAM(s) Oral at bedtime  DULoxetine 60 milliGRAM(s) Oral daily  glucagon  Injectable 1 milliGRAM(s) IntraMuscular once  insulin glargine Injectable (LANTUS) 8 Unit(s) SubCutaneous at bedtime  insulin lispro (ADMELOG) corrective regimen sliding scale   SubCutaneous three times a day before meals  insulin lispro (ADMELOG) corrective regimen sliding scale   SubCutaneous at bedtime  lactobacillus acidophilus 1 Tablet(s) Oral two times a day with meals  losartan 25 milliGRAM(s) Oral daily  metoprolol tartrate 50 milliGRAM(s) Oral every 8 hours  multivitamin 1 Tablet(s) Oral daily  pantoprazole   Suspension 40 milliGRAM(s) Oral daily  piperacillin/tazobactam IVPB.. 3.375 Gram(s) IV Intermittent every 8 hours  senna 2 Tablet(s) Oral at bedtime  simvastatin 40 milliGRAM(s) Oral at bedtime  tamsulosin 0.4 milliGRAM(s) Oral at bedtime    MEDICATIONS  (PRN):  acetaminophen     Tablet .. 650 milliGRAM(s) Oral every 6 hours PRN Temp greater or equal to 38C (100.4F), Mild Pain (1 - 3)  aluminum hydroxide/magnesium hydroxide/simethicone Suspension 30 milliLiter(s) Oral every 4 hours PRN Dyspepsia  bisacodyl Suppository 10 milliGRAM(s) Rectal daily PRN Constipation  dextrose Oral Gel 15 Gram(s) Oral once PRN Blood Glucose LESS THAN 70 milliGRAM(s)/deciliter  hydrALAZINE 50 milliGRAM(s) Oral every 6 hours PRN for systolic BP>160  magnesium hydroxide Suspension 30 milliLiter(s) Oral daily PRN Constipation  melatonin 3 milliGRAM(s) Oral at bedtime PRN Insomnia  ondansetron Injectable 4 milliGRAM(s) IV Push every 8 hours PRN Nausea and/or Vomiting  sodium chloride 0.9% lock flush 10 milliLiter(s) IV Push every 1 hour PRN Pre/post blood products, medications, blood draw, and to maintain line patency      REVIEW OF SYSTEMS:  CONSTITUTIONAL: No fever,  RESPIRATORY: No cough, wheezing, shortness of breath  CARDIOVASCULAR: No chest pain, dyspnea, palpitations, dizziness, syncope, paroxysmal nocturnal dyspnea, orthopnea, or arm or leg swelling  GASTROINTESTINAL: No abdominal  or epigastric pain, nausea, vomiting,  diarrhea  NEUROLOGICAL: No headaches, numbness, or tremors  Skin : No itching .  Hematology : No active bleeding .  Endocrinology : No heat and cold intolerance .  Psychiatry : Patient is confused .  Genitourinary : No dysuria .  Musculoskeletal : Patient has mild arthritis .              Vital Signs Last 24 Hrs  T(C): 36.6 (12 Apr 2023 04:37), Max: 37 (11 Apr 2023 13:03)  T(F): 97.9 (12 Apr 2023 04:37), Max: 98.6 (11 Apr 2023 13:03)  HR: 80 (12 Apr 2023 04:37) (68 - 89)  BP: 125/94 (12 Apr 2023 04:37) (125/94 - 149/72)  BP(mean): --  RR: 18 (12 Apr 2023 04:37) (17 - 18)  SpO2: 95% (12 Apr 2023 04:37) (91% - 95%)    Parameters below as of 12 Apr 2023 04:37  Patient On (Oxygen Delivery Method): room air        PHYSICAL EXAM:  HEAD:  Atraumatic, Normocephalic  NECK: Supple and normal thyroid.  No JVD or carotid bruit.   HEART: S1, S2 irregular , 1/6 soft ejection systolic murmur in mitral area , no thrill and no gallops .  PULMONARY: Bilateral vesicular breathing , few scattered rhonchi and few scattered rales are present .  ABDOMEN: Soft nontender and positive bowl sounds   EXTREMITIES:  No clubbing, cyanosis, or pedal  edema  NEUROLOGICAL: AA and mild confused . Bilateral feet in dressing .  Skin : No rashes .  Musculoskeletal : No joint swellings     LABS:                        9.3    7.90  )-----------( 325      ( 12 Apr 2023 10:09 )             31.0     04-12    139  |  110<H>  |  31<H>  ----------------------------<  216<H>  3.9   |  23  |  1.40<H>    Ca    8.8      12 Apr 2023 10:09    TPro  6.2  /  Alb  1.9<L>  /  TBili  0.5  /  DBili  0.2  /  AST  17  /  ALT  18  /  AlkPhos  66  04-12            Assessment/Plan  Patient has :  1) Left foot infection and toe osteomyelitis . S/P Foot surgery   2) Hypertension and stable .  3) DM .  4) H/O COPD   5) Mild chronic systolic and diastolic heart failure and stable   6) Anemia   7) Dementia   8) Paroxysmal atrial fibrillation with mild fast ventricular rate   9 ) COVID positive   10) Atrial fibrillation with intermittent fast ventricular rate and aberrant conduction and looks like NSVT but it is not NSVT but rapid atrial fibrillation with aberrance .  Plan : 1) I/V antibiotics as per ID 2) Monitor hemoglobin and electrolytes 3) Rest of medications as such . 4) Resume Lovenox when OK with surgeon  6) Increase metoprolol 50 mg three times  a day .  Will continue to follow during hospital course and give further recommendations as needed . Thanks for your referral . if any questions please contact me at office ([phone] cell [phone])      KARIME WYNN MD [place] [id number]  [address]  OFFICE : [id number]  CELL : [phone]  CARDIOLOGY F/U :       HPI:  88 yo male ,Formerly [facility] resident with PMHx - COPD, DM, HTN, CAD, HLD, H/O TIA, dementia,GERD, BPH and depression sent ot ER for evaluation of left foot 1metatarsal wound ,suggestive of osteomyelitis .Patient was seen by ID consult and transfer to the hospital recommended for wound cx/bone biopsy /podiatry evaluation ,likely will require 4-6 weeks of iv abx . Patient was admitted to [facility] with b/l feet wounds and was followed by wound care team ,recently completed 7 days of doxycycline for foot wound cellulitis . (30 Mar 2023 05:21)        SUBJECTIVE: Patient feeling better       ALLERGIES:  Allergies    No Known Allergies    Intolerances          MEDICATIONS  (STANDING):  budesonide 160 MICROgram(s)/formoterol 4.5 MICROgram(s) Inhaler 2 Puff(s) Inhalation two times a day  dextrose 50% Injectable 25 Gram(s) IV Push once  dextrose 50% Injectable 12.5 Gram(s) IV Push once  dextrose 50% Injectable 25 Gram(s) IV Push once  donepezil 5 milliGRAM(s) Oral at bedtime  DULoxetine 60 milliGRAM(s) Oral daily  glucagon  Injectable 1 milliGRAM(s) IntraMuscular once  insulin glargine Injectable (LANTUS) 8 Unit(s) SubCutaneous at bedtime  insulin lispro (ADMELOG) corrective regimen sliding scale   SubCutaneous three times a day before meals  insulin lispro (ADMELOG) corrective regimen sliding scale   SubCutaneous at bedtime  lactobacillus acidophilus 1 Tablet(s) Oral two times a day with meals  losartan 25 milliGRAM(s) Oral daily  metoprolol tartrate 50 milliGRAM(s) Oral every 8 hours  multivitamin 1 Tablet(s) Oral daily  pantoprazole   Suspension 40 milliGRAM(s) Oral daily  piperacillin/tazobactam IVPB.. 3.375 Gram(s) IV Intermittent every 8 hours  senna 2 Tablet(s) Oral at bedtime  simvastatin 40 milliGRAM(s) Oral at bedtime  tamsulosin 0.4 milliGRAM(s) Oral at bedtime    MEDICATIONS  (PRN):  acetaminophen     Tablet .. 650 milliGRAM(s) Oral every 6 hours PRN Temp greater or equal to 38C (100.4F), Mild Pain (1 - 3)  aluminum hydroxide/magnesium hydroxide/simethicone Suspension 30 milliLiter(s) Oral every 4 hours PRN Dyspepsia  bisacodyl Suppository 10 milliGRAM(s) Rectal daily PRN Constipation  dextrose Oral Gel 15 Gram(s) Oral once PRN Blood Glucose LESS THAN 70 milliGRAM(s)/deciliter  hydrALAZINE 50 milliGRAM(s) Oral every 6 hours PRN for systolic BP>160  magnesium hydroxide Suspension 30 milliLiter(s) Oral daily PRN Constipation  melatonin 3 milliGRAM(s) Oral at bedtime PRN Insomnia  ondansetron Injectable 4 milliGRAM(s) IV Push every 8 hours PRN Nausea and/or Vomiting  sodium chloride 0.9% lock flush 10 milliLiter(s) IV Push every 1 hour PRN Pre/post blood products, medications, blood draw, and to maintain line patency      REVIEW OF SYSTEMS:  CONSTITUTIONAL: No fever,  RESPIRATORY: No cough, wheezing, shortness of breath  CARDIOVASCULAR: No chest pain, dyspnea, palpitations, dizziness, syncope, paroxysmal nocturnal dyspnea, orthopnea, or arm or leg swelling  GASTROINTESTINAL: No abdominal  or epigastric pain, nausea, vomiting,  diarrhea  NEUROLOGICAL: No headaches, numbness, or tremors  Skin : No itching .  Hematology : No active bleeding .  Endocrinology : No heat and cold intolerance .  Psychiatry : Patient is confused .  Genitourinary : No dysuria .  Musculoskeletal : Patient has mild arthritis .              Vital Signs Last 24 Hrs  T(C): 36.6 (12 Apr 2023 04:37), Max: 37 (11 Apr 2023 13:03)  T(F): 97.9 (12 Apr 2023 04:37), Max: 98.6 (11 Apr 2023 13:03)  HR: 80 (12 Apr 2023 04:37) (68 - 89)  BP: 125/94 (12 Apr 2023 04:37) (125/94 - 149/72)  BP(mean): --  RR: 18 (12 Apr 2023 04:37) (17 - 18)  SpO2: 95% (12 Apr 2023 04:37) (91% - 95%)    Parameters below as of 12 Apr 2023 04:37  Patient On (Oxygen Delivery Method): room air        PHYSICAL EXAM:  HEAD:  Atraumatic, Normocephalic  NECK: Supple and normal thyroid.  No JVD or carotid bruit.   HEART: S1, S2 irregular , 1/6 soft ejection systolic murmur in mitral area , no thrill and no gallops .  PULMONARY: Bilateral vesicular breathing , few scattered rhonchi and few scattered rales are present .  ABDOMEN: Soft nontender and positive bowl sounds   EXTREMITIES:  No clubbing, cyanosis, or pedal  edema  NEUROLOGICAL: AA and mild confused . Bilateral feet in dressing .  Skin : No rashes .  Musculoskeletal : No joint swellings     LABS:                        9.3    7.90  )-----------( 325      ( 12 Apr 2023 10:09 )             31.0     04-12    139  |  110<H>  |  31<H>  ----------------------------<  216<H>  3.9   |  23  |  1.40<H>    Ca    8.8      12 Apr 2023 10:09    TPro  6.2  /  Alb  1.9<L>  /  TBili  0.5  /  DBili  0.2  /  AST  17  /  ALT  18  /  AlkPhos  66  04-12            Assessment/Plan  Patient has :  1) Left foot infection and toe osteomyelitis . S/P Foot surgery   2) Hypertension and stable .  3) DM .  4) H/O COPD   5) Mild chronic systolic and diastolic heart failure and stable   6) Anemia   7) Dementia   8) Paroxysmal atrial fibrillation with mild fast ventricular rate   9 ) COVID positive   10) Atrial fibrillation with intermittent fast ventricular rate and aberrant conduction and looks like NSVT but it is not NSVT but rapid atrial fibrillation with aberrance .  Plan : 1) I/V antibiotics as per ID 2) Monitor hemoglobin and electrolytes 3) Rest of medications as such . 4) Resume Lovenox when OK with surgeon  6) Increase metoprolol 100 mg two  times a day .  Will continue to follow during hospital course and give further recommendations as needed . Thanks for your referral . if any questions please contact me at office ([phone] cell [phone])      KARIME WYNN MD [place] [id number]  [address]  OFFICE : [phone]  CELL : [phone]  CARDIOLOGY F/U :       HPI:  86 yo male ,[facility] with PMHx - COPD, DM, HTN, CAD, HLD, H/O TIA, dementia,GERD, BPH and depression sent ot ER for evaluation of left foot 1metatarsal wound ,suggestive of osteomyelitis .Patient was seen by ID consult and transfer to the hospital recommended for wound cx/bone biopsy /podiatry evaluation ,likely will require 4-6 weeks of iv abx . [facility] and was followed by wound care team ,recently completed 7 days of doxycycline for foot wound cellulitis . (30 Mar 2023 05:21)        SUBJECTIVE: Patient feeling better       ALLERGIES:  Allergies    No Known Allergies    Intolerances          MEDICATIONS  (STANDING):  budesonide 160 MICROgram(s)/formoterol 4.5 MICROgram(s) Inhaler 2 Puff(s) Inhalation two times a day  dextrose 50% Injectable 25 Gram(s) IV Push once  dextrose 50% Injectable 12.5 Gram(s) IV Push once  dextrose 50% Injectable 25 Gram(s) IV Push once  donepezil 5 milliGRAM(s) Oral at bedtime  DULoxetine 60 milliGRAM(s) Oral daily  glucagon  Injectable 1 milliGRAM(s) IntraMuscular once  insulin glargine Injectable (LANTUS) 8 Unit(s) SubCutaneous at bedtime  insulin lispro (ADMELOG) corrective regimen sliding scale   SubCutaneous three times a day before meals  insulin lispro (ADMELOG) corrective regimen sliding scale   SubCutaneous at bedtime  lactobacillus acidophilus 1 Tablet(s) Oral two times a day with meals  losartan 25 milliGRAM(s) Oral daily  metoprolol tartrate 50 milliGRAM(s) Oral every 8 hours  multivitamin 1 Tablet(s) Oral daily  pantoprazole   Suspension 40 milliGRAM(s) Oral daily  piperacillin/tazobactam IVPB.. 3.375 Gram(s) IV Intermittent every 8 hours  senna 2 Tablet(s) Oral at bedtime  simvastatin 40 milliGRAM(s) Oral at bedtime  tamsulosin 0.4 milliGRAM(s) Oral at bedtime    MEDICATIONS  (PRN):  acetaminophen     Tablet .. 650 milliGRAM(s) Oral every 6 hours PRN Temp greater or equal to 38C (100.4F), Mild Pain (1 - 3)  aluminum hydroxide/magnesium hydroxide/simethicone Suspension 30 milliLiter(s) Oral every 4 hours PRN Dyspepsia  bisacodyl Suppository 10 milliGRAM(s) Rectal daily PRN Constipation  dextrose Oral Gel 15 Gram(s) Oral once PRN Blood Glucose LESS THAN 70 milliGRAM(s)/deciliter  hydrALAZINE 50 milliGRAM(s) Oral every 6 hours PRN for systolic BP>160  magnesium hydroxide Suspension 30 milliLiter(s) Oral daily PRN Constipation  melatonin 3 milliGRAM(s) Oral at bedtime PRN Insomnia  ondansetron Injectable 4 milliGRAM(s) IV Push every 8 hours PRN Nausea and/or Vomiting  sodium chloride 0.9% lock flush 10 milliLiter(s) IV Push every 1 hour PRN Pre/post blood products, medications, blood draw, and to maintain line patency      REVIEW OF SYSTEMS:  CONSTITUTIONAL: No fever,  RESPIRATORY: No cough, wheezing, shortness of breath  CARDIOVASCULAR: No chest pain, dyspnea, palpitations, dizziness, syncope, paroxysmal nocturnal dyspnea, orthopnea, or arm or leg swelling  GASTROINTESTINAL: No abdominal  or epigastric pain, nausea, vomiting,  diarrhea  NEUROLOGICAL: No headaches, numbness, or tremors  Skin : No itching .  Hematology : No active bleeding .  Endocrinology : No heat and cold intolerance .  Psychiatry : Patient is confused .  Genitourinary : No dysuria .  Musculoskeletal : Patient has mild arthritis .              Vital Signs Last 24 Hrs  T(C): 36.6 (12 Apr 2023 04:37), Max: 37 (11 Apr 2023 13:03)  T(F): 97.9 (12 Apr 2023 04:37), Max: 98.6 (11 Apr 2023 13:03)  HR: 80 (12 Apr 2023 04:37) (68 - 89)  BP: 125/94 (12 Apr 2023 04:37) (125/94 - 149/72)  BP(mean): --  RR: 18 (12 Apr 2023 04:37) (17 - 18)  SpO2: 95% (12 Apr 2023 04:37) (91% - 95%)    Parameters below as of 12 Apr 2023 04:37  Patient On (Oxygen Delivery Method): room air        PHYSICAL EXAM:  HEAD:  Atraumatic, Normocephalic  NECK: Supple and normal thyroid.  No JVD or carotid bruit.   HEART: S1, S2 irregular , 1/6 soft ejection systolic murmur in mitral area , no thrill and no gallops .  PULMONARY: Bilateral vesicular breathing , few scattered rhonchi and few scattered rales are present .  ABDOMEN: Soft nontender and positive bowl sounds   EXTREMITIES:  No clubbing, cyanosis, or pedal  edema  NEUROLOGICAL: AA and mild confused . Bilateral feet in dressing .  Skin : No rashes .  Musculoskeletal : No joint swellings     LABS:                        9.3    7.90  )-----------( 325      ( 12 Apr 2023 10:09 )             31.0     04-12    139  |  110<H>  |  31<H>  ----------------------------<  216<H>  3.9   |  23  |  1.40<H>    Ca    8.8      12 Apr 2023 10:09    TPro  6.2  /  Alb  1.9<L>  /  TBili  0.5  /  DBili  0.2  /  AST  17  /  ALT  18  /  AlkPhos  66  04-12            Assessment/Plan  Patient has :  1) Left foot infection and toe osteomyelitis . S/P Foot surgery   2) Hypertension and stable .  3) DM .  4) H/O COPD   5) Mild chronic systolic and diastolic heart failure and stable   6) Anemia   7) Dementia   8) Paroxysmal atrial fibrillation with mild fast ventricular rate   9 ) COVID positive   10) Atrial fibrillation with intermittent fast ventricular rate and aberrant conduction and looks like NSVT but it is not NSVT but rapid atrial fibrillation with aberrance .  Plan : 1) I/V antibiotics as per ID 2) Monitor hemoglobin and electrolytes 3) Rest of medications as such . 4) Resume Lovenox when OK with surgeon  6) Increase metoprolol 100 mg two  times a day .  Will continue to follow during hospital course and give further recommendations as needed . Thanks for your referral . if any questions please contact me at office ([phone] cell [phone])      KARIME WYNN MD [place] [id number]  [address]  OFFICE : [phone]  CELL : [phone]  CARDIOLOGY F/U :       HPI:  88 yo male ,[facility] resident with PMHx - COPD, DM, HTN, CAD, HLD, H/O TIA, dementia,GERD, BPH and depression sent ot ER for evaluation of left foot 1metatarsal wound ,suggestive of osteomyelitis .Patient was seen by ID consult and transfer to the hospital recommended for wound cx/bone biopsy /podiatry evaluation ,likely will require 4-6 weeks of iv abx . Patient was admitted to [facility] with b/l feet wounds and was followed by wound care team ,recently completed 7 days of doxycycline for foot wound cellulitis . (30 Mar 2023 05:21)        SUBJECTIVE: Patient feeling better       ALLERGIES:  Allergies    No Known Allergies    Intolerances          MEDICATIONS  (STANDING):  budesonide 160 MICROgram(s)/formoterol 4.5 MICROgram(s) Inhaler 2 Puff(s) Inhalation two times a day  dextrose 50% Injectable 25 Gram(s) IV Push once  dextrose 50% Injectable 12.5 Gram(s) IV Push once  dextrose 50% Injectable 25 Gram(s) IV Push once  donepezil 5 milliGRAM(s) Oral at bedtime  DULoxetine 60 milliGRAM(s) Oral daily  glucagon  Injectable 1 milliGRAM(s) IntraMuscular once  insulin glargine Injectable (LANTUS) 8 Unit(s) SubCutaneous at bedtime  insulin lispro (ADMELOG) corrective regimen sliding scale   SubCutaneous three times a day before meals  insulin lispro (ADMELOG) corrective regimen sliding scale   SubCutaneous at bedtime  lactobacillus acidophilus 1 Tablet(s) Oral two times a day with meals  losartan 25 milliGRAM(s) Oral daily  metoprolol tartrate 50 milliGRAM(s) Oral every 8 hours  multivitamin 1 Tablet(s) Oral daily  pantoprazole   Suspension 40 milliGRAM(s) Oral daily  piperacillin/tazobactam IVPB.. 3.375 Gram(s) IV Intermittent every 8 hours  senna 2 Tablet(s) Oral at bedtime  simvastatin 40 milliGRAM(s) Oral at bedtime  tamsulosin 0.4 milliGRAM(s) Oral at bedtime    MEDICATIONS  (PRN):  acetaminophen     Tablet .. 650 milliGRAM(s) Oral every 6 hours PRN Temp greater or equal to 38C (100.4F), Mild Pain (1 - 3)  aluminum hydroxide/magnesium hydroxide/simethicone Suspension 30 milliLiter(s) Oral every 4 hours PRN Dyspepsia  bisacodyl Suppository 10 milliGRAM(s) Rectal daily PRN Constipation  dextrose Oral Gel 15 Gram(s) Oral once PRN Blood Glucose LESS THAN 70 milliGRAM(s)/deciliter  hydrALAZINE 50 milliGRAM(s) Oral every 6 hours PRN for systolic BP>160  magnesium hydroxide Suspension 30 milliLiter(s) Oral daily PRN Constipation  melatonin 3 milliGRAM(s) Oral at bedtime PRN Insomnia  ondansetron Injectable 4 milliGRAM(s) IV Push every 8 hours PRN Nausea and/or Vomiting  sodium chloride 0.9% lock flush 10 milliLiter(s) IV Push every 1 hour PRN Pre/post blood products, medications, blood draw, and to maintain line patency      REVIEW OF SYSTEMS:  CONSTITUTIONAL: No fever,  RESPIRATORY: No cough, wheezing, shortness of breath  CARDIOVASCULAR: No chest pain, dyspnea, palpitations, dizziness, syncope, paroxysmal nocturnal dyspnea, orthopnea, or arm or leg swelling  GASTROINTESTINAL: No abdominal  or epigastric pain, nausea, vomiting,  diarrhea  NEUROLOGICAL: No headaches, numbness, or tremors  Skin : No itching .  Hematology : No active bleeding .  Endocrinology : No heat and cold intolerance .  Psychiatry : Patient is confused .  Genitourinary : No dysuria .  Musculoskeletal : Patient has mild arthritis .              Vital Signs Last 24 Hrs  T(C): 36.6 (12 Apr 2023 04:37), Max: 37 (11 Apr 2023 13:03)  T(F): 97.9 (12 Apr 2023 04:37), Max: 98.6 (11 Apr 2023 13:03)  HR: 80 (12 Apr 2023 04:37) (68 - 89)  BP: 125/94 (12 Apr 2023 04:37) (125/94 - 149/72)  BP(mean): --  RR: 18 (12 Apr 2023 04:37) (17 - 18)  SpO2: 95% (12 Apr 2023 04:37) (91% - 95%)    Parameters below as of 12 Apr 2023 04:37  Patient On (Oxygen Delivery Method): room air        PHYSICAL EXAM:  HEAD:  Atraumatic, Normocephalic  NECK: Supple and normal thyroid.  No JVD or carotid bruit.   HEART: S1, S2 irregular , 1/6 soft ejection systolic murmur in mitral area , no thrill and no gallops .  PULMONARY: Bilateral vesicular breathing , few scattered rhonchi and few scattered rales are present .  ABDOMEN: Soft nontender and positive bowl sounds   EXTREMITIES:  No clubbing, cyanosis, or pedal  edema  NEUROLOGICAL: AA and mild confused . Bilateral feet in dressing .  Skin : No rashes .  Musculoskeletal : No joint swellings     LABS:                        9.3    7.90  )-----------( 325      ( 12 Apr 2023 10:09 )             31.0     04-12    139  |  110<H>  |  31<H>  ----------------------------<  216<H>  3.9   |  23  |  1.40<H>    Ca    8.8      12 Apr 2023 10:09    TPro  6.2  /  Alb  1.9<L>  /  TBili  0.5  /  DBili  0.2  /  AST  17  /  ALT  18  /  AlkPhos  66  04-12            Assessment/Plan  Patient has :  1) Left foot infection and toe osteomyelitis . S/P Foot surgery   2) Hypertension and stable .  3) DM .  4) H/O COPD   5) Mild chronic systolic and diastolic heart failure and stable   6) Anemia   7) Dementia   8) Paroxysmal atrial fibrillation with mild fast ventricular rate   9 ) COVID positive   10) Atrial fibrillation with intermittent fast ventricular rate and aberrant conduction and looks like NSVT but it is not NSVT but rapid atrial fibrillation with aberrance .  Plan : 1) I/V antibiotics as per ID 2) Monitor hemoglobin and electrolytes 3) Rest of medications as such . 4) Resume Lovenox when OK with surgeon  6) Increase metoprolol 100 mg two  times a day .  Will continue to follow during hospital course and give further recommendations as needed . Thanks for your referral . if any questions please contact me at office ([phone] cell [phone])

## 2023-04-13 DIAGNOSIS — A49.02 METHICILLIN RESISTANT STAPHYLOCOCCUS AUREUS INFECTION, UNSPECIFIED SITE: ICD-10-CM

## 2023-04-13 LAB
ALBUMIN SERPL ELPH-MCNC: 2 G/DL — LOW (ref 3.3–5)
ALP SERPL-CCNC: 64 U/L — SIGNIFICANT CHANGE UP (ref 40–120)
ALT FLD-CCNC: 19 U/L — SIGNIFICANT CHANGE UP (ref 12–78)
AST SERPL-CCNC: 18 U/L — SIGNIFICANT CHANGE UP (ref 15–37)
BILIRUB DIRECT SERPL-MCNC: 0.2 MG/DL — SIGNIFICANT CHANGE UP (ref 0–0.3)
BILIRUB INDIRECT FLD-MCNC: 0.2 MG/DL — SIGNIFICANT CHANGE UP (ref 0.2–1)
BILIRUB SERPL-MCNC: 0.4 MG/DL — SIGNIFICANT CHANGE UP (ref 0.2–1.2)
CREAT SERPL-MCNC: 1.4 MG/DL — HIGH (ref 0.5–1.3)
EGFR: 49 ML/MIN/1.73M2 — LOW
PROT SERPL-MCNC: 6.3 G/DL — SIGNIFICANT CHANGE UP (ref 6–8.3)

## 2023-04-13 PROCEDURE — 99233 SBSQ HOSP IP/OBS HIGH 50: CPT

## 2023-04-13 PROCEDURE — 99024 POSTOP FOLLOW-UP VISIT: CPT

## 2023-04-13 RX ORDER — TRAMADOL HYDROCHLORIDE 50 MG/1
50 TABLET ORAL
Refills: 0 | Status: DISCONTINUED | OUTPATIENT
Start: 2023-04-13 | End: 2023-04-14

## 2023-04-13 RX ORDER — MORPHINE SULFATE 50 MG/1
2 CAPSULE, EXTENDED RELEASE ORAL EVERY 6 HOURS
Refills: 0 | Status: DISCONTINUED | OUTPATIENT
Start: 2023-04-13 | End: 2023-04-14

## 2023-04-13 RX ORDER — VANCOMYCIN HCL 1 G
750 VIAL (EA) INTRAVENOUS EVERY 24 HOURS
Refills: 0 | Status: DISCONTINUED | OUTPATIENT
Start: 2023-04-13 | End: 2023-04-14

## 2023-04-13 RX ADMIN — Medication 2: at 08:09

## 2023-04-13 RX ADMIN — INSULIN GLARGINE 8 UNIT(S): 100 INJECTION, SOLUTION SUBCUTANEOUS at 21:20

## 2023-04-13 RX ADMIN — DONEPEZIL HYDROCHLORIDE 5 MILLIGRAM(S): 10 TABLET, FILM COATED ORAL at 21:01

## 2023-04-13 RX ADMIN — Medication 100 MILLIGRAM(S): at 18:22

## 2023-04-13 RX ADMIN — Medication 2: at 17:06

## 2023-04-13 RX ADMIN — SIMVASTATIN 40 MILLIGRAM(S): 20 TABLET, FILM COATED ORAL at 21:01

## 2023-04-13 RX ADMIN — Medication 250 MILLIGRAM(S): at 10:01

## 2023-04-13 RX ADMIN — LISINOPRIL 2.5 MILLIGRAM(S): 2.5 TABLET ORAL at 05:26

## 2023-04-13 RX ADMIN — ENOXAPARIN SODIUM 60 MILLIGRAM(S): 100 INJECTION SUBCUTANEOUS at 05:27

## 2023-04-13 RX ADMIN — SENNA PLUS 2 TABLET(S): 8.6 TABLET ORAL at 21:01

## 2023-04-13 RX ADMIN — Medication 1 TABLET(S): at 17:08

## 2023-04-13 RX ADMIN — MORPHINE SULFATE 2 MILLIGRAM(S): 50 CAPSULE, EXTENDED RELEASE ORAL at 15:55

## 2023-04-13 RX ADMIN — BUDESONIDE AND FORMOTEROL FUMARATE DIHYDRATE 2 PUFF(S): 160; 4.5 AEROSOL RESPIRATORY (INHALATION) at 21:19

## 2023-04-13 RX ADMIN — TAMSULOSIN HYDROCHLORIDE 0.4 MILLIGRAM(S): 0.4 CAPSULE ORAL at 21:01

## 2023-04-13 RX ADMIN — Medication 100 MILLIGRAM(S): at 05:27

## 2023-04-13 RX ADMIN — Medication 1 TABLET(S): at 08:13

## 2023-04-13 RX ADMIN — Medication 4: at 12:05

## 2023-04-13 RX ADMIN — Medication 1 TABLET(S): at 12:06

## 2023-04-13 RX ADMIN — BUDESONIDE AND FORMOTEROL FUMARATE DIHYDRATE 2 PUFF(S): 160; 4.5 AEROSOL RESPIRATORY (INHALATION) at 09:30

## 2023-04-13 RX ADMIN — ENOXAPARIN SODIUM 60 MILLIGRAM(S): 100 INJECTION SUBCUTANEOUS at 17:07

## 2023-04-13 RX ADMIN — PANTOPRAZOLE SODIUM 40 MILLIGRAM(S): 20 TABLET, DELAYED RELEASE ORAL at 12:15

## 2023-04-13 RX ADMIN — PIPERACILLIN AND TAZOBACTAM 25 GRAM(S): 4; .5 INJECTION, POWDER, LYOPHILIZED, FOR SOLUTION INTRAVENOUS at 05:27

## 2023-04-13 NOTE — PROGRESS NOTE ADULT - PROBLEM SELECTOR PLAN 1
Patient examined and evaluated.  Surgical site with post op erythema and edema and also with tenderness to the left foot   Surgical site dressed with silver alginate  and dry, sterile dressing.  Patient is to be partial weight bearing to the left heel if can tolerate ambulation   OR first metatarsal with extensive acute and chronic OM,  Proximal margin with no osteomyelitis   Bone scan (+) for OM to the right heel with stable dry stable eschar  Antibiotics as per ID recommendations  Continue with offloading boots to BLE when not ambulating.    Wound Care Instructions:  -Keep dressing clean, dry, and intact to the right heel / left  foot wounds   -Apply silver alginate and dry sterile  dressing to the right heel  and left foot, change every other day   -Monitor for any signs of infection including redness, swelling in the leg above the bandage, nausea/vomiting/fever/chills/chest pain/shortness of breath, if any are present patient must report to the emergency department immediately  -Patient is to follow up with Dr. Moreau/Dr. Neri/ Dr. Armando  within 5 days after discharge at [facility] [place]. Please call to make an appointment [phone] Patient examined and evaluated.  Surgical site with post op erythema and edema and also with tenderness to the left foot   Surgical site dressed with silver alginate  and dry, sterile dressing.  Patient is to be partial weight bearing to the left heel if can tolerate ambulation   OR first metatarsal with extensive acute and chronic OM,  Proximal margin with no osteomyelitis   Bone scan (+) for OM to the right heel with stable dry stable eschar  Antibiotics as per ID recommendations  Continue with offloading boots to BLE when not ambulating.    Wound Care Instructions:  -Keep dressing clean, dry, and intact to the right heel / left  foot wounds   -Apply silver alginate and dry sterile  dressing to the right heel  and left foot, change every other day   -Monitor for any signs of infection including redness, swelling in the leg above the bandage, nausea/vomiting/fever/chills/chest pain/shortness of breath, if any are present patient must report to the emergency department immediately  -Patient is to follow up with Dr. Moreau/Dr. Neri/ Dr. Armando  within 5 days after discharge at [facility] [place]. Please call to make an appointment [phone] Patient examined and evaluated.  Surgical site with post op erythema and edema and also with tenderness to the left foot   Surgical site dressed with silver alginate  and dry, sterile dressing.  Patient is to be partial weight bearing to the left heel if can tolerate ambulation   OR first metatarsal with extensive acute and chronic OM,  Proximal margin with no osteomyelitis   Bone scan (+) for OM to the right heel with stable dry stable eschar  Antibiotics as per ID recommendations  Continue with offloading boots to BLE when not ambulating.    Wound Care Instructions:  -Keep dressing clean, dry, and intact to the right heel / left  foot wounds   -Apply silver alginate and dry sterile  dressing to the right heel  and left foot, change every other day   -Monitor for any signs of infection including redness, swelling in the leg above the bandage, nausea/vomiting/fever/chills/chest pain/shortness of breath, if any are present patient must report to the emergency department immediately  -Patient is to follow up with Dr. Moreau/Dr. Neri/ Dr. Armando  within 5 days after discharge at [facility] [place]. Please call to make an appointment [phone]

## 2023-04-13 NOTE — PROGRESS NOTE ADULT - ASSESSMENT
88 yo male ,[facility] resident with PMHx - COPD, DM, HTN, CAD, HLD, H/O TIA, dementia, GERD, BPH and depression, who was sent for evaluation of left foot 1metatarsal wound. Concern for wound infection with underlying osteomyelitis. Bone scan suspicious for L foot 1st toe osteomyelitis. Cannot exclude osteomyelitis on R heel. S/p Resection of head of first metatarsal bone and Sesamoidectomy of L foot on 4/10. Operatively noted to have Left first metatarsal head exposed with necrotic soft tissue and purulent drainage. First metatarsal head culture growing MRSA and corynebacterium. Path showed no evidence of osteomyelitis on proximal margin.    Incidentally found to be COVID positive on 4/4. Completed 3 doses of remdesivir. Remains afebrile and without leukocytosis.     -suggest vancomycin 750mg IV q24h  -discontinue Zosyn  -contact isolation    Isis Mancia MD  Division of Infectious Diseases   Cell [phone] between 8am and 6pm   After 6pm and weekends please call ID service at [phone].   86 yo male ,[facility], [facility] resident with PMHx - COPD, DM, HTN, CAD, HLD, H/O TIA, dementia, GERD, BPH and depression, who was sent for evaluation of left foot 1metatarsal wound. Concern for wound infection with underlying osteomyelitis. Bone scan suspicious for L foot 1st toe osteomyelitis. Cannot exclude osteomyelitis on R heel. S/p Resection of head of first metatarsal bone and Sesamoidectomy of L foot on 4/10. Operatively noted to have Left first metatarsal head exposed with necrotic soft tissue and purulent drainage. First metatarsal head culture growing MRSA and corynebacterium. Path showed no evidence of osteomyelitis on proximal margin.    Incidentally found to be COVID positive on 4/4. Completed 3 doses of remdesivir. Remains afebrile and without leukocytosis.     -suggest vancomycin 750mg IV q24h  -discontinue Zosyn  -contact isolation    Isis Mancia MD  Division of Infectious Diseases   Cell [phone] between 8am and 6pm   After 6pm and weekends please call ID service at [phone].   86 yo male ,[facility] resident with PMHx - COPD, DM, HTN, CAD, HLD, H/O TIA, dementia, GERD, BPH and depression, who was sent for evaluation of left foot 1metatarsal wound. Concern for wound infection with underlying osteomyelitis. Bone scan suspicious for L foot 1st toe osteomyelitis. Cannot exclude osteomyelitis on R heel. S/p Resection of head of first metatarsal bone and Sesamoidectomy of L foot on 4/10. Operatively noted to have Left first metatarsal head exposed with necrotic soft tissue and purulent drainage. First metatarsal head culture growing MRSA and corynebacterium. Path showed no evidence of osteomyelitis on proximal margin.    Incidentally found to be COVID positive on 4/4. Completed 3 doses of remdesivir. Remains afebrile and without leukocytosis.     -suggest vancomycin 750mg IV q24h  -discontinue Zosyn  -contact isolation    Isis Mancia MD  Division of Infectious Diseases   Cell [phone] between 8am and 6pm   After 6pm and weekends please call ID service at [phone].   86 yo male ,[facility] resident with PMHx - COPD, DM, HTN, CAD, HLD, H/O TIA, dementia, GERD, BPH and depression, who was sent for evaluation of left foot 1metatarsal wound. Concern for wound infection with underlying osteomyelitis. Bone scan suspicious for L foot 1st toe osteomyelitis. Cannot exclude osteomyelitis on R heel. S/p Resection of head of first metatarsal bone and Sesamoidectomy of L foot on 4/10. Operatively noted to have Left first metatarsal head exposed with necrotic soft tissue and purulent drainage.     First metatarsal head culture growing MRSA and corynebacterium. Path showed no evidence of osteomyelitis on proximal margin; however would still treat for osteomyelitis since bone scan concerning for osteomyelitis on R heel. Would only tentatively give vancomycin for 4 weeks given high risk for renal failure, if wound improving can switch to PO antibiotics sooner. PICC placed on 4/13. Remains afebrile and without leukocytosis.     -suggest vancomycin 750mg IV q24h--complete 4 week course until May 11, can extend if needed pending further clinical improvement  -AUC based vancomycin dosing per pharmacy  -contact isolation    Isis Mancia MD  Division of Infectious Diseases   Cell [phone] between 8am and 6pm   After 6pm and weekends please call ID service at [phone].   86 yo male ,[facility] resident with PMHx - COPD, DM, HTN, CAD, HLD, H/O TIA, dementia, GERD, BPH and depression, who was sent for evaluation of left foot 1metatarsal wound. Concern for wound infection with underlying osteomyelitis. Bone scan suspicious for L foot 1st toe osteomyelitis. Cannot exclude osteomyelitis on R heel. S/p Resection of head of first metatarsal bone and Sesamoidectomy of L foot on 4/10. Operatively noted to have Left first metatarsal head exposed with necrotic soft tissue and purulent drainage.     First metatarsal head culture growing MRSA and corynebacterium. Path showed no evidence of osteomyelitis on proximal margin; however would still treat for osteomyelitis since bone scan concerning for osteomyelitis on R heel. Would only tentatively give vancomycin for 4 weeks given high risk for renal failure, if wound improving can switch to PO antibiotics sooner. PICC placed on 4/13. Remains afebrile and without leukocytosis.     -suggest vancomycin 750mg IV q24h--complete 4 week course until May 11, can extend if needed pending further clinical improvement  -AUC based vancomycin dosing per pharmacy  -contact isolation    Isis Mancia MD  Division of Infectious Diseases   Cell [phone] between 8am and 6pm   After 6pm and weekends please call ID service at [phone].   86 yo male ,[facility] resident with PMHx - COPD, DM, HTN, CAD, HLD, H/O TIA, dementia, GERD, BPH and depression, who was sent for evaluation of left foot 1metatarsal wound. Concern for wound infection with underlying osteomyelitis. Bone scan suspicious for L foot 1st toe osteomyelitis. Cannot exclude osteomyelitis on R heel. S/p Resection of head of first metatarsal bone and Sesamoidectomy of L foot on 4/10. Operatively noted to have Left first metatarsal head exposed with necrotic soft tissue and purulent drainage.     First metatarsal head culture growing MRSA and corynebacterium. Path showed no evidence of osteomyelitis on proximal margin; however would still treat for osteomyelitis since bone scan concerning for osteomyelitis on R heel. Would only tentatively give vancomycin for 4 weeks given high risk for renal failure, if wound improving can switch to PO antibiotics sooner. PICC placed on 4/13. Remains afebrile and without leukocytosis.     -suggest vancomycin 750mg IV q24h--complete 4 week course until May 11, can extend if needed pending further clinical improvement  -AUC based vancomycin dosing per pharmacy  -contact isolation    Isis Mancia MD  Division of Infectious Diseases   Cell [phone] between 8am and 6pm   After 6pm and weekends please call ID service at [phone].

## 2023-04-13 NOTE — PROGRESS NOTE ADULT - SUBJECTIVE AND OBJECTIVE BOX
[facility]  INFECTIOUS DISEASES - Ruma Mayes, [place] [address]  Tel: [phone]     Fax: [phone]  =======================================================    DEANANA MARIA TAYLOR [id number]    Follow up: No fevers.    Allergies:  No Known Allergies      Antibiotics:  acetaminophen     Tablet .. 650 milliGRAM(s) Oral every 6 hours PRN  aluminum hydroxide/magnesium hydroxide/simethicone Suspension 30 milliLiter(s) Oral every 4 hours PRN  bisacodyl Suppository 10 milliGRAM(s) Rectal daily PRN  budesonide 160 MICROgram(s)/formoterol 4.5 MICROgram(s) Inhaler 2 Puff(s) Inhalation two times a day  dextrose 50% Injectable 25 Gram(s) IV Push once  dextrose 50% Injectable 12.5 Gram(s) IV Push once  dextrose 50% Injectable 25 Gram(s) IV Push once  dextrose Oral Gel 15 Gram(s) Oral once PRN  donepezil 5 milliGRAM(s) Oral at bedtime  DULoxetine 60 milliGRAM(s) Oral daily  enoxaparin Injectable 60 milliGRAM(s) SubCutaneous every 12 hours  glucagon  Injectable 1 milliGRAM(s) IntraMuscular once  hydrALAZINE 50 milliGRAM(s) Oral every 6 hours PRN  insulin glargine Injectable (LANTUS) 8 Unit(s) SubCutaneous at bedtime  insulin lispro (ADMELOG) corrective regimen sliding scale   SubCutaneous three times a day before meals  insulin lispro (ADMELOG) corrective regimen sliding scale   SubCutaneous at bedtime  lactobacillus acidophilus 1 Tablet(s) Oral two times a day with meals  lisinopril 2.5 milliGRAM(s) Oral daily  magnesium hydroxide Suspension 30 milliLiter(s) Oral daily PRN  melatonin 3 milliGRAM(s) Oral at bedtime PRN  metoprolol tartrate 100 milliGRAM(s) Oral two times a day  morphine  - Injectable 2 milliGRAM(s) IV Push every 6 hours PRN  multivitamin 1 Tablet(s) Oral daily  ondansetron Injectable 4 milliGRAM(s) IV Push every 8 hours PRN  pantoprazole   Suspension 40 milliGRAM(s) Oral daily  senna 2 Tablet(s) Oral at bedtime  simvastatin 40 milliGRAM(s) Oral at bedtime  sodium chloride 0.9% lock flush 10 milliLiter(s) IV Push every 1 hour PRN  tamsulosin 0.4 milliGRAM(s) Oral at bedtime  traMADol 50 milliGRAM(s) Oral four times a day PRN  vancomycin  IVPB 750 milliGRAM(s) IV Intermittent every 24 hours       REVIEW OF SYSTEMS:  unable to obtain 2/2 dementia     Physical Exam:  ICU Vital Signs Last 24 Hrs  T(C): 36.4 (13 Apr 2023 12:16), Max: 36.4 (12 Apr 2023 21:55)  T(F): 97.6 (13 Apr 2023 12:16), Max: 97.6 (13 Apr 2023 12:16)  HR: 70 (13 Apr 2023 12:16) (70 - 97)  BP: 117/67 (13 Apr 2023 12:16) (113/68 - 142/76)  BP(mean): --  ABP: --  ABP(mean): --  RR: 18 (13 Apr 2023 12:16) (17 - 18)  SpO2: 94% (13 Apr 2023 12:16) (91% - 97%)    O2 Parameters below as of 13 Apr 2023 12:16  Patient On (Oxygen Delivery Method): room air      GEN: NAD  HEENT: normocephalic and atraumatic.   NECK: Supple.   LUNGS: Normal respiratory effort  HEART: Regular rate and rhythm   ABDOMEN: Soft, nontender, and nondistended.    EXTREMITIES: No leg edema. RUE PICC--no surrounding erythema or swelling  NEUROLOGIC: awake, not responding to most questions appropriately    Labs:  04-13    x   |  x   |  x   ----------------------------<  x   x    |  x   |  1.40<H>    Ca    8.8      12 Apr 2023 10:09    TPro  6.3  /  Alb  2.0<L>  /  TBili  0.4  /  DBili  0.2  /  AST  18  /  ALT  19  /  AlkPhos  64  04-13                          9.3    7.90  )-----------( 325      ( 12 Apr 2023 10:09 )             31.0         LIVER FUNCTIONS - ( 13 Apr 2023 06:53 )  Alb: 2.0 g/dL / Pro: 6.3 g/dL / ALK PHOS: 64 U/L / ALT: 19 U/L / AST: 18 U/L / GGT: x             RECENT CULTURES:  04-10 @ 13:10 .Tissue Other, LEFT FOOT 1ST METATARSAL HEAD Methicillin resistant Staphylococcus aureus    Rare Methicillin Resistant Staphylococcus aureus  Few Corynebacterium species "Susceptibilities not performed"    No polymorphonuclear cells seen per low power field  No organisms seen per oil power field      04-02 @ 09:15 Clean Catch Clean Catch (Midstream) Enterococcus faecalis    >100,000 CFU/ml Enterococcus faecalis              All imaging and data are reviewed.    [facility]  INFECTIOUS DISEASES - Ruma Mayes, [place] [address]  Tel: [phone]     Fax: [phone]  =======================================================    DEANANA MARIA TAYLOR [id number]    Follow up: No fevers.    Allergies:  No Known Allergies      Antibiotics:  acetaminophen     Tablet .. 650 milliGRAM(s) Oral every 6 hours PRN  aluminum hydroxide/magnesium hydroxide/simethicone Suspension 30 milliLiter(s) Oral every 4 hours PRN  bisacodyl Suppository 10 milliGRAM(s) Rectal daily PRN  budesonide 160 MICROgram(s)/formoterol 4.5 MICROgram(s) Inhaler 2 Puff(s) Inhalation two times a day  dextrose 50% Injectable 25 Gram(s) IV Push once  dextrose 50% Injectable 12.5 Gram(s) IV Push once  dextrose 50% Injectable 25 Gram(s) IV Push once  dextrose Oral Gel 15 Gram(s) Oral once PRN  donepezil 5 milliGRAM(s) Oral at bedtime  DULoxetine 60 milliGRAM(s) Oral daily  enoxaparin Injectable 60 milliGRAM(s) SubCutaneous every 12 hours  glucagon  Injectable 1 milliGRAM(s) IntraMuscular once  hydrALAZINE 50 milliGRAM(s) Oral every 6 hours PRN  insulin glargine Injectable (LANTUS) 8 Unit(s) SubCutaneous at bedtime  insulin lispro (ADMELOG) corrective regimen sliding scale   SubCutaneous three times a day before meals  insulin lispro (ADMELOG) corrective regimen sliding scale   SubCutaneous at bedtime  lactobacillus acidophilus 1 Tablet(s) Oral two times a day with meals  lisinopril 2.5 milliGRAM(s) Oral daily  magnesium hydroxide Suspension 30 milliLiter(s) Oral daily PRN  melatonin 3 milliGRAM(s) Oral at bedtime PRN  metoprolol tartrate 100 milliGRAM(s) Oral two times a day  morphine  - Injectable 2 milliGRAM(s) IV Push every 6 hours PRN  multivitamin 1 Tablet(s) Oral daily  ondansetron Injectable 4 milliGRAM(s) IV Push every 8 hours PRN  pantoprazole   Suspension 40 milliGRAM(s) Oral daily  senna 2 Tablet(s) Oral at bedtime  simvastatin 40 milliGRAM(s) Oral at bedtime  sodium chloride 0.9% lock flush 10 milliLiter(s) IV Push every 1 hour PRN  tamsulosin 0.4 milliGRAM(s) Oral at bedtime  traMADol 50 milliGRAM(s) Oral four times a day PRN  vancomycin  IVPB 750 milliGRAM(s) IV Intermittent every 24 hours       REVIEW OF SYSTEMS:  unable to obtain 2/2 dementia     Physical Exam:  ICU Vital Signs Last 24 Hrs  T(C): 36.4 (13 Apr 2023 12:16), Max: 36.4 (12 Apr 2023 21:55)  T(F): 97.6 (13 Apr 2023 12:16), Max: 97.6 (13 Apr 2023 12:16)  HR: 70 (13 Apr 2023 12:16) (70 - 97)  BP: 117/67 (13 Apr 2023 12:16) (113/68 - 142/76)  BP(mean): --  ABP: --  ABP(mean): --  RR: 18 (13 Apr 2023 12:16) (17 - 18)  SpO2: 94% (13 Apr 2023 12:16) (91% - 97%)    O2 Parameters below as of 13 Apr 2023 12:16  Patient On (Oxygen Delivery Method): room air      GEN: NAD  HEENT: normocephalic and atraumatic.   NECK: Supple.   LUNGS: Normal respiratory effort  HEART: Regular rate and rhythm   ABDOMEN: Soft, nontender, and nondistended.    EXTREMITIES: No leg edema. RUE PICC--no surrounding erythema or swelling  NEUROLOGIC: awake, not responding to most questions appropriately    Labs:  04-13    x   |  x   |  x   ----------------------------<  x   x    |  x   |  1.40<H>    Ca    8.8      12 Apr 2023 10:09    TPro  6.3  /  Alb  2.0<L>  /  TBili  0.4  /  DBili  0.2  /  AST  18  /  ALT  19  /  AlkPhos  64  04-13                          9.3    7.90  )-----------( 325      ( 12 Apr 2023 10:09 )             31.0         LIVER FUNCTIONS - ( 13 Apr 2023 06:53 )  Alb: 2.0 g/dL / Pro: 6.3 g/dL / ALK PHOS: 64 U/L / ALT: 19 U/L / AST: 18 U/L / GGT: x             RECENT CULTURES:  04-10 @ 13:10 .Tissue Other, LEFT FOOT 1ST METATARSAL HEAD Methicillin resistant Staphylococcus aureus    Rare Methicillin Resistant Staphylococcus aureus  Few Corynebacterium species "Susceptibilities not performed"    No polymorphonuclear cells seen per low power field  No organisms seen per oil power field      04-02 @ 09:15 Clean Catch Clean Catch (Midstream) Enterococcus faecalis    >100,000 CFU/ml Enterococcus faecalis              All imaging and data are reviewed.    [facility]  INFECTIOUS DISEASES - [address]uma Mayes, [place] [address]  Tel: [phone]     Fax: [phone]  =======================================================    DEANANA MARIA TAYLOR [id number]    Follow up: No fevers.    Allergies:  No Known Allergies      Antibiotics:  acetaminophen     Tablet .. 650 milliGRAM(s) Oral every 6 hours PRN  aluminum hydroxide/magnesium hydroxide/simethicone Suspension 30 milliLiter(s) Oral every 4 hours PRN  bisacodyl Suppository 10 milliGRAM(s) Rectal daily PRN  budesonide 160 MICROgram(s)/formoterol 4.5 MICROgram(s) Inhaler 2 Puff(s) Inhalation two times a day  dextrose 50% Injectable 25 Gram(s) IV Push once  dextrose 50% Injectable 12.5 Gram(s) IV Push once  dextrose 50% Injectable 25 Gram(s) IV Push once  dextrose Oral Gel 15 Gram(s) Oral once PRN  donepezil 5 milliGRAM(s) Oral at bedtime  DULoxetine 60 milliGRAM(s) Oral daily  enoxaparin Injectable 60 milliGRAM(s) SubCutaneous every 12 hours  glucagon  Injectable 1 milliGRAM(s) IntraMuscular once  hydrALAZINE 50 milliGRAM(s) Oral every 6 hours PRN  insulin glargine Injectable (LANTUS) 8 Unit(s) SubCutaneous at bedtime  insulin lispro (ADMELOG) corrective regimen sliding scale   SubCutaneous three times a day before meals  insulin lispro (ADMELOG) corrective regimen sliding scale   SubCutaneous at bedtime  lactobacillus acidophilus 1 Tablet(s) Oral two times a day with meals  lisinopril 2.5 milliGRAM(s) Oral daily  magnesium hydroxide Suspension 30 milliLiter(s) Oral daily PRN  melatonin 3 milliGRAM(s) Oral at bedtime PRN  metoprolol tartrate 100 milliGRAM(s) Oral two times a day  morphine  - Injectable 2 milliGRAM(s) IV Push every 6 hours PRN  multivitamin 1 Tablet(s) Oral daily  ondansetron Injectable 4 milliGRAM(s) IV Push every 8 hours PRN  pantoprazole   Suspension 40 milliGRAM(s) Oral daily  senna 2 Tablet(s) Oral at bedtime  simvastatin 40 milliGRAM(s) Oral at bedtime  sodium chloride 0.9% lock flush 10 milliLiter(s) IV Push every 1 hour PRN  tamsulosin 0.4 milliGRAM(s) Oral at bedtime  traMADol 50 milliGRAM(s) Oral four times a day PRN  vancomycin  IVPB 750 milliGRAM(s) IV Intermittent every 24 hours       REVIEW OF SYSTEMS:  unable to obtain 2/2 dementia     Physical Exam:  ICU Vital Signs Last 24 Hrs  T(C): 36.4 (13 Apr 2023 12:16), Max: 36.4 (12 Apr 2023 21:55)  T(F): 97.6 (13 Apr 2023 12:16), Max: 97.6 (13 Apr 2023 12:16)  HR: 70 (13 Apr 2023 12:16) (70 - 97)  BP: 117/67 (13 Apr 2023 12:16) (113/68 - 142/76)  BP(mean): --  ABP: --  ABP(mean): --  RR: 18 (13 Apr 2023 12:16) (17 - 18)  SpO2: 94% (13 Apr 2023 12:16) (91% - 97%)    O2 Parameters below as of 13 Apr 2023 12:16  Patient On (Oxygen Delivery Method): room air      GEN: NAD  HEENT: normocephalic and atraumatic.   NECK: Supple.   LUNGS: Normal respiratory effort  HEART: Regular rate and rhythm   ABDOMEN: Soft, nontender, and nondistended.    EXTREMITIES: No leg edema. RUE PICC--no surrounding erythema or swelling  NEUROLOGIC: awake, not responding to most questions appropriately    Labs:  04-13    x   |  x   |  x   ----------------------------<  x   x    |  x   |  1.40<H>    Ca    8.8      12 Apr 2023 10:09    TPro  6.3  /  Alb  2.0<L>  /  TBili  0.4  /  DBili  0.2  /  AST  18  /  ALT  19  /  AlkPhos  64  04-13                          9.3    7.90  )-----------( 325      ( 12 Apr 2023 10:09 )             31.0         LIVER FUNCTIONS - ( 13 Apr 2023 06:53 )  Alb: 2.0 g/dL / Pro: 6.3 g/dL / ALK PHOS: 64 U/L / ALT: 19 U/L / AST: 18 U/L / GGT: x             RECENT CULTURES:  04-10 @ 13:10 .Tissue Other, LEFT FOOT 1ST METATARSAL HEAD Methicillin resistant Staphylococcus aureus    Rare Methicillin Resistant Staphylococcus aureus  Few Corynebacterium species "Susceptibilities not performed"    No polymorphonuclear cells seen per low power field  No organisms seen per oil power field      04-02 @ 09:15 Clean Catch Clean Catch (Midstream) Enterococcus faecalis    >100,000 CFU/ml Enterococcus faecalis              All imaging and data are reviewed.

## 2023-04-13 NOTE — PROGRESS NOTE ADULT - ASSESSMENT
86 yo male ,[facility] with PMHx - COPD, DM, HTN, CAD, HLD, H/O TIA, dementia,GERD, BPH and depression sent ot ER for evaluation of left foot 1metatarsal wound ,suggestive of osteomyelitis .Patient was seen by ID consult and transfer to the hospital recommended for wound cx/bone biopsy /podiatry evaluation ,likely will require 4-6 weeks of iv abx . [facility] b/l feet wounds and was followed by wound care team ,recently completed 7 days of doxycycline for foot wound cellulitis . 86 yo male ,[facility] resident with PMHx - COPD, DM, HTN, CAD, HLD, H/O TIA, dementia,GERD, BPH and depression sent ot ER for evaluation of left foot 1metatarsal wound ,suggestive of osteomyelitis .Patient was seen by ID consult and transfer to the hospital recommended for wound cx/bone biopsy /podiatry evaluation ,likely will require 4-6 weeks of iv abx . Patient was admitted to [facility] with b/l feet wounds and was followed by wound care team ,recently completed 7 days of doxycycline for foot wound cellulitis . 88 yo male ,[facility] resident with PMHx - COPD, DM, HTN, CAD, HLD, H/O TIA, dementia,GERD, BPH and depression sent ot ER for evaluation of left foot 1metatarsal wound ,suggestive of osteomyelitis .Patient was seen by ID consult and transfer to the hospital recommended for wound cx/bone biopsy /podiatry evaluation ,likely will require 4-6 weeks of iv abx . Patient was admitted to [facility] with b/l feet wounds and was followed by wound care team ,recently completed 7 days of doxycycline for foot wound cellulitis .

## 2023-04-13 NOTE — PROVIDER CONTACT NOTE (OTHER) - ASSESSMENT
Pt a&o0, Pt asymptomatic
Pt awake and alert, confused, in no apparent physical distress.
No s./s of chest pain
pt asymptomatic, sleeping comfortably at this time.
Pt a&o0, VSS at time of event. pt shows no s/s of distress, CP. Pt on tele monitor. called by tele tech that Pt had 8 beats vtac.  Pt baseline rhythm afib. Not first event of vtac
Patient not in any distress. VSS
Pt in bed no s/s of chest pain observed

## 2023-04-13 NOTE — PROGRESS NOTE ADULT - SUBJECTIVE AND OBJECTIVE BOX
87y year old Male seen at hospitals 1EAS 101 W1 s/p  left foot first metatarsal head resection DOS 04/10/23.  Patient is non verbal and has been in and out of sleep. Patient is tender in the area of the surgical site. Patient had the foot overhanging on the side rale.           Allergies    No Known Allergies    Intolerances        MEDICATIONS  (STANDING):  budesonide 160 MICROgram(s)/formoterol 4.5 MICROgram(s) Inhaler 2 Puff(s) Inhalation two times a day  dextrose 50% Injectable 25 Gram(s) IV Push once  dextrose 50% Injectable 12.5 Gram(s) IV Push once  dextrose 50% Injectable 25 Gram(s) IV Push once  donepezil 5 milliGRAM(s) Oral at bedtime  DULoxetine 60 milliGRAM(s) Oral daily  enoxaparin Injectable 60 milliGRAM(s) SubCutaneous every 12 hours  glucagon  Injectable 1 milliGRAM(s) IntraMuscular once  insulin glargine Injectable (LANTUS) 8 Unit(s) SubCutaneous at bedtime  insulin lispro (ADMELOG) corrective regimen sliding scale   SubCutaneous three times a day before meals  insulin lispro (ADMELOG) corrective regimen sliding scale   SubCutaneous at bedtime  lactobacillus acidophilus 1 Tablet(s) Oral two times a day with meals  lisinopril 2.5 milliGRAM(s) Oral daily  metoprolol tartrate 100 milliGRAM(s) Oral two times a day  multivitamin 1 Tablet(s) Oral daily  pantoprazole   Suspension 40 milliGRAM(s) Oral daily  senna 2 Tablet(s) Oral at bedtime  simvastatin 40 milliGRAM(s) Oral at bedtime  tamsulosin 0.4 milliGRAM(s) Oral at bedtime  vancomycin  IVPB 750 milliGRAM(s) IV Intermittent every 24 hours    MEDICATIONS  (PRN):  acetaminophen     Tablet .. 650 milliGRAM(s) Oral every 6 hours PRN Temp greater or equal to 38C (100.4F), Mild Pain (1 - 3)  aluminum hydroxide/magnesium hydroxide/simethicone Suspension 30 milliLiter(s) Oral every 4 hours PRN Dyspepsia  bisacodyl Suppository 10 milliGRAM(s) Rectal daily PRN Constipation  dextrose Oral Gel 15 Gram(s) Oral once PRN Blood Glucose LESS THAN 70 milliGRAM(s)/deciliter  hydrALAZINE 50 milliGRAM(s) Oral every 6 hours PRN for systolic BP>160  magnesium hydroxide Suspension 30 milliLiter(s) Oral daily PRN Constipation  melatonin 3 milliGRAM(s) Oral at bedtime PRN Insomnia  morphine  - Injectable 2 milliGRAM(s) IV Push every 6 hours PRN Severe Pain (7 - 10)  ondansetron Injectable 4 milliGRAM(s) IV Push every 8 hours PRN Nausea and/or Vomiting  sodium chloride 0.9% lock flush 10 milliLiter(s) IV Push every 1 hour PRN Pre/post blood products, medications, blood draw, and to maintain line patency  traMADol 50 milliGRAM(s) Oral four times a day PRN Moderate Pain (4 - 6)      Vital Signs Last 24 Hrs  T(C): 36.7 (13 Apr 2023 20:16), Max: 36.7 (13 Apr 2023 20:16)  T(F): 98 (13 Apr 2023 20:16), Max: 98 (13 Apr 2023 20:16)  HR: 63 (13 Apr 2023 20:16) (63 - 81)  BP: 114/70 (13 Apr 2023 20:16) (114/70 - 142/76)  BP(mean): 81 (13 Apr 2023 18:20) (81 - 81)  RR: 18 (13 Apr 2023 20:16) (17 - 18)  SpO2: 98% (13 Apr 2023 20:16) (91% - 98%)    Parameters below as of 13 Apr 2023 20:16  Patient On (Oxygen Delivery Method): room air        PHYSICAL EXAM:  Vascular: DP & PT non palpable bilaterally, Capillary refill delayed to the digits  Digital hair absent bilaterally  Neurological: unable to assess   Musculoskeletal: Unable to assess   Dermatological: Left foot surgical site at the ist metatarsal head resection site with sutures intact, post op erythema and edema to the forefoot noted, lateral 5th metatarsal base 1.0cm x 0.5cm x o.3 fibrotic wounds with periwound erythema and serous drainage noted, no fluctuance, no malodor, no proximal streaking at this time. Bilateral posterior plantar  heel eschars - right measuring 3.0cm x 4.0cm x necrotic eschar and left 1.0cm x 0.7cm x necrotic eschar, stable with no fluctuance or drainage.  no proximal streaking, no fluctuance, no malodor, no signs of infection at this time.    CBC Full  -  ( 12 Apr 2023 10:09 )  WBC Count : 7.90 K/uL  RBC Count : 3.26 M/uL  Hemoglobin : 9.3 g/dL  Hematocrit : 31.0 %  Platelet Count - Automated : 325 K/uL  Mean Cell Volume : 95.1 fl  Mean Cell Hemoglobin : 28.5 pg  Mean Cell Hemoglobin Concentration : 30.0 gm/dL  Auto Neutrophil # : x  Auto Lymphocyte # : x  Auto Monocyte # : x  Auto Eosinophil # : x  Auto Basophil # : x  Auto Neutrophil % : x  Auto Lymphocyte % : x  Auto Monocyte % : x  Auto Eosinophil % : x  Auto Basophil % : x    04-13    x   |  x   |  x   ----------------------------<  x   x    |  x   |  1.40<H>    Ca    8.8      12 Apr 2023 10:09    TPro  6.3  /  Alb  2.0<L>  /  TBili  0.4  /  DBili  0.2  /  AST  18  /  ALT  19  /  AlkPhos  64  04-13                          9.3    7.90  )-----------( 325      ( 12 Apr 2023 10:09 )             31.0       ACCESSION No:  30 [id number]  Patient:   ANA MARIA LEIGH      Accession:                             [id number]- [id number]    Collected Date/Time:                   4/10/2023 13:30 EDT  Received Date/Time:                    4/11/2023 07:27 EDT    Surgical Pathology Report - Auth (Verified)    Specimen(s) Submitted  1  Left foot, 1st metatarsl bone  2  Left foot, sesamoid  3  Left foot, 1st metatarsal prominent margin    Final Diagnosis  1.  Bone (left foot, first metatarsal bone):  -   Extensive acute and chronic osteomyelitis with bony necrosis and    resorption.  -   Adjacent bone with extensive medullary fibrosis.    2.  Bone (left foot sesamoid):  -   Extensive acute and chronic osteomyelitis with bony necrosis and  resorption.  -   Adjacent bone with patchy medullary fibrosis.  -   Soft tissue including dense fibrous tissue with regions of  inflamed granulation tissue.    3.  Bone (left foot, first metatarsal proximal margin):  -   No evidence of osteomyelitis.  -   Viable bone with regions of medullary fibrosis.  Verified by: JORDIN REDDING  (Electronic Signature)  Reported on: 04/13/23 14:39 EDT, [facility], [address] [place] [address],  [place] [address]  _________________________________________________________________        Imaging: ----------  1 / 1 Doc Suero              Report date: 4/11/2023       View Order      (Report matches study selected on Patient History pane)                    ACC: [id number] EXAM: XR FOOT COMP MIN 3 VIEWS LT ORDERED BY: DREW KIMBROUGH    PROCEDURE DATE: 04/10/2023        INTERPRETATION: LEFT foot    CLINICAL INFORMATION: Postoperative radiographs.    TECHNIQUE: AP,lateral and oblique views.  Comparison: 3/30/2023 LEFT foot radiographs FINDINGS:     87y year old Male seen at [facility] 1EAS 101 W1 s/p  left foot first metatarsal head resection DOS 04/10/23.  Patient is non verbal and has been in and out of sleep. Patient is tender in the area of the surgical site. Patient had the foot overhanging on the side rale.           Allergies    No Known Allergies    Intolerances        MEDICATIONS  (STANDING):  budesonide 160 MICROgram(s)/formoterol 4.5 MICROgram(s) Inhaler 2 Puff(s) Inhalation two times a day  dextrose 50% Injectable 25 Gram(s) IV Push once  dextrose 50% Injectable 12.5 Gram(s) IV Push once  dextrose 50% Injectable 25 Gram(s) IV Push once  donepezil 5 milliGRAM(s) Oral at bedtime  DULoxetine 60 milliGRAM(s) Oral daily  enoxaparin Injectable 60 milliGRAM(s) SubCutaneous every 12 hours  glucagon  Injectable 1 milliGRAM(s) IntraMuscular once  insulin glargine Injectable (LANTUS) 8 Unit(s) SubCutaneous at bedtime  insulin lispro (ADMELOG) corrective regimen sliding scale   SubCutaneous three times a day before meals  insulin lispro (ADMELOG) corrective regimen sliding scale   SubCutaneous at bedtime  lactobacillus acidophilus 1 Tablet(s) Oral two times a day with meals  lisinopril 2.5 milliGRAM(s) Oral daily  metoprolol tartrate 100 milliGRAM(s) Oral two times a day  multivitamin 1 Tablet(s) Oral daily  pantoprazole   Suspension 40 milliGRAM(s) Oral daily  senna 2 Tablet(s) Oral at bedtime  simvastatin 40 milliGRAM(s) Oral at bedtime  tamsulosin 0.4 milliGRAM(s) Oral at bedtime  vancomycin  IVPB 750 milliGRAM(s) IV Intermittent every 24 hours    MEDICATIONS  (PRN):  acetaminophen     Tablet .. 650 milliGRAM(s) Oral every 6 hours PRN Temp greater or equal to 38C (100.4F), Mild Pain (1 - 3)  aluminum hydroxide/magnesium hydroxide/simethicone Suspension 30 milliLiter(s) Oral every 4 hours PRN Dyspepsia  bisacodyl Suppository 10 milliGRAM(s) Rectal daily PRN Constipation  dextrose Oral Gel 15 Gram(s) Oral once PRN Blood Glucose LESS THAN 70 milliGRAM(s)/deciliter  hydrALAZINE 50 milliGRAM(s) Oral every 6 hours PRN for systolic BP>160  magnesium hydroxide Suspension 30 milliLiter(s) Oral daily PRN Constipation  melatonin 3 milliGRAM(s) Oral at bedtime PRN Insomnia  morphine  - Injectable 2 milliGRAM(s) IV Push every 6 hours PRN Severe Pain (7 - 10)  ondansetron Injectable 4 milliGRAM(s) IV Push every 8 hours PRN Nausea and/or Vomiting  sodium chloride 0.9% lock flush 10 milliLiter(s) IV Push every 1 hour PRN Pre/post blood products, medications, blood draw, and to maintain line patency  traMADol 50 milliGRAM(s) Oral four times a day PRN Moderate Pain (4 - 6)      Vital Signs Last 24 Hrs  T(C): 36.7 (13 Apr 2023 20:16), Max: 36.7 (13 Apr 2023 20:16)  T(F): 98 (13 Apr 2023 20:16), Max: 98 (13 Apr 2023 20:16)  HR: 63 (13 Apr 2023 20:16) (63 - 81)  BP: 114/70 (13 Apr 2023 20:16) (114/70 - 142/76)  BP(mean): 81 (13 Apr 2023 18:20) (81 - 81)  RR: 18 (13 Apr 2023 20:16) (17 - 18)  SpO2: 98% (13 Apr 2023 20:16) (91% - 98%)    Parameters below as of 13 Apr 2023 20:16  Patient On (Oxygen Delivery Method): room air        PHYSICAL EXAM:  Vascular: DP & PT non palpable bilaterally, Capillary refill delayed to the digits  Digital hair absent bilaterally  Neurological: unable to assess   Musculoskeletal: Unable to assess   Dermatological: Left foot surgical site at the ist metatarsal head resection site with sutures intact, post op erythema and edema to the forefoot noted, lateral 5th metatarsal base 1.0cm x 0.5cm x o.3 fibrotic wounds with periwound erythema and serous drainage noted, no fluctuance, no malodor, no proximal streaking at this time. Bilateral posterior plantar  heel eschars - right measuring 3.0cm x 4.0cm x necrotic eschar and left 1.0cm x 0.7cm x necrotic eschar, stable with no fluctuance or drainage.  no proximal streaking, no fluctuance, no malodor, no signs of infection at this time.    CBC Full  -  ( 12 Apr 2023 10:09 )  WBC Count : 7.90 K/uL  RBC Count : 3.26 M/uL  Hemoglobin : 9.3 g/dL  Hematocrit : 31.0 %  Platelet Count - Automated : 325 K/uL  Mean Cell Volume : 95.1 fl  Mean Cell Hemoglobin : 28.5 pg  Mean Cell Hemoglobin Concentration : 30.0 gm/dL  Auto Neutrophil # : x  Auto Lymphocyte # : x  Auto Monocyte # : x  Auto Eosinophil # : x  Auto Basophil # : x  Auto Neutrophil % : x  Auto Lymphocyte % : x  Auto Monocyte % : x  Auto Eosinophil % : x  Auto Basophil % : x    04-13    x   |  x   |  x   ----------------------------<  x   x    |  x   |  1.40<H>    Ca    8.8      12 Apr 2023 10:09    TPro  6.3  /  Alb  2.0<L>  /  TBili  0.4  /  DBili  0.2  /  AST  18  /  ALT  19  /  AlkPhos  64  04-13                          9.3    7.90  )-----------( 325      ( 12 Apr 2023 10:09 )             31.0       ACCESSION No:  30 [id number]  Patient:   ANA MARIA LEIGH      Accession:                             [id number]- [id number]    Collected Date/Time:                   4/10/2023 13:30 EDT  Received Date/Time:                    4/11/2023 07:27 EDT    Surgical Pathology Report - Auth (Verified)    Specimen(s) Submitted  1  Left foot, 1st metatarsl bone  2  Left foot, sesamoid  3  Left foot, 1st metatarsal prominent margin    Final Diagnosis  1.  Bone (left foot, first metatarsal bone):  -   Extensive acute and chronic osteomyelitis with bony necrosis and    resorption.  -   Adjacent bone with extensive medullary fibrosis.    2.  Bone (left foot sesamoid):  -   Extensive acute and chronic osteomyelitis with bony necrosis and  resorption.  -   Adjacent bone with patchy medullary fibrosis.  -   Soft tissue including dense fibrous tissue with regions of  inflamed granulation tissue.    3.  Bone (left foot, first metatarsal proximal margin):  -   No evidence of osteomyelitis.  -   Viable bone with regions of medullary fibrosis.  Verified by: JORDIN REDDING  (Electronic Signature)  Reported on: 04/13/23 14:39 EDT, [facility], [address] [place] [address],  [place] [address]  _________________________________________________________________        Imaging: ----------  1 / 1 Doc Suero              Report date: 4/11/2023       View Order      (Report matches study selected on Patient History pane)                    ACC: [id number] EXAM: XR FOOT COMP MIN 3 VIEWS LT ORDERED BY: DREW KIMBROUGH    PROCEDURE DATE: 04/10/2023        INTERPRETATION: LEFT foot    CLINICAL INFORMATION: Postoperative radiographs.    TECHNIQUE: AP,lateral and oblique views.  Comparison: 3/30/2023 LEFT foot radiographs FINDINGS:     87y year old Male seen at [facility] 1EAS 101 W1 s/p  left foot first metatarsal head resection DOS 04/10/23.  Patient is non verbal and has been in and out of sleep. Patient is tender in the area of the surgical site. Patient had the foot overhanging on the side rale.           Allergies    No Known Allergies    Intolerances        MEDICATIONS  (STANDING):  budesonide 160 MICROgram(s)/formoterol 4.5 MICROgram(s) Inhaler 2 Puff(s) Inhalation two times a day  dextrose 50% Injectable 25 Gram(s) IV Push once  dextrose 50% Injectable 12.5 Gram(s) IV Push once  dextrose 50% Injectable 25 Gram(s) IV Push once  donepezil 5 milliGRAM(s) Oral at bedtime  DULoxetine 60 milliGRAM(s) Oral daily  enoxaparin Injectable 60 milliGRAM(s) SubCutaneous every 12 hours  glucagon  Injectable 1 milliGRAM(s) IntraMuscular once  insulin glargine Injectable (LANTUS) 8 Unit(s) SubCutaneous at bedtime  insulin lispro (ADMELOG) corrective regimen sliding scale   SubCutaneous three times a day before meals  insulin lispro (ADMELOG) corrective regimen sliding scale   SubCutaneous at bedtime  lactobacillus acidophilus 1 Tablet(s) Oral two times a day with meals  lisinopril 2.5 milliGRAM(s) Oral daily  metoprolol tartrate 100 milliGRAM(s) Oral two times a day  multivitamin 1 Tablet(s) Oral daily  pantoprazole   Suspension 40 milliGRAM(s) Oral daily  senna 2 Tablet(s) Oral at bedtime  simvastatin 40 milliGRAM(s) Oral at bedtime  tamsulosin 0.4 milliGRAM(s) Oral at bedtime  vancomycin  IVPB 750 milliGRAM(s) IV Intermittent every 24 hours    MEDICATIONS  (PRN):  acetaminophen     Tablet .. 650 milliGRAM(s) Oral every 6 hours PRN Temp greater or equal to 38C (100.4F), Mild Pain (1 - 3)  aluminum hydroxide/magnesium hydroxide/simethicone Suspension 30 milliLiter(s) Oral every 4 hours PRN Dyspepsia  bisacodyl Suppository 10 milliGRAM(s) Rectal daily PRN Constipation  dextrose Oral Gel 15 Gram(s) Oral once PRN Blood Glucose LESS THAN 70 milliGRAM(s)/deciliter  hydrALAZINE 50 milliGRAM(s) Oral every 6 hours PRN for systolic BP>160  magnesium hydroxide Suspension 30 milliLiter(s) Oral daily PRN Constipation  melatonin 3 milliGRAM(s) Oral at bedtime PRN Insomnia  morphine  - Injectable 2 milliGRAM(s) IV Push every 6 hours PRN Severe Pain (7 - 10)  ondansetron Injectable 4 milliGRAM(s) IV Push every 8 hours PRN Nausea and/or Vomiting  sodium chloride 0.9% lock flush 10 milliLiter(s) IV Push every 1 hour PRN Pre/post blood products, medications, blood draw, and to maintain line patency  traMADol 50 milliGRAM(s) Oral four times a day PRN Moderate Pain (4 - 6)      Vital Signs Last 24 Hrs  T(C): 36.7 (13 Apr 2023 20:16), Max: 36.7 (13 Apr 2023 20:16)  T(F): 98 (13 Apr 2023 20:16), Max: 98 (13 Apr 2023 20:16)  HR: 63 (13 Apr 2023 20:16) (63 - 81)  BP: 114/70 (13 Apr 2023 20:16) (114/70 - 142/76)  BP(mean): 81 (13 Apr 2023 18:20) (81 - 81)  RR: 18 (13 Apr 2023 20:16) (17 - 18)  SpO2: 98% (13 Apr 2023 20:16) (91% - 98%)    Parameters below as of 13 Apr 2023 20:16  Patient On (Oxygen Delivery Method): room air        PHYSICAL EXAM:  Vascular: DP & PT non palpable bilaterally, Capillary refill delayed to the digits  Digital hair absent bilaterally  Neurological: unable to assess   Musculoskeletal: Unable to assess   Dermatological: Left foot surgical site at the ist metatarsal head resection site with sutures intact, post op erythema and edema to the forefoot noted, lateral 5th metatarsal base 1.0cm x 0.5cm x o.3 fibrotic wounds with periwound erythema and serous drainage noted, no fluctuance, no malodor, no proximal streaking at this time. Bilateral posterior plantar  heel eschars - right measuring 3.0cm x 4.0cm x necrotic eschar and left 1.0cm x 0.7cm x necrotic eschar, stable with no fluctuance or drainage.  no proximal streaking, no fluctuance, no malodor, no signs of infection at this time.    CBC Full  -  ( 12 Apr 2023 10:09 )  WBC Count : 7.90 K/uL  RBC Count : 3.26 M/uL  Hemoglobin : 9.3 g/dL  Hematocrit : 31.0 %  Platelet Count - Automated : 325 K/uL  Mean Cell Volume : 95.1 fl  Mean Cell Hemoglobin : 28.5 pg  Mean Cell Hemoglobin Concentration : 30.0 gm/dL  Auto Neutrophil # : x  Auto Lymphocyte # : x  Auto Monocyte # : x  Auto Eosinophil # : x  Auto Basophil # : x  Auto Neutrophil % : x  Auto Lymphocyte % : x  Auto Monocyte % : x  Auto Eosinophil % : x  Auto Basophil % : x    04-13    x   |  x   |  x   ----------------------------<  x   x    |  x   |  1.40<H>    Ca    8.8      12 Apr 2023 10:09    TPro  6.3  /  Alb  2.0<L>  /  TBili  0.4  /  DBili  0.2  /  AST  18  /  ALT  19  /  AlkPhos  64  04-13                          9.3    7.90  )-----------( 325      ( 12 Apr 2023 10:09 )             31.0       ACCESSION No:  30 [id number]  Patient:   ANA MARIA LEIGH      Accession:                             [id number]- [id number]    Collected Date/Time:                   4/10/2023 13:30 EDT  Received Date/Time:                    4/11/2023 07:27 EDT    Surgical Pathology Report - Auth (Verified)    Specimen(s) Submitted  1  Left foot, 1st metatarsl bone  2  Left foot, sesamoid  3  Left foot, 1st metatarsal prominent margin    Final Diagnosis  1.  Bone (left foot, first metatarsal bone):  -   Extensive acute and chronic osteomyelitis with bony necrosis and    resorption.  -   Adjacent bone with extensive medullary fibrosis.    2.  Bone (left foot sesamoid):  -   Extensive acute and chronic osteomyelitis with bony necrosis and  resorption.  -   Adjacent bone with patchy medullary fibrosis.  -   Soft tissue including dense fibrous tissue with regions of  inflamed granulation tissue.    3.  Bone (left foot, first metatarsal proximal margin):  -   No evidence of osteomyelitis.  -   Viable bone with regions of medullary fibrosis.  Verified by: JORDIN REDDING  (Electronic Signature)  Reported on: 04/13/23 14:39 EDT, [facility], [address] [place] [address],  [place] [address]  _________________________________________________________________        Imaging: ----------  1 / 1 Doc Suero              Report date: 4/11/2023       View Order      (Report matches study selected on Patient History pane)                    ACC: [id number] EXAM: XR FOOT COMP MIN 3 VIEWS LT ORDERED BY: DREW KIMBROUGH    PROCEDURE DATE: 04/10/2023        INTERPRETATION: LEFT foot    CLINICAL INFORMATION: Postoperative radiographs.    TECHNIQUE: AP,lateral and oblique views.  Comparison: 3/30/2023 LEFT foot radiographs FINDINGS:

## 2023-04-13 NOTE — PROGRESS NOTE ADULT - SUBJECTIVE AND OBJECTIVE BOX
PROGRESS NOTE  Patient is a 87y old  Male who presents with a chief complaint of left foot infection (13 Apr 2023 15:22)      OVERNIGHT      HPI:  86 yo male ,Counts include 234 beds at the [facility] resident with PMHx - COPD, DM, HTN, CAD, HLD, H/O TIA, dementia,GERD, BPH and depression sent ot ER for evaluation of left foot 1metatarsal wound ,suggestive of osteomyelitis .Patient was seen by ID consult and transfer to the hospital recommended for wound cx/bone biopsy /podiatry evaluation ,likely will require 4-6 weeks of iv abx . Patient was admitted to Counts include 234 beds at the [facility] with b/l feet wounds and was followed by wound care team ,recently completed 7 days of doxycycline for foot wound cellulitis . (30 Mar 2023 05:21)    PAST MEDICAL & SURGICAL HISTORY:  ASHD (arteriosclerotic heart disease)      BPH without urinary obstruction      COPD, moderate      Stage 3 chronic kidney disease      Chronic GERD      HLD (hyperlipidemia)      MDD (major depressive disorder)      Obstructive and reflux uropathy      Cellulitis      HTN (hypertension)      Sepsis      Dementia      Moderate protein-calorie malnutrition      Brain TIA      History of RSV infection      DM type 2, not at goal      Pressure ulcer of unspecified heel, unspecified stage      Venous stasis ulcer without varicose veins      Multiple open wounds of foot          MEDICATIONS  (STANDING):  budesonide 160 MICROgram(s)/formoterol 4.5 MICROgram(s) Inhaler 2 Puff(s) Inhalation two times a day  dextrose 50% Injectable 25 Gram(s) IV Push once  dextrose 50% Injectable 12.5 Gram(s) IV Push once  dextrose 50% Injectable 25 Gram(s) IV Push once  donepezil 5 milliGRAM(s) Oral at bedtime  DULoxetine 60 milliGRAM(s) Oral daily  enoxaparin Injectable 60 milliGRAM(s) SubCutaneous every 12 hours  glucagon  Injectable 1 milliGRAM(s) IntraMuscular once  insulin glargine Injectable (LANTUS) 8 Unit(s) SubCutaneous at bedtime  insulin lispro (ADMELOG) corrective regimen sliding scale   SubCutaneous three times a day before meals  insulin lispro (ADMELOG) corrective regimen sliding scale   SubCutaneous at bedtime  lactobacillus acidophilus 1 Tablet(s) Oral two times a day with meals  lisinopril 2.5 milliGRAM(s) Oral daily  metoprolol tartrate 100 milliGRAM(s) Oral two times a day  multivitamin 1 Tablet(s) Oral daily  pantoprazole   Suspension 40 milliGRAM(s) Oral daily  senna 2 Tablet(s) Oral at bedtime  simvastatin 40 milliGRAM(s) Oral at bedtime  tamsulosin 0.4 milliGRAM(s) Oral at bedtime  vancomycin  IVPB 750 milliGRAM(s) IV Intermittent every 24 hours    MEDICATIONS  (PRN):  acetaminophen     Tablet .. 650 milliGRAM(s) Oral every 6 hours PRN Temp greater or equal to 38C (100.4F), Mild Pain (1 - 3)  aluminum hydroxide/magnesium hydroxide/simethicone Suspension 30 milliLiter(s) Oral every 4 hours PRN Dyspepsia  bisacodyl Suppository 10 milliGRAM(s) Rectal daily PRN Constipation  dextrose Oral Gel 15 Gram(s) Oral once PRN Blood Glucose LESS THAN 70 milliGRAM(s)/deciliter  hydrALAZINE 50 milliGRAM(s) Oral every 6 hours PRN for systolic BP>160  magnesium hydroxide Suspension 30 milliLiter(s) Oral daily PRN Constipation  melatonin 3 milliGRAM(s) Oral at bedtime PRN Insomnia  morphine  - Injectable 2 milliGRAM(s) IV Push every 6 hours PRN Severe Pain (7 - 10)  ondansetron Injectable 4 milliGRAM(s) IV Push every 8 hours PRN Nausea and/or Vomiting  sodium chloride 0.9% lock flush 10 milliLiter(s) IV Push every 1 hour PRN Pre/post blood products, medications, blood draw, and to maintain line patency  traMADol 50 milliGRAM(s) Oral four times a day PRN Moderate Pain (4 - 6)      OBJECTIVE    T(C): 36.4 (04-13-23 @ 12:16), Max: 36.4 (04-12-23 @ 21:55)  HR: 70 (04-13-23 @ 12:16) (70 - 97)  BP: 117/67 (04-13-23 @ 12:16) (113/68 - 142/76)  RR: 18 (04-13-23 @ 12:16) (17 - 18)  SpO2: 94% (04-13-23 @ 12:16) (91% - 97%)  Wt(kg): --  I&O's Summary    12 Apr 2023 07:01  -  13 Apr 2023 07:00  --------------------------------------------------------  IN: 240 mL / OUT: 0 mL / NET: 240 mL          REVIEW OF SYSTEMS:  CONSTITUTIONAL: No fever, weight loss, or fatigue  EYES: No eye pain, visual disturbances, or discharge  ENMT:   No sinus or throat pain  NECK: No pain or stiffness  RESPIRATORY: No cough, wheezing, chills or hemoptysis; No shortness of breath  CARDIOVASCULAR: No chest pain, palpitations, dizziness, or leg swelling  GASTROINTESTINAL: No abdominal pain. No nausea, vomiting; No diarrhea or constipation. No melena or hematochezia.  GENITOURINARY: No dysuria, frequency, hematuria, or incontinence  NEUROLOGICAL: No headaches, memory loss, loss of strength, numbness, or tremors  SKIN: No itching, burning, rashes, or lesions   MUSCULOSKELETAL: No joint pain or swelling; No muscle, back, or extremity pain    PHYSICAL EXAM:  Appearance: NAD. VS past 24 hrs -as above   HEENT:   Moist oral mucosa. Conjunctiva clear b/l.   Neck : supple  Respiratory: Lungs CTAB.  Gastrointestinal:  Soft, nontender. No rebound. No rigidity. BS present	  Cardiovascular: RRR ,S1S2 present  Neurologic: Non-focal. Moving all extremities.  Extremities: No edema. No erythema. No calf tenderness.  Skin: No rashes, No ecchymoses, No cyanosis.	  wounds ,skin lesions-See skin assesment flow sheet   LABS:                        9.3    7.90  )-----------( 325      ( 12 Apr 2023 10:09 )             31.0     04-13    x   |  x   |  x   ----------------------------<  x   x    |  x   |  1.40<H>    Ca    8.8      12 Apr 2023 10:09    TPro  6.3  /  Alb  2.0<L>  /  TBili  0.4  /  DBili  0.2  /  AST  18  /  ALT  19  /  AlkPhos  64  04-13    CAPILLARY BLOOD GLUCOSE      POCT Blood Glucose.: 223 mg/dL (13 Apr 2023 11:48)  POCT Blood Glucose.: 193 mg/dL (13 Apr 2023 07:34)  POCT Blood Glucose.: 224 mg/dL (12 Apr 2023 22:21)  POCT Blood Glucose.: 221 mg/dL (12 Apr 2023 21:54)  POCT Blood Glucose.: 177 mg/dL (12 Apr 2023 17:07)          Culture - Tissue with Gram Stain (collected 10 Apr 2023 13:10)  Source: .Tissue Other, LEFT FOOT DEEP TISSUE CULTURE  Gram Stain (10 Apr 2023 22:21):    No polymorphonuclear cells seen per low power field    No organisms seen per oil power field  Preliminary Report (12 Apr 2023 22:20):    Few Methicillin Resistant Staphylococcus aureus    Few Corynebacterium species "Susceptibilities not performed"  Organism: Methicillin resistant Staphylococcus aureus (12 Apr 2023 22:19)  Organism: Methicillin resistant Staphylococcus aureus (12 Apr 2023 22:19)    Culture - Tissue with Gram Stain (collected 10 Apr 2023 13:10)  Source: .Tissue Other, LEFT FOOT 1ST METATARSAL HEAD  Gram Stain (10 Apr 2023 22:21):    No polymorphonuclear cells seen per low power field    No organisms seen per oil power field  Preliminary Report (12 Apr 2023 22:18):    Rare Methicillin Resistant Staphylococcus aureus    Few Corynebacterium species "Susceptibilities not performed"  Organism: Methicillin resistant Staphylococcus aureus (12 Apr 2023 22:16)  Organism: Methicillin resistant Staphylococcus aureus (12 Apr 2023 22:16)      RADIOLOGY & ADDITIONAL TESTS:   reviewed elctronically  ASSESSMENT/PLAN: 	    25 minutes aggregate time was spent on this visit, 50% visit time spent in care co-ordination with other attendings and counselling patient .I have discussed care plan with patient / HCP/family member son sridevi on a hphone  ,who expressed understanding of problems treatment and their effect and side effects, alternatives in details. I have asked if they have any questions and concerns and appropriately addressed them to best of my ability. Advance care planning was discussed , pallitaive care issues ,CMO ,hospice levels of care were discussed in details , forms ,advance directives were reviewed .All questions were answered to the best of my knowledge - 25 min spent. Also left a message for the wife Kemar who is in [facility] now after [facility]  PROGRESS NOTE  Patient is a 87y old  Male who presents with a chief complaint of left foot infection (13 Apr 2023 15:22)      OVERNIGHT      HPI:  86 yo male ,[facility] with PMHx - COPD, DM, HTN, CAD, HLD, H/O TIA, dementia,GERD, BPH and depression sent ot ER for evaluation of left foot 1metatarsal wound ,suggestive of osteomyelitis .Patient was seen by ID consult and transfer to the hospital recommended for wound cx/bone biopsy /podiatry evaluation ,likely will require 4-6 weeks of iv abx . [facility] and was followed by wound care team ,recently completed 7 days of doxycycline for foot wound cellulitis . (30 Mar 2023 05:21)    PAST MEDICAL & SURGICAL HISTORY:  ASHD (arteriosclerotic heart disease)      BPH without urinary obstruction      COPD, moderate      Stage 3 chronic kidney disease      Chronic GERD      HLD (hyperlipidemia)      MDD (major depressive disorder)      Obstructive and reflux uropathy      Cellulitis      HTN (hypertension)      Sepsis      Dementia      Moderate protein-calorie malnutrition      Brain TIA      History of RSV infection      DM type 2, not at goal      Pressure ulcer of unspecified heel, unspecified stage      Venous stasis ulcer without varicose veins      Multiple open wounds of foot          MEDICATIONS  (STANDING):  budesonide 160 MICROgram(s)/formoterol 4.5 MICROgram(s) Inhaler 2 Puff(s) Inhalation two times a day  dextrose 50% Injectable 25 Gram(s) IV Push once  dextrose 50% Injectable 12.5 Gram(s) IV Push once  dextrose 50% Injectable 25 Gram(s) IV Push once  donepezil 5 milliGRAM(s) Oral at bedtime  DULoxetine 60 milliGRAM(s) Oral daily  enoxaparin Injectable 60 milliGRAM(s) SubCutaneous every 12 hours  glucagon  Injectable 1 milliGRAM(s) IntraMuscular once  insulin glargine Injectable (LANTUS) 8 Unit(s) SubCutaneous at bedtime  insulin lispro (ADMELOG) corrective regimen sliding scale   SubCutaneous three times a day before meals  insulin lispro (ADMELOG) corrective regimen sliding scale   SubCutaneous at bedtime  lactobacillus acidophilus 1 Tablet(s) Oral two times a day with meals  lisinopril 2.5 milliGRAM(s) Oral daily  metoprolol tartrate 100 milliGRAM(s) Oral two times a day  multivitamin 1 Tablet(s) Oral daily  pantoprazole   Suspension 40 milliGRAM(s) Oral daily  senna 2 Tablet(s) Oral at bedtime  simvastatin 40 milliGRAM(s) Oral at bedtime  tamsulosin 0.4 milliGRAM(s) Oral at bedtime  vancomycin  IVPB 750 milliGRAM(s) IV Intermittent every 24 hours    MEDICATIONS  (PRN):  acetaminophen     Tablet .. 650 milliGRAM(s) Oral every 6 hours PRN Temp greater or equal to 38C (100.4F), Mild Pain (1 - 3)  aluminum hydroxide/magnesium hydroxide/simethicone Suspension 30 milliLiter(s) Oral every 4 hours PRN Dyspepsia  bisacodyl Suppository 10 milliGRAM(s) Rectal daily PRN Constipation  dextrose Oral Gel 15 Gram(s) Oral once PRN Blood Glucose LESS THAN 70 milliGRAM(s)/deciliter  hydrALAZINE 50 milliGRAM(s) Oral every 6 hours PRN for systolic BP>160  magnesium hydroxide Suspension 30 milliLiter(s) Oral daily PRN Constipation  melatonin 3 milliGRAM(s) Oral at bedtime PRN Insomnia  morphine  - Injectable 2 milliGRAM(s) IV Push every 6 hours PRN Severe Pain (7 - 10)  ondansetron Injectable 4 milliGRAM(s) IV Push every 8 hours PRN Nausea and/or Vomiting  sodium chloride 0.9% lock flush 10 milliLiter(s) IV Push every 1 hour PRN Pre/post blood products, medications, blood draw, and to maintain line patency  traMADol 50 milliGRAM(s) Oral four times a day PRN Moderate Pain (4 - 6)      OBJECTIVE    T(C): 36.4 (04-13-23 @ 12:16), Max: 36.4 (04-12-23 @ 21:55)  HR: 70 (04-13-23 @ 12:16) (70 - 97)  BP: 117/67 (04-13-23 @ 12:16) (113/68 - 142/76)  RR: 18 (04-13-23 @ 12:16) (17 - 18)  SpO2: 94% (04-13-23 @ 12:16) (91% - 97%)  Wt(kg): --  I&O's Summary    12 Apr 2023 07:01  -  13 Apr 2023 07:00  --------------------------------------------------------  IN: 240 mL / OUT: 0 mL / NET: 240 mL          REVIEW OF SYSTEMS:  CONSTITUTIONAL: No fever, weight loss, or fatigue  EYES: No eye pain, visual disturbances, or discharge  ENMT:   No sinus or throat pain  NECK: No pain or stiffness  RESPIRATORY: No cough, wheezing, chills or hemoptysis; No shortness of breath  CARDIOVASCULAR: No chest pain, palpitations, dizziness, or leg swelling  GASTROINTESTINAL: No abdominal pain. No nausea, vomiting; No diarrhea or constipation. No melena or hematochezia.  GENITOURINARY: No dysuria, frequency, hematuria, or incontinence  NEUROLOGICAL: No headaches, memory loss, loss of strength, numbness, or tremors  SKIN: No itching, burning, rashes, or lesions   MUSCULOSKELETAL: No joint pain or swelling; No muscle, back, or extremity pain    PHYSICAL EXAM:  Appearance: NAD. VS past 24 hrs -as above   HEENT:   Moist oral mucosa. Conjunctiva clear b/l.   Neck : supple  Respiratory: Lungs CTAB.  Gastrointestinal:  Soft, nontender. No rebound. No rigidity. BS present	  Cardiovascular: RRR ,S1S2 present  Neurologic: Non-focal. Moving all extremities.  Extremities: No edema. No erythema. No calf tenderness.  Skin: No rashes, No ecchymoses, No cyanosis.	  wounds ,skin lesions-See skin assesment flow sheet   LABS:                        9.3    7.90  )-----------( 325      ( 12 Apr 2023 10:09 )             31.0     04-13    x   |  x   |  x   ----------------------------<  x   x    |  x   |  1.40<H>    Ca    8.8      12 Apr 2023 10:09    TPro  6.3  /  Alb  2.0<L>  /  TBili  0.4  /  DBili  0.2  /  AST  18  /  ALT  19  /  AlkPhos  64  04-13    CAPILLARY BLOOD GLUCOSE      POCT Blood Glucose.: 223 mg/dL (13 Apr 2023 11:48)  POCT Blood Glucose.: 193 mg/dL (13 Apr 2023 07:34)  POCT Blood Glucose.: 224 mg/dL (12 Apr 2023 22:21)  POCT Blood Glucose.: 221 mg/dL (12 Apr 2023 21:54)  POCT Blood Glucose.: 177 mg/dL (12 Apr 2023 17:07)          Culture - Tissue with Gram Stain (collected 10 Apr 2023 13:10)  Source: .Tissue Other, LEFT FOOT DEEP TISSUE CULTURE  Gram Stain (10 Apr 2023 22:21):    No polymorphonuclear cells seen per low power field    No organisms seen per oil power field  Preliminary Report (12 Apr 2023 22:20):    Few Methicillin Resistant Staphylococcus aureus    Few Corynebacterium species "Susceptibilities not performed"  Organism: Methicillin resistant Staphylococcus aureus (12 Apr 2023 22:19)  Organism: Methicillin resistant Staphylococcus aureus (12 Apr 2023 22:19)    Culture - Tissue with Gram Stain (collected 10 Apr 2023 13:10)  Source: .Tissue Other, LEFT FOOT 1ST METATARSAL HEAD  Gram Stain (10 Apr 2023 22:21):    No polymorphonuclear cells seen per low power field    No organisms seen per oil power field  Preliminary Report (12 Apr 2023 22:18):    Rare Methicillin Resistant Staphylococcus aureus    Few Corynebacterium species "Susceptibilities not performed"  Organism: Methicillin resistant Staphylococcus aureus (12 Apr 2023 22:16)  Organism: Methicillin resistant Staphylococcus aureus (12 Apr 2023 22:16)      RADIOLOGY & ADDITIONAL TESTS:   reviewed elctronically  ASSESSMENT/PLAN: 	    25 minutes aggregate time was spent on this visit, 50% visit time spent in care co-ordination with other attendings and counselling patient .I have discussed care plan with patient / HCP/family member son sridevi on a hphone  ,who expressed understanding of problems treatment and their effect and side effects, alternatives in details. I have asked if they have any questions and concerns and appropriately addressed them to best of my ability. Advance care planning was discussed , pallitaive care issues ,CMO ,hospice levels of care were discussed in details , forms ,advance directives were reviewed .All questions were answered to the best of my knowledge - 25 min spent. Also left a message for the wife Kemar who is in [facility] now after [facility]R  PROGRESS NOTE  Patient is a 87y old  Male who presents with a chief complaint of left foot infection (13 Apr 2023 15:22)      OVERNIGHT      HPI:  86 yo male ,[facility] resident with PMHx - COPD, DM, HTN, CAD, HLD, H/O TIA, dementia,GERD, BPH and depression sent ot ER for evaluation of left foot 1metatarsal wound ,suggestive of osteomyelitis .Patient was seen by ID consult and transfer to the hospital recommended for wound cx/bone biopsy /podiatry evaluation ,likely will require 4-6 weeks of iv abx . Patient was admitted to [facility] with b/l feet wounds and was followed by wound care team ,recently completed 7 days of doxycycline for foot wound cellulitis . (30 Mar 2023 05:21)    PAST MEDICAL & SURGICAL HISTORY:  ASHD (arteriosclerotic heart disease)      BPH without urinary obstruction      COPD, moderate      Stage 3 chronic kidney disease      Chronic GERD      HLD (hyperlipidemia)      MDD (major depressive disorder)      Obstructive and reflux uropathy      Cellulitis      HTN (hypertension)      Sepsis      Dementia      Moderate protein-calorie malnutrition      Brain TIA      History of RSV infection      DM type 2, not at goal      Pressure ulcer of unspecified heel, unspecified stage      Venous stasis ulcer without varicose veins      Multiple open wounds of foot          MEDICATIONS  (STANDING):  budesonide 160 MICROgram(s)/formoterol 4.5 MICROgram(s) Inhaler 2 Puff(s) Inhalation two times a day  dextrose 50% Injectable 25 Gram(s) IV Push once  dextrose 50% Injectable 12.5 Gram(s) IV Push once  dextrose 50% Injectable 25 Gram(s) IV Push once  donepezil 5 milliGRAM(s) Oral at bedtime  DULoxetine 60 milliGRAM(s) Oral daily  enoxaparin Injectable 60 milliGRAM(s) SubCutaneous every 12 hours  glucagon  Injectable 1 milliGRAM(s) IntraMuscular once  insulin glargine Injectable (LANTUS) 8 Unit(s) SubCutaneous at bedtime  insulin lispro (ADMELOG) corrective regimen sliding scale   SubCutaneous three times a day before meals  insulin lispro (ADMELOG) corrective regimen sliding scale   SubCutaneous at bedtime  lactobacillus acidophilus 1 Tablet(s) Oral two times a day with meals  lisinopril 2.5 milliGRAM(s) Oral daily  metoprolol tartrate 100 milliGRAM(s) Oral two times a day  multivitamin 1 Tablet(s) Oral daily  pantoprazole   Suspension 40 milliGRAM(s) Oral daily  senna 2 Tablet(s) Oral at bedtime  simvastatin 40 milliGRAM(s) Oral at bedtime  tamsulosin 0.4 milliGRAM(s) Oral at bedtime  vancomycin  IVPB 750 milliGRAM(s) IV Intermittent every 24 hours    MEDICATIONS  (PRN):  acetaminophen     Tablet .. 650 milliGRAM(s) Oral every 6 hours PRN Temp greater or equal to 38C (100.4F), Mild Pain (1 - 3)  aluminum hydroxide/magnesium hydroxide/simethicone Suspension 30 milliLiter(s) Oral every 4 hours PRN Dyspepsia  bisacodyl Suppository 10 milliGRAM(s) Rectal daily PRN Constipation  dextrose Oral Gel 15 Gram(s) Oral once PRN Blood Glucose LESS THAN 70 milliGRAM(s)/deciliter  hydrALAZINE 50 milliGRAM(s) Oral every 6 hours PRN for systolic BP>160  magnesium hydroxide Suspension 30 milliLiter(s) Oral daily PRN Constipation  melatonin 3 milliGRAM(s) Oral at bedtime PRN Insomnia  morphine  - Injectable 2 milliGRAM(s) IV Push every 6 hours PRN Severe Pain (7 - 10)  ondansetron Injectable 4 milliGRAM(s) IV Push every 8 hours PRN Nausea and/or Vomiting  sodium chloride 0.9% lock flush 10 milliLiter(s) IV Push every 1 hour PRN Pre/post blood products, medications, blood draw, and to maintain line patency  traMADol 50 milliGRAM(s) Oral four times a day PRN Moderate Pain (4 - 6)      OBJECTIVE    T(C): 36.4 (04-13-23 @ 12:16), Max: 36.4 (04-12-23 @ 21:55)  HR: 70 (04-13-23 @ 12:16) (70 - 97)  BP: 117/67 (04-13-23 @ 12:16) (113/68 - 142/76)  RR: 18 (04-13-23 @ 12:16) (17 - 18)  SpO2: 94% (04-13-23 @ 12:16) (91% - 97%)  Wt(kg): --  I&O's Summary    12 Apr 2023 07:01  -  13 Apr 2023 07:00  --------------------------------------------------------  IN: 240 mL / OUT: 0 mL / NET: 240 mL          REVIEW OF SYSTEMS:  CONSTITUTIONAL: No fever, weight loss, or fatigue  EYES: No eye pain, visual disturbances, or discharge  ENMT:   No sinus or throat pain  NECK: No pain or stiffness  RESPIRATORY: No cough, wheezing, chills or hemoptysis; No shortness of breath  CARDIOVASCULAR: No chest pain, palpitations, dizziness, or leg swelling  GASTROINTESTINAL: No abdominal pain. No nausea, vomiting; No diarrhea or constipation. No melena or hematochezia.  GENITOURINARY: No dysuria, frequency, hematuria, or incontinence  NEUROLOGICAL: No headaches, memory loss, loss of strength, numbness, or tremors  SKIN: No itching, burning, rashes, or lesions   MUSCULOSKELETAL: No joint pain or swelling; No muscle, back, or extremity pain    PHYSICAL EXAM:  Appearance: NAD. VS past 24 hrs -as above   HEENT:   Moist oral mucosa. Conjunctiva clear b/l.   Neck : supple  Respiratory: Lungs CTAB.  Gastrointestinal:  Soft, nontender. No rebound. No rigidity. BS present	  Cardiovascular: RRR ,S1S2 present  Neurologic: Non-focal. Moving all extremities.  Extremities: No edema. No erythema. No calf tenderness.  Skin: No rashes, No ecchymoses, No cyanosis.	  wounds ,skin lesions-See skin assesment flow sheet   LABS:                        9.3    7.90  )-----------( 325      ( 12 Apr 2023 10:09 )             31.0     04-13    x   |  x   |  x   ----------------------------<  x   x    |  x   |  1.40<H>    Ca    8.8      12 Apr 2023 10:09    TPro  6.3  /  Alb  2.0<L>  /  TBili  0.4  /  DBili  0.2  /  AST  18  /  ALT  19  /  AlkPhos  64  04-13    CAPILLARY BLOOD GLUCOSE      POCT Blood Glucose.: 223 mg/dL (13 Apr 2023 11:48)  POCT Blood Glucose.: 193 mg/dL (13 Apr 2023 07:34)  POCT Blood Glucose.: 224 mg/dL (12 Apr 2023 22:21)  POCT Blood Glucose.: 221 mg/dL (12 Apr 2023 21:54)  POCT Blood Glucose.: 177 mg/dL (12 Apr 2023 17:07)          Culture - Tissue with Gram Stain (collected 10 Apr 2023 13:10)  Source: .Tissue Other, LEFT FOOT DEEP TISSUE CULTURE  Gram Stain (10 Apr 2023 22:21):    No polymorphonuclear cells seen per low power field    No organisms seen per oil power field  Preliminary Report (12 Apr 2023 22:20):    Few Methicillin Resistant Staphylococcus aureus    Few Corynebacterium species "Susceptibilities not performed"  Organism: Methicillin resistant Staphylococcus aureus (12 Apr 2023 22:19)  Organism: Methicillin resistant Staphylococcus aureus (12 Apr 2023 22:19)    Culture - Tissue with Gram Stain (collected 10 Apr 2023 13:10)  Source: .Tissue Other, LEFT FOOT 1ST METATARSAL HEAD  Gram Stain (10 Apr 2023 22:21):    No polymorphonuclear cells seen per low power field    No organisms seen per oil power field  Preliminary Report (12 Apr 2023 22:18):    Rare Methicillin Resistant Staphylococcus aureus    Few Corynebacterium species "Susceptibilities not performed"  Organism: Methicillin resistant Staphylococcus aureus (12 Apr 2023 22:16)  Organism: Methicillin resistant Staphylococcus aureus (12 Apr 2023 22:16)      RADIOLOGY & ADDITIONAL TESTS:   reviewed elctronically  ASSESSMENT/PLAN: 	    25 minutes aggregate time was spent on this visit, 50% visit time spent in care co-ordination with other attendings and counselling patient .I have discussed care plan with patient / HCP/family member son sridevi on a hphone  ,who expressed understanding of problems treatment and their effect and side effects, alternatives in details. I have asked if they have any questions and concerns and appropriately addressed them to best of my ability. Advance care planning was discussed , pallitaive care issues ,CMO ,hospice levels of care were discussed in details , forms ,advance directives were reviewed .All questions were answered to the best of my knowledge - 25 min spent. Also left a message for the wife Kemar who is in [facility] now after [facility]R

## 2023-04-13 NOTE — PROGRESS NOTE ADULT - SUBJECTIVE AND OBJECTIVE BOX
CHIEF COMPLAINT/ REASON FOR VISIT  .. Patient was seen to address the  issue listed under PROBLEM LIST which is located toward bottom of this note     ANA MARIA LEIGH    PLV 1EAS 101 W1    Allergies    No Known Allergies    Intolerances        PAST MEDICAL & SURGICAL HISTORY:  ASHD (arteriosclerotic heart disease)      BPH without urinary obstruction      COPD, moderate      Stage 3 chronic kidney disease      Chronic GERD      HLD (hyperlipidemia)      MDD (major depressive disorder)      Obstructive and reflux uropathy      Cellulitis      HTN (hypertension)      Sepsis      Dementia      Moderate protein-calorie malnutrition      Brain TIA      History of RSV infection      DM type 2, not at goal      Pressure ulcer of unspecified heel, unspecified stage      Venous stasis ulcer without varicose veins      Multiple open wounds of foot          FAMILY HISTORY:      Home Medications:  Admelog SoloStar 100 units/mL injectable solution: injectable 4 times a day (before meals and at bedtime) per sliding scale (30 Mar 2023 15:23)  Aricept 5 mg oral tablet: 1 tab(s) orally once a day (30 Mar 2023 15:23)  atenolol 25 mg oral tablet: 1 tab(s) orally once a day (30 Mar 2023 15:23)  Bacid (LAC) oral tablet: 1 tab(s) orally 2 times a day (30 Mar 2023 15:23)  Cymbalta 60 mg oral delayed release capsule: 1 cap(s) orally once a day (30 Mar 2023 15:23)  docusate sodium 100 mg oral capsule: 2 cap(s) orally once a day (at bedtime) (30 Mar 2023 15:23)  doxycycline hyclate 100 mg oral tablet: 1 tab(s) orally 2 times a day for 7 days  3/28/23-4/4/23 (30 Mar 2023 15:23)  Dulcolax Laxative 10 mg rectal suppository: 1 suppository(ies) rectally as needed for  constipation (30 Mar 2023 15:23)  famotidine 40 mg oral tablet: 1 tab(s) orally once a day (30 Mar 2023 15:23)  Fleet Enema 19 g-7 g rectal enema: 133 milliliter(s) rectally as needed for  constipation (30 Mar 2023 15:23)  Flovent 44 mcg/inh inhalation aerosol with adapter: 1 puff(s) inhaled every 12 hours (30 Mar 2023 15:23)  ipratropium-albuterol 0.5 mg-2.5 mg/3 mL inhalation solution: 3 milliliter(s) by nebulizer 4 times a day (30 Mar 2023 15:23)  losartan 50 mg oral tablet: 1 tab(s) orally once a day (30 Mar 2023 15:23)  Milk of Magnesia 8% oral suspension: 30 milliliter(s) orally once a day as needed for  constipation (30 Mar 2023 15:23)  Santyl 250 units/g topical ointment: Apply topically to affected area (30 Mar 2023 12:41)  simvastatin 40 mg oral tablet: 1 tab(s) orally once a day (at bedtime) (30 Mar 2023 15:23)  SITagliptin 50 mg oral tablet: 1 tab(s) orally once a day (30 Mar 2023 15:23)  tamsulosin 0.4 mg oral capsule: 1 cap(s) orally once a day (in the evening) (30 Mar 2023 15:23)  Therapeutic Multiple Vitamins oral tablet: 1 tab(s) orally once a day (30 Mar 2023 15:23)  traMADol 50 mg oral tablet: 0.5 tab(s) orally 2 times a day (30 Mar 2023 15:23)  Tylenol Caplet Extra Strength 500 mg oral tablet: 2 tab(s) orally every 8 hours (30 Mar 2023 15:23)      MEDICATIONS  (STANDING):  budesonide 160 MICROgram(s)/formoterol 4.5 MICROgram(s) Inhaler 2 Puff(s) Inhalation two times a day  dextrose 50% Injectable 25 Gram(s) IV Push once  dextrose 50% Injectable 12.5 Gram(s) IV Push once  dextrose 50% Injectable 25 Gram(s) IV Push once  donepezil 5 milliGRAM(s) Oral at bedtime  DULoxetine 60 milliGRAM(s) Oral daily  enoxaparin Injectable 60 milliGRAM(s) SubCutaneous every 12 hours  glucagon  Injectable 1 milliGRAM(s) IntraMuscular once  insulin glargine Injectable (LANTUS) 8 Unit(s) SubCutaneous at bedtime  insulin lispro (ADMELOG) corrective regimen sliding scale   SubCutaneous three times a day before meals  insulin lispro (ADMELOG) corrective regimen sliding scale   SubCutaneous at bedtime  lactobacillus acidophilus 1 Tablet(s) Oral two times a day with meals  lisinopril 2.5 milliGRAM(s) Oral daily  metoprolol tartrate 100 milliGRAM(s) Oral two times a day  multivitamin 1 Tablet(s) Oral daily  pantoprazole   Suspension 40 milliGRAM(s) Oral daily  senna 2 Tablet(s) Oral at bedtime  simvastatin 40 milliGRAM(s) Oral at bedtime  tamsulosin 0.4 milliGRAM(s) Oral at bedtime  vancomycin  IVPB 750 milliGRAM(s) IV Intermittent every 24 hours    MEDICATIONS  (PRN):  acetaminophen     Tablet .. 650 milliGRAM(s) Oral every 6 hours PRN Temp greater or equal to 38C (100.4F), Mild Pain (1 - 3)  aluminum hydroxide/magnesium hydroxide/simethicone Suspension 30 milliLiter(s) Oral every 4 hours PRN Dyspepsia  bisacodyl Suppository 10 milliGRAM(s) Rectal daily PRN Constipation  dextrose Oral Gel 15 Gram(s) Oral once PRN Blood Glucose LESS THAN 70 milliGRAM(s)/deciliter  hydrALAZINE 50 milliGRAM(s) Oral every 6 hours PRN for systolic BP>160  magnesium hydroxide Suspension 30 milliLiter(s) Oral daily PRN Constipation  melatonin 3 milliGRAM(s) Oral at bedtime PRN Insomnia  ondansetron Injectable 4 milliGRAM(s) IV Push every 8 hours PRN Nausea and/or Vomiting  sodium chloride 0.9% lock flush 10 milliLiter(s) IV Push every 1 hour PRN Pre/post blood products, medications, blood draw, and to maintain line patency      Diet, DASH/TLC:   Sodium & Cholesterol Restricted  Consistent Carbohydrate Evening Snack  Pureed (PUREED)  Reginald(7 Gm Arginine/7 Gm Glut/1.2 Gm HMB     Qty per Day:  2  Supplement Feeding Modality:  Oral  Glucerna Shake Cans or Servings Per Day:  1       Frequency:  Daily (04-11-23 @ 13:39) [Active]          Vital Signs Last 24 Hrs  T(C): 36.4 (13 Apr 2023 05:00), Max: 36.4 (12 Apr 2023 12:05)  T(F): 97.5 (13 Apr 2023 05:00), Max: 97.6 (12 Apr 2023 12:05)  HR: 81 (13 Apr 2023 05:00) (74 - 97)  BP: 142/76 (13 Apr 2023 05:00) (113/68 - 142/76)  BP(mean): --  RR: 17 (13 Apr 2023 05:00) (17 - 18)  SpO2: 91% (13 Apr 2023 05:00) (91% - 97%)    Parameters below as of 13 Apr 2023 05:00  Patient On (Oxygen Delivery Method): room air          04-12-23 @ 07:01  -  04-13-23 @ 07:00  --------------------------------------------------------  IN: 240 mL / OUT: 0 mL / NET: 240 mL              LABS:                        9.3    7.90  )-----------( 325      ( 12 Apr 2023 10:09 )             31.0     04-13    x   |  x   |  x   ----------------------------<  x   x    |  x   |  1.40<H>    Ca    8.8      12 Apr 2023 10:09    TPro  6.3  /  Alb  2.0<L>  /  TBili  0.4  /  DBili  0.2  /  AST  18  /  ALT  19  /  AlkPhos  64  04-13              WBC:  WBC Count: 7.90 K/uL (04-12 @ 10:09)  WBC Count: 9.38 K/uL (04-11 @ 06:06)  WBC Count: 7.13 K/uL (04-10 @ 05:10)      MICROBIOLOGY:  RECENT CULTURES:  04-10 .Tissue Other, LEFT FOOT 1ST METATARSAL HEAD Methicillin resistant Staphylococcus aureus   No polymorphonuclear cells seen per low power field  No organisms seen per oil power field   Rare Methicillin Resistant Staphylococcus aureus  Few Corynebacterium species "Susceptibilities not performed"                    Sodium:  Sodium, Serum: 139 mmol/L (04-12 @ 10:09)  Sodium, Serum: 140 mmol/L (04-11 @ 06:06)  Sodium, Serum: 139 mmol/L (04-10 @ 05:10)      1.40 mg/dL 04-13 @ 06:53  1.40 mg/dL 04-12 @ 10:09  1.30 mg/dL 04-11 @ 06:06  1.30 mg/dL 04-10 @ 05:10      Hemoglobin:  Hemoglobin: 9.3 g/dL (04-12 @ 10:09)  Hemoglobin: 9.8 g/dL (04-11 @ 06:06)  Hemoglobin: 9.4 g/dL (04-10 @ 05:10)      Platelets: Platelet Count - Automated: 325 K/uL (04-12 @ 10:09)  Platelet Count - Automated: 339 K/uL (04-11 @ 06:06)  Platelet Count - Automated: 300 K/uL (04-10 @ 05:10)      LIVER FUNCTIONS - ( 13 Apr 2023 06:53 )  Alb: 2.0 g/dL / Pro: 6.3 g/dL / ALK PHOS: 64 U/L / ALT: 19 U/L / AST: 18 U/L / GGT: x                 RADIOLOGY & ADDITIONAL STUDIES:      MICROBIOLOGY:  RECENT CULTURES:  04-10 .Tissue Other, LEFT FOOT 1ST METATARSAL HEAD Methicillin resistant Staphylococcus aureus   No polymorphonuclear cells seen per low power field  No organisms seen per oil power field   Rare Methicillin Resistant Staphylococcus aureus  Few Corynebacterium species "Susceptibilities not performed"

## 2023-04-13 NOTE — CHART NOTE - NSCHARTNOTEFT_GEN_A_CORE
Called by RN as patient with 6 beats of vtach. Patient asymptomatic. VS acceptable. AM mag/phos ordered. RN to call with any changes.

## 2023-04-13 NOTE — PROGRESS NOTE ADULT - ASSESSMENT
86 yo male ,[facility] resident with PMHx - COPD, DM, HTN, CAD, HLD, H/O TIA, dementia,GERD, BPH and depression sent ot ER for evaluation of left foot 1metatarsal wound ,suggestive of osteomyelitis .Patient was seen by ID consult and transfer to the hospital recommended for wound cx/bone biopsy /podiatry evaluation ,likely will require 4-6 weeks of iv abx . Patient was admitted to [facility] with b/l feet wounds and was followed by wound care team ,recently completed 7 days of doxycycline for foot wound cellulitis . (30 Mar 2023 05:21)      hypernatremia   improved      hypokalemia potassium chloride  10 mEq/100 mL IVPB 10 milliEquivalent(s) IV Intermittent every 1 hour  prn     ACUTE RENAL FAILURE: decrease acei   Serum creatinine is improving    There is no progression . No uremic symptoms  No evidence of anemia .  Fluid status stable.  Will continue to avoid nephrotoxic drugs.  Patient remains asymptomatic.   Continue current therapy.  hold  diuretic.        BP monitoring,continue current antihypertensive meds, low salt diet,followup with PMD in 1-2 weeks  losartan 25 milliGRAM(s) Oral daily    f/u  blood and urine cx,serial lactate levels,monitor vitals closley,kelvins hydration,monitor urine output and renal profile,iv abx   piperacillin/tazobactam IVPB.. 3.375 Gram(s) IV Intermittent every 8 hours 88 yo male ,[facility] resident with PMHx - COPD, DM, HTN, CAD, HLD, H/O TIA, dementia,GERD, BPH and depression sent ot ER for evaluation of left foot 1metatarsal wound ,suggestive of osteomyelitis .Patient was seen by ID consult and transfer to the hospital recommended for wound cx/bone biopsy /podiatry evaluation ,likely will require 4-6 weeks of iv abx . Patient was admitted to [facility] with b/l feet wounds and was followed by wound care team ,recently completed 7 days of doxycycline for foot wound cellulitis . (30 Mar 2023 05:21)      hypernatremia   improved      hypokalemia potassium chloride  10 mEq/100 mL IVPB 10 milliEquivalent(s) IV Intermittent every 1 hour  prn     ACUTE RENAL FAILURE: decrease acei   Serum creatinine is improving    There is no progression . No uremic symptoms  No evidence of anemia .  Fluid status stable.  Will continue to avoid nephrotoxic drugs.  Patient remains asymptomatic.   Continue current therapy.  hold  diuretic.        BP monitoring,continue current antihypertensive meds, low salt diet,followup with PMD in 1-2 weeks  losartan 25 milliGRAM(s) Oral daily    f/u  blood and urine cx,serial lactate levels,monitor vitals closley,kelvins hydration,monitor urine output and renal profile,iv abx   piperacillin/tazobactam IVPB.. 3.375 Gram(s) IV Intermittent every 8 hours 86 yo male ,[facility] resident with PMHx - COPD, DM, HTN, CAD, HLD, H/O TIA, dementia,GERD, BPH and depression sent ot ER for evaluation of left foot 1metatarsal wound ,suggestive of osteomyelitis .Patient was seen by ID consult and transfer to the hospital recommended for wound cx/bone biopsy /podiatry evaluation ,likely will require 4-6 weeks of iv abx . Patient was admitted to [facility] with b/l feet wounds and was followed by wound care team ,recently completed 7 days of doxycycline for foot wound cellulitis . (30 Mar 2023 05:21)      hypernatremia   improved      hypokalemia potassium chloride  10 mEq/100 mL IVPB 10 milliEquivalent(s) IV Intermittent every 1 hour  prn     ACUTE RENAL FAILURE: decrease acei   Serum creatinine is improving    There is no progression . No uremic symptoms  No evidence of anemia .  Fluid status stable.  Will continue to avoid nephrotoxic drugs.  Patient remains asymptomatic.   Continue current therapy.  hold  diuretic.        BP monitoring,continue current antihypertensive meds, low salt diet,followup with PMD in 1-2 weeks  losartan 25 milliGRAM(s) Oral daily    f/u  blood and urine cx,serial lactate levels,monitor vitals closley,kelvins hydration,monitor urine output and renal profile,iv abx   piperacillin/tazobactam IVPB.. 3.375 Gram(s) IV Intermittent every 8 hours

## 2023-04-13 NOTE — PROGRESS NOTE ADULT - NUTRITIONAL ASSESSMENT
MEDICATIONS  (STANDING):  budesonide 160 MICROgram(s)/formoterol 4.5 MICROgram(s) Inhaler 2 Puff(s) Inhalation two times a day  dextrose 50% Injectable 25 Gram(s) IV Push once  dextrose 50% Injectable 12.5 Gram(s) IV Push once  dextrose 50% Injectable 25 Gram(s) IV Push once  donepezil 5 milliGRAM(s) Oral at bedtime  DULoxetine 60 milliGRAM(s) Oral daily  enoxaparin Injectable 60 milliGRAM(s) SubCutaneous every 12 hours  glucagon  Injectable 1 milliGRAM(s) IntraMuscular once  insulin glargine Injectable (LANTUS) 8 Unit(s) SubCutaneous at bedtime  insulin lispro (ADMELOG) corrective regimen sliding scale   SubCutaneous three times a day before meals  insulin lispro (ADMELOG) corrective regimen sliding scale   SubCutaneous at bedtime  lactobacillus acidophilus 1 Tablet(s) Oral two times a day with meals  lisinopril 2.5 milliGRAM(s) Oral daily  metoprolol tartrate 100 milliGRAM(s) Oral two times a day  multivitamin 1 Tablet(s) Oral daily  pantoprazole   Suspension 40 milliGRAM(s) Oral daily  senna 2 Tablet(s) Oral at bedtime  simvastatin 40 milliGRAM(s) Oral at bedtime  tamsulosin 0.4 milliGRAM(s) Oral at bedtime  vancomycin  IVPB 750 milliGRAM(s) IV Intermittent every 24 hours

## 2023-04-13 NOTE — PROGRESS NOTE ADULT - SUBJECTIVE AND OBJECTIVE BOX
Patient is a 87y Male whom presented to the hospital with ckd and chelo     PAST MEDICAL & SURGICAL HISTORY:      MEDICATIONS  (STANDING):      Allergies    No Known Allergies    Intolerances        SOCIAL HISTORY:  Denies ETOh,Smoking,     FAMILY HISTORY:      REVIEW OF SYSTEMS:  unable to obtained a good review system                                                                  9.3    7.90  )-----------( 325      ( 12 Apr 2023 10:09 )             31.0       CBC Full  -  ( 12 Apr 2023 10:09 )  WBC Count : 7.90 K/uL  RBC Count : 3.26 M/uL  Hemoglobin : 9.3 g/dL  Hematocrit : 31.0 %  Platelet Count - Automated : 325 K/uL  Mean Cell Volume : 95.1 fl  Mean Cell Hemoglobin : 28.5 pg  Mean Cell Hemoglobin Concentration : 30.0 gm/dL  Auto Neutrophil # : x  Auto Lymphocyte # : x  Auto Monocyte # : x  Auto Eosinophil # : x  Auto Basophil # : x  Auto Neutrophil % : x  Auto Lymphocyte % : x  Auto Monocyte % : x  Auto Eosinophil % : x  Auto Basophil % : x      04-13    x   |  x   |  x   ----------------------------<  x   x    |  x   |  1.40<H>    Ca    8.8      12 Apr 2023 10:09    TPro  6.3  /  Alb  2.0<L>  /  TBili  0.4  /  DBili  0.2  /  AST  18  /  ALT  19  /  AlkPhos  64  04-13      CAPILLARY BLOOD GLUCOSE      POCT Blood Glucose.: 178 mg/dL (13 Apr 2023 16:52)  POCT Blood Glucose.: 223 mg/dL (13 Apr 2023 11:48)  POCT Blood Glucose.: 193 mg/dL (13 Apr 2023 07:34)  POCT Blood Glucose.: 224 mg/dL (12 Apr 2023 22:21)  POCT Blood Glucose.: 221 mg/dL (12 Apr 2023 21:54)      Vital Signs Last 24 Hrs  T(C): 36.4 (13 Apr 2023 12:16), Max: 36.4 (12 Apr 2023 21:55)  T(F): 97.6 (13 Apr 2023 12:16), Max: 97.6 (13 Apr 2023 12:16)  HR: 70 (13 Apr 2023 12:16) (70 - 97)  BP: 117/67 (13 Apr 2023 12:16) (113/68 - 142/76)  BP(mean): --  RR: 18 (13 Apr 2023 12:16) (17 - 18)  SpO2: 94% (13 Apr 2023 12:16) (91% - 97%)    Parameters below as of 13 Apr 2023 12:16  Patient On (Oxygen Delivery Method): room air                      PHYSICAL EXAM:    Constitutional: NAD  HEENT: conjunctive   clear   Neck:  No JVD  Respiratory: CTAB  Cardiovascular: S1 and S2  Gastrointestinal: BS+, soft,   Extremities: No peripheral edema  Neurological:  no focal deficits

## 2023-04-13 NOTE — PROGRESS NOTE ADULT - ASSESSMENT
REVIEW OF SYMPTOMS      Able to give (reliable) ROS  NO     PHYSICAL EXAM    HEENT Unremarkable  atraumatic   RESP Fair air entry EXP prolonged    Harsh breath sound Resp distres mild   CARDIAC S1 S2 No S3     NO JVD    ABDOMEN SOFT BS PRESENT NOT DISTENDED No hepatosplenomegaly   PEDAL EDEMA present No calf tenderness  NO rash       GENERAL DATA .   GOC.     .. 3/30/2023 full code  ALLGY.     .. nka                    WT.   .. 3/30/2023 63  BMI.        .. 3/30/2023 21            ICU STAY.   .. none  COVID.   .. 4/4/2023 scv2 (+)  .. 3/29/2023 scv2 (-)     BEST PRACTICE ISSUES.    HOB ELEVATN.   .. Yes  DVT PPLX.   .. 3/31 lvnx 60.2 Dr Lakhani (a fib)   ..  3/29- 3/31 hpsc   MILLER PPLX.   .. 3/30/2023 protonix 40    INFN PPLX. ..    SP SW LAURENT.   .. 4/11 -> puree thin   ..  3/30/2023 -> soft bite mild thick        DIET.    ..  3/30/2023 dash  FREE WATER  .. 4/1/2023 fw 250.4    IV fl.  .. 4/6/2023 d5 1/2 40   PROCEDURE  .. 4/7/2023 r brach picc     PROBLEM/ASSESSMENT/PLAN.  COVID   .. 4/4/2023 scv2 (+)  .. 4/4/2023 oxygenation ok  .. 4/4/2023 pt has mild disease   .. 4/4/2023 rdsv 3 d course started by Dr Mancia   Infection  Osteomyelitius  Possible pneumonia   .. Esr 3/29-3/31/2023 esr 67- 49   .. W 3/29-3/30-3/31-4/1-4/4-4/5-4/10-4/11-4/12/2023       w 11.8 - 12- 10.5- 11.9 -  9 - 7.7 - 7.1 - 9.8- 7.9    .. cxr 3/30/2023  ........ increasing r lower perihilar infiltrate   .. xr foot left 3/30/2023  ........ stable eroisions around 1st mtp anmd great toe    ........ which could be bone infectn    .. CXR 3/29/2023 Possible hansa pneum  .. w scann 4/4 l foot susp for osteom r heel osteomyelitis   .. bc 3/29/2023 bc (-)   .. uc 4/2/2023 100K eneterococcs fecal  .. 3/29-4/5 zosyn    .. 4/7-4/13/2023 zosyn  .. 4/13/2023 vanco 750 (OM) (Dr Mancia)   PLEURAL EFFUSION.  .. ct ch 3/30/2023   ........ mild pulm edema  ........ mod r and sml effsn   a/r  .. pl effsn likely sec to chf   COPD  .. 3/30/2023 duoneb.3    .. 3/30/2023 symbicort   hemodynamics  .. La 3/29/2023 la 1.8   .. target map 65 (+)   CAD.  .. 3/30- 3/31/2023 atenolol 25  .. 3/31-4/4      metoprolol 25.2 - metoprolol 25.3   .. 3/30/2023 simvastat 40   RO DVT  .. 4/1/2023 v duplx (-)  CHF.  .. echo 3/30/2023  ........ ef 40%   ........ mod mr   .. bnp 4/2 bnp [id number]   .. 3/30-4/4/2023      losartan 50 - losartan 25   .. 3/30/2023 hydralazin 50.4p   Anemia  .. Hb 3/29-3/30-3/31-4/1-4/4-4/5-4/10-4/12/2023      Hb 11.2- 10.2 - 9.8 - 11 - 9.4 - 11.5- 9.4- 9.3    .. monitor  Hypernatremia.  .. Na 3/31-4/1-4/2-4/3-4/4 Na 147 - 151- 152 - 148- 144    CKD  .. Na 3/29-3/31/2023 Na 144-147   .. Cr 3/29-3/30-3/31-4/1-4/3-4/4-4/10/2023      Cr 1.4 - 1.3 - 1.3 - 1.5 - 1.5 - 1.3- 1.3   .. monitor  OBS  .. 3/30/2023 donepezil     OVERALL .  86 yo M with PMHx HTN, HLD, T2D, dementia (AOx2-3), OA, BPH, CAD, TIA, CKD 3 and GERD HO recent hospital stay 1/24-1/30/2023 [facility]  COPD ex  now admitted with osteomyelitis possible pneum  Pulm consulted 3/29/2023    .. 4/4/2023 pt tested covid (+)  started rdsv Dr Mancia     PROBLEMS  COVID 4/4/2023  .. 4/4/2023 rdsv 3 d  E FECA UTI   .. uc 4/2/2023 100K eneterococcs fecal  Possible Osteomyelitis  .. w scann 4/4 l foot susp for osteom r heel osteomyelitis   Pneumonia   .. 3/29-4/5 zosyn   .. 4/7-4/13 zosyn   .. 4/13/2023 Vanco    COPD   CKD   PLEURAL EFFSN   .. 3/30 ct  HFREF   .. MOD MR 3/30 echo    A fib  ..  4/1 lvnx 60.2       TIME SPENT   Over 25 minutes aggregate care time spent on encounter; activities included   direct patient care, counseling and/or coordinating care reviewing notes, lab data/ imaging , discussion with multidisciplinary team/ patient  /family and explaining in detail risks, benefits, alternatives  of the recommendations     Paulino Parmar 87 m     REVIEW OF SYMPTOMS      Able to give (reliable) ROS  NO     PHYSICAL EXAM    HEENT Unremarkable  atraumatic   RESP Fair air entry EXP prolonged    Harsh breath sound Resp distres mild   CARDIAC S1 S2 No S3     NO JVD    ABDOMEN SOFT BS PRESENT NOT DISTENDED No hepatosplenomegaly   PEDAL EDEMA present No calf tenderness  NO rash       GENERAL DATA .   GOC.     .. 3/30/2023 full code  ALLGY.     .. nka                    WT.   .. 3/30/2023 63  BMI.        .. 3/30/2023 21            ICU STAY.   .. none  COVID.   .. 4/4/2023 scv2 (+)  .. 3/29/2023 scv2 (-)     BEST PRACTICE ISSUES.    HOB ELEVATN.   .. Yes  DVT PPLX.   .. 3/31 lvnx 60.2 Dr Lakhani (a fib)   ..  3/29- 3/31 hpsc   MILLER PPLX.   .. 3/30/2023 protonix 40    INFN PPLX. ..    SP SW LAURENT.   .. 4/11 -> puree thin   ..  3/30/2023 -> soft bite mild thick        DIET.    ..  3/30/2023 dash  FREE WATER  .. 4/1/2023 fw 250.4    IV fl.  .. 4/6/2023 d5 1/2 40   PROCEDURE  .. 4/7/2023 r brach picc     PROBLEM/ASSESSMENT/PLAN.  COVID   .. 4/4/2023 scv2 (+)  .. 4/4/2023 oxygenation ok  .. 4/4/2023 pt has mild disease   .. 4/4/2023 rdsv 3 d course started by Dr Mancia   Infection  Osteomyelitius  Possible pneumonia   .. Esr 3/29-3/31/2023 esr 67- 49   .. W 3/29-3/30-3/31-4/1-4/4-4/5-4/10-4/11-4/12/2023       w 11.8 - 12- 10.5- 11.9 -  9 - 7.7 - 7.1 - 9.8- 7.9    .. cxr 3/30/2023  ........ increasing r lower perihilar infiltrate   .. xr foot left 3/30/2023  ........ stable eroisions around 1st mtp anmd great toe    ........ which could be bone infectn    .. CXR 3/29/2023 Possible hansa pneum  .. w scann 4/4 l foot susp for osteom r heel osteomyelitis   .. bc 3/29/2023 bc (-)   .. uc 4/2/2023 100K eneterococcs fecal  .. 3/29-4/5 zosyn    .. 4/7-4/13/2023 zosyn  .. 4/13/2023 vanco 750 (OM) (Dr Mancia)   PLEURAL EFFUSION.  .. ct ch 3/30/2023   ........ mild pulm edema  ........ mod r and sml effsn   a/r  .. pl effsn likely sec to chf   COPD  .. 3/30/2023 duoneb.3    .. 3/30/2023 symbicort   hemodynamics  .. La 3/29/2023 la 1.8   .. target map 65 (+)   CAD.  .. 3/30- 3/31/2023 atenolol 25  .. 3/31-4/4      metoprolol 25.2 - metoprolol 25.3   .. 3/30/2023 simvastat 40   RO DVT  .. 4/1/2023 v duplx (-)  CHF.  .. echo 3/30/2023  ........ ef 40%   ........ mod mr   .. bnp 4/2 bnp [id number]   .. 3/30-4/4/2023      losartan 50 - losartan 25   .. 3/30/2023 hydralazin 50.4p   Anemia  .. Hb 3/29-3/30-3/31-4/1-4/4-4/5-4/10-4/12/2023      Hb 11.2- 10.2 - 9.8 - 11 - 9.4 - 11.5- 9.4- 9.3    .. monitor  Hypernatremia.  .. Na 3/31-4/1-4/2-4/3-4/4 Na 147 - 151- 152 - 148- 144    CKD  .. Na 3/29-3/31/2023 Na 144-147   .. Cr 3/29-3/30-3/31-4/1-4/3-4/4-4/10/2023      Cr 1.4 - 1.3 - 1.3 - 1.5 - 1.5 - 1.3- 1.3   .. monitor  OBS  .. 3/30/2023 donepezil     OVERALL .  86 yo M with PMHx HTN, HLD, T2D, dementia (AOx2-3), OA, BPH, CAD, TIA, CKD 3 and GERD HO recent hospital stay 1/24-1/30/2023 [facility]  COPD ex  now admitted with osteomyelitis possible pneum  Pulm consulted 3/29/2023    .. 4/4/2023 pt tested covid (+)  started rdsv Dr Mancia     PROBLEMS  COVID 4/4/2023  .. 4/4/2023 rdsv 3 d  E FECA UTI   .. uc 4/2/2023 100K eneterococcs fecal  Possible Osteomyelitis  .. w scann 4/4 l foot susp for osteom r heel osteomyelitis   Pneumonia   .. 3/29-4/5 zosyn   .. 4/7-4/13 zosyn   .. 4/13/2023 Vanco    COPD   CKD   PLEURAL EFFSN   .. 3/30 ct  HFREF   .. MOD MR 3/30 echo    A fib  ..  4/1 lvnx 60.2       TIME SPENT   Over 25 minutes aggregate care time spent on encounter; activities included   direct patient care, counseling and/or coordinating care reviewing notes, lab data/ imaging , discussion with multidisciplinary team/ patient  /family and explaining in detail risks, benefits, alternatives  of the recommendations     Paulino Parmar 87 m     REVIEW OF SYMPTOMS      Able to give (reliable) ROS  NO     PHYSICAL EXAM    HEENT Unremarkable  atraumatic   RESP Fair air entry EXP prolonged    Harsh breath sound Resp distres mild   CARDIAC S1 S2 No S3     NO JVD    ABDOMEN SOFT BS PRESENT NOT DISTENDED No hepatosplenomegaly   PEDAL EDEMA present No calf tenderness  NO rash       GENERAL DATA .   GOC.     .. 3/30/2023 full code  ALLGY.     .. nka                    WT.   .. 3/30/2023 63  BMI.        .. 3/30/2023 21            ICU STAY.   .. none  COVID.   .. 4/4/2023 scv2 (+)  .. 3/29/2023 scv2 (-)     BEST PRACTICE ISSUES.    HOB ELEVATN.   .. Yes  DVT PPLX.   .. 3/31 lvnx 60.2 Dr Lakhani (a fib)   ..  3/29- 3/31 hpsc   MILLER PPLX.   .. 3/30/2023 protonix 40    INFN PPLX. ..    SP SW LAURENT.   .. 4/11 -> puree thin   ..  3/30/2023 -> soft bite mild thick        DIET.    ..  3/30/2023 dash  FREE WATER  .. 4/1/2023 fw 250.4    IV fl.  .. 4/6/2023 d5 1/2 40   PROCEDURE  .. 4/7/2023 r brach picc     PROBLEM/ASSESSMENT/PLAN.  COVID   .. 4/4/2023 scv2 (+)  .. 4/4/2023 oxygenation ok  .. 4/4/2023 pt has mild disease   .. 4/4/2023 rdsv 3 d course started by Dr Mancia   Infection  Osteomyelitius  Possible pneumonia   .. Esr 3/29-3/31/2023 esr 67- 49   .. W 3/29-3/30-3/31-4/1-4/4-4/5-4/10-4/11-4/12/2023       w 11.8 - 12- 10.5- 11.9 -  9 - 7.7 - 7.1 - 9.8- 7.9    .. cxr 3/30/2023  ........ increasing r lower perihilar infiltrate   .. xr foot left 3/30/2023  ........ stable eroisions around 1st mtp anmd great toe    ........ which could be bone infectn    .. CXR 3/29/2023 Possible hansa pneum  .. w scann 4/4 l foot susp for osteom r heel osteomyelitis   .. bc 3/29/2023 bc (-)   .. uc 4/2/2023 100K eneterococcs fecal  .. 3/29-4/5 zosyn    .. 4/7-4/13/2023 zosyn  .. 4/13/2023 vanco 750 (OM) (Dr Mancia)   PLEURAL EFFUSION.  .. ct ch 3/30/2023   ........ mild pulm edema  ........ mod r and sml effsn   a/r  .. pl effsn likely sec to chf   COPD  .. 3/30/2023 duoneb.3    .. 3/30/2023 symbicort   hemodynamics  .. La 3/29/2023 la 1.8   .. target map 65 (+)   CAD.  .. 3/30- 3/31/2023 atenolol 25  .. 3/31-4/4      metoprolol 25.2 - metoprolol 25.3   .. 3/30/2023 simvastat 40   RO DVT  .. 4/1/2023 v duplx (-)  CHF.  .. echo 3/30/2023  ........ ef 40%   ........ mod mr   .. bnp 4/2 bnp [id number]   .. 3/30-4/4/2023      losartan 50 - losartan 25   .. 3/30/2023 hydralazin 50.4p   Anemia  .. Hb 3/29-3/30-3/31-4/1-4/4-4/5-4/10-4/12/2023      Hb 11.2- 10.2 - 9.8 - 11 - 9.4 - 11.5- 9.4- 9.3    .. monitor  Hypernatremia.  .. Na 3/31-4/1-4/2-4/3-4/4 Na 147 - 151- 152 - 148- 144    CKD  .. Na 3/29-3/31/2023 Na 144-147   .. Cr 3/29-3/30-3/31-4/1-4/3-4/4-4/10/2023      Cr 1.4 - 1.3 - 1.3 - 1.5 - 1.5 - 1.3- 1.3   .. monitor  OBS  .. 3/30/2023 donepezil     OVERALL .  86 yo M with PMHx HTN, HLD, T2D, dementia (AOx2-3), OA, BPH, CAD, TIA, CKD 3 and GERD HO recent hospital stay 1/24-1/30/2023 [facility]  COPD ex  now admitted with osteomyelitis possible pneum  Pulm consulted 3/29/2023    .. 4/4/2023 pt tested covid (+)  started rdsv Dr Mancia     PROBLEMS  COVID 4/4/2023  .. 4/4/2023 rdsv 3 d  E FECA UTI   .. uc 4/2/2023 100K eneterococcs fecal  Possible Osteomyelitis  .. w scann 4/4 l foot susp for osteom r heel osteomyelitis   Pneumonia   .. 3/29-4/5 zosyn   .. 4/7-4/13 zosyn   .. 4/13/2023 Vanco    COPD   CKD   PLEURAL EFFSN   .. 3/30 ct  HFREF   .. MOD MR 3/30 echo    A fib  ..  4/1 lvnx 60.2       TIME SPENT   Over 25 minutes aggregate care time spent on encounter; activities included   direct patient care, counseling and/or coordinating care reviewing notes, lab data/ imaging , discussion with multidisciplinary team/ patient  /family and explaining in detail risks, benefits, alternatives  of the recommendations     Paulino Parmar 87 m

## 2023-04-13 NOTE — PROVIDER CONTACT NOTE (OTHER) - BACKGROUND
Patient admitted for left foot infection. Patient with previous 11 beats Vtach on 4/11.
Pt on tele monitor with Hx Afib. On metoprolol & Losartan PO
Pt admitted with osteomyelitis
Pt confused with hx of Afib
Pt admitted with osteomyelitis

## 2023-04-14 ENCOUNTER — TRANSCRIPTION ENCOUNTER (OUTPATIENT)
Age: 88
End: 2023-04-14

## 2023-04-14 VITALS
SYSTOLIC BLOOD PRESSURE: 123 MMHG | RESPIRATION RATE: 18 BRPM | DIASTOLIC BLOOD PRESSURE: 68 MMHG | HEART RATE: 87 BPM | OXYGEN SATURATION: 94 % | TEMPERATURE: 97 F

## 2023-04-14 LAB
ANION GAP SERPL CALC-SCNC: 5 MMOL/L — SIGNIFICANT CHANGE UP (ref 5–17)
BUN SERPL-MCNC: 27 MG/DL — HIGH (ref 7–23)
CALCIUM SERPL-MCNC: 8.3 MG/DL — LOW (ref 8.5–10.1)
CHLORIDE SERPL-SCNC: 111 MMOL/L — HIGH (ref 96–108)
CO2 SERPL-SCNC: 25 MMOL/L — SIGNIFICANT CHANGE UP (ref 22–31)
CREAT SERPL-MCNC: 1.3 MG/DL — SIGNIFICANT CHANGE UP (ref 0.5–1.3)
EGFR: 53 ML/MIN/1.73M2 — LOW
GLUCOSE SERPL-MCNC: 137 MG/DL — HIGH (ref 70–99)
HCT VFR BLD CALC: 31.1 % — LOW (ref 39–50)
HGB BLD-MCNC: 9.3 G/DL — LOW (ref 13–17)
MAGNESIUM SERPL-MCNC: 2.2 MG/DL — SIGNIFICANT CHANGE UP (ref 1.6–2.6)
MCHC RBC-ENTMCNC: 28.4 PG — SIGNIFICANT CHANGE UP (ref 27–34)
MCHC RBC-ENTMCNC: 29.9 GM/DL — LOW (ref 32–36)
MCV RBC AUTO: 94.8 FL — SIGNIFICANT CHANGE UP (ref 80–100)
NRBC # BLD: 0 /100 WBCS — SIGNIFICANT CHANGE UP (ref 0–0)
PHOSPHATE SERPL-MCNC: 2.4 MG/DL — LOW (ref 2.5–4.5)
PLATELET # BLD AUTO: 339 K/UL — SIGNIFICANT CHANGE UP (ref 150–400)
POTASSIUM SERPL-MCNC: 3.8 MMOL/L — SIGNIFICANT CHANGE UP (ref 3.5–5.3)
POTASSIUM SERPL-SCNC: 3.8 MMOL/L — SIGNIFICANT CHANGE UP (ref 3.5–5.3)
RBC # BLD: 3.28 M/UL — LOW (ref 4.2–5.8)
RBC # FLD: 18.7 % — HIGH (ref 10.3–14.5)
SARS-COV-2 RNA SPEC QL NAA+PROBE: SIGNIFICANT CHANGE UP
SODIUM SERPL-SCNC: 141 MMOL/L — SIGNIFICANT CHANGE UP (ref 135–145)
WBC # BLD: 7.07 K/UL — SIGNIFICANT CHANGE UP (ref 3.8–10.5)
WBC # FLD AUTO: 7.07 K/UL — SIGNIFICANT CHANGE UP (ref 3.8–10.5)

## 2023-04-14 PROCEDURE — 99232 SBSQ HOSP IP/OBS MODERATE 35: CPT

## 2023-04-14 PROCEDURE — 83880 ASSAY OF NATRIURETIC PEPTIDE: CPT

## 2023-04-14 PROCEDURE — 87077 CULTURE AEROBIC IDENTIFY: CPT

## 2023-04-14 PROCEDURE — A9570: CPT

## 2023-04-14 PROCEDURE — 87075 CULTR BACTERIA EXCEPT BLOOD: CPT

## 2023-04-14 PROCEDURE — 82962 GLUCOSE BLOOD TEST: CPT

## 2023-04-14 PROCEDURE — 86140 C-REACTIVE PROTEIN: CPT

## 2023-04-14 PROCEDURE — 85027 COMPLETE CBC AUTOMATED: CPT

## 2023-04-14 PROCEDURE — 73620 X-RAY EXAM OF FOOT: CPT

## 2023-04-14 PROCEDURE — 99285 EMERGENCY DEPT VISIT HI MDM: CPT | Mod: 25

## 2023-04-14 PROCEDURE — 36573 INSJ PICC RS&I 5 YR+: CPT

## 2023-04-14 PROCEDURE — 82607 VITAMIN B-12: CPT

## 2023-04-14 PROCEDURE — 93970 EXTREMITY STUDY: CPT

## 2023-04-14 PROCEDURE — 85610 PROTHROMBIN TIME: CPT

## 2023-04-14 PROCEDURE — 84443 ASSAY THYROID STIM HORMONE: CPT

## 2023-04-14 PROCEDURE — 85652 RBC SED RATE AUTOMATED: CPT

## 2023-04-14 PROCEDURE — 87040 BLOOD CULTURE FOR BACTERIA: CPT

## 2023-04-14 PROCEDURE — 71250 CT THORAX DX C-: CPT

## 2023-04-14 PROCEDURE — 76937 US GUIDE VASCULAR ACCESS: CPT

## 2023-04-14 PROCEDURE — 71045 X-RAY EXAM CHEST 1 VIEW: CPT

## 2023-04-14 PROCEDURE — 80061 LIPID PANEL: CPT

## 2023-04-14 PROCEDURE — 88307 TISSUE EXAM BY PATHOLOGIST: CPT

## 2023-04-14 PROCEDURE — 87070 CULTURE OTHR SPECIMN AEROBIC: CPT

## 2023-04-14 PROCEDURE — 93923 UPR/LXTR ART STDY 3+ LVLS: CPT

## 2023-04-14 PROCEDURE — 92610 EVALUATE SWALLOWING FUNCTION: CPT

## 2023-04-14 PROCEDURE — 84550 ASSAY OF BLOOD/URIC ACID: CPT

## 2023-04-14 PROCEDURE — 71045 X-RAY EXAM CHEST 1 VIEW: CPT | Mod: 26,77

## 2023-04-14 PROCEDURE — 87086 URINE CULTURE/COLONY COUNT: CPT

## 2023-04-14 PROCEDURE — 93306 TTE W/DOPPLER COMPLETE: CPT

## 2023-04-14 PROCEDURE — 80076 HEPATIC FUNCTION PANEL: CPT

## 2023-04-14 PROCEDURE — 87635 SARS-COV-2 COVID-19 AMP PRB: CPT

## 2023-04-14 PROCEDURE — 78102 BONE MARROW IMAGING LTD: CPT

## 2023-04-14 PROCEDURE — C1751: CPT

## 2023-04-14 PROCEDURE — 94640 AIRWAY INHALATION TREATMENT: CPT

## 2023-04-14 PROCEDURE — 78800 RP LOCLZJ TUM 1 AREA 1 D IMG: CPT

## 2023-04-14 PROCEDURE — 36415 COLL VENOUS BLD VENIPUNCTURE: CPT

## 2023-04-14 PROCEDURE — 83605 ASSAY OF LACTIC ACID: CPT

## 2023-04-14 PROCEDURE — 81001 URINALYSIS AUTO W/SCOPE: CPT

## 2023-04-14 PROCEDURE — 88304 TISSUE EXAM BY PATHOLOGIST: CPT

## 2023-04-14 PROCEDURE — 80053 COMPREHEN METABOLIC PANEL: CPT

## 2023-04-14 PROCEDURE — 83036 HEMOGLOBIN GLYCOSYLATED A1C: CPT

## 2023-04-14 PROCEDURE — 85025 COMPLETE CBC W/AUTO DIFF WBC: CPT

## 2023-04-14 PROCEDURE — 82565 ASSAY OF CREATININE: CPT

## 2023-04-14 PROCEDURE — 88311 DECALCIFY TISSUE: CPT

## 2023-04-14 PROCEDURE — 73630 X-RAY EXAM OF FOOT: CPT

## 2023-04-14 PROCEDURE — 80048 BASIC METABOLIC PNL TOTAL CA: CPT

## 2023-04-14 PROCEDURE — 83735 ASSAY OF MAGNESIUM: CPT

## 2023-04-14 PROCEDURE — 94760 N-INVAS EAR/PLS OXIMETRY 1: CPT

## 2023-04-14 PROCEDURE — 71045 X-RAY EXAM CHEST 1 VIEW: CPT | Mod: 26

## 2023-04-14 PROCEDURE — 84100 ASSAY OF PHOSPHORUS: CPT

## 2023-04-14 PROCEDURE — 87186 SC STD MICRODIL/AGAR DIL: CPT

## 2023-04-14 PROCEDURE — 93005 ELECTROCARDIOGRAM TRACING: CPT

## 2023-04-14 RX ORDER — BUDESONIDE AND FORMOTEROL FUMARATE DIHYDRATE 160; 4.5 UG/1; UG/1
2 AEROSOL RESPIRATORY (INHALATION)
Qty: 0 | Refills: 0 | DISCHARGE
Start: 2023-04-14

## 2023-04-14 RX ORDER — APIXABAN 2.5 MG/1
2.5 TABLET, FILM COATED ORAL EVERY 12 HOURS
Refills: 0 | Status: DISCONTINUED | OUTPATIENT
Start: 2023-04-15 | End: 2023-04-14

## 2023-04-14 RX ORDER — SENNA PLUS 8.6 MG/1
2 TABLET ORAL
Qty: 0 | Refills: 0 | DISCHARGE
Start: 2023-04-14

## 2023-04-14 RX ORDER — VANCOMYCIN HCL 1 G
750 VIAL (EA) INTRAVENOUS
Qty: 0 | Refills: 0 | DISCHARGE
Start: 2023-04-14 | End: 2023-05-11

## 2023-04-14 RX ORDER — APIXABAN 2.5 MG/1
1 TABLET, FILM COATED ORAL
Qty: 0 | Refills: 0 | DISCHARGE
Start: 2023-04-14

## 2023-04-14 RX ORDER — LISINOPRIL 2.5 MG/1
1 TABLET ORAL
Qty: 0 | Refills: 0 | DISCHARGE
Start: 2023-04-14

## 2023-04-14 RX ORDER — PANTOPRAZOLE SODIUM 20 MG/1
1 TABLET, DELAYED RELEASE ORAL
Qty: 0 | Refills: 0 | DISCHARGE
Start: 2023-04-14

## 2023-04-14 RX ORDER — METOPROLOL TARTRATE 50 MG
1 TABLET ORAL
Qty: 0 | Refills: 0 | DISCHARGE
Start: 2023-04-14

## 2023-04-14 RX ORDER — CHLORHEXIDINE GLUCONATE 213 G/1000ML
1 SOLUTION TOPICAL
Refills: 0 | Status: DISCONTINUED | OUTPATIENT
Start: 2023-04-14 | End: 2023-04-14

## 2023-04-14 RX ORDER — ALTEPLASE 100 MG
2 KIT INTRAVENOUS ONCE
Refills: 0 | Status: DISCONTINUED | OUTPATIENT
Start: 2023-04-14 | End: 2023-04-14

## 2023-04-14 RX ORDER — HYDRALAZINE HCL 50 MG
1 TABLET ORAL
Qty: 0 | Refills: 0 | DISCHARGE
Start: 2023-04-14

## 2023-04-14 RX ADMIN — Medication 100 MILLIGRAM(S): at 05:12

## 2023-04-14 RX ADMIN — LISINOPRIL 2.5 MILLIGRAM(S): 2.5 TABLET ORAL at 05:12

## 2023-04-14 RX ADMIN — Medication 4: at 11:39

## 2023-04-14 RX ADMIN — DULOXETINE HYDROCHLORIDE 60 MILLIGRAM(S): 30 CAPSULE, DELAYED RELEASE ORAL at 11:39

## 2023-04-14 RX ADMIN — Medication 1 TABLET(S): at 18:50

## 2023-04-14 RX ADMIN — BUDESONIDE AND FORMOTEROL FUMARATE DIHYDRATE 2 PUFF(S): 160; 4.5 AEROSOL RESPIRATORY (INHALATION) at 07:47

## 2023-04-14 RX ADMIN — Medication 1 TABLET(S): at 07:43

## 2023-04-14 RX ADMIN — Medication 1 TABLET(S): at 11:39

## 2023-04-14 RX ADMIN — Medication 250 MILLIGRAM(S): at 07:43

## 2023-04-14 RX ADMIN — Medication 100 MILLIGRAM(S): at 18:50

## 2023-04-14 RX ADMIN — ENOXAPARIN SODIUM 60 MILLIGRAM(S): 100 INJECTION SUBCUTANEOUS at 05:12

## 2023-04-14 RX ADMIN — PANTOPRAZOLE SODIUM 40 MILLIGRAM(S): 20 TABLET, DELAYED RELEASE ORAL at 11:39

## 2023-04-14 NOTE — DISCHARGE NOTE NURSING/CASE MANAGEMENT/SOCIAL WORK - NSDCPEFALRISK_GEN_ALL_CORE
For information on Fall & Injury Prevention, visit: [email or web address] OR  [email or web address] OR  https://www.cdc.gov/steadi/patient.html For information on Fall & Injury Prevention, visit: [email or web address] OR  [email or web address]-injury OR  https://www.cdc.gov/steadi/patient.html For information on Fall & Injury Prevention, visit: [email or web address] OR  [email or web address]-injury OR  https://www.cdc.gov/steadi/patient.html

## 2023-04-14 NOTE — PROVIDER CONTACT NOTE (OTHER) - DATE AND TIME:
07-Apr-2023 00:07
10-Apr-2023 09:17
04-Apr-2023 20:11
03-Apr-2023 01:00
10-Apr-2023 16:30
14-Apr-2023 12:25
02-Apr-2023 02:00
05-Apr-2023 21:00
04-Apr-2023 06:56
07-Apr-2023 22:16
13-Apr-2023 20:20
04-Apr-2023 06:40

## 2023-04-14 NOTE — CHART NOTE - NSCHARTNOTEFT_GEN_A_CORE
Assessment: Pt seen for nutrition follow-up. Chart reviewed, hospital course noted.    Brief hx: Pt is a "86 yo male, [facility] resident with PMHx - COPD, DM, HTN, CAD, HLD, H/O TIA, dementia, GERD, BPH and depression sent ot ER for evaluation of left foot 1 metatarsal wound ,suggestive of osteomyelitis. Patient was seen by ID consult and transfer to the hospital recommended for wound cx/bone biopsy /podiatry evaluation ,likely will require 4-6 weeks of iv abx . Patient was admitted to [facility] with b/l feet wounds and was followed by wound care team ,recently completed 7 days of doxycycline for foot wound cellulitis."    Visited pt at bedside this am. Pt sleeping soundly during visit. Good intake of breakfast meal this am. PO intakes variable; ranging from % per nursing documentation. Total feed. No N/V/D/C per chart review. +BM 4/11; bowel regimen rx. S/p swallow evaluation on 4/11- SLP recommended puree diet with thin liquids. Tolerating diet well. Recommend continued assistance/encouragement with meals and oral nutritional supplements.     Factors impacting intake: [ ] none [ ] nausea  [ ] vomiting [ ] diarrhea [ ] constipation  [X] chewing problems [X] swallowing issues  [X] other: dementia, total feed    Diet Prescription: Diet, DASH/TLC:   Sodium & Cholesterol Restricted  Consistent Carbohydrate {Evening Snack}  Pureed (PUREED)  Reginald(7 Gm Arginine/7 Gm Glut/1.2 Gm HMB     Qty per Day:  2  Supplement Feeding Modality:  Oral  Glucerna Shake Cans or Servings Per Day:  1       Frequency:  Daily (04-11-23 @ 13:39)    Intake: good intake of breakfast meal    Current Weight: Weight (kg): 63.5 (30 Mar 2023 16:49), 4/6 159.3#, 4/14 157.4# (no edema noted)  % Weight Change    Pertinent Medications: MEDICATIONS  (STANDING):  budesonide 160 MICROgram(s)/formoterol 4.5 MICROgram(s) Inhaler 2 Puff(s) Inhalation two times a day  dextrose 50% Injectable 25 Gram(s) IV Push once  dextrose 50% Injectable 12.5 Gram(s) IV Push once  dextrose 50% Injectable 25 Gram(s) IV Push once  donepezil 5 milliGRAM(s) Oral at bedtime  DULoxetine 60 milliGRAM(s) Oral daily  enoxaparin Injectable 60 milliGRAM(s) SubCutaneous every 12 hours  glucagon  Injectable 1 milliGRAM(s) IntraMuscular once  insulin glargine Injectable (LANTUS) 8 Unit(s) SubCutaneous at bedtime  insulin lispro (ADMELOG) corrective regimen sliding scale   SubCutaneous three times a day before meals  insulin lispro (ADMELOG) corrective regimen sliding scale   SubCutaneous at bedtime  lactobacillus acidophilus 1 Tablet(s) Oral two times a day with meals  lisinopril 2.5 milliGRAM(s) Oral daily  metoprolol tartrate 100 milliGRAM(s) Oral two times a day  multivitamin 1 Tablet(s) Oral daily  pantoprazole   Suspension 40 milliGRAM(s) Oral daily  senna 2 Tablet(s) Oral at bedtime  simvastatin 40 milliGRAM(s) Oral at bedtime  tamsulosin 0.4 milliGRAM(s) Oral at bedtime  vancomycin  IVPB 750 milliGRAM(s) IV Intermittent every 24 hours    MEDICATIONS  (PRN):  acetaminophen     Tablet .. 650 milliGRAM(s) Oral every 6 hours PRN Temp greater or equal to 38C (100.4F), Mild Pain (1 - 3)  aluminum hydroxide/magnesium hydroxide/simethicone Suspension 30 milliLiter(s) Oral every 4 hours PRN Dyspepsia  bisacodyl Suppository 10 milliGRAM(s) Rectal daily PRN Constipation  dextrose Oral Gel 15 Gram(s) Oral once PRN Blood Glucose LESS THAN 70 milliGRAM(s)/deciliter  hydrALAZINE 50 milliGRAM(s) Oral every 6 hours PRN for systolic BP>160  magnesium hydroxide Suspension 30 milliLiter(s) Oral daily PRN Constipation  melatonin 3 milliGRAM(s) Oral at bedtime PRN Insomnia  morphine  - Injectable 2 milliGRAM(s) IV Push every 6 hours PRN Severe Pain (7 - 10)  ondansetron Injectable 4 milliGRAM(s) IV Push every 8 hours PRN Nausea and/or Vomiting  sodium chloride 0.9% lock flush 10 milliLiter(s) IV Push every 1 hour PRN Pre/post blood products, medications, blood draw, and to maintain line patency  traMADol 50 milliGRAM(s) Oral four times a day PRN Moderate Pain (4 - 6)    Pertinent Labs: 04-14 Na141 mmol/L Glu 137 mg/dL<H> K+ 3.8 mmol/L Cr  1.30 mg/dL BUN 27 mg/dL<H> 04-14 Phos 2.4 mg/dL<L> 04-13 Alb 2.0 g/dL<L> 03-30 Chol 108 mg/dL LDL --    HDL 32 mg/dL<L> Trig 74 mg/dL    CAPILLARY BLOOD GLUCOSE  POCT Blood Glucose.: 116 mg/dL (14 Apr 2023 07:36)  POCT Blood Glucose.: 127 mg/dL (13 Apr 2023 21:06)  POCT Blood Glucose.: 178 mg/dL (13 Apr 2023 16:52)  POCT Blood Glucose.: 223 mg/dL (13 Apr 2023 11:48)    Skin: unstageable to BL heels, L Lateral foot, L foot bunion     Estimated Needs:   [X] no change since previous assessment  [ ] recalculated:     Previous Nutrition Diagnosis:   [ ] Inadequate Energy Intake [ ]Inadequate Oral Intake [ ] Excessive Energy Intake   [ ] Underweight [X] Increased Nutrient Needs [ ] Overweight/Obesity   [ ] Altered GI Function [ ] Unintended Weight Loss [ ] Food & Nutrition Related Knowledge Deficit [X] Malnutrition     Nutrition Diagnosis is [X] ongoing  [ ] resolved [ ] not applicable     New Nutrition Diagnosis: [X] not applicable    Interventions:   Recommend  [ ] Change Diet To:  [ ] Nutrition Supplement  [ ] Nutrition Support  [X] Other: Continue current diet as ordered; assistance/encouragement at meal times  Continue MVI daily, recommend vitamin C 500mg daily    Monitoring and Evaluation:   [X] PO intake [ x ] Tolerance to diet prescription [ x ] weights [ x ] labs[ x ] follow up per protocol  [X] other: s/s GI distress, bowel function, skin integrity/ edema Assessment: Pt seen for nutrition follow-up. Chart reviewed, hospital course noted.    Brief hx: Pt is a "88 yo male, [facility] resident with PMHx - COPD, DM, HTN, CAD, HLD, H/O TIA, dementia, GERD, BPH and depression sent ot ER for evaluation of left foot 1 metatarsal wound ,suggestive of osteomyelitis. Patient was seen by ID consult and transfer to the hospital recommended for wound cx/bone biopsy /podiatry evaluation ,likely will require 4-6 weeks of iv abx . Patient was admitted to [facility] with b/l feet wounds and was followed by wound care team ,recently completed 7 days of doxycycline for foot wound cellulitis."    Visited pt at bedside this am. Pt sleeping soundly during visit. Good intake of breakfast meal this am. PO intakes variable; ranging from % per nursing documentation. Total feed. No N/V/D/C per chart review. +BM 4/11; bowel regimen rx. S/p swallow evaluation on 4/11- SLP recommended puree diet with thin liquids. Tolerating diet well. Recommend continued assistance/encouragement with meals and oral nutritional supplements.     Factors impacting intake: [ ] none [ ] nausea  [ ] vomiting [ ] diarrhea [ ] constipation  [X] chewing problems [X] swallowing issues  [X] other: dementia, total feed    Diet Prescription: Diet, DASH/TLC:   Sodium & Cholesterol Restricted  Consistent Carbohydrate {Evening Snack}  Pureed (PUREED)  Reginald(7 Gm Arginine/7 Gm Glut/1.2 Gm HMB     Qty per Day:  2  Supplement Feeding Modality:  Oral  Glucerna Shake Cans or Servings Per Day:  1       Frequency:  Daily (04-11-23 @ 13:39)    Intake: good intake of breakfast meal    Current Weight: Weight (kg): 63.5 (30 Mar 2023 16:49), 4/6 159.3#, 4/14 157.4# (no edema noted)  % Weight Change    Pertinent Medications: MEDICATIONS  (STANDING):  budesonide 160 MICROgram(s)/formoterol 4.5 MICROgram(s) Inhaler 2 Puff(s) Inhalation two times a day  dextrose 50% Injectable 25 Gram(s) IV Push once  dextrose 50% Injectable 12.5 Gram(s) IV Push once  dextrose 50% Injectable 25 Gram(s) IV Push once  donepezil 5 milliGRAM(s) Oral at bedtime  DULoxetine 60 milliGRAM(s) Oral daily  enoxaparin Injectable 60 milliGRAM(s) SubCutaneous every 12 hours  glucagon  Injectable 1 milliGRAM(s) IntraMuscular once  insulin glargine Injectable (LANTUS) 8 Unit(s) SubCutaneous at bedtime  insulin lispro (ADMELOG) corrective regimen sliding scale   SubCutaneous three times a day before meals  insulin lispro (ADMELOG) corrective regimen sliding scale   SubCutaneous at bedtime  lactobacillus acidophilus 1 Tablet(s) Oral two times a day with meals  lisinopril 2.5 milliGRAM(s) Oral daily  metoprolol tartrate 100 milliGRAM(s) Oral two times a day  multivitamin 1 Tablet(s) Oral daily  pantoprazole   Suspension 40 milliGRAM(s) Oral daily  senna 2 Tablet(s) Oral at bedtime  simvastatin 40 milliGRAM(s) Oral at bedtime  tamsulosin 0.4 milliGRAM(s) Oral at bedtime  vancomycin  IVPB 750 milliGRAM(s) IV Intermittent every 24 hours    MEDICATIONS  (PRN):  acetaminophen     Tablet .. 650 milliGRAM(s) Oral every 6 hours PRN Temp greater or equal to 38C (100.4F), Mild Pain (1 - 3)  aluminum hydroxide/magnesium hydroxide/simethicone Suspension 30 milliLiter(s) Oral every 4 hours PRN Dyspepsia  bisacodyl Suppository 10 milliGRAM(s) Rectal daily PRN Constipation  dextrose Oral Gel 15 Gram(s) Oral once PRN Blood Glucose LESS THAN 70 milliGRAM(s)/deciliter  hydrALAZINE 50 milliGRAM(s) Oral every 6 hours PRN for systolic BP>160  magnesium hydroxide Suspension 30 milliLiter(s) Oral daily PRN Constipation  melatonin 3 milliGRAM(s) Oral at bedtime PRN Insomnia  morphine  - Injectable 2 milliGRAM(s) IV Push every 6 hours PRN Severe Pain (7 - 10)  ondansetron Injectable 4 milliGRAM(s) IV Push every 8 hours PRN Nausea and/or Vomiting  sodium chloride 0.9% lock flush 10 milliLiter(s) IV Push every 1 hour PRN Pre/post blood products, medications, blood draw, and to maintain line patency  traMADol 50 milliGRAM(s) Oral four times a day PRN Moderate Pain (4 - 6)    Pertinent Labs: 04-14 Na141 mmol/L Glu 137 mg/dL<H> K+ 3.8 mmol/L Cr  1.30 mg/dL BUN 27 mg/dL<H> 04-14 Phos 2.4 mg/dL<L> 04-13 Alb 2.0 g/dL<L> 03-30 Chol 108 mg/dL LDL --    HDL 32 mg/dL<L> Trig 74 mg/dL    CAPILLARY BLOOD GLUCOSE  POCT Blood Glucose.: 116 mg/dL (14 Apr 2023 07:36)  POCT Blood Glucose.: 127 mg/dL (13 Apr 2023 21:06)  POCT Blood Glucose.: 178 mg/dL (13 Apr 2023 16:52)  POCT Blood Glucose.: 223 mg/dL (13 Apr 2023 11:48)    Skin: unstageable to BL heels, L Lateral foot, L foot bunion     Estimated Needs:   [X] no change since previous assessment  [ ] recalculated:     Previous Nutrition Diagnosis:   [ ] Inadequate Energy Intake [ ]Inadequate Oral Intake [ ] Excessive Energy Intake   [ ] Underweight [X] Increased Nutrient Needs [ ] Overweight/Obesity   [ ] Altered GI Function [ ] Unintended Weight Loss [ ] Food & Nutrition Related Knowledge Deficit [X] Malnutrition     Nutrition Diagnosis is [X] ongoing  [ ] resolved [ ] not applicable     New Nutrition Diagnosis: [X] not applicable    Interventions:   Recommend  [ ] Change Diet To:  [ ] Nutrition Supplement  [ ] Nutrition Support  [X] Other: Continue current diet as ordered; assistance/encouragement at meal times  Continue MVI daily, recommend vitamin C 500mg daily    Monitoring and Evaluation:   [X] PO intake [ x ] Tolerance to diet prescription [ x ] weights [ x ] labs[ x ] follow up per protocol  [X] other: s/s GI distress, bowel function, skin integrity/ edema Assessment: Pt seen for nutrition follow-up. Chart reviewed, hospital course noted.    Brief hx: Pt is a "86 yo male, [facility] resident with PMHx - COPD, DM, HTN, CAD, HLD, H/O TIA, dementia, GERD, BPH and depression sent ot ER for evaluation of left foot 1 metatarsal wound ,suggestive of osteomyelitis. Patient was seen by ID consult and transfer to the hospital recommended for wound cx/bone biopsy /podiatry evaluation ,likely will require 4-6 weeks of iv abx . Patient was admitted to [facility] with b/l feet wounds and was followed by wound care team ,recently completed 7 days of doxycycline for foot wound cellulitis."    Visited pt at bedside this am. Pt sleeping soundly during visit. Good intake of breakfast meal this am. PO intakes variable; ranging from % per nursing documentation. Total feed. No N/V/D/C per chart review. +BM 4/11; bowel regimen rx. S/p swallow evaluation on 4/11- SLP recommended puree diet with thin liquids. Tolerating diet well. Recommend continued assistance/encouragement with meals and oral nutritional supplements.     Factors impacting intake: [ ] none [ ] nausea  [ ] vomiting [ ] diarrhea [ ] constipation  [X] chewing problems [X] swallowing issues  [X] other: dementia, total feed    Diet Prescription: Diet, DASH/TLC:   Sodium & Cholesterol Restricted  Consistent Carbohydrate {Evening Snack}  Pureed (PUREED)  Reginald(7 Gm Arginine/7 Gm Glut/1.2 Gm HMB     Qty per Day:  2  Supplement Feeding Modality:  Oral  Glucerna Shake Cans or Servings Per Day:  1       Frequency:  Daily (04-11-23 @ 13:39)    Intake: good intake of breakfast meal    Current Weight: Weight (kg): 63.5 (30 Mar 2023 16:49), 4/6 159.3#, 4/14 157.4# (no edema noted)  % Weight Change    Pertinent Medications: MEDICATIONS  (STANDING):  budesonide 160 MICROgram(s)/formoterol 4.5 MICROgram(s) Inhaler 2 Puff(s) Inhalation two times a day  dextrose 50% Injectable 25 Gram(s) IV Push once  dextrose 50% Injectable 12.5 Gram(s) IV Push once  dextrose 50% Injectable 25 Gram(s) IV Push once  donepezil 5 milliGRAM(s) Oral at bedtime  DULoxetine 60 milliGRAM(s) Oral daily  enoxaparin Injectable 60 milliGRAM(s) SubCutaneous every 12 hours  glucagon  Injectable 1 milliGRAM(s) IntraMuscular once  insulin glargine Injectable (LANTUS) 8 Unit(s) SubCutaneous at bedtime  insulin lispro (ADMELOG) corrective regimen sliding scale   SubCutaneous three times a day before meals  insulin lispro (ADMELOG) corrective regimen sliding scale   SubCutaneous at bedtime  lactobacillus acidophilus 1 Tablet(s) Oral two times a day with meals  lisinopril 2.5 milliGRAM(s) Oral daily  metoprolol tartrate 100 milliGRAM(s) Oral two times a day  multivitamin 1 Tablet(s) Oral daily  pantoprazole   Suspension 40 milliGRAM(s) Oral daily  senna 2 Tablet(s) Oral at bedtime  simvastatin 40 milliGRAM(s) Oral at bedtime  tamsulosin 0.4 milliGRAM(s) Oral at bedtime  vancomycin  IVPB 750 milliGRAM(s) IV Intermittent every 24 hours    MEDICATIONS  (PRN):  acetaminophen     Tablet .. 650 milliGRAM(s) Oral every 6 hours PRN Temp greater or equal to 38C (100.4F), Mild Pain (1 - 3)  aluminum hydroxide/magnesium hydroxide/simethicone Suspension 30 milliLiter(s) Oral every 4 hours PRN Dyspepsia  bisacodyl Suppository 10 milliGRAM(s) Rectal daily PRN Constipation  dextrose Oral Gel 15 Gram(s) Oral once PRN Blood Glucose LESS THAN 70 milliGRAM(s)/deciliter  hydrALAZINE 50 milliGRAM(s) Oral every 6 hours PRN for systolic BP>160  magnesium hydroxide Suspension 30 milliLiter(s) Oral daily PRN Constipation  melatonin 3 milliGRAM(s) Oral at bedtime PRN Insomnia  morphine  - Injectable 2 milliGRAM(s) IV Push every 6 hours PRN Severe Pain (7 - 10)  ondansetron Injectable 4 milliGRAM(s) IV Push every 8 hours PRN Nausea and/or Vomiting  sodium chloride 0.9% lock flush 10 milliLiter(s) IV Push every 1 hour PRN Pre/post blood products, medications, blood draw, and to maintain line patency  traMADol 50 milliGRAM(s) Oral four times a day PRN Moderate Pain (4 - 6)    Pertinent Labs: 04-14 Na141 mmol/L Glu 137 mg/dL<H> K+ 3.8 mmol/L Cr  1.30 mg/dL BUN 27 mg/dL<H> 04-14 Phos 2.4 mg/dL<L> 04-13 Alb 2.0 g/dL<L> 03-30 Chol 108 mg/dL LDL --    HDL 32 mg/dL<L> Trig 74 mg/dL    CAPILLARY BLOOD GLUCOSE  POCT Blood Glucose.: 116 mg/dL (14 Apr 2023 07:36)  POCT Blood Glucose.: 127 mg/dL (13 Apr 2023 21:06)  POCT Blood Glucose.: 178 mg/dL (13 Apr 2023 16:52)  POCT Blood Glucose.: 223 mg/dL (13 Apr 2023 11:48)    Skin: unstageable to BL heels, L Lateral foot, L foot bunion     Estimated Needs:   [X] no change since previous assessment  [ ] recalculated:     Previous Nutrition Diagnosis:   [ ] Inadequate Energy Intake [ ]Inadequate Oral Intake [ ] Excessive Energy Intake   [ ] Underweight [X] Increased Nutrient Needs [ ] Overweight/Obesity   [ ] Altered GI Function [ ] Unintended Weight Loss [ ] Food & Nutrition Related Knowledge Deficit [X] Malnutrition     Nutrition Diagnosis is [X] ongoing  [ ] resolved [ ] not applicable     New Nutrition Diagnosis: [X] not applicable    Interventions:   Recommend  [ ] Change Diet To:  [ ] Nutrition Supplement  [ ] Nutrition Support  [X] Other: Continue current diet as ordered; assistance/encouragement at meal times  Continue MVI daily, recommend vitamin C 500mg daily    Monitoring and Evaluation:   [X] PO intake [ x ] Tolerance to diet prescription [ x ] weights [ x ] labs[ x ] follow up per protocol  [X] other: s/s GI distress, bowel function, skin integrity/ edema

## 2023-04-14 NOTE — CHART NOTE - NSCHARTNOTESELECT_GEN_ALL_CORE
Event Note
Nutrition Services
Vascular Surgery Note/Event Note
podiatry/Event Note
Nutrition Services
Nutrition Services
Event Note
Event Note
Nutrition Services
Nutrition Services

## 2023-04-14 NOTE — PROCEDURE NOTE - PROCEDURE FINDINGS AND DETAILS
40cm bard single lumen power picc inserted into patent right brachial vein under sterile conditions and image guidance.  Tip is in the SVC.  PICC can be accessed.
Exchange of PICC to a 46cm bard single lumen power picc inserted into patent right brachial vein over wire under sterile conditions and image guidance.  Tip is in the SVC.  PICC can be accessed.

## 2023-04-14 NOTE — PROGRESS NOTE ADULT - PROBLEM SELECTOR PLAN 6
Admitted for workup and IV antibiotics  Pt unable to provide any reliable information  Reports walks independently  Will obtain ALANNA/PVRs to provide input on wound healing potential  Pt does not appear to be a surgical candidate however in light of his advanced dementia, intermittent behavioral disturbances and malnutrition  Would consider conservative approach and GOC conversation
continue home medications
Admitted for workup and IV antibiotics  Pt unable to provide any reliable information  Reports walks independently  Will obtain ALANNA/PVRs to provide input on wound healing potential  Pt does not appear to be a surgical candidate however in light of his advanced dementia, intermittent behavioral disturbances and malnutrition  Would consider conservative approach and GOC conversation
Accuchecks monitoring and insulin corrective regimen  sliding scale coverage with short acting inslulin, add longacting insulin as needed ,no concentrated sweets diet, serial labs ,HbA1C,education .S/p hypoglycemia 04/06 -seen by diabetic team and adjustments are made
Admitted for workup and IV antibiotics  Pt unable to provide any reliable information  Reports walks independently  Will obtain ALANNA/PVRs to provide input on wound healing potential  Pt does not appear to be a surgical candidate however in light of his advanced dementia, intermittent behavioral disturbances and malnutrition  Would consider conservative approach and GOC conversation
Pt does not appear to be a surgical candidate for vascular interventions in light of his advanced dementia, intermittent behavioral disturbances and malnutrition   conservative approach advised by vascular team
continue home medications
Admitted for workup and IV antibiotics  Pt unable to provide any reliable information  Reports walks independently  Will obtain ALANNA/PVRs to provide input on wound healing potential  Pt does not appear to be a surgical candidate however in light of his advanced dementia, intermittent behavioral disturbances and malnutrition  Would consider conservative approach and GOC conversation
Accuchecks monitoring and insulin corrective regimen  sliding scale coverage with short acting inslulin, add longacting insulin as needed ,no concentrated sweets diet, serial labs ,HbA1C,education .S/p hypoglycemia 04/06 -seen by diabetic team and adjustments are made
continue home medications
Admitted for workup and IV antibiotics  Pt unable to provide any reliable information  Reports walks independently  Will obtain ALANNA/PVRs to provide input on wound healing potential  Pt does not appear to be a surgical candidate however in light of his advanced dementia, intermittent behavioral disturbances and malnutrition  Would consider conservative approach and GOC conversation
Admitted for workup and IV antibiotics  Pt unable to provide any reliable information  Reports walks independently  Will obtain ALANNA/PVRs to provide input on wound healing potential  Pt does not appear to be a surgical candidate however in light of his advanced dementia, intermittent behavioral disturbances and malnutrition  Would consider conservative approach and GOC conversation
Pt does not appear to be a surgical candidate for vascular interventions in light of his advanced dementia, intermittent behavioral disturbances and malnutrition   conservative approach advised by vascular team
continue home medications
continue home medications

## 2023-04-14 NOTE — PROGRESS NOTE ADULT - PROVIDER SPECIALTY LIST ADULT
Cardiology
Cardiology
Endocrinology
Nephrology
Pulmonology
Cardiology
Cardiology
Nephrology
Podiatry
Pulmonology
Pulmonology
Hospitalist
Infectious Disease
Hospitalist
Infectious Disease
Podiatry
Podiatry
Pulmonology
Pulmonology
Cardiology
Infectious Disease
Nephrology
Cardiology
Endocrinology
Endocrinology
Infectious Disease
Nephrology
Pulmonology
Pulmonology
Endocrinology
Infectious Disease
Nephrology
Infectious Disease
Infectious Disease
Nephrology
Podiatry
Hospitalist
Internal Medicine
Hospitalist

## 2023-04-14 NOTE — PROGRESS NOTE ADULT - PROBLEM SELECTOR PROBLEM 4
Pressure ulcer, heel
PVD (peripheral vascular disease)
DM (diabetes mellitus), type 2
Pressure ulcer, heel
PVD (peripheral vascular disease)
Pressure ulcer, heel
PVD (peripheral vascular disease)
Pressure ulcer, heel
PVD (peripheral vascular disease)
Lab test positive for detection of COVID-19 virus

## 2023-04-14 NOTE — PROGRESS NOTE ADULT - REASON FOR ADMISSION
left foot infection

## 2023-04-14 NOTE — PROGRESS NOTE ADULT - PROBLEM SELECTOR PROBLEM 12
Chronic GERD
Prophylactic measure
Stage 3 chronic kidney disease
Prophylactic measure
Dementia
Prophylactic measure
Dementia
Dementia
Stage 3 chronic kidney disease
Dementia
Dementia
Prophylactic measure
Dementia

## 2023-04-14 NOTE — DISCHARGE NOTE PROVIDER - HOSPITAL COURSE
· Nutritional Assessment  This patient has been assessed with a concern for Malnutrition and has been determined to have a diagnosis/diagnoses of Moderate protein-calorie malnutrition.    This patient is being managed with:   Diet DASH/TLC-  Sodium & Cholesterol Restricted  Consistent Carbohydrate {Evening Snack}  Pureed (PUREED)  Reginald(7 Gm Arginine/7 Gm Glut/1.2 Gm HMB     Qty per Day:  2  Supplement Feeding Modality:  Oral  Glucerna Shake Cans or Servings Per Day:  1       Frequency:  Daily  Entered: Apr 11 2023  1:39PM    ·  Problem: Osteomyelitis of left foot.   ·  Plan: 04/10/2023  PRE-OP DIAGNOSIS:  Foot osteomyelitis, left  Diabetic infection of left foot   ·  POST-OP DIAGNOSIS:  Diabetic infection of left foot   Foot osteomyelitis, left  ·  PROCEDURES:  Resection of head of first metatarsal bone   Sesamoidectomy of toe.  ·  Problem: Cellulitis of left foot.   ·  Plan: Abnormal combined Indium-111 labeled leukocyte study and   marrow scan. In the LEFT foot, findings at the first toe are suspicious   for osteomyelitis; the other foci appear equivocal or are more likely   soft tissue infection. On the RIGHT, an area of osteomyelitis at the   medial portion of the RIGHT heel wound is not excluded.    Problem/Plan - 3:  ·  Problem: Infection of wound due to methicillin resistant Staphylococcus aureus (MRSA).   ·  Plan: Contact isolation , on vancomycin.    Problem/Plan - 4:  ·  Problem: Pressure ulcer, heel.   ·  Plan: Pressure ulcers to bilateral heels with necrotic stable eschars   Continue with offloading boots at all times since patient is non ambulatory   Applied Aquacel and sterile dry dressing to bilateral heels.    Problem/Plan - 5:  ·  Problem: Lab test positive for detection of COVID-19 virus.   ·  Plan: monitor pulse oximetry , s/p  remdesivir by ID cons ,pulm is following.    Problem/Plan - 6:  ·  Problem: DM (diabetes mellitus), type 2.   ·  Plan: Accuchecks monitoring and insulin corrective regimen  sliding scale coverage with short acting inslulin, add longacting insulin as needed ,no concentrated sweets diet, serial labs ,HbA1C,education .S/p hypoglycemia 04/06 -seen by diabetic team and adjustments are made.    Problem/Plan - 7:  ·  Problem: PVD (peripheral vascular disease).   ·  Plan: Pt does not appear to be a surgical candidate for vascular interventions in light of his advanced dementia, intermittent behavioral disturbances and malnutrition   conservative approach advised by vascular team.    Problem/Plan - 8:  ·  Problem: HTN (hypertension).   ·  Plan: continue home medications.    Problem/Plan - 9:  ·  Problem: Positive urine culture.   ·  Plan: Culture - Urine (collected 02 Apr 2023 09:15)  Source: Clean Catch Clean Catch (Midstream)  Final Report (04 Apr 2023 19:44):    >100,000 CFU/ml Enterococcus faecalis  Organism: Enterococcus faecalis (04 Apr 2023 19:44)  ID cons is following.    Problem/Plan - 10:  ·  Problem: COPD, moderate.   ·  Plan; continue home medications.    Problem/Plan - 11:  ·  Problem: BPH without urinary obstruction.   ·  Plan: continue home medications.

## 2023-04-14 NOTE — DISCHARGE NOTE PROVIDER - NPI NUMBER (FOR SYSADMIN USE ONLY) :
[[id number]] [[id number]],[[id number]],[[id number]] [[id number]],[[id number]]

## 2023-04-14 NOTE — PROGRESS NOTE ADULT - ASSESSMENT
REVIEW OF SYMPTOMS      Able to give (reliable) ROS  NO     PHYSICAL EXAM    HEENT Unremarkable  atraumatic   RESP Fair air entry EXP prolonged    Harsh breath sound Resp distres mild   CARDIAC S1 S2 No S3     NO JVD    ABDOMEN SOFT BS PRESENT NOT DISTENDED No hepatosplenomegaly   PEDAL EDEMA present No calf tenderness  NO rash       GENERAL DATA .   GOC.     .. 3/30/2023 full code  ALLGY.     .. nka                    WT.   .. 3/30/2023 63  BMI.        .. 3/30/2023 21            ICU STAY.   .. none  COVID.   .. 4/4/2023 scv2 (+)  .. 3/29/2023 scv2 (-)     BEST PRACTICE ISSUES.    HOB ELEVATN.   .. Yes  DVT PPLX.   .. 3/31 lvnx 60.2 Dr Lakhani (a fib)   ..  3/29- 3/31 hpsc   MILLER PPLX.   .. 3/30/2023 protonix 40    INFN PPLX. ..    SP SW LAURENT.   .. 4/11 -> puree thin   ..  3/30/2023 -> soft bite mild thick        DIET.    ..  3/30/2023 dash  FREE WATER  .. 4/1/2023 fw 250.4    IV fl.  .. 4/6/2023 d5 1/2 40   PROCEDURE  .. 4/7/2023 r brach picc     OVERALL .  88 yo M with PMHx HTN, HLD, T2D, dementia (AOx2-3), OA, BPH, CAD, TIA, CKD 3 and GERD HO recent hospital stay 1/24-1/30/2023 [facility] RSV  COPD ex  now admitted with osteomyelitis possible pneum  Pulm consulted 3/29/2023    .. 4/4/2023 pt tested covid (+)  started rdsv Dr Mancia     PROBLEMS  COVID 4/4/2023  .. 4/4/2023 rdsv 3 d  E FECA UTI   .. uc 4/2/2023 100K eneterococcs fecal  Possible Osteomyelitis  .. w scann 4/4 l foot susp for osteom r heel osteomyelitis   Pneumonia   .. 3/29-4/5 zosyn   .. 4/7-4/13 zosyn   .. 4/13/2023 Vanco    COPD   CKD   PLEURAL EFFSN   .. 3/30 ct  HFREF   .. MOD MR 3/30 echo    A fib  ..  4/1 lvnx 60.2       TIME SPENT   Over 25 minutes aggregate care time spent on encounter; activities included   direct patient care, counseling and/or coordinating care reviewing notes, lab data/ imaging , discussion with multidisciplinary team/ patient  /family and explaining in detail risks, benefits, alternatives  of the recommendations     Paulino louis     REVIEW OF SYMPTOMS      Able to give (reliable) ROS  NO     PHYSICAL EXAM    HEENT Unremarkable  atraumatic   RESP Fair air entry EXP prolonged    Harsh breath sound Resp distres mild   CARDIAC S1 S2 No S3     NO JVD    ABDOMEN SOFT BS PRESENT NOT DISTENDED No hepatosplenomegaly   PEDAL EDEMA present No calf tenderness  NO rash       GENERAL DATA .   GOC.     .. 3/30/2023 full code  ALLGY.     .. nka                    WT.   .. 3/30/2023 63  BMI.        .. 3/30/2023 21            ICU STAY.   .. none  COVID.   .. 4/4/2023 scv2 (+)  .. 3/29/2023 scv2 (-)     BEST PRACTICE ISSUES.    HOB ELEVATN.   .. Yes  DVT PPLX.   .. 3/31 lvnx 60.2 Dr Lakhani (a fib)   ..  3/29- 3/31 hpsc   MILLER PPLX.   .. 3/30/2023 protonix 40    INFN PPLX. ..    SP SW LAURENT.   .. 4/11 -> puree thin   ..  3/30/2023 -> soft bite mild thick        DIET.    ..  3/30/2023 dash  FREE WATER  .. 4/1/2023 fw 250.4    IV fl.  .. 4/6/2023 d5 1/2 40   PROCEDURE  .. 4/7/2023 r brach picc     OVERALL .  86 yo M with PMHx HTN, HLD, T2D, dementia (AOx2-3), OA, BPH, CAD, TIA, CKD 3 and GERD HO recent hospital stay 1/24-1/30/2023 [facility]  COPD ex  now admitted with osteomyelitis possible pneum  Pulm consulted 3/29/2023    .. 4/4/2023 pt tested covid (+)  started rdsv Dr Mancia     PROBLEMS  COVID 4/4/2023  .. 4/4/2023 rdsv 3 d  E FECA UTI   .. uc 4/2/2023 100K eneterococcs fecal  Possible Osteomyelitis  .. w scann 4/4 l foot susp for osteom r heel osteomyelitis   Pneumonia   .. 3/29-4/5 zosyn   .. 4/7-4/13 zosyn   .. 4/13/2023 Vanco    COPD   CKD   PLEURAL EFFSN   .. 3/30 ct  HFREF   .. MOD MR 3/30 echo    A fib  ..  4/1 lvnx 60.2       TIME SPENT   Over 25 minutes aggregate care time spent on encounter; activities included   direct patient care, counseling and/or coordinating care reviewing notes, lab data/ imaging , discussion with multidisciplinary team/ patient  /family and explaining in detail risks, benefits, alternatives  of the recommendations     Paulino louis

## 2023-04-14 NOTE — PROGRESS NOTE ADULT - ASSESSMENT
88 yo male ,[facility] resident with PMHx - COPD, DM, HTN, CAD, HLD, H/O TIA, dementia,GERD, BPH and depression sent ot ER for evaluation of left foot 1metatarsal wound ,suggestive of osteomyelitis .Patient was seen by ID consult and transfer to the hospital recommended for wound cx/bone biopsy /podiatry evaluation ,likely will require 4-6 weeks of iv abx . Patient was admitted to [facility] with b/l feet wounds and was followed by wound care team ,recently completed 7 days of doxycycline for foot wound cellulitis . 86 yo male ,[facility] resident with PMHx - COPD, DM, HTN, CAD, HLD, H/O TIA, dementia,GERD, BPH and depression sent ot ER for evaluation of left foot 1metatarsal wound ,suggestive of osteomyelitis .Patient was seen by ID consult and transfer to the hospital recommended for wound cx/bone biopsy /podiatry evaluation ,likely will require 4-6 weeks of iv abx . Patient was admitted to [facility] with b/l feet wounds and was followed by wound care team ,recently completed 7 days of doxycycline for foot wound cellulitis . 88 yo male ,[facility] resident with PMHx - COPD, DM, HTN, CAD, HLD, H/O TIA, dementia,GERD, BPH and depression sent ot ER for evaluation of left foot 1metatarsal wound ,suggestive of osteomyelitis .Patient was seen by ID consult and transfer to the hospital recommended for wound cx/bone biopsy /podiatry evaluation ,likely will require 4-6 weeks of iv abx . Patient was admitted to [facility] with b/l feet wounds and was followed by wound care team ,recently completed 7 days of doxycycline for foot wound cellulitis .

## 2023-04-14 NOTE — PROGRESS NOTE ADULT - PROBLEM SELECTOR PROBLEM 9
COPD, moderate
ASHD (arteriosclerotic heart disease)
COPD, moderate
Positive urine culture
Stage 3 chronic kidney disease
Stage 3 chronic kidney disease
COPD, moderate
Stage 3 chronic kidney disease
Positive urine culture
ASHD (arteriosclerotic heart disease)
COPD, moderate
BPH without urinary obstruction
Stage 3 chronic kidney disease

## 2023-04-14 NOTE — DISCHARGE NOTE PROVIDER - NSDCCPCAREPLAN_GEN_ALL_CORE_FT
PRINCIPAL DISCHARGE DIAGNOSIS  Diagnosis: Osteomyelitis of left foot  Assessment and Plan of Treatment:       SECONDARY DISCHARGE DIAGNOSES  Diagnosis: Infection of wound due to methicillin resistant Staphylococcus aureus (MRSA)  Assessment and Plan of Treatment:     Diagnosis: Cellulitis of left foot  Assessment and Plan of Treatment:     Diagnosis: Lab test positive for detection of COVID-19 virus  Assessment and Plan of Treatment:     Diagnosis: DM (diabetes mellitus), type 2  Assessment and Plan of Treatment:     Diagnosis: Pressure ulcer, heel  Assessment and Plan of Treatment:     Diagnosis: HTN (hypertension)  Assessment and Plan of Treatment:     Diagnosis: PVD (peripheral vascular disease)  Assessment and Plan of Treatment:     Diagnosis: Stage 3 chronic kidney disease  Assessment and Plan of Treatment:     Diagnosis: BPH without urinary obstruction  Assessment and Plan of Treatment:     Diagnosis: Dementia  Assessment and Plan of Treatment:     Diagnosis: COPD, moderate  Assessment and Plan of Treatment:     Diagnosis: Chronic GERD  Assessment and Plan of Treatment:

## 2023-04-14 NOTE — PROGRESS NOTE ADULT - ASSESSMENT
88 yo male ,Counts include 234 beds at the [facility] resident with PMHx - COPD, DM, HTN, CAD, HLD, H/O TIA, dementia, GERD, BPH and depression, who was sent for evaluation of left foot 1metatarsal wound. Concern for wound infection with underlying osteomyelitis. Bone scan suspicious for L foot 1st toe osteomyelitis. Cannot exclude osteomyelitis on R heel. S/p Resection of head of first metatarsal bone and Sesamoidectomy of L foot on 4/10. Operatively noted to have Left first metatarsal head exposed with necrotic soft tissue and purulent drainage.     First metatarsal head culture growing MRSA and corynebacterium. Path showed no evidence of osteomyelitis on proximal margin; however would still treat for osteomyelitis since bone scan concerning for osteomyelitis on R heel. Would only tentatively give vancomycin for 4 weeks given high risk for renal failure, if wound improving can switch to PO antibiotics sooner. PICC placed on 4/13. Remains afebrile and without leukocytosis.     -continue vancomycin 750mg IV q24h--complete 4 week course until May 11, can extend if needed pending further clinical improvement  -AUC based vancomycin dosing per pharmacy, plan to collect trough tomorrow  -wound care per Podiatry  -contact isolation    Isis Mancia MD  Division of Infectious Diseases   Cell [phone] between 8am and 6pm   After 6pm and weekends please call ID service at [phone].     86 yo male ,[facility] resident with PMHx - COPD, DM, HTN, CAD, HLD, H/O TIA, dementia, GERD, BPH and depression, who was sent for evaluation of left foot 1metatarsal wound. Concern for wound infection with underlying osteomyelitis. Bone scan suspicious for L foot 1st toe osteomyelitis. Cannot exclude osteomyelitis on R heel. S/p Resection of head of first metatarsal bone and Sesamoidectomy of L foot on 4/10. Operatively noted to have Left first metatarsal head exposed with necrotic soft tissue and purulent drainage.     First metatarsal head culture growing MRSA and corynebacterium. Path showed no evidence of osteomyelitis on proximal margin; however would still treat for osteomyelitis since bone scan concerning for osteomyelitis on R heel. Would only tentatively give vancomycin for 4 weeks given high risk for renal failure, if wound improving can switch to PO antibiotics sooner. PICC placed on 4/13. Remains afebrile and without leukocytosis.     -continue vancomycin 750mg IV q24h--complete 4 week course until May 11, can extend if needed pending further clinical improvement  -AUC based vancomycin dosing per pharmacy, plan to collect trough tomorrow  -wound care per Podiatry  -contact isolation    Isis Mancia MD  Division of Infectious Diseases   Cell [phone] between 8am and 6pm   After 6pm and weekends please call ID service at [phone].     88 yo male ,Counts include 234 beds at the [facility] resident with PMHx - COPD, DM, HTN, CAD, HLD, H/O TIA, dementia, GERD, BPH and depression, who was sent for evaluation of left foot 1metatarsal wound. Concern for wound infection with underlying osteomyelitis. Bone scan suspicious for L foot 1st toe osteomyelitis. Cannot exclude osteomyelitis on R heel. S/p Resection of head of first metatarsal bone and Sesamoidectomy of L foot on 4/10. Operatively noted to have Left first metatarsal head exposed with necrotic soft tissue and purulent drainage.     First metatarsal head culture growing MRSA and corynebacterium. Path showed no evidence of osteomyelitis on proximal margin; however would still treat for osteomyelitis since bone scan concerning for osteomyelitis on R heel. Would only tentatively give vancomycin for 4 weeks given high risk for renal failure, if wound improving can switch to PO antibiotics sooner. PICC placed on 4/13. Remains afebrile and without leukocytosis.     -continue vancomycin 750mg IV q24h--complete 4 week course until May 11, can extend if needed pending further clinical improvement  -AUC based vancomycin dosing per pharmacy, plan to collect trough tomorrow  -wound care per Podiatry  -contact isolation    Isis Mancia MD  Division of Infectious Diseases   Cell [phone] between 8am and 6pm   After 6pm and weekends please call ID service at [phone].

## 2023-04-14 NOTE — PROCEDURE NOTE - NSICDXPROCEDURE_GEN_ALL_CORE_FT
PROCEDURES:  Placement, PICC, with image guidance 07-Apr-2023 12:44:27  Neno Amaya  Replacement, PICC 14-Apr-2023 13:37:04  Neno Amaya  
PROCEDURES:  Placement, PICC, with image guidance 07-Apr-2023 12:44:27  Neno Amaya

## 2023-04-14 NOTE — PROGRESS NOTE ADULT - PROBLEM SELECTOR PROBLEM 6
HTN (hypertension)
PVD (peripheral vascular disease)
COPD, moderate
PVD (peripheral vascular disease)
DM (diabetes mellitus), type 2
DM (diabetes mellitus), type 2
COPD, moderate
PVD (peripheral vascular disease)
COPD, moderate
PVD (peripheral vascular disease)
COPD, moderate

## 2023-04-14 NOTE — PROGRESS NOTE ADULT - PROBLEM SELECTOR PROBLEM 7
ASHD (arteriosclerotic heart disease)
HTN (hypertension)
PVD (peripheral vascular disease)
COPD, moderate
ASHD (arteriosclerotic heart disease)
HTN (hypertension)
PVD (peripheral vascular disease)
HTN (hypertension)
ASHD (arteriosclerotic heart disease)
HTN (hypertension)
ASHD (arteriosclerotic heart disease)

## 2023-04-14 NOTE — PROGRESS NOTE ADULT - PROBLEM SELECTOR PROBLEM 5
DM (diabetes mellitus), type 2
DM (diabetes mellitus), type 2
HTN (hypertension)
DM (diabetes mellitus), type 2
DM (diabetes mellitus), type 2
Lab test positive for detection of COVID-19 virus
PVD (peripheral vascular disease)
DM (diabetes mellitus), type 2
DM (diabetes mellitus), type 2
HTN (hypertension)
Lab test positive for detection of COVID-19 virus
DM (diabetes mellitus), type 2
HTN (hypertension)
HTN (hypertension)
DM (diabetes mellitus), type 2

## 2023-04-14 NOTE — PROGRESS NOTE ADULT - PROBLEM SELECTOR PLAN 11
ppi
ppi
continue home medications
serial bmp ,ins/outs
serial bmp ,ins/outs
Supportive care ,frequent redirection ,continue home medications, management of agitation as needed
serial bmp ,ins/outs
ppi
ppi
serial bmp ,ins/outs
continue home medications

## 2023-04-14 NOTE — PROGRESS NOTE ADULT - PROBLEM SELECTOR PLAN 12
serial bmp ,ins/outs
Gastrointestinal stress ulcer prophylaxis and DVT prophylaxis administered
ppi
Supportive care ,frequent redirection ,continue home medications, management of agitation as needed
Gastrointestinal stress ulcer prophylaxis and DVT prophylaxis administered
Supportive care ,frequent redirection ,continue home medications, management of agitation as needed
Gastrointestinal stress ulcer prophylaxis and DVT prophylaxis administered
Supportive care ,frequent redirection ,continue home medications, management of agitation as needed
Gastrointestinal stress ulcer prophylaxis and DVT prophylaxis administered
Supportive care ,frequent redirection ,continue home medications, management of agitation as needed
Supportive care ,frequent redirection ,continue home medications, management of agitation as needed
serial bmp ,ins/outs

## 2023-04-14 NOTE — DISCHARGE NOTE NURSING/CASE MANAGEMENT/SOCIAL WORK - PATIENT PORTAL LINK FT
You can access the FollowMyHealth Patient Portal offered by [facility] by registering at the following website: [email or web address]. By joining [employer]’s FollowMyHealth portal, you will also be able to view your health information using other applications (apps) compatible with our system. You can access the FollowMyHealth Patient Portal offered by [facility] by registering at the following website: [email or web address]. By joining [employer]’s FollowMyHealth portal, you will also be able to view your health information using other applications (apps) compatible with our system. You can access the FollowMyHealth Patient Portal offered by [facility] by registering at the following website: [email or web address]. By joining [employer]’s FollowMyHealth portal, you will also be able to view your health information using other applications (apps) compatible with our system.

## 2023-04-14 NOTE — PROGRESS NOTE ADULT - SUBJECTIVE AND OBJECTIVE BOX
Patient is a 87y Male whom presented to the hospital with ckd and chelo     PAST MEDICAL & SURGICAL HISTORY:      MEDICATIONS  (STANDING):      Allergies    No Known Allergies    Intolerances        SOCIAL HISTORY:  Denies ETOh,Smoking,     FAMILY HISTORY:      REVIEW OF SYSTEMS:  unable to obtained a good review system                                            9.3    7.07  )-----------( 339      ( 14 Apr 2023 06:47 )             31.1       CBC Full  -  ( 14 Apr 2023 06:47 )  WBC Count : 7.07 K/uL  RBC Count : 3.28 M/uL  Hemoglobin : 9.3 g/dL  Hematocrit : 31.1 %  Platelet Count - Automated : 339 K/uL  Mean Cell Volume : 94.8 fl  Mean Cell Hemoglobin : 28.4 pg  Mean Cell Hemoglobin Concentration : 29.9 gm/dL  Auto Neutrophil # : x  Auto Lymphocyte # : x  Auto Monocyte # : x  Auto Eosinophil # : x  Auto Basophil # : x  Auto Neutrophil % : x  Auto Lymphocyte % : x  Auto Monocyte % : x  Auto Eosinophil % : x  Auto Basophil % : x      04-14    141  |  111<H>  |  27<H>  ----------------------------<  137<H>  3.8   |  25  |  1.30    Ca    8.3<L>      14 Apr 2023 06:47  Phos  2.4     04-14  Mg     2.2     04-14    TPro  6.3  /  Alb  2.0<L>  /  TBili  0.4  /  DBili  0.2  /  AST  18  /  ALT  19  /  AlkPhos  64  04-13      CAPILLARY BLOOD GLUCOSE      POCT Blood Glucose.: 116 mg/dL (14 Apr 2023 07:36)  POCT Blood Glucose.: 127 mg/dL (13 Apr 2023 21:06)  POCT Blood Glucose.: 178 mg/dL (13 Apr 2023 16:52)  POCT Blood Glucose.: 223 mg/dL (13 Apr 2023 11:48)      Vital Signs Last 24 Hrs  T(C): 36.6 (14 Apr 2023 05:00), Max: 36.7 (13 Apr 2023 20:16)  T(F): 97.9 (14 Apr 2023 05:00), Max: 98 (13 Apr 2023 20:16)  HR: 66 (14 Apr 2023 05:00) (63 - 72)  BP: 152/77 (14 Apr 2023 05:00) (114/70 - 152/77)  BP(mean): 81 (13 Apr 2023 18:20) (81 - 81)  RR: 17 (14 Apr 2023 05:00) (17 - 18)  SpO2: 95% (14 Apr 2023 05:00) (94% - 98%)    Parameters below as of 14 Apr 2023 09:00  Patient On (Oxygen Delivery Method): room air                      PHYSICAL EXAM:    Constitutional: NAD  HEENT: conjunctive   clear   Neck:  No JVD  Respiratory: CTAB  Cardiovascular: S1 and S2  Gastrointestinal: BS+, soft,   Extremities: No peripheral edema  Neurological:  no focal deficits

## 2023-04-14 NOTE — PROGRESS NOTE ADULT - SUBJECTIVE AND OBJECTIVE BOX
[facility]  INFECTIOUS DISEASES - Ruma Mayes, [place] [address]  Tel: [phone]     Fax: [phone]  =======================================================    DEANANA MARIA TAYLOR [id number]    Follow up: No fevers. Seen earlier today.    Allergies:  No Known Allergies      Antibiotics:  acetaminophen     Tablet .. 650 milliGRAM(s) Oral every 6 hours PRN  alteplase for catheter clearance 2 milliGRAM(s) Catheter once  aluminum hydroxide/magnesium hydroxide/simethicone Suspension 30 milliLiter(s) Oral every 4 hours PRN  apixaban 2.5 milliGRAM(s) Oral every 12 hours  bisacodyl Suppository 10 milliGRAM(s) Rectal daily PRN  budesonide 160 MICROgram(s)/formoterol 4.5 MICROgram(s) Inhaler 2 Puff(s) Inhalation two times a day  chlorhexidine 4% Liquid 1 Application(s) Topical <User Schedule>  dextrose 50% Injectable 25 Gram(s) IV Push once  dextrose 50% Injectable 12.5 Gram(s) IV Push once  dextrose 50% Injectable 25 Gram(s) IV Push once  dextrose Oral Gel 15 Gram(s) Oral once PRN  donepezil 5 milliGRAM(s) Oral at bedtime  DULoxetine 60 milliGRAM(s) Oral daily  glucagon  Injectable 1 milliGRAM(s) IntraMuscular once  hydrALAZINE 50 milliGRAM(s) Oral every 6 hours PRN  insulin glargine Injectable (LANTUS) 8 Unit(s) SubCutaneous at bedtime  insulin lispro (ADMELOG) corrective regimen sliding scale   SubCutaneous at bedtime  insulin lispro (ADMELOG) corrective regimen sliding scale   SubCutaneous three times a day before meals  lactobacillus acidophilus 1 Tablet(s) Oral two times a day with meals  lisinopril 2.5 milliGRAM(s) Oral daily  magnesium hydroxide Suspension 30 milliLiter(s) Oral daily PRN  melatonin 3 milliGRAM(s) Oral at bedtime PRN  metoprolol tartrate 100 milliGRAM(s) Oral two times a day  morphine  - Injectable 2 milliGRAM(s) IV Push every 6 hours PRN  multivitamin 1 Tablet(s) Oral daily  ondansetron Injectable 4 milliGRAM(s) IV Push every 8 hours PRN  pantoprazole   Suspension 40 milliGRAM(s) Oral daily  senna 2 Tablet(s) Oral at bedtime  simvastatin 40 milliGRAM(s) Oral at bedtime  sodium chloride 0.9% lock flush 10 milliLiter(s) IV Push every 1 hour PRN  tamsulosin 0.4 milliGRAM(s) Oral at bedtime  traMADol 50 milliGRAM(s) Oral four times a day PRN  vancomycin  IVPB 750 milliGRAM(s) IV Intermittent every 24 hours       REVIEW OF SYSTEMS:  unable to obtain 2/2 dementia     Physical Exam:  ICU Vital Signs Last 24 Hrs  T(C): 36.3 (14 Apr 2023 18:30), Max: 36.6 (14 Apr 2023 05:00)  T(F): 97.4 (14 Apr 2023 18:30), Max: 97.9 (14 Apr 2023 05:00)  HR: 87 (14 Apr 2023 18:30) (66 - 87)  BP: 123/68 (14 Apr 2023 18:30) (122/67 - 152/77)  BP(mean): --  ABP: --  ABP(mean): --  RR: 18 (14 Apr 2023 18:30) (17 - 18)  SpO2: 94% (14 Apr 2023 18:30) (94% - 95%)    O2 Parameters below as of 14 Apr 2023 12:07  Patient On (Oxygen Delivery Method): room air      GEN: NAD  HEENT: normocephalic and atraumatic.   NECK: Supple.   LUNGS: Normal respiratory effort  HEART: Regular rate and rhythm   ABDOMEN: Soft, nontender, and nondistended.    EXTREMITIES: No leg edema. RUE PICC--no surrounding erythema or swelling  NEUROLOGIC: awake, not responding to most questions appropriately        Labs:  04-14    141  |  111<H>  |  27<H>  ----------------------------<  137<H>  3.8   |  25  |  1.30    Ca    8.3<L>      14 Apr 2023 06:47  Phos  2.4     04-14  Mg     2.2     04-14    TPro  6.3  /  Alb  2.0<L>  /  TBili  0.4  /  DBili  0.2  /  AST  18  /  ALT  19  /  AlkPhos  64  04-13                          9.3    7.07  )-----------( 339      ( 14 Apr 2023 06:47 )             31.1         LIVER FUNCTIONS - ( 13 Apr 2023 06:53 )  Alb: 2.0 g/dL / Pro: 6.3 g/dL / ALK PHOS: 64 U/L / ALT: 19 U/L / AST: 18 U/L / GGT: x             RECENT CULTURES:  04-10 @ 13:10 .Tissue Other, LEFT FOOT 1ST METATARSAL HEAD Methicillin resistant Staphylococcus aureus    Rare Methicillin Resistant Staphylococcus aureus  Few Corynebacterium species "Susceptibilities not performed"    No polymorphonuclear cells seen per low power field  No organisms seen per oil power field      04-02 @ 09:15 Clean Catch Clean Catch (Midstream) Enterococcus faecalis    >100,000 CFU/ml Enterococcus faecalis              All imaging and data are reviewed.    [facility]  INFECTIOUS DISEASES - Ruma Mayes, [place] [address]  Tel: [phone]     Fax: [phone]  =======================================================    DEANANA MARIA TAYLOR [id number]    Follow up: No fevers. Seen earlier today.    Allergies:  No Known Allergies      Antibiotics:  acetaminophen     Tablet .. 650 milliGRAM(s) Oral every 6 hours PRN  alteplase for catheter clearance 2 milliGRAM(s) Catheter once  aluminum hydroxide/magnesium hydroxide/simethicone Suspension 30 milliLiter(s) Oral every 4 hours PRN  apixaban 2.5 milliGRAM(s) Oral every 12 hours  bisacodyl Suppository 10 milliGRAM(s) Rectal daily PRN  budesonide 160 MICROgram(s)/formoterol 4.5 MICROgram(s) Inhaler 2 Puff(s) Inhalation two times a day  chlorhexidine 4% Liquid 1 Application(s) Topical <User Schedule>  dextrose 50% Injectable 25 Gram(s) IV Push once  dextrose 50% Injectable 12.5 Gram(s) IV Push once  dextrose 50% Injectable 25 Gram(s) IV Push once  dextrose Oral Gel 15 Gram(s) Oral once PRN  donepezil 5 milliGRAM(s) Oral at bedtime  DULoxetine 60 milliGRAM(s) Oral daily  glucagon  Injectable 1 milliGRAM(s) IntraMuscular once  hydrALAZINE 50 milliGRAM(s) Oral every 6 hours PRN  insulin glargine Injectable (LANTUS) 8 Unit(s) SubCutaneous at bedtime  insulin lispro (ADMELOG) corrective regimen sliding scale   SubCutaneous at bedtime  insulin lispro (ADMELOG) corrective regimen sliding scale   SubCutaneous three times a day before meals  lactobacillus acidophilus 1 Tablet(s) Oral two times a day with meals  lisinopril 2.5 milliGRAM(s) Oral daily  magnesium hydroxide Suspension 30 milliLiter(s) Oral daily PRN  melatonin 3 milliGRAM(s) Oral at bedtime PRN  metoprolol tartrate 100 milliGRAM(s) Oral two times a day  morphine  - Injectable 2 milliGRAM(s) IV Push every 6 hours PRN  multivitamin 1 Tablet(s) Oral daily  ondansetron Injectable 4 milliGRAM(s) IV Push every 8 hours PRN  pantoprazole   Suspension 40 milliGRAM(s) Oral daily  senna 2 Tablet(s) Oral at bedtime  simvastatin 40 milliGRAM(s) Oral at bedtime  sodium chloride 0.9% lock flush 10 milliLiter(s) IV Push every 1 hour PRN  tamsulosin 0.4 milliGRAM(s) Oral at bedtime  traMADol 50 milliGRAM(s) Oral four times a day PRN  vancomycin  IVPB 750 milliGRAM(s) IV Intermittent every 24 hours       REVIEW OF SYSTEMS:  unable to obtain 2/2 dementia     Physical Exam:  ICU Vital Signs Last 24 Hrs  T(C): 36.3 (14 Apr 2023 18:30), Max: 36.6 (14 Apr 2023 05:00)  T(F): 97.4 (14 Apr 2023 18:30), Max: 97.9 (14 Apr 2023 05:00)  HR: 87 (14 Apr 2023 18:30) (66 - 87)  BP: 123/68 (14 Apr 2023 18:30) (122/67 - 152/77)  BP(mean): --  ABP: --  ABP(mean): --  RR: 18 (14 Apr 2023 18:30) (17 - 18)  SpO2: 94% (14 Apr 2023 18:30) (94% - 95%)    O2 Parameters below as of 14 Apr 2023 12:07  Patient On (Oxygen Delivery Method): room air      GEN: NAD  HEENT: normocephalic and atraumatic.   NECK: Supple.   LUNGS: Normal respiratory effort  HEART: Regular rate and rhythm   ABDOMEN: Soft, nontender, and nondistended.    EXTREMITIES: No leg edema. RUE PICC--no surrounding erythema or swelling  NEUROLOGIC: awake, not responding to most questions appropriately        Labs:  04-14    141  |  111<H>  |  27<H>  ----------------------------<  137<H>  3.8   |  25  |  1.30    Ca    8.3<L>      14 Apr 2023 06:47  Phos  2.4     04-14  Mg     2.2     04-14    TPro  6.3  /  Alb  2.0<L>  /  TBili  0.4  /  DBili  0.2  /  AST  18  /  ALT  19  /  AlkPhos  64  04-13                          9.3    7.07  )-----------( 339      ( 14 Apr 2023 06:47 )             31.1         LIVER FUNCTIONS - ( 13 Apr 2023 06:53 )  Alb: 2.0 g/dL / Pro: 6.3 g/dL / ALK PHOS: 64 U/L / ALT: 19 U/L / AST: 18 U/L / GGT: x             RECENT CULTURES:  04-10 @ 13:10 .Tissue Other, LEFT FOOT 1ST METATARSAL HEAD Methicillin resistant Staphylococcus aureus    Rare Methicillin Resistant Staphylococcus aureus  Few Corynebacterium species "Susceptibilities not performed"    No polymorphonuclear cells seen per low power field  No organisms seen per oil power field      04-02 @ 09:15 Clean Catch Clean Catch (Midstream) Enterococcus faecalis    >100,000 CFU/ml Enterococcus faecalis              All imaging and data are reviewed.    [facility]  INFECTIOUS DISEASES - Ruma Mayes, [place] [address]  Tel: [phone]     Fax: [phone]  =======================================================    DEANANA MARIA TAYLOR [id number]    Follow up: No fevers. Seen earlier today.    Allergies:  No Known Allergies      Antibiotics:  acetaminophen     Tablet .. 650 milliGRAM(s) Oral every 6 hours PRN  alteplase for catheter clearance 2 milliGRAM(s) Catheter once  aluminum hydroxide/magnesium hydroxide/simethicone Suspension 30 milliLiter(s) Oral every 4 hours PRN  apixaban 2.5 milliGRAM(s) Oral every 12 hours  bisacodyl Suppository 10 milliGRAM(s) Rectal daily PRN  budesonide 160 MICROgram(s)/formoterol 4.5 MICROgram(s) Inhaler 2 Puff(s) Inhalation two times a day  chlorhexidine 4% Liquid 1 Application(s) Topical <User Schedule>  dextrose 50% Injectable 25 Gram(s) IV Push once  dextrose 50% Injectable 12.5 Gram(s) IV Push once  dextrose 50% Injectable 25 Gram(s) IV Push once  dextrose Oral Gel 15 Gram(s) Oral once PRN  donepezil 5 milliGRAM(s) Oral at bedtime  DULoxetine 60 milliGRAM(s) Oral daily  glucagon  Injectable 1 milliGRAM(s) IntraMuscular once  hydrALAZINE 50 milliGRAM(s) Oral every 6 hours PRN  insulin glargine Injectable (LANTUS) 8 Unit(s) SubCutaneous at bedtime  insulin lispro (ADMELOG) corrective regimen sliding scale   SubCutaneous at bedtime  insulin lispro (ADMELOG) corrective regimen sliding scale   SubCutaneous three times a day before meals  lactobacillus acidophilus 1 Tablet(s) Oral two times a day with meals  lisinopril 2.5 milliGRAM(s) Oral daily  magnesium hydroxide Suspension 30 milliLiter(s) Oral daily PRN  melatonin 3 milliGRAM(s) Oral at bedtime PRN  metoprolol tartrate 100 milliGRAM(s) Oral two times a day  morphine  - Injectable 2 milliGRAM(s) IV Push every 6 hours PRN  multivitamin 1 Tablet(s) Oral daily  ondansetron Injectable 4 milliGRAM(s) IV Push every 8 hours PRN  pantoprazole   Suspension 40 milliGRAM(s) Oral daily  senna 2 Tablet(s) Oral at bedtime  simvastatin 40 milliGRAM(s) Oral at bedtime  sodium chloride 0.9% lock flush 10 milliLiter(s) IV Push every 1 hour PRN  tamsulosin 0.4 milliGRAM(s) Oral at bedtime  traMADol 50 milliGRAM(s) Oral four times a day PRN  vancomycin  IVPB 750 milliGRAM(s) IV Intermittent every 24 hours       REVIEW OF SYSTEMS:  unable to obtain 2/2 dementia     Physical Exam:  ICU Vital Signs Last 24 Hrs  T(C): 36.3 (14 Apr 2023 18:30), Max: 36.6 (14 Apr 2023 05:00)  T(F): 97.4 (14 Apr 2023 18:30), Max: 97.9 (14 Apr 2023 05:00)  HR: 87 (14 Apr 2023 18:30) (66 - 87)  BP: 123/68 (14 Apr 2023 18:30) (122/67 - 152/77)  BP(mean): --  ABP: --  ABP(mean): --  RR: 18 (14 Apr 2023 18:30) (17 - 18)  SpO2: 94% (14 Apr 2023 18:30) (94% - 95%)    O2 Parameters below as of 14 Apr 2023 12:07  Patient On (Oxygen Delivery Method): room air      GEN: NAD  HEENT: normocephalic and atraumatic.   NECK: Supple.   LUNGS: Normal respiratory effort  HEART: Regular rate and rhythm   ABDOMEN: Soft, nontender, and nondistended.    EXTREMITIES: No leg edema. RUE PICC--no surrounding erythema or swelling  NEUROLOGIC: awake, not responding to most questions appropriately        Labs:  04-14    141  |  111<H>  |  27<H>  ----------------------------<  137<H>  3.8   |  25  |  1.30    Ca    8.3<L>      14 Apr 2023 06:47  Phos  2.4     04-14  Mg     2.2     04-14    TPro  6.3  /  Alb  2.0<L>  /  TBili  0.4  /  DBili  0.2  /  AST  18  /  ALT  19  /  AlkPhos  64  04-13                          9.3    7.07  )-----------( 339      ( 14 Apr 2023 06:47 )             31.1         LIVER FUNCTIONS - ( 13 Apr 2023 06:53 )  Alb: 2.0 g/dL / Pro: 6.3 g/dL / ALK PHOS: 64 U/L / ALT: 19 U/L / AST: 18 U/L / GGT: x             RECENT CULTURES:  04-10 @ 13:10 .Tissue Other, LEFT FOOT 1ST METATARSAL HEAD Methicillin resistant Staphylococcus aureus    Rare Methicillin Resistant Staphylococcus aureus  Few Corynebacterium species "Susceptibilities not performed"    No polymorphonuclear cells seen per low power field  No organisms seen per oil power field      04-02 @ 09:15 Clean Catch Clean Catch (Midstream) Enterococcus faecalis    >100,000 CFU/ml Enterococcus faecalis              All imaging and data are reviewed.

## 2023-04-14 NOTE — PROGRESS NOTE ADULT - PROBLEM SELECTOR PLAN 5
Accuchecks monitoring and insulin corrective regimen  sliding scale coverage with short acting inslulin, add longacting insulin as needed ,no concentrated sweets diet, serial labs ,HbA1C,education .S/p hypoglycemia 04/06 -seen by diabetic team and adjustments are made
Accuchecks monitoring and insulin corrective regimen  sliding scale coverage with short acting inslulin, add longacting insulin as needed ,no concentrated sweets diet, serial labs ,HbA1C,education .S/p hypoglycemia 04/06 -seen by diabetic team and adjustments are made
continue home medications
Accuchecks monitoring and insulin corrective regimen  sliding scale coverage with short acting inslulin, add longacting insulin as needed ,no concentrated sweets diet, serial labs ,HbA1C,education .S/p hypoglycemia 04/06 -seen by diabetic team and adjustments are made
Accuchecks monitoring and insulin corrective regimen  sliding scale coverage with short acting inslulin, add longacting insulin as needed ,no concentrated sweets diet, serial labs ,HbA1C,education .S/p hypoglycemia 04/06 -seen by diabetic team and adjustments are made
Admitted for workup and IV antibiotics  Pt unable to provide any reliable information  Reports walks independently  Will obtain ALANNA/PVRs to provide input on wound healing potential  Pt does not appear to be a surgical candidate however in light of his advanced dementia, intermittent behavioral disturbances and malnutrition  Would consider conservative approach and GOC conversation
monitor pulse oximetry , s/p  remdesivir by ID cons ,pulm is following
Accuchecks monitoring and insulin corrective regimen  sliding scale coverage with short acting inslulin, add longacting insulin as needed ,no concentrated sweets diet, serial labs ,HbA1C,education .S/p hypoglycemia 04/06 -seen by diabetic team and adjustments are made
continue home medications
Accuchecks monitoring and insulin corrective regimen  sliding scale coverage with short acting inslulin, add longacting insulin as needed ,no concentrated sweets diet, serial labs ,HbA1C,education
monitor pulse oximetry , s/p  remdesivir by ID cons ,pulm is following
Accuchecks monitoring and insulin corrective regimen  sliding scale coverage with short acting inslulin, add longacting insulin as needed ,no concentrated sweets diet, serial labs ,HbA1C,education .S/p hypoglycemia 04/06 -seen by diabetic team and adjustments are made
continue home medications
continue home medications
Accuchecks monitoring and insulin corrective regimen  sliding scale coverage with short acting inslulin, add longacting insulin as needed ,no concentrated sweets diet, serial labs ,HbA1C,education .S/p hypoglycemia 04/06 -seen by diabetic team and adjustments are made

## 2023-04-14 NOTE — PROGRESS NOTE ADULT - PROBLEM SELECTOR PROBLEM 1
DM (diabetes mellitus), type 2
Bone infection of left foot
DM (diabetes mellitus), type 2
Osteomyelitis of left foot
Osteomyelitis of left foot
Lab test positive for detection of COVID-19 virus
Osteomyelitis of left foot
Osteomyelitis of left foot
Lab test positive for detection of COVID-19 virus
Lab test positive for detection of COVID-19 virus
Osteomyelitis of left foot
Lab test positive for detection of COVID-19 virus
Bone infection of left foot
Bone infection of left foot
Osteomyelitis of left foot
Lab test positive for detection of COVID-19 virus
Osteomyelitis of left foot
Lab test positive for detection of COVID-19 virus
Bone infection of left foot

## 2023-04-14 NOTE — PROVIDER CONTACT NOTE (OTHER) - SITUATION
Completing weekly PICC line dressing change with 2nd RN and ANM, and line was out of place with no blood return. Upon removal of old dressing PICC line came out.
had 8 beats of VTs right after came back from PACU   vitals stable
patient asymptomatic 10 beats vtach with highest rate hr 72 as per [facility] tele tech
As per tele tech pt with episode of 7 beats of V-tach followed by 8beats of V-tach HR 143bpm
Pt had 6 beats of v tach. Pt has no symptoms and sleeping comfortably. Now back to reading a-fib on the tele monitor.
Pt covid swab resulted positive
Pt tele event, 8 beats vtac. Pt asymptomatic
pt with 7 beats V-tach
Per tele tech, pt had 5 beats of V tach on tele monitor
reported to OR and wanted to get clarification for cardiology clearance   because pt has vts after the clearance noted written
As per tele tech pt with 10beats of V-tach -195. Then decreased back to 93beats
Patient had 6 beats Vtach

## 2023-04-14 NOTE — PROGRESS NOTE ADULT - SUBJECTIVE AND OBJECTIVE BOX
CHIEF COMPLAINT/ REASON FOR VISIT  .. Patient was seen to address the  issue listed under PROBLEM LIST which is located toward bottom of this note     ANA MARIA LEIGH    PLV 1EAS 101 W1    Allergies    No Known Allergies    Intolerances        PAST MEDICAL & SURGICAL HISTORY:  ASHD (arteriosclerotic heart disease)      BPH without urinary obstruction      COPD, moderate      Stage 3 chronic kidney disease      Chronic GERD      HLD (hyperlipidemia)      MDD (major depressive disorder)      Obstructive and reflux uropathy      Cellulitis      HTN (hypertension)      Sepsis      Dementia      Moderate protein-calorie malnutrition      Brain TIA      History of RSV infection      DM type 2, not at goal      Pressure ulcer of unspecified heel, unspecified stage      Venous stasis ulcer without varicose veins      Multiple open wounds of foot          FAMILY HISTORY:      Home Medications:  Admelog SoloStar 100 units/mL injectable solution: injectable 4 times a day (before meals and at bedtime) per sliding scale (30 Mar 2023 15:23)  Aricept 5 mg oral tablet: 1 tab(s) orally once a day (30 Mar 2023 15:23)  atenolol 25 mg oral tablet: 1 tab(s) orally once a day (30 Mar 2023 15:23)  Bacid (LAC) oral tablet: 1 tab(s) orally 2 times a day (30 Mar 2023 15:23)  Cymbalta 60 mg oral delayed release capsule: 1 cap(s) orally once a day (30 Mar 2023 15:23)  docusate sodium 100 mg oral capsule: 2 cap(s) orally once a day (at bedtime) (30 Mar 2023 15:23)  doxycycline hyclate 100 mg oral tablet: 1 tab(s) orally 2 times a day for 7 days  3/28/23-4/4/23 (30 Mar 2023 15:23)  Dulcolax Laxative 10 mg rectal suppository: 1 suppository(ies) rectally as needed for  constipation (30 Mar 2023 15:23)  famotidine 40 mg oral tablet: 1 tab(s) orally once a day (30 Mar 2023 15:23)  Fleet Enema 19 g-7 g rectal enema: 133 milliliter(s) rectally as needed for  constipation (30 Mar 2023 15:23)  Flovent 44 mcg/inh inhalation aerosol with adapter: 1 puff(s) inhaled every 12 hours (30 Mar 2023 15:23)  ipratropium-albuterol 0.5 mg-2.5 mg/3 mL inhalation solution: 3 milliliter(s) by nebulizer 4 times a day (30 Mar 2023 15:23)  losartan 50 mg oral tablet: 1 tab(s) orally once a day (30 Mar 2023 15:23)  Milk of Magnesia 8% oral suspension: 30 milliliter(s) orally once a day as needed for  constipation (30 Mar 2023 15:23)  Santyl 250 units/g topical ointment: Apply topically to affected area (30 Mar 2023 12:41)  simvastatin 40 mg oral tablet: 1 tab(s) orally once a day (at bedtime) (30 Mar 2023 15:23)  SITagliptin 50 mg oral tablet: 1 tab(s) orally once a day (30 Mar 2023 15:23)  tamsulosin 0.4 mg oral capsule: 1 cap(s) orally once a day (in the evening) (30 Mar 2023 15:23)  Therapeutic Multiple Vitamins oral tablet: 1 tab(s) orally once a day (30 Mar 2023 15:23)  traMADol 50 mg oral tablet: 0.5 tab(s) orally 2 times a day (30 Mar 2023 15:23)  Tylenol Caplet Extra Strength 500 mg oral tablet: 2 tab(s) orally every 8 hours (30 Mar 2023 15:23)      MEDICATIONS  (STANDING):  budesonide 160 MICROgram(s)/formoterol 4.5 MICROgram(s) Inhaler 2 Puff(s) Inhalation two times a day  dextrose 50% Injectable 25 Gram(s) IV Push once  dextrose 50% Injectable 12.5 Gram(s) IV Push once  dextrose 50% Injectable 25 Gram(s) IV Push once  donepezil 5 milliGRAM(s) Oral at bedtime  DULoxetine 60 milliGRAM(s) Oral daily  enoxaparin Injectable 60 milliGRAM(s) SubCutaneous every 12 hours  glucagon  Injectable 1 milliGRAM(s) IntraMuscular once  insulin glargine Injectable (LANTUS) 8 Unit(s) SubCutaneous at bedtime  insulin lispro (ADMELOG) corrective regimen sliding scale   SubCutaneous three times a day before meals  insulin lispro (ADMELOG) corrective regimen sliding scale   SubCutaneous at bedtime  lactobacillus acidophilus 1 Tablet(s) Oral two times a day with meals  lisinopril 2.5 milliGRAM(s) Oral daily  metoprolol tartrate 100 milliGRAM(s) Oral two times a day  multivitamin 1 Tablet(s) Oral daily  pantoprazole   Suspension 40 milliGRAM(s) Oral daily  senna 2 Tablet(s) Oral at bedtime  simvastatin 40 milliGRAM(s) Oral at bedtime  tamsulosin 0.4 milliGRAM(s) Oral at bedtime  vancomycin  IVPB 750 milliGRAM(s) IV Intermittent every 24 hours    MEDICATIONS  (PRN):  acetaminophen     Tablet .. 650 milliGRAM(s) Oral every 6 hours PRN Temp greater or equal to 38C (100.4F), Mild Pain (1 - 3)  aluminum hydroxide/magnesium hydroxide/simethicone Suspension 30 milliLiter(s) Oral every 4 hours PRN Dyspepsia  bisacodyl Suppository 10 milliGRAM(s) Rectal daily PRN Constipation  dextrose Oral Gel 15 Gram(s) Oral once PRN Blood Glucose LESS THAN 70 milliGRAM(s)/deciliter  hydrALAZINE 50 milliGRAM(s) Oral every 6 hours PRN for systolic BP>160  magnesium hydroxide Suspension 30 milliLiter(s) Oral daily PRN Constipation  melatonin 3 milliGRAM(s) Oral at bedtime PRN Insomnia  morphine  - Injectable 2 milliGRAM(s) IV Push every 6 hours PRN Severe Pain (7 - 10)  ondansetron Injectable 4 milliGRAM(s) IV Push every 8 hours PRN Nausea and/or Vomiting  sodium chloride 0.9% lock flush 10 milliLiter(s) IV Push every 1 hour PRN Pre/post blood products, medications, blood draw, and to maintain line patency  traMADol 50 milliGRAM(s) Oral four times a day PRN Moderate Pain (4 - 6)      Diet, DASH/TLC:   Sodium & Cholesterol Restricted  Consistent Carbohydrate Evening Snack  Pureed (PUREED)  Reginald(7 Gm Arginine/7 Gm Glut/1.2 Gm HMB     Qty per Day:  2  Supplement Feeding Modality:  Oral  Glucerna Shake Cans or Servings Per Day:  1       Frequency:  Daily (04-11-23 @ 13:39) [Active]          Vital Signs Last 24 Hrs  T(C): 36.6 (14 Apr 2023 05:00), Max: 36.7 (13 Apr 2023 20:16)  T(F): 97.9 (14 Apr 2023 05:00), Max: 98 (13 Apr 2023 20:16)  HR: 66 (14 Apr 2023 05:00) (63 - 72)  BP: 152/77 (14 Apr 2023 05:00) (114/70 - 152/77)  BP(mean): 81 (13 Apr 2023 18:20) (81 - 81)  RR: 17 (14 Apr 2023 05:00) (17 - 18)  SpO2: 95% (14 Apr 2023 05:00) (94% - 98%)    Parameters below as of 14 Apr 2023 05:00  Patient On (Oxygen Delivery Method): room air          04-12-23 @ 07:01  -  04-13-23 @ 07:00  --------------------------------------------------------  IN: 240 mL / OUT: 0 mL / NET: 240 mL    04-13-23 @ 07:01  -  04-14-23 @ 06:39  --------------------------------------------------------  IN: 120 mL / OUT: 0 mL / NET: 120 mL              LABS:                        9.3    7.90  )-----------( 325      ( 12 Apr 2023 10:09 )             31.0     04-13    x   |  x   |  x   ----------------------------<  x   x    |  x   |  1.40<H>    Ca    8.8      12 Apr 2023 10:09    TPro  6.3  /  Alb  2.0<L>  /  TBili  0.4  /  DBili  0.2  /  AST  18  /  ALT  19  /  AlkPhos  64  04-13              WBC:  WBC Count: 7.90 K/uL (04-12 @ 10:09)  WBC Count: 9.38 K/uL (04-11 @ 06:06)      MICROBIOLOGY:  RECENT CULTURES:  04-10 .Tissue Other, LEFT FOOT 1ST METATARSAL HEAD Methicillin resistant Staphylococcus aureus   No polymorphonuclear cells seen per low power field  No organisms seen per oil power field   Rare Methicillin Resistant Staphylococcus aureus  Few Corynebacterium species "Susceptibilities not performed"                    Sodium:  Sodium, Serum: 139 mmol/L (04-12 @ 10:09)  Sodium, Serum: 140 mmol/L (04-11 @ 06:06)      1.40 mg/dL 04-13 @ 06:53  1.40 mg/dL 04-12 @ 10:09  1.30 mg/dL 04-11 @ 06:06      Hemoglobin:  Hemoglobin: 9.3 g/dL (04-12 @ 10:09)  Hemoglobin: 9.8 g/dL (04-11 @ 06:06)      Platelets: Platelet Count - Automated: 325 K/uL (04-12 @ 10:09)  Platelet Count - Automated: 339 K/uL (04-11 @ 06:06)      LIVER FUNCTIONS - ( 13 Apr 2023 06:53 )  Alb: 2.0 g/dL / Pro: 6.3 g/dL / ALK PHOS: 64 U/L / ALT: 19 U/L / AST: 18 U/L / GGT: x                 RADIOLOGY & ADDITIONAL STUDIES:      MICROBIOLOGY:  RECENT CULTURES:  04-10 .Tissue Other, LEFT FOOT 1ST METATARSAL HEAD Methicillin resistant Staphylococcus aureus   No polymorphonuclear cells seen per low power field  No organisms seen per oil power field   Rare Methicillin Resistant Staphylococcus aureus  Few Corynebacterium species "Susceptibilities not performed"

## 2023-04-14 NOTE — DISCHARGE NOTE PROVIDER - NSDCCPTREATMENT_GEN_ALL_CORE_FT
PRINCIPAL PROCEDURE  Procedure: Resection of head of first metatarsal bone  Findings and Treatment:       SECONDARY PROCEDURE  Procedure: Sesamoidectomy of toe  Findings and Treatment:     Procedure: Placement, PICC, with image guidance  Findings and Treatment:     Procedure: Replacement, PICC  Findings and Treatment:

## 2023-04-14 NOTE — PROGRESS NOTE ADULT - PROBLEM SELECTOR PROBLEM 2
Cellulitis of left foot
Osteomyelitis of left foot
Multiple open wounds of foot
Cellulitis of left foot
Osteomyelitis of left foot
Cellulitis of left foot
Multiple open wounds of foot
Osteomyelitis of left foot
Cellulitis of left foot
Multiple open wounds of foot
Cellulitis of left foot
Osteomyelitis of left foot
Osteomyelitis of left foot
Bone infection of left foot
Multiple open wounds of foot

## 2023-04-14 NOTE — PROGRESS NOTE ADULT - SUBJECTIVE AND OBJECTIVE BOX
CAPILLARY BLOOD GLUCOSE      POCT Blood Glucose.: 116 mg/dL (14 Apr 2023 07:36)  POCT Blood Glucose.: 127 mg/dL (13 Apr 2023 21:06)  POCT Blood Glucose.: 178 mg/dL (13 Apr 2023 16:52)  POCT Blood Glucose.: 223 mg/dL (13 Apr 2023 11:48)      Vital Signs Last 24 Hrs  T(C): 36.6 (14 Apr 2023 05:00), Max: 36.7 (13 Apr 2023 20:16)  T(F): 97.9 (14 Apr 2023 05:00), Max: 98 (13 Apr 2023 20:16)  HR: 66 (14 Apr 2023 05:00) (63 - 72)  BP: 152/77 (14 Apr 2023 05:00) (114/70 - 152/77)  BP(mean): 81 (13 Apr 2023 18:20) (81 - 81)  RR: 17 (14 Apr 2023 05:00) (17 - 18)  SpO2: 95% (14 Apr 2023 05:00) (94% - 98%)    Parameters below as of 14 Apr 2023 05:00  Patient On (Oxygen Delivery Method): room air        General: WN/WD NAD  Respiratory: CTA B/L  CV: RRR, S1S2, no murmurs, rubs or gallops  Abdominal: Soft, NT, ND +BS, Last BM  Extremities: No edema, + peripheral pulses     04-14    x   |  x   |  x   ----------------------------<  137<H>  x    |  25  |  x     Ca    8.3<L>      14 Apr 2023 06:47  Phos  2.4     04-14  Mg     2.2     04-14    TPro  6.3  /  Alb  2.0<L>  /  TBili  0.4  /  DBili  0.2  /  AST  18  /  ALT  19  /  AlkPhos  64  04-13      dextrose 50% Injectable 25 Gram(s) IV Push once  dextrose 50% Injectable 12.5 Gram(s) IV Push once  dextrose 50% Injectable 25 Gram(s) IV Push once  dextrose Oral Gel 15 Gram(s) Oral once PRN  glucagon  Injectable 1 milliGRAM(s) IntraMuscular once  insulin glargine Injectable (LANTUS) 8 Unit(s) SubCutaneous at bedtime  insulin lispro (ADMELOG) corrective regimen sliding scale   SubCutaneous three times a day before meals  insulin lispro (ADMELOG) corrective regimen sliding scale   SubCutaneous at bedtime  simvastatin 40 milliGRAM(s) Oral at bedtime

## 2023-04-14 NOTE — DISCHARGE NOTE PROVIDER - NSDCMRMEDTOKEN_GEN_ALL_CORE_FT
Admelog SoloStar 100 units/mL injectable solution: injectable 4 times a day (before meals and at bedtime) per sliding scale  apixaban 2.5 mg oral tablet: 1 tab(s) orally every 12 hours  Aricept 5 mg oral tablet: 1 tab(s) orally once a day  Bacid (LAC) oral tablet: 1 tab(s) orally 2 times a day  budesonide-formoterol 160 mcg-4.5 mcg/inh inhalation aerosol: 2 puff(s) inhaled 2 times a day  Cymbalta 60 mg oral delayed release capsule: 1 cap(s) orally once a day  docusate sodium 100 mg oral capsule: 2 cap(s) orally once a day (at bedtime)  Dulcolax Laxative 10 mg rectal suppository: 1 suppository(ies) rectally once a day as needed for  constipation  hydrALAZINE 50 mg oral tablet: 1 tab(s) orally every 6 hours As needed for systolic BP&gt;160  ipratropium-albuterol 0.5 mg-2.5 mg/3 mL inhalation solution: 3 milliliter(s) by nebulizer 4 times a day  lisinopril 2.5 mg oral tablet: 1 tab(s) orally once a day  metoprolol tartrate 100 mg oral tablet: 1 tab(s) orally 2 times a day  Milk of Magnesia 8% oral suspension: 30 milliliter(s) orally once a day as needed for  constipation  pantoprazole 40 mg oral granule, delayed release: 1 tab(s) orally once a day  senna leaf extract oral tablet: 2 tab(s) orally once a day (at bedtime)  simvastatin 40 mg oral tablet: 1 tab(s) orally once a day (at bedtime)  SITagliptin 50 mg oral tablet: 1 tab(s) orally once a day  tamsulosin 0.4 mg oral capsule: 1 cap(s) orally once a day (in the evening)  Therapeutic Multiple Vitamins oral tablet: 1 tab(s) orally once a day  traMADol 50 mg oral tablet: 0.5 tab(s) orally 2 times a day  Tylenol Caplet Extra Strength 500 mg oral tablet: 2 tab(s) orally every 8 hours  vancomycin 750 mg intravenous injection: 750 milligram(s) intravenous every 24 hours

## 2023-04-14 NOTE — CHART NOTE - NSCHARTNOTEFT_GEN_A_CORE
Spoke to cardiologist  regarding OAC plan ,he advised to stop Lovenox today and to start ELIQUIS tomorrow .Left a message for the son Ghulam and wife Kemar ,asked to call me back regarding oac plan discussion and d/c plan back to [facility] Spoke to cardiologist  regarding OAC plan ,he advised to stop Lovenox today and to start ELIQUIS tomorrow .Left a message for the son Ghulam and wife Kemar ,asked to call me back regarding oac plan discussion and d/c plan back to [facility] Spoke to cardiologist  regarding OAC plan ,he advised to stop Lovenox today and to start ELIQUIS tomorrow .Left a message for the son Ghulam and wife Kemar ,asked to call me back regarding oac plan discussion and d/c plan back to [facility]

## 2023-04-14 NOTE — PROGRESS NOTE ADULT - PROBLEM SELECTOR PLAN 10
continue home medications
Supportive care ,frequent redirection ,continue home medications, management of agitation as needed
Supportive care ,frequent redirection ,continue home medications, management of agitation as needed
continue home medications
serial bmp ,ins/outs
continue home medications
Supportive care ,frequent redirection ,continue home medications, management of agitation as needed
continue home medications
Supportive care ,frequent redirection ,continue home medications, management of agitation as needed
continue home medications
continue home medications

## 2023-04-14 NOTE — PROGRESS NOTE ADULT - PROBLEM SELECTOR PLAN 8
Culture - Urine (collected 02 Apr 2023 09:15)  Source: Clean Catch Clean Catch (Midstream)  Final Report (04 Apr 2023 19:44):    >100,000 CFU/ml Enterococcus faecalis  Organism: Enterococcus faecalis (04 Apr 2023 19:44)  ID cons is following
Culture - Urine (collected 02 Apr 2023 09:15)  Source: Clean Catch Clean Catch (Midstream)  Final Report (04 Apr 2023 19:44):    >100,000 CFU/ml Enterococcus faecalis  Organism: Enterococcus faecalis (04 Apr 2023 19:44)  ID cons is following
continue home medications
continue home medications
Culture - Urine (collected 02 Apr 2023 09:15)  Source: Clean Catch Clean Catch (Midstream)  Final Report (04 Apr 2023 19:44):    >100,000 CFU/ml Enterococcus faecalis  Organism: Enterococcus faecalis (04 Apr 2023 19:44)  ID cons is following
continue home medications
Culture - Urine (collected 02 Apr 2023 09:15)  Source: Clean Catch Clean Catch (Midstream)  Final Report (04 Apr 2023 19:44):    >100,000 CFU/ml Enterococcus faecalis  Organism: Enterococcus faecalis (04 Apr 2023 19:44)  ID cons is following
continue home medications
continue home medications
Culture - Urine (collected 02 Apr 2023 09:15)  Source: Clean Catch Clean Catch (Midstream)  Final Report (04 Apr 2023 19:44):    >100,000 CFU/ml Enterococcus faecalis  Organism: Enterococcus faecalis (04 Apr 2023 19:44)  ID cons is following
continue home medications
Culture - Urine (collected 02 Apr 2023 09:15)  Source: Clean Catch Clean Catch (Midstream)  Final Report (04 Apr 2023 19:44):    >100,000 CFU/ml Enterococcus faecalis  Organism: Enterococcus faecalis (04 Apr 2023 19:44)  ID cons is following
continue home medications

## 2023-04-14 NOTE — PROGRESS NOTE ADULT - PROBLEM SELECTOR PROBLEM 11
Stage 3 chronic kidney disease
Dementia
Stage 3 chronic kidney disease
Chronic GERD
Stage 3 chronic kidney disease
Stage 3 chronic kidney disease
Chronic GERD
Chronic GERD
Stage 3 chronic kidney disease
Stage 3 chronic kidney disease
BPH without urinary obstruction
Chronic GERD
BPH without urinary obstruction
Stage 3 chronic kidney disease
Stage 3 chronic kidney disease

## 2023-04-14 NOTE — DISCHARGE NOTE PROVIDER - CARE PROVIDER_API CALL
Damian Hendrickson  Infectious Diseases  [address] [place] [address]  Phone: [phone]  Fax: [phone]  Follow Up Time: 1 week    José Neri (DPKALA)  Foot Surgery; Podiatric Medicine; Recon RearfootAnkle Surgery  [address] [place] [address]  Phone: [phone]  Fax: [phone]  Follow Up Time: 1 week    Dutch Arevalo)  Cardiology  [address] [place] [address], [address]  [place] [address]  Phone: [phone]  Fax: [phone]  Follow Up Time: 1 month   Damian Hendrickson  Infectious Diseases  [address] [place] [address]  Phone: [phone]  Fax: [phone]  Follow Up Time: 1 week    José Neri (DPKALA)  Foot Surgery; Podiatric Medicine; Recon RearfootAnkle Surgery  [address] [place] [address]  Phone: [phone]  Fax: [phone]  Follow Up Time: 1 week    Dutch Arevalo)  Cardiology  [address] [place] [address], [address]  [place] [address]  Phone: [phone]  Fax: [phone]  Follow Up Time: 1 month   Damian Hendrickson  Infectious Diseases  [address] [place] [address]  Phone: [phone]  Fax: [phone]  Follow Up Time: 1 week    José Neri (DPKALA)  Foot Surgery; Podiatric Medicine; Recon RearfootAnkle Surgery  [address] [place] [address]  Phone: [phone]  Fax: [phone]  Follow Up Time: 1 week    Dutch Arevalo)  Cardiology  [address] [place] [address], [address]  [place] [address]  Phone: [phone]  Fax: [phone]  Follow Up Time: 1 month

## 2023-04-14 NOTE — PROVIDER CONTACT NOTE (OTHER) - NAME OF MD/NP/PA/DO NOTIFIED:
Dr Rondon
Dutch Arevalo
Denny Ward
Dr Rondon
MD Young
dr yadira turcios
Dr. Ward
Dutch Arevalo
Dr. Ward
Dr Sloan
Vilma Garcia MD
Denny Ward

## 2023-04-14 NOTE — PROGRESS NOTE ADULT - PROBLEM SELECTOR PLAN 3
Abnormal combined Indium-111 labeled leukocyte study and   marrow scan. In the LEFT foot, findings at the first toe are suspicious   for osteomyelitis; the other foci appear equivocal or are more likely   soft tissue infection. On the RIGHT, an area of osteomyelitis at the   medial portion of the RIGHT heel wound is not excluded.
Pressure ulcers to bilateral heels with necrotic stable eschars   Continue with offloading boots at all times since patient is non ambulatory   Continue dressing changes per nursing orders
Pressure ulcers to bilateral heels with necrotic stable eschars   Continue with offloading boots at all times since patient is non ambulatory   Applied Aquacel and sterile dry dressing to bilateral heels.
Abnormal combined Indium-111 labeled leukocyte study and   marrow scan. In the LEFT foot, findings at the first toe are suspicious   for osteomyelitis; the other foci appear equivocal or are more likely   soft tissue infection. On the RIGHT, an area of osteomyelitis at the   medial portion of the RIGHT heel wound is not excluded.
Accuchecks monitoring and insulin corrective regimen  sliding scale coverage with short acting inslulin, add longacting insulin as needed ,no concentrated sweets diet, serial labs ,HbA1C,education
Accuchecks monitoring and insulin corrective regimen  sliding scale coverage with short acting inslulin, add longacting insulin as needed ,no concentrated sweets diet, serial labs ,HbA1C,education
Wound care consult, turn and reposition every 2 hrs ,skin assessment and skin care as per floor protocol ,heel elevators
: Pressure ulcers to bilateral heels with necrotic stable eschars   Continue with offloading boots at all times since patient is non ambulatory   Applied Aquacel and sterile dry dressing to bilateral heels.
Abnormal combined Indium-111 labeled leukocyte study and   marrow scan. In the LEFT foot, findings at the first toe are suspicious   for osteomyelitis; the other foci appear equivocal or are more likely   soft tissue infection. On the RIGHT, an area of osteomyelitis at the   medial portion of the RIGHT heel wound is not excluded.
Accuchecks monitoring and insulin corrective regimen  sliding scale coverage with short acting inslulin, add longacting insulin as needed ,no concentrated sweets diet, serial labs ,HbA1C,education
Accuchecks monitoring and insulin corrective regimen  sliding scale coverage with short acting inslulin, add longacting insulin as needed ,no concentrated sweets diet, serial labs ,HbA1C,education
Abnormal combined Indium-111 labeled leukocyte study and   marrow scan. In the LEFT foot, findings at the first toe are suspicious   for osteomyelitis; the other foci appear equivocal or are more likely   soft tissue infection. On the RIGHT, an area of osteomyelitis at the   medial portion of the RIGHT heel wound is not excluded.
Abnormal combined Indium-111 labeled leukocyte study and   marrow scan. In the LEFT foot, findings at the first toe are suspicious   for osteomyelitis; the other foci appear equivocal or are more likely   soft tissue infection. On the RIGHT, an area of osteomyelitis at the   medial portion of the RIGHT heel wound is not excluded.
Contact isolation , on vancomycin
Abnormal combined Indium-111 labeled leukocyte study and   marrow scan. In the LEFT foot, findings at the first toe are suspicious   for osteomyelitis; the other foci appear equivocal or are more likely   soft tissue infection. On the RIGHT, an area of osteomyelitis at the   medial portion of the RIGHT heel wound is not excluded.
Contact isolation , on vancomycin
Pressure ulcers to bilateral heels with necrotic stable eschars   Continue with offloading boots at all times since patient is non ambulatory   Applied Aquacel and sterile dry dressing to bilateral heels.
Abnormal combined Indium-111 labeled leukocyte study and   marrow scan. In the LEFT foot, findings at the first toe are suspicious   for osteomyelitis; the other foci appear equivocal or are more likely   soft tissue infection. On the RIGHT, an area of osteomyelitis at the   medial portion of the RIGHT heel wound is not excluded.

## 2023-04-14 NOTE — PROGRESS NOTE ADULT - PROBLEM SELECTOR PLAN 2
Wound care consult, turn and reposition every 2 hrs ,skin assesemnt and skin care as per floor protocol ,heel elevators
Continue with IV antibiotics per ID
Wound care consult, turn and reposition every 2 hrs ,skin assesemnt and skin care as per floor protocol ,heel elevators
Abnormal combined Indium-111 labeled leukocyte study and   marrow scan. In the LEFT foot, findings at the first toe are suspicious   for osteomyelitis; the other foci appear equivocal or are more likely   soft tissue infection. On the RIGHT, an area of osteomyelitis at the   medial portion of the RIGHT heel wound is not excluded.
left foot 1metatarsal wound. Concern for wound infection with underlying osteomyelitis. He also has bilateral heel wounds R>L. He had a low grade temp and mild leukocytosis. Plan for potential Podiatry intervention pending bone scan ,MRI is not available at facility .  -continue Zosyn  -hold off on vancomycin for now  -follow blood cultures  -follow up bilateral foot MRI
04/10/2023  PRE-OP DIAGNOSIS:  Foot osteomyelitis, left  Diabetic infection of left foot   ·  POST-OP DIAGNOSIS:  Diabetic infection of left foot   Foot osteomyelitis, left  ·  PROCEDURES:  Resection of head of first metatarsal bone   Sesamoidectomy of toe
Post op erythema and edema to the left foot decreased from before   Continue IV antibiotics per infectious disease.
Post op erythema and edema to the left foot  Continue IV antibiotics per infectious disease
Continue with IV antibiotics per ID.  Noticed purulent drainage for left first metatarsal head with exposed bone
Wound care consult, turn and reposition every 2 hrs ,skin assesemnt and skin care as per floor protocol ,heel elevators
BONE SCAN -Abnormal combined Indium-111 labeled leukocyte study and   marrow scan. In the LEFT foot, findings at the first toe are suspicious   for osteomyelitis; the other foci appear equivocal or are more likely   soft tissue infection. On the RIGHT, an area of osteomyelitis at the   medial portion of the RIGHT heel wound is not excluded.
BONE SCAN -Abnormal combined Indium-111 labeled leukocyte study and   marrow scan. In the LEFT foot, findings at the first toe are suspicious   for osteomyelitis; the other foci appear equivocal or are more likely   soft tissue infection. On the RIGHT, an area of osteomyelitis at the   medial portion of the RIGHT heel wound is not excluded. Seen by podiatry team -Discussed bone scan findings with patient's spouse over telephone conversation. Bone scan (+) OM on the left metatarsal, also right posterior heel wound. Discussed surgical intervention at this time for left foot first metatarsal head resection with sesamoidectomy. Right heel wound is with a stable eschar at this time and will continue local wound care and offloading. Patient to be scheduled for surgery on Monday 04/10/23 since patient's wife is undergoing surgery on Friday and is the health proxy, and is requesting for the procedure to be performed Monday. Discussed at length that patient is high risk for limb loss, more proximal amputation, sepsis, loss of life.
Abnormal combined Indium-111 labeled leukocyte study and   marrow scan. In the LEFT foot, findings at the first toe are suspicious   for osteomyelitis; the other foci appear equivocal or are more likely   soft tissue infection. On the RIGHT, an area of osteomyelitis at the   medial portion of the RIGHT heel wound is not excluded.
Abnormal combined Indium-111 labeled leukocyte study and   marrow scan. In the LEFT foot, findings at the first toe are suspicious   for osteomyelitis; the other foci appear equivocal or are more likely   soft tissue infection. On the RIGHT, an area of osteomyelitis at the   medial portion of the RIGHT heel wound is not excluded.
Wound care consult, turn and reposition every 2 hrs ,skin assesemnt and skin care as per floor protocol ,heel elevators
BONE SCAN -Abnormal combined Indium-111 labeled leukocyte study and   marrow scan. In the LEFT foot, findings at the first toe are suspicious   for osteomyelitis; the other foci appear equivocal or are more likely   soft tissue infection. On the RIGHT, an area of osteomyelitis at the   medial portion of the RIGHT heel wound is not excluded. Seen by podiatry team -Discussed bone scan findings with patient's spouse over telephone conversation. Bone scan (+) OM on the left metatarsal, also right posterior heel wound. Discussed surgical intervention at this time for left foot first metatarsal head resection with sesamoidectomy. Right heel wound is with a stable eschar at this time and will continue local wound care and offloading. Patient to be scheduled for surgery on Monday 04/10/23 since patient's wife is undergoing surgery on Friday and is the health proxy, and is requesting for the procedure to be performed Monday. Discussed at length that patient is high risk for limb loss, more proximal amputation, sepsis, loss of life.

## 2023-04-14 NOTE — DISCHARGE NOTE PROVIDER - NSDCFUADDAPPT_GEN_ALL_CORE_FT
Wound Care Instructions:  -Keep dressing clean, dry, and intact to the right heel / left  foot wounds   -Apply silver alginate and dry sterile  dressing to the right heel  and left foot, change every other day   -Monitor for any signs of infection including redness, swelling in the leg above the bandage, nausea/vomiting/fever/chills/chest pain/shortness of breath, if any are present patient must report to the emergency department immediately  -Patient is to follow up with Dr. Moreau/Dr. Neri/ Dr. Armando  within 5 days after discharge at [facility] [place]. Please call to make an appointment [phone]. Wound Care Instructions:  -Keep dressing clean, dry, and intact to the right heel / left  foot wounds   -Apply silver alginate and dry sterile  dressing to the right heel  and left foot, change every other day   -Monitor for any signs of infection including redness, swelling in the leg above the bandage, nausea/vomiting/fever/chills/chest pain/shortness of breath, if any are present patient must report to the emergency department immediately  -Patient is to follow up with Dr. Moreau/Dr. Neri/ Dr. Armando  within 5 days after discharge at [facility] [place]. Please call to make an appointment [phone]. Wound Care Instructions:  -Keep dressing clean, dry, and intact to the right heel / left  foot wounds   -Apply silver alginate and dry sterile  dressing to the right heel  and left foot, change every other day   -Monitor for any signs of infection including redness, swelling in the leg above the bandage, nausea/vomiting/fever/chills/chest pain/shortness of breath, if any are present patient must report to the emergency department immediately  -Patient is to follow up with Dr. Moreau/Dr. Neri/ Dr. Armando  within 5 days after discharge at [facility] [place]. Please call to make an appointment [phone].

## 2023-04-14 NOTE — PROGRESS NOTE ADULT - PROBLEM SELECTOR PROBLEM 3
DM (diabetes mellitus), type 2
Pressure ulcer, heel
Pressure ulcer, unstageable, with eschar
Cellulitis of left foot
Pressure ulcer, unstageable, with eschar
Cellulitis of left foot
DM (diabetes mellitus), type 2
Multiple open wounds of foot
Pressure ulcer, unstageable, with eschar
Pressure ulcer, unstageable, with eschar
DM (diabetes mellitus), type 2
Cellulitis of left foot
Cellulitis of left foot
Infection of wound due to methicillin resistant Staphylococcus aureus (MRSA)
Cellulitis of left foot
Cellulitis of left foot
Infection of wound due to methicillin resistant Staphylococcus aureus (MRSA)
DM (diabetes mellitus), type 2
Cellulitis of left foot

## 2023-04-14 NOTE — PROGRESS NOTE ADULT - SUBJECTIVE AND OBJECTIVE BOX
PROGRESS NOTE  Patient is a 87y old  Male who presents with a chief complaint of left foot infection (14 Apr 2023 14:03)    Chart and available morning labs /imaging are reviewed electronically , urgent issues addressed . More information  is being added upon completion of rounds , when more information is collected and management discussed with consultants , medical staff and social service/case management on the floor   OVERNIGHT  No new issues reported by medical staff . All above noted Patient is resting in a bed comfortably .Confused ,poor mentation .No distress noted     HPI:  86 yo male ,[facility] resident with PMHx - COPD, DM, HTN, CAD, HLD, H/O TIA, dementia,GERD, BPH and depression sent ot ER for evaluation of left foot 1metatarsal wound ,suggestive of osteomyelitis .Patient was seen by ID consult and transfer to the hospital recommended for wound cx/bone biopsy /podiatry evaluation ,likely will require 4-6 weeks of iv abx . Patient was admitted to [facility] with b/l feet wounds and was followed by wound care team ,recently completed 7 days of doxycycline for foot wound cellulitis . (30 Mar 2023 05:21)    PAST MEDICAL & SURGICAL HISTORY:  ASHD (arteriosclerotic heart disease)      BPH without urinary obstruction      COPD, moderate      Stage 3 chronic kidney disease      Chronic GERD      HLD (hyperlipidemia)      MDD (major depressive disorder)      Obstructive and reflux uropathy      Cellulitis      HTN (hypertension)      Sepsis      Dementia      Moderate protein-calorie malnutrition      Brain TIA      History of RSV infection      DM type 2, not at goal      Pressure ulcer of unspecified heel, unspecified stage      Venous stasis ulcer without varicose veins      Multiple open wounds of foot          MEDICATIONS  (STANDING):  alteplase for catheter clearance 2 milliGRAM(s) Catheter once  budesonide 160 MICROgram(s)/formoterol 4.5 MICROgram(s) Inhaler 2 Puff(s) Inhalation two times a day  chlorhexidine 4% Liquid 1 Application(s) Topical <User Schedule>  dextrose 50% Injectable 25 Gram(s) IV Push once  dextrose 50% Injectable 12.5 Gram(s) IV Push once  dextrose 50% Injectable 25 Gram(s) IV Push once  donepezil 5 milliGRAM(s) Oral at bedtime  DULoxetine 60 milliGRAM(s) Oral daily  glucagon  Injectable 1 milliGRAM(s) IntraMuscular once  insulin glargine Injectable (LANTUS) 8 Unit(s) SubCutaneous at bedtime  insulin lispro (ADMELOG) corrective regimen sliding scale   SubCutaneous three times a day before meals  insulin lispro (ADMELOG) corrective regimen sliding scale   SubCutaneous at bedtime  lactobacillus acidophilus 1 Tablet(s) Oral two times a day with meals  lisinopril 2.5 milliGRAM(s) Oral daily  metoprolol tartrate 100 milliGRAM(s) Oral two times a day  multivitamin 1 Tablet(s) Oral daily  pantoprazole   Suspension 40 milliGRAM(s) Oral daily  senna 2 Tablet(s) Oral at bedtime  simvastatin 40 milliGRAM(s) Oral at bedtime  tamsulosin 0.4 milliGRAM(s) Oral at bedtime  vancomycin  IVPB 750 milliGRAM(s) IV Intermittent every 24 hours    MEDICATIONS  (PRN):  acetaminophen     Tablet .. 650 milliGRAM(s) Oral every 6 hours PRN Temp greater or equal to 38C (100.4F), Mild Pain (1 - 3)  aluminum hydroxide/magnesium hydroxide/simethicone Suspension 30 milliLiter(s) Oral every 4 hours PRN Dyspepsia  bisacodyl Suppository 10 milliGRAM(s) Rectal daily PRN Constipation  dextrose Oral Gel 15 Gram(s) Oral once PRN Blood Glucose LESS THAN 70 milliGRAM(s)/deciliter  hydrALAZINE 50 milliGRAM(s) Oral every 6 hours PRN for systolic BP>160  magnesium hydroxide Suspension 30 milliLiter(s) Oral daily PRN Constipation  melatonin 3 milliGRAM(s) Oral at bedtime PRN Insomnia  morphine  - Injectable 2 milliGRAM(s) IV Push every 6 hours PRN Severe Pain (7 - 10)  ondansetron Injectable 4 milliGRAM(s) IV Push every 8 hours PRN Nausea and/or Vomiting  sodium chloride 0.9% lock flush 10 milliLiter(s) IV Push every 1 hour PRN Pre/post blood products, medications, blood draw, and to maintain line patency  traMADol 50 milliGRAM(s) Oral four times a day PRN Moderate Pain (4 - 6)      OBJECTIVE    T(C): 36.6 (04-14-23 @ 12:07), Max: 36.7 (04-13-23 @ 20:16)  HR: 74 (04-14-23 @ 12:07) (63 - 74)  BP: 122/67 (04-14-23 @ 12:07) (114/70 - 152/77)  RR: 18 (04-14-23 @ 12:07) (17 - 18)  SpO2: 94% (04-14-23 @ 12:07) (94% - 98%)  Wt(kg): --  I&O's Summary    13 Apr 2023 07:01  -  14 Apr 2023 07:00  --------------------------------------------------------  IN: 120 mL / OUT: 0 mL / NET: 120 mL    14 Apr 2023 07:01  -  14 Apr 2023 14:16  --------------------------------------------------------  IN: 240 mL / OUT: 0 mL / NET: 240 mL          REVIEW OF SYSTEMS:  Patient is  unable to provide any information/ROS  due to baseline mental status.     PHYSICAL EXAM:  Appearance: NAD. VS past 24 hrs -as above   HEENT:   Moist oral mucosa. Conjunctiva clear b/l.   Neck : supple  Respiratory: Lungs CTAB.  Gastrointestinal:  Soft, nontender. No rebound. No rigidity. BS present	  Cardiovascular: RRR ,S1S2 present  Neurologic: Non-focal. Moving all extremities.  Extremities: No edema. No erythema. No calf tenderness.  Skin: No rashes, No ecchymoses, No cyanosis.	  wounds ,skin lesions-See skin assesment flow sheet   LABS:                        9.3    7.07  )-----------( 339      ( 14 Apr 2023 06:47 )             31.1     04-14    141  |  111<H>  |  27<H>  ----------------------------<  137<H>  3.8   |  25  |  1.30    Ca    8.3<L>      14 Apr 2023 06:47  Phos  2.4     04-14  Mg     2.2     04-14    TPro  6.3  /  Alb  2.0<L>  /  TBili  0.4  /  DBili  0.2  /  AST  18  /  ALT  19  /  AlkPhos  64  04-13    CAPILLARY BLOOD GLUCOSE      POCT Blood Glucose.: 231 mg/dL (14 Apr 2023 11:34)  POCT Blood Glucose.: 116 mg/dL (14 Apr 2023 07:36)  POCT Blood Glucose.: 127 mg/dL (13 Apr 2023 21:06)  POCT Blood Glucose.: 178 mg/dL (13 Apr 2023 16:52)          Culture - Tissue with Gram Stain (collected 10 Apr 2023 13:10)  Source: .Tissue Other, LEFT FOOT DEEP TISSUE CULTURE  Gram Stain (10 Apr 2023 22:21):    No polymorphonuclear cells seen per low power field    No organisms seen per oil power field  Preliminary Report (12 Apr 2023 22:20):    Few Methicillin Resistant Staphylococcus aureus    Few Corynebacterium species "Susceptibilities not performed"  Organism: Methicillin resistant Staphylococcus aureus (12 Apr 2023 22:19)  Organism: Methicillin resistant Staphylococcus aureus (12 Apr 2023 22:19)    Culture - Tissue with Gram Stain (collected 10 Apr 2023 13:10)  Source: .Tissue Other, LEFT FOOT 1ST METATARSAL HEAD  Gram Stain (10 Apr 2023 22:21):    No polymorphonuclear cells seen per low power field    No organisms seen per oil power field  Preliminary Report (12 Apr 2023 22:18):    Rare Methicillin Resistant Staphylococcus aureus    Few Corynebacterium species "Susceptibilities not performed"  Organism: Methicillin resistant Staphylococcus aureus (12 Apr 2023 22:16)  Organism: Methicillin resistant Staphylococcus aureus (12 Apr 2023 22:16)      RADIOLOGY & ADDITIONAL TESTS:   reviewed elctronically  ASSESSMENT/PLAN: 	    Patient was seen and examined on a day of discharge . Plan of care , discharge medications and recommendations discussed with consultants and clearance for discharge obtained .Social service , case management  and medical staff are aware of plan. Family is notified. Discharge summary  is  prepared electronically-see separate document prepared by me .75minutes spent on this visit, 50% visit time spent in care co-ordination with other attendings and counselling patient  I have discussed care plan with patient and HCP,expressed understanding of problems treatment and their effect and side effects, alternatives in detail,I have asked if they have any questions and concerns and appropriately addressed them to best of my ability  PROGRESS NOTE  Patient is a 87y old  Male who presents with a chief complaint of left foot infection (14 Apr 2023 14:03)    Chart and available morning labs /imaging are reviewed electronically , urgent issues addressed . More information  is being added upon completion of rounds , when more information is collected and management discussed with consultants , medical staff and social service/case management on the floor   OVERNIGHT  No new issues reported by medical staff . All above noted Patient is resting in a bed comfortably .Confused ,poor mentation .No distress noted     HPI:  86 yo male ,[facility] resident with PMHx - COPD, DM, HTN, CAD, HLD, H/O TIA, dementia,GERD, BPH and depression sent ot ER for evaluation of left foot 1metatarsal wound ,suggestive of osteomyelitis .Patient was seen by ID consult and transfer to the hospital recommended for wound cx/bone biopsy /podiatry evaluation ,likely will require 4-6 weeks of iv abx . Patient was admitted to [facility] with b/l feet wounds and was followed by wound care team ,recently completed 7 days of doxycycline for foot wound cellulitis . (30 Mar 2023 05:21)    PAST MEDICAL & SURGICAL HISTORY:  ASHD (arteriosclerotic heart disease)      BPH without urinary obstruction      COPD, moderate      Stage 3 chronic kidney disease      Chronic GERD      HLD (hyperlipidemia)      MDD (major depressive disorder)      Obstructive and reflux uropathy      Cellulitis      HTN (hypertension)      Sepsis      Dementia      Moderate protein-calorie malnutrition      Brain TIA      History of RSV infection      DM type 2, not at goal      Pressure ulcer of unspecified heel, unspecified stage      Venous stasis ulcer without varicose veins      Multiple open wounds of foot          MEDICATIONS  (STANDING):  alteplase for catheter clearance 2 milliGRAM(s) Catheter once  budesonide 160 MICROgram(s)/formoterol 4.5 MICROgram(s) Inhaler 2 Puff(s) Inhalation two times a day  chlorhexidine 4% Liquid 1 Application(s) Topical <User Schedule>  dextrose 50% Injectable 25 Gram(s) IV Push once  dextrose 50% Injectable 12.5 Gram(s) IV Push once  dextrose 50% Injectable 25 Gram(s) IV Push once  donepezil 5 milliGRAM(s) Oral at bedtime  DULoxetine 60 milliGRAM(s) Oral daily  glucagon  Injectable 1 milliGRAM(s) IntraMuscular once  insulin glargine Injectable (LANTUS) 8 Unit(s) SubCutaneous at bedtime  insulin lispro (ADMELOG) corrective regimen sliding scale   SubCutaneous three times a day before meals  insulin lispro (ADMELOG) corrective regimen sliding scale   SubCutaneous at bedtime  lactobacillus acidophilus 1 Tablet(s) Oral two times a day with meals  lisinopril 2.5 milliGRAM(s) Oral daily  metoprolol tartrate 100 milliGRAM(s) Oral two times a day  multivitamin 1 Tablet(s) Oral daily  pantoprazole   Suspension 40 milliGRAM(s) Oral daily  senna 2 Tablet(s) Oral at bedtime  simvastatin 40 milliGRAM(s) Oral at bedtime  tamsulosin 0.4 milliGRAM(s) Oral at bedtime  vancomycin  IVPB 750 milliGRAM(s) IV Intermittent every 24 hours    MEDICATIONS  (PRN):  acetaminophen     Tablet .. 650 milliGRAM(s) Oral every 6 hours PRN Temp greater or equal to 38C (100.4F), Mild Pain (1 - 3)  aluminum hydroxide/magnesium hydroxide/simethicone Suspension 30 milliLiter(s) Oral every 4 hours PRN Dyspepsia  bisacodyl Suppository 10 milliGRAM(s) Rectal daily PRN Constipation  dextrose Oral Gel 15 Gram(s) Oral once PRN Blood Glucose LESS THAN 70 milliGRAM(s)/deciliter  hydrALAZINE 50 milliGRAM(s) Oral every 6 hours PRN for systolic BP>160  magnesium hydroxide Suspension 30 milliLiter(s) Oral daily PRN Constipation  melatonin 3 milliGRAM(s) Oral at bedtime PRN Insomnia  morphine  - Injectable 2 milliGRAM(s) IV Push every 6 hours PRN Severe Pain (7 - 10)  ondansetron Injectable 4 milliGRAM(s) IV Push every 8 hours PRN Nausea and/or Vomiting  sodium chloride 0.9% lock flush 10 milliLiter(s) IV Push every 1 hour PRN Pre/post blood products, medications, blood draw, and to maintain line patency  traMADol 50 milliGRAM(s) Oral four times a day PRN Moderate Pain (4 - 6)      OBJECTIVE    T(C): 36.6 (04-14-23 @ 12:07), Max: 36.7 (04-13-23 @ 20:16)  HR: 74 (04-14-23 @ 12:07) (63 - 74)  BP: 122/67 (04-14-23 @ 12:07) (114/70 - 152/77)  RR: 18 (04-14-23 @ 12:07) (17 - 18)  SpO2: 94% (04-14-23 @ 12:07) (94% - 98%)  Wt(kg): --  I&O's Summary    13 Apr 2023 07:01  -  14 Apr 2023 07:00  --------------------------------------------------------  IN: 120 mL / OUT: 0 mL / NET: 120 mL    14 Apr 2023 07:01  -  14 Apr 2023 14:16  --------------------------------------------------------  IN: 240 mL / OUT: 0 mL / NET: 240 mL          REVIEW OF SYSTEMS:  Patient is  unable to provide any information/ROS  due to baseline mental status.     PHYSICAL EXAM:  Appearance: NAD. VS past 24 hrs -as above   HEENT:   Moist oral mucosa. Conjunctiva clear b/l.   Neck : supple  Respiratory: Lungs CTAB.  Gastrointestinal:  Soft, nontender. No rebound. No rigidity. BS present	  Cardiovascular: RRR ,S1S2 present  Neurologic: Non-focal. Moving all extremities.  Extremities: No edema. No erythema. No calf tenderness.  Skin: No rashes, No ecchymoses, No cyanosis.	  wounds ,skin lesions-See skin assesment flow sheet   LABS:                        9.3    7.07  )-----------( 339      ( 14 Apr 2023 06:47 )             31.1     04-14    141  |  111<H>  |  27<H>  ----------------------------<  137<H>  3.8   |  25  |  1.30    Ca    8.3<L>      14 Apr 2023 06:47  Phos  2.4     04-14  Mg     2.2     04-14    TPro  6.3  /  Alb  2.0<L>  /  TBili  0.4  /  DBili  0.2  /  AST  18  /  ALT  19  /  AlkPhos  64  04-13    CAPILLARY BLOOD GLUCOSE      POCT Blood Glucose.: 231 mg/dL (14 Apr 2023 11:34)  POCT Blood Glucose.: 116 mg/dL (14 Apr 2023 07:36)  POCT Blood Glucose.: 127 mg/dL (13 Apr 2023 21:06)  POCT Blood Glucose.: 178 mg/dL (13 Apr 2023 16:52)          Culture - Tissue with Gram Stain (collected 10 Apr 2023 13:10)  Source: .Tissue Other, LEFT FOOT DEEP TISSUE CULTURE  Gram Stain (10 Apr 2023 22:21):    No polymorphonuclear cells seen per low power field    No organisms seen per oil power field  Preliminary Report (12 Apr 2023 22:20):    Few Methicillin Resistant Staphylococcus aureus    Few Corynebacterium species "Susceptibilities not performed"  Organism: Methicillin resistant Staphylococcus aureus (12 Apr 2023 22:19)  Organism: Methicillin resistant Staphylococcus aureus (12 Apr 2023 22:19)    Culture - Tissue with Gram Stain (collected 10 Apr 2023 13:10)  Source: .Tissue Other, LEFT FOOT 1ST METATARSAL HEAD  Gram Stain (10 Apr 2023 22:21):    No polymorphonuclear cells seen per low power field    No organisms seen per oil power field  Preliminary Report (12 Apr 2023 22:18):    Rare Methicillin Resistant Staphylococcus aureus    Few Corynebacterium species "Susceptibilities not performed"  Organism: Methicillin resistant Staphylococcus aureus (12 Apr 2023 22:16)  Organism: Methicillin resistant Staphylococcus aureus (12 Apr 2023 22:16)      RADIOLOGY & ADDITIONAL TESTS:   reviewed elctronically  ASSESSMENT/PLAN: 	    Patient was seen and examined on a day of discharge . Plan of care , discharge medications and recommendations discussed with consultants and clearance for discharge obtained .Social service , case management  and medical staff are aware of plan. Family is notified. Discharge summary  is  prepared electronically-see separate document prepared by me .75minutes spent on this visit, 50% visit time spent in care co-ordination with other attendings and counselling patient  I have discussed care plan with patient and HCP,expressed understanding of problems treatment and their effect and side effects, alternatives in detail,I have asked if they have any questions and concerns and appropriately addressed them to best of my ability  PROGRESS NOTE  Patient is a 87y old  Male who presents with a chief complaint of left foot infection (14 Apr 2023 14:03)    Chart and available morning labs /imaging are reviewed electronically , urgent issues addressed . More information  is being added upon completion of rounds , when more information is collected and management discussed with consultants , medical staff and social service/case management on the floor   OVERNIGHT  No new issues reported by medical staff . All above noted Patient is resting in a bed comfortably .Confused ,poor mentation .No distress noted     HPI:  88 yo male ,[facility] resident with PMHx - COPD, DM, HTN, CAD, HLD, H/O TIA, dementia,GERD, BPH and depression sent ot ER for evaluation of left foot 1metatarsal wound ,suggestive of osteomyelitis .Patient was seen by ID consult and transfer to the hospital recommended for wound cx/bone biopsy /podiatry evaluation ,likely will require 4-6 weeks of iv abx . Patient was admitted to [facility] with b/l feet wounds and was followed by wound care team ,recently completed 7 days of doxycycline for foot wound cellulitis . (30 Mar 2023 05:21)    PAST MEDICAL & SURGICAL HISTORY:  ASHD (arteriosclerotic heart disease)      BPH without urinary obstruction      COPD, moderate      Stage 3 chronic kidney disease      Chronic GERD      HLD (hyperlipidemia)      MDD (major depressive disorder)      Obstructive and reflux uropathy      Cellulitis      HTN (hypertension)      Sepsis      Dementia      Moderate protein-calorie malnutrition      Brain TIA      History of RSV infection      DM type 2, not at goal      Pressure ulcer of unspecified heel, unspecified stage      Venous stasis ulcer without varicose veins      Multiple open wounds of foot          MEDICATIONS  (STANDING):  alteplase for catheter clearance 2 milliGRAM(s) Catheter once  budesonide 160 MICROgram(s)/formoterol 4.5 MICROgram(s) Inhaler 2 Puff(s) Inhalation two times a day  chlorhexidine 4% Liquid 1 Application(s) Topical <User Schedule>  dextrose 50% Injectable 25 Gram(s) IV Push once  dextrose 50% Injectable 12.5 Gram(s) IV Push once  dextrose 50% Injectable 25 Gram(s) IV Push once  donepezil 5 milliGRAM(s) Oral at bedtime  DULoxetine 60 milliGRAM(s) Oral daily  glucagon  Injectable 1 milliGRAM(s) IntraMuscular once  insulin glargine Injectable (LANTUS) 8 Unit(s) SubCutaneous at bedtime  insulin lispro (ADMELOG) corrective regimen sliding scale   SubCutaneous three times a day before meals  insulin lispro (ADMELOG) corrective regimen sliding scale   SubCutaneous at bedtime  lactobacillus acidophilus 1 Tablet(s) Oral two times a day with meals  lisinopril 2.5 milliGRAM(s) Oral daily  metoprolol tartrate 100 milliGRAM(s) Oral two times a day  multivitamin 1 Tablet(s) Oral daily  pantoprazole   Suspension 40 milliGRAM(s) Oral daily  senna 2 Tablet(s) Oral at bedtime  simvastatin 40 milliGRAM(s) Oral at bedtime  tamsulosin 0.4 milliGRAM(s) Oral at bedtime  vancomycin  IVPB 750 milliGRAM(s) IV Intermittent every 24 hours    MEDICATIONS  (PRN):  acetaminophen     Tablet .. 650 milliGRAM(s) Oral every 6 hours PRN Temp greater or equal to 38C (100.4F), Mild Pain (1 - 3)  aluminum hydroxide/magnesium hydroxide/simethicone Suspension 30 milliLiter(s) Oral every 4 hours PRN Dyspepsia  bisacodyl Suppository 10 milliGRAM(s) Rectal daily PRN Constipation  dextrose Oral Gel 15 Gram(s) Oral once PRN Blood Glucose LESS THAN 70 milliGRAM(s)/deciliter  hydrALAZINE 50 milliGRAM(s) Oral every 6 hours PRN for systolic BP>160  magnesium hydroxide Suspension 30 milliLiter(s) Oral daily PRN Constipation  melatonin 3 milliGRAM(s) Oral at bedtime PRN Insomnia  morphine  - Injectable 2 milliGRAM(s) IV Push every 6 hours PRN Severe Pain (7 - 10)  ondansetron Injectable 4 milliGRAM(s) IV Push every 8 hours PRN Nausea and/or Vomiting  sodium chloride 0.9% lock flush 10 milliLiter(s) IV Push every 1 hour PRN Pre/post blood products, medications, blood draw, and to maintain line patency  traMADol 50 milliGRAM(s) Oral four times a day PRN Moderate Pain (4 - 6)      OBJECTIVE    T(C): 36.6 (04-14-23 @ 12:07), Max: 36.7 (04-13-23 @ 20:16)  HR: 74 (04-14-23 @ 12:07) (63 - 74)  BP: 122/67 (04-14-23 @ 12:07) (114/70 - 152/77)  RR: 18 (04-14-23 @ 12:07) (17 - 18)  SpO2: 94% (04-14-23 @ 12:07) (94% - 98%)  Wt(kg): --  I&O's Summary    13 Apr 2023 07:01  -  14 Apr 2023 07:00  --------------------------------------------------------  IN: 120 mL / OUT: 0 mL / NET: 120 mL    14 Apr 2023 07:01  -  14 Apr 2023 14:16  --------------------------------------------------------  IN: 240 mL / OUT: 0 mL / NET: 240 mL          REVIEW OF SYSTEMS:  Patient is  unable to provide any information/ROS  due to baseline mental status.     PHYSICAL EXAM:  Appearance: NAD. VS past 24 hrs -as above   HEENT:   Moist oral mucosa. Conjunctiva clear b/l.   Neck : supple  Respiratory: Lungs CTAB.  Gastrointestinal:  Soft, nontender. No rebound. No rigidity. BS present	  Cardiovascular: RRR ,S1S2 present  Neurologic: Non-focal. Moving all extremities.  Extremities: No edema. No erythema. No calf tenderness.  Skin: No rashes, No ecchymoses, No cyanosis.	  wounds ,skin lesions-See skin assesment flow sheet   LABS:                        9.3    7.07  )-----------( 339      ( 14 Apr 2023 06:47 )             31.1     04-14    141  |  111<H>  |  27<H>  ----------------------------<  137<H>  3.8   |  25  |  1.30    Ca    8.3<L>      14 Apr 2023 06:47  Phos  2.4     04-14  Mg     2.2     04-14    TPro  6.3  /  Alb  2.0<L>  /  TBili  0.4  /  DBili  0.2  /  AST  18  /  ALT  19  /  AlkPhos  64  04-13    CAPILLARY BLOOD GLUCOSE      POCT Blood Glucose.: 231 mg/dL (14 Apr 2023 11:34)  POCT Blood Glucose.: 116 mg/dL (14 Apr 2023 07:36)  POCT Blood Glucose.: 127 mg/dL (13 Apr 2023 21:06)  POCT Blood Glucose.: 178 mg/dL (13 Apr 2023 16:52)          Culture - Tissue with Gram Stain (collected 10 Apr 2023 13:10)  Source: .Tissue Other, LEFT FOOT DEEP TISSUE CULTURE  Gram Stain (10 Apr 2023 22:21):    No polymorphonuclear cells seen per low power field    No organisms seen per oil power field  Preliminary Report (12 Apr 2023 22:20):    Few Methicillin Resistant Staphylococcus aureus    Few Corynebacterium species "Susceptibilities not performed"  Organism: Methicillin resistant Staphylococcus aureus (12 Apr 2023 22:19)  Organism: Methicillin resistant Staphylococcus aureus (12 Apr 2023 22:19)    Culture - Tissue with Gram Stain (collected 10 Apr 2023 13:10)  Source: .Tissue Other, LEFT FOOT 1ST METATARSAL HEAD  Gram Stain (10 Apr 2023 22:21):    No polymorphonuclear cells seen per low power field    No organisms seen per oil power field  Preliminary Report (12 Apr 2023 22:18):    Rare Methicillin Resistant Staphylococcus aureus    Few Corynebacterium species "Susceptibilities not performed"  Organism: Methicillin resistant Staphylococcus aureus (12 Apr 2023 22:16)  Organism: Methicillin resistant Staphylococcus aureus (12 Apr 2023 22:16)      RADIOLOGY & ADDITIONAL TESTS:   reviewed elctronically  ASSESSMENT/PLAN: 	    Patient was seen and examined on a day of discharge . Plan of care , discharge medications and recommendations discussed with consultants and clearance for discharge obtained .Social service , case management  and medical staff are aware of plan. Family is notified. Discharge summary  is  prepared electronically-see separate document prepared by me .75minutes spent on this visit, 50% visit time spent in care co-ordination with other attendings and counselling patient  I have discussed care plan with patient and HCP,expressed understanding of problems treatment and their effect and side effects, alternatives in detail,I have asked if they have any questions and concerns and appropriately addressed them to best of my ability

## 2023-04-14 NOTE — DISCHARGE NOTE NURSING/CASE MANAGEMENT/SOCIAL WORK - NSDCFUADDAPPT_GEN_ALL_CORE_FT
Wound Care Instructions:  -Keep dressing clean, dry, and intact to the right heel / left  foot wounds   -Apply silver alginate and dry sterile  dressing to the right heel  and left foot, change every other day   -Monitor for any signs of infection including redness, swelling in the leg above the bandage, nausea/vomiting/fever/chills/chest pain/shortness of breath, if any are present patient must report to the emergency department immediately  -Patient is to follow up with Dr. Moreau/Dr. Neri/ Dr. Armando  within 5 days after discharge at [facility] [place]. Please call to make an appointment [phone]. Wound Care Instructions:  -Keep dressing clean, dry, and intact to the right heel / left  foot wounds   -Apply silver alginate and dry sterile  dressing to the right heel  and left foot, change every other day   -Monitor for any signs of infection including redness, swelling in the leg above the bandage, nausea/vomiting/fever/chills/chest pain/shortness of breath, if any are present patient must report to the emergency department immediately  -Patient is to follow up with Dr. Moreau/Dr. Neri/ Dr. Armando  within 5 days after discharge at [facility] [place]. Please call to make an appointment [phone]. Wound Care Instructions:  -Keep dressing clean, dry, and intact to the right heel / left  foot wounds   -Apply silver alginate and dry sterile  dressing to the right heel  and left foot, change every other day   -Monitor for any signs of infection including redness, swelling in the leg above the bandage, nausea/vomiting/fever/chills/chest pain/shortness of breath, if any are present patient must report to the emergency department immediately  -Patient is to follow up with Dr. Moreau/Dr. Neri/ Dr. Armando  within 5 days after discharge at [facility] [place]. Please call to make an appointment [phone].

## 2023-04-14 NOTE — PROGRESS NOTE ADULT - ASSESSMENT
88 yo male ,[facility] resident with PMHx - COPD, DM, HTN, CAD, HLD, H/O TIA, dementia,GERD, BPH and depression sent ot ER for evaluation of left foot 1metatarsal wound ,suggestive of osteomyelitis .Patient was seen by ID consult and transfer to the hospital recommended for wound cx/bone biopsy /podiatry evaluation ,likely will require 4-6 weeks of iv abx . Patient was admitted to [facility] with b/l feet wounds and was followed by wound care team ,recently completed 7 days of doxycycline for foot wound cellulitis . (30 Mar 2023 05:21)      hypernatremia   improved      hypokalemia potassium chloride  10 mEq/100 mL IVPB 10 milliEquivalent(s) IV Intermittent every 1 hour  prn     ACUTE RENAL FAILURE: decrease acei   Serum creatinine is improving    There is no progression . No uremic symptoms  No evidence of anemia .  Fluid status stable.  Will continue to avoid nephrotoxic drugs.  Patient remains asymptomatic.   Continue current therapy.  hold  diuretic.        BP monitoring,continue current antihypertensive meds, low salt diet,followup with PMD in 1-2 weeks  losartan 25 milliGRAM(s) Oral daily    f/u  blood and urine cx,serial lactate levels,monitor vitals closley,kelvins hydration,monitor urine output and renal profile,iv abx   piperacillin/tazobactam IVPB.. 3.375 Gram(s) IV Intermittent every 8 hours 86 yo male ,[facility] resident with PMHx - COPD, DM, HTN, CAD, HLD, H/O TIA, dementia,GERD, BPH and depression sent ot ER for evaluation of left foot 1metatarsal wound ,suggestive of osteomyelitis .Patient was seen by ID consult and transfer to the hospital recommended for wound cx/bone biopsy /podiatry evaluation ,likely will require 4-6 weeks of iv abx . Patient was admitted to [facility] with b/l feet wounds and was followed by wound care team ,recently completed 7 days of doxycycline for foot wound cellulitis . (30 Mar 2023 05:21)      hypernatremia   improved      hypokalemia potassium chloride  10 mEq/100 mL IVPB 10 milliEquivalent(s) IV Intermittent every 1 hour  prn     ACUTE RENAL FAILURE: decrease acei   Serum creatinine is improving    There is no progression . No uremic symptoms  No evidence of anemia .  Fluid status stable.  Will continue to avoid nephrotoxic drugs.  Patient remains asymptomatic.   Continue current therapy.  hold  diuretic.        BP monitoring,continue current antihypertensive meds, low salt diet,followup with PMD in 1-2 weeks  losartan 25 milliGRAM(s) Oral daily    f/u  blood and urine cx,serial lactate levels,monitor vitals closley,kelvins hydration,monitor urine output and renal profile,iv abx   piperacillin/tazobactam IVPB.. 3.375 Gram(s) IV Intermittent every 8 hours 88 yo male ,[facility] resident with PMHx - COPD, DM, HTN, CAD, HLD, H/O TIA, dementia,GERD, BPH and depression sent ot ER for evaluation of left foot 1metatarsal wound ,suggestive of osteomyelitis .Patient was seen by ID consult and transfer to the hospital recommended for wound cx/bone biopsy /podiatry evaluation ,likely will require 4-6 weeks of iv abx . Patient was admitted to [facility] with b/l feet wounds and was followed by wound care team ,recently completed 7 days of doxycycline for foot wound cellulitis . (30 Mar 2023 05:21)      hypernatremia   improved      hypokalemia potassium chloride  10 mEq/100 mL IVPB 10 milliEquivalent(s) IV Intermittent every 1 hour  prn     ACUTE RENAL FAILURE: decrease acei   Serum creatinine is improving    There is no progression . No uremic symptoms  No evidence of anemia .  Fluid status stable.  Will continue to avoid nephrotoxic drugs.  Patient remains asymptomatic.   Continue current therapy.  hold  diuretic.        BP monitoring,continue current antihypertensive meds, low salt diet,followup with PMD in 1-2 weeks  losartan 25 milliGRAM(s) Oral daily    f/u  blood and urine cx,serial lactate levels,monitor vitals closley,kelvins hydration,monitor urine output and renal profile,iv abx   piperacillin/tazobactam IVPB.. 3.375 Gram(s) IV Intermittent every 8 hours

## 2023-04-14 NOTE — PROGRESS NOTE ADULT - PROBLEM SELECTOR PLAN 7
continue home medications
Pt does not appear to be a surgical candidate for vascular interventions in light of his advanced dementia, intermittent behavioral disturbances and malnutrition   conservative approach advised by vascular team
continue home medications
Pt does not appear to be a surgical candidate for vascular interventions in light of his advanced dementia, intermittent behavioral disturbances and malnutrition   conservative approach advised by vascular team
continue home medications

## 2023-04-14 NOTE — PROGRESS NOTE ADULT - PROBLEM SELECTOR PROBLEM 10
COPD, moderate
Dementia
Stage 3 chronic kidney disease
BPH without urinary obstruction
Dementia
BPH without urinary obstruction
Dementia
BPH without urinary obstruction
COPD, moderate
BPH without urinary obstruction
BPH without urinary obstruction
Dementia
BPH without urinary obstruction

## 2023-04-14 NOTE — DISCHARGE NOTE PROVIDER - NSDCCAREPROVSEEN_GEN_ALL_CORE_FT
Tr, Ledy Alston, Billy Ward, Denny Arevalo, Dutch France, Dorothy Perlman, Sunil Garcia, Vilma Sanders, Linda Mascorro, Manuel Amaya, Neno Newell, Guanakito GARCIA

## 2023-04-14 NOTE — PROGRESS NOTE ADULT - PROBLEM SELECTOR PLAN 9
continue home medications
serial bmp ,ins/outs
Culture - Urine (collected 02 Apr 2023 09:15)  Source: Clean Catch Clean Catch (Midstream)  Final Report (04 Apr 2023 19:44):    >100,000 CFU/ml Enterococcus faecalis  Organism: Enterococcus faecalis (04 Apr 2023 19:44)  ID cons is following
continue home medications
serial bmp ,ins/outs
continue home medications
continue home medications
Culture - Urine (collected 02 Apr 2023 09:15)  Source: Clean Catch Clean Catch (Midstream)  Final Report (04 Apr 2023 19:44):    >100,000 CFU/ml Enterococcus faecalis  Organism: Enterococcus faecalis (04 Apr 2023 19:44)  ID cons is following

## 2023-04-14 NOTE — DISCHARGE NOTE PROVIDER - PROVIDER TOKENS
PROVIDER:[TOKEN:[[id number]],FOLLOWUP:[1 week]],PROVIDER:[TOKEN:[[id number]],FOLLOWUP:[1 week]],PROVIDER:[[id number]],FOLLOWUP:[1 month]] PROVIDER:[TOKEN:[[id number]],FOLLOWUP:[1 week]],PROVIDER:[TOKEN:[[id number]],FOLLOWUP:[1 week]],PROVIDER:[[id number]],FOLLOWUP:[1 month]] PROVIDER:[TOKEN:[[id number]],FOLLOWUP:[1 week]],PROVIDER:[TOKEN:[[id number]],FOLLOWUP:[1 week]],PROVIDER:[TOKEN:[[id number]],FOLLOWUP:[1 month]]

## 2023-04-14 NOTE — SOCIAL WORK PROGRESS NOTE - NSSWPROGRESSNOTE_GEN_ALL_CORE
pt for dc back to [facility]. Sw informed son whom is in agreement. [facility] to  pt at 5pm. No further sw services indicated at this time. All paperwork to accompany pt. No further sw services indicated at this time.    pt for dc back to [facility]. Sw informed son whom is in agreement. [facility] to  pt at 5pm. No further sw services indicated at this time. All paperwork to accompany pt. No further sw services indicated at this time.    pt for dc back to [facility]. Sw informed son whom is in agreement. [facility] to  pt at 5pm. No further sw services indicated at this time. All paperwork to accompany pt. No further sw services indicated at this time.

## 2023-04-14 NOTE — PROGRESS NOTE ADULT - PROBLEM SELECTOR PLAN 4
Admitted for workup and IV antibiotics  Pt unable to provide any reliable information  Reports walks independently  Will obtain ALANNA/PVRs to provide input on wound healing potential  Pt does not appear to be a surgical candidate however in light of his advanced dementia, intermittent behavioral disturbances and malnutrition  Would consider conservative approach and GOC conversation
Pressure ulcers to bilateral heels with necrotic stable eschars   Continue with offloading boots at all times since patient is non ambulatory   Applied Aquacel and sterile dry dressing to bilateral heels.
Pressure ulcers to bilateral heels with necrotic stable eschars   Continue with offloading boots at all times since patient is non ambulatory   Applied Aquacel and sterile dry dressing to bilateral heels.
monitor pulse oximetry , s/p  remdesivir by ID cons ,pulm is following
Admitted for workup and IV antibiotics  Pt unable to provide any reliable information  Reports walks independently  Will obtain ALANNA/PVRs to provide input on wound healing potential  Pt does not appear to be a surgical candidate however in light of his advanced dementia, intermittent behavioral disturbances and malnutrition  Would consider conservative approach and GOC conversation
Pressure ulcers to bilateral heels with necrotic stable eschars   Continue with offloading boots at all times since patient is non ambulatory   Applied Aquacel and sterile dry dressing to bilateral heels.
Admitted for workup and IV antibiotics  Pt unable to provide any reliable information  Reports walks independently  Will obtain ALANNA/PVRs to provide input on wound healing potential  Pt does not appear to be a surgical candidate however in light of his advanced dementia, intermittent behavioral disturbances and malnutrition  Would consider conservative approach and GOC conversation
Admitted for workup and IV antibiotics  Pt unable to provide any reliable information  Reports walks independently  Will obtain ALANNA/PVRs to provide input on wound healing potential  Pt does not appear to be a surgical candidate however in light of his advanced dementia, intermittent behavioral disturbances and malnutrition  Would consider conservative approach and GOC conversation
Pressure ulcers to bilateral heels with necrotic stable eschars   Continue with offloading boots at all times since patient is non ambulatory   Applied Aquacel and sterile dry dressing to bilateral heels.
Accuchecks monitoring and insulin corrective regimen  sliding scale coverage with short acting inslulin, add longacting insulin as needed ,no concentrated sweets diet, serial labs ,HbA1C,education
Pressure ulcers to bilateral heels with necrotic stable eschars   Continue with offloading boots at all times since patient is non ambulatory   Applied Aquacel and sterile dry dressing to bilateral heels.
Pressure ulcers to bilateral heels with necrotic stable eschars   Continue with offloading boots at all times since patient is non ambulatory   Applied Aquacel and sterile dry dressing to bilateral heels.

## 2023-04-14 NOTE — PHARMACOTHERAPY INTERVENTION NOTE - COMMENTS
Patient is an 87 year old male with OM. Patient is currently ordered for vancomycin 750 mg every 24 hours. Expected 24-hour AUC estimate on current dose is 362 (based off population estimates). Will follow up with first trough 4/15 07:00 in order to obtain patient specific AUC estimates to help guide dosing.    Vancomycin dosing by pharmacy:    1. Indication for the vancomycin: OM  2. Requesting Provider: Dr. Mancia  3. Current plan and dosing regimen: 750 mg every 24 hours   4. Creatinine Clearance (CrCl): 40 mL/min  5. Scr: 1.3  6. Day of therapy: 2  7. 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 faecalis (4/2)   8. WBC: 7.07  9. Target AUC: 400-600  10. Expected 24-hr AUC: 362 (based off population estimates; will follow up patient-specific estimates after first trough)  11. Day and time trough is due: 4/15 07:00  12. Previous troughs: N/a    
Medication reconciliation was completed by pharmacy representative. The following discrepancies were discussed with Dr. Sanders    Facility Med                                 Current Order/Not Ordered  Famotidine 40mg 1T QD               Not ordered  Losartan 50mg 1T QD                   Losartan 25mg 1T QD       MD aware and would like to adjust losartan to match facility dose

## 2023-04-15 LAB
CULTURE RESULTS: SIGNIFICANT CHANGE UP
ORGANISM # SPEC MICROSCOPIC CNT: SIGNIFICANT CHANGE UP
SPECIMEN SOURCE: SIGNIFICANT CHANGE UP

## 2023-04-17 ENCOUNTER — NON-APPOINTMENT (OUTPATIENT)
Age: 88
End: 2023-04-17

## 2023-04-24 NOTE — PROVIDER CONTACT NOTE (HYPOGLYCEMIA EVENT) - NS PROVIDER CONTACT BACKGROUND-HYPO
Age: 87y    Gender: Male    POCT Blood Glucose:  59 mg/dL (04-06-23 @ 07:45)  69 mg/dL (04-06-23 @ 07:42)  175 mg/dL (04-05-23 @ 21:39)  116 mg/dL (04-05-23 @ 19:03)  97 mg/dL (04-05-23 @ 17:51)  86 mg/dL (04-05-23 @ 17:14)  66 mg/dL (04-05-23 @ 16:41)  77 mg/dL (04-05-23 @ 11:33)      eMAR:dextrose 50% Injectable   12.5 Gram(s) IV Push (04-05-23 @ 16:57)    dextrose 50% Injectable   12.5 Gram(s) IV Push (04-06-23 @ 07:56)    insulin glargine Injectable (LANTUS)   15 Unit(s) SubCutaneous (04-05-23 @ 22:35)    simvastatin   40 milliGRAM(s) Oral (04-05-23 @ 22:49)    
PAST MEDICAL HISTORY:  BPH (benign prostatic hyperplasia)     HLD (hyperlipidemia)     Peripheral neuropathy

## 2023-05-01 ENCOUNTER — NON-APPOINTMENT (OUTPATIENT)
Age: 88
End: 2023-05-01
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
## 2023-09-18 NOTE — DISCHARGE NOTE PROVIDER - CARE PROVIDER_API CALL
Detail Level: Zone Sunil Agustin (MD)  Gastroenterology; Internal Medicine  [address]  [place] [address]  Phone: [phone]  Fax: [phone]  Follow Up Time:

## 2023-12-07 RX ORDER — ATENOLOL 25 MG/1
1 TABLET ORAL
Refills: 0 | DISCHARGE

## 2023-12-07 RX ORDER — IPRATROPIUM/ALBUTEROL SULFATE 18-103MCG
3 AEROSOL WITH ADAPTER (GRAM) INHALATION
Refills: 0 | DISCHARGE

## 2023-12-07 RX ORDER — LOSARTAN POTASSIUM 100 MG/1
1 TABLET, FILM COATED ORAL
Refills: 0 | DISCHARGE

## 2023-12-07 RX ORDER — TAMSULOSIN HYDROCHLORIDE 0.4 MG/1
1 CAPSULE ORAL
Refills: 0 | DISCHARGE

## 2023-12-07 RX ORDER — MAGNESIUM HYDROXIDE 400 MG/1
30 TABLET, CHEWABLE ORAL
Refills: 0 | DISCHARGE

## 2023-12-07 RX ORDER — ACETAMINOPHEN 500 MG
2 TABLET ORAL
Refills: 0 | DISCHARGE

## 2023-12-07 RX ORDER — FAMOTIDINE 10 MG/ML
1 INJECTION INTRAVENOUS
Refills: 0 | DISCHARGE

## 2023-12-07 RX ORDER — LACTOBACILLUS ACIDOPHILUS 100MM CELL
1 CAPSULE ORAL
Refills: 0 | DISCHARGE

## 2023-12-07 RX ORDER — COLLAGENASE CLOSTRIDIUM HIST. 250 UNIT/G
1 OINTMENT (GRAM) TOPICAL
Refills: 0 | DISCHARGE

## 2023-12-07 RX ORDER — SIMVASTATIN 20 MG/1
1 TABLET, FILM COATED ORAL
Refills: 0 | DISCHARGE

## 2023-12-07 RX ORDER — DOCUSATE SODIUM 100 MG
2 CAPSULE ORAL
Refills: 0 | DISCHARGE

## 2023-12-07 RX ORDER — INSULIN LISPRO 100/ML
0 VIAL (ML) SUBCUTANEOUS
Refills: 0 | DISCHARGE

## 2023-12-07 RX ORDER — DONEPEZIL HYDROCHLORIDE 10 MG/1
1 TABLET, FILM COATED ORAL
Refills: 0 | DISCHARGE

## 2023-12-07 RX ORDER — SITAGLIPTIN 50 MG/1
1 TABLET, FILM COATED ORAL
Refills: 0 | DISCHARGE

## 2023-12-07 RX ORDER — FLUTICASONE PROPIONATE 220 MCG
1 AEROSOL WITH ADAPTER (GRAM) INHALATION
Refills: 0 | DISCHARGE

## 2023-12-07 RX ORDER — TRAMADOL HYDROCHLORIDE 50 MG/1
0.5 TABLET ORAL
Refills: 0 | DISCHARGE

## 2023-12-07 RX ORDER — DULOXETINE HYDROCHLORIDE 30 MG/1
1 CAPSULE, DELAYED RELEASE ORAL
Refills: 0 | DISCHARGE

## 2024-02-01 NOTE — DIETITIAN INITIAL EVALUATION ADULT - PERTINENT MEDS FT
Patent MEDICATIONS  (STANDING):  albuterol/ipratropium for Nebulization 3 milliLiter(s) Nebulizer every 6 hours  atenolol  Tablet 25 milliGRAM(s) Oral daily  dextrose 5%. 1000 milliLiter(s) (100 mL/Hr) IV Continuous <Continuous>  dextrose 5%. 1000 milliLiter(s) (50 mL/Hr) IV Continuous <Continuous>  dextrose 50% Injectable 25 Gram(s) IV Push once  dextrose 50% Injectable 12.5 Gram(s) IV Push once  dextrose 50% Injectable 25 Gram(s) IV Push once  DULoxetine 60 milliGRAM(s) Oral daily  glucagon  Injectable 1 milliGRAM(s) IntraMuscular once  heparin   Injectable 5000 Unit(s) SubCutaneous every 8 hours  insulin glargine Injectable (LANTUS) 15 Unit(s) SubCutaneous at bedtime  insulin lispro (ADMELOG) corrective regimen sliding scale   SubCutaneous three times a day before meals  insulin lispro (ADMELOG) corrective regimen sliding scale   SubCutaneous at bedtime  insulin lispro Injectable (ADMELOG) 5 Unit(s) SubCutaneous three times a day before meals  losartan 50 milliGRAM(s) Oral daily  melatonin 3 milliGRAM(s) Oral at bedtime  mometasone 220 MICROgram(s) Inhaler 1 Puff(s) Inhalation daily  pantoprazole    Tablet 40 milliGRAM(s) Oral before breakfast  simvastatin 40 milliGRAM(s) Oral at bedtime  tamsulosin 0.8 milliGRAM(s) Oral at bedtime    MEDICATIONS  (PRN):  acetaminophen     Tablet .. 650 milliGRAM(s) Oral every 6 hours PRN Temp greater or equal to 38C (100.4F), Mild Pain (1 - 3)  albuterol   0.5% 2.5 milliGRAM(s) Nebulizer every 2 hours PRN SOB/Cough  dextrose Oral Gel 15 Gram(s) Oral once PRN Blood Glucose LESS THAN 70 milliGRAM(s)/deciliter  guaiFENesin Oral Liquid (Sugar-Free) 100 milliGRAM(s) Oral every 6 hours PRN Cough  sodium chloride 3%  Inhalation 4 milliLiter(s) Inhalation once PRN sputum induction

## 2024-08-16 NOTE — H&P ADULT - HIV OFFER
Infectious Disease Progress Note    Reason for Consultation: SIRS   Date of Consultation: 8/16/2024  Physician requesting Consultation: No ref. provider found       Subjective   No acute events overnight. Patient  examined at bedside  this AM. Reports feeling much better since when he arrived to hospital. Denies dysuria, chest pain,or  abdominal pain.Reports to right inguinal region with palpation,but says it is less than before.  Was able to have bowel  movement and feels relief  of gassy feeling. No other complaints.   Cardiology plans for left heart cath today at 4 pm.      Review of Systems:    Constitutional: Denies fever, chills, rigors, or night sweats  Eyes: Denies change in vision  Ears/nose/mouth/throat: Denies sore throat, oral lesions  CV: Denies chest pain, palpitations  Respiratory: Denies cough, dyspnea  GI: Denies nausea, vomiting, diarrhea currently. Gas has resolved,had BM  : Denies dysuria or increased frequency  MSK: Denies myalgias or joint swelling. Reports some back pain last week  Skin: Denies rash  Neuro: Denies headache    Medications:  Current Facility-Administered Medications   Medication Dose Route Frequency Provider Last Rate Last Admin    polyethylene glycol (MIRALAX) packet 17 g  17 g Oral Daily PRN Olga Watson MD   17 g at 08/15/24 1226    sodium chloride 0.9 % flush bag 25 mL  25 mL Intravenous PRN Talisha Chavis CNP        sodium chloride 0.9 % injection 2 mL  2 mL Intracatheter 2 times per day Talisha Chavis CNP   2 mL at 08/16/24 0855    ibuprofen (MOTRIN) tablet 600 mg  600 mg Oral Q6H PRN Daniel Otero MD        aspirin chewable 81 mg  81 mg Oral Daily Daniel Otero MD   81 mg at 08/16/24 0853    atorvastatin (LIPITOR) tablet 10 mg  10 mg Oral Daily Daniel Otero MD   10 mg at 08/16/24 0853    betamethasone dipropionate (DIPROSONE) 0.05 % cream   Topical Daily Daniel Otero MD        doxazosin (CARDURA) tablet 1 mg  1 mg Oral Nightly Daniel Otero MD   1 mg at  08/15/24 2110    glipiZIDE (GLUCOTROL) tablet 5 mg  5 mg Oral BID AC Daniel Otero MD   5 mg at 08/15/24 164    labetalol (NORMODYNE) tablet 200 mg  200 mg Oral BID Daniel Otero MD   200 mg at 24 08    levothyroxine (SYNTHROID, LEVOTHROID) tablet 112 mcg  112 mcg Oral Daily Daniel Otero MD   112 mcg at 24 08    losartan (COZAAR) tablet 100 mg  100 mg Oral Daily Daniel Otero MD   100 mg at 24 08    [Held by provider] metFORMIN (GLUCOPHAGE) tablet 850 mg  850 mg Oral BID Daniel Mccain MD        NIFEdipine XL (PROCARDIA XL) ER tablet 90 mg  90 mg Oral Daily Daniel Otero MD   90 mg at 24 08    pantoprazole (PROTONIX) EC tablet 20 mg  20 mg Oral Daily Daniel Otero MD   20 mg at 24 08    dextrose 50 % injection 25 g  25 g Intravenous PRN Daniel Otero MD        dextrose 50 % injection 12.5 g  12.5 g Intravenous PRN Daniel Otero MD        glucagon (GLUCAGEN) injection 1 mg  1 mg Intramuscular PRN Daniel Otero MD        dextrose (GLUTOSE) 40 % gel 15 g  15 g Oral PRN Daniel Otero MD        dextrose (GLUTOSE) 40 % gel 30 g  30 g Oral PRN Daniel Otero MD        insulin lispro (ADMELOG,HumaLOG) - Correction Dose   Subcutaneous TID Daniel Mccain MD   1 Units at 08/15/24 1649    heparin (porcine) 25,000 units/250 mL in dextrose 5 % infusion  1-30 Units/kg/hr (Dosing Weight) Intravenous Continuous Talisha Chavis CNP 7.2 mL/hr at 24 0640 10 Units/kg/hr at 24 0640    heparin (porcine) injection 4,000 Units  4,000 Units Intravenous PRN Talisha Chavis CNP        heparin (porcine) injection 2,000 Units  2,000 Units Intravenous PRN Talisha Chavis CNP        lactated ringers infusion   Intravenous Continuous Cheyanne Schofield  mL/hr at 24 0456 New Bag at 24 0456            Objective     Temp (24hrs), Av.2 °F (36.8 °C), Min:98.1 °F (36.7 °C), Max:98.2 °F (36.8 °C)  Visit Vitals  /73   Pulse 63   Temp 98.2 °F (36.8 °C) (Oral)    Resp 16   Ht 5' 5\" (1.651 m)   Wt 72.3 kg (159 lb 6.3 oz)   SpO2 95%   BMI 26.52 kg/m²   Vitals reviewed    Physical exam:  GEN: NAD, awake and alert  HEENT: normocephalic, atraumatic, oropharynx clear  LYMPH: Right inguinal lymphadenopathy present, minimally tender to palpation  CV: RRR, no murmurs  PULM: CTAB, normal respiratory effort  GI: soft, non distended.   MSK: no joint swelling, erythema, or deformities  NEURO: alert and oriented to self, cranial nerves grossly intact, no gross focal deficits  PSYCH: normal mood and affect  SKIN: Dry wounds present on bilateral feet, and right lower extremity inferior to knee joint    Labs:  Recent Labs   Lab 24  0605   WBC 7.8   RBC 4.10*   HGB 12.0*   HCT 35.9*         Recent Labs   Lab 24  0605   SODIUM 138   POTASSIUM 3.9   CHLORIDE 109   CO2 23   BUN 12   CREATININE 0.85   CALCIUM 8.6   ALBUMIN 2.9*   BILIRUBIN 0.4   ALKPT 64   GPT 32   AST 21   GLUCOSE 120*        Imaging:  TRANSTHORACIC ECHO(TTE) COMPLETE W/ W/O IMAGING AGENT  *[facility]*  [address] [address] [phone]  Transthoracic Echocardiogram (TTE)    Patient: Lewis Rodriguez    Study Date/Time:         Aug 14 2024 1:31PM  MRN:     [id number]          FIN#:                    [id number]  :     1941       Ht/Wt:                   170.2cm 81.7kg  Age:     82               BSA/BMI:                 1.98m^2 28.2kg/m^2  Gender:  M                Baseline BP:             153 / 68  Ordering Physician:     Talisha Chavis     Referring Physician:    Talisha Chavis     Attending Physician:    Daniel Otero     Diagnostic Physician:   Maribell Uribe MD  Sonographer:            Dana LOCKETT     ------------------------------------------------------------------------------  INDICATIONS:   Troponin increased in blood specimen.    ------------------------------------------------------------------------------  STUDY CONCLUSIONS  SUMMARY:    1.  Left ventricle: The cavity size is normal. Wall thickness is mildly     increased. Systolic function is normal. The ejection fraction was measured     by single plane method of disks. The ejection fraction is 65%.  2. Right ventricle: The cavity size is normal. Systolic function is normal.    ------------------------------------------------------------------------------  STUDY DATA:  Patient room number: 828S-B1.  Procedure:  A transthoracic  echocardiogram was performed. Image quality was good.  M-mode, complete 2D,  complete spectral Doppler, and color Doppler.  Study status:  Routine.  Study  completion:  There were no complications.    FINDINGS    BASELINE ECG:   Normal sinus rhythm.  LEFT VENTRICLE:  The cavity size is normal. Wall thickness is mildly  increased. Systolic function is normal. Wall motion is normal; there are no  regional wall motion abnormalities.    The ejection fraction was measured by  single plane method of disks. The ejection fraction is 65%. The tissue Doppler  parameters are abnormal. Left ventricular diastolic function is indeterminate.    AORTIC VALVE:  The annulus is mildly calcified. The valve is trileaflet. The  leaflets are mildly sclerotic. Velocity is within the normal range. There is  no stenosis. There is no regurgitation.    AORTA:  Aortic root: The root is normal-sized.  Ascending aorta: The vessel is 3.5cm diameter.    MITRAL VALVE:  The annulus is mildly calcified. The leaflets are normal  thickness. Inflow velocity is within the normal range. There is no evidence  for stenosis. There is no regurgitation. The peak diastolic gradient is 3mm  Hg.    LEFT ATRIUM:  The atrium is normal in size.    RIGHT VENTRICLE:  The cavity size is normal. Systolic function is normal. The  TAPSE is normal, suggestive of normal RV systolic function.    PULMONIC VALVE:   The leaflets are normal thickness. There is trivial  regurgitation.    TRICUSPID VALVE:  The valve is structurally normal.  There is trivial  regurgitation.    RIGHT ATRIUM:  The atrium is normal in size.    PERICARDIUM:  A prominent pericardial fat pad is present.  There is no  pericardial effusion.    SYSTEMIC VEINS:  Inferior vena cava: The IVC is normal-sized.  Respirophasic diameter changes  are in the normal range (>= 50%).    ------------------------------------------------------------------------------  Measurements     Left ventricle            Value        Ref       02/20/2021  Right ventricle          Value          Ref        02/20/2021   ARISTIDES, LAX chord        (N) 5.1   cm     4.2 - 5.8 4.6         ARISTIDES, LAX                 2.4   cm       ---------- 3.6   ESD, LAX chord        (N) 3.8   cm     2.5 - 4.0 3.3         TAPSE, MM            (N) 2.0   cm       >=1.7      ----------   ARISTIDES/bsa, LAX chord    (N) 2.6   cm/m^2 2.2 - 3.0 2.7         S' lateral           (N) 11.5  cm/sec   6.0 - 13.4 ----------   ESD/bsa, LAX chord    (N) 1.9   cm/m^2 1.3 - 2.1 1.9   PW, ED, LAX           (H) 1.3   cm     0.6 - 1.0 0.5         Left atrium              Value          Ref        02/20/2021   ARISTIDES major ax, A4C         8.0   cm     --------- 6.4         AP dim, ES           (N) 3.6   cm       3.0 - 4.0  3.5   ESD major ax, A4C         7.5   cm     --------- 5.8         AP dim index, ES     (N) 1.8   cm/m^2   1.5 - 2.3  ----------   FS major axis, A4C        6     %      --------- 10          Area ES, A4C         (N) 18    cm^2     <=20       19   ARISTIDES/bsa major ax, A4C     4.0   cm/m^2 --------- 3.7         Area/bsa ES, A4C         8.87  cm^2/m^2 ---------- ----------   ESD/bsa major ax, A4C     3.8   cm/m^2 --------- 3.3         Area ES, A2C             21    cm^2     ---------- 15   ANAI, A4C                  26.3  cm^2   --------- 18.9        Area/bsa ES, A2C         10.68 cm^2/m^2 ---------- ----------   DIVYA, A4C                  15.1  cm^2   --------- 11.2        Vol, S               (N) 50    ml       18 - 58    41   FAC, A4C                   43    %      --------- 41          Vol/bsa, S           (N) 25    ml/m^2   16 - 34    24   IVS, ED               (H) 1.2   cm     0.6 - 1.0 0.9         Vol, ES, 1-p A4C     (N) 41    ml       18 - 58    46   PW, ED                (H) 1.3   cm     0.6 - 1.0 0.5         Vol/bsa, ES, 1-p A4C (N) 21    ml/m^2   12 - 37    27   IVS/PW, ED                0.95         --------- 1.57        Vol, ES, 1-p A2C     (H) 60    ml       18 - 58    33   EDV                   (N) 135   ml     62 - 150  100         Vol/bsa, ES, 1-p A2C (N) 30    ml/m^2   11 - 43    19   ESV                   (N) 54    ml     21 - 61   36          Vol, ES, 2-p             50    ml       ---------- 41   EF                    (N) 65    %      52 - 72   60          Vol/bsa, ES, 2-p     (N) 25    ml/m^2   16 - 34    24   SV                        65    ml     --------- 55   EDV/bsa               (N) 68    ml/m^2 34 - 74   58          Mitral valve             Value          Ref        02/20/2021   ESV/bsa               (N) 27    ml/m^2 11 - 31   21          Peak E                   0.8   m/sec    ---------- 0.7   SV/bsa                    33    ml/m^2 --------- 32          Peak A                   1.01  m/sec    ---------- 0.97   SV, 1-p A4C               46    ml     --------- 28          Decel time               275   ms       ---------- 282   SV/bsa, 1-p A4C           23    ml/m^2 --------- 16          Peak grad, D             3     mm Hg    ---------- ----------   ESV, 2-p              (N) 24    ml     21 - 61   19          Peak E/A ratio           0.8            ---------- ----------   ESV/bsa, 2-p          (N) 12    ml/m^2 11 - 31   11   E', lat nas, TDI      (L) 8.2   cm/sec >=10.0    4.9         Aortic root              Value          Ref        02/20/2021   E/e', lat nas, TDI    (N) 10           <=13      ----------  Root diam, ED        (N) 3.0   cm       2.7 - 4.1  ----------   E', med nas, TDI      (L) 6.7   cm/sec >=7.0     3.5   E/e', med  Opt out nas, TDI        12           --------- ----------  Ascending aorta          Value          Ref        02/20/2021   E', avg, TDI              7.45  cm/sec --------- ----------  AAo AP diam, ED      (N) 3.5   cm       2.2 - 3.8  ----------   E/e', avg, TDI        (N) 11           <=14      ----------  AAo AP diam/bsa, ED  (N) 1.8   cm/m^2   1.1 - 1.9  ----------     Legend:  (L)  and  (H)  sharlene values outside specified reference range.    (N)  marks values inside specified reference range.    Prepared and electronically signed by  Maribell Uribe MD  08/14/2024 17:03       Pertinent Micro:     Blood Cx (8/13): NGTD  UA (8/13): no sign of UTI  Rapid respiratory panel: negative  Covid: negative  Chlamydia/gonorrhea KATHY: negative  Repeat UA (8/14): no sign of UTI  HIV: negative      Assessment & Plan      SIRS (resolved) d/t NSTEMI vs STI  -  WBC 19.2, temp 100.8, lactic 2.5 on arrival  - WBC 7.8 on 8/16  - Blood cx: NGTD  - Gonorrhea/Chlamydia KATHY: Negative  - Two UA show no sign of infection and now asymptomatic, cystitis unlikely   - Troponin peaked to 1313, then downtrended   - EKG shows no evidence of STEMI, patient denies chest pain   - Cardiology consulted    - Treating like NSTEMI    - Echo showed EF of 65%    - Planning for [facility]   Right inguinal adenopathy-improving   - RLE stent procedure 6 months ago   - Right inguinal/RLE tenderness to palpation, swollen lymph node felt on palpation   - RLE duplex US: no sign of DVT or pseudoaneurysm   - HIV: negative      Recommendations:  - CTM off antibiotics  - Follow blood cx  - Continue mIVF  ml/hr and heparin drip  - Appreciate plan per cardiology and primary team      Thank you for this consultation, will follow patient with you.       Jhonny Bauer [id number]   8/16/2024 10:15 AM     Initial (On Arrival)

## 2024-08-27 NOTE — ED PROVIDER NOTE - OBJECTIVE STATEMENT
84 yoM, sig PMHX, HTN, HLD, DM, CAD, TIA, falls BIB EMS for concern for SOB. Pt very limitied historian. At this time has no complaints. States he feels well.    Wife called for collateral:  pt had labored breathing at home this evening, was not complaining of chest pain, sees cards regularly and had EKG las week which was "stable", no fever/chills, no recent falls, eating/drinking,  PCP: Sunil Agustin  Cards: Juancho Mann
Statement Selected

## 2024-11-06 NOTE — ED ADULT TRIAGE NOTE - CCCP TRG CHIEF CMPLNT
BP today 110/74  Goal BP <140/90, patient is at goal   See attached image for BP at home   Current medication: Lisinopril 5mg  At last visit patient was started on lisinopril 5mg   Continue current medication regimen   Advised patient on symptoms of hypotension & severe HTN  Limit salt-intake & caffeine. DASH diet discussed, minimize stress level  Weight reduction efforts via improved diet & increased exercise              wound check

## 2025-01-06 NOTE — H&P ADULT - PROBLEM/PLAN-12
PA submitted [employer] for Trulicity 1.5 mg #2 mL per 28 days     Key: [id number]  PA Case ID #: [id number]   DISPLAY PLAN FREE TEXT

## (undated) DEVICE — SPECIMEN CONTAINER 4OZ

## (undated) DEVICE — SAW BLADE LINVATEC SAGITTAL 19.5X41.0X..4MM COARSE

## (undated) DEVICE — FLOORMAT SURGISAFE ABSORBANT WHITE 36" X 72"

## (undated) DEVICE — SYR LUER LOK 10CC

## (undated) DEVICE — SUT MONOSOF 3-0 18" P-14

## (undated) DEVICE — DRSG TELFA 3 X 8

## (undated) DEVICE — VENODYNE/SCD SLEEVE CALF LARGE

## (undated) DEVICE — SUT POLYSORB 2-0 30" GS-22 UNDYED

## (undated) DEVICE — DRAPE 3/4 SHEET W REINFORCEMENT 56X77"

## (undated) DEVICE — NDL HYPO SAFE 18G X 1.5" (PINK)

## (undated) DEVICE — GLV 7 PROTEXIS (WHITE)

## (undated) DEVICE — PLV-SCD MACHINE: Type: DURABLE MEDICAL EQUIPMENT

## (undated) DEVICE — DRSG XEROFORM 1 X 8"

## (undated) DEVICE — BUR MICROAIRE CARBIDE OVAL 4MM 8 FLUTE

## (undated) DEVICE — PREP ALCOHOL PAD MED

## (undated) DEVICE — SOL IRR POUR NS 0.9% 1000ML

## (undated) DEVICE — WARMING BLANKET UPPER ADULT

## (undated) DEVICE — BLADE SCALPEL SAFETYLOCK #10

## (undated) DEVICE — BLADE SCALPEL SAFETYLOCK #15

## (undated) DEVICE — PACK LOWER EXTREMITY NS PLAINVI

## (undated) DEVICE — GLV 6.5 PROTEXIS (BLUE)

## (undated) DEVICE — SOL IRR POUR H2O 1000ML

## (undated) DEVICE — DRSG KERLIX ROLL 4.5"

## (undated) DEVICE — NDL HYPO SAFE 22G X 1.5" (BLACK)

## (undated) DEVICE — PREP BETADINE KIT

## (undated) DEVICE — SUT POLYSORB 4-0 18" P-12 UNDYED

## (undated) DEVICE — SAW BLADE LINVATEC SAGITTAL MIC 9.5X25.5X0.4MM

## (undated) DEVICE — DRSG CURITY GAUZE SPONGE 4 X 4" 12-PLY

## (undated) DEVICE — DRSG COMBINE 5X9"

## (undated) DEVICE — SUT MONOSOF 2-0 18" C-15

## (undated) DEVICE — VENODYNE/SCD SLEEVE CALF MEDIUM